# Patient Record
Sex: FEMALE | Race: WHITE | NOT HISPANIC OR LATINO | Employment: UNEMPLOYED | URBAN - METROPOLITAN AREA
[De-identification: names, ages, dates, MRNs, and addresses within clinical notes are randomized per-mention and may not be internally consistent; named-entity substitution may affect disease eponyms.]

---

## 2021-03-12 ENCOUNTER — IMMUNIZATION (OUTPATIENT)
Dept: PRIMARY CARE CLINIC | Facility: CLINIC | Age: 51
End: 2021-03-12
Payer: MEDICARE

## 2021-03-12 DIAGNOSIS — Z23 NEED FOR VACCINATION: Primary | ICD-10-CM

## 2021-03-12 PROCEDURE — 91301 COVID-19, MRNA, LNP-S, PF, 100 MCG/0.5 ML DOSE VACCINE: CPT | Mod: ,,, | Performed by: EMERGENCY MEDICINE

## 2021-03-12 PROCEDURE — 0011A COVID-19, MRNA, LNP-S, PF, 100 MCG/0.5 ML DOSE VACCINE: ICD-10-PCS | Mod: CV19,,, | Performed by: EMERGENCY MEDICINE

## 2021-03-12 PROCEDURE — 91301 COVID-19, MRNA, LNP-S, PF, 100 MCG/0.5 ML DOSE VACCINE: ICD-10-PCS | Mod: ,,, | Performed by: EMERGENCY MEDICINE

## 2021-03-12 PROCEDURE — 0011A COVID-19, MRNA, LNP-S, PF, 100 MCG/0.5 ML DOSE VACCINE: CPT | Mod: CV19,,, | Performed by: EMERGENCY MEDICINE

## 2021-04-09 ENCOUNTER — IMMUNIZATION (OUTPATIENT)
Dept: PRIMARY CARE CLINIC | Facility: CLINIC | Age: 51
End: 2021-04-09
Payer: MEDICARE

## 2021-04-09 DIAGNOSIS — Z23 NEED FOR VACCINATION: Primary | ICD-10-CM

## 2021-04-09 PROCEDURE — 0012A COVID-19, MRNA, LNP-S, PF, 100 MCG/0.5 ML DOSE VACCINE: CPT | Mod: PBBFAC,PN

## 2021-05-27 ENCOUNTER — TELEPHONE (OUTPATIENT)
Dept: HEMATOLOGY/ONCOLOGY | Facility: CLINIC | Age: 51
End: 2021-05-27

## 2021-06-08 PROBLEM — C50.912: Status: ACTIVE | Noted: 2021-06-08

## 2021-06-08 PROBLEM — C78.00 METASTASIS TO LUNG: Status: ACTIVE | Noted: 2021-06-08

## 2021-06-08 PROBLEM — C78.02 MALIGNANT NEOPLASM METASTATIC TO LEFT LUNG: Status: ACTIVE | Noted: 2021-06-08

## 2021-06-09 ENCOUNTER — LAB VISIT (OUTPATIENT)
Dept: LAB | Facility: HOSPITAL | Age: 51
End: 2021-06-09
Attending: INTERNAL MEDICINE
Payer: MEDICARE

## 2021-06-09 ENCOUNTER — OFFICE VISIT (OUTPATIENT)
Dept: HEMATOLOGY/ONCOLOGY | Facility: CLINIC | Age: 51
End: 2021-06-09
Payer: MEDICARE

## 2021-06-09 VITALS
HEART RATE: 111 BPM | DIASTOLIC BLOOD PRESSURE: 99 MMHG | WEIGHT: 245.56 LBS | HEIGHT: 62 IN | RESPIRATION RATE: 22 BRPM | SYSTOLIC BLOOD PRESSURE: 149 MMHG | BODY MASS INDEX: 45.19 KG/M2

## 2021-06-09 DIAGNOSIS — C50.912 BREAST CANCER, STAGE 4, LEFT: ICD-10-CM

## 2021-06-09 DIAGNOSIS — C78.02 MALIGNANT NEOPLASM METASTATIC TO LEFT LUNG: ICD-10-CM

## 2021-06-09 LAB
ALBUMIN SERPL BCP-MCNC: 3.3 G/DL (ref 3.5–5.2)
ALP SERPL-CCNC: 135 U/L (ref 55–135)
ALT SERPL W/O P-5'-P-CCNC: 19 U/L (ref 10–44)
ANION GAP SERPL CALC-SCNC: 10 MMOL/L (ref 8–16)
AST SERPL-CCNC: 24 U/L (ref 10–40)
BASOPHILS # BLD AUTO: 0.04 K/UL (ref 0–0.2)
BASOPHILS NFR BLD: 0.6 % (ref 0–1.9)
BILIRUB SERPL-MCNC: 0.8 MG/DL (ref 0.1–1)
BUN SERPL-MCNC: 8 MG/DL (ref 6–20)
CALCIUM SERPL-MCNC: 9.7 MG/DL (ref 8.7–10.5)
CHLORIDE SERPL-SCNC: 102 MMOL/L (ref 95–110)
CO2 SERPL-SCNC: 27 MMOL/L (ref 23–29)
CREAT SERPL-MCNC: 0.7 MG/DL (ref 0.5–1.4)
DIFFERENTIAL METHOD: ABNORMAL
EOSINOPHIL # BLD AUTO: 0.3 K/UL (ref 0–0.5)
EOSINOPHIL NFR BLD: 4.2 % (ref 0–8)
ERYTHROCYTE [DISTWIDTH] IN BLOOD BY AUTOMATED COUNT: 16.2 % (ref 11.5–14.5)
EST. GFR  (AFRICAN AMERICAN): >60 ML/MIN/1.73 M^2
EST. GFR  (NON AFRICAN AMERICAN): >60 ML/MIN/1.73 M^2
GLUCOSE SERPL-MCNC: 104 MG/DL (ref 70–110)
HCT VFR BLD AUTO: 41.3 % (ref 37–48.5)
HGB BLD-MCNC: 13.7 G/DL (ref 12–16)
IMM GRANULOCYTES # BLD AUTO: 0.02 K/UL (ref 0–0.04)
IMM GRANULOCYTES NFR BLD AUTO: 0.3 % (ref 0–0.5)
LYMPHOCYTES # BLD AUTO: 1.3 K/UL (ref 1–4.8)
LYMPHOCYTES NFR BLD: 19.2 % (ref 18–48)
MCH RBC QN AUTO: 29.9 PG (ref 27–31)
MCHC RBC AUTO-ENTMCNC: 33.2 G/DL (ref 32–36)
MCV RBC AUTO: 90 FL (ref 82–98)
MONOCYTES # BLD AUTO: 0.2 K/UL (ref 0.3–1)
MONOCYTES NFR BLD: 3 % (ref 4–15)
NEUTROPHILS # BLD AUTO: 5.1 K/UL (ref 1.8–7.7)
NEUTROPHILS NFR BLD: 72.7 % (ref 38–73)
NRBC BLD-RTO: 0 /100 WBC
PLATELET # BLD AUTO: 114 K/UL (ref 150–450)
PMV BLD AUTO: 11.8 FL (ref 9.2–12.9)
POTASSIUM SERPL-SCNC: 4 MMOL/L (ref 3.5–5.1)
PROT SERPL-MCNC: 7.4 G/DL (ref 6–8.4)
RBC # BLD AUTO: 4.58 M/UL (ref 4–5.4)
SODIUM SERPL-SCNC: 139 MMOL/L (ref 136–145)
WBC # BLD AUTO: 6.94 K/UL (ref 3.9–12.7)

## 2021-06-09 PROCEDURE — 99214 OFFICE O/P EST MOD 30 MIN: CPT | Mod: PBBFAC | Performed by: INTERNAL MEDICINE

## 2021-06-09 PROCEDURE — 99205 PR OFFICE/OUTPT VISIT, NEW, LEVL V, 60-74 MIN: ICD-10-PCS | Mod: S$PBB,,, | Performed by: INTERNAL MEDICINE

## 2021-06-09 PROCEDURE — 36415 COLL VENOUS BLD VENIPUNCTURE: CPT | Performed by: INTERNAL MEDICINE

## 2021-06-09 PROCEDURE — 85025 COMPLETE CBC W/AUTO DIFF WBC: CPT | Performed by: INTERNAL MEDICINE

## 2021-06-09 PROCEDURE — 99999 PR PBB SHADOW E&M-EST. PATIENT-LVL IV: CPT | Mod: PBBFAC,,, | Performed by: INTERNAL MEDICINE

## 2021-06-09 PROCEDURE — 99999 PR PBB SHADOW E&M-EST. PATIENT-LVL IV: ICD-10-PCS | Mod: PBBFAC,,, | Performed by: INTERNAL MEDICINE

## 2021-06-09 PROCEDURE — 99205 OFFICE O/P NEW HI 60 MIN: CPT | Mod: S$PBB,,, | Performed by: INTERNAL MEDICINE

## 2021-06-09 PROCEDURE — 80053 COMPREHEN METABOLIC PANEL: CPT | Performed by: INTERNAL MEDICINE

## 2021-06-09 RX ORDER — ACETAMINOPHEN 500 MG
1000 TABLET ORAL
COMMUNITY

## 2021-06-09 RX ORDER — ONDANSETRON HYDROCHLORIDE 8 MG/1
8 TABLET, FILM COATED ORAL 3 TIMES DAILY PRN
COMMUNITY
Start: 2021-04-06

## 2021-06-09 RX ORDER — HEPARIN 100 UNIT/ML
500 SYRINGE INTRAVENOUS
Status: CANCELLED | OUTPATIENT
Start: 2021-06-28

## 2021-06-09 RX ORDER — HEPARIN SOD,PORCINE/0.9 % NACL 100/ML
KIT INTRAVENOUS
COMMUNITY
End: 2021-11-29

## 2021-06-09 RX ORDER — SODIUM CHLORIDE 0.9 % (FLUSH) 0.9 %
10 SYRINGE (ML) INJECTION
Status: CANCELLED | OUTPATIENT
Start: 2021-06-28

## 2021-06-09 RX ORDER — LIDOCAINE AND PRILOCAINE 25; 25 MG/G; MG/G
CREAM TOPICAL
COMMUNITY
Start: 2021-05-04

## 2021-06-18 ENCOUNTER — TELEPHONE (OUTPATIENT)
Dept: INTERVENTIONAL RADIOLOGY/VASCULAR | Facility: HOSPITAL | Age: 51
End: 2021-06-18

## 2021-06-22 ENCOUNTER — HOSPITAL ENCOUNTER (OUTPATIENT)
Dept: RADIOLOGY | Facility: HOSPITAL | Age: 51
Discharge: HOME | End: 2021-06-22
Attending: INTERNAL MEDICINE
Payer: MEDICARE

## 2021-06-22 ENCOUNTER — HOSPITAL ENCOUNTER (OUTPATIENT)
Dept: INTERVENTIONAL RADIOLOGY/VASCULAR | Facility: HOSPITAL | Age: 51
Discharge: HOME | End: 2021-06-22
Attending: INTERNAL MEDICINE
Payer: MEDICARE

## 2021-06-22 ENCOUNTER — HOSPITAL ENCOUNTER (OUTPATIENT)
Dept: CARDIOLOGY | Facility: HOSPITAL | Age: 51
Discharge: HOME | End: 2021-06-22
Attending: INTERNAL MEDICINE
Payer: MEDICARE

## 2021-06-22 VITALS
SYSTOLIC BLOOD PRESSURE: 140 MMHG | BODY MASS INDEX: 45.08 KG/M2 | WEIGHT: 245 LBS | DIASTOLIC BLOOD PRESSURE: 82 MMHG | HEART RATE: 92 BPM | HEIGHT: 62 IN

## 2021-06-22 DIAGNOSIS — C50.912 BREAST CANCER, STAGE 4, LEFT: ICD-10-CM

## 2021-06-22 LAB
ASCENDING AORTA: 3.44 CM
AV INDEX (PROSTH): 0.49
AV MEAN GRADIENT: 8 MMHG
AV PEAK GRADIENT: 16 MMHG
AV VALVE AREA: 1.74 CM2
AV VELOCITY RATIO: 0.44
BSA FOR ECHO PROCEDURE: 2.2 M2
CV ECHO LV RWT: 0.36 CM
DOP CALC AO PEAK VEL: 2.02 M/S
DOP CALC AO VTI: 39.18 CM
DOP CALC LVOT AREA: 3.5 CM2
DOP CALC LVOT DIAMETER: 2.12 CM
DOP CALC LVOT PEAK VEL: 0.88 M/S
DOP CALC LVOT STROKE VOLUME: 68.09 CM3
DOP CALCLVOT PEAK VEL VTI: 19.3 CM
E WAVE DECELERATION TIME: 197.93 MSEC
E/A RATIO: 0.74
E/E' RATIO: 9.25 M/S
ECHO LV POSTERIOR WALL: 0.87 CM (ref 0.6–1.1)
EJECTION FRACTION: 55 %
FRACTIONAL SHORTENING: 34 % (ref 28–44)
INTERVENTRICULAR SEPTUM: 0.74 CM (ref 0.6–1.1)
LA MAJOR: 6.04 CM
LA MINOR: 5.76 CM
LA WIDTH: 4.39 CM
LEFT ATRIUM SIZE: 3.78 CM
LEFT ATRIUM VOLUME INDEX MOD: 29.9 ML/M2
LEFT ATRIUM VOLUME INDEX: 40 ML/M2
LEFT ATRIUM VOLUME MOD: 62.18 CM3
LEFT ATRIUM VOLUME: 83.17 CM3
LEFT INTERNAL DIMENSION IN SYSTOLE: 3.15 CM (ref 2.1–4)
LEFT VENTRICLE DIASTOLIC VOLUME INDEX: 51.41 ML/M2
LEFT VENTRICLE DIASTOLIC VOLUME: 106.93 ML
LEFT VENTRICLE MASS INDEX: 61 G/M2
LEFT VENTRICLE SYSTOLIC VOLUME INDEX: 18.9 ML/M2
LEFT VENTRICLE SYSTOLIC VOLUME: 39.36 ML
LEFT VENTRICULAR INTERNAL DIMENSION IN DIASTOLE: 4.79 CM (ref 3.5–6)
LEFT VENTRICULAR MASS: 127.27 G
LV LATERAL E/E' RATIO: 9.25 M/S
LV SEPTAL E/E' RATIO: 9.25 M/S
MV A" WAVE DURATION": 7.23 MSEC
MV PEAK A VEL: 1 M/S
MV PEAK E VEL: 0.74 M/S
MV STENOSIS PRESSURE HALF TIME: 57.4 MS
MV VALVE AREA P 1/2 METHOD: 3.83 CM2
PISA TR MAX VEL: 2.45 M/S
PULM VEIN S/D RATIO: 1.48
PV PEAK D VEL: 0.4 M/S
PV PEAK S VEL: 0.59 M/S
QEF: 54 %
RA MAJOR: 4.95 CM
RA PRESSURE: 3 MMHG
RA WIDTH: 2.94 CM
RIGHT VENTRICULAR END-DIASTOLIC DIMENSION: 3.5 CM
SINUS: 3.2 CM
STJ: 2.53 CM
TDI LATERAL: 0.08 M/S
TDI SEPTAL: 0.08 M/S
TDI: 0.08 M/S
TR MAX PG: 24 MMHG
TRICUSPID ANNULAR PLANE SYSTOLIC EXCURSION: 1.91 CM
TV REST PULMONARY ARTERY PRESSURE: 27 MMHG

## 2021-06-22 PROCEDURE — 25500020 PHARM REV CODE 255: Performed by: STUDENT IN AN ORGANIZED HEALTH CARE EDUCATION/TRAINING PROGRAM

## 2021-06-22 PROCEDURE — 93356 MYOCRD STRAIN IMG SPCKL TRCK: CPT | Mod: ,,, | Performed by: INTERNAL MEDICINE

## 2021-06-22 PROCEDURE — 93306 TTE W/DOPPLER COMPLETE: CPT | Mod: 26,,, | Performed by: INTERNAL MEDICINE

## 2021-06-22 PROCEDURE — 74177 CT ABD & PELVIS W/CONTRAST: CPT | Mod: 26,,, | Performed by: RADIOLOGY

## 2021-06-22 PROCEDURE — 70553 MRI BRAIN W WO CONTRAST: ICD-10-PCS | Mod: 26,,, | Performed by: RADIOLOGY

## 2021-06-22 PROCEDURE — 63600175 PHARM REV CODE 636 W HCPCS: Performed by: STUDENT IN AN ORGANIZED HEALTH CARE EDUCATION/TRAINING PROGRAM

## 2021-06-22 PROCEDURE — 36598 IR PORT CHECK, EXISTING CENTRAL VENOUS LINE W/CONTRAST INJ: ICD-10-PCS | Mod: ,,, | Performed by: STUDENT IN AN ORGANIZED HEALTH CARE EDUCATION/TRAINING PROGRAM

## 2021-06-22 PROCEDURE — 93306 ECHO (CUPID ONLY): ICD-10-PCS | Mod: 26,,, | Performed by: INTERNAL MEDICINE

## 2021-06-22 PROCEDURE — 74177 CT ABD & PELVIS W/CONTRAST: CPT | Mod: TC

## 2021-06-22 PROCEDURE — 74177 CT CHEST ABDOMEN PELVIS WITH CONTRAST (XPD): ICD-10-PCS | Mod: 26,,, | Performed by: RADIOLOGY

## 2021-06-22 PROCEDURE — 93356 ECHO (CUPID ONLY): ICD-10-PCS | Mod: ,,, | Performed by: INTERNAL MEDICINE

## 2021-06-22 PROCEDURE — 70553 MRI BRAIN STEM W/O & W/DYE: CPT | Mod: TC

## 2021-06-22 PROCEDURE — 93306 TTE W/DOPPLER COMPLETE: CPT

## 2021-06-22 PROCEDURE — 25500020 PHARM REV CODE 255: Performed by: INTERNAL MEDICINE

## 2021-06-22 PROCEDURE — A9585 GADOBUTROL INJECTION: HCPCS | Performed by: INTERNAL MEDICINE

## 2021-06-22 PROCEDURE — 70553 MRI BRAIN STEM W/O & W/DYE: CPT | Mod: 26,,, | Performed by: RADIOLOGY

## 2021-06-22 PROCEDURE — 71260 CT CHEST ABDOMEN PELVIS WITH CONTRAST (XPD): ICD-10-PCS | Mod: 26,,, | Performed by: RADIOLOGY

## 2021-06-22 PROCEDURE — 36598 INJ W/FLUOR EVAL CV DEVICE: CPT | Performed by: STUDENT IN AN ORGANIZED HEALTH CARE EDUCATION/TRAINING PROGRAM

## 2021-06-22 PROCEDURE — 71260 CT THORAX DX C+: CPT | Mod: 26,,, | Performed by: RADIOLOGY

## 2021-06-22 RX ORDER — GADOBUTROL 604.72 MG/ML
10 INJECTION INTRAVENOUS
Status: COMPLETED | OUTPATIENT
Start: 2021-06-22 | End: 2021-06-22

## 2021-06-22 RX ORDER — HEPARIN 100 UNIT/ML
SYRINGE INTRAVENOUS CODE/TRAUMA/SEDATION MEDICATION
Status: DISCONTINUED | OUTPATIENT
Start: 2021-06-22 | End: 2021-06-23 | Stop reason: HOSPADM

## 2021-06-22 RX ADMIN — IOHEXOL 100 ML: 350 INJECTION, SOLUTION INTRAVENOUS at 04:06

## 2021-06-22 RX ADMIN — HEPARIN SODIUM (PORCINE) LOCK FLUSH IV SOLN 100 UNIT/ML 5 ML: 100 SOLUTION at 09:06

## 2021-06-22 RX ADMIN — GADOBUTROL 10 ML: 604.72 INJECTION INTRAVENOUS at 02:06

## 2021-06-22 RX ADMIN — IOHEXOL 5 ML: 300 INJECTION, SOLUTION INTRAVENOUS at 09:06

## 2021-06-23 DIAGNOSIS — C50.912 BREAST CANCER, STAGE 4, LEFT: Primary | ICD-10-CM

## 2021-06-25 ENCOUNTER — TELEPHONE (OUTPATIENT)
Dept: HEMATOLOGY/ONCOLOGY | Facility: CLINIC | Age: 51
End: 2021-06-25

## 2021-06-25 ENCOUNTER — TELEPHONE (OUTPATIENT)
Dept: INTERVENTIONAL RADIOLOGY/VASCULAR | Facility: HOSPITAL | Age: 51
End: 2021-06-25

## 2021-06-28 ENCOUNTER — OFFICE VISIT (OUTPATIENT)
Dept: HEMATOLOGY/ONCOLOGY | Facility: CLINIC | Age: 51
End: 2021-06-28
Payer: MEDICARE

## 2021-06-28 ENCOUNTER — INFUSION (OUTPATIENT)
Dept: INFUSION THERAPY | Facility: HOSPITAL | Age: 51
End: 2021-06-28
Payer: MEDICARE

## 2021-06-28 VITALS
HEART RATE: 105 BPM | DIASTOLIC BLOOD PRESSURE: 107 MMHG | RESPIRATION RATE: 18 BRPM | TEMPERATURE: 97 F | HEIGHT: 62 IN | SYSTOLIC BLOOD PRESSURE: 179 MMHG | OXYGEN SATURATION: 95 % | BODY MASS INDEX: 45.72 KG/M2 | WEIGHT: 248.44 LBS

## 2021-06-28 VITALS
RESPIRATION RATE: 18 BRPM | HEART RATE: 80 BPM | SYSTOLIC BLOOD PRESSURE: 160 MMHG | DIASTOLIC BLOOD PRESSURE: 73 MMHG | TEMPERATURE: 98 F | OXYGEN SATURATION: 96 %

## 2021-06-28 DIAGNOSIS — C50.912 BREAST CANCER, STAGE 4, LEFT: Primary | ICD-10-CM

## 2021-06-28 PROCEDURE — 96367 TX/PROPH/DG ADDL SEQ IV INF: CPT

## 2021-06-28 PROCEDURE — 99213 OFFICE O/P EST LOW 20 MIN: CPT | Mod: S$PBB,,, | Performed by: INTERNAL MEDICINE

## 2021-06-28 PROCEDURE — 99213 PR OFFICE/OUTPT VISIT, EST, LEVL III, 20-29 MIN: ICD-10-PCS | Mod: S$PBB,,, | Performed by: INTERNAL MEDICINE

## 2021-06-28 PROCEDURE — 96413 CHEMO IV INFUSION 1 HR: CPT

## 2021-06-28 PROCEDURE — 63600175 PHARM REV CODE 636 W HCPCS: Performed by: INTERNAL MEDICINE

## 2021-06-28 PROCEDURE — 99999 PR PBB SHADOW E&M-EST. PATIENT-LVL III: CPT | Mod: PBBFAC,,, | Performed by: INTERNAL MEDICINE

## 2021-06-28 PROCEDURE — 99999 PR PBB SHADOW E&M-EST. PATIENT-LVL III: ICD-10-PCS | Mod: PBBFAC,,, | Performed by: INTERNAL MEDICINE

## 2021-06-28 PROCEDURE — A4216 STERILE WATER/SALINE, 10 ML: HCPCS | Performed by: INTERNAL MEDICINE

## 2021-06-28 PROCEDURE — 25000003 PHARM REV CODE 250: Performed by: INTERNAL MEDICINE

## 2021-06-28 PROCEDURE — 99213 OFFICE O/P EST LOW 20 MIN: CPT | Mod: PBBFAC,25 | Performed by: INTERNAL MEDICINE

## 2021-06-28 RX ORDER — SODIUM CHLORIDE 0.9 % (FLUSH) 0.9 %
10 SYRINGE (ML) INJECTION
Status: DISCONTINUED | OUTPATIENT
Start: 2021-06-28 | End: 2021-06-28 | Stop reason: HOSPADM

## 2021-06-28 RX ORDER — HEPARIN 100 UNIT/ML
500 SYRINGE INTRAVENOUS
Status: DISCONTINUED | OUTPATIENT
Start: 2021-06-28 | End: 2021-06-28 | Stop reason: HOSPADM

## 2021-06-28 RX ADMIN — HEPARIN 500 UNITS: 100 SYRINGE at 10:06

## 2021-06-28 RX ADMIN — Medication 10 ML: at 10:06

## 2021-06-28 RX ADMIN — SODIUM CHLORIDE 150 MG: 9 INJECTION, SOLUTION INTRAVENOUS at 09:06

## 2021-06-28 RX ADMIN — FAM-TRASTUZUMAB DERUXTECAN-NXKI 600 MG: 100 INJECTION, POWDER, LYOPHILIZED, FOR SOLUTION INTRAVENOUS at 09:06

## 2021-06-28 RX ADMIN — PALONOSETRON HYDROCHLORIDE: 0.25 INJECTION INTRAVENOUS at 08:06

## 2021-06-28 RX ADMIN — SODIUM CHLORIDE: 0.9 INJECTION, SOLUTION INTRAVENOUS at 08:06

## 2021-06-29 ENCOUNTER — TELEPHONE (OUTPATIENT)
Dept: HEMATOLOGY/ONCOLOGY | Facility: CLINIC | Age: 51
End: 2021-06-29

## 2021-07-06 DIAGNOSIS — C50.912 BREAST CANCER, STAGE 4, LEFT: Primary | ICD-10-CM

## 2021-07-06 DIAGNOSIS — R79.1 ABNORMAL COAGULATION PROFILE: ICD-10-CM

## 2021-07-07 ENCOUNTER — TELEPHONE (OUTPATIENT)
Dept: INTERVENTIONAL RADIOLOGY/VASCULAR | Facility: HOSPITAL | Age: 51
End: 2021-07-07

## 2021-07-08 ENCOUNTER — OFFICE VISIT (OUTPATIENT)
Dept: INTERVENTIONAL RADIOLOGY/VASCULAR | Facility: CLINIC | Age: 51
End: 2021-07-08
Payer: MEDICARE

## 2021-07-08 DIAGNOSIS — Z45.2 ENCOUNTER FOR CARE RELATED TO VASCULAR ACCESS PORT: Primary | ICD-10-CM

## 2021-07-08 PROCEDURE — 99214 PR OFFICE/OUTPT VISIT, EST, LEVL IV, 30-39 MIN: ICD-10-PCS | Mod: 95,,, | Performed by: FAMILY MEDICINE

## 2021-07-08 PROCEDURE — 99214 OFFICE O/P EST MOD 30 MIN: CPT | Mod: 95,,, | Performed by: FAMILY MEDICINE

## 2021-07-19 ENCOUNTER — INFUSION (OUTPATIENT)
Dept: INFUSION THERAPY | Facility: HOSPITAL | Age: 51
End: 2021-07-19
Attending: INTERNAL MEDICINE
Payer: MEDICARE

## 2021-07-19 ENCOUNTER — LAB VISIT (OUTPATIENT)
Dept: LAB | Facility: HOSPITAL | Age: 51
End: 2021-07-19
Attending: INTERNAL MEDICINE
Payer: MEDICARE

## 2021-07-19 ENCOUNTER — OFFICE VISIT (OUTPATIENT)
Dept: HEMATOLOGY/ONCOLOGY | Facility: CLINIC | Age: 51
End: 2021-07-19
Payer: MEDICARE

## 2021-07-19 VITALS
OXYGEN SATURATION: 95 % | HEART RATE: 107 BPM | DIASTOLIC BLOOD PRESSURE: 93 MMHG | HEIGHT: 62 IN | SYSTOLIC BLOOD PRESSURE: 147 MMHG | BODY MASS INDEX: 46.17 KG/M2 | WEIGHT: 250.88 LBS | RESPIRATION RATE: 16 BRPM | TEMPERATURE: 98 F

## 2021-07-19 VITALS — HEART RATE: 77 BPM | SYSTOLIC BLOOD PRESSURE: 137 MMHG | DIASTOLIC BLOOD PRESSURE: 84 MMHG

## 2021-07-19 DIAGNOSIS — C50.912 BREAST CANCER, STAGE 4, LEFT: ICD-10-CM

## 2021-07-19 DIAGNOSIS — D51.8 OTHER VITAMIN B12 DEFICIENCY ANEMIAS: ICD-10-CM

## 2021-07-19 DIAGNOSIS — C50.912 BREAST CANCER, STAGE 4, LEFT: Primary | ICD-10-CM

## 2021-07-19 DIAGNOSIS — R53.83 FATIGUE, UNSPECIFIED TYPE: ICD-10-CM

## 2021-07-19 DIAGNOSIS — C78.02 MALIGNANT NEOPLASM METASTATIC TO LEFT LUNG: ICD-10-CM

## 2021-07-19 LAB
ALBUMIN SERPL BCP-MCNC: 3.4 G/DL (ref 3.5–5.2)
ALP SERPL-CCNC: 149 U/L (ref 55–135)
ALT SERPL W/O P-5'-P-CCNC: 21 U/L (ref 10–44)
ANION GAP SERPL CALC-SCNC: 9 MMOL/L (ref 8–16)
AST SERPL-CCNC: 30 U/L (ref 10–40)
BASOPHILS # BLD AUTO: 0.03 K/UL (ref 0–0.2)
BASOPHILS NFR BLD: 0.7 % (ref 0–1.9)
BILIRUB SERPL-MCNC: 0.8 MG/DL (ref 0.1–1)
BUN SERPL-MCNC: 6 MG/DL (ref 6–20)
CALCIUM SERPL-MCNC: 9.7 MG/DL (ref 8.7–10.5)
CHLORIDE SERPL-SCNC: 106 MMOL/L (ref 95–110)
CO2 SERPL-SCNC: 28 MMOL/L (ref 23–29)
CREAT SERPL-MCNC: 0.7 MG/DL (ref 0.5–1.4)
DIFFERENTIAL METHOD: ABNORMAL
EOSINOPHIL # BLD AUTO: 0.3 K/UL (ref 0–0.5)
EOSINOPHIL NFR BLD: 7.5 % (ref 0–8)
ERYTHROCYTE [DISTWIDTH] IN BLOOD BY AUTOMATED COUNT: 17.1 % (ref 11.5–14.5)
EST. GFR  (AFRICAN AMERICAN): >60 ML/MIN/1.73 M^2
EST. GFR  (NON AFRICAN AMERICAN): >60 ML/MIN/1.73 M^2
GLUCOSE SERPL-MCNC: 110 MG/DL (ref 70–110)
HCT VFR BLD AUTO: 42.5 % (ref 37–48.5)
HGB BLD-MCNC: 13.7 G/DL (ref 12–16)
IMM GRANULOCYTES # BLD AUTO: 0 K/UL (ref 0–0.04)
IMM GRANULOCYTES NFR BLD AUTO: 0 % (ref 0–0.5)
LYMPHOCYTES # BLD AUTO: 1 K/UL (ref 1–4.8)
LYMPHOCYTES NFR BLD: 22.9 % (ref 18–48)
MCH RBC QN AUTO: 31 PG (ref 27–31)
MCHC RBC AUTO-ENTMCNC: 32.2 G/DL (ref 32–36)
MCV RBC AUTO: 96 FL (ref 82–98)
MONOCYTES # BLD AUTO: 0.3 K/UL (ref 0.3–1)
MONOCYTES NFR BLD: 7.5 % (ref 4–15)
NEUTROPHILS # BLD AUTO: 2.6 K/UL (ref 1.8–7.7)
NEUTROPHILS NFR BLD: 61.4 % (ref 38–73)
NRBC BLD-RTO: 0 /100 WBC
PLATELET # BLD AUTO: 203 K/UL (ref 150–450)
PMV BLD AUTO: 11.2 FL (ref 9.2–12.9)
POTASSIUM SERPL-SCNC: 3.8 MMOL/L (ref 3.5–5.1)
PROT SERPL-MCNC: 7.3 G/DL (ref 6–8.4)
RBC # BLD AUTO: 4.42 M/UL (ref 4–5.4)
SODIUM SERPL-SCNC: 143 MMOL/L (ref 136–145)
WBC # BLD AUTO: 4.28 K/UL (ref 3.9–12.7)

## 2021-07-19 PROCEDURE — 36415 COLL VENOUS BLD VENIPUNCTURE: CPT | Performed by: INTERNAL MEDICINE

## 2021-07-19 PROCEDURE — 99999 PR PBB SHADOW E&M-EST. PATIENT-LVL III: ICD-10-PCS | Mod: PBBFAC,,, | Performed by: NURSE PRACTITIONER

## 2021-07-19 PROCEDURE — 96367 TX/PROPH/DG ADDL SEQ IV INF: CPT

## 2021-07-19 PROCEDURE — 80053 COMPREHEN METABOLIC PANEL: CPT | Performed by: INTERNAL MEDICINE

## 2021-07-19 PROCEDURE — 99999 PR PBB SHADOW E&M-EST. PATIENT-LVL III: CPT | Mod: PBBFAC,,, | Performed by: NURSE PRACTITIONER

## 2021-07-19 PROCEDURE — 99213 OFFICE O/P EST LOW 20 MIN: CPT | Mod: PBBFAC,25 | Performed by: NURSE PRACTITIONER

## 2021-07-19 PROCEDURE — 85025 COMPLETE CBC W/AUTO DIFF WBC: CPT | Performed by: INTERNAL MEDICINE

## 2021-07-19 PROCEDURE — 99215 PR OFFICE/OUTPT VISIT, EST, LEVL V, 40-54 MIN: ICD-10-PCS | Mod: S$PBB,,, | Performed by: NURSE PRACTITIONER

## 2021-07-19 PROCEDURE — 25000003 PHARM REV CODE 250: Performed by: INTERNAL MEDICINE

## 2021-07-19 PROCEDURE — 96413 CHEMO IV INFUSION 1 HR: CPT

## 2021-07-19 PROCEDURE — 63600175 PHARM REV CODE 636 W HCPCS: Performed by: INTERNAL MEDICINE

## 2021-07-19 PROCEDURE — 99215 OFFICE O/P EST HI 40 MIN: CPT | Mod: S$PBB,,, | Performed by: NURSE PRACTITIONER

## 2021-07-19 RX ORDER — SODIUM CHLORIDE 0.9 % (FLUSH) 0.9 %
10 SYRINGE (ML) INJECTION
Status: DISCONTINUED | OUTPATIENT
Start: 2021-07-19 | End: 2021-07-19 | Stop reason: HOSPADM

## 2021-07-19 RX ORDER — SODIUM CHLORIDE 0.9 % (FLUSH) 0.9 %
10 SYRINGE (ML) INJECTION
Status: CANCELLED | OUTPATIENT
Start: 2021-07-19

## 2021-07-19 RX ORDER — HEPARIN 100 UNIT/ML
500 SYRINGE INTRAVENOUS
Status: CANCELLED | OUTPATIENT
Start: 2021-07-19

## 2021-07-19 RX ORDER — HEPARIN 100 UNIT/ML
500 SYRINGE INTRAVENOUS
Status: DISCONTINUED | OUTPATIENT
Start: 2021-07-19 | End: 2021-07-19 | Stop reason: HOSPADM

## 2021-07-19 RX ADMIN — SODIUM CHLORIDE: 9 INJECTION, SOLUTION INTRAVENOUS at 09:07

## 2021-07-19 RX ADMIN — SODIUM CHLORIDE 150 MG: 0.9 INJECTION, SOLUTION INTRAVENOUS at 09:07

## 2021-07-19 RX ADMIN — HEPARIN 500 UNITS: 100 SYRINGE at 10:07

## 2021-07-19 RX ADMIN — PALONOSETRON: 0.25 INJECTION, SOLUTION INTRAVENOUS at 09:07

## 2021-07-19 RX ADMIN — FAM-TRASTUZUMAB DERUXTECAN-NXKI 600 MG: 100 INJECTION, POWDER, LYOPHILIZED, FOR SOLUTION INTRAVENOUS at 10:07

## 2021-07-20 ENCOUNTER — TELEPHONE (OUTPATIENT)
Dept: HEMATOLOGY/ONCOLOGY | Facility: CLINIC | Age: 51
End: 2021-07-20

## 2021-07-20 ENCOUNTER — PATIENT MESSAGE (OUTPATIENT)
Dept: HEMATOLOGY/ONCOLOGY | Facility: CLINIC | Age: 51
End: 2021-07-20

## 2021-07-26 ENCOUNTER — CLINICAL SUPPORT (OUTPATIENT)
Dept: REHABILITATION | Facility: HOSPITAL | Age: 51
End: 2021-07-26
Attending: NURSE PRACTITIONER
Payer: MEDICARE

## 2021-07-26 DIAGNOSIS — C78.02 MALIGNANT NEOPLASM METASTATIC TO LEFT LUNG: ICD-10-CM

## 2021-07-26 DIAGNOSIS — M25.612 DECREASED ROM OF LEFT SHOULDER: Primary | ICD-10-CM

## 2021-07-26 DIAGNOSIS — Z74.09 IMPAIRED FUNCTIONAL MOBILITY AND ACTIVITY TOLERANCE: ICD-10-CM

## 2021-07-26 DIAGNOSIS — C50.912 BREAST CANCER, STAGE 4, LEFT: ICD-10-CM

## 2021-07-26 DIAGNOSIS — M62.81 MUSCLE WEAKNESS: ICD-10-CM

## 2021-07-26 DIAGNOSIS — I89.0 LYMPHEDEMA: ICD-10-CM

## 2021-07-26 PROCEDURE — 97162 PT EVAL MOD COMPLEX 30 MIN: CPT

## 2021-07-26 PROCEDURE — 97110 THERAPEUTIC EXERCISES: CPT

## 2021-07-27 PROBLEM — Z74.09 IMPAIRED FUNCTIONAL MOBILITY AND ACTIVITY TOLERANCE: Status: ACTIVE | Noted: 2021-07-27

## 2021-07-27 PROBLEM — M62.81 MUSCLE WEAKNESS: Status: ACTIVE | Noted: 2021-07-27

## 2021-07-27 PROBLEM — I89.0 LYMPHEDEMA: Status: ACTIVE | Noted: 2021-07-27

## 2021-07-27 PROBLEM — M25.612 DECREASED ROM OF LEFT SHOULDER: Status: ACTIVE | Noted: 2021-07-27

## 2021-08-02 ENCOUNTER — CLINICAL SUPPORT (OUTPATIENT)
Dept: REHABILITATION | Facility: HOSPITAL | Age: 51
End: 2021-08-02
Payer: MEDICARE

## 2021-08-02 ENCOUNTER — PATIENT MESSAGE (OUTPATIENT)
Dept: HEMATOLOGY/ONCOLOGY | Facility: CLINIC | Age: 51
End: 2021-08-02

## 2021-08-02 DIAGNOSIS — Z74.09 IMPAIRED FUNCTIONAL MOBILITY AND ACTIVITY TOLERANCE: ICD-10-CM

## 2021-08-02 DIAGNOSIS — M25.612 DECREASED ROM OF LEFT SHOULDER: Primary | ICD-10-CM

## 2021-08-02 DIAGNOSIS — M62.81 MUSCLE WEAKNESS: ICD-10-CM

## 2021-08-02 DIAGNOSIS — I89.0 LYMPHEDEMA: ICD-10-CM

## 2021-08-02 PROCEDURE — 97140 MANUAL THERAPY 1/> REGIONS: CPT | Performed by: PHYSICAL MEDICINE & REHABILITATION

## 2021-08-02 PROCEDURE — 97530 THERAPEUTIC ACTIVITIES: CPT | Performed by: PHYSICAL MEDICINE & REHABILITATION

## 2021-08-02 RX ORDER — SODIUM CHLORIDE 0.9 % (FLUSH) 0.9 %
10 SYRINGE (ML) INJECTION
Status: CANCELLED | OUTPATIENT
Start: 2021-08-09

## 2021-08-02 RX ORDER — HEPARIN 100 UNIT/ML
500 SYRINGE INTRAVENOUS
Status: CANCELLED | OUTPATIENT
Start: 2021-08-09

## 2021-08-03 DIAGNOSIS — C78.02 MALIGNANT NEOPLASM METASTATIC TO LEFT LUNG: Primary | ICD-10-CM

## 2021-08-06 ENCOUNTER — CLINICAL SUPPORT (OUTPATIENT)
Dept: REHABILITATION | Facility: HOSPITAL | Age: 51
End: 2021-08-06
Payer: MEDICARE

## 2021-08-06 DIAGNOSIS — Z74.09 IMPAIRED FUNCTIONAL MOBILITY AND ACTIVITY TOLERANCE: ICD-10-CM

## 2021-08-06 DIAGNOSIS — M25.612 DECREASED ROM OF LEFT SHOULDER: Primary | ICD-10-CM

## 2021-08-06 DIAGNOSIS — M62.81 MUSCLE WEAKNESS: ICD-10-CM

## 2021-08-06 DIAGNOSIS — I89.0 LYMPHEDEMA: ICD-10-CM

## 2021-08-06 PROCEDURE — 97110 THERAPEUTIC EXERCISES: CPT | Performed by: PHYSICAL MEDICINE & REHABILITATION

## 2021-08-06 PROCEDURE — 97140 MANUAL THERAPY 1/> REGIONS: CPT | Performed by: PHYSICAL MEDICINE & REHABILITATION

## 2021-08-09 ENCOUNTER — INFUSION (OUTPATIENT)
Dept: INFUSION THERAPY | Facility: HOSPITAL | Age: 51
End: 2021-08-09
Payer: MEDICARE

## 2021-08-09 ENCOUNTER — LAB VISIT (OUTPATIENT)
Dept: LAB | Facility: HOSPITAL | Age: 51
End: 2021-08-09
Payer: MEDICARE

## 2021-08-09 ENCOUNTER — OFFICE VISIT (OUTPATIENT)
Dept: HEMATOLOGY/ONCOLOGY | Facility: CLINIC | Age: 51
End: 2021-08-09
Payer: MEDICARE

## 2021-08-09 VITALS
TEMPERATURE: 99 F | HEART RATE: 75 BPM | BODY MASS INDEX: 45.76 KG/M2 | SYSTOLIC BLOOD PRESSURE: 130 MMHG | WEIGHT: 248.69 LBS | RESPIRATION RATE: 18 BRPM | DIASTOLIC BLOOD PRESSURE: 70 MMHG | OXYGEN SATURATION: 99 % | HEIGHT: 62 IN

## 2021-08-09 VITALS
HEART RATE: 87 BPM | RESPIRATION RATE: 18 BRPM | WEIGHT: 248.69 LBS | SYSTOLIC BLOOD PRESSURE: 137 MMHG | DIASTOLIC BLOOD PRESSURE: 86 MMHG | HEIGHT: 62 IN | TEMPERATURE: 98 F | OXYGEN SATURATION: 95 % | BODY MASS INDEX: 45.76 KG/M2

## 2021-08-09 DIAGNOSIS — C78.02 MALIGNANT NEOPLASM METASTATIC TO LEFT LUNG: ICD-10-CM

## 2021-08-09 DIAGNOSIS — C50.912 BREAST CANCER, STAGE 4, LEFT: Primary | ICD-10-CM

## 2021-08-09 DIAGNOSIS — C50.912 BREAST CANCER, STAGE 4, LEFT: ICD-10-CM

## 2021-08-09 LAB
ALBUMIN SERPL BCP-MCNC: 3.2 G/DL (ref 3.5–5.2)
ALP SERPL-CCNC: 138 U/L (ref 55–135)
ALT SERPL W/O P-5'-P-CCNC: 17 U/L (ref 10–44)
ANION GAP SERPL CALC-SCNC: 9 MMOL/L (ref 8–16)
AST SERPL-CCNC: 25 U/L (ref 10–40)
BILIRUB SERPL-MCNC: 0.6 MG/DL (ref 0.1–1)
BUN SERPL-MCNC: 8 MG/DL (ref 6–20)
CALCIUM SERPL-MCNC: 9 MG/DL (ref 8.7–10.5)
CHLORIDE SERPL-SCNC: 105 MMOL/L (ref 95–110)
CO2 SERPL-SCNC: 27 MMOL/L (ref 23–29)
CREAT SERPL-MCNC: 0.7 MG/DL (ref 0.5–1.4)
ERYTHROCYTE [DISTWIDTH] IN BLOOD BY AUTOMATED COUNT: 17.2 % (ref 11.5–14.5)
EST. GFR  (AFRICAN AMERICAN): >60 ML/MIN/1.73 M^2
EST. GFR  (NON AFRICAN AMERICAN): >60 ML/MIN/1.73 M^2
GLUCOSE SERPL-MCNC: 90 MG/DL (ref 70–110)
HCT VFR BLD AUTO: 39.9 % (ref 37–48.5)
HGB BLD-MCNC: 13.1 G/DL (ref 12–16)
IMM GRANULOCYTES # BLD AUTO: 0.01 K/UL (ref 0–0.04)
MCH RBC QN AUTO: 31.4 PG (ref 27–31)
MCHC RBC AUTO-ENTMCNC: 32.8 G/DL (ref 32–36)
MCV RBC AUTO: 96 FL (ref 82–98)
NEUTROPHILS # BLD AUTO: 2.7 K/UL (ref 1.8–7.7)
PLATELET # BLD AUTO: 184 K/UL (ref 150–450)
PMV BLD AUTO: 10.7 FL (ref 9.2–12.9)
POTASSIUM SERPL-SCNC: 3.9 MMOL/L (ref 3.5–5.1)
PROT SERPL-MCNC: 6.9 G/DL (ref 6–8.4)
RBC # BLD AUTO: 4.17 M/UL (ref 4–5.4)
SODIUM SERPL-SCNC: 141 MMOL/L (ref 136–145)
WBC # BLD AUTO: 4.53 K/UL (ref 3.9–12.7)

## 2021-08-09 PROCEDURE — 99999 PR PBB SHADOW E&M-EST. PATIENT-LVL III: CPT | Mod: PBBFAC,,, | Performed by: NURSE PRACTITIONER

## 2021-08-09 PROCEDURE — 96413 CHEMO IV INFUSION 1 HR: CPT

## 2021-08-09 PROCEDURE — 99215 OFFICE O/P EST HI 40 MIN: CPT | Mod: S$PBB,,, | Performed by: NURSE PRACTITIONER

## 2021-08-09 PROCEDURE — 85027 COMPLETE CBC AUTOMATED: CPT | Performed by: NURSE PRACTITIONER

## 2021-08-09 PROCEDURE — 99215 PR OFFICE/OUTPT VISIT, EST, LEVL V, 40-54 MIN: ICD-10-PCS | Mod: S$PBB,,, | Performed by: NURSE PRACTITIONER

## 2021-08-09 PROCEDURE — 25000003 PHARM REV CODE 250: Performed by: INTERNAL MEDICINE

## 2021-08-09 PROCEDURE — 80053 COMPREHEN METABOLIC PANEL: CPT | Performed by: NURSE PRACTITIONER

## 2021-08-09 PROCEDURE — 63600175 PHARM REV CODE 636 W HCPCS: Performed by: INTERNAL MEDICINE

## 2021-08-09 PROCEDURE — 36415 COLL VENOUS BLD VENIPUNCTURE: CPT | Performed by: NURSE PRACTITIONER

## 2021-08-09 PROCEDURE — 99999 PR PBB SHADOW E&M-EST. PATIENT-LVL III: ICD-10-PCS | Mod: PBBFAC,,, | Performed by: NURSE PRACTITIONER

## 2021-08-09 PROCEDURE — 99213 OFFICE O/P EST LOW 20 MIN: CPT | Mod: PBBFAC,25 | Performed by: NURSE PRACTITIONER

## 2021-08-09 PROCEDURE — 96367 TX/PROPH/DG ADDL SEQ IV INF: CPT

## 2021-08-09 RX ORDER — HEPARIN 100 UNIT/ML
500 SYRINGE INTRAVENOUS
Status: DISCONTINUED | OUTPATIENT
Start: 2021-08-09 | End: 2021-08-09 | Stop reason: HOSPADM

## 2021-08-09 RX ORDER — SODIUM CHLORIDE 0.9 % (FLUSH) 0.9 %
10 SYRINGE (ML) INJECTION
Status: DISCONTINUED | OUTPATIENT
Start: 2021-08-09 | End: 2021-08-09 | Stop reason: HOSPADM

## 2021-08-09 RX ADMIN — HEPARIN 500 UNITS: 100 SYRINGE at 11:08

## 2021-08-09 RX ADMIN — PALONOSETRON HYDROCHLORIDE 0.25 MG: 0.25 INJECTION, SOLUTION INTRAVENOUS at 10:08

## 2021-08-09 RX ADMIN — SODIUM CHLORIDE 150 MG: 9 INJECTION, SOLUTION INTRAVENOUS at 09:08

## 2021-08-09 RX ADMIN — FAM-TRASTUZUMAB DERUXTECAN-NXKI 600 MG: 100 INJECTION, POWDER, LYOPHILIZED, FOR SOLUTION INTRAVENOUS at 10:08

## 2021-08-10 DIAGNOSIS — C50.912 BREAST CANCER, STAGE 4, LEFT: Primary | ICD-10-CM

## 2021-08-10 DIAGNOSIS — R79.1 ABNORMAL COAGULATION PROFILE: ICD-10-CM

## 2021-08-12 ENCOUNTER — TELEPHONE (OUTPATIENT)
Dept: INTERVENTIONAL RADIOLOGY/VASCULAR | Facility: CLINIC | Age: 51
End: 2021-08-12

## 2021-08-12 ENCOUNTER — PATIENT MESSAGE (OUTPATIENT)
Dept: HEMATOLOGY/ONCOLOGY | Facility: CLINIC | Age: 51
End: 2021-08-12

## 2021-08-27 ENCOUNTER — PATIENT MESSAGE (OUTPATIENT)
Dept: HEMATOLOGY/ONCOLOGY | Facility: CLINIC | Age: 51
End: 2021-08-27

## 2021-09-13 ENCOUNTER — OFFICE VISIT (OUTPATIENT)
Dept: HEMATOLOGY/ONCOLOGY | Facility: CLINIC | Age: 51
End: 2021-09-13
Payer: MEDICARE

## 2021-09-13 ENCOUNTER — TELEPHONE (OUTPATIENT)
Dept: HEMATOLOGY/ONCOLOGY | Facility: CLINIC | Age: 51
End: 2021-09-13

## 2021-09-13 ENCOUNTER — LAB VISIT (OUTPATIENT)
Dept: LAB | Facility: HOSPITAL | Age: 51
End: 2021-09-13
Attending: NURSE PRACTITIONER
Payer: MEDICARE

## 2021-09-13 VITALS
OXYGEN SATURATION: 98 % | WEIGHT: 244.69 LBS | TEMPERATURE: 97 F | RESPIRATION RATE: 18 BRPM | HEIGHT: 62 IN | BODY MASS INDEX: 45.03 KG/M2 | SYSTOLIC BLOOD PRESSURE: 139 MMHG | HEART RATE: 93 BPM | DIASTOLIC BLOOD PRESSURE: 87 MMHG

## 2021-09-13 DIAGNOSIS — Z12.89 ENCOUNTER FOR SCREENING FOR MALIGNANT NEOPLASM OF OTHER SITES: ICD-10-CM

## 2021-09-13 DIAGNOSIS — C50.912 BREAST CANCER, STAGE 4, LEFT: ICD-10-CM

## 2021-09-13 DIAGNOSIS — C78.02 MALIGNANT NEOPLASM METASTATIC TO LEFT LUNG: ICD-10-CM

## 2021-09-13 DIAGNOSIS — C50.912 BREAST CANCER, STAGE 4, LEFT: Primary | ICD-10-CM

## 2021-09-13 LAB
ALBUMIN SERPL BCP-MCNC: 3.5 G/DL (ref 3.5–5.2)
ALP SERPL-CCNC: 156 U/L (ref 55–135)
ALT SERPL W/O P-5'-P-CCNC: 19 U/L (ref 10–44)
ANION GAP SERPL CALC-SCNC: 10 MMOL/L (ref 8–16)
AST SERPL-CCNC: 32 U/L (ref 10–40)
BILIRUB SERPL-MCNC: 0.8 MG/DL (ref 0.1–1)
BUN SERPL-MCNC: 8 MG/DL (ref 6–20)
CALCIUM SERPL-MCNC: 9.2 MG/DL (ref 8.7–10.5)
CHLORIDE SERPL-SCNC: 106 MMOL/L (ref 95–110)
CO2 SERPL-SCNC: 25 MMOL/L (ref 23–29)
CREAT SERPL-MCNC: 0.7 MG/DL (ref 0.5–1.4)
ERYTHROCYTE [DISTWIDTH] IN BLOOD BY AUTOMATED COUNT: 16.1 % (ref 11.5–14.5)
EST. GFR  (AFRICAN AMERICAN): >60 ML/MIN/1.73 M^2
EST. GFR  (NON AFRICAN AMERICAN): >60 ML/MIN/1.73 M^2
GLUCOSE SERPL-MCNC: 86 MG/DL (ref 70–110)
HCT VFR BLD AUTO: 42 % (ref 37–48.5)
HGB BLD-MCNC: 14 G/DL (ref 12–16)
IMM GRANULOCYTES # BLD AUTO: 0.01 K/UL (ref 0–0.04)
MCH RBC QN AUTO: 32 PG (ref 27–31)
MCHC RBC AUTO-ENTMCNC: 33.3 G/DL (ref 32–36)
MCV RBC AUTO: 96 FL (ref 82–98)
NEUTROPHILS # BLD AUTO: 2.9 K/UL (ref 1.8–7.7)
PLATELET # BLD AUTO: 123 K/UL (ref 150–450)
PMV BLD AUTO: 11.6 FL (ref 9.2–12.9)
POTASSIUM SERPL-SCNC: 3.9 MMOL/L (ref 3.5–5.1)
PROT SERPL-MCNC: 7.2 G/DL (ref 6–8.4)
RBC # BLD AUTO: 4.38 M/UL (ref 4–5.4)
SODIUM SERPL-SCNC: 141 MMOL/L (ref 136–145)
WBC # BLD AUTO: 4.61 K/UL (ref 3.9–12.7)

## 2021-09-13 PROCEDURE — 99215 OFFICE O/P EST HI 40 MIN: CPT | Mod: S$PBB,,, | Performed by: NURSE PRACTITIONER

## 2021-09-13 PROCEDURE — 85027 COMPLETE CBC AUTOMATED: CPT | Performed by: NURSE PRACTITIONER

## 2021-09-13 PROCEDURE — 99215 PR OFFICE/OUTPT VISIT, EST, LEVL V, 40-54 MIN: ICD-10-PCS | Mod: S$PBB,,, | Performed by: NURSE PRACTITIONER

## 2021-09-13 PROCEDURE — 80053 COMPREHEN METABOLIC PANEL: CPT | Performed by: NURSE PRACTITIONER

## 2021-09-13 PROCEDURE — 99999 PR PBB SHADOW E&M-EST. PATIENT-LVL IV: ICD-10-PCS | Mod: PBBFAC,,, | Performed by: NURSE PRACTITIONER

## 2021-09-13 PROCEDURE — 36415 COLL VENOUS BLD VENIPUNCTURE: CPT | Performed by: NURSE PRACTITIONER

## 2021-09-13 PROCEDURE — 99999 PR PBB SHADOW E&M-EST. PATIENT-LVL IV: CPT | Mod: PBBFAC,,, | Performed by: NURSE PRACTITIONER

## 2021-09-13 PROCEDURE — 99214 OFFICE O/P EST MOD 30 MIN: CPT | Mod: PBBFAC | Performed by: NURSE PRACTITIONER

## 2021-09-13 RX ORDER — DOXYCYCLINE 100 MG/1
100 CAPSULE ORAL 2 TIMES DAILY
COMMUNITY
Start: 2021-09-12 | End: 2021-11-29

## 2021-09-20 ENCOUNTER — INFUSION (OUTPATIENT)
Dept: INFUSION THERAPY | Facility: HOSPITAL | Age: 51
End: 2021-09-20
Payer: MEDICARE

## 2021-09-20 VITALS
WEIGHT: 245.13 LBS | HEIGHT: 62 IN | HEART RATE: 77 BPM | TEMPERATURE: 98 F | RESPIRATION RATE: 18 BRPM | SYSTOLIC BLOOD PRESSURE: 134 MMHG | BODY MASS INDEX: 45.11 KG/M2 | DIASTOLIC BLOOD PRESSURE: 61 MMHG

## 2021-09-20 DIAGNOSIS — C50.912 BREAST CANCER, STAGE 4, LEFT: Primary | ICD-10-CM

## 2021-09-20 PROCEDURE — 63600175 PHARM REV CODE 636 W HCPCS: Performed by: INTERNAL MEDICINE

## 2021-09-20 PROCEDURE — 96413 CHEMO IV INFUSION 1 HR: CPT

## 2021-09-20 PROCEDURE — 25000003 PHARM REV CODE 250: Performed by: NURSE PRACTITIONER

## 2021-09-20 PROCEDURE — 25000003 PHARM REV CODE 250: Performed by: INTERNAL MEDICINE

## 2021-09-20 PROCEDURE — 96367 TX/PROPH/DG ADDL SEQ IV INF: CPT

## 2021-09-20 PROCEDURE — A4216 STERILE WATER/SALINE, 10 ML: HCPCS | Performed by: INTERNAL MEDICINE

## 2021-09-20 RX ORDER — SODIUM CHLORIDE 0.9 % (FLUSH) 0.9 %
10 SYRINGE (ML) INJECTION
Status: DISCONTINUED | OUTPATIENT
Start: 2021-09-20 | End: 2021-09-20 | Stop reason: HOSPADM

## 2021-09-20 RX ORDER — SODIUM CHLORIDE 0.9 % (FLUSH) 0.9 %
10 SYRINGE (ML) INJECTION
Status: CANCELLED | OUTPATIENT
Start: 2021-09-20

## 2021-09-20 RX ORDER — HEPARIN 100 UNIT/ML
500 SYRINGE INTRAVENOUS
Status: CANCELLED | OUTPATIENT
Start: 2021-09-20

## 2021-09-20 RX ORDER — HEPARIN 100 UNIT/ML
500 SYRINGE INTRAVENOUS
Status: DISCONTINUED | OUTPATIENT
Start: 2021-09-20 | End: 2021-09-20 | Stop reason: HOSPADM

## 2021-09-20 RX ADMIN — PALONOSETRON HYDROCHLORIDE 0.25 MG: 0.25 INJECTION, SOLUTION INTRAVENOUS at 02:09

## 2021-09-20 RX ADMIN — FAM-TRASTUZUMAB DERUXTECAN-NXKI 600 MG: 100 INJECTION, POWDER, LYOPHILIZED, FOR SOLUTION INTRAVENOUS at 03:09

## 2021-09-20 RX ADMIN — SODIUM CHLORIDE: 9 INJECTION, SOLUTION INTRAVENOUS at 02:09

## 2021-09-20 RX ADMIN — DEXTROSE: 50 INJECTION, SOLUTION INTRAVENOUS at 03:09

## 2021-09-20 RX ADMIN — SODIUM CHLORIDE 150 MG: 9 INJECTION, SOLUTION INTRAVENOUS at 02:09

## 2021-09-20 RX ADMIN — HEPARIN 500 UNITS: 100 SYRINGE at 04:09

## 2021-09-20 RX ADMIN — Medication 10 ML: at 04:09

## 2021-09-27 ENCOUNTER — DOCUMENTATION ONLY (OUTPATIENT)
Dept: REHABILITATION | Facility: HOSPITAL | Age: 51
End: 2021-09-27

## 2021-09-27 DIAGNOSIS — M62.81 MUSCLE WEAKNESS: ICD-10-CM

## 2021-09-27 DIAGNOSIS — M25.612 DECREASED ROM OF LEFT SHOULDER: Primary | ICD-10-CM

## 2021-09-27 DIAGNOSIS — I89.0 LYMPHEDEMA: ICD-10-CM

## 2021-09-27 DIAGNOSIS — Z74.09 IMPAIRED FUNCTIONAL MOBILITY AND ACTIVITY TOLERANCE: ICD-10-CM

## 2021-10-08 ENCOUNTER — HOSPITAL ENCOUNTER (OUTPATIENT)
Dept: RADIOLOGY | Facility: HOSPITAL | Age: 51
Discharge: HOME | End: 2021-10-08
Attending: NURSE PRACTITIONER
Payer: MEDICARE

## 2021-10-08 ENCOUNTER — HOSPITAL ENCOUNTER (OUTPATIENT)
Dept: CARDIOLOGY | Facility: HOSPITAL | Age: 51
Discharge: HOME | End: 2021-10-08
Attending: NURSE PRACTITIONER
Payer: MEDICARE

## 2021-10-08 VITALS
BODY MASS INDEX: 45.08 KG/M2 | SYSTOLIC BLOOD PRESSURE: 130 MMHG | WEIGHT: 245 LBS | DIASTOLIC BLOOD PRESSURE: 70 MMHG | HEIGHT: 62 IN | HEART RATE: 80 BPM

## 2021-10-08 DIAGNOSIS — C50.912 BREAST CANCER, STAGE 4, LEFT: ICD-10-CM

## 2021-10-08 DIAGNOSIS — Z12.89 ENCOUNTER FOR SCREENING FOR MALIGNANT NEOPLASM OF OTHER SITES: ICD-10-CM

## 2021-10-08 LAB
ASCENDING AORTA: 3.24 CM
AV INDEX (PROSTH): 0.61
AV MEAN GRADIENT: 8 MMHG
AV PEAK GRADIENT: 14 MMHG
AV VALVE AREA: 1.86 CM2
AV VELOCITY RATIO: 0.52
BSA FOR ECHO PROCEDURE: 2.2 M2
CV ECHO LV RWT: 0.32 CM
DOP CALC AO PEAK VEL: 1.86 M/S
DOP CALC AO VTI: 35.65 CM
DOP CALC LVOT AREA: 3 CM2
DOP CALC LVOT DIAMETER: 1.97 CM
DOP CALC LVOT PEAK VEL: 0.96 M/S
DOP CALC LVOT STROKE VOLUME: 66.14 CM3
DOP CALCLVOT PEAK VEL VTI: 21.71 CM
E WAVE DECELERATION TIME: 143.03 MSEC
E/A RATIO: 0.95
E/E' RATIO: 9.56 M/S
ECHO LV POSTERIOR WALL: 0.83 CM (ref 0.6–1.1)
EJECTION FRACTION: 55 %
FRACTIONAL SHORTENING: 33 % (ref 28–44)
INTERVENTRICULAR SEPTUM: 0.84 CM (ref 0.6–1.1)
IVRT: 74.22 MSEC
LA MAJOR: 5.22 CM
LA MINOR: 5.05 CM
LA WIDTH: 4.33 CM
LEFT ATRIUM SIZE: 4.64 CM
LEFT ATRIUM VOLUME INDEX MOD: 31.5 ML/M2
LEFT ATRIUM VOLUME INDEX: 42.1 ML/M2
LEFT ATRIUM VOLUME MOD: 65.5 CM3
LEFT ATRIUM VOLUME: 87.67 CM3
LEFT INTERNAL DIMENSION IN SYSTOLE: 3.48 CM (ref 2.1–4)
LEFT VENTRICLE DIASTOLIC VOLUME INDEX: 61.46 ML/M2
LEFT VENTRICLE DIASTOLIC VOLUME: 127.83 ML
LEFT VENTRICLE MASS INDEX: 73 G/M2
LEFT VENTRICLE SYSTOLIC VOLUME INDEX: 24.1 ML/M2
LEFT VENTRICLE SYSTOLIC VOLUME: 50.14 ML
LEFT VENTRICULAR INTERNAL DIMENSION IN DIASTOLE: 5.17 CM (ref 3.5–6)
LEFT VENTRICULAR MASS: 151.88 G
LV LATERAL E/E' RATIO: 9.56 M/S
LV SEPTAL E/E' RATIO: 9.56 M/S
MV A" WAVE DURATION": 7.71 MSEC
MV PEAK A VEL: 0.91 M/S
MV PEAK E VEL: 0.86 M/S
MV STENOSIS PRESSURE HALF TIME: 41.48 MS
MV VALVE AREA P 1/2 METHOD: 5.3 CM2
PISA TR MAX VEL: 2.3 M/S
PULM VEIN S/D RATIO: 1.32
PV PEAK D VEL: 0.57 M/S
PV PEAK S VEL: 0.75 M/S
QEF: 55 %
RA MAJOR: 4.05 CM
RA PRESSURE: 3 MMHG
RA WIDTH: 3.88 CM
RIGHT VENTRICULAR END-DIASTOLIC DIMENSION: 3.78 CM
RV TISSUE DOPPLER FREE WALL SYSTOLIC VELOCITY 1 (APICAL 4 CHAMBER VIEW): 11.43 CM/S
SINUS: 2.99 CM
STJ: 2.63 CM
TDI LATERAL: 0.09 M/S
TDI SEPTAL: 0.09 M/S
TDI: 0.09 M/S
TR MAX PG: 21 MMHG
TRICUSPID ANNULAR PLANE SYSTOLIC EXCURSION: 2.21 CM
TV REST PULMONARY ARTERY PRESSURE: 24 MMHG

## 2021-10-08 PROCEDURE — 25500020 PHARM REV CODE 255: Performed by: NURSE PRACTITIONER

## 2021-10-08 PROCEDURE — 93356 MYOCRD STRAIN IMG SPCKL TRCK: CPT | Mod: ,,, | Performed by: INTERNAL MEDICINE

## 2021-10-08 PROCEDURE — 93306 ECHO (CUPID ONLY): ICD-10-PCS | Mod: 26,,, | Performed by: INTERNAL MEDICINE

## 2021-10-08 PROCEDURE — 74177 CT ABD & PELVIS W/CONTRAST: CPT | Mod: TC

## 2021-10-08 PROCEDURE — 74177 CT CHEST ABDOMEN PELVIS WITH CONTRAST (XPD): ICD-10-PCS | Mod: 26,,, | Performed by: RADIOLOGY

## 2021-10-08 PROCEDURE — 74177 CT ABD & PELVIS W/CONTRAST: CPT | Mod: 26,,, | Performed by: RADIOLOGY

## 2021-10-08 PROCEDURE — 93306 TTE W/DOPPLER COMPLETE: CPT | Mod: 26,,, | Performed by: INTERNAL MEDICINE

## 2021-10-08 PROCEDURE — 71260 CT CHEST ABDOMEN PELVIS WITH CONTRAST (XPD): ICD-10-PCS | Mod: 26,,, | Performed by: RADIOLOGY

## 2021-10-08 PROCEDURE — 93306 TTE W/DOPPLER COMPLETE: CPT

## 2021-10-08 PROCEDURE — 93356 ECHO (CUPID ONLY): ICD-10-PCS | Mod: ,,, | Performed by: INTERNAL MEDICINE

## 2021-10-08 PROCEDURE — 71260 CT THORAX DX C+: CPT | Mod: TC

## 2021-10-08 PROCEDURE — 71260 CT THORAX DX C+: CPT | Mod: 26,,, | Performed by: RADIOLOGY

## 2021-10-08 RX ADMIN — IOHEXOL 100 ML: 350 INJECTION, SOLUTION INTRAVENOUS at 10:10

## 2021-10-08 RX ADMIN — IOHEXOL 15 ML: 350 INJECTION, SOLUTION INTRAVENOUS at 10:10

## 2021-10-11 ENCOUNTER — OFFICE VISIT (OUTPATIENT)
Dept: HEMATOLOGY/ONCOLOGY | Facility: CLINIC | Age: 51
End: 2021-10-11
Payer: MEDICARE

## 2021-10-11 ENCOUNTER — INFUSION (OUTPATIENT)
Dept: INFUSION THERAPY | Facility: HOSPITAL | Age: 51
End: 2021-10-11
Payer: MEDICARE

## 2021-10-11 VITALS
SYSTOLIC BLOOD PRESSURE: 139 MMHG | HEART RATE: 97 BPM | TEMPERATURE: 98 F | RESPIRATION RATE: 16 BRPM | OXYGEN SATURATION: 95 % | BODY MASS INDEX: 45.72 KG/M2 | WEIGHT: 248.44 LBS | DIASTOLIC BLOOD PRESSURE: 90 MMHG | HEIGHT: 62 IN

## 2021-10-11 VITALS
RESPIRATION RATE: 18 BRPM | HEART RATE: 81 BPM | SYSTOLIC BLOOD PRESSURE: 152 MMHG | TEMPERATURE: 98 F | DIASTOLIC BLOOD PRESSURE: 70 MMHG

## 2021-10-11 DIAGNOSIS — C50.912 BREAST CANCER, STAGE 4, LEFT: Primary | ICD-10-CM

## 2021-10-11 DIAGNOSIS — C78.02 MALIGNANT NEOPLASM METASTATIC TO LEFT LUNG: ICD-10-CM

## 2021-10-11 PROCEDURE — 99999 PR PBB SHADOW E&M-EST. PATIENT-LVL III: ICD-10-PCS | Mod: PBBFAC,,, | Performed by: INTERNAL MEDICINE

## 2021-10-11 PROCEDURE — 96413 CHEMO IV INFUSION 1 HR: CPT

## 2021-10-11 PROCEDURE — 25000003 PHARM REV CODE 250: Performed by: INTERNAL MEDICINE

## 2021-10-11 PROCEDURE — 99999 PR PBB SHADOW E&M-EST. PATIENT-LVL III: CPT | Mod: PBBFAC,,, | Performed by: INTERNAL MEDICINE

## 2021-10-11 PROCEDURE — 99214 OFFICE O/P EST MOD 30 MIN: CPT | Mod: S$PBB,,, | Performed by: INTERNAL MEDICINE

## 2021-10-11 PROCEDURE — 99213 OFFICE O/P EST LOW 20 MIN: CPT | Mod: PBBFAC,25 | Performed by: INTERNAL MEDICINE

## 2021-10-11 PROCEDURE — 99214 PR OFFICE/OUTPT VISIT, EST, LEVL IV, 30-39 MIN: ICD-10-PCS | Mod: S$PBB,,, | Performed by: INTERNAL MEDICINE

## 2021-10-11 PROCEDURE — 96367 TX/PROPH/DG ADDL SEQ IV INF: CPT

## 2021-10-11 PROCEDURE — 63600175 PHARM REV CODE 636 W HCPCS: Performed by: INTERNAL MEDICINE

## 2021-10-11 RX ORDER — HEPARIN 100 UNIT/ML
500 SYRINGE INTRAVENOUS
Status: CANCELLED | OUTPATIENT
Start: 2021-10-11

## 2021-10-11 RX ORDER — SODIUM CHLORIDE 0.9 % (FLUSH) 0.9 %
10 SYRINGE (ML) INJECTION
Status: CANCELLED | OUTPATIENT
Start: 2021-10-11

## 2021-10-11 RX ORDER — SODIUM CHLORIDE 0.9 % (FLUSH) 0.9 %
10 SYRINGE (ML) INJECTION
Status: DISCONTINUED | OUTPATIENT
Start: 2021-10-11 | End: 2021-10-11 | Stop reason: HOSPADM

## 2021-10-11 RX ORDER — HEPARIN 100 UNIT/ML
500 SYRINGE INTRAVENOUS
Status: DISCONTINUED | OUTPATIENT
Start: 2021-10-11 | End: 2021-10-11 | Stop reason: HOSPADM

## 2021-10-11 RX ADMIN — PALONOSETRON HYDROCHLORIDE 0.25 MG: 0.25 INJECTION, SOLUTION INTRAVENOUS at 02:10

## 2021-10-11 RX ADMIN — HEPARIN 500 UNITS: 100 SYRINGE at 04:10

## 2021-10-11 RX ADMIN — SODIUM CHLORIDE: 9 INJECTION, SOLUTION INTRAVENOUS at 12:10

## 2021-10-11 RX ADMIN — SODIUM CHLORIDE 150 MG: 9 INJECTION, SOLUTION INTRAVENOUS at 02:10

## 2021-10-11 RX ADMIN — FAM-TRASTUZUMAB DERUXTECAN-NXKI 600 MG: 100 INJECTION, POWDER, LYOPHILIZED, FOR SOLUTION INTRAVENOUS at 03:10

## 2021-10-13 ENCOUNTER — TELEPHONE (OUTPATIENT)
Dept: HEMATOLOGY/ONCOLOGY | Facility: CLINIC | Age: 51
End: 2021-10-13

## 2021-11-08 ENCOUNTER — OFFICE VISIT (OUTPATIENT)
Dept: HEMATOLOGY/ONCOLOGY | Facility: CLINIC | Age: 51
End: 2021-11-08
Payer: MEDICARE

## 2021-11-08 ENCOUNTER — INFUSION (OUTPATIENT)
Dept: INFUSION THERAPY | Facility: HOSPITAL | Age: 51
End: 2021-11-08
Payer: MEDICARE

## 2021-11-08 VITALS
HEART RATE: 93 BPM | TEMPERATURE: 98 F | DIASTOLIC BLOOD PRESSURE: 88 MMHG | HEIGHT: 62 IN | OXYGEN SATURATION: 97 % | BODY MASS INDEX: 44.79 KG/M2 | RESPIRATION RATE: 18 BRPM | WEIGHT: 243.38 LBS | SYSTOLIC BLOOD PRESSURE: 159 MMHG

## 2021-11-08 VITALS — SYSTOLIC BLOOD PRESSURE: 122 MMHG | DIASTOLIC BLOOD PRESSURE: 58 MMHG | HEART RATE: 75 BPM

## 2021-11-08 DIAGNOSIS — C50.912 BREAST CANCER, STAGE 4, LEFT: Primary | ICD-10-CM

## 2021-11-08 DIAGNOSIS — C78.02 MALIGNANT NEOPLASM METASTATIC TO LEFT LUNG: ICD-10-CM

## 2021-11-08 PROCEDURE — 99213 OFFICE O/P EST LOW 20 MIN: CPT | Mod: PBBFAC,25 | Performed by: INTERNAL MEDICINE

## 2021-11-08 PROCEDURE — 63600175 PHARM REV CODE 636 W HCPCS: Mod: TB | Performed by: INTERNAL MEDICINE

## 2021-11-08 PROCEDURE — 25000003 PHARM REV CODE 250: Performed by: INTERNAL MEDICINE

## 2021-11-08 PROCEDURE — 96413 CHEMO IV INFUSION 1 HR: CPT

## 2021-11-08 PROCEDURE — 99214 OFFICE O/P EST MOD 30 MIN: CPT | Mod: S$PBB,,, | Performed by: INTERNAL MEDICINE

## 2021-11-08 PROCEDURE — 99999 PR PBB SHADOW E&M-EST. PATIENT-LVL III: CPT | Mod: PBBFAC,,, | Performed by: INTERNAL MEDICINE

## 2021-11-08 PROCEDURE — 99214 PR OFFICE/OUTPT VISIT, EST, LEVL IV, 30-39 MIN: ICD-10-PCS | Mod: S$PBB,,, | Performed by: INTERNAL MEDICINE

## 2021-11-08 PROCEDURE — 96367 TX/PROPH/DG ADDL SEQ IV INF: CPT

## 2021-11-08 PROCEDURE — 99999 PR PBB SHADOW E&M-EST. PATIENT-LVL III: ICD-10-PCS | Mod: PBBFAC,,, | Performed by: INTERNAL MEDICINE

## 2021-11-08 PROCEDURE — A4216 STERILE WATER/SALINE, 10 ML: HCPCS | Performed by: INTERNAL MEDICINE

## 2021-11-08 RX ORDER — HEPARIN 100 UNIT/ML
500 SYRINGE INTRAVENOUS
Status: DISCONTINUED | OUTPATIENT
Start: 2021-11-08 | End: 2021-11-08 | Stop reason: HOSPADM

## 2021-11-08 RX ORDER — SODIUM CHLORIDE 0.9 % (FLUSH) 0.9 %
10 SYRINGE (ML) INJECTION
Status: DISCONTINUED | OUTPATIENT
Start: 2021-11-08 | End: 2021-11-08 | Stop reason: HOSPADM

## 2021-11-08 RX ORDER — SODIUM CHLORIDE 0.9 % (FLUSH) 0.9 %
10 SYRINGE (ML) INJECTION
Status: CANCELLED | OUTPATIENT
Start: 2021-11-08

## 2021-11-08 RX ORDER — HEPARIN 100 UNIT/ML
500 SYRINGE INTRAVENOUS
Status: CANCELLED | OUTPATIENT
Start: 2021-11-08

## 2021-11-08 RX ADMIN — FOSAPREPITANT DIMEGLUMINE 150 MG: 150 INJECTION, POWDER, LYOPHILIZED, FOR SOLUTION INTRAVENOUS at 02:11

## 2021-11-08 RX ADMIN — HEPARIN 500 UNITS: 100 SYRINGE at 03:11

## 2021-11-08 RX ADMIN — SODIUM CHLORIDE: 0.9 INJECTION, SOLUTION INTRAVENOUS at 01:11

## 2021-11-08 RX ADMIN — Medication 10 ML: at 03:11

## 2021-11-08 RX ADMIN — PALONOSETRON HYDROCHLORIDE 0.25 MG: 0.25 INJECTION, SOLUTION INTRAVENOUS at 02:11

## 2021-11-08 RX ADMIN — FAM-TRASTUZUMAB DERUXTECAN-NXKI 600 MG: 100 INJECTION, POWDER, LYOPHILIZED, FOR SOLUTION INTRAVENOUS at 02:11

## 2021-11-12 ENCOUNTER — TELEPHONE (OUTPATIENT)
Dept: HEMATOLOGY/ONCOLOGY | Facility: CLINIC | Age: 51
End: 2021-11-12
Payer: MEDICARE

## 2021-11-29 ENCOUNTER — LAB VISIT (OUTPATIENT)
Dept: LAB | Facility: HOSPITAL | Age: 51
End: 2021-11-29
Attending: NURSE PRACTITIONER
Payer: MEDICARE

## 2021-11-29 ENCOUNTER — INFUSION (OUTPATIENT)
Dept: INFUSION THERAPY | Facility: HOSPITAL | Age: 51
End: 2021-11-29
Attending: NURSE PRACTITIONER
Payer: MEDICARE

## 2021-11-29 ENCOUNTER — OFFICE VISIT (OUTPATIENT)
Dept: HEMATOLOGY/ONCOLOGY | Facility: CLINIC | Age: 51
End: 2021-11-29
Payer: MEDICARE

## 2021-11-29 VITALS
BODY MASS INDEX: 45.48 KG/M2 | DIASTOLIC BLOOD PRESSURE: 96 MMHG | SYSTOLIC BLOOD PRESSURE: 143 MMHG | HEART RATE: 91 BPM | RESPIRATION RATE: 20 BRPM | OXYGEN SATURATION: 99 % | TEMPERATURE: 98 F | HEIGHT: 62 IN | WEIGHT: 247.13 LBS

## 2021-11-29 VITALS
HEART RATE: 72 BPM | DIASTOLIC BLOOD PRESSURE: 72 MMHG | OXYGEN SATURATION: 96 % | SYSTOLIC BLOOD PRESSURE: 142 MMHG | RESPIRATION RATE: 16 BRPM | TEMPERATURE: 98 F

## 2021-11-29 DIAGNOSIS — C78.02 MALIGNANT NEOPLASM METASTATIC TO LEFT LUNG: ICD-10-CM

## 2021-11-29 DIAGNOSIS — G89.29 CHRONIC PAIN OF LEFT KNEE: ICD-10-CM

## 2021-11-29 DIAGNOSIS — C50.912 BREAST CANCER, STAGE 4, LEFT: ICD-10-CM

## 2021-11-29 DIAGNOSIS — C50.912 BREAST CANCER, STAGE 4, LEFT: Primary | ICD-10-CM

## 2021-11-29 DIAGNOSIS — M25.562 CHRONIC PAIN OF LEFT KNEE: ICD-10-CM

## 2021-11-29 LAB
ALBUMIN SERPL BCP-MCNC: 3.4 G/DL (ref 3.5–5.2)
ALP SERPL-CCNC: 158 U/L (ref 55–135)
ALT SERPL W/O P-5'-P-CCNC: 20 U/L (ref 10–44)
ANION GAP SERPL CALC-SCNC: 7 MMOL/L (ref 8–16)
AST SERPL-CCNC: 31 U/L (ref 10–40)
BILIRUB SERPL-MCNC: 0.9 MG/DL (ref 0.1–1)
BUN SERPL-MCNC: 6 MG/DL (ref 6–20)
CALCIUM SERPL-MCNC: 9.2 MG/DL (ref 8.7–10.5)
CHLORIDE SERPL-SCNC: 105 MMOL/L (ref 95–110)
CO2 SERPL-SCNC: 29 MMOL/L (ref 23–29)
CREAT SERPL-MCNC: 0.6 MG/DL (ref 0.5–1.4)
ERYTHROCYTE [DISTWIDTH] IN BLOOD BY AUTOMATED COUNT: 16.7 % (ref 11.5–14.5)
EST. GFR  (AFRICAN AMERICAN): >60 ML/MIN/1.73 M^2
EST. GFR  (NON AFRICAN AMERICAN): >60 ML/MIN/1.73 M^2
GLUCOSE SERPL-MCNC: 107 MG/DL (ref 70–110)
HCT VFR BLD AUTO: 40.7 % (ref 37–48.5)
HGB BLD-MCNC: 13.3 G/DL (ref 12–16)
IMM GRANULOCYTES # BLD AUTO: 0 K/UL (ref 0–0.04)
MCH RBC QN AUTO: 32 PG (ref 27–31)
MCHC RBC AUTO-ENTMCNC: 32.7 G/DL (ref 32–36)
MCV RBC AUTO: 98 FL (ref 82–98)
NEUTROPHILS # BLD AUTO: 2 K/UL (ref 1.8–7.7)
PLATELET # BLD AUTO: 168 K/UL (ref 150–450)
PMV BLD AUTO: 11.3 FL (ref 9.2–12.9)
POTASSIUM SERPL-SCNC: 3.6 MMOL/L (ref 3.5–5.1)
PROT SERPL-MCNC: 6.9 G/DL (ref 6–8.4)
RBC # BLD AUTO: 4.15 M/UL (ref 4–5.4)
SODIUM SERPL-SCNC: 141 MMOL/L (ref 136–145)
WBC # BLD AUTO: 3.4 K/UL (ref 3.9–12.7)

## 2021-11-29 PROCEDURE — 99999 PR PBB SHADOW E&M-EST. PATIENT-LVL IV: ICD-10-PCS | Mod: PBBFAC,,, | Performed by: NURSE PRACTITIONER

## 2021-11-29 PROCEDURE — 85027 COMPLETE CBC AUTOMATED: CPT | Performed by: NURSE PRACTITIONER

## 2021-11-29 PROCEDURE — 99214 OFFICE O/P EST MOD 30 MIN: CPT | Mod: PBBFAC | Performed by: NURSE PRACTITIONER

## 2021-11-29 PROCEDURE — A4216 STERILE WATER/SALINE, 10 ML: HCPCS | Performed by: INTERNAL MEDICINE

## 2021-11-29 PROCEDURE — 80053 COMPREHEN METABOLIC PANEL: CPT | Performed by: NURSE PRACTITIONER

## 2021-11-29 PROCEDURE — 25000003 PHARM REV CODE 250: Performed by: INTERNAL MEDICINE

## 2021-11-29 PROCEDURE — 96367 TX/PROPH/DG ADDL SEQ IV INF: CPT

## 2021-11-29 PROCEDURE — 99215 OFFICE O/P EST HI 40 MIN: CPT | Mod: S$PBB,,, | Performed by: NURSE PRACTITIONER

## 2021-11-29 PROCEDURE — 36415 COLL VENOUS BLD VENIPUNCTURE: CPT | Performed by: NURSE PRACTITIONER

## 2021-11-29 PROCEDURE — 99215 PR OFFICE/OUTPT VISIT, EST, LEVL V, 40-54 MIN: ICD-10-PCS | Mod: S$PBB,,, | Performed by: NURSE PRACTITIONER

## 2021-11-29 PROCEDURE — 63600175 PHARM REV CODE 636 W HCPCS: Performed by: INTERNAL MEDICINE

## 2021-11-29 PROCEDURE — 96413 CHEMO IV INFUSION 1 HR: CPT

## 2021-11-29 PROCEDURE — 99999 PR PBB SHADOW E&M-EST. PATIENT-LVL IV: CPT | Mod: PBBFAC,,, | Performed by: NURSE PRACTITIONER

## 2021-11-29 RX ORDER — HEPARIN 100 UNIT/ML
500 SYRINGE INTRAVENOUS
Status: DISCONTINUED | OUTPATIENT
Start: 2021-11-29 | End: 2021-11-29 | Stop reason: HOSPADM

## 2021-11-29 RX ORDER — SODIUM CHLORIDE 0.9 % (FLUSH) 0.9 %
10 SYRINGE (ML) INJECTION
Status: DISCONTINUED | OUTPATIENT
Start: 2021-11-29 | End: 2021-11-29 | Stop reason: HOSPADM

## 2021-11-29 RX ORDER — HEPARIN 100 UNIT/ML
500 SYRINGE INTRAVENOUS
Status: CANCELLED | OUTPATIENT
Start: 2021-11-29

## 2021-11-29 RX ORDER — SODIUM CHLORIDE 0.9 % (FLUSH) 0.9 %
10 SYRINGE (ML) INJECTION
Status: CANCELLED | OUTPATIENT
Start: 2021-11-29

## 2021-11-29 RX ADMIN — Medication 10 ML: at 05:11

## 2021-11-29 RX ADMIN — FOSAPREPITANT DIMEGLUMINE 150 MG: 150 INJECTION, POWDER, LYOPHILIZED, FOR SOLUTION INTRAVENOUS at 02:11

## 2021-11-29 RX ADMIN — FAM-TRASTUZUMAB DERUXTECAN-NXKI 600 MG: 100 INJECTION, POWDER, LYOPHILIZED, FOR SOLUTION INTRAVENOUS at 03:11

## 2021-11-29 RX ADMIN — PALONOSETRON HYDROCHLORIDE 0.25 MG: 0.25 INJECTION, SOLUTION INTRAVENOUS at 02:11

## 2021-11-29 RX ADMIN — HEPARIN 500 UNITS: 100 SYRINGE at 05:11

## 2021-11-29 RX ADMIN — SODIUM CHLORIDE: 9 INJECTION, SOLUTION INTRAVENOUS at 01:11

## 2021-12-27 ENCOUNTER — OFFICE VISIT (OUTPATIENT)
Dept: HEMATOLOGY/ONCOLOGY | Facility: CLINIC | Age: 51
End: 2021-12-27
Payer: MEDICARE

## 2021-12-27 ENCOUNTER — PATIENT MESSAGE (OUTPATIENT)
Dept: ADMINISTRATIVE | Facility: OTHER | Age: 51
End: 2021-12-27
Payer: MEDICARE

## 2021-12-27 ENCOUNTER — INFUSION (OUTPATIENT)
Dept: INFUSION THERAPY | Facility: HOSPITAL | Age: 51
End: 2021-12-27
Payer: MEDICARE

## 2021-12-27 VITALS
DIASTOLIC BLOOD PRESSURE: 65 MMHG | SYSTOLIC BLOOD PRESSURE: 131 MMHG | RESPIRATION RATE: 18 BRPM | HEART RATE: 83 BPM | TEMPERATURE: 98 F

## 2021-12-27 VITALS
TEMPERATURE: 98 F | SYSTOLIC BLOOD PRESSURE: 144 MMHG | DIASTOLIC BLOOD PRESSURE: 81 MMHG | HEIGHT: 62 IN | WEIGHT: 246.25 LBS | HEART RATE: 97 BPM | BODY MASS INDEX: 45.32 KG/M2 | OXYGEN SATURATION: 97 % | RESPIRATION RATE: 18 BRPM

## 2021-12-27 DIAGNOSIS — C50.912 BREAST CANCER, STAGE 4, LEFT: ICD-10-CM

## 2021-12-27 DIAGNOSIS — C78.02 MALIGNANT NEOPLASM METASTATIC TO LEFT LUNG: Primary | ICD-10-CM

## 2021-12-27 DIAGNOSIS — C50.912 BREAST CANCER, STAGE 4, LEFT: Primary | ICD-10-CM

## 2021-12-27 PROCEDURE — 96367 TX/PROPH/DG ADDL SEQ IV INF: CPT

## 2021-12-27 PROCEDURE — 99215 OFFICE O/P EST HI 40 MIN: CPT | Mod: S$PBB,,, | Performed by: NURSE PRACTITIONER

## 2021-12-27 PROCEDURE — A4216 STERILE WATER/SALINE, 10 ML: HCPCS | Performed by: STUDENT IN AN ORGANIZED HEALTH CARE EDUCATION/TRAINING PROGRAM

## 2021-12-27 PROCEDURE — 99213 OFFICE O/P EST LOW 20 MIN: CPT | Mod: PBBFAC,25 | Performed by: NURSE PRACTITIONER

## 2021-12-27 PROCEDURE — 63600175 PHARM REV CODE 636 W HCPCS: Mod: TB | Performed by: STUDENT IN AN ORGANIZED HEALTH CARE EDUCATION/TRAINING PROGRAM

## 2021-12-27 PROCEDURE — 99215 PR OFFICE/OUTPT VISIT, EST, LEVL V, 40-54 MIN: ICD-10-PCS | Mod: S$PBB,,, | Performed by: NURSE PRACTITIONER

## 2021-12-27 PROCEDURE — 99999 PR PBB SHADOW E&M-EST. PATIENT-LVL III: ICD-10-PCS | Mod: PBBFAC,,, | Performed by: NURSE PRACTITIONER

## 2021-12-27 PROCEDURE — 96413 CHEMO IV INFUSION 1 HR: CPT

## 2021-12-27 PROCEDURE — 99999 PR PBB SHADOW E&M-EST. PATIENT-LVL III: CPT | Mod: PBBFAC,,, | Performed by: NURSE PRACTITIONER

## 2021-12-27 PROCEDURE — 25000003 PHARM REV CODE 250: Performed by: STUDENT IN AN ORGANIZED HEALTH CARE EDUCATION/TRAINING PROGRAM

## 2021-12-27 RX ORDER — HEPARIN 100 UNIT/ML
500 SYRINGE INTRAVENOUS
Status: DISCONTINUED | OUTPATIENT
Start: 2021-12-27 | End: 2021-12-27 | Stop reason: HOSPADM

## 2021-12-27 RX ORDER — SODIUM CHLORIDE 0.9 % (FLUSH) 0.9 %
10 SYRINGE (ML) INJECTION
Status: DISCONTINUED | OUTPATIENT
Start: 2021-12-27 | End: 2021-12-27 | Stop reason: HOSPADM

## 2021-12-27 RX ADMIN — PALONOSETRON HYDROCHLORIDE 0.25 MG: 0.25 INJECTION, SOLUTION INTRAVENOUS at 01:12

## 2021-12-27 RX ADMIN — HEPARIN 500 UNITS: 100 SYRINGE at 03:12

## 2021-12-27 RX ADMIN — Medication 10 ML: at 03:12

## 2021-12-27 RX ADMIN — SODIUM CHLORIDE: 0.9 INJECTION, SOLUTION INTRAVENOUS at 01:12

## 2021-12-27 RX ADMIN — FOSAPREPITANT DIMEGLUMINE 150 MG: 150 INJECTION, POWDER, LYOPHILIZED, FOR SOLUTION INTRAVENOUS at 02:12

## 2021-12-27 RX ADMIN — FAM-TRASTUZUMAB DERUXTECAN-NXKI 600 MG: 100 INJECTION, POWDER, LYOPHILIZED, FOR SOLUTION INTRAVENOUS at 02:12

## 2021-12-29 ENCOUNTER — PATIENT MESSAGE (OUTPATIENT)
Dept: HEMATOLOGY/ONCOLOGY | Facility: CLINIC | Age: 51
End: 2021-12-29
Payer: MEDICARE

## 2021-12-29 ENCOUNTER — PATIENT MESSAGE (OUTPATIENT)
Dept: ADMINISTRATIVE | Facility: OTHER | Age: 51
End: 2021-12-29
Payer: MEDICARE

## 2022-01-14 ENCOUNTER — HOSPITAL ENCOUNTER (OUTPATIENT)
Dept: RADIOLOGY | Facility: HOSPITAL | Age: 52
Discharge: HOME | End: 2022-01-14
Attending: NURSE PRACTITIONER
Payer: MEDICARE

## 2022-01-14 DIAGNOSIS — C50.912 BREAST CANCER, STAGE 4, LEFT: ICD-10-CM

## 2022-01-14 DIAGNOSIS — C78.02 MALIGNANT NEOPLASM METASTATIC TO LEFT LUNG: ICD-10-CM

## 2022-01-14 PROCEDURE — 74177 CT CHEST ABDOMEN PELVIS WITH CONTRAST (XPD): ICD-10-PCS | Mod: 26,,, | Performed by: STUDENT IN AN ORGANIZED HEALTH CARE EDUCATION/TRAINING PROGRAM

## 2022-01-14 PROCEDURE — 25500020 PHARM REV CODE 255: Performed by: NURSE PRACTITIONER

## 2022-01-14 PROCEDURE — 71260 CT CHEST ABDOMEN PELVIS WITH CONTRAST (XPD): ICD-10-PCS | Mod: 26,,, | Performed by: STUDENT IN AN ORGANIZED HEALTH CARE EDUCATION/TRAINING PROGRAM

## 2022-01-14 PROCEDURE — 74177 CT ABD & PELVIS W/CONTRAST: CPT | Mod: TC

## 2022-01-14 PROCEDURE — 71260 CT THORAX DX C+: CPT | Mod: 26,,, | Performed by: STUDENT IN AN ORGANIZED HEALTH CARE EDUCATION/TRAINING PROGRAM

## 2022-01-14 PROCEDURE — 74177 CT ABD & PELVIS W/CONTRAST: CPT | Mod: 26,,, | Performed by: STUDENT IN AN ORGANIZED HEALTH CARE EDUCATION/TRAINING PROGRAM

## 2022-01-14 PROCEDURE — 71260 CT THORAX DX C+: CPT | Mod: TC

## 2022-01-14 RX ADMIN — IOHEXOL 100 ML: 350 INJECTION, SOLUTION INTRAVENOUS at 01:01

## 2022-01-14 RX ADMIN — IOHEXOL 15 ML: 350 INJECTION, SOLUTION INTRAVENOUS at 01:01

## 2022-01-17 NOTE — PROGRESS NOTES
Subjective:       Patient ID: Shikha López is a 51 y.o. female.    Chief Complaint: No chief complaint on file.    HPI 51-year-old female who returns for F/U  for metastatic breast cancer.  She is on Trastuzumab deruxtecan  - here for cycle 12 .      Today she reports she has had no shortness of breath.  She has no unusual pain.  Appetite and bowel function has been stable.    CT 1/14/21 -    Stable mildly enlarged right axillary lymph node.     Redemonstration of multiple pulmonary lesions with slight increase in size of the left infrahilar mass which may be due to interobserver variability/technique as above.  Additional pulmonary nodules appear stable from the prior exam.  No definite new nodules.      Breast history:  She presented in the emergency room at [facility] on September 29, 2006 with a 4 months history of inflammation of her left breast.  Physical examination at that time showed the left breast was largely replaced by large mass with multiple skin ulcerations.    A biopsy in September 2006 showed poorly differentiated carcinoma which was ER and [place]. negative and HER2 positive.    She was then referred to [facility] for additional care.   CT scan of the chest and abdomen November 2006 revealed multiple nodules in the lungs consistent with metastatic disease.  There also enlarged left axillary node.    She was treated with chemotherapy with weekly Herceptin and Abraxane and had marked improvement in her breast and lung metastasis on that therapy.    On May 29, 2007 she underwent left modified radical mastectomy(Dr. Colvin) which showed no residual tumor in the breast and 1/5 nodes was positive for metastasis measuring 6 mm.  (ypT0N1).    She then received postoperative radiation therapy(Dr Singh)  to the left chest wall supraclav and internal mammary lymph nodes from August 20, 2007 to September 28, 2007.    Postoperatively she continued on Herceptin for approximately 3 and 1 half  years before her lung metastasis begin to grow.    She subsequently received a number of different chemotherapy treatments including Adriamycin, Halaven, Xeloda plus lapatinib then Xeloda plus Herceptin.  The  Those treatments were provided under the care of  in [facility].    After she was hospitalized with neutropenic fever in 2013, she transferred her care to  at [facility].  She received Kadcyla therapy.  She was initially treated with Taxotere Herceptin Perjeta.  She was then changed to Kadcyla which she received for 12 in 18 months.  Line we  In July 2020 PET scan showed an increase in the size of an infrahilar mass consistent with progression.   She then took approximately 9 months of Herceptin and Navelbine.  Apparently she has some initial response to that therapy.  Last scans were December 2020.    She started trastuzumab- deruxtecan  4/12/21.     MRI brain 6/22/21  - negative    Echo 10/8/21  -EF 55%.    CT - 10/8/21 decreased size of left infrahilar mass within the left lower lobe measuring approximately 3.2 x 2.4 x 2.9 cm, previously measured 4.2 x 3.2 x 3.7 cm.  Multiple additional small pulmonary nodules scattered throughout both lungs appears similar in overall size and distribution, the largest located within the apical segment of the right upper lobe tethering of pleural margin, measuring approximately 2.0 cm.      Review of Systems   Constitutional: Positive for fatigue. Negative for activity change, appetite change and fever.   Respiratory: Negative.    Cardiovascular: Negative.    Gastrointestinal: Positive for nausea. Negative for constipation.   Genitourinary: Negative for frequency.   Musculoskeletal: Positive for arthralgias (left knee). Negative for back pain.   Integumentary:  Negative for rash.   Neurological: Negative for headaches.   Hematological: Negative for adenopathy.   Psychiatric/Behavioral: Negative for dysphoric mood. The patient is not  nervous/anxious.          Objective:      Physical Exam  Vitals reviewed.   Constitutional:       General: She is not in acute distress.     Appearance: She is obese.   Eyes:      General: No scleral icterus.  Cardiovascular:      Rate and Rhythm: Normal rate and regular rhythm.   Pulmonary:      Effort: Pulmonary effort is normal. No respiratory distress.      Breath sounds: Normal breath sounds. No wheezing or rales.   Chest:   Breasts:      Right: Normal. No axillary adenopathy or supraclavicular adenopathy.      Left: No supraclavicular adenopathy.         Abdominal:      Palpations: Abdomen is soft. There is no mass.   Lymphadenopathy:      Cervical: No cervical adenopathy.      Upper Body:      Right upper body: No supraclavicular or axillary adenopathy.      Left upper body: No supraclavicular adenopathy.   Neurological:      Mental Status: She is alert and oriented to person, place, and time.   Psychiatric:         Mood and Affect: Mood normal.         Behavior: Behavior normal.         Thought Content: Thought content normal.         Judgment: Judgment normal.         Assessment:     Labs unremarkable  1. Breast cancer, stage 4, left    2. Malignant neoplasm metastatic to left lung        Plan:          Cycle 12 Trastuzumab deruxtecan.  She is due for her echocardiogram this month.  RTC 3 weeks with labs.  Rescan in 9 weeks.

## 2022-01-18 ENCOUNTER — LAB VISIT (OUTPATIENT)
Dept: LAB | Facility: HOSPITAL | Age: 52
End: 2022-01-18
Attending: NURSE PRACTITIONER
Payer: MEDICARE

## 2022-01-18 ENCOUNTER — OFFICE VISIT (OUTPATIENT)
Dept: HEMATOLOGY/ONCOLOGY | Facility: CLINIC | Age: 52
End: 2022-01-18
Payer: MEDICARE

## 2022-01-18 ENCOUNTER — INFUSION (OUTPATIENT)
Dept: INFUSION THERAPY | Facility: HOSPITAL | Age: 52
End: 2022-01-18
Attending: NURSE PRACTITIONER
Payer: MEDICARE

## 2022-01-18 VITALS
DIASTOLIC BLOOD PRESSURE: 79 MMHG | HEART RATE: 84 BPM | TEMPERATURE: 98 F | HEIGHT: 62 IN | BODY MASS INDEX: 44.22 KG/M2 | OXYGEN SATURATION: 98 % | WEIGHT: 240.31 LBS | RESPIRATION RATE: 18 BRPM | SYSTOLIC BLOOD PRESSURE: 140 MMHG

## 2022-01-18 VITALS
TEMPERATURE: 98 F | HEIGHT: 62 IN | DIASTOLIC BLOOD PRESSURE: 69 MMHG | BODY MASS INDEX: 44.22 KG/M2 | RESPIRATION RATE: 20 BRPM | WEIGHT: 240.31 LBS | HEART RATE: 75 BPM | SYSTOLIC BLOOD PRESSURE: 128 MMHG

## 2022-01-18 DIAGNOSIS — C50.912 BREAST CANCER, STAGE 4, LEFT: ICD-10-CM

## 2022-01-18 DIAGNOSIS — C50.912 BREAST CANCER, STAGE 4, LEFT: Primary | ICD-10-CM

## 2022-01-18 DIAGNOSIS — C78.02 MALIGNANT NEOPLASM METASTATIC TO LEFT LUNG: ICD-10-CM

## 2022-01-18 LAB
ALBUMIN SERPL BCP-MCNC: 3.3 G/DL (ref 3.5–5.2)
ALP SERPL-CCNC: 141 U/L (ref 55–135)
ALT SERPL W/O P-5'-P-CCNC: 13 U/L (ref 10–44)
ANION GAP SERPL CALC-SCNC: 8 MMOL/L (ref 8–16)
AST SERPL-CCNC: 24 U/L (ref 10–40)
BILIRUB SERPL-MCNC: 0.8 MG/DL (ref 0.1–1)
BUN SERPL-MCNC: 6 MG/DL (ref 6–20)
CALCIUM SERPL-MCNC: 8.9 MG/DL (ref 8.7–10.5)
CHLORIDE SERPL-SCNC: 106 MMOL/L (ref 95–110)
CO2 SERPL-SCNC: 27 MMOL/L (ref 23–29)
CREAT SERPL-MCNC: 0.6 MG/DL (ref 0.5–1.4)
ERYTHROCYTE [DISTWIDTH] IN BLOOD BY AUTOMATED COUNT: 16.8 % (ref 11.5–14.5)
EST. GFR  (AFRICAN AMERICAN): >60 ML/MIN/1.73 M^2
EST. GFR  (NON AFRICAN AMERICAN): >60 ML/MIN/1.73 M^2
GLUCOSE SERPL-MCNC: 89 MG/DL (ref 70–110)
HCT VFR BLD AUTO: 40.2 % (ref 37–48.5)
HGB BLD-MCNC: 13.1 G/DL (ref 12–16)
IMM GRANULOCYTES # BLD AUTO: 0.01 K/UL (ref 0–0.04)
MCH RBC QN AUTO: 32.9 PG (ref 27–31)
MCHC RBC AUTO-ENTMCNC: 32.6 G/DL (ref 32–36)
MCV RBC AUTO: 101 FL (ref 82–98)
NEUTROPHILS # BLD AUTO: 2.4 K/UL (ref 1.8–7.7)
PLATELET # BLD AUTO: 186 K/UL (ref 150–450)
PMV BLD AUTO: 11 FL (ref 9.2–12.9)
POTASSIUM SERPL-SCNC: 3.6 MMOL/L (ref 3.5–5.1)
PROT SERPL-MCNC: 6.9 G/DL (ref 6–8.4)
RBC # BLD AUTO: 3.98 M/UL (ref 4–5.4)
SODIUM SERPL-SCNC: 141 MMOL/L (ref 136–145)
WBC # BLD AUTO: 3.92 K/UL (ref 3.9–12.7)

## 2022-01-18 PROCEDURE — 36415 COLL VENOUS BLD VENIPUNCTURE: CPT | Performed by: NURSE PRACTITIONER

## 2022-01-18 PROCEDURE — 96367 TX/PROPH/DG ADDL SEQ IV INF: CPT

## 2022-01-18 PROCEDURE — A4216 STERILE WATER/SALINE, 10 ML: HCPCS | Performed by: INTERNAL MEDICINE

## 2022-01-18 PROCEDURE — 99213 OFFICE O/P EST LOW 20 MIN: CPT | Mod: PBBFAC | Performed by: INTERNAL MEDICINE

## 2022-01-18 PROCEDURE — 99999 PR PBB SHADOW E&M-EST. PATIENT-LVL III: CPT | Mod: PBBFAC,,, | Performed by: INTERNAL MEDICINE

## 2022-01-18 PROCEDURE — 99214 PR OFFICE/OUTPT VISIT, EST, LEVL IV, 30-39 MIN: ICD-10-PCS | Mod: S$PBB,,, | Performed by: INTERNAL MEDICINE

## 2022-01-18 PROCEDURE — 25000003 PHARM REV CODE 250: Performed by: INTERNAL MEDICINE

## 2022-01-18 PROCEDURE — 80053 COMPREHEN METABOLIC PANEL: CPT | Performed by: NURSE PRACTITIONER

## 2022-01-18 PROCEDURE — 99214 OFFICE O/P EST MOD 30 MIN: CPT | Mod: S$PBB,,, | Performed by: INTERNAL MEDICINE

## 2022-01-18 PROCEDURE — 96413 CHEMO IV INFUSION 1 HR: CPT

## 2022-01-18 PROCEDURE — 85027 COMPLETE CBC AUTOMATED: CPT | Performed by: NURSE PRACTITIONER

## 2022-01-18 PROCEDURE — 99999 PR PBB SHADOW E&M-EST. PATIENT-LVL III: ICD-10-PCS | Mod: PBBFAC,,, | Performed by: INTERNAL MEDICINE

## 2022-01-18 PROCEDURE — 63600175 PHARM REV CODE 636 W HCPCS: Mod: TB | Performed by: INTERNAL MEDICINE

## 2022-01-18 RX ORDER — HEPARIN 100 UNIT/ML
500 SYRINGE INTRAVENOUS
Status: CANCELLED | OUTPATIENT
Start: 2022-01-18

## 2022-01-18 RX ORDER — SODIUM CHLORIDE 0.9 % (FLUSH) 0.9 %
10 SYRINGE (ML) INJECTION
Status: CANCELLED | OUTPATIENT
Start: 2022-01-18

## 2022-01-18 RX ORDER — HEPARIN 100 UNIT/ML
500 SYRINGE INTRAVENOUS
Status: DISCONTINUED | OUTPATIENT
Start: 2022-01-18 | End: 2022-01-18 | Stop reason: HOSPADM

## 2022-01-18 RX ORDER — SODIUM CHLORIDE 0.9 % (FLUSH) 0.9 %
10 SYRINGE (ML) INJECTION
Status: DISCONTINUED | OUTPATIENT
Start: 2022-01-18 | End: 2022-01-18 | Stop reason: HOSPADM

## 2022-01-18 RX ADMIN — PALONOSETRON HYDROCHLORIDE 0.25 MG: 0.25 INJECTION, SOLUTION INTRAVENOUS at 08:01

## 2022-01-18 RX ADMIN — HEPARIN 500 UNITS: 100 SYRINGE at 11:01

## 2022-01-18 RX ADMIN — FOSAPREPITANT DIMEGLUMINE 150 MG: 150 INJECTION, POWDER, LYOPHILIZED, FOR SOLUTION INTRAVENOUS at 09:01

## 2022-01-18 RX ADMIN — SODIUM CHLORIDE: 0.9 INJECTION, SOLUTION INTRAVENOUS at 08:01

## 2022-01-18 RX ADMIN — Medication 10 ML: at 11:01

## 2022-01-18 RX ADMIN — FAM-TRASTUZUMAB DERUXTECAN-NXKI 600 MG: 100 INJECTION, POWDER, LYOPHILIZED, FOR SOLUTION INTRAVENOUS at 09:01

## 2022-01-18 NOTE — PLAN OF CARE
Patient tolerated Enhertu well today over 1 hour and observed for 30 min post infusion. Patient states she prefers the 1 hour infusion bc it decreased her nausea. NAD noted upon discharge. Port deaccessed, no blood return present (port study completed). Discharged home, ambulated independently.

## 2022-01-18 NOTE — PLAN OF CARE
Problem: Adult Inpatient Plan of Care  Goal: Optimal Comfort and Wellbeing  Intervention: Provide Person-Centered Care  Flowsheets (Taken 1/18/2022 8672)  Trust Relationship/Rapport:   reassurance provided   care explained   choices provided   thoughts/feelings acknowledged   emotional support provided   empathic listening provided   questions answered   questions encouraged

## 2022-01-24 NOTE — PROGRESS NOTES
Subjective:       Patient ID: Shikha López is a 51 y.o. female.    Chief Complaint: Malignant neoplasm metastatic to left lung    HPI 51-year-old female who returns for F/U  for metastatic breast cancer.  She is on Trastuzumab deruxtecan  - here for cycle 13.      Today she reports she has more fatigue and decreased appetite.  She has no unusual pain.    Positive for constipation, she tried probiotics without relief.       Per Dr. Willis's previous note: Breast history:  She presented in the emergency room at [facility] on September 29, 2006 with a 4 months history of inflammation of her left breast.  Physical examination at that time showed the left breast was largely replaced by large mass with multiple skin ulcerations.    A biopsy in September 2006 showed poorly differentiated carcinoma which was ER and [place]. negative and HER2 positive.    She was then referred to [facility] for additional care.   CT scan of the chest and abdomen November 2006 revealed multiple nodules in the lungs consistent with metastatic disease.  There also enlarged left axillary node.    She was treated with chemotherapy with weekly Herceptin and Abraxane and had marked improvement in her breast and lung metastasis on that therapy.    On May 29, 2007 she underwent left modified radical mastectomy(Dr. Colvin) which showed no residual tumor in the breast and 1/5 nodes was positive for metastasis measuring 6 mm.  (ypT0N1).    She then received postoperative radiation therapy(Dr Singh)  to the left chest wall supraclav and internal mammary lymph nodes from August 20, 2007 to September 28, 2007.    Postoperatively she continued on Herceptin for approximately 3 and 1 half years before her lung metastasis begin to grow.    She subsequently received a number of different chemotherapy treatments including Adriamycin, Halaven, Xeloda plus lapatinib then Xeloda plus Herceptin.  The  Those treatments were provided under the care of   in [facility].    After she was hospitalized with neutropenic fever in 2013, she transferred her care to  at [facility].  She received Kadcyla therapy.  She was initially treated with Taxotere Herceptin Perjeta.  She was then changed to Kadcyla which she received for 12 in 18 months.  Line we  In July 2020 PET scan showed an increase in the size of an infrahilar mass consistent with progression.   She then took approximately 9 months of Herceptin and Navelbine.  Apparently she has some initial response to that therapy.  Last scans were December 2020.    She started trastuzumab- deruxtecan  4/12/21.     MRI brain 6/22/21  - negative    Echo 10/8/21  -EF 55%.    CT - 10/8/21 decreased size of left infrahilar mass within the left lower lobe measuring approximately 3.2 x 2.4 x 2.9 cm, previously measured 4.2 x 3.2 x 3.7 cm.  Multiple additional small pulmonary nodules scattered throughout both lungs appears similar in overall size and distribution, the largest located within the apical segment of the right upper lobe tethering of pleural margin, measuring approximately 2.0 cm.    CT 1/14/21 -    Stable mildly enlarged right axillary lymph node. Redemonstration of multiple pulmonary lesions with slight increase in size of the left infrahilar mass which may be due to interobserver variability/technique as above.  Additional pulmonary nodules appear stable from the prior exam.  No definite new nodules.      Review of Systems   Constitutional: Positive for fatigue. Negative for activity change, appetite change and fever.   Respiratory: Negative.    Cardiovascular: Negative.    Gastrointestinal: Positive for constipation and nausea.   Genitourinary: Negative for frequency.   Musculoskeletal: Positive for arthralgias (left knee). Negative for back pain.   Integumentary:  Negative for rash.   Neurological: Negative for headaches.   Hematological: Negative for adenopathy.    Psychiatric/Behavioral: Negative for dysphoric mood. The patient is not nervous/anxious.          Objective:      Physical Exam  Vitals reviewed.   Constitutional:       General: She is not in acute distress.     Appearance: She is obese.   Eyes:      General: No scleral icterus.  Cardiovascular:      Rate and Rhythm: Normal rate and regular rhythm.   Pulmonary:      Effort: Pulmonary effort is normal. No respiratory distress.      Breath sounds: Normal breath sounds. No wheezing or rales.   Chest:   Breasts:      Right: Normal. No axillary adenopathy or supraclavicular adenopathy.      Left: No supraclavicular adenopathy.         Abdominal:      Palpations: Abdomen is soft. There is no mass.   Lymphadenopathy:      Cervical: No cervical adenopathy.      Upper Body:      Right upper body: No supraclavicular or axillary adenopathy.      Left upper body: No supraclavicular adenopathy.   Neurological:      Mental Status: She is alert and oriented to person, place, and time.   Psychiatric:         Mood and Affect: Mood normal.         Behavior: Behavior normal.         Thought Content: Thought content normal.         Judgment: Judgment normal.         Assessment:     Labs unremarkable  1. Malignant neoplasm metastatic to left lung    2. Breast cancer, stage 4, left        Plan:          1-2. Cycle 13 Trastuzumab deruxtecan.  Due for Echo   RTC 3 weeks with labs.  Rescan in 6 weeks.    Return to clinic in 3 weeks with MD appointment and labs.     Patient is in agreement with the proposed treatment plan. All questions were answered to the patient's satisfaction. Patient knows to call clinic for any new or worsening symptoms and if anything is needed before the next clinic visit.          Mariam Lisa, MALIKP-C  Hematology & Medical Oncology   [facility] [place] [address]  ph. [phone]  Fax. [phone]    Patient discussed with collaborating physician, Dr. estrella.    Approximately 15 minutes were  spent face-to-face with the patient.  Approximately 25 minutes in total were spent on this encounter, which includes face-to-face time and non-face-to-face time preparing to see the patient (e.g., review of tests), obtaining and/or reviewing separately obtained history, documenting clinical information in the electronic or other health record, independently interpreting results (not separately reported) and communicating results to the patient/family/caregiver, or care coordination (not separately reported).       Route Chart for Scheduling    Med Onc Chart Routing  Follow up with physician . 6 weeks CBC and CMP to see Dr. Willis and Chemo    Follow up with JIMI 3 weeks. 3 weeks CBC and CMP to see me and chemo   Labs    Imaging ECHO and CT chest abdomen pelvis   CT due in 6 weeks prior to seeing Dr. Willis ; Echo due now    Pharmacy appointment    Other referrals          Treatment Plan Information   OP BREAST FAM-TRASTUZUMAB DERUXTECAN-NXKI Q3W   Home Willis MD   Upcoming Treatment Dates - OP BREAST FAM-TRASTUZUMAB DERUXTECAN-NXKI Q3W    2/7/2022       Chemotherapy       fam-trastuzumab deruxtecan-nxki 602 mg in dextrose 5 % 100 mL infusion  2/28/2022       Chemotherapy       fam-trastuzumab deruxtecan-nxki 602 mg in dextrose 5 % 100 mL infusion  3/21/2022       Chemotherapy       fam-trastuzumab deruxtecan-nxki 602 mg in dextrose 5 % 100 mL infusion  4/11/2022       Chemotherapy       fam-trastuzumab deruxtecan-nxki 602 mg in dextrose 5 % 100 mL infusion

## 2022-02-07 ENCOUNTER — OFFICE VISIT (OUTPATIENT)
Dept: HEMATOLOGY/ONCOLOGY | Facility: CLINIC | Age: 52
End: 2022-02-07
Payer: MEDICARE

## 2022-02-07 ENCOUNTER — INFUSION (OUTPATIENT)
Dept: INFUSION THERAPY | Facility: HOSPITAL | Age: 52
End: 2022-02-07
Payer: MEDICARE

## 2022-02-07 VITALS
HEART RATE: 80 BPM | DIASTOLIC BLOOD PRESSURE: 67 MMHG | OXYGEN SATURATION: 95 % | SYSTOLIC BLOOD PRESSURE: 140 MMHG | TEMPERATURE: 98 F | RESPIRATION RATE: 18 BRPM

## 2022-02-07 VITALS
HEIGHT: 62 IN | RESPIRATION RATE: 18 BRPM | BODY MASS INDEX: 44.42 KG/M2 | WEIGHT: 241.38 LBS | TEMPERATURE: 98 F | OXYGEN SATURATION: 96 % | DIASTOLIC BLOOD PRESSURE: 81 MMHG | SYSTOLIC BLOOD PRESSURE: 147 MMHG | HEART RATE: 93 BPM

## 2022-02-07 DIAGNOSIS — C50.912 BREAST CANCER, STAGE 4, LEFT: ICD-10-CM

## 2022-02-07 DIAGNOSIS — R11.0 NAUSEA: ICD-10-CM

## 2022-02-07 DIAGNOSIS — C50.912 BREAST CANCER, STAGE 4, LEFT: Primary | ICD-10-CM

## 2022-02-07 DIAGNOSIS — C78.02 MALIGNANT NEOPLASM METASTATIC TO LEFT LUNG: Primary | ICD-10-CM

## 2022-02-07 PROCEDURE — 63600175 PHARM REV CODE 636 W HCPCS: Performed by: INTERNAL MEDICINE

## 2022-02-07 PROCEDURE — 99213 OFFICE O/P EST LOW 20 MIN: CPT | Mod: PBBFAC | Performed by: NURSE PRACTITIONER

## 2022-02-07 PROCEDURE — 99999 PR PBB SHADOW E&M-EST. PATIENT-LVL III: ICD-10-PCS | Mod: PBBFAC,,, | Performed by: NURSE PRACTITIONER

## 2022-02-07 PROCEDURE — A4216 STERILE WATER/SALINE, 10 ML: HCPCS | Performed by: INTERNAL MEDICINE

## 2022-02-07 PROCEDURE — 96365 THER/PROPH/DIAG IV INF INIT: CPT

## 2022-02-07 PROCEDURE — 96413 CHEMO IV INFUSION 1 HR: CPT

## 2022-02-07 PROCEDURE — 99999 PR PBB SHADOW E&M-EST. PATIENT-LVL III: CPT | Mod: PBBFAC,,, | Performed by: NURSE PRACTITIONER

## 2022-02-07 PROCEDURE — 25000003 PHARM REV CODE 250: Performed by: INTERNAL MEDICINE

## 2022-02-07 PROCEDURE — 99215 OFFICE O/P EST HI 40 MIN: CPT | Mod: S$PBB,,, | Performed by: NURSE PRACTITIONER

## 2022-02-07 PROCEDURE — 99215 PR OFFICE/OUTPT VISIT, EST, LEVL V, 40-54 MIN: ICD-10-PCS | Mod: S$PBB,,, | Performed by: NURSE PRACTITIONER

## 2022-02-07 PROCEDURE — 96367 TX/PROPH/DG ADDL SEQ IV INF: CPT

## 2022-02-07 RX ORDER — SODIUM CHLORIDE 0.9 % (FLUSH) 0.9 %
10 SYRINGE (ML) INJECTION
Status: DISCONTINUED | OUTPATIENT
Start: 2022-02-07 | End: 2022-02-07 | Stop reason: HOSPADM

## 2022-02-07 RX ORDER — SODIUM CHLORIDE 0.9 % (FLUSH) 0.9 %
10 SYRINGE (ML) INJECTION
Status: CANCELLED | OUTPATIENT
Start: 2022-02-07

## 2022-02-07 RX ORDER — HEPARIN 100 UNIT/ML
500 SYRINGE INTRAVENOUS
Status: CANCELLED | OUTPATIENT
Start: 2022-02-07

## 2022-02-07 RX ORDER — HEPARIN 100 UNIT/ML
500 SYRINGE INTRAVENOUS
Status: DISCONTINUED | OUTPATIENT
Start: 2022-02-07 | End: 2022-02-07 | Stop reason: HOSPADM

## 2022-02-07 RX ORDER — OLANZAPINE 5 MG/1
TABLET ORAL
Qty: 30 TABLET | Refills: 2 | Status: SHIPPED | OUTPATIENT
Start: 2022-02-07

## 2022-02-07 RX ADMIN — PALONOSETRON HYDROCHLORIDE 0.25 MG: 0.25 INJECTION, SOLUTION INTRAVENOUS at 03:02

## 2022-02-07 RX ADMIN — Medication 10 ML: at 05:02

## 2022-02-07 RX ADMIN — FAM-TRASTUZUMAB DERUXTECAN-NXKI 600 MG: 100 INJECTION, POWDER, LYOPHILIZED, FOR SOLUTION INTRAVENOUS at 03:02

## 2022-02-07 RX ADMIN — FOSAPREPITANT DIMEGLUMINE 150 MG: 150 INJECTION, POWDER, LYOPHILIZED, FOR SOLUTION INTRAVENOUS at 02:02

## 2022-02-07 RX ADMIN — HEPARIN 500 UNITS: 100 SYRINGE at 05:02

## 2022-02-07 NOTE — NURSING
6023  Patient stated that she gets the  EnHertu over 1 hour as opposed to the 30 minute infusion as ordered to have less nausea post infusion.   Patient stated that she gets on a support group site for people on the exact same medication and most do 1 hour and IV fluids.

## 2022-02-07 NOTE — PLAN OF CARE
1720  Patient completed infusion and post 30 minute observation, tolerated well.  VSS, Port deaccessed without issue, Flushed, heparin locked. Patient has no RTC at this time but will monitor My Chart for future appts.  Patient ambulated off floor independently.

## 2022-02-07 NOTE — PLAN OF CARE
1400 Patient seated in chair, VSS, Assessment done, Port accessed without issue, flushed, patient reported port had study done but does not return blood, approved to use.  Started on NS @ 25 cc/hr KVO while waiting for pre-medications from pharmacy.  WCTM for safety

## 2022-02-14 NOTE — PROGRESS NOTES
Subjective:       Patient ID: Shikha López is a 51 y.o. female.    Chief Complaint: Malignant neoplasm metastatic to left lung    HPI 51-year-old female who returns for F/U  for metastatic breast cancer.  She is on Trastuzumab deruxtecan  - here for cycle 14.  She receives her chemo over an hour.    Today she reports that she has been fatigued - stable.  Over the lats three weeks she had more constipation, bloating and decreased appetite.   She is taking dulcolax - will increase this cycle.   She is taking gas X for the bloating.   Hydrating well. She has been drinking smoothies and carnation instant breakfast.   Notes she has trouble swallowing the first week of chemo, not choking on her food.         Per Dr. Willis's previous note: Breast history:  She presented in the emergency room at [facility] on September 29, 2006 with a 4 months history of inflammation of her left breast.  Physical examination at that time showed the left breast was largely replaced by large mass with multiple skin ulcerations.    A biopsy in September 2006 showed poorly differentiated carcinoma which was ER and OR. negative and HER2 positive.    She was then referred to [facility] for additional care.   CT scan of the chest and abdomen November 2006 revealed multiple nodules in the lungs consistent with metastatic disease.  There also enlarged left axillary node.    She was treated with chemotherapy with weekly Herceptin and Abraxane and had marked improvement in her breast and lung metastasis on that therapy.    On May 29, 2007 she underwent left modified radical mastectomy(Dr. Colvin) which showed no residual tumor in the breast and 1/5 nodes was positive for metastasis measuring 6 mm.  (ypT0N1).    She then received postoperative radiation therapy(Dr Singh)  to the left chest wall supraclav and internal mammary lymph nodes from August 20, 2007 to September 28, 2007.    Postoperatively she continued on Herceptin for  approximately 3 and 1 half years before her lung metastasis begin to grow.    She subsequently received a number of different chemotherapy treatments including Adriamycin, Halaven, Xeloda plus lapatinib then Xeloda plus Herceptin.  The  Those treatments were provided under the care of  in [facility].    After she was hospitalized with neutropenic fever in 2013, she transferred her care to  at [facility].  She received Kadcyla therapy.  She was initially treated with Taxotere Herceptin Perjeta.  She was then changed to Kadcyla which she received for 12 in 18 months.  Line we  In July 2020 PET scan showed an increase in the size of an infrahilar mass consistent with progression.   She then took approximately 9 months of Herceptin and Navelbine.  Apparently she has some initial response to that therapy.  Last scans were December 2020.    She started trastuzumab- deruxtecan  4/12/21.     MRI brain 6/22/21  - negative    Echo 10/8/21  -EF 55%.    CT - 10/8/21 decreased size of left infrahilar mass within the left lower lobe measuring approximately 3.2 x 2.4 x 2.9 cm, previously measured 4.2 x 3.2 x 3.7 cm.  Multiple additional small pulmonary nodules scattered throughout both lungs appears similar in overall size and distribution, the largest located within the apical segment of the right upper lobe tethering of pleural margin, measuring approximately 2.0 cm.    CT 1/14/21 -    Stable mildly enlarged right axillary lymph node. Redemonstration of multiple pulmonary lesions with slight increase in size of the left infrahilar mass which may be due to interobserver variability/technique as above.  Additional pulmonary nodules appear stable from the prior exam.  No definite new nodules.      Review of Systems   Constitutional: Positive for fatigue. Negative for activity change, appetite change and fever.   Respiratory: Negative.    Cardiovascular: Negative.    Gastrointestinal: Positive for  constipation and nausea.        Bloating    Genitourinary: Negative for frequency.   Musculoskeletal: Positive for arthralgias (left knee). Negative for back pain.   Integumentary:  Negative for rash.   Neurological: Negative for headaches.   Hematological: Negative for adenopathy.   Psychiatric/Behavioral: Negative for dysphoric mood. The patient is not nervous/anxious.          Objective:      Physical Exam  Vitals and nursing note reviewed.   Constitutional:       General: She is not in acute distress.     Appearance: Normal appearance. She is well-developed.   HENT:      Head: Normocephalic.   Eyes:      Pupils: Pupils are equal, round, and reactive to light.   Cardiovascular:      Rate and Rhythm: Normal rate and regular rhythm.      Heart sounds: Normal heart sounds.   Pulmonary:      Effort: Pulmonary effort is normal.      Breath sounds: Normal breath sounds.   Abdominal:      General: Bowel sounds are normal. There is no distension.      Palpations: Abdomen is soft.      Tenderness: There is no abdominal tenderness.   Musculoskeletal:      Cervical back: Normal range of motion.   Skin:     General: Skin is warm and dry.      Findings: No rash.   Neurological:      Mental Status: She is alert and oriented to person, place, and time.   Psychiatric:         Behavior: Behavior normal.         Assessment:     Labs unremarkable  1. Malignant neoplasm metastatic to left lung    2. Breast cancer, stage 4, left        Plan:          1-2. Cycle 14 Trastuzumab deruxtecan.  EF 55% on 2/25/22   - Scans prior to next treatment    Encouraged to increase potassium rich foods.     Return to clinic in 3 weeks with MD appointment and labs.     Patient is in agreement with the proposed treatment plan. All questions were answered to the patient's satisfaction. Patient knows to call clinic for any new or worsening symptoms and if anything is needed before the next clinic visit.          Mariam Lisa, MALIKP-C  Hematology &  Medical Oncology   [facility] [place] [address]  ph. [phone]  Fax. [phone]    Patient discussed with collaborating physician, Dr. willis.    Approximately 15 minutes were spent face-to-face with the patient.  Approximately 25 minutes in total were spent on this encounter, which includes face-to-face time and non-face-to-face time preparing to see the patient (e.g., review of tests), obtaining and/or reviewing separately obtained history, documenting clinical information in the electronic or other health record, independently interpreting results (not separately reported) and communicating results to the patient/family/caregiver, or care coordination (not separately reported).       Route Chart for Scheduling    Med Onc Chart Routing      Follow up with physician    Follow up with JIMI 6 weeks.   Labs CBC and CMP   Lab interval: every 3 weeks  Needs labs prior to seein Dr. Willis 3/21 - Call patient about this appointment please and leave a message if she does not answer   Imaging PET scan   Prior to seeing Dr. Willis 3/21   Pharmacy appointment    Other referrals          Treatment Plan Information   OP BREAST FAM-TRASTUZUMAB DERUXTECAN-NXKI Q3W   Home Willis MD   Upcoming Treatment Dates - OP BREAST FAM-TRASTUZUMAB DERUXTECAN-NXKI Q3W    2/28/2022       Chemotherapy       fam-trastuzumab deruxtecan-nxki 602 mg in dextrose 5 % 100 mL infusion  3/21/2022       Chemotherapy       fam-trastuzumab deruxtecan-nxki 602 mg in dextrose 5 % 100 mL infusion  4/11/2022       Chemotherapy       fam-trastuzumab deruxtecan-nxki 602 mg in dextrose 5 % 100 mL infusion

## 2022-02-21 ENCOUNTER — PATIENT MESSAGE (OUTPATIENT)
Dept: HEMATOLOGY/ONCOLOGY | Facility: CLINIC | Age: 52
End: 2022-02-21
Payer: MEDICARE

## 2022-02-24 ENCOUNTER — TELEPHONE (OUTPATIENT)
Dept: RESEARCH | Facility: HOSPITAL | Age: 52
End: 2022-02-24
Payer: MEDICARE

## 2022-02-24 NOTE — TELEPHONE ENCOUNTER
Study title: Detection of Reduced Left Ventricular Ejection Fraction and Atrial Arrhymias with Single Lead ECG using [employer] Intelligence  IRB #: [id number]  IRB approval date: 6/30/2021  Sponsor: [employer]  Site Number: Oef  Subject ID: N/A  Date of Encounter:  2/24/2022    Called to introduce the patient to the EKO clinical trial. Left voicemail with my return number.

## 2022-02-24 NOTE — TELEPHONE ENCOUNTER
Study title: Detection of Reduced Left Ventricular Ejection Fraction and Atrial Arrhymias with Single Lead ECG using [employer] Intelligence  IRB #: [id number]  IRB approval date: 6/30/2021  Sponsor: [employer]  Site Number: Oef  Subject ID: N/A  Date of Encounter:  2/24/2022     Called patient to introduce the EKO study. Explained overview of study. Patient expressed interest and is willing to see me on 2/25/2022 @0830. Voiced understanding. Will schedule accordingly.     Consent form was emailed to patient.

## 2022-02-25 ENCOUNTER — HOSPITAL ENCOUNTER (OUTPATIENT)
Dept: CARDIOLOGY | Facility: HOSPITAL | Age: 52
Discharge: HOME | End: 2022-02-25
Attending: NURSE PRACTITIONER
Payer: MEDICARE

## 2022-02-25 ENCOUNTER — RESEARCH ENCOUNTER (OUTPATIENT)
Dept: RESEARCH | Facility: HOSPITAL | Age: 52
End: 2022-02-25

## 2022-02-25 VITALS
SYSTOLIC BLOOD PRESSURE: 128 MMHG | DIASTOLIC BLOOD PRESSURE: 65 MMHG | HEART RATE: 75 BPM | WEIGHT: 245 LBS | BODY MASS INDEX: 45.08 KG/M2 | HEIGHT: 62 IN

## 2022-02-25 DIAGNOSIS — C78.02 MALIGNANT NEOPLASM METASTATIC TO LEFT LUNG: ICD-10-CM

## 2022-02-25 DIAGNOSIS — C50.912 BREAST CANCER, STAGE 4, LEFT: ICD-10-CM

## 2022-02-25 LAB
ASCENDING AORTA: 3.22 CM
AV INDEX (PROSTH): 0.49
AV MEAN GRADIENT: 10 MMHG
AV PEAK GRADIENT: 17 MMHG
AV VALVE AREA: 1.85 CM2
AV VELOCITY RATIO: 0.47
BSA FOR ECHO PROCEDURE: 2.2 M2
CV ECHO LV RWT: 0.29 CM
DOP CALC AO PEAK VEL: 2.07 M/S
DOP CALC AO VTI: 47.51 CM
DOP CALC LVOT AREA: 3.8 CM2
DOP CALC LVOT DIAMETER: 2.2 CM
DOP CALC LVOT PEAK VEL: 0.97 M/S
DOP CALC LVOT STROKE VOLUME: 88.07 CM3
DOP CALCLVOT PEAK VEL VTI: 23.18 CM
E WAVE DECELERATION TIME: 174.84 MSEC
E/A RATIO: 0.83
E/E' RATIO: 9.67 M/S
ECHO LV POSTERIOR WALL: 0.75 CM (ref 0.6–1.1)
EJECTION FRACTION: 55 %
FRACTIONAL SHORTENING: 42 % (ref 28–44)
INTERVENTRICULAR SEPTUM: 0.61 CM (ref 0.6–1.1)
LA MAJOR: 4.08 CM
LA MINOR: 4.41 CM
LA WIDTH: 3.63 CM
LEFT ATRIUM SIZE: 4.3 CM
LEFT ATRIUM VOLUME INDEX MOD: 20.1 ML/M2
LEFT ATRIUM VOLUME INDEX: 27 ML/M2
LEFT ATRIUM VOLUME MOD: 41.77 CM3
LEFT ATRIUM VOLUME: 56.24 CM3
LEFT INTERNAL DIMENSION IN SYSTOLE: 3.01 CM (ref 2.1–4)
LEFT VENTRICLE DIASTOLIC VOLUME INDEX: 61.49 ML/M2
LEFT VENTRICLE DIASTOLIC VOLUME: 127.9 ML
LEFT VENTRICLE MASS INDEX: 56 G/M2
LEFT VENTRICLE SYSTOLIC VOLUME INDEX: 17 ML/M2
LEFT VENTRICLE SYSTOLIC VOLUME: 35.39 ML
LEFT VENTRICULAR INTERNAL DIMENSION IN DIASTOLE: 5.17 CM (ref 3.5–6)
LEFT VENTRICULAR MASS: 117.29 G
LV LATERAL E/E' RATIO: 9.67 M/S
LV SEPTAL E/E' RATIO: 9.67 M/S
MV PEAK A VEL: 1.05 M/S
MV PEAK E VEL: 0.87 M/S
MV STENOSIS PRESSURE HALF TIME: 50.7 MS
MV VALVE AREA P 1/2 METHOD: 4.34 CM2
PISA TR MAX VEL: 2.07 M/S
RA MAJOR: 3.76 CM
RA PRESSURE: 3 MMHG
RA WIDTH: 3.02 CM
RIGHT VENTRICULAR END-DIASTOLIC DIMENSION: 3.66 CM
RV TISSUE DOPPLER FREE WALL SYSTOLIC VELOCITY 1 (APICAL 4 CHAMBER VIEW): 10.23 CM/S
SINUS: 3.16 CM
STJ: 2.97 CM
TDI LATERAL: 0.09 M/S
TDI SEPTAL: 0.09 M/S
TDI: 0.09 M/S
TR MAX PG: 17 MMHG
TRICUSPID ANNULAR PLANE SYSTOLIC EXCURSION: 1.44 CM
TV REST PULMONARY ARTERY PRESSURE: 20 MMHG

## 2022-02-25 PROCEDURE — 93306 ECHO (CUPID ONLY): ICD-10-PCS | Mod: 26,,, | Performed by: INTERNAL MEDICINE

## 2022-02-25 PROCEDURE — 93306 TTE W/DOPPLER COMPLETE: CPT

## 2022-02-25 PROCEDURE — 93306 TTE W/DOPPLER COMPLETE: CPT | Mod: 26,,, | Performed by: INTERNAL MEDICINE

## 2022-02-25 NOTE — PROGRESS NOTES
Date Consent signed: 2/25/2022    Company/  Name: [employer]  Study Title: Detection of Reduced Left Ventricular Ejection Fraction and Atrial Arrhythmias with Single Lead ECG using  Artificial Intelligence- EKO Study    IRB Number: 2021.079    Principle Investigator: Teodora Tucker MD    Present for Discussion: Patient, Myself    Is LAR Consenting for Subject: N/A      Scotsteffany Rock met with patient to review consent form and answer any and all outstanding questions that would present in regard to the EKO clinical trial. After thoroughly discussing the consent form patient did agree to participate in cardiology clinical trial 2021.079.      Prior to the Informed Consent (IC) being signed, or any study protocol required data collection, testing, procedure, or intervention being performed, the following was done and/or discussed:   Patient was given a copy of the IC for review   Purpose of the study and qualifications to participate   Study design, Follow up schedule, and tests or procedures done at each visit   Confidentiality and HIPAA Authorization for Release of Medical Records for the research trial/ subject's rights/research related injury   Risk, Benefits, Alternative Treatments, Compensation and Costs   Participation in the research trial is voluntary and patient may withdraw at anytime   Contact information for study related questions      Patient verbalizes understanding of the above: Yes  Contact information for CRC and PI given to patient: Yes  Patient able to adequately summarize: the purpose of the study, the risks associated with the study, and all procedures, testing, and follow-ups associated with the study: Yes      Shikha López signed the informed consent form for the EKO clinical research study with an IRB approval date of 6/30/2021. Each page of the consent form was reviewed with Shikha López (and pts family) and all questions answered satisfactorily. Shikha MORALES  Mariebl signed the consent form and received a copy of same. The original consent was scanned into electronic medical records (EPIC) and filed into the subject's research study binder.      Consent was obtained by: Scot Rock      As a participant in the EKO clinical trial, 5 recordings were done by a DUO monitor device. Recordings were done by Scot Rock      After all study related procedures were completed, patient was issued a $10.00 stipend for participation via [employer] provided by the sponsor.

## 2022-02-28 ENCOUNTER — INFUSION (OUTPATIENT)
Dept: INFUSION THERAPY | Facility: HOSPITAL | Age: 52
End: 2022-02-28
Attending: NURSE PRACTITIONER
Payer: MEDICARE

## 2022-02-28 ENCOUNTER — OFFICE VISIT (OUTPATIENT)
Dept: HEMATOLOGY/ONCOLOGY | Facility: CLINIC | Age: 52
End: 2022-02-28
Payer: MEDICARE

## 2022-02-28 VITALS — DIASTOLIC BLOOD PRESSURE: 61 MMHG | RESPIRATION RATE: 16 BRPM | HEART RATE: 75 BPM | SYSTOLIC BLOOD PRESSURE: 127 MMHG

## 2022-02-28 VITALS
DIASTOLIC BLOOD PRESSURE: 84 MMHG | WEIGHT: 240.06 LBS | BODY MASS INDEX: 44.18 KG/M2 | TEMPERATURE: 98 F | SYSTOLIC BLOOD PRESSURE: 172 MMHG | HEIGHT: 62 IN | RESPIRATION RATE: 18 BRPM | OXYGEN SATURATION: 96 % | HEART RATE: 94 BPM

## 2022-02-28 DIAGNOSIS — C50.912 BREAST CANCER, STAGE 4, LEFT: ICD-10-CM

## 2022-02-28 DIAGNOSIS — C50.912 BREAST CANCER, STAGE 4, LEFT: Primary | ICD-10-CM

## 2022-02-28 DIAGNOSIS — C78.02 MALIGNANT NEOPLASM METASTATIC TO LEFT LUNG: Primary | ICD-10-CM

## 2022-02-28 PROCEDURE — 99215 PR OFFICE/OUTPT VISIT, EST, LEVL V, 40-54 MIN: ICD-10-PCS | Mod: S$PBB,,, | Performed by: NURSE PRACTITIONER

## 2022-02-28 PROCEDURE — 25000003 PHARM REV CODE 250: Performed by: STUDENT IN AN ORGANIZED HEALTH CARE EDUCATION/TRAINING PROGRAM

## 2022-02-28 PROCEDURE — 96413 CHEMO IV INFUSION 1 HR: CPT

## 2022-02-28 PROCEDURE — 63600175 PHARM REV CODE 636 W HCPCS: Mod: TB | Performed by: STUDENT IN AN ORGANIZED HEALTH CARE EDUCATION/TRAINING PROGRAM

## 2022-02-28 PROCEDURE — 99215 OFFICE O/P EST HI 40 MIN: CPT | Mod: S$PBB,,, | Performed by: NURSE PRACTITIONER

## 2022-02-28 PROCEDURE — 99999 PR PBB SHADOW E&M-EST. PATIENT-LVL IV: CPT | Mod: PBBFAC,,, | Performed by: NURSE PRACTITIONER

## 2022-02-28 PROCEDURE — 96367 TX/PROPH/DG ADDL SEQ IV INF: CPT

## 2022-02-28 PROCEDURE — 99999 PR PBB SHADOW E&M-EST. PATIENT-LVL IV: ICD-10-PCS | Mod: PBBFAC,,, | Performed by: NURSE PRACTITIONER

## 2022-02-28 PROCEDURE — A4216 STERILE WATER/SALINE, 10 ML: HCPCS | Performed by: STUDENT IN AN ORGANIZED HEALTH CARE EDUCATION/TRAINING PROGRAM

## 2022-02-28 PROCEDURE — 99214 OFFICE O/P EST MOD 30 MIN: CPT | Mod: PBBFAC | Performed by: NURSE PRACTITIONER

## 2022-02-28 RX ORDER — HEPARIN 100 UNIT/ML
500 SYRINGE INTRAVENOUS
Status: DISCONTINUED | OUTPATIENT
Start: 2022-02-28 | End: 2022-02-28 | Stop reason: HOSPADM

## 2022-02-28 RX ORDER — SODIUM CHLORIDE 0.9 % (FLUSH) 0.9 %
10 SYRINGE (ML) INJECTION
Status: DISCONTINUED | OUTPATIENT
Start: 2022-02-28 | End: 2022-02-28 | Stop reason: HOSPADM

## 2022-02-28 RX ADMIN — SODIUM CHLORIDE: 9 INJECTION, SOLUTION INTRAVENOUS at 12:02

## 2022-02-28 RX ADMIN — FAM-TRASTUZUMAB DERUXTECAN-NXKI 600 MG: 100 INJECTION, POWDER, LYOPHILIZED, FOR SOLUTION INTRAVENOUS at 02:02

## 2022-02-28 RX ADMIN — PALONOSETRON HYDROCHLORIDE 0.25 MG: 0.25 INJECTION, SOLUTION INTRAVENOUS at 01:02

## 2022-02-28 RX ADMIN — FOSAPREPITANT DIMEGLUMINE 150 MG: 150 INJECTION, POWDER, LYOPHILIZED, FOR SOLUTION INTRAVENOUS at 01:02

## 2022-02-28 RX ADMIN — HEPARIN 500 UNITS: 100 SYRINGE at 02:02

## 2022-02-28 RX ADMIN — Medication 10 ML: at 02:02

## 2022-02-28 NOTE — PLAN OF CARE
1440-Pt tolerated Enhertu infusion well, no complications or side effects, POC and meds discussed with pt, pt aware of upcoming appts, pt knows to call MD with any questions or concerns. Pt ambulated off unit, no distress noted.

## 2022-03-05 ENCOUNTER — TELEPHONE (OUTPATIENT)
Dept: HEMATOLOGY/ONCOLOGY | Facility: CLINIC | Age: 52
End: 2022-03-05
Payer: MEDICARE

## 2022-03-11 ENCOUNTER — PATIENT MESSAGE (OUTPATIENT)
Dept: HEMATOLOGY/ONCOLOGY | Facility: CLINIC | Age: 52
End: 2022-03-11
Payer: MEDICARE

## 2022-03-23 ENCOUNTER — HOSPITAL ENCOUNTER (OUTPATIENT)
Dept: RADIOLOGY | Facility: HOSPITAL | Age: 52
Discharge: HOME | End: 2022-03-23
Attending: NURSE PRACTITIONER
Payer: MEDICARE

## 2022-03-23 DIAGNOSIS — C78.02 MALIGNANT NEOPLASM METASTATIC TO LEFT LUNG: ICD-10-CM

## 2022-03-23 DIAGNOSIS — C50.912 BREAST CANCER, STAGE 4, LEFT: ICD-10-CM

## 2022-03-23 PROCEDURE — 25500020 PHARM REV CODE 255: Performed by: NURSE PRACTITIONER

## 2022-03-23 PROCEDURE — 74177 CT ABD & PELVIS W/CONTRAST: CPT | Mod: 26,,, | Performed by: RADIOLOGY

## 2022-03-23 PROCEDURE — 71260 CT THORAX DX C+: CPT | Mod: 26,,, | Performed by: RADIOLOGY

## 2022-03-23 PROCEDURE — 71260 CT THORAX DX C+: CPT | Mod: TC

## 2022-03-23 PROCEDURE — 71260 CT CHEST ABDOMEN PELVIS WITH CONTRAST (XPD): ICD-10-PCS | Mod: 26,,, | Performed by: RADIOLOGY

## 2022-03-23 PROCEDURE — 74177 CT CHEST ABDOMEN PELVIS WITH CONTRAST (XPD): ICD-10-PCS | Mod: 26,,, | Performed by: RADIOLOGY

## 2022-03-23 PROCEDURE — 74177 CT ABD & PELVIS W/CONTRAST: CPT | Mod: TC

## 2022-03-23 RX ADMIN — IOHEXOL 100 ML: 350 INJECTION, SOLUTION INTRAVENOUS at 07:03

## 2022-03-23 RX ADMIN — IOHEXOL 15 ML: 350 INJECTION, SOLUTION INTRAVENOUS at 07:03

## 2022-03-24 RX ORDER — HEPARIN 100 UNIT/ML
500 SYRINGE INTRAVENOUS
Status: CANCELLED | OUTPATIENT
Start: 2022-03-31

## 2022-03-24 RX ORDER — SODIUM CHLORIDE 0.9 % (FLUSH) 0.9 %
10 SYRINGE (ML) INJECTION
Status: CANCELLED | OUTPATIENT
Start: 2022-03-31

## 2022-03-30 ENCOUNTER — TELEPHONE (OUTPATIENT)
Dept: HEMATOLOGY/ONCOLOGY | Facility: CLINIC | Age: 52
End: 2022-03-30
Payer: MEDICARE

## 2022-03-30 NOTE — PROGRESS NOTES
Subjective:       Patient ID: Shikha López is a 51 y.o. female.    Chief Complaint: No chief complaint on file.    HPI 51-year-old female who returns for F/U  for metastatic breast cancer.  She is on Trastuzumab deruxtecan  - here for cycle  .      She has some fatigue and constipation during her 1st week, her appetite is also somewhat increased.  She denies any pain.        Breast history:  She presented in the emergency room at [facility] on September 29, 2006 with a 4 months history of inflammation of her left breast.  Physical examination at that time showed the left breast was largely replaced by large mass with multiple skin ulcerations.    A biopsy in September 2006 showed poorly differentiated carcinoma which was ER and NH. negative and HER2 positive.    She was then referred to [facility] for additional care.   CT scan of the chest and abdomen November 2006 revealed multiple nodules in the lungs consistent with metastatic disease.  There also enlarged left axillary node.    She was treated with chemotherapy with weekly Herceptin and Abraxane and had marked improvement in her breast and lung metastasis on that therapy.    On May 29, 2007 she underwent left modified radical mastectomy(Dr. Colvin) which showed no residual tumor in the breast and 1/5 nodes was positive for metastasis measuring 6 mm.  (ypT0N1).    She then received postoperative radiation therapy(Dr Singh)  to the left chest wall supraclav and internal mammary lymph nodes from August 20, 2007 to September 28, 2007.    Postoperatively she continued on Herceptin for approximately 3 and 1 half years before her lung metastasis begin to grow.    She subsequently received a number of different chemotherapy treatments including Adriamycin, Halaven, Xeloda plus lapatinib then Xeloda plus Herceptin.  The  Those treatments were provided under the care of  in [facility].    After she was hospitalized with neutropenic fever  in 2013, she transferred her care to  at [facility].  She received Kadcyla therapy.  She was initially treated with Taxotere Herceptin Perjeta.  She was then changed to Kadcyla which she received for 12 in 18 months.  Line we  In July 2020 PET scan showed an increase in the size of an infrahilar mass consistent with progression.   She then took approximately 9 months of Herceptin and Navelbine.  Apparently she has some initial response to that therapy.  Last scans were December 2020.    She started trastuzumab- deruxtecan  4/12/21.     Echo 2/25 - EF 55%    CT 3/23/22 -   Lungs/airways/pleura: Central airways are patent.  Persistent left infrahilar mass obstructing the proximal segmental bronchi in the left lower lobe.  The left infrahilar mass is difficult to measure however is approximately 3.0 x 2.7 cm, unchanged.  Scattered irregular nodular opacities bilaterally similar in size and distribution compared to prior.  Largest lesion right upper lobe again measures 1.9 cm with pleural tethering, unchanged.  Largest lesion in the left upper lobe measures 1.5 cm  unchanged.  No definite new nodules.  Persistent mild left pleural thickening.  Review of Systems   Constitutional: Positive for fatigue. Negative for appetite change and unexpected weight change.   HENT: Negative for mouth sores.    Eyes: Negative for visual disturbance.   Respiratory: Positive for shortness of breath (At times). Negative for cough.    Cardiovascular: Negative for chest pain.   Gastrointestinal: Positive for constipation. Negative for abdominal pain and diarrhea.   Genitourinary: Negative for frequency.   Musculoskeletal: Negative for back pain.   Integumentary:  Negative for rash.   Neurological: Negative for headaches.   Hematological: Negative for adenopathy.   Psychiatric/Behavioral: Negative.  The patient is not nervous/anxious.          Objective:      Physical Exam  Vitals reviewed.   HENT:      Mouth/Throat:       Mouth: Mucous membranes are moist.      Pharynx: Oropharynx is clear. No oropharyngeal exudate or posterior oropharyngeal erythema.   Eyes:      Pupils: Pupils are equal, round, and reactive to light.   Cardiovascular:      Rate and Rhythm: Normal rate and regular rhythm.      Heart sounds: Murmur (systolic -aortic) heard.   Pulmonary:      Effort: Pulmonary effort is normal. No respiratory distress.      Breath sounds: Normal breath sounds. No wheezing or rales.   Chest:   Breasts:      Right: Normal. No axillary adenopathy or supraclavicular adenopathy.      Left: Absent. No axillary adenopathy or supraclavicular adenopathy.         Abdominal:      Palpations: Abdomen is soft. There is no mass.      Tenderness: There is no abdominal tenderness.   Lymphadenopathy:      Cervical: No cervical adenopathy.      Upper Body:      Right upper body: No supraclavicular or axillary adenopathy.      Left upper body: No supraclavicular or axillary adenopathy.   Skin:     Findings: No rash.   Neurological:      Mental Status: She is alert and oriented to person, place, and time.   Psychiatric:         Mood and Affect: Mood normal.         Behavior: Behavior normal.         Thought Content: Thought content normal.         Judgment: Judgment normal.         Assessment:     CMP - bili 1.5, CBC normal  Problem List Items Addressed This Visit     Breast cancer, stage 4, left - Primary    Malignant neoplasm metastatic to left lung          Plan:       Continue current RX    Route Chart for Scheduling    Med Onc Chart Routing      Follow up with physician 3 weeks. Me or Mariam on 4/25   Follow up with JIMI    Labs CBC and CMP   Lab interval:     Imaging    Pharmacy appointment    Other referrals          Treatment Plan Information   OP BREAST FAM-TRASTUZUMAB DERUXTECAN-NXKI Q3W   Home Willis MD   Upcoming Treatment Dates - OP BREAST FAM-TRASTUZUMAB DERUXTECAN-NXKI Q3W    3/31/2022       Chemotherapy       fam-trastuzumab  deruxtecan-nxki 602 mg in dextrose 5 % 100 mL infusion  4/21/2022       Chemotherapy       fam-trastuzumab deruxtecan-nxki 602 mg in dextrose 5 % 100 mL infusion

## 2022-03-31 ENCOUNTER — INFUSION (OUTPATIENT)
Dept: INFUSION THERAPY | Facility: HOSPITAL | Age: 52
End: 2022-03-31
Payer: MEDICARE

## 2022-03-31 ENCOUNTER — OFFICE VISIT (OUTPATIENT)
Dept: HEMATOLOGY/ONCOLOGY | Facility: CLINIC | Age: 52
End: 2022-03-31
Payer: MEDICARE

## 2022-03-31 VITALS
RESPIRATION RATE: 18 BRPM | WEIGHT: 239.44 LBS | TEMPERATURE: 98 F | SYSTOLIC BLOOD PRESSURE: 144 MMHG | BODY MASS INDEX: 44.06 KG/M2 | HEIGHT: 62 IN | DIASTOLIC BLOOD PRESSURE: 73 MMHG | OXYGEN SATURATION: 97 % | HEART RATE: 87 BPM

## 2022-03-31 VITALS
BODY MASS INDEX: 44.06 KG/M2 | DIASTOLIC BLOOD PRESSURE: 58 MMHG | TEMPERATURE: 97 F | HEIGHT: 62 IN | WEIGHT: 239.44 LBS | OXYGEN SATURATION: 95 % | HEART RATE: 83 BPM | RESPIRATION RATE: 16 BRPM | SYSTOLIC BLOOD PRESSURE: 114 MMHG

## 2022-03-31 DIAGNOSIS — C50.912 BREAST CANCER, STAGE 4, LEFT: Primary | ICD-10-CM

## 2022-03-31 DIAGNOSIS — C78.02 MALIGNANT NEOPLASM METASTATIC TO LEFT LUNG: ICD-10-CM

## 2022-03-31 PROCEDURE — 96365 THER/PROPH/DIAG IV INF INIT: CPT

## 2022-03-31 PROCEDURE — 63600175 PHARM REV CODE 636 W HCPCS: Performed by: INTERNAL MEDICINE

## 2022-03-31 PROCEDURE — 99214 PR OFFICE/OUTPT VISIT, EST, LEVL IV, 30-39 MIN: ICD-10-PCS | Mod: S$PBB,,, | Performed by: INTERNAL MEDICINE

## 2022-03-31 PROCEDURE — 25000003 PHARM REV CODE 250: Performed by: INTERNAL MEDICINE

## 2022-03-31 PROCEDURE — 99999 PR PBB SHADOW E&M-EST. PATIENT-LVL III: ICD-10-PCS | Mod: PBBFAC,,, | Performed by: INTERNAL MEDICINE

## 2022-03-31 PROCEDURE — 96413 CHEMO IV INFUSION 1 HR: CPT

## 2022-03-31 PROCEDURE — 99213 OFFICE O/P EST LOW 20 MIN: CPT | Mod: PBBFAC | Performed by: INTERNAL MEDICINE

## 2022-03-31 PROCEDURE — 96367 TX/PROPH/DG ADDL SEQ IV INF: CPT

## 2022-03-31 PROCEDURE — A4216 STERILE WATER/SALINE, 10 ML: HCPCS | Performed by: INTERNAL MEDICINE

## 2022-03-31 PROCEDURE — 99999 PR PBB SHADOW E&M-EST. PATIENT-LVL III: CPT | Mod: PBBFAC,,, | Performed by: INTERNAL MEDICINE

## 2022-03-31 PROCEDURE — 99214 OFFICE O/P EST MOD 30 MIN: CPT | Mod: S$PBB,,, | Performed by: INTERNAL MEDICINE

## 2022-03-31 RX ORDER — SODIUM CHLORIDE 0.9 % (FLUSH) 0.9 %
10 SYRINGE (ML) INJECTION
Status: DISCONTINUED | OUTPATIENT
Start: 2022-03-31 | End: 2022-03-31 | Stop reason: HOSPADM

## 2022-03-31 RX ORDER — HEPARIN 100 UNIT/ML
500 SYRINGE INTRAVENOUS
Status: DISCONTINUED | OUTPATIENT
Start: 2022-03-31 | End: 2022-03-31 | Stop reason: HOSPADM

## 2022-03-31 RX ADMIN — FAM-TRASTUZUMAB DERUXTECAN-NXKI 600 MG: 100 INJECTION, POWDER, LYOPHILIZED, FOR SOLUTION INTRAVENOUS at 12:03

## 2022-03-31 RX ADMIN — Medication 10 ML: at 01:03

## 2022-03-31 RX ADMIN — SODIUM CHLORIDE: 9 INJECTION, SOLUTION INTRAVENOUS at 11:03

## 2022-03-31 RX ADMIN — FOSAPREPITANT DIMEGLUMINE 150 MG: 150 INJECTION, POWDER, LYOPHILIZED, FOR SOLUTION INTRAVENOUS at 11:03

## 2022-03-31 RX ADMIN — PALONOSETRON HYDROCHLORIDE 0.25 MG: 0.25 INJECTION, SOLUTION INTRAVENOUS at 11:03

## 2022-03-31 RX ADMIN — HEPARIN 500 UNITS: 100 SYRINGE at 01:03

## 2022-03-31 NOTE — PLAN OF CARE
1100   Patient seated in chair, VSS, Assessmenrt done, Port accessed without issue, flushed and Blood return noted.  Patient stated that was the first time in a while that there was blood return.  They had done a port study to make sure it was able to be utilized.  Started NS @ 25 cc/hr KVO while waiting for pre-medication from pharmacy.  WCTM for safety

## 2022-03-31 NOTE — PLAN OF CARE
1310  Pateint completed Enhertu infusion, tolerated well.  Port deaccessed without issue, flushed, heparin locked.  No RTC scheduled at this time, Patient will monitor My Chart for future appts.  Patient ambulated independently off floor.

## 2022-04-04 ENCOUNTER — TELEPHONE (OUTPATIENT)
Dept: HEMATOLOGY/ONCOLOGY | Facility: CLINIC | Age: 52
End: 2022-04-04
Payer: MEDICARE

## 2022-04-21 ENCOUNTER — TELEPHONE (OUTPATIENT)
Dept: HEMATOLOGY/ONCOLOGY | Facility: CLINIC | Age: 52
End: 2022-04-21
Payer: MEDICARE

## 2022-04-21 NOTE — TELEPHONE ENCOUNTER
"Returned pt call, pt will see Mariam on Monday and will discuss next visit at that time.                 ----- Message from Quinten Leung sent at 4/21/2022  3:49 PM CDT -----  Consult/Advisory:          Name Of Caller: Self      Contact Preference?: [phone] (home)         Provider Name: Lazaro      Does patient feel the need to be seen today? No      What is the nature of the call?: Calling to speak w/ nurse. Inquiring if she still needed to see Dr. Willis some time soon (since her appt w/ him was r/s to see [facility]).          Additional Notes:  "Thank you for all that you do for our patients"        "

## 2022-04-25 ENCOUNTER — INFUSION (OUTPATIENT)
Dept: INFUSION THERAPY | Facility: HOSPITAL | Age: 52
End: 2022-04-25
Attending: INTERNAL MEDICINE
Payer: MEDICARE

## 2022-04-25 ENCOUNTER — OFFICE VISIT (OUTPATIENT)
Dept: HEMATOLOGY/ONCOLOGY | Facility: CLINIC | Age: 52
End: 2022-04-25
Attending: INTERNAL MEDICINE
Payer: MEDICARE

## 2022-04-25 VITALS
WEIGHT: 239.44 LBS | DIASTOLIC BLOOD PRESSURE: 81 MMHG | RESPIRATION RATE: 18 BRPM | HEART RATE: 85 BPM | HEIGHT: 62 IN | BODY MASS INDEX: 44.06 KG/M2 | OXYGEN SATURATION: 96 % | SYSTOLIC BLOOD PRESSURE: 173 MMHG | TEMPERATURE: 98 F

## 2022-04-25 VITALS — HEART RATE: 74 BPM | DIASTOLIC BLOOD PRESSURE: 77 MMHG | SYSTOLIC BLOOD PRESSURE: 144 MMHG

## 2022-04-25 DIAGNOSIS — C50.912 BREAST CANCER, STAGE 4, LEFT: Primary | ICD-10-CM

## 2022-04-25 DIAGNOSIS — T45.1X5A CHEMOTHERAPY-INDUCED PERIPHERAL NEUROPATHY: ICD-10-CM

## 2022-04-25 DIAGNOSIS — C78.02 MALIGNANT NEOPLASM METASTATIC TO LEFT LUNG: Primary | ICD-10-CM

## 2022-04-25 DIAGNOSIS — C50.912 BREAST CANCER, STAGE 4, LEFT: ICD-10-CM

## 2022-04-25 DIAGNOSIS — G62.0 CHEMOTHERAPY-INDUCED PERIPHERAL NEUROPATHY: ICD-10-CM

## 2022-04-25 PROCEDURE — 96367 TX/PROPH/DG ADDL SEQ IV INF: CPT

## 2022-04-25 PROCEDURE — 99999 PR PBB SHADOW E&M-EST. PATIENT-LVL IV: CPT | Mod: PBBFAC,,, | Performed by: NURSE PRACTITIONER

## 2022-04-25 PROCEDURE — 99215 PR OFFICE/OUTPT VISIT, EST, LEVL V, 40-54 MIN: ICD-10-PCS | Mod: S$PBB,,, | Performed by: NURSE PRACTITIONER

## 2022-04-25 PROCEDURE — 99214 OFFICE O/P EST MOD 30 MIN: CPT | Mod: PBBFAC,25 | Performed by: NURSE PRACTITIONER

## 2022-04-25 PROCEDURE — A4216 STERILE WATER/SALINE, 10 ML: HCPCS | Performed by: INTERNAL MEDICINE

## 2022-04-25 PROCEDURE — 63600175 PHARM REV CODE 636 W HCPCS: Mod: TB | Performed by: INTERNAL MEDICINE

## 2022-04-25 PROCEDURE — 25000003 PHARM REV CODE 250: Performed by: INTERNAL MEDICINE

## 2022-04-25 PROCEDURE — 99215 OFFICE O/P EST HI 40 MIN: CPT | Mod: S$PBB,,, | Performed by: NURSE PRACTITIONER

## 2022-04-25 PROCEDURE — 96413 CHEMO IV INFUSION 1 HR: CPT

## 2022-04-25 PROCEDURE — 99999 PR PBB SHADOW E&M-EST. PATIENT-LVL IV: ICD-10-PCS | Mod: PBBFAC,,, | Performed by: NURSE PRACTITIONER

## 2022-04-25 RX ORDER — HEPARIN 100 UNIT/ML
500 SYRINGE INTRAVENOUS
Status: DISCONTINUED | OUTPATIENT
Start: 2022-04-25 | End: 2022-04-25 | Stop reason: HOSPADM

## 2022-04-25 RX ORDER — HEPARIN 100 UNIT/ML
500 SYRINGE INTRAVENOUS
Status: CANCELLED | OUTPATIENT
Start: 2022-04-29

## 2022-04-25 RX ORDER — SODIUM CHLORIDE 0.9 % (FLUSH) 0.9 %
10 SYRINGE (ML) INJECTION
Status: CANCELLED | OUTPATIENT
Start: 2022-04-29

## 2022-04-25 RX ORDER — SODIUM CHLORIDE 0.9 % (FLUSH) 0.9 %
10 SYRINGE (ML) INJECTION
Status: DISCONTINUED | OUTPATIENT
Start: 2022-04-25 | End: 2022-04-25 | Stop reason: HOSPADM

## 2022-04-25 RX ADMIN — FAM-TRASTUZUMAB DERUXTECAN-NXKI 600 MG: 100 INJECTION, POWDER, LYOPHILIZED, FOR SOLUTION INTRAVENOUS at 04:04

## 2022-04-25 RX ADMIN — HEPARIN SODIUM (PORCINE) LOCK FLUSH IV SOLN 100 UNIT/ML 500 UNITS: 100 SOLUTION at 05:04

## 2022-04-25 RX ADMIN — PALONOSETRON 0.25 MG: 0.25 INJECTION, SOLUTION INTRAVENOUS at 03:04

## 2022-04-25 RX ADMIN — Medication 10 ML: at 05:04

## 2022-04-25 RX ADMIN — FOSAPREPITANT DIMEGLUMINE 150 MG: 150 INJECTION, POWDER, LYOPHILIZED, FOR SOLUTION INTRAVENOUS at 04:04

## 2022-04-25 RX ADMIN — SODIUM CHLORIDE: 9 INJECTION, SOLUTION INTRAVENOUS at 03:04

## 2022-04-25 NOTE — PLAN OF CARE
Patient tolerated Enhertu today. NAD. PAC accessed with positive blood return. Pt declined AVS, uses [facility]. Discharged home, ambulated independently.

## 2022-04-25 NOTE — PROGRESS NOTES
Subjective:       Patient ID: Shikha López is a 51 y.o. female.    Chief Complaint: Malignant neoplasm metastatic to left lung    HPI 51-year-old female who returns for F/U  for metastatic breast cancer.  She is on Trastuzumab deruxtecan  - here for cycle 16 .      Energy level was okay after last cycle.  Still with gas build up and nausea.  Appetite is stable.   Still with knee pain.   Bilateral feet swelling and ankle started last week. Improved when laying worse with standing.         Per Dr. Willis's previous note: Breast history:  She presented in the emergency room at [facility] on September 29, 2006 with a 4 months history of inflammation of her left breast.  Physical examination at that time showed the left breast was largely replaced by large mass with multiple skin ulcerations.    A biopsy in September 2006 showed poorly differentiated carcinoma which was ER and NM. negative and HER2 positive.    She was then referred to [facility] for additional care.   CT scan of the chest and abdomen November 2006 revealed multiple nodules in the lungs consistent with metastatic disease.  There also enlarged left axillary node.    She was treated with chemotherapy with weekly Herceptin and Abraxane and had marked improvement in her breast and lung metastasis on that therapy.    On May 29, 2007 she underwent left modified radical mastectomy(Dr. Colvin) which showed no residual tumor in the breast and 1/5 nodes was positive for metastasis measuring 6 mm.  (ypT0N1).    She then received postoperative radiation therapy(Dr Singh)  to the left chest wall supraclav and internal mammary lymph nodes from August 20, 2007 to September 28, 2007.    Postoperatively she continued on Herceptin for approximately 3 and 1 half years before her lung metastasis begin to grow.    She subsequently received a number of different chemotherapy treatments including Adriamycin, Halaven, Xeloda plus lapatinib then Xeloda plus  Herceptin.  The  Those treatments were provided under the care of  in [facility].    After she was hospitalized with neutropenic fever in 2013, she transferred her care to  at [facility].  She received Kadcyla therapy.  She was initially treated with Taxotere Herceptin Perjeta.  She was then changed to Kadcyla which she received for 12 in 18 months.  Line we  In July 2020 PET scan showed an increase in the size of an infrahilar mass consistent with progression.   She then took approximately 9 months of Herceptin and Navelbine.  Apparently she has some initial response to that therapy.  Last scans were December 2020.    She started trastuzumab- deruxtecan  4/12/21.     Echo 2/25 - EF 55%    CT 3/23/22 -   Lungs/airways/pleura: Central airways are patent.  Persistent left infrahilar mass obstructing the proximal segmental bronchi in the left lower lobe.  The left infrahilar mass is difficult to measure however is approximately 3.0 x 2.7 cm, unchanged.  Scattered irregular nodular opacities bilaterally similar in size and distribution compared to prior.  Largest lesion right upper lobe again measures 1.9 cm with pleural tethering, unchanged.  Largest lesion in the left upper lobe measures 1.5 cm  unchanged.  No definite new nodules.  Persistent mild left pleural thickening.      Review of Systems   Constitutional: Positive for fatigue. Negative for activity change, appetite change, chills, diaphoresis, fever and unexpected weight change.   HENT: Negative for mouth sores and nosebleeds.    Eyes: Negative for visual disturbance.   Respiratory: Positive for shortness of breath (stable). Negative for cough.    Cardiovascular: Positive for leg swelling (worsening throughout the day). Negative for chest pain and palpitations.   Gastrointestinal: Positive for constipation. Negative for abdominal distention, abdominal pain, blood in stool, diarrhea, nausea and vomiting.   Genitourinary:  Negative for frequency, hematuria and vaginal bleeding.   Musculoskeletal: Negative for arthralgias, back pain and myalgias.   Integumentary:  Negative for pallor and rash.   Allergic/Immunologic: Negative for immunocompromised state.   Neurological: Negative for dizziness, weakness, light-headedness, numbness and headaches.   Hematological: Negative for adenopathy. Does not bruise/bleed easily.   Psychiatric/Behavioral: Negative.  Negative for confusion. The patient is not nervous/anxious.          Objective:      Physical Exam  Vitals and nursing note reviewed.   Constitutional:       General: She is not in acute distress.     Appearance: Normal appearance. She is well-developed.   HENT:      Head: Normocephalic.      Mouth/Throat:      Pharynx: No oropharyngeal exudate.   Eyes:      Pupils: Pupils are equal, round, and reactive to light.   Cardiovascular:      Rate and Rhythm: Normal rate and regular rhythm.      Heart sounds: Murmur heard.   Pulmonary:      Effort: Pulmonary effort is normal.      Breath sounds: Normal breath sounds.   Chest:   Breasts:      Right: Normal. No axillary adenopathy or supraclavicular adenopathy.      Left: No axillary adenopathy or supraclavicular adenopathy.        Comments: Left breast mastectomy with skin graft in place  Abdominal:      General: Bowel sounds are normal. There is no distension.      Palpations: Abdomen is soft.      Tenderness: There is no abdominal tenderness.   Musculoskeletal:      Cervical back: Normal range of motion.   Lymphadenopathy:      Cervical: No cervical adenopathy.      Upper Body:      Right upper body: No supraclavicular or axillary adenopathy.      Left upper body: No supraclavicular or axillary adenopathy.   Skin:     General: Skin is warm and dry.      Findings: No rash.   Neurological:      Mental Status: She is alert and oriented to person, place, and time.   Psychiatric:         Behavior: Behavior normal.         Assessment:     CMP - bili  1.2, CBC unremarkable   1. Malignant neoplasm metastatic to left lung     2. Breast cancer, stage 4, left        Plan:       1-2. Continue current RX  - discussed dose reduction with patient and Dr. Willis, plan for dose reduction due to side effects next cycle   - Echo due in May   - Referred to integrative oncology for acupuncture     Return to clinic in 3 weeks with MD appointment and labs.     Patient is in agreement with the proposed treatment plan. All questions were answered to the patient's satisfaction. Patient knows to call clinic for any new or worsening symptoms and if anything is needed before the next clinic visit.          Mariam Lisa, FNP-C  Hematology & Medical Oncology   [address] [place] [address]  ph. [phone]  Fax. [phone]    Collaborating physician, Dr. Willis.    Approximately 15 minutes were spent face-to-face with the patient.  Approximately 30 minutes in total were spent on this encounter, which includes face-to-face time and non-face-to-face time preparing to see the patient (e.g., review of tests), obtaining and/or reviewing separately obtained history, documenting clinical information in the electronic or other health record, independently interpreting results (not separately reported) and communicating results to the patient/family/caregiver, or care coordination (not separately reported).     Route Chart for Scheduling    Med Onc Chart Routing      Follow up with physician 3 weeks.   Follow up with JIMI 6 weeks.   Labs CBC and CMP   Lab interval: every 3 weeks     Imaging ECHO   Echo in late May   Pharmacy appointment    Other referrals          Treatment Plan Information   OP BREAST FAM-TRASTUZUMAB DERUXTECAN-NXKI Q3W   Home Willis MD   Upcoming Treatment Dates - OP BREAST FAM-TRASTUZUMAB DERUXTECAN-NXKI Q3W    5/20/2022       Chemotherapy       fam-trastuzumab deruxtecan-nxki 602 mg in dextrose 5 % 100 mL infusion  6/10/2022       Chemotherapy        fam-trastuzumab deruxtecan-nxki 602 mg in dextrose 5 % 100 mL infusion

## 2022-04-26 ENCOUNTER — TELEPHONE (OUTPATIENT)
Dept: HEMATOLOGY/ONCOLOGY | Facility: CLINIC | Age: 52
End: 2022-04-26
Payer: MEDICARE

## 2022-04-26 ENCOUNTER — TELEPHONE (OUTPATIENT)
Dept: OBSTETRICS AND GYNECOLOGY | Facility: CLINIC | Age: 52
End: 2022-04-26
Payer: MEDICARE

## 2022-04-26 ENCOUNTER — PATIENT MESSAGE (OUTPATIENT)
Dept: HEMATOLOGY/ONCOLOGY | Facility: CLINIC | Age: 52
End: 2022-04-26
Payer: MEDICARE

## 2022-04-26 DIAGNOSIS — G62.0 PERIPHERAL NEUROPATHY DUE TO CHEMOTHERAPY: Primary | ICD-10-CM

## 2022-04-26 DIAGNOSIS — T45.1X5A PERIPHERAL NEUROPATHY DUE TO CHEMOTHERAPY: Primary | ICD-10-CM

## 2022-04-26 NOTE — TELEPHONE ENCOUNTER
----- Message from EMILIA Espnioza MA sent at 4/26/2022  9:01 AM CDT -----  Hey,  Pt confirmed integrative appt 5/2. She reports neuropathy, nausea, gas and bloating. Active chemo. Poor appetite the first week of chemo, doesn't sleep well with the steroids. No supplements. She wants to start taking collagen.    Can we submit acupuncture?    Thanks!

## 2022-04-26 NOTE — TELEPHONE ENCOUNTER
Pt confirmed integrative appt 5/2. She reports neuropathy, nausea, gas and bloating. Active chemo. Poor appetite the first week of chemo, doesn't sleep well with the steroids. No supplements. She wants to start taking collagen.

## 2022-05-02 ENCOUNTER — OFFICE VISIT (OUTPATIENT)
Dept: HEMATOLOGY/ONCOLOGY | Facility: CLINIC | Age: 52
End: 2022-05-02
Payer: MEDICARE

## 2022-05-02 VITALS
SYSTOLIC BLOOD PRESSURE: 135 MMHG | HEART RATE: 106 BPM | DIASTOLIC BLOOD PRESSURE: 94 MMHG | WEIGHT: 231.94 LBS | HEIGHT: 62 IN | BODY MASS INDEX: 42.68 KG/M2

## 2022-05-02 DIAGNOSIS — R11.0 NAUSEA: ICD-10-CM

## 2022-05-02 DIAGNOSIS — C50.912 BREAST CANCER, STAGE 4, LEFT: ICD-10-CM

## 2022-05-02 DIAGNOSIS — M54.50 CHRONIC BILATERAL LOW BACK PAIN WITHOUT SCIATICA: Primary | ICD-10-CM

## 2022-05-02 DIAGNOSIS — G62.0 CHEMOTHERAPY-INDUCED PERIPHERAL NEUROPATHY: ICD-10-CM

## 2022-05-02 DIAGNOSIS — R53.83 OTHER FATIGUE: ICD-10-CM

## 2022-05-02 DIAGNOSIS — G89.29 CHRONIC BILATERAL LOW BACK PAIN WITHOUT SCIATICA: Primary | ICD-10-CM

## 2022-05-02 DIAGNOSIS — K59.09 OTHER CONSTIPATION: ICD-10-CM

## 2022-05-02 DIAGNOSIS — T45.1X5A CHEMOTHERAPY-INDUCED PERIPHERAL NEUROPATHY: ICD-10-CM

## 2022-05-02 PROCEDURE — 99204 OFFICE O/P NEW MOD 45 MIN: CPT | Mod: S$PBB,,, | Performed by: PHYSICIAN ASSISTANT

## 2022-05-02 PROCEDURE — 99214 OFFICE O/P EST MOD 30 MIN: CPT | Mod: PBBFAC | Performed by: PHYSICIAN ASSISTANT

## 2022-05-02 PROCEDURE — 99999 PR PBB SHADOW E&M-EST. PATIENT-LVL IV: CPT | Mod: PBBFAC,,, | Performed by: PHYSICIAN ASSISTANT

## 2022-05-02 PROCEDURE — 99999 PR PBB SHADOW E&M-EST. PATIENT-LVL IV: ICD-10-PCS | Mod: PBBFAC,,, | Performed by: PHYSICIAN ASSISTANT

## 2022-05-02 PROCEDURE — 99204 PR OFFICE/OUTPT VISIT, NEW, LEVL IV, 45-59 MIN: ICD-10-PCS | Mod: S$PBB,,, | Performed by: PHYSICIAN ASSISTANT

## 2022-05-02 NOTE — PROGRESS NOTES
"Subjective:      Shikha López is a 51 y.o. female who presents for integrative oncology consult. History of metastatic breast cancer currently on chemotherapy. Reports abdominal bloating, gas and constipation for the week after chemotherapy. Some nausea but avoids Zofran due to worsening the constipation. Eating small meals of what she can tolerate. Lower abdominal discomfort while occurring. Takes Miralax that helps, but if continues will then have diarrhea.  Reports chronic low back pain for years. Worse in the AM when she wakes up in the morning. No trauma to the area. Thinks is related to her weight. Also having left knee pain and has been referred to ortho. Limits her ability to exercise. Does have 3 dogs but has not been able to walk them. Has never done yoga but is interested. Concerns that she would not be able to get up. Tried YouTube yoga at home but needs assistance so stopped.   Neuropathy in bilateral fingers and toes for years. Can not tolerate gabapentin. Trying to avoid another medication and interested in acupuncture.  She is "tired." Has been battling breast cancer for 16 years and mostly on her own. Father  shortly after her diagnosis. Does have daughter who lives on [address] but is busy with her own life. Energy levels are up and down pending when she has had chemotherapy. Sleeping "ok" pending if she needs to urinate or her dogs. Does not pray but did try meditation and liked. Had to cancel her [employer] subscription due to cost.    Supplements: None      Past Medical History:   Diagnosis Date    Breast cancer      Past Surgical History:   Procedure Laterality Date    MASTECTOMY       Social History     Tobacco Use    Smoking status: Never Smoker    Smokeless tobacco: Never Used   Substance Use Topics    Alcohol use: Never    Drug use: Never     Family History   Problem Relation Age of Onset    Lung cancer Father      OB History   No obstetric history on file.       Current " "Outpatient Medications:     acetaminophen (TYLENOL) 500 MG tablet, Take 1,000 mg by mouth., Disp: , Rfl:     LIDOcaine-prilocaine (EMLA) cream, APPLY TO THE AFFECTED AREA 1 HOUR BEFORE PORT ACCESS, Disp: , Rfl:     OLANZapine (ZYPREXA) 5 MG tablet, Take days 1-4 of chemotherapy, Disp: 30 tablet, Rfl: 2    ondansetron (ZOFRAN) 8 MG tablet, Take 8 mg by mouth 3 (three) times daily as needed., Disp: , Rfl:     The ASCVD Risk score (Caballo ROCHELLE Jr., et al., 2013) failed to calculate for the following reasons:    Cannot find a previous HDL lab    Cannot find a previous total cholesterol lab    Review of Systems:  General: No fever, chills, or weight loss.  Chest: No chest pain, shortness of breath, or palpitations.  Breast: No pain, masses, or nipple discharge.  Vulva: No pain, lesions, or itching.  Vagina: No relaxation, itching, discharge, or lesions.  Abdomen: No pain, nausea, vomiting, diarrhea, or constipation.  Urinary: No incontinence, nocturia, frequency, or dysuria.  Extremities:  No leg cramps, edema, or calf pain.  Neurologic: No headaches, dizziness, or visual changes.    Objective:     Vitals:    05/02/22 0829   BP: (!) 135/94   Pulse: 106   Weight: 105.2 kg (231 lb 14.8 oz)   Height: 5' 2" (1.575 m)   PainSc: 0-No pain     Body mass index is 42.42 kg/m².    PHYSICAL EXAM:  APPEARANCE: Well nourished, well developed, in no acute distress.  AFFECT: WNL, alert and oriented x 3      Assessment:      Chronic bilateral low back pain without sciatica  -     Acupuncture; Future  -     Ambulatory referral/consult to Physical/Occupational Therapy; Future; Expected date: 05/09/2022    Chemotherapy-induced peripheral neuropathy  -     Ambulatory referral/consult to Integrative Oncology    Breast cancer, stage 4, left  -     Ambulatory referral/consult to Physical/Occupational Therapy; Future; Expected date: 05/09/2022    Nausea    Other constipation    Other fatigue  -     Ambulatory referral/consult to " Physical/Occupational Therapy; Future; Expected date: 05/09/2022        Plan:   Acupuncture for constipation, fatigue and pain/neuropathy.  Therapeutic yoga with Tamarin.  Reviewed benefits of meditation.  Informed of meditation class if interested.  List of free meditation apps to try at home  Counseled on sleep hygiene.  Sleep Stories on Calm hailee.  Renea hailee.  Info on You Night.  Follow up in 3 months with Dr. Mcqueen.     Instructed patient to call if she experiences any side effects or has any questions.    I spent a total of 35 minutes on the day of the visit.This includes face to face time and non-face to face time preparing to see the patient (eg, review of tests), obtaining and/or reviewing separately obtained history, documenting clinical information in the electronic or other health record, independently interpreting results and communicating results to the patient/family/caregiver, or care coordinator.

## 2022-05-04 ENCOUNTER — PATIENT MESSAGE (OUTPATIENT)
Dept: HEMATOLOGY/ONCOLOGY | Facility: CLINIC | Age: 52
End: 2022-05-04
Payer: MEDICARE

## 2022-05-05 ENCOUNTER — CLINICAL SUPPORT (OUTPATIENT)
Dept: HEMATOLOGY/ONCOLOGY | Facility: CLINIC | Age: 52
End: 2022-05-05
Payer: MEDICARE

## 2022-05-05 DIAGNOSIS — T45.1X5A CHEMOTHERAPY-INDUCED PERIPHERAL NEUROPATHY: Primary | ICD-10-CM

## 2022-05-05 DIAGNOSIS — R11.0 NAUSEA: ICD-10-CM

## 2022-05-05 DIAGNOSIS — G62.0 CHEMOTHERAPY-INDUCED PERIPHERAL NEUROPATHY: Primary | ICD-10-CM

## 2022-05-05 DIAGNOSIS — M54.50 CHRONIC BILATERAL LOW BACK PAIN WITHOUT SCIATICA: ICD-10-CM

## 2022-05-05 DIAGNOSIS — G89.29 CHRONIC BILATERAL LOW BACK PAIN WITHOUT SCIATICA: ICD-10-CM

## 2022-05-05 PROCEDURE — 97811 ACUP 1/> W/O ESTIM EA ADD 15: CPT | Mod: ,,, | Performed by: ACUPUNCTURIST

## 2022-05-05 PROCEDURE — 97810 ACUP 1/> WO ESTIM 1ST 15 MIN: CPT | Mod: ,,, | Performed by: ACUPUNCTURIST

## 2022-05-05 PROCEDURE — 97810 PR ACUPUNCT W/O ELEC STIMUL 15 MIN: ICD-10-PCS | Mod: ,,, | Performed by: ACUPUNCTURIST

## 2022-05-05 PROCEDURE — 97811 PR ACUPUNCT W/O ELEC STIMUL ADDL 15M: ICD-10-PCS | Mod: ,,, | Performed by: ACUPUNCTURIST

## 2022-05-05 NOTE — PROGRESS NOTES
Subjective:       Patient ID: Shikha López is a 51 y.o. female.    Chief Complaint: Pain (cLBP), Nausea, and Diarrhea (With alternating constipation )    New patient establishing care for chronic low back pain and nausea, diarrhea and constipation. Patient states that this is increased after chemo (every 3 weeks). She has joint pain and gas / gastro issues. Complete 4 treatments then evaluate progress.     Review of Systems   Gastrointestinal: Positive for constipation, diarrhea and nausea.   Musculoskeletal: Positive for arthralgias and back pain.         Objective:      Physical Exam    Assessment:       Problem List Items Addressed This Visit    None     Visit Diagnoses     Chemotherapy-induced peripheral neuropathy    -  Primary    Chronic bilateral low back pain without sciatica        Nausea              Plan:       1x a week*         Pre-Symptom Score: 7  Post-Symptom Score: 7     Pain (cLBP), Nausea, and Diarrhea (With alternating constipation )       Encounter Diagnoses   Name Primary?    Chemotherapy-induced peripheral neuropathy Yes    Chronic bilateral low back pain without sciatica     Nausea        Acupuncture points used:  4 PEREZ, BA GRADY, Du20, Gb34, Li11, REN12, Sp10, Sp6, Sp9, St36, St40, St44 and YIN JENKINS    NO STIM USED      NEEDLES IN: 28  NEEDLES OUT: 28    NEEDLES W/O STIM  AT: 8:15 AM  NEEDLES W/O STIM REMOVED AT: 8:45 AM

## 2022-05-09 ENCOUNTER — CLINICAL SUPPORT (OUTPATIENT)
Dept: HEMATOLOGY/ONCOLOGY | Facility: CLINIC | Age: 52
End: 2022-05-09
Payer: MEDICARE

## 2022-05-09 DIAGNOSIS — G89.29 CHRONIC BILATERAL LOW BACK PAIN WITHOUT SCIATICA: Primary | ICD-10-CM

## 2022-05-09 DIAGNOSIS — T45.1X5A CHEMOTHERAPY-INDUCED PERIPHERAL NEUROPATHY: ICD-10-CM

## 2022-05-09 DIAGNOSIS — R11.0 NAUSEA: ICD-10-CM

## 2022-05-09 DIAGNOSIS — M54.50 CHRONIC BILATERAL LOW BACK PAIN WITHOUT SCIATICA: Primary | ICD-10-CM

## 2022-05-09 DIAGNOSIS — G62.0 CHEMOTHERAPY-INDUCED PERIPHERAL NEUROPATHY: ICD-10-CM

## 2022-05-09 PROCEDURE — 97810 ACUP 1/> WO ESTIM 1ST 15 MIN: CPT | Mod: ,,, | Performed by: ACUPUNCTURIST

## 2022-05-09 PROCEDURE — 97810 PR ACUPUNCT W/O ELEC STIMUL 15 MIN: ICD-10-PCS | Mod: ,,, | Performed by: ACUPUNCTURIST

## 2022-05-09 PROCEDURE — 97811 ACUP 1/> W/O ESTIM EA ADD 15: CPT | Mod: ,,, | Performed by: ACUPUNCTURIST

## 2022-05-09 PROCEDURE — 97811 PR ACUPUNCT W/O ELEC STIMUL ADDL 15M: ICD-10-PCS | Mod: ,,, | Performed by: ACUPUNCTURIST

## 2022-05-09 NOTE — PROGRESS NOTES
Subjective:       Patient ID: Shikha López is a 51 y.o. female.    Chief Complaint: Pain (cLBP)    Patient states that she had reduction in symptoms after initial treatment. Continue with care.    Review of Systems   Gastrointestinal: Positive for abdominal distention, constipation and diarrhea.   Musculoskeletal: Positive for arthralgias and back pain.         Objective:      Physical Exam    Assessment:       Problem List Items Addressed This Visit    None     Visit Diagnoses     Chronic bilateral low back pain without sciatica    -  Primary    Chemotherapy-induced peripheral neuropathy        Nausea              Plan:       1x a week*         Pre-Symptom Score: NA  Post-Symptom Score: NA     Pain (cLBP)       Encounter Diagnoses   Name Primary?    Chronic bilateral low back pain without sciatica Yes    Chemotherapy-induced peripheral neuropathy     Nausea        Acupuncture points used:  4 PEREZ, BA GRADY, Gb34, Li11, REN12, MAZARIEGOS MEN, Sp10, Sp6, Sp9, St25, St36, St40, St44 and YIN JENKINS    NO STIM USED      NEEDLES IN: 22  NEEDLES OUT: 22    NEEDLES W/O STIM  AT: 8:00 AM  NEEDLES W/O STIM REMOVED AT: 8 :30 AM

## 2022-05-13 ENCOUNTER — HOSPITAL ENCOUNTER (OUTPATIENT)
Dept: CARDIOLOGY | Facility: HOSPITAL | Age: 52
Discharge: HOME | End: 2022-05-13
Attending: NURSE PRACTITIONER
Payer: MEDICARE

## 2022-05-13 VITALS
BODY MASS INDEX: 42.51 KG/M2 | HEART RATE: 85 BPM | DIASTOLIC BLOOD PRESSURE: 70 MMHG | WEIGHT: 231 LBS | SYSTOLIC BLOOD PRESSURE: 140 MMHG | HEIGHT: 62 IN

## 2022-05-13 DIAGNOSIS — C78.02 MALIGNANT NEOPLASM METASTATIC TO LEFT LUNG: ICD-10-CM

## 2022-05-13 DIAGNOSIS — C50.912 BREAST CANCER, STAGE 4, LEFT: ICD-10-CM

## 2022-05-13 LAB
ASCENDING AORTA: 2.83 CM
AV INDEX (PROSTH): 0.46
AV MEAN GRADIENT: 14 MMHG
AV PEAK GRADIENT: 27 MMHG
AV VALVE AREA: 1.73 CM2
AV VELOCITY RATIO: 0.42
BSA FOR ECHO PROCEDURE: 2.14 M2
CV ECHO LV RWT: 0.31 CM
DOP CALC AO PEAK VEL: 2.58 M/S
DOP CALC AO VTI: 52.61 CM
DOP CALC LVOT AREA: 3.8 CM2
DOP CALC LVOT DIAMETER: 2.2 CM
DOP CALC LVOT PEAK VEL: 1.09 M/S
DOP CALC LVOT STROKE VOLUME: 91.19 CM3
DOP CALCLVOT PEAK VEL VTI: 24 CM
E WAVE DECELERATION TIME: 151.65 MSEC
E/A RATIO: 0.98
E/E' RATIO: 15.06 M/S
ECHO LV POSTERIOR WALL: 0.78 CM (ref 0.6–1.1)
EJECTION FRACTION: 60 %
FRACTIONAL SHORTENING: 34 % (ref 28–44)
INTERVENTRICULAR SEPTUM: 0.84 CM (ref 0.6–1.1)
IVRT: 62.8 MSEC
LA MAJOR: 5.53 CM
LA MINOR: 5.41 CM
LA WIDTH: 3.84 CM
LEFT ATRIUM SIZE: 5.09 CM
LEFT ATRIUM VOLUME INDEX MOD: 29.8 ML/M2
LEFT ATRIUM VOLUME INDEX: 44.8 ML/M2
LEFT ATRIUM VOLUME MOD: 60.41 CM3
LEFT ATRIUM VOLUME: 90.87 CM3
LEFT INTERNAL DIMENSION IN SYSTOLE: 3.32 CM (ref 2.1–4)
LEFT VENTRICLE DIASTOLIC VOLUME INDEX: 58.63 ML/M2
LEFT VENTRICLE DIASTOLIC VOLUME: 119.02 ML
LEFT VENTRICLE MASS INDEX: 68 G/M2
LEFT VENTRICLE SYSTOLIC VOLUME INDEX: 22.1 ML/M2
LEFT VENTRICLE SYSTOLIC VOLUME: 44.83 ML
LEFT VENTRICULAR INTERNAL DIMENSION IN DIASTOLE: 5.01 CM (ref 3.5–6)
LEFT VENTRICULAR MASS: 138.45 G
LV LATERAL E/E' RATIO: 18.29 M/S
LV SEPTAL E/E' RATIO: 12.8 M/S
MV A" WAVE DURATION": 7.42 MSEC
MV PEAK A VEL: 1.31 M/S
MV PEAK E VEL: 1.28 M/S
MV STENOSIS PRESSURE HALF TIME: 43.98 MS
MV VALVE AREA P 1/2 METHOD: 5 CM2
PISA TR MAX VEL: 2.7 M/S
PULM VEIN S/D RATIO: 0.83
PV PEAK D VEL: 0.96 M/S
PV PEAK S VEL: 0.8 M/S
QEF: 62 %
RA MAJOR: 4.83 CM
RA PRESSURE: 3 MMHG
RA WIDTH: 4.4 CM
RIGHT VENTRICULAR END-DIASTOLIC DIMENSION: 4.4 CM
RV TISSUE DOPPLER FREE WALL SYSTOLIC VELOCITY 1 (APICAL 4 CHAMBER VIEW): 11.79 CM/S
SINUS: 3.05 CM
STJ: 2.64 CM
TDI LATERAL: 0.07 M/S
TDI SEPTAL: 0.1 M/S
TDI: 0.09 M/S
TR MAX PG: 29 MMHG
TRICUSPID ANNULAR PLANE SYSTOLIC EXCURSION: 2.16 CM
TV REST PULMONARY ARTERY PRESSURE: 32 MMHG

## 2022-05-13 PROCEDURE — 93356 MYOCRD STRAIN IMG SPCKL TRCK: CPT

## 2022-05-13 PROCEDURE — 93306 ECHO (CUPID ONLY): ICD-10-PCS | Mod: 26,,, | Performed by: INTERNAL MEDICINE

## 2022-05-13 PROCEDURE — 93356 MYOCRD STRAIN IMG SPCKL TRCK: CPT | Mod: ,,, | Performed by: INTERNAL MEDICINE

## 2022-05-13 PROCEDURE — 93306 TTE W/DOPPLER COMPLETE: CPT | Mod: 26,,, | Performed by: INTERNAL MEDICINE

## 2022-05-13 PROCEDURE — 93356 ECHO (CUPID ONLY): ICD-10-PCS | Mod: ,,, | Performed by: INTERNAL MEDICINE

## 2022-05-13 NOTE — PROGRESS NOTES
Subjective:       Patient ID: Shikha López is a 51 y.o. female.    Chief Complaint: No chief complaint on file.    HPI 51-year-old female who returns for F/U  for metastatic breast cancer.  She is on Trastuzumab deruxtecan  - here for cycle  17.        Today she reports that      Breast history:  She presented in the emergency room at [facility] on September 29, 2006 with a 4 months history of inflammation of her left breast.  Physical examination at that time showed the left breast was largely replaced by large mass with multiple skin ulcerations.    A biopsy in September 2006 showed poorly differentiated carcinoma which was ER and [place]. negative and HER2 positive.    She was then referred to [facility] for additional care.   CT scan of the chest and abdomen November 2006 revealed multiple nodules in the lungs consistent with metastatic disease.  There also enlarged left axillary node.    She was treated with chemotherapy with weekly Herceptin and Abraxane and had marked improvement in her breast and lung metastasis on that therapy.    On May 29, 2007 she underwent left modified radical mastectomy(Dr. Colvin) which showed no residual tumor in the breast and 1/5 nodes was positive for metastasis measuring 6 mm.  (ypT0N1).    She then received postoperative radiation therapy(Dr Singh)  to the left chest wall supraclav and internal mammary lymph nodes from August 20, 2007 to September 28, 2007.    Postoperatively she continued on Herceptin for approximately 3 and 1 half years before her lung metastasis begin to grow.    She subsequently received a number of different chemotherapy treatments including Adriamycin, Halaven, Xeloda plus lapatinib then Xeloda plus Herceptin.  The  Those treatments were provided under the care of  in [facility].    After she was hospitalized with neutropenic fever in 2013, she transferred her care to  at [facility].  She received Kadcyla  therapy.  She was initially treated with Taxotere Herceptin Perjeta.  She was then changed to Kadcyla which she received for 12 in 18 months.  Line we  In July 2020 PET scan showed an increase in the size of an infrahilar mass consistent with progression.   She then took approximately 9 months of Herceptin and Navelbine.  Apparently she has some initial response to that therapy.  Last scans were December 2020.    She started trastuzumab- deruxtecan  4/12/21.       CT 3/23/22 -   Lungs/airways/pleura: Central airways are patent.  Persistent left infrahilar mass obstructing the proximal segmental bronchi in the left lower lobe.  The left infrahilar mass is difficult to measure however is approximately 3.0 x 2.7 cm, unchanged.  Scattered irregular nodular opacities bilaterally similar in size and distribution compared to prior.  Largest lesion right upper lobe again measures 1.9 cm with pleural tethering, unchanged.  Largest lesion in the left upper lobe measures 1.5 cm  unchanged.  No definite new nodules.  Persistent mild left pleural thickening.    Echo 4/25/22  -EF 60%  Review of Systems   Constitutional: Positive for fatigue (variable). Negative for appetite change and unexpected weight change.   HENT: Negative for mouth sores.    Eyes: Negative for visual disturbance.   Respiratory: Positive for shortness of breath (minimal). Negative for cough.    Cardiovascular: Negative for chest pain.   Gastrointestinal: Positive for abdominal pain, constipation and nausea. Negative for diarrhea.   Genitourinary: Negative for frequency.   Musculoskeletal: Negative for back pain.   Integumentary:  Negative for rash.   Neurological: Negative for headaches.   Hematological: Negative for adenopathy.   Psychiatric/Behavioral: Negative.  The patient is not nervous/anxious.          Objective:      Physical Exam  Vitals reviewed.   Constitutional:       General: She is not in acute distress.     Appearance: She is obese.   HENT:       Mouth/Throat:      Mouth: Mucous membranes are moist.      Pharynx: Oropharynx is clear. No oropharyngeal exudate or posterior oropharyngeal erythema.   Eyes:      Pupils: Pupils are equal, round, and reactive to light.   Cardiovascular:      Rate and Rhythm: Normal rate and regular rhythm.      Heart sounds: Murmur (systolic -aortic) heard.   Pulmonary:      Effort: Pulmonary effort is normal. No respiratory distress.      Breath sounds: Normal breath sounds. No wheezing or rales.   Chest:   Breasts:      Right: Normal. No axillary adenopathy or supraclavicular adenopathy.      Left: Absent. No axillary adenopathy or supraclavicular adenopathy.         Abdominal:      Palpations: Abdomen is soft. There is no mass.      Tenderness: There is no abdominal tenderness.   Lymphadenopathy:      Cervical: No cervical adenopathy.      Upper Body:      Right upper body: No supraclavicular or axillary adenopathy.      Left upper body: No supraclavicular or axillary adenopathy.   Skin:     Findings: No rash.   Neurological:      Mental Status: She is alert and oriented to person, place, and time.   Psychiatric:         Mood and Affect: Mood normal.         Behavior: Behavior normal.         Thought Content: Thought content normal.         Judgment: Judgment normal.         Assessment:     Labs pending  Problem List Items Addressed This Visit     Breast cancer, stage 4, left - Primary    Malignant neoplasm metastatic to left lung          Plan:       Continue current RX  RTC 3 weeks.  Scans in June.    Route Chart for Scheduling    Med Onc Chart Routing      Follow up with physician 3 weeks.   Follow up with JIMI    Labs CBC and CMP   Lab interval:     Imaging    Pharmacy appointment    Other referrals          Treatment Plan Information   OP BREAST FAM-TRASTUZUMAB DERUXTECAN-NXKI Q3W   Home Willis MD   Upcoming Treatment Dates - OP BREAST FAM-TRASTUZUMAB DERUXTECAN-NXKI Q3W    5/20/2022       Chemotherapy        fam-trastuzumab deruxtecan-nxki 602 mg in dextrose 5 % 100 mL infusion  6/10/2022       Chemotherapy       fam-trastuzumab deruxtecan-nxki 602 mg in dextrose 5 % 100 mL infusion

## 2022-05-16 ENCOUNTER — INFUSION (OUTPATIENT)
Dept: INFUSION THERAPY | Facility: HOSPITAL | Age: 52
End: 2022-05-16
Payer: MEDICARE

## 2022-05-16 ENCOUNTER — OFFICE VISIT (OUTPATIENT)
Dept: HEMATOLOGY/ONCOLOGY | Facility: CLINIC | Age: 52
End: 2022-05-16
Payer: MEDICARE

## 2022-05-16 ENCOUNTER — LAB VISIT (OUTPATIENT)
Dept: LAB | Facility: HOSPITAL | Age: 52
End: 2022-05-16
Attending: INTERNAL MEDICINE
Payer: MEDICARE

## 2022-05-16 VITALS
HEIGHT: 62 IN | HEART RATE: 88 BPM | SYSTOLIC BLOOD PRESSURE: 122 MMHG | RESPIRATION RATE: 18 BRPM | OXYGEN SATURATION: 98 % | WEIGHT: 234.81 LBS | BODY MASS INDEX: 43.21 KG/M2 | DIASTOLIC BLOOD PRESSURE: 65 MMHG | TEMPERATURE: 97 F

## 2022-05-16 VITALS
TEMPERATURE: 98 F | DIASTOLIC BLOOD PRESSURE: 55 MMHG | RESPIRATION RATE: 18 BRPM | SYSTOLIC BLOOD PRESSURE: 121 MMHG | HEART RATE: 75 BPM

## 2022-05-16 DIAGNOSIS — C78.02 MALIGNANT NEOPLASM METASTATIC TO LEFT LUNG: ICD-10-CM

## 2022-05-16 DIAGNOSIS — C50.912 BREAST CANCER, STAGE 4, LEFT: Primary | ICD-10-CM

## 2022-05-16 DIAGNOSIS — C50.912 BREAST CANCER, STAGE 4, LEFT: ICD-10-CM

## 2022-05-16 LAB
ALBUMIN SERPL BCP-MCNC: 3.3 G/DL (ref 3.5–5.2)
ALP SERPL-CCNC: 119 U/L (ref 55–135)
ALT SERPL W/O P-5'-P-CCNC: 15 U/L (ref 10–44)
ANION GAP SERPL CALC-SCNC: 8 MMOL/L (ref 8–16)
AST SERPL-CCNC: 31 U/L (ref 10–40)
BILIRUB SERPL-MCNC: 1.5 MG/DL (ref 0.1–1)
BUN SERPL-MCNC: 6 MG/DL (ref 6–20)
CALCIUM SERPL-MCNC: 8.9 MG/DL (ref 8.7–10.5)
CHLORIDE SERPL-SCNC: 108 MMOL/L (ref 95–110)
CO2 SERPL-SCNC: 28 MMOL/L (ref 23–29)
CREAT SERPL-MCNC: 0.6 MG/DL (ref 0.5–1.4)
ERYTHROCYTE [DISTWIDTH] IN BLOOD BY AUTOMATED COUNT: 16.4 % (ref 11.5–14.5)
EST. GFR  (AFRICAN AMERICAN): >60 ML/MIN/1.73 M^2
EST. GFR  (NON AFRICAN AMERICAN): >60 ML/MIN/1.73 M^2
GLUCOSE SERPL-MCNC: 92 MG/DL (ref 70–110)
HCT VFR BLD AUTO: 38 % (ref 37–48.5)
HGB BLD-MCNC: 12.6 G/DL (ref 12–16)
IMM GRANULOCYTES # BLD AUTO: 0 K/UL (ref 0–0.04)
MCH RBC QN AUTO: 33.2 PG (ref 27–31)
MCHC RBC AUTO-ENTMCNC: 33.2 G/DL (ref 32–36)
MCV RBC AUTO: 100 FL (ref 82–98)
NEUTROPHILS # BLD AUTO: 2.1 K/UL (ref 1.8–7.7)
PLATELET # BLD AUTO: 150 K/UL (ref 150–450)
PMV BLD AUTO: 10.7 FL (ref 9.2–12.9)
POTASSIUM SERPL-SCNC: 3.3 MMOL/L (ref 3.5–5.1)
PROT SERPL-MCNC: 6.5 G/DL (ref 6–8.4)
RBC # BLD AUTO: 3.8 M/UL (ref 4–5.4)
SODIUM SERPL-SCNC: 144 MMOL/L (ref 136–145)
WBC # BLD AUTO: 3.85 K/UL (ref 3.9–12.7)

## 2022-05-16 PROCEDURE — 80053 COMPREHEN METABOLIC PANEL: CPT | Performed by: NURSE PRACTITIONER

## 2022-05-16 PROCEDURE — 96413 CHEMO IV INFUSION 1 HR: CPT

## 2022-05-16 PROCEDURE — 99214 PR OFFICE/OUTPT VISIT, EST, LEVL IV, 30-39 MIN: ICD-10-PCS | Mod: S$PBB,,, | Performed by: INTERNAL MEDICINE

## 2022-05-16 PROCEDURE — 96367 TX/PROPH/DG ADDL SEQ IV INF: CPT

## 2022-05-16 PROCEDURE — 36415 COLL VENOUS BLD VENIPUNCTURE: CPT | Performed by: NURSE PRACTITIONER

## 2022-05-16 PROCEDURE — 25000003 PHARM REV CODE 250: Performed by: INTERNAL MEDICINE

## 2022-05-16 PROCEDURE — 99999 PR PBB SHADOW E&M-EST. PATIENT-LVL III: CPT | Mod: PBBFAC,,, | Performed by: INTERNAL MEDICINE

## 2022-05-16 PROCEDURE — 85027 COMPLETE CBC AUTOMATED: CPT | Performed by: NURSE PRACTITIONER

## 2022-05-16 PROCEDURE — 99999 PR PBB SHADOW E&M-EST. PATIENT-LVL III: ICD-10-PCS | Mod: PBBFAC,,, | Performed by: INTERNAL MEDICINE

## 2022-05-16 PROCEDURE — 99214 OFFICE O/P EST MOD 30 MIN: CPT | Mod: S$PBB,,, | Performed by: INTERNAL MEDICINE

## 2022-05-16 PROCEDURE — 63600175 PHARM REV CODE 636 W HCPCS: Performed by: INTERNAL MEDICINE

## 2022-05-16 PROCEDURE — 99213 OFFICE O/P EST LOW 20 MIN: CPT | Mod: PBBFAC,25 | Performed by: INTERNAL MEDICINE

## 2022-05-16 PROCEDURE — A4216 STERILE WATER/SALINE, 10 ML: HCPCS | Performed by: INTERNAL MEDICINE

## 2022-05-16 RX ORDER — SODIUM CHLORIDE 0.9 % (FLUSH) 0.9 %
10 SYRINGE (ML) INJECTION
Status: DISCONTINUED | OUTPATIENT
Start: 2022-05-16 | End: 2022-05-16 | Stop reason: HOSPADM

## 2022-05-16 RX ORDER — HEPARIN 100 UNIT/ML
500 SYRINGE INTRAVENOUS
Status: DISCONTINUED | OUTPATIENT
Start: 2022-05-16 | End: 2022-05-16 | Stop reason: HOSPADM

## 2022-05-16 RX ORDER — SODIUM CHLORIDE 0.9 % (FLUSH) 0.9 %
10 SYRINGE (ML) INJECTION
Status: CANCELLED | OUTPATIENT
Start: 2022-05-20

## 2022-05-16 RX ORDER — HEPARIN 100 UNIT/ML
500 SYRINGE INTRAVENOUS
Status: CANCELLED | OUTPATIENT
Start: 2022-05-20

## 2022-05-16 RX ADMIN — FOSAPREPITANT DIMEGLUMINE 150 MG: 150 INJECTION, POWDER, LYOPHILIZED, FOR SOLUTION INTRAVENOUS at 01:05

## 2022-05-16 RX ADMIN — SODIUM CHLORIDE: 9 INJECTION, SOLUTION INTRAVENOUS at 12:05

## 2022-05-16 RX ADMIN — HEPARIN 500 UNITS: 100 SYRINGE at 03:05

## 2022-05-16 RX ADMIN — PALONOSETRON HYDROCHLORIDE 0.25 MG: 0.25 INJECTION, SOLUTION INTRAVENOUS at 01:05

## 2022-05-16 RX ADMIN — FAM-TRASTUZUMAB DERUXTECAN-NXKI 600 MG: 100 INJECTION, POWDER, LYOPHILIZED, FOR SOLUTION INTRAVENOUS at 02:05

## 2022-05-16 RX ADMIN — Medication 10 ML: at 12:05

## 2022-05-16 NOTE — PLAN OF CARE
Pt arrived for [place] [id number] following Dr. Willis appt.  Treatment plan reviewed with pt, states agreement.  Port accessed with good blood return.  Pt tolerated all infusions without issue.  Port flushed and de accessed.  Pt discharged to home.

## 2022-05-23 ENCOUNTER — CLINICAL SUPPORT (OUTPATIENT)
Dept: HEMATOLOGY/ONCOLOGY | Facility: CLINIC | Age: 52
End: 2022-05-23
Payer: MEDICARE

## 2022-05-23 DIAGNOSIS — T45.1X5A CHEMOTHERAPY-INDUCED PERIPHERAL NEUROPATHY: ICD-10-CM

## 2022-05-23 DIAGNOSIS — M54.50 CHRONIC BILATERAL LOW BACK PAIN WITHOUT SCIATICA: Primary | ICD-10-CM

## 2022-05-23 DIAGNOSIS — G62.0 CHEMOTHERAPY-INDUCED PERIPHERAL NEUROPATHY: ICD-10-CM

## 2022-05-23 DIAGNOSIS — K59.09 OTHER CONSTIPATION: ICD-10-CM

## 2022-05-23 DIAGNOSIS — G89.29 CHRONIC BILATERAL LOW BACK PAIN WITHOUT SCIATICA: Primary | ICD-10-CM

## 2022-05-23 PROCEDURE — 97811 PR ACUPUNCT W/O ELEC STIMUL ADDL 15M: ICD-10-PCS | Mod: ,,, | Performed by: ACUPUNCTURIST

## 2022-05-23 PROCEDURE — 97811 ACUP 1/> W/O ESTIM EA ADD 15: CPT | Mod: ,,, | Performed by: ACUPUNCTURIST

## 2022-05-23 PROCEDURE — 97810 PR ACUPUNCT W/O ELEC STIMUL 15 MIN: ICD-10-PCS | Mod: ,,, | Performed by: ACUPUNCTURIST

## 2022-05-23 PROCEDURE — 97810 ACUP 1/> WO ESTIM 1ST 15 MIN: CPT | Mod: ,,, | Performed by: ACUPUNCTURIST

## 2022-05-23 NOTE — PROGRESS NOTES
Subjective:       Patient ID: Shikha López is a 51 y.o. female.    Chief Complaint: Pain (cLBP, CIPN) and Nausea    Patient states that she had a rough weekend starting on Saturday and had some severe constipation and low back pain. Patient states her toes are also still having CIPN symptoms. Continue with care and evaluate.     Review of Systems   Gastrointestinal: Positive for constipation.   Musculoskeletal: Positive for arthralgias and back pain.   Neurological: Positive for numbness.         Objective:      Physical Exam    Assessment:       Problem List Items Addressed This Visit    None     Visit Diagnoses     Chronic bilateral low back pain without sciatica    -  Primary    Chemotherapy-induced peripheral neuropathy        Other constipation              Plan:       1x a week*         Pre-Symptom Score: NA  Post-Symptom Score: NA     Pain (cLBP, CIPN) and Nausea       Encounter Diagnoses   Name Primary?    Chronic bilateral low back pain without sciatica Yes    Chemotherapy-induced peripheral neuropathy     Other constipation        Acupuncture points used:  4 PEREZ, BA GRADY, Du20, Gb34, Li11, REN12, MAZARIEGOS MEN, Sp10, Sp6, Sp9, SSC, St25, St36, St40 and YIN JENKINS    NO STIM USED      NEEDLES IN: 24  NEEDLES OUT: 24    NEEDLES W/O STIM  AT: 8:10 AM  NEEDLES W/O STIM REMOVED AT: 8:40 AM

## 2022-05-23 NOTE — PROGRESS NOTES
Subjective:       Patient ID: Shikha López is a 51 y.o. female.    Chief Complaint: Malignant neoplasm metastatic to left lung    HPI 51-year-old female who returns for F/U  for metastatic breast cancer.  She is on Trastuzumab deruxtecan  - here for cycle  18.      Overall she is doing well.  She still has nausea the week of treatment.   Appetite and bowel movements are good.       Per Dr. Willis's previous note: Breast history:  She presented in the emergency room at [facility] on September 29, 2006 with a 4 months history of inflammation of her left breast.  Physical examination at that time showed the left breast was largely replaced by large mass with multiple skin ulcerations.    A biopsy in September 2006 showed poorly differentiated carcinoma which was ER and [place]. negative and HER2 positive.    She was then referred to [facility] for additional care.   CT scan of the chest and abdomen November 2006 revealed multiple nodules in the lungs consistent with metastatic disease.  There also enlarged left axillary node.    She was treated with chemotherapy with weekly Herceptin and Abraxane and had marked improvement in her breast and lung metastasis on that therapy.    On May 29, 2007 she underwent left modified radical mastectomy(Dr. Colvin) which showed no residual tumor in the breast and 1/5 nodes was positive for metastasis measuring 6 mm.  (ypT0N1).    She then received postoperative radiation therapy(Dr Singh)  to the left chest wall supraclav and internal mammary lymph nodes from August 20, 2007 to September 28, 2007.    Postoperatively she continued on Herceptin for approximately 3 and 1 half years before her lung metastasis begin to grow.    She subsequently received a number of different chemotherapy treatments including Adriamycin, Halaven, Xeloda plus lapatinib then Xeloda plus Herceptin.  The  Those treatments were provided under the care of  in [facility].    After she was  hospitalized with neutropenic fever in 2013, she transferred her care to  at [facility].  She received Kadcyla therapy.  She was initially treated with Taxotere Herceptin Perjeta.  She was then changed to Kadcyla which she received for 12 in 18 months.  Line we  In July 2020 PET scan showed an increase in the size of an infrahilar mass consistent with progression.   She then took approximately 9 months of Herceptin and Navelbine.  Apparently she has some initial response to that therapy.  Last scans were December 2020.    She started trastuzumab- deruxtecan  4/12/21.       CT 3/23/22 -   Lungs/airways/pleura: Central airways are patent.  Persistent left infrahilar mass obstructing the proximal segmental bronchi in the left lower lobe.  The left infrahilar mass is difficult to measure however is approximately 3.0 x 2.7 cm, unchanged.  Scattered irregular nodular opacities bilaterally similar in size and distribution compared to prior.  Largest lesion right upper lobe again measures 1.9 cm with pleural tethering, unchanged.  Largest lesion in the left upper lobe measures 1.5 cm  unchanged.  No definite new nodules.  Persistent mild left pleural thickening.    Echo 4/25/22  -EF 60%  Review of Systems   Constitutional: Positive for fatigue (variable). Negative for activity change, appetite change, chills and unexpected weight change.   HENT: Negative for mouth sores.    Eyes: Negative for visual disturbance.   Respiratory: Positive for shortness of breath (minimal). Negative for cough.    Cardiovascular: Negative for chest pain.   Gastrointestinal: Positive for nausea. Negative for abdominal pain, constipation and diarrhea.   Genitourinary: Negative for frequency.   Musculoskeletal: Negative for back pain.   Integumentary:  Negative for rash.   Neurological: Negative for headaches.   Hematological: Negative for adenopathy.   Psychiatric/Behavioral: Negative.  The patient is not nervous/anxious.           Objective:      Physical Exam  Vitals reviewed.   Constitutional:       General: She is not in acute distress.     Appearance: She is obese.   HENT:      Mouth/Throat:      Mouth: Mucous membranes are moist.      Pharynx: Oropharynx is clear. No oropharyngeal exudate or posterior oropharyngeal erythema.   Eyes:      Pupils: Pupils are equal, round, and reactive to light.   Cardiovascular:      Rate and Rhythm: Normal rate and regular rhythm.      Heart sounds: Murmur (systolic -aortic) heard.   Pulmonary:      Effort: Pulmonary effort is normal. No respiratory distress.      Breath sounds: Normal breath sounds. No wheezing or rales.   Chest:   Breasts:      Right: Normal. No axillary adenopathy or supraclavicular adenopathy.      Left: Absent. No axillary adenopathy or supraclavicular adenopathy.         Abdominal:      Palpations: Abdomen is soft. There is no mass.      Tenderness: There is no abdominal tenderness.   Lymphadenopathy:      Cervical: No cervical adenopathy.      Upper Body:      Right upper body: No supraclavicular or axillary adenopathy.      Left upper body: No supraclavicular or axillary adenopathy.   Skin:     Findings: No rash.   Neurological:      Mental Status: She is alert and oriented to person, place, and time.   Psychiatric:         Mood and Affect: Mood normal.         Behavior: Behavior normal.         Thought Content: Thought content normal.         Judgment: Judgment normal.         Assessment:     [facility] T. Bli 1.9  1. Malignant neoplasm metastatic to left lung     2. Breast cancer, stage 4, left        Plan:       1-2. Continue current RX  RTC 3 weeks.  Due for scans   EF 60% - due in August    - T. Bili 1.9 - will proceed with treatment today; discussed with Dr. Willis     Return to clinic in 3 weeks with MD appointment and labs and imaging.     Patient is in agreement with the proposed treatment plan. All questions were answered to the patient's satisfaction. Patient knows to  call clinic for any new or worsening symptoms and if anything is needed before the next clinic visit.          Mariam Lisa, FNP-C  Hematology & Medical Oncology   [facility] [place] [address]  ph. [phone]  Fax. [phone]    Collaborating physician, Dr. Willis.    Approximately 10 minutes were spent face-to-face with the patient.  Approximately 20 minutes in total were spent on this encounter, which includes face-to-face time and non-face-to-face time preparing to see the patient (e.g., review of tests), obtaining and/or reviewing separately obtained history, documenting clinical information in the electronic or other health record, independently interpreting results (not separately reported) and communicating results to the patient/family/caregiver, or care coordination (not separately reported).       Route Chart for Scheduling    Med Onc Chart Routing      Follow up with physician 3 weeks.   Follow up with JIMI 6 weeks.   Labs CBC and CMP   Lab interval: every 3 weeks     Imaging CT chest abdomen pelvis   Prior to seeing Lazaro in 3 weeks    Pharmacy appointment    Other referrals          Treatment Plan Information   OP BREAST FAM-TRASTUZUMAB DERUXTECAN-NXKI Q3W   Home Willis MD   Upcoming Treatment Dates - OP BREAST FAM-TRASTUZUMAB DERUXTECAN-NXKI Q3W    6/6/2022       Chemotherapy       fam-trastuzumab deruxtecan-nxki 602 mg in dextrose 5 % 100 mL infusion  6/23/2022       Chemotherapy       fam-trastuzumab deruxtecan-nxki 602 mg in dextrose 5 % 100 mL infusion  7/10/2022       Chemotherapy       fam-trastuzumab deruxtecan-nxki 602 mg in dextrose 5 % 100 mL infusion  7/27/2022       Chemotherapy       fam-trastuzumab deruxtecan-nxki 602 mg in dextrose 5 % 100 mL infusion

## 2022-05-30 ENCOUNTER — CLINICAL SUPPORT (OUTPATIENT)
Dept: HEMATOLOGY/ONCOLOGY | Facility: CLINIC | Age: 52
End: 2022-05-30
Payer: MEDICARE

## 2022-05-30 DIAGNOSIS — G62.0 PERIPHERAL NEUROPATHY DUE TO CHEMOTHERAPY: ICD-10-CM

## 2022-05-30 DIAGNOSIS — T45.1X5A PERIPHERAL NEUROPATHY DUE TO CHEMOTHERAPY: ICD-10-CM

## 2022-05-30 DIAGNOSIS — G89.29 CHRONIC BILATERAL LOW BACK PAIN WITHOUT SCIATICA: Primary | ICD-10-CM

## 2022-05-30 DIAGNOSIS — M54.50 CHRONIC BILATERAL LOW BACK PAIN WITHOUT SCIATICA: Primary | ICD-10-CM

## 2022-05-30 PROCEDURE — 97811 PR ACUPUNCT W/O ELEC STIMUL ADDL 15M: ICD-10-PCS | Mod: ,,, | Performed by: ACUPUNCTURIST

## 2022-05-30 PROCEDURE — 97810 PR ACUPUNCT W/O ELEC STIMUL 15 MIN: ICD-10-PCS | Mod: ,,, | Performed by: ACUPUNCTURIST

## 2022-05-30 PROCEDURE — 97810 ACUP 1/> WO ESTIM 1ST 15 MIN: CPT | Mod: ,,, | Performed by: ACUPUNCTURIST

## 2022-05-30 PROCEDURE — 97811 ACUP 1/> W/O ESTIM EA ADD 15: CPT | Mod: ,,, | Performed by: ACUPUNCTURIST

## 2022-05-30 NOTE — PROGRESS NOTES
Subjective:       Patient ID: Shikha López is a 51 y.o. female.    Chief Complaint: Results (Constipation with alternating diarrhea) and Pain (cLBP)    Patient doing well with treatments - today is dealing with some constipation issues still followed by loose stools. Continue with care.    Review of Systems   Constitutional: Positive for fatigue.   Gastrointestinal: Positive for constipation and diarrhea.   Musculoskeletal: Positive for arthralgias and back pain.         Objective:      Physical Exam    Assessment:       Problem List Items Addressed This Visit    None     Visit Diagnoses     Chronic bilateral low back pain without sciatica    -  Primary    Peripheral neuropathy due to chemotherapy              Plan:       1x a week*         Pre-Symptom Score: NA  Post-Symptom Score: NA     Results (Constipation with alternating diarrhea) and Pain (cLBP)       Encounter Diagnoses   Name Primary?    Chronic bilateral low back pain without sciatica Yes    Peripheral neuropathy due to chemotherapy        Acupuncture points used:  4 PEREZ, Du20, Gb34, Li11, REN12, MAZARIEGOS MEN, Sp10, Sp6, Sp9, St36, St40, St44 and YIN JENKINS    NO STIM USED      NEEDLES IN: 20  NEEDLES OUT: 20    NEEDLES W/O STIM  AT: 8:10 AM  NEEDLES W/O STIM REMOVED AT: 8:40 AM

## 2022-06-06 ENCOUNTER — LAB VISIT (OUTPATIENT)
Dept: LAB | Facility: HOSPITAL | Age: 52
End: 2022-06-06
Attending: INTERNAL MEDICINE
Payer: MEDICARE

## 2022-06-06 ENCOUNTER — OFFICE VISIT (OUTPATIENT)
Dept: HEMATOLOGY/ONCOLOGY | Facility: CLINIC | Age: 52
End: 2022-06-06
Payer: MEDICARE

## 2022-06-06 ENCOUNTER — CLINICAL SUPPORT (OUTPATIENT)
Dept: HEMATOLOGY/ONCOLOGY | Facility: CLINIC | Age: 52
End: 2022-06-06
Payer: MEDICARE

## 2022-06-06 ENCOUNTER — INFUSION (OUTPATIENT)
Dept: INFUSION THERAPY | Facility: HOSPITAL | Age: 52
End: 2022-06-06
Payer: MEDICARE

## 2022-06-06 VITALS
TEMPERATURE: 98 F | BODY MASS INDEX: 42.96 KG/M2 | OXYGEN SATURATION: 96 % | HEART RATE: 79 BPM | SYSTOLIC BLOOD PRESSURE: 129 MMHG | HEIGHT: 62 IN | DIASTOLIC BLOOD PRESSURE: 69 MMHG | WEIGHT: 233.44 LBS | RESPIRATION RATE: 18 BRPM

## 2022-06-06 VITALS
SYSTOLIC BLOOD PRESSURE: 183 MMHG | TEMPERATURE: 98 F | BODY MASS INDEX: 42.96 KG/M2 | HEIGHT: 62 IN | WEIGHT: 233.44 LBS | DIASTOLIC BLOOD PRESSURE: 79 MMHG | HEART RATE: 73 BPM | RESPIRATION RATE: 18 BRPM

## 2022-06-06 DIAGNOSIS — C50.912 BREAST CANCER, STAGE 4, LEFT: ICD-10-CM

## 2022-06-06 DIAGNOSIS — G89.29 CHRONIC BILATERAL LOW BACK PAIN WITHOUT SCIATICA: Primary | ICD-10-CM

## 2022-06-06 DIAGNOSIS — K59.09 OTHER CONSTIPATION: ICD-10-CM

## 2022-06-06 DIAGNOSIS — C78.02 MALIGNANT NEOPLASM METASTATIC TO LEFT LUNG: Primary | ICD-10-CM

## 2022-06-06 DIAGNOSIS — G62.0 CHEMOTHERAPY-INDUCED PERIPHERAL NEUROPATHY: ICD-10-CM

## 2022-06-06 DIAGNOSIS — M54.50 CHRONIC BILATERAL LOW BACK PAIN WITHOUT SCIATICA: Primary | ICD-10-CM

## 2022-06-06 DIAGNOSIS — C50.912 BREAST CANCER, STAGE 4, LEFT: Primary | ICD-10-CM

## 2022-06-06 DIAGNOSIS — C78.02 MALIGNANT NEOPLASM METASTATIC TO LEFT LUNG: ICD-10-CM

## 2022-06-06 DIAGNOSIS — T45.1X5A CHEMOTHERAPY-INDUCED PERIPHERAL NEUROPATHY: ICD-10-CM

## 2022-06-06 DIAGNOSIS — R11.0 NAUSEA: ICD-10-CM

## 2022-06-06 LAB
ALBUMIN SERPL BCP-MCNC: 3.5 G/DL (ref 3.5–5.2)
ALP SERPL-CCNC: 146 U/L (ref 55–135)
ALT SERPL W/O P-5'-P-CCNC: 16 U/L (ref 10–44)
ANION GAP SERPL CALC-SCNC: 9 MMOL/L (ref 8–16)
AST SERPL-CCNC: 32 U/L (ref 10–40)
BILIRUB SERPL-MCNC: 1.9 MG/DL (ref 0.1–1)
BUN SERPL-MCNC: 5 MG/DL (ref 6–20)
CALCIUM SERPL-MCNC: 9.2 MG/DL (ref 8.7–10.5)
CHLORIDE SERPL-SCNC: 106 MMOL/L (ref 95–110)
CO2 SERPL-SCNC: 28 MMOL/L (ref 23–29)
CREAT SERPL-MCNC: 0.7 MG/DL (ref 0.5–1.4)
ERYTHROCYTE [DISTWIDTH] IN BLOOD BY AUTOMATED COUNT: 17.3 % (ref 11.5–14.5)
EST. GFR  (AFRICAN AMERICAN): >60 ML/MIN/1.73 M^2
EST. GFR  (NON AFRICAN AMERICAN): >60 ML/MIN/1.73 M^2
GLUCOSE SERPL-MCNC: 88 MG/DL (ref 70–110)
HCT VFR BLD AUTO: 38.1 % (ref 37–48.5)
HGB BLD-MCNC: 13 G/DL (ref 12–16)
IMM GRANULOCYTES # BLD AUTO: 0.01 K/UL (ref 0–0.04)
MCH RBC QN AUTO: 33.7 PG (ref 27–31)
MCHC RBC AUTO-ENTMCNC: 34.1 G/DL (ref 32–36)
MCV RBC AUTO: 99 FL (ref 82–98)
NEUTROPHILS # BLD AUTO: 2.8 K/UL (ref 1.8–7.7)
PLATELET # BLD AUTO: 153 K/UL (ref 150–450)
PMV BLD AUTO: 11.1 FL (ref 9.2–12.9)
POTASSIUM SERPL-SCNC: 3.4 MMOL/L (ref 3.5–5.1)
PROT SERPL-MCNC: 6.8 G/DL (ref 6–8.4)
RBC # BLD AUTO: 3.86 M/UL (ref 4–5.4)
SODIUM SERPL-SCNC: 143 MMOL/L (ref 136–145)
WBC # BLD AUTO: 4.45 K/UL (ref 3.9–12.7)

## 2022-06-06 PROCEDURE — 99215 OFFICE O/P EST HI 40 MIN: CPT | Mod: S$PBB,,, | Performed by: NURSE PRACTITIONER

## 2022-06-06 PROCEDURE — 36415 COLL VENOUS BLD VENIPUNCTURE: CPT | Performed by: INTERNAL MEDICINE

## 2022-06-06 PROCEDURE — 99999 PR PBB SHADOW E&M-EST. PATIENT-LVL I: ICD-10-PCS | Mod: PBBFAC,,, | Performed by: ACUPUNCTURIST

## 2022-06-06 PROCEDURE — 99211 OFF/OP EST MAY X REQ PHY/QHP: CPT | Mod: PBBFAC,27,25 | Performed by: ACUPUNCTURIST

## 2022-06-06 PROCEDURE — 99999 PR PBB SHADOW E&M-EST. PATIENT-LVL IV: CPT | Mod: PBBFAC,,, | Performed by: NURSE PRACTITIONER

## 2022-06-06 PROCEDURE — 96367 TX/PROPH/DG ADDL SEQ IV INF: CPT

## 2022-06-06 PROCEDURE — 99215 PR OFFICE/OUTPT VISIT, EST, LEVL V, 40-54 MIN: ICD-10-PCS | Mod: S$PBB,,, | Performed by: NURSE PRACTITIONER

## 2022-06-06 PROCEDURE — 97810 PR ACUPUNCT W/O ELEC STIMUL 15 MIN: ICD-10-PCS | Mod: ,,, | Performed by: ACUPUNCTURIST

## 2022-06-06 PROCEDURE — 99999 PR PBB SHADOW E&M-EST. PATIENT-LVL IV: ICD-10-PCS | Mod: PBBFAC,,, | Performed by: NURSE PRACTITIONER

## 2022-06-06 PROCEDURE — 97811 PR ACUPUNCT W/O ELEC STIMUL ADDL 15M: ICD-10-PCS | Mod: ,,, | Performed by: ACUPUNCTURIST

## 2022-06-06 PROCEDURE — 97810 ACUP 1/> WO ESTIM 1ST 15 MIN: CPT | Mod: ,,, | Performed by: ACUPUNCTURIST

## 2022-06-06 PROCEDURE — 97811 ACUP 1/> W/O ESTIM EA ADD 15: CPT | Mod: ,,, | Performed by: ACUPUNCTURIST

## 2022-06-06 PROCEDURE — 96413 CHEMO IV INFUSION 1 HR: CPT

## 2022-06-06 PROCEDURE — 80053 COMPREHEN METABOLIC PANEL: CPT | Performed by: INTERNAL MEDICINE

## 2022-06-06 PROCEDURE — 99214 OFFICE O/P EST MOD 30 MIN: CPT | Mod: PBBFAC | Performed by: NURSE PRACTITIONER

## 2022-06-06 PROCEDURE — 85027 COMPLETE CBC AUTOMATED: CPT | Performed by: INTERNAL MEDICINE

## 2022-06-06 PROCEDURE — 99999 PR PBB SHADOW E&M-EST. PATIENT-LVL I: CPT | Mod: PBBFAC,,, | Performed by: ACUPUNCTURIST

## 2022-06-06 PROCEDURE — 96375 TX/PRO/DX INJ NEW DRUG ADDON: CPT

## 2022-06-06 PROCEDURE — 25000003 PHARM REV CODE 250: Performed by: INTERNAL MEDICINE

## 2022-06-06 PROCEDURE — 63600175 PHARM REV CODE 636 W HCPCS: Performed by: INTERNAL MEDICINE

## 2022-06-06 RX ORDER — HEPARIN 100 UNIT/ML
500 SYRINGE INTRAVENOUS
Status: DISCONTINUED | OUTPATIENT
Start: 2022-06-06 | End: 2022-06-06 | Stop reason: HOSPADM

## 2022-06-06 RX ORDER — SODIUM CHLORIDE 0.9 % (FLUSH) 0.9 %
10 SYRINGE (ML) INJECTION
Status: CANCELLED | OUTPATIENT
Start: 2022-06-06

## 2022-06-06 RX ORDER — HEPARIN 100 UNIT/ML
500 SYRINGE INTRAVENOUS
Status: CANCELLED | OUTPATIENT
Start: 2022-06-06

## 2022-06-06 RX ORDER — SODIUM CHLORIDE 0.9 % (FLUSH) 0.9 %
10 SYRINGE (ML) INJECTION
Status: DISCONTINUED | OUTPATIENT
Start: 2022-06-06 | End: 2022-06-06 | Stop reason: HOSPADM

## 2022-06-06 RX ADMIN — DEXAMETHASONE SODIUM PHOSPHATE 0.25 MG: 4 INJECTION, SOLUTION INTRA-ARTICULAR; INTRALESIONAL; INTRAMUSCULAR; INTRAVENOUS; SOFT TISSUE at 03:06

## 2022-06-06 RX ADMIN — FAM-TRASTUZUMAB DERUXTECAN-NXKI 600 MG: 100 INJECTION, POWDER, LYOPHILIZED, FOR SOLUTION INTRAVENOUS at 04:06

## 2022-06-06 RX ADMIN — FOSAPREPITANT DIMEGLUMINE 150 MG: 150 INJECTION, POWDER, LYOPHILIZED, FOR SOLUTION INTRAVENOUS at 04:06

## 2022-06-06 NOTE — PROGRESS NOTES
Subjective:       Patient ID: Shikha López is a 51 y.o. female.    Chief Complaint: Pain (cLBP)    Patient doing well with treatments. States she is getting chemo today and will have fatigue for a week with some nausea. Continue with treatments    Review of Systems   Constitutional: Positive for fatigue.   Gastrointestinal: Positive for constipation, diarrhea and nausea.   Musculoskeletal: Positive for arthralgias and back pain.         Objective:      Physical Exam    Assessment:       Problem List Items Addressed This Visit    None     Visit Diagnoses     Chronic bilateral low back pain without sciatica    -  Primary    Chemotherapy-induced peripheral neuropathy        Nausea        Other constipation              Plan:       1x a week*         Pre-Symptom Score: NA  Post-Symptom Score: NA     Pain (cLBP)       Encounter Diagnoses   Name Primary?    Chronic bilateral low back pain without sciatica Yes    Chemotherapy-induced peripheral neuropathy     Nausea     Other constipation        Acupuncture points used:  4 PEREZ, Du20, ER JUAN, Gb34, Li11, REN12, MAZARIEGOS MEN, Sp10, Sp6, Sp9, SSC, St25, St36, St40 and YIN JENKINS    NO STIM USED      NEEDLES IN: 26  NEEDLES OUT: 26    NEEDLES W/O STIM  AT: 8:10 AM  NEEDLES W/O STIM REMOVED AT: 8:40 AM

## 2022-06-13 ENCOUNTER — CLINICAL SUPPORT (OUTPATIENT)
Dept: HEMATOLOGY/ONCOLOGY | Facility: CLINIC | Age: 52
End: 2022-06-13
Payer: MEDICARE

## 2022-06-13 DIAGNOSIS — G62.0 CHEMOTHERAPY-INDUCED PERIPHERAL NEUROPATHY: ICD-10-CM

## 2022-06-13 DIAGNOSIS — M54.50 CHRONIC BILATERAL LOW BACK PAIN WITHOUT SCIATICA: Primary | ICD-10-CM

## 2022-06-13 DIAGNOSIS — R11.0 NAUSEA: ICD-10-CM

## 2022-06-13 DIAGNOSIS — G89.29 CHRONIC BILATERAL LOW BACK PAIN WITHOUT SCIATICA: Primary | ICD-10-CM

## 2022-06-13 DIAGNOSIS — T45.1X5A CHEMOTHERAPY-INDUCED PERIPHERAL NEUROPATHY: ICD-10-CM

## 2022-06-13 PROCEDURE — 97811 PR ACUPUNCT W/O ELEC STIMUL ADDL 15M: ICD-10-PCS | Mod: ,,, | Performed by: ACUPUNCTURIST

## 2022-06-13 PROCEDURE — 97810 PR ACUPUNCT W/O ELEC STIMUL 15 MIN: ICD-10-PCS | Mod: ,,, | Performed by: ACUPUNCTURIST

## 2022-06-13 PROCEDURE — 97811 ACUP 1/> W/O ESTIM EA ADD 15: CPT | Mod: ,,, | Performed by: ACUPUNCTURIST

## 2022-06-13 PROCEDURE — 97810 ACUP 1/> WO ESTIM 1ST 15 MIN: CPT | Mod: ,,, | Performed by: ACUPUNCTURIST

## 2022-06-13 NOTE — PROGRESS NOTES
Subjective:       Patient ID: Shikha López is a 52 y.o. female.    Chief Complaint: Pain (cLBP) and Nausea (CINV)    Patient doing great with treatments, continue.     Review of Systems   Musculoskeletal: Positive for arthralgias and back pain.         Objective:      Physical Exam    Assessment:       Problem List Items Addressed This Visit    None     Visit Diagnoses     Chronic bilateral low back pain without sciatica    -  Primary    Chemotherapy-induced peripheral neuropathy        Nausea              Plan:       1x a week*         Pre-Symptom Score: NA  Post-Symptom Score: NA     Pain (cLBP) and Nausea (CINV)       Encounter Diagnoses   Name Primary?    Chronic bilateral low back pain without sciatica Yes    Chemotherapy-induced peripheral neuropathy     Nausea        Acupuncture points used:  4 PEREZ, BA GRADY, BA MALINA , Du20, Gb34, Li11, REN12, Sp10, Sp6, Sp9, St25, St36, St40, St44 and YIN JENKINS    NO STIM USED      NEEDLES IN: 24  NEEDLES OUT: 24    NEEDLES W/O STIM  AT: 8:10 AM  NEEDLES W/O STIM REMOVED AT: 8:40 AM

## 2022-06-14 ENCOUNTER — CLINICAL SUPPORT (OUTPATIENT)
Dept: REHABILITATION | Facility: HOSPITAL | Age: 52
End: 2022-06-14
Payer: MEDICARE

## 2022-06-14 DIAGNOSIS — R53.83 OTHER FATIGUE: ICD-10-CM

## 2022-06-14 DIAGNOSIS — C50.912 BREAST CANCER, STAGE 4, LEFT: ICD-10-CM

## 2022-06-14 DIAGNOSIS — R26.89 BALANCE PROBLEM: ICD-10-CM

## 2022-06-14 DIAGNOSIS — M54.50 CHRONIC BILATERAL LOW BACK PAIN WITHOUT SCIATICA: ICD-10-CM

## 2022-06-14 DIAGNOSIS — G89.29 CHRONIC BILATERAL LOW BACK PAIN WITHOUT SCIATICA: ICD-10-CM

## 2022-06-14 DIAGNOSIS — R53.81 PHYSICAL DECONDITIONING: Primary | ICD-10-CM

## 2022-06-14 PROCEDURE — 97165 OT EVAL LOW COMPLEX 30 MIN: CPT

## 2022-06-14 PROCEDURE — 97110 THERAPEUTIC EXERCISES: CPT

## 2022-06-14 PROCEDURE — 97535 SELF CARE MNGMENT TRAINING: CPT

## 2022-06-14 NOTE — PLAN OF CARE
[facility]   Occupational Therapy Initial Evaluation - Therapeutic Yoga    Date: 6/14/2022   Name: Shikha López  Clinic Number: [id number]    Therapy Diagnosis:   Encounter Diagnoses   Name Primary?    Chronic bilateral low back pain without sciatica     Breast cancer, stage 4, left     Other fatigue      Physician: Jessie Vivar PA-C    Physician Orders: Eval and Treat   Medical Diagnosis from Referral: Chronic bilateral low back pain without sciatica [M54.50, G89.29], Breast cancer, stage 4, left [C50.912], Other fatigue [R53.83]    Evaluation Date: 6/14/2022  Authorization Period Expiration: 5/2/23  Plan of Care Expiration: 9/14/22  Progress Note Due: 9/14/22  Visit # / Visits authorized: 1/1/        Precautions: Standard and cancer     Time In: 4pm  Time Out: 5pm  Total Appointment Time (timed & untimed codes): 60 minutes      SUBJECTIVE     Date of onset: 2006 diagnosed with breast cancer.    History of current condition: Shikha reports: left breast mastectomy with skin graft.   She has been on chemotherapy for 16 years and is currently receiving this every 3 weeks.     Falls: none    Prior Therapy: yes, lymph drainage with PT, 1 year s/p  Social History:  lives alone  Occupation: contract work on occasion in     Prior Level of Function:   Current Level of Function: fatigued with more  difficulty  Completing ADL's this year.    Pain:  Current 4/10, worst 10/10, best 1/10   Location: bilateral back    Description: Aching and Burning  Aggravating Factors: Standing, falling asleep on my sofa  Easing Factors: pain medication and hot bath     Patients goals: improve my balance, get stronger, learn relaxation techniques, have less pain in my back.     Medical History:   Past Medical History:   Diagnosis Date    Breast cancer        Surgical History:   Shikha López  has a past surgical history that includes Mastectomy.    Medications:   Shikha has a  current medication list which includes the following prescription(s): acetaminophen, lidocaine-prilocaine, olanzapine, and ondansetron.    Allergies:   Review of patient's allergies indicates:  No Known Allergies       OBJECTIVE        Emotional Stress Response: anxious   Physical Stress Response: I don't have a physical response   Behavioral Stress Response: figure out what to do and stay calm    Self-Care: unable to perform full meal prep or baking due to decreased standing tolerance fo Pedro Pablo's    Patient Specific Functional Scale:         Activity 6/14/22       1.  Using stairs 4       2.  Carrying groceries 4       3. LB dressing in standing 4       4.        5.        6.        SCORE    4         Total Score = Sum of activity scores / number of activities  Minimum Detectable Change (90% CII) for average score = 2 points  Minimum Detectable Change (90% CI) for single activity score = 3 points    Lifestyle Questionnaire:      Lifestyle Response   Emotional Response to Health Status good   Patient's Communication Skills good   Reported Eating Habits fair   Reported Sleeping Patterns  Waking up 3-4x /night and unable to fall back to sleep fair   Reported Energy Level poor   Knowledge of Exercises & Fitness poor   Frequency of Exercise Sessions/Week <3       TREATMENT     Total Treatment time (time-based codes) separate from Evaluation: 60 minutes      Shikha received the treatments listed below:      therapeutic exercises to develop strength and flexability for 15 minutes including:chair yoga:  Cat-Cow,forward bend, back bend, twist, supine bridge with block between knees x 5 and twist x 5    Self care to develop relaxation skills for immune health for 15 minutes including: body scan, diaphragmatic breathing, DB.      PATIENT EDUCATION AND HOME EXERCISES     Education provided: - physiology of yoga/meditation and immune health      Written Home Exercises Provided: yes. Exercises were reviewed and Shikha was able  to demonstrate them prior to the end of the session.  Shikha demonstrated good  understanding of the education provided. See EMR under Patient Instructions for exercises provided during therapy sessions.    ASSESSMENT     Shikha is a 52 y.o. female referred to outpatient Occupational Therapy with a medical diagnosis ofChronic bilateral low back pain without sciatica [M54.50, G89.29], Breast cancer, stage 4, left [C50.912], Other fatigue [R53.83]  Chronic bilateral low back pain without sciatica [M54.50, G89.29], Breast cancer, stage 4, left [C50.912], Other fatigue [R53.83]   . Patient presents with the following impairments:       Self-Care Limitations, Deconditioning, Activity Pacing, Sleep Disturbance and Upper Extremity - Decreased Strength    Following medical record review, it is determined that Shikha will benefit from skilled outpatient Occupational Therapy to address the impairments stated above and in the chart below in order to maximize functional activities. The following goals were discussed with the patient and patient is in agreement with them as addressed in the treatment plan. The patient's rehab potential is Good.       Plan of care discussed with patient: Yes  Patient's spiritual, cultural and educational needs considered and patient is agreeable to the plan of care and goals as stated below:     Anticipated Barriers for therapy: none    Medical Necessity is demonstrated by the following    Profile and History Assessment of Occupational Performance Level of Clinical Decision Making Complexity Score   Occupational Profile:   Shikha López is a 52 y.o. female who lives alone and is currently employed. Shikha has difficulty with  ADLs and IADLs as listed previously, which  Affecting herdaily functional abilities.      Comorbidities:    has a past medical history of Breast cancer.    Medical and Therapy History Review:   Brief               Performance Deficits    Physical:  Joint Mobility  Joint  Stability  Muscle Power/Strength  Muscle Endurance  Pain    Cognitive:  No Deficits    Psychosocial:    No Deficits     Clinical Decision Making:  low    Assessment Process:  Problem-Focused Assessments    Modification/Need for Assistance:  Not Necessary    Intervention Selection:  Multiple Treatment Options       low  Based on PMHX, co morbidities , data from assessments and functional level of assistance required with task and clinical presentation directly impacting function.         Goals:  Short Term Goals: 6 weeks      Goal # Goal Status   1 Patient will demonstrate independence with diaphragmatic breathing in sit and supine. Progressing   2 Patient will demonstrate independence with diaphragmatic breathing in sit and supine. Progressing   3 Patient will identify 2 new stress coping skills for stress management/immune health. Progressing   4 Patient will identify activity pacing problems.  Patient will then implement new plan for daily activity to increase endurance for ADL's. Progressing      Long Term Goals: 12 weeks      Goal # Goal Status   1 Patient will demonstrate independence with yoga Home Exercise Program to increase strength and endurance for ADL's. Progressing   2 Patient will demonstrate independence with relaxation techniques to manage stress for immune health. Progressing   3 Patient will verbalize good understanding of stress/immune health relationship.  Progressing   4              PLAN   Plan of care Certification: 6/14/2022 to 9/14/22    Outpatient Occupational Therapy 1 times weekly for 12 weeks to include the following interventions: Patient Education, Self Care and Therapeutic Exercise.     Heidi Van, OT

## 2022-06-15 ENCOUNTER — TELEPHONE (OUTPATIENT)
Dept: HEMATOLOGY/ONCOLOGY | Facility: CLINIC | Age: 52
End: 2022-06-15
Payer: MEDICARE

## 2022-06-20 ENCOUNTER — CLINICAL SUPPORT (OUTPATIENT)
Dept: HEMATOLOGY/ONCOLOGY | Facility: CLINIC | Age: 52
End: 2022-06-20
Payer: MEDICARE

## 2022-06-20 DIAGNOSIS — T45.1X5A CHEMOTHERAPY-INDUCED PERIPHERAL NEUROPATHY: ICD-10-CM

## 2022-06-20 DIAGNOSIS — R11.0 NAUSEA: ICD-10-CM

## 2022-06-20 DIAGNOSIS — M54.50 CHRONIC BILATERAL LOW BACK PAIN WITHOUT SCIATICA: Primary | ICD-10-CM

## 2022-06-20 DIAGNOSIS — G89.29 CHRONIC BILATERAL LOW BACK PAIN WITHOUT SCIATICA: Primary | ICD-10-CM

## 2022-06-20 DIAGNOSIS — G62.0 CHEMOTHERAPY-INDUCED PERIPHERAL NEUROPATHY: ICD-10-CM

## 2022-06-20 PROCEDURE — 97811 ACUP 1/> W/O ESTIM EA ADD 15: CPT | Mod: ,,, | Performed by: ACUPUNCTURIST

## 2022-06-20 PROCEDURE — 97810 ACUP 1/> WO ESTIM 1ST 15 MIN: CPT | Mod: ,,, | Performed by: ACUPUNCTURIST

## 2022-06-20 PROCEDURE — 97810 PR ACUPUNCT W/O ELEC STIMUL 15 MIN: ICD-10-PCS | Mod: ,,, | Performed by: ACUPUNCTURIST

## 2022-06-20 PROCEDURE — 97811 PR ACUPUNCT W/O ELEC STIMUL ADDL 15M: ICD-10-PCS | Mod: ,,, | Performed by: ACUPUNCTURIST

## 2022-06-20 NOTE — PROGRESS NOTES
Subjective:       Patient ID: Shikha López is a 52 y.o. female.    Chief Complaint: Pain (cLBP), Results (fatigue), and Nausea (CINV)    Patient doing well with treatments. Continue.     Review of Systems   Gastrointestinal: Positive for nausea.   Genitourinary: Positive for hot flashes.   Musculoskeletal: Positive for arthralgias and back pain.         Objective:      Physical Exam    Assessment:       Problem List Items Addressed This Visit    None     Visit Diagnoses     Chronic bilateral low back pain without sciatica    -  Primary    Chemotherapy-induced peripheral neuropathy        Nausea              Plan:       1x a week*         Pre-Symptom Score: NA  Post-Symptom Score: NA     Pain (cLBP), Results (fatigue), and Nausea (CINV)       Encounter Diagnoses   Name Primary?    Chronic bilateral low back pain without sciatica Yes    Chemotherapy-induced peripheral neuropathy     Nausea        Acupuncture points used:  4 PEREZ, Du20, ER JUAN, Gb34, Li11, Lu7, REN12, Sp10, Sp6, Sp9, SSC, St25, St36, St40 and YIN JENKINS    NO STIM USED      NEEDLES IN: 24  NEEDLES OUT: 24    NEEDLES W/O STIM  AT: 8:00 AM  NEEDLES W/O STIM REMOVED AT: 8:30 AM

## 2022-06-23 ENCOUNTER — HOSPITAL ENCOUNTER (OUTPATIENT)
Dept: RADIOLOGY | Facility: HOSPITAL | Age: 52
Discharge: HOME | End: 2022-06-23
Attending: NURSE PRACTITIONER
Payer: MEDICARE

## 2022-06-23 DIAGNOSIS — C50.912 BREAST CANCER, STAGE 4, LEFT: ICD-10-CM

## 2022-06-23 DIAGNOSIS — C78.02 MALIGNANT NEOPLASM METASTATIC TO LEFT LUNG: ICD-10-CM

## 2022-06-23 PROCEDURE — 71250 CT CHEST ABDOMEN PELVIS WITHOUT CONTRAST(XPD): ICD-10-PCS | Mod: 26,,, | Performed by: RADIOLOGY

## 2022-06-23 PROCEDURE — 74176 CT ABD & PELVIS W/O CONTRAST: CPT | Mod: 26,,, | Performed by: RADIOLOGY

## 2022-06-23 PROCEDURE — 71250 CT THORAX DX C-: CPT | Mod: 26,,, | Performed by: RADIOLOGY

## 2022-06-23 PROCEDURE — 74176 CT CHEST ABDOMEN PELVIS WITHOUT CONTRAST(XPD): ICD-10-PCS | Mod: 26,,, | Performed by: RADIOLOGY

## 2022-06-23 PROCEDURE — 74176 CT ABD & PELVIS W/O CONTRAST: CPT | Mod: TC

## 2022-06-26 NOTE — PROGRESS NOTES
Subjective:       Patient ID: Shikha López is a 52 y.o. female.    Chief Complaint: No chief complaint on file.    HPI 52-year-old female who returns for F/U  for metastatic breast cancer.  She is on Trastuzumab deruxtecan  - here for cycle  19.      She has mild nausea on day 3 which last for 1 day then resolves.  Appetite and bowel function has been good.  She denies any shortness of breath or pain.        Breast history:  She presented in the emergency room at [facility] on September 29, 2006 with a 4 months history of inflammation of her left breast.  Physical examination at that time showed the left breast was largely replaced by large mass with multiple skin ulcerations.    A biopsy in September 2006 showed poorly differentiated carcinoma which was ER and OK. negative and HER2 positive.    She was then referred to [facility] for additional care.   CT scan of the chest and abdomen November 2006 revealed multiple nodules in the lungs consistent with metastatic disease.  There also enlarged left axillary node.    She was treated with chemotherapy with weekly Herceptin and Abraxane and had marked improvement in her breast and lung metastasis on that therapy.    On May 29, 2007 she underwent left modified radical mastectomy(Dr. Colvin) which showed no residual tumor in the breast and 1/5 nodes was positive for metastasis measuring 6 mm.  (ypT0N1).    She then received postoperative radiation therapy(Dr Singh)  to the left chest wall supraclav and internal mammary lymph nodes from August 20, 2007 to September 28, 2007.    Postoperatively she continued on Herceptin for approximately 3 and 1 half years before her lung metastasis begin to grow.    She subsequently received a number of different chemotherapy treatments including Adriamycin, Halaven, Xeloda plus lapatinib then Xeloda plus Herceptin.  The  Those treatments were provided under the care of  in [facility].    After she was  hospitalized with neutropenic fever in 2013, she transferred her care to  at [facility].  She received Kadcyla therapy.  She was initially treated with Taxotere Herceptin Perjeta.  She was then changed to Kadcyla which she received for 12 in 18 months.  Line we  In July 2020 PET scan showed an increase in the size of an infrahilar mass consistent with progression.   She then took approximately 9 months of Herceptin and Navelbine.  Apparently she has some initial response to that therapy.  Last scans were December 2020.    She started trastuzumab- deruxtecan  4/12/21.       CT 3/23/22 -   Lungs/airways/pleura: Central airways are patent.  Persistent left infrahilar mass obstructing the proximal segmental bronchi in the left lower lobe.  The left infrahilar mass is difficult to measure however is approximately 3.0 x 2.7 cm, unchanged.  Scattered irregular nodular opacities bilaterally similar in size and distribution compared to prior.  Largest lesion right upper lobe again measures 1.9 cm with pleural tethering, unchanged.  Largest lesion in the left upper lobe measures 1.5 cm  unchanged.  No definite new nodules.  Persistent mild left pleural thickening.    Echo 5/13/22  -EF 60%    CT on 6/25 -   Relatively unchanged infrahilar mass (allowing for differences in interobserver measurement technique and lack of IV contrast) obstructing the proximal segmental lower lobe bronchi (axial series 6, image 208), measuring approximately 2.7 by 2.7 cm (previously 3.0 x 2.7 cm).  Relatively stable scattered bilateral irregular pulmonary opacities.  Largest on the right measures 1.9 cm (axial series 6, image 98), previously 1.9 cm.  Largest on the left measures 1.4 cm (axial series 6, image 128), previously 1.5 cm.  Stable axillary and retroperitoneal nodes  Review of Systems   Constitutional: Positive for fatigue. Negative for appetite change and unexpected weight change.   HENT: Negative for mouth sores.     Eyes: Negative for visual disturbance.   Respiratory: Negative for cough and shortness of breath.    Cardiovascular: Negative for chest pain.   Gastrointestinal: Positive for nausea. Negative for abdominal pain, constipation and diarrhea.   Genitourinary: Negative for frequency.   Musculoskeletal: Negative for back pain.   Integumentary:  Negative for rash.   Neurological: Negative for headaches.   Hematological: Negative for adenopathy.   Psychiatric/Behavioral: Negative.  The patient is not nervous/anxious.          Objective:      Physical Exam  Vitals reviewed.   Constitutional:       General: She is not in acute distress.     Appearance: She is obese.   HENT:      Mouth/Throat:      Mouth: Mucous membranes are moist.      Pharynx: Oropharynx is clear. No oropharyngeal exudate or posterior oropharyngeal erythema.   Eyes:      Pupils: Pupils are equal, round, and reactive to light.   Cardiovascular:      Rate and Rhythm: Normal rate and regular rhythm.      Heart sounds: Murmur (systolic -aortic) heard.   Pulmonary:      Effort: Pulmonary effort is normal. No respiratory distress.      Breath sounds: Normal breath sounds. No wheezing or rales.   Chest:   Breasts:      Right: Normal. No axillary adenopathy or supraclavicular adenopathy.      Left: Absent. No axillary adenopathy or supraclavicular adenopathy.         Abdominal:      Palpations: Abdomen is soft. There is no mass.      Tenderness: There is no abdominal tenderness.   Lymphadenopathy:      Cervical: No cervical adenopathy.      Upper Body:      Right upper body: No supraclavicular or axillary adenopathy.      Left upper body: No supraclavicular or axillary adenopathy.   Skin:     Findings: No rash.   Neurological:      Mental Status: She is alert and oriented to person, place, and time.   Psychiatric:         Mood and Affect: Mood normal.         Behavior: Behavior normal.         Thought Content: Thought content normal.         Judgment: Judgment  normal.         Assessment:      Problem List Items Addressed This Visit     Breast cancer, stage 4, left - Primary    Malignant neoplasm metastatic to left lung          Plan:       Continue current RX  RTC 3 weeks.      Route Chart for Scheduling    Med Onc Chart Routing      Follow up with physician 3 months. Me or Mariam   Follow up with JIMI    Infusion scheduling note    Injection scheduling note    Labs CMP and CBC   Lab interval:     Imaging    Pharmacy appointment    Other referrals          Treatment Plan Information   OP BREAST FAM-TRASTUZUMAB DERUXTECAN-NXKI Q3W   Home Willis MD   Upcoming Treatment Dates - OP BREAST FAM-TRASTUZUMAB DERUXTECAN-NXKI Q3W    6/23/2022       Chemotherapy       fam-trastuzumab deruxtecan-nxki 602 mg in dextrose 5 % 100 mL infusion       Antiemetics       fosaprepitant 150 mg in sodium chloride 0.9% 150 mL IVPB       palonosetron (ALOXI) 0.25 mg, dexamethasone (DECADRON) 10 mg in sodium chloride 0.9% 50 mL IVPB  7/10/2022       Chemotherapy       fam-trastuzumab deruxtecan-nxki 602 mg in dextrose 5 % 100 mL infusion       Antiemetics       fosaprepitant 150 mg in sodium chloride 0.9% 150 mL IVPB       palonosetron (ALOXI) 0.25 mg, dexamethasone (DECADRON) 10 mg in sodium chloride 0.9% 50 mL IVPB  7/27/2022       Chemotherapy       fam-trastuzumab deruxtecan-nxki 602 mg in dextrose 5 % 100 mL infusion       Antiemetics       fosaprepitant 150 mg in sodium chloride 0.9% 150 mL IVPB       palonosetron (ALOXI) 0.25 mg, dexamethasone (DECADRON) 10 mg in sodium chloride 0.9% 50 mL IVPB

## 2022-06-27 ENCOUNTER — INFUSION (OUTPATIENT)
Dept: INFUSION THERAPY | Facility: HOSPITAL | Age: 52
End: 2022-06-27
Attending: INTERNAL MEDICINE
Payer: MEDICARE

## 2022-06-27 ENCOUNTER — OFFICE VISIT (OUTPATIENT)
Dept: HEMATOLOGY/ONCOLOGY | Facility: CLINIC | Age: 52
End: 2022-06-27
Payer: MEDICARE

## 2022-06-27 ENCOUNTER — CLINICAL SUPPORT (OUTPATIENT)
Dept: HEMATOLOGY/ONCOLOGY | Facility: CLINIC | Age: 52
End: 2022-06-27
Payer: MEDICARE

## 2022-06-27 VITALS
HEIGHT: 62 IN | WEIGHT: 235 LBS | HEART RATE: 77 BPM | RESPIRATION RATE: 18 BRPM | DIASTOLIC BLOOD PRESSURE: 65 MMHG | TEMPERATURE: 98 F | BODY MASS INDEX: 43.24 KG/M2 | SYSTOLIC BLOOD PRESSURE: 142 MMHG

## 2022-06-27 VITALS
OXYGEN SATURATION: 96 % | SYSTOLIC BLOOD PRESSURE: 179 MMHG | DIASTOLIC BLOOD PRESSURE: 79 MMHG | HEART RATE: 90 BPM | HEIGHT: 62 IN | BODY MASS INDEX: 43.24 KG/M2 | WEIGHT: 235 LBS | TEMPERATURE: 98 F | RESPIRATION RATE: 18 BRPM

## 2022-06-27 DIAGNOSIS — C50.912 BREAST CANCER, STAGE 4, LEFT: Primary | ICD-10-CM

## 2022-06-27 DIAGNOSIS — G89.29 CHRONIC PAIN OF LEFT KNEE: ICD-10-CM

## 2022-06-27 DIAGNOSIS — G62.0 CHEMOTHERAPY-INDUCED PERIPHERAL NEUROPATHY: ICD-10-CM

## 2022-06-27 DIAGNOSIS — M25.562 CHRONIC PAIN OF LEFT KNEE: ICD-10-CM

## 2022-06-27 DIAGNOSIS — C78.02 MALIGNANT NEOPLASM METASTATIC TO LEFT LUNG: ICD-10-CM

## 2022-06-27 DIAGNOSIS — R11.0 NAUSEA: ICD-10-CM

## 2022-06-27 DIAGNOSIS — M54.50 CHRONIC BILATERAL LOW BACK PAIN WITHOUT SCIATICA: Primary | ICD-10-CM

## 2022-06-27 DIAGNOSIS — G89.29 CHRONIC BILATERAL LOW BACK PAIN WITHOUT SCIATICA: Primary | ICD-10-CM

## 2022-06-27 DIAGNOSIS — T45.1X5A CHEMOTHERAPY-INDUCED PERIPHERAL NEUROPATHY: ICD-10-CM

## 2022-06-27 PROCEDURE — 97811 ACUP 1/> W/O ESTIM EA ADD 15: CPT | Mod: ,,, | Performed by: ACUPUNCTURIST

## 2022-06-27 PROCEDURE — 99214 PR OFFICE/OUTPT VISIT, EST, LEVL IV, 30-39 MIN: ICD-10-PCS | Mod: S$PBB,,, | Performed by: INTERNAL MEDICINE

## 2022-06-27 PROCEDURE — 63600175 PHARM REV CODE 636 W HCPCS: Mod: TB | Performed by: INTERNAL MEDICINE

## 2022-06-27 PROCEDURE — 99999 PR PBB SHADOW E&M-EST. PATIENT-LVL III: ICD-10-PCS | Mod: PBBFAC,,, | Performed by: INTERNAL MEDICINE

## 2022-06-27 PROCEDURE — 97811 PR ACUPUNCT W/O ELEC STIMUL ADDL 15M: ICD-10-PCS | Mod: ,,, | Performed by: ACUPUNCTURIST

## 2022-06-27 PROCEDURE — 99211 OFF/OP EST MAY X REQ PHY/QHP: CPT | Mod: PBBFAC,25,27 | Performed by: ACUPUNCTURIST

## 2022-06-27 PROCEDURE — 96367 TX/PROPH/DG ADDL SEQ IV INF: CPT

## 2022-06-27 PROCEDURE — 97810 PR ACUPUNCT W/O ELEC STIMUL 15 MIN: ICD-10-PCS | Mod: ,,, | Performed by: ACUPUNCTURIST

## 2022-06-27 PROCEDURE — 25000003 PHARM REV CODE 250: Performed by: INTERNAL MEDICINE

## 2022-06-27 PROCEDURE — 96413 CHEMO IV INFUSION 1 HR: CPT

## 2022-06-27 PROCEDURE — A4216 STERILE WATER/SALINE, 10 ML: HCPCS | Performed by: INTERNAL MEDICINE

## 2022-06-27 PROCEDURE — 99214 OFFICE O/P EST MOD 30 MIN: CPT | Mod: S$PBB,,, | Performed by: INTERNAL MEDICINE

## 2022-06-27 PROCEDURE — 97810 ACUP 1/> WO ESTIM 1ST 15 MIN: CPT | Mod: ,,, | Performed by: ACUPUNCTURIST

## 2022-06-27 PROCEDURE — 99213 OFFICE O/P EST LOW 20 MIN: CPT | Mod: PBBFAC,25 | Performed by: INTERNAL MEDICINE

## 2022-06-27 PROCEDURE — 99999 PR PBB SHADOW E&M-EST. PATIENT-LVL III: CPT | Mod: PBBFAC,,, | Performed by: INTERNAL MEDICINE

## 2022-06-27 PROCEDURE — 99999 PR PBB SHADOW E&M-EST. PATIENT-LVL I: ICD-10-PCS | Mod: PBBFAC,,, | Performed by: ACUPUNCTURIST

## 2022-06-27 PROCEDURE — 99999 PR PBB SHADOW E&M-EST. PATIENT-LVL I: CPT | Mod: PBBFAC,,, | Performed by: ACUPUNCTURIST

## 2022-06-27 RX ORDER — SODIUM CHLORIDE 0.9 % (FLUSH) 0.9 %
10 SYRINGE (ML) INJECTION
Status: CANCELLED | OUTPATIENT
Start: 2022-06-27

## 2022-06-27 RX ORDER — SODIUM CHLORIDE 0.9 % (FLUSH) 0.9 %
10 SYRINGE (ML) INJECTION
Status: DISCONTINUED | OUTPATIENT
Start: 2022-06-27 | End: 2022-06-27 | Stop reason: HOSPADM

## 2022-06-27 RX ORDER — HEPARIN 100 UNIT/ML
500 SYRINGE INTRAVENOUS
Status: DISCONTINUED | OUTPATIENT
Start: 2022-06-27 | End: 2022-06-27 | Stop reason: HOSPADM

## 2022-06-27 RX ORDER — HEPARIN 100 UNIT/ML
500 SYRINGE INTRAVENOUS
Status: CANCELLED | OUTPATIENT
Start: 2022-06-27

## 2022-06-27 RX ADMIN — PALONOSETRON 0.25 MG: 0.25 INJECTION, SOLUTION INTRAVENOUS at 10:06

## 2022-06-27 RX ADMIN — FAM-TRASTUZUMAB DERUXTECAN-NXKI 600 MG: 100 INJECTION, POWDER, LYOPHILIZED, FOR SOLUTION INTRAVENOUS at 12:06

## 2022-06-27 RX ADMIN — SODIUM CHLORIDE: 9 INJECTION, SOLUTION INTRAVENOUS at 10:06

## 2022-06-27 RX ADMIN — HEPARIN 500 UNITS: 100 SYRINGE at 01:06

## 2022-06-27 RX ADMIN — Medication 10 ML: at 01:06

## 2022-06-27 RX ADMIN — FOSAPREPITANT 150 MG: 150 INJECTION, POWDER, LYOPHILIZED, FOR SOLUTION INTRAVENOUS at 10:06

## 2022-06-27 NOTE — PLAN OF CARE
Patient tolerated Enhertu with no complications. VSS. Pt instructed to call MD with any problems. NAD. Pt discharged home independently.

## 2022-06-30 NOTE — PROGRESS NOTES
Subjective:       Patient ID: Shikha López is a 52 y.o. female.    Chief Complaint: Pain (cLBP) and Nausea (CINV)    Patient is still dealing with severe back pain which is worse when laying down or standing for long periods of time. Has CINV after treatment and fatigue. Continue with care during treatments.     Review of Systems   Constitutional: Positive for fatigue.   Musculoskeletal: Positive for arthralgias and back pain.   Neurological: Positive for numbness.         Objective:      Physical Exam    Assessment:       Problem List Items Addressed This Visit    None     Visit Diagnoses     Chronic bilateral low back pain without sciatica    -  Primary    Chemotherapy-induced peripheral neuropathy        Nausea        Chronic pain of left knee              Plan:       1x a week*         Pre-Symptom Score: NA  Post-Symptom Score: NA     Pain (cLBP) and Nausea (CINV)       Encounter Diagnoses   Name Primary?    Chronic bilateral low back pain without sciatica Yes    Chemotherapy-induced peripheral neuropathy     Nausea     Chronic pain of left knee        Acupuncture points used:  4 PEREZ, Du20, Gb34, Li11, REN12, Sp10, Sp6, Sp9, SSC, St25, St36, St40 and YIN JENKINS    NO STIM USED      NEEDLES IN: 24  NEEDLES OUT: 24    NEEDLES W/O STIM  AT: 8:00 AM  NEEDLES W/O STIM REMOVED AT: 8:30 AM

## 2022-07-01 ENCOUNTER — TELEPHONE (OUTPATIENT)
Dept: HEMATOLOGY/ONCOLOGY | Facility: CLINIC | Age: 52
End: 2022-07-01
Payer: MEDICARE

## 2022-07-06 NOTE — PROGRESS NOTES
Subjective:       Patient ID: Shikha López is a 52 y.o. female.    Chief Complaint: Breast cancer, stage 4, left    HPI 52-year-old female who returns for F/U  for metastatic breast cancer.  She is on Trastuzumab deruxtecan  - here for cycle  20.      Her main issues is the let down from the steroids. Notes that her blood sugar is not stable on days 2-3 after chemo and her energy is decreased.  Nausea has been well controlled with PRN medications  Still with indigestion (she is on Pepcid daily) and gas.   Appetite and bowel function has been good.  She denies any shortness of breath or pain.        Per Dr. Willis's previous note: Breast history:  She presented in the emergency room at [facility] on September 29, 2006 with a 4 months history of inflammation of her left breast.  Physical examination at that time showed the left breast was largely replaced by large mass with multiple skin ulcerations.    A biopsy in September 2006 showed poorly differentiated carcinoma which was ER and RI. negative and HER2 positive.    She was then referred to [facility] for additional care.   CT scan of the chest and abdomen November 2006 revealed multiple nodules in the lungs consistent with metastatic disease.  There also enlarged left axillary node.    She was treated with chemotherapy with weekly Herceptin and Abraxane and had marked improvement in her breast and lung metastasis on that therapy.    On May 29, 2007 she underwent left modified radical mastectomy(Dr. Colvin) which showed no residual tumor in the breast and 1/5 nodes was positive for metastasis measuring 6 mm.  (ypT0N1).    She then received postoperative radiation therapy(Dr Singh)  to the left chest wall supraclav and internal mammary lymph nodes from August 20, 2007 to September 28, 2007.    Postoperatively she continued on Herceptin for approximately 3 and 1 half years before her lung metastasis begin to grow.    She subsequently received a  number of different chemotherapy treatments including Adriamycin, Halaven, Xeloda plus lapatinib then Xeloda plus Herceptin.  The  Those treatments were provided under the care of  in [facility].    After she was hospitalized with neutropenic fever in 2013, she transferred her care to  at [facility].  She received Kadcyla therapy.  She was initially treated with Taxotere Herceptin Perjeta.  She was then changed to Kadcyla which she received for 12 in 18 months.  Line we  In July 2020 PET scan showed an increase in the size of an infrahilar mass consistent with progression.   She then took approximately 9 months of Herceptin and Navelbine.  Apparently she has some initial response to that therapy.  Last scans were December 2020.    She started trastuzumab- deruxtecan  4/12/21.       CT 3/23/22 -   Lungs/airways/pleura: Central airways are patent.  Persistent left infrahilar mass obstructing the proximal segmental bronchi in the left lower lobe.  The left infrahilar mass is difficult to measure however is approximately 3.0 x 2.7 cm, unchanged.  Scattered irregular nodular opacities bilaterally similar in size and distribution compared to prior.  Largest lesion right upper lobe again measures 1.9 cm with pleural tethering, unchanged.  Largest lesion in the left upper lobe measures 1.5 cm  unchanged.  No definite new nodules.  Persistent mild left pleural thickening.    Echo 5/13/22  -EF 60%    CT on 6/25 -   Relatively unchanged infrahilar mass (allowing for differences in interobserver measurement technique and lack of IV contrast) obstructing the proximal segmental lower lobe bronchi (axial series 6, image 208), measuring approximately 2.7 by 2.7 cm (previously 3.0 x 2.7 cm).  Relatively stable scattered bilateral irregular pulmonary opacities.  Largest on the right measures 1.9 cm (axial series 6, image 98), previously 1.9 cm.  Largest on the left measures 1.4 cm (axial series 6,  image 128), previously 1.5 cm.  Stable axillary and retroperitoneal nodes      Review of Systems   Constitutional: Positive for fatigue. Negative for activity change, appetite change, chills and unexpected weight change.   HENT: Negative for mouth sores and nosebleeds.    Eyes: Negative for visual disturbance.   Respiratory: Negative for cough and shortness of breath.    Cardiovascular: Negative for chest pain.   Gastrointestinal: Positive for constipation (1st week after chemo), nausea (controlled with PRN medicaitons) and reflux (takes pepcid). Negative for abdominal pain, diarrhea and vomiting.   Genitourinary: Negative for frequency, hematuria and hot flashes.   Musculoskeletal: Negative for back pain.   Integumentary:  Negative for rash.   Neurological: Negative for dizziness, light-headedness, numbness and headaches.   Hematological: Negative for adenopathy.   Psychiatric/Behavioral: Negative.  The patient is not nervous/anxious.          Objective:      Physical Exam  Vitals and nursing note reviewed.   Constitutional:       General: She is not in acute distress.     Appearance: Normal appearance. She is well-developed. She is obese.   HENT:      Head: Normocephalic.   Eyes:      Pupils: Pupils are equal, round, and reactive to light.   Cardiovascular:      Rate and Rhythm: Normal rate and regular rhythm.      Heart sounds: Murmur (systolic) heard.   Pulmonary:      Effort: Pulmonary effort is normal.      Breath sounds: Normal breath sounds.   Chest:   Breasts:      Right: Normal. No axillary adenopathy or supraclavicular adenopathy.      Left: Absent. No axillary adenopathy or supraclavicular adenopathy.        Comments: Left breast mastectomy with skin graft, denies pain but positive for tenderness  Abdominal:      General: Bowel sounds are normal. There is no distension.      Palpations: Abdomen is soft.      Tenderness: There is no abdominal tenderness.   Musculoskeletal:      Cervical back: Normal range  of motion.   Lymphadenopathy:      Cervical: No cervical adenopathy.      Upper Body:      Right upper body: No supraclavicular or axillary adenopathy.      Left upper body: No supraclavicular or axillary adenopathy.   Skin:     General: Skin is warm and dry.      Findings: No rash.   Neurological:      Mental Status: She is alert and oriented to person, place, and time.   Psychiatric:         Behavior: Behavior normal.         Assessment:       1. Breast cancer, stage 4, left     2. Malignant neoplasm metastatic to left lung            Plan:       1-2. Continue current RX  - Enhertu  - Due every 3 weeks  - Echo due in August - ordered  - Scans in June; due again in December       Return to clinic in 3 weeks with MD appointment and labs.     Patient is in agreement with the proposed treatment plan. All questions were answered to the patient's satisfaction. Patient knows to call clinic for any new or worsening symptoms and if anything is needed before the next clinic visit.          Mariam Lisa, MALIKP-C  Hematology & Medical Oncology   [address] [place] [id number]  ph. [phone]  Fax. [phone]    Collaborating physician, Dr. Willis.    Approximately 20 minutes were spent face-to-face with the patient.  Approximately 25 minutes in total were spent on this encounter, which includes face-to-face time and non-face-to-face time preparing to see the patient (e.g., review of tests), obtaining and/or reviewing separately obtained history, documenting clinical information in the electronic or other health record, independently interpreting results (not separately reported) and communicating results to the patient/family/caregiver, or care coordination (not separately reported).         Route Chart for Scheduling    Med Onc Chart Routing      Follow up with physician 3 weeks. Me or Mariam; 9 weeks   Follow up with JIMI 6 weeks.   Infusion scheduling note    Injection scheduling note    Labs CMP and CBC    Lab interval: every 3 weeks     Imaging ECHO   Due in august   Pharmacy appointment    Other referrals          Treatment Plan Information   OP BREAST FAM-TRASTUZUMAB DERUXTECAN-NXKI Q3W   Home Willis MD   Upcoming Treatment Dates - OP BREAST FAM-TRASTUZUMAB DERUXTECAN-NXKI Q3W    7/18/2022       Chemotherapy       fam-trastuzumab deruxtecan-nxki 602 mg in dextrose 5 % 100 mL infusion       Antiemetics       fosaprepitant 150 mg in sodium chloride 0.9% 150 mL IVPB       palonosetron (ALOXI) 0.25 mg in sodium chloride 0.9% 50 mL IVPB       dexAMETHasone tablet 8 mg  8/4/2022       Chemotherapy       fam-trastuzumab deruxtecan-nxki 602 mg in dextrose 5 % 100 mL infusion       Antiemetics       fosaprepitant 150 mg in sodium chloride 0.9% 150 mL IVPB       palonosetron (ALOXI) 0.25 mg in sodium chloride 0.9% 50 mL IVPB       dexAMETHasone tablet 8 mg  8/21/2022       Chemotherapy       fam-trastuzumab deruxtecan-nxki 602 mg in dextrose 5 % 100 mL infusion       Antiemetics       fosaprepitant 150 mg in sodium chloride 0.9% 150 mL IVPB       palonosetron (ALOXI) 0.25 mg in sodium chloride 0.9% 50 mL IVPB       dexAMETHasone tablet 8 mg  9/7/2022       Chemotherapy       fam-trastuzumab deruxtecan-nxki 602 mg in dextrose 5 % 100 mL infusion       Antiemetics       fosaprepitant 150 mg in sodium chloride 0.9% 150 mL IVPB       palonosetron (ALOXI) 0.25 mg in sodium chloride 0.9% 50 mL IVPB       dexAMETHasone tablet 8 mg

## 2022-07-11 ENCOUNTER — CLINICAL SUPPORT (OUTPATIENT)
Dept: HEMATOLOGY/ONCOLOGY | Facility: CLINIC | Age: 52
End: 2022-07-11
Payer: MEDICARE

## 2022-07-11 DIAGNOSIS — M54.50 CHRONIC BILATERAL LOW BACK PAIN WITHOUT SCIATICA: Primary | ICD-10-CM

## 2022-07-11 DIAGNOSIS — G89.29 CHRONIC BILATERAL LOW BACK PAIN WITHOUT SCIATICA: Primary | ICD-10-CM

## 2022-07-11 DIAGNOSIS — G62.0 CHEMOTHERAPY-INDUCED PERIPHERAL NEUROPATHY: ICD-10-CM

## 2022-07-11 DIAGNOSIS — T45.1X5A CHEMOTHERAPY-INDUCED PERIPHERAL NEUROPATHY: ICD-10-CM

## 2022-07-11 PROCEDURE — 97811 PR ACUPUNCT W/O ELEC STIMUL ADDL 15M: ICD-10-PCS | Mod: ,,, | Performed by: ACUPUNCTURIST

## 2022-07-11 PROCEDURE — 97811 ACUP 1/> W/O ESTIM EA ADD 15: CPT | Mod: ,,, | Performed by: ACUPUNCTURIST

## 2022-07-11 PROCEDURE — 97810 ACUP 1/> WO ESTIM 1ST 15 MIN: CPT | Mod: ,,, | Performed by: ACUPUNCTURIST

## 2022-07-11 PROCEDURE — 97810 PR ACUPUNCT W/O ELEC STIMUL 15 MIN: ICD-10-PCS | Mod: ,,, | Performed by: ACUPUNCTURIST

## 2022-07-11 NOTE — PROGRESS NOTES
Subjective:       Patient ID: Shikha López is a 52 y.o. female.    Chief Complaint: Pain (cLBP), Nausea, and Results (fatigue)    Patient is doing well with treatments and symptoms are subdued. She is still having some higher bouts of fatigue post treatment but overall pain and nausea is reduced. Continue with care.     Review of Systems   Constitutional: Positive for fatigue.   Musculoskeletal: Positive for arthralgias and back pain.         Objective:      Physical Exam    Assessment:       Problem List Items Addressed This Visit    None     Visit Diagnoses     Chronic bilateral low back pain without sciatica    -  Primary    Chemotherapy-induced peripheral neuropathy              Plan:       1x a week*         Pre-Symptom Score: NA  Post-Symptom Score: NA     Pain (cLBP), Nausea, and Results (fatigue)       Encounter Diagnoses   Name Primary?    Chronic bilateral low back pain without sciatica Yes    Chemotherapy-induced peripheral neuropathy        Acupuncture points used:  4 PEREZ, Du20, Gb34, Li11, REN12, Sp10, Sp6, Sp9, SSC, St25, St36, St40 and YIN JENKINS    NO STIM USED      NEEDLES IN: 24  NEEDLES OUT: 24    NEEDLES W/ STIM  AT: 8:10 AM  NEEDLES W/ STIM REMOVED AT: 8:40 AM

## 2022-07-18 ENCOUNTER — INFUSION (OUTPATIENT)
Dept: INFUSION THERAPY | Facility: HOSPITAL | Age: 52
End: 2022-07-18
Attending: INTERNAL MEDICINE
Payer: MEDICARE

## 2022-07-18 ENCOUNTER — OFFICE VISIT (OUTPATIENT)
Dept: HEMATOLOGY/ONCOLOGY | Facility: CLINIC | Age: 52
End: 2022-07-18
Payer: MEDICARE

## 2022-07-18 ENCOUNTER — CLINICAL SUPPORT (OUTPATIENT)
Dept: HEMATOLOGY/ONCOLOGY | Facility: CLINIC | Age: 52
End: 2022-07-18
Payer: MEDICARE

## 2022-07-18 VITALS
TEMPERATURE: 98 F | DIASTOLIC BLOOD PRESSURE: 67 MMHG | HEART RATE: 75 BPM | BODY MASS INDEX: 43.08 KG/M2 | HEIGHT: 62 IN | OXYGEN SATURATION: 99 % | WEIGHT: 234.13 LBS | SYSTOLIC BLOOD PRESSURE: 143 MMHG | RESPIRATION RATE: 18 BRPM

## 2022-07-18 VITALS
TEMPERATURE: 98 F | HEART RATE: 76 BPM | BODY MASS INDEX: 43.08 KG/M2 | DIASTOLIC BLOOD PRESSURE: 71 MMHG | RESPIRATION RATE: 18 BRPM | SYSTOLIC BLOOD PRESSURE: 163 MMHG | OXYGEN SATURATION: 95 % | WEIGHT: 234.13 LBS | HEIGHT: 62 IN

## 2022-07-18 DIAGNOSIS — C50.912 BREAST CANCER, STAGE 4, LEFT: Primary | ICD-10-CM

## 2022-07-18 DIAGNOSIS — M54.50 CHRONIC BILATERAL LOW BACK PAIN WITHOUT SCIATICA: Primary | ICD-10-CM

## 2022-07-18 DIAGNOSIS — C78.02 MALIGNANT NEOPLASM METASTATIC TO LEFT LUNG: ICD-10-CM

## 2022-07-18 DIAGNOSIS — G89.29 CHRONIC BILATERAL LOW BACK PAIN WITHOUT SCIATICA: Primary | ICD-10-CM

## 2022-07-18 PROCEDURE — 97810 ACUP 1/> WO ESTIM 1ST 15 MIN: CPT | Mod: ,,, | Performed by: ACUPUNCTURIST

## 2022-07-18 PROCEDURE — 99213 OFFICE O/P EST LOW 20 MIN: CPT | Mod: PBBFAC,25 | Performed by: NURSE PRACTITIONER

## 2022-07-18 PROCEDURE — 99211 OFF/OP EST MAY X REQ PHY/QHP: CPT | Mod: PBBFAC,27 | Performed by: ACUPUNCTURIST

## 2022-07-18 PROCEDURE — 99999 PR PBB SHADOW E&M-EST. PATIENT-LVL I: ICD-10-PCS | Mod: PBBFAC,,, | Performed by: ACUPUNCTURIST

## 2022-07-18 PROCEDURE — 97810 PR ACUPUNCT W/O ELEC STIMUL 15 MIN: ICD-10-PCS | Mod: ,,, | Performed by: ACUPUNCTURIST

## 2022-07-18 PROCEDURE — 99215 PR OFFICE/OUTPT VISIT, EST, LEVL V, 40-54 MIN: ICD-10-PCS | Mod: S$PBB,,, | Performed by: NURSE PRACTITIONER

## 2022-07-18 PROCEDURE — 99999 PR PBB SHADOW E&M-EST. PATIENT-LVL III: CPT | Mod: PBBFAC,,, | Performed by: NURSE PRACTITIONER

## 2022-07-18 PROCEDURE — 97811 ACUP 1/> W/O ESTIM EA ADD 15: CPT | Mod: ,,, | Performed by: ACUPUNCTURIST

## 2022-07-18 PROCEDURE — 96367 TX/PROPH/DG ADDL SEQ IV INF: CPT

## 2022-07-18 PROCEDURE — 99999 PR PBB SHADOW E&M-EST. PATIENT-LVL I: CPT | Mod: PBBFAC,,, | Performed by: ACUPUNCTURIST

## 2022-07-18 PROCEDURE — 99215 OFFICE O/P EST HI 40 MIN: CPT | Mod: S$PBB,,, | Performed by: NURSE PRACTITIONER

## 2022-07-18 PROCEDURE — 25000003 PHARM REV CODE 250: Performed by: NURSE PRACTITIONER

## 2022-07-18 PROCEDURE — 96413 CHEMO IV INFUSION 1 HR: CPT

## 2022-07-18 PROCEDURE — 63600175 PHARM REV CODE 636 W HCPCS: Performed by: NURSE PRACTITIONER

## 2022-07-18 PROCEDURE — A4216 STERILE WATER/SALINE, 10 ML: HCPCS | Performed by: NURSE PRACTITIONER

## 2022-07-18 PROCEDURE — 99999 PR PBB SHADOW E&M-EST. PATIENT-LVL III: ICD-10-PCS | Mod: PBBFAC,,, | Performed by: NURSE PRACTITIONER

## 2022-07-18 PROCEDURE — 97811 PR ACUPUNCT W/O ELEC STIMUL ADDL 15M: ICD-10-PCS | Mod: ,,, | Performed by: ACUPUNCTURIST

## 2022-07-18 RX ORDER — DEXAMETHASONE 4 MG/1
8 TABLET ORAL
Status: CANCELLED
Start: 2022-07-18

## 2022-07-18 RX ORDER — HEPARIN 100 UNIT/ML
500 SYRINGE INTRAVENOUS
Status: DISCONTINUED | OUTPATIENT
Start: 2022-07-18 | End: 2022-07-18 | Stop reason: HOSPADM

## 2022-07-18 RX ORDER — SODIUM CHLORIDE 0.9 % (FLUSH) 0.9 %
10 SYRINGE (ML) INJECTION
Status: CANCELLED | OUTPATIENT
Start: 2022-07-18

## 2022-07-18 RX ORDER — DEXAMETHASONE 4 MG/1
8 TABLET ORAL
Status: COMPLETED | OUTPATIENT
Start: 2022-07-18 | End: 2022-07-18

## 2022-07-18 RX ORDER — HEPARIN 100 UNIT/ML
500 SYRINGE INTRAVENOUS
Status: CANCELLED | OUTPATIENT
Start: 2022-07-18

## 2022-07-18 RX ORDER — SODIUM CHLORIDE 0.9 % (FLUSH) 0.9 %
10 SYRINGE (ML) INJECTION
Status: DISCONTINUED | OUTPATIENT
Start: 2022-07-18 | End: 2022-07-18 | Stop reason: HOSPADM

## 2022-07-18 RX ADMIN — HEPARIN SODIUM (PORCINE) LOCK FLUSH IV SOLN 100 UNIT/ML 500 UNITS: 100 SOLUTION at 03:07

## 2022-07-18 RX ADMIN — FAM-TRASTUZUMAB DERUXTECAN-NXKI 600 MG: 100 INJECTION, POWDER, LYOPHILIZED, FOR SOLUTION INTRAVENOUS at 01:07

## 2022-07-18 RX ADMIN — SODIUM CHLORIDE: 0.9 INJECTION, SOLUTION INTRAVENOUS at 12:07

## 2022-07-18 RX ADMIN — PALONOSETRON 0.25 MG: 0.25 INJECTION, SOLUTION INTRAVENOUS at 12:07

## 2022-07-18 RX ADMIN — DEXAMETHASONE 8 MG: 4 TABLET ORAL at 12:07

## 2022-07-18 RX ADMIN — Medication 10 ML: at 03:07

## 2022-07-18 RX ADMIN — FOSAPREPITANT 150 MG: 150 INJECTION, POWDER, LYOPHILIZED, FOR SOLUTION INTRAVENOUS at 01:07

## 2022-07-18 NOTE — PLAN OF CARE
Pt received Enhertu today and tolerated well, without complications. Educated patient about Enhertu (indications, side effects, possible reactions, chemotherapy precautions) and verbalized understanding.  VSS. CW port positive for blood return, saline flushed, Heparin flush instilled to dwell and de accessed prior to DC. Pt DC with no distress noted, ambulated off unit w/o event, via self, pleased.

## 2022-07-18 NOTE — PROGRESS NOTES
Subjective:       Patient ID: Shikha López is a 52 y.o. female.    Chief Complaint: Pain (cLBP)    Patient doing well overall - nausea is redcued, low back pain is present but manageable. Continue with care.     Review of Systems   Musculoskeletal: Positive for arthralgias and back pain.         Objective:      Physical Exam    Assessment:       Problem List Items Addressed This Visit    None     Visit Diagnoses     Chronic bilateral low back pain without sciatica    -  Primary          Plan:       1x a week*         Pre-Symptom Score: NA  Post-Symptom Score: NA     Pain (cLBP)       Encounter Diagnoses   Name Primary?    Chronic bilateral low back pain without sciatica Yes       Acupuncture points used:  4 PEREZ, Du20, ER JUAN, Gb34, Li11, REN12, Sp10, Sp6, Sp9, St25, St36, St40 and YIN JENKINS    NO STIM USED      NEEDLES IN: 22  NEEDLES OUT: 22    NEEDLES W/O STIM  AT: 8:00 Am  NEEDLES W/O STIM REMOVED AT: 8:30 am

## 2022-07-25 ENCOUNTER — TELEPHONE (OUTPATIENT)
Dept: HEMATOLOGY/ONCOLOGY | Facility: CLINIC | Age: 52
End: 2022-07-25

## 2022-07-25 ENCOUNTER — CLINICAL SUPPORT (OUTPATIENT)
Dept: HEMATOLOGY/ONCOLOGY | Facility: CLINIC | Age: 52
End: 2022-07-25
Payer: MEDICARE

## 2022-07-25 ENCOUNTER — TELEPHONE (OUTPATIENT)
Dept: HEMATOLOGY/ONCOLOGY | Facility: CLINIC | Age: 52
End: 2022-07-25
Payer: MEDICARE

## 2022-07-25 DIAGNOSIS — R11.0 NAUSEA: ICD-10-CM

## 2022-07-25 DIAGNOSIS — G89.29 CHRONIC BILATERAL LOW BACK PAIN WITHOUT SCIATICA: Primary | ICD-10-CM

## 2022-07-25 DIAGNOSIS — M54.50 CHRONIC BILATERAL LOW BACK PAIN WITHOUT SCIATICA: Primary | ICD-10-CM

## 2022-07-25 PROCEDURE — 97811 ACUP 1/> W/O ESTIM EA ADD 15: CPT | Mod: ,,, | Performed by: ACUPUNCTURIST

## 2022-07-25 PROCEDURE — 97811 PR ACUPUNCT W/O ELEC STIMUL ADDL 15M: ICD-10-PCS | Mod: ,,, | Performed by: ACUPUNCTURIST

## 2022-07-25 PROCEDURE — 97810 PR ACUPUNCT W/O ELEC STIMUL 15 MIN: ICD-10-PCS | Mod: ,,, | Performed by: ACUPUNCTURIST

## 2022-07-25 PROCEDURE — 97810 ACUP 1/> WO ESTIM 1ST 15 MIN: CPT | Mod: ,,, | Performed by: ACUPUNCTURIST

## 2022-07-25 NOTE — PROGRESS NOTES
Subjective:       Patient ID: Shikha López is a 52 y.o. female.    Chief Complaint: Pain (cLBP) and Nausea    Patient states she had one of her better works since last treatment and infusion. Low gas, nausea. Back pain present but was able to move around and work in the house. Continue with care.     Review of Systems   Musculoskeletal: Positive for arthralgias and back pain.         Objective:      Physical Exam    Assessment:       Problem List Items Addressed This Visit    None     Visit Diagnoses     Chronic bilateral low back pain without sciatica    -  Primary    Nausea              Plan:       1x a week*         Pre-Symptom Score: NA  Post-Symptom Score: NA     Pain (cLBP) and Nausea       Encounter Diagnoses   Name Primary?    Chronic bilateral low back pain without sciatica Yes    Nausea        Acupuncture points used:  4 PEREZ, Du20, Gb34, Li11, REN12, MAZARIEGOS MEN, Sp10, Sp6, Sp9, St25, St36, St40, St44 and YIN JENKINS    NO STIM USED      NEEDLES IN: 23  NEEDLES OUT: 23    NEEDLES W/O STIM  AT: 8:15 AM  NEEDLES W/O STIM REMOVED AT: 8:45 AM

## 2022-08-01 ENCOUNTER — CLINICAL SUPPORT (OUTPATIENT)
Dept: HEMATOLOGY/ONCOLOGY | Facility: CLINIC | Age: 52
End: 2022-08-01
Payer: MEDICARE

## 2022-08-01 DIAGNOSIS — G89.29 CHRONIC BILATERAL LOW BACK PAIN WITHOUT SCIATICA: Primary | ICD-10-CM

## 2022-08-01 DIAGNOSIS — R11.0 NAUSEA: ICD-10-CM

## 2022-08-01 DIAGNOSIS — M54.50 CHRONIC BILATERAL LOW BACK PAIN WITHOUT SCIATICA: Primary | ICD-10-CM

## 2022-08-01 PROCEDURE — 97811 ACUP 1/> W/O ESTIM EA ADD 15: CPT | Mod: ,,, | Performed by: ACUPUNCTURIST

## 2022-08-01 PROCEDURE — 97810 ACUP 1/> WO ESTIM 1ST 15 MIN: CPT | Mod: ,,, | Performed by: ACUPUNCTURIST

## 2022-08-01 PROCEDURE — 97811 PR ACUPUNCT W/O ELEC STIMUL ADDL 15M: ICD-10-PCS | Mod: ,,, | Performed by: ACUPUNCTURIST

## 2022-08-01 PROCEDURE — 97810 PR ACUPUNCT W/O ELEC STIMUL 15 MIN: ICD-10-PCS | Mod: ,,, | Performed by: ACUPUNCTURIST

## 2022-08-02 NOTE — PROGRESS NOTES
Subjective:       Patient ID: Shikha López is a 52 y.o. female.    Chief Complaint: Pain (cLBP)    Patient on weekly maintenance for symptoms. Continue with care.     Review of Systems   Musculoskeletal: Positive for arthralgias and back pain.         Objective:      Physical Exam    Assessment:       Problem List Items Addressed This Visit    None     Visit Diagnoses     Chronic bilateral low back pain without sciatica    -  Primary    Nausea              Plan:       1x a week*         Pre-Symptom Score: NA  Post-Symptom Score: NA     Pain (cLBP)       Encounter Diagnoses   Name Primary?    Chronic bilateral low back pain without sciatica Yes    Nausea        Acupuncture points used:  4 PEREZ, Du20, Gb34, Li11, Pc6, REN12, MAZARIEGOS MEN, Sp10, Sp6, Sp9, SSC, St25, St36, St40 and YIN JENKINS    NO STIM USED      NEEDLES IN: 18  NEEDLES OUT: 18    NEEDLES W/O STIM  AT: 8:10 AM  NEEDLES W/O STIM REMOVED AT: 8:40 AM

## 2022-08-05 ENCOUNTER — HOSPITAL ENCOUNTER (OUTPATIENT)
Dept: CARDIOLOGY | Facility: HOSPITAL | Age: 52
Discharge: HOME | End: 2022-08-05
Attending: NURSE PRACTITIONER
Payer: MEDICARE

## 2022-08-05 VITALS
HEIGHT: 62 IN | HEART RATE: 84 BPM | DIASTOLIC BLOOD PRESSURE: 74 MMHG | BODY MASS INDEX: 43.06 KG/M2 | SYSTOLIC BLOOD PRESSURE: 132 MMHG | WEIGHT: 234 LBS

## 2022-08-05 DIAGNOSIS — C78.02 MALIGNANT NEOPLASM METASTATIC TO LEFT LUNG: ICD-10-CM

## 2022-08-05 DIAGNOSIS — C50.912 BREAST CANCER, STAGE 4, LEFT: ICD-10-CM

## 2022-08-05 LAB
ASCENDING AORTA: 2.71 CM
AV INDEX (PROSTH): 0.48
AV MEAN GRADIENT: 13 MMHG
AV PEAK GRADIENT: 24 MMHG
AV VALVE AREA: 1.73 CM2
AV VELOCITY RATIO: 0.42
BSA FOR ECHO PROCEDURE: 2.15 M2
CV ECHO LV RWT: 0.34 CM
DOP CALC AO PEAK VEL: 2.47 M/S
DOP CALC AO VTI: 49.12 CM
DOP CALC LVOT AREA: 3.6 CM2
DOP CALC LVOT DIAMETER: 2.15 CM
DOP CALC LVOT PEAK VEL: 1.03 M/S
DOP CALC LVOT STROKE VOLUME: 84.8 CM3
DOP CALCLVOT PEAK VEL VTI: 23.37 CM
E WAVE DECELERATION TIME: 148.44 MSEC
E/A RATIO: 1.03
E/E' RATIO: 13.67 M/S
ECHO LV POSTERIOR WALL: 0.89 CM (ref 0.6–1.1)
EJECTION FRACTION: 65 %
FRACTIONAL SHORTENING: 37 % (ref 28–44)
INTERVENTRICULAR SEPTUM: 0.86 CM (ref 0.6–1.1)
IVRT: 62.8 MSEC
LA MAJOR: 5.6 CM
LA MINOR: 5.22 CM
LA WIDTH: 4.01 CM
LEFT ATRIUM SIZE: 4.76 CM
LEFT ATRIUM VOLUME INDEX MOD: 27.6 ML/M2
LEFT ATRIUM VOLUME INDEX: 43 ML/M2
LEFT ATRIUM VOLUME MOD: 56.35 CM3
LEFT ATRIUM VOLUME: 87.67 CM3
LEFT INTERNAL DIMENSION IN SYSTOLE: 3.27 CM (ref 2.1–4)
LEFT VENTRICLE DIASTOLIC VOLUME INDEX: 63.83 ML/M2
LEFT VENTRICLE DIASTOLIC VOLUME: 130.21 ML
LEFT VENTRICLE MASS INDEX: 80 G/M2
LEFT VENTRICLE SYSTOLIC VOLUME INDEX: 21.2 ML/M2
LEFT VENTRICLE SYSTOLIC VOLUME: 43.29 ML
LEFT VENTRICULAR INTERNAL DIMENSION IN DIASTOLE: 5.21 CM (ref 3.5–6)
LEFT VENTRICULAR MASS: 163.45 G
LV LATERAL E/E' RATIO: 15.38 M/S
LV SEPTAL E/E' RATIO: 12.3 M/S
MV PEAK A VEL: 1.19 M/S
MV PEAK E VEL: 1.23 M/S
MV STENOSIS PRESSURE HALF TIME: 43.05 MS
MV VALVE AREA P 1/2 METHOD: 5.11 CM2
PISA TR MAX VEL: 2.55 M/S
PULM VEIN S/D RATIO: 0.93
PV PEAK D VEL: 0.8 M/S
PV PEAK S VEL: 0.74 M/S
RA MAJOR: 4.97 CM
RA PRESSURE: 3 MMHG
RA WIDTH: 4.59 CM
RIGHT ATRIAL AREA: 19 CM2
RIGHT VENTRICULAR END-DIASTOLIC DIMENSION: 4.1 CM
RV TISSUE DOPPLER FREE WALL SYSTOLIC VELOCITY 1 (APICAL 4 CHAMBER VIEW): 12.76 CM/S
SINUS: 3.19 CM
STJ: 2.6 CM
TDI LATERAL: 0.08 M/S
TDI SEPTAL: 0.1 M/S
TDI: 0.09 M/S
TR MAX PG: 26 MMHG
TRICUSPID ANNULAR PLANE SYSTOLIC EXCURSION: 1.87 CM
TV REST PULMONARY ARTERY PRESSURE: 29 MMHG

## 2022-08-05 PROCEDURE — 93306 TTE W/DOPPLER COMPLETE: CPT | Mod: 26,,, | Performed by: INTERNAL MEDICINE

## 2022-08-05 PROCEDURE — 93356 MYOCRD STRAIN IMG SPCKL TRCK: CPT

## 2022-08-05 PROCEDURE — 93356 MYOCRD STRAIN IMG SPCKL TRCK: CPT | Mod: ,,, | Performed by: INTERNAL MEDICINE

## 2022-08-05 PROCEDURE — 93356 ECHO (CUPID ONLY): ICD-10-PCS | Mod: ,,, | Performed by: INTERNAL MEDICINE

## 2022-08-05 PROCEDURE — 93306 ECHO (CUPID ONLY): ICD-10-PCS | Mod: 26,,, | Performed by: INTERNAL MEDICINE

## 2022-08-10 RX ORDER — DEXAMETHASONE 4 MG/1
8 TABLET ORAL
Status: CANCELLED
Start: 2022-08-17

## 2022-08-10 RX ORDER — HEPARIN 100 UNIT/ML
500 SYRINGE INTRAVENOUS
Status: CANCELLED | OUTPATIENT
Start: 2022-08-17

## 2022-08-10 RX ORDER — SODIUM CHLORIDE 0.9 % (FLUSH) 0.9 %
10 SYRINGE (ML) INJECTION
Status: CANCELLED | OUTPATIENT
Start: 2022-08-17

## 2022-08-15 ENCOUNTER — CLINICAL SUPPORT (OUTPATIENT)
Dept: HEMATOLOGY/ONCOLOGY | Facility: CLINIC | Age: 52
End: 2022-08-15
Payer: MEDICARE

## 2022-08-15 DIAGNOSIS — G89.29 CHRONIC BILATERAL LOW BACK PAIN WITHOUT SCIATICA: Primary | ICD-10-CM

## 2022-08-15 DIAGNOSIS — M54.50 CHRONIC BILATERAL LOW BACK PAIN WITHOUT SCIATICA: Primary | ICD-10-CM

## 2022-08-15 PROCEDURE — 97810 ACUP 1/> WO ESTIM 1ST 15 MIN: CPT | Mod: ,,, | Performed by: ACUPUNCTURIST

## 2022-08-15 PROCEDURE — 97811 ACUP 1/> W/O ESTIM EA ADD 15: CPT | Mod: ,,, | Performed by: ACUPUNCTURIST

## 2022-08-15 PROCEDURE — 97810 PR ACUPUNCT W/O ELEC STIMUL 15 MIN: ICD-10-PCS | Mod: ,,, | Performed by: ACUPUNCTURIST

## 2022-08-15 PROCEDURE — 97811 PR ACUPUNCT W/O ELEC STIMUL ADDL 15M: ICD-10-PCS | Mod: ,,, | Performed by: ACUPUNCTURIST

## 2022-08-15 NOTE — PROGRESS NOTES
Subjective:       Patient ID: Shikha López is a 52 y.o. female.    Chief Complaint: Pain (cLBP, headache)    Patient on maintenance for cLBP and headaches. Patient states she is dealing with a headache today, low back pain is present but not as severe getting up off table as usual. Continue with care as needed.     Review of Systems   Musculoskeletal: Positive for arthralgias and back pain.   Neurological: Positive for headaches.         Objective:      Physical Exam    Assessment:       Problem List Items Addressed This Visit    None     Visit Diagnoses     Chronic bilateral low back pain without sciatica    -  Primary          Plan:       1x a week*         Pre-Symptom Score: NA  Post-Symptom Score: NA     Pain (cLBP, headache)       Encounter Diagnoses   Name Primary?    Chronic bilateral low back pain without sciatica Yes       Acupuncture points used:  4 PEREZ, Du20, Gb34, Li11, Sp10, Sp6, Sp9, SSC, St36, St40 and YIN JENKINS    NO STIM USED      NEEDLES IN: 18  NEEDLES OUT: 18    NEEDLES W/O STIM  AT: 8:10 AM  NEEDLES W/O STIM REMOVED AT: 8:40 AM
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
## 2022-08-17 ENCOUNTER — OFFICE VISIT (OUTPATIENT)
Dept: HEMATOLOGY/ONCOLOGY | Facility: CLINIC | Age: 52
End: 2022-08-17
Payer: MEDICARE

## 2022-08-17 ENCOUNTER — INFUSION (OUTPATIENT)
Dept: INFUSION THERAPY | Facility: HOSPITAL | Age: 52
End: 2022-08-17
Attending: INTERNAL MEDICINE
Payer: MEDICARE

## 2022-08-17 ENCOUNTER — LAB VISIT (OUTPATIENT)
Dept: LAB | Facility: HOSPITAL | Age: 52
End: 2022-08-17
Attending: INTERNAL MEDICINE
Payer: MEDICARE

## 2022-08-17 VITALS
TEMPERATURE: 98 F | HEART RATE: 80 BPM | SYSTOLIC BLOOD PRESSURE: 132 MMHG | RESPIRATION RATE: 18 BRPM | DIASTOLIC BLOOD PRESSURE: 67 MMHG

## 2022-08-17 VITALS
OXYGEN SATURATION: 96 % | SYSTOLIC BLOOD PRESSURE: 125 MMHG | RESPIRATION RATE: 18 BRPM | BODY MASS INDEX: 42.44 KG/M2 | HEART RATE: 91 BPM | WEIGHT: 230.63 LBS | DIASTOLIC BLOOD PRESSURE: 62 MMHG | TEMPERATURE: 98 F | HEIGHT: 62 IN

## 2022-08-17 DIAGNOSIS — R53.83 FATIGUE, UNSPECIFIED TYPE: ICD-10-CM

## 2022-08-17 DIAGNOSIS — C50.912 BREAST CANCER, STAGE 4, LEFT: Primary | ICD-10-CM

## 2022-08-17 DIAGNOSIS — C50.912 BREAST CANCER, STAGE 4, LEFT: ICD-10-CM

## 2022-08-17 DIAGNOSIS — D51.8 OTHER VITAMIN B12 DEFICIENCY ANEMIAS: ICD-10-CM

## 2022-08-17 DIAGNOSIS — C78.02 MALIGNANT NEOPLASM METASTATIC TO LEFT LUNG: ICD-10-CM

## 2022-08-17 LAB
ALBUMIN SERPL BCP-MCNC: 3.3 G/DL (ref 3.5–5.2)
ALP SERPL-CCNC: 131 U/L (ref 55–135)
ALT SERPL W/O P-5'-P-CCNC: 14 U/L (ref 10–44)
ANION GAP SERPL CALC-SCNC: 9 MMOL/L (ref 8–16)
AST SERPL-CCNC: 34 U/L (ref 10–40)
BASOPHILS # BLD AUTO: 0.04 K/UL (ref 0–0.2)
BASOPHILS NFR BLD: 1 % (ref 0–1.9)
BILIRUB SERPL-MCNC: 1.8 MG/DL (ref 0.1–1)
BUN SERPL-MCNC: 6 MG/DL (ref 6–20)
CALCIUM SERPL-MCNC: 9.2 MG/DL (ref 8.7–10.5)
CHLORIDE SERPL-SCNC: 109 MMOL/L (ref 95–110)
CO2 SERPL-SCNC: 26 MMOL/L (ref 23–29)
CREAT SERPL-MCNC: 0.6 MG/DL (ref 0.5–1.4)
DIFFERENTIAL METHOD: ABNORMAL
EOSINOPHIL # BLD AUTO: 0.3 K/UL (ref 0–0.5)
EOSINOPHIL NFR BLD: 7 % (ref 0–8)
ERYTHROCYTE [DISTWIDTH] IN BLOOD BY AUTOMATED COUNT: 16.5 % (ref 11.5–14.5)
EST. GFR  (NO RACE VARIABLE): >60 ML/MIN/1.73 M^2
GLUCOSE SERPL-MCNC: 95 MG/DL (ref 70–110)
HCT VFR BLD AUTO: 38.7 % (ref 37–48.5)
HGB BLD-MCNC: 12.9 G/DL (ref 12–16)
IMM GRANULOCYTES # BLD AUTO: 0.01 K/UL (ref 0–0.04)
IMM GRANULOCYTES NFR BLD AUTO: 0.2 % (ref 0–0.5)
LYMPHOCYTES # BLD AUTO: 0.9 K/UL (ref 1–4.8)
LYMPHOCYTES NFR BLD: 22.1 % (ref 18–48)
MCH RBC QN AUTO: 35 PG (ref 27–31)
MCHC RBC AUTO-ENTMCNC: 33.3 G/DL (ref 32–36)
MCV RBC AUTO: 105 FL (ref 82–98)
MONOCYTES # BLD AUTO: 0.3 K/UL (ref 0.3–1)
MONOCYTES NFR BLD: 7.5 % (ref 4–15)
NEUTROPHILS # BLD AUTO: 2.6 K/UL (ref 1.8–7.7)
NEUTROPHILS NFR BLD: 62.2 % (ref 38–73)
NRBC BLD-RTO: 0 /100 WBC
PLATELET # BLD AUTO: 129 K/UL (ref 150–450)
PMV BLD AUTO: 10.8 FL (ref 9.2–12.9)
POTASSIUM SERPL-SCNC: 3.5 MMOL/L (ref 3.5–5.1)
PROT SERPL-MCNC: 6.4 G/DL (ref 6–8.4)
RBC # BLD AUTO: 3.69 M/UL (ref 4–5.4)
SODIUM SERPL-SCNC: 144 MMOL/L (ref 136–145)
VIT B12 SERPL-MCNC: 399 PG/ML (ref 210–950)
WBC # BLD AUTO: 4.12 K/UL (ref 3.9–12.7)

## 2022-08-17 PROCEDURE — 99213 OFFICE O/P EST LOW 20 MIN: CPT | Mod: PBBFAC | Performed by: INTERNAL MEDICINE

## 2022-08-17 PROCEDURE — 96413 CHEMO IV INFUSION 1 HR: CPT

## 2022-08-17 PROCEDURE — 36415 COLL VENOUS BLD VENIPUNCTURE: CPT | Performed by: NURSE PRACTITIONER

## 2022-08-17 PROCEDURE — 96367 TX/PROPH/DG ADDL SEQ IV INF: CPT

## 2022-08-17 PROCEDURE — A4216 STERILE WATER/SALINE, 10 ML: HCPCS | Performed by: INTERNAL MEDICINE

## 2022-08-17 PROCEDURE — 99214 OFFICE O/P EST MOD 30 MIN: CPT | Mod: S$PBB,,, | Performed by: INTERNAL MEDICINE

## 2022-08-17 PROCEDURE — 25000003 PHARM REV CODE 250: Performed by: INTERNAL MEDICINE

## 2022-08-17 PROCEDURE — 99214 PR OFFICE/OUTPT VISIT, EST, LEVL IV, 30-39 MIN: ICD-10-PCS | Mod: S$PBB,,, | Performed by: INTERNAL MEDICINE

## 2022-08-17 PROCEDURE — 80053 COMPREHEN METABOLIC PANEL: CPT | Performed by: INTERNAL MEDICINE

## 2022-08-17 PROCEDURE — 63600175 PHARM REV CODE 636 W HCPCS: Mod: TB | Performed by: INTERNAL MEDICINE

## 2022-08-17 PROCEDURE — 85025 COMPLETE CBC W/AUTO DIFF WBC: CPT | Performed by: INTERNAL MEDICINE

## 2022-08-17 PROCEDURE — 99999 PR PBB SHADOW E&M-EST. PATIENT-LVL III: ICD-10-PCS | Mod: PBBFAC,,, | Performed by: INTERNAL MEDICINE

## 2022-08-17 PROCEDURE — 82607 VITAMIN B-12: CPT | Performed by: NURSE PRACTITIONER

## 2022-08-17 PROCEDURE — 99999 PR PBB SHADOW E&M-EST. PATIENT-LVL III: CPT | Mod: PBBFAC,,, | Performed by: INTERNAL MEDICINE

## 2022-08-17 RX ORDER — DEXAMETHASONE 4 MG/1
8 TABLET ORAL
Status: COMPLETED | OUTPATIENT
Start: 2022-08-17 | End: 2022-08-17

## 2022-08-17 RX ORDER — HEPARIN 100 UNIT/ML
500 SYRINGE INTRAVENOUS
Status: DISCONTINUED | OUTPATIENT
Start: 2022-08-17 | End: 2022-08-17 | Stop reason: HOSPADM

## 2022-08-17 RX ORDER — SODIUM CHLORIDE 0.9 % (FLUSH) 0.9 %
10 SYRINGE (ML) INJECTION
Status: DISCONTINUED | OUTPATIENT
Start: 2022-08-17 | End: 2022-08-17 | Stop reason: HOSPADM

## 2022-08-17 RX ADMIN — DEXAMETHASONE 8 MG: 4 TABLET ORAL at 09:08

## 2022-08-17 RX ADMIN — SODIUM CHLORIDE: 9 INJECTION, SOLUTION INTRAVENOUS at 09:08

## 2022-08-17 RX ADMIN — FAM-TRASTUZUMAB DERUXTECAN-NXKI 600 MG: 100 INJECTION, POWDER, LYOPHILIZED, FOR SOLUTION INTRAVENOUS at 10:08

## 2022-08-17 RX ADMIN — PALONOSETRON 0.25 MG: 0.05 INJECTION, SOLUTION INTRAVENOUS at 09:08

## 2022-08-17 RX ADMIN — HEPARIN 500 UNITS: 100 SYRINGE at 12:08

## 2022-08-17 RX ADMIN — Medication 10 ML: at 12:08

## 2022-08-17 RX ADMIN — FOSAPREPITANT 150 MG: 150 INJECTION, POWDER, LYOPHILIZED, FOR SOLUTION INTRAVENOUS at 10:08

## 2022-08-17 NOTE — PLAN OF CARE
1222 pt tolerated enhertu, pt was observed for 30 mins post infusion. NAD noted, pt voiced no new complaints or concerns at this time. Pt d/c home.

## 2022-08-18 ENCOUNTER — PATIENT MESSAGE (OUTPATIENT)
Dept: HEMATOLOGY/ONCOLOGY | Facility: CLINIC | Age: 52
End: 2022-08-18
Payer: MEDICARE

## 2022-08-23 ENCOUNTER — CLINICAL SUPPORT (OUTPATIENT)
Dept: HEMATOLOGY/ONCOLOGY | Facility: CLINIC | Age: 52
End: 2022-08-23
Payer: MEDICARE

## 2022-08-23 DIAGNOSIS — M54.50 CHRONIC BILATERAL LOW BACK PAIN WITHOUT SCIATICA: Primary | ICD-10-CM

## 2022-08-23 DIAGNOSIS — G89.29 CHRONIC BILATERAL LOW BACK PAIN WITHOUT SCIATICA: Primary | ICD-10-CM

## 2022-08-23 PROCEDURE — 97810 PR ACUPUNCT W/O ELEC STIMUL 15 MIN: ICD-10-PCS | Mod: ,,, | Performed by: ACUPUNCTURIST

## 2022-08-23 PROCEDURE — 97810 ACUP 1/> WO ESTIM 1ST 15 MIN: CPT | Mod: ,,, | Performed by: ACUPUNCTURIST

## 2022-08-23 PROCEDURE — 97811 ACUP 1/> W/O ESTIM EA ADD 15: CPT | Mod: ,,, | Performed by: ACUPUNCTURIST

## 2022-08-23 PROCEDURE — 97811 PR ACUPUNCT W/O ELEC STIMUL ADDL 15M: ICD-10-PCS | Mod: ,,, | Performed by: ACUPUNCTURIST

## 2022-08-23 NOTE — PROGRESS NOTES
Subjective:       Patient ID: Shikha López is a 52 y.o. female.    Chief Complaint: Pain (cLBP)    Patient on maintenance. States she is doing well, slept a lot this weekend and feels better and caught up today. Nausea low. Pain lower but still present in back.     Review of Systems   Musculoskeletal: Positive for arthralgias and back pain.         Objective:      Physical Exam    Assessment:       Problem List Items Addressed This Visit    None     Visit Diagnoses     Chronic bilateral low back pain without sciatica    -  Primary          Plan:       *1x a week*         Pre-Symptom Score: NA  Post-Symptom Score: NA     Pain (cLBP)       Encounter Diagnoses   Name Primary?    Chronic bilateral low back pain without sciatica Yes       Acupuncture points used:  4 PEREZ, Du20, Gb34, Li11, REN12, MAZARIEGOS MEN, Sp10, Sp6, Sp9, St25, St36, St40 and YIN JENKINS    NO STIM USED      NEEDLES IN: 22  NEEDLES OUT: 22    NEEDLES W/O STIM  AT: 7:40 AM  NEEDLES W/O STIM REMOVED AT: 8:10 AM

## 2022-08-24 NOTE — PROGRESS NOTES
Subjective:       Patient ID: Shikha López is a 52 y.o. female.    Chief Complaint: Breast cancer, stage 4, left    HPI 52-year-old female who returns for F/U  for metastatic breast cancer.  She is on Trastuzumab deruxtecan  - here for cycle  22.        Overall she is doing well. Nausea a few days after chemotherapy. She does have sluggish bowel movements the first few days, but then resolves.  Energy remains low.       Per Dr. Willis's previous note: Breast history:  She presented in the emergency room at [facility] on September 29, 2006 with a 4 months history of inflammation of her left breast.  Physical examination at that time showed the left breast was largely replaced by large mass with multiple skin ulcerations.    A biopsy in September 2006 showed poorly differentiated carcinoma which was ER and MI. negative and HER2 positive.    She was then referred to [facility] for additional care.   CT scan of the chest and abdomen November 2006 revealed multiple nodules in the lungs consistent with metastatic disease.  There also enlarged left axillary node.    She was treated with chemotherapy with weekly Herceptin and Abraxane and had marked improvement in her breast and lung metastasis on that therapy.    On May 29, 2007 she underwent left modified radical mastectomy(Dr. Colvin) which showed no residual tumor in the breast and 1/5 nodes was positive for metastasis measuring 6 mm.  (ypT0N1).    She then received postoperative radiation therapy(Dr Singh)  to the left chest wall supraclav and internal mammary lymph nodes from August 20, 2007 to September 28, 2007.    Postoperatively she continued on Herceptin for approximately 3 and 1 half years before her lung metastasis begin to grow.    She subsequently received a number of different chemotherapy treatments including Adriamycin, Halaven, Xeloda plus lapatinib then Xeloda plus Herceptin.  The  Those treatments were provided under the care of   in [facility].    After she was hospitalized with neutropenic fever in 2013, she transferred her care to  at [facility].  She received Kadcyla therapy.  She was initially treated with Taxotere Herceptin Perjeta.  She was then changed to Kadcyla which she received for 12 in 18 months.  Line we  In July 2020 PET scan showed an increase in the size of an infrahilar mass consistent with progression.   She then took approximately 9 months of Herceptin and Navelbine.  Apparently she has some initial response to that therapy.  Last scans were December 2020.    She started trastuzumab- deruxtecan  4/12/21.     Echo 5/13/22  -EF 60%    CT on 6/25/22 -   Relatively unchanged infrahilar mass  obstructing the proximal segmental lower lobe bronchi , measuring approximately 2.7 by 2.7 cm (previously 3.0 x 2.7 cm).  Relatively stable scattered bilateral irregular pulmonary opacities.  Largest on the right measures 1.9 cm , previously 1.9 cm.  Largest on the left measures 1.4 cm , previously 1.5 cm.  Stable axillary and retroperitoneal nodes      Review of Systems   Constitutional:  Positive for fatigue. Negative for appetite change and unexpected weight change.   HENT:  Negative for mouth sores.    Eyes:  Negative for visual disturbance.   Respiratory:  Negative for cough and shortness of breath.    Cardiovascular:  Negative for chest pain.   Gastrointestinal:  Positive for nausea (controlled). Negative for abdominal pain, constipation and diarrhea.   Genitourinary:  Negative for frequency.   Musculoskeletal:  Positive for arthralgias (knees). Negative for back pain.   Integumentary:  Negative for rash.   Neurological:  Negative for headaches.   Hematological:  Negative for adenopathy.   Psychiatric/Behavioral: Negative.  The patient is not nervous/anxious.        Objective:      Physical Exam  Vitals reviewed.   Constitutional:       General: She is not in acute distress.     Appearance: She is  obese.   HENT:      Mouth/Throat:      Mouth: Mucous membranes are moist.      Pharynx: Oropharynx is clear. No oropharyngeal exudate or posterior oropharyngeal erythema.   Eyes:      Pupils: Pupils are equal, round, and reactive to light.   Cardiovascular:      Rate and Rhythm: Normal rate and regular rhythm.      Heart sounds: Murmur (systolic -aortic) heard.   Pulmonary:      Effort: Pulmonary effort is normal. No respiratory distress.      Breath sounds: Normal breath sounds. No wheezing or rales.   Chest:   Breasts:     Right: Normal.      Left: Absent.       Abdominal:      Palpations: Abdomen is soft. There is no mass.      Tenderness: There is no abdominal tenderness.   Lymphadenopathy:      Cervical: No cervical adenopathy.      Upper Body:      Right upper body: No supraclavicular or axillary adenopathy.      Left upper body: No supraclavicular or axillary adenopathy.   Skin:     Findings: No rash.   Neurological:      Mental Status: She is alert and oriented to person, place, and time.   Psychiatric:         Mood and Affect: Mood normal.         Behavior: Behavior normal.         Thought Content: Thought content normal.         Judgment: Judgment normal.       Assessment:       1. Malignant neoplasm metastatic to left lung     2. Breast cancer, stage 4, left            Plan:       1-2. Continue current RX. Cycle 22 of Enhertu   - Echo due in Nov  - CT due in September   - Bra and prothesis sent       Return to clinic in 3 weeks with MD appointment and labs.     Patient is in agreement with the proposed treatment plan. All questions were answered to the patient's satisfaction. Patient knows to call clinic for any new or worsening symptoms and if anything is needed before the next clinic visit.          Mariam Lisa, MALIKP-C  Hematology & Medical Oncology   [facility] [place] [address]  ph. [phone]  Fax. [phone]    Collaborating physician, Dr. Willis.    Approximately 15 minutes  were spent face-to-face with the patient.  Approximately 25 minutes in total were spent on this encounter, which includes face-to-face time and non-face-to-face time preparing to see the patient (e.g., review of tests), obtaining and/or reviewing separately obtained history, documenting clinical information in the electronic or other health record, independently interpreting results (not separately reported) and communicating results to the patient/family/caregiver, or care coordination (not separately reported).       Route Chart for Scheduling    Med Onc Chart Routing      Follow up with physician 3 weeks. Please move 9/19 to 9/26   Follow up with JIMI 6 weeks. 6 weeks - please schedule patient me, chemo and labs same day here   Infusion scheduling note    Injection scheduling note    Labs CBC and CMP   Lab interval:     Imaging CT chest abdomen pelvis   please schedule before 9/26   Pharmacy appointment    Other referrals        Treatment Plan Information   OP BREAST FAM-TRASTUZUMAB DERUXTECAN-NXKI Q3W   Home Willis MD   Upcoming Treatment Dates - OP BREAST FAM-TRASTUZUMAB DERUXTECAN-NXKI Q3W    9/7/2022       Chemotherapy       fam-trastuzumab deruxtecan-nxki 602 mg in dextrose 5 % 100 mL infusion       Antiemetics       fosaprepitant 150 mg in sodium chloride 0.9% 150 mL IVPB       palonosetron (ALOXI) 0.25 mg in sodium chloride 0.9% 50 mL IVPB       dexAMETHasone tablet 8 mg  9/24/2022       Chemotherapy       fam-trastuzumab deruxtecan-nxki 602 mg in dextrose 5 % 100 mL infusion       Antiemetics       fosaprepitant 150 mg in sodium chloride 0.9% 150 mL IVPB       palonosetron (ALOXI) 0.25 mg in sodium chloride 0.9% 50 mL IVPB       dexAMETHasone tablet 8 mg  10/11/2022       Chemotherapy       fam-trastuzumab deruxtecan-nxki 602 mg in dextrose 5 % 100 mL infusion       Antiemetics       fosaprepitant 150 mg in sodium chloride 0.9% 150 mL IVPB       palonosetron (ALOXI) 0.25 mg in sodium chloride 0.9% 50 mL  IVPB       dexAMETHasone tablet 8 mg

## 2022-08-26 ENCOUNTER — TELEPHONE (OUTPATIENT)
Dept: INFUSION THERAPY | Facility: HOSPITAL | Age: 52
End: 2022-08-26
Payer: MEDICARE

## 2022-08-26 ENCOUNTER — OFFICE VISIT (OUTPATIENT)
Dept: HEMATOLOGY/ONCOLOGY | Facility: CLINIC | Age: 52
End: 2022-08-26
Payer: MEDICARE

## 2022-08-26 VITALS
HEART RATE: 95 BPM | SYSTOLIC BLOOD PRESSURE: 145 MMHG | BODY MASS INDEX: 42.6 KG/M2 | WEIGHT: 231.5 LBS | DIASTOLIC BLOOD PRESSURE: 62 MMHG | HEIGHT: 62 IN

## 2022-08-26 DIAGNOSIS — Z85.3 HISTORY OF BREAST CANCER: Primary | ICD-10-CM

## 2022-08-26 DIAGNOSIS — Z12.31 ENCOUNTER FOR SCREENING MAMMOGRAM FOR MALIGNANT NEOPLASM OF BREAST: ICD-10-CM

## 2022-08-26 DIAGNOSIS — R53.81 PHYSICAL DECONDITIONING: ICD-10-CM

## 2022-08-26 PROCEDURE — 99214 PR OFFICE/OUTPT VISIT, EST, LEVL IV, 30-39 MIN: ICD-10-PCS | Mod: S$PBB,,, | Performed by: OBSTETRICS & GYNECOLOGY

## 2022-08-26 PROCEDURE — 99214 OFFICE O/P EST MOD 30 MIN: CPT | Mod: S$PBB,,, | Performed by: OBSTETRICS & GYNECOLOGY

## 2022-08-26 PROCEDURE — 99999 PR PBB SHADOW E&M-EST. PATIENT-LVL IV: CPT | Mod: PBBFAC,,, | Performed by: OBSTETRICS & GYNECOLOGY

## 2022-08-26 PROCEDURE — 99214 OFFICE O/P EST MOD 30 MIN: CPT | Mod: PBBFAC | Performed by: OBSTETRICS & GYNECOLOGY

## 2022-08-26 PROCEDURE — 99999 PR PBB SHADOW E&M-EST. PATIENT-LVL IV: ICD-10-PCS | Mod: PBBFAC,,, | Performed by: OBSTETRICS & GYNECOLOGY

## 2022-08-26 NOTE — PROGRESS NOTES
"Integrative Health and Medicine Initial Visit    This 52 y.o.  female seeks an integrative approach to discuss what she can do to improve her long-term survival from breast cancer and to address side effects related to her breast cancer treatment..    HPI  She has a history of Stage IV breast cancer  With metastatic disease to her lungs. She is currently on Herceptin Deruxtecan. She reports this is "my 10th line of treatment and this is by far the hardest". She reports GI side effects- gas, bloating.    She reports that she tries to prepare meals on the weekends for the week. She has tried simple meals, cutting carbohydrates and adding more fruits and vegetables      Sleep  How many hours of sleep per night? 8 hours  Do you have trouble falling asleep, staying asleep or waking up earlier than you need to? no  Do you have daytime fatigue? yes  Do you need medication for sleep? no  Do you use any supplements or other interventions for sleep? no    Resilience  Rate your current level of stress- high  How do you manage stress? She does not have a lot of support. She is looking for a support group with Stage IV survivors    Nutrition   Food allergies or sensitivities: no  Do you adhere to a particular type of diet? no  What type of diet do you follow? none  Do you have any concerns with your eating habits? yes  Are you concerned with your level of alcohol intake? no    Exercise  How would you describe your physical activity level? minimal  Do you work at a sedentary job? no  What do you do for physical activity? Walks her dogs    Past Medical History  Past Medical History:   Diagnosis Date    Breast cancer         Past Surgical History   Past Surgical History:   Procedure Laterality Date    MASTECTOMY          Family History   Family History   Problem Relation Age of Onset    Lung cancer Father         Social History  Social History     Socioeconomic History    Marital status: Single   Tobacco Use    Smoking status: " "Never Smoker    Smokeless tobacco: Never Used   Substance and Sexual Activity    Alcohol use: Never    Drug use: Never    Sexual activity: Not Currently        Allergies  Review of patient's allergies indicates:  No Known Allergies     Current Medications:    Current Outpatient Medications:     acetaminophen (TYLENOL) 500 MG tablet, Take 1,000 mg by mouth., Disp: , Rfl:     LIDOcaine-prilocaine (EMLA) cream, APPLY TO THE AFFECTED AREA 1 HOUR BEFORE PORT ACCESS, Disp: , Rfl:     OLANZapine (ZYPREXA) 5 MG tablet, Take days 1-4 of chemotherapy, Disp: 30 tablet, Rfl: 2    ondansetron (ZOFRAN) 8 MG tablet, Take 8 mg by mouth 3 (three) times daily as needed., Disp: , Rfl:      Review of Systems  Constitutional: no weight loss, weight gain, fever, fatigue  Eyes:  No vision changes, glasses/contacts  ENT/Mouth: No ulcers, sinus problems, ears ringing, headache  Cardiovascular: No inability to lie flat, chest pain, exercise intolerance, swelling, heart palpitations  Respiratory: No wheezing, coughing blood, shortness of breath, or cough  Gastrointestinal: No diarrhea, bloody stool, nausea/vomiting, +constipation,+ gas, hemorrhoids, +bloating  Genitourinary: No blood in urine, painful urination, urgency of urination, frequency of urination, incomplete emptying, incontinence, abnormal bleeding, painful periods, heavy periods, vaginal discharge, vaginal odor, painful intercourse, sexual problems, bleeding after intercourse.  Musculoskeletal: No muscle weakness  Skin/Breast: No painful breasts, nipple discharge, masses, rash, ulcers, +breast cancer  Neurological: No passing out, seizures, numbness, headache  Endocrine: No diabetes, hypothyroid, hyperthyroid, hot flashes, hair loss, abnormal hair growth, acne  Psychiatric: No depression, crying  Hematologic: No bruises, bleeding, swollen lymph nodes, anemia.    Physical Exam   BP: 145/62 Ht:5'2"  Wt: 230 pounds  BMI Body mass index is 42.34 kg/m².    Physical " Exam  deferred  ASSESSMENT & PLAN:    1. History of breast cancer  Mammo Digital Screening Right with Abdi    Ambulatory referral/consult to Hematology/Oncology/Nutrition    Ambulatory Referral/Consult to Hematology/Oncology/Behavioral Health   2. Encounter for screening mammogram for malignant neoplasm of breast   Mammo Digital Screening Right with Abdi   3. Physical deconditioning         PLAN total time 30 minutes- face to face, review of medical record and arranging follow up  Counseled her on benefit of meeting with RD- will refer to nutrition  Counseled her on benefit of meeting with psychologist- referral placed  Mammogram  Continue acupuncture  Continue OT and therapeutic yoga

## 2022-08-30 ENCOUNTER — CLINICAL SUPPORT (OUTPATIENT)
Dept: HEMATOLOGY/ONCOLOGY | Facility: CLINIC | Age: 52
End: 2022-08-30
Payer: MEDICARE

## 2022-08-30 DIAGNOSIS — Z85.3 HISTORY OF BREAST CANCER: ICD-10-CM

## 2022-08-30 DIAGNOSIS — M54.50 CHRONIC BILATERAL LOW BACK PAIN WITHOUT SCIATICA: Primary | ICD-10-CM

## 2022-08-30 DIAGNOSIS — G89.29 CHRONIC BILATERAL LOW BACK PAIN WITHOUT SCIATICA: Primary | ICD-10-CM

## 2022-08-30 PROCEDURE — 97811 PR ACUPUNCT W/O ELEC STIMUL ADDL 15M: ICD-10-PCS | Mod: ,,, | Performed by: ACUPUNCTURIST

## 2022-08-30 PROCEDURE — 97811 ACUP 1/> W/O ESTIM EA ADD 15: CPT | Mod: ,,, | Performed by: ACUPUNCTURIST

## 2022-08-30 PROCEDURE — 97810 PR ACUPUNCT W/O ELEC STIMUL 15 MIN: ICD-10-PCS | Mod: ,,, | Performed by: ACUPUNCTURIST

## 2022-08-30 PROCEDURE — 97810 ACUP 1/> WO ESTIM 1ST 15 MIN: CPT | Mod: ,,, | Performed by: ACUPUNCTURIST

## 2022-08-31 RX ORDER — HEPARIN 100 UNIT/ML
500 SYRINGE INTRAVENOUS
Status: CANCELLED | OUTPATIENT
Start: 2022-09-07

## 2022-08-31 RX ORDER — SODIUM CHLORIDE 0.9 % (FLUSH) 0.9 %
10 SYRINGE (ML) INJECTION
Status: CANCELLED | OUTPATIENT
Start: 2022-09-07

## 2022-08-31 RX ORDER — DEXAMETHASONE 4 MG/1
8 TABLET ORAL
Status: CANCELLED
Start: 2022-09-07

## 2022-08-31 NOTE — PROGRESS NOTES
Subjective:       Patient ID: Shikha López is a 52 y.o. female.    Chief Complaint: Pain (cLBP)    Patient doing well, on maintenance. Low back not hurting as bad this morning and mobility is better.    Review of Systems   Gastrointestinal:  Positive for nausea.   Musculoskeletal:  Positive for arthralgias and back pain.       Objective:      Physical Exam    Assessment:       Problem List Items Addressed This Visit    None  Visit Diagnoses       Chronic bilateral low back pain without sciatica    -  Primary    History of breast cancer                  Plan:       1x a week           Pre-Symptom Score: NA  Post-Symptom Score: NA     Pain (cLBP)       Encounter Diagnoses   Name Primary?    Chronic bilateral low back pain without sciatica Yes    History of breast cancer        Acupuncture points used:  4 PEREZ, Du20, Gb34, Li11, Pc6, REN12, Sp10, Sp6, Sp9, St36, St40, and YIN JENKINS    NO STIM USED      NEEDLES IN: 18  NEEDLES OUT: 18    NEEDLES W/O STIM  AT: 8:10 AM  NEEDLES W/O STIM REMOVED AT: 8:40 AM

## 2022-09-01 ENCOUNTER — HOSPITAL ENCOUNTER (OUTPATIENT)
Dept: RADIOLOGY | Facility: HOSPITAL | Age: 52
Discharge: HOME | End: 2022-09-01
Attending: OBSTETRICS & GYNECOLOGY
Payer: MEDICARE

## 2022-09-01 DIAGNOSIS — Z85.3 HISTORY OF BREAST CANCER: ICD-10-CM

## 2022-09-01 DIAGNOSIS — Z12.31 ENCOUNTER FOR SCREENING MAMMOGRAM FOR MALIGNANT NEOPLASM OF BREAST: ICD-10-CM

## 2022-09-01 PROCEDURE — 77067 MAMMO DIGITAL SCREENING RIGHT WITH TOMO: ICD-10-PCS | Mod: 26,,, | Performed by: RADIOLOGY

## 2022-09-01 PROCEDURE — 77067 SCR MAMMO BI INCL CAD: CPT | Mod: TC

## 2022-09-01 PROCEDURE — 77063 BREAST TOMOSYNTHESIS BI: CPT | Mod: 26,,, | Performed by: RADIOLOGY

## 2022-09-01 PROCEDURE — 77063 BREAST TOMOSYNTHESIS BI: CPT | Mod: TC

## 2022-09-01 PROCEDURE — 77063 MAMMO DIGITAL SCREENING RIGHT WITH TOMO: ICD-10-PCS | Mod: 26,,, | Performed by: RADIOLOGY

## 2022-09-01 PROCEDURE — 77067 SCR MAMMO BI INCL CAD: CPT | Mod: 26,,, | Performed by: RADIOLOGY

## 2022-09-06 ENCOUNTER — CLINICAL SUPPORT (OUTPATIENT)
Dept: HEMATOLOGY/ONCOLOGY | Facility: CLINIC | Age: 52
End: 2022-09-06
Payer: MEDICARE

## 2022-09-06 ENCOUNTER — TELEPHONE (OUTPATIENT)
Dept: HEMATOLOGY/ONCOLOGY | Facility: CLINIC | Age: 52
End: 2022-09-06
Payer: MEDICARE

## 2022-09-06 DIAGNOSIS — Z85.3 HISTORY OF BREAST CANCER: Primary | ICD-10-CM

## 2022-09-06 DIAGNOSIS — G89.29 CHRONIC BILATERAL LOW BACK PAIN WITHOUT SCIATICA: Primary | ICD-10-CM

## 2022-09-06 DIAGNOSIS — M54.50 CHRONIC BILATERAL LOW BACK PAIN WITHOUT SCIATICA: Primary | ICD-10-CM

## 2022-09-06 PROCEDURE — 97810 PR ACUPUNCT W/O ELEC STIMUL 15 MIN: ICD-10-PCS | Mod: ,,, | Performed by: ACUPUNCTURIST

## 2022-09-06 PROCEDURE — 99999 PR PBB SHADOW E&M-EST. PATIENT-LVL I: ICD-10-PCS | Mod: PBBFAC,,, | Performed by: ACUPUNCTURIST

## 2022-09-06 PROCEDURE — 97811 ACUP 1/> W/O ESTIM EA ADD 15: CPT | Mod: ,,, | Performed by: ACUPUNCTURIST

## 2022-09-06 PROCEDURE — 97811 PR ACUPUNCT W/O ELEC STIMUL ADDL 15M: ICD-10-PCS | Mod: ,,, | Performed by: ACUPUNCTURIST

## 2022-09-06 PROCEDURE — 97810 ACUP 1/> WO ESTIM 1ST 15 MIN: CPT | Mod: ,,, | Performed by: ACUPUNCTURIST

## 2022-09-06 PROCEDURE — 99999 PR PBB SHADOW E&M-EST. PATIENT-LVL I: CPT | Mod: PBBFAC,,, | Performed by: ACUPUNCTURIST

## 2022-09-06 PROCEDURE — 99211 OFF/OP EST MAY X REQ PHY/QHP: CPT | Mod: PBBFAC | Performed by: ACUPUNCTURIST

## 2022-09-07 ENCOUNTER — TELEPHONE (OUTPATIENT)
Dept: RADIOLOGY | Facility: HOSPITAL | Age: 52
End: 2022-09-07
Payer: MEDICARE

## 2022-09-07 ENCOUNTER — OFFICE VISIT (OUTPATIENT)
Dept: HEMATOLOGY/ONCOLOGY | Facility: CLINIC | Age: 52
End: 2022-09-07
Payer: MEDICARE

## 2022-09-07 ENCOUNTER — LAB VISIT (OUTPATIENT)
Dept: LAB | Facility: HOSPITAL | Age: 52
End: 2022-09-07
Attending: NURSE PRACTITIONER
Payer: MEDICARE

## 2022-09-07 ENCOUNTER — INFUSION (OUTPATIENT)
Dept: INFUSION THERAPY | Facility: HOSPITAL | Age: 52
End: 2022-09-07
Attending: NURSE PRACTITIONER
Payer: MEDICARE

## 2022-09-07 VITALS
DIASTOLIC BLOOD PRESSURE: 74 MMHG | HEART RATE: 88 BPM | SYSTOLIC BLOOD PRESSURE: 137 MMHG | BODY MASS INDEX: 42.15 KG/M2 | WEIGHT: 229.06 LBS | RESPIRATION RATE: 18 BRPM | HEIGHT: 62 IN | TEMPERATURE: 98 F | OXYGEN SATURATION: 95 %

## 2022-09-07 VITALS
WEIGHT: 229.06 LBS | RESPIRATION RATE: 18 BRPM | TEMPERATURE: 98 F | DIASTOLIC BLOOD PRESSURE: 60 MMHG | BODY MASS INDEX: 42.15 KG/M2 | SYSTOLIC BLOOD PRESSURE: 112 MMHG | HEART RATE: 87 BPM | HEIGHT: 62 IN

## 2022-09-07 DIAGNOSIS — C50.912 BREAST CANCER, STAGE 4, LEFT: ICD-10-CM

## 2022-09-07 DIAGNOSIS — C78.02 MALIGNANT NEOPLASM METASTATIC TO LEFT LUNG: Primary | ICD-10-CM

## 2022-09-07 DIAGNOSIS — C50.912 BREAST CANCER, STAGE 4, LEFT: Primary | ICD-10-CM

## 2022-09-07 LAB
ALBUMIN SERPL BCP-MCNC: 3.3 G/DL (ref 3.5–5.2)
ALP SERPL-CCNC: 148 U/L (ref 55–135)
ALT SERPL W/O P-5'-P-CCNC: 21 U/L (ref 10–44)
ANION GAP SERPL CALC-SCNC: 6 MMOL/L (ref 8–16)
AST SERPL-CCNC: 32 U/L (ref 10–40)
BASOPHILS # BLD AUTO: 0.04 K/UL (ref 0–0.2)
BASOPHILS NFR BLD: 1.2 % (ref 0–1.9)
BILIRUB SERPL-MCNC: 1.9 MG/DL (ref 0.1–1)
BUN SERPL-MCNC: 5 MG/DL (ref 6–20)
CALCIUM SERPL-MCNC: 9 MG/DL (ref 8.7–10.5)
CHLORIDE SERPL-SCNC: 105 MMOL/L (ref 95–110)
CO2 SERPL-SCNC: 28 MMOL/L (ref 23–29)
CREAT SERPL-MCNC: 0.6 MG/DL (ref 0.5–1.4)
DIFFERENTIAL METHOD: ABNORMAL
EOSINOPHIL # BLD AUTO: 0.4 K/UL (ref 0–0.5)
EOSINOPHIL NFR BLD: 11.1 % (ref 0–8)
ERYTHROCYTE [DISTWIDTH] IN BLOOD BY AUTOMATED COUNT: 16.2 % (ref 11.5–14.5)
EST. GFR  (NO RACE VARIABLE): >60 ML/MIN/1.73 M^2
GLUCOSE SERPL-MCNC: 90 MG/DL (ref 70–110)
HCT VFR BLD AUTO: 37.1 % (ref 37–48.5)
HGB BLD-MCNC: 12.7 G/DL (ref 12–16)
IMM GRANULOCYTES # BLD AUTO: 0 K/UL (ref 0–0.04)
IMM GRANULOCYTES NFR BLD AUTO: 0 % (ref 0–0.5)
LYMPHOCYTES # BLD AUTO: 0.7 K/UL (ref 1–4.8)
LYMPHOCYTES NFR BLD: 21.9 % (ref 18–48)
MCH RBC QN AUTO: 35.5 PG (ref 27–31)
MCHC RBC AUTO-ENTMCNC: 34.2 G/DL (ref 32–36)
MCV RBC AUTO: 104 FL (ref 82–98)
MONOCYTES # BLD AUTO: 0.3 K/UL (ref 0.3–1)
MONOCYTES NFR BLD: 7.8 % (ref 4–15)
NEUTROPHILS # BLD AUTO: 1.9 K/UL (ref 1.8–7.7)
NEUTROPHILS NFR BLD: 58 % (ref 38–73)
NRBC BLD-RTO: 0 /100 WBC
PLATELET # BLD AUTO: 167 K/UL (ref 150–450)
PMV BLD AUTO: 10.8 FL (ref 9.2–12.9)
POTASSIUM SERPL-SCNC: 3.5 MMOL/L (ref 3.5–5.1)
PROT SERPL-MCNC: 6.2 G/DL (ref 6–8.4)
RBC # BLD AUTO: 3.58 M/UL (ref 4–5.4)
SODIUM SERPL-SCNC: 139 MMOL/L (ref 136–145)
WBC # BLD AUTO: 3.33 K/UL (ref 3.9–12.7)

## 2022-09-07 PROCEDURE — 99215 OFFICE O/P EST HI 40 MIN: CPT | Mod: S$PBB,,, | Performed by: NURSE PRACTITIONER

## 2022-09-07 PROCEDURE — 85025 COMPLETE CBC W/AUTO DIFF WBC: CPT | Performed by: INTERNAL MEDICINE

## 2022-09-07 PROCEDURE — 25000003 PHARM REV CODE 250: Performed by: INTERNAL MEDICINE

## 2022-09-07 PROCEDURE — 36415 COLL VENOUS BLD VENIPUNCTURE: CPT | Performed by: INTERNAL MEDICINE

## 2022-09-07 PROCEDURE — A4216 STERILE WATER/SALINE, 10 ML: HCPCS | Performed by: INTERNAL MEDICINE

## 2022-09-07 PROCEDURE — 96413 CHEMO IV INFUSION 1 HR: CPT

## 2022-09-07 PROCEDURE — 99999 PR PBB SHADOW E&M-EST. PATIENT-LVL IV: ICD-10-PCS | Mod: PBBFAC,,, | Performed by: NURSE PRACTITIONER

## 2022-09-07 PROCEDURE — 63600175 PHARM REV CODE 636 W HCPCS: Performed by: INTERNAL MEDICINE

## 2022-09-07 PROCEDURE — 96367 TX/PROPH/DG ADDL SEQ IV INF: CPT

## 2022-09-07 PROCEDURE — 80053 COMPREHEN METABOLIC PANEL: CPT | Performed by: INTERNAL MEDICINE

## 2022-09-07 PROCEDURE — 99999 PR PBB SHADOW E&M-EST. PATIENT-LVL IV: CPT | Mod: PBBFAC,,, | Performed by: NURSE PRACTITIONER

## 2022-09-07 PROCEDURE — 99214 OFFICE O/P EST MOD 30 MIN: CPT | Mod: PBBFAC | Performed by: NURSE PRACTITIONER

## 2022-09-07 PROCEDURE — 99215 PR OFFICE/OUTPT VISIT, EST, LEVL V, 40-54 MIN: ICD-10-PCS | Mod: S$PBB,,, | Performed by: NURSE PRACTITIONER

## 2022-09-07 RX ORDER — DEXAMETHASONE 4 MG/1
8 TABLET ORAL
Status: COMPLETED | OUTPATIENT
Start: 2022-09-07 | End: 2022-09-07

## 2022-09-07 RX ORDER — SODIUM CHLORIDE 0.9 % (FLUSH) 0.9 %
10 SYRINGE (ML) INJECTION
Status: DISCONTINUED | OUTPATIENT
Start: 2022-09-07 | End: 2022-09-07 | Stop reason: HOSPADM

## 2022-09-07 RX ORDER — HEPARIN 100 UNIT/ML
500 SYRINGE INTRAVENOUS
Status: DISCONTINUED | OUTPATIENT
Start: 2022-09-07 | End: 2022-09-07 | Stop reason: HOSPADM

## 2022-09-07 RX ADMIN — DEXAMETHASONE 8 MG: 4 TABLET ORAL at 09:09

## 2022-09-07 RX ADMIN — FAM-TRASTUZUMAB DERUXTECAN-NXKI 600 MG: 100 INJECTION, POWDER, LYOPHILIZED, FOR SOLUTION INTRAVENOUS at 10:09

## 2022-09-07 RX ADMIN — FOSAPREPITANT 150 MG: 150 INJECTION, POWDER, LYOPHILIZED, FOR SOLUTION INTRAVENOUS at 09:09

## 2022-09-07 RX ADMIN — PALONOSETRON 0.25 MG: 0.25 INJECTION, SOLUTION INTRAVENOUS at 09:09

## 2022-09-07 RX ADMIN — SODIUM CHLORIDE: 9 INJECTION, SOLUTION INTRAVENOUS at 09:09

## 2022-09-07 RX ADMIN — HEPARIN SODIUM (PORCINE) LOCK FLUSH IV SOLN 100 UNIT/ML 500 UNITS: 100 SOLUTION at 12:09

## 2022-09-07 RX ADMIN — Medication 10 ML: at 12:09

## 2022-09-07 NOTE — PLAN OF CARE
1217  Infusion completed, pt tolerated well; pt observed 30 mins post infusion end, no complaints or concerns; pt instructed to increase water hydration daily; discussed when to contact MD, when to report to ER; AVS declined, next appt incorrect, RN will contact clinic and pt instructed to monitor [facility] for changes; pt verbalized understanding of all discussed

## 2022-09-07 NOTE — TELEPHONE ENCOUNTER
Spoke with patient and explained mammogram findings.Patient expressed understanding of results. Patient did not schedule follow up. Per the patient she is waiting to hear from Mariam Lisa NP and Dr Willis before scheduling to make sure she need to schedule follow up.

## 2022-09-07 NOTE — NURSING
0856  Pt here for Enhertu infusion, no new complaints or concerns at present; discussed treatment plan for today, all questions answered and pt agrees to proceed

## 2022-09-09 ENCOUNTER — TELEPHONE (OUTPATIENT)
Dept: HEMATOLOGY/ONCOLOGY | Facility: CLINIC | Age: 52
End: 2022-09-09
Payer: MEDICARE

## 2022-09-09 ENCOUNTER — PATIENT MESSAGE (OUTPATIENT)
Dept: HEMATOLOGY/ONCOLOGY | Facility: CLINIC | Age: 52
End: 2022-09-09
Payer: MEDICARE

## 2022-09-09 NOTE — TELEPHONE ENCOUNTER
Spoke with patient. She states all her concerns were addressed with the  who just called her back.  She thanked nurse for following up.

## 2022-09-09 NOTE — TELEPHONE ENCOUNTER
"----- Message from Quinten Leung sent at 9/9/2022  2:03 PM CDT -----  Consult/Advisory:          Name Of Caller: Self      Contact Preference?: [phone] (home)       Provider Name: [facility]      Does patient feel the need to be seen today? No      What is the nature of the call?: Returning call to office.          Additional Notes:  "Thank you for all that you do for our patients"       "

## 2022-09-12 ENCOUNTER — CLINICAL SUPPORT (OUTPATIENT)
Dept: HEMATOLOGY/ONCOLOGY | Facility: CLINIC | Age: 52
End: 2022-09-12
Payer: MEDICARE

## 2022-09-12 DIAGNOSIS — M54.50 CHRONIC BILATERAL LOW BACK PAIN WITHOUT SCIATICA: Primary | ICD-10-CM

## 2022-09-12 DIAGNOSIS — G89.29 CHRONIC BILATERAL LOW BACK PAIN WITHOUT SCIATICA: Primary | ICD-10-CM

## 2022-09-12 PROCEDURE — 97811 ACUP 1/> W/O ESTIM EA ADD 15: CPT | Mod: ,,, | Performed by: ACUPUNCTURIST

## 2022-09-12 PROCEDURE — 97810 ACUP 1/> WO ESTIM 1ST 15 MIN: CPT | Mod: ,,, | Performed by: ACUPUNCTURIST

## 2022-09-12 PROCEDURE — 97811 PR ACUPUNCT W/O ELEC STIMUL ADDL 15M: ICD-10-PCS | Mod: ,,, | Performed by: ACUPUNCTURIST

## 2022-09-12 PROCEDURE — 97810 PR ACUPUNCT W/O ELEC STIMUL 15 MIN: ICD-10-PCS | Mod: ,,, | Performed by: ACUPUNCTURIST

## 2022-09-12 NOTE — PROGRESS NOTES
"Oncology Nutrition Assessment for Medical Nutrition Therapy  Initial Visit    Shikha López   1970    Referring Provider:  Samra Mcqueen, *      Reason for Visit: Pt in for education and nutrition counseling     PMHx:   Past Medical History:   Diagnosis Date    Breast cancer        Nutrition Assessment    This is a 52 y.o.female with metastatic breast cancer. She has been on multiple lines of treatment and is struggling more with her current regimen (Enhertu). Referred to nutrition from integrative department.   Her current chemotherapy regimen is causing "slow" digestion as well as gas with a lot of bloating. She is taking pepcid BID which helps some. Has constipation 3-4 days following an infusion, but then mostly regular bowel movements. She drinks 4-6 bottles of water per day. Does not like ginger or anything carbonated.    Her appetite is poor days 2-5 after chemo. Normal after this. She tries to eat 3 meals per day; will do smaller meals if needed.   Breakfast- egg sandwich, overnight oats, fruit/granola, etc   Lunch- yesterday soup and grilled cheese (might be homemade, [employer], etc)  Dinner- cereal if not up to cooking, last night small pork chop and sweet potato  Tries to incorporate fruits and vegetables but not always able to     Weight:103.9 kg (229 lb)  Height:5' 2" (1.575 m)  BMI:Body mass index is 41.88 kg/m².   IBW: Ideal body weight: 50.1 kg (110 lb 7.2 oz)  Adjusted ideal body weight: 71.6 kg (157 lb 13.9 oz)    Allergies: Patient has no known allergies.    Current Medications:    Current Outpatient Medications:     acetaminophen (TYLENOL) 500 MG tablet, Take 1,000 mg by mouth., Disp: , Rfl:     LIDOcaine-prilocaine (EMLA) cream, APPLY TO THE AFFECTED AREA 1 HOUR BEFORE PORT ACCESS, Disp: , Rfl:     OLANZapine (ZYPREXA) 5 MG tablet, Take days 1-4 of chemotherapy, Disp: 30 tablet, Rfl: 2    ondansetron (ZOFRAN) 8 MG tablet, Take 8 mg by mouth 3 (three) times daily as needed., Disp: , " Rfl:     Vitamins/Supplements: none     Labs: Reviewed from 9/7- also noted B12 is now WNL    Nutrition Diagnosis    Problem: altered GI function  Etiology (related to):  chemotherapy   Signs/Symptoms (as evidenced by):  bloating, gas, and changes in digestion and bowel habits    Nutrition Intervention    Nutrition Prescription   2229-6476 Kcals (15-20kcal/kg)  83 g protein (0.8g/kg)   2600 mL fluid (25mL/kg)    Recommendations:  Culturelle digestive daily probiotic (or similar)   4-6 bottles of water per day   Enteric coated peppermint 1-2 times daily   Prebiotic foods like bananas, oats, onion, garlic, asparagus, etc.   Enterade if other interventions are not working (information and samples provided)   -2 bottles per day 4-7 days than maintenance 1/day     Nutrition Monitoring and Evaluation    Monitor: diet tolerance , diet education needs , and GI symptoms     Goals: improved GI symptoms, diet tolerance     Follow up In about 4 weeks     Communication to referring provider/care team: note available in chart     Counseling time: 45 Minutes    Radha Patel, MPH, RD, , LDN, FAND   [phone]

## 2022-09-12 NOTE — PROGRESS NOTES
Subjective:       Patient ID: Shikha López is a 52 y.o. female.    Chief Complaint: Pain (cLBP), Nausea, and Results (Constipation post chemo)    Patient states she had a harder weekend post chemo with constipation and low appetite. Nausea was present but minimal. Low back pain present today.     Review of Systems   Gastrointestinal:  Positive for constipation and nausea.   Musculoskeletal:  Positive for arthralgias and back pain.       Objective:      Physical Exam    Assessment:       Problem List Items Addressed This Visit    None  Visit Diagnoses       Chronic bilateral low back pain without sciatica    -  Primary            Plan:       1x a week         Pre-Symptom Score: NA  Post-Symptom Score: NA     Pain (cLBP), Nausea, and Results (Constipation post chemo)       Encounter Diagnoses   Name Primary?    Chronic bilateral low back pain without sciatica Yes     Acupuncture points used:  4 PEREZ, Du20, Gb34, Li11, Pc6, REN12, Sp10, Sp6, Sp9, St36, St40, and YIN JENKINS    NO STIM USED      NEEDLES IN: 22  NEEDLES OUT: 22    NEEDLES W/O STIM  AT: 8:15 AM  NEEDLES W/O STIM REMOVED AT: 8:45 AM

## 2022-09-13 ENCOUNTER — CLINICAL SUPPORT (OUTPATIENT)
Dept: HEMATOLOGY/ONCOLOGY | Facility: CLINIC | Age: 52
End: 2022-09-13
Payer: MEDICARE

## 2022-09-13 VITALS — WEIGHT: 229 LBS | BODY MASS INDEX: 42.14 KG/M2 | HEIGHT: 62 IN

## 2022-09-13 DIAGNOSIS — R14.0 POSTPRANDIAL ABDOMINAL BLOATING: ICD-10-CM

## 2022-09-13 DIAGNOSIS — Z71.3 NUTRITIONAL COUNSELING: Primary | ICD-10-CM

## 2022-09-13 DIAGNOSIS — Z85.3 HISTORY OF BREAST CANCER: ICD-10-CM

## 2022-09-13 DIAGNOSIS — C50.912 BREAST CANCER, STAGE 4, LEFT: ICD-10-CM

## 2022-09-13 PROCEDURE — 99212 OFFICE O/P EST SF 10 MIN: CPT | Mod: PBBFAC,25 | Performed by: DIETITIAN, REGISTERED

## 2022-09-13 PROCEDURE — 99999 PR PBB SHADOW E&M-EST. PATIENT-LVL II: CPT | Mod: PBBFAC,,, | Performed by: DIETITIAN, REGISTERED

## 2022-09-13 PROCEDURE — 99999 PR PBB SHADOW E&M-EST. PATIENT-LVL II: ICD-10-PCS | Mod: PBBFAC,,, | Performed by: DIETITIAN, REGISTERED

## 2022-09-13 PROCEDURE — 97802 MEDICAL NUTRITION INDIV IN: CPT | Mod: PBBFAC | Performed by: DIETITIAN, REGISTERED

## 2022-09-19 ENCOUNTER — CLINICAL SUPPORT (OUTPATIENT)
Dept: HEMATOLOGY/ONCOLOGY | Facility: CLINIC | Age: 52
End: 2022-09-19
Payer: MEDICARE

## 2022-09-19 DIAGNOSIS — M54.50 CHRONIC BILATERAL LOW BACK PAIN WITHOUT SCIATICA: Primary | ICD-10-CM

## 2022-09-19 DIAGNOSIS — G89.29 CHRONIC BILATERAL LOW BACK PAIN WITHOUT SCIATICA: Primary | ICD-10-CM

## 2022-09-19 DIAGNOSIS — R11.0 NAUSEA: ICD-10-CM

## 2022-09-19 PROCEDURE — 97810 PR ACUPUNCT W/O ELEC STIMUL 15 MIN: ICD-10-PCS | Mod: ,,, | Performed by: ACUPUNCTURIST

## 2022-09-19 PROCEDURE — 97811 ACUP 1/> W/O ESTIM EA ADD 15: CPT | Mod: ,,, | Performed by: ACUPUNCTURIST

## 2022-09-19 PROCEDURE — 97811 PR ACUPUNCT W/O ELEC STIMUL ADDL 15M: ICD-10-PCS | Mod: ,,, | Performed by: ACUPUNCTURIST

## 2022-09-19 PROCEDURE — 97810 ACUP 1/> WO ESTIM 1ST 15 MIN: CPT | Mod: ,,, | Performed by: ACUPUNCTURIST

## 2022-09-19 NOTE — PROGRESS NOTES
Subjective:       Patient ID: Shikha López is a 52 y.o. female.    Chief Complaint: Pain (cLBP) and Nausea (CINV)    Patient on maintenance. States she did well this weekend, especially with digestive issues. Continue with care as needed.    Review of Systems   Gastrointestinal:  Positive for constipation, diarrhea and nausea.   Musculoskeletal:  Positive for arthralgias and back pain.       Objective:      Physical Exam    Assessment:       Problem List Items Addressed This Visit    None  Visit Diagnoses       Chronic bilateral low back pain without sciatica    -  Primary    Nausea                Plan:       1x a week or as needed         Pre-Symptom Score: NA  Post-Symptom Score: NA     Pain (cLBP) and Nausea (CINV)       Encounter Diagnoses   Name Primary?    Chronic bilateral low back pain without sciatica Yes    Nausea        Acupuncture points used:  4 PEREZ, Du20, Gb34, Li11, REN12, MAZARIEGOS MEN, Sp10, Sp6, Sp9, SSC, St25, St36, St40, and YIN JENKINS    NO STIM USED      NEEDLES IN: 26  NEEDLES OUT: 26    NEEDLES W/O STIM  AT: 8:00 AM   NEEDLES W/O STIM REMOVED AT: 8:30 AM

## 2022-09-21 RX ORDER — SODIUM CHLORIDE 0.9 % (FLUSH) 0.9 %
10 SYRINGE (ML) INJECTION
Status: CANCELLED | OUTPATIENT
Start: 2022-09-24

## 2022-09-21 RX ORDER — DEXAMETHASONE 4 MG/1
8 TABLET ORAL
Status: CANCELLED
Start: 2022-09-24

## 2022-09-21 RX ORDER — HEPARIN 100 UNIT/ML
500 SYRINGE INTRAVENOUS
Status: CANCELLED | OUTPATIENT
Start: 2022-09-24

## 2022-09-23 ENCOUNTER — PATIENT MESSAGE (OUTPATIENT)
Dept: PSYCHIATRY | Facility: CLINIC | Age: 52
End: 2022-09-23
Payer: MEDICARE

## 2022-09-23 ENCOUNTER — TELEPHONE (OUTPATIENT)
Dept: HEMATOLOGY/ONCOLOGY | Facility: CLINIC | Age: 52
End: 2022-09-23
Payer: MEDICARE

## 2022-09-23 ENCOUNTER — OFFICE VISIT (OUTPATIENT)
Dept: PSYCHIATRY | Facility: CLINIC | Age: 52
End: 2022-09-23
Payer: MEDICARE

## 2022-09-23 DIAGNOSIS — F41.9 ANXIETY: Primary | ICD-10-CM

## 2022-09-23 DIAGNOSIS — C50.912 BREAST CANCER, STAGE 4, LEFT: ICD-10-CM

## 2022-09-23 DIAGNOSIS — F32.A DEPRESSION, UNSPECIFIED DEPRESSION TYPE: ICD-10-CM

## 2022-09-23 DIAGNOSIS — Z85.3 HISTORY OF BREAST CANCER: ICD-10-CM

## 2022-09-23 PROCEDURE — 99212 OFFICE O/P EST SF 10 MIN: CPT | Mod: PBBFAC | Performed by: PSYCHOLOGIST

## 2022-09-23 PROCEDURE — 99999 PR PBB SHADOW E&M-EST. PATIENT-LVL II: ICD-10-PCS | Mod: PBBFAC,,, | Performed by: PSYCHOLOGIST

## 2022-09-23 PROCEDURE — 99999 PR PBB SHADOW E&M-EST. PATIENT-LVL II: CPT | Mod: PBBFAC,,, | Performed by: PSYCHOLOGIST

## 2022-09-23 PROCEDURE — 90791 PSYCH DIAGNOSTIC EVALUATION: CPT | Mod: ,,, | Performed by: PSYCHOLOGIST

## 2022-09-23 PROCEDURE — 90791 PR PSYCHIATRIC DIAGNOSTIC EVALUATION: ICD-10-PCS | Mod: ,,, | Performed by: PSYCHOLOGIST

## 2022-09-23 NOTE — PROGRESS NOTES
INFORMED CONSENT/ LIMITS of CONFIDENTIALITY: Prior to beginning the interview, the patient's identification was confirmed via name and date of birth. Shikha López  was informed of the possible risks and benefits of psychological interventions (e.g., counseling, psychotherapy, testing) and provided information regarding the handling of protected health records and   the limits of confidentiality, including the importance of reporting any suicidal or homicidal ideation to ensure safety of all parties. This provider explained the purpose of today's appointment and the patient was provided with time to ask questions regarding this information.  Acceptance and understanding of these conditions was expressed, and Shikha López freely consented to this evaluation.       PSYCHO-ONCOLOGY INTAKE    Diagnostic Interview - CPT 29609    Date: 9/23/2022  Site: [facility]     Evaluation Length (direct face-to-face time):  1 hour     Referral Source: Samra Mcqueen, *   Oncologist:   PCP: Primary Doctor No    Clinical status of patient: Outpatient    Shikha López, a 52 y.o. female, seen for initial evaluation visit.  Met with patient.    Chief complaint/reason for encounter: adjustment to illness, Psychological Evaluation and treatment recommendations    Medical/Surgical History:    Patient Active Problem List   Diagnosis    Breast cancer, stage 4, left    Malignant neoplasm metastatic to left lung    Physical deconditioning    Balance problem       Health Behaviors:       ETOH Use: No (rare)       Tobacco Use: No   Illicit Drug Use:  No     Prescription Misuse:No   Caffeine: Occassional   Exercise:The patient engages in intermittent activity (walking), garden work   Firearms:  No   Advanced directives:Yes     Family History:   Psychiatric illness: No     Alcohol/Drug Abuse: No     Suicide: No      Past Psychiatric History:   Inpatient treatment: No     Outpatient treatment: No     Prior substance abuse  treatment: No     Suicide Attempts: No     Psychotropic Medications:  Current: none       Past: none    Current medications as per below, allergies reviewed in chart.    Current Outpatient Medications   Medication    acetaminophen (TYLENOL) 500 MG tablet    LIDOcaine-prilocaine (EMLA) cream    OLANZapine (ZYPREXA) 5 MG tablet    ondansetron (ZOFRAN) 8 MG tablet     No current facility-administered medications for this visit.          Social situation/Stressors: Shikha López lives with alone (pets) in [facility] in [facility] .  She is PRN in shipping and received   She has been in her job for several  years.    Shikha López has never been  and has 1 adult children.    The patient reports poor perceived social support.  Has one close friend and her daughter. Strained relationship with her sister. Mother with memory issues. Shikha López is not Spiritism.  Shikha López's hobbies include gardening, reading, art/painting, crafting.    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  The patient was fully cooperative throughout the interview and was an adequate historian   Appearance: age appropriate, appropriately  dressed, adequately  groomed  Behavior/Cooperation: friendly and cooperative  Speech: normal in rate, volume, and tone and appropriate quality, quantity and organization of sentences  Mood: euthymic  Affect: mood congruent  Thought Process: goal-directed, logical  Thought Content: normal, no suicidality, no homicidality, delusions, or paranoia;did not appear to be responding to internal stimuli during the interview.   Orientation: grossly intact  Memory: Grossly intact  Attention Span/Concentration: Attends to interview without  distraction; reports no difficulty  Fund of Knowledge: average  Estimate of Intelligence: average from verbal skills and history  Cognition: grossly intact  Insight: patient has awareness of illness; good insight into own behavior and behavior of others  Judgment: the patient's behavior is adequate to circumstances    Distress Score    Distress Score: 2        Practical Problems Physical Problems                                                   Family Problems                                         Emotional Problems                                                         Spiritual/Religions Concerns     Spiritual / Evangelical Concerns: No         Other Problems              PHQ ANSWERS    Q1. Little interest or pleasure in doing things: (P) More than half the days (09/21/22 1530)  Q2. Feeling down, depressed, or hopeless: (P) Several days (09/21/22 1530)  Q3. Trouble falling or staying asleep, or sleeping too much: (P) Not at all (09/21/22 1530)  Q4. Feeling tired or having little energy: (P) More than half the days (09/21/22 1530)  Q5. Poor appetite or overeating: (P) Not at all (09/21/22 1530)  Q6. Feeling bad about yourself - or that you are a failure or have let yourself or your family down: (P) More than half the days (09/21/22 1530)  Q7. Trouble concentrating on things, such as reading the newspaper or watching television: (P) Several days (09/21/22 1530)  Q8. Moving or speaking so slowly that other people could have noticed. Or the opposite - being so fidgety or restless that you have been moving around a lot more than usual: (P) Not at all (09/21/22 1530)  Q9.  No SI    PHQ8 Score : (P) 8 (09/21/22 1530)  PHQ-9 Total Score: (P) 8 (09/21/22 1530)       LILY-7     GAD7 9/21/2022   1. Feeling nervous, anxious, or on edge? 1   2. Not being able to stop or control worrying? 1   3. Worrying too much about different things? 0   4. Trouble relaxing? 0   5. Being so restless that it is hard to sit still? 0   6.  Becoming easily annoyed or irritable? 0   7. Feeling afraid as if something awful might happen? 0   LILY-7 Score 2          History of present illness:    Oncology History   Breast cancer, stage 4, left   4/12/2021 -  Chemotherapy    Treatment Summary   Plan Name: OP BREAST FAM-TRASTUZUMAB DERUXTECAN-NXKI Q3W  Treatment Goal: Palliative  Status: Active  Start Date: 4/12/2021  End Date: 10/11/2022 (Planned)  Provider: Home Willis MD  Chemotherapy: fam-trastuzumab deruxtecan-nxki 602 mg in dextrose 5 % 100 mL infusion, 5.4 mg/kg, Intravenous, Clinic/HOD 1 time, 22 of 24 cycles  Administration: 600 mg (6/28/2021), 600 mg (7/19/2021), 600 mg (8/9/2021), 600 mg (9/20/2021), 600 mg (10/11/2021), 600 mg (11/8/2021), 600 mg (11/29/2021), 600 mg (12/27/2021), 600 mg (1/18/2022), 600 mg (2/7/2022), 600 mg (3/31/2022), 600 mg (4/25/2022), 600 mg (2/28/2022), 600 mg (5/16/2022), 600 mg (6/6/2022), 600 mg (6/27/2022), 600 mg (7/18/2022), 600 mg (8/17/2022), 600 mg (9/7/2022)       6/8/2021 Initial Diagnosis    Breast cancer, stage 4, left     6/9/2021 Cancer Staged    Staging form: Breast, AJCC 8th Edition  - Clinical stage from 6/9/2021: Stage IV (cT4b, cN2a, cM1, G3, ER-, UT-, HER2+)         Patient with HER2+ Stage IV BC. Has tried several lines of treatment with progression of disease. Continues to be on diease directed therapy.  Has been on disease directed treatment for 16 years. Referred for stress management.  Poor perceived social support over time. Finds that acupuncture and meditation helps. Patient stated that aat times is hurts to not have support at appointments and continually.     Struggles with large crowds chronically. Also does not like to speak in front of people- fear of judgement (social anxiety)    Shikha López has adjusted to illness fairly well primarily through active coping strategies. She has engaged in appropriate information gathering.  The patient has fair family/friend support.  Her support  system is coping well with the diagnosis/treatment/prognosis. Illness-related psychosocial stressors include absence from work and changes in ability to engage in leisure activities.  The patient has a good partnership with her [facility] oncology treatment team. The patient reports the following barriers to cancer care:lack of family support.       Patient Reported Cancer Treatment Symptoms:  alopecia, constipation, dehydration, fatigue, and nausea    Behavioral Health Symptoms:   Mood: Depression: depressed mood, diminished interest, fatigue, worthlessness/guilt, and social isolation;  prior depression:with COVID- due to isolation ; no SI/HI  Hui: Denies  Psychosis: Denies   Anxiety: Feeling nervous, anxious, or on edge and Uncontrollable worry (about health, support); lifelong anxiety  Generalized anxiety: Denies    Panic Disorder: Denies  Social/specific phobia: Denies   OCD: Denies  Trauma: Denies  Sexual Dysfunction:  Denies  Substance abuse: denied  Cognitive functioning: denied  Health behaviors: noncontributory  Sleep: No concerns restful sleep  , no sleep onset difficulty  and no sleep maintenance difficulty, occasional naps, mild caffeine/stimulants  and no sleep hygiene considerations , no use of OTC/melatonin/hypnotics/benzodiazepines    Pain: Ms. López reports no pain. Symptoms interfere with daily activity, sleeping and work.   CAM Therapies: None         Assessment - Diagnosis - Goals:       ICD-10-CM ICD-9-CM   1. Anxiety  F41.9 300.00   2. History of breast cancer  Z85.3 V10.3   3. Depression, unspecified depression type  F32.A 311   4. Breast cancer, stage 4, left  C50.912 174.9        Plan:individual psychotherapy      Summary and Recommendations  Shikha López is a 52 y.o. female referred by Samra Mcqueen, * for psychological evaluation and treatment.  Ms. López appears to be coping fairly well with her diagnosis and proposed treatment course.  Patient has good relationship with  children. Mood protective strategies during cancer treatment were discussed.  She is interested in individual therapy for cancer coping skills and will follow up with me for that purpose..     GOALS:   Pursed Lip Breathing    Next Session:  Positive Self- Talk    Linda Figueroa, PhD  Clinical Psychologist  [place] License #[id number]  [place] License #[id number]

## 2022-09-26 ENCOUNTER — HOSPITAL ENCOUNTER (OUTPATIENT)
Dept: RADIOLOGY | Facility: HOSPITAL | Age: 52
Discharge: HOME | End: 2022-09-26
Attending: NURSE PRACTITIONER
Payer: MEDICARE

## 2022-09-26 ENCOUNTER — CLINICAL SUPPORT (OUTPATIENT)
Dept: HEMATOLOGY/ONCOLOGY | Facility: CLINIC | Age: 52
End: 2022-09-26
Payer: MEDICARE

## 2022-09-26 DIAGNOSIS — G89.29 CHRONIC BILATERAL LOW BACK PAIN WITHOUT SCIATICA: Primary | ICD-10-CM

## 2022-09-26 DIAGNOSIS — C78.02 MALIGNANT NEOPLASM METASTATIC TO LEFT LUNG: ICD-10-CM

## 2022-09-26 DIAGNOSIS — R11.0 NAUSEA: ICD-10-CM

## 2022-09-26 DIAGNOSIS — M25.562 CHRONIC PAIN OF LEFT KNEE: ICD-10-CM

## 2022-09-26 DIAGNOSIS — C50.912 BREAST CANCER, STAGE 4, LEFT: ICD-10-CM

## 2022-09-26 DIAGNOSIS — K59.09 OTHER CONSTIPATION: ICD-10-CM

## 2022-09-26 DIAGNOSIS — M54.50 CHRONIC BILATERAL LOW BACK PAIN WITHOUT SCIATICA: Primary | ICD-10-CM

## 2022-09-26 DIAGNOSIS — G89.29 CHRONIC PAIN OF LEFT KNEE: ICD-10-CM

## 2022-09-26 PROCEDURE — 71260 CT THORAX DX C+: CPT | Mod: 26,,, | Performed by: RADIOLOGY

## 2022-09-26 PROCEDURE — 74177 CT ABD & PELVIS W/CONTRAST: CPT | Mod: TC

## 2022-09-26 PROCEDURE — 71260 CT CHEST ABDOMEN PELVIS WITH CONTRAST (XPD): ICD-10-PCS | Mod: 26,,, | Performed by: RADIOLOGY

## 2022-09-26 PROCEDURE — 97811 PR ACUPUNCT W/O ELEC STIMUL ADDL 15M: ICD-10-PCS | Mod: ,,, | Performed by: ACUPUNCTURIST

## 2022-09-26 PROCEDURE — 25500020 PHARM REV CODE 255: Performed by: NURSE PRACTITIONER

## 2022-09-26 PROCEDURE — 74177 CT CHEST ABDOMEN PELVIS WITH CONTRAST (XPD): ICD-10-PCS | Mod: 26,,, | Performed by: RADIOLOGY

## 2022-09-26 PROCEDURE — 74177 CT ABD & PELVIS W/CONTRAST: CPT | Mod: 26,,, | Performed by: RADIOLOGY

## 2022-09-26 PROCEDURE — 97810 PR ACUPUNCT W/O ELEC STIMUL 15 MIN: ICD-10-PCS | Mod: ,,, | Performed by: ACUPUNCTURIST

## 2022-09-26 PROCEDURE — 99999 PR PBB SHADOW E&M-EST. PATIENT-LVL I: CPT | Mod: PBBFAC,,, | Performed by: ACUPUNCTURIST

## 2022-09-26 PROCEDURE — 97811 ACUP 1/> W/O ESTIM EA ADD 15: CPT | Mod: ,,, | Performed by: ACUPUNCTURIST

## 2022-09-26 PROCEDURE — 71260 CT THORAX DX C+: CPT | Mod: TC

## 2022-09-26 PROCEDURE — 97810 ACUP 1/> WO ESTIM 1ST 15 MIN: CPT | Mod: ,,, | Performed by: ACUPUNCTURIST

## 2022-09-26 PROCEDURE — 99999 PR PBB SHADOW E&M-EST. PATIENT-LVL I: ICD-10-PCS | Mod: PBBFAC,,, | Performed by: ACUPUNCTURIST

## 2022-09-26 PROCEDURE — 99211 OFF/OP EST MAY X REQ PHY/QHP: CPT | Mod: PBBFAC,25 | Performed by: ACUPUNCTURIST

## 2022-09-26 RX ADMIN — IOHEXOL 100 ML: 350 INJECTION, SOLUTION INTRAVENOUS at 07:09

## 2022-09-26 NOTE — PROGRESS NOTES
Subjective:       Patient ID: Shikha López is a 52 y.o. female.    Chief Complaint: Pain (cLBP, bilateral knee)    Patient on maintenance. Continue weekly for symptom management.     Review of Systems   Gastrointestinal:  Positive for constipation, diarrhea and nausea.   Musculoskeletal:  Positive for arthralgias and back pain.       Objective:      Physical Exam    Assessment:       Problem List Items Addressed This Visit    None  Visit Diagnoses       Chronic bilateral low back pain without sciatica    -  Primary    Nausea        Other constipation        Chronic pain of left knee                  Plan:       1x a week         Pre-Symptom Score: NA  Post-Symptom Score: NA     Pain (cLBP, bilateral knee)       Encounter Diagnoses   Name Primary?    Chronic bilateral low back pain without sciatica Yes    Nausea     Other constipation     Chronic pain of left knee        Acupuncture points used:  4 PEREZ, Du20, Gb34, Li11, Pc6, REN12, Sp10, Sp6, Sp9, St25, St36, St40, St44, and YIN JENKINS    NO STIM USED      NEEDLES IN: 18  NEEDLES OUT: 18    NEEDLES W/O STIM  AT: 8:00 AM  NEEDLES W/O STIM REMOVED AT: 8:30 AM

## 2022-09-27 NOTE — PROGRESS NOTES
Subjective:       Patient ID: Shikha López is a 52 y.o. female.    Chief Complaint: No chief complaint on file.    HPI 52-year-old female who returns for F/U  for metastatic breast cancer.  She is on Trastuzumab deruxtecan  - here for cycle  23.      Today she reports no new issues.There is no change in her breathing and she has no unusual pains. Mild nausea the day after chemotherapy.      CT 9/27/22  - no evidence of new metastasis in comparison to 06/23/2022.   Left infrahilar spiculated lung mass with mild increased size in comparison prior as above.   Stable size and number of few patchy pulmonary nodules largest measuring 1.8 cm.      Breast history:  She presented in the emergency room at [facility] on September 29, 2006 with a 4 months history of inflammation of her left breast.  Physical examination at that time showed the left breast was largely replaced by large mass with multiple skin ulcerations.    A biopsy in September 2006 showed poorly differentiated carcinoma which was ER and ME. negative and HER2 positive.    She was then referred to [facility] for additional care.   CT scan of the chest and abdomen November 2006 revealed multiple nodules in the lungs consistent with metastatic disease.  There also enlarged left axillary node.    She was treated with chemotherapy with weekly Herceptin and Abraxane and had marked improvement in her breast and lung metastasis on that therapy.    On May 29, 2007 she underwent left modified radical mastectomy(Dr. Colvin) which showed no residual tumor in the breast and 1/5 nodes was positive for metastasis measuring 6 mm.  (ypT0N1).    She then received postoperative radiation therapy(Dr Singh)  to the left chest wall supraclav and internal mammary lymph nodes from August 20, 2007 to September 28, 2007.    Postoperatively she continued on Herceptin for approximately 3 and 1/2 years before her lung metastasis begin to grow.    She subsequently  received a number of different chemotherapy treatments including Adriamycin, Halaven, Xeloda plus lapatinib then Xeloda plus Herceptin.  The  Those treatments were provided under the care of  in [facility].  Subsequently, she transferred her care to  at [facility].  She was initially treated with Taxotere Herceptin Perjeta.      She was then changed to Kadcyla.    In July 2020 PET scan showed an increase in the size of an infrahilar mass consistent with progression.   She then took approximately 9 months of Herceptin and Navelbine.  Apparently she has some initial response to that therapy.    She started trastuzumab- deruxtecan  4/12/21.     Echo 8/5/22  -EF 65%    CT on 9/26/22 -   Sandra/Mediastinum: No significant mediastinal, hilar, or axillary lymphadenopathy     Lungs: Trachea and bronchi are patent.  Similar lung parenchymal mosaic pattern similar likely from small airway disease.   Left spiculated infrahilar mass obstructing the proximal segment of the left lower lobe bronchi with increase in size measuring approximately 2.8 x 3.4 cm, previously 2.7 x 2.7 cm however, suboptimal comparison due to lack of IV contrast on the prior study.   Scattered bilateral pulmonary nodules are stable in size and number in comparison to prior, largest on the right measuring 1.8 cm  and largest on the left measuring 1.4 cm .  No new consolidation or pulmonary nodule.  No pleural effusion.  Stable left-sided pleural thickening.     Liver: Normal in size and contour.  No focal hepatic lesion.    Review of Systems   Constitutional:  Negative for appetite change and unexpected weight change.   HENT:  Negative for mouth sores.    Eyes:  Negative for visual disturbance.   Respiratory:  Negative for cough and shortness of breath.    Cardiovascular:  Negative for chest pain.   Gastrointestinal:  Negative for abdominal pain, constipation and diarrhea.   Genitourinary:  Negative for frequency.   Musculoskeletal:   Negative for back pain.   Integumentary:  Negative for rash.   Neurological:  Negative for headaches.   Hematological:  Negative for adenopathy.   Psychiatric/Behavioral: Negative.  The patient is not nervous/anxious.        Objective:      Physical Exam  Vitals reviewed.   Constitutional:       General: She is not in acute distress.     Appearance: She is obese.   HENT:      Mouth/Throat:      Mouth: Mucous membranes are moist.      Pharynx: Oropharynx is clear. No oropharyngeal exudate or posterior oropharyngeal erythema.   Eyes:      Pupils: Pupils are equal, round, and reactive to light.   Cardiovascular:      Rate and Rhythm: Normal rate and regular rhythm.      Heart sounds: Murmur (systolic -aortic) heard.   Pulmonary:      Effort: Pulmonary effort is normal. No respiratory distress.      Breath sounds: Normal breath sounds. No wheezing or rales.   Chest:   Breasts:     Right: Normal.      Left: Absent.       Abdominal:      Palpations: Abdomen is soft. There is no mass.      Tenderness: There is no abdominal tenderness.   Lymphadenopathy:      Cervical: No cervical adenopathy.      Upper Body:      Right upper body: No supraclavicular or axillary adenopathy.      Left upper body: No supraclavicular or axillary adenopathy.   Skin:     Findings: No rash.   Neurological:      Mental Status: She is alert and oriented to person, place, and time.   Psychiatric:         Mood and Affect: Mood normal.         Behavior: Behavior normal.         Thought Content: Thought content normal.         Judgment: Judgment normal.       Assessment:    CBC shows white count 3000 with an ANC of 70 100, hemoglobin 12 memory count 192,000, metabolic profile shows creatinine 0.6, bilirubin 1.7 with normal transaminases.  Problem List Items Addressed This Visit       Breast cancer, stage 4, left - Primary    Malignant neoplasm metastatic to left lung       Plan:       Continue current RX  RTC 3 weeks.  Repeat scans in 3 M.    Route  Chart for Scheduling    Med Onc Chart Routing      Follow up with physician 3 weeks. me or Mariam   Follow up with JIMI    Infusion scheduling note    Injection scheduling note    Labs CBC and CMP   Lab interval:     Imaging    Pharmacy appointment    Other referrals        Treatment Plan Information   OP BREAST FAM-TRASTUZUMAB DERUXTECAN-NXKI Q3W   Home Willis MD   Upcoming Treatment Dates - OP BREAST FAM-TRASTUZUMAB DERUXTECAN-NXKI Q3W    9/24/2022       Chemotherapy       fam-trastuzumab deruxtecan-nxki 602 mg in dextrose 5 % 100 mL infusion       Antiemetics       fosaprepitant 150 mg in sodium chloride 0.9% 150 mL IVPB       palonosetron (ALOXI) 0.25 mg in sodium chloride 0.9% 50 mL IVPB       dexAMETHasone tablet 8 mg  10/11/2022       Chemotherapy       fam-trastuzumab deruxtecan-nxki 602 mg in dextrose 5 % 100 mL infusion       Antiemetics       fosaprepitant 150 mg in sodium chloride 0.9% 150 mL IVPB       palonosetron (ALOXI) 0.25 mg in sodium chloride 0.9% 50 mL IVPB       dexAMETHasone tablet 8 mg

## 2022-09-28 ENCOUNTER — OFFICE VISIT (OUTPATIENT)
Dept: HEMATOLOGY/ONCOLOGY | Facility: CLINIC | Age: 52
End: 2022-09-28
Attending: INTERNAL MEDICINE
Payer: MEDICARE

## 2022-09-28 ENCOUNTER — INFUSION (OUTPATIENT)
Dept: INFUSION THERAPY | Facility: HOSPITAL | Age: 52
End: 2022-09-28
Attending: INTERNAL MEDICINE
Payer: MEDICARE

## 2022-09-28 VITALS
OXYGEN SATURATION: 96 % | HEIGHT: 62 IN | DIASTOLIC BLOOD PRESSURE: 77 MMHG | HEART RATE: 86 BPM | BODY MASS INDEX: 43.16 KG/M2 | WEIGHT: 234.56 LBS | TEMPERATURE: 98 F | SYSTOLIC BLOOD PRESSURE: 164 MMHG | RESPIRATION RATE: 18 BRPM

## 2022-09-28 VITALS
HEART RATE: 70 BPM | RESPIRATION RATE: 18 BRPM | DIASTOLIC BLOOD PRESSURE: 61 MMHG | SYSTOLIC BLOOD PRESSURE: 132 MMHG | TEMPERATURE: 98 F

## 2022-09-28 DIAGNOSIS — C50.912 BREAST CANCER, STAGE 4, LEFT: Primary | ICD-10-CM

## 2022-09-28 DIAGNOSIS — C78.02 MALIGNANT NEOPLASM METASTATIC TO LEFT LUNG: ICD-10-CM

## 2022-09-28 PROCEDURE — 99999 PR PBB SHADOW E&M-EST. PATIENT-LVL IV: CPT | Mod: PBBFAC,,, | Performed by: INTERNAL MEDICINE

## 2022-09-28 PROCEDURE — 99214 OFFICE O/P EST MOD 30 MIN: CPT | Mod: S$PBB,,, | Performed by: INTERNAL MEDICINE

## 2022-09-28 PROCEDURE — 25000003 PHARM REV CODE 250: Performed by: INTERNAL MEDICINE

## 2022-09-28 PROCEDURE — 96367 TX/PROPH/DG ADDL SEQ IV INF: CPT

## 2022-09-28 PROCEDURE — 99214 PR OFFICE/OUTPT VISIT, EST, LEVL IV, 30-39 MIN: ICD-10-PCS | Mod: S$PBB,,, | Performed by: INTERNAL MEDICINE

## 2022-09-28 PROCEDURE — 63600175 PHARM REV CODE 636 W HCPCS: Performed by: INTERNAL MEDICINE

## 2022-09-28 PROCEDURE — A4216 STERILE WATER/SALINE, 10 ML: HCPCS | Performed by: INTERNAL MEDICINE

## 2022-09-28 PROCEDURE — 99214 OFFICE O/P EST MOD 30 MIN: CPT | Mod: PBBFAC,25 | Performed by: INTERNAL MEDICINE

## 2022-09-28 PROCEDURE — 99999 PR PBB SHADOW E&M-EST. PATIENT-LVL IV: ICD-10-PCS | Mod: PBBFAC,,, | Performed by: INTERNAL MEDICINE

## 2022-09-28 PROCEDURE — 96413 CHEMO IV INFUSION 1 HR: CPT

## 2022-09-28 RX ORDER — DEXAMETHASONE 4 MG/1
8 TABLET ORAL
Status: COMPLETED | OUTPATIENT
Start: 2022-09-28 | End: 2022-09-28

## 2022-09-28 RX ORDER — HEPARIN 100 UNIT/ML
500 SYRINGE INTRAVENOUS
Status: DISCONTINUED | OUTPATIENT
Start: 2022-09-28 | End: 2022-09-28 | Stop reason: HOSPADM

## 2022-09-28 RX ORDER — SODIUM CHLORIDE 0.9 % (FLUSH) 0.9 %
10 SYRINGE (ML) INJECTION
Status: DISCONTINUED | OUTPATIENT
Start: 2022-09-28 | End: 2022-09-28 | Stop reason: HOSPADM

## 2022-09-28 RX ADMIN — PALONOSETRON 0.25 MG: 0.25 INJECTION, SOLUTION INTRAVENOUS at 10:09

## 2022-09-28 RX ADMIN — FOSAPREPITANT 150 MG: 150 INJECTION, POWDER, LYOPHILIZED, FOR SOLUTION INTRAVENOUS at 10:09

## 2022-09-28 RX ADMIN — FAM-TRASTUZUMAB DERUXTECAN-NXKI 600 MG: 100 INJECTION, POWDER, LYOPHILIZED, FOR SOLUTION INTRAVENOUS at 11:09

## 2022-09-28 RX ADMIN — DEXAMETHASONE 8 MG: 4 TABLET ORAL at 10:09

## 2022-09-28 RX ADMIN — SODIUM CHLORIDE: 0.9 INJECTION, SOLUTION INTRAVENOUS at 10:09

## 2022-09-28 RX ADMIN — HEPARIN SODIUM (PORCINE) LOCK FLUSH IV SOLN 100 UNIT/ML 500 UNITS: 100 SOLUTION at 12:09

## 2022-09-28 RX ADMIN — Medication 10 ML: at 12:09

## 2022-09-28 NOTE — PLAN OF CARE
Pt tolerated Enhertu today. NAD. Port flushed no blood return present(pt had port study, ok to use) flushed. Hep lock and deaccesed. declined AVS. Uses my [facility]. Discharged home. Ambulated independently.   Problem: Adult Inpatient Plan of Care  Goal: Optimal Comfort and Wellbeing  Intervention: Provide Person-Centered Care  Flowsheets (Taken 9/28/2022 1043)  Trust Relationship/Rapport:   thoughts/feelings acknowledged   care explained   choices provided   emotional support provided   empathic listening provided   questions answered   questions encouraged   reassurance provided

## 2022-09-30 ENCOUNTER — TELEPHONE (OUTPATIENT)
Dept: INFUSION THERAPY | Facility: HOSPITAL | Age: 52
End: 2022-09-30
Payer: MEDICARE

## 2022-10-02 NOTE — PROGRESS NOTES
Subjective:       Patient ID: Shikha López is a 52 y.o. female.    Chief Complaint: Pain (cLBP) and Nausea    Patient on maintenance for symptoms. Continue as needed.    Review of Systems   Musculoskeletal:  Positive for arthralgias and back pain.       Objective:      Physical Exam    Assessment:       Problem List Items Addressed This Visit    None  Visit Diagnoses       Chronic bilateral low back pain without sciatica    -  Primary            Plan:       1x a week or as needed         Pre-Symptom Score: NA  Post-Symptom Score: NA     Pain (cLBP) and Nausea       Encounter Diagnoses   Name Primary?    Chronic bilateral low back pain without sciatica Yes       Acupuncture points used:  4 PEREZ, Du20, Gb34, Li11, REN12, MAZARIEGOS MEN, Sp10, Sp6, Sp9, SSC, St25, St40, and YIN JENKINS    NO STIM USED      NEEDLES IN: 18  NEEDLES OUT: 18    NEEDLES W/O STIM  AT: 8:00 AM  NEEDLES W/O STIM REMOVED AT: 8:30 AM

## 2022-10-03 ENCOUNTER — CLINICAL SUPPORT (OUTPATIENT)
Dept: HEMATOLOGY/ONCOLOGY | Facility: CLINIC | Age: 52
End: 2022-10-03
Payer: MEDICARE

## 2022-10-03 DIAGNOSIS — G89.29 CHRONIC BILATERAL LOW BACK PAIN WITHOUT SCIATICA: Primary | ICD-10-CM

## 2022-10-03 DIAGNOSIS — M54.50 CHRONIC BILATERAL LOW BACK PAIN WITHOUT SCIATICA: Primary | ICD-10-CM

## 2022-10-03 DIAGNOSIS — R11.0 NAUSEA: ICD-10-CM

## 2022-10-03 PROCEDURE — 97810 PR ACUPUNCT W/O ELEC STIMUL 15 MIN: ICD-10-PCS | Mod: ,,, | Performed by: ACUPUNCTURIST

## 2022-10-03 PROCEDURE — 97810 ACUP 1/> WO ESTIM 1ST 15 MIN: CPT | Mod: ,,, | Performed by: ACUPUNCTURIST

## 2022-10-03 PROCEDURE — 97811 PR ACUPUNCT W/O ELEC STIMUL ADDL 15M: ICD-10-PCS | Mod: ,,, | Performed by: ACUPUNCTURIST

## 2022-10-03 PROCEDURE — 97811 ACUP 1/> W/O ESTIM EA ADD 15: CPT | Mod: ,,, | Performed by: ACUPUNCTURIST

## 2022-10-03 NOTE — PROGRESS NOTES
Subjective:       Patient ID: Shikha López is a 52 y.o. female.    Chief Complaint: Pain (cLBP) and Nausea (CINV)    Patient states while she had some discomfort this weekend after treatment she was moving around a bit and walking dogs. Movement helped but still felt nausea and fatigue. Continue with care weekly.    Review of Systems   Constitutional:  Positive for fatigue.   Gastrointestinal:  Positive for nausea.   Musculoskeletal:  Positive for arthralgias and back pain.       Objective:      Physical Exam    Assessment:       Problem List Items Addressed This Visit    None  Visit Diagnoses       Chronic bilateral low back pain without sciatica    -  Primary    Nausea                  Plan:       1x a week         Pre-Symptom Score: NA  Post-Symptom Score: NA     Pain (cLBP) and Nausea (CINV)       Encounter Diagnoses   Name Primary?    Chronic bilateral low back pain without sciatica Yes    Nausea        Acupuncture points used:  4 PEREZ, Du20, ER JUAN, Gb34, Li11, REN12, Sp10, Sp6, Sp9, St25, St36, St40, and YIN JENKINS    NO STIM USED      NEEDLES IN: 18  NEEDLES OUT: 18    NEEDLES W/O STIM  AT: 8:14 AM  NEEDLES W/O STIM REMOVED AT: 8:44 AM

## 2022-10-06 ENCOUNTER — TELEPHONE (OUTPATIENT)
Dept: PSYCHIATRY | Facility: CLINIC | Age: 52
End: 2022-10-06
Payer: MEDICARE

## 2022-10-10 NOTE — PROGRESS NOTES
Subjective:       Patient ID: Shikha López is a 52 y.o. female.    Chief Complaint: Breast cancer, stage 4, left    HPI 52-year-old female who returns for F/U  for metastatic breast cancer.  She is on Trastuzumab deruxtecan  - here for cycle  24.      Overall she is feeling well. She is taking probiotics at the recommendation of the nutritionist.  Denied nausea after the last cycle. Appetite and bowel movements good. Fatigues easily.  Stable peripheral neuropathy. Work with acupuncturist. Denies shortness of breath.       Per Dr. Willis's previous note: Breast history:  She presented in the emergency room at [facility] on September 29, 2006 with a 4 months history of inflammation of her left breast.  Physical examination at that time showed the left breast was largely replaced by large mass with multiple skin ulcerations.    A biopsy in September 2006 showed poorly differentiated carcinoma which was ER and TN. negative and HER2 positive.    She was then referred to [facility] for additional care.   CT scan of the chest and abdomen November 2006 revealed multiple nodules in the lungs consistent with metastatic disease.  There also enlarged left axillary node.    She was treated with chemotherapy with weekly Herceptin and Abraxane and had marked improvement in her breast and lung metastasis on that therapy.    On May 29, 2007 she underwent left modified radical mastectomy(Dr. Colvin) which showed no residual tumor in the breast and 1/5 nodes was positive for metastasis measuring 6 mm.  (ypT0N1).    She then received postoperative radiation therapy(Dr Singh)  to the left chest wall supraclav and internal mammary lymph nodes from August 20, 2007 to September 28, 2007.    Postoperatively she continued on Herceptin for approximately 3 and 1/2 years before her lung metastasis begin to grow.    She subsequently received a number of different chemotherapy treatments including Adriamycin, Halaven, Xeloda  plus lapatinib then Xeloda plus Herceptin.  The  Those treatments were provided under the care of  in [facility].  Subsequently, she transferred her care to  at [facility].  She was initially treated with Taxotere Herceptin Perjeta.      She was then changed to Kadcyla.    In July 2020 PET scan showed an increase in the size of an infrahilar mass consistent with progression.   She then took approximately 9 months of Herceptin and Navelbine.  Apparently she has some initial response to that therapy.    She started trastuzumab- deruxtecan  4/12/21.     Echo 8/5/22  -EF 65%    CT on 9/26/22 -   Sandra/Mediastinum: No significant mediastinal, hilar, or axillary lymphadenopathy     Lungs: Trachea and bronchi are patent.  Similar lung parenchymal mosaic pattern similar likely from small airway disease.   Left spiculated infrahilar mass obstructing the proximal segment of the left lower lobe bronchi with increase in size measuring approximately 2.8 x 3.4 cm, previously 2.7 x 2.7 cm however, suboptimal comparison due to lack of IV contrast on the prior study.   Scattered bilateral pulmonary nodules are stable in size and number in comparison to prior, largest on the right measuring 1.8 cm  and largest on the left measuring 1.4 cm .  No new consolidation or pulmonary nodule.  No pleural effusion.  Stable left-sided pleural thickening.     Liver: Normal in size and contour.  No focal hepatic lesion.        Review of Systems   Constitutional:  Positive for fatigue. Negative for activity change, appetite change, chills, diaphoresis, fever and unexpected weight change.   HENT:  Negative for mouth sores and nosebleeds.    Eyes:  Negative for visual disturbance.   Respiratory:  Negative for cough and shortness of breath.    Cardiovascular:  Negative for chest pain, palpitations and leg swelling.   Gastrointestinal:  Negative for abdominal distention, abdominal pain, blood in stool, constipation, diarrhea,  nausea and vomiting.   Genitourinary:  Negative for frequency, hematuria and vaginal bleeding.   Musculoskeletal:  Negative for arthralgias, back pain and myalgias.   Integumentary:  Negative for pallor and rash.   Allergic/Immunologic: Negative for immunocompromised state.   Neurological:  Positive for numbness. Negative for dizziness, weakness, light-headedness and headaches.   Hematological:  Negative for adenopathy. Does not bruise/bleed easily.   Psychiatric/Behavioral: Negative.  Negative for confusion. The patient is not nervous/anxious.        Objective:      Physical Exam  Vitals reviewed.   Constitutional:       General: She is not in acute distress.     Appearance: She is obese.   HENT:      Mouth/Throat:      Mouth: Mucous membranes are moist.      Pharynx: Oropharynx is clear. No oropharyngeal exudate or posterior oropharyngeal erythema.   Eyes:      Pupils: Pupils are equal, round, and reactive to light.   Cardiovascular:      Rate and Rhythm: Normal rate and regular rhythm.      Heart sounds: Murmur (systolic -aortic) heard.   Pulmonary:      Effort: Pulmonary effort is normal. No respiratory distress.      Breath sounds: Normal breath sounds. No wheezing or rales.   Chest:   Breasts:     Right: Normal.      Left: Absent.       Abdominal:      Palpations: Abdomen is soft. There is no mass.      Tenderness: There is no abdominal tenderness.   Lymphadenopathy:      Cervical: No cervical adenopathy.      Upper Body:      Right upper body: No supraclavicular or axillary adenopathy.      Left upper body: No supraclavicular or axillary adenopathy.   Skin:     Findings: No rash.   Neurological:      Mental Status: She is alert and oriented to person, place, and time.   Psychiatric:         Mood and Affect: Mood normal.         Behavior: Behavior normal.         Thought Content: Thought content normal.         Judgment: Judgment normal.       Assessment:      1. Breast cancer, stage 4, left        2.  Malignant neoplasm metastatic to left lung               Plan:       1-2. Proceed with cycle 24 of Trastuzumab deruxtecan  - Echo due in early Nov  - Repeat scans end of December    Return to clinic in 3 weeks with JIMI appointment and labs and echo.     Patient is in agreement with the proposed treatment plan. All questions were answered to the patient's satisfaction. Patient knows to call clinic for any new or worsening symptoms and if anything is needed before the next clinic visit.          Mariam Lisa, FNP-C  Hematology & Medical Oncology   [facility] [place] [address]  ph. [phone]  Fax. [phone]    Collaborating physician, Dr. Willis.    Approximately 15 minutes were spent face-to-face with the patient.  Approximately 25 minutes in total were spent on this encounter, which includes face-to-face time and non-face-to-face time preparing to see the patient (e.g., review of tests), obtaining and/or reviewing separately obtained history, documenting clinical information in the electronic or other health record, independently interpreting results (not separately reported) and communicating results to the patient/family/caregiver, or care coordination (not separately reported).       Route Chart for Scheduling    Med Onc Chart Routing      Follow up with physician 3 weeks. 10 weeks with Dr. Willis (will be off 1 week per patient request) so 12/28 with scans prior   Follow up with JIMI 6 weeks.   Infusion scheduling note every 3 weeks trastuzumab-dextran   Injection scheduling note    Labs CBC and CMP   Lab interval: every 3 weeks     Imaging CT chest abdomen pelvis and ECHO   CT prior to appointment on 12/28; echo due in early Nov   Pharmacy appointment    Other referrals        Treatment Plan Information   OP BREAST FAM-TRASTUZUMAB DERUXTECAN-NXKI Q3W   Home Willis MD   Upcoming Treatment Dates - OP BREAST FAM-TRASTUZUMAB DERUXTECAN-NXKI Q3W    10/19/2022       Chemotherapy        fam-trastuzumab deruxtecan-nxki 602 mg in dextrose 5 % 100 mL infusion       Antiemetics       fosaprepitant 150 mg in sodium chloride 0.9% 150 mL IVPB       palonosetron (ALOXI) 0.25 mg in sodium chloride 0.9% 50 mL IVPB       dexAMETHasone tablet 8 mg  11/9/2022       Chemotherapy       fam-trastuzumab deruxtecan-nxki 602 mg in dextrose 5 % 100 mL infusion       Antiemetics       fosaprepitant 150 mg in sodium chloride 0.9% 150 mL IVPB       palonosetron (ALOXI) 0.25 mg in sodium chloride 0.9% 50 mL IVPB       dexAMETHasone tablet 8 mg  11/30/2022       Chemotherapy       fam-trastuzumab deruxtecan-nxki 602 mg in dextrose 5 % 100 mL infusion       Antiemetics       fosaprepitant 150 mg in sodium chloride 0.9% 150 mL IVPB       palonosetron (ALOXI) 0.25 mg in sodium chloride 0.9% 50 mL IVPB       dexAMETHasone tablet 8 mg  12/21/2022       Chemotherapy       fam-trastuzumab deruxtecan-nxki 602 mg in dextrose 5 % 100 mL infusion       Antiemetics       fosaprepitant 150 mg in sodium chloride 0.9% 150 mL IVPB       palonosetron (ALOXI) 0.25 mg in sodium chloride 0.9% 50 mL IVPB       dexAMETHasone tablet 8 mg

## 2022-10-11 ENCOUNTER — CLINICAL SUPPORT (OUTPATIENT)
Dept: HEMATOLOGY/ONCOLOGY | Facility: CLINIC | Age: 52
End: 2022-10-11
Payer: MEDICARE

## 2022-10-11 ENCOUNTER — PATIENT MESSAGE (OUTPATIENT)
Dept: HEMATOLOGY/ONCOLOGY | Facility: CLINIC | Age: 52
End: 2022-10-11

## 2022-10-11 VITALS — BODY MASS INDEX: 42.9 KG/M2 | HEIGHT: 62 IN

## 2022-10-11 DIAGNOSIS — G89.29 CHRONIC BILATERAL LOW BACK PAIN WITHOUT SCIATICA: Primary | ICD-10-CM

## 2022-10-11 DIAGNOSIS — R11.0 NAUSEA: ICD-10-CM

## 2022-10-11 DIAGNOSIS — Z71.3 NUTRITIONAL COUNSELING: Primary | ICD-10-CM

## 2022-10-11 DIAGNOSIS — C50.912 BREAST CANCER, STAGE 4, LEFT: ICD-10-CM

## 2022-10-11 DIAGNOSIS — E87.6 HYPOKALEMIA: ICD-10-CM

## 2022-10-11 DIAGNOSIS — M54.50 CHRONIC BILATERAL LOW BACK PAIN WITHOUT SCIATICA: Primary | ICD-10-CM

## 2022-10-11 PROCEDURE — 97811 ACUP 1/> W/O ESTIM EA ADD 15: CPT | Mod: ,,, | Performed by: ACUPUNCTURIST

## 2022-10-11 PROCEDURE — 97810 ACUP 1/> WO ESTIM 1ST 15 MIN: CPT | Mod: ,,, | Performed by: ACUPUNCTURIST

## 2022-10-11 PROCEDURE — 97811 PR ACUPUNCT W/O ELEC STIMUL ADDL 15M: ICD-10-PCS | Mod: ,,, | Performed by: ACUPUNCTURIST

## 2022-10-11 PROCEDURE — 97803 MED NUTRITION INDIV SUBSEQ: CPT | Mod: PBBFAC | Performed by: DIETITIAN, REGISTERED

## 2022-10-11 PROCEDURE — 99211 OFF/OP EST MAY X REQ PHY/QHP: CPT | Mod: PBBFAC | Performed by: ACUPUNCTURIST

## 2022-10-11 PROCEDURE — 99999 PR PBB SHADOW E&M-EST. PATIENT-LVL I: ICD-10-PCS | Mod: PBBFAC,,, | Performed by: ACUPUNCTURIST

## 2022-10-11 PROCEDURE — 99999 PR PBB SHADOW E&M-EST. PATIENT-LVL I: CPT | Mod: PBBFAC,,, | Performed by: ACUPUNCTURIST

## 2022-10-11 PROCEDURE — 97810 PR ACUPUNCT W/O ELEC STIMUL 15 MIN: ICD-10-PCS | Mod: ,,, | Performed by: ACUPUNCTURIST

## 2022-10-11 NOTE — PROGRESS NOTES
"Oncology Nutrition Assessment for Medical Nutrition Therapy  Follow up Visit    Shikha López   1970    Referring Provider:  No ref. provider found      Reason for Visit: Pt in for education and nutrition counseling     PMHx:   Past Medical History:   Diagnosis Date    Breast cancer        Nutrition Assessment    This is a 52 y.o.female with metastatic breast cancer. She has been on multiple lines of treatment and is struggling more with her current regimen (Enhertu). Referred to nutrition from integrative department.   She is here today for follow up.   She reports she started culturelle after our previous visit an almost all of her digestion issues have resolved. She only occasionally has a "bad day" now. Reports feeling wonderful/better than she has in a long time.   She is concerned about low potassium. Still struggling to find foods she wants to eat. Her appetite is fine but sometimes nothing appeals to her. Since she lives alone she doesn't cook often.     Weight:   Height:5' 2" (1.575 m)  BMI:Body mass index is 42.9 kg/m².   IBW: Patient weight not recorded    Allergies: Patient has no known allergies.    Current Medications:    Current Outpatient Medications:     acetaminophen (TYLENOL) 500 MG tablet, Take 1,000 mg by mouth., Disp: , Rfl:     LIDOcaine-prilocaine (EMLA) cream, APPLY TO THE AFFECTED AREA 1 HOUR BEFORE PORT ACCESS, Disp: , Rfl:     OLANZapine (ZYPREXA) 5 MG tablet, Take days 1-4 of chemotherapy, Disp: 30 tablet, Rfl: 2    ondansetron (ZOFRAN) 8 MG tablet, Take 8 mg by mouth 3 (three) times daily as needed., Disp: , Rfl:     Vitamins/Supplements: none     Labs: Reviewed from 9/28- potassium 3.3, Albumin 3.2    Nutrition Diagnosis    Problem: altered nutrition related labs: potassium   Etiology (related to):  chemotherapy  vs inadequate intake   Signs/Symptoms (as evidenced by): potassium 3.3     Nutrition Intervention    Nutrition Prescription   5525-1266 Kcals (15-20kcal/kg)  83 g " protein (0.8g/kg)   2600 mL fluid (25mL/kg)    Recommendations:  Continue Culturelle digestive daily probiotic   Increase intake of high potassium foods (list provided)   Add Liquid IV to water 1-2 times daily   Discussed options for buying smaller portions of food that are easy to prepare  -skillet meals, frozen salmon patties, small portions of vegetables     Nutrition Monitoring and Evaluation    Monitor: diet tolerance , diet education needs , and GI symptoms     Goals: continued improvement in GI symptoms, diet tolerance     Follow up PRN    Communication to referring provider/care team: note available in chart     Counseling time: 15 Minutes    Radha Patel, MPH, RD, , LDN, FAND   [phone]

## 2022-10-12 RX ORDER — HEPARIN 100 UNIT/ML
500 SYRINGE INTRAVENOUS
Status: CANCELLED | OUTPATIENT
Start: 2022-10-19

## 2022-10-12 RX ORDER — DEXAMETHASONE 4 MG/1
8 TABLET ORAL
Status: CANCELLED
Start: 2022-10-19

## 2022-10-12 RX ORDER — SODIUM CHLORIDE 0.9 % (FLUSH) 0.9 %
10 SYRINGE (ML) INJECTION
Status: CANCELLED | OUTPATIENT
Start: 2022-10-19

## 2022-10-17 ENCOUNTER — CLINICAL SUPPORT (OUTPATIENT)
Dept: HEMATOLOGY/ONCOLOGY | Facility: CLINIC | Age: 52
End: 2022-10-17
Payer: MEDICARE

## 2022-10-17 DIAGNOSIS — G89.29 CHRONIC BILATERAL LOW BACK PAIN WITHOUT SCIATICA: Primary | ICD-10-CM

## 2022-10-17 DIAGNOSIS — M54.50 CHRONIC BILATERAL LOW BACK PAIN WITHOUT SCIATICA: Primary | ICD-10-CM

## 2022-10-17 DIAGNOSIS — R11.0 NAUSEA: ICD-10-CM

## 2022-10-17 PROCEDURE — 97810 ACUP 1/> WO ESTIM 1ST 15 MIN: CPT | Mod: ,,, | Performed by: ACUPUNCTURIST

## 2022-10-17 PROCEDURE — 97810 PR ACUPUNCT W/O ELEC STIMUL 15 MIN: ICD-10-PCS | Mod: ,,, | Performed by: ACUPUNCTURIST

## 2022-10-17 PROCEDURE — 97811 PR ACUPUNCT W/O ELEC STIMUL ADDL 15M: ICD-10-PCS | Mod: ,,, | Performed by: ACUPUNCTURIST

## 2022-10-17 PROCEDURE — 97811 ACUP 1/> W/O ESTIM EA ADD 15: CPT | Mod: ,,, | Performed by: ACUPUNCTURIST

## 2022-10-17 NOTE — PROGRESS NOTES
Subjective:       Patient ID: Shikha López is a 52 y.o. female.    Chief Complaint: Pain (cLBP, general joint) and Nausea (CINV)    Patient states she had a harder weekend with lots of movement. Feeling fatigued today. cLBP doing okay, knees better. Continue with care as needed.    Review of Systems   Gastrointestinal:  Positive for nausea.   Musculoskeletal:  Positive for arthralgias and back pain.       Objective:      Physical Exam    Assessment:       Problem List Items Addressed This Visit    None  Visit Diagnoses       Chronic bilateral low back pain without sciatica    -  Primary    Nausea                  Plan:       1x a week or as needed         Pre-Symptom Score: NA  Post-Symptom Score: NA     Pain (cLBP, general joint) and Nausea (CINV)       Encounter Diagnoses   Name Primary?    Chronic bilateral low back pain without sciatica Yes    Nausea      Acupuncture points used:  4 PEREZ, Du20, Gb34, Li11, REN12, MAZARIEGOS MEN, Sp10, Sp6, Sp9, SSC, St25, St36, St40, and YIN JENKINS    NO STIM USED      NEEDLES IN: 18  NEEDLES OUT: 18    NEEDLES W/O STIM  AT: 8:15 AM  NEEDLES W/O STIM REMOVED AT: 8:45 AM

## 2022-10-19 ENCOUNTER — OFFICE VISIT (OUTPATIENT)
Dept: HEMATOLOGY/ONCOLOGY | Facility: CLINIC | Age: 52
End: 2022-10-19
Payer: MEDICARE

## 2022-10-19 ENCOUNTER — INFUSION (OUTPATIENT)
Dept: INFUSION THERAPY | Facility: HOSPITAL | Age: 52
End: 2022-10-19
Attending: INTERNAL MEDICINE
Payer: MEDICARE

## 2022-10-19 VITALS
WEIGHT: 232.06 LBS | BODY MASS INDEX: 42.7 KG/M2 | RESPIRATION RATE: 18 BRPM | TEMPERATURE: 98 F | SYSTOLIC BLOOD PRESSURE: 143 MMHG | DIASTOLIC BLOOD PRESSURE: 67 MMHG | HEART RATE: 81 BPM | HEIGHT: 62 IN | OXYGEN SATURATION: 98 %

## 2022-10-19 VITALS
SYSTOLIC BLOOD PRESSURE: 153 MMHG | TEMPERATURE: 98 F | BODY MASS INDEX: 42.7 KG/M2 | RESPIRATION RATE: 18 BRPM | WEIGHT: 232.06 LBS | HEART RATE: 85 BPM | DIASTOLIC BLOOD PRESSURE: 69 MMHG | HEIGHT: 62 IN | OXYGEN SATURATION: 98 %

## 2022-10-19 DIAGNOSIS — C78.02 MALIGNANT NEOPLASM METASTATIC TO LEFT LUNG: ICD-10-CM

## 2022-10-19 DIAGNOSIS — C50.912 BREAST CANCER, STAGE 4, LEFT: Primary | ICD-10-CM

## 2022-10-19 PROCEDURE — 99999 PR PBB SHADOW E&M-EST. PATIENT-LVL IV: ICD-10-PCS | Mod: PBBFAC,,, | Performed by: NURSE PRACTITIONER

## 2022-10-19 PROCEDURE — 99214 OFFICE O/P EST MOD 30 MIN: CPT | Mod: PBBFAC,25 | Performed by: NURSE PRACTITIONER

## 2022-10-19 PROCEDURE — 96413 CHEMO IV INFUSION 1 HR: CPT

## 2022-10-19 PROCEDURE — 99215 OFFICE O/P EST HI 40 MIN: CPT | Mod: S$PBB,,, | Performed by: NURSE PRACTITIONER

## 2022-10-19 PROCEDURE — 96367 TX/PROPH/DG ADDL SEQ IV INF: CPT

## 2022-10-19 PROCEDURE — A4216 STERILE WATER/SALINE, 10 ML: HCPCS | Performed by: INTERNAL MEDICINE

## 2022-10-19 PROCEDURE — 99999 PR PBB SHADOW E&M-EST. PATIENT-LVL IV: CPT | Mod: PBBFAC,,, | Performed by: NURSE PRACTITIONER

## 2022-10-19 PROCEDURE — 63600175 PHARM REV CODE 636 W HCPCS: Performed by: INTERNAL MEDICINE

## 2022-10-19 PROCEDURE — 25000003 PHARM REV CODE 250: Performed by: INTERNAL MEDICINE

## 2022-10-19 PROCEDURE — 99215 PR OFFICE/OUTPT VISIT, EST, LEVL V, 40-54 MIN: ICD-10-PCS | Mod: S$PBB,,, | Performed by: NURSE PRACTITIONER

## 2022-10-19 RX ORDER — HEPARIN 100 UNIT/ML
500 SYRINGE INTRAVENOUS
Status: DISCONTINUED | OUTPATIENT
Start: 2022-10-19 | End: 2022-10-19 | Stop reason: HOSPADM

## 2022-10-19 RX ORDER — DEXAMETHASONE 4 MG/1
8 TABLET ORAL
Status: COMPLETED | OUTPATIENT
Start: 2022-10-19 | End: 2022-10-19

## 2022-10-19 RX ORDER — SODIUM CHLORIDE 0.9 % (FLUSH) 0.9 %
10 SYRINGE (ML) INJECTION
Status: DISCONTINUED | OUTPATIENT
Start: 2022-10-19 | End: 2022-10-19 | Stop reason: HOSPADM

## 2022-10-19 RX ADMIN — HEPARIN 500 UNITS: 100 SYRINGE at 11:10

## 2022-10-19 RX ADMIN — FOSAPREPITANT 150 MG: 150 INJECTION, POWDER, LYOPHILIZED, FOR SOLUTION INTRAVENOUS at 09:10

## 2022-10-19 RX ADMIN — DEXAMETHASONE 8 MG: 4 TABLET ORAL at 09:10

## 2022-10-19 RX ADMIN — FAM-TRASTUZUMAB DERUXTECAN-NXKI 600 MG: 100 INJECTION, POWDER, LYOPHILIZED, FOR SOLUTION INTRAVENOUS at 10:10

## 2022-10-19 RX ADMIN — Medication 10 ML: at 11:10

## 2022-10-19 RX ADMIN — PALONOSETRON 0.25 MG: 0.05 INJECTION, SOLUTION INTRAVENOUS at 09:10

## 2022-10-19 NOTE — PLAN OF CARE
Enhertu infusion complete tolerated well. Pt monitored 30 mins post infusion no s/s of reaction. PAC flushed with NS positive blood rtn hep locked de accessed. D/c home, avs declined. Ambulated away from unit independently, NAD.

## 2022-10-24 ENCOUNTER — CLINICAL SUPPORT (OUTPATIENT)
Dept: HEMATOLOGY/ONCOLOGY | Facility: CLINIC | Age: 52
End: 2022-10-24
Payer: MEDICARE

## 2022-10-24 ENCOUNTER — PATIENT MESSAGE (OUTPATIENT)
Dept: HEMATOLOGY/ONCOLOGY | Facility: CLINIC | Age: 52
End: 2022-10-24

## 2022-10-24 DIAGNOSIS — R11.0 NAUSEA: ICD-10-CM

## 2022-10-24 DIAGNOSIS — M54.50 CHRONIC BILATERAL LOW BACK PAIN WITHOUT SCIATICA: Primary | ICD-10-CM

## 2022-10-24 DIAGNOSIS — G89.29 CHRONIC BILATERAL LOW BACK PAIN WITHOUT SCIATICA: Primary | ICD-10-CM

## 2022-10-24 PROCEDURE — 97811 ACUP 1/> W/O ESTIM EA ADD 15: CPT | Mod: ,,, | Performed by: ACUPUNCTURIST

## 2022-10-24 PROCEDURE — 97810 PR ACUPUNCT W/O ELEC STIMUL 15 MIN: ICD-10-PCS | Mod: ,,, | Performed by: ACUPUNCTURIST

## 2022-10-24 PROCEDURE — 97811 PR ACUPUNCT W/O ELEC STIMUL ADDL 15M: ICD-10-PCS | Mod: ,,, | Performed by: ACUPUNCTURIST

## 2022-10-24 PROCEDURE — 97810 ACUP 1/> WO ESTIM 1ST 15 MIN: CPT | Mod: ,,, | Performed by: ACUPUNCTURIST

## 2022-10-24 NOTE — PROGRESS NOTES
Subjective:       Patient ID: Shikha López is a 52 y.o. female.    Chief Complaint: Pain (cLBP) and Results (fatigue)    Patient states she is extremely fatigued after this chemo and weekend due to family situation and animals. Continue with care weekly.     Review of Systems   Constitutional:  Positive for fatigue.   Gastrointestinal:  Positive for nausea.   Musculoskeletal:  Positive for arthralgias and back pain.       Objective:      Physical Exam    Assessment:       Problem List Items Addressed This Visit    None  Visit Diagnoses       Chronic bilateral low back pain without sciatica    -  Primary    Nausea                  Plan:       1x a week         Pre-Symptom Score: NA  Post-Symptom Score: NA     Pain (cLBP) and Results (fatigue)       Encounter Diagnoses   Name Primary?    Chronic bilateral low back pain without sciatica Yes    Nausea        Acupuncture points used:  4 PEREZ, Du20, Gb34, Li11, Pc6, REN12, MAZARIEGOS MEN, Sp10, Sp6, Sp9, St25, St36, St40, St44, and YIN JENKINS    NO STIM USED      NEEDLES IN: 21  NEEDLES OUT: 21    NEEDLES W/O STIM  AT: 8:10 AM  NEEDLES W/O STIM REMOVED AT: 8:40 AM

## 2022-10-30 ENCOUNTER — PATIENT MESSAGE (OUTPATIENT)
Dept: HEMATOLOGY/ONCOLOGY | Facility: CLINIC | Age: 52
End: 2022-10-30
Payer: MEDICARE

## 2022-11-03 RX ORDER — SODIUM CHLORIDE 0.9 % (FLUSH) 0.9 %
10 SYRINGE (ML) INJECTION
Status: CANCELLED | OUTPATIENT
Start: 2022-11-13

## 2022-11-03 RX ORDER — HEPARIN 100 UNIT/ML
500 SYRINGE INTRAVENOUS
Status: CANCELLED | OUTPATIENT
Start: 2022-11-13

## 2022-11-03 RX ORDER — DEXAMETHASONE 4 MG/1
8 TABLET ORAL
Status: CANCELLED
Start: 2022-11-13

## 2022-11-04 ENCOUNTER — PATIENT MESSAGE (OUTPATIENT)
Dept: HEMATOLOGY/ONCOLOGY | Facility: CLINIC | Age: 52
End: 2022-11-04
Payer: MEDICARE

## 2022-11-07 ENCOUNTER — HOSPITAL ENCOUNTER (OUTPATIENT)
Dept: CARDIOLOGY | Facility: HOSPITAL | Age: 52
Discharge: HOME | End: 2022-11-07
Attending: NURSE PRACTITIONER
Payer: MEDICARE

## 2022-11-07 ENCOUNTER — CLINICAL SUPPORT (OUTPATIENT)
Dept: HEMATOLOGY/ONCOLOGY | Facility: CLINIC | Age: 52
End: 2022-11-07
Payer: MEDICARE

## 2022-11-07 ENCOUNTER — LAB VISIT (OUTPATIENT)
Dept: LAB | Facility: HOSPITAL | Age: 52
End: 2022-11-07
Attending: INTERNAL MEDICINE
Payer: MEDICARE

## 2022-11-07 VITALS
WEIGHT: 232 LBS | BODY MASS INDEX: 42.69 KG/M2 | HEIGHT: 62 IN | SYSTOLIC BLOOD PRESSURE: 140 MMHG | HEART RATE: 75 BPM | DIASTOLIC BLOOD PRESSURE: 67 MMHG

## 2022-11-07 DIAGNOSIS — G89.29 CHRONIC BILATERAL LOW BACK PAIN WITHOUT SCIATICA: Primary | ICD-10-CM

## 2022-11-07 DIAGNOSIS — C78.02 MALIGNANT NEOPLASM METASTATIC TO LEFT LUNG: ICD-10-CM

## 2022-11-07 DIAGNOSIS — M54.50 CHRONIC BILATERAL LOW BACK PAIN WITHOUT SCIATICA: Primary | ICD-10-CM

## 2022-11-07 DIAGNOSIS — C50.912 BREAST CANCER, STAGE 4, LEFT: ICD-10-CM

## 2022-11-07 DIAGNOSIS — R11.0 NAUSEA: ICD-10-CM

## 2022-11-07 LAB
ALBUMIN SERPL BCP-MCNC: 3.2 G/DL (ref 3.5–5.2)
ALP SERPL-CCNC: 134 U/L (ref 55–135)
ALT SERPL W/O P-5'-P-CCNC: 20 U/L (ref 10–44)
ANION GAP SERPL CALC-SCNC: 9 MMOL/L (ref 8–16)
ASCENDING AORTA: 3.1 CM
AST SERPL-CCNC: 35 U/L (ref 10–40)
AV INDEX (PROSTH): 0.45
AV MEAN GRADIENT: 11 MMHG
AV PEAK GRADIENT: 20 MMHG
AV VALVE AREA: 1.73 CM2
AV VELOCITY RATIO: 0.42
BASOPHILS # BLD AUTO: 0.03 K/UL (ref 0–0.2)
BASOPHILS NFR BLD: 1.1 % (ref 0–1.9)
BILIRUB SERPL-MCNC: 2 MG/DL (ref 0.1–1)
BSA FOR ECHO PROCEDURE: 2.15 M2
BUN SERPL-MCNC: 7 MG/DL (ref 6–20)
CALCIUM SERPL-MCNC: 8.7 MG/DL (ref 8.7–10.5)
CHLORIDE SERPL-SCNC: 105 MMOL/L (ref 95–110)
CO2 SERPL-SCNC: 25 MMOL/L (ref 23–29)
CREAT SERPL-MCNC: 0.6 MG/DL (ref 0.5–1.4)
CV ECHO LV RWT: 0.23 CM
DIFFERENTIAL METHOD: ABNORMAL
DOP CALC AO PEAK VEL: 2.22 M/S
DOP CALC AO VTI: 51.2 CM
DOP CALC LVOT AREA: 3.8 CM2
DOP CALC LVOT DIAMETER: 2.2 CM
DOP CALC LVOT PEAK VEL: 0.93 M/S
DOP CALC LVOT STROKE VOLUME: 88.45 CM3
DOP CALC MV VTI: 19.19 CM
DOP CALCLVOT PEAK VEL VTI: 23.28 CM
E WAVE DECELERATION TIME: 222.86 MSEC
E/A RATIO: 1.09
E/E' RATIO: 10.38 M/S
ECHO LV POSTERIOR WALL: 0.58 CM (ref 0.6–1.1)
EJECTION FRACTION: 60 %
EOSINOPHIL # BLD AUTO: 0.3 K/UL (ref 0–0.5)
EOSINOPHIL NFR BLD: 10 % (ref 0–8)
ERYTHROCYTE [DISTWIDTH] IN BLOOD BY AUTOMATED COUNT: 17.6 % (ref 11.5–14.5)
EST. GFR  (NO RACE VARIABLE): >60 ML/MIN/1.73 M^2
FRACTIONAL SHORTENING: 36 % (ref 28–44)
GLUCOSE SERPL-MCNC: 93 MG/DL (ref 70–110)
HCT VFR BLD AUTO: 37.3 % (ref 37–48.5)
HGB BLD-MCNC: 12.2 G/DL (ref 12–16)
IMM GRANULOCYTES # BLD AUTO: 0 K/UL (ref 0–0.04)
IMM GRANULOCYTES NFR BLD AUTO: 0 % (ref 0–0.5)
INTERVENTRICULAR SEPTUM: 0.62 CM (ref 0.6–1.1)
IVRT: 65.65 MSEC
LA MAJOR: 5.88 CM
LA MINOR: 5.84 CM
LA WIDTH: 5.05 CM
LEFT ATRIUM SIZE: 4.32 CM
LEFT ATRIUM VOLUME INDEX MOD: 41.7 ML/M2
LEFT ATRIUM VOLUME INDEX: 53.3 ML/M2
LEFT ATRIUM VOLUME MOD: 85.11 CM3
LEFT ATRIUM VOLUME: 108.66 CM3
LEFT INTERNAL DIMENSION IN SYSTOLE: 3.27 CM (ref 2.1–4)
LEFT VENTRICLE DIASTOLIC VOLUME INDEX: 60.75 ML/M2
LEFT VENTRICLE DIASTOLIC VOLUME: 123.93 ML
LEFT VENTRICLE MASS INDEX: 48 G/M2
LEFT VENTRICLE SYSTOLIC VOLUME INDEX: 21.1 ML/M2
LEFT VENTRICLE SYSTOLIC VOLUME: 43.08 ML
LEFT VENTRICULAR INTERNAL DIMENSION IN DIASTOLE: 5.1 CM (ref 3.5–6)
LEFT VENTRICULAR MASS: 98.27 G
LV LATERAL E/E' RATIO: 10.9 M/S
LV SEPTAL E/E' RATIO: 9.91 M/S
LYMPHOCYTES # BLD AUTO: 0.7 K/UL (ref 1–4.8)
LYMPHOCYTES NFR BLD: 25.8 % (ref 18–48)
MCH RBC QN AUTO: 35.4 PG (ref 27–31)
MCHC RBC AUTO-ENTMCNC: 32.7 G/DL (ref 32–36)
MCV RBC AUTO: 108 FL (ref 82–98)
MONOCYTES # BLD AUTO: 0.2 K/UL (ref 0.3–1)
MONOCYTES NFR BLD: 8.6 % (ref 4–15)
MV A" WAVE DURATION": 8.56 MSEC
MV MEAN GRADIENT: 1 MMHG
MV PEAK A VEL: 1 M/S
MV PEAK E VEL: 1.09 M/S
MV PEAK GRADIENT: 2 MMHG
MV STENOSIS PRESSURE HALF TIME: 74.23 MS
MV VALVE AREA BY CONTINUITY EQUATION: 4.61 CM2
MV VALVE AREA P 1/2 METHOD: 2.96 CM2
NEUTROPHILS # BLD AUTO: 1.5 K/UL (ref 1.8–7.7)
NEUTROPHILS NFR BLD: 54.5 % (ref 38–73)
NRBC BLD-RTO: 0 /100 WBC
PISA MRMAX VEL: 0.05 M/S
PISA TR MAX VEL: 2.92 M/S
PLATELET # BLD AUTO: 132 K/UL (ref 150–450)
PMV BLD AUTO: 10.9 FL (ref 9.2–12.9)
POTASSIUM SERPL-SCNC: 3.5 MMOL/L (ref 3.5–5.1)
PROT SERPL-MCNC: 6 G/DL (ref 6–8.4)
PULM VEIN S/D RATIO: 0.82
PV PEAK D VEL: 0.9 M/S
PV PEAK S VEL: 0.74 M/S
QEF: 62 %
RA MAJOR: 5.06 CM
RA PRESSURE: 8 MMHG
RA WIDTH: 3.92 CM
RBC # BLD AUTO: 3.45 M/UL (ref 4–5.4)
RIGHT VENTRICULAR END-DIASTOLIC DIMENSION: 4.2 CM
RV TISSUE DOPPLER FREE WALL SYSTOLIC VELOCITY 1 (APICAL 4 CHAMBER VIEW): 11.76 CM/S
SINUS: 2.95 CM
SODIUM SERPL-SCNC: 139 MMOL/L (ref 136–145)
STJ: 2.62 CM
TDI LATERAL: 0.1 M/S
TDI SEPTAL: 0.11 M/S
TDI: 0.11 M/S
TR MAX PG: 34 MMHG
TRICUSPID ANNULAR PLANE SYSTOLIC EXCURSION: 1.88 CM
TV REST PULMONARY ARTERY PRESSURE: 42 MMHG
WBC # BLD AUTO: 2.79 K/UL (ref 3.9–12.7)

## 2022-11-07 PROCEDURE — 97811 PR ACUPUNCT W/O ELEC STIMUL ADDL 15M: ICD-10-PCS | Mod: ,,, | Performed by: ACUPUNCTURIST

## 2022-11-07 PROCEDURE — 93356 MYOCRD STRAIN IMG SPCKL TRCK: CPT | Mod: ,,, | Performed by: INTERNAL MEDICINE

## 2022-11-07 PROCEDURE — 85025 COMPLETE CBC W/AUTO DIFF WBC: CPT | Performed by: INTERNAL MEDICINE

## 2022-11-07 PROCEDURE — 93356 ECHO (CUPID ONLY): ICD-10-PCS | Mod: ,,, | Performed by: INTERNAL MEDICINE

## 2022-11-07 PROCEDURE — 93306 TTE W/DOPPLER COMPLETE: CPT | Mod: 26,,, | Performed by: INTERNAL MEDICINE

## 2022-11-07 PROCEDURE — 80053 COMPREHEN METABOLIC PANEL: CPT | Performed by: INTERNAL MEDICINE

## 2022-11-07 PROCEDURE — 97810 ACUP 1/> WO ESTIM 1ST 15 MIN: CPT | Mod: ,,, | Performed by: ACUPUNCTURIST

## 2022-11-07 PROCEDURE — 93306 ECHO (CUPID ONLY): ICD-10-PCS | Mod: 26,,, | Performed by: INTERNAL MEDICINE

## 2022-11-07 PROCEDURE — 36415 COLL VENOUS BLD VENIPUNCTURE: CPT | Performed by: INTERNAL MEDICINE

## 2022-11-07 PROCEDURE — 97810 PR ACUPUNCT W/O ELEC STIMUL 15 MIN: ICD-10-PCS | Mod: ,,, | Performed by: ACUPUNCTURIST

## 2022-11-07 PROCEDURE — 93356 MYOCRD STRAIN IMG SPCKL TRCK: CPT

## 2022-11-07 PROCEDURE — 97811 ACUP 1/> W/O ESTIM EA ADD 15: CPT | Mod: ,,, | Performed by: ACUPUNCTURIST

## 2022-11-08 NOTE — PROGRESS NOTES
Subjective:       Patient ID: Shikha López is a 52 y.o. female.    Chief Complaint: Pain (cLBP)    Patient states she had a long weekend and is fatigued. Pain is minimal and nausea is reduced. Continue with care.     Review of Systems   Gastrointestinal:  Positive for nausea.   Musculoskeletal:  Positive for arthralgias and back pain.       Objective:      Physical Exam    Assessment:       Problem List Items Addressed This Visit    None  Visit Diagnoses       Chronic bilateral low back pain without sciatica    -  Primary    Nausea                  Plan:       1x a week         Pre-Symptom Score: NA  Post-Symptom Score: NA     Pain (cLBP)       Encounter Diagnoses   Name Primary?    Chronic bilateral low back pain without sciatica Yes    Nausea        Acupuncture points used:  4 PEREZ, Du20, Gb34, Li11, REN12, MAZARIEGOS MEN, Sp10, Sp6, Sp9, SSC, St25, St36, St40, and YIN JENKINS    NO STIM USED      NEEDLES IN: 24  NEEDLES OUT: 24    NEEDLES W/O STIM  AT: 8:30 AM  NEEDLES W/O STIM REMOVED AT: 9:00 AM

## 2022-11-08 NOTE — PROGRESS NOTES
Subjective:       Patient ID: Shikha López is a 52 y.o. female.    Chief Complaint: No chief complaint on file.    HPI 52-year-old female who returns for F/U  for metastatic breast cancer.  She is on Trastuzumab deruxtecan  - here for cycle  25.      She continues to do well with only some mild nausea for 1-2 days after treatment. She has no shortness of breath.      Breast history:  She presented in the emergency room at [facility] on September 29, 2006 with a 4 months history of inflammation of her left breast.  Physical examination at that time showed the left breast was largely replaced by large mass with multiple skin ulcerations.    A biopsy in September 2006 showed poorly differentiated carcinoma which was ER and LA. negative and HER2 positive.    She was then referred to [facility] for additional care.   CT scan of the chest and abdomen November 2006 revealed multiple nodules in the lungs consistent with metastatic disease.  There also enlarged left axillary node.    She was treated with chemotherapy with weekly Herceptin and Abraxane and had marked improvement in her breast and lung metastasis on that therapy.    On May 29, 2007 she underwent left modified radical mastectomy(Dr. Colvin) which showed no residual tumor in the breast and 1/5 nodes was positive for metastasis measuring 6 mm.  (ypT0N1).    She then received postoperative radiation therapy(Dr Singh)  to the left chest wall supraclav and internal mammary lymph nodes from August 20, 2007 to September 28, 2007.    Postoperatively she continued on Herceptin for approximately 3 and 1/2 years before her lung metastasis begin to grow.    She subsequently received a number of different chemotherapy treatments including Adriamycin, Halaven, Xeloda plus lapatinib then Xeloda plus Herceptin.  The  Those treatments were provided under the care of  in [facility].  Subsequently, she transferred her care to  at [facility]  [facility].  She was initially treated with Taxotere Herceptin Perjeta.      She was then changed to Kadcyla.    In July 2020 PET scan showed an increase in the size of an infrahilar mass consistent with progression.   She then took approximately 9 months of Herceptin and Navelbine.  Apparently she has some initial response to that therapy.    She started trastuzumab- deruxtecan  4/12/21.     Echo 8/5/22  -EF 65%    CT on 9/26/22 -   Sandra/Mediastinum: No significant mediastinal, hilar, or axillary lymphadenopathy     Lungs: Trachea and bronchi are patent.  Similar lung parenchymal mosaic pattern similar likely from small airway disease.   Left spiculated infrahilar mass obstructing the proximal segment of the left lower lobe bronchi with increase in size measuring approximately 2.8 x 3.4 cm, previously 2.7 x 2.7 cm however, suboptimal comparison due to lack of IV contrast on the prior study.   Scattered bilateral pulmonary nodules are stable in size and number in comparison to prior, largest on the right measuring 1.8 cm  and largest on the left measuring 1.4 cm .  No new consolidation or pulmonary nodule.  No pleural effusion.  Stable left-sided pleural thickening.     Liver: Normal in size and contour.  No focal hepatic lesion.    Review of Systems   Constitutional:  Negative for appetite change and unexpected weight change.   HENT:  Negative for mouth sores.    Eyes:  Negative for visual disturbance.   Respiratory:  Negative for cough and shortness of breath.    Cardiovascular:  Negative for chest pain.   Gastrointestinal:  Positive for nausea. Negative for abdominal pain, constipation and diarrhea.   Genitourinary:  Negative for frequency.   Musculoskeletal:  Negative for back pain.   Integumentary:  Negative for rash.   Neurological:  Negative for headaches.   Hematological:  Negative for adenopathy.   Psychiatric/Behavioral: Negative.  The patient is not nervous/anxious.        Objective:      Physical  Exam  Vitals reviewed.   Constitutional:       General: She is not in acute distress.     Appearance: She is obese.   HENT:      Mouth/Throat:      Mouth: Mucous membranes are moist.      Pharynx: Oropharynx is clear. No oropharyngeal exudate or posterior oropharyngeal erythema.   Eyes:      Pupils: Pupils are equal, round, and reactive to light.   Cardiovascular:      Rate and Rhythm: Normal rate and regular rhythm.      Heart sounds: Murmur (systolic -aortic) heard.   Pulmonary:      Effort: Pulmonary effort is normal. No respiratory distress.      Breath sounds: Normal breath sounds. No wheezing or rales.   Chest:   Breasts:     Right: Normal.      Left: Absent.       Abdominal:      Palpations: Abdomen is soft. There is no mass.      Tenderness: There is no abdominal tenderness.   Lymphadenopathy:      Cervical: No cervical adenopathy.      Upper Body:      Right upper body: No supraclavicular or axillary adenopathy.      Left upper body: No supraclavicular or axillary adenopathy.   Skin:     Findings: No rash.   Neurological:      Mental Status: She is alert and oriented to person, place, and time.   Psychiatric:         Mood and Affect: Mood normal.         Behavior: Behavior normal.         Thought Content: Thought content normal.         Judgment: Judgment normal.       Assessment:    CBC shows  WBC 2790, ANC  1500, HGB 12.2  Plts,[id number]  CMP - bili 2  Echo -60%  Problem List Items Addressed This Visit       Breast cancer, stage 4, left - Primary       Plan:       Continue current RX  RTC 3 weeks.  Repeat scans in 3 M - end of December.    Route Chart for Scheduling    Med Onc Chart Routing      Follow up with physician 3 weeks. me or Mariam   Follow up with JIMI    Infusion scheduling note    Injection scheduling note    Labs CBC and CMP   Lab interval:     Imaging    Pharmacy appointment    Other referrals          Treatment Plan Information   OP BREAST FAM-TRASTUZUMAB DERUXTECAN-NXKI Q3W   Home Willis MD    Upcoming Treatment Dates - OP BREAST FAM-TRASTUZUMAB DERUXTECAN-NXKI Q3W    11/13/2022       Chemotherapy       fam-trastuzumab deruxtecan-nxki (ENHERTU) 602 mg in dextrose 5 % 100 mL infusion       Antiemetics       fosaprepitant 150 mg in sodium chloride 0.9% 150 mL IVPB       palonosetron (ALOXI) 0.25 mg in sodium chloride 0.9% 50 mL IVPB       dexAMETHasone tablet 8 mg  12/4/2022       Chemotherapy       fam-trastuzumab deruxtecan-nxki 602 mg in dextrose 5 % 100 mL infusion       Antiemetics       fosaprepitant 150 mg in sodium chloride 0.9% 150 mL IVPB       palonosetron (ALOXI) 0.25 mg in sodium chloride 0.9% 50 mL IVPB       dexAMETHasone tablet 8 mg  12/25/2022       Chemotherapy       fam-trastuzumab deruxtecan-nxki 602 mg in dextrose 5 % 100 mL infusion       Antiemetics       fosaprepitant 150 mg in sodium chloride 0.9% 150 mL IVPB       palonosetron (ALOXI) 0.25 mg in sodium chloride 0.9% 50 mL IVPB       dexAMETHasone tablet 8 mg  1/15/2023       Chemotherapy       fam-trastuzumab deruxtecan-nxki 602 mg in dextrose 5 % 100 mL infusion       Antiemetics       fosaprepitant 150 mg in sodium chloride 0.9% 150 mL IVPB       palonosetron (ALOXI) 0.25 mg in sodium chloride 0.9% 50 mL IVPB       dexAMETHasone tablet 8 mg

## 2022-11-09 ENCOUNTER — INFUSION (OUTPATIENT)
Dept: INFUSION THERAPY | Facility: HOSPITAL | Age: 52
End: 2022-11-09
Attending: INTERNAL MEDICINE
Payer: MEDICARE

## 2022-11-09 ENCOUNTER — OFFICE VISIT (OUTPATIENT)
Dept: HEMATOLOGY/ONCOLOGY | Facility: CLINIC | Age: 52
End: 2022-11-09
Payer: MEDICARE

## 2022-11-09 VITALS
RESPIRATION RATE: 18 BRPM | OXYGEN SATURATION: 95 % | DIASTOLIC BLOOD PRESSURE: 72 MMHG | HEIGHT: 62 IN | WEIGHT: 231.94 LBS | HEART RATE: 98 BPM | BODY MASS INDEX: 42.68 KG/M2 | SYSTOLIC BLOOD PRESSURE: 137 MMHG | TEMPERATURE: 98 F

## 2022-11-09 VITALS — HEART RATE: 80 BPM | SYSTOLIC BLOOD PRESSURE: 111 MMHG | DIASTOLIC BLOOD PRESSURE: 55 MMHG

## 2022-11-09 DIAGNOSIS — C50.912 BREAST CANCER, STAGE 4, LEFT: Primary | ICD-10-CM

## 2022-11-09 PROCEDURE — 96367 TX/PROPH/DG ADDL SEQ IV INF: CPT

## 2022-11-09 PROCEDURE — 99999 PR PBB SHADOW E&M-EST. PATIENT-LVL III: ICD-10-PCS | Mod: PBBFAC,,, | Performed by: INTERNAL MEDICINE

## 2022-11-09 PROCEDURE — 96413 CHEMO IV INFUSION 1 HR: CPT

## 2022-11-09 PROCEDURE — 99214 PR OFFICE/OUTPT VISIT, EST, LEVL IV, 30-39 MIN: ICD-10-PCS | Mod: S$PBB,,, | Performed by: INTERNAL MEDICINE

## 2022-11-09 PROCEDURE — A4216 STERILE WATER/SALINE, 10 ML: HCPCS | Performed by: INTERNAL MEDICINE

## 2022-11-09 PROCEDURE — 99214 OFFICE O/P EST MOD 30 MIN: CPT | Mod: S$PBB,,, | Performed by: INTERNAL MEDICINE

## 2022-11-09 PROCEDURE — 99999 PR PBB SHADOW E&M-EST. PATIENT-LVL III: CPT | Mod: PBBFAC,,, | Performed by: INTERNAL MEDICINE

## 2022-11-09 PROCEDURE — 99213 OFFICE O/P EST LOW 20 MIN: CPT | Mod: PBBFAC,25 | Performed by: INTERNAL MEDICINE

## 2022-11-09 PROCEDURE — 63600175 PHARM REV CODE 636 W HCPCS: Mod: JG | Performed by: INTERNAL MEDICINE

## 2022-11-09 PROCEDURE — 25000003 PHARM REV CODE 250: Performed by: INTERNAL MEDICINE

## 2022-11-09 RX ORDER — SODIUM CHLORIDE 0.9 % (FLUSH) 0.9 %
10 SYRINGE (ML) INJECTION
Status: DISCONTINUED | OUTPATIENT
Start: 2022-11-09 | End: 2022-11-09 | Stop reason: HOSPADM

## 2022-11-09 RX ORDER — DEXAMETHASONE 4 MG/1
8 TABLET ORAL
Status: COMPLETED | OUTPATIENT
Start: 2022-11-09 | End: 2022-11-09

## 2022-11-09 RX ORDER — HEPARIN 100 UNIT/ML
500 SYRINGE INTRAVENOUS
Status: DISCONTINUED | OUTPATIENT
Start: 2022-11-09 | End: 2022-11-09 | Stop reason: HOSPADM

## 2022-11-09 RX ADMIN — HEPARIN 500 UNITS: 100 SYRINGE at 12:11

## 2022-11-09 RX ADMIN — PALONOSETRON 0.25 MG: 0.05 INJECTION, SOLUTION INTRAVENOUS at 09:11

## 2022-11-09 RX ADMIN — DEXAMETHASONE 8 MG: 4 TABLET ORAL at 09:11

## 2022-11-09 RX ADMIN — FAM-TRASTUZUMAB DERUXTECAN-NXKI 600 MG: 100 INJECTION, POWDER, LYOPHILIZED, FOR SOLUTION INTRAVENOUS at 10:11

## 2022-11-09 RX ADMIN — Medication 10 ML: at 12:11

## 2022-11-09 RX ADMIN — SODIUM CHLORIDE: 9 INJECTION, SOLUTION INTRAVENOUS at 09:11

## 2022-11-09 RX ADMIN — FOSAPREPITANT 150 MG: 150 INJECTION, POWDER, LYOPHILIZED, FOR SOLUTION INTRAVENOUS at 09:11

## 2022-11-09 NOTE — PLAN OF CARE
0900: Pt arrived. Pt A&Ox4. VSS. Labs reviewed. Chemo consent found in chart. All medications review and verbal understanding noted. Port accessed with sterile technique. Positive blood return noted prior to infusion. All premeds given. Will continue to monitor.

## 2022-11-09 NOTE — PLAN OF CARE
1211: Pt tolerated treatment well. Positive blood return noted s/p chemo infusion. Port deaccessed s/p heparin lock. Pt reeducated on sign and symptoms of reaction and instructed to seek medical care if reaction noted. Pt denied AVS, will use MyChart. Pt ambulated out of clinic.

## 2022-11-13 NOTE — PROGRESS NOTES
Subjective:       Patient ID: Shikha López is a 52 y.o. female.    Chief Complaint: Pain (cLBP)    Weekly maintenance for cLBP, joint and general discomfort. Continue through end of year.     Review of Systems   Constitutional:  Positive for fatigue.   Gastrointestinal:  Positive for nausea.   Musculoskeletal:  Positive for arthralgias, back pain and leg pain.       Objective:      Physical Exam    Assessment:       Problem List Items Addressed This Visit    None  Visit Diagnoses       Chronic bilateral low back pain without sciatica    -  Primary    Nausea                  Plan:       1x a week         Pre-Symptom Score: NA  Post-Symptom Score: NA     Pain (cLBP)       Encounter Diagnoses   Name Primary?    Chronic bilateral low back pain without sciatica Yes    Nausea      Acupuncture points used:  4 PEREZ, Du20, Gb34, Li11, Lu7, Pc6, REN12, MAZARIEGOS MEN, Sp10, Sp6, Sp9, St25, St36, St44, and YIN JENKINS    NO STIM USED      NEEDLES IN: 24  NEEDLES OUT: 24    NEEDLES W/O STIM  AT: 8:30 AM   NEEDLES W/O STIM REMOVED AT: 9:00 AM

## 2022-11-14 ENCOUNTER — PATIENT MESSAGE (OUTPATIENT)
Dept: HEMATOLOGY/ONCOLOGY | Facility: CLINIC | Age: 52
End: 2022-11-14

## 2022-11-14 ENCOUNTER — CLINICAL SUPPORT (OUTPATIENT)
Dept: HEMATOLOGY/ONCOLOGY | Facility: CLINIC | Age: 52
End: 2022-11-14
Payer: MEDICARE

## 2022-11-14 DIAGNOSIS — G89.29 CHRONIC BILATERAL LOW BACK PAIN WITHOUT SCIATICA: Primary | ICD-10-CM

## 2022-11-14 DIAGNOSIS — M54.50 CHRONIC BILATERAL LOW BACK PAIN WITHOUT SCIATICA: Primary | ICD-10-CM

## 2022-11-14 PROCEDURE — 97810 ACUP 1/> WO ESTIM 1ST 15 MIN: CPT | Mod: ,,, | Performed by: ACUPUNCTURIST

## 2022-11-14 PROCEDURE — 97811 PR ACUPUNCT W/O ELEC STIMUL ADDL 15M: ICD-10-PCS | Mod: ,,, | Performed by: ACUPUNCTURIST

## 2022-11-14 PROCEDURE — 97810 PR ACUPUNCT W/O ELEC STIMUL 15 MIN: ICD-10-PCS | Mod: ,,, | Performed by: ACUPUNCTURIST

## 2022-11-14 PROCEDURE — 97811 ACUP 1/> W/O ESTIM EA ADD 15: CPT | Mod: ,,, | Performed by: ACUPUNCTURIST

## 2022-11-14 NOTE — PROGRESS NOTES
Subjective:       Patient ID: Shikha López is a 52 y.o. female.    Chief Complaint: Pain (cLBP)    Patient finishing protocol. Still having symptoms after treatments - may follow up in the new year.     Review of Systems   Musculoskeletal:  Positive for arthralgias and back pain.       Objective:      Physical Exam    Assessment:       Problem List Items Addressed This Visit    None  Visit Diagnoses       Chronic bilateral low back pain without sciatica    -  Primary            Plan:       Follow up in new year         Pre-Symptom Score: NA  Post-Symptom Score: NA     Pain (cLBP)       Encounter Diagnoses   Name Primary?    Chronic bilateral low back pain without sciatica Yes       Acupuncture points used:  4 PEREZ, Du20, Gb34, Li11, REN12, Sp10, Sp6, Sp9, St25, St36, St40, St44, and YIN JENKINS    NO STIM USED      NEEDLES IN: 24  NEEDLES OUT: 24    NEEDLES W/O STIM  AT: 8:45 AM  NEEDLES W/O STIM REMOVED AT: 9:15 AM

## 2022-11-17 NOTE — PROGRESS NOTES
Subjective:       Patient ID: Shikha López is a 52 y.o. female.    Chief Complaint: Breast cancer, stage 4, left    HPI 52-year-old female who returns for F/U  for metastatic breast cancer.  She is on Trastuzumab deruxtecan  - here for cycle  26.      Overall she is stable. She does note she is extremely fatigued.   Appetite is stable. She continues to do well with only some mild nausea for 1-2 days after treatment. She has no shortness of breath.  Bowel movements stable - worse the first few days after treatment then improves.       Per Dr. Willis's previous note: Breast history:  She presented in the emergency room at [facility] on September 29, 2006 with a 4 months history of inflammation of her left breast.  Physical examination at that time showed the left breast was largely replaced by large mass with multiple skin ulcerations.    A biopsy in September 2006 showed poorly differentiated carcinoma which was ER and [place]. negative and HER2 positive.    She was then referred to [facility] for additional care.   CT scan of the chest and abdomen November 2006 revealed multiple nodules in the lungs consistent with metastatic disease.  There also enlarged left axillary node.    She was treated with chemotherapy with weekly Herceptin and Abraxane and had marked improvement in her breast and lung metastasis on that therapy.    On May 29, 2007 she underwent left modified radical mastectomy(Dr. Colvin) which showed no residual tumor in the breast and 1/5 nodes was positive for metastasis measuring 6 mm.  (ypT0N1).    She then received postoperative radiation therapy(Dr Singh)  to the left chest wall supraclav and internal mammary lymph nodes from August 20, 2007 to September 28, 2007.    Postoperatively she continued on Herceptin for approximately 3 and 1/2 years before her lung metastasis begin to grow.    She subsequently received a number of different chemotherapy treatments including Adriamycin,  Halaven, Xeloda plus lapatinib then Xeloda plus Herceptin.  The  Those treatments were provided under the care of  in [facility].  Subsequently, she transferred her care to  at [facility].  She was initially treated with Taxotere Herceptin Perjeta.      She was then changed to Kadcyla.    In July 2020 PET scan showed an increase in the size of an infrahilar mass consistent with progression.   She then took approximately 9 months of Herceptin and Navelbine.  Apparently she has some initial response to that therapy.    She started trastuzumab- deruxtecan  4/12/21.     Echo 8/5/22  -EF 65%    CT on 9/26/22 -   Sandra/Mediastinum: No significant mediastinal, hilar, or axillary lymphadenopathy     Lungs: Trachea and bronchi are patent.  Similar lung parenchymal mosaic pattern similar likely from small airway disease.   Left spiculated infrahilar mass obstructing the proximal segment of the left lower lobe bronchi with increase in size measuring approximately 2.8 x 3.4 cm, previously 2.7 x 2.7 cm however, suboptimal comparison due to lack of IV contrast on the prior study.   Scattered bilateral pulmonary nodules are stable in size and number in comparison to prior, largest on the right measuring 1.8 cm  and largest on the left measuring 1.4 cm .  No new consolidation or pulmonary nodule.  No pleural effusion.  Stable left-sided pleural thickening.     Liver: Normal in size and contour.  No focal hepatic lesion.    Review of Systems   Constitutional:  Positive for fatigue. Negative for appetite change, chills, fever and unexpected weight change.   HENT:  Negative for mouth sores.    Eyes:  Negative for visual disturbance.   Respiratory:  Negative for cough and shortness of breath.    Cardiovascular:  Negative for chest pain.   Gastrointestinal:  Positive for constipation and nausea. Negative for abdominal pain and diarrhea.   Genitourinary:  Negative for frequency.   Musculoskeletal:  Negative for  back pain.   Integumentary:  Negative for rash.   Neurological:  Positive for numbness (stable no changes) and memory loss. Negative for dizziness, light-headedness and headaches.   Hematological:  Negative for adenopathy.   Psychiatric/Behavioral: Negative.  The patient is not nervous/anxious.        Objective:      Physical Exam  Vitals reviewed.   Constitutional:       General: She is not in acute distress.     Appearance: She is obese.   HENT:      Mouth/Throat:      Mouth: Mucous membranes are moist.      Pharynx: Oropharynx is clear. No oropharyngeal exudate or posterior oropharyngeal erythema.   Eyes:      Pupils: Pupils are equal, round, and reactive to light.   Cardiovascular:      Rate and Rhythm: Normal rate and regular rhythm.      Heart sounds: Murmur (systolic -aortic) heard.   Pulmonary:      Effort: Pulmonary effort is normal. No respiratory distress.      Breath sounds: Normal breath sounds. No wheezing or rales.   Chest:   Breasts:     Right: Normal.      Left: Absent.       Abdominal:      Palpations: Abdomen is soft. There is no mass.      Tenderness: There is no abdominal tenderness.   Lymphadenopathy:      Cervical: No cervical adenopathy.      Upper Body:      Right upper body: No supraclavicular or axillary adenopathy.      Left upper body: No supraclavicular or axillary adenopathy.   Skin:     Findings: No rash.   Neurological:      Mental Status: She is alert and oriented to person, place, and time.   Psychiatric:         Mood and Affect: Mood normal.         Behavior: Behavior normal.         Thought Content: Thought content normal.         Judgment: Judgment normal.       Assessment:      1. Malignant neoplasm metastatic to left lung        2. Breast cancer, stage 4, left        3. Lack of concentration  methylphenidate HCl (RITALIN) 5 MG tablet             Plan:       1-2. Continue current RX  -Scans due the end of December will delay until Jan as Dr. Willis is out  - Echo due in Feb.  -  T. Bili 2.2 will continue to monitor, LFTs WNL    Return to clinic in 3 weeks with MD appointment and labs.     Patient is in agreement with the proposed treatment plan. All questions were answered to the patient's satisfaction. Patient knows to call clinic for any new or worsening symptoms and if anything is needed before the next clinic visit.          Mariam Lisa, FNP-C  Hematology & Medical Oncology   [address] [place] [address]  ph. [phone]  Fax. [phone]    Collaborating physician, Dr. Willis.    Approximately 15 minutes were spent face-to-face with the patient.  Approximately 25 minutes in total were spent on this encounter, which includes face-to-face time and non-face-to-face time preparing to see the patient (e.g., review of tests), obtaining and/or reviewing separately obtained history, documenting clinical information in the electronic or other health record, independently interpreting results (not separately reported) and communicating results to the patient/family/caregiver, or care coordination (not separately reported).       Route Chart for Scheduling    Med Onc Chart Routing      Follow up with physician 6 weeks. 1/18   Follow up with JIMI . 9 weeks labs and see me with chemo after   Infusion scheduling note enhertu every 3 weeks   Injection scheduling note    Labs CBC and CMP   Lab interval: every 3 weeks     Imaging ECHO   Please move scan from 12/27 to 1/17 prior to seeing Dr. Willis; echo in Feb   Pharmacy appointment    Other referrals        Treatment Plan Information   OP BREAST FAM-TRASTUZUMAB DERUXTECAN-NXKI Q3W   Home Willis MD   Upcoming Treatment Dates - OP BREAST FAM-TRASTUZUMAB DERUXTECAN-NXKI Q3W    12/4/2022       Chemotherapy       fam-trastuzumab deruxtecan-nxki 602 mg in dextrose 5 % 100 mL infusion       Antiemetics       fosaprepitant 150 mg in sodium chloride 0.9% 150 mL IVPB       palonosetron (ALOXI) 0.25 mg in sodium chloride 0.9% 50 mL IVPB        dexAMETHasone tablet 8 mg  12/25/2022       Chemotherapy       fam-trastuzumab deruxtecan-nxki 602 mg in dextrose 5 % 100 mL infusion       Antiemetics       fosaprepitant 150 mg in sodium chloride 0.9% 150 mL IVPB       palonosetron (ALOXI) 0.25 mg in sodium chloride 0.9% 50 mL IVPB       dexAMETHasone tablet 8 mg  1/15/2023       Chemotherapy       fam-trastuzumab deruxtecan-nxki 602 mg in dextrose 5 % 100 mL infusion       Antiemetics       fosaprepitant 150 mg in sodium chloride 0.9% 150 mL IVPB       palonosetron (ALOXI) 0.25 mg in sodium chloride 0.9% 50 mL IVPB       dexAMETHasone tablet 8 mg

## 2022-11-30 RX ORDER — HEPARIN 100 UNIT/ML
500 SYRINGE INTRAVENOUS
Status: CANCELLED | OUTPATIENT
Start: 2022-12-04

## 2022-11-30 RX ORDER — DEXAMETHASONE 4 MG/1
8 TABLET ORAL
Status: CANCELLED
Start: 2022-12-04

## 2022-11-30 RX ORDER — SODIUM CHLORIDE 0.9 % (FLUSH) 0.9 %
10 SYRINGE (ML) INJECTION
Status: CANCELLED | OUTPATIENT
Start: 2022-12-04

## 2022-12-01 ENCOUNTER — INFUSION (OUTPATIENT)
Dept: INFUSION THERAPY | Facility: HOSPITAL | Age: 52
End: 2022-12-01
Attending: INTERNAL MEDICINE
Payer: MEDICARE

## 2022-12-01 ENCOUNTER — OFFICE VISIT (OUTPATIENT)
Dept: HEMATOLOGY/ONCOLOGY | Facility: CLINIC | Age: 52
End: 2022-12-01
Payer: MEDICARE

## 2022-12-01 ENCOUNTER — PATIENT MESSAGE (OUTPATIENT)
Dept: HEMATOLOGY/ONCOLOGY | Facility: CLINIC | Age: 52
End: 2022-12-01

## 2022-12-01 VITALS
OXYGEN SATURATION: 98 % | SYSTOLIC BLOOD PRESSURE: 134 MMHG | RESPIRATION RATE: 18 BRPM | TEMPERATURE: 98 F | DIASTOLIC BLOOD PRESSURE: 66 MMHG | BODY MASS INDEX: 41.97 KG/M2 | WEIGHT: 228.06 LBS | HEART RATE: 85 BPM | HEIGHT: 62 IN

## 2022-12-01 VITALS
HEART RATE: 81 BPM | TEMPERATURE: 98 F | RESPIRATION RATE: 18 BRPM | SYSTOLIC BLOOD PRESSURE: 111 MMHG | DIASTOLIC BLOOD PRESSURE: 55 MMHG | OXYGEN SATURATION: 98 %

## 2022-12-01 DIAGNOSIS — C50.912 BREAST CANCER, STAGE 4, LEFT: Primary | ICD-10-CM

## 2022-12-01 DIAGNOSIS — C78.02 MALIGNANT NEOPLASM METASTATIC TO LEFT LUNG: Primary | ICD-10-CM

## 2022-12-01 DIAGNOSIS — R41.840 LACK OF CONCENTRATION: ICD-10-CM

## 2022-12-01 DIAGNOSIS — C50.912 BREAST CANCER, STAGE 4, LEFT: ICD-10-CM

## 2022-12-01 PROCEDURE — 63600175 PHARM REV CODE 636 W HCPCS: Performed by: INTERNAL MEDICINE

## 2022-12-01 PROCEDURE — 96367 TX/PROPH/DG ADDL SEQ IV INF: CPT

## 2022-12-01 PROCEDURE — 96413 CHEMO IV INFUSION 1 HR: CPT

## 2022-12-01 PROCEDURE — 99999 PR PBB SHADOW E&M-EST. PATIENT-LVL III: ICD-10-PCS | Mod: PBBFAC,,, | Performed by: NURSE PRACTITIONER

## 2022-12-01 PROCEDURE — 99215 PR OFFICE/OUTPT VISIT, EST, LEVL V, 40-54 MIN: ICD-10-PCS | Mod: S$PBB,,, | Performed by: NURSE PRACTITIONER

## 2022-12-01 PROCEDURE — 99213 OFFICE O/P EST LOW 20 MIN: CPT | Mod: PBBFAC,25 | Performed by: NURSE PRACTITIONER

## 2022-12-01 PROCEDURE — 25000003 PHARM REV CODE 250: Performed by: INTERNAL MEDICINE

## 2022-12-01 PROCEDURE — 99999 PR PBB SHADOW E&M-EST. PATIENT-LVL III: CPT | Mod: PBBFAC,,, | Performed by: NURSE PRACTITIONER

## 2022-12-01 PROCEDURE — A4216 STERILE WATER/SALINE, 10 ML: HCPCS | Performed by: INTERNAL MEDICINE

## 2022-12-01 PROCEDURE — 99215 OFFICE O/P EST HI 40 MIN: CPT | Mod: S$PBB,,, | Performed by: NURSE PRACTITIONER

## 2022-12-01 RX ORDER — SODIUM CHLORIDE 0.9 % (FLUSH) 0.9 %
10 SYRINGE (ML) INJECTION
Status: DISCONTINUED | OUTPATIENT
Start: 2022-12-01 | End: 2022-12-01 | Stop reason: HOSPADM

## 2022-12-01 RX ORDER — HEPARIN 100 UNIT/ML
500 SYRINGE INTRAVENOUS
Status: DISCONTINUED | OUTPATIENT
Start: 2022-12-01 | End: 2022-12-01 | Stop reason: HOSPADM

## 2022-12-01 RX ORDER — DEXAMETHASONE 4 MG/1
8 TABLET ORAL
Status: COMPLETED | OUTPATIENT
Start: 2022-12-01 | End: 2022-12-01

## 2022-12-01 RX ORDER — METHYLPHENIDATE HYDROCHLORIDE 5 MG/1
5 TABLET ORAL 2 TIMES DAILY
Qty: 60 TABLET | Refills: 0 | Status: SHIPPED | OUTPATIENT
Start: 2022-12-01 | End: 2022-12-05

## 2022-12-01 RX ADMIN — Medication 10 ML: at 12:12

## 2022-12-01 RX ADMIN — HEPARIN 500 UNITS: 100 SYRINGE at 12:12

## 2022-12-01 RX ADMIN — FAM-TRASTUZUMAB DERUXTECAN-NXKI 600 MG: 100 INJECTION, POWDER, LYOPHILIZED, FOR SOLUTION INTRAVENOUS at 10:12

## 2022-12-01 RX ADMIN — DEXAMETHASONE 8 MG: 4 TABLET ORAL at 09:12

## 2022-12-01 RX ADMIN — PALONOSETRON 0.25 MG: 0.05 INJECTION, SOLUTION INTRAVENOUS at 09:12

## 2022-12-01 RX ADMIN — SODIUM CHLORIDE 150 MG: 0.9 INJECTION, SOLUTION INTRAVENOUS at 09:12

## 2022-12-01 NOTE — PLAN OF CARE
1210-okay to run Enhertu over 1 hr per provider, Pt tolerated Enhertu infusion well , no complications or side effects, POC and meds discussed with pt, pt aware of upcoming appts, pt knows to call MD with any questions or concerns. Pt ambulated off unit, no distress noted.

## 2022-12-05 RX ORDER — METHYLPHENIDATE HYDROCHLORIDE 5 MG/1
5 TABLET ORAL 2 TIMES DAILY
Qty: 60 TABLET | Refills: 0 | Status: SHIPPED | OUTPATIENT
Start: 2022-12-05 | End: 2023-01-03 | Stop reason: SDUPTHER

## 2022-12-06 ENCOUNTER — TELEPHONE (OUTPATIENT)
Dept: HEMATOLOGY/ONCOLOGY | Facility: CLINIC | Age: 52
End: 2022-12-06
Payer: MEDICARE

## 2022-12-06 NOTE — TELEPHONE ENCOUNTER
"Returned call, spoke to representative. Dx and letter confirmed,.          ----- Message from Constantin Alston sent at 12/6/2022  9:56 AM CST -----  Regarding: Consult/Advisory    Name Of Caller:Sally Durant    Contact Preference?:[phone]           What is the nature of the call?: Calling in regards of a letter. Would like to confirm that letter came from Dr. Lisa           Additional Notes:  "Thank you for all that you do for our patients'"     "

## 2022-12-28 ENCOUNTER — OFFICE VISIT (OUTPATIENT)
Dept: HEMATOLOGY/ONCOLOGY | Facility: CLINIC | Age: 52
End: 2022-12-28
Payer: MEDICARE

## 2022-12-28 ENCOUNTER — INFUSION (OUTPATIENT)
Dept: INFUSION THERAPY | Facility: HOSPITAL | Age: 52
End: 2022-12-28
Attending: INTERNAL MEDICINE
Payer: MEDICARE

## 2022-12-28 VITALS
SYSTOLIC BLOOD PRESSURE: 134 MMHG | BODY MASS INDEX: 39.88 KG/M2 | OXYGEN SATURATION: 96 % | HEIGHT: 62 IN | DIASTOLIC BLOOD PRESSURE: 67 MMHG | WEIGHT: 216.69 LBS | TEMPERATURE: 98 F | RESPIRATION RATE: 18 BRPM | HEART RATE: 94 BPM

## 2022-12-28 VITALS
TEMPERATURE: 98 F | BODY MASS INDEX: 39.88 KG/M2 | HEIGHT: 62 IN | HEART RATE: 84 BPM | SYSTOLIC BLOOD PRESSURE: 142 MMHG | DIASTOLIC BLOOD PRESSURE: 72 MMHG | WEIGHT: 216.69 LBS | RESPIRATION RATE: 18 BRPM

## 2022-12-28 DIAGNOSIS — C78.02 MALIGNANT NEOPLASM METASTATIC TO LEFT LUNG: ICD-10-CM

## 2022-12-28 DIAGNOSIS — C50.912 BREAST CANCER, STAGE 4, LEFT: Primary | ICD-10-CM

## 2022-12-28 DIAGNOSIS — C78.02 MALIGNANT NEOPLASM METASTATIC TO LEFT LUNG: Primary | ICD-10-CM

## 2022-12-28 PROCEDURE — 96367 TX/PROPH/DG ADDL SEQ IV INF: CPT

## 2022-12-28 PROCEDURE — 99215 PR OFFICE/OUTPT VISIT, EST, LEVL V, 40-54 MIN: ICD-10-PCS | Mod: S$PBB,,, | Performed by: INTERNAL MEDICINE

## 2022-12-28 PROCEDURE — 99999 PR PBB SHADOW E&M-EST. PATIENT-LVL III: CPT | Mod: PBBFAC,,, | Performed by: INTERNAL MEDICINE

## 2022-12-28 PROCEDURE — 99213 OFFICE O/P EST LOW 20 MIN: CPT | Mod: PBBFAC,25 | Performed by: INTERNAL MEDICINE

## 2022-12-28 PROCEDURE — A4216 STERILE WATER/SALINE, 10 ML: HCPCS | Performed by: INTERNAL MEDICINE

## 2022-12-28 PROCEDURE — 96413 CHEMO IV INFUSION 1 HR: CPT

## 2022-12-28 PROCEDURE — 25000003 PHARM REV CODE 250: Performed by: INTERNAL MEDICINE

## 2022-12-28 PROCEDURE — 63600175 PHARM REV CODE 636 W HCPCS: Performed by: INTERNAL MEDICINE

## 2022-12-28 PROCEDURE — 99999 PR PBB SHADOW E&M-EST. PATIENT-LVL III: ICD-10-PCS | Mod: PBBFAC,,, | Performed by: INTERNAL MEDICINE

## 2022-12-28 PROCEDURE — 99215 OFFICE O/P EST HI 40 MIN: CPT | Mod: S$PBB,,, | Performed by: INTERNAL MEDICINE

## 2022-12-28 RX ORDER — DEXAMETHASONE 4 MG/1
8 TABLET ORAL
Status: CANCELLED
Start: 2022-12-28

## 2022-12-28 RX ORDER — SODIUM CHLORIDE 0.9 % (FLUSH) 0.9 %
10 SYRINGE (ML) INJECTION
Status: DISCONTINUED | OUTPATIENT
Start: 2022-12-28 | End: 2022-12-28 | Stop reason: HOSPADM

## 2022-12-28 RX ORDER — POTASSIUM CHLORIDE 7.45 MG/ML
20 INJECTION INTRAVENOUS
Status: CANCELLED
Start: 2022-12-28

## 2022-12-28 RX ORDER — SODIUM CHLORIDE 0.9 % (FLUSH) 0.9 %
10 SYRINGE (ML) INJECTION
Status: CANCELLED | OUTPATIENT
Start: 2022-12-28

## 2022-12-28 RX ORDER — DEXAMETHASONE 4 MG/1
8 TABLET ORAL
Status: COMPLETED | OUTPATIENT
Start: 2022-12-28 | End: 2022-12-28

## 2022-12-28 RX ORDER — POTASSIUM CHLORIDE 7.45 MG/ML
20 INJECTION INTRAVENOUS
Status: DISCONTINUED | OUTPATIENT
Start: 2022-12-28 | End: 2022-12-28

## 2022-12-28 RX ORDER — HEPARIN 100 UNIT/ML
500 SYRINGE INTRAVENOUS
Status: CANCELLED | OUTPATIENT
Start: 2022-12-28

## 2022-12-28 RX ORDER — POTASSIUM CHLORIDE 7.45 MG/ML
10 INJECTION INTRAVENOUS
Status: COMPLETED | OUTPATIENT
Start: 2022-12-28 | End: 2022-12-28

## 2022-12-28 RX ORDER — HEPARIN 100 UNIT/ML
500 SYRINGE INTRAVENOUS
Status: DISCONTINUED | OUTPATIENT
Start: 2022-12-28 | End: 2022-12-28 | Stop reason: HOSPADM

## 2022-12-28 RX ADMIN — FAM-TRASTUZUMAB DERUXTECAN-NXKI 500 MG: 100 INJECTION, POWDER, LYOPHILIZED, FOR SOLUTION INTRAVENOUS at 12:12

## 2022-12-28 RX ADMIN — Medication 10 ML: at 01:12

## 2022-12-28 RX ADMIN — HEPARIN 500 UNITS: 100 SYRINGE at 01:12

## 2022-12-28 RX ADMIN — FOSAPREPITANT 150 MG: 150 INJECTION, POWDER, LYOPHILIZED, FOR SOLUTION INTRAVENOUS at 11:12

## 2022-12-28 RX ADMIN — PALONOSETRON 0.25 MG: 0.05 INJECTION, SOLUTION INTRAVENOUS at 11:12

## 2022-12-28 RX ADMIN — DEXAMETHASONE 8 MG: 4 TABLET ORAL at 11:12

## 2022-12-28 RX ADMIN — SODIUM CHLORIDE 1000 ML: 9 INJECTION, SOLUTION INTRAVENOUS at 09:12

## 2022-12-28 RX ADMIN — POTASSIUM CHLORIDE 10 MEQ: 10 INJECTION, SOLUTION INTRAVENOUS at 09:12

## 2022-12-28 RX ADMIN — POTASSIUM CHLORIDE 10 MEQ: 10 INJECTION, SOLUTION INTRAVENOUS at 10:12

## 2022-12-28 NOTE — PLAN OF CARE
Patient tolerated Enhertu, IVFs, and potassium with no complications. VSS. Pt instructed to call MD with any problems. Pt discharged home independently.

## 2022-12-28 NOTE — PROGRESS NOTES
Subjective:       Patient ID: Shikha López is a 52 y.o. female.    Chief Complaint: Malignant neoplasm metastatic to left lung      HPI 52-year-old female who returns for F/U  for metastatic breast cancer.  She is on Trastuzumab deruxtecan  - here for cycle  27.      On ritalin for the extreme fatigue, it is helping but she has noticed decreased appetite.   Is walking her dogs and staying active that way.   She wants to try IV fluids for fatigue and dehydration.   Has lost 12 lbs since her last visit       Per Dr. Willis's previous note: Breast history:  She presented in the emergency room at [facility] on September 29, 2006 with a 4 months history of inflammation of her left breast.  Physical examination at that time showed the left breast was largely replaced by large mass with multiple skin ulcerations.    A biopsy in September 2006 showed poorly differentiated carcinoma which was ER and [place]. negative and HER2 positive.    She was then referred to [facility] for additional care.   CT scan of the chest and abdomen November 2006 revealed multiple nodules in the lungs consistent with metastatic disease.  There also enlarged left axillary node.    She was treated with chemotherapy with weekly Herceptin and Abraxane and had marked improvement in her breast and lung metastasis on that therapy.    On May 29, 2007 she underwent left modified radical mastectomy(Dr. Colvin) which showed no residual tumor in the breast and 1/5 nodes was positive for metastasis measuring 6 mm.  (ypT0N1).    She then received postoperative radiation therapy(Dr Singh)  to the left chest wall supraclav and internal mammary lymph nodes from August 20, 2007 to September 28, 2007.    Postoperatively she continued on Herceptin for approximately 3 and 1/2 years before her lung metastasis begin to grow.    She subsequently received a number of different chemotherapy treatments including Adriamycin, Halaven, Xeloda plus lapatinib then  Xeloda plus Herceptin.  The  Those treatments were provided under the care of  in [facility].  Subsequently, she transferred her care to  at [facility].  She was initially treated with Taxotere Herceptin Perjeta.      She was then changed to Kadcyla.    In July 2020 PET scan showed an increase in the size of an infrahilar mass consistent with progression.   She then took approximately 9 months of Herceptin and Navelbine.  Apparently she has some initial response to that therapy.    She started trastuzumab- deruxtecan  4/12/21.     Echo 8/5/22  -EF 65%    CT on 9/26/22 -   Sandra/Mediastinum: No significant mediastinal, hilar, or axillary lymphadenopathy     Lungs: Trachea and bronchi are patent.  Similar lung parenchymal mosaic pattern similar likely from small airway disease.   Left spiculated infrahilar mass obstructing the proximal segment of the left lower lobe bronchi with increase in size measuring approximately 2.8 x 3.4 cm, previously 2.7 x 2.7 cm however, suboptimal comparison due to lack of IV contrast on the prior study.   Scattered bilateral pulmonary nodules are stable in size and number in comparison to prior, largest on the right measuring 1.8 cm  and largest on the left measuring 1.4 cm .  No new consolidation or pulmonary nodule.  No pleural effusion.  Stable left-sided pleural thickening.     Liver: Normal in size and contour.  No focal hepatic lesion.    Review of Systems   Constitutional:  Positive for fatigue. Negative for activity change, appetite change, chills, fever and unexpected weight change.   HENT:  Negative for nasal congestion, mouth sores, nosebleeds, rhinorrhea, sneezing and sore throat.    Eyes:  Negative for photophobia, discharge, itching and visual disturbance.   Respiratory:  Negative for cough, chest tightness, shortness of breath and wheezing.    Cardiovascular:  Negative for chest pain, palpitations and leg swelling.   Gastrointestinal:  Positive for  constipation and nausea. Negative for abdominal distention, abdominal pain, blood in stool, diarrhea and vomiting.   Endocrine: Negative for cold intolerance, heat intolerance, polydipsia and polyuria.   Genitourinary:  Negative for difficulty urinating, dysuria, flank pain, frequency and hematuria.   Musculoskeletal:  Negative for arthralgias, back pain, joint swelling and neck stiffness.   Integumentary:  Negative for color change, pallor and rash.   Allergic/Immunologic: Negative for food allergies.   Neurological:  Positive for numbness (stable no changes) and memory loss. Negative for dizziness, tremors, seizures, syncope, weakness, light-headedness and headaches.   Hematological:  Negative for adenopathy. Does not bruise/bleed easily.   Psychiatric/Behavioral: Negative.  Negative for agitation, behavioral problems, confusion and sleep disturbance. The patient is not nervous/anxious.        Objective:      Physical Exam  Vitals reviewed.   Constitutional:       General: She is not in acute distress.     Appearance: She is obese.   HENT:      Mouth/Throat:      Mouth: Mucous membranes are moist.      Pharynx: Oropharynx is clear. No oropharyngeal exudate or posterior oropharyngeal erythema.   Eyes:      Pupils: Pupils are equal, round, and reactive to light.   Cardiovascular:      Rate and Rhythm: Normal rate and regular rhythm.      Heart sounds: Murmur (systolic -aortic) heard.   Pulmonary:      Effort: Pulmonary effort is normal. No respiratory distress.      Breath sounds: Normal breath sounds. No wheezing or rales.   Chest:   Breasts:     Right: Normal.      Left: Absent.       Abdominal:      Palpations: Abdomen is soft. There is no mass.      Tenderness: There is no abdominal tenderness.   Lymphadenopathy:      Cervical: No cervical adenopathy.      Upper Body:      Right upper body: No supraclavicular or axillary adenopathy.      Left upper body: No supraclavicular or axillary adenopathy.   Skin:      Findings: No rash.   Neurological:      Mental Status: She is alert and oriented to person, place, and time.   Psychiatric:         Mood and Affect: Mood normal.         Behavior: Behavior normal.         Thought Content: Thought content normal.         Judgment: Judgment normal.       Assessment:      1. Breast cancer, stage 4, left     2. Malignant neoplasm metastatic to left lung        3. Hypokalemia     4. Weight loss                                                     Plan:       1-2. Continue current RX  -Scans scheduled prior to next visit with Dr. Willis   - Noy pool in Feb.  - T. Bili 2.24will continue to monitor, LFTs WNL    3. Mild and intermittent. Will give KCL 20 meq IV with IVF today.   4. Dose adjusted based on weight. Monitor and offer appetite stimulants as needed.     Return to clinic in 3 weeks with MD appointment and labs.     Patient is in agreement with the proposed treatment plan. All questions were answered to the patient's satisfaction. Patient knows to call clinic for any new or worsening symptoms and if anything is needed before the next clinic visit.            Route Chart for Scheduling    Med Onc Chart Routing      Follow up with physician . Appts scheduled   Follow up with JIMI    Infusion scheduling note    Injection scheduling note    Labs    Imaging    Pharmacy appointment    Other referrals        Treatment Plan Information   OP BREAST FAM-TRASTUZUMAB DERUXTECAN-NXKI Q3W   Home Willis MD   Upcoming Treatment Dates - OP BREAST FAM-TRASTUZUMAB DERUXTECAN-NXKI Q3W    1/15/2023       Chemotherapy       fam-trastuzumab deruxtecan-nxki (ENHERTU) 530 mg in dextrose 5 % 100 mL infusion       Antiemetics       fosaprepitant 150 mg in sodium chloride 0.9% 150 mL IVPB       palonosetron (ALOXI) 0.25 mg in sodium chloride 0.9% 50 mL IVPB       dexAMETHasone tablet 8 mg

## 2023-01-03 DIAGNOSIS — R41.840 LACK OF CONCENTRATION: ICD-10-CM

## 2023-01-03 RX ORDER — METHYLPHENIDATE HYDROCHLORIDE 5 MG/1
5 TABLET ORAL 2 TIMES DAILY
Qty: 60 TABLET | Refills: 0 | Status: SHIPPED | OUTPATIENT
Start: 2023-01-03 | End: 2023-02-08 | Stop reason: SDUPTHER

## 2023-01-11 RX ORDER — HEPARIN 100 UNIT/ML
500 SYRINGE INTRAVENOUS
Status: CANCELLED | OUTPATIENT
Start: 2023-01-15

## 2023-01-11 RX ORDER — DEXAMETHASONE 4 MG/1
8 TABLET ORAL
Status: CANCELLED
Start: 2023-01-15

## 2023-01-11 RX ORDER — SODIUM CHLORIDE 0.9 % (FLUSH) 0.9 %
10 SYRINGE (ML) INJECTION
Status: CANCELLED | OUTPATIENT
Start: 2023-01-15

## 2023-01-17 ENCOUNTER — HOSPITAL ENCOUNTER (OUTPATIENT)
Dept: RADIOLOGY | Facility: HOSPITAL | Age: 53
Discharge: HOME | End: 2023-01-17
Attending: NURSE PRACTITIONER
Payer: MEDICARE

## 2023-01-17 DIAGNOSIS — C50.912 BREAST CANCER, STAGE 4, LEFT: ICD-10-CM

## 2023-01-17 DIAGNOSIS — C78.02 MALIGNANT NEOPLASM METASTATIC TO LEFT LUNG: ICD-10-CM

## 2023-01-17 PROCEDURE — 74177 CT ABD & PELVIS W/CONTRAST: CPT | Mod: 26,,, | Performed by: RADIOLOGY

## 2023-01-17 PROCEDURE — 25500020 PHARM REV CODE 255: Performed by: NURSE PRACTITIONER

## 2023-01-17 PROCEDURE — 74177 CT ABD & PELVIS W/CONTRAST: CPT | Mod: TC

## 2023-01-17 PROCEDURE — 74177 CT CHEST ABDOMEN PELVIS WITH CONTRAST (XPD): ICD-10-PCS | Mod: 26,,, | Performed by: RADIOLOGY

## 2023-01-17 PROCEDURE — 71260 CT THORAX DX C+: CPT | Mod: TC

## 2023-01-17 PROCEDURE — A9698 NON-RAD CONTRAST MATERIALNOC: HCPCS | Performed by: NURSE PRACTITIONER

## 2023-01-17 PROCEDURE — 71260 CT CHEST ABDOMEN PELVIS WITH CONTRAST (XPD): ICD-10-PCS | Mod: 26,,, | Performed by: RADIOLOGY

## 2023-01-17 PROCEDURE — 71260 CT THORAX DX C+: CPT | Mod: 26,,, | Performed by: RADIOLOGY

## 2023-01-17 RX ADMIN — Medication 450 ML: at 12:01

## 2023-01-17 RX ADMIN — IOHEXOL 100 ML: 350 INJECTION, SOLUTION INTRAVENOUS at 12:01

## 2023-01-17 NOTE — PROGRESS NOTES
Subjective:       Patient ID: Shikha López is a 52 y.o. female.    Chief Complaint: No chief complaint on file.      HPI 52-year-old female who returns for F/U  for metastatic breast cancer.  She is on Trastuzumab deruxtecan  - here for cycle  28.      Today she reports that she is been doing well.  She is no new symptoms.  She has a chronic dry cough which she feels like starts in her throat with a tickle.    She is no shortness of breath or change in pulmonary symptoms.  Appetite and bowel function are good.  She is no unusual pain.  She got IV fluids before her last treatment which seemed to help her quite a bit.    Breast history:  She presented in the emergency room at [facility] on September 29, 2006 with a 4 months history of inflammation of her left breast.  Physical examination at that time showed the left breast was largely replaced by large mass with multiple skin ulcerations.    A biopsy in September 2006 showed poorly differentiated carcinoma which was ER and CA. negative and HER2 positive.    She was then referred to [facility] for additional care.   CT scan of the chest and abdomen November 2006 revealed multiple nodules in the lungs consistent with metastatic disease.  There also enlarged left axillary node.    She was treated with chemotherapy with weekly Herceptin and Abraxane and had marked improvement in her breast and lung metastasis on that therapy.    On May 29, 2007 she underwent left modified radical mastectomy(Dr. Colvin) which showed no residual tumor in the breast and 1/5 nodes was positive for metastasis measuring 6 mm.  (ypT0N1).    She then received postoperative radiation therapy(Dr Singh)  to the left chest wall supraclav and internal mammary lymph nodes from August 20, 2007 to September 28, 2007.    Postoperatively she continued on Herceptin for approximately 3 and 1/2 years before her lung metastasis begin to grow.    She subsequently received a number of  different chemotherapy treatments including Adriamycin, Halaven, Xeloda plus lapatinib then Xeloda plus Herceptin.  The  Those treatments were provided under the care of  in [facility].  Subsequently, she transferred her care to  at [facility].  She was initially treated with Taxotere Herceptin Perjeta.      She was then changed to Kadcyla.    In July 2020 PET scan showed an increase in the size of an infrahilar mass consistent with progression.   She then took approximately 9 months of Herceptin and Navelbine.  Apparently she has some initial response to that therapy.    She started trastuzumab- deruxtecan  4/12/21.     Echo 8/5/22  -EF 65%    CT on 9/26/22 -   Sandra/Mediastinum: No significant mediastinal, hilar, or axillary lymphadenopathy     Lungs: Trachea and bronchi are patent.  Similar lung parenchymal mosaic pattern similar likely from small airway disease.   Left spiculated infrahilar mass obstructing the proximal segment of the left lower lobe bronchi with increase in size measuring approximately 2.8 x 3.4 cm, previously 2.7 x 2.7 cm however, suboptimal comparison due to lack of IV contrast on the prior study.   Scattered bilateral pulmonary nodules are stable in size and number in comparison to prior, largest on the right measuring 1.8 cm  and largest on the left measuring 1.4 cm .  No new consolidation or pulmonary nodule.  No pleural effusion.  Stable left-sided pleural thickening.     Liver: Normal in size and contour.  No focal hepatic lesion.    Review of Systems   Constitutional:  Negative for appetite change and unexpected weight change.   HENT:  Negative for mouth sores.    Eyes:  Negative for visual disturbance.   Respiratory:  Positive for cough. Negative for shortness of breath.    Cardiovascular:  Negative for chest pain.   Gastrointestinal:  Negative for abdominal pain, constipation, diarrhea and nausea.   Genitourinary:  Negative for frequency.   Musculoskeletal:   Negative for back pain.   Integumentary:  Negative for rash.   Neurological:  Negative for headaches.   Hematological:  Negative for adenopathy.   Psychiatric/Behavioral: Negative.  The patient is not nervous/anxious.        Objective:      Physical Exam  Vitals reviewed.   Constitutional:       General: She is not in acute distress.     Appearance: She is obese.   HENT:      Mouth/Throat:      Mouth: Mucous membranes are moist.      Pharynx: Oropharynx is clear. No oropharyngeal exudate or posterior oropharyngeal erythema.   Eyes:      Pupils: Pupils are equal, round, and reactive to light.   Cardiovascular:      Rate and Rhythm: Normal rate and regular rhythm.      Heart sounds: Murmur (systolic -aortic) heard.   Pulmonary:      Effort: Pulmonary effort is normal. No respiratory distress.      Breath sounds: Normal breath sounds. No wheezing or rales.   Chest:   Breasts:     Right: Normal.      Left: Absent.       Abdominal:      Palpations: Abdomen is soft. There is no mass.      Tenderness: There is no abdominal tenderness.   Lymphadenopathy:      Cervical: No cervical adenopathy.      Upper Body:      Right upper body: No supraclavicular or axillary adenopathy.      Left upper body: No supraclavicular or axillary adenopathy.   Skin:     Findings: No rash.   Neurological:      Mental Status: She is alert and oriented to person, place, and time.   Psychiatric:         Mood and Affect: Mood normal.         Behavior: Behavior normal.         Thought Content: Thought content normal.         Judgment: Judgment normal.       Assessment:    CT 1/17/22  - 1. In this patient with metastatic breast cancer there is a stable infrahilar spiculated lung mass, stable bilateral pulmonary nodules, and stable enlarged retroperitoneal lymph node.  No definite new foci of metastatic disease.  There is a 12 mm right axillary node series 2, image 44 previously 10 mm.  2. Persistent mosaic attenuation in the bilateral lung parenchyma,  suggestive of small airways disease or increased pulmonary vascular resistance.  3. Stable left-sided pleural thickening.    WBC 3220 with ANC 1800  Problem List Items Addressed This Visit       Breast cancer, stage 4, left - Primary    Malignant neoplasm metastatic to left lung       Plan:     Echo next month  Continue current RX  RTC 3 weeks.      Route Chart for Scheduling    Med Onc Chart Routing      Follow up with physician 3 weeks. me or mesha   Follow up with JIMI    Infusion scheduling note    Injection scheduling note    Labs CBC and CMP   Lab interval:     Imaging    Pharmacy appointment    Other referrals        Treatment Plan Information   OP BREAST FAM-TRASTUZUMAB DERUXTECAN-NXKI Q3W   Home Willis MD   Upcoming Treatment Dates - OP BREAST FAM-TRASTUZUMAB DERUXTECAN-NXKI Q3W    1/15/2023       Chemotherapy       fam-trastuzumab deruxtecan-nxki (ENHERTU) 530 mg in dextrose 5 % 100 mL infusion       Antiemetics       fosaprepitant 150 mg in sodium chloride 0.9% 150 mL IVPB       palonosetron (ALOXI) 0.25 mg in sodium chloride 0.9% 50 mL IVPB       dexAMETHasone tablet 8 mg

## 2023-01-18 ENCOUNTER — OFFICE VISIT (OUTPATIENT)
Dept: HEMATOLOGY/ONCOLOGY | Facility: CLINIC | Age: 53
End: 2023-01-18
Payer: MEDICARE

## 2023-01-18 ENCOUNTER — INFUSION (OUTPATIENT)
Dept: INFUSION THERAPY | Facility: HOSPITAL | Age: 53
End: 2023-01-18
Attending: INTERNAL MEDICINE
Payer: MEDICARE

## 2023-01-18 VITALS
BODY MASS INDEX: 39.66 KG/M2 | HEART RATE: 83 BPM | HEIGHT: 62 IN | DIASTOLIC BLOOD PRESSURE: 57 MMHG | TEMPERATURE: 98 F | RESPIRATION RATE: 18 BRPM | BODY MASS INDEX: 39.66 KG/M2 | DIASTOLIC BLOOD PRESSURE: 66 MMHG | HEIGHT: 62 IN | TEMPERATURE: 97 F | RESPIRATION RATE: 18 BRPM | OXYGEN SATURATION: 95 % | WEIGHT: 215.5 LBS | SYSTOLIC BLOOD PRESSURE: 134 MMHG | HEART RATE: 90 BPM | SYSTOLIC BLOOD PRESSURE: 125 MMHG | WEIGHT: 215.5 LBS

## 2023-01-18 DIAGNOSIS — C50.912 BREAST CANCER, STAGE 4, LEFT: Primary | ICD-10-CM

## 2023-01-18 DIAGNOSIS — C78.02 MALIGNANT NEOPLASM METASTATIC TO LEFT LUNG: ICD-10-CM

## 2023-01-18 PROCEDURE — A4216 STERILE WATER/SALINE, 10 ML: HCPCS | Performed by: INTERNAL MEDICINE

## 2023-01-18 PROCEDURE — 96413 CHEMO IV INFUSION 1 HR: CPT

## 2023-01-18 PROCEDURE — 99214 PR OFFICE/OUTPT VISIT, EST, LEVL IV, 30-39 MIN: ICD-10-PCS | Mod: S$PBB,,, | Performed by: INTERNAL MEDICINE

## 2023-01-18 PROCEDURE — 25000003 PHARM REV CODE 250: Performed by: INTERNAL MEDICINE

## 2023-01-18 PROCEDURE — 96367 TX/PROPH/DG ADDL SEQ IV INF: CPT

## 2023-01-18 PROCEDURE — 63600175 PHARM REV CODE 636 W HCPCS: Performed by: INTERNAL MEDICINE

## 2023-01-18 PROCEDURE — 99214 OFFICE O/P EST MOD 30 MIN: CPT | Mod: S$PBB,,, | Performed by: INTERNAL MEDICINE

## 2023-01-18 PROCEDURE — 99213 OFFICE O/P EST LOW 20 MIN: CPT | Mod: PBBFAC | Performed by: INTERNAL MEDICINE

## 2023-01-18 PROCEDURE — 99999 PR PBB SHADOW E&M-EST. PATIENT-LVL III: CPT | Mod: PBBFAC,,, | Performed by: INTERNAL MEDICINE

## 2023-01-18 PROCEDURE — 99999 PR PBB SHADOW E&M-EST. PATIENT-LVL III: ICD-10-PCS | Mod: PBBFAC,,, | Performed by: INTERNAL MEDICINE

## 2023-01-18 RX ORDER — SODIUM CHLORIDE 9 MG/ML
INJECTION, SOLUTION INTRAVENOUS ONCE
Status: CANCELLED
Start: 2023-01-18 | End: 2023-01-18

## 2023-01-18 RX ORDER — SODIUM CHLORIDE 0.9 % (FLUSH) 0.9 %
10 SYRINGE (ML) INJECTION
Status: DISCONTINUED | OUTPATIENT
Start: 2023-01-18 | End: 2023-01-18 | Stop reason: HOSPADM

## 2023-01-18 RX ORDER — SODIUM CHLORIDE 9 MG/ML
INJECTION, SOLUTION INTRAVENOUS ONCE
Status: COMPLETED | OUTPATIENT
Start: 2023-01-18 | End: 2023-01-18

## 2023-01-18 RX ORDER — DEXAMETHASONE 4 MG/1
8 TABLET ORAL
Status: COMPLETED | OUTPATIENT
Start: 2023-01-18 | End: 2023-01-18

## 2023-01-18 RX ORDER — HEPARIN 100 UNIT/ML
500 SYRINGE INTRAVENOUS
Status: DISCONTINUED | OUTPATIENT
Start: 2023-01-18 | End: 2023-01-18 | Stop reason: HOSPADM

## 2023-01-18 RX ADMIN — DEXAMETHASONE 8 MG: 4 TABLET ORAL at 09:01

## 2023-01-18 RX ADMIN — FAM-TRASTUZUMAB DERUXTECAN-NXKI 500 MG: 100 INJECTION, POWDER, LYOPHILIZED, FOR SOLUTION INTRAVENOUS at 10:01

## 2023-01-18 RX ADMIN — SODIUM CHLORIDE: 9 INJECTION, SOLUTION INTRAVENOUS at 08:01

## 2023-01-18 RX ADMIN — PALONOSETRON 0.25 MG: 0.05 INJECTION, SOLUTION INTRAVENOUS at 09:01

## 2023-01-18 RX ADMIN — FOSAPREPITANT 150 MG: 150 INJECTION, POWDER, LYOPHILIZED, FOR SOLUTION INTRAVENOUS at 09:01

## 2023-01-18 RX ADMIN — SODIUM CHLORIDE: 9 INJECTION, SOLUTION INTRAVENOUS at 09:01

## 2023-01-18 RX ADMIN — Medication 10 ML: at 11:01

## 2023-01-18 RX ADMIN — HEPARIN 500 UNITS: 100 SYRINGE at 11:01

## 2023-01-18 NOTE — PLAN OF CARE
Patient tolerated Enhertu and IVFs with no complications. VSS. Pt instructed to call MD with any problems. Pt discharged home independently.

## 2023-01-26 ENCOUNTER — TELEPHONE (OUTPATIENT)
Dept: HEMATOLOGY/ONCOLOGY | Facility: CLINIC | Age: 53
End: 2023-01-26
Payer: MEDICARE

## 2023-01-26 DIAGNOSIS — C50.912 BREAST CANCER, STAGE 4, LEFT: Primary | ICD-10-CM

## 2023-01-26 NOTE — PROGRESS NOTES
Subjective:       Patient ID: Shikha López is a 52 y.o. female.    Chief Complaint: Malignant neoplasm metastatic to left lung      HPI 52-year-old female who returns for F/U  for metastatic breast cancer.  She is on Trastuzumab deruxtecan  - here for cycle  29.     Overall she is doing well.  Energy level has been better, she is on ritalin. Appetite and hydration are good.    She is also getting extra hydration prior to enhertu which has helped.   Denies nausea, she has not been taking her steroids. Denies constipation.   Denies pain.     Per Dr. Willis's previous note: Breast history:  She presented in the emergency room at [facility] on September 29, 2006 with a 4 months history of inflammation of her left breast.  Physical examination at that time showed the left breast was largely replaced by large mass with multiple skin ulcerations.    A biopsy in September 2006 showed poorly differentiated carcinoma which was ER and ND. negative and HER2 positive.    She was then referred to [facility] for additional care.   CT scan of the chest and abdomen November 2006 revealed multiple nodules in the lungs consistent with metastatic disease.  There also enlarged left axillary node.    She was treated with chemotherapy with weekly Herceptin and Abraxane and had marked improvement in her breast and lung metastasis on that therapy.    On May 29, 2007 she underwent left modified radical mastectomy(Dr. Colvin) which showed no residual tumor in the breast and 1/5 nodes was positive for metastasis measuring 6 mm.  (ypT0N1).    She then received postoperative radiation therapy(Dr Singh)  to the left chest wall supraclav and internal mammary lymph nodes from August 20, 2007 to September 28, 2007.    Postoperatively she continued on Herceptin for approximately 3 and 1/2 years before her lung metastasis begin to grow.    She subsequently received a number of different chemotherapy treatments including Adriamycin,  Halaven, Xeloda plus lapatinib then Xeloda plus Herceptin.  The  Those treatments were provided under the care of  in [facility].  Subsequently, she transferred her care to  at [facility].  She was initially treated with Taxotere Herceptin Perjeta.      She was then changed to Kadcyla.    In July 2020 PET scan showed an increase in the size of an infrahilar mass consistent with progression.   She then took approximately 9 months of Herceptin and Navelbine.  Apparently she has some initial response to that therapy.    She started trastuzumab- deruxtecan  4/12/21.     Echo 8/5/22  -EF 65%    CT on 9/26/22 -   Sandra/Mediastinum: No significant mediastinal, hilar, or axillary lymphadenopathy     Lungs: Trachea and bronchi are patent.  Similar lung parenchymal mosaic pattern similar likely from small airway disease.   Left spiculated infrahilar mass obstructing the proximal segment of the left lower lobe bronchi with increase in size measuring approximately 2.8 x 3.4 cm, previously 2.7 x 2.7 cm however, suboptimal comparison due to lack of IV contrast on the prior study.   Scattered bilateral pulmonary nodules are stable in size and number in comparison to prior, largest on the right measuring 1.8 cm  and largest on the left measuring 1.4 cm .  No new consolidation or pulmonary nodule.  No pleural effusion.  Stable left-sided pleural thickening.     Liver: Normal in size and contour.  No focal hepatic lesion.    CT 1/17/22  - 1. In this patient with metastatic breast cancer there is a stable infrahilar spiculated lung mass, stable bilateral pulmonary nodules, and stable enlarged retroperitoneal lymph node.  No definite new foci of metastatic disease.  There is a 12 mm right axillary node series 2, image 44 previously 10 mm.  2. Persistent mosaic attenuation in the bilateral lung parenchyma, suggestive of small airways disease or increased pulmonary vascular resistance.  3. Stable left-sided pleural  thickening.    Review of Systems   Constitutional:  Positive for fatigue (improved). Negative for activity change, appetite change and unexpected weight change.   HENT:  Negative for mouth sores.    Eyes:  Negative for visual disturbance.   Respiratory:  Negative for cough and shortness of breath.    Cardiovascular:  Negative for chest pain.   Gastrointestinal:  Negative for abdominal pain, constipation, diarrhea, nausea and vomiting.   Genitourinary:  Negative for frequency.   Musculoskeletal:  Negative for back pain.   Integumentary:  Negative for rash.   Neurological:  Negative for headaches.   Hematological:  Negative for adenopathy.   Psychiatric/Behavioral: Negative.  The patient is not nervous/anxious.        Objective:      Physical Exam  Vitals reviewed.   Constitutional:       General: She is not in acute distress.     Appearance: She is obese.   HENT:      Mouth/Throat:      Mouth: Mucous membranes are moist.      Pharynx: Oropharynx is clear. No oropharyngeal exudate or posterior oropharyngeal erythema.   Eyes:      Pupils: Pupils are equal, round, and reactive to light.   Cardiovascular:      Rate and Rhythm: Normal rate and regular rhythm.      Heart sounds: Murmur (systolic -aortic) heard.   Pulmonary:      Effort: Pulmonary effort is normal. No respiratory distress.      Breath sounds: Normal breath sounds. No wheezing or rales.   Chest:   Breasts:     Right: Normal.      Left: Absent.       Abdominal:      Palpations: Abdomen is soft. There is no mass.      Tenderness: There is no abdominal tenderness.   Lymphadenopathy:      Cervical: No cervical adenopathy.      Upper Body:      Right upper body: No supraclavicular or axillary adenopathy.      Left upper body: No supraclavicular or axillary adenopathy.   Skin:     Findings: No rash.   Neurological:      Mental Status: She is alert and oriented to person, place, and time.   Psychiatric:         Mood and Affect: Mood normal.         Behavior:  Behavior normal.         Thought Content: Thought content normal.         Judgment: Judgment normal.       Assessment:      1. Malignant neoplasm metastatic to left lung        2. Breast cancer, stage 4, left        3. Hypokalemia               Plan:     1-2. Proceed with Cycle 29 of Enerhertu  - Dexamethasone removed from treatment plan.  - 500 cc of fluids added prior to enhertu   - Echo scheduled 2/14     3. 20 meQ of potassium added today     Return to clinic in 3 weeks with MD appointment and labs.     Patient is in agreement with the proposed treatment plan. All questions were answered to the patient's satisfaction. Patient knows to call clinic for any new or worsening symptoms and if anything is needed before the next clinic visit.          Mariam Lisa, FNP-C  Hematology & Medical Oncology   [facility] [place] [address]  ph. [phone]  Fax. [phone]    Collaborating physician, Dr. Willis.    Approximately 18 minutes were spent face-to-face with the patient.  Approximately 30 minutes in total were spent on this encounter, which includes face-to-face time and non-face-to-face time preparing to see the patient (e.g., review of tests), obtaining and/or reviewing separately obtained history, documenting clinical information in the electronic or other health record, independently interpreting results (not separately reported) and communicating results to the patient/family/caregiver, or care coordination (not separately reported).         Route Chart for Scheduling    Med Onc Chart Routing      Follow up with physician    Follow up with JIMI 6 weeks. with labs prior and enerhtu following   Infusion scheduling note    Injection scheduling note    Labs CBC and CMP   Lab interval: every 3 weeks     Imaging    Pharmacy appointment    Other referrals        Treatment Plan Information   OP BREAST FAM-TRASTUZUMAB DERUXTECAN-NXKI Q3W   Home Willis MD   Upcoming Treatment Dates - OP BREAST  FAM-TRASTUZUMAB DERUXTECAN-NXKI Q3W    2/8/2023       Chemotherapy       fam-trastuzumab deruxtecan-nxki (ENHERTU) 530 mg in dextrose 5 % (D5W) 100 mL infusion       Antiemetics       fosaprepitant 150 mg in sodium chloride 0.9% 150 mL IVPB       palonosetron (ALOXI) 0.25 mg in sodium chloride 0.9% 50 mL IVPB  3/1/2023       Chemotherapy       fam-trastuzumab deruxtecan-nxki (ENHERTU) 530 mg in dextrose 5 % 100 mL infusion       Antiemetics       fosaprepitant 150 mg in sodium chloride 0.9% 150 mL IVPB       palonosetron (ALOXI) 0.25 mg in sodium chloride 0.9% 50 mL IVPB  3/22/2023       Chemotherapy       fam-trastuzumab deruxtecan-nxki (ENHERTU) 530 mg in dextrose 5 % 100 mL infusion       Antiemetics       fosaprepitant 150 mg in sodium chloride 0.9% 150 mL IVPB       palonosetron (ALOXI) 0.25 mg in sodium chloride 0.9% 50 mL IVPB  4/12/2023       Chemotherapy       fam-trastuzumab deruxtecan-nxki (ENHERTU) 530 mg in dextrose 5 % 100 mL infusion       Antiemetics       fosaprepitant 150 mg in sodium chloride 0.9% 150 mL IVPB       palonosetron (ALOXI) 0.25 mg in sodium chloride 0.9% 50 mL IVPB

## 2023-02-01 RX ORDER — SODIUM CHLORIDE 9 MG/ML
INJECTION, SOLUTION INTRAVENOUS ONCE
Status: CANCELLED
Start: 2023-02-08 | End: 2023-02-05

## 2023-02-01 RX ORDER — SODIUM CHLORIDE 0.9 % (FLUSH) 0.9 %
10 SYRINGE (ML) INJECTION
Status: CANCELLED | OUTPATIENT
Start: 2023-02-08

## 2023-02-01 RX ORDER — HEPARIN 100 UNIT/ML
500 SYRINGE INTRAVENOUS
Status: CANCELLED | OUTPATIENT
Start: 2023-02-08

## 2023-02-01 RX ORDER — DEXAMETHASONE 4 MG/1
8 TABLET ORAL
Status: CANCELLED
Start: 2023-02-05

## 2023-02-08 ENCOUNTER — INFUSION (OUTPATIENT)
Dept: INFUSION THERAPY | Facility: HOSPITAL | Age: 53
End: 2023-02-08
Attending: INTERNAL MEDICINE
Payer: MEDICARE

## 2023-02-08 ENCOUNTER — OFFICE VISIT (OUTPATIENT)
Dept: HEMATOLOGY/ONCOLOGY | Facility: CLINIC | Age: 53
End: 2023-02-08
Payer: MEDICARE

## 2023-02-08 VITALS
DIASTOLIC BLOOD PRESSURE: 63 MMHG | HEART RATE: 87 BPM | HEIGHT: 62 IN | BODY MASS INDEX: 39.49 KG/M2 | SYSTOLIC BLOOD PRESSURE: 133 MMHG | RESPIRATION RATE: 18 BRPM | WEIGHT: 214.63 LBS | OXYGEN SATURATION: 99 % | TEMPERATURE: 98 F

## 2023-02-08 VITALS
HEART RATE: 83 BPM | TEMPERATURE: 98 F | RESPIRATION RATE: 18 BRPM | DIASTOLIC BLOOD PRESSURE: 55 MMHG | SYSTOLIC BLOOD PRESSURE: 118 MMHG

## 2023-02-08 DIAGNOSIS — E87.6 HYPOKALEMIA: ICD-10-CM

## 2023-02-08 DIAGNOSIS — C78.02 MALIGNANT NEOPLASM METASTATIC TO LEFT LUNG: Primary | ICD-10-CM

## 2023-02-08 DIAGNOSIS — C50.912 BREAST CANCER, STAGE 4, LEFT: ICD-10-CM

## 2023-02-08 DIAGNOSIS — E87.6 HYPOKALEMIA: Primary | ICD-10-CM

## 2023-02-08 DIAGNOSIS — R41.840 LACK OF CONCENTRATION: ICD-10-CM

## 2023-02-08 PROCEDURE — 99999 PR PBB SHADOW E&M-EST. PATIENT-LVL III: CPT | Mod: PBBFAC,,, | Performed by: NURSE PRACTITIONER

## 2023-02-08 PROCEDURE — 63600175 PHARM REV CODE 636 W HCPCS: Performed by: NURSE PRACTITIONER

## 2023-02-08 PROCEDURE — A4216 STERILE WATER/SALINE, 10 ML: HCPCS | Performed by: INTERNAL MEDICINE

## 2023-02-08 PROCEDURE — 25000003 PHARM REV CODE 250: Performed by: INTERNAL MEDICINE

## 2023-02-08 PROCEDURE — 96413 CHEMO IV INFUSION 1 HR: CPT

## 2023-02-08 PROCEDURE — 25000003 PHARM REV CODE 250: Performed by: NURSE PRACTITIONER

## 2023-02-08 PROCEDURE — 99215 OFFICE O/P EST HI 40 MIN: CPT | Mod: S$PBB,,, | Performed by: NURSE PRACTITIONER

## 2023-02-08 PROCEDURE — 99213 OFFICE O/P EST LOW 20 MIN: CPT | Mod: PBBFAC,25 | Performed by: NURSE PRACTITIONER

## 2023-02-08 PROCEDURE — 99999 PR PBB SHADOW E&M-EST. PATIENT-LVL III: ICD-10-PCS | Mod: PBBFAC,,, | Performed by: NURSE PRACTITIONER

## 2023-02-08 PROCEDURE — 96367 TX/PROPH/DG ADDL SEQ IV INF: CPT

## 2023-02-08 PROCEDURE — 99215 PR OFFICE/OUTPT VISIT, EST, LEVL V, 40-54 MIN: ICD-10-PCS | Mod: S$PBB,,, | Performed by: NURSE PRACTITIONER

## 2023-02-08 PROCEDURE — 63600175 PHARM REV CODE 636 W HCPCS: Mod: TB,JG | Performed by: INTERNAL MEDICINE

## 2023-02-08 RX ORDER — POTASSIUM CHLORIDE 7.45 MG/ML
10 INJECTION INTRAVENOUS
Status: CANCELLED
Start: 2023-02-08

## 2023-02-08 RX ORDER — METHYLPHENIDATE HYDROCHLORIDE 5 MG/1
5 TABLET ORAL 2 TIMES DAILY
Qty: 60 TABLET | Refills: 0 | Status: SHIPPED | OUTPATIENT
Start: 2023-02-08 | End: 2023-03-08 | Stop reason: SDUPTHER

## 2023-02-08 RX ORDER — POTASSIUM CHLORIDE 7.45 MG/ML
10 INJECTION INTRAVENOUS
Status: COMPLETED | OUTPATIENT
Start: 2023-02-08 | End: 2023-02-08

## 2023-02-08 RX ORDER — SODIUM CHLORIDE 0.9 % (FLUSH) 0.9 %
10 SYRINGE (ML) INJECTION
Status: DISCONTINUED | OUTPATIENT
Start: 2023-02-08 | End: 2023-02-08 | Stop reason: HOSPADM

## 2023-02-08 RX ORDER — HEPARIN 100 UNIT/ML
500 SYRINGE INTRAVENOUS
Status: DISCONTINUED | OUTPATIENT
Start: 2023-02-08 | End: 2023-02-08 | Stop reason: HOSPADM

## 2023-02-08 RX ADMIN — Medication 10 ML: at 04:02

## 2023-02-08 RX ADMIN — FAM-TRASTUZUMAB DERUXTECAN-NXKI 500 MG: 100 INJECTION, POWDER, LYOPHILIZED, FOR SOLUTION INTRAVENOUS at 02:02

## 2023-02-08 RX ADMIN — PALONOSETRON 0.25 MG: 0.05 INJECTION, SOLUTION INTRAVENOUS at 02:02

## 2023-02-08 RX ADMIN — HEPARIN 500 UNITS: 100 SYRINGE at 04:02

## 2023-02-08 RX ADMIN — SODIUM CHLORIDE: 9 INJECTION, SOLUTION INTRAVENOUS at 11:02

## 2023-02-08 RX ADMIN — FOSAPREPITANT 150 MG: 150 INJECTION, POWDER, LYOPHILIZED, FOR SOLUTION INTRAVENOUS at 02:02

## 2023-02-08 RX ADMIN — POTASSIUM CHLORIDE 10 MEQ: 10 INJECTION, SOLUTION INTRAVENOUS at 11:02

## 2023-02-08 RX ADMIN — POTASSIUM CHLORIDE 500 ML/HR: 149 INJECTION, SOLUTION, CONCENTRATE INTRAVENOUS at 12:02

## 2023-02-08 NOTE — PLAN OF CARE
1605 pt tolerated K+, IVFs, and enhertu (over 1h). VSS, pt was observed for 30 mins post infusion. Pt voiced no new complaints or concerns at this time, NAD noted. Pt d/c home, left unit ambulatory.

## 2023-02-14 ENCOUNTER — HOSPITAL ENCOUNTER (OUTPATIENT)
Dept: CARDIOLOGY | Facility: HOSPITAL | Age: 53
Discharge: HOME | End: 2023-02-14
Attending: NURSE PRACTITIONER
Payer: MEDICARE

## 2023-02-14 VITALS
WEIGHT: 215 LBS | HEART RATE: 66 BPM | BODY MASS INDEX: 39.56 KG/M2 | HEIGHT: 62 IN | SYSTOLIC BLOOD PRESSURE: 124 MMHG | DIASTOLIC BLOOD PRESSURE: 68 MMHG

## 2023-02-14 DIAGNOSIS — C78.02 MALIGNANT NEOPLASM METASTATIC TO LEFT LUNG: ICD-10-CM

## 2023-02-14 LAB
ASCENDING AORTA: 2.84 CM
AV INDEX (PROSTH): 0.43
AV MEAN GRADIENT: 17 MMHG
AV PEAK GRADIENT: 28 MMHG
AV VALVE AREA: 1.62 CM2
AV VELOCITY RATIO: 0.39
BSA FOR ECHO PROCEDURE: 2.07 M2
CV ECHO LV RWT: 0.3 CM
DOP CALC AO PEAK VEL: 2.64 M/S
DOP CALC AO VTI: 55.82 CM
DOP CALC LVOT AREA: 3.8 CM2
DOP CALC LVOT DIAMETER: 2.2 CM
DOP CALC LVOT PEAK VEL: 1.03 M/S
DOP CALC LVOT STROKE VOLUME: 90.69 CM3
DOP CALCLVOT PEAK VEL VTI: 23.87 CM
E WAVE DECELERATION TIME: 210.59 MSEC
E/A RATIO: 1.09
E/E' RATIO: 14.67 M/S
ECHO LV POSTERIOR WALL: 0.73 CM (ref 0.6–1.1)
EJECTION FRACTION: 60 %
FRACTIONAL SHORTENING: 27 % (ref 28–44)
INTERVENTRICULAR SEPTUM: 0.76 CM (ref 0.6–1.1)
LA MAJOR: 4.71 CM
LA MINOR: 4.73 CM
LA WIDTH: 4.27 CM
LEFT ATRIUM SIZE: 4.04 CM
LEFT ATRIUM VOLUME INDEX MOD: 31.5 ML/M2
LEFT ATRIUM VOLUME INDEX: 35.1 ML/M2
LEFT ATRIUM VOLUME MOD: 62 CM3
LEFT ATRIUM VOLUME: 69.21 CM3
LEFT INTERNAL DIMENSION IN SYSTOLE: 3.59 CM (ref 2.1–4)
LEFT VENTRICLE DIASTOLIC VOLUME INDEX: 57.13 ML/M2
LEFT VENTRICLE DIASTOLIC VOLUME: 112.55 ML
LEFT VENTRICLE MASS INDEX: 61 G/M2
LEFT VENTRICLE SYSTOLIC VOLUME INDEX: 27.4 ML/M2
LEFT VENTRICLE SYSTOLIC VOLUME: 53.92 ML
LEFT VENTRICULAR INTERNAL DIMENSION IN DIASTOLE: 4.9 CM (ref 3.5–6)
LEFT VENTRICULAR MASS: 119.8 G
LV LATERAL E/E' RATIO: 15.71 M/S
LV SEPTAL E/E' RATIO: 13.75 M/S
MV A" WAVE DURATION": 6.85 MSEC
MV PEAK A VEL: 1.01 M/S
MV PEAK E VEL: 1.1 M/S
MV STENOSIS PRESSURE HALF TIME: 61.07 MS
MV VALVE AREA P 1/2 METHOD: 3.6 CM2
PISA TR MAX VEL: 2.66 M/S
PULM VEIN S/D RATIO: 1.13
PV PEAK D VEL: 0.7 M/S
PV PEAK S VEL: 0.79 M/S
RA MAJOR: 4.06 CM
RA PRESSURE: 3 MMHG
RA WIDTH: 3.81 CM
RIGHT VENTRICULAR END-DIASTOLIC DIMENSION: 3.38 CM
RV TISSUE DOPPLER FREE WALL SYSTOLIC VELOCITY 1 (APICAL 4 CHAMBER VIEW): 13.73 CM/S
SINUS: 3.15 CM
STJ: 2.58 CM
TDI LATERAL: 0.07 M/S
TDI SEPTAL: 0.08 M/S
TDI: 0.08 M/S
TR MAX PG: 28 MMHG
TRICUSPID ANNULAR PLANE SYSTOLIC EXCURSION: 1.64 CM
TV REST PULMONARY ARTERY PRESSURE: 31 MMHG

## 2023-02-14 PROCEDURE — 93356 MYOCRD STRAIN IMG SPCKL TRCK: CPT

## 2023-02-14 PROCEDURE — 93356 MYOCRD STRAIN IMG SPCKL TRCK: CPT | Mod: ,,, | Performed by: INTERNAL MEDICINE

## 2023-02-14 PROCEDURE — 93306 ECHO (CUPID ONLY): ICD-10-PCS | Mod: 26,,, | Performed by: INTERNAL MEDICINE

## 2023-02-14 PROCEDURE — 93306 TTE W/DOPPLER COMPLETE: CPT | Mod: 26,,, | Performed by: INTERNAL MEDICINE

## 2023-02-14 PROCEDURE — 93356 ECHO (CUPID ONLY): ICD-10-PCS | Mod: ,,, | Performed by: INTERNAL MEDICINE

## 2023-02-20 ENCOUNTER — OFFICE VISIT (OUTPATIENT)
Dept: PSYCHIATRY | Facility: CLINIC | Age: 53
End: 2023-02-20
Payer: MEDICARE

## 2023-02-20 DIAGNOSIS — F41.9 ANXIETY: Primary | ICD-10-CM

## 2023-02-20 DIAGNOSIS — C50.912 BREAST CANCER, STAGE 4, LEFT: ICD-10-CM

## 2023-02-20 PROCEDURE — 90834 PR PSYCHOTHERAPY W/PATIENT, 45 MIN: ICD-10-PCS | Mod: ,,, | Performed by: PSYCHOLOGIST

## 2023-02-20 PROCEDURE — 99211 OFF/OP EST MAY X REQ PHY/QHP: CPT | Mod: PBBFAC | Performed by: PSYCHOLOGIST

## 2023-02-20 PROCEDURE — 99999 PR PBB SHADOW E&M-EST. PATIENT-LVL I: CPT | Mod: PBBFAC,,, | Performed by: PSYCHOLOGIST

## 2023-02-20 PROCEDURE — 99999 PR PBB SHADOW E&M-EST. PATIENT-LVL I: ICD-10-PCS | Mod: PBBFAC,,, | Performed by: PSYCHOLOGIST

## 2023-02-20 PROCEDURE — 90834 PSYTX W PT 45 MINUTES: CPT | Mod: ,,, | Performed by: PSYCHOLOGIST

## 2023-02-20 NOTE — PROGRESS NOTES
INFORMED CONSENT: Patient was identified using two patient-identifiers. The patient has been informed of the risks and benefits associated with engaging in psychotherapy, the handling of protected health information, the rights of privacy and the limits of confidentiality. The patient has also been informed of the importance of reporting any suicidal or homicidal ideation to this or any provider to ensure safety of all parties, and the Shikha López expressed understanding. The patient was agreeable to these terms and freely participates in individual psychotherapy.    PSYCHO-ONCOLOGY NOTE/ Individual Psychotherapy     Date: 2/20/2023   Site:  [facility]        Therapeutic Intervention: Met with patient.  Outpatient - Insight oriented psychotherapy 45 min - CPT code 67811      Patient was last seen by me on 9/23/2022    Problem list  Patient Active Problem List   Diagnosis    Breast cancer, stage 4, left    Malignant neoplasm metastatic to left lung    Physical deconditioning    Balance problem    Hypokalemia       Chief complaint/reason for encounter: adjustment   Met with patient to evaluate psychosocial adaptation to diagnosis/treatment/survivorship of Stage IV BC    Current Medications  Current Outpatient Medications   Medication    acetaminophen (TYLENOL) 500 MG tablet    Lactobac no.41/Bifidobact no.7 (PROBIOTIC-10 ORAL)    LIDOcaine-prilocaine (EMLA) cream    methylphenidate HCl (RITALIN) 5 MG tablet    OLANZapine (ZYPREXA) 5 MG tablet    ondansetron (ZOFRAN) 8 MG tablet     No current facility-administered medications for this visit.       Objective:  Shikhakal López arrived promptly for the session.  The patient was fully cooperative throughout the session.  Appearance: age appropriate, appropriately  dressed, adequately  groomed  Behavior/Cooperation: friendly and cooperative  Speech: normal in rate, volume, and tone and appropriate quality, quantity and organization of sentences  Mood:  euthymic  Affect: mood congruent  Thought Process: goal-directed, logical  Thought Content: normal,  No delusions or paranoia; did not appear to be responding to internal stimuli during the session  Orientation: grossly intact  Memory: Grossly intact  Attention Span/Concentration: Attends to session without distraction; reports no difficulty  Fund of Knowledge: average  Estimate of Intelligence: average from verbal skills and history  Cognition: grossly intact  Insight: patient has awareness of illness; good insight into own behavior and behavior of others  Judgment: the patient's behavior is adequate to circumstances    Interval history and content of current session: Patient returns for a check-in. Reports mood has improved, sleep is stable, and feels hopeful.  Cognitive fatigue improved with Ritalin. Discussed diagnosis, treatment, prognosis, current adaptation to disease and treatment status, and family's adaptation to disease and treatment status. Reports to be coping in an adequate manner. Evaluated cognitive response, paying particular attention to negative intrusive thoughts of a persistent and detrimental nature. Thoughts of this type are in evidence with no distress. Provided cognitive behavioral therapy to address negative cognitions. Identified and evaluated psychosocial and environmental stressors secondary to diagnosis and treatment.  Examined proactive behaviors that may be implemented to minimize or ameliorate psychosocial stressors secondary to diagnosis and treatment.     Risk parameters:   Patient reports no suicidal ideation  Patient reports no homicidal ideation  Patient reports no self-injurious behavior  Patient reports no violent behavior   Safety needs:  None at this time      Verbal deficits: None     Patient's response to intervention:The patient's response to intervention is accepting.     Progress toward goals and other mental status changes:  The patient's progress toward goals is  good.      Progress to date:Progress as Expected      Goals from last visit: Met     Patient reported outcomes:    Distress Thermometer:   Distress Score    Distress Score: 0 - No Distress        Practical Problems Physical Problems                                                   Family Problems                                         Emotional Problems                                                         Spiritual/Religions Concerns     Spiritual / Mandaeism Concerns: No         Other Problems                 PHQ ANSWERS    Q1. Little interest or pleasure in doing things: (P) Several days (02/20/23 1119)  Q2. Feeling down, depressed, or hopeless: (P) Not at all (02/20/23 1119)  Q3. Trouble falling or staying asleep, or sleeping too much: (P) Not at all (02/20/23 1119)  Q4. Feeling tired or having little energy: (P) Several days (02/20/23 1119)  Q5. Poor appetite or overeating: (P) Not at all (02/20/23 1119)  Q6. Feeling bad about yourself - or that you are a failure or have let yourself or your family down: (P) Not at all (02/20/23 1119)  Q7. Trouble concentrating on things, such as reading the newspaper or watching television: (P) Several days (02/20/23 1119)  Q8. Moving or speaking so slowly that other people could have noticed. Or the opposite - being so fidgety or restless that you have been moving around a lot more than usual: (P) Not at all (02/20/23 1119)  Q9.  0    PHQ8 Score : (P) 3 (02/20/23 1119)  PHQ-9 Total Score: (P) 3 (02/20/23 1119)     LILY-7 Answers    GAD7 2/20/2023   1. Feeling nervous, anxious, or on edge? 0   2. Not being able to stop or control worrying? 0   3. Worrying too much about different things? 0   4. Trouble relaxing? 0   5. Being so restless that it is hard to sit still? 0   6. Becoming easily annoyed or irritable? 0   7. Feeling afraid as if something awful might happen? 0   LILY-7 Score 0     LILY-7 Score: (P) 0  Interpretation: (P) Normal     Client Strengths: verbal,  intelligent, successful, good social support, good insight, commitment to wellness, strong rob, strong cultural traditions     Diagnosis:     ICD-10-CM ICD-9-CM   1. Anxiety  F41.9 300.00   2. Breast cancer, stage 4, left  C50.912 174.9        Treatment Plan:individual psychotherapy and medication management by physician  Target symptoms: anxiety , adjustment  Why chosen therapy is appropriate versus another modality: relevant to diagnosis, patient responds to this modality, evidence based practice  Outcome monitoring methods: self-report, observation, checklist/rating scale  Therapeutic intervention type: insight oriented psychotherapy, behavior modifying psychotherapy  Prognosis: Good      Behavioral goals:    Exercise:   Stress management:   Social engagement:   Nutrition:   Smoking Cessation:   Therapy:  Increase daily self-care and attention to health management  Pleasant events scheduling and increased social interaction  Monitor stressors in writing and bring to next visit    Return to clinic: as needed    Next Session:      Length of Service (minutes direct face-to-face contact): 45    Linda Figueroa, PhD  Clinical Psychologist  [place] License #[id number]  [place] License #[id number]

## 2023-02-22 DIAGNOSIS — C50.912 BREAST CANCER, STAGE 4, LEFT: Primary | ICD-10-CM

## 2023-02-22 RX ORDER — SODIUM CHLORIDE 9 MG/ML
INJECTION, SOLUTION INTRAVENOUS ONCE
Status: CANCELLED
Start: 2023-03-09 | End: 2023-03-01

## 2023-02-22 RX ORDER — HEPARIN 100 UNIT/ML
500 SYRINGE INTRAVENOUS
Status: CANCELLED | OUTPATIENT
Start: 2023-03-09

## 2023-02-22 RX ORDER — SODIUM CHLORIDE 0.9 % (FLUSH) 0.9 %
10 SYRINGE (ML) INJECTION
Status: CANCELLED | OUTPATIENT
Start: 2023-03-09

## 2023-02-28 NOTE — PROGRESS NOTES
Subjective:       Patient ID: Shikha López is a 52 y.o. female.    Chief Complaint: No chief complaint on file.      HPI 52-year-old female who returns for F/U  for metastatic breast cancer.  She is on Trastuzumab deruxtecan  - here for cycle  30.      Today she reports that she is been feeling very well.  Nausea after chemotherapy is better now that she is receiving IV fluids and no steroids.    Appetite is good bowel function is stable.  She is having no unusual pain.    She denies shortness of breath.  She has a mild chronic cough.      Breast history:  She presented in the emergency room at [facility] on September 29, 2006 with a 4 months history of inflammation of her left breast.  Physical examination at that time showed the left breast was largely replaced by large mass with multiple skin ulcerations.    A biopsy in September 2006 showed poorly differentiated carcinoma which was ER and NM. negative and HER2 positive.    She was then referred to [facility] for additional care.   CT scan of the chest and abdomen November 2006 revealed multiple nodules in the lungs consistent with metastatic disease.  There also enlarged left axillary node.    She was treated with chemotherapy with weekly Herceptin and Abraxane and had marked improvement in her breast and lung metastasis on that therapy.    On May 29, 2007 she underwent left modified radical mastectomy(Dr. Colvin) which showed no residual tumor in the breast and 1/5 nodes was positive for metastasis measuring 6 mm.  (ypT0N1).    She then received postoperative radiation therapy(Dr Singh)  to the left chest wall supraclav and internal mammary lymph nodes from August 20, 2007 to September 28, 2007.    Postoperatively she continued on Herceptin for approximately 3 and 1/2 years before her lung metastasis begin to grow.    She subsequently received a number of different chemotherapy treatments including Adriamycin, Halaven, Xeloda plus lapatinib  then Xeloda plus Herceptin.  The  Those treatments were provided under the care of  in [facility].  Subsequently, she transferred her care to  at [facility].  She was initially treated with Taxotere Herceptin Perjeta.      She was then changed to Kadcyla.    In July 2020 PET scan showed an increase in the size of an infrahilar mass consistent with progression.   She then took approximately 9 months of Herceptin and Navelbine.  Apparently she has some initial response to that therapy.    She started trastuzumab- deruxtecan  4/12/21.       CT 1/17/22  - 1. In this patient with metastatic breast cancer there is a stable infrahilar spiculated lung mass, stable bilateral pulmonary nodules, and stable enlarged retroperitoneal lymph node.  No definite new foci of metastatic disease.  There is a 12 mm right axillary node series 2, image 44 previously 10 mm.  2. Persistent mosaic attenuation in the bilateral lung parenchyma, suggestive of small airways disease or increased pulmonary vascular resistance.  3. Stable left-sided pleural thickening.      Echo 2/14/23 -EF 60%  Review of Systems   Constitutional:  Negative for appetite change and unexpected weight change.   HENT:  Negative for mouth sores.    Eyes:  Negative for visual disturbance.   Respiratory:  Positive for cough. Negative for shortness of breath.    Cardiovascular:  Negative for chest pain.   Gastrointestinal:  Negative for abdominal pain, constipation, diarrhea and nausea.   Genitourinary:  Negative for frequency.   Musculoskeletal:  Negative for back pain.   Integumentary:  Negative for rash.   Neurological:  Negative for headaches.   Hematological:  Negative for adenopathy.   Psychiatric/Behavioral: Negative.  The patient is not nervous/anxious.        Objective:      Physical Exam  Vitals reviewed.   Constitutional:       General: She is not in acute distress.     Appearance: She is obese.   HENT:      Mouth/Throat:      Mouth:  Mucous membranes are moist.      Pharynx: Oropharynx is clear. No oropharyngeal exudate or posterior oropharyngeal erythema.   Eyes:      Pupils: Pupils are equal, round, and reactive to light.   Cardiovascular:      Rate and Rhythm: Normal rate and regular rhythm.      Heart sounds: Murmur (systolic -aortic) heard.   Pulmonary:      Effort: Pulmonary effort is normal. No respiratory distress.      Breath sounds: Normal breath sounds. No wheezing or rales.   Chest:   Breasts:     Right: Normal.      Left: Absent.       Abdominal:      Palpations: Abdomen is soft. There is no mass.      Tenderness: There is no abdominal tenderness.   Lymphadenopathy:      Cervical: No cervical adenopathy.      Upper Body:      Right upper body: No supraclavicular or axillary adenopathy.      Left upper body: No supraclavicular or axillary adenopathy.   Skin:     Findings: No rash.   Neurological:      Mental Status: She is alert and oriented to person, place, and time.   Psychiatric:         Mood and Affect: Mood normal.         Behavior: Behavior normal.         Thought Content: Thought content normal.         Judgment: Judgment normal.       Assessment:    WBC 2880, ANC  1800, HGB 11.7, Plts 127,000, CMP potassium 3.4, Cr - 0.6, Bili 1.9  Problem List Items Addressed This Visit       Breast cancer, stage 4, left - Primary    Malignant neoplasm metastatic to left lung       Plan:      Continue current RX  RTC 3 weeks.  Repeat scans in 6 weeks.      Route Chart for Scheduling    Med Onc Chart Routing      Follow up with physician 3 weeks. me or Mariam   Follow up with JIMI    Infusion scheduling note    Injection scheduling note    Labs CBC and CMP   Lab interval:     Imaging    Pharmacy appointment    Other referrals        Treatment Plan Information   OP BREAST FAM-TRASTUZUMAB DERUXTECAN-NXKI Q3W   Home Willis MD   Upcoming Treatment Dates - OP BREAST FAM-TRASTUZUMAB DERUXTECAN-NXKI Q3W    3/1/2023       Chemotherapy        fam-trastuzumab deruxtecan-nxki (ENHERTU) 530 mg in dextrose 5 % (D5W) 100 mL infusion       Antiemetics       fosaprepitant 150 mg in sodium chloride 0.9% 150 mL IVPB       palonosetron (ALOXI) 0.25 mg in sodium chloride 0.9% 50 mL IVPB  3/22/2023       Chemotherapy       fam-trastuzumab deruxtecan-nxki (ENHERTU) 530 mg in dextrose 5 % 100 mL infusion       Antiemetics       fosaprepitant 150 mg in sodium chloride 0.9% 150 mL IVPB       palonosetron (ALOXI) 0.25 mg in sodium chloride 0.9% 50 mL IVPB  4/12/2023       Chemotherapy       fam-trastuzumab deruxtecan-nxki (ENHERTU) 530 mg in dextrose 5 % 100 mL infusion       Antiemetics       fosaprepitant 150 mg in sodium chloride 0.9% 150 mL IVPB       palonosetron (ALOXI) 0.25 mg in sodium chloride 0.9% 50 mL IVPB

## 2023-03-01 ENCOUNTER — INFUSION (OUTPATIENT)
Dept: INFUSION THERAPY | Facility: HOSPITAL | Age: 53
End: 2023-03-01
Attending: INTERNAL MEDICINE
Payer: MEDICARE

## 2023-03-01 ENCOUNTER — OFFICE VISIT (OUTPATIENT)
Dept: HEMATOLOGY/ONCOLOGY | Facility: CLINIC | Age: 53
End: 2023-03-01
Payer: MEDICARE

## 2023-03-01 VITALS
BODY MASS INDEX: 39.82 KG/M2 | OXYGEN SATURATION: 100 % | SYSTOLIC BLOOD PRESSURE: 128 MMHG | DIASTOLIC BLOOD PRESSURE: 61 MMHG | HEART RATE: 81 BPM | WEIGHT: 216.38 LBS | HEIGHT: 62 IN | RESPIRATION RATE: 16 BRPM

## 2023-03-01 VITALS
WEIGHT: 216.25 LBS | SYSTOLIC BLOOD PRESSURE: 160 MMHG | TEMPERATURE: 98 F | DIASTOLIC BLOOD PRESSURE: 79 MMHG | HEIGHT: 62 IN | BODY MASS INDEX: 39.79 KG/M2 | RESPIRATION RATE: 18 BRPM | HEART RATE: 85 BPM

## 2023-03-01 DIAGNOSIS — C50.912 BREAST CANCER, STAGE 4, LEFT: Primary | ICD-10-CM

## 2023-03-01 DIAGNOSIS — E87.6 HYPOKALEMIA: Primary | ICD-10-CM

## 2023-03-01 DIAGNOSIS — C50.912 BREAST CANCER, STAGE 4, LEFT: ICD-10-CM

## 2023-03-01 DIAGNOSIS — C78.02 MALIGNANT NEOPLASM METASTATIC TO LEFT LUNG: ICD-10-CM

## 2023-03-01 PROCEDURE — 96361 HYDRATE IV INFUSION ADD-ON: CPT

## 2023-03-01 PROCEDURE — 99999 PR PBB SHADOW E&M-EST. PATIENT-LVL II: CPT | Mod: PBBFAC,,, | Performed by: INTERNAL MEDICINE

## 2023-03-01 PROCEDURE — 63600175 PHARM REV CODE 636 W HCPCS: Mod: JZ,TB | Performed by: INTERNAL MEDICINE

## 2023-03-01 PROCEDURE — 99214 OFFICE O/P EST MOD 30 MIN: CPT | Mod: S$PBB,,, | Performed by: INTERNAL MEDICINE

## 2023-03-01 PROCEDURE — 99212 OFFICE O/P EST SF 10 MIN: CPT | Mod: PBBFAC,25 | Performed by: INTERNAL MEDICINE

## 2023-03-01 PROCEDURE — 96413 CHEMO IV INFUSION 1 HR: CPT

## 2023-03-01 PROCEDURE — 96367 TX/PROPH/DG ADDL SEQ IV INF: CPT

## 2023-03-01 PROCEDURE — A4216 STERILE WATER/SALINE, 10 ML: HCPCS | Performed by: INTERNAL MEDICINE

## 2023-03-01 PROCEDURE — 25000003 PHARM REV CODE 250: Performed by: INTERNAL MEDICINE

## 2023-03-01 PROCEDURE — 99999 PR PBB SHADOW E&M-EST. PATIENT-LVL II: ICD-10-PCS | Mod: PBBFAC,,, | Performed by: INTERNAL MEDICINE

## 2023-03-01 PROCEDURE — 99214 PR OFFICE/OUTPT VISIT, EST, LEVL IV, 30-39 MIN: ICD-10-PCS | Mod: S$PBB,,, | Performed by: INTERNAL MEDICINE

## 2023-03-01 RX ORDER — HEPARIN 100 UNIT/ML
500 SYRINGE INTRAVENOUS
Status: DISCONTINUED | OUTPATIENT
Start: 2023-03-01 | End: 2023-03-01 | Stop reason: HOSPADM

## 2023-03-01 RX ORDER — SODIUM CHLORIDE 9 MG/ML
INJECTION, SOLUTION INTRAVENOUS ONCE
Status: COMPLETED | OUTPATIENT
Start: 2023-03-01 | End: 2023-03-01

## 2023-03-01 RX ORDER — SODIUM CHLORIDE 0.9 % (FLUSH) 0.9 %
10 SYRINGE (ML) INJECTION
Status: DISCONTINUED | OUTPATIENT
Start: 2023-03-01 | End: 2023-03-01 | Stop reason: HOSPADM

## 2023-03-01 RX ADMIN — FAM-TRASTUZUMAB DERUXTECAN-NXKI 500 MG: 100 INJECTION, POWDER, LYOPHILIZED, FOR SOLUTION INTRAVENOUS at 11:03

## 2023-03-01 RX ADMIN — HEPARIN 500 UNITS: 100 SYRINGE at 12:03

## 2023-03-01 RX ADMIN — PALONOSETRON 0.25 MG: 0.05 INJECTION, SOLUTION INTRAVENOUS at 10:03

## 2023-03-01 RX ADMIN — Medication 10 ML: at 12:03

## 2023-03-01 RX ADMIN — FOSAPREPITANT 150 MG: 150 INJECTION, POWDER, LYOPHILIZED, FOR SOLUTION INTRAVENOUS at 10:03

## 2023-03-01 RX ADMIN — SODIUM CHLORIDE: 9 INJECTION, SOLUTION INTRAVENOUS at 09:03

## 2023-03-08 DIAGNOSIS — R41.840 LACK OF CONCENTRATION: ICD-10-CM

## 2023-03-08 NOTE — PROGRESS NOTES
Subjective:       Patient ID: Shikha López is a 52 y.o. female.    Chief Complaint: Malignant neoplasm metastatic to left lung      HPI 52-year-old female who returns for F/U  for metastatic breast cancer.  She is on Trastuzumab deruxtecan  - here for cycle  31.      Overall she is doing well.  Since stopping the dexamethasone around the chemotherapy, she has been feeling much better and not having the let down.  She does get additional fluids with her chemotherapy which has helped.   She is on Ritalin for her fatigue. Minimal nausea, much better with fluids and without steroids.   Appetite is good, bowel function is stable.  She is having no unusual pain.    She denies shortness of breath.        Per Dr. Willis previous note: Breast history:  She presented in the emergency room at [facility] on September 29, 2006 with a 4 months history of inflammation of her left breast.  Physical examination at that time showed the left breast was largely replaced by large mass with multiple skin ulcerations.    A biopsy in September 2006 showed poorly differentiated carcinoma which was ER and MO. negative and HER2 positive.    She was then referred to [facility] for additional care.   CT scan of the chest and abdomen November 2006 revealed multiple nodules in the lungs consistent with metastatic disease.  There also enlarged left axillary node.    She was treated with chemotherapy with weekly Herceptin and Abraxane and had marked improvement in her breast and lung metastasis on that therapy.    On May 29, 2007 she underwent left modified radical mastectomy(Dr. Colvin) which showed no residual tumor in the breast and 1/5 nodes was positive for metastasis measuring 6 mm.  (ypT0N1).    She then received postoperative radiation therapy(Dr Singh)  to the left chest wall supraclav and internal mammary lymph nodes from August 20, 2007 to September 28, 2007.    Postoperatively she continued on Herceptin for  approximately 3 and 1/2 years before her lung metastasis begin to grow.    She subsequently received a number of different chemotherapy treatments including Adriamycin, Halaven, Xeloda plus lapatinib then Xeloda plus Herceptin.  The  Those treatments were provided under the care of  in [facility].  Subsequently, she transferred her care to  at [facility].  She was initially treated with Taxotere Herceptin Perjeta.      She was then changed to Kadcyla.    In July 2020 PET scan showed an increase in the size of an infrahilar mass consistent with progression.   She then took approximately 9 months of Herceptin and Navelbine.  Apparently she has some initial response to that therapy.    She started trastuzumab- deruxtecan  4/12/21.       CT 1/17/22  - 1. In this patient with metastatic breast cancer there is a stable infrahilar spiculated lung mass, stable bilateral pulmonary nodules, and stable enlarged retroperitoneal lymph node.  No definite new foci of metastatic disease.  There is a 12 mm right axillary node series 2, image 44 previously 10 mm.  2. Persistent mosaic attenuation in the bilateral lung parenchyma, suggestive of small airways disease or increased pulmonary vascular resistance.  3. Stable left-sided pleural thickening.      Echo 2/14/23 -EF 60%    Review of Systems   Constitutional:  Negative for appetite change, fatigue and unexpected weight change.   HENT:  Negative for mouth sores.    Eyes:  Negative for visual disturbance.   Respiratory:  Negative for cough and shortness of breath.    Cardiovascular:  Negative for chest pain.   Gastrointestinal:  Negative for abdominal pain, constipation, diarrhea and nausea.   Genitourinary:  Negative for frequency.   Musculoskeletal:  Negative for back pain.   Integumentary:  Negative for rash.   Neurological:  Negative for headaches.   Hematological:  Negative for adenopathy.   Psychiatric/Behavioral: Negative.  The patient is not  nervous/anxious.        Objective:      Physical Exam  Vitals reviewed.   Constitutional:       General: She is not in acute distress.     Appearance: She is obese.   HENT:      Mouth/Throat:      Mouth: Mucous membranes are moist.      Pharynx: Oropharynx is clear. No oropharyngeal exudate or posterior oropharyngeal erythema.   Eyes:      Pupils: Pupils are equal, round, and reactive to light.   Cardiovascular:      Rate and Rhythm: Normal rate and regular rhythm.      Heart sounds: Murmur (systolic -aortic) heard.   Pulmonary:      Effort: Pulmonary effort is normal. No respiratory distress.      Breath sounds: Normal breath sounds. No wheezing or rales.   Chest:   Breasts:     Right: Normal.      Left: Absent.       Abdominal:      Palpations: Abdomen is soft. There is no mass.      Tenderness: There is no abdominal tenderness.   Lymphadenopathy:      Cervical: No cervical adenopathy.      Upper Body:      Right upper body: No supraclavicular or axillary adenopathy.      Left upper body: No supraclavicular or axillary adenopathy.   Skin:     Findings: No rash.   Neurological:      Mental Status: She is alert and oriented to person, place, and time.   Psychiatric:         Mood and Affect: Mood normal.         Behavior: Behavior normal.         Thought Content: Thought content normal.         Judgment: Judgment normal.       Assessment:      1. Breast cancer, stage 4, left        2. Malignant neoplasm metastatic to left lung               Plan:      1-2. Continue current enhertu   - Scans due prior to next cycle   - Echo due in May     Return to clinic in 3 weeks with MD appointment and labs and imaging.     Patient is in agreement with the proposed treatment plan. All questions were answered to the patient's satisfaction. Patient knows to call clinic for any new or worsening symptoms and if anything is needed before the next clinic visit.          Mariam Lisa, FNP-C  Hematology & Medical Oncology   [id number]  [place] [address]  ph. [phone]  Fax. [phone]    Collaborating physician, Dr. Willis.    Approximately 20 minutes were spent face-to-face with the patient.  Approximately 30 minutes in total were spent on this encounter, which includes face-to-face time and non-face-to-face time preparing to see the patient (e.g., review of tests), obtaining and/or reviewing separately obtained history, documenting clinical information in the electronic or other health record, independently interpreting results (not separately reported) and communicating results to the patient/family/caregiver, or care coordination (not separately reported).       Route Chart for Scheduling    Med Onc Chart Routing      Follow up with physician 3 weeks. with CT scan prior labs and enertu   Follow up with JIMI 6 weeks. with labs and enerhtu   Infusion scheduling note enhertu every 3 weeks   Injection scheduling note    Labs CBC and CMP   Scheduling:  Preferred lab:  Lab interval: every 3 weeks     Imaging CT chest abdomen pelvis and ECHO   in 3 weeks; echo in mid May   Pharmacy appointment    Other referrals           Treatment Plan Information   OP BREAST FAM-TRASTUZUMAB DERUXTECAN-NXKI Q3W   Home Willis MD   Upcoming Treatment Dates - OP BREAST FAM-TRASTUZUMAB DERUXTECAN-NXKI Q3W    3/30/2023       Chemotherapy       fam-trastuzumab deruxtecan-nxki (ENHERTU) 530 mg in dextrose 5 % (D5W) 100 mL infusion       Antiemetics       fosaprepitant 150 mg in sodium chloride 0.9% 150 mL IVPB       palonosetron (ALOXI) 0.25 mg in sodium chloride 0.9% 50 mL IVPB  4/20/2023       Chemotherapy       fam-trastuzumab deruxtecan-nxki (ENHERTU) 530 mg in dextrose 5 % (D5W) 100 mL infusion       Antiemetics       fosaprepitant 150 mg in sodium chloride 0.9% 150 mL IVPB       palonosetron (ALOXI) 0.25 mg in sodium chloride 0.9% 50 mL IVPB  5/11/2023       Chemotherapy       fam-trastuzumab deruxtecan-nxki (ENHERTU) 530 mg in dextrose 5 %  (D5W) 100 mL infusion       Antiemetics       fosaprepitant 150 mg in sodium chloride 0.9% 150 mL IVPB       palonosetron (ALOXI) 0.25 mg in sodium chloride 0.9% 50 mL IVPB  6/1/2023       Chemotherapy       fam-trastuzumab deruxtecan-nxki (ENHERTU) 530 mg in dextrose 5 % (D5W) 100 mL infusion       Antiemetics       fosaprepitant 150 mg in sodium chloride 0.9% 150 mL IVPB       palonosetron (ALOXI) 0.25 mg in sodium chloride 0.9% 50 mL IVPB

## 2023-03-09 RX ORDER — METHYLPHENIDATE HYDROCHLORIDE 5 MG/1
5 TABLET ORAL 2 TIMES DAILY
Qty: 60 TABLET | Refills: 0 | Status: SHIPPED | OUTPATIENT
Start: 2023-03-09 | End: 2023-04-10 | Stop reason: SDUPTHER

## 2023-03-22 ENCOUNTER — INFUSION (OUTPATIENT)
Dept: INFUSION THERAPY | Facility: HOSPITAL | Age: 53
End: 2023-03-22
Attending: INTERNAL MEDICINE
Payer: MEDICARE

## 2023-03-22 ENCOUNTER — OFFICE VISIT (OUTPATIENT)
Dept: HEMATOLOGY/ONCOLOGY | Facility: CLINIC | Age: 53
End: 2023-03-22
Payer: MEDICARE

## 2023-03-22 VITALS
HEART RATE: 75 BPM | DIASTOLIC BLOOD PRESSURE: 74 MMHG | HEIGHT: 62 IN | RESPIRATION RATE: 18 BRPM | BODY MASS INDEX: 38.03 KG/M2 | SYSTOLIC BLOOD PRESSURE: 157 MMHG | WEIGHT: 206.69 LBS | OXYGEN SATURATION: 100 % | TEMPERATURE: 98 F

## 2023-03-22 VITALS
TEMPERATURE: 98 F | HEART RATE: 68 BPM | SYSTOLIC BLOOD PRESSURE: 119 MMHG | OXYGEN SATURATION: 100 % | RESPIRATION RATE: 16 BRPM | DIASTOLIC BLOOD PRESSURE: 60 MMHG

## 2023-03-22 DIAGNOSIS — C50.912 BREAST CANCER, STAGE 4, LEFT: Primary | ICD-10-CM

## 2023-03-22 DIAGNOSIS — C78.02 MALIGNANT NEOPLASM METASTATIC TO LEFT LUNG: ICD-10-CM

## 2023-03-22 DIAGNOSIS — E87.6 HYPOKALEMIA: Primary | ICD-10-CM

## 2023-03-22 DIAGNOSIS — C50.912 BREAST CANCER, STAGE 4, LEFT: ICD-10-CM

## 2023-03-22 PROCEDURE — 96361 HYDRATE IV INFUSION ADD-ON: CPT

## 2023-03-22 PROCEDURE — 99215 PR OFFICE/OUTPT VISIT, EST, LEVL V, 40-54 MIN: ICD-10-PCS | Mod: S$PBB,,, | Performed by: NURSE PRACTITIONER

## 2023-03-22 PROCEDURE — 99213 OFFICE O/P EST LOW 20 MIN: CPT | Mod: PBBFAC,25 | Performed by: NURSE PRACTITIONER

## 2023-03-22 PROCEDURE — 96367 TX/PROPH/DG ADDL SEQ IV INF: CPT

## 2023-03-22 PROCEDURE — 25000003 PHARM REV CODE 250: Performed by: NURSE PRACTITIONER

## 2023-03-22 PROCEDURE — 99215 OFFICE O/P EST HI 40 MIN: CPT | Mod: S$PBB,,, | Performed by: NURSE PRACTITIONER

## 2023-03-22 PROCEDURE — 99999 PR PBB SHADOW E&M-EST. PATIENT-LVL III: ICD-10-PCS | Mod: PBBFAC,,, | Performed by: NURSE PRACTITIONER

## 2023-03-22 PROCEDURE — 99999 PR PBB SHADOW E&M-EST. PATIENT-LVL III: CPT | Mod: PBBFAC,,, | Performed by: NURSE PRACTITIONER

## 2023-03-22 PROCEDURE — 96413 CHEMO IV INFUSION 1 HR: CPT

## 2023-03-22 PROCEDURE — 63600175 PHARM REV CODE 636 W HCPCS: Performed by: NURSE PRACTITIONER

## 2023-03-22 RX ORDER — HEPARIN 100 UNIT/ML
500 SYRINGE INTRAVENOUS
Status: CANCELLED | OUTPATIENT
Start: 2023-03-30

## 2023-03-22 RX ORDER — SODIUM CHLORIDE 9 MG/ML
INJECTION, SOLUTION INTRAVENOUS ONCE
Status: COMPLETED | OUTPATIENT
Start: 2023-03-22 | End: 2023-03-22

## 2023-03-22 RX ORDER — HEPARIN 100 UNIT/ML
500 SYRINGE INTRAVENOUS
Status: DISCONTINUED | OUTPATIENT
Start: 2023-03-22 | End: 2023-03-22 | Stop reason: HOSPADM

## 2023-03-22 RX ORDER — SODIUM CHLORIDE 0.9 % (FLUSH) 0.9 %
10 SYRINGE (ML) INJECTION
Status: DISCONTINUED | OUTPATIENT
Start: 2023-03-22 | End: 2023-03-22 | Stop reason: HOSPADM

## 2023-03-22 RX ORDER — SODIUM CHLORIDE 0.9 % (FLUSH) 0.9 %
10 SYRINGE (ML) INJECTION
Status: CANCELLED | OUTPATIENT
Start: 2023-03-30

## 2023-03-22 RX ORDER — SODIUM CHLORIDE 9 MG/ML
INJECTION, SOLUTION INTRAVENOUS ONCE
Status: CANCELLED
Start: 2023-03-30 | End: 2023-03-30

## 2023-03-22 RX ADMIN — SODIUM CHLORIDE: 9 INJECTION, SOLUTION INTRAVENOUS at 10:03

## 2023-03-22 RX ADMIN — FAM-TRASTUZUMAB DERUXTECAN-NXKI 500 MG: 100 INJECTION, POWDER, LYOPHILIZED, FOR SOLUTION INTRAVENOUS at 12:03

## 2023-03-22 RX ADMIN — FOSAPREPITANT 150 MG: 150 INJECTION, POWDER, LYOPHILIZED, FOR SOLUTION INTRAVENOUS at 11:03

## 2023-03-22 RX ADMIN — SODIUM CHLORIDE: 9 INJECTION, SOLUTION INTRAVENOUS at 12:03

## 2023-03-22 RX ADMIN — PALONOSETRON 0.25 MG: 0.05 INJECTION, SOLUTION INTRAVENOUS at 11:03

## 2023-03-22 RX ADMIN — HEPARIN 500 UNITS: 100 SYRINGE at 01:03

## 2023-03-22 NOTE — PLAN OF CARE
Pt here for Enhertu and IVF. Assessment complete and labs reviewed. VSS. PAC accessed using sterile technique. Pt tolerated treatment well; no reaction suspected after 30 min observation period. Flushed PAC with NS and heparin prior to de-accessing. No questions or concerns. Pt ambulated out of unit unassisted.

## 2023-03-23 ENCOUNTER — TELEPHONE (OUTPATIENT)
Dept: HEMATOLOGY/ONCOLOGY | Facility: CLINIC | Age: 53
End: 2023-03-23
Payer: MEDICARE

## 2023-03-23 DIAGNOSIS — C78.02 MALIGNANT NEOPLASM METASTATIC TO LEFT LUNG: Primary | ICD-10-CM

## 2023-03-24 ENCOUNTER — TELEPHONE (OUTPATIENT)
Dept: HEMATOLOGY/ONCOLOGY | Facility: CLINIC | Age: 53
End: 2023-03-24
Payer: MEDICARE

## 2023-03-24 NOTE — TELEPHONE ENCOUNTER
----- Message from Mariam Lisa NP sent at 3/23/2023  9:49 AM CDT -----  Order is in. Thanks  ----- Message -----  From: Tunde Cosme  Sent: 3/23/2023   9:44 AM CDT  To: Mariam Lisa NP    Good morning,    Can I get an echo order for this pt please?              Thank you!!  Tunde            CT chest abdomen pelvis and ECHO   in 3 weeks; echo in mid May

## 2023-03-29 ENCOUNTER — PATIENT MESSAGE (OUTPATIENT)
Dept: HEMATOLOGY/ONCOLOGY | Facility: CLINIC | Age: 53
End: 2023-03-29
Payer: MEDICARE

## 2023-04-03 ENCOUNTER — PATIENT MESSAGE (OUTPATIENT)
Dept: HEMATOLOGY/ONCOLOGY | Facility: CLINIC | Age: 53
End: 2023-04-03
Payer: MEDICARE

## 2023-04-10 ENCOUNTER — HOSPITAL ENCOUNTER (OUTPATIENT)
Dept: RADIOLOGY | Facility: HOSPITAL | Age: 53
Discharge: HOME | End: 2023-04-10
Attending: INTERNAL MEDICINE
Payer: MEDICARE

## 2023-04-10 ENCOUNTER — PATIENT MESSAGE (OUTPATIENT)
Dept: HEMATOLOGY/ONCOLOGY | Facility: CLINIC | Age: 53
End: 2023-04-10
Payer: MEDICARE

## 2023-04-10 DIAGNOSIS — R41.840 LACK OF CONCENTRATION: ICD-10-CM

## 2023-04-10 DIAGNOSIS — C50.912 BREAST CANCER, STAGE 4, LEFT: ICD-10-CM

## 2023-04-10 PROCEDURE — 74177 CT ABD & PELVIS W/CONTRAST: CPT | Mod: TC

## 2023-04-10 PROCEDURE — 25500020 PHARM REV CODE 255: Performed by: INTERNAL MEDICINE

## 2023-04-10 PROCEDURE — 71260 CT CHEST WITH CONTRAST: ICD-10-PCS | Mod: 26,,, | Performed by: RADIOLOGY

## 2023-04-10 PROCEDURE — 71260 CT THORAX DX C+: CPT | Mod: TC

## 2023-04-10 PROCEDURE — 74177 CT ABDOMEN PELVIS WITH CONTRAST: ICD-10-PCS | Mod: 26,,, | Performed by: RADIOLOGY

## 2023-04-10 PROCEDURE — 71260 CT THORAX DX C+: CPT | Mod: 26,,, | Performed by: RADIOLOGY

## 2023-04-10 PROCEDURE — 74177 CT ABD & PELVIS W/CONTRAST: CPT | Mod: 26,,, | Performed by: RADIOLOGY

## 2023-04-10 RX ORDER — METHYLPHENIDATE HYDROCHLORIDE 5 MG/1
5 TABLET ORAL 2 TIMES DAILY
Qty: 60 TABLET | Refills: 0 | Status: SHIPPED | OUTPATIENT
Start: 2023-04-10 | End: 2023-05-12 | Stop reason: SDUPTHER

## 2023-04-10 RX ADMIN — IOHEXOL 100 ML: 350 INJECTION, SOLUTION INTRAVENOUS at 11:04

## 2023-04-12 NOTE — PROGRESS NOTES
Subjective:       Patient ID: Shikha López is a 52 y.o. female.    Chief Complaint: No chief complaint on file.      HPI 52-year-old female who returns for F/U  for metastatic breast cancer.  She is on Trastuzumab deruxtecan  - here for cycle  32.      She continues to do well her current therapy.  She denies any shortness of breath or pain.  Her appetite has been variable.    Breast history:  She presented in the emergency room at [facility] on September 29, 2006 with a 4 months history of inflammation of her left breast.  Physical examination at that time showed the left breast was largely replaced by large mass with multiple skin ulcerations.    A biopsy in September 2006 showed poorly differentiated carcinoma which was ER and OK. negative and HER2 positive.    She was then referred to [facility] for additional care.   CT scan of the chest and abdomen November 2006 revealed multiple nodules in the lungs consistent with metastatic disease.  There also enlarged left axillary node.    She was treated with chemotherapy with weekly Herceptin and Abraxane and had marked improvement in her breast and lung metastasis on that therapy.    On May 29, 2007 she underwent left modified radical mastectomy(Dr. Colvin) which showed no residual tumor in the breast and 1/5 nodes was positive for metastasis measuring 6 mm.  (ypT0N1).    She then received postoperative radiation therapy(Dr Singh)  to the left chest wall supraclav and internal mammary lymph nodes from August 20, 2007 to September 28, 2007.    Postoperatively she continued on Herceptin for approximately 3 and 1/2 years before her lung metastasis begin to grow.    She subsequently received a number of different chemotherapy treatments including Adriamycin, Halaven, Xeloda plus lapatinib then Xeloda plus Herceptin.  The  Those treatments were provided under the care of  in [facility].  Subsequently, she transferred her care to  at  [facility].  She was initially treated with Taxotere Herceptin Perjeta.      She was then changed to Kadcyla.    In July 2020 PET scan showed an increase in the size of an infrahilar mass consistent with progression.   She then took approximately 9 months of Herceptin and Navelbine.  Apparently she has some initial response to that therapy.    She started trastuzumab- deruxtecan  4/12/21.       CT 4/10/23 - stable lesions possibly representing metastatic deposits throughout the lungs including a prominent left infrahilar 3.4 cm spiculated mass.  Stable prominent abdominal lymph node as well.  No convincing CT evidence of new or worsening metastatic disease above or below the diaphragm.      Echo 2/14/23 -EF 60%  Review of Systems   Constitutional:  Negative for appetite change and unexpected weight change.   HENT:  Negative for mouth sores.    Eyes:  Negative for visual disturbance.   Respiratory:  Positive for cough. Negative for shortness of breath.    Cardiovascular:  Negative for chest pain.   Gastrointestinal:  Negative for abdominal pain, constipation, diarrhea and nausea.   Genitourinary:  Negative for frequency.   Musculoskeletal:  Negative for back pain.   Integumentary:  Negative for rash.   Neurological:  Negative for headaches.   Hematological:  Negative for adenopathy.   Psychiatric/Behavioral: Negative.  The patient is not nervous/anxious.        Objective:      Physical Exam  Vitals reviewed.   Constitutional:       General: She is not in acute distress.     Appearance: She is obese.   HENT:      Mouth/Throat:      Mouth: Mucous membranes are moist.      Pharynx: Oropharynx is clear. No oropharyngeal exudate or posterior oropharyngeal erythema.   Eyes:      Pupils: Pupils are equal, round, and reactive to light.   Cardiovascular:      Rate and Rhythm: Normal rate and regular rhythm.      Heart sounds: Murmur (systolic -aortic) heard.   Pulmonary:      Effort: Pulmonary effort is normal. No  respiratory distress.      Breath sounds: Normal breath sounds. No wheezing or rales.   Chest:   Breasts:     Right: Normal.      Left: Absent.       Abdominal:      Palpations: Abdomen is soft. There is no mass.      Tenderness: There is no abdominal tenderness.   Lymphadenopathy:      Cervical: No cervical adenopathy.      Upper Body:      Right upper body: No supraclavicular or axillary adenopathy.      Left upper body: No supraclavicular or axillary adenopathy.   Skin:     Findings: No rash.   Neurological:      Mental Status: She is alert and oriented to person, place, and time.   Psychiatric:         Mood and Affect: Mood normal.         Behavior: Behavior normal.         Thought Content: Thought content normal.         Judgment: Judgment normal.       Assessment:    CBC unremarkable, CMP - K+2.9 -   Problem List Items Addressed This Visit       Breast cancer, stage 4, left    Malignant neoplasm metastatic to left lung - Primary       Plan:      Continue current RX  RTC 3 weeks.  Echo in May        Route Chart for Scheduling    Med Onc Chart Routing      Follow up with physician 6 weeks.   Follow up with JIMI 3 weeks.   Infusion scheduling note    Injection scheduling note    Labs CBC and CMP   Scheduling: Labs same day as infusion  Preferred lab:  Lab interval:     Imaging ECHO   3 weeks   Pharmacy appointment    Other referrals         Treatment Plan Information   OP BREAST FAM-TRASTUZUMAB DERUXTECAN-NXKI Q3W   Home Willis MD   Upcoming Treatment Dates - OP BREAST FAM-TRASTUZUMAB DERUXTECAN-NXKI Q3W    4/20/2023       Chemotherapy       fam-trastuzumab deruxtecan-nxki (ENHERTU) 530 mg in dextrose 5 % (D5W) 100 mL infusion       Antiemetics       fosaprepitant 150 mg in sodium chloride 0.9% 150 mL IVPB       palonosetron (ALOXI) 0.25 mg in sodium chloride 0.9% 50 mL IVPB  5/11/2023       Chemotherapy       fam-trastuzumab deruxtecan-nxki (ENHERTU) 530 mg in dextrose 5 % (D5W) 100 mL infusion        Antiemetics       fosaprepitant 150 mg in sodium chloride 0.9% 150 mL IVPB       palonosetron (ALOXI) 0.25 mg in sodium chloride 0.9% 50 mL IVPB  6/1/2023       Chemotherapy       fam-trastuzumab deruxtecan-nxki (ENHERTU) 530 mg in dextrose 5 % (D5W) 100 mL infusion       Antiemetics       fosaprepitant 150 mg in sodium chloride 0.9% 150 mL IVPB       palonosetron (ALOXI) 0.25 mg in sodium chloride 0.9% 50 mL IVPB  6/22/2023       Chemotherapy       fam-trastuzumab deruxtecan-nxki (ENHERTU) 530 mg in dextrose 5 % (D5W) 100 mL infusion       Antiemetics       fosaprepitant 150 mg in sodium chloride 0.9% 150 mL IVPB       palonosetron (ALOXI) 0.25 mg in sodium chloride 0.9% 50 mL IVPB

## 2023-04-13 ENCOUNTER — OFFICE VISIT (OUTPATIENT)
Dept: HEMATOLOGY/ONCOLOGY | Facility: CLINIC | Age: 53
End: 2023-04-13
Payer: MEDICARE

## 2023-04-13 ENCOUNTER — INFUSION (OUTPATIENT)
Dept: INFUSION THERAPY | Facility: HOSPITAL | Age: 53
End: 2023-04-13
Attending: INTERNAL MEDICINE
Payer: MEDICARE

## 2023-04-13 VITALS
TEMPERATURE: 98 F | BODY MASS INDEX: 37.37 KG/M2 | SYSTOLIC BLOOD PRESSURE: 115 MMHG | OXYGEN SATURATION: 97 % | HEIGHT: 62 IN | WEIGHT: 203.06 LBS | HEART RATE: 84 BPM | DIASTOLIC BLOOD PRESSURE: 60 MMHG | TEMPERATURE: 98 F | HEART RATE: 85 BPM | HEIGHT: 62 IN | BODY MASS INDEX: 37.37 KG/M2 | DIASTOLIC BLOOD PRESSURE: 57 MMHG | WEIGHT: 203.06 LBS | RESPIRATION RATE: 18 BRPM | SYSTOLIC BLOOD PRESSURE: 131 MMHG | RESPIRATION RATE: 18 BRPM

## 2023-04-13 DIAGNOSIS — C50.912 BREAST CANCER, STAGE 4, LEFT: ICD-10-CM

## 2023-04-13 DIAGNOSIS — E87.6 HYPOKALEMIA: ICD-10-CM

## 2023-04-13 DIAGNOSIS — C78.02 MALIGNANT NEOPLASM METASTATIC TO LEFT LUNG: Primary | ICD-10-CM

## 2023-04-13 DIAGNOSIS — E87.6 HYPOKALEMIA: Primary | ICD-10-CM

## 2023-04-13 PROCEDURE — 96361 HYDRATE IV INFUSION ADD-ON: CPT

## 2023-04-13 PROCEDURE — 63600175 PHARM REV CODE 636 W HCPCS: Performed by: INTERNAL MEDICINE

## 2023-04-13 PROCEDURE — 99213 OFFICE O/P EST LOW 20 MIN: CPT | Mod: PBBFAC | Performed by: INTERNAL MEDICINE

## 2023-04-13 PROCEDURE — 99999 PR PBB SHADOW E&M-EST. PATIENT-LVL III: CPT | Mod: PBBFAC,,, | Performed by: INTERNAL MEDICINE

## 2023-04-13 PROCEDURE — 96367 TX/PROPH/DG ADDL SEQ IV INF: CPT

## 2023-04-13 PROCEDURE — 25000003 PHARM REV CODE 250: Performed by: INTERNAL MEDICINE

## 2023-04-13 PROCEDURE — A4216 STERILE WATER/SALINE, 10 ML: HCPCS | Performed by: INTERNAL MEDICINE

## 2023-04-13 PROCEDURE — 99214 PR OFFICE/OUTPT VISIT, EST, LEVL IV, 30-39 MIN: ICD-10-PCS | Mod: S$PBB,,, | Performed by: INTERNAL MEDICINE

## 2023-04-13 PROCEDURE — 99214 OFFICE O/P EST MOD 30 MIN: CPT | Mod: S$PBB,,, | Performed by: INTERNAL MEDICINE

## 2023-04-13 PROCEDURE — 99999 PR PBB SHADOW E&M-EST. PATIENT-LVL III: ICD-10-PCS | Mod: PBBFAC,,, | Performed by: INTERNAL MEDICINE

## 2023-04-13 PROCEDURE — 96413 CHEMO IV INFUSION 1 HR: CPT

## 2023-04-13 RX ORDER — SODIUM CHLORIDE 9 MG/ML
INJECTION, SOLUTION INTRAVENOUS ONCE
Status: CANCELLED
Start: 2023-04-20 | End: 2023-04-20

## 2023-04-13 RX ORDER — POTASSIUM CHLORIDE 20 MEQ/1
20 TABLET, EXTENDED RELEASE ORAL DAILY
Qty: 30 TABLET | Refills: 11 | Status: ON HOLD | OUTPATIENT
Start: 2023-04-13 | End: 2023-06-28 | Stop reason: HOSPADM

## 2023-04-13 RX ORDER — SODIUM CHLORIDE 0.9 % (FLUSH) 0.9 %
10 SYRINGE (ML) INJECTION
Status: DISCONTINUED | OUTPATIENT
Start: 2023-04-13 | End: 2023-04-13 | Stop reason: HOSPADM

## 2023-04-13 RX ORDER — SODIUM CHLORIDE 0.9 % (FLUSH) 0.9 %
10 SYRINGE (ML) INJECTION
Status: CANCELLED | OUTPATIENT
Start: 2023-04-20

## 2023-04-13 RX ORDER — HEPARIN 100 UNIT/ML
500 SYRINGE INTRAVENOUS
Status: DISCONTINUED | OUTPATIENT
Start: 2023-04-13 | End: 2023-04-13 | Stop reason: HOSPADM

## 2023-04-13 RX ORDER — HEPARIN 100 UNIT/ML
500 SYRINGE INTRAVENOUS
Status: CANCELLED | OUTPATIENT
Start: 2023-04-20

## 2023-04-13 RX ORDER — SODIUM CHLORIDE 9 MG/ML
INJECTION, SOLUTION INTRAVENOUS ONCE
Status: COMPLETED | OUTPATIENT
Start: 2023-04-13 | End: 2023-04-13

## 2023-04-13 RX ADMIN — FAM-TRASTUZUMAB DERUXTECAN-NXKI 500 MG: 100 INJECTION, POWDER, LYOPHILIZED, FOR SOLUTION INTRAVENOUS at 10:04

## 2023-04-13 RX ADMIN — HEPARIN SODIUM (PORCINE) LOCK FLUSH IV SOLN 100 UNIT/ML 500 UNITS: 100 SOLUTION at 11:04

## 2023-04-13 RX ADMIN — SODIUM CHLORIDE: 0.9 INJECTION, SOLUTION INTRAVENOUS at 08:04

## 2023-04-13 RX ADMIN — Medication 10 ML: at 11:04

## 2023-04-13 RX ADMIN — FOSAPREPITANT 150 MG: 150 INJECTION, POWDER, LYOPHILIZED, FOR SOLUTION INTRAVENOUS at 10:04

## 2023-04-13 RX ADMIN — PALONOSETRON 0.25 MG: 0.05 INJECTION, SOLUTION INTRAVENOUS at 09:04

## 2023-04-19 NOTE — PROGRESS NOTES
Subjective:       Patient ID: Shikha López is a 52 y.o. female.    Chief Complaint: Breast cancer, stage 4, left      HPI 52-year-old female who returns for F/U  for metastatic breast cancer.  She is on Trastuzumab deruxtecan  - here for cycle  33.      Overall she is doing well.  She does have to put her dog down and this has her saddened.    She has been doing well with extra fluids and decreasing the dexamethasone.   Appetite is okay, tends to eat less at night.   She has been losing weight, but states she feels better.  Two weeks after chemo she is constipated or 3-4 days, this then resolves.       Per Dr. Willis's previous note:Breast history:  She presented in the emergency room at [facility] on September 29, 2006 with a 4 months history of inflammation of her left breast.  Physical examination at that time showed the left breast was largely replaced by large mass with multiple skin ulcerations.    A biopsy in September 2006 showed poorly differentiated carcinoma which was ER and DC. negative and HER2 positive.    She was then referred to [facility] for additional care.   CT scan of the chest and abdomen November 2006 revealed multiple nodules in the lungs consistent with metastatic disease.  There also enlarged left axillary node.    She was treated with chemotherapy with weekly Herceptin and Abraxane and had marked improvement in her breast and lung metastasis on that therapy.    On May 29, 2007 she underwent left modified radical mastectomy(Dr. Colvin) which showed no residual tumor in the breast and 1/5 nodes was positive for metastasis measuring 6 mm.  (ypT0N1).    She then received postoperative radiation therapy(Dr Singh)  to the left chest wall supraclav and internal mammary lymph nodes from August 20, 2007 to September 28, 2007.    Postoperatively she continued on Herceptin for approximately 3 and 1/2 years before her lung metastasis begin to grow.    She subsequently received a  number of different chemotherapy treatments including Adriamycin, Halaven, Xeloda plus lapatinib then Xeloda plus Herceptin.  The  Those treatments were provided under the care of  in [facility].  Subsequently, she transferred her care to  at [facility].  She was initially treated with Taxotere Herceptin Perjeta.      She was then changed to Kadcyla.    In July 2020 PET scan showed an increase in the size of an infrahilar mass consistent with progression.   She then took approximately 9 months of Herceptin and Navelbine.  Apparently she has some initial response to that therapy.    She started trastuzumab- deruxtecan  4/12/21.       CT 4/10/23 - stable lesions possibly representing metastatic deposits throughout the lungs including a prominent left infrahilar 3.4 cm spiculated mass.  Stable prominent abdominal lymph node as well.  No convincing CT evidence of new or worsening metastatic disease above or below the diaphragm.      Echo 2/14/23 -EF 60%    Review of Systems   Constitutional:  Negative for appetite change and unexpected weight change.   HENT:  Negative for mouth sores.    Eyes:  Negative for visual disturbance.   Respiratory:  Negative for cough and shortness of breath.    Cardiovascular:  Negative for chest pain.   Gastrointestinal:  Positive for constipation (2 weeks post chemo). Negative for abdominal pain, diarrhea and nausea.   Genitourinary:  Negative for frequency.   Musculoskeletal:  Negative for back pain.   Integumentary:  Negative for rash.   Neurological:  Negative for headaches.   Hematological:  Negative for adenopathy.   Psychiatric/Behavioral: Negative.  The patient is not nervous/anxious.        Objective:      Physical Exam  Vitals reviewed.   Constitutional:       General: She is not in acute distress.     Appearance: She is obese.   HENT:      Mouth/Throat:      Mouth: Mucous membranes are moist.      Pharynx: Oropharynx is clear. No oropharyngeal exudate or  posterior oropharyngeal erythema.   Eyes:      Pupils: Pupils are equal, round, and reactive to light.   Cardiovascular:      Rate and Rhythm: Normal rate and regular rhythm.      Heart sounds: Murmur (systolic -aortic) heard.   Pulmonary:      Effort: Pulmonary effort is normal. No respiratory distress.      Breath sounds: Normal breath sounds. No wheezing or rales.   Chest:   Breasts:     Right: Normal.      Left: Absent.       Abdominal:      Palpations: Abdomen is soft. There is no mass.      Tenderness: There is no abdominal tenderness.   Lymphadenopathy:      Cervical: No cervical adenopathy.      Upper Body:      Right upper body: No supraclavicular or axillary adenopathy.      Left upper body: No supraclavicular or axillary adenopathy.   Skin:     Findings: No rash.   Neurological:      Mental Status: She is alert and oriented to person, place, and time.   Psychiatric:         Mood and Affect: Mood normal.         Behavior: Behavior normal.         Thought Content: Thought content normal.         Judgment: Judgment normal.       Assessment:      1. Malignant neoplasm metastatic to left lung        2. Breast cancer, stage 4, left               Plan:      1-2. Continue enhertu  RTC 3 weeks.  Echo in May - scheduled     Return to clinic in 3 weeks with MD appointment and labs.     Patient is in agreement with the proposed treatment plan. All questions were answered to the patient's satisfaction. Patient knows to call clinic for any new or worsening symptoms and if anything is needed before the next clinic visit.          Mariam Lisa, MALIKP-C  Hematology & Medical Oncology   [facility] [place] [address]  ph. [phone]  Fax. [phone]    Collaborating physician, Dr. Willis.    Approximately 10 minutes were spent face-to-face with the patient.  Approximately 20 minutes in total were spent on this encounter, which includes face-to-face time and non-face-to-face time preparing to see the  patient (e.g., review of tests), obtaining and/or reviewing separately obtained history, documenting clinical information in the electronic or other health record, independently interpreting results (not separately reported) and communicating results to the patient/family/caregiver, or care coordination (not separately reported).         Route Chart for Scheduling    Med Onc Chart Routing      Follow up with physician 3 weeks. already made   Follow up with JIMI 6 weeks.   Infusion scheduling note enhertu every 3 weeks   Injection scheduling note    Labs CBC and CMP   Scheduling:  Preferred lab:  Lab interval: every 3 weeks     Imaging    Pharmacy appointment    Other referrals           Treatment Plan Information   OP BREAST FAM-TRASTUZUMAB DERUXTECAN-NXKI Q3W   Home Willis MD   Upcoming Treatment Dates - OP BREAST FAM-TRASTUZUMAB DERUXTECAN-NXKI Q3W    5/11/2023       Chemotherapy       fam-trastuzumab deruxtecan-nxki (ENHERTU) 530 mg in dextrose 5 % (D5W) 100 mL infusion       Antiemetics       fosaprepitant 150 mg in sodium chloride 0.9% 150 mL IVPB       palonosetron (ALOXI) 0.25 mg in sodium chloride 0.9% 50 mL IVPB  6/1/2023       Chemotherapy       fam-trastuzumab deruxtecan-nxki (ENHERTU) 530 mg in dextrose 5 % (D5W) 100 mL infusion       Antiemetics       fosaprepitant 150 mg in sodium chloride 0.9% 150 mL IVPB       palonosetron (ALOXI) 0.25 mg in sodium chloride 0.9% 50 mL IVPB  6/22/2023       Chemotherapy       fam-trastuzumab deruxtecan-nxki (ENHERTU) 530 mg in dextrose 5 % (D5W) 100 mL infusion       Antiemetics       fosaprepitant 150 mg in sodium chloride 0.9% 150 mL IVPB       palonosetron (ALOXI) 0.25 mg in sodium chloride 0.9% 50 mL IVPB  7/13/2023       Chemotherapy       fam-trastuzumab deruxtecan-nxki (ENHERTU) 530 mg in dextrose 5 % (D5W) 100 mL infusion       Antiemetics       fosaprepitant 150 mg in sodium chloride 0.9% 150 mL IVPB       palonosetron (ALOXI) 0.25 mg in sodium chloride 0.9%  50 mL IVPB

## 2023-04-20 ENCOUNTER — TELEPHONE (OUTPATIENT)
Dept: HEMATOLOGY/ONCOLOGY | Facility: CLINIC | Age: 53
End: 2023-04-20
Payer: MEDICARE

## 2023-05-03 ENCOUNTER — OFFICE VISIT (OUTPATIENT)
Dept: HEMATOLOGY/ONCOLOGY | Facility: CLINIC | Age: 53
End: 2023-05-03
Payer: MEDICARE

## 2023-05-03 ENCOUNTER — INFUSION (OUTPATIENT)
Dept: INFUSION THERAPY | Facility: HOSPITAL | Age: 53
End: 2023-05-03
Attending: INTERNAL MEDICINE
Payer: MEDICARE

## 2023-05-03 VITALS
TEMPERATURE: 98 F | HEART RATE: 88 BPM | WEIGHT: 197.31 LBS | DIASTOLIC BLOOD PRESSURE: 65 MMHG | BODY MASS INDEX: 36.09 KG/M2 | RESPIRATION RATE: 18 BRPM | SYSTOLIC BLOOD PRESSURE: 145 MMHG

## 2023-05-03 VITALS
HEART RATE: 92 BPM | BODY MASS INDEX: 36.31 KG/M2 | WEIGHT: 197.31 LBS | TEMPERATURE: 98 F | RESPIRATION RATE: 18 BRPM | HEIGHT: 62 IN | DIASTOLIC BLOOD PRESSURE: 64 MMHG | SYSTOLIC BLOOD PRESSURE: 149 MMHG | OXYGEN SATURATION: 95 %

## 2023-05-03 DIAGNOSIS — E87.6 HYPOKALEMIA: Primary | ICD-10-CM

## 2023-05-03 DIAGNOSIS — C78.02 MALIGNANT NEOPLASM METASTATIC TO LEFT LUNG: Primary | ICD-10-CM

## 2023-05-03 DIAGNOSIS — C50.912 BREAST CANCER, STAGE 4, LEFT: ICD-10-CM

## 2023-05-03 PROCEDURE — 25000003 PHARM REV CODE 250: Performed by: NURSE PRACTITIONER

## 2023-05-03 PROCEDURE — 96367 TX/PROPH/DG ADDL SEQ IV INF: CPT

## 2023-05-03 PROCEDURE — 63600175 PHARM REV CODE 636 W HCPCS: Performed by: NURSE PRACTITIONER

## 2023-05-03 PROCEDURE — 99999 PR PBB SHADOW E&M-EST. PATIENT-LVL IV: CPT | Mod: PBBFAC,,, | Performed by: NURSE PRACTITIONER

## 2023-05-03 PROCEDURE — 99999 PR PBB SHADOW E&M-EST. PATIENT-LVL IV: ICD-10-PCS | Mod: PBBFAC,,, | Performed by: NURSE PRACTITIONER

## 2023-05-03 PROCEDURE — 99215 PR OFFICE/OUTPT VISIT, EST, LEVL V, 40-54 MIN: ICD-10-PCS | Mod: S$PBB,,, | Performed by: NURSE PRACTITIONER

## 2023-05-03 PROCEDURE — 96413 CHEMO IV INFUSION 1 HR: CPT

## 2023-05-03 PROCEDURE — 99214 OFFICE O/P EST MOD 30 MIN: CPT | Mod: PBBFAC,25 | Performed by: NURSE PRACTITIONER

## 2023-05-03 PROCEDURE — A4216 STERILE WATER/SALINE, 10 ML: HCPCS | Performed by: NURSE PRACTITIONER

## 2023-05-03 PROCEDURE — 96361 HYDRATE IV INFUSION ADD-ON: CPT

## 2023-05-03 PROCEDURE — 99215 OFFICE O/P EST HI 40 MIN: CPT | Mod: S$PBB,,, | Performed by: NURSE PRACTITIONER

## 2023-05-03 RX ORDER — SODIUM CHLORIDE 0.9 % (FLUSH) 0.9 %
10 SYRINGE (ML) INJECTION
Status: CANCELLED | OUTPATIENT
Start: 2023-05-11

## 2023-05-03 RX ORDER — SODIUM CHLORIDE 9 MG/ML
INJECTION, SOLUTION INTRAVENOUS ONCE
Status: COMPLETED | OUTPATIENT
Start: 2023-05-03 | End: 2023-05-03

## 2023-05-03 RX ORDER — HEPARIN 100 UNIT/ML
500 SYRINGE INTRAVENOUS
Status: CANCELLED | OUTPATIENT
Start: 2023-05-11

## 2023-05-03 RX ORDER — SODIUM CHLORIDE 0.9 % (FLUSH) 0.9 %
10 SYRINGE (ML) INJECTION
Status: DISCONTINUED | OUTPATIENT
Start: 2023-05-03 | End: 2023-05-03 | Stop reason: HOSPADM

## 2023-05-03 RX ORDER — SODIUM CHLORIDE 9 MG/ML
INJECTION, SOLUTION INTRAVENOUS ONCE
Status: CANCELLED
Start: 2023-05-11 | End: 2023-05-11

## 2023-05-03 RX ORDER — HEPARIN 100 UNIT/ML
500 SYRINGE INTRAVENOUS
Status: DISCONTINUED | OUTPATIENT
Start: 2023-05-03 | End: 2023-05-03 | Stop reason: HOSPADM

## 2023-05-03 RX ADMIN — PALONOSETRON 0.25 MG: 0.05 INJECTION, SOLUTION INTRAVENOUS at 02:05

## 2023-05-03 RX ADMIN — SODIUM CHLORIDE: 0.9 INJECTION, SOLUTION INTRAVENOUS at 02:05

## 2023-05-03 RX ADMIN — HEPARIN 500 UNITS: 100 SYRINGE at 03:05

## 2023-05-03 RX ADMIN — FAM-TRASTUZUMAB DERUXTECAN-NXKI 500 MG: 100 INJECTION, POWDER, LYOPHILIZED, FOR SOLUTION INTRAVENOUS at 03:05

## 2023-05-03 RX ADMIN — Medication 10 ML: at 03:05

## 2023-05-03 RX ADMIN — FOSAPREPITANT 150 MG: 150 INJECTION, POWDER, LYOPHILIZED, FOR SOLUTION INTRAVENOUS at 02:05

## 2023-05-03 NOTE — PLAN OF CARE
1600 pt tolerated enhertu infusion without issue, pt to rtc 6/25/23, no distress noted upon d/c to home

## 2023-05-03 NOTE — PLAN OF CARE
1410 pt here for Enhertu D1C33 with IVF, labs, hx, meds, allergies reviewed, pt with no new complaints at this time, reclined in chair, continue to monitor

## 2023-05-12 DIAGNOSIS — R41.840 LACK OF CONCENTRATION: ICD-10-CM

## 2023-05-15 RX ORDER — METHYLPHENIDATE HYDROCHLORIDE 5 MG/1
5 TABLET ORAL 2 TIMES DAILY
Qty: 60 TABLET | Refills: 0 | Status: SHIPPED | OUTPATIENT
Start: 2023-05-15 | End: 2023-06-12 | Stop reason: SDUPTHER

## 2023-05-18 ENCOUNTER — HOSPITAL ENCOUNTER (OUTPATIENT)
Dept: CARDIOLOGY | Facility: HOSPITAL | Age: 53
Discharge: HOME | End: 2023-05-18
Attending: INTERNAL MEDICINE
Payer: MEDICARE

## 2023-05-18 VITALS
DIASTOLIC BLOOD PRESSURE: 75 MMHG | WEIGHT: 197 LBS | HEART RATE: 74 BPM | BODY MASS INDEX: 36.25 KG/M2 | SYSTOLIC BLOOD PRESSURE: 110 MMHG | HEIGHT: 62 IN

## 2023-05-18 DIAGNOSIS — C78.02 MALIGNANT NEOPLASM METASTATIC TO LEFT LUNG: ICD-10-CM

## 2023-05-18 LAB
ASCENDING AORTA: 3.05 CM
AV INDEX (PROSTH): 0.6
AV MEAN GRADIENT: 17 MMHG
AV PEAK GRADIENT: 27 MMHG
AV VALVE AREA: 2.17 CM2
AV VELOCITY RATIO: 0.57
BSA FOR ECHO PROCEDURE: 1.98 M2
CV ECHO LV RWT: 0.26 CM
DOP CALC AO PEAK VEL: 2.61 M/S
DOP CALC AO VTI: 59.24 CM
DOP CALC LVOT AREA: 3.6 CM2
DOP CALC LVOT DIAMETER: 2.15 CM
DOP CALC LVOT PEAK VEL: 1.49 M/S
DOP CALC LVOT STROKE VOLUME: 128.49 CM3
DOP CALC MV VTI: 31.61 CM
DOP CALCLVOT PEAK VEL VTI: 35.41 CM
E WAVE DECELERATION TIME: 51.75 MSEC
E/A RATIO: 0.79
E/E' RATIO: 10.53 M/S
ECHO LV POSTERIOR WALL: 0.63 CM (ref 0.6–1.1)
EJECTION FRACTION: 65 %
FRACTIONAL SHORTENING: 42 % (ref 28–44)
INTERVENTRICULAR SEPTUM: 0.59 CM (ref 0.6–1.1)
LA MAJOR: 5.11 CM
LA MINOR: 4.67 CM
LA WIDTH: 3.84 CM
LEFT ATRIUM SIZE: 4.83 CM
LEFT ATRIUM VOLUME INDEX MOD: 23.3 ML/M2
LEFT ATRIUM VOLUME INDEX: 40.5 ML/M2
LEFT ATRIUM VOLUME MOD: 44.31 CM3
LEFT ATRIUM VOLUME: 76.94 CM3
LEFT INTERNAL DIMENSION IN SYSTOLE: 2.78 CM (ref 2.1–4)
LEFT VENTRICLE DIASTOLIC VOLUME INDEX: 56.77 ML/M2
LEFT VENTRICLE DIASTOLIC VOLUME: 107.87 ML
LEFT VENTRICLE MASS INDEX: 48 G/M2
LEFT VENTRICLE SYSTOLIC VOLUME INDEX: 15.3 ML/M2
LEFT VENTRICLE SYSTOLIC VOLUME: 29 ML
LEFT VENTRICULAR INTERNAL DIMENSION IN DIASTOLE: 4.81 CM (ref 3.5–6)
LEFT VENTRICULAR MASS: 90.43 G
LV LATERAL E/E' RATIO: 11.11 M/S
LV SEPTAL E/E' RATIO: 10 M/S
MV A" WAVE DURATION": 11.13 MSEC
MV MEAN GRADIENT: 4 MMHG
MV PEAK A VEL: 1.27 M/S
MV PEAK E VEL: 1 M/S
MV PEAK GRADIENT: 6 MMHG
MV STENOSIS PRESSURE HALF TIME: 15.01 MS
MV VALVE AREA BY CONTINUITY EQUATION: 4.06 CM2
MV VALVE AREA P 1/2 METHOD: 14.66 CM2
PISA MRMAX VEL: 0.05 M/S
PISA TR MAX VEL: 2.82 M/S
PULM VEIN S/D RATIO: 0.74
PV PEAK D VEL: 0.61 M/S
PV PEAK S VEL: 0.45 M/S
QEF: 62 %
RA MAJOR: 3.96 CM
RA PRESSURE: 3 MMHG
RA WIDTH: 3.87 CM
RIGHT VENTRICULAR END-DIASTOLIC DIMENSION: 3.81 CM
SINUS: 2.54 CM
STJ: 2.55 CM
TDI LATERAL: 0.09 M/S
TDI SEPTAL: 0.1 M/S
TDI: 0.1 M/S
TR MAX PG: 32 MMHG
TRICUSPID ANNULAR PLANE SYSTOLIC EXCURSION: 2.02 CM
TV REST PULMONARY ARTERY PRESSURE: 35 MMHG

## 2023-05-18 PROCEDURE — 93306 TTE W/DOPPLER COMPLETE: CPT | Mod: 26,,, | Performed by: INTERNAL MEDICINE

## 2023-05-18 PROCEDURE — 93356 MYOCRD STRAIN IMG SPCKL TRCK: CPT

## 2023-05-18 PROCEDURE — 93356 ECHO (CUPID ONLY): ICD-10-PCS | Mod: ,,, | Performed by: INTERNAL MEDICINE

## 2023-05-18 PROCEDURE — 93356 MYOCRD STRAIN IMG SPCKL TRCK: CPT | Mod: ,,, | Performed by: INTERNAL MEDICINE

## 2023-05-18 PROCEDURE — 93306 ECHO (CUPID ONLY): ICD-10-PCS | Mod: 26,,, | Performed by: INTERNAL MEDICINE

## 2023-05-22 NOTE — PROGRESS NOTES
Subjective:       Patient ID: Shikha López is a 52 y.o. female.    Chief Complaint: No chief complaint on file.      HPI 52-year-old female who returns for F/U  for metastatic breast cancer.  She is on Trastuzumab deruxtecan  - here for cycle  35.      Today she reports that she is been feeling well.  She denies any shortness of breath or pain.  She is occasional cough.  She did have significant constipation after her last treatment.        Breast history:  She presented in the emergency room at [facility] on September 29, 2006 with a 4 months history of inflammation of her left breast.  Physical examination at that time showed the left breast was largely replaced by large mass with multiple skin ulcerations.    A biopsy in September 2006 showed poorly differentiated carcinoma which was ER and CO. negative and HER2 positive.    She was then referred to [facility] for additional care.   CT scan of the chest and abdomen November 2006 revealed multiple nodules in the lungs consistent with metastatic disease.  There also enlarged left axillary node.    She was treated with chemotherapy with weekly Herceptin and Abraxane and had marked improvement in her breast and lung metastasis on that therapy.    On May 29, 2007 she underwent left modified radical mastectomy(Dr. Colvin) which showed no residual tumor in the breast and 1/5 nodes was positive for metastasis measuring 6 mm.  (ypT0N1).    She then received postoperative radiation therapy(Dr Singh)  to the left chest wall supraclav and internal mammary lymph nodes from August 20, 2007 to September 28, 2007.    Postoperatively she continued on Herceptin for approximately 3 and 1/2 years before her lung metastasis begin to grow.    She subsequently received a number of different chemotherapy treatments including Adriamycin, Halaven, Xeloda plus lapatinib then Xeloda plus Herceptin.  The  Those treatments were provided under the care of  in  [place].  Subsequently, she transferred her care to  at [facility].  She was initially treated with Taxotere Herceptin Perjeta.      She was then changed to Kadcyla.    In July 2020 PET scan showed an increase in the size of an infrahilar mass consistent with progression.   She then took approximately 9 months of Herceptin and Navelbine.  Apparently she has some initial response to that therapy.    She started trastuzumab- deruxtecan  4/12/21.       CT 4/10/23 - stable lesions possibly representing metastatic deposits throughout the lungs including a prominent left infrahilar 3.4 cm spiculated mass.  Stable prominent abdominal lymph node as well.  No convincing CT evidence of new or worsening metastatic disease above or below the diaphragm.      Echo 5/18/23 - EF 62%   Review of Systems   Constitutional:  Negative for appetite change and unexpected weight change.   HENT:  Negative for mouth sores.    Eyes:  Negative for visual disturbance.   Respiratory:  Positive for cough. Negative for shortness of breath.    Cardiovascular:  Negative for chest pain.   Gastrointestinal:  Negative for abdominal pain, constipation, diarrhea and nausea.   Genitourinary:  Negative for frequency.   Musculoskeletal:  Negative for back pain.   Integumentary:  Negative for rash.   Neurological:  Negative for headaches.   Hematological:  Negative for adenopathy.   Psychiatric/Behavioral: Negative.  The patient is not nervous/anxious.        Objective:      Physical Exam  Vitals reviewed.   Constitutional:       General: She is not in acute distress.     Appearance: She is obese.   HENT:      Mouth/Throat:      Mouth: Mucous membranes are moist.      Pharynx: Oropharynx is clear. No oropharyngeal exudate or posterior oropharyngeal erythema.   Eyes:      Pupils: Pupils are equal, round, and reactive to light.   Cardiovascular:      Rate and Rhythm: Normal rate and regular rhythm.      Heart sounds: Murmur (systolic -aortic)  heard.   Pulmonary:      Effort: Pulmonary effort is normal. No respiratory distress.      Breath sounds: Normal breath sounds. No wheezing or rales.   Chest:   Breasts:     Right: Normal.      Left: Absent.       Abdominal:      Palpations: Abdomen is soft. There is no mass.      Tenderness: There is no abdominal tenderness.   Lymphadenopathy:      Cervical: No cervical adenopathy.      Upper Body:      Right upper body: No supraclavicular or axillary adenopathy.      Left upper body: No supraclavicular or axillary adenopathy.   Skin:     Findings: No rash.   Neurological:      Mental Status: She is alert and oriented to person, place, and time.   Psychiatric:         Mood and Affect: Mood normal.         Behavior: Behavior normal.         Thought Content: Thought content normal.         Judgment: Judgment normal.       Assessment:    CBC white blood cell count 2950 with ANC of 1900, hemoglobin 11.7 and platelet count 378361, CMP bili 2.6  Problem List Items Addressed This Visit       Breast cancer, stage 4, left - Primary    Malignant neoplasm metastatic to left lung       Plan:      Continue current RX  RTC 3 weeks.  Scans in July,      Route Chart for Scheduling    Med Onc Chart Routing      Follow up with physician 3 weeks. me or Mariam   Follow up with JIMI    Infusion scheduling note    Injection scheduling note    Labs CBC and CMP   Scheduling: Labs same day as infusion  Preferred lab:  Lab interval:     Imaging    Pharmacy appointment    Other referrals         Treatment Plan Information   OP BREAST FAM-TRASTUZUMAB DERUXTECAN-NXKI Q3W   Home Willis MD   Upcoming Treatment Dates - OP BREAST FAM-TRASTUZUMAB DERUXTECAN-NXKI Q3W    6/1/2023       Chemotherapy       fam-trastuzumab deruxtecan-nxki (ENHERTU) 530 mg in dextrose 5 % (D5W) 100 mL infusion       Antiemetics       fosaprepitant 150 mg in sodium chloride 0.9% 150 mL IVPB       palonosetron (ALOXI) 0.25 mg in sodium chloride 0.9% 50 mL IVPB  6/22/2023        Chemotherapy       fam-trastuzumab deruxtecan-nxki (ENHERTU) 530 mg in dextrose 5 % (D5W) 100 mL infusion       Antiemetics       fosaprepitant 150 mg in sodium chloride 0.9% 150 mL IVPB       palonosetron (ALOXI) 0.25 mg in sodium chloride 0.9% 50 mL IVPB  7/13/2023       Chemotherapy       fam-trastuzumab deruxtecan-nxki (ENHERTU) 530 mg in dextrose 5 % (D5W) 100 mL infusion       Antiemetics       fosaprepitant 150 mg in sodium chloride 0.9% 150 mL IVPB       palonosetron (ALOXI) 0.25 mg in sodium chloride 0.9% 50 mL IVPB

## 2023-05-24 ENCOUNTER — INFUSION (OUTPATIENT)
Dept: INFUSION THERAPY | Facility: HOSPITAL | Age: 53
End: 2023-05-24
Attending: INTERNAL MEDICINE
Payer: MEDICARE

## 2023-05-24 ENCOUNTER — OFFICE VISIT (OUTPATIENT)
Dept: HEMATOLOGY/ONCOLOGY | Facility: CLINIC | Age: 53
End: 2023-05-24
Payer: MEDICARE

## 2023-05-24 VITALS
SYSTOLIC BLOOD PRESSURE: 127 MMHG | OXYGEN SATURATION: 92 % | TEMPERATURE: 98 F | BODY MASS INDEX: 36.53 KG/M2 | WEIGHT: 198.5 LBS | RESPIRATION RATE: 16 BRPM | HEIGHT: 62 IN | DIASTOLIC BLOOD PRESSURE: 62 MMHG | HEART RATE: 82 BPM

## 2023-05-24 DIAGNOSIS — E87.6 HYPOKALEMIA: Primary | ICD-10-CM

## 2023-05-24 DIAGNOSIS — C50.912 BREAST CANCER, STAGE 4, LEFT: Primary | ICD-10-CM

## 2023-05-24 DIAGNOSIS — C50.912 BREAST CANCER, STAGE 4, LEFT: ICD-10-CM

## 2023-05-24 DIAGNOSIS — C78.02 MALIGNANT NEOPLASM METASTATIC TO LEFT LUNG: ICD-10-CM

## 2023-05-24 PROCEDURE — 96413 CHEMO IV INFUSION 1 HR: CPT

## 2023-05-24 PROCEDURE — 96367 TX/PROPH/DG ADDL SEQ IV INF: CPT

## 2023-05-24 PROCEDURE — 99214 OFFICE O/P EST MOD 30 MIN: CPT | Mod: S$PBB,,, | Performed by: INTERNAL MEDICINE

## 2023-05-24 PROCEDURE — 99999 PR PBB SHADOW E&M-EST. PATIENT-LVL III: ICD-10-PCS | Mod: PBBFAC,,, | Performed by: INTERNAL MEDICINE

## 2023-05-24 PROCEDURE — A4216 STERILE WATER/SALINE, 10 ML: HCPCS | Performed by: INTERNAL MEDICINE

## 2023-05-24 PROCEDURE — 99214 PR OFFICE/OUTPT VISIT, EST, LEVL IV, 30-39 MIN: ICD-10-PCS | Mod: S$PBB,,, | Performed by: INTERNAL MEDICINE

## 2023-05-24 PROCEDURE — 96361 HYDRATE IV INFUSION ADD-ON: CPT

## 2023-05-24 PROCEDURE — 63600175 PHARM REV CODE 636 W HCPCS: Performed by: INTERNAL MEDICINE

## 2023-05-24 PROCEDURE — 25000003 PHARM REV CODE 250: Performed by: INTERNAL MEDICINE

## 2023-05-24 PROCEDURE — 99213 OFFICE O/P EST LOW 20 MIN: CPT | Mod: PBBFAC,25 | Performed by: INTERNAL MEDICINE

## 2023-05-24 PROCEDURE — 99999 PR PBB SHADOW E&M-EST. PATIENT-LVL III: CPT | Mod: PBBFAC,,, | Performed by: INTERNAL MEDICINE

## 2023-05-24 RX ORDER — HEPARIN 100 UNIT/ML
500 SYRINGE INTRAVENOUS
Status: CANCELLED | OUTPATIENT
Start: 2023-06-01

## 2023-05-24 RX ORDER — SODIUM CHLORIDE 9 MG/ML
INJECTION, SOLUTION INTRAVENOUS ONCE
Status: CANCELLED
Start: 2023-06-01 | End: 2023-06-01

## 2023-05-24 RX ORDER — HEPARIN 100 UNIT/ML
500 SYRINGE INTRAVENOUS
Status: DISCONTINUED | OUTPATIENT
Start: 2023-05-24 | End: 2023-05-24 | Stop reason: HOSPADM

## 2023-05-24 RX ORDER — SODIUM CHLORIDE 9 MG/ML
INJECTION, SOLUTION INTRAVENOUS ONCE
Status: COMPLETED | OUTPATIENT
Start: 2023-05-24 | End: 2023-05-24

## 2023-05-24 RX ORDER — SODIUM CHLORIDE 0.9 % (FLUSH) 0.9 %
10 SYRINGE (ML) INJECTION
Status: CANCELLED | OUTPATIENT
Start: 2023-06-01

## 2023-05-24 RX ORDER — SODIUM CHLORIDE 0.9 % (FLUSH) 0.9 %
10 SYRINGE (ML) INJECTION
Status: DISCONTINUED | OUTPATIENT
Start: 2023-05-24 | End: 2023-05-24 | Stop reason: HOSPADM

## 2023-05-24 RX ADMIN — FAM-TRASTUZUMAB DERUXTECAN-NXKI 500 MG: 100 INJECTION, POWDER, LYOPHILIZED, FOR SOLUTION INTRAVENOUS at 10:05

## 2023-05-24 RX ADMIN — PALONOSETRON 0.25 MG: 0.05 INJECTION, SOLUTION INTRAVENOUS at 09:05

## 2023-05-24 RX ADMIN — SODIUM CHLORIDE: 9 INJECTION, SOLUTION INTRAVENOUS at 09:05

## 2023-05-24 RX ADMIN — HEPARIN 500 UNITS: 100 SYRINGE at 11:05

## 2023-05-24 RX ADMIN — Medication 10 ML: at 11:05

## 2023-05-24 RX ADMIN — FOSAPREPITANT 150 MG: 150 INJECTION, POWDER, LYOPHILIZED, FOR SOLUTION INTRAVENOUS at 09:05

## 2023-05-24 NOTE — PLAN OF CARE
0900: Pt arrived for ENHERTU/IVFs. Pt A&Ox4. VSS. Labs reviewed. Chemo consent found in chart. All medications review and verbal understanding noted. Port accessed with sterile technique. Positive blood return noted prior to infusion. All premeds given. Will continue to monitor.

## 2023-05-24 NOTE — PLAN OF CARE
1130: Pt tolerated treatment well. Positive blood return noted s/p chemo infusion. Port deaccessed s/p heparin lock. Pt reeducated on sign and symptoms of reaction and instructed to seek medical care if reaction noted. Pt denied AVS, will use MyChart. Pt ambulated out of clinic.

## 2023-05-26 ENCOUNTER — PATIENT MESSAGE (OUTPATIENT)
Dept: HEMATOLOGY/ONCOLOGY | Facility: CLINIC | Age: 53
End: 2023-05-26
Payer: MEDICARE

## 2023-05-31 NOTE — PROGRESS NOTES
Subjective:       Patient ID: Shikha López is a 52 y.o. female.    Chief Complaint: Follow-up      HPI 52-year-old female who returns for F/U  for metastatic breast cancer.  She is on Trastuzumab deruxtecan  - here for cycle  35.      Overall she is feeling well. 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  Appetite and bowel movements are good. She denies any shortness of breath or pain.    She is occasional cough.        Per Dr. Willis's previous note: Breast history:  She presented in the emergency room at [facility] on September 29, 2006 with a 4 months history of inflammation of her left breast.  Physical examination at that time showed the left breast was largely replaced by large mass with multiple skin ulcerations.    A biopsy in September 2006 showed poorly differentiated carcinoma which was ER and MT. negative and HER2 positive.    She was then referred to [facility] for additional care.   CT scan of the chest and abdomen November 2006 revealed multiple nodules in the lungs consistent with metastatic disease.  There also enlarged left axillary node.    She was treated with chemotherapy with weekly Herceptin and Abraxane and had marked improvement in her breast and lung metastasis on that therapy.    On May 29, 2007 she underwent left modified radical mastectomy(Dr. Colvin) which showed no residual tumor in the breast and 1/5 nodes was positive for metastasis measuring 6 mm.  (ypT0N1).    She then received postoperative radiation therapy(Dr Singh)  to the left chest wall supraclav and internal mammary lymph nodes from August 20, 2007 to September 28, 2007.    Postoperatively she continued on Herceptin for approximately 3 and 1/2 years before her lung metastasis begin to grow.    She subsequently received a number of different chemotherapy treatments including Adriamycin, Halaven, Xeloda plus lapatinib then Xeloda plus Herceptin.  The  Those treatments were provided under the care of   in [facility].  Subsequently, she transferred her care to  at [facility].  She was initially treated with Taxotere Herceptin Perjeta.      She was then changed to Kadcyla.    In July 2020 PET scan showed an increase in the size of an infrahilar mass consistent with progression.   She then took approximately 9 months of Herceptin and Navelbine.  Apparently she has some initial response to that therapy.    She started trastuzumab- deruxtecan  4/12/21.       CT 4/10/23 - stable lesions possibly representing metastatic deposits throughout the lungs including a prominent left infrahilar 3.4 cm spiculated mass.  Stable prominent abdominal lymph node as well.  No convincing CT evidence of new or worsening metastatic disease above or below the diaphragm.      Echo 5/18/23 - EF 62% .    Review of Systems   Constitutional:  Negative for activity change, appetite change, chills, diaphoresis, fatigue, fever and unexpected weight change.   HENT:  Negative for mouth sores, nosebleeds and trouble swallowing.    Eyes:  Negative for visual disturbance.   Respiratory:  Positive for cough (in the morning). Negative for shortness of breath.    Cardiovascular:  Negative for chest pain, palpitations and leg swelling.   Gastrointestinal:  Negative for abdominal distention, abdominal pain, blood in stool, constipation, diarrhea, nausea and vomiting.   Genitourinary:  Negative for frequency, hematuria and vaginal bleeding.   Musculoskeletal:  Negative for arthralgias, back pain and myalgias.   Integumentary:  Negative for pallor and rash.   Allergic/Immunologic: Negative for immunocompromised state.   Neurological:  Negative for dizziness, weakness, light-headedness, numbness and headaches.   Hematological:  Negative for adenopathy. Does not bruise/bleed easily.   Psychiatric/Behavioral: Negative.  Negative for confusion. The patient is not nervous/anxious.        Objective:      Physical Exam  Vitals reviewed.    Constitutional:       General: She is not in acute distress.     Appearance: She is obese.   HENT:      Mouth/Throat:      Mouth: Mucous membranes are moist.      Pharynx: Oropharynx is clear. No oropharyngeal exudate or posterior oropharyngeal erythema.   Eyes:      Pupils: Pupils are equal, round, and reactive to light.   Cardiovascular:      Rate and Rhythm: Normal rate and regular rhythm.      Heart sounds: Murmur (systolic -aortic) heard.   Pulmonary:      Effort: Pulmonary effort is normal. No respiratory distress.      Breath sounds: Normal breath sounds. No wheezing or rales.   Chest:   Breasts:     Right: Normal.      Left: Absent.       Abdominal:      Palpations: Abdomen is soft. There is no mass.      Tenderness: There is no abdominal tenderness.   Lymphadenopathy:      Cervical: No cervical adenopathy.      Upper Body:      Right upper body: No supraclavicular or axillary adenopathy.      Left upper body: No supraclavicular or axillary adenopathy.   Skin:     Findings: No rash.   Neurological:      Mental Status: She is alert and oriented to person, place, and time.   Psychiatric:         Mood and Affect: Mood normal.         Behavior: Behavior normal.         Thought Content: Thought content normal.         Judgment: Judgment normal.       Assessment:      1. Malignant neoplasm metastatic to left lung        2. Breast cancer, stage 4, left               Plan:      1-2. Continue current RX, cycle 36 of enhertu due in 3 weeks  - Scans due prior next   - Echo due in Aug  - Potassium 3.2 - patient not taking oral K at home, will restart 1 mEq of K today via IV      Return to clinic in 3 weeks with MD appointment and labs and imaging.     Patient is in agreement with the proposed treatment plan. All questions were answered to the patient's satisfaction. Patient knows to call clinic for any new or worsening symptoms and if anything is needed before the next clinic visit.          Mariam Lisa,  FNP-C  Hematology & Medical Oncology   [address] [place] [address]  ph. [phone]  Fax. [phone]    Collaborating physician, Dr. Willis.    Approximately 15 minutes were spent face-to-face with the patient.  Approximately 25 minutes in total were spent on this encounter, which includes face-to-face time and non-face-to-face time preparing to see the patient (e.g., review of tests), obtaining and/or reviewing separately obtained history, documenting clinical information in the electronic or other health record, independently interpreting results (not separately reported) and communicating results to the patient/family/caregiver, or care coordination (not separately reported).       Route Chart for Scheduling    Med Onc Chart Routing      Follow up with physician 3 weeks.   Follow up with JIMI 6 weeks.   Infusion scheduling note   enhertu every 3 weeks   Injection scheduling note    Labs CBC and CMP   Scheduling:  Preferred lab:  Lab interval: every 3 weeks     Imaging CT chest abdomen pelvis   please schedule on Saturday July 1 if possible   Pharmacy appointment    Other referrals            Treatment Plan Information   OP BREAST FAM-TRASTUZUMAB DERUXTECAN-NXKI Q3W   Home Willis MD   Upcoming Treatment Dates - OP BREAST FAM-TRASTUZUMAB DERUXTECAN-NXKI Q3W    7/13/2023       Chemotherapy       fam-trastuzumab deruxtecan-nxki (ENHERTU) 530 mg in dextrose 5 % (D5W) 100 mL infusion       Antiemetics       fosaprepitant 150 mg in sodium chloride 0.9% 150 mL IVPB       palonosetron (ALOXI) 0.25 mg in sodium chloride 0.9% 50 mL IVPB  8/3/2023       Chemotherapy       fam-trastuzumab deruxtecan-nxki (ENHERTU) 530 mg in dextrose 5 % (D5W) 100 mL infusion       Antiemetics       fosaprepitant 150 mg in sodium chloride 0.9% 150 mL IVPB       palonosetron (ALOXI) 0.25 mg in sodium chloride 0.9% 50 mL IVPB  8/24/2023       Chemotherapy       fam-trastuzumab deruxtecan-nxki (ENHERTU) 530 mg in dextrose 5 %  (D5W) 100 mL infusion       Antiemetics       fosaprepitant 150 mg in sodium chloride 0.9% 150 mL IVPB       palonosetron (ALOXI) 0.25 mg in sodium chloride 0.9% 50 mL IVPB  9/14/2023       Chemotherapy       fam-trastuzumab deruxtecan-nxki (ENHERTU) 530 mg in dextrose 5 % (D5W) 100 mL infusion       Antiemetics       fosaprepitant 150 mg in sodium chloride 0.9% 150 mL IVPB       palonosetron (ALOXI) 0.25 mg in sodium chloride 0.9% 50 mL IVPB

## 2023-06-12 DIAGNOSIS — R41.840 LACK OF CONCENTRATION: ICD-10-CM

## 2023-06-13 RX ORDER — METHYLPHENIDATE HYDROCHLORIDE 5 MG/1
5 TABLET ORAL 2 TIMES DAILY
Qty: 60 TABLET | Refills: 0 | Status: SHIPPED | OUTPATIENT
Start: 2023-06-13 | End: 2023-07-13 | Stop reason: SDUPTHER

## 2023-06-14 ENCOUNTER — OFFICE VISIT (OUTPATIENT)
Dept: HEMATOLOGY/ONCOLOGY | Facility: CLINIC | Age: 53
End: 2023-06-14
Payer: MEDICARE

## 2023-06-14 ENCOUNTER — INFUSION (OUTPATIENT)
Dept: INFUSION THERAPY | Facility: HOSPITAL | Age: 53
End: 2023-06-14
Attending: INTERNAL MEDICINE
Payer: MEDICARE

## 2023-06-14 VITALS — DIASTOLIC BLOOD PRESSURE: 53 MMHG | SYSTOLIC BLOOD PRESSURE: 106 MMHG | HEART RATE: 84 BPM | TEMPERATURE: 98 F

## 2023-06-14 VITALS
SYSTOLIC BLOOD PRESSURE: 126 MMHG | OXYGEN SATURATION: 98 % | RESPIRATION RATE: 18 BRPM | BODY MASS INDEX: 36.98 KG/M2 | HEIGHT: 62 IN | WEIGHT: 200.94 LBS | DIASTOLIC BLOOD PRESSURE: 58 MMHG | HEART RATE: 80 BPM

## 2023-06-14 DIAGNOSIS — E87.6 HYPOKALEMIA: Primary | ICD-10-CM

## 2023-06-14 DIAGNOSIS — C78.02 MALIGNANT NEOPLASM METASTATIC TO LEFT LUNG: Primary | ICD-10-CM

## 2023-06-14 DIAGNOSIS — C50.912 BREAST CANCER, STAGE 4, LEFT: ICD-10-CM

## 2023-06-14 PROCEDURE — 99214 OFFICE O/P EST MOD 30 MIN: CPT | Mod: PBBFAC,25 | Performed by: NURSE PRACTITIONER

## 2023-06-14 PROCEDURE — 25000003 PHARM REV CODE 250: Performed by: NURSE PRACTITIONER

## 2023-06-14 PROCEDURE — 96413 CHEMO IV INFUSION 1 HR: CPT

## 2023-06-14 PROCEDURE — 63600175 PHARM REV CODE 636 W HCPCS: Performed by: NURSE PRACTITIONER

## 2023-06-14 PROCEDURE — 99999 PR PBB SHADOW E&M-EST. PATIENT-LVL IV: ICD-10-PCS | Mod: PBBFAC,,, | Performed by: NURSE PRACTITIONER

## 2023-06-14 PROCEDURE — 96367 TX/PROPH/DG ADDL SEQ IV INF: CPT

## 2023-06-14 PROCEDURE — 99215 PR OFFICE/OUTPT VISIT, EST, LEVL V, 40-54 MIN: ICD-10-PCS | Mod: S$PBB,,, | Performed by: NURSE PRACTITIONER

## 2023-06-14 PROCEDURE — 99215 OFFICE O/P EST HI 40 MIN: CPT | Mod: S$PBB,,, | Performed by: NURSE PRACTITIONER

## 2023-06-14 PROCEDURE — 96361 HYDRATE IV INFUSION ADD-ON: CPT

## 2023-06-14 PROCEDURE — A4216 STERILE WATER/SALINE, 10 ML: HCPCS | Performed by: NURSE PRACTITIONER

## 2023-06-14 PROCEDURE — 99999 PR PBB SHADOW E&M-EST. PATIENT-LVL IV: CPT | Mod: PBBFAC,,, | Performed by: NURSE PRACTITIONER

## 2023-06-14 RX ORDER — SODIUM CHLORIDE 9 MG/ML
INJECTION, SOLUTION INTRAVENOUS ONCE
Status: COMPLETED | OUTPATIENT
Start: 2023-06-14 | End: 2023-06-14

## 2023-06-14 RX ORDER — HEPARIN 100 UNIT/ML
500 SYRINGE INTRAVENOUS
Status: DISCONTINUED | OUTPATIENT
Start: 2023-06-14 | End: 2023-06-14 | Stop reason: HOSPADM

## 2023-06-14 RX ORDER — SODIUM CHLORIDE 0.9 % (FLUSH) 0.9 %
10 SYRINGE (ML) INJECTION
Status: CANCELLED | OUTPATIENT
Start: 2023-06-22

## 2023-06-14 RX ORDER — SODIUM CHLORIDE 0.9 % (FLUSH) 0.9 %
10 SYRINGE (ML) INJECTION
Status: DISCONTINUED | OUTPATIENT
Start: 2023-06-14 | End: 2023-06-14 | Stop reason: HOSPADM

## 2023-06-14 RX ORDER — SODIUM CHLORIDE 9 MG/ML
INJECTION, SOLUTION INTRAVENOUS ONCE
Status: CANCELLED
Start: 2023-06-22 | End: 2023-06-22

## 2023-06-14 RX ORDER — HEPARIN 100 UNIT/ML
500 SYRINGE INTRAVENOUS
Status: CANCELLED | OUTPATIENT
Start: 2023-06-22

## 2023-06-14 RX ORDER — POTASSIUM CHLORIDE 7.45 MG/ML
10 INJECTION INTRAVENOUS
Status: COMPLETED | OUTPATIENT
Start: 2023-06-14 | End: 2023-06-14

## 2023-06-14 RX ADMIN — Medication 10 ML: at 12:06

## 2023-06-14 RX ADMIN — PALONOSETRON 0.25 MG: 0.05 INJECTION, SOLUTION INTRAVENOUS at 10:06

## 2023-06-14 RX ADMIN — SODIUM CHLORIDE: 9 INJECTION, SOLUTION INTRAVENOUS at 08:06

## 2023-06-14 RX ADMIN — FOSAPREPITANT 150 MG: 150 INJECTION, POWDER, LYOPHILIZED, FOR SOLUTION INTRAVENOUS at 09:06

## 2023-06-14 RX ADMIN — HEPARIN 500 UNITS: 100 SYRINGE at 12:06

## 2023-06-14 RX ADMIN — POTASSIUM CHLORIDE 10 MEQ: 10 INJECTION, SOLUTION INTRAVENOUS at 11:06

## 2023-06-14 RX ADMIN — SODIUM CHLORIDE: 9 INJECTION, SOLUTION INTRAVENOUS at 11:06

## 2023-06-14 RX ADMIN — FAM-TRASTUZUMAB DERUXTECAN-NXKI 500 MG: 100 INJECTION, POWDER, LYOPHILIZED, FOR SOLUTION INTRAVENOUS at 10:06

## 2023-06-14 NOTE — PLAN OF CARE
1244 Patient tolerated C35 Enhertu/ IVFs/ potassium repletion without incident. Seen by Mariam Lisa NP prior to treatment. Labs reviewed and within parameters for treatment. Potassium at 3.2, 10 mEq IV potassium administered as ordered per Mariam Lisa NP. Vitals stable before, during and after infusion. Port with brisk blood return and flushed without resistance before, during and after infusion, heparin locked and needle removed at discharge. Patient aware of her next appointment date/time, uses [facility]. Patient verbalized understanding that she must restart taking her home prescription for potassium. To contact provider with questions or concerns. D/C ambulatory and stable.

## 2023-06-22 ENCOUNTER — HOSPITAL ENCOUNTER (INPATIENT)
Facility: HOSPITAL | Age: 53
LOS: 6 days | Discharge: HOME | DRG: 299 | End: 2023-06-28
Attending: EMERGENCY MEDICINE | Admitting: INTERNAL MEDICINE
Payer: MEDICARE

## 2023-06-22 DIAGNOSIS — T45.1X5A ADVERSE EFFECT OF TRASTUZUMAB INFUSION: ICD-10-CM

## 2023-06-22 DIAGNOSIS — K92.2 GASTROINTESTINAL HEMORRHAGE, UNSPECIFIED GASTROINTESTINAL HEMORRHAGE TYPE: Primary | ICD-10-CM

## 2023-06-22 DIAGNOSIS — C78.02 MALIGNANT NEOPLASM METASTATIC TO LEFT LUNG: ICD-10-CM

## 2023-06-22 DIAGNOSIS — C50.912 BREAST CANCER, STAGE 4, LEFT: ICD-10-CM

## 2023-06-22 DIAGNOSIS — K92.2 GIB (GASTROINTESTINAL BLEEDING): ICD-10-CM

## 2023-06-22 DIAGNOSIS — R00.0 TACHYCARDIA: ICD-10-CM

## 2023-06-22 DIAGNOSIS — E87.6 HYPOKALEMIA: ICD-10-CM

## 2023-06-22 LAB
ABO + RH BLD: NORMAL
ALBUMIN SERPL BCP-MCNC: 2.7 G/DL (ref 3.5–5.2)
ALP SERPL-CCNC: 114 U/L (ref 55–135)
ALT SERPL W/O P-5'-P-CCNC: 12 U/L (ref 10–44)
ANION GAP SERPL CALC-SCNC: 10 MMOL/L (ref 8–16)
APTT PPP: 29.2 SEC (ref 21–32)
AST SERPL-CCNC: 33 U/L (ref 10–40)
BASOPHILS # BLD AUTO: 0.03 K/UL (ref 0–0.2)
BASOPHILS NFR BLD: 0.4 % (ref 0–1.9)
BILIRUB SERPL-MCNC: 3.2 MG/DL (ref 0.1–1)
BLD GP AB SCN CELLS X3 SERPL QL: NORMAL
BUN SERPL-MCNC: 23 MG/DL (ref 6–20)
CALCIUM SERPL-MCNC: 8.2 MG/DL (ref 8.7–10.5)
CHLORIDE SERPL-SCNC: 111 MMOL/L (ref 95–110)
CO2 SERPL-SCNC: 25 MMOL/L (ref 23–29)
CREAT SERPL-MCNC: 0.5 MG/DL (ref 0.5–1.4)
DIFFERENTIAL METHOD: ABNORMAL
EOSINOPHIL # BLD AUTO: 0.1 K/UL (ref 0–0.5)
EOSINOPHIL NFR BLD: 1 % (ref 0–8)
ERYTHROCYTE [DISTWIDTH] IN BLOOD BY AUTOMATED COUNT: 16.1 % (ref 11.5–14.5)
EST. GFR  (NO RACE VARIABLE): >60 ML/MIN/1.73 M^2
GLUCOSE SERPL-MCNC: 134 MG/DL (ref 70–110)
HCT VFR BLD AUTO: 21.1 % (ref 37–48.5)
HCV AB SERPL QL IA: NORMAL
HGB BLD-MCNC: 7 G/DL (ref 12–16)
HIV 1+2 AB+HIV1 P24 AG SERPL QL IA: NORMAL
IMM GRANULOCYTES # BLD AUTO: 0.06 K/UL (ref 0–0.04)
IMM GRANULOCYTES NFR BLD AUTO: 0.7 % (ref 0–0.5)
INR PPP: 1.4 (ref 0.8–1.2)
LYMPHOCYTES # BLD AUTO: 0.7 K/UL (ref 1–4.8)
LYMPHOCYTES NFR BLD: 8.5 % (ref 18–48)
MCH RBC QN AUTO: 36.8 PG (ref 27–31)
MCHC RBC AUTO-ENTMCNC: 33.2 G/DL (ref 32–36)
MCV RBC AUTO: 111 FL (ref 82–98)
MONOCYTES # BLD AUTO: 0.3 K/UL (ref 0.3–1)
MONOCYTES NFR BLD: 3.5 % (ref 4–15)
NEUTROPHILS # BLD AUTO: 6.9 K/UL (ref 1.8–7.7)
NEUTROPHILS NFR BLD: 85.9 % (ref 38–73)
NRBC BLD-RTO: 0 /100 WBC
PLATELET # BLD AUTO: 145 K/UL (ref 150–450)
PMV BLD AUTO: 11.4 FL (ref 9.2–12.9)
POTASSIUM SERPL-SCNC: 3.6 MMOL/L (ref 3.5–5.1)
PROT SERPL-MCNC: 5 G/DL (ref 6–8.4)
PROTHROMBIN TIME: 15.2 SEC (ref 9–12.5)
RBC # BLD AUTO: 1.9 M/UL (ref 4–5.4)
SARS-COV-2 RDRP RESP QL NAA+PROBE: NEGATIVE
SODIUM SERPL-SCNC: 146 MMOL/L (ref 136–145)
SPECIMEN OUTDATE: NORMAL
WBC # BLD AUTO: 8.03 K/UL (ref 3.9–12.7)

## 2023-06-22 PROCEDURE — 36430 TRANSFUSION BLD/BLD COMPNT: CPT

## 2023-06-22 PROCEDURE — 85730 THROMBOPLASTIN TIME PARTIAL: CPT | Performed by: PHYSICIAN ASSISTANT

## 2023-06-22 PROCEDURE — 63600175 PHARM REV CODE 636 W HCPCS: Mod: JA | Performed by: PHYSICIAN ASSISTANT

## 2023-06-22 PROCEDURE — 96375 TX/PRO/DX INJ NEW DRUG ADDON: CPT

## 2023-06-22 PROCEDURE — U0002 COVID-19 LAB TEST NON-CDC: HCPCS | Performed by: PHYSICIAN ASSISTANT

## 2023-06-22 PROCEDURE — 86803 HEPATITIS C AB TEST: CPT | Performed by: PHYSICIAN ASSISTANT

## 2023-06-22 PROCEDURE — 63600175 PHARM REV CODE 636 W HCPCS

## 2023-06-22 PROCEDURE — 25000003 PHARM REV CODE 250: Performed by: PHYSICIAN ASSISTANT

## 2023-06-22 PROCEDURE — 99233 PR SUBSEQUENT HOSPITAL CARE,LEVL III: ICD-10-PCS | Mod: ,,, | Performed by: NURSE PRACTITIONER

## 2023-06-22 PROCEDURE — 99285 EMERGENCY DEPT VISIT HI MDM: CPT | Mod: 25

## 2023-06-22 PROCEDURE — 93010 EKG 12-LEAD: ICD-10-PCS | Mod: ,,, | Performed by: INTERNAL MEDICINE

## 2023-06-22 PROCEDURE — 93005 ELECTROCARDIOGRAM TRACING: CPT

## 2023-06-22 PROCEDURE — 86900 BLOOD TYPING SEROLOGIC ABO: CPT | Performed by: PHYSICIAN ASSISTANT

## 2023-06-22 PROCEDURE — 85025 COMPLETE CBC W/AUTO DIFF WBC: CPT | Performed by: PHYSICIAN ASSISTANT

## 2023-06-22 PROCEDURE — 87389 HIV-1 AG W/HIV-1&-2 AB AG IA: CPT | Performed by: PHYSICIAN ASSISTANT

## 2023-06-22 PROCEDURE — C9113 INJ PANTOPRAZOLE SODIUM, VIA: HCPCS

## 2023-06-22 PROCEDURE — 99233 SBSQ HOSP IP/OBS HIGH 50: CPT | Mod: ,,, | Performed by: NURSE PRACTITIONER

## 2023-06-22 PROCEDURE — 80053 COMPREHEN METABOLIC PANEL: CPT | Performed by: PHYSICIAN ASSISTANT

## 2023-06-22 PROCEDURE — 96365 THER/PROPH/DIAG IV INF INIT: CPT

## 2023-06-22 PROCEDURE — 12000002 HC ACUTE/MED SURGE SEMI-PRIVATE ROOM

## 2023-06-22 PROCEDURE — C9113 INJ PANTOPRAZOLE SODIUM, VIA: HCPCS | Performed by: PHYSICIAN ASSISTANT

## 2023-06-22 PROCEDURE — 93010 ELECTROCARDIOGRAM REPORT: CPT | Mod: ,,, | Performed by: INTERNAL MEDICINE

## 2023-06-22 PROCEDURE — 86920 COMPATIBILITY TEST SPIN: CPT | Performed by: PHYSICIAN ASSISTANT

## 2023-06-22 PROCEDURE — 85610 PROTHROMBIN TIME: CPT | Performed by: PHYSICIAN ASSISTANT

## 2023-06-22 RX ORDER — HYDROCODONE BITARTRATE AND ACETAMINOPHEN 500; 5 MG/1; MG/1
TABLET ORAL
Status: DISCONTINUED | OUTPATIENT
Start: 2023-06-22 | End: 2023-06-28 | Stop reason: HOSPADM

## 2023-06-22 RX ORDER — PANTOPRAZOLE SODIUM 40 MG/10ML
80 INJECTION, POWDER, LYOPHILIZED, FOR SOLUTION INTRAVENOUS
Status: COMPLETED | OUTPATIENT
Start: 2023-06-22 | End: 2023-06-22

## 2023-06-22 RX ORDER — POTASSIUM CHLORIDE 20 MEQ/1
20 TABLET, EXTENDED RELEASE ORAL DAILY
Status: DISCONTINUED | OUTPATIENT
Start: 2023-06-23 | End: 2023-06-24

## 2023-06-22 RX ORDER — OCTREOTIDE ACETATE 100 UG/ML
50 INJECTION, SOLUTION INTRAVENOUS; SUBCUTANEOUS
Status: COMPLETED | OUTPATIENT
Start: 2023-06-22 | End: 2023-06-22

## 2023-06-22 RX ORDER — PANTOPRAZOLE SODIUM 40 MG/10ML
40 INJECTION, POWDER, LYOPHILIZED, FOR SOLUTION INTRAVENOUS 2 TIMES DAILY
Status: DISCONTINUED | OUTPATIENT
Start: 2023-06-22 | End: 2023-06-28

## 2023-06-22 RX ORDER — ONDANSETRON 2 MG/ML
4 INJECTION INTRAMUSCULAR; INTRAVENOUS
Status: COMPLETED | OUTPATIENT
Start: 2023-06-22 | End: 2023-06-22

## 2023-06-22 RX ORDER — PANTOPRAZOLE SODIUM 40 MG/10ML
40 INJECTION, POWDER, LYOPHILIZED, FOR SOLUTION INTRAVENOUS
Status: DISCONTINUED | OUTPATIENT
Start: 2023-06-22 | End: 2023-06-22

## 2023-06-22 RX ADMIN — SODIUM CHLORIDE 1000 ML: 9 INJECTION, SOLUTION INTRAVENOUS at 09:06

## 2023-06-22 RX ADMIN — OCTREOTIDE ACETATE 50 MCG: 100 INJECTION, SOLUTION INTRAVENOUS; SUBCUTANEOUS at 09:06

## 2023-06-22 RX ADMIN — PANTOPRAZOLE SODIUM 80 MG: 40 INJECTION, POWDER, FOR SOLUTION INTRAVENOUS at 09:06

## 2023-06-22 RX ADMIN — PROMETHAZINE HYDROCHLORIDE 25 MG: 25 INJECTION, SOLUTION INTRAMUSCULAR; INTRAVENOUS at 11:06

## 2023-06-22 RX ADMIN — ONDANSETRON 4 MG: 2 INJECTION INTRAMUSCULAR; INTRAVENOUS at 09:06

## 2023-06-22 RX ADMIN — OCTREOTIDE ACETATE 50 MCG/HR: 500 INJECTION, SOLUTION INTRAVENOUS; SUBCUTANEOUS at 09:06

## 2023-06-22 RX ADMIN — PANTOPRAZOLE SODIUM 40 MG: 40 INJECTION, POWDER, FOR SOLUTION INTRAVENOUS at 11:06

## 2023-06-23 ENCOUNTER — ANESTHESIA (OUTPATIENT)
Dept: ENDOSCOPY | Facility: HOSPITAL | Age: 53
DRG: 299 | End: 2023-06-23
Payer: MEDICARE

## 2023-06-23 ENCOUNTER — ANESTHESIA EVENT (OUTPATIENT)
Dept: ENDOSCOPY | Facility: HOSPITAL | Age: 53
DRG: 299 | End: 2023-06-23
Payer: MEDICARE

## 2023-06-23 PROBLEM — I86.4 ESOPHAGEAL AND GASTRIC VARICES: Status: ACTIVE | Noted: 2023-06-23

## 2023-06-23 PROBLEM — I85.00 ESOPHAGEAL AND GASTRIC VARICES: Status: ACTIVE | Noted: 2023-06-23

## 2023-06-23 LAB
BASOPHILS # BLD AUTO: 0.01 K/UL (ref 0–0.2)
BASOPHILS # BLD AUTO: 0.02 K/UL (ref 0–0.2)
BASOPHILS # BLD AUTO: 0.03 K/UL (ref 0–0.2)
BASOPHILS # BLD AUTO: 0.05 K/UL (ref 0–0.2)
BASOPHILS NFR BLD: 0.2 % (ref 0–1.9)
BASOPHILS NFR BLD: 0.3 % (ref 0–1.9)
BASOPHILS NFR BLD: 0.5 % (ref 0–1.9)
BASOPHILS NFR BLD: 0.6 % (ref 0–1.9)
BLD PROD TYP BPU: NORMAL
BLD PROD TYP BPU: NORMAL
BLOOD UNIT EXPIRATION DATE: NORMAL
BLOOD UNIT EXPIRATION DATE: NORMAL
BLOOD UNIT TYPE CODE: 5100
BLOOD UNIT TYPE CODE: 5100
BLOOD UNIT TYPE: NORMAL
BLOOD UNIT TYPE: NORMAL
CODING SYSTEM: NORMAL
CODING SYSTEM: NORMAL
CROSSMATCH INTERPRETATION: NORMAL
CROSSMATCH INTERPRETATION: NORMAL
DIFFERENTIAL METHOD: ABNORMAL
DISPENSE STATUS: NORMAL
DISPENSE STATUS: NORMAL
EOSINOPHIL # BLD AUTO: 0.1 K/UL (ref 0–0.5)
EOSINOPHIL # BLD AUTO: 0.1 K/UL (ref 0–0.5)
EOSINOPHIL # BLD AUTO: 0.2 K/UL (ref 0–0.5)
EOSINOPHIL # BLD AUTO: 0.4 K/UL (ref 0–0.5)
EOSINOPHIL NFR BLD: 1.5 % (ref 0–8)
EOSINOPHIL NFR BLD: 2.1 % (ref 0–8)
EOSINOPHIL NFR BLD: 2.9 % (ref 0–8)
EOSINOPHIL NFR BLD: 4.2 % (ref 0–8)
ERYTHROCYTE [DISTWIDTH] IN BLOOD BY AUTOMATED COUNT: 17.4 % (ref 11.5–14.5)
ERYTHROCYTE [DISTWIDTH] IN BLOOD BY AUTOMATED COUNT: 19.6 % (ref 11.5–14.5)
ERYTHROCYTE [DISTWIDTH] IN BLOOD BY AUTOMATED COUNT: 20.8 % (ref 11.5–14.5)
ERYTHROCYTE [DISTWIDTH] IN BLOOD BY AUTOMATED COUNT: 21.2 % (ref 11.5–14.5)
HCT VFR BLD AUTO: 20.9 % (ref 37–48.5)
HCT VFR BLD AUTO: 23.3 % (ref 37–48.5)
HCT VFR BLD AUTO: 24 % (ref 37–48.5)
HCT VFR BLD AUTO: 25 % (ref 37–48.5)
HGB BLD-MCNC: 7.1 G/DL (ref 12–16)
HGB BLD-MCNC: 8 G/DL (ref 12–16)
HGB BLD-MCNC: 8 G/DL (ref 12–16)
HGB BLD-MCNC: 8.9 G/DL (ref 12–16)
IMM GRANULOCYTES # BLD AUTO: 0.03 K/UL (ref 0–0.04)
IMM GRANULOCYTES # BLD AUTO: 0.05 K/UL (ref 0–0.04)
IMM GRANULOCYTES # BLD AUTO: 0.05 K/UL (ref 0–0.04)
IMM GRANULOCYTES # BLD AUTO: 0.1 K/UL (ref 0–0.04)
IMM GRANULOCYTES NFR BLD AUTO: 0.5 % (ref 0–0.5)
IMM GRANULOCYTES NFR BLD AUTO: 0.8 % (ref 0–0.5)
IMM GRANULOCYTES NFR BLD AUTO: 0.9 % (ref 0–0.5)
IMM GRANULOCYTES NFR BLD AUTO: 1.2 % (ref 0–0.5)
LYMPHOCYTES # BLD AUTO: 0.8 K/UL (ref 1–4.8)
LYMPHOCYTES # BLD AUTO: 1.3 K/UL (ref 1–4.8)
LYMPHOCYTES NFR BLD: 12.4 % (ref 18–48)
LYMPHOCYTES NFR BLD: 13.5 % (ref 18–48)
LYMPHOCYTES NFR BLD: 14.5 % (ref 18–48)
LYMPHOCYTES NFR BLD: 14.5 % (ref 18–48)
MCH RBC QN AUTO: 34.9 PG (ref 27–31)
MCH RBC QN AUTO: 35.7 PG (ref 27–31)
MCH RBC QN AUTO: 36.2 PG (ref 27–31)
MCH RBC QN AUTO: 36.6 PG (ref 27–31)
MCHC RBC AUTO-ENTMCNC: 33.3 G/DL (ref 32–36)
MCHC RBC AUTO-ENTMCNC: 34 G/DL (ref 32–36)
MCHC RBC AUTO-ENTMCNC: 34.3 G/DL (ref 32–36)
MCHC RBC AUTO-ENTMCNC: 35.6 G/DL (ref 32–36)
MCV RBC AUTO: 102 FL (ref 82–98)
MCV RBC AUTO: 104 FL (ref 82–98)
MCV RBC AUTO: 105 FL (ref 82–98)
MCV RBC AUTO: 108 FL (ref 82–98)
MONOCYTES # BLD AUTO: 0.3 K/UL (ref 0.3–1)
MONOCYTES # BLD AUTO: 0.3 K/UL (ref 0.3–1)
MONOCYTES # BLD AUTO: 0.4 K/UL (ref 0.3–1)
MONOCYTES # BLD AUTO: 0.5 K/UL (ref 0.3–1)
MONOCYTES NFR BLD: 5.2 % (ref 4–15)
MONOCYTES NFR BLD: 5.4 % (ref 4–15)
MONOCYTES NFR BLD: 5.8 % (ref 4–15)
MONOCYTES NFR BLD: 6.3 % (ref 4–15)
NEUTROPHILS # BLD AUTO: 4.5 K/UL (ref 1.8–7.7)
NEUTROPHILS # BLD AUTO: 4.5 K/UL (ref 1.8–7.7)
NEUTROPHILS # BLD AUTO: 4.9 K/UL (ref 1.8–7.7)
NEUTROPHILS # BLD AUTO: 6.4 K/UL (ref 1.8–7.7)
NEUTROPHILS NFR BLD: 73.7 % (ref 38–73)
NEUTROPHILS NFR BLD: 76.9 % (ref 38–73)
NEUTROPHILS NFR BLD: 77.9 % (ref 38–73)
NEUTROPHILS NFR BLD: 78.2 % (ref 38–73)
NRBC BLD-RTO: 0 /100 WBC
NRBC BLD-RTO: 0 /100 WBC
NRBC BLD-RTO: 1 /100 WBC
NRBC BLD-RTO: 1 /100 WBC
NUM UNITS TRANS PACKED RBC: NORMAL
NUM UNITS TRANS PACKED RBC: NORMAL
PLATELET # BLD AUTO: 107 K/UL (ref 150–450)
PLATELET # BLD AUTO: 108 K/UL (ref 150–450)
PLATELET # BLD AUTO: 120 K/UL (ref 150–450)
PLATELET # BLD AUTO: 140 K/UL (ref 150–450)
PMV BLD AUTO: 10.9 FL (ref 9.2–12.9)
PMV BLD AUTO: 11.3 FL (ref 9.2–12.9)
PMV BLD AUTO: 11.6 FL (ref 9.2–12.9)
PMV BLD AUTO: 11.6 FL (ref 9.2–12.9)
RBC # BLD AUTO: 1.94 M/UL (ref 4–5.4)
RBC # BLD AUTO: 2.24 M/UL (ref 4–5.4)
RBC # BLD AUTO: 2.29 M/UL (ref 4–5.4)
RBC # BLD AUTO: 2.46 M/UL (ref 4–5.4)
WBC # BLD AUTO: 5.79 K/UL (ref 3.9–12.7)
WBC # BLD AUTO: 5.84 K/UL (ref 3.9–12.7)
WBC # BLD AUTO: 6.2 K/UL (ref 3.9–12.7)
WBC # BLD AUTO: 8.61 K/UL (ref 3.9–12.7)

## 2023-06-23 PROCEDURE — 43235 PR EGD, FLEX, DIAGNOSTIC: ICD-10-PCS | Mod: ,,, | Performed by: STUDENT IN AN ORGANIZED HEALTH CARE EDUCATION/TRAINING PROGRAM

## 2023-06-23 PROCEDURE — 63600175 PHARM REV CODE 636 W HCPCS: Mod: JA | Performed by: PHYSICIAN ASSISTANT

## 2023-06-23 PROCEDURE — D9220A PRA ANESTHESIA: ICD-10-PCS | Mod: ANES,,, | Performed by: ANESTHESIOLOGY

## 2023-06-23 PROCEDURE — C9113 INJ PANTOPRAZOLE SODIUM, VIA: HCPCS

## 2023-06-23 PROCEDURE — 37000008 HC ANESTHESIA 1ST 15 MINUTES: Performed by: STUDENT IN AN ORGANIZED HEALTH CARE EDUCATION/TRAINING PROGRAM

## 2023-06-23 PROCEDURE — 25000003 PHARM REV CODE 250: Performed by: HOSPITALIST

## 2023-06-23 PROCEDURE — 20600001 HC STEP DOWN PRIVATE ROOM

## 2023-06-23 PROCEDURE — P9016 RBC LEUKOCYTES REDUCED: HCPCS | Performed by: PHYSICIAN ASSISTANT

## 2023-06-23 PROCEDURE — D9220A PRA ANESTHESIA: ICD-10-PCS | Mod: CRNA,,, | Performed by: NURSE ANESTHETIST, CERTIFIED REGISTERED

## 2023-06-23 PROCEDURE — 94761 N-INVAS EAR/PLS OXIMETRY MLT: CPT

## 2023-06-23 PROCEDURE — 25000003 PHARM REV CODE 250: Performed by: NURSE ANESTHETIST, CERTIFIED REGISTERED

## 2023-06-23 PROCEDURE — 99223 PR INITIAL HOSPITAL CARE,LEVL III: ICD-10-PCS | Mod: 25,GC,, | Performed by: STUDENT IN AN ORGANIZED HEALTH CARE EDUCATION/TRAINING PROGRAM

## 2023-06-23 PROCEDURE — 99223 PR INITIAL HOSPITAL CARE,LEVL III: ICD-10-PCS | Mod: GC,,, | Performed by: STUDENT IN AN ORGANIZED HEALTH CARE EDUCATION/TRAINING PROGRAM

## 2023-06-23 PROCEDURE — 99223 1ST HOSP IP/OBS HIGH 75: CPT | Mod: 25,GC,, | Performed by: STUDENT IN AN ORGANIZED HEALTH CARE EDUCATION/TRAINING PROGRAM

## 2023-06-23 PROCEDURE — 99223 1ST HOSP IP/OBS HIGH 75: CPT | Mod: GC,,, | Performed by: STUDENT IN AN ORGANIZED HEALTH CARE EDUCATION/TRAINING PROGRAM

## 2023-06-23 PROCEDURE — D9220A PRA ANESTHESIA: Mod: ANES,,, | Performed by: ANESTHESIOLOGY

## 2023-06-23 PROCEDURE — 99223 PR INITIAL HOSPITAL CARE,LEVL III: ICD-10-PCS | Mod: GC,,, | Performed by: HOSPITALIST

## 2023-06-23 PROCEDURE — D9220A PRA ANESTHESIA: Mod: CRNA,,, | Performed by: NURSE ANESTHETIST, CERTIFIED REGISTERED

## 2023-06-23 PROCEDURE — 63600175 PHARM REV CODE 636 W HCPCS

## 2023-06-23 PROCEDURE — 99223 1ST HOSP IP/OBS HIGH 75: CPT | Mod: GC,,, | Performed by: HOSPITALIST

## 2023-06-23 PROCEDURE — 37000009 HC ANESTHESIA EA ADD 15 MINS: Performed by: STUDENT IN AN ORGANIZED HEALTH CARE EDUCATION/TRAINING PROGRAM

## 2023-06-23 PROCEDURE — 25000003 PHARM REV CODE 250: Performed by: PHYSICIAN ASSISTANT

## 2023-06-23 PROCEDURE — 85025 COMPLETE CBC W/AUTO DIFF WBC: CPT | Mod: 91

## 2023-06-23 PROCEDURE — 36415 COLL VENOUS BLD VENIPUNCTURE: CPT

## 2023-06-23 PROCEDURE — 43235 EGD DIAGNOSTIC BRUSH WASH: CPT | Mod: ,,, | Performed by: STUDENT IN AN ORGANIZED HEALTH CARE EDUCATION/TRAINING PROGRAM

## 2023-06-23 PROCEDURE — 43235 EGD DIAGNOSTIC BRUSH WASH: CPT | Performed by: STUDENT IN AN ORGANIZED HEALTH CARE EDUCATION/TRAINING PROGRAM

## 2023-06-23 PROCEDURE — 63600175 PHARM REV CODE 636 W HCPCS: Performed by: NURSE ANESTHETIST, CERTIFIED REGISTERED

## 2023-06-23 RX ORDER — HYDROMORPHONE HYDROCHLORIDE 1 MG/ML
0.2 INJECTION, SOLUTION INTRAMUSCULAR; INTRAVENOUS; SUBCUTANEOUS EVERY 5 MIN PRN
Status: DISCONTINUED | OUTPATIENT
Start: 2023-06-23 | End: 2023-06-23 | Stop reason: HOSPADM

## 2023-06-23 RX ORDER — MUPIROCIN 20 MG/G
OINTMENT TOPICAL 2 TIMES DAILY
Status: COMPLETED | OUTPATIENT
Start: 2023-06-23 | End: 2023-06-27

## 2023-06-23 RX ORDER — LIDOCAINE HYDROCHLORIDE 20 MG/ML
INJECTION INTRAVENOUS
Status: DISCONTINUED | OUTPATIENT
Start: 2023-06-23 | End: 2023-06-23

## 2023-06-23 RX ORDER — PROPOFOL 10 MG/ML
VIAL (ML) INTRAVENOUS
Status: DISCONTINUED | OUTPATIENT
Start: 2023-06-23 | End: 2023-06-23

## 2023-06-23 RX ORDER — SODIUM CHLORIDE 0.9 % (FLUSH) 0.9 %
10 SYRINGE (ML) INJECTION
Status: DISCONTINUED | OUTPATIENT
Start: 2023-06-23 | End: 2023-06-23 | Stop reason: HOSPADM

## 2023-06-23 RX ADMIN — OCTREOTIDE ACETATE 50 MCG/HR: 500 INJECTION, SOLUTION INTRAVENOUS; SUBCUTANEOUS at 12:06

## 2023-06-23 RX ADMIN — MUPIROCIN: 20 OINTMENT TOPICAL at 12:06

## 2023-06-23 RX ADMIN — OCTREOTIDE ACETATE 50 MCG/HR: 500 INJECTION, SOLUTION INTRAVENOUS; SUBCUTANEOUS at 09:06

## 2023-06-23 RX ADMIN — PANTOPRAZOLE SODIUM 40 MG: 40 INJECTION, POWDER, FOR SOLUTION INTRAVENOUS at 12:06

## 2023-06-23 RX ADMIN — PROPOFOL 50 MG: 10 INJECTION, EMULSION INTRAVENOUS at 10:06

## 2023-06-23 RX ADMIN — PANTOPRAZOLE SODIUM 40 MG: 40 INJECTION, POWDER, FOR SOLUTION INTRAVENOUS at 08:06

## 2023-06-23 RX ADMIN — LIDOCAINE HYDROCHLORIDE 100 MG: 20 INJECTION INTRAVENOUS at 10:06

## 2023-06-23 RX ADMIN — SODIUM CHLORIDE: 9 INJECTION, SOLUTION INTRAVENOUS at 10:06

## 2023-06-23 RX ADMIN — MUPIROCIN: 20 OINTMENT TOPICAL at 08:06

## 2023-06-23 RX ADMIN — GLYCOPYRROLATE 0.2 MG: 0.2 INJECTION, SOLUTION INTRAMUSCULAR; INTRAVENOUS at 10:06

## 2023-06-23 NOTE — PROVATION PATIENT INSTRUCTIONS
Discharge Summary/Instructions after an Endoscopic Procedure  Patient Name: Shikha López  Patient MRN: [id number]  Patient YOB: 1970 Friday, June 23, 2023  Quintin Mooney MD  Dear patient,  As a result of recent federal legislation (The Federal Cures Act), you may   receive lab or pathology results from your procedure in your [facility]   account before your physician is able to contact you. Your physician or   their representative will relay the results to you with their   recommendations at their soonest availability.  Thank you,  RESTRICTIONS:  During your procedure today, you received medications for sedation.  These   medications may affect your judgment, balance and coordination.  Therefore,   for 24 hours, you have the following restrictions:   - DO NOT drive a car, operate machinery, make legal/financial decisions,   sign important papers or drink alcohol.    ACTIVITY:  Today: no heavy lifting, straining or running due to procedural   sedation/anesthesia.  The following day: return to full activity including work.  DIET:  Eat and drink normally unless instructed otherwise.     TREATMENT FOR COMMON SIDE EFFECTS:  - Mild abdominal pain, nausea, belching, bloating or excessive gas:  rest,   eat lightly and use a heating pad.  - Sore Throat: treat with throat lozenges and/or gargle with warm salt   water.  - Because air was used during the procedure, expelling large amounts of air   from your rectum or belching is normal.  - If a bowel prep was taken, you may not have a bowel movement for 1-3 days.    This is normal.  SYMPTOMS TO WATCH FOR AND REPORT TO YOUR PHYSICIAN:  1. Abdominal pain or bloating, other than gas cramps.  2. Chest pain.  3. Back pain.  4. Signs of infection such as: chills or fever occurring within 24 hours   after the procedure.  5. Rectal bleeding, which would show as bright red, maroon, or black stools.   (A tablespoon of blood from the rectum is not serious, especially  if   hemorrhoids are present.)  6. Vomiting.  7. Weakness or dizziness.  GO DIRECTLY TO THE NEAREST EMERGENCY ROOM IF YOU HAVE ANY OF THE FOLLOWING:      Difficulty breathing              Chills and/or fever over 101 F   Persistent vomiting and/or vomiting blood   Severe abdominal pain   Severe chest pain   Black, tarry stools   Bleeding- more than one tablespoon   Any other symptom or condition that you feel may need urgent attention  Your doctor recommends these additional instructions:  If any biopsies were taken, your doctors clinic will contact you in 1 to 2   weeks with any results.  - The findings and recommendations were discussed with the patient.   - Return patient to hospital kee for ongoing care.   - Consult hepatology for further evaluation of potential liver disease.    Will consider IR eval vs AES guided management.  For questions, problems or results please call your physician - Quintin Mooney MD at Work:  [phone].  [facility], EMERGENCY ROOM PHONE NUMBER: [phone]  IF A COMPLICATION OR EMERGENCY SITUATION ARISES AND YOU ARE UNABLE TO REACH   YOUR PHYSICIAN - GO DIRECTLY TO THE EMERGENCY ROOM.  Quintin Mooney MD  6/23/2023 10:38:21 AM  This report has been verified and signed electronically.  Dear patient,  As a result of recent federal legislation (The Federal Cures Act), you may   receive lab or pathology results from your procedure in your [facility]   account before your physician is able to contact you. Your physician or   their representative will relay the results to you with their   recommendations at their soonest availability.  Thank you,  PROVATION

## 2023-06-23 NOTE — ED PROVIDER NOTES
Encounter Date: 6/22/2023       History     Chief Complaint   Patient presents with    Rectal Bleeding     Diarrhea and rectal bleeding since last night +dark emesis      The history is provided by the patient and medical records. No  was used.     Shikha López is a 53 y.o. female with medical history of Metastatic breast cancer to L lung on chemotherapy presenting to the ED with the chief complaint of rectal bleeding.     Reports developing dark colored stools and vomiting beginning last night. Reports about 4-5 episodes of each. Reports feeling like she was going to pass out earlier and had to lay down on the ground. Denies blood thinner use or history of GI bleed. Occasionally takes NSAIDs, but nothing in the past week. Currently on chemotherapy. Denies prior EGD or colonoscopy.     Review of patient's allergies indicates:  No Known Allergies  Past Medical History:   Diagnosis Date    Breast cancer      Past Surgical History:   Procedure Laterality Date    MASTECTOMY       Family History   Problem Relation Age of Onset    Lung cancer Father      Social History     Tobacco Use    Smoking status: Never    Smokeless tobacco: Never   Substance Use Topics    Alcohol use: Never    Drug use: Never     Review of Systems   Gastrointestinal:  Positive for blood in stool.     Physical Exam     Initial Vitals [06/22/23 1902]   BP Pulse Resp Temp SpO2   125/62 (!) 117 20 98.4 °F (36.9 °C) 98 %      MAP       --         Physical Exam    Constitutional: She appears well-developed and well-nourished. She is not diaphoretic. No distress.   HENT:   Head: Normocephalic and atraumatic.   Mouth/Throat: Oropharynx is clear and moist. No oropharyngeal exudate.   Eyes: EOM are normal. Pupils are equal, round, and reactive to light.   Neck: Neck supple.   Normal range of motion.  Cardiovascular:  Regular rhythm.   Tachycardia present.         Pulmonary/Chest: Breath sounds normal. No respiratory distress. She has  no wheezes. She has no rales.   Abdominal: Abdomen is soft. She exhibits no distension. There is no abdominal tenderness. There is no rebound.   Genitourinary:    Genitourinary Comments: Rectal - Black colored stools on gloved finger. FOBT positive. Chaperone present.     Musculoskeletal:         General: No tenderness. Normal range of motion.      Cervical back: Normal range of motion and neck supple.     Neurological: She is alert and oriented to person, place, and time. She has normal strength. No cranial nerve deficit or sensory deficit.   Skin: Skin is warm and dry. No rash noted. No erythema.       ED Course   Procedures  Labs Reviewed   CBC W/ AUTO DIFFERENTIAL - Abnormal; Notable for the following components:       Result Value    RBC 1.90 (*)     Hemoglobin 7.0 (*)     Hematocrit 21.1 (*)      (*)     MCH 36.8 (*)     RDW 16.1 (*)     Platelets 145 (*)     Immature Granulocytes 0.7 (*)     Immature Grans (Abs) 0.06 (*)     Lymph # 0.7 (*)     Gran % 85.9 (*)     Lymph % 8.5 (*)     Mono % 3.5 (*)     All other components within normal limits   COMPREHENSIVE METABOLIC PANEL - Abnormal; Notable for the following components:    Sodium 146 (*)     Chloride 111 (*)     Glucose 134 (*)     BUN 23 (*)     Calcium 8.2 (*)     Total Protein 5.0 (*)     Albumin 2.7 (*)     Total Bilirubin 3.2 (*)     All other components within normal limits   HIV 1 / 2 ANTIBODY   HEPATITIS C ANTIBODY   APTT   PROTIME-INR   SARS-COV-2 RNA AMPLIFICATION, QUAL   TYPE & SCREEN   PREPARE RBC SOFT          Imaging Results    None          Medications   sodium chloride 0.9% bolus 1,000 mL 1,000 mL (1,000 mLs Intravenous New Bag 6/22/23 2133)   octreotide (SANDOSTATIN) 500 mcg in sodium chloride 0.9% 100 mL infusion (has no administration in time range)   0.9%  NaCl infusion (for blood administration) (has no administration in time range)   ondansetron injection 4 mg (4 mg Intravenous Given 6/22/23 2129)   pantoprazole injection 80  mg (80 mg Intravenous Given 6/22/23 2130)   octreotide injection 50 mcg (50 mcg Intravenous Given 6/22/23 2143)     Medical Decision Making:   History:   Old Medical Records: I decided to obtain old medical records.  Old Records Summarized: records from clinic visits and records from previous admission(s).  Initial Assessment:   53 y.o. female with medical history of Metastatic breast cancer to L lung on chemotherapy presenting to the ED c/o dark colored emesis and stools beginning about 24 hours ago.   Differential Diagnosis:   Variceal bleed, upper GI bleed, symptomatic anemia, malignancy, cardiac arrhythmia  Clinical Tests:   Lab Tests: Ordered and Reviewed  Medical Tests: Ordered and Reviewed  ED Management:  Emergency room management documented as below or in the ED course.  Other:   I have discussed this case with another health care provider.       <> Summary of the Discussion: Medical ICU, Gastroenterology     APC / Resident Notes:   Work-up shows HBG 7.0, baseline 11.5 from 8 days ago. CMP shows Na 146. Crt 0.5. She has history of upper abdominal esophagogastric varices on CT scan from 2 months ago. Discussed with GI concerns of unstable upper GI bleed. Protonix, Octreotide, IVF, T&S, 2 units RBC ordered to be transfused. Additionally discussed with Medical ICU who will come see the patient. Patient signed out to my attending at the end of my shift pending final disposition. Patient expresses understanding and agreeable to the plan. I have discussed the care of this patient with my supervising physician.                    Clinical Impression:   Final diagnoses:  [R00.0] Tachycardia               Dawson Webb PA-C  06/22/23 2139       Dawson Webb PA-C  06/22/23 2146

## 2023-06-23 NOTE — H&P
Short Stay Endoscopy History and Physical    PCP - Primary Doctor No  Referring Physician - Gali Amaya MD  [id number] [place] [address]    Procedure - EGD  ASA - per anesthesia  Mallampati - per anesthesia  History of Anesthesia problems - no  Family history Anesthesia problems -  no   Plan of anesthesia - General    HPI  53 y.o. female  Reason for procedure:  Gastrointestinal hemorrhage, unspecified gastrointestinal hemorrhage type [K92.2]      ROS:  Constitutional: No fevers, chills, No weight loss  CV: No chest pain  Pulm: No cough, No shortness of breath  GI: see HPI    Medical History:  has a past medical history of Breast cancer.    Surgical History:  has a past surgical history that includes Mastectomy.    Family History: family history includes Lung cancer in her father..    Social History:  reports that she has never smoked. She has never used smokeless tobacco. She reports that she does not drink alcohol and does not use drugs.    Review of patient's allergies indicates:  No Known Allergies    Medications:   Medications Prior to Admission   Medication Sig Dispense Refill Last Dose    acetaminophen (TYLENOL) 500 MG tablet Take 1,000 mg by mouth.       Lactobac no.41/Bifidobact no.7 (PROBIOTIC-10 ORAL) Take by mouth.       LIDOcaine-prilocaine (EMLA) cream APPLY TO THE AFFECTED AREA 1 HOUR BEFORE PORT ACCESS       methylphenidate HCl (RITALIN) 5 MG tablet Take 1 tablet (5 mg total) by mouth 2 (two) times daily. 60 tablet 0     OLANZapine (ZYPREXA) 5 MG tablet Take days 1-4 of chemotherapy 30 tablet 2     ondansetron (ZOFRAN) 8 MG tablet Take 8 mg by mouth 3 (three) times daily as needed.       potassium chloride SA (K-DUR,KLOR-CON) 20 MEQ tablet Take 1 tablet (20 mEq total) by mouth once daily. 30 tablet 11        Physical Exam:    Vital Signs:   Vitals:    06/23/23 0900   BP: 112/62   Pulse: 95   Resp: 16   Temp: 98.5 °F (36.9 °C)       General Appearance: Well appearing in no acute  distress  Abdomen: Soft, non tender, non distended with normal bowel sounds, no masses      Labs:  Lab Results   Component Value Date    WBC 5.79 06/23/2023    HGB 8.0 (L) 06/23/2023    HCT 23.3 (L) 06/23/2023     (L) 06/23/2023    ALT 12 06/22/2023    AST 33 06/22/2023     (H) 06/22/2023    K 3.6 06/22/2023     (H) 06/22/2023    CREATININE 0.5 06/22/2023    BUN 23 (H) 06/22/2023    CO2 25 06/22/2023    INR 1.4 (H) 06/22/2023       I have explained the risks and benefits of this endoscopic procedure to the patient including but not limited to bleeding, inflammation, infection, perforation, and death.      Quintin Mooney MD

## 2023-06-23 NOTE — ASSESSMENT & PLAN NOTE
Patient with history of metastatic breast cancer on chemotherapy who presented to ED with hematemesis and melena.    NSAID (--)  Aspirin(antiplatelet) (--)  Alcohol Use (--)  Severe retching (--)  History of GI bleed (--)  Past endoscopy (--)  Trauma (--)  Anticoagulation (--)  Malignancy (+)    Labs:  Recent Labs   Lab 06/22/23 2105   HGB 7.0*       Lab Results   Component Value Date    WBC 8.03 06/22/2023    HGB 7.0 (L) 06/22/2023    HCT 21.1 (L) 06/22/2023     (H) 06/22/2023     (L) 06/22/2023       Lab Results   Component Value Date     (H) 06/22/2023    K 3.6 06/22/2023     (H) 06/22/2023    CO2 25 06/22/2023    BUN 23 (H) 06/22/2023    CREATININE 0.5 06/22/2023    CALCIUM 8.2 (L) 06/22/2023    ANIONGAP 10 06/22/2023    ESTGFRAFRICA >60.0 07/18/2022    EGFRNONAA >60.0 07/18/2022       Lab Results   Component Value Date    ALT 12 06/22/2023    AST 33 06/22/2023    ALKPHOS 114 06/22/2023    BILITOT 3.2 (H) 06/22/2023       Lab Results   Component Value Date    INR 1.4 (H) 06/22/2023    INR 1.4 (H) 03/23/2011       GI Hemorrhage  Acute Blood Loss Anemia  - Hgb 7 on admit, baseline around 11-12  - S/p Protonix 80mg IV x 1 in the ED.  Start Protonix 40mg IV BID  - GI consulted, appreciate assistance  - NPO at midnight for possible EGD  - Type and screen, blood consent obtained  - Transfuse for Hemoglobin <7  - Continue Octreotide gtt, started in ED, with recent imaging on CT revealing of possible varices

## 2023-06-23 NOTE — CONSULTS
[facility] ([facility])  Hepatology  Consult Note    Patient Name: Shikha López  MRN: [id number]  Admission Date: 6/22/2023  Hospital Length of Stay: 1 days  Attending Provider: Gali Amaya MD   Primary Care Physician: Primary Doctor No  Principal Problem:GI bleed    Inpatient consult to Hepatology  Consult performed by: Francesco Muñoz MD  Consult ordered by: Conrad Lang DO        Subjective:     HPI:  Ms. Shikha López is a 53 year old female for whom hepatology is consulted for TIPS evaluation. She has a PMH significant for breast cancer (initially diagnosed in 2006 and status post chemotherapy, radiation, and mastectomy in 2007 with recurrence and metastases to lung by 2010; currently on chemotherapy; last session on 06/14) and obesity.     Hospital Course:   Patient was admitted to [facility] on 06/23 for evaluation of GI bleeding (hematemesis and melena) since 06/21 associated with pre-sycnope. On arrival, vital signs notable for tachycardia ('s). Labs notable for anemia (Hgb 7 from 11.5 on 06/14), thrombocytopenia (145), elevated INR (1.4), and hyperbilirubinemia (3.2). She was admitted to medical oncology and GI consulted. EGD performed on 06/23 showed Type 1 isolated gastric varices. Hepatology consulted for consideration of TIPS.     On initial bedside interview, patient denies abdominal pain, prior known or family history of liver disease, prior history of GI bleeding, history of skin or eye yellowing, ETOH usage, or prior episodes of hepatitis.       Review of Systems   Constitutional:  Positive for fatigue. Negative for appetite change, chills and fever.   HENT:  Negative for congestion and trouble swallowing.    Eyes:  Negative for pain and redness.   Respiratory:  Negative for cough and shortness of breath.    Cardiovascular:  Negative for chest pain and palpitations.   Gastrointestinal:  Negative for abdominal pain, constipation, diarrhea, nausea and vomiting.    Genitourinary:  Negative for difficulty urinating and dysuria.   Musculoskeletal:  Negative for back pain and neck pain.   Skin:  Negative for rash.   Neurological:  Positive for weakness. Negative for dizziness, light-headedness and headaches.     Past Medical History:   Diagnosis Date    Breast cancer        Past Surgical History:   Procedure Laterality Date    MASTECTOMY       Review of patient's allergies indicates:  No Known Allergies      Tobacco Use    Smoking status: Never    Smokeless tobacco: Never   Substance and Sexual Activity    Alcohol use: Never    Drug use: Never    Sexual activity: Not Currently       Medications Prior to Admission   Medication Sig Dispense Refill Last Dose    acetaminophen (TYLENOL) 500 MG tablet Take 1,000 mg by mouth.       Lactobac no.41/Bifidobact no.7 (PROBIOTIC-10 ORAL) Take by mouth.       LIDOcaine-prilocaine (EMLA) cream APPLY TO THE AFFECTED AREA 1 HOUR BEFORE PORT ACCESS       methylphenidate HCl (RITALIN) 5 MG tablet Take 1 tablet (5 mg total) by mouth 2 (two) times daily. 60 tablet 0     OLANZapine (ZYPREXA) 5 MG tablet Take days 1-4 of chemotherapy 30 tablet 2     ondansetron (ZOFRAN) 8 MG tablet Take 8 mg by mouth 3 (three) times daily as needed.       potassium chloride SA (K-DUR,KLOR-CON) 20 MEQ tablet Take 1 tablet (20 mEq total) by mouth once daily. 30 tablet 11        Objective:     Vital Signs (Most Recent):  Temp: 97.7 °F (36.5 °C) (06/23/23 1042)  Pulse: 98 (06/23/23 1400)  Resp: 19 (06/23/23 1400)  BP: 129/61 (06/23/23 1400)  SpO2: 97 % (06/23/23 1400) Vital Signs (24h Range):  Temp:  [97.7 °F (36.5 °C)-98.7 °F (37.1 °C)] 97.7 °F (36.5 °C)  Pulse:  [] 98  Resp:  [5-24] 19  SpO2:  [96 %-100 %] 97 %  BP: (105-131)/(52-62) 129/61     Weight: 90.7 kg (200 lb) (06/22/23 1902)  Body mass index is 36.58 kg/m².       Physical Exam  Constitutional:       Appearance: Normal appearance. She is obese.   Eyes:      General: Scleral icterus present.    Cardiovascular:      Rate and Rhythm: Normal rate and regular rhythm.      Pulses: Normal pulses.      Heart sounds: Normal heart sounds.   Pulmonary:      Effort: Pulmonary effort is normal. No respiratory distress.      Breath sounds: Normal breath sounds.   Abdominal:      General: Bowel sounds are normal. There is no distension.      Palpations: Abdomen is soft.      Tenderness: There is no abdominal tenderness.   Skin:     General: Skin is warm and dry.      Coloration: Skin is not jaundiced.   Neurological:      Mental Status: She is alert and oriented to person, place, and time.          MELD-Na: 15 at 6/22/2023  9:29 PM  MELD: 15 at 6/22/2023  9:29 PM  Calculated from:  Serum Creatinine: 0.5 mg/dL (Using min of 1 mg/dL) at 6/22/2023  9:05 PM  Serum Sodium: 146 mmol/L (Using max of 137 mmol/L) at 6/22/2023  9:05 PM  Total Bilirubin: 3.2 mg/dL at 6/22/2023  9:05 PM  INR(ratio): 1.4 at 6/22/2023  9:29 PM      Significant Labs:  Labs within the past month have been reviewed.    Significant Imaging:  CT: Reviewed    Assessment/Plan:     GI  Esophageal and gastric varices  This is a 53 year old female with PMH significant for breast cancer (initially diagnosed in 2006 and status post chemotherapy, radiation, and mastectomy in 2007 with recurrence and metastases to lung by 2010; currently on chemotherapy; last session on 06/14) and obesity who presented to [facility] on 06/22 for management of upper GI bleeding (hematemesis and melena). She is status post EGD on 06/23 showing non-bleeding small esophageal varices and gastric varices (IGV1). Hepatology consulted for consideration of TIPS for further treatment. Patient is without obvious stigmata of liver disease on exam or imaging, however would be at risk for HAWTHORNE in the setting of obesity. Other differentials include development of portal vein thrombus in the setting of malignancy.     Recommendations:     -Prior to consideration for TIPS, recommend obtaining CT  triple phase and ECHO.   -Continue Octreotide infusion.   -Follow-up GI recommendations.   -CMP and INR daily.         Thank you for your consult. I will follow-up with patient. Please contact us if you have any additional questions.    Francesco Muñoz MD  Hepatology  [facility] - [facility] (2nd Fl)

## 2023-06-23 NOTE — HPI
Shikha López is a 53 year old female with a past medical history of breast cancer with metastasis to the left lung on chemotherapy presenting to the ED with the chief complaint of rectal bleeding. She is currently on Trastuzumab. Last chemo  06/14.      Per the patient, she developed dark colored stools and vomiting beginning last night. Reports about 4-5 episodes of each. Reports feeling like she was going to pass out earlier and had to lay down on the ground. Denies blood thinner use or history of GI bleed. Denies NSAIDs. Denies prior EGD or colonoscopy.     In the ED, The patient was tachycardic , normotensive, on room air. Labs revealed Na 146, Cl 111, glucose 132, T.Bili 3.2, H/H 7/21.1 (baseline hemoglobin 11-12), INR 1.4

## 2023-06-23 NOTE — ASSESSMENT & PLAN NOTE
Pt with history of breast cancer diagnosed in 2007. Currently on Trastuzumab . Last Chemo 06/14/2023.

## 2023-06-23 NOTE — ED NOTES
Pt transported to [facility] via stretcher. Report given to [facility] RN. All belongings with pt.

## 2023-06-23 NOTE — NURSING TRANSFER
Nursing Transfer Note      6/23/2023     Reason patient is being transferred: post op    Transfer To: [id number]    Transfer via stretcher    Transfer with cardiac monitoring via centralize telemetry     Transported by transport     Medicines sent: mupirocin    Chart send with patient: Yes    Notified: pt notified family     Patient reassessed at: 6/23/2023 1485

## 2023-06-23 NOTE — ASSESSMENT & PLAN NOTE
Pt with multiple episodes of vomiting dark blood and dark stools. GI bleed likely secondary to Trastuzumab.  Last chemo 06/14.  Pt hemodynamically stable. Denies previous GI bleed, NSAID use, alcohol consumption. Denies prior colonoscopy and EGD.    -GI consult  -Recommend transfusion of 1 unit PRBC.   -Transfuse for Hgb <7  -Recommend continuing PPI and octreotide until GI consult

## 2023-06-23 NOTE — H&P
[facility] - Emergency Dept  Hematology/Oncology  H&P    Patient Name: Shikha López  MRN: [id number]  Admission Date: 6/22/2023  Code Status: Full Code   Attending Provider: Manfred Kolb MD  Primary Care Physician: Primary Doctor No  Principal Problem:GI bleed    Subjective:     HPI: Ms. López is a 53yoF with PMHx of metastatic breast cancer to left lung currently on chemotherapy, last session on 6/14 who presented to [facility] ED with noted hematemesis and melena starting 1 night prior to admission.  4-5 episodes.  Reports pre-syncope however denies syncopal event or falls.  Denies history of GIB or being on AC.  Denies NSAIDs or prior endoscopies.  In ED, initially tachycardic with stable BP on room air.  HR improved in ED prior to admission.  Hgb 7 on admission, baseline around 11-12.  Patient follows with Dr. Willis for breast cancer.    Oncology History:  She presented in the emergency room at [facility] on September 29, 2006 with a 4 months history of inflammation of her left breast.  Physical examination at that time showed the left breast was largely replaced by large mass with multiple skin ulcerations.     A biopsy in September 2006 showed poorly differentiated carcinoma which was ER and DC. negative and HER2 positive.     She was then referred to [facility] for additional care.   CT scan of the chest and abdomen November 2006 revealed multiple nodules in the lungs consistent with metastatic disease.  There also enlarged left axillary node.     She was treated with chemotherapy with weekly Herceptin and Abraxane and had marked improvement in her breast and lung metastasis on that therapy.     On May 29, 2007 she underwent left modified radical mastectomy(Dr. Colvin) which showed no residual tumor in the breast and 1/5 nodes was positive for metastasis measuring 6 mm.  (ypT0N1).     She then received postoperative radiation therapy(Dr Singh)  to the left chest wall supraclav and internal mammary  lymph nodes from August 20, 2007 to September 28, 2007.     Postoperatively she continued on Herceptin for approximately 3 and 1/2 years before her lung metastasis begin to grow.     She subsequently received a number of different chemotherapy treatments including Adriamycin, Halaven, Xeloda plus lapatinib then Xeloda plus Herceptin.  The  Those treatments were provided under the care of  in [facility].  Subsequently, she transferred her care to  at [facility].  She was initially treated with Taxotere Herceptin Perjeta.       She was then changed to Kadcyla.    In July 2020 PET scan showed an increase in the size of an infrahilar mass consistent with progression.   She then took approximately 9 months of Herceptin and Navelbine.  Apparently she has some initial response to that therapy.     She started trastuzumab- deruxtecan  4/12/21.       Oncology Treatment Plan:   OP BREAST FAM-TRASTUZUMAB DERUXTECAN-NXKI Q3W    Medications:  Continuous Infusions:   octreotide (SANDOSTATIN) infusion 50 mcg/hr (06/22/23 1939)     Scheduled Meds:   pantoprazole  40 mg Intravenous BID    potassium chloride SA  20 mEq Oral Daily     PRN Meds:sodium chloride, sodium chloride     Review of patient's allergies indicates:  No Known Allergies     Past Medical History:   Diagnosis Date    Breast cancer      Past Surgical History:   Procedure Laterality Date    MASTECTOMY       Family History       Problem Relation (Age of Onset)    Lung cancer Father          Tobacco Use    Smoking status: Never    Smokeless tobacco: Never   Substance and Sexual Activity    Alcohol use: Never    Drug use: Never    Sexual activity: Not Currently       Review of Systems   Constitutional:  Negative for chills and fever.   HENT:  Negative for sore throat and trouble swallowing.    Respiratory:  Negative for cough and shortness of breath.    Cardiovascular:  Negative for chest pain, palpitations and leg swelling.    Gastrointestinal:  Positive for blood in stool and nausea. Negative for abdominal distention, abdominal pain, constipation and vomiting.   Genitourinary: Negative.  Negative for difficulty urinating and dysuria.   Musculoskeletal: Negative.    Neurological:  Negative for dizziness and light-headedness.   Objective:     Vital Signs (Most Recent):  Temp: 98.4 °F (36.9 °C) (06/22/23 1902)  Pulse: 106 (06/22/23 2300)  Resp: 17 (06/22/23 2300)  BP: (!) 120/56 (06/22/23 2300)  SpO2: 97 % (06/22/23 2300) Vital Signs (24h Range):  Temp:  [98.4 °F (36.9 °C)] 98.4 °F (36.9 °C)  Pulse:  [] 106  Resp:  [17-20] 17  SpO2:  [97 %-98 %] 97 %  BP: (114-128)/(52-62) 120/56     Weight: 90.7 kg (200 lb)  Body mass index is 36.58 kg/m².  Body surface area is 1.99 meters squared.      Intake/Output Summary (Last 24 hours) at 6/23/2023 0012  Last data filed at 6/22/2023 2310  Gross per 24 hour   Intake 1000 ml   Output --   Net 1000 ml        Physical Exam  Vitals and nursing note reviewed.   Constitutional:       General: She is not in acute distress.     Appearance: She is not toxic-appearing.   HENT:      Head: Normocephalic and atraumatic.      Mouth/Throat:      Mouth: Mucous membranes are moist.      Pharynx: Oropharynx is clear. No oropharyngeal exudate.   Eyes:      General: No scleral icterus.     Extraocular Movements: Extraocular movements intact.      Conjunctiva/sclera: Conjunctivae normal.   Cardiovascular:      Rate and Rhythm: Regular rhythm. Tachycardia present.      Heart sounds: Murmur heard.   Pulmonary:      Effort: Pulmonary effort is normal. No respiratory distress.      Breath sounds: Normal breath sounds. No wheezing or rales.   Abdominal:      General: Abdomen is flat. Bowel sounds are normal. There is no distension.      Palpations: Abdomen is soft.      Tenderness: There is no abdominal tenderness. There is no guarding.   Musculoskeletal:         General: No tenderness. Normal range of motion.       Cervical back: Normal range of motion and neck supple.      Right lower leg: No edema.      Left lower leg: No edema.   Skin:     General: Skin is warm and dry.      Coloration: Skin is pale.   Neurological:      General: No focal deficit present.      Mental Status: She is alert and oriented to person, place, and time.        Significant Labs:   CBC:   Recent Labs   Lab 06/22/23 2105   WBC 8.03   HGB 7.0*   HCT 21.1*   *   , CMP:   Recent Labs   Lab 06/22/23 2105   *   K 3.6   *   CO2 25   *   BUN 23*   CREATININE 0.5   CALCIUM 8.2*   PROT 5.0*   ALBUMIN 2.7*   BILITOT 3.2*   ALKPHOS 114   AST 33   ALT 12   ANIONGAP 10   , and All pertinent labs from the last 24 hours have been reviewed.    Diagnostic Results:  I have reviewed all pertinent imaging results/findings within the past 24 hours.    Assessment/Plan:     * GI bleed  Patient with history of metastatic breast cancer on chemotherapy who presented to ED with hematemesis and melena.    NSAID (--)  Aspirin(antiplatelet) (--)  Alcohol Use (--)  Severe retching (--)  History of GI bleed (--)  Past endoscopy (--)  Trauma (--)  Anticoagulation (--)  Malignancy (+)    Labs:  Recent Labs   Lab 06/22/23 2105   HGB 7.0*       Lab Results   Component Value Date    WBC 8.03 06/22/2023    HGB 7.0 (L) 06/22/2023    HCT 21.1 (L) 06/22/2023     (H) 06/22/2023     (L) 06/22/2023       Lab Results   Component Value Date     (H) 06/22/2023    K 3.6 06/22/2023     (H) 06/22/2023    CO2 25 06/22/2023    BUN 23 (H) 06/22/2023    CREATININE 0.5 06/22/2023    CALCIUM 8.2 (L) 06/22/2023    ANIONGAP 10 06/22/2023    ESTGFRAFRICA >60.0 07/18/2022    EGFRNONAA >60.0 07/18/2022       Lab Results   Component Value Date    ALT 12 06/22/2023    AST 33 06/22/2023    ALKPHOS 114 06/22/2023    BILITOT 3.2 (H) 06/22/2023       Lab Results   Component Value Date    INR 1.4 (H) 06/22/2023    INR 1.4 (H) 03/23/2011       GI Hemorrhage  Acute  Blood Loss Anemia  - Hgb 7 on admit, baseline around 11-12  - S/p Protonix 80mg IV x 1 in the ED.  Start Protonix 40mg IV BID  - GI consulted, appreciate assistance  - NPO at midnight for possible EGD  - Type and screen, blood consent obtained  - Transfuse for Hemoglobin <7  - Continue Octreotide gtt, started in ED, with recent imaging on CT revealing of possible varices      Breast cancer, stage 4, left  Patient follows with Dr. Willis outpatient for metastatic breast cancer on Trastuzumab.  Last dose on 6/14.  HDS        Kevin Vick MD  Hematology/Oncology  [facility] - Emergency Dept

## 2023-06-23 NOTE — HPI
Ms. López is a 53yoF with PMHx of metastatic breast cancer to left lung currently on chemotherapy, last session on 6/14 who presented to [facility] ED with noted hematemesis and melena starting 1 night prior to admission.  4-5 episodes.  Reports pre-syncope however denies syncopal event or falls.  Denies history of GIB or being on AC.  Denies NSAIDs or prior endoscopies.  In ED, initially tachycardic with stable BP on room air.  HR improved in ED prior to admission.  Hgb 7 on admission, baseline around 11-12.  Patient follows with Dr. Willis for breast cancer.    Oncology History:  She presented in the emergency room at [facility] on September 29, 2006 with a 4 months history of inflammation of her left breast.  Physical examination at that time showed the left breast was largely replaced by large mass with multiple skin ulcerations.     A biopsy in September 2006 showed poorly differentiated carcinoma which was ER and IA. negative and HER2 positive.     She was then referred to [facility] for additional care.   CT scan of the chest and abdomen November 2006 revealed multiple nodules in the lungs consistent with metastatic disease.  There also enlarged left axillary node.     She was treated with chemotherapy with weekly Herceptin and Abraxane and had marked improvement in her breast and lung metastasis on that therapy.     On May 29, 2007 she underwent left modified radical mastectomy(Dr. Colvin) which showed no residual tumor in the breast and 1/5 nodes was positive for metastasis measuring 6 mm.  (ypT0N1).     She then received postoperative radiation therapy(Dr Singh)  to the left chest wall supraclav and internal mammary lymph nodes from August 20, 2007 to September 28, 2007.     Postoperatively she continued on Herceptin for approximately 3 and 1/2 years before her lung metastasis begin to grow.     She subsequently received a number of different chemotherapy treatments including Adriamycin, Halaven,  Xeloda plus lapatinib then Xeloda plus Herceptin.  The  Those treatments were provided under the care of  in [facility].  Subsequently, she transferred her care to  at [facility].  She was initially treated with Taxotere Herceptin Perjeta.       She was then changed to Kadcyla.    In July 2020 PET scan showed an increase in the size of an infrahilar mass consistent with progression.   She then took approximately 9 months of Herceptin and Navelbine.  Apparently she has some initial response to that therapy.     She started trastuzumab- deruxtecan  4/12/21.

## 2023-06-23 NOTE — HOSPITAL COURSE
Pt with GI bleed. Reports multiple episodes of dark bloody emesis and melena. Pt hemodynamically stable while in the ED. Awaiting 1 unit PRBC.     Critical care medicine consulted. Ok for patient to be admitted to hospital medicine service.

## 2023-06-23 NOTE — TREATMENT PLAN
GI Post-Procedure Treatment Plan    EGD complete    Impression:            - Small (< 5 mm) esophageal varices.                          - Type 1 isolated gastric varices (IGV1, varices                          located in the fundus),                          without bleeding.                          - Erythematous mucosa in the antrum.                          - Normal examined duodenum.                          - No specimens collected.     Recommendation:        - Consult hepatology for further evaluation of potential liver disease  - Will discuss IR eval vs AES-guided management  - Continue octreotide gtt and IV PPI BID at this time    Piyush Spencer  Gastroenterology Fellow, PGY-IV

## 2023-06-23 NOTE — ANESTHESIA PREPROCEDURE EVALUATION
06/23/2023  Shikha López is a 53 y.o., female with breast cancer and GI bleed here for EGD.  Past Medical History:   Diagnosis Date    Breast cancer      Lab Results   Component Value Date    WBC 5.79 06/23/2023    HGB 8.0 (L) 06/23/2023    HCT 23.3 (L) 06/23/2023     (H) 06/23/2023     (L) 06/23/2023         Chemistry        Component Value Date/Time     (H) 06/22/2023 2105    K 3.6 06/22/2023 2105     (H) 06/22/2023 2105    CO2 25 06/22/2023 2105    BUN 23 (H) 06/22/2023 2105    CREATININE 0.5 06/22/2023 2105     (H) 06/22/2023 2105        Component Value Date/Time    CALCIUM 8.2 (L) 06/22/2023 2105    ALKPHOS 114 06/22/2023 2105    AST 33 06/22/2023 2105    ALT 12 06/22/2023 2105    BILITOT 3.2 (H) 06/22/2023 2105    ESTGFRAFRICA >60.0 07/18/2022 1054    EGFRNONAA >60.0 07/18/2022 1054        Results for orders placed during the hospital encounter of 05/18/23    Echo Saline Bubble? No    Interpretation Summary  · The left ventricle is normal in size with normal systolic function.  · The estimated ejection fraction is 65%.  · The quantitatively derived ejection fraction is 62%.  · The left ventricular global longitudinal strain is -19.9%.  · Grade I left ventricular diastolic dysfunction.  · Mild left atrial enlargement.  · The estimated PA systolic pressure is 35 mmHg.  · Normal right ventricular size with normal right ventricular systolic function.  · Normal central venous pressure (3 mmHg).  · There is no pulmonary hypertension.  · There is mild aortic valve stenosis. No significant change from previous  · Aortic valve area is 2.17 cm2; peak velocity is 2.61 m/s; mean gradient is 17 mmHg.  · Previously: Aortic valve area is 1.62 cm2; peak velocity is 2.64 m/s; mean gradient is 17 mmHg        Pre-op Assessment    I have reviewed the Patient Summary Reports.     I    [facility]  Exam    ASSESSMENT & PLAN  Karlos Aguilera is a 6 days who has normal growth and normal development.  Diagnoses and all orders for this visit:    Health supervision for  under 8 days old        Vitamin D discussed, Lactation Referral and Return to clinic at 2 months or sooner as needed.    ANTICIPATORY GUIDANCE  I have reviewed age appropriate anticipatory guidance.  Parenting:  Sleep Habits and Respond to Cry/Colic  Nutrition:  Breastfeeding  Play and Communication:  Mobiles and Voices  Health:  Dressing and Diaper Care  Safety:  Car Seat  and Safe Crib    HEALTH HISTORY   Do you have any concerns that you'd like to discuss today?: 1. Throws a tantrum when parents undress  2.Mother states that he pants a lot. When will this go away?    3. Vitamin D drop questions      Roomed by: [facility]    Accompanied by Parents    Refills needed? No    Do you have any forms that need to be filled out? No        Do you have any significant health concerns in your family history?: No  Family History   Problem Relation Age of Onset     Asthma Father      Allergies Sister      Asthma Sister      Other Sister      fine motor delays     ADD / ADHD Brother      No Medical Problems Maternal Grandmother      No Medical Problems Sister      No Medical Problems Mother      No Medical Problems Maternal Grandfather      Migraines Paternal Grandmother      Cancer Paternal Grandfather      lymphatic       Who lives in your home?:  Mother,father, 2 sisters, brother and dog  Social History     Social History Narrative     No narrative on file       Does your child eat:  Breast: every  2 hours for 15 min/side  Is your child spitting up? NO    Sleep:  How many times does your child wake in the night?: 7-9 times   In what position does your baby sleep:  back  Where does your baby sleep?:  bassinet   He sleeps 2.5 hour stretches.     Elimination:  Do you have any concerns with your child's bowels or bladder (peeing, pooping,  "constipation?):  No  How many dirty diapers does your child have a day?:  4  How many wet diapers does your child have a day?:  5    TB Risk Assessment:  The patient and/or parent/guardian answer positive to:  patient and/or parent/guardian answer 'no' to all screening TB questions    DEVELOPMENT  Do parents have any concerns regarding development?  No  Do parents have any concerns regarding hearing?  No  Do parents have any concerns regarding vision?  No     SCREENING RESULTS  [place] hearing screening: Pass  Blood spot/metabolic results:  In process  Pulse oximetry:  Pass    Patient Active Problem List   Diagnosis     Term , current hospitalization     Passage of meconium during delivery affecting      [place] delivered by vacuum extraction      circumcision       Maternal depression screening: Doing well    Screening Results      metabolic       Hearing         MEASUREMENTS    Length:  19.25\" (48.9 cm) (15 %, Z= -1.03, Source: WHO (Boys, 0-2 years))  Weight: 7 lb 12.5 oz (3.53 kg) (47 %, Z= -0.08, Source: WHO (Boys, 0-2 years))  Birth Weight Change:  -1%  OFC: 35.6 cm (14\") (67 %, Z= 0.43, Source: WHO (Boys, 0-2 years))    Birth History     Birth     Length: 20\" (50.8 cm)     Weight: 7 lb 14 oz (3.572 kg)     HC 35.6 cm (14\")     Apgar     One: 7     Five: 9     Delivery Method: Vaginal, Vacuum (Extractor)     Gestation Age: 38 4/7 wks     Duration of Labor: 2nd: 5h 32m       PHYSICAL EXAM  General: He is alert, quiet, in no acute distress   Head: Sutures normal, Anterior [place] soft and flat   Eyes: PERRL, Red reflex present bilaterally   Ears: Ears normally formed and placed, canals patent   Nose: Patent nares; noncongested   Mouth: Moist mucosa, palate intact   Neck: No anomalies   Lungs: Clear to auscultation bilaterally   CV: Normal S1 & S2 with regular rate and rhythm, no murmur present; femoral pulses 2+ bilaterally, well perfused   Abdomen: Soft, nontender, " have reviewed the Nursing Notes. I have reviewed the NPO Status.      Review of Systems         Anesthesia Plan  Type of Anesthesia, risks & benefits discussed:    Anesthesia Type: Gen Natural Airway  Intra-op Monitoring Plan: Standard ASA Monitors  Post Op Pain Control Plan: multimodal analgesia  Induction:  IV  Informed Consent: Informed consent signed with the Patient and all parties understand the risks and agree with anesthesia plan.  All questions answered.   ASA Score: 3    Ready For Surgery From Anesthesia Perspective.     .       nondistended, no masses or hepatosplenomegaly   Back: Well formed, no dimples or hair harry   : Normal jeffrey 1 male genitalia   Musculoskeletal: Hips with symmetric abduction, normal Ortolani & Gutierrez, symmetric skin folds   Skin: No rashes or lesions; no jaundice.   Neuro: Normal tone, symmetric reflexes    ADDITIONAL HISTORY SUMMARIZED (2): None.  DECISION TO OBTAIN EXTRA INFORMATION (1): None.   RADIOLOGY TESTS (1): None.  LABS (1): None.  MEDICINE TESTS (1): None.  INDEPENDENT REVIEW (2 each): None.     The visit lasted a total of 24 minutes face to face with the patient. Over 50% of the time was spent counseling and educating the patient about general wellness.    I, Sindi Rodriguez, am scribing for and in the presence of, Dr. Castellanos.    I, Rebel Castellanos, personally performed the services described in this documentation, as scribed by Sindi Rodriguez in my presence, and it is both accurate and complete.

## 2023-06-23 NOTE — PLAN OF CARE
Pt transferred to room 808 from PACU at around 1820. AAOx4 with no distress observed. Pt oriented to room, educated on safety measures to prevent injury to self, pt with nonskid footwear on with bed in lowest position and locked. Up with stand-by assist, instructed to call for assistance prior to getting OOB, verbalized understanding, and call light is within reach.

## 2023-06-23 NOTE — SUBJECTIVE & OBJECTIVE
Review of Systems   Constitutional:  Positive for fatigue. Negative for appetite change, chills and fever.   HENT:  Negative for congestion and trouble swallowing.    Eyes:  Negative for pain and redness.   Respiratory:  Negative for cough and shortness of breath.    Cardiovascular:  Negative for chest pain and palpitations.   Gastrointestinal:  Negative for abdominal pain, constipation, diarrhea, nausea and vomiting.   Genitourinary:  Negative for difficulty urinating and dysuria.   Musculoskeletal:  Negative for back pain and neck pain.   Skin:  Negative for rash.   Neurological:  Positive for weakness. Negative for dizziness, light-headedness and headaches.     Past Medical History:   Diagnosis Date    Breast cancer        Past Surgical History:   Procedure Laterality Date    MASTECTOMY       Review of patient's allergies indicates:  No Known Allergies      Tobacco Use    Smoking status: Never    Smokeless tobacco: Never   Substance and Sexual Activity    Alcohol use: Never    Drug use: Never    Sexual activity: Not Currently       Medications Prior to Admission   Medication Sig Dispense Refill Last Dose    acetaminophen (TYLENOL) 500 MG tablet Take 1,000 mg by mouth.       Lactobac no.41/Bifidobact no.7 (PROBIOTIC-10 ORAL) Take by mouth.       LIDOcaine-prilocaine (EMLA) cream APPLY TO THE AFFECTED AREA 1 HOUR BEFORE PORT ACCESS       methylphenidate HCl (RITALIN) 5 MG tablet Take 1 tablet (5 mg total) by mouth 2 (two) times daily. 60 tablet 0     OLANZapine (ZYPREXA) 5 MG tablet Take days 1-4 of chemotherapy 30 tablet 2     ondansetron (ZOFRAN) 8 MG tablet Take 8 mg by mouth 3 (three) times daily as needed.       potassium chloride SA (K-DUR,KLOR-CON) 20 MEQ tablet Take 1 tablet (20 mEq total) by mouth once daily. 30 tablet 11        Objective:     Vital Signs (Most Recent):  Temp: 97.7 °F (36.5 °C) (06/23/23 1042)  Pulse: 98 (06/23/23 1400)  Resp: 19 (06/23/23 1400)  BP: 129/61 (06/23/23 1400)  SpO2: 97 %  (06/23/23 1400) Vital Signs (24h Range):  Temp:  [97.7 °F (36.5 °C)-98.7 °F (37.1 °C)] 97.7 °F (36.5 °C)  Pulse:  [] 98  Resp:  [5-24] 19  SpO2:  [96 %-100 %] 97 %  BP: (105-131)/(52-62) 129/61     Weight: 90.7 kg (200 lb) (06/22/23 1902)  Body mass index is 36.58 kg/m².       Physical Exam  Constitutional:       Appearance: Normal appearance. She is obese.   Eyes:      General: Scleral icterus present.   Cardiovascular:      Rate and Rhythm: Normal rate and regular rhythm.      Pulses: Normal pulses.      Heart sounds: Normal heart sounds.   Pulmonary:      Effort: Pulmonary effort is normal. No respiratory distress.      Breath sounds: Normal breath sounds.   Abdominal:      General: Bowel sounds are normal. There is no distension.      Palpations: Abdomen is soft.      Tenderness: There is no abdominal tenderness.   Skin:     General: Skin is warm and dry.      Coloration: Skin is not jaundiced.   Neurological:      Mental Status: She is alert and oriented to person, place, and time.          MELD-Na: 15 at 6/22/2023  9:29 PM  MELD: 15 at 6/22/2023  9:29 PM  Calculated from:  Serum Creatinine: 0.5 mg/dL (Using min of 1 mg/dL) at 6/22/2023  9:05 PM  Serum Sodium: 146 mmol/L (Using max of 137 mmol/L) at 6/22/2023  9:05 PM  Total Bilirubin: 3.2 mg/dL at 6/22/2023  9:05 PM  INR(ratio): 1.4 at 6/22/2023  9:29 PM      Significant Labs:  Labs within the past month have been reviewed.    Significant Imaging:  CT: Reviewed

## 2023-06-23 NOTE — ED NOTES
Assumed care of patient. Patient on continuous cardiac monitor, automatic blood pressure cuff and continuous pulse oximeter. VSS. Pt resting comfortably in stretcher. Respirations even and unlabored. Side rails up and bed in lowest position. Call light in reach. Pt aware of POC. RBCs transfusing at 150ml/hr, pt tolerating well.

## 2023-06-23 NOTE — SUBJECTIVE & OBJECTIVE
Past Medical History:   Diagnosis Date    Breast cancer        Past Surgical History:   Procedure Laterality Date    MASTECTOMY         Review of patient's allergies indicates:  No Known Allergies    Family History       Problem Relation (Age of Onset)    Lung cancer Father          Tobacco Use    Smoking status: Never    Smokeless tobacco: Never   Substance and Sexual Activity    Alcohol use: Never    Drug use: Never    Sexual activity: Not Currently      Review of Systems   Constitutional:  Positive for fatigue.   HENT: Negative.     Eyes: Negative.    Respiratory: Negative.     Cardiovascular: Negative.    Gastrointestinal:  Positive for blood in stool and vomiting.   Endocrine: Negative.    Genitourinary: Negative.    Musculoskeletal: Negative.    Neurological:  Positive for weakness.   Psychiatric/Behavioral: Negative.     Objective:     Vital Signs (Most Recent):  Temp: 98.4 °F (36.9 °C) (06/22/23 1902)  Pulse: (!) 111 (06/22/23 2200)  Resp: 18 (06/22/23 2200)  BP: (!) 128/59 (06/22/23 2200)  SpO2: 98 % (06/22/23 2200) Vital Signs (24h Range):  Temp:  [98.4 °F (36.9 °C)] 98.4 °F (36.9 °C)  Pulse:  [111-117] 111  Resp:  [18-20] 18  SpO2:  [98 %] 98 %  BP: (125-128)/(59-62) 128/59   Weight: 90.7 kg (200 lb)  Body mass index is 36.58 kg/m².    No intake or output data in the 24 hours ending 06/22/23 2214       Physical Exam  Vitals and nursing note reviewed.   Constitutional:       Appearance: She is ill-appearing.   Eyes:      Pupils: Pupils are equal, round, and reactive to light.   Cardiovascular:      Rate and Rhythm: Regular rhythm. Tachycardia present.      Pulses: Normal pulses.      Heart sounds: Normal heart sounds.   Pulmonary:      Effort: Pulmonary effort is normal.      Breath sounds: Normal breath sounds.   Abdominal:      General: Abdomen is flat. Bowel sounds are normal.      Palpations: Abdomen is soft.   Musculoskeletal:      Right lower leg: Edema present.      Left lower leg: Edema present.    Skin:     General: Skin is warm and dry.      Coloration: Skin is pale.   Neurological:      Mental Status: She is alert and oriented to person, place, and time.          Vents:     Lines/Drains/Airways       Central Venous Catheter Line  Duration             Port A Cath Single Lumen 06/22/21 right internal jugular 730 days              Peripheral Intravenous Line  Duration                  Peripheral IV - Single Lumen 06/22/23 2105 18 G Right Antecubital <1 day         Peripheral IV - Single Lumen 06/22/23 2146 20 G Anterior;Right Forearm <1 day                  Significant Labs:    CBC/Anemia Profile:  Recent Labs   Lab 06/22/23 2105   WBC 8.03   HGB 7.0*   HCT 21.1*   *   *   RDW 16.1*        Chemistries:  Recent Labs   Lab 06/22/23 2105   *   K 3.6   *   CO2 25   BUN 23*   CREATININE 0.5   CALCIUM 8.2*   ALBUMIN 2.7*   PROT 5.0*   BILITOT 3.2*   ALKPHOS 114   ALT 12   AST 33       All pertinent labs within the past 24 hours have been reviewed.    Significant Imaging: I have reviewed all pertinent imaging results/findings within the past 24 hours.

## 2023-06-23 NOTE — PLAN OF CARE
To procedure via stretcher. All vital signs, medications, IV fluids and sedation per CRNA. See anesthesia notes. Pt's belongings under stretcher

## 2023-06-23 NOTE — SUBJECTIVE & OBJECTIVE
Oncology Treatment Plan:   OP BREAST FAM-TRASTUZUMAB DERUXTECAN-NXKI Q3W    Medications:  Continuous Infusions:   octreotide (SANDOSTATIN) infusion 50 mcg/hr (06/22/23 2155)     Scheduled Meds:   pantoprazole  40 mg Intravenous BID    potassium chloride SA  20 mEq Oral Daily     PRN Meds:sodium chloride, sodium chloride     Review of patient's allergies indicates:  No Known Allergies     Past Medical History:   Diagnosis Date    Breast cancer      Past Surgical History:   Procedure Laterality Date    MASTECTOMY       Family History       Problem Relation (Age of Onset)    Lung cancer Father          Tobacco Use    Smoking status: Never    Smokeless tobacco: Never   Substance and Sexual Activity    Alcohol use: Never    Drug use: Never    Sexual activity: Not Currently       Review of Systems   Constitutional:  Negative for chills and fever.   HENT:  Negative for sore throat and trouble swallowing.    Respiratory:  Negative for cough and shortness of breath.    Cardiovascular:  Negative for chest pain, palpitations and leg swelling.   Gastrointestinal:  Positive for blood in stool and nausea. Negative for abdominal distention, abdominal pain, constipation and vomiting.   Genitourinary: Negative.  Negative for difficulty urinating and dysuria.   Musculoskeletal: Negative.    Neurological:  Negative for dizziness and light-headedness.   Objective:     Vital Signs (Most Recent):  Temp: 98.4 °F (36.9 °C) (06/22/23 1902)  Pulse: 106 (06/22/23 2300)  Resp: 17 (06/22/23 2300)  BP: (!) 120/56 (06/22/23 2300)  SpO2: 97 % (06/22/23 2300) Vital Signs (24h Range):  Temp:  [98.4 °F (36.9 °C)] 98.4 °F (36.9 °C)  Pulse:  [] 106  Resp:  [17-20] 17  SpO2:  [97 %-98 %] 97 %  BP: (114-128)/(52-62) 120/56     Weight: 90.7 kg (200 lb)  Body mass index is 36.58 kg/m².  Body surface area is 1.99 meters squared.      Intake/Output Summary (Last 24 hours) at 6/23/2023 0012  Last data filed at 6/22/2023 2310  Gross per 24 hour   Intake  1000 ml   Output --   Net 1000 ml        Physical Exam  Vitals and nursing note reviewed.   Constitutional:       General: She is not in acute distress.     Appearance: She is not toxic-appearing.   HENT:      Head: Normocephalic and atraumatic.      Mouth/Throat:      Mouth: Mucous membranes are moist.      Pharynx: Oropharynx is clear. No oropharyngeal exudate.   Eyes:      General: No scleral icterus.     Extraocular Movements: Extraocular movements intact.      Conjunctiva/sclera: Conjunctivae normal.   Cardiovascular:      Rate and Rhythm: Regular rhythm. Tachycardia present.      Heart sounds: Murmur heard.   Pulmonary:      Effort: Pulmonary effort is normal. No respiratory distress.      Breath sounds: Normal breath sounds. No wheezing or rales.   Abdominal:      General: Abdomen is flat. Bowel sounds are normal. There is no distension.      Palpations: Abdomen is soft.      Tenderness: There is no abdominal tenderness. There is no guarding.   Musculoskeletal:         General: No tenderness. Normal range of motion.      Cervical back: Normal range of motion and neck supple.      Right lower leg: No edema.      Left lower leg: No edema.   Skin:     General: Skin is warm and dry.      Coloration: Skin is pale.   Neurological:      General: No focal deficit present.      Mental Status: She is alert and oriented to person, place, and time.        Significant Labs:   CBC:   Recent Labs   Lab 06/22/23  2105   WBC 8.03   HGB 7.0*   HCT 21.1*   *   , CMP:   Recent Labs   Lab 06/22/23  2105   *   K 3.6   *   CO2 25   *   BUN 23*   CREATININE 0.5   CALCIUM 8.2*   PROT 5.0*   ALBUMIN 2.7*   BILITOT 3.2*   ALKPHOS 114   AST 33   ALT 12   ANIONGAP 10   , and All pertinent labs from the last 24 hours have been reviewed.    Diagnostic Results:  I have reviewed all pertinent imaging results/findings within the past 24 hours.

## 2023-06-23 NOTE — TRANSFER OF CARE
"Anesthesia Transfer of Care Note    Patient: Shikha López    Procedure(s) Performed: Procedure(s) (LRB):  EGD (ESOPHAGOGASTRODUODENOSCOPY) (N/A)    Patient location: PACU    Anesthesia Type: MAC    Post pain: adequate analgesia    Post assessment: no apparent anesthetic complications    Post vital signs: stable    Level of consciousness: awake, alert and oriented    Nausea/Vomiting: no nausea/vomiting    Complications: none    Transfer of care protocol was followed      Last vitals:   Visit Vitals  /60 (BP Location: Right leg, Patient Position: Lying)   Pulse 107   Temp 36.7 °C (98.1 °F) (Tympanic)   Resp 17   Ht 5' 2" (1.575 m)   Wt 90.7 kg (200 lb)   LMP  (LMP Unknown)   SpO2 100%   Breastfeeding No   BMI 36.58 kg/m²     "

## 2023-06-23 NOTE — ASSESSMENT & PLAN NOTE
This is a 53 year old female with PMH significant for breast cancer (initially diagnosed in 2006 and status post chemotherapy, radiation, and mastectomy in 2007 with recurrence and metastases to lung by 2010; currently on chemotherapy; last session on 06/14) and obesity who presented to [facility] on 06/22 for management of upper GI bleeding (hematemesis and melena). She is status post EGD on 06/23 showing non-bleeding small esophageal varices and gastric varices (IGV1). Hepatology consulted for consideration of TIPS for further treatment. Patient is without obvious stigmata of liver disease on exam or imaging, however would be at risk for HAWTHORNE in the setting of obesity. Other differentials include development of portal vein thrombus in the setting of malignancy.     Recommendations:     -Prior to consideration for TIPS, recommend obtaining CT triple phase and ECHO.   -Continue Octreotide infusion.   -Follow-up GI recommendations.   -CMP and INR daily.

## 2023-06-23 NOTE — ED TRIAGE NOTES
Pt reports to ED via POV accompanied by her mother with c/o rectal bleeding and hematemesis that began last night. Pt has a hx of metastatic breast cancer, currently on chemo. Pt on blood thinners and is weak, nauseated, and having ABD pain. +dark stool. AAOx4.

## 2023-06-23 NOTE — ASSESSMENT & PLAN NOTE
Patient follows with Dr. Willis outpatient for metastatic breast cancer on Trastuzumab.  Last dose on 6/14.  HDS

## 2023-06-23 NOTE — CONSULTS
[facility] - Emergency Dept  Critical Care Medicine  Consult Note    Patient Name: Shikha López  MRN: [id number]  Admission Date: 6/22/2023  Hospital Length of Stay: 0 days  Code Status: No Order  Attending Physician: Julia Nava MD   Primary Care Provider: Primary Doctor No   Principal Problem: GI bleed    Consults  Subjective:     HPI:  Shikha López is a 53 year old female with a past medical history of breast cancer with metastasis to the left lung on chemotherapy presenting to the ED with the chief complaint of rectal bleeding. She is currently on Trastuzumab. Last chemo  06/14.      Per the patient, she developed dark colored stools and vomiting beginning last night. Reports about 4-5 episodes of each. Reports feeling like she was going to pass out earlier and had to lay down on the ground. Denies blood thinner use or history of GI bleed. Denies NSAIDs. Denies prior EGD or colonoscopy.     In the ED, The patient was tachycardic , normotensive, on room air. Labs revealed Na 146, Cl 111, glucose 132, T.Bili 3.2, H/H 7/21.1 (baseline hemoglobin 11-12), INR 1.4          Hospital/ICU Course:  Pt with GI bleed. Reports multiple episodes of dark bloody emesis and melena. Pt hemodynamically stable while in the ED. Awaiting 1 unit PRBC.     Critical care medicine consulted. Ok for patient to be admitted to hospital medicine service.       Past Medical History:   Diagnosis Date    Breast cancer        Past Surgical History:   Procedure Laterality Date    MASTECTOMY         Review of patient's allergies indicates:  No Known Allergies    Family History       Problem Relation (Age of Onset)    Lung cancer Father          Tobacco Use    Smoking status: Never    Smokeless tobacco: Never   Substance and Sexual Activity    Alcohol use: Never    Drug use: Never    Sexual activity: Not Currently      Review of Systems   Constitutional:  Positive for fatigue.   HENT: Negative.     Eyes: Negative.     Respiratory: Negative.     Cardiovascular: Negative.    Gastrointestinal:  Positive for blood in stool and vomiting.   Endocrine: Negative.    Genitourinary: Negative.    Musculoskeletal: Negative.    Neurological:  Positive for weakness.   Psychiatric/Behavioral: Negative.     Objective:     Vital Signs (Most Recent):  Temp: 98.4 °F (36.9 °C) (06/22/23 1902)  Pulse: (!) 111 (06/22/23 2200)  Resp: 18 (06/22/23 2200)  BP: (!) 128/59 (06/22/23 2200)  SpO2: 98 % (06/22/23 2200) Vital Signs (24h Range):  Temp:  [98.4 °F (36.9 °C)] 98.4 °F (36.9 °C)  Pulse:  [111-117] 111  Resp:  [18-20] 18  SpO2:  [98 %] 98 %  BP: (125-128)/(59-62) 128/59   Weight: 90.7 kg (200 lb)  Body mass index is 36.58 kg/m².    No intake or output data in the 24 hours ending 06/22/23 2214       Physical Exam  Vitals and nursing note reviewed.   Constitutional:       Appearance: She is ill-appearing.   Eyes:      Pupils: Pupils are equal, round, and reactive to light.   Cardiovascular:      Rate and Rhythm: Regular rhythm. Tachycardia present.      Pulses: Normal pulses.      Heart sounds: Normal heart sounds.   Pulmonary:      Effort: Pulmonary effort is normal.      Breath sounds: Normal breath sounds.   Abdominal:      General: Abdomen is flat. Bowel sounds are normal.      Palpations: Abdomen is soft.   Musculoskeletal:      Right lower leg: Edema present.      Left lower leg: Edema present.   Skin:     General: Skin is warm and dry.      Coloration: Skin is pale.   Neurological:      Mental Status: She is alert and oriented to person, place, and time.          Vents:     Lines/Drains/Airways       Central Venous Catheter Line  Duration             Port A Cath Single Lumen 06/22/21 right internal jugular 730 days              Peripheral Intravenous Line  Duration                  Peripheral IV - Single Lumen 06/22/23 2105 18 G Right Antecubital <1 day         Peripheral IV - Single Lumen 06/22/23 2146 20 G Anterior;Right Forearm <1 day                   Significant Labs:    CBC/Anemia Profile:  Recent Labs   Lab 06/22/23  2105   WBC 8.03   HGB 7.0*   HCT 21.1*   *   *   RDW 16.1*        Chemistries:  Recent Labs   Lab 06/22/23  2105   *   K 3.6   *   CO2 25   BUN 23*   CREATININE 0.5   CALCIUM 8.2*   ALBUMIN 2.7*   PROT 5.0*   BILITOT 3.2*   ALKPHOS 114   ALT 12   AST 33       All pertinent labs within the past 24 hours have been reviewed.    Significant Imaging: I have reviewed all pertinent imaging results/findings within the past 24 hours.      ABG  No results for input(s): PH, PO2, PCO2, HCO3, BE in the last 168 hours.  Assessment/Plan:     Oncology  Breast cancer, stage 4, left  Pt with history of breast cancer diagnosed in 2007. Currently on Trastuzumab . Last Chemo 06/14/2023.     GI  * GI bleed  Pt with multiple episodes of vomiting dark blood and dark stools. GI bleed likely secondary to Trastuzumab.  Last chemo 06/14.  Pt hemodynamically stable. Denies previous GI bleed, NSAID use, alcohol consumption. Denies prior colonoscopy and EGD.    -GI consult  -Recommend transfusion of 1 unit PRBC.   -Transfuse for Hgb <7  -Recommend continuing PPI and octreotide until GI consult               Thank you for your consult. I will sign off. Please contact us if you have any additional questions.     Laura Robin, ZORAIDA  Critical Care Medicine  [facility] - Emergency Dept

## 2023-06-23 NOTE — CONSULTS
[facility]  Gastroenterology  Consult Note    Patient Name: Shikha López  MRN: [id number]  Admission Date: 6/22/2023  Hospital Length of Stay: 1 days  Code Status: Full Code   Attending Provider: Gali Amaya MD   Consulting Provider: Rosas Wall MD  Primary Care Physician: Primary Doctor No  Principal Problem:GI bleed    Inpatient consult to Gastroenterology  Consult performed by: Rosas Wall MD  Consult ordered by: Kevin Vick MD      Subjective:     HPI: Shikha López is a 53 y.o. female with history of metastatic breast cancer to left lung currently on chemotherapy with Enhertu (last session on 06/14) who presented with hematemesis and melena for the past day. She reports increased dizziness and fatigue since her most recent chemotherapy session. She denies any prior history of GI bleeding or hematemesis. She denies current use of NSAIDs or anticoagulation. Denies any previous endoscopies.    Per chart review, baseline hemoglobin is between 11.5 - 12. On admission hemoglobin noted to be 7. No active bleeding identified at that time. On prior CT abdomen imaging (04/2023) she was documented to have esophageal varices. She has a lengthy oncologic history including radical mastectomy and a number of prior attempted chemotherapy agents.     Gastroenterology was consulted for GI bleed    Past Medical History:   Diagnosis Date    Breast cancer        Past Surgical History:   Procedure Laterality Date    MASTECTOMY         Family History   Problem Relation Age of Onset    Lung cancer Father        Social History     Socioeconomic History    Marital status: Single    Number of children: 1   Tobacco Use    Smoking status: Never    Smokeless tobacco: Never   Substance and Sexual Activity    Alcohol use: Never    Drug use: Never    Sexual activity: Not Currently       No current facility-administered medications on file prior to encounter.     Current Outpatient Medications on File Prior  to Encounter   Medication Sig Dispense Refill    acetaminophen (TYLENOL) 500 MG tablet Take 1,000 mg by mouth.      Lactobac no.41/Bifidobact no.7 (PROBIOTIC-10 ORAL) Take by mouth.      LIDOcaine-prilocaine (EMLA) cream APPLY TO THE AFFECTED AREA 1 HOUR BEFORE PORT ACCESS      methylphenidate HCl (RITALIN) 5 MG tablet Take 1 tablet (5 mg total) by mouth 2 (two) times daily. 60 tablet 0    OLANZapine (ZYPREXA) 5 MG tablet Take days 1-4 of chemotherapy 30 tablet 2    ondansetron (ZOFRAN) 8 MG tablet Take 8 mg by mouth 3 (three) times daily as needed.      potassium chloride SA (K-DUR,KLOR-CON) 20 MEQ tablet Take 1 tablet (20 mEq total) by mouth once daily. 30 tablet 11       Review of patient's allergies indicates:  No Known Allergies    Review of Systems   Constitutional:  Negative for fever.   HENT:  Negative for sore throat.    Eyes:  Negative for pain.   Respiratory:  Negative for shortness of breath.    Cardiovascular:  Negative for chest pain.   Gastrointestinal:  Positive for vomiting.   Genitourinary:  Negative for dysuria.   Musculoskeletal:  Negative for myalgias.   Skin:  Negative for rash.   Neurological:  Negative for headaches.   Psychiatric/Behavioral:  The patient is not nervous/anxious.       Objective:     Vitals:    06/23/23 0717   BP: (!) 123/59   Pulse: 100   Resp: 18   Temp: 98.4 °F (36.9 °C)       Constitutional: NAD, tired-appearing female patient  HENT: Normocephalic, atraumatic, no obvious deformities   Eyes: sclera non-icteric  Cardiovascular: S1/S2 noted   Respiratory: normal respiratory effort,   Abdominal: Bowel sounds appreciated in all quadrants  Skin: warm, dry  Neurological: Alert and oriented x 3  Rectal: deferred    Significant Labs:  Recent Labs   Lab 06/22/23 2105 06/23/23  0434   HGB 7.0* 7.1*       Lab Results   Component Value Date    WBC 5.84 06/23/2023    HGB 7.1 (L) 06/23/2023    HCT 20.9 (L) 06/23/2023     (H) 06/23/2023     (L) 06/23/2023       Lab Results    Component Value Date     (H) 06/22/2023    K 3.6 06/22/2023     (H) 06/22/2023    CO2 25 06/22/2023    BUN 23 (H) 06/22/2023    CREATININE 0.5 06/22/2023    CALCIUM 8.2 (L) 06/22/2023    ANIONGAP 10 06/22/2023    ESTGFRAFRICA >60.0 07/18/2022    EGFRNONAA >60.0 07/18/2022       Lab Results   Component Value Date    ALT 12 06/22/2023    AST 33 06/22/2023    ALKPHOS 114 06/22/2023    BILITOT 3.2 (H) 06/22/2023       Lab Results   Component Value Date    INR 1.4 (H) 06/22/2023    INR 1.4 (H) 03/23/2011       Significant Imaging:  Reviewed pertinent radiology findings.       Assessment/Plan:     Shikha López is a 53 y.o. female with history of metastatic breast cancer and previously identified esophageal varices on imaging presenting with hematemesis, melena and symptomatic anemia    Problem List:    Hematemesis/Melena  Symptomatic anemia    Patient on active chemotherapy with decline in hemoglobin, melena and hematemesis concerning for acute blood loss upper GI bleed. Of note she has previously identified esophageal varices on imaging. Recommend further evaluation with endoscopy.    Recommendations:    - Plan for EGD today  - Trend Hgb q8 hrs. Transfuse for Hgb <7, unless otherwise indicated  - Maintain IV access with 2 large bore IVs  - Intravascular resuscitation/support with IVFs   - NPO  - Hold all NSAIDs and anticoagulants, unless contraindicated  - Bolus IV pantoprazole 80mg followed by 40mg BID  - Please correct any coagulopathy with platelets and FFP for goal of platelets >50K and INR <2.0  - Please notify GI team if there is significant change in patient's clinical status     Thank you for involving us in the care of Shikha Lópze. Please call with any additional questions, concerns or changes in the patient's clinical status. We will continue to follow.     Rosas Wall MD  Internal Medicine Resident, PGY-1  [facility]

## 2023-06-23 NOTE — HPI
Ms. Shikha López is a 53 year old female for whom hepatology is consulted for TIPS evaluation. She has a PMH significant for breast cancer (initially diagnosed in 2006 and status post chemotherapy, radiation, and mastectomy in 2007 with recurrence and metastases to lung by 2010; currently on chemotherapy; last session on 06/14) and obesity.     Hospital Course:   Patient was admitted to [facility] on 06/23 for evaluation of GI bleeding (hematemesis and melena) since 06/21 associated with pre-sycnope. On arrival, vital signs notable for tachycardia ('s). Labs notable for anemia (Hgb 7 from 11.5 on 06/14), thrombocytopenia (145), elevated INR (1.4), and hyperbilirubinemia (3.2). She was admitted to medical oncology and GI consulted. EGD performed on 06/23 showed Type 1 isolated gastric varices. Hepatology consulted for consideration of TIPS.     On initial bedside interview, patient denies abdominal pain, prior known or family history of liver disease, prior history of GI bleeding, history of skin or eye yellowing, ETOH usage, or prior episodes of hepatitis.

## 2023-06-24 LAB
ALBUMIN SERPL BCP-MCNC: 2.5 G/DL (ref 3.5–5.2)
ALP SERPL-CCNC: 98 U/L (ref 55–135)
ALT SERPL W/O P-5'-P-CCNC: 10 U/L (ref 10–44)
ANION GAP SERPL CALC-SCNC: 7 MMOL/L (ref 8–16)
AST SERPL-CCNC: 28 U/L (ref 10–40)
BASOPHILS # BLD AUTO: 0.07 K/UL (ref 0–0.2)
BASOPHILS NFR BLD: 1 % (ref 0–1.9)
BILIRUB SERPL-MCNC: 2.4 MG/DL (ref 0.1–1)
BUN SERPL-MCNC: 19 MG/DL (ref 6–20)
CALCIUM SERPL-MCNC: 7.7 MG/DL (ref 8.7–10.5)
CHLORIDE SERPL-SCNC: 113 MMOL/L (ref 95–110)
CO2 SERPL-SCNC: 24 MMOL/L (ref 23–29)
CREAT SERPL-MCNC: 0.6 MG/DL (ref 0.5–1.4)
DIFFERENTIAL METHOD: ABNORMAL
EOSINOPHIL # BLD AUTO: 0.6 K/UL (ref 0–0.5)
EOSINOPHIL NFR BLD: 8.9 % (ref 0–8)
ERYTHROCYTE [DISTWIDTH] IN BLOOD BY AUTOMATED COUNT: 21.4 % (ref 11.5–14.5)
EST. GFR  (NO RACE VARIABLE): >60 ML/MIN/1.73 M^2
GLUCOSE SERPL-MCNC: 90 MG/DL (ref 70–110)
HCT VFR BLD AUTO: 23.5 % (ref 37–48.5)
HGB BLD-MCNC: 8.1 G/DL (ref 12–16)
IMM GRANULOCYTES # BLD AUTO: 0.06 K/UL (ref 0–0.04)
IMM GRANULOCYTES NFR BLD AUTO: 0.9 % (ref 0–0.5)
INR PPP: 1.5 (ref 0.8–1.2)
LYMPHOCYTES # BLD AUTO: 1.3 K/UL (ref 1–4.8)
LYMPHOCYTES NFR BLD: 19 % (ref 18–48)
MAGNESIUM SERPL-MCNC: 1.9 MG/DL (ref 1.6–2.6)
MCH RBC QN AUTO: 36.7 PG (ref 27–31)
MCHC RBC AUTO-ENTMCNC: 34.5 G/DL (ref 32–36)
MCV RBC AUTO: 106 FL (ref 82–98)
MONOCYTES # BLD AUTO: 0.4 K/UL (ref 0.3–1)
MONOCYTES NFR BLD: 6.2 % (ref 4–15)
NEUTROPHILS # BLD AUTO: 4.5 K/UL (ref 1.8–7.7)
NEUTROPHILS NFR BLD: 64 % (ref 38–73)
NRBC BLD-RTO: 0 /100 WBC
PHOSPHATE SERPL-MCNC: 3.2 MG/DL (ref 2.7–4.5)
PLATELET # BLD AUTO: 118 K/UL (ref 150–450)
PMV BLD AUTO: 11.8 FL (ref 9.2–12.9)
POTASSIUM SERPL-SCNC: 3.3 MMOL/L (ref 3.5–5.1)
PROT SERPL-MCNC: 4.5 G/DL (ref 6–8.4)
PROTHROMBIN TIME: 15.6 SEC (ref 9–12.5)
RBC # BLD AUTO: 2.21 M/UL (ref 4–5.4)
SODIUM SERPL-SCNC: 144 MMOL/L (ref 136–145)
WBC # BLD AUTO: 6.99 K/UL (ref 3.9–12.7)

## 2023-06-24 PROCEDURE — 85025 COMPLETE CBC W/AUTO DIFF WBC: CPT

## 2023-06-24 PROCEDURE — 99233 PR SUBSEQUENT HOSPITAL CARE,LEVL III: ICD-10-PCS | Mod: GC,,, | Performed by: HOSPITALIST

## 2023-06-24 PROCEDURE — 84100 ASSAY OF PHOSPHORUS: CPT

## 2023-06-24 PROCEDURE — 85610 PROTHROMBIN TIME: CPT

## 2023-06-24 PROCEDURE — 83735 ASSAY OF MAGNESIUM: CPT

## 2023-06-24 PROCEDURE — 63600175 PHARM REV CODE 636 W HCPCS: Mod: JA | Performed by: PHYSICIAN ASSISTANT

## 2023-06-24 PROCEDURE — 94761 N-INVAS EAR/PLS OXIMETRY MLT: CPT

## 2023-06-24 PROCEDURE — 80053 COMPREHEN METABOLIC PANEL: CPT

## 2023-06-24 PROCEDURE — 25500020 PHARM REV CODE 255: Performed by: HOSPITALIST

## 2023-06-24 PROCEDURE — 20600001 HC STEP DOWN PRIVATE ROOM

## 2023-06-24 PROCEDURE — 25000003 PHARM REV CODE 250

## 2023-06-24 PROCEDURE — 36415 COLL VENOUS BLD VENIPUNCTURE: CPT

## 2023-06-24 PROCEDURE — 25000003 PHARM REV CODE 250: Performed by: PHYSICIAN ASSISTANT

## 2023-06-24 PROCEDURE — C9113 INJ PANTOPRAZOLE SODIUM, VIA: HCPCS

## 2023-06-24 PROCEDURE — 99233 SBSQ HOSP IP/OBS HIGH 50: CPT | Mod: GC,,, | Performed by: HOSPITALIST

## 2023-06-24 PROCEDURE — 63600175 PHARM REV CODE 636 W HCPCS

## 2023-06-24 RX ORDER — POTASSIUM CHLORIDE 750 MG/1
30 CAPSULE, EXTENDED RELEASE ORAL
Status: DISCONTINUED | OUTPATIENT
Start: 2023-06-24 | End: 2023-06-24

## 2023-06-24 RX ADMIN — MUPIROCIN: 20 OINTMENT TOPICAL at 08:06

## 2023-06-24 RX ADMIN — PANTOPRAZOLE SODIUM 40 MG: 40 INJECTION, POWDER, FOR SOLUTION INTRAVENOUS at 08:06

## 2023-06-24 RX ADMIN — POTASSIUM BICARBONATE 25 MEQ: 978 TABLET, EFFERVESCENT ORAL at 03:06

## 2023-06-24 RX ADMIN — PANTOPRAZOLE SODIUM 40 MG: 40 INJECTION, POWDER, FOR SOLUTION INTRAVENOUS at 09:06

## 2023-06-24 RX ADMIN — POTASSIUM BICARBONATE 25 MEQ: 978 TABLET, EFFERVESCENT ORAL at 09:06

## 2023-06-24 RX ADMIN — OCTREOTIDE ACETATE 50 MCG/HR: 500 INJECTION, SOLUTION INTRAVENOUS; SUBCUTANEOUS at 03:06

## 2023-06-24 RX ADMIN — MUPIROCIN: 20 OINTMENT TOPICAL at 09:06

## 2023-06-24 RX ADMIN — IOHEXOL 100 ML: 350 INJECTION, SOLUTION INTRAVENOUS at 03:06

## 2023-06-24 RX ADMIN — OCTREOTIDE ACETATE 50 MCG/HR: 500 INJECTION, SOLUTION INTRAVENOUS; SUBCUTANEOUS at 01:06

## 2023-06-24 NOTE — PLAN OF CARE
Problem: Adjustment to Illness (Gastrointestinal Bleeding)  Goal: Optimal Coping with Acute Illness  Outcome: Ongoing, Progressing     Problem: Bleeding (Gastrointestinal Bleeding)  Goal: Hemostasis  Outcome: Ongoing, Progressing     AAOX4; VSS; Afebrile. No acute complaints or concerns. Octreotide infusing at 10 ml/h. Fall precautions in place with call light nearby. Frequent monitoring Q2H. No new orders at this time.

## 2023-06-24 NOTE — SUBJECTIVE & OBJECTIVE
Interval History: NAEON. Patient tolerating clear liquid diet well. Hepatology consulted, recommend echo and triple phase CT. Repleted potassium. Discussed with patient regarding beginning to think about GOC and advanced directives which she was very receptive to. Can continue this discussion/outpatient palliative consult.     Oncology Treatment Plan:   OP BREAST FAM-TRASTUZUMAB DERUXTECAN-NXKI Q3W    Medications:  Continuous Infusions:   octreotide (SANDOSTATIN) infusion 50 mcg/hr (06/24/23 0141)     Scheduled Meds:   mupirocin   Nasal BID    pantoprazole  40 mg Intravenous BID    potassium bicarbonate  25 mEq Oral Q2H     PRN Meds:sodium chloride, sodium chloride     Review of Systems  Objective:     Vital Signs (Most Recent):  Temp: 98.3 °F (36.8 °C) (06/24/23 0845)  Pulse: 87 (06/24/23 0845)  Resp: 15 (06/24/23 0845)  BP: 130/62 (06/24/23 0845)  SpO2: (!) 93 % (06/24/23 0845) Vital Signs (24h Range):  Temp:  [97.7 °F (36.5 °C)-98.9 °F (37.2 °C)] 98.3 °F (36.8 °C)  Pulse:  [] 87  Resp:  [5-24] 15  SpO2:  [93 %-100 %] 93 %  BP: (112-136)/(53-63) 130/62     Weight: 90.7 kg (200 lb)  Body mass index is 36.58 kg/m².  Body surface area is 1.99 meters squared.      Intake/Output Summary (Last 24 hours) at 6/24/2023 0931  Last data filed at 6/24/2023 0400  Gross per 24 hour   Intake 536.51 ml   Output 780 ml   Net -243.49 ml        Physical Exam  Constitutional:       Appearance: Normal appearance. She is obese.   HENT:      Head: Normocephalic and atraumatic.   Cardiovascular:      Rate and Rhythm: Normal rate and regular rhythm.      Pulses: Normal pulses.      Heart sounds: Normal heart sounds.   Pulmonary:      Effort: Pulmonary effort is normal. No respiratory distress.      Breath sounds: Normal breath sounds.   Abdominal:      General: Bowel sounds are normal. There is no distension.      Palpations: Abdomen is soft.      Tenderness: There is no abdominal tenderness.   Skin:     General: Skin is warm and  dry.      Coloration: Skin is not jaundiced.   Neurological:      Mental Status: She is alert and oriented to person, place, and time.   Psychiatric:         Mood and Affect: Mood normal.         Behavior: Behavior normal.        Significant Labs:   All pertinent labs from the last 24 hours have been reviewed.    Diagnostic Results:  I have reviewed all pertinent imaging results/findings within the past 24 hours.

## 2023-06-24 NOTE — ASSESSMENT & PLAN NOTE
Patient with history of metastatic breast cancer on chemotherapy who presented to ED with hematemesis and melena.    NSAID (--)  Aspirin(antiplatelet) (--)  Alcohol Use (--)  Severe retching (--)  History of GI bleed (--)  Past endoscopy (--)  Trauma (--)  Anticoagulation (--)  Malignancy (+)    Labs:  Recent Labs   Lab 06/23/23  0901 06/23/23  1352 06/23/23  1848   HGB 8.0* 8.0* 8.9*       Lab Results   Component Value Date    WBC 8.61 06/23/2023    HGB 8.9 (L) 06/23/2023    HCT 25.0 (L) 06/23/2023     (H) 06/23/2023     (L) 06/23/2023       Lab Results   Component Value Date     06/24/2023    K 3.3 (L) 06/24/2023     (H) 06/24/2023    CO2 24 06/24/2023    BUN 19 06/24/2023    CREATININE 0.6 06/24/2023    CALCIUM 7.7 (L) 06/24/2023    ANIONGAP 7 (L) 06/24/2023    ESTGFRAFRICA >60.0 07/18/2022    EGFRNONAA >60.0 07/18/2022       Lab Results   Component Value Date    ALT 10 06/24/2023    AST 28 06/24/2023    ALKPHOS 98 06/24/2023    BILITOT 2.4 (H) 06/24/2023       Lab Results   Component Value Date    INR 1.5 (H) 06/24/2023    INR 1.4 (H) 06/22/2023    INR 1.4 (H) 03/23/2011       GI Hemorrhage  Acute Blood Loss Anemia  - Hgb 7 on admit, baseline around 11-12  - S/p Protonix 80mg IV x 1 in the ED.  Start Protonix 40mg IV BID  - GI consulted, appreciate assistance  - Type and screen, blood consent obtained s/p 2x pRBC transfusion   - Continue Octreotide gtt, started in ED, with recent imaging on CT revealing of possible varices  - hepatology consulted, recommend triple phase Ct for liver, and echo   - Denies further symptoms that she had upon presentation   - tolerating clear diet well   - Lyte repletion

## 2023-06-24 NOTE — PLAN OF CARE
[id number]    N: A/Ox4. Calm, cooperative. Glasses on.   C: Denies CP/palpitations. Cardiac monitoring discontinued. Generalized edema.   R: RA. Denies SOB. Aspiration precautions maintained.   : Stress incontinence. Voiding sufficient.   GI: Obese. Abdomen soft, non tender. Continue ocreotide continuous, protonix IVP BID. Continue clear liquid diet - not advanced due to GI rec.   S: Intact, scattered bruising. Scarring from left breast mastectomy.   M: Standby supervision. Patient reports tenderness and discomfort to left knee when beginning to stand/ambulate. No assistive device.     No s/s of active bleeding observed.   CT abd/pelvis completed, results pending.     Safety measures maintained. Hourly rounding complete. Patient able to use call light and make needs known appropriately.       Problem: Adjustment to Illness (Gastrointestinal Bleeding)  Goal: Optimal Coping with Acute Illness  Outcome: Ongoing, Progressing     Problem: Bleeding (Gastrointestinal Bleeding)  Goal: Hemostasis  Outcome: Ongoing, Progressing     Problem: Adult Inpatient Plan of Care  Goal: Plan of Care Review  Outcome: Ongoing, Progressing  Goal: Patient-Specific Goal (Individualized)  Outcome: Ongoing, Progressing  Goal: Absence of Hospital-Acquired Illness or Injury  Outcome: Ongoing, Progressing  Goal: Optimal Comfort and Wellbeing  Outcome: Ongoing, Progressing  Goal: Readiness for Transition of Care  Outcome: Ongoing, Progressing     Problem: Infection  Goal: Absence of Infection Signs and Symptoms  Outcome: Ongoing, Progressing

## 2023-06-24 NOTE — PROGRESS NOTES
Dereck Forrester - Oncology ([facility])  Hematology/Oncology  Progress Note    Patient Name: Shikha López  Admission Date: 6/22/2023  Hospital Length of Stay: 2 days  Code Status: Full Code     Subjective:     HPI:  Ms. López is a 53yoF with PMHx of metastatic breast cancer to left lung currently on chemotherapy, last session on 6/14 who presented to [facility] ED with noted hematemesis and melena starting 1 night prior to admission.  4-5 episodes.  Reports pre-syncope however denies syncopal event or falls.  Denies history of GIB or being on AC.  Denies NSAIDs or prior endoscopies.  In ED, initially tachycardic with stable BP on room air.  HR improved in ED prior to admission.  Hgb 7 on admission, baseline around 11-12.  Patient follows with Dr. Willis for breast cancer.    Oncology History:  She presented in the emergency room at [facility] on September 29, 2006 with a 4 months history of inflammation of her left breast.  Physical examination at that time showed the left breast was largely replaced by large mass with multiple skin ulcerations.     A biopsy in September 2006 showed poorly differentiated carcinoma which was ER and NH. negative and HER2 positive.     She was then referred to [facility] for additional care.   CT scan of the chest and abdomen November 2006 revealed multiple nodules in the lungs consistent with metastatic disease.  There also enlarged left axillary node.     She was treated with chemotherapy with weekly Herceptin and Abraxane and had marked improvement in her breast and lung metastasis on that therapy.     On May 29, 2007 she underwent left modified radical mastectomy(Dr. Colvin) which showed no residual tumor in the breast and 1/5 nodes was positive for metastasis measuring 6 mm.  (ypT0N1).     She then received postoperative radiation therapy(Dr Singh)  to the left chest wall supraclav and internal mammary lymph nodes from August 20, 2007 to September 28, 2007.      Postoperatively she continued on Herceptin for approximately 3 and 1/2 years before her lung metastasis begin to grow.     She subsequently received a number of different chemotherapy treatments including Adriamycin, Halaven, Xeloda plus lapatinib then Xeloda plus Herceptin.  The  Those treatments were provided under the care of  in [facility].  Subsequently, she transferred her care to  at [facility].  She was initially treated with Taxotere Herceptin Perjeta.       She was then changed to Kadcyla.    In July 2020 PET scan showed an increase in the size of an infrahilar mass consistent with progression.   She then took approximately 9 months of Herceptin and Navelbine.  Apparently she has some initial response to that therapy.     She started trastuzumab- deruxtecan  4/12/21.      Interval History: NAEON. Patient tolerating clear liquid diet well. Hepatology consulted, recommend echo and triple phase CT. Repleted potassium. Discussed with patient regarding beginning to think about GOC and advanced directives which she was very receptive to. Can continue this discussion/outpatient palliative consult.     Oncology Treatment Plan:   OP BREAST FAM-TRASTUZUMAB DERUXTECAN-NXKI Q3W    Medications:  Continuous Infusions:   octreotide (SANDOSTATIN) infusion 50 mcg/hr (06/24/23 0141)     Scheduled Meds:   mupirocin   Nasal BID    pantoprazole  40 mg Intravenous BID    potassium bicarbonate  25 mEq Oral Q2H     PRN Meds:sodium chloride, sodium chloride     Review of Systems  Objective:     Vital Signs (Most Recent):  Temp: 98.3 °F (36.8 °C) (06/24/23 0845)  Pulse: 87 (06/24/23 0845)  Resp: 15 (06/24/23 0845)  BP: 130/62 (06/24/23 0845)  SpO2: (!) 93 % (06/24/23 0845) Vital Signs (24h Range):  Temp:  [97.7 °F (36.5 °C)-98.9 °F (37.2 °C)] 98.3 °F (36.8 °C)  Pulse:  [] 87  Resp:  [5-24] 15  SpO2:  [93 %-100 %] 93 %  BP: (112-136)/(53-63) 130/62     Weight: 90.7 kg (200 lb)  Body mass index is  36.58 kg/m².  Body surface area is 1.99 meters squared.      Intake/Output Summary (Last 24 hours) at 6/24/2023 0931  Last data filed at 6/24/2023 0400  Gross per 24 hour   Intake 536.51 ml   Output 780 ml   Net -243.49 ml        Physical Exam  Constitutional:       Appearance: Normal appearance. She is obese.   HENT:      Head: Normocephalic and atraumatic.   Cardiovascular:      Rate and Rhythm: Normal rate and regular rhythm.      Pulses: Normal pulses.      Heart sounds: Normal heart sounds.   Pulmonary:      Effort: Pulmonary effort is normal. No respiratory distress.      Breath sounds: Normal breath sounds.   Abdominal:      General: Bowel sounds are normal. There is no distension.      Palpations: Abdomen is soft.      Tenderness: There is no abdominal tenderness.   Skin:     General: Skin is warm and dry.      Coloration: Skin is not jaundiced.   Neurological:      Mental Status: She is alert and oriented to person, place, and time.   Psychiatric:         Mood and Affect: Mood normal.         Behavior: Behavior normal.        Significant Labs:   All pertinent labs from the last 24 hours have been reviewed.    Diagnostic Results:  I have reviewed all pertinent imaging results/findings within the past 24 hours.  Assessment/Plan:     * GI bleed  Patient with history of metastatic breast cancer on chemotherapy who presented to ED with hematemesis and melena.    NSAID (--)  Aspirin(antiplatelet) (--)  Alcohol Use (--)  Severe retching (--)  History of GI bleed (--)  Past endoscopy (--)  Trauma (--)  Anticoagulation (--)  Malignancy (+)    Labs:  Recent Labs   Lab 06/23/23  0901 06/23/23  1352 06/23/23  1848   HGB 8.0* 8.0* 8.9*       Lab Results   Component Value Date    WBC 8.61 06/23/2023    HGB 8.9 (L) 06/23/2023    HCT 25.0 (L) 06/23/2023     (H) 06/23/2023     (L) 06/23/2023       Lab Results   Component Value Date     06/24/2023    K 3.3 (L) 06/24/2023     (H) 06/24/2023    CO2 24  06/24/2023    BUN 19 06/24/2023    CREATININE 0.6 06/24/2023    CALCIUM 7.7 (L) 06/24/2023    ANIONGAP 7 (L) 06/24/2023    ESTGFRAFRICA >60.0 07/18/2022    EGFRNONAA >60.0 07/18/2022       Lab Results   Component Value Date    ALT 10 06/24/2023    AST 28 06/24/2023    ALKPHOS 98 06/24/2023    BILITOT 2.4 (H) 06/24/2023       Lab Results   Component Value Date    INR 1.5 (H) 06/24/2023    INR 1.4 (H) 06/22/2023    INR 1.4 (H) 03/23/2011       GI Hemorrhage  Acute Blood Loss Anemia  - Hgb 7 on admit, baseline around 11-12  - S/p Protonix 80mg IV x 1 in the ED.  Start Protonix 40mg IV BID  - GI consulted, appreciate assistance  - Type and screen, blood consent obtained s/p 2x pRBC transfusion   - Continue Octreotide gtt, started in ED, with recent imaging on CT revealing of possible varices  - hepatology consulted, recommend triple phase Ct for liver, and echo   - Denies further symptoms that she had upon presentation   - tolerating clear diet well   - Lyte repletion      Breast cancer, stage 4, left  Patient follows with Dr. Willis outpatient for metastatic breast cancer on Trastuzumab.  Last dose on 6/14.  HDS             [facility]  Hematology/Oncology  Dereck Forrester - Oncology ([facility])

## 2023-06-25 LAB
ALBUMIN SERPL BCP-MCNC: 2.7 G/DL (ref 3.5–5.2)
ALP SERPL-CCNC: 102 U/L (ref 55–135)
ALT SERPL W/O P-5'-P-CCNC: 11 U/L (ref 10–44)
ANION GAP SERPL CALC-SCNC: 7 MMOL/L (ref 8–16)
AST SERPL-CCNC: 34 U/L (ref 10–40)
BASOPHILS # BLD AUTO: 0.04 K/UL (ref 0–0.2)
BASOPHILS NFR BLD: 0.9 % (ref 0–1.9)
BILIRUB SERPL-MCNC: 2.4 MG/DL (ref 0.1–1)
BUN SERPL-MCNC: 16 MG/DL (ref 6–20)
CALCIUM SERPL-MCNC: 8 MG/DL (ref 8.7–10.5)
CHLORIDE SERPL-SCNC: 109 MMOL/L (ref 95–110)
CO2 SERPL-SCNC: 25 MMOL/L (ref 23–29)
CREAT SERPL-MCNC: 0.6 MG/DL (ref 0.5–1.4)
DIFFERENTIAL METHOD: ABNORMAL
EOSINOPHIL # BLD AUTO: 0.5 K/UL (ref 0–0.5)
EOSINOPHIL NFR BLD: 11.2 % (ref 0–8)
ERYTHROCYTE [DISTWIDTH] IN BLOOD BY AUTOMATED COUNT: 21.4 % (ref 11.5–14.5)
EST. GFR  (NO RACE VARIABLE): >60 ML/MIN/1.73 M^2
GLUCOSE SERPL-MCNC: 106 MG/DL (ref 70–110)
HCT VFR BLD AUTO: 23.1 % (ref 37–48.5)
HGB BLD-MCNC: 7.6 G/DL (ref 12–16)
IMM GRANULOCYTES # BLD AUTO: 0.02 K/UL (ref 0–0.04)
IMM GRANULOCYTES NFR BLD AUTO: 0.5 % (ref 0–0.5)
INR PPP: 1.4 (ref 0.8–1.2)
LYMPHOCYTES # BLD AUTO: 1 K/UL (ref 1–4.8)
LYMPHOCYTES NFR BLD: 23.1 % (ref 18–48)
MAGNESIUM SERPL-MCNC: 1.8 MG/DL (ref 1.6–2.6)
MCH RBC QN AUTO: 35.8 PG (ref 27–31)
MCHC RBC AUTO-ENTMCNC: 32.9 G/DL (ref 32–36)
MCV RBC AUTO: 109 FL (ref 82–98)
MONOCYTES # BLD AUTO: 0.2 K/UL (ref 0.3–1)
MONOCYTES NFR BLD: 4.3 % (ref 4–15)
NEUTROPHILS # BLD AUTO: 2.6 K/UL (ref 1.8–7.7)
NEUTROPHILS NFR BLD: 60 % (ref 38–73)
NRBC BLD-RTO: 1 /100 WBC
PHOSPHATE SERPL-MCNC: 2.5 MG/DL (ref 2.7–4.5)
PLATELET # BLD AUTO: 103 K/UL (ref 150–450)
PMV BLD AUTO: 11.6 FL (ref 9.2–12.9)
POTASSIUM SERPL-SCNC: 3.4 MMOL/L (ref 3.5–5.1)
PROT SERPL-MCNC: 4.7 G/DL (ref 6–8.4)
PROTHROMBIN TIME: 14.8 SEC (ref 9–12.5)
RBC # BLD AUTO: 2.12 M/UL (ref 4–5.4)
SODIUM SERPL-SCNC: 141 MMOL/L (ref 136–145)
WBC # BLD AUTO: 4.38 K/UL (ref 3.9–12.7)

## 2023-06-25 PROCEDURE — 25000003 PHARM REV CODE 250: Performed by: PHYSICIAN ASSISTANT

## 2023-06-25 PROCEDURE — 25000003 PHARM REV CODE 250: Performed by: HOSPITALIST

## 2023-06-25 PROCEDURE — 36415 COLL VENOUS BLD VENIPUNCTURE: CPT

## 2023-06-25 PROCEDURE — 94761 N-INVAS EAR/PLS OXIMETRY MLT: CPT

## 2023-06-25 PROCEDURE — 84100 ASSAY OF PHOSPHORUS: CPT

## 2023-06-25 PROCEDURE — 85610 PROTHROMBIN TIME: CPT

## 2023-06-25 PROCEDURE — 99233 PR SUBSEQUENT HOSPITAL CARE,LEVL III: ICD-10-PCS | Mod: GC,,, | Performed by: HOSPITALIST

## 2023-06-25 PROCEDURE — 63600175 PHARM REV CODE 636 W HCPCS: Mod: JA | Performed by: PHYSICIAN ASSISTANT

## 2023-06-25 PROCEDURE — 25000003 PHARM REV CODE 250

## 2023-06-25 PROCEDURE — 80053 COMPREHEN METABOLIC PANEL: CPT

## 2023-06-25 PROCEDURE — 83735 ASSAY OF MAGNESIUM: CPT

## 2023-06-25 PROCEDURE — 85025 COMPLETE CBC W/AUTO DIFF WBC: CPT

## 2023-06-25 PROCEDURE — 63600175 PHARM REV CODE 636 W HCPCS

## 2023-06-25 PROCEDURE — 20600001 HC STEP DOWN PRIVATE ROOM

## 2023-06-25 PROCEDURE — C9113 INJ PANTOPRAZOLE SODIUM, VIA: HCPCS

## 2023-06-25 PROCEDURE — 99233 SBSQ HOSP IP/OBS HIGH 50: CPT | Mod: GC,,, | Performed by: HOSPITALIST

## 2023-06-25 RX ORDER — SODIUM,POTASSIUM PHOSPHATES 280-250MG
2 POWDER IN PACKET (EA) ORAL ONCE
Status: COMPLETED | OUTPATIENT
Start: 2023-06-25 | End: 2023-06-25

## 2023-06-25 RX ADMIN — OCTREOTIDE ACETATE 50 MCG/HR: 500 INJECTION, SOLUTION INTRAVENOUS; SUBCUTANEOUS at 12:06

## 2023-06-25 RX ADMIN — POTASSIUM & SODIUM PHOSPHATES POWDER PACK 280-160-250 MG 2 PACKET: 280-160-250 PACK at 08:06

## 2023-06-25 RX ADMIN — PANTOPRAZOLE SODIUM 40 MG: 40 INJECTION, POWDER, FOR SOLUTION INTRAVENOUS at 08:06

## 2023-06-25 RX ADMIN — OCTREOTIDE ACETATE 50 MCG/HR: 500 INJECTION, SOLUTION INTRAVENOUS; SUBCUTANEOUS at 02:06

## 2023-06-25 RX ADMIN — MUPIROCIN: 20 OINTMENT TOPICAL at 08:06

## 2023-06-25 RX ADMIN — POTASSIUM BICARBONATE 25 MEQ: 978 TABLET, EFFERVESCENT ORAL at 12:06

## 2023-06-25 NOTE — ASSESSMENT & PLAN NOTE
Patient with history of metastatic breast cancer on chemotherapy who presented to ED with hematemesis and melena.    NSAID (--)  Aspirin(antiplatelet) (--)  Alcohol Use (--)  Severe retching (--)  History of GI bleed (--)  Past endoscopy (--)  Trauma (--)  Anticoagulation (--)  Malignancy (+)    Labs:  Recent Labs   Lab 06/23/23  1848 06/24/23  1626 06/25/23  0352   HGB 8.9* 8.1* 7.6*       Lab Results   Component Value Date    WBC 4.38 06/25/2023    HGB 7.6 (L) 06/25/2023    HCT 23.1 (L) 06/25/2023     (H) 06/25/2023     (L) 06/25/2023       Lab Results   Component Value Date     06/25/2023    K 3.4 (L) 06/25/2023     06/25/2023    CO2 25 06/25/2023    BUN 16 06/25/2023    CREATININE 0.6 06/25/2023    CALCIUM 8.0 (L) 06/25/2023    ANIONGAP 7 (L) 06/25/2023    ESTGFRAFRICA >60.0 07/18/2022    EGFRNONAA >60.0 07/18/2022       Lab Results   Component Value Date    ALT 11 06/25/2023    AST 34 06/25/2023    ALKPHOS 102 06/25/2023    BILITOT 2.4 (H) 06/25/2023       Lab Results   Component Value Date    INR 1.4 (H) 06/25/2023    INR 1.5 (H) 06/24/2023    INR 1.4 (H) 06/22/2023       GI Hemorrhage  Acute Blood Loss Anemia  - Hgb 7 on admit, baseline around 11-12  - S/p Protonix 80mg IV x 1 in the ED.  Start Protonix 40mg IV BID  - GI consulted, appreciate assistance  - Type and screen, blood consent obtained s/p 2x pRBC transfusion   - Continue Octreotide gtt, started in ED, with recent imaging on CT revealing of possible varices  - hepatology consulted, recommend triple phase Ct for liver, and echo - plan for coiling of varices   - Denies further symptoms that she had upon presentation   - tolerating clear diet well - advanced to regular with NPO at midnight   - Lyte repletion

## 2023-06-25 NOTE — HOSPITAL COURSE
Patient was admitted to Medical Oncology service for hematemesis and melena. She received a total of 2 units pRBC throughout hospital course. She underwent with endoscopy with GI who recommended Hepatology consult for concerns of varices potentially liver dysfunction. She was started on IV protonix and IV octreotide. Hep recommended triple phase CT scan as well as new echo. She underwent coiling with GI of gastric varices on 06/7. GI recommended repeat upper endoscopy in 4 weeks. She overall felt well, tolerated diet and di not have recurrent episodes of symptoms since admission. She was discharged on 06/28 in stable condition with protonix. She has follow up with primary Oncologist on 07/05.

## 2023-06-25 NOTE — TREATMENT PLAN
Hepatology Treatment Plan    Shikha López is a 53 y.o. female admitted to hospital 6/22/2023 (Hospital Day: 4) due to GI bleed.     Interval History  Vital signs stable on room air. Labs notable for Hgb 7.6 from 8.1 (PRBC last given on 06/23), stable INR (1.4), normal Cr, and stable hyperbilirubinemia (2.4 from 2.4). CT triple phase shows a normal appearing liver but with signs of portal hypertension including splenomegaly and gastroesophageal varices; portal vein patent.     Objective  Temp:  [97.9 °F (36.6 °C)-99.9 °F (37.7 °C)] 98.4 °F (36.9 °C) (06/25 1140)  Pulse:  [79-86] 79 (06/25 1140)  BP: (110-146)/(54-69) 128/61 (06/25 1140)  Resp:  [17-20] 19 (06/25 1140)  SpO2:  [91 %-97 %] 93 % (06/25 1140)    Laboratory    Lab Results   Component Value Date    WBC 4.38 06/25/2023    HGB 7.6 (L) 06/25/2023    HCT 23.1 (L) 06/25/2023     (H) 06/25/2023     (L) 06/25/2023       Lab Results   Component Value Date     06/25/2023    K 3.4 (L) 06/25/2023     06/25/2023    CO2 25 06/25/2023    BUN 16 06/25/2023    CREATININE 0.6 06/25/2023    CALCIUM 8.0 (L) 06/25/2023       Lab Results   Component Value Date    ALBUMIN 2.7 (L) 06/25/2023    ALT 11 06/25/2023    AST 34 06/25/2023    ALKPHOS 102 06/25/2023    BILITOT 2.4 (H) 06/25/2023       Lab Results   Component Value Date    INR 1.4 (H) 06/25/2023    INR 1.5 (H) 06/24/2023    INR 1.4 (H) 06/22/2023       MELD-Na: 14 at 6/25/2023  3:52 AM  MELD: 14 at 6/25/2023  3:52 AM  Calculated from:  Serum Creatinine: 0.6 mg/dL (Using min of 1 mg/dL) at 6/25/2023  3:52 AM  Serum Sodium: 141 mmol/L (Using max of 137 mmol/L) at 6/25/2023  3:52 AM  Total Bilirubin: 2.4 mg/dL at 6/25/2023  3:52 AM  INR(ratio): 1.4 at 6/25/2023  3:52 AM      Assessment  This is a 53 year old female with PMH significant for breast cancer (initially diagnosed in 2006 and status post chemotherapy, radiation, and mastectomy in 2007 with recurrence and metastases to lung by 2010;  currently on chemotherapy; last session on 06/14) and obesity who presented to [facility] on 06/22 for management of upper GI bleeding (hematemesis and melena). She is status post EGD on 06/23 showing non-bleeding small esophageal varices and gastric varices (IGV1). Hepatology consulted for consideration of TIPS for further treatment. Patient is without obvious stigmata of liver disease on exam, however she does have features of portal hypertension on CT triple phase from 06/24 with splenomegaly and gastric varices with patent vasculature.      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   -Given MELD score 14-15, would recommend attempting endoscopic treatment of varices rather than TIPS.   -Continue Octreotide infusion.   -Follow-up GI recommendations; notified of recommendation to pursue endoscopic treatment if possible.   -CMP and INR daily.     Thank you for involving us in the care of Shikha López. Please call with any additional concerns or questions.        Francesco Muñoz MD, PGY-V  Gastroenterology Fellow  [facility]

## 2023-06-25 NOTE — PROGRESS NOTES
Dereck Forrester - Oncology ([facility])  Hematology/Oncology  Progress Note    Patient Name: Shikha López  Admission Date: 6/22/2023  Hospital Length of Stay: 3 days  Code Status: Full Code     Subjective:     HPI:  Ms. López is a 53yoF with PMHx of metastatic breast cancer to left lung currently on chemotherapy, last session on 6/14 who presented to [facility] ED with noted hematemesis and melena starting 1 night prior to admission.  4-5 episodes.  Reports pre-syncope however denies syncopal event or falls.  Denies history of GIB or being on AC.  Denies NSAIDs or prior endoscopies.  In ED, initially tachycardic with stable BP on room air.  HR improved in ED prior to admission.  Hgb 7 on admission, baseline around 11-12.  Patient follows with Dr. Willis for breast cancer.    Oncology History:  She presented in the emergency room at [facility] on September 29, 2006 with a 4 months history of inflammation of her left breast.  Physical examination at that time showed the left breast was largely replaced by large mass with multiple skin ulcerations.     A biopsy in September 2006 showed poorly differentiated carcinoma which was ER and IL. negative and HER2 positive.     She was then referred to [facility] for additional care.   CT scan of the chest and abdomen November 2006 revealed multiple nodules in the lungs consistent with metastatic disease.  There also enlarged left axillary node.     She was treated with chemotherapy with weekly Herceptin and Abraxane and had marked improvement in her breast and lung metastasis on that therapy.     On May 29, 2007 she underwent left modified radical mastectomy(Dr. Colvin) which showed no residual tumor in the breast and 1/5 nodes was positive for metastasis measuring 6 mm.  (ypT0N1).     She then received postoperative radiation therapy(Dr Singh)  to the left chest wall supraclav and internal mammary lymph nodes from August 20, 2007 to September 28, 2007.      Postoperatively she continued on Herceptin for approximately 3 and 1/2 years before her lung metastasis begin to grow.     She subsequently received a number of different chemotherapy treatments including Adriamycin, Halaven, Xeloda plus lapatinib then Xeloda plus Herceptin.  The  Those treatments were provided under the care of  in [facility].  Subsequently, she transferred her care to  at [facility].  She was initially treated with Taxotere Herceptin Perjeta.       She was then changed to Kadcyla.    In July 2020 PET scan showed an increase in the size of an infrahilar mass consistent with progression.   She then took approximately 9 months of Herceptin and Navelbine.  Apparently she has some initial response to that therapy.     She started trastuzumab- deruxtecan  4/12/21.      Interval History: NAEON. Ct scan completed. Hepatology states GI/AES considering coiling. Will advance diet, NPO at midnight in anticipation.     Oncology Treatment Plan:   OP BREAST FAM-TRASTUZUMAB DERUXTECAN-NXKI Q3W    Medications:  Continuous Infusions:   octreotide (SANDOSTATIN) infusion 50 mcg/hr (06/25/23 0317)     Scheduled Meds:   mupirocin   Nasal BID    pantoprazole  40 mg Intravenous BID    potassium bicarbonate  25 mEq Oral Once     PRN Meds:sodium chloride, sodium chloride     Review of Systems  Objective:     Vital Signs (Most Recent):  Temp: 98.4 °F (36.9 °C) (06/25/23 1140)  Pulse: 79 (06/25/23 1140)  Resp: 19 (06/25/23 1140)  BP: 128/61 (06/25/23 1140)  SpO2: (!) 93 % (06/25/23 1140) Vital Signs (24h Range):  Temp:  [97.9 °F (36.6 °C)-99.9 °F (37.7 °C)] 98.4 °F (36.9 °C)  Pulse:  [79-86] 79  Resp:  [17-20] 19  SpO2:  [91 %-97 %] 93 %  BP: (110-146)/(54-69) 128/61     Weight: 90.7 kg (200 lb)  Body mass index is 36.58 kg/m².  Body surface area is 1.99 meters squared.      Intake/Output Summary (Last 24 hours) at 6/25/2023 1151  Last data filed at 6/25/2023 0412  Gross per 24 hour   Intake  584.97 ml   Output --   Net 584.97 ml        Physical Exam  Constitutional:       Appearance: Normal appearance. She is obese.   HENT:      Head: Normocephalic and atraumatic.   Cardiovascular:      Rate and Rhythm: Normal rate and regular rhythm.      Pulses: Normal pulses.      Heart sounds: Normal heart sounds.   Pulmonary:      Effort: Pulmonary effort is normal. No respiratory distress.      Breath sounds: Normal breath sounds.   Abdominal:      General: Bowel sounds are normal. There is no distension.      Palpations: Abdomen is soft.      Tenderness: There is no abdominal tenderness.   Skin:     General: Skin is warm and dry.      Coloration: Skin is not jaundiced.   Neurological:      Mental Status: She is alert and oriented to person, place, and time.   Psychiatric:         Mood and Affect: Mood normal.         Behavior: Behavior normal.         Thought Content: Thought content normal.         Judgment: Judgment normal.        Significant Labs:   All pertinent labs from the last 24 hours have been reviewed.    Diagnostic Results:  I have reviewed all pertinent imaging results/findings within the past 24 hours.    Assessment/Plan:     * GI bleed  Patient with history of metastatic breast cancer on chemotherapy who presented to ED with hematemesis and melena.    NSAID (--)  Aspirin(antiplatelet) (--)  Alcohol Use (--)  Severe retching (--)  History of GI bleed (--)  Past endoscopy (--)  Trauma (--)  Anticoagulation (--)  Malignancy (+)    Labs:  Recent Labs   Lab 06/23/23  1848 06/24/23  1626 06/25/23  0352   HGB 8.9* 8.1* 7.6*       Lab Results   Component Value Date    WBC 4.38 06/25/2023    HGB 7.6 (L) 06/25/2023    HCT 23.1 (L) 06/25/2023     (H) 06/25/2023     (L) 06/25/2023       Lab Results   Component Value Date     06/25/2023    K 3.4 (L) 06/25/2023     06/25/2023    CO2 25 06/25/2023    BUN 16 06/25/2023    CREATININE 0.6 06/25/2023    CALCIUM 8.0 (L) 06/25/2023    ANIONGAP  7 (L) 06/25/2023    ESTGFRAFRICA >60.0 07/18/2022    EGFRNONAA >60.0 07/18/2022       Lab Results   Component Value Date    ALT 11 06/25/2023    AST 34 06/25/2023    ALKPHOS 102 06/25/2023    BILITOT 2.4 (H) 06/25/2023       Lab Results   Component Value Date    INR 1.4 (H) 06/25/2023    INR 1.5 (H) 06/24/2023    INR 1.4 (H) 06/22/2023       GI Hemorrhage  Acute Blood Loss Anemia  - Hgb 7 on admit, baseline around 11-12  - S/p Protonix 80mg IV x 1 in the ED.  Start Protonix 40mg IV BID  - GI consulted, appreciate assistance  - Type and screen, blood consent obtained s/p 2x pRBC transfusion   - Continue Octreotide gtt, started in ED, with recent imaging on CT revealing of possible varices  - hepatology consulted, recommend triple phase Ct for liver, and echo - plan for coiling of varices   - Denies further symptoms that she had upon presentation   - tolerating clear diet well - advanced to regular with NPO at midnight   - Lyte repletion      Breast cancer, stage 4, left  Patient follows with Dr. Willis outpatient for metastatic breast cancer on Trastuzumab.  Last dose on 6/14.  HDS             Conrad Lang DO  Hematology/Oncology  Dereck Forrester - Oncology ([facility])

## 2023-06-25 NOTE — PLAN OF CARE
No acute events this shift. Afebrile & VSS on room air. Octreotide infusing at 10ml/hr. No PRNs needed. Ambulates independently to bathroom. No BM this shift. To be NPO at midnight for possible scope/coiling tomorrow, pt aware. CHG wipes provided and encouraged use. POC reviewed with patient. All needs addressed. Will continue to monitor with frequent rounds and clustered care to promote rest.

## 2023-06-25 NOTE — PLAN OF CARE
0613    AAOx4; afebrile; R PIV FA Dc'd due to swelling and edema- MD aware. Ultrasound consulted. Octreotide at 10 ml/h. NO BM overnight.    Problem: Adjustment to Illness (Gastrointestinal Bleeding)  Goal: Optimal Coping with Acute Illness  Outcome: Ongoing, Progressing     Problem: Bleeding (Gastrointestinal Bleeding)  Goal: Hemostasis  Outcome: Ongoing, Progressing     Problem: Adult Inpatient Plan of Care  Goal: Optimal Comfort and Wellbeing  Outcome: Ongoing, Progressing     Problem: Infection  Goal: Absence of Infection Signs and Symptoms  Outcome: Ongoing, Progressing     Problem: Skin Injury Risk Increased  Goal: Skin Health and Integrity  Outcome: Ongoing, Progressing

## 2023-06-25 NOTE — SUBJECTIVE & OBJECTIVE
Interval History: NAEON. Ct scan completed. Hepatology states GI/AES considering coiling. Will advance diet, NPO at midnight in anticipation.     Oncology Treatment Plan:   OP BREAST FAM-TRASTUZUMAB DERUXTECAN-NXKI Q3W    Medications:  Continuous Infusions:   octreotide (SANDOSTATIN) infusion 50 mcg/hr (06/25/23 0317)     Scheduled Meds:   mupirocin   Nasal BID    pantoprazole  40 mg Intravenous BID    potassium bicarbonate  25 mEq Oral Once     PRN Meds:sodium chloride, sodium chloride     Review of Systems  Objective:     Vital Signs (Most Recent):  Temp: 98.4 °F (36.9 °C) (06/25/23 1140)  Pulse: 79 (06/25/23 1140)  Resp: 19 (06/25/23 1140)  BP: 128/61 (06/25/23 1140)  SpO2: (!) 93 % (06/25/23 1140) Vital Signs (24h Range):  Temp:  [97.9 °F (36.6 °C)-99.9 °F (37.7 °C)] 98.4 °F (36.9 °C)  Pulse:  [79-86] 79  Resp:  [17-20] 19  SpO2:  [91 %-97 %] 93 %  BP: (110-146)/(54-69) 128/61     Weight: 90.7 kg (200 lb)  Body mass index is 36.58 kg/m².  Body surface area is 1.99 meters squared.      Intake/Output Summary (Last 24 hours) at 6/25/2023 1151  Last data filed at 6/25/2023 0412  Gross per 24 hour   Intake 584.97 ml   Output --   Net 584.97 ml        Physical Exam  Constitutional:       Appearance: Normal appearance. She is obese.   HENT:      Head: Normocephalic and atraumatic.   Cardiovascular:      Rate and Rhythm: Normal rate and regular rhythm.      Pulses: Normal pulses.      Heart sounds: Normal heart sounds.   Pulmonary:      Effort: Pulmonary effort is normal. No respiratory distress.      Breath sounds: Normal breath sounds.   Abdominal:      General: Bowel sounds are normal. There is no distension.      Palpations: Abdomen is soft.      Tenderness: There is no abdominal tenderness.   Skin:     General: Skin is warm and dry.      Coloration: Skin is not jaundiced.   Neurological:      Mental Status: She is alert and oriented to person, place, and time.   Psychiatric:         Mood and Affect: Mood normal.          Behavior: Behavior normal.         Thought Content: Thought content normal.         Judgment: Judgment normal.        Significant Labs:   All pertinent labs from the last 24 hours have been reviewed.    Diagnostic Results:  I have reviewed all pertinent imaging results/findings within the past 24 hours.

## 2023-06-26 ENCOUNTER — ANESTHESIA EVENT (OUTPATIENT)
Dept: ENDOSCOPY | Facility: HOSPITAL | Age: 53
DRG: 299 | End: 2023-06-26
Payer: MEDICARE

## 2023-06-26 LAB
ASCENDING AORTA: 3.13 CM
AV INDEX (PROSTH): 0.41
AV MEAN GRADIENT: 21 MMHG
AV PEAK GRADIENT: 34 MMHG
AV VALVE AREA: 1.62 CM2
AV VELOCITY RATIO: 0.39
BSA FOR ECHO PROCEDURE: 1.99 M2
CV ECHO LV RWT: 0.3 CM
DOP CALC AO PEAK VEL: 2.9 M/S
DOP CALC AO VTI: 70.03 CM
DOP CALC LVOT AREA: 4 CM2
DOP CALC LVOT DIAMETER: 2.25 CM
DOP CALC LVOT PEAK VEL: 1.12 M/S
DOP CALC LVOT STROKE VOLUME: 113.1 CM3
DOP CALCLVOT PEAK VEL VTI: 28.46 CM
E WAVE DECELERATION TIME: 161.16 MSEC
E/A RATIO: 1.11
E/E' RATIO: 14 M/S
ECHO LV POSTERIOR WALL: 0.8 CM (ref 0.6–1.1)
EJECTION FRACTION: 65 %
FRACTIONAL SHORTENING: 36 % (ref 28–44)
INTERVENTRICULAR SEPTUM: 0.81 CM (ref 0.6–1.1)
LA MAJOR: 6.2 CM
LA MINOR: 6.21 CM
LA WIDTH: 4.57 CM
LEFT ATRIUM SIZE: 4.79 CM
LEFT ATRIUM VOLUME INDEX MOD: 51.9 ML/M2
LEFT ATRIUM VOLUME INDEX: 60.4 ML/M2
LEFT ATRIUM VOLUME MOD: 99.15 CM3
LEFT ATRIUM VOLUME: 115.45 CM3
LEFT INTERNAL DIMENSION IN SYSTOLE: 3.38 CM (ref 2.1–4)
LEFT VENTRICLE DIASTOLIC VOLUME INDEX: 70.86 ML/M2
LEFT VENTRICLE DIASTOLIC VOLUME: 135.35 ML
LEFT VENTRICLE MASS INDEX: 79 G/M2
LEFT VENTRICLE SYSTOLIC VOLUME INDEX: 24.5 ML/M2
LEFT VENTRICLE SYSTOLIC VOLUME: 46.87 ML
LEFT VENTRICULAR INTERNAL DIMENSION IN DIASTOLE: 5.3 CM (ref 3.5–6)
LEFT VENTRICULAR MASS: 151.24 G
LV LATERAL E/E' RATIO: 16.63 M/S
LV SEPTAL E/E' RATIO: 12.09 M/S
MV A" WAVE DURATION": 12.18 MSEC
MV PEAK A VEL: 1.2 M/S
MV PEAK E VEL: 1.33 M/S
MV STENOSIS PRESSURE HALF TIME: 46.74 MS
MV VALVE AREA P 1/2 METHOD: 4.71 CM2
PISA TR MAX VEL: 2.51 M/S
PULM VEIN S/D RATIO: 1.03
PV PEAK D VEL: 0.67 M/S
PV PEAK S VEL: 0.69 M/S
RA MAJOR: 4.51 CM
RA PRESSURE: 8 MMHG
RA WIDTH: 3.03 CM
RIGHT VENTRICULAR END-DIASTOLIC DIMENSION: 4.7 CM
SINUS: 2.97 CM
STJ: 2.49 CM
TDI LATERAL: 0.08 M/S
TDI SEPTAL: 0.11 M/S
TDI: 0.1 M/S
TR MAX PG: 25 MMHG
TRICUSPID ANNULAR PLANE SYSTOLIC EXCURSION: 2.21 CM
TV REST PULMONARY ARTERY PRESSURE: 33 MMHG

## 2023-06-26 PROCEDURE — 99233 PR SUBSEQUENT HOSPITAL CARE,LEVL III: ICD-10-PCS | Mod: GC,,, | Performed by: HOSPITALIST

## 2023-06-26 PROCEDURE — 99233 SBSQ HOSP IP/OBS HIGH 50: CPT | Mod: GC,,, | Performed by: HOSPITALIST

## 2023-06-26 PROCEDURE — C9113 INJ PANTOPRAZOLE SODIUM, VIA: HCPCS

## 2023-06-26 PROCEDURE — 25000003 PHARM REV CODE 250: Performed by: PHYSICIAN ASSISTANT

## 2023-06-26 PROCEDURE — 63600175 PHARM REV CODE 636 W HCPCS

## 2023-06-26 PROCEDURE — 63600175 PHARM REV CODE 636 W HCPCS: Mod: JA | Performed by: PHYSICIAN ASSISTANT

## 2023-06-26 PROCEDURE — 36415 COLL VENOUS BLD VENIPUNCTURE: CPT

## 2023-06-26 PROCEDURE — 20600001 HC STEP DOWN PRIVATE ROOM

## 2023-06-26 PROCEDURE — 94761 N-INVAS EAR/PLS OXIMETRY MLT: CPT

## 2023-06-26 RX ADMIN — OCTREOTIDE ACETATE 50 MCG/HR: 500 INJECTION, SOLUTION INTRAVENOUS; SUBCUTANEOUS at 12:06

## 2023-06-26 RX ADMIN — PANTOPRAZOLE SODIUM 40 MG: 40 INJECTION, POWDER, FOR SOLUTION INTRAVENOUS at 08:06

## 2023-06-26 RX ADMIN — PANTOPRAZOLE SODIUM 40 MG: 40 INJECTION, POWDER, FOR SOLUTION INTRAVENOUS at 09:06

## 2023-06-26 RX ADMIN — MUPIROCIN: 20 OINTMENT TOPICAL at 08:06

## 2023-06-26 NOTE — PROGRESS NOTES
Dereck Forrester - Oncology ([facility])  Hematology/Oncology  Progress Note    Patient Name: Shikha López  Admission Date: 6/22/2023  Hospital Length of Stay: 4 days  Code Status: Full Code     Subjective:     HPI:  Ms. López is a 53yoF with PMHx of metastatic breast cancer to left lung currently on chemotherapy, last session on 6/14 who presented to [facility] ED with noted hematemesis and melena starting 1 night prior to admission.  4-5 episodes.  Reports pre-syncope however denies syncopal event or falls.  Denies history of GIB or being on AC.  Denies NSAIDs or prior endoscopies.  In ED, initially tachycardic with stable BP on room air.  HR improved in ED prior to admission.  Hgb 7 on admission, baseline around 11-12.  Patient follows with Dr. Willis for breast cancer.    Oncology History:  She presented in the emergency room at [facility] on September 29, 2006 with a 4 months history of inflammation of her left breast.  Physical examination at that time showed the left breast was largely replaced by large mass with multiple skin ulcerations.     A biopsy in September 2006 showed poorly differentiated carcinoma which was ER and NV. negative and HER2 positive.     She was then referred to [facility] for additional care.   CT scan of the chest and abdomen November 2006 revealed multiple nodules in the lungs consistent with metastatic disease.  There also enlarged left axillary node.     She was treated with chemotherapy with weekly Herceptin and Abraxane and had marked improvement in her breast and lung metastasis on that therapy.     On May 29, 2007 she underwent left modified radical mastectomy(Dr. Colvin) which showed no residual tumor in the breast and 1/5 nodes was positive for metastasis measuring 6 mm.  (ypT0N1).     She then received postoperative radiation therapy(Dr Singh)  to the left chest wall supraclav and internal mammary lymph nodes from August 20, 2007 to September 28, 2007.      Postoperatively she continued on Herceptin for approximately 3 and 1/2 years before her lung metastasis begin to grow.     She subsequently received a number of different chemotherapy treatments including Adriamycin, Halaven, Xeloda plus lapatinib then Xeloda plus Herceptin.  The  Those treatments were provided under the care of  in [facility].  Subsequently, she transferred her care to  at [facility].  She was initially treated with Taxotere Herceptin Perjeta.       She was then changed to Kadcyla.    In July 2020 PET scan showed an increase in the size of an infrahilar mass consistent with progression.   She then took approximately 9 months of Herceptin and Navelbine.  Apparently she has some initial response to that therapy.     She started trastuzumab- deruxtecan  4/12/21.      Interval History: NAEON. Plan for coiling tomorrow with GI. Clears today, NPO after midnight.     Oncology Treatment Plan:   OP BREAST FAM-TRASTUZUMAB DERUXTECAN-NXKI Q3W    Medications:  Continuous Infusions:   octreotide (SANDOSTATIN) infusion 50 mcg/hr (06/26/23 0051)     Scheduled Meds:   mupirocin   Nasal BID    pantoprazole  40 mg Intravenous BID     PRN Meds:sodium chloride, sodium chloride     Review of Systems  Objective:     Vital Signs (Most Recent):  Temp: 98 °F (36.7 °C) (06/26/23 1123)  Pulse: 81 (06/26/23 1123)  Resp: 15 (06/26/23 1123)  BP: 137/76 (06/26/23 1123)  SpO2: (!) 94 % (06/26/23 1123) Vital Signs (24h Range):  Temp:  [98 °F (36.7 °C)-98.4 °F (36.9 °C)] 98 °F (36.7 °C)  Pulse:  [74-85] 81  Resp:  [15-22] 15  SpO2:  [91 %-94 %] 94 %  BP: (105-137)/(50-76) 137/76     Weight: 90.7 kg (200 lb)  Body mass index is 36.58 kg/m².  Body surface area is 1.99 meters squared.      Intake/Output Summary (Last 24 hours) at 6/26/2023 1130  Last data filed at 6/25/2023 2011  Gross per 24 hour   Intake 1122 ml   Output 318 ml   Net 804 ml        Physical Exam  Constitutional:       Appearance: Normal  appearance. She is obese.   HENT:      Head: Normocephalic and atraumatic.   Cardiovascular:      Rate and Rhythm: Normal rate and regular rhythm.   Pulmonary:      Effort: Pulmonary effort is normal. No respiratory distress.   Abdominal:      General: Bowel sounds are normal. There is no distension.      Palpations: Abdomen is soft.      Tenderness: There is no abdominal tenderness.   Skin:     General: Skin is warm and dry.      Coloration: Skin is not jaundiced.   Neurological:      General: No focal deficit present.      Mental Status: She is alert and oriented to person, place, and time. Mental status is at baseline.   Psychiatric:         Mood and Affect: Mood normal.         Behavior: Behavior normal.         Thought Content: Thought content normal.         Judgment: Judgment normal.        Significant Labs:   All pertinent labs from the last 24 hours have been reviewed.    Diagnostic Results:  I have reviewed all pertinent imaging results/findings within the past 24 hours.    Assessment/Plan:     * GI bleed  Patient with history of metastatic breast cancer on chemotherapy who presented to ED with hematemesis and melena.    NSAID (--)  Aspirin(antiplatelet) (--)  Alcohol Use (--)  Severe retching (--)  History of GI bleed (--)  Past endoscopy (--)  Trauma (--)  Anticoagulation (--)  Malignancy (+)    Labs:  Recent Labs   Lab 06/23/23  1848 06/24/23  1626 06/25/23  0352   HGB 8.9* 8.1* 7.6*       Lab Results   Component Value Date    WBC 4.38 06/25/2023    HGB 7.6 (L) 06/25/2023    HCT 23.1 (L) 06/25/2023     (H) 06/25/2023     (L) 06/25/2023       Lab Results   Component Value Date     06/25/2023    K 3.4 (L) 06/25/2023     06/25/2023    CO2 25 06/25/2023    BUN 16 06/25/2023    CREATININE 0.6 06/25/2023    CALCIUM 8.0 (L) 06/25/2023    ANIONGAP 7 (L) 06/25/2023    ESTGFRAFRICA >60.0 07/18/2022    EGFRNONAA >60.0 07/18/2022       Lab Results   Component Value Date    ALT 11  06/25/2023    AST 34 06/25/2023    ALKPHOS 102 06/25/2023    BILITOT 2.4 (H) 06/25/2023       Lab Results   Component Value Date    INR 1.4 (H) 06/25/2023    INR 1.5 (H) 06/24/2023    INR 1.4 (H) 06/22/2023       GI Hemorrhage  Acute Blood Loss Anemia  - Hgb 7 on admit, baseline around 11-12  - S/p Protonix 80mg IV x 1 in the ED.  Start Protonix 40mg IV BID  - GI consulted, appreciate assistance  - Type and screen, blood consent obtained s/p 2x pRBC transfusion   - Continue Octreotide gtt, started in ED, with recent imaging on CT revealing of possible varices  - hepatology consulted, recommend triple phase Ct for liver, and echo - plan for coiling of varices   - Denies further symptoms that she had upon presentation   - tolerating clear diet well - will continue NPO at midnight for coiling tomorrow   - Lyte repletion      Breast cancer, stage 4, left  Patient follows with Dr. Willis outpatient for metastatic breast cancer on Trastuzumab.  Last dose on 6/14.  HDS             Conrad Lang DO  Hematology/Oncology  Dereck Forrester - Oncology ([facility])

## 2023-06-26 NOTE — ANESTHESIA PREPROCEDURE EVALUATION
[facility]  Anesthesia Pre-Operative Evaluation        Patient Name: Shikha López  YOB: 1970  MRN: [id number]    SUBJECTIVE:     Pre-operative Evaluation for Procedure(s) (LRB):  ULTRASOUND, UPPER GI TRACT, ENDOSCOPIC (N/A)  EGD (ESOPHAGOGASTRODUODENOSCOPY) (N/A)     06/26/2023    Shikha López is a 53 y.o. female with a PMHx significant for metastatic breast cancer here with hematemesis and melena. EGD showed large gastric varices (IGV1) and small nonbleeding esophageal varices. 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 Patient currently on octreotide gtt.      She now presents for the above procedure(s).    Previous Airway: None documented.    TTE   Results for orders placed during the hospital encounter of 05/18/23  Interpretation Summary  · The left ventricle is normal in size with normal systolic function.  · The estimated ejection fraction is 65%.  · The quantitatively derived ejection fraction is 62%.  · The left ventricular global longitudinal strain is -19.9%.  · Grade I left ventricular diastolic dysfunction.  · Mild left atrial enlargement.  · The estimated PA systolic pressure is 35 mmHg.  · Normal right ventricular size with normal right ventricular systolic function.  · Normal central venous pressure (3 mmHg).  · There is no pulmonary hypertension.  · There is mild aortic valve stenosis. No significant change from previous  · Aortic valve area is 2.17 cm2; peak velocity is 2.61 m/s; mean gradient is 17 mmHg.  · Previously: Aortic valve area is 1.62 cm2; peak velocity is 2.64 m/s; mean gradient is 17 mmHg      LDA:   Port A Cath Single Lumen 06/22/21 right internal jugular (Active)   Line Necessity Review Longterm central access required 06/14/23 5267   Site Assessment No drainage;No redness;No swelling 06/14/23 6670    Dressing Type Transparent (Tegaderm) 06/14/23 0856   Dressing Status Clean;Dry;Intact 06/14/23 0856   Dressing Intervention Other (Comment) 06/14/23 1240   Patency/Care flushed w/o difficulty;heparin locked;blood return present 06/14/23 1240   Status Deaccessed 06/14/23 1240   Accessed by: Nova GARNICA 06/14/23 0856   Needle Insertion Date 06/14/23 06/14/23 0856   Needle Insertion Time 0846 06/14/23 0856   Type of Needle Marcos 06/14/23 0856   Gauge 20 06/14/23 0856   Needle Length 3/4 in 06/14/23 0856   Needle Status Removed 06/14/23 1240   Flush Performed Yes 06/14/23 1240   Needle Removal Date 06/14/23 06/14/23 1240   Needle Removal Time 1240 06/14/23 1240   Deaccessed By Genaro 06/14/23 1240   Number of days: 734            Peripheral IV - Single Lumen 06/22/23 2105 18 G Right Antecubital (Active)   Site Assessment Clean;Dry;Intact;No redness;No swelling 06/26/23 0800   Extremity Assessment Distal to IV No abnormal discoloration;No redness;No swelling;No warmth 06/26/23 0800   Line Status Infusing 06/26/23 0800   Dressing Status Clean;Dry;Intact 06/26/23 0800   Dressing Intervention Integrity maintained 06/26/23 0800   Dressing Change Due 06/26/23 06/26/23 0800   Site Change Due 06/26/23 06/26/23 0800   Reason Not Rotated Other (Comment) 06/26/23 0800   Number of days: 3       Drips:    octreotide (SANDOSTATIN) infusion 50 mcg/hr (06/26/23 0051)       Patient Active Problem List   Diagnosis    Breast cancer, stage 4, left    Malignant neoplasm metastatic to left lung    Physical deconditioning    Balance problem    Hypokalemia    GI bleed    Esophageal and gastric varices       Review of patient's allergies indicates:  No Known Allergies    Current Outpatient Medications   Medication Instructions    acetaminophen (TYLENOL) 1,000 mg, Oral    Lactobac no.41/Bifidobact no.7 (PROBIOTIC-10 ORAL) Oral    LIDOcaine-prilocaine (EMLA) cream APPLY TO THE AFFECTED AREA 1 HOUR BEFORE PORT ACCESS     methylphenidate HCl (RITALIN) 5 mg, Oral, 2 times daily    OLANZapine (ZYPREXA) 5 MG tablet Take days 1-4 of chemotherapy    ondansetron (ZOFRAN) 8 mg, Oral, 3 times daily PRN    potassium chloride SA (K-DUR,KLOR-CON) 20 MEQ tablet 20 mEq, Oral, Daily       Past Surgical History:   Procedure Laterality Date    ESOPHAGOGASTRODUODENOSCOPY N/A 6/23/2023    Procedure: EGD (ESOPHAGOGASTRODUODENOSCOPY);  Surgeon: Quintin Mooney MD;  Location: [address]);  Service: Endoscopy;  Laterality: N/A;    MASTECTOMY         Social History     Substance and Sexual Activity   Drug Use Never     Alcohol Use: Not on file     Tobacco Use: Low Risk     Smoking Tobacco Use: Never    Smokeless Tobacco Use: Never    Passive Exposure: Not on file       OBJECTIVE:     Vital Signs Range (Last 24H):  Temp:  [36.7 °C (98 °F)-36.9 °C (98.4 °F)]   Pulse:  [74-85]   Resp:  [15-22]   BP: (105-137)/(50-76)   SpO2:  [91 %-94 %]       Significant Labs    Heme Profile  Lab Results   Component Value Date    WBC 4.38 06/25/2023    HGB 7.6 (L) 06/25/2023    HCT 23.1 (L) 06/25/2023     (L) 06/25/2023       Coagulation Studies  Lab Results   Component Value Date    LABPROT 14.8 (H) 06/25/2023    INR 1.4 (H) 06/25/2023    APTT 29.2 06/22/2023       BMP  Lab Results   Component Value Date     06/25/2023    K 3.4 (L) 06/25/2023     06/25/2023    CO2 25 06/25/2023    BUN 16 06/25/2023    CREATININE 0.6 06/25/2023    MG 1.8 06/25/2023    PHOS 2.5 (L) 06/25/2023       Liver Function Tests  Lab Results   Component Value Date    AST 34 06/25/2023    ALT 11 06/25/2023    ALKPHOS 102 06/25/2023    BILITOT 2.4 (H) 06/25/2023    PROT 4.7 (L) 06/25/2023    ALBUMIN 2.7 (L) 06/25/2023       Lipid Profile  No results found for: CHOL, HDL, LDLDIRECT, TRIG    Endocrine Profile  No results found for: HGBA1C, TSH      Cardiac Studies    EKG:   Results for orders placed or performed during the hospital encounter of 06/22/23   EKG 12-lead     Collection Time: 06/22/23  8:56 PM    Narrative    Test Reason : R00.0,    Vent. Rate : 110 BPM     Atrial Rate : 110 BPM     P-R Int : 190 ms          QRS Dur : 090 ms      QT Int : 328 ms       P-R-T Axes : 055 007 182 degrees     QTc Int : 443 ms    Sinus tachycardia  Possible Left atrial enlargement  LVH  ST and T wave abnormality, consider inferior ischemia  Abnormal ECG  When compared with ECG of 23-MAR-2011 21:28,  T wave inversion more evident in Inferior leads  T wave inversion now evident in Lateral leads  Confirmed by Maldonado Mobley MD ([id number] on 6/23/2023 9:29:46 AM    Referred By: [email or web address]   SELF           Confirmed By:Maldonado Mobley MD       TTE   Results for orders placed during the hospital encounter of 05/18/23    Echo Saline Bubble? No    Interpretation Summary  · The left ventricle is normal in size with normal systolic function.  · The estimated ejection fraction is 65%.  · The quantitatively derived ejection fraction is 62%.  · The left ventricular global longitudinal strain is -19.9%.  · Grade I left ventricular diastolic dysfunction.  · Mild left atrial enlargement.  · The estimated PA systolic pressure is 35 mmHg.  · Normal right ventricular size with normal right ventricular systolic function.  · Normal central venous pressure (3 mmHg).  · There is no pulmonary hypertension.  · There is mild aortic valve stenosis. No significant change from previous  · Aortic valve area is 2.17 cm2; peak velocity is 2.61 m/s; mean gradient is 17 mmHg.  · Previously: Aortic valve area is 1.62 cm2; peak velocity is 2.64 m/s; mean gradient is 17 mmHg      ASSESSMENT/PLAN:       Pre-op Assessment    I have reviewed the Patient Summary Reports.     I have reviewed the Nursing Notes. I have reviewed the NPO Status.      Review of Systems  Anesthesia Hx:  No problems with previous Anesthesia   Denies Personal Hx of Anesthesia complications.   Hematology/Oncology:        Current/Recent Cancer. Breast  chemotherapy Oncology Comments: Metastatic breast cancer to left lung     EENT/Dental:EENT/Dental Normal   Cardiovascular:  Cardiovascular Normal     Pulmonary:  Pulmonary Normal    Renal/:  Renal/ Normal     Hepatic/GI:   Esophageal varices    Neurological:  Neurology Normal    Endocrine:  Endocrine Normal    Dermatological:  Skin Normal    Psych:  Psychiatric Normal           Physical Exam  General: Well nourished, Cooperative and Alert    Airway:  Mallampati: III   Mouth Opening: Small, but > 3cm  TM Distance: Normal  Tongue: Normal  Neck ROM: Normal ROM    Dental:  Periodontal disease        Anesthesia Plan  Type of Anesthesia, risks & benefits discussed:    Anesthesia Type: Gen ETT  Intra-op Monitoring Plan: Standard ASA Monitors and Art Line  Post Op Pain Control Plan: multimodal analgesia and IV/PO Opioids PRN  Induction:  IV  Airway Plan: Direct, Post-Induction  Informed Consent: Informed consent signed with the Patient and all parties understand the risks and agree with anesthesia plan.  All questions answered.   ASA Score: 3  Day of Surgery Review of History & Physical: H&P Update referred to the surgeon/provider.    Ready For Surgery From Anesthesia Perspective.     .

## 2023-06-26 NOTE — PLAN OF CARE
No acute events this shift. Afebrile & VSS on room air. Octreotide infusing at 10ml/hr. No PRNs needed. Ambulates independently to bathroom. Re-educated on I&O recording, hat provided. No BM this shift. To be NPO at midnight for EGD with coiling tomorrow 6/27, pt aware. Will notify night RN. CHG wipes provided and encouraged use. POC reviewed with patient. All needs addressed. Will continue to monitor with frequent rounds and clustered care to promote rest.

## 2023-06-26 NOTE — ASSESSMENT & PLAN NOTE
Patient with history of metastatic breast cancer on chemotherapy who presented to ED with hematemesis and melena.    NSAID (--)  Aspirin(antiplatelet) (--)  Alcohol Use (--)  Severe retching (--)  History of GI bleed (--)  Past endoscopy (--)  Trauma (--)  Anticoagulation (--)  Malignancy (+)    Labs:  Recent Labs   Lab 06/23/23  1848 06/24/23  1626 06/25/23  0352   HGB 8.9* 8.1* 7.6*       Lab Results   Component Value Date    WBC 4.38 06/25/2023    HGB 7.6 (L) 06/25/2023    HCT 23.1 (L) 06/25/2023     (H) 06/25/2023     (L) 06/25/2023       Lab Results   Component Value Date     06/25/2023    K 3.4 (L) 06/25/2023     06/25/2023    CO2 25 06/25/2023    BUN 16 06/25/2023    CREATININE 0.6 06/25/2023    CALCIUM 8.0 (L) 06/25/2023    ANIONGAP 7 (L) 06/25/2023    ESTGFRAFRICA >60.0 07/18/2022    EGFRNONAA >60.0 07/18/2022       Lab Results   Component Value Date    ALT 11 06/25/2023    AST 34 06/25/2023    ALKPHOS 102 06/25/2023    BILITOT 2.4 (H) 06/25/2023       Lab Results   Component Value Date    INR 1.4 (H) 06/25/2023    INR 1.5 (H) 06/24/2023    INR 1.4 (H) 06/22/2023       GI Hemorrhage  Acute Blood Loss Anemia  - Hgb 7 on admit, baseline around 11-12  - S/p Protonix 80mg IV x 1 in the ED.  Start Protonix 40mg IV BID  - GI consulted, appreciate assistance  - Type and screen, blood consent obtained s/p 2x pRBC transfusion   - Continue Octreotide gtt, started in ED, with recent imaging on CT revealing of possible varices  - hepatology consulted, recommend triple phase Ct for liver, and echo - plan for coiling of varices   - Denies further symptoms that she had upon presentation   - tolerating clear diet well - will continue NPO at midnight for coiling tomorrow   - Lyte repletion

## 2023-06-26 NOTE — ANESTHESIA POSTPROCEDURE EVALUATION
Anesthesia Post Evaluation    Patient: Shikha López    Procedure(s) Performed: Procedure(s) (LRB):  EGD (ESOPHAGOGASTRODUODENOSCOPY) (N/A)    Final Anesthesia Type: general      Patient location during evaluation: PACU  Patient participation: Yes- Able to Participate  Level of consciousness: awake and alert and oriented  Post-procedure vital signs: reviewed and stable  Pain management: adequate  Airway patency: patent    PONV status at discharge: No PONV  Anesthetic complications: no      Cardiovascular status: blood pressure returned to baseline and hemodynamically stable  Respiratory status: unassisted and spontaneous ventilation  Hydration status: euvolemic  Follow-up not needed.          Vitals Value Taken Time   /59 06/26/23 0715   Temp 36.7 °C (98.1 °F) 06/26/23 0715   Pulse 81 06/26/23 0715   Resp 18 06/26/23 0715   SpO2 93 % 06/26/23 0715         Event Time   Out of Recovery 18:04:00         Pain/Yisel Score: No data recorded

## 2023-06-26 NOTE — TREATMENT PLAN
GI Treatment Plan    Shikha López is a 53 y.o. female admitted to hospital 6/22/2023 (Hospital Day: 5) due to GI bleed.     Interval History  Hgb 8.1 > 7.6 yesterday, awaiting repeat today  HDS  No recurrent bleeding    Objective  Temp:  [98 °F (36.7 °C)-98.4 °F (36.9 °C)] 98.1 °F (36.7 °C) (06/26 0715)  Pulse:  [74-85] 81 (06/26 0715)  BP: (105-129)/(50-61) 129/59 (06/26 0715)  Resp:  [17-22] 18 (06/26 0715)  SpO2:  [91 %-94 %] 93 % (06/26 0715)        Laboratory    Recent Labs   Lab 06/23/23  1848 06/24/23  1626 06/25/23  0352   HGB 8.9* 8.1* 7.6*         Assessment and Plan    Shikha López is a 53 y.o. female with history of metastatic breast cancer here with hematemesis and melena. EGD showed large gastric varices (IGV1) and small nonbleeding esophageal varices. 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     Recommendations  - Continue octreotide gtt  - Continue IV PPI BID  - Plan for EGD with coiling with AES on 6/27  - Clear liquid diet, NPO at midnight    - We will continue to follow.    Thank you for involving us in the care of Shikha López. Please call with any additional questions, concerns or changes in the patient's clinical status.    Piyush Spencer MD  Gastroenterology Fellow, PGY IV

## 2023-06-26 NOTE — PLAN OF CARE
IVF octreotide 10 ml/h; no complaints or concerns; been NPO for coiling/scope procedure; no new orders at this time    Problem: Bleeding (Gastrointestinal Bleeding)  Goal: Hemostasis  Outcome: Ongoing, Progressing     Problem: Infection  Goal: Absence of Infection Signs and Symptoms  Outcome: Ongoing, Progressing     Problem: Skin Injury Risk Increased  Goal: Skin Health and Integrity  Outcome: Ongoing, Progressing

## 2023-06-26 NOTE — SUBJECTIVE & OBJECTIVE
Interval History: NAEON. Plan for coiling tomorrow with GI. Clears today, NPO after midnight.     Oncology Treatment Plan:   OP BREAST FAM-TRASTUZUMAB DERUXTECAN-NXKI Q3W    Medications:  Continuous Infusions:   octreotide (SANDOSTATIN) infusion 50 mcg/hr (06/26/23 0051)     Scheduled Meds:   mupirocin   Nasal BID    pantoprazole  40 mg Intravenous BID     PRN Meds:sodium chloride, sodium chloride     Review of Systems  Objective:     Vital Signs (Most Recent):  Temp: 98 °F (36.7 °C) (06/26/23 1123)  Pulse: 81 (06/26/23 1123)  Resp: 15 (06/26/23 1123)  BP: 137/76 (06/26/23 1123)  SpO2: (!) 94 % (06/26/23 1123) Vital Signs (24h Range):  Temp:  [98 °F (36.7 °C)-98.4 °F (36.9 °C)] 98 °F (36.7 °C)  Pulse:  [74-85] 81  Resp:  [15-22] 15  SpO2:  [91 %-94 %] 94 %  BP: (105-137)/(50-76) 137/76     Weight: 90.7 kg (200 lb)  Body mass index is 36.58 kg/m².  Body surface area is 1.99 meters squared.      Intake/Output Summary (Last 24 hours) at 6/26/2023 1130  Last data filed at 6/25/2023 2011  Gross per 24 hour   Intake 1122 ml   Output 318 ml   Net 804 ml        Physical Exam  Constitutional:       Appearance: Normal appearance. She is obese.   HENT:      Head: Normocephalic and atraumatic.   Cardiovascular:      Rate and Rhythm: Normal rate and regular rhythm.   Pulmonary:      Effort: Pulmonary effort is normal. No respiratory distress.   Abdominal:      General: Bowel sounds are normal. There is no distension.      Palpations: Abdomen is soft.      Tenderness: There is no abdominal tenderness.   Skin:     General: Skin is warm and dry.      Coloration: Skin is not jaundiced.   Neurological:      General: No focal deficit present.      Mental Status: She is alert and oriented to person, place, and time. Mental status is at baseline.   Psychiatric:         Mood and Affect: Mood normal.         Behavior: Behavior normal.         Thought Content: Thought content normal.         Judgment: Judgment normal.        Significant  Labs:   All pertinent labs from the last 24 hours have been reviewed.    Diagnostic Results:  I have reviewed all pertinent imaging results/findings within the past 24 hours.

## 2023-06-27 ENCOUNTER — ANESTHESIA (OUTPATIENT)
Dept: ENDOSCOPY | Facility: HOSPITAL | Age: 53
DRG: 299 | End: 2023-06-27
Payer: MEDICARE

## 2023-06-27 ENCOUNTER — TELEPHONE (OUTPATIENT)
Dept: ENDOSCOPY | Facility: HOSPITAL | Age: 53
End: 2023-06-27

## 2023-06-27 DIAGNOSIS — I86.4 GASTRIC VARICES: Primary | ICD-10-CM

## 2023-06-27 LAB
ALBUMIN SERPL BCP-MCNC: 2.5 G/DL (ref 3.5–5.2)
ALP SERPL-CCNC: 99 U/L (ref 55–135)
ALT SERPL W/O P-5'-P-CCNC: 10 U/L (ref 10–44)
ANION GAP SERPL CALC-SCNC: 8 MMOL/L (ref 8–16)
ANISOCYTOSIS BLD QL SMEAR: SLIGHT
AST SERPL-CCNC: 32 U/L (ref 10–40)
BASOPHILS # BLD AUTO: 0.03 K/UL (ref 0–0.2)
BASOPHILS NFR BLD: 1.1 % (ref 0–1.9)
BILIRUB SERPL-MCNC: 2.1 MG/DL (ref 0.1–1)
BUN SERPL-MCNC: 8 MG/DL (ref 6–20)
CALCIUM SERPL-MCNC: 7.6 MG/DL (ref 8.7–10.5)
CHLORIDE SERPL-SCNC: 108 MMOL/L (ref 95–110)
CO2 SERPL-SCNC: 27 MMOL/L (ref 23–29)
CREAT SERPL-MCNC: 0.5 MG/DL (ref 0.5–1.4)
DIFFERENTIAL METHOD: ABNORMAL
EOSINOPHIL # BLD AUTO: 0.4 K/UL (ref 0–0.5)
EOSINOPHIL NFR BLD: 13.5 % (ref 0–8)
ERYTHROCYTE [DISTWIDTH] IN BLOOD BY AUTOMATED COUNT: 22 % (ref 11.5–14.5)
EST. GFR  (NO RACE VARIABLE): >60 ML/MIN/1.73 M^2
GLUCOSE SERPL-MCNC: 101 MG/DL (ref 70–110)
HCT VFR BLD AUTO: 22.8 % (ref 37–48.5)
HGB BLD-MCNC: 7.5 G/DL (ref 12–16)
IMM GRANULOCYTES # BLD AUTO: 0.01 K/UL (ref 0–0.04)
IMM GRANULOCYTES NFR BLD AUTO: 0.4 % (ref 0–0.5)
INR PPP: 1.6 (ref 0.8–1.2)
LYMPHOCYTES # BLD AUTO: 0.7 K/UL (ref 1–4.8)
LYMPHOCYTES NFR BLD: 24.8 % (ref 18–48)
MAGNESIUM SERPL-MCNC: 1.8 MG/DL (ref 1.6–2.6)
MCH RBC QN AUTO: 36.6 PG (ref 27–31)
MCHC RBC AUTO-ENTMCNC: 32.9 G/DL (ref 32–36)
MCV RBC AUTO: 111 FL (ref 82–98)
MONOCYTES # BLD AUTO: 0.2 K/UL (ref 0.3–1)
MONOCYTES NFR BLD: 5.7 % (ref 4–15)
NEUTROPHILS # BLD AUTO: 1.5 K/UL (ref 1.8–7.7)
NEUTROPHILS NFR BLD: 54.5 % (ref 38–73)
NRBC BLD-RTO: 1 /100 WBC
OVALOCYTES BLD QL SMEAR: ABNORMAL
PHOSPHATE SERPL-MCNC: 3.4 MG/DL (ref 2.7–4.5)
PLATELET # BLD AUTO: 84 K/UL (ref 150–450)
PLATELET BLD QL SMEAR: ABNORMAL
PMV BLD AUTO: 12.2 FL (ref 9.2–12.9)
POIKILOCYTOSIS BLD QL SMEAR: SLIGHT
POTASSIUM SERPL-SCNC: 3.3 MMOL/L (ref 3.5–5.1)
PROT SERPL-MCNC: 4.5 G/DL (ref 6–8.4)
PROTHROMBIN TIME: 16.7 SEC (ref 9–12.5)
RBC # BLD AUTO: 2.05 M/UL (ref 4–5.4)
SODIUM SERPL-SCNC: 143 MMOL/L (ref 136–145)
SPHEROCYTES BLD QL SMEAR: ABNORMAL
WBC # BLD AUTO: 2.82 K/UL (ref 3.9–12.7)

## 2023-06-27 PROCEDURE — 43999 PR EGD W/ COIL EMBOLIZATION OF GASTRIC VARICES: ICD-10-PCS | Mod: ,,, | Performed by: INTERNAL MEDICINE

## 2023-06-27 PROCEDURE — 83735 ASSAY OF MAGNESIUM: CPT

## 2023-06-27 PROCEDURE — 25000003 PHARM REV CODE 250: Performed by: NURSE ANESTHETIST, CERTIFIED REGISTERED

## 2023-06-27 PROCEDURE — 63600175 PHARM REV CODE 636 W HCPCS: Performed by: NURSE ANESTHETIST, CERTIFIED REGISTERED

## 2023-06-27 PROCEDURE — C1889 IMPLANT/INSERT DEVICE, NOC: HCPCS | Performed by: INTERNAL MEDICINE

## 2023-06-27 PROCEDURE — 20600001 HC STEP DOWN PRIVATE ROOM

## 2023-06-27 PROCEDURE — 94761 N-INVAS EAR/PLS OXIMETRY MLT: CPT

## 2023-06-27 PROCEDURE — 63600175 PHARM REV CODE 636 W HCPCS: Mod: JA | Performed by: PHYSICIAN ASSISTANT

## 2023-06-27 PROCEDURE — D9220A PRA ANESTHESIA: ICD-10-PCS | Mod: ANES,,, | Performed by: ANESTHESIOLOGY

## 2023-06-27 PROCEDURE — 43253 EGD US TRANSMURAL INJXN/MARK: CPT | Mod: ,,, | Performed by: INTERNAL MEDICINE

## 2023-06-27 PROCEDURE — D9220A PRA ANESTHESIA: Mod: ANES,,, | Performed by: ANESTHESIOLOGY

## 2023-06-27 PROCEDURE — 84100 ASSAY OF PHOSPHORUS: CPT

## 2023-06-27 PROCEDURE — 85025 COMPLETE CBC W/AUTO DIFF WBC: CPT

## 2023-06-27 PROCEDURE — D9220A PRA ANESTHESIA: ICD-10-PCS | Mod: CRNA,,, | Performed by: NURSE ANESTHETIST, CERTIFIED REGISTERED

## 2023-06-27 PROCEDURE — 99233 SBSQ HOSP IP/OBS HIGH 50: CPT | Mod: GC,,, | Performed by: HOSPITALIST

## 2023-06-27 PROCEDURE — 25000003 PHARM REV CODE 250: Performed by: PHYSICIAN ASSISTANT

## 2023-06-27 PROCEDURE — 63600175 PHARM REV CODE 636 W HCPCS

## 2023-06-27 PROCEDURE — C9113 INJ PANTOPRAZOLE SODIUM, VIA: HCPCS

## 2023-06-27 PROCEDURE — 37000008 HC ANESTHESIA 1ST 15 MINUTES: Performed by: INTERNAL MEDICINE

## 2023-06-27 PROCEDURE — 99233 PR SUBSEQUENT HOSPITAL CARE,LEVL III: ICD-10-PCS | Mod: GC,,, | Performed by: HOSPITALIST

## 2023-06-27 PROCEDURE — 43999 UNLISTED PROCEDURE STOMACH: CPT | Mod: ,,, | Performed by: INTERNAL MEDICINE

## 2023-06-27 PROCEDURE — D9220A PRA ANESTHESIA: Mod: CRNA,,, | Performed by: NURSE ANESTHETIST, CERTIFIED REGISTERED

## 2023-06-27 PROCEDURE — 43253 PR EGD, FLEX, W/US GUIDED INJ, DX/THER SUBST/FIDUCIAL MARKER: ICD-10-PCS | Mod: ,,, | Performed by: INTERNAL MEDICINE

## 2023-06-27 PROCEDURE — 85610 PROTHROMBIN TIME: CPT

## 2023-06-27 PROCEDURE — 80053 COMPREHEN METABOLIC PANEL: CPT

## 2023-06-27 PROCEDURE — 25000003 PHARM REV CODE 250

## 2023-06-27 PROCEDURE — 37000009 HC ANESTHESIA EA ADD 15 MINS: Performed by: INTERNAL MEDICINE

## 2023-06-27 PROCEDURE — 43999 UNLISTED PROCEDURE STOMACH: CPT | Performed by: INTERNAL MEDICINE

## 2023-06-27 PROCEDURE — 43253 EGD US TRANSMURAL INJXN/MARK: CPT | Performed by: INTERNAL MEDICINE

## 2023-06-27 DEVICE — NESTER EMBOLIZATION COIL
Type: IMPLANTABLE DEVICE | Site: ABDOMEN | Status: FUNCTIONAL
Brand: NESTER

## 2023-06-27 RX ORDER — ROCURONIUM BROMIDE 10 MG/ML
INJECTION, SOLUTION INTRAVENOUS
Status: DISCONTINUED | OUTPATIENT
Start: 2023-06-27 | End: 2023-06-27

## 2023-06-27 RX ORDER — FENTANYL CITRATE 50 UG/ML
INJECTION, SOLUTION INTRAMUSCULAR; INTRAVENOUS
Status: DISCONTINUED | OUTPATIENT
Start: 2023-06-27 | End: 2023-06-27

## 2023-06-27 RX ORDER — PHENYLEPHRINE HCL IN 0.9% NACL 1 MG/10 ML
SYRINGE (ML) INTRAVENOUS
Status: DISCONTINUED | OUTPATIENT
Start: 2023-06-27 | End: 2023-06-27

## 2023-06-27 RX ORDER — DEXAMETHASONE SODIUM PHOSPHATE 4 MG/ML
INJECTION, SOLUTION INTRA-ARTICULAR; INTRALESIONAL; INTRAMUSCULAR; INTRAVENOUS; SOFT TISSUE
Status: DISCONTINUED | OUTPATIENT
Start: 2023-06-27 | End: 2023-06-27

## 2023-06-27 RX ORDER — LIDOCAINE HYDROCHLORIDE 20 MG/ML
INJECTION, SOLUTION EPIDURAL; INFILTRATION; INTRACAUDAL; PERINEURAL
Status: DISCONTINUED | OUTPATIENT
Start: 2023-06-27 | End: 2023-06-27

## 2023-06-27 RX ORDER — ONDANSETRON 2 MG/ML
INJECTION INTRAMUSCULAR; INTRAVENOUS
Status: DISCONTINUED | OUTPATIENT
Start: 2023-06-27 | End: 2023-06-27

## 2023-06-27 RX ORDER — PROPOFOL 10 MG/ML
VIAL (ML) INTRAVENOUS
Status: DISCONTINUED | OUTPATIENT
Start: 2023-06-27 | End: 2023-06-27

## 2023-06-27 RX ADMIN — ROCURONIUM BROMIDE 40 MG: 10 INJECTION, SOLUTION INTRAVENOUS at 10:06

## 2023-06-27 RX ADMIN — PROPOFOL 150 MG: 10 INJECTION, EMULSION INTRAVENOUS at 10:06

## 2023-06-27 RX ADMIN — Medication 100 MCG: at 10:06

## 2023-06-27 RX ADMIN — OCTREOTIDE ACETATE 50 MCG/HR: 500 INJECTION, SOLUTION INTRAVENOUS; SUBCUTANEOUS at 12:06

## 2023-06-27 RX ADMIN — ONDANSETRON 4 MG: 2 INJECTION INTRAMUSCULAR; INTRAVENOUS at 11:06

## 2023-06-27 RX ADMIN — MUPIROCIN: 20 OINTMENT TOPICAL at 08:06

## 2023-06-27 RX ADMIN — DEXAMETHASONE SODIUM PHOSPHATE 4 MG: 4 INJECTION INTRA-ARTICULAR; INTRALESIONAL; INTRAMUSCULAR; INTRAVENOUS; SOFT TISSUE at 10:06

## 2023-06-27 RX ADMIN — SODIUM CHLORIDE: 0.9 INJECTION, SOLUTION INTRAVENOUS at 10:06

## 2023-06-27 RX ADMIN — PANTOPRAZOLE SODIUM 40 MG: 40 INJECTION, POWDER, FOR SOLUTION INTRAVENOUS at 08:06

## 2023-06-27 RX ADMIN — FENTANYL CITRATE 50 MCG: 50 INJECTION, SOLUTION INTRAMUSCULAR; INTRAVENOUS at 10:06

## 2023-06-27 RX ADMIN — LIDOCAINE HYDROCHLORIDE 80 MG: 20 INJECTION, SOLUTION EPIDURAL; INFILTRATION; INTRACAUDAL; PERINEURAL at 10:06

## 2023-06-27 RX ADMIN — SUGAMMADEX 200 MG: 100 INJECTION, SOLUTION INTRAVENOUS at 11:06

## 2023-06-27 RX ADMIN — POTASSIUM BICARBONATE 25 MEQ: 978 TABLET, EFFERVESCENT ORAL at 01:06

## 2023-06-27 RX ADMIN — POTASSIUM BICARBONATE 25 MEQ: 978 TABLET, EFFERVESCENT ORAL at 08:06

## 2023-06-27 RX ADMIN — OCTREOTIDE ACETATE 50 MCG/HR: 500 INJECTION, SOLUTION INTRAVENOUS; SUBCUTANEOUS at 08:06

## 2023-06-27 NOTE — ASSESSMENT & PLAN NOTE
Patient with history of metastatic breast cancer on chemotherapy who presented to ED with hematemesis and melena.    NSAID (--)  Aspirin(antiplatelet) (--)  Alcohol Use (--)  Severe retching (--)  History of GI bleed (--)  Past endoscopy (--)  Trauma (--)  Anticoagulation (--)  Malignancy (+)    Labs:  Recent Labs   Lab 06/24/23  1626 06/25/23  0352 06/27/23  0436   HGB 8.1* 7.6* 7.5*       Lab Results   Component Value Date    WBC 2.82 (L) 06/27/2023    HGB 7.5 (L) 06/27/2023    HCT 22.8 (L) 06/27/2023     (H) 06/27/2023    PLT 84 (L) 06/27/2023       Lab Results   Component Value Date     06/27/2023    K 3.3 (L) 06/27/2023     06/27/2023    CO2 27 06/27/2023    BUN 8 06/27/2023    CREATININE 0.5 06/27/2023    CALCIUM 7.6 (L) 06/27/2023    ANIONGAP 8 06/27/2023    ESTGFRAFRICA >60.0 07/18/2022    EGFRNONAA >60.0 07/18/2022       Lab Results   Component Value Date    ALT 10 06/27/2023    AST 32 06/27/2023    ALKPHOS 99 06/27/2023    BILITOT 2.1 (H) 06/27/2023       Lab Results   Component Value Date    INR 1.6 (H) 06/27/2023    INR 1.4 (H) 06/25/2023    INR 1.5 (H) 06/24/2023       GI Hemorrhage  Acute Blood Loss Anemia  - Hgb 7 on admit, baseline around 11-12  - S/p Protonix 80mg IV x 1 in the ED.  Start Protonix 40mg IV BID  - GI consulted, appreciate assistance  - Type and screen, blood consent obtained s/p 2x pRBC transfusion   - Continue Octreotide gtt, started in ED, with recent imaging on CT revealing of possible varices  - hepatology consulted, recommend triple phase Ct for liver, and echo - plan for coiling of varices   - Denies further symptoms that she had upon presentation   - tolerating clear diet well - coiling today, will observe overnight and d/c tomorrow if no acute events   - Lyte repletion

## 2023-06-27 NOTE — PROGRESS NOTES
Dereck Forrester - Oncology ([facility])  Hematology/Oncology  Progress Note    Patient Name: Shikha óLpez  Admission Date: 6/22/2023  Hospital Length of Stay: 5 days  Code Status: Full Code     Subjective:     HPI:  Ms. López is a 53yoF with PMHx of metastatic breast cancer to left lung currently on chemotherapy, last session on 6/14 who presented to [facility] ED with noted hematemesis and melena starting 1 night prior to admission.  4-5 episodes.  Reports pre-syncope however denies syncopal event or falls.  Denies history of GIB or being on AC.  Denies NSAIDs or prior endoscopies.  In ED, initially tachycardic with stable BP on room air.  HR improved in ED prior to admission.  Hgb 7 on admission, baseline around 11-12.  Patient follows with Dr. Willis for breast cancer.    Oncology History:  She presented in the emergency room at [facility] on September 29, 2006 with a 4 months history of inflammation of her left breast.  Physical examination at that time showed the left breast was largely replaced by large mass with multiple skin ulcerations.     A biopsy in September 2006 showed poorly differentiated carcinoma which was ER and [place]. negative and HER2 positive.     She was then referred to [facility] for additional care.   CT scan of the chest and abdomen November 2006 revealed multiple nodules in the lungs consistent with metastatic disease.  There also enlarged left axillary node.     She was treated with chemotherapy with weekly Herceptin and Abraxane and had marked improvement in her breast and lung metastasis on that therapy.     On May 29, 2007 she underwent left modified radical mastectomy(Dr. Colvin) which showed no residual tumor in the breast and 1/5 nodes was positive for metastasis measuring 6 mm.  (ypT0N1).     She then received postoperative radiation therapy(Dr Singh)  to the left chest wall supraclav and internal mammary lymph nodes from August 20, 2007 to September 28, 2007.      Postoperatively she continued on Herceptin for approximately 3 and 1/2 years before her lung metastasis begin to grow.     She subsequently received a number of different chemotherapy treatments including Adriamycin, Halaven, Xeloda plus lapatinib then Xeloda plus Herceptin.  The  Those treatments were provided under the care of  in [facility].  Subsequently, she transferred her care to  at [facility].  She was initially treated with Taxotere Herceptin Perjeta.       She was then changed to Kadcyla.    In July 2020 PET scan showed an increase in the size of an infrahilar mass consistent with progression.   She then took approximately 9 months of Herceptin and Navelbine.  Apparently she has some initial response to that therapy.     She started trastuzumab- deruxtecan  4/12/21.      Interval History: NAEON. Stable H/H. Plan for coiling today.     Oncology Treatment Plan:   OP BREAST FAM-TRASTUZUMAB DERUXTECAN-NXKI Q3W    Medications:  Continuous Infusions:   octreotide (SANDOSTATIN) infusion 50 mcg/hr (06/27/23 0251)     Scheduled Meds:   mupirocin   Nasal BID    pantoprazole  40 mg Intravenous BID    potassium bicarbonate  25 mEq Oral Q2H     PRN Meds:sodium chloride, sodium chloride     Review of Systems  Objective:     Vital Signs (Most Recent):  Temp: 98.6 °F (37 °C) (06/27/23 0734)  Pulse: 81 (06/27/23 0734)  Resp: 16 (06/27/23 0734)  BP: 122/60 (06/27/23 0734)  SpO2: 96 % (06/27/23 0734) Vital Signs (24h Range):  Temp:  [97.9 °F (36.6 °C)-98.6 °F (37 °C)] 98.6 °F (37 °C)  Pulse:  [77-83] 81  Resp:  [15-20] 16  SpO2:  [91 %-96 %] 96 %  BP: (101-137)/(49-76) 122/60     Weight: 90.7 kg (200 lb)  Body mass index is 36.58 kg/m².  Body surface area is 1.99 meters squared.      Intake/Output Summary (Last 24 hours) at 6/27/2023 0815  Last data filed at 6/27/2023 0735  Gross per 24 hour   Intake 1078.63 ml   Output 700 ml   Net 378.63 ml        Physical Exam  Constitutional:        Appearance: Normal appearance. She is obese.   HENT:      Head: Normocephalic and atraumatic.   Cardiovascular:      Rate and Rhythm: Normal rate and regular rhythm.   Pulmonary:      Effort: Pulmonary effort is normal. No respiratory distress.   Abdominal:      General: Bowel sounds are normal. There is no distension.      Palpations: Abdomen is soft.      Tenderness: There is no abdominal tenderness.   Skin:     General: Skin is warm and dry.      Coloration: Skin is not jaundiced.   Neurological:      General: No focal deficit present.      Mental Status: She is alert and oriented to person, place, and time. Mental status is at baseline.   Psychiatric:         Mood and Affect: Mood normal.         Behavior: Behavior normal.         Thought Content: Thought content normal.         Judgment: Judgment normal.        Significant Labs:   All pertinent labs from the last 24 hours have been reviewed.    Diagnostic Results:  I have reviewed all pertinent imaging results/findings within the past 24 hours.    Assessment/Plan:     * GI bleed  Patient with history of metastatic breast cancer on chemotherapy who presented to ED with hematemesis and melena.    NSAID (--)  Aspirin(antiplatelet) (--)  Alcohol Use (--)  Severe retching (--)  History of GI bleed (--)  Past endoscopy (--)  Trauma (--)  Anticoagulation (--)  Malignancy (+)    Labs:  Recent Labs   Lab 06/24/23  1626 06/25/23  0352 06/27/23  0436   HGB 8.1* 7.6* 7.5*       Lab Results   Component Value Date    WBC 2.82 (L) 06/27/2023    HGB 7.5 (L) 06/27/2023    HCT 22.8 (L) 06/27/2023     (H) 06/27/2023    PLT 84 (L) 06/27/2023       Lab Results   Component Value Date     06/27/2023    K 3.3 (L) 06/27/2023     06/27/2023    CO2 27 06/27/2023    BUN 8 06/27/2023    CREATININE 0.5 06/27/2023    CALCIUM 7.6 (L) 06/27/2023    ANIONGAP 8 06/27/2023    ESTGFRAFRICA >60.0 07/18/2022    EGFRNONAA >60.0 07/18/2022       Lab Results   Component Value Date     ALT 10 06/27/2023    AST 32 06/27/2023    ALKPHOS 99 06/27/2023    BILITOT 2.1 (H) 06/27/2023       Lab Results   Component Value Date    INR 1.6 (H) 06/27/2023    INR 1.4 (H) 06/25/2023    INR 1.5 (H) 06/24/2023       GI Hemorrhage  Acute Blood Loss Anemia  - Hgb 7 on admit, baseline around 11-12  - S/p Protonix 80mg IV x 1 in the ED.  Start Protonix 40mg IV BID  - GI consulted, appreciate assistance  - Type and screen, blood consent obtained s/p 2x pRBC transfusion   - Continue Octreotide gtt, started in ED, with recent imaging on CT revealing of possible varices  - hepatology consulted, recommend triple phase Ct for liver, and echo - plan for coiling of varices   - Denies further symptoms that she had upon presentation   - tolerating clear diet well - coiling today, will observe overnight and d/c tomorrow if no acute events   - Lyte repletion      Breast cancer, stage 4, left  Patient follows with Dr. Willis outpatient for metastatic breast cancer on Trastuzumab.  Last dose on 6/14.  HDS             Conrad Lang DO  Hematology/Oncology  Dereck Forrester - Oncology ([facility])

## 2023-06-27 NOTE — SUBJECTIVE & OBJECTIVE
Interval History: NAEON. Stable H/H. Plan for coiling today.     Oncology Treatment Plan:   OP BREAST FAM-TRASTUZUMAB DERUXTECAN-NXKI Q3W    Medications:  Continuous Infusions:   octreotide (SANDOSTATIN) infusion 50 mcg/hr (06/27/23 0251)     Scheduled Meds:   mupirocin   Nasal BID    pantoprazole  40 mg Intravenous BID    potassium bicarbonate  25 mEq Oral Q2H     PRN Meds:sodium chloride, sodium chloride     Review of Systems  Objective:     Vital Signs (Most Recent):  Temp: 98.6 °F (37 °C) (06/27/23 0734)  Pulse: 81 (06/27/23 0734)  Resp: 16 (06/27/23 0734)  BP: 122/60 (06/27/23 0734)  SpO2: 96 % (06/27/23 0734) Vital Signs (24h Range):  Temp:  [97.9 °F (36.6 °C)-98.6 °F (37 °C)] 98.6 °F (37 °C)  Pulse:  [77-83] 81  Resp:  [15-20] 16  SpO2:  [91 %-96 %] 96 %  BP: (101-137)/(49-76) 122/60     Weight: 90.7 kg (200 lb)  Body mass index is 36.58 kg/m².  Body surface area is 1.99 meters squared.      Intake/Output Summary (Last 24 hours) at 6/27/2023 0858  Last data filed at 6/27/2023 0735  Gross per 24 hour   Intake 1078.63 ml   Output 700 ml   Net 378.63 ml        Physical Exam  Constitutional:       Appearance: Normal appearance. She is obese.   HENT:      Head: Normocephalic and atraumatic.   Cardiovascular:      Rate and Rhythm: Normal rate and regular rhythm.   Pulmonary:      Effort: Pulmonary effort is normal. No respiratory distress.   Abdominal:      General: Bowel sounds are normal. There is no distension.      Palpations: Abdomen is soft.      Tenderness: There is no abdominal tenderness.   Skin:     General: Skin is warm and dry.      Coloration: Skin is not jaundiced.   Neurological:      General: No focal deficit present.      Mental Status: She is alert and oriented to person, place, and time. Mental status is at baseline.   Psychiatric:         Mood and Affect: Mood normal.         Behavior: Behavior normal.         Thought Content: Thought content normal.         Judgment: Judgment normal.         Significant Labs:   All pertinent labs from the last 24 hours have been reviewed.    Diagnostic Results:  I have reviewed all pertinent imaging results/findings within the past 24 hours.

## 2023-06-27 NOTE — PLAN OF CARE
Pt had EGD today, tolerated well. No acute events this shift. Afebrile & VSS on room air. Octreotide infusing at 10ml/hr. No PRNs needed. Electrolytes closely monitored, potassium replaced per orders. Ambulates independently to bathroom. Re-educated on I&O recording, pt voiding in hat. No BM this shift. Resumed regular diet post EGD with good appetite. CHG wipes provided and encouraged use. POC reviewed with patient. All needs addressed. Will continue to monitor with frequent rounds and clustered care to promote rest.

## 2023-06-27 NOTE — ANESTHESIA POSTPROCEDURE EVALUATION
Anesthesia Post Evaluation    Patient: Shikha López    Procedure(s) Performed: Procedure(s) (LRB):  ULTRASOUND, UPPER GI TRACT, ENDOSCOPIC (N/A)  EGD (ESOPHAGOGASTRODUODENOSCOPY) (N/A)    Final Anesthesia Type: general      Patient location during evaluation: PACU  Patient participation: Yes- Able to Participate  Level of consciousness: awake and alert  Post-procedure vital signs: reviewed and stable  Pain management: adequate  Airway patency: patent    PONV status at discharge: No PONV  Anesthetic complications: no      Cardiovascular status: blood pressure returned to baseline  Respiratory status: unassisted  Hydration status: euvolemic  Follow-up not needed.          Vitals Value Taken Time   /56 06/27/23 1231   Temp 36.7 °C (98 °F) 06/27/23 1230   Pulse 83 06/27/23 1235   Resp 22 06/27/23 1235   SpO2 96 % 06/27/23 1235   Vitals shown include unvalidated device data.      Event Time   Out of Recovery 12:45:00         Pain/Yisel Score: Yisel Score: 9 (6/27/2023 11:45 AM)

## 2023-06-27 NOTE — TREATMENT PLAN
EUS    Impression:            - Small (< 5 mm) esophageal varices.                          - Gastric varices, without bleeding. IGV coiled                         - Normal examined duodenum.                          - No specimens collected.   Recommendation:        - Return patient to hospital kee for ongoing care.                          - Resume previous diet.                          - Repeat the upper endoscopic ultrasound in 4                          weeks for retreatment.     Please call back with questions or if patient has change in clinical status.    Jayden Lozada MD  Gastroenterology/Hepatology, PGY IV  [facility]

## 2023-06-27 NOTE — ANESTHESIA PROCEDURE NOTES
Intubation    Date/Time: 6/27/2023 10:26 AM  Performed by: Sultana Celis CRNA  Authorized by: Amador Gregg MD     Intubation:     Induction:  Intravenous    Intubated:  Postinduction    Mask Ventilation:  Easy with oral airway    Attempts:  1    Attempted By:  CRNA    Method of Intubation:  Video laryngoscopy    Blade:  Yee 3    Laryngeal View Grade: Grade I - full view of cords      Difficult Airway Encountered?: No      Complications:  None    Airway Device:  Oral endotracheal tube    Airway Device Size:  7.0    Style/Cuff Inflation:  Cuffed (inflated to minimal occlusive pressure)    Inflation Amount (mL):  6    Tube secured:  21    Secured at:  The lips    Placement Verified By:  Capnometry    Complicating Factors:  None    Findings Post-Intubation:  BS equal bilateral and atraumatic/condition of teeth unchanged

## 2023-06-27 NOTE — NURSING TRANSFER
Nursing Transfer Note      6/27/2023     Transfer To: [facility] [facility]    Transfer via stretcher    Transfer with None    Transported by transport    Telemetry: N/A    Medicines sent: None    Chart send with patient: Yes    Notified: No one at the request of patient    Patient reassessed at: 1130 on 6/27/23

## 2023-06-27 NOTE — TRANSFER OF CARE
"Anesthesia Transfer of Care Note    Patient: Shikha López    Procedure(s) Performed: Procedure(s) (LRB):  ULTRASOUND, UPPER GI TRACT, ENDOSCOPIC (N/A)  EGD (ESOPHAGOGASTRODUODENOSCOPY) (N/A)    Patient location: PACU    Anesthesia Type: general    Transport from OR: Transported from OR on 6-10 L/min O2 by face mask with adequate spontaneous ventilation    Post pain: adequate analgesia    Post assessment: no apparent anesthetic complications and tolerated procedure well    Post vital signs: stable    Level of consciousness: awake    Nausea/Vomiting: no nausea/vomiting    Complications: none    Transfer of care protocol was followed      Last vitals:   Visit Vitals  /60 (Patient Position: Lying)   Pulse 85   Temp 36.7 °C (98 °F) (Temporal)   Resp 16   Ht 5' 2" (1.575 m)   Wt 90.7 kg (200 lb)   LMP  (LMP Unknown)   SpO2 98%   Breastfeeding No   BMI 36.58 kg/m²     "

## 2023-06-27 NOTE — PLAN OF CARE
NPO since 0000 due to EGD or scope/coiling procedure this morning. Octreotide infusing at 10 ml/h; new bag in fridge.    Problem: Adjustment to Illness (Gastrointestinal Bleeding)  Goal: Optimal Coping with Acute Illness  Outcome: Ongoing, Progressing     Problem: Bleeding (Gastrointestinal Bleeding)  Goal: Hemostasis  Outcome: Ongoing, Progressing     Problem: Adult Inpatient Plan of Care  Goal: Plan of Care Review  Outcome: Ongoing, Progressing  Goal: Patient-Specific Goal (Individualized)  Outcome: Ongoing, Progressing  Goal: Absence of Hospital-Acquired Illness or Injury  Outcome: Ongoing, Progressing  Goal: Optimal Comfort and Wellbeing  Outcome: Ongoing, Progressing  Goal: Readiness for Transition of Care  Outcome: Ongoing, Progressing     Problem: Infection  Goal: Absence of Infection Signs and Symptoms  Outcome: Ongoing, Progressing     Problem: Skin Injury Risk Increased  Goal: Skin Health and Integrity  Outcome: Ongoing, Progressing

## 2023-06-27 NOTE — H&P
Short Stay Endoscopy History and Physical    PCP - Primary Doctor No  Referring Physician - Gali Amaya MD  [address] [place] [address]    Procedure - eus coil of gastric varices  ASA - per anesthesia  Mallampati - per anesthesia  History of Anesthesia problems - no  Family history Anesthesia problems -  no   Plan of anesthesia - General    HPI:  This is a 53 y.o. female here for evaluation of: gastric varices    Reflux - no  Dysphagia - no  Abdominal pain - no  Diarrhea - no    ROS:  Constitutional: No fevers, chills, No weight loss  CV: No chest pain  Pulm: No cough, No shortness of breath  Ophtho: No vision changes  GI: see HPI  Derm: No rash    Medical History:  has a past medical history of Breast cancer.    Surgical History:  has a past surgical history that includes Mastectomy and Esophagogastroduodenoscopy (N/A, 6/23/2023).    Family History: family history includes Lung cancer in her father..    Social History:  reports that she has never smoked. She has never used smokeless tobacco. She reports that she does not drink alcohol and does not use drugs.    Review of patient's allergies indicates:  No Known Allergies    Medications:   Medications Prior to Admission   Medication Sig Dispense Refill Last Dose    acetaminophen (TYLENOL) 500 MG tablet Take 1,000 mg by mouth.       Lactobac no.41/Bifidobact no.7 (PROBIOTIC-10 ORAL) Take by mouth.       LIDOcaine-prilocaine (EMLA) cream APPLY TO THE AFFECTED AREA 1 HOUR BEFORE PORT ACCESS       methylphenidate HCl (RITALIN) 5 MG tablet Take 1 tablet (5 mg total) by mouth 2 (two) times daily. 60 tablet 0     OLANZapine (ZYPREXA) 5 MG tablet Take days 1-4 of chemotherapy 30 tablet 2     ondansetron (ZOFRAN) 8 MG tablet Take 8 mg by mouth 3 (three) times daily as needed.       potassium chloride SA (K-DUR,KLOR-CON) 20 MEQ tablet Take 1 tablet (20 mEq total) by mouth once daily. 30 tablet 11        Physical Exam:    Vital Signs:   Vitals:     06/27/23 0922   BP: 128/60   Pulse: 85   Resp: 16   Temp: 98 °F (36.7 °C)       General Appearance: Well appearing in no acute distress    Labs:  Lab Results   Component Value Date    WBC 2.82 (L) 06/27/2023    HGB 7.5 (L) 06/27/2023    HCT 22.8 (L) 06/27/2023    PLT 84 (L) 06/27/2023    ALT 10 06/27/2023    AST 32 06/27/2023     06/27/2023    K 3.3 (L) 06/27/2023     06/27/2023    CREATININE 0.5 06/27/2023    BUN 8 06/27/2023    CO2 27 06/27/2023    INR 1.6 (H) 06/27/2023       I have explained the risks and benefits of this endoscopic procedure to the patient including but not limited to bleeding, inflammation, infection, perforation, and death.      Home Lopez MD

## 2023-06-27 NOTE — PROVATION PATIENT INSTRUCTIONS
Discharge Summary/Instructions after an Endoscopic Procedure  Patient Name: Shikha López  Patient MRN: [id number]  Patient YOB: 1970 Tuesday, June 27, 2023  Home Lopez MD  Dear patient,  As a result of recent federal legislation (The Federal Cures Act), you may   receive lab or pathology results from your procedure in your [facility]   account before your physician is able to contact you. Your physician or   their representative will relay the results to you with their   recommendations at their soonest availability.  Thank you,  RESTRICTIONS:  During your procedure today, you received medications for sedation.  These   medications may affect your judgment, balance and coordination.  Therefore,   for 24 hours, you have the following restrictions:   - DO NOT drive a car, operate machinery, make legal/financial decisions,   sign important papers or drink alcohol.    ACTIVITY:  Today: no heavy lifting, straining or running due to procedural   sedation/anesthesia.  The following day: return to full activity including work.  DIET:  Eat and drink normally unless instructed otherwise.     TREATMENT FOR COMMON SIDE EFFECTS:  - Mild abdominal pain, nausea, belching, bloating or excessive gas:  rest,   eat lightly and use a heating pad.  - Sore Throat: treat with throat lozenges and/or gargle with warm salt   water.  - Because air was used during the procedure, expelling large amounts of air   from your rectum or belching is normal.  - If a bowel prep was taken, you may not have a bowel movement for 1-3 days.    This is normal.  SYMPTOMS TO WATCH FOR AND REPORT TO YOUR PHYSICIAN:  1. Abdominal pain or bloating, other than gas cramps.  2. Chest pain.  3. Back pain.  4. Signs of infection such as: chills or fever occurring within 24 hours   after the procedure.  5. Rectal bleeding, which would show as bright red, maroon, or black stools.   (A tablespoon of blood from the rectum is not serious, especially if    hemorrhoids are present.)  6. Vomiting.  7. Weakness or dizziness.  GO DIRECTLY TO THE NEAREST EMERGENCY ROOM IF YOU HAVE ANY OF THE FOLLOWING:      Difficulty breathing              Chills and/or fever over 101 F   Persistent vomiting and/or vomiting blood   Severe abdominal pain   Severe chest pain   Black, tarry stools   Bleeding- more than one tablespoon   Any other symptom or condition that you feel may need urgent attention  Your doctor recommends these additional instructions:  If any biopsies were taken, your doctors clinic will contact you in 1 to 2   weeks with any results.  - Return patient to hospital kee for ongoing care.   - Resume previous diet.   - Repeat the upper endoscopic ultrasound in 4 weeks for retreatment.  For questions, problems or results please call your physician - Home Lopez MD at Work:  [phone].  [facility], EMERGENCY ROOM PHONE NUMBER: [phone]  IF A COMPLICATION OR EMERGENCY SITUATION ARISES AND YOU ARE UNABLE TO REACH   YOUR PHYSICIAN - GO DIRECTLY TO THE EMERGENCY ROOM.  Home Lopez MD  6/27/2023 11:27:27 AM  This report has been verified and signed electronically.  Dear patient,  As a result of recent federal legislation (The Federal Cures Act), you may   receive lab or pathology results from your procedure in your [facility]   account before your physician is able to contact you. Your physician or   their representative will relay the results to you with their   recommendations at their soonest availability.  Thank you,  PROVATION

## 2023-06-28 ENCOUNTER — TELEPHONE (OUTPATIENT)
Dept: ENDOSCOPY | Facility: HOSPITAL | Age: 53
End: 2023-06-28
Payer: MEDICARE

## 2023-06-28 ENCOUNTER — PATIENT MESSAGE (OUTPATIENT)
Dept: HEMATOLOGY/ONCOLOGY | Facility: CLINIC | Age: 53
End: 2023-06-28
Payer: MEDICARE

## 2023-06-28 VITALS
HEART RATE: 91 BPM | SYSTOLIC BLOOD PRESSURE: 129 MMHG | OXYGEN SATURATION: 95 % | WEIGHT: 200 LBS | HEIGHT: 62 IN | DIASTOLIC BLOOD PRESSURE: 64 MMHG | TEMPERATURE: 98 F | RESPIRATION RATE: 17 BRPM | BODY MASS INDEX: 36.8 KG/M2

## 2023-06-28 DIAGNOSIS — C78.02 MALIGNANT NEOPLASM METASTATIC TO LEFT LUNG: Primary | ICD-10-CM

## 2023-06-28 LAB
ALBUMIN SERPL BCP-MCNC: 2.6 G/DL (ref 3.5–5.2)
ALP SERPL-CCNC: 116 U/L (ref 55–135)
ALT SERPL W/O P-5'-P-CCNC: 11 U/L (ref 10–44)
ANION GAP SERPL CALC-SCNC: 5 MMOL/L (ref 8–16)
ANISOCYTOSIS BLD QL SMEAR: SLIGHT
AST SERPL-CCNC: 31 U/L (ref 10–40)
BASOPHILS # BLD AUTO: 0.02 K/UL (ref 0–0.2)
BASOPHILS NFR BLD: 0.5 % (ref 0–1.9)
BILIRUB SERPL-MCNC: 2.1 MG/DL (ref 0.1–1)
BUN SERPL-MCNC: 7 MG/DL (ref 6–20)
CALCIUM SERPL-MCNC: 7.9 MG/DL (ref 8.7–10.5)
CHLORIDE SERPL-SCNC: 107 MMOL/L (ref 95–110)
CO2 SERPL-SCNC: 28 MMOL/L (ref 23–29)
CREAT SERPL-MCNC: 0.6 MG/DL (ref 0.5–1.4)
DACRYOCYTES BLD QL SMEAR: ABNORMAL
DIFFERENTIAL METHOD: ABNORMAL
EOSINOPHIL # BLD AUTO: 0.2 K/UL (ref 0–0.5)
EOSINOPHIL NFR BLD: 3.6 % (ref 0–8)
ERYTHROCYTE [DISTWIDTH] IN BLOOD BY AUTOMATED COUNT: 22.5 % (ref 11.5–14.5)
EST. GFR  (NO RACE VARIABLE): >60 ML/MIN/1.73 M^2
GLUCOSE SERPL-MCNC: 108 MG/DL (ref 70–110)
HCT VFR BLD AUTO: 23.9 % (ref 37–48.5)
HGB BLD-MCNC: 7.6 G/DL (ref 12–16)
HYPOCHROMIA BLD QL SMEAR: ABNORMAL
IMM GRANULOCYTES # BLD AUTO: 0.01 K/UL (ref 0–0.04)
IMM GRANULOCYTES NFR BLD AUTO: 0.2 % (ref 0–0.5)
INR PPP: 1.5 (ref 0.8–1.2)
LYMPHOCYTES # BLD AUTO: 0.6 K/UL (ref 1–4.8)
LYMPHOCYTES NFR BLD: 15.3 % (ref 18–48)
MAGNESIUM SERPL-MCNC: 1.7 MG/DL (ref 1.6–2.6)
MCH RBC QN AUTO: 36 PG (ref 27–31)
MCHC RBC AUTO-ENTMCNC: 31.8 G/DL (ref 32–36)
MCV RBC AUTO: 113 FL (ref 82–98)
MONOCYTES # BLD AUTO: 0.3 K/UL (ref 0.3–1)
MONOCYTES NFR BLD: 6 % (ref 4–15)
NEUTROPHILS # BLD AUTO: 3.1 K/UL (ref 1.8–7.7)
NEUTROPHILS NFR BLD: 74.4 % (ref 38–73)
NRBC BLD-RTO: 0 /100 WBC
OVALOCYTES BLD QL SMEAR: ABNORMAL
PHOSPHATE SERPL-MCNC: 3.2 MG/DL (ref 2.7–4.5)
PLATELET # BLD AUTO: 100 K/UL (ref 150–450)
PLATELET BLD QL SMEAR: ABNORMAL
PMV BLD AUTO: 12.4 FL (ref 9.2–12.9)
POIKILOCYTOSIS BLD QL SMEAR: SLIGHT
POLYCHROMASIA BLD QL SMEAR: ABNORMAL
POTASSIUM SERPL-SCNC: 3.6 MMOL/L (ref 3.5–5.1)
PROT SERPL-MCNC: 4.6 G/DL (ref 6–8.4)
PROTHROMBIN TIME: 15.5 SEC (ref 9–12.5)
RBC # BLD AUTO: 2.11 M/UL (ref 4–5.4)
SODIUM SERPL-SCNC: 140 MMOL/L (ref 136–145)
WBC # BLD AUTO: 4.19 K/UL (ref 3.9–12.7)

## 2023-06-28 PROCEDURE — 94761 N-INVAS EAR/PLS OXIMETRY MLT: CPT

## 2023-06-28 PROCEDURE — 99239 PR HOSPITAL DISCHARGE DAY,>30 MIN: ICD-10-PCS | Mod: GC,,, | Performed by: HOSPITALIST

## 2023-06-28 PROCEDURE — 85610 PROTHROMBIN TIME: CPT

## 2023-06-28 PROCEDURE — 84100 ASSAY OF PHOSPHORUS: CPT

## 2023-06-28 PROCEDURE — 83735 ASSAY OF MAGNESIUM: CPT

## 2023-06-28 PROCEDURE — 80053 COMPREHEN METABOLIC PANEL: CPT

## 2023-06-28 PROCEDURE — 85025 COMPLETE CBC W/AUTO DIFF WBC: CPT

## 2023-06-28 PROCEDURE — C9113 INJ PANTOPRAZOLE SODIUM, VIA: HCPCS

## 2023-06-28 PROCEDURE — 63600175 PHARM REV CODE 636 W HCPCS: Performed by: STUDENT IN AN ORGANIZED HEALTH CARE EDUCATION/TRAINING PROGRAM

## 2023-06-28 PROCEDURE — 99239 HOSP IP/OBS DSCHRG MGMT >30: CPT | Mod: GC,,, | Performed by: HOSPITALIST

## 2023-06-28 PROCEDURE — 63600175 PHARM REV CODE 636 W HCPCS

## 2023-06-28 RX ORDER — HEPARIN 100 UNIT/ML
100 SYRINGE INTRAVENOUS ONCE
Status: COMPLETED | OUTPATIENT
Start: 2023-06-28 | End: 2023-06-28

## 2023-06-28 RX ORDER — POTASSIUM CHLORIDE 20 MEQ/15ML
20 SOLUTION ORAL DAILY
Qty: 600 ML | Refills: 0 | Status: SHIPPED | OUTPATIENT
Start: 2023-06-28

## 2023-06-28 RX ORDER — PANTOPRAZOLE SODIUM 40 MG/1
40 TABLET, DELAYED RELEASE ORAL
Status: DISCONTINUED | OUTPATIENT
Start: 2023-06-28 | End: 2023-06-28 | Stop reason: HOSPADM

## 2023-06-28 RX ORDER — PANTOPRAZOLE SODIUM 40 MG/1
40 TABLET, DELAYED RELEASE ORAL 2 TIMES DAILY
Qty: 60 TABLET | Refills: 2 | Status: SHIPPED | OUTPATIENT
Start: 2023-06-28 | End: 2023-10-23 | Stop reason: SDUPTHER

## 2023-06-28 RX ADMIN — HEPARIN 100 UNITS: 100 SYRINGE at 02:06

## 2023-06-28 RX ADMIN — PANTOPRAZOLE SODIUM 40 MG: 40 INJECTION, POWDER, FOR SOLUTION INTRAVENOUS at 08:06

## 2023-06-28 NOTE — TREATMENT PLAN
[facility] Follow-up Note     Shikha López was seen and examined.   1 Day Post-Op s/p EUS w/ coiling of IGV   No abdominal discomfort. Tolerating diet.    Vitals stable. Afebrile.     Lab Results   Component Value Date    ALT 11 06/28/2023    AST 31 06/28/2023    ALKPHOS 116 06/28/2023    BILITOT 2.1 (H) 06/28/2023    BILITOT 2.1 (H) 06/27/2023    BILITOT 2.4 (H) 06/25/2023       No sx/sx of post-ERCP pancreatitis or other procedural complications.   AES will sign off. Please call if any questions/concerns.  Patient will be contacted with follow-up information.     Jayden Lozada MD  Gastroenterology & Hepatology Fellow

## 2023-06-28 NOTE — PLAN OF CARE
Plan of care reviewed. Patient is oriented X4. Ambulates  without difficulty. Port infusing Octreotide at 10 ml/hr. No complaints of pain or discomfort at this time. Remained afebrile. All questions and concerns addressed. All safety precautions in place. Remains free of injury. All needs met at this time.

## 2023-06-28 NOTE — PROGRESS NOTES
Admit Assessment    Patient Identification: Shikha López   :  1970  Admit Date:  2023  Attending Provider:  Gali Amaya MD              Referral:   Patient was admitted to [facility] with a diagnosis of GI bleed, and was admitted this hospital stay due to Tachycardia [R00.0]  GIB (gastrointestinal bleeding) [K92.2]  Gastrointestinal hemorrhage, unspecified gastrointestinal hemorrhage type [K92.2].       is involved was referred to the Social Work Department via routine referral.   Patient presents as a 53 y.o. year old single female.    Persons interviewed: Patient  Patient reports she resides alone in a single story home with 5 steps to enter the home. Patient reports she has no DME and is in the process of obtaining a PCP.     Living Situation:    Resides at   [address] [place] [address]   Phone: [phone]       (RETIRED) Functional Status Prior  Ambulation Prior: 0-->independent  Transferrin-->independent  Toiletin-->independent  Bathin-->independent  Dressin-->independent  Eatin-->independent  Communication: Understands/communicates without difficulty  Swallowing: Swallows foods/liquids without difficulty    Current or Past Agencies and Description of Services/Supplies    DME  Agency Name: N/A     Home Health  Agency Name: N/A    IV Infusion  Agency Name: N/A    Nutrition: Regular Diet    Outpatient Pharmacy:     [id number] [address] AT [facility]  [address] [place] [place] [address]  Phone: [phone] Fax: [phone]    [facility] [address] [address]  Phone: [phone] Fax: [phone]    Patient Preference of agencies include: Patient has no agency preference.     Patient/Caregiver informed of right to choose providers or agencies. Patient provides permission to release any necessary information to [facility] and to  Non-[facility] agencies as needed to facilitate patient care, treatment planning, and patient discharge planning.  Written and verbal resources provided.    Coping  Patient reports she has been handling everything pretty well.     Adjustment to Diagnosis and Treatment  Patient reports she has adjusting well.     Emotional/Behavioral/Cognitive Issues  Patient reports no emotional behavioral or emotional issues at this time.     Employment: N/A    Finances: SSDI    Housing: Safe and permanent    Support: Good support system with her daughter, sister and extended family.     Hobbies/Leisure/Recreation: Patient enjoys working in her garden, doing Yieldex, drawing, painting and caring for her dogs and cats.      Brinda: Syn[employer] (Non-Practicing)     Will: Currently working on obtaining a Living Will.     POA: Currently working on appointing a Medical POA    Transportation: Patient's sister will provide transportation    Advance Directives: TBD    History/Current Symptoms of Anxiety/Depression: No    History/Current Substance Use:   Social History     Tobacco Use    Smoking status: Never    Smokeless tobacco: Never   Substance and Sexual Activity    Alcohol use: Never    Drug use: Never    Sexual activity: Not Currently     Indications of Abuse/Neglect: No  Abuse Screen (yes response referral indicated)  Feels Unsafe at Home or Work/School: no  Feels Threatened by Someone: no  Does anyone try to keep you from having contact with others or doing things outside your home?: no  Physical Signs of Abuse Present: no    Financial:  Payer/Plan Subscr  Sex Relation Sub. Ins. ID Effective Group Num   1. MEDICARE - [place]* BRADLY GUZMÁN 1970 Female Self [id number] 3/1/09                                    [address]   2. [place]ID [place] BRADLY GUZMÁN 1970 Female Self [id number]* 18 [id number]                                   [address] [address]     Other identified concerns/needs: None at this time.    Plan: Home with  family    Interventions/Referrals: None at this time.     Patient/caregiver engaged in treatment planning process.     providing psychosocial and supportive counseling, resources, education, assistance and discharge planning as appropriate. Patient/caregiver state understanding of  available resources,  following, remains available.    ELSIE Wilkes, [facility] Social Worker   Hardik y - Oncology  [phone]

## 2023-06-28 NOTE — PLAN OF CARE
Patient is AAOX4. IV out done. Port de assessed. Discharge teaching given. Prescribed medication will be picked up by the patient. Patient discharged with her belongings and family member with transport.

## 2023-06-28 NOTE — PLAN OF CARE
CHW met with patient/family at bedside. Patient experience rounding completed and reviewed the following.     Do you know your discharge plan? Yes,    If yes, what is the plan? Home    If you are discharging home, do you have help at home? Yes    Do you think you will need help at home at discharge? Yes    Have you discussed your needs and preferences with your SW/CM? Yes    Assigned SW/CM notified of any patient/family needs or concerns.

## 2023-06-28 NOTE — DISCHARGE SUMMARY
Dereck Forrester - Oncology ([facility])  Hematology/Oncology  Discharge Summary      Patient Name: Shikha López  MRN: [id number]  Admission Date: 6/22/2023  Hospital Length of Stay: 6 days  Discharge Date and Time:  06/28/2023 6:15 PM  Attending Physician: Lenore att. providers found   Discharging Provider: Maxine Solomon MD  Primary Care Provider: Primary Doctor Lenore    HPI: Ms. López is a 53yoF with PMHx of metastatic breast cancer to left lung currently on chemotherapy, last session on 6/14 who presented to [facility] [place] with noted hematemesis and melena starting 1 night prior to admission.  4-5 episodes.  Reports pre-syncope however denies syncopal event or falls.  Denies history of GIB or being on AC.  Denies NSAIDs or prior endoscopies.  In ED, initially tachycardic with stable BP on room air.  HR improved in ED prior to admission.  Hgb 7 on admission, baseline around 11-12.  Patient follows with Dr. Willis for breast cancer.    Oncology History:  She presented in the emergency room at [facility] on September 29, 2006 with a 4 months history of inflammation of her left breast.  Physical examination at that time showed the left breast was largely replaced by large mass with multiple skin ulcerations.     A biopsy in September 2006 showed poorly differentiated carcinoma which was ER and CO. negative and HER2 positive.     She was then referred to [facility] for additional care.   CT scan of the chest and abdomen November 2006 revealed multiple nodules in the lungs consistent with metastatic disease.  There also enlarged left axillary node.     She was treated with chemotherapy with weekly Herceptin and Abraxane and had marked improvement in her breast and lung metastasis on that therapy.     On May 29, 2007 she underwent left modified radical mastectomy(Dr. Colvin) which showed no residual tumor in the breast and 1/5 nodes was positive for metastasis measuring 6 mm.  (ypT0N1).     She then received postoperative radiation  therapy(Dr Singh)  to the left chest wall supraclav and internal mammary lymph nodes from August 20, 2007 to September 28, 2007.     Postoperatively she continued on Herceptin for approximately 3 and 1/2 years before her lung metastasis begin to grow.     She subsequently received a number of different chemotherapy treatments including Adriamycin, Halaven, Xeloda plus lapatinib then Xeloda plus Herceptin.  The  Those treatments were provided under the care of  in [facility].  Subsequently, she transferred her care to  at [facility].  She was initially treated with Taxotere Herceptin Perjeta.       She was then changed to Kadcyla.    In July 2020 PET scan showed an increase in the size of an infrahilar mass consistent with progression.   She then took approximately 9 months of Herceptin and Navelbine.  Apparently she has some initial response to that therapy.     She started trastuzumab- deruxtecan  4/12/21.      Procedure(s) (LRB):  ULTRASOUND, UPPER GI TRACT, ENDOSCOPIC (N/A)  EGD (ESOPHAGOGASTRODUODENOSCOPY) (N/A)     Hospital Course: Patient was admitted to Medical Oncology service for hematemesis and melena. She received a total of 2 units pRBC throughout hospital course. She underwent with endoscopy with GI who recommended Hepatology consult for concerns of varices potentially liver dysfunction. She was started on IV protonix and IV octreotide. Hep recommended triple phase CT scan as well as new echo. She underwent coiling with GI of gastric varices on 06/7. GI recommended repeat upper endoscopy in 4 weeks. She overall felt well, tolerated diet and di not have recurrent episodes of symptoms since admission. She was discharged on 06/28 in stable condition with protonix. She has follow up with primary Oncologist on 07/05.      Goals of Care Treatment Preferences:  Code Status: Full Code      Consults:   Consults (From admission, onward)        Status Ordering Provider     Inpatient  consult to Hepatology  Once        Provider:  (Not yet assigned)    Completed HAZEL LORI     Inpatient consult to Gastroenterology  Once        Provider:  (Not yet assigned)    Completed EVELIA BISHOP     Inpatient consult to Hematology/Oncology  Once        Provider:  (Not yet assigned)    Completed ADRIANO PARR        Vitals:    06/28/23 0003 06/28/23 0329 06/28/23 0745 06/28/23 1129   BP: (!) 120/59 (!) 115/57 (!) 122/59 129/64   BP Location: Right arm Right arm Right arm Right arm   Patient Position: Lying Lying Sitting Sitting   Pulse: 84 89 87 91   Resp: 18 17 16 17   Temp: 98.3 °F (36.8 °C) 98.2 °F (36.8 °C) 98.1 °F (36.7 °C) 97.9 °F (36.6 °C)   TempSrc: Oral Oral Oral Oral   SpO2: 95% (!) 92% (!) 93% 95%   Weight:       Height:         Physical Exam  Constitutional:       Appearance: Normal appearance. She is obese.   HENT:      Head: Normocephalic and atraumatic.   Cardiovascular:      Rate and Rhythm: Normal rate and regular rhythm.   Pulmonary:      Effort: Pulmonary effort is normal. No respiratory distress.   Abdominal:      General: Bowel sounds are normal. There is no distension.      Palpations: Abdomen is soft.      Tenderness: There is no abdominal tenderness.   Skin:     General: Skin is warm and dry.      Coloration: Skin is not jaundiced.   Neurological:      General: No focal deficit present.      Mental Status: She is alert and oriented to person, place, and time. Mental status is at baseline.   Psychiatric:         Mood and Affect: Mood normal.         Behavior: Behavior normal.         Thought Content: Thought content normal.         Judgment: Judgment normal.     Significant Diagnostic Studies: Labs:   CMP   Recent Labs   Lab 06/27/23  0436 06/28/23  0355    140   K 3.3* 3.6    107   CO2 27 28    108   BUN 8 7   CREATININE 0.5 0.6   CALCIUM 7.6* 7.9*   PROT 4.5* 4.6*   ALBUMIN 2.5* 2.6*   BILITOT 2.1* 2.1*   ALKPHOS 99 116   AST 32 31   ALT 10 11   ANIONGAP  8 5*    and CBC   Recent Labs   Lab 06/27/23  0436 06/28/23  0355   WBC 2.82* 4.19   HGB 7.5* 7.6*   HCT 22.8* 23.9*   PLT 84* 100*       Pending Diagnostic Studies:     Procedure Component Value Units Date/Time    CBC auto differential [[id number]] Collected: 06/26/23 0541    Order Status: Sent Lab Status: In process Updated: 06/26/23 0541    Specimen: Blood     Comprehensive metabolic panel [[id number]] Collected: 06/26/23 0541    Order Status: Sent Lab Status: In process Updated: 06/26/23 0541    Specimen: Blood     Magnesium [[id number]] Collected: 06/26/23 0541    Order Status: Sent Lab Status: In process Updated: 06/26/23 0541    Specimen: Blood     Phosphorus [[id number]] Collected: 06/26/23 0541    Order Status: Sent Lab Status: In process Updated: 06/26/23 0541    Specimen: Blood     Protime-INR [[id number]] Collected: 06/26/23 0541    Order Status: Sent Lab Status: In process Updated: 06/26/23 0541    Specimen: Blood         Final Active Diagnoses:    Diagnosis Date Noted POA    PRINCIPAL PROBLEM:  GI bleed [K92.2] 06/22/2023 Yes    Esophageal and gastric varices [I85.00, I86.4] 06/23/2023 Yes    Malignant neoplasm metastatic to left lung [C78.02] 06/08/2021 Yes    Breast cancer, stage 4, left [C50.912] 06/08/2021 Yes      Problems Resolved During this Admission:      Discharged Condition: fair    Disposition: Home or Self Care    Follow Up:    Patient Instructions:      Ambulatory referral/consult to CLINIC Palliative Care   Standing Status: Future   Referral Priority: Routine Referral Type: Consultation   Requested Specialty: Palliative Medicine   Number of Visits Requested: 1     Ambulatory referral/consult to Gastroenterology   Standing Status: Future   Referral Priority: Routine Referral Type: Consultation   Referral Reason: Specialty Services Required   Requested Specialty: Gastroenterology   Number of Visits Requested: 1     Medications:  Reconciled Home Medications:      Medication List      START  taking these medications    pantoprazole 40 MG tablet  Commonly known as: PROTONIX  Take 1 tablet (40 mg total) by mouth 2 (two) times daily.     potassium chloride 10% 20 mEq/15 mL oral solution  Commonly known as: KAYCIEL  Take 15 mLs (20 mEq total) by mouth once daily. (To prevent stomach upset, mix dose with a glass of cold water or juice)  Replaces: potassium chloride SA 20 MEQ tablet        CONTINUE taking these medications    acetaminophen 500 MG tablet  Commonly known as: TYLENOL  Take 1,000 mg by mouth.     LIDOcaine-prilocaine cream  Commonly known as: EMLA  APPLY TO THE AFFECTED AREA 1 HOUR BEFORE PORT ACCESS     methylphenidate HCl 5 MG tablet  Commonly known as: RITALIN  Take 1 tablet (5 mg total) by mouth 2 (two) times daily.     OLANZapine 5 MG tablet  Commonly known as: ZyPREXA  Take days 1-4 of chemotherapy     ondansetron 8 MG tablet  Commonly known as: ZOFRAN  Take 8 mg by mouth 3 (three) times daily as needed.     PROBIOTIC-10 ORAL  Take by mouth.        STOP taking these medications    potassium chloride SA 20 MEQ tablet  Commonly known as: K-DUR,KLOR-CON  Replaced by: potassium chloride 10% 20 mEq/15 mL oral solution            Maxine Solomon MD  Hematology/Oncology  [facility] - Oncology ([facility])

## 2023-06-30 ENCOUNTER — TELEPHONE (OUTPATIENT)
Dept: ENDOSCOPY | Facility: HOSPITAL | Age: 53
End: 2023-06-30
Payer: MEDICARE

## 2023-06-30 ENCOUNTER — HOSPITAL ENCOUNTER (OUTPATIENT)
Dept: RADIOLOGY | Facility: HOSPITAL | Age: 53
Discharge: HOME | End: 2023-06-30
Attending: NURSE PRACTITIONER
Payer: MEDICARE

## 2023-06-30 DIAGNOSIS — C78.02 MALIGNANT NEOPLASM METASTATIC TO LEFT LUNG: ICD-10-CM

## 2023-06-30 PROCEDURE — 71260 CT CHEST WITH CONTRAST: ICD-10-PCS | Mod: 26,,, | Performed by: STUDENT IN AN ORGANIZED HEALTH CARE EDUCATION/TRAINING PROGRAM

## 2023-06-30 PROCEDURE — 25500020 PHARM REV CODE 255: Performed by: NURSE PRACTITIONER

## 2023-06-30 PROCEDURE — 71260 CT THORAX DX C+: CPT | Mod: TC

## 2023-06-30 PROCEDURE — 71260 CT THORAX DX C+: CPT | Mod: 26,,, | Performed by: STUDENT IN AN ORGANIZED HEALTH CARE EDUCATION/TRAINING PROGRAM

## 2023-06-30 RX ADMIN — IOHEXOL 75 ML: 350 INJECTION, SOLUTION INTRAVENOUS at 07:06

## 2023-07-03 NOTE — PROGRESS NOTES
Subjective:       Patient ID: Shikha López is a 53 y.o. female.    Chief Complaint: No chief complaint on file.      HPI 52-year-old female who returns for F/U  for metastatic breast cancer.  She is on Trastuzumab deruxtecan  - here for cycle  37.      She was hospitalized on June 22nd with an upper GI bleed.  Hemoglobin decreased to 7 g dL.  She required packed red blood cell transfusions.  Endoscopy showed some esophageal gastric varices.  She is scheduled for follow-up endoscopy August 3rd.      Since her discharge she is eating much better with no more nausea or vomiting.  Stools are.  She does not feel ready to restart chemotherapy.    CT 6/30/23 - stable - no new findings      Breast history:  She presented in the emergency room at [facility] on September 29, 2006 with a 4 months history of inflammation of her left breast.  Physical examination at that time showed the left breast was largely replaced by large mass with multiple skin ulcerations.    A biopsy in September 2006 showed poorly differentiated carcinoma which was ER and FL. negative and HER2 positive.    She was then referred to [facility] for additional care.   CT scan of the chest and abdomen November 2006 revealed multiple nodules in the lungs consistent with metastatic disease.  There also enlarged left axillary node.    She was treated with chemotherapy with weekly Herceptin and Abraxane and had marked improvement in her breast and lung metastasis on that therapy.    On May 29, 2007 she underwent left modified radical mastectomy(Dr. Colvin) which showed no residual tumor in the breast and 1/5 nodes was positive for metastasis measuring 6 mm.  (ypT0N1).    She then received postoperative radiation therapy(Dr Singh)  to the left chest wall supraclav and internal mammary lymph nodes from August 20, 2007 to September 28, 2007.    Postoperatively she continued on Herceptin for approximately 3 and 1/2 years before her lung  metastasis begin to grow.    She subsequently received a number of different chemotherapy treatments including Adriamycin, Halaven, Xeloda plus lapatinib then Xeloda plus Herceptin.  The  Those treatments were provided under the care of  in [facility].  Subsequently, she transferred her care to  at [facility].  She was initially treated with Taxotere Herceptin Perjeta.      She was then changed to Kadcyla.    In July 2020 PET scan showed an increase in the size of an infrahilar mass consistent with progression.   She then took approximately 9 months of Herceptin and Navelbine.  Apparently she has some initial response to that therapy.    She started trastuzumab- deruxtecan  4/12/21.     Echo 6/26/23 - EF 65% , left atrial enlargement, mild AS  Review of Systems   Constitutional:  Positive for fatigue. Negative for appetite change and unexpected weight change.   HENT:  Negative for mouth sores.    Eyes:  Negative for visual disturbance.   Respiratory:  Negative for cough and shortness of breath.    Cardiovascular:  Negative for chest pain.   Gastrointestinal:  Negative for abdominal pain, blood in stool, constipation, diarrhea and nausea.   Genitourinary:  Negative for frequency.   Musculoskeletal:  Negative for back pain.   Integumentary:  Negative for rash.   Neurological:  Positive for light-headedness. Negative for headaches.   Hematological:  Negative for adenopathy.   Psychiatric/Behavioral: Negative.  The patient is not nervous/anxious.        Objective:      Physical Exam  Vitals reviewed.   Constitutional:       General: She is not in acute distress.     Appearance: She is obese.   HENT:      Mouth/Throat:      Mouth: Mucous membranes are moist.      Pharynx: Oropharynx is clear. No oropharyngeal exudate or posterior oropharyngeal erythema.   Eyes:      Pupils: Pupils are equal, round, and reactive to light.   Cardiovascular:      Rate and Rhythm: Normal rate and regular rhythm.       Heart sounds: Murmur (systolic -aortic) heard.   Pulmonary:      Effort: Pulmonary effort is normal. No respiratory distress.      Breath sounds: Normal breath sounds. No wheezing or rales.   Chest:   Breasts:     Right: Normal.      Left: Absent.       Abdominal:      Palpations: Abdomen is soft. There is no mass.      Tenderness: There is no abdominal tenderness.   Lymphadenopathy:      Cervical: No cervical adenopathy.      Upper Body:      Right upper body: No supraclavicular or axillary adenopathy.      Left upper body: No supraclavicular or axillary adenopathy.   Skin:     Findings: No rash.   Neurological:      Mental Status: She is alert and oriented to person, place, and time.   Psychiatric:         Mood and Affect: Mood normal.         Behavior: Behavior normal.         Thought Content: Thought content normal.         Judgment: Judgment normal.       Assessment:    CBC - HGB 8.6, Cr 0.6, Bile 2.1  Problem List Items Addressed This Visit       Breast cancer, stage 4, left - Primary    Malignant neoplasm metastatic to left lung       Plan:    Start iron therapy.  Delay RX  RTC 2 weeks.  Cardiology        Route Chart for Scheduling    Med Onc Chart Routing      Follow up with physician 2 weeks. me or Mariam   Follow up with JIMI    Infusion scheduling note    Injection scheduling note the day after visit   Labs CBC   Scheduling:  Preferred lab:  Lab interval:     Imaging None      Pharmacy appointment    Other referrals     No additional referrals needed  Cariology       Treatment Plan Information   OP BREAST FAM-TRASTUZUMAB DERUXTECAN-NXKI Q3W   Home Willis MD   Upcoming Treatment Dates - OP BREAST FAM-TRASTUZUMAB DERUXTECAN-NXKI Q3W    7/13/2023       Chemotherapy       fam-trastuzumab deruxtecan-nxki (ENHERTU) 530 mg in dextrose 5 % (D5W) 100 mL infusion       Antiemetics       fosaprepitant 150 mg in sodium chloride 0.9% 150 mL IVPB       palonosetron (ALOXI) 0.25 mg in sodium chloride 0.9% 50 mL  IVPB  8/3/2023       Chemotherapy       fam-trastuzumab deruxtecan-nxki (ENHERTU) 530 mg in dextrose 5 % (D5W) 100 mL infusion       Antiemetics       fosaprepitant 150 mg in sodium chloride 0.9% 150 mL IVPB       palonosetron (ALOXI) 0.25 mg in sodium chloride 0.9% 50 mL IVPB  8/24/2023       Chemotherapy       fam-trastuzumab deruxtecan-nxki (ENHERTU) 530 mg in dextrose 5 % (D5W) 100 mL infusion       Antiemetics       fosaprepitant 150 mg in sodium chloride 0.9% 150 mL IVPB       palonosetron (ALOXI) 0.25 mg in sodium chloride 0.9% 50 mL IVPB  9/14/2023       Chemotherapy       fam-trastuzumab deruxtecan-nxki (ENHERTU) 530 mg in dextrose 5 % (D5W) 100 mL infusion       Antiemetics       fosaprepitant 150 mg in sodium chloride 0.9% 150 mL IVPB       palonosetron (ALOXI) 0.25 mg in sodium chloride 0.9% 50 mL IVPB

## 2023-07-05 ENCOUNTER — OFFICE VISIT (OUTPATIENT)
Dept: HEMATOLOGY/ONCOLOGY | Facility: CLINIC | Age: 53
End: 2023-07-05
Payer: MEDICARE

## 2023-07-05 ENCOUNTER — LAB VISIT (OUTPATIENT)
Dept: LAB | Facility: HOSPITAL | Age: 53
End: 2023-07-05
Attending: INTERNAL MEDICINE
Payer: MEDICARE

## 2023-07-05 VITALS
HEIGHT: 62 IN | HEART RATE: 92 BPM | OXYGEN SATURATION: 98 % | RESPIRATION RATE: 20 BRPM | SYSTOLIC BLOOD PRESSURE: 119 MMHG | TEMPERATURE: 98 F | DIASTOLIC BLOOD PRESSURE: 64 MMHG | BODY MASS INDEX: 39.05 KG/M2 | WEIGHT: 212.19 LBS

## 2023-07-05 DIAGNOSIS — C50.912 BREAST CANCER, STAGE 4, LEFT: ICD-10-CM

## 2023-07-05 DIAGNOSIS — I35.0 NONRHEUMATIC AORTIC VALVE STENOSIS: ICD-10-CM

## 2023-07-05 DIAGNOSIS — C78.02 MALIGNANT NEOPLASM METASTATIC TO LEFT LUNG: ICD-10-CM

## 2023-07-05 DIAGNOSIS — I85.00 ESOPHAGEAL AND GASTRIC VARICES: ICD-10-CM

## 2023-07-05 DIAGNOSIS — D62 ACUTE BLOOD LOSS ANEMIA: ICD-10-CM

## 2023-07-05 DIAGNOSIS — I86.4 ESOPHAGEAL AND GASTRIC VARICES: ICD-10-CM

## 2023-07-05 DIAGNOSIS — C50.912 BREAST CANCER, STAGE 4, LEFT: Primary | ICD-10-CM

## 2023-07-05 LAB
ALBUMIN SERPL BCP-MCNC: 2.9 G/DL (ref 3.5–5.2)
ALP SERPL-CCNC: 115 U/L (ref 55–135)
ALT SERPL W/O P-5'-P-CCNC: 13 U/L (ref 10–44)
ANION GAP SERPL CALC-SCNC: 7 MMOL/L (ref 8–16)
AST SERPL-CCNC: 27 U/L (ref 10–40)
BASOPHILS # BLD AUTO: 0.04 K/UL (ref 0–0.2)
BASOPHILS NFR BLD: 1.4 % (ref 0–1.9)
BILIRUB SERPL-MCNC: 2.1 MG/DL (ref 0.1–1)
BUN SERPL-MCNC: 4 MG/DL (ref 6–20)
CALCIUM SERPL-MCNC: 8.2 MG/DL (ref 8.7–10.5)
CHLORIDE SERPL-SCNC: 106 MMOL/L (ref 95–110)
CO2 SERPL-SCNC: 28 MMOL/L (ref 23–29)
CREAT SERPL-MCNC: 0.6 MG/DL (ref 0.5–1.4)
DIFFERENTIAL METHOD: ABNORMAL
EOSINOPHIL # BLD AUTO: 0.2 K/UL (ref 0–0.5)
EOSINOPHIL NFR BLD: 7.6 % (ref 0–8)
ERYTHROCYTE [DISTWIDTH] IN BLOOD BY AUTOMATED COUNT: 21.6 % (ref 11.5–14.5)
EST. GFR  (NO RACE VARIABLE): >60 ML/MIN/1.73 M^2
GLUCOSE SERPL-MCNC: 117 MG/DL (ref 70–110)
HCT VFR BLD AUTO: 27.6 % (ref 37–48.5)
HGB BLD-MCNC: 8.6 G/DL (ref 12–16)
IMM GRANULOCYTES # BLD AUTO: 0 K/UL (ref 0–0.04)
IMM GRANULOCYTES NFR BLD AUTO: 0 % (ref 0–0.5)
LYMPHOCYTES # BLD AUTO: 0.5 K/UL (ref 1–4.8)
LYMPHOCYTES NFR BLD: 19.1 % (ref 18–48)
MCH RBC QN AUTO: 35.5 PG (ref 27–31)
MCHC RBC AUTO-ENTMCNC: 31.2 G/DL (ref 32–36)
MCV RBC AUTO: 114 FL (ref 82–98)
MONOCYTES # BLD AUTO: 0.3 K/UL (ref 0.3–1)
MONOCYTES NFR BLD: 9 % (ref 4–15)
NEUTROPHILS # BLD AUTO: 1.7 K/UL (ref 1.8–7.7)
NEUTROPHILS NFR BLD: 62.9 % (ref 38–73)
NRBC BLD-RTO: 0 /100 WBC
PLATELET # BLD AUTO: 148 K/UL (ref 150–450)
PMV BLD AUTO: 11.7 FL (ref 9.2–12.9)
POTASSIUM SERPL-SCNC: 3.6 MMOL/L (ref 3.5–5.1)
PROT SERPL-MCNC: 5.2 G/DL (ref 6–8.4)
RBC # BLD AUTO: 2.42 M/UL (ref 4–5.4)
SODIUM SERPL-SCNC: 141 MMOL/L (ref 136–145)
WBC # BLD AUTO: 2.77 K/UL (ref 3.9–12.7)

## 2023-07-05 PROCEDURE — 99214 OFFICE O/P EST MOD 30 MIN: CPT | Mod: PBBFAC | Performed by: INTERNAL MEDICINE

## 2023-07-05 PROCEDURE — 85025 COMPLETE CBC W/AUTO DIFF WBC: CPT | Performed by: INTERNAL MEDICINE

## 2023-07-05 PROCEDURE — 80053 COMPREHEN METABOLIC PANEL: CPT | Performed by: INTERNAL MEDICINE

## 2023-07-05 PROCEDURE — 99214 PR OFFICE/OUTPT VISIT, EST, LEVL IV, 30-39 MIN: ICD-10-PCS | Mod: S$PBB,,, | Performed by: INTERNAL MEDICINE

## 2023-07-05 PROCEDURE — 99214 OFFICE O/P EST MOD 30 MIN: CPT | Mod: S$PBB,,, | Performed by: INTERNAL MEDICINE

## 2023-07-05 PROCEDURE — 99999 PR PBB SHADOW E&M-EST. PATIENT-LVL IV: ICD-10-PCS | Mod: PBBFAC,,, | Performed by: INTERNAL MEDICINE

## 2023-07-05 PROCEDURE — 99999 PR PBB SHADOW E&M-EST. PATIENT-LVL IV: CPT | Mod: PBBFAC,,, | Performed by: INTERNAL MEDICINE

## 2023-07-05 PROCEDURE — 36415 COLL VENOUS BLD VENIPUNCTURE: CPT | Performed by: INTERNAL MEDICINE

## 2023-07-05 RX ORDER — FERROUS SULFATE 325(65) MG
TABLET ORAL
Qty: 60 TABLET | Refills: 3 | Status: SHIPPED | OUTPATIENT
Start: 2023-07-05

## 2023-07-06 ENCOUNTER — TELEPHONE (OUTPATIENT)
Dept: PRIMARY CARE CLINIC | Facility: CLINIC | Age: 53
End: 2023-07-06
Payer: MEDICARE

## 2023-07-06 PROBLEM — E66.01 SEVERE OBESITY (BMI 35.0-39.9) WITH COMORBIDITY: Status: ACTIVE | Noted: 2023-07-06

## 2023-07-06 PROBLEM — I70.0 AORTIC ATHEROSCLEROSIS: Status: ACTIVE | Noted: 2023-07-06

## 2023-07-06 NOTE — PROGRESS NOTES
Subjective:           Chief Complaint: Shortness of Breath (On exertion)      [facility]    Cardio-oncology consultation    53 year old female with breast cancer receiving trastuzumab deruxtecan (previously also received paclitaxel, doxorubicin, eribulin, capecitabine, lapatinib, trastuzumab, trastuzumab emtansine, docetaxel and vinorelbine), mild aortic stenosis, obesity, upper GI bleed with esophageal varices noted on EGD here for cardio-oncology evaluation.    Since her hospital discharge she has gained 14 lbs and has noticed increasing shortness of breath with dependent peripheral edema. She denies chest pain/pressure/tightness/discomfort, orthopnea, PND, palpitations, syncope or claudication.    She walks her dogs about half a block, could go further if her dog did not have health issues. She can spend up to 2 hours walking around [place] without needing breaks.    She quit smoking in 1991 and does not drink alcohol.    There is a family history of premature CAD (father had first MI at age 40).    Review of Systems  Pertinent findings as per HPI.        Objective:      Vitals:    07/13/23 1020   BP: 134/65   Pulse: 100       Physical Exam  Constitutional:       Appearance: She is well-developed. She is not diaphoretic.   HENT:      Head: Normocephalic and atraumatic.   Eyes:      Pupils: Pupils are equal, round, and reactive to light.   Neck:      Vascular: JVD present.   Cardiovascular:      Rate and Rhythm: Normal rate and regular rhythm.      Heart sounds: Heart sounds not distant. Murmur (Grade III/VI sysolic murmur loudest at RUSB, radiates to carotids) heard.     No friction rub. No gallop. No S3 or S4 sounds.   Pulmonary:      Effort: Pulmonary effort is normal. No respiratory distress.      Breath sounds: Rales (Bibasilar) present. No wheezing.   Abdominal:      General: Bowel sounds are normal. There is no distension.      Palpations: Abdomen is soft.      Tenderness: There is no abdominal tenderness.    Musculoskeletal:         General: Swelling (3+ BLLE edema) present.      Cervical back: Normal range of motion.   Skin:     General: Skin is warm.      Findings: No erythema.   Neurological:      Mental Status: She is alert and oriented to person, place, and time.   Psychiatric:         Behavior: Behavior normal.         Assessment:       1. Breast cancer, stage 4, left    2. Malignant neoplasm metastatic to left lung    3. Gastrointestinal hemorrhage, unspecified gastrointestinal hemorrhage type    4. Esophageal and gastric varices    5. Obesity (BMI 30-39.9)    6. Nonrheumatic aortic valve stenosis    7. Severe obesity (BMI 35.0-39.9) with comorbidity    8. Aortic atherosclerosis    9. Chronic heart failure with preserved ejection fraction          Plan     Breast cancer, stage 4, left  Malignant neoplasm metastatic to left lung  Very high risk for CTRCD according to HFA-ICOS risk stratification  TTE every 3 months and consideration of BNP/troponin every 3 months recommended  Troponin, BNP and repeat TTE ordered today (repeat TTE due to the clinical change since hospital discharge)  Clearly volume overloaded, will start lasix 40 mg daily, BMP in one week    Stress test when euvolemic    Mild aortic stenosis  TTE surveillance as above    Gastrointestinal hemorrhage, unspecified gastrointestinal hemorrhage type  Esophageal and gastric varices  Plans for repeat EGD    Obesity (BMI 30-39.9)  We discussed diet and lifestyle modification.    RTC 6 weeks

## 2023-07-06 NOTE — TELEPHONE ENCOUNTER
----- Message from Emani Bernal MA sent at 7/6/2023 12:04 PM CDT -----  Type:  Sooner Apoointment Request    Caller is requesting a sooner appointment. Yes  Name of Caller: pt  When is the first available appointment? N/a  Would the patient rather a call back or a response via [facility]? Call back  Best Call Back Number: [phone]  Additional Information:  please contact pt to be scheduled by provider

## 2023-07-12 ENCOUNTER — TELEPHONE (OUTPATIENT)
Dept: ADMINISTRATIVE | Facility: CLINIC | Age: 53
End: 2023-07-12
Payer: MEDICARE

## 2023-07-12 ENCOUNTER — PATIENT OUTREACH (OUTPATIENT)
Dept: ADMINISTRATIVE | Facility: OTHER | Age: 53
End: 2023-07-12
Payer: MEDICARE

## 2023-07-12 NOTE — PROGRESS NOTES
Pt did not answer on 1st outreach call. I will continue to outreach tomorrow on [facility] platform.

## 2023-07-13 ENCOUNTER — OFFICE VISIT (OUTPATIENT)
Dept: CARDIOLOGY | Facility: CLINIC | Age: 53
End: 2023-07-13
Payer: MEDICARE

## 2023-07-13 ENCOUNTER — TELEPHONE (OUTPATIENT)
Dept: HEMATOLOGY/ONCOLOGY | Facility: CLINIC | Age: 53
End: 2023-07-13
Payer: MEDICARE

## 2023-07-13 VITALS
HEIGHT: 62 IN | BODY MASS INDEX: 39.39 KG/M2 | DIASTOLIC BLOOD PRESSURE: 65 MMHG | OXYGEN SATURATION: 100 % | SYSTOLIC BLOOD PRESSURE: 134 MMHG | HEART RATE: 100 BPM | WEIGHT: 214.06 LBS

## 2023-07-13 DIAGNOSIS — M54.50 CHRONIC BILATERAL LOW BACK PAIN WITHOUT SCIATICA: Primary | ICD-10-CM

## 2023-07-13 DIAGNOSIS — E66.9 OBESITY (BMI 30-39.9): ICD-10-CM

## 2023-07-13 DIAGNOSIS — C50.912 BREAST CANCER, STAGE 4, LEFT: Primary | ICD-10-CM

## 2023-07-13 DIAGNOSIS — E66.01 SEVERE OBESITY (BMI 35.0-39.9) WITH COMORBIDITY: ICD-10-CM

## 2023-07-13 DIAGNOSIS — C78.02 MALIGNANT NEOPLASM METASTATIC TO LEFT LUNG: ICD-10-CM

## 2023-07-13 DIAGNOSIS — I85.00 ESOPHAGEAL AND GASTRIC VARICES: ICD-10-CM

## 2023-07-13 DIAGNOSIS — G89.29 CHRONIC BILATERAL LOW BACK PAIN WITHOUT SCIATICA: Primary | ICD-10-CM

## 2023-07-13 DIAGNOSIS — I50.32 CHRONIC HEART FAILURE WITH PRESERVED EJECTION FRACTION: ICD-10-CM

## 2023-07-13 DIAGNOSIS — I35.0 NONRHEUMATIC AORTIC VALVE STENOSIS: ICD-10-CM

## 2023-07-13 DIAGNOSIS — K92.2 GASTROINTESTINAL HEMORRHAGE, UNSPECIFIED GASTROINTESTINAL HEMORRHAGE TYPE: ICD-10-CM

## 2023-07-13 DIAGNOSIS — I86.4 ESOPHAGEAL AND GASTRIC VARICES: ICD-10-CM

## 2023-07-13 DIAGNOSIS — R41.840 LACK OF CONCENTRATION: ICD-10-CM

## 2023-07-13 DIAGNOSIS — I70.0 AORTIC ATHEROSCLEROSIS: ICD-10-CM

## 2023-07-13 PROCEDURE — 99204 PR OFFICE/OUTPT VISIT, NEW, LEVL IV, 45-59 MIN: ICD-10-PCS | Mod: S$PBB,,, | Performed by: INTERNAL MEDICINE

## 2023-07-13 PROCEDURE — 99999 PR PBB SHADOW E&M-EST. PATIENT-LVL IV: ICD-10-PCS | Mod: PBBFAC,,, | Performed by: INTERNAL MEDICINE

## 2023-07-13 PROCEDURE — 99204 OFFICE O/P NEW MOD 45 MIN: CPT | Mod: S$PBB,,, | Performed by: INTERNAL MEDICINE

## 2023-07-13 PROCEDURE — 99214 OFFICE O/P EST MOD 30 MIN: CPT | Mod: PBBFAC | Performed by: INTERNAL MEDICINE

## 2023-07-13 PROCEDURE — 99999 PR PBB SHADOW E&M-EST. PATIENT-LVL IV: CPT | Mod: PBBFAC,,, | Performed by: INTERNAL MEDICINE

## 2023-07-13 RX ORDER — FUROSEMIDE 40 MG/1
40 TABLET ORAL DAILY
Qty: 30 TABLET | Refills: 11 | Status: SHIPPED | OUTPATIENT
Start: 2023-07-13 | End: 2023-07-20

## 2023-07-13 RX ORDER — METHYLPHENIDATE HYDROCHLORIDE 5 MG/1
5 TABLET ORAL 2 TIMES DAILY
Qty: 60 TABLET | Refills: 0 | Status: SHIPPED | OUTPATIENT
Start: 2023-07-13 | End: 2023-09-12 | Stop reason: SDUPTHER

## 2023-07-13 NOTE — TELEPHONE ENCOUNTER
----- Message from Trish Mark sent at 7/13/2023 11:59 AM CDT -----  Regarding: Acupuncture  Good Morning,     Ms. Trivedi has seen Ms. Randi Healy in the past for acupuncture and would like to resume.  Can you please put in an order using PRO1 or simply by typing acupuncture.        Medicare covers up to 12 acupuncture visits in 90 days for chronic low back pain. Chronic low back pain is defined as: Lasting 12 weeks or longer    Can you please update her diagnosis on the referral to also reflect M54.5 Chronic Low Back Pain?    Thank you,     Trish Mark  [phone]

## 2023-07-19 ENCOUNTER — CLINICAL SUPPORT (OUTPATIENT)
Dept: REHABILITATION | Facility: HOSPITAL | Age: 53
End: 2023-07-19
Attending: INTERNAL MEDICINE
Payer: MEDICARE

## 2023-07-19 DIAGNOSIS — G89.29 CHRONIC BILATERAL LOW BACK PAIN WITHOUT SCIATICA: ICD-10-CM

## 2023-07-19 DIAGNOSIS — M54.50 CHRONIC BILATERAL LOW BACK PAIN WITHOUT SCIATICA: ICD-10-CM

## 2023-07-19 PROCEDURE — 97813 ACUP 1/> W/ESTIM 1ST 15 MIN: CPT | Performed by: ACUPUNCTURIST

## 2023-07-19 PROCEDURE — 97814 ACUP 1/> W/ESTIM EA ADDL 15: CPT | Performed by: ACUPUNCTURIST

## 2023-07-19 NOTE — PROGRESS NOTES
"  Acupuncture Evaluation Note     Name: Shikha MORALES [facility] Number: [id number]    Traditional Chinese Medicine (TCM) Diagnosis: Qi Deficiency, Blood Deficiency, Damp, and Garcia Deficiency  Medical Diagnosis:   Encounter Diagnosis   Name Primary?    Chronic bilateral low back pain without sciatica         Evaluation Date: 7/19/2023    Visit #/Visits authorized: 1/12     Precautions: Standard and cancer    Subjective     Chief Concern: Midline cLBP and CIPN with numbness in tips of fingers and toes, worse in hands.     Medical necessity is demonstrated by the following IMPAIRMENTS: Medical Necessity: Cancer related pain              Aggravating Factors:  movement and standing   Relieving Factors:  rest    Symptom Description:     Quality:  Aching and numbness  Severity:  3  Frequency:  continuously    Previous Treatments Tried:  Acupuncture    HEENT:  no complaints    Chest:  no complaints     Digestion:     Diet: in general, a "healthy" diet     Fluids: denies use, is drinking plenty of fluids, none  Taste/Appetite: appetite good, taste normal    Symptoms: constipation    Sleep: History of trouble staying sleep, wakes up in the night with racing mind    Energy Levels:  better in past month, fatigue kris with chemo.     Psychological Symptoms:  anxiety     Other Symptoms: Hospitalized within the past month for liver problems, which created varises in throat and stomach. Patient was vomiting blood and had bloody diarrhea. She had two blood transfusions in ER. No confirmed dx yet, cirrhosis and cancer ruled out. Could be either fatty liver disease or effects of chemo. Started Lasix last Friday to treat water weight, has lost 17 lbs since. Skipped a dose of chemo recently due to being in ER.     GYN Symptoms: chemo induced menopause     Objective     Observation:     Tongue:      Body:  swollen edges   Color:  pink   Coating:  thin,     Pulse:        deep and slippery       New Findings:      Treatment     Treatment " Principles:  Nourish qi and blood, strengthen Yang qi, strengthen middle burner to transform damp, stop pain    Acupuncture points used:  BA GRADY, BA MALINA , Ki3, Ki6, Li11, Lu7, REN12, REN6, Sp6, and YIN JENKINS    Bilateral points: KD7, ling-gu/da-derrick  Unilateral points: HT7, GB40  Auricular Treatment:      Needles In: 35  Needles Out: 35  [place] W/ STIM placed: 11:30  Needles W/ STIM removed: 11:50      Other Traditional Chinese Medicine Modalities - [facility]    Assessment     After treatment, patient felt relaxed     Patient prognosis is Good.     Patient will continue to benefit from acupuncture treatment to address the deficits listed in the problem list box on initial evaluation, provide patient family education and to maximize pt's level of independence in the home and community environment.     Patient's spiritual, cultural and educational needs considered and pt agreeable to plan of care and goals.     Anticipated barriers to treatment: none    Plan     Recommend 1 /week for 6 sessions then re-assess.      Education:  Patient is aware of cumulative benefit of acupuncture

## 2023-07-20 ENCOUNTER — LAB VISIT (OUTPATIENT)
Dept: LAB | Facility: HOSPITAL | Age: 53
End: 2023-07-20
Attending: INTERNAL MEDICINE
Payer: MEDICARE

## 2023-07-20 DIAGNOSIS — C50.912 BREAST CANCER, STAGE 4, LEFT: ICD-10-CM

## 2023-07-20 LAB
ANION GAP SERPL CALC-SCNC: 7 MMOL/L (ref 8–16)
BNP SERPL-MCNC: 124 PG/ML (ref 0–99)
BUN SERPL-MCNC: 7 MG/DL (ref 6–20)
CALCIUM SERPL-MCNC: 8.4 MG/DL (ref 8.7–10.5)
CHLORIDE SERPL-SCNC: 106 MMOL/L (ref 95–110)
CO2 SERPL-SCNC: 29 MMOL/L (ref 23–29)
CREAT SERPL-MCNC: 0.6 MG/DL (ref 0.5–1.4)
EST. GFR  (NO RACE VARIABLE): >60 ML/MIN/1.73 M^2
GLUCOSE SERPL-MCNC: 95 MG/DL (ref 70–110)
POTASSIUM SERPL-SCNC: 3.2 MMOL/L (ref 3.5–5.1)
SODIUM SERPL-SCNC: 142 MMOL/L (ref 136–145)
TROPONIN I SERPL DL<=0.01 NG/ML-MCNC: 0.01 NG/ML (ref 0–0.03)

## 2023-07-20 PROCEDURE — 80048 BASIC METABOLIC PNL TOTAL CA: CPT | Performed by: INTERNAL MEDICINE

## 2023-07-20 PROCEDURE — 83880 ASSAY OF NATRIURETIC PEPTIDE: CPT | Performed by: INTERNAL MEDICINE

## 2023-07-20 PROCEDURE — 84484 ASSAY OF TROPONIN QUANT: CPT | Performed by: INTERNAL MEDICINE

## 2023-07-20 PROCEDURE — 36415 COLL VENOUS BLD VENIPUNCTURE: CPT | Performed by: INTERNAL MEDICINE

## 2023-07-20 RX ORDER — FUROSEMIDE 20 MG/1
20 TABLET ORAL DAILY
Qty: 90 TABLET | Refills: 3 | Status: SHIPPED | OUTPATIENT
Start: 2023-07-20 | End: 2024-07-19

## 2023-07-20 NOTE — PROGRESS NOTES
Spoke with patient regarding hypokalemia on today's labs. She feels markedly improved since starting lasix with significantly less dyspnea and edema. She is now starting to feel somewhat dehydrated (dry mouth) and is drinking more water.    Decrease lasix dose from 40 mg daily to 20 mg daily with instructions given on how to self-titrate lasix dose and when to notify me. She missed her potassium supplement yesterday. She will continue her potassium supplement and reduce her lasix dose and has upcoming labs next week for which she can message me in order to review results.    Kevin Epps MD

## 2023-07-24 NOTE — PROGRESS NOTES
Subjective:       Patient ID: Shikha López is a 53 y.o. female.    Chief Complaint: No chief complaint on file.      HPI 53-year-old female who returns for F/U  for metastatic breast cancer.  She is on Trastuzumab deruxtecan  - here for cycle  37.      She was hospitalized on June 22nd with an upper GI bleed due to gastric varices.  Hemoglobin decreased to 7 g dL.  She required packed red blood cell transfusions.    She is scheduled for follow-up endoscopy August 3rd.      Chemo was held to give her additional recovery time.  She saw Cardiology on July 13th and was felt to be volume overloaded, Lasix was started with marked improvement - now on 20 mg/day.    Today she reports that she is been feeling much better.  Her breathing is almost back to normal and her fatigue is also improved.          Breast history:  She presented in the emergency room at [facility] on September 29, 2006 with a 4 months history of inflammation of her left breast.  Physical examination at that time showed the left breast was largely replaced by large mass with multiple skin ulcerations.    A biopsy in September 2006 showed poorly differentiated carcinoma which was ER and OR. negative and HER2 positive.    She was then referred to [facility] for additional care.   CT scan of the chest and abdomen November 2006 revealed multiple nodules in the lungs consistent with metastatic disease.  There also enlarged left axillary node.    She was treated with chemotherapy with weekly Herceptin and Abraxane and had marked improvement in her breast and lung metastasis on that therapy.    On May 29, 2007 she underwent left modified radical mastectomy(Dr. Colvin) which showed no residual tumor in the breast and 1/5 nodes was positive for metastasis measuring 6 mm.  (ypT0N1).    She then received postoperative radiation therapy(Dr Singh)  to the left chest wall supraclav and internal mammary lymph nodes from August 20, 2007 to September  28, 2007.    Postoperatively she continued on Herceptin for approximately 3 and 1/2 years before her lung metastasis begin to grow.    She subsequently received a number of different chemotherapy treatments including Adriamycin, Halaven, Xeloda plus lapatinib then Xeloda plus Herceptin.  The  Those treatments were provided under the care of  in [facility].  Subsequently, she transferred her care to  at [facility].  She was initially treated with Taxotere Herceptin Perjeta.      She was then changed to Kadcyla.    In July 2020 PET scan showed an increase in the size of an infrahilar mass consistent with progression.   She then took approximately 9 months of Herceptin and Navelbine.  Apparently she has some initial response to that therapy.    She started trastuzumab- deruxtecan  4/12/21.       CT 6/30/23 - stable - no new findings    Echo 6/26/23 - EF 65% , left atrial enlargement, mild AS  Review of Systems   Constitutional:  Positive for fatigue (improved). Negative for appetite change and unexpected weight change.   HENT:  Negative for mouth sores.    Eyes:  Negative for visual disturbance.   Respiratory:  Positive for shortness of breath (improved). Negative for cough.    Cardiovascular:  Negative for chest pain.   Gastrointestinal:  Negative for abdominal pain, blood in stool, constipation, diarrhea and nausea.   Genitourinary:  Negative for frequency.   Musculoskeletal:  Negative for back pain.   Integumentary:  Negative for rash.   Neurological:  Negative for headaches.   Hematological:  Negative for adenopathy.   Psychiatric/Behavioral: Negative.  The patient is not nervous/anxious.        Objective:      Physical Exam  Vitals reviewed.   Constitutional:       General: She is not in acute distress.     Appearance: She is obese.   HENT:      Mouth/Throat:      Mouth: Mucous membranes are moist.      Pharynx: Oropharynx is clear. No oropharyngeal exudate or posterior oropharyngeal  erythema.   Eyes:      Pupils: Pupils are equal, round, and reactive to light.   Cardiovascular:      Rate and Rhythm: Normal rate and regular rhythm.      Heart sounds: Murmur (systolic -aortic) heard.   Pulmonary:      Effort: Pulmonary effort is normal. No respiratory distress.      Breath sounds: Normal breath sounds. No wheezing or rales.   Chest:   Breasts:     Right: Normal.      Left: Absent.       Abdominal:      Palpations: Abdomen is soft. There is no mass.      Tenderness: There is no abdominal tenderness.   Lymphadenopathy:      Cervical: No cervical adenopathy.      Upper Body:      Right upper body: No supraclavicular or axillary adenopathy.      Left upper body: No supraclavicular or axillary adenopathy.   Skin:     Findings: No rash.   Neurological:      Mental Status: She is alert and oriented to person, place, and time.   Psychiatric:         Mood and Affect: Mood normal.         Behavior: Behavior normal.         Thought Content: Thought content normal.         Judgment: Judgment normal.       Assessment:    CBC - HGB 10.1, WBC 2560 with ANC 1400, CMP normal renal and hepatic function (bu=clement 1.8)  Problem List Items Addressed This Visit       Breast cancer, stage 4, left    Malignant neoplasm metastatic to left lung - Primary       Plan:    Restart Therapy  RTC 3 weeks.    Route Chart for Scheduling    Med Onc Chart Routing      Follow up with physician 3 weeks. me or Mariam   Follow up with JIMI    Infusion scheduling note    Injection scheduling note    Labs CBC and CMP   Scheduling:  Preferred lab:  Lab interval:     Imaging None      Pharmacy appointment    Other referrals     No additional referrals needed           Treatment Plan Information   OP BREAST FAM-TRASTUZUMAB DERUXTECAN-NXKI Q3W   Home Willis MD   Upcoming Treatment Dates - OP BREAST FAM-TRASTUZUMAB DERUXTECAN-NXKI Q3W    8/3/2023       Chemotherapy       fam-trastuzumab deruxtecan-nxki (ENHERTU) 530 mg in dextrose 5 % (D5W) 100  mL infusion       Antiemetics       fosaprepitant 150 mg in sodium chloride 0.9% 150 mL IVPB       palonosetron (ALOXI) 0.25 mg in sodium chloride 0.9% 50 mL IVPB  8/24/2023       Chemotherapy       fam-trastuzumab deruxtecan-nxki (ENHERTU) 530 mg in dextrose 5 % (D5W) 100 mL infusion       Antiemetics       fosaprepitant 150 mg in sodium chloride 0.9% 150 mL IVPB       palonosetron (ALOXI) 0.25 mg in sodium chloride 0.9% 50 mL IVPB  9/14/2023       Chemotherapy       fam-trastuzumab deruxtecan-nxki (ENHERTU) 530 mg in dextrose 5 % (D5W) 100 mL infusion       Antiemetics       fosaprepitant 150 mg in sodium chloride 0.9% 150 mL IVPB       palonosetron (ALOXI) 0.25 mg in sodium chloride 0.9% 50 mL IVPB  10/5/2023       Chemotherapy       fam-trastuzumab deruxtecan-nxki (ENHERTU) 530 mg in dextrose 5 % (D5W) 100 mL infusion       Antiemetics       fosaprepitant 150 mg in sodium chloride 0.9% 150 mL IVPB       palonosetron (ALOXI) 0.25 mg in sodium chloride 0.9% 50 mL IVPB

## 2023-07-26 ENCOUNTER — OFFICE VISIT (OUTPATIENT)
Dept: HEMATOLOGY/ONCOLOGY | Facility: CLINIC | Age: 53
End: 2023-07-26
Payer: MEDICARE

## 2023-07-26 ENCOUNTER — PATIENT MESSAGE (OUTPATIENT)
Dept: ADMINISTRATIVE | Facility: OTHER | Age: 53
End: 2023-07-26
Payer: MEDICARE

## 2023-07-26 ENCOUNTER — INFUSION (OUTPATIENT)
Dept: INFUSION THERAPY | Facility: HOSPITAL | Age: 53
End: 2023-07-26
Attending: INTERNAL MEDICINE
Payer: MEDICARE

## 2023-07-26 VITALS
SYSTOLIC BLOOD PRESSURE: 116 MMHG | WEIGHT: 190.81 LBS | TEMPERATURE: 98 F | HEIGHT: 62 IN | BODY MASS INDEX: 35.11 KG/M2 | OXYGEN SATURATION: 100 % | RESPIRATION RATE: 20 BRPM | HEART RATE: 81 BPM | DIASTOLIC BLOOD PRESSURE: 56 MMHG

## 2023-07-26 VITALS — SYSTOLIC BLOOD PRESSURE: 118 MMHG | HEART RATE: 68 BPM | DIASTOLIC BLOOD PRESSURE: 58 MMHG

## 2023-07-26 DIAGNOSIS — E87.6 HYPOKALEMIA: Primary | ICD-10-CM

## 2023-07-26 DIAGNOSIS — D61.818 PANCYTOPENIA: ICD-10-CM

## 2023-07-26 DIAGNOSIS — C78.02 MALIGNANT NEOPLASM METASTATIC TO LEFT LUNG: Primary | ICD-10-CM

## 2023-07-26 DIAGNOSIS — C50.912 BREAST CANCER, STAGE 4, LEFT: ICD-10-CM

## 2023-07-26 PROCEDURE — 63600175 PHARM REV CODE 636 W HCPCS: Performed by: INTERNAL MEDICINE

## 2023-07-26 PROCEDURE — 96360 HYDRATION IV INFUSION INIT: CPT

## 2023-07-26 PROCEDURE — 99999 PR PBB SHADOW E&M-EST. PATIENT-LVL III: ICD-10-PCS | Mod: PBBFAC,,, | Performed by: INTERNAL MEDICINE

## 2023-07-26 PROCEDURE — 99213 OFFICE O/P EST LOW 20 MIN: CPT | Mod: PBBFAC,25 | Performed by: INTERNAL MEDICINE

## 2023-07-26 PROCEDURE — A4216 STERILE WATER/SALINE, 10 ML: HCPCS | Performed by: INTERNAL MEDICINE

## 2023-07-26 PROCEDURE — 99214 OFFICE O/P EST MOD 30 MIN: CPT | Mod: S$PBB,,, | Performed by: INTERNAL MEDICINE

## 2023-07-26 PROCEDURE — 99214 PR OFFICE/OUTPT VISIT, EST, LEVL IV, 30-39 MIN: ICD-10-PCS | Mod: S$PBB,,, | Performed by: INTERNAL MEDICINE

## 2023-07-26 PROCEDURE — 25000003 PHARM REV CODE 250: Performed by: INTERNAL MEDICINE

## 2023-07-26 PROCEDURE — 99999 PR PBB SHADOW E&M-EST. PATIENT-LVL III: CPT | Mod: PBBFAC,,, | Performed by: INTERNAL MEDICINE

## 2023-07-26 PROCEDURE — 96367 TX/PROPH/DG ADDL SEQ IV INF: CPT

## 2023-07-26 PROCEDURE — 96413 CHEMO IV INFUSION 1 HR: CPT

## 2023-07-26 RX ORDER — SODIUM CHLORIDE 9 MG/ML
INJECTION, SOLUTION INTRAVENOUS ONCE
Status: COMPLETED | OUTPATIENT
Start: 2023-07-26 | End: 2023-07-26

## 2023-07-26 RX ORDER — SODIUM CHLORIDE 9 MG/ML
INJECTION, SOLUTION INTRAVENOUS ONCE
Status: CANCELLED
Start: 2023-08-03 | End: 2023-07-26

## 2023-07-26 RX ORDER — SODIUM CHLORIDE 0.9 % (FLUSH) 0.9 %
10 SYRINGE (ML) INJECTION
Status: DISCONTINUED | OUTPATIENT
Start: 2023-07-26 | End: 2023-07-26 | Stop reason: HOSPADM

## 2023-07-26 RX ORDER — HEPARIN 100 UNIT/ML
500 SYRINGE INTRAVENOUS
Status: DISCONTINUED | OUTPATIENT
Start: 2023-07-26 | End: 2023-07-26 | Stop reason: HOSPADM

## 2023-07-26 RX ORDER — HEPARIN 100 UNIT/ML
500 SYRINGE INTRAVENOUS
Status: CANCELLED | OUTPATIENT
Start: 2023-08-03

## 2023-07-26 RX ORDER — SODIUM CHLORIDE 9 MG/ML
INJECTION, SOLUTION INTRAVENOUS ONCE
Status: CANCELLED
Start: 2023-08-03 | End: 2023-08-03

## 2023-07-26 RX ORDER — SODIUM CHLORIDE 0.9 % (FLUSH) 0.9 %
10 SYRINGE (ML) INJECTION
Status: CANCELLED | OUTPATIENT
Start: 2023-08-03

## 2023-07-26 RX ADMIN — Medication 10 ML: at 12:07

## 2023-07-26 RX ADMIN — SODIUM CHLORIDE: 9 INJECTION, SOLUTION INTRAVENOUS at 09:07

## 2023-07-26 RX ADMIN — HEPARIN 500 UNITS: 100 SYRINGE at 12:07

## 2023-07-26 RX ADMIN — FOSAPREPITANT 150 MG: 150 INJECTION, POWDER, LYOPHILIZED, FOR SOLUTION INTRAVENOUS at 10:07

## 2023-07-26 RX ADMIN — PALONOSETRON 0.25 MG: 0.05 INJECTION, SOLUTION INTRAVENOUS at 09:07

## 2023-07-26 RX ADMIN — FAM-TRASTUZUMAB DERUXTECAN-NXKI 500 MG: 100 INJECTION, POWDER, LYOPHILIZED, FOR SOLUTION INTRAVENOUS at 10:07

## 2023-07-27 ENCOUNTER — PATIENT MESSAGE (OUTPATIENT)
Dept: ADMINISTRATIVE | Facility: OTHER | Age: 53
End: 2023-07-27
Payer: MEDICARE

## 2023-07-28 ENCOUNTER — TELEPHONE (OUTPATIENT)
Dept: ENDOSCOPY | Facility: HOSPITAL | Age: 53
End: 2023-07-28
Payer: MEDICARE

## 2023-07-28 NOTE — TELEPHONE ENCOUNTER
----- Message from Sally Cosme sent at 7/28/2023 10:23 AM CDT -----  Type:  Needs Medical Advice    Who Called: pt  Symptoms (please be specific): Pt has Endoscopy on Aug 3 needs to know about what time so she can make arrangements for transportation   Would the patient rather a call back or a response via [facility]? Call  Best Call Back Number: [phone]  Additional Information:

## 2023-07-31 ENCOUNTER — PATIENT MESSAGE (OUTPATIENT)
Dept: ADMINISTRATIVE | Facility: OTHER | Age: 53
End: 2023-07-31
Payer: MEDICARE

## 2023-08-01 ENCOUNTER — PATIENT MESSAGE (OUTPATIENT)
Dept: ADMINISTRATIVE | Facility: OTHER | Age: 53
End: 2023-08-01
Payer: MEDICARE

## 2023-08-02 ENCOUNTER — PATIENT MESSAGE (OUTPATIENT)
Dept: ADMINISTRATIVE | Facility: OTHER | Age: 53
End: 2023-08-02
Payer: MEDICARE

## 2023-08-03 ENCOUNTER — ANESTHESIA EVENT (OUTPATIENT)
Dept: ENDOSCOPY | Facility: HOSPITAL | Age: 53
End: 2023-08-03
Payer: MEDICARE

## 2023-08-03 ENCOUNTER — ANESTHESIA (OUTPATIENT)
Dept: ENDOSCOPY | Facility: HOSPITAL | Age: 53
End: 2023-08-03
Payer: MEDICARE

## 2023-08-03 ENCOUNTER — HOSPITAL ENCOUNTER (OUTPATIENT)
Facility: HOSPITAL | Age: 53
Discharge: HOME | End: 2023-08-03
Attending: INTERNAL MEDICINE | Admitting: INTERNAL MEDICINE
Payer: MEDICARE

## 2023-08-03 VITALS
HEART RATE: 74 BPM | WEIGHT: 189 LBS | TEMPERATURE: 98 F | RESPIRATION RATE: 21 BRPM | OXYGEN SATURATION: 98 % | SYSTOLIC BLOOD PRESSURE: 109 MMHG | HEIGHT: 62 IN | BODY MASS INDEX: 34.78 KG/M2 | DIASTOLIC BLOOD PRESSURE: 60 MMHG

## 2023-08-03 DIAGNOSIS — I85.00 ESOPHAGEAL VARICES: ICD-10-CM

## 2023-08-03 DIAGNOSIS — C78.02 MALIGNANT NEOPLASM METASTATIC TO LEFT LUNG: Primary | ICD-10-CM

## 2023-08-03 DIAGNOSIS — I85.00 ESOPHAGEAL AND GASTRIC VARICES: ICD-10-CM

## 2023-08-03 DIAGNOSIS — I86.4 ESOPHAGEAL AND GASTRIC VARICES: ICD-10-CM

## 2023-08-03 PROCEDURE — 25000003 PHARM REV CODE 250: Performed by: INTERNAL MEDICINE

## 2023-08-03 PROCEDURE — 37000008 HC ANESTHESIA 1ST 15 MINUTES: Performed by: INTERNAL MEDICINE

## 2023-08-03 PROCEDURE — D9220A PRA ANESTHESIA: ICD-10-PCS | Mod: CRNA,,, | Performed by: NURSE ANESTHETIST, CERTIFIED REGISTERED

## 2023-08-03 PROCEDURE — 37000009 HC ANESTHESIA EA ADD 15 MINS: Performed by: INTERNAL MEDICINE

## 2023-08-03 PROCEDURE — 25000003 PHARM REV CODE 250: Performed by: NURSE ANESTHETIST, CERTIFIED REGISTERED

## 2023-08-03 PROCEDURE — 43235 EGD DIAGNOSTIC BRUSH WASH: CPT | Performed by: INTERNAL MEDICINE

## 2023-08-03 PROCEDURE — D9220A PRA ANESTHESIA: Mod: ANES,,, | Performed by: ANESTHESIOLOGY

## 2023-08-03 PROCEDURE — 43235 EGD DIAGNOSTIC BRUSH WASH: CPT | Mod: ,,, | Performed by: INTERNAL MEDICINE

## 2023-08-03 PROCEDURE — 63600175 PHARM REV CODE 636 W HCPCS: Performed by: NURSE ANESTHETIST, CERTIFIED REGISTERED

## 2023-08-03 PROCEDURE — D9220A PRA ANESTHESIA: ICD-10-PCS | Mod: ANES,,, | Performed by: ANESTHESIOLOGY

## 2023-08-03 PROCEDURE — 43235 PR EGD, FLEX, DIAGNOSTIC: ICD-10-PCS | Mod: ,,, | Performed by: INTERNAL MEDICINE

## 2023-08-03 PROCEDURE — D9220A PRA ANESTHESIA: Mod: CRNA,,, | Performed by: NURSE ANESTHETIST, CERTIFIED REGISTERED

## 2023-08-03 RX ORDER — ONDANSETRON 2 MG/ML
4 INJECTION INTRAMUSCULAR; INTRAVENOUS ONCE AS NEEDED
Status: DISCONTINUED | OUTPATIENT
Start: 2023-08-03 | End: 2023-08-03 | Stop reason: HOSPADM

## 2023-08-03 RX ORDER — PROPOFOL 10 MG/ML
VIAL (ML) INTRAVENOUS
Status: DISCONTINUED | OUTPATIENT
Start: 2023-08-03 | End: 2023-08-03

## 2023-08-03 RX ORDER — SODIUM CHLORIDE 9 MG/ML
INJECTION, SOLUTION INTRAVENOUS CONTINUOUS
Status: DISCONTINUED | OUTPATIENT
Start: 2023-08-03 | End: 2023-08-03 | Stop reason: HOSPADM

## 2023-08-03 RX ORDER — SODIUM CHLORIDE 0.9 % (FLUSH) 0.9 %
10 SYRINGE (ML) INJECTION
Status: DISCONTINUED | OUTPATIENT
Start: 2023-08-03 | End: 2023-08-03 | Stop reason: HOSPADM

## 2023-08-03 RX ORDER — PROPOFOL 10 MG/ML
INJECTION, EMULSION INTRAVENOUS CONTINUOUS PRN
Status: DISCONTINUED | OUTPATIENT
Start: 2023-08-03 | End: 2023-08-03

## 2023-08-03 RX ORDER — LIDOCAINE HYDROCHLORIDE 20 MG/ML
INJECTION INTRAVENOUS
Status: DISCONTINUED | OUTPATIENT
Start: 2023-08-03 | End: 2023-08-03

## 2023-08-03 RX ADMIN — PROPOFOL 100 MG: 10 INJECTION, EMULSION INTRAVENOUS at 09:08

## 2023-08-03 RX ADMIN — PROPOFOL 225 MCG/KG/MIN: 10 INJECTION, EMULSION INTRAVENOUS at 09:08

## 2023-08-03 RX ADMIN — PROPOFOL 30 MG: 10 INJECTION, EMULSION INTRAVENOUS at 09:08

## 2023-08-03 RX ADMIN — LIDOCAINE HYDROCHLORIDE 100 MG: 20 INJECTION INTRAVENOUS at 09:08

## 2023-08-03 RX ADMIN — SODIUM CHLORIDE: 0.9 INJECTION, SOLUTION INTRAVENOUS at 08:08

## 2023-08-03 NOTE — PLAN OF CARE
Patient discharged in stable condition with responsible adult.  IV removed.  AVS reviewed and given to patient/family.  Verbalized an understanding of all information discussed.

## 2023-08-03 NOTE — TRANSFER OF CARE
Anesthesia Transfer of Care Note    Patient: Shikha López    Procedure(s) Performed: Procedure(s) (LRB):  EGD (ESOPHAGOGASTRODUODENOSCOPY) (N/A)    Patient location: [facility]    Anesthesia Type: general    Transport from OR: Transported from OR on room air with adequate spontaneous ventilation    Post pain: adequate analgesia    Post assessment: no apparent anesthetic complications    Post vital signs: stable    Level of consciousness: awake    Nausea/Vomiting: no nausea/vomiting    Complications: none    Transfer of care protocol was followed      Last vitals:   Visit Vitals  /54   Pulse 92   Temp 36   Resp 20   Ht    Wt    LMP    SpO2 99%   Breastfeeding    BMI

## 2023-08-03 NOTE — PROVATION PATIENT INSTRUCTIONS
Discharge Summary/Instructions after an Endoscopic Procedure  Patient Name: Shikha López  Patient MRN: [id number]  Patient YOB: 1970  Thursday, August 3, 2023  Home Lopez MD  Dear patient,  As a result of recent federal legislation (The Federal Cures Act), you may   receive lab or pathology results from your procedure in your [facility]   account before your physician is able to contact you. Your physician or   their representative will relay the results to you with their   recommendations at their soonest availability.  Thank you,  RESTRICTIONS:  During your procedure today, you received medications for sedation.  These   medications may affect your judgment, balance and coordination.  Therefore,   for 24 hours, you have the following restrictions:   - DO NOT drive a car, operate machinery, make legal/financial decisions,   sign important papers or drink alcohol.    ACTIVITY:  Today: no heavy lifting, straining or running due to procedural   sedation/anesthesia.  The following day: return to full activity including work.  DIET:  Eat and drink normally unless instructed otherwise.     TREATMENT FOR COMMON SIDE EFFECTS:  - Mild abdominal pain, nausea, belching, bloating or excessive gas:  rest,   eat lightly and use a heating pad.  - Sore Throat: treat with throat lozenges and/or gargle with warm salt   water.  - Because air was used during the procedure, expelling large amounts of air   from your rectum or belching is normal.  - If a bowel prep was taken, you may not have a bowel movement for 1-3 days.    This is normal.  SYMPTOMS TO WATCH FOR AND REPORT TO YOUR PHYSICIAN:  1. Abdominal pain or bloating, other than gas cramps.  2. Chest pain.  3. Back pain.  4. Signs of infection such as: chills or fever occurring within 24 hours   after the procedure.  5. Rectal bleeding, which would show as bright red, maroon, or black stools.   (A tablespoon of blood from the rectum is not serious, especially if    hemorrhoids are present.)  6. Vomiting.  7. Weakness or dizziness.  GO DIRECTLY TO THE NEAREST EMERGENCY ROOM IF YOU HAVE ANY OF THE FOLLOWING:      Difficulty breathing              Chills and/or fever over 101 F   Persistent vomiting and/or vomiting blood   Severe abdominal pain   Severe chest pain   Black, tarry stools   Bleeding- more than one tablespoon   Any other symptom or condition that you feel may need urgent attention  Your doctor recommends these additional instructions:  If any biopsies were taken, your doctors clinic will contact you in 1 to 2   weeks with any results.  - Discharge patient to home.   - Resume previous diet.   - Continue present medications.   - Perform an upper endoscopic ultrasound (UEUS) in 3 months.   - Return to primary care physician.  For questions, problems or results please call your physician - Home Lopez MD at Work:  [phone].  [facility] [place], EMERGENCY ROOM PHONE NUMBER: [phone]  IF A COMPLICATION OR EMERGENCY SITUATION ARISES AND YOU ARE UNABLE TO REACH   YOUR PHYSICIAN - GO DIRECTLY TO THE EMERGENCY ROOM.  Home Lopez MD  8/3/2023 9:47:14 AM  This report has been verified and signed electronically.  Dear patient,  As a result of recent federal legislation (The Federal Cures Act), you may   receive lab or pathology results from your procedure in your [facility]   account before your physician is able to contact you. Your physician or   their representative will relay the results to you with their   recommendations at their soonest availability.  Thank you,  [employer]ATION

## 2023-08-03 NOTE — H&P
Short Stay Endoscopy History and Physical    PCP - No, Primary Doctor  Referring Physician - Home Lopez MD  [address]  [address]  [place] [id number]    Procedure - egd  ASA - per anesthesia  Mallampati - per anesthesia  History of Anesthesia problems - no  Family history Anesthesia problems -  no   Plan of anesthesia - General    HPI:  This is a 53 y.o. female here for evaluation of: variceal surveillance    Reflux - no  Dysphagia - no  Abdominal pain - no  Diarrhea - no    ROS:  Constitutional: No fevers, chills, No weight loss  CV: No chest pain  Pulm: No cough, No shortness of breath  Ophtho: No vision changes  GI: see HPI  Derm: No rash    Medical History:  has a past medical history of Breast cancer.    Surgical History:  has a past surgical history that includes Mastectomy; Esophagogastroduodenoscopy (N/A, 6/23/2023); Endoscopic ultrasound of upper gastrointestinal tract (N/A, 6/27/2023); and Esophagogastroduodenoscopy (N/A, 6/27/2023).    Family History: family history includes Lung cancer in her father..    Social History:  reports that she has never smoked. She has never used smokeless tobacco. She reports that she does not drink alcohol and does not use drugs.    Review of patient's allergies indicates:  No Known Allergies    Medications:   Medications Prior to Admission   Medication Sig Dispense Refill Last Dose    ferrous sulfate (IRON, FERROUS SULFATE,) 325 mg (65 mg iron) Tab tablet Take daily with Vitamin C on an empty stomach 60 tablet 3 8/2/2023    furosemide (LASIX) 20 MG tablet Take 1 tablet (20 mg total) by mouth once daily. 90 tablet 3 Past Week    methylphenidate HCl (RITALIN) 5 MG tablet Take 1 tablet (5 mg total) by mouth 2 (two) times daily. 60 tablet 0 8/2/2023    pantoprazole (PROTONIX) 40 MG tablet Take 1 tablet (40 mg total) by mouth 2 (two) times daily. 60 tablet 2 8/2/2023    potassium chloride 10% (KAYCIEL) 20 mEq/15 mL oral solution Take 15 mLs (20 mEq total) by  mouth once daily. (To prevent stomach upset, mix dose with a glass of cold water or juice) 600 mL 0 Past Week    acetaminophen (TYLENOL) 500 MG tablet Take 1,000 mg by mouth.   More than a month    Lactobac no.41/Bifidobact no.7 (PROBIOTIC-10 ORAL) Take by mouth.   More than a month    LIDOcaine-prilocaine (EMLA) cream APPLY TO THE AFFECTED AREA 1 HOUR BEFORE PORT ACCESS       OLANZapine (ZYPREXA) 5 MG tablet Take days 1-4 of chemotherapy 30 tablet 2 More than a month    ondansetron (ZOFRAN) 8 MG tablet Take 8 mg by mouth 3 (three) times daily as needed.   More than a month       Physical Exam:    Vital Signs:   Vitals:    08/03/23 0856   BP: (!) 164/72   Pulse: 93   Resp: 16   Temp: 98.2 °F (36.8 °C)       General Appearance: Well appearing in no acute distress    Labs:  Lab Results   Component Value Date    WBC 2.56 (L) 07/26/2023    HGB 10.1 (L) 07/26/2023    HCT 33.4 (L) 07/26/2023     (L) 07/26/2023    ALT 13 07/26/2023    AST 32 07/26/2023     07/26/2023    K 3.4 (L) 07/26/2023     07/26/2023    CREATININE 0.6 07/26/2023    BUN 5 (L) 07/26/2023    CO2 28 07/26/2023    INR 1.5 (H) 06/28/2023       I have explained the risks and benefits of this endoscopic procedure to the patient including but not limited to bleeding, inflammation, infection, perforation, and death.      Home Lopez MD  egd

## 2023-08-03 NOTE — ANESTHESIA PREPROCEDURE EVALUATION
08/03/2023  Shikha López is a 53 y.o., female.      Pre-op Assessment          Review of Systems  Anesthesia Hx:  No problems with previous Anesthesia    Hematology/Oncology:        Current/Recent Cancer.   Cardiovascular:   Denies Hypertension. Valvular problems/Murmurs, AS  Denies CAD.     Functional Capacity Can you climb two flights of stairs? ==> Yes    Pulmonary:   Denies Asthma.  Denies Sleep Apnea.    Renal/:   Denies Chronic Renal Disease.     Hepatic/GI:   Denies PUD. Denies GERD. Denies Liver Disease.    Neurological:   Denies CVA. Denies Seizures.    Endocrine:   Denies Diabetes. Denies Hypothyroidism.        Physical Exam  General: Alert    Airway:  Mallampati: I   Mouth Opening: Normal  TM Distance: Normal  Tongue: Normal  Neck ROM: Normal ROM    Dental:  Periodontal disease        Anesthesia Plan  Type of Anesthesia, risks & benefits discussed:    Anesthesia Type: Gen ETT  Intra-op Monitoring Plan: Standard ASA Monitors  Post Op Pain Control Plan: multimodal analgesia and IV/PO Opioids PRN  Induction:  IV  Airway Plan: Direct  Informed Consent: Informed consent signed with the Patient and all parties understand the risks and agree with anesthesia plan.  All questions answered.   ASA Score: 3    Ready For Surgery From Anesthesia Perspective.     .

## 2023-08-04 ENCOUNTER — PATIENT MESSAGE (OUTPATIENT)
Dept: ADMINISTRATIVE | Facility: OTHER | Age: 53
End: 2023-08-04
Payer: MEDICARE

## 2023-08-07 NOTE — ANESTHESIA POSTPROCEDURE EVALUATION
Anesthesia Post Evaluation    Patient: Shikha López    Procedure(s) Performed: Procedure(s) (LRB):  EGD (ESOPHAGOGASTRODUODENOSCOPY) (N/A)    Final Anesthesia Type: general      Patient location during evaluation: GI PACU  Patient participation: Yes- Able to Participate  Level of consciousness: awake  Post-procedure vital signs: reviewed and stable  Pain management: adequate  Airway patency: patent    PONV status at discharge: No PONV  Anesthetic complications: no      Cardiovascular status: blood pressure returned to baseline  Respiratory status: unassisted  Hydration status: euvolemic  Follow-up not needed.          Vitals Value Taken Time   /60 08/03/23 1030   Temp 36.7 °C (98.1 °F) 08/03/23 1030   Pulse 74 08/03/23 1030   Resp 21 08/03/23 1030   SpO2 98 % 08/03/23 1030         No case tracking events are documented in the log.      Pain/Yisel Score: No data recorded

## 2023-08-09 ENCOUNTER — PATIENT MESSAGE (OUTPATIENT)
Dept: ADMINISTRATIVE | Facility: OTHER | Age: 53
End: 2023-08-09
Payer: MEDICARE

## 2023-08-10 ENCOUNTER — PATIENT MESSAGE (OUTPATIENT)
Dept: ADMINISTRATIVE | Facility: OTHER | Age: 53
End: 2023-08-10
Payer: MEDICARE

## 2023-08-11 ENCOUNTER — PATIENT MESSAGE (OUTPATIENT)
Dept: ADMINISTRATIVE | Facility: OTHER | Age: 53
End: 2023-08-11
Payer: MEDICARE

## 2023-08-12 ENCOUNTER — PATIENT MESSAGE (OUTPATIENT)
Dept: ADMINISTRATIVE | Facility: OTHER | Age: 53
End: 2023-08-12
Payer: MEDICARE

## 2023-08-14 ENCOUNTER — PATIENT MESSAGE (OUTPATIENT)
Dept: ADMINISTRATIVE | Facility: OTHER | Age: 53
End: 2023-08-14
Payer: MEDICARE

## 2023-08-15 ENCOUNTER — PATIENT MESSAGE (OUTPATIENT)
Dept: ADMINISTRATIVE | Facility: OTHER | Age: 53
End: 2023-08-15
Payer: MEDICARE

## 2023-08-15 NOTE — PROGRESS NOTES
Subjective:       Patient ID: Shikha López is a 53 y.o. female.    Chief Complaint: No chief complaint on file.      HPI 53-year-old female who returns for F/U  for metastatic breast cancer.  She is on Trastuzumab deruxtecan  - here for cycle  38..        Today she reports that her energy level is improved.  She has occasional mild fatigue.    Old intolerance has resolved.  She has no cough or shortness of breath.    She is due to for follow-up endoscopic ultrasound in November.          Breast history:  She presented in the emergency room at [facility] on September 29, 2006 with a 4 months history of inflammation of her left breast.  Physical examination at that time showed the left breast was largely replaced by large mass with multiple skin ulcerations.    A biopsy in September 2006 showed poorly differentiated carcinoma which was ER and GA. negative and HER2 positive.    She was then referred to [facility] for additional care.   CT scan of the chest and abdomen November 2006 revealed multiple nodules in the lungs consistent with metastatic disease.  There also enlarged left axillary node.    She was treated with chemotherapy with weekly Herceptin and Abraxane with improvement in her breast and lung metastasis.    On May 29, 2007 she underwent left modified radical mastectomy(Dr. Colvin) which showed no residual tumor in the breast and 1/5 nodes was positive for metastasis measuring 6 mm.  (ypT0N1).  She then received postoperative radiation therapy(Dr Singh)  to the left chest wall supraclav and internal mammary lymph nodes from August 20, 2007 to September 28, 2007.    Postoperatively she continued on Herceptin for approximately 3 and 1/2 years before her lung metastasis begin to grow.    She subsequently received a number of different chemotherapy treatments including Adriamycin, Halaven, Xeloda plus lapatinib then Xeloda plus Herceptin. (  in [facility].)    Subsequently, she  transferred her care to  at [facility].She was initially treated with Taxotere Herceptin Perjeta.      She was then changed to Kadcyla.      In July 2020 PET scan showed progression.   She then took approximately 9 months of Herceptin and Navelbine with initial response then progression.    She started trastuzumab- deruxtecan  4/12/21.     CT 6/30/23 - stable - no new findings    Echo 6/26/23 - EF 65% , left atrial enlargement, mild AS    She was hospitalized on June 22nd with an upper GI bleed due to gastric varices.  Hemoglobin decreased to 7 g dL-  required packed red blood cell transfusions.    F/U endoscopy 8/3/23  -esophageal and gastric varices - no bleeding.  Review of Systems   Constitutional:  Negative for appetite change, fatigue (minimal) and unexpected weight change.   HENT:  Negative for mouth sores.    Eyes:  Negative for visual disturbance.   Respiratory:  Negative for cough and shortness of breath.    Cardiovascular:  Negative for chest pain.   Gastrointestinal:  Negative for abdominal pain, blood in stool, constipation, diarrhea and nausea.   Genitourinary:  Negative for frequency.   Musculoskeletal:  Negative for back pain.   Integumentary:  Negative for rash.   Neurological:  Negative for headaches.   Hematological:  Negative for adenopathy.   Psychiatric/Behavioral: Negative.  The patient is not nervous/anxious.          Objective:      Physical Exam  Vitals reviewed.   Constitutional:       General: She is not in acute distress.     Appearance: She is obese.   HENT:      Mouth/Throat:      Mouth: Mucous membranes are moist.      Pharynx: Oropharynx is clear. No oropharyngeal exudate or posterior oropharyngeal erythema.   Eyes:      Pupils: Pupils are equal, round, and reactive to light.   Cardiovascular:      Rate and Rhythm: Normal rate and regular rhythm.      Heart sounds: Murmur (systolic -aortic) heard.   Pulmonary:      Effort: Pulmonary effort is normal. No respiratory  distress.      Breath sounds: Normal breath sounds. No wheezing or rales.   Abdominal:      Palpations: Abdomen is soft. There is no mass.      Tenderness: There is no abdominal tenderness.   Lymphadenopathy:      Cervical: No cervical adenopathy.      Upper Body:      Right upper body: No supraclavicular or axillary adenopathy.      Left upper body: No supraclavicular or axillary adenopathy.   Skin:     Findings: No rash.   Neurological:      Mental Status: She is alert and oriented to person, place, and time.   Psychiatric:         Mood and Affect: Mood normal.         Behavior: Behavior normal.         Thought Content: Thought content normal.         Judgment: Judgment normal.         Assessment:    CBC - pending  Problem List Items Addressed This Visit       Breast cancer, stage 4, left    Malignant neoplasm metastatic to left lung - Primary       Plan:    Continue current therapy  RTC 3 weeks.  Echo in 6 weeks.  CT in December    Route Chart for Scheduling    Med Onc Chart Routing      Follow up with physician 3 weeks.   Follow up with JIMI    Infusion scheduling note    Injection scheduling note    Labs CBC and CMP   Scheduling:  Preferred lab:  Lab interval:     Imaging ECHO   6 weeks   Pharmacy appointment    Other referrals              Treatment Plan Information   OP BREAST FAM-TRASTUZUMAB DERUXTECAN-NXKI Q3W   Home Willis MD   Upcoming Treatment Dates - OP BREAST FAM-TRASTUZUMAB DERUXTECAN-NXKI Q3W    8/24/2023       Chemotherapy       fam-trastuzumab deruxtecan-nxki (ENHERTU) 530 mg in dextrose 5 % (D5W) 100 mL infusion       Antiemetics       fosaprepitant 150 mg in sodium chloride 0.9% 150 mL IVPB       palonosetron (ALOXI) 0.25 mg in sodium chloride 0.9% 50 mL IVPB  9/14/2023       Chemotherapy       fam-trastuzumab deruxtecan-nxki (ENHERTU) 530 mg in dextrose 5 % (D5W) 100 mL infusion       Antiemetics       fosaprepitant 150 mg in sodium chloride 0.9% 150 mL IVPB       palonosetron (ALOXI) 0.25 mg  in sodium chloride 0.9% 50 mL IVPB  10/5/2023       Chemotherapy       fam-trastuzumab deruxtecan-nxki (ENHERTU) 530 mg in dextrose 5 % (D5W) 100 mL infusion       Antiemetics       fosaprepitant 150 mg in sodium chloride 0.9% 150 mL IVPB       palonosetron (ALOXI) 0.25 mg in sodium chloride 0.9% 50 mL IVPB

## 2023-08-16 ENCOUNTER — OFFICE VISIT (OUTPATIENT)
Dept: HEMATOLOGY/ONCOLOGY | Facility: CLINIC | Age: 53
End: 2023-08-16
Payer: MEDICARE

## 2023-08-16 ENCOUNTER — PATIENT MESSAGE (OUTPATIENT)
Dept: ADMINISTRATIVE | Facility: OTHER | Age: 53
End: 2023-08-16
Payer: MEDICARE

## 2023-08-16 ENCOUNTER — INFUSION (OUTPATIENT)
Dept: INFUSION THERAPY | Facility: HOSPITAL | Age: 53
End: 2023-08-16
Attending: INTERNAL MEDICINE
Payer: MEDICARE

## 2023-08-16 VITALS
SYSTOLIC BLOOD PRESSURE: 134 MMHG | TEMPERATURE: 97 F | SYSTOLIC BLOOD PRESSURE: 152 MMHG | OXYGEN SATURATION: 99 % | DIASTOLIC BLOOD PRESSURE: 60 MMHG | HEART RATE: 84 BPM | BODY MASS INDEX: 35.38 KG/M2 | RESPIRATION RATE: 18 BRPM | DIASTOLIC BLOOD PRESSURE: 67 MMHG | TEMPERATURE: 98 F | BODY MASS INDEX: 35.4 KG/M2 | HEIGHT: 62 IN | HEART RATE: 75 BPM | WEIGHT: 192.38 LBS | HEIGHT: 62 IN | WEIGHT: 192.25 LBS | RESPIRATION RATE: 18 BRPM

## 2023-08-16 DIAGNOSIS — E87.6 HYPOKALEMIA: Primary | ICD-10-CM

## 2023-08-16 DIAGNOSIS — C50.912 BREAST CANCER, STAGE 4, LEFT: ICD-10-CM

## 2023-08-16 DIAGNOSIS — C78.02 MALIGNANT NEOPLASM METASTATIC TO LEFT LUNG: Primary | ICD-10-CM

## 2023-08-16 PROCEDURE — 99213 OFFICE O/P EST LOW 20 MIN: CPT | Mod: PBBFAC,25 | Performed by: INTERNAL MEDICINE

## 2023-08-16 PROCEDURE — 99214 OFFICE O/P EST MOD 30 MIN: CPT | Mod: S$PBB,,, | Performed by: INTERNAL MEDICINE

## 2023-08-16 PROCEDURE — 99999 PR PBB SHADOW E&M-EST. PATIENT-LVL III: ICD-10-PCS | Mod: PBBFAC,,, | Performed by: INTERNAL MEDICINE

## 2023-08-16 PROCEDURE — 99214 PR OFFICE/OUTPT VISIT, EST, LEVL IV, 30-39 MIN: ICD-10-PCS | Mod: S$PBB,,, | Performed by: INTERNAL MEDICINE

## 2023-08-16 PROCEDURE — 63600175 PHARM REV CODE 636 W HCPCS: Performed by: INTERNAL MEDICINE

## 2023-08-16 PROCEDURE — 96361 HYDRATE IV INFUSION ADD-ON: CPT

## 2023-08-16 PROCEDURE — 96413 CHEMO IV INFUSION 1 HR: CPT

## 2023-08-16 PROCEDURE — 25000003 PHARM REV CODE 250: Performed by: INTERNAL MEDICINE

## 2023-08-16 PROCEDURE — A4216 STERILE WATER/SALINE, 10 ML: HCPCS | Performed by: INTERNAL MEDICINE

## 2023-08-16 PROCEDURE — 96367 TX/PROPH/DG ADDL SEQ IV INF: CPT

## 2023-08-16 PROCEDURE — 96375 TX/PRO/DX INJ NEW DRUG ADDON: CPT

## 2023-08-16 PROCEDURE — 99999 PR PBB SHADOW E&M-EST. PATIENT-LVL III: CPT | Mod: PBBFAC,,, | Performed by: INTERNAL MEDICINE

## 2023-08-16 RX ORDER — SODIUM CHLORIDE 0.9 % (FLUSH) 0.9 %
10 SYRINGE (ML) INJECTION
Status: DISCONTINUED | OUTPATIENT
Start: 2023-08-16 | End: 2023-08-16 | Stop reason: HOSPADM

## 2023-08-16 RX ORDER — HEPARIN 100 UNIT/ML
500 SYRINGE INTRAVENOUS
Status: CANCELLED | OUTPATIENT
Start: 2023-08-24

## 2023-08-16 RX ORDER — SODIUM CHLORIDE 9 MG/ML
INJECTION, SOLUTION INTRAVENOUS ONCE
Status: CANCELLED
Start: 2023-08-24 | End: 2023-08-24

## 2023-08-16 RX ORDER — HEPARIN 100 UNIT/ML
500 SYRINGE INTRAVENOUS
Status: DISCONTINUED | OUTPATIENT
Start: 2023-08-16 | End: 2023-08-16 | Stop reason: HOSPADM

## 2023-08-16 RX ORDER — SODIUM CHLORIDE 0.9 % (FLUSH) 0.9 %
10 SYRINGE (ML) INJECTION
Status: CANCELLED | OUTPATIENT
Start: 2023-08-24

## 2023-08-16 RX ORDER — SODIUM CHLORIDE 9 MG/ML
INJECTION, SOLUTION INTRAVENOUS ONCE
Status: COMPLETED | OUTPATIENT
Start: 2023-08-16 | End: 2023-08-16

## 2023-08-16 RX ADMIN — HEPARIN 500 UNITS: 100 SYRINGE at 11:08

## 2023-08-16 RX ADMIN — PALONOSETRON 0.25 MG: 0.05 INJECTION, SOLUTION INTRAVENOUS at 09:08

## 2023-08-16 RX ADMIN — FAM-TRASTUZUMAB DERUXTECAN-NXKI 500 MG: 100 INJECTION, POWDER, LYOPHILIZED, FOR SOLUTION INTRAVENOUS at 10:08

## 2023-08-16 RX ADMIN — SODIUM CHLORIDE 150 MG: 9 INJECTION, SOLUTION INTRAVENOUS at 09:08

## 2023-08-16 RX ADMIN — SODIUM CHLORIDE: 9 INJECTION, SOLUTION INTRAVENOUS at 09:08

## 2023-08-16 RX ADMIN — Medication 10 ML: at 11:08

## 2023-08-17 ENCOUNTER — PATIENT MESSAGE (OUTPATIENT)
Dept: ADMINISTRATIVE | Facility: OTHER | Age: 53
End: 2023-08-17
Payer: MEDICARE

## 2023-08-18 ENCOUNTER — PATIENT MESSAGE (OUTPATIENT)
Dept: ADMINISTRATIVE | Facility: OTHER | Age: 53
End: 2023-08-18
Payer: MEDICARE

## 2023-08-18 ENCOUNTER — TELEPHONE (OUTPATIENT)
Dept: PALLIATIVE MEDICINE | Facility: CLINIC | Age: 53
End: 2023-08-18
Payer: MEDICARE

## 2023-08-22 ENCOUNTER — PATIENT MESSAGE (OUTPATIENT)
Dept: ADMINISTRATIVE | Facility: OTHER | Age: 53
End: 2023-08-22
Payer: MEDICARE

## 2023-08-23 ENCOUNTER — TELEPHONE (OUTPATIENT)
Dept: PALLIATIVE MEDICINE | Facility: CLINIC | Age: 53
End: 2023-08-23
Payer: MEDICARE

## 2023-08-23 ENCOUNTER — PATIENT MESSAGE (OUTPATIENT)
Dept: ADMINISTRATIVE | Facility: OTHER | Age: 53
End: 2023-08-23
Payer: MEDICARE

## 2023-08-24 ENCOUNTER — PATIENT MESSAGE (OUTPATIENT)
Dept: ADMINISTRATIVE | Facility: OTHER | Age: 53
End: 2023-08-24
Payer: MEDICARE

## 2023-08-24 ENCOUNTER — OFFICE VISIT (OUTPATIENT)
Dept: PALLIATIVE MEDICINE | Facility: CLINIC | Age: 53
End: 2023-08-24
Payer: MEDICARE

## 2023-08-24 VITALS
DIASTOLIC BLOOD PRESSURE: 61 MMHG | SYSTOLIC BLOOD PRESSURE: 127 MMHG | HEIGHT: 62 IN | BODY MASS INDEX: 36.47 KG/M2 | HEART RATE: 100 BPM | WEIGHT: 198.19 LBS

## 2023-08-24 DIAGNOSIS — C50.912 BREAST CANCER, STAGE 4, LEFT: ICD-10-CM

## 2023-08-24 DIAGNOSIS — R53.83 FATIGUE, UNSPECIFIED TYPE: ICD-10-CM

## 2023-08-24 DIAGNOSIS — Z71.89 ACP (ADVANCE CARE PLANNING): ICD-10-CM

## 2023-08-24 DIAGNOSIS — Z51.5 ENCOUNTER FOR PALLIATIVE CARE: Primary | ICD-10-CM

## 2023-08-24 DIAGNOSIS — R60.9 EDEMA, UNSPECIFIED TYPE: ICD-10-CM

## 2023-08-24 DIAGNOSIS — C78.02 MALIGNANT NEOPLASM METASTATIC TO LEFT LUNG: ICD-10-CM

## 2023-08-24 PROCEDURE — 99213 OFFICE O/P EST LOW 20 MIN: CPT | Mod: PBBFAC | Performed by: NURSE PRACTITIONER

## 2023-08-24 PROCEDURE — 99497 PR ADVNCD CARE PLAN 30 MIN: ICD-10-PCS | Mod: S$PBB,,, | Performed by: NURSE PRACTITIONER

## 2023-08-24 PROCEDURE — 99215 PR OFFICE/OUTPT VISIT, EST, LEVL V, 40-54 MIN: ICD-10-PCS | Mod: S$PBB,,, | Performed by: NURSE PRACTITIONER

## 2023-08-24 PROCEDURE — 99215 OFFICE O/P EST HI 40 MIN: CPT | Mod: S$PBB,,, | Performed by: NURSE PRACTITIONER

## 2023-08-24 PROCEDURE — 99999 PR PBB SHADOW E&M-EST. PATIENT-LVL III: CPT | Mod: PBBFAC,,, | Performed by: NURSE PRACTITIONER

## 2023-08-24 PROCEDURE — 99497 ADVNCD CARE PLAN 30 MIN: CPT | Mod: S$PBB,,, | Performed by: NURSE PRACTITIONER

## 2023-08-24 PROCEDURE — 99999 PR PBB SHADOW E&M-EST. PATIENT-LVL III: ICD-10-PCS | Mod: PBBFAC,,, | Performed by: NURSE PRACTITIONER

## 2023-08-24 NOTE — PROGRESS NOTES
Consult Note  Palliative Care      Consult Requested By: Dr. Conrad Lang  Reason for Consult: symptom mgmt/ACP      ASSESSMENT/PLAN:     Plan/Recommendations:    08/24/2023:  - initial visit    Metastatic breast cancer  - following with Dr. Willis & NP [place]   - ID 2006, chemo, s/p radical mastectomy (2007), s/p post-op RT, herceptin x 3.5 years until progression in lung mass, cycled through myriad of chemo tx with progression on July 2020 PET, 9 months of continued therapy w eventual progression, stable on 6/2023 CT  - currently ENHERTU on 2 years  - next scan December 2023    Encounter for palliative care  - Patient is decisional  - Patient accompanied today by self  - ACP documents are not uploaded into EMR   - Philosophy of Palliative Medicine reviewed with patient and family at first visit  - New patient folder given to and reviewed with patient and family at first visit  - Goals of care:  Patient lives alone with her 2 dogs and 2 cats. She is fully independent with no care needs of any kind. Her daughter Samra Jaeger ([id number]) is her support system. Nearby, her mother is living with cognitive/health issues and pt anticipates having to care for her mother FT within a year. Pt also has a sister who is an  and will help her with getting her affairs in order, including HCPOA. A booklet is provided and reviewed today. We will complete at future visit. Ms. Trivedi shares that she was initially diagnosed with cancer 17 years ago. It has been a long journey for her. She found strength in setting goals along the way. Seeing her daughter graduate, , start her career - each milestone has motivated her to continue. Her daughter is now pregnant and due in December 2023, her goal is to meet her grandson. Ms. 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 Enjoys attending [employer], going to the movies, working in the garden, reading and baking, going to  [employer], farmer's markets, etc. No spiritual/rob, declines .    Cancer associated fatigue  - treatment-associated  - exacerbated by heat (she does not have AC in car, runs only 1 window unit in her house 2/2 cost)  - currently on ritalin, which helps  - she is ECOG 0  - will continue to monitor     Edema   - BLE  - follows with cardiologist  - controls with daily lasix, will hold doses on a busy day where it will be difficult to use a bathroom frequently    Understanding of illness: Excellent     Goals of care: preserve qol, independence, activity tolerance    Follow up: 8 weeks     Patient's encounter and above plan of care discussed with patient's oncology team.     SUBJECTIVE:     History of Present Illness:  Patient is a 53 y.o. year old female presenting with metastatic breast cancer. Please see oncology notes for full oncologic history and treatment course.      08/24/2023:  JOSSUE ELIZALDE reviewed: no concerns    Patient presents to initial clinic visit alone, she is extremely pleasant with few complaints. She does have some lower extremity edema for which she uses lasix. She skips doses on days where she will be away from home or running errands and in less frequent contact with a bathroom. She uses ritalin for fatigue and that works well for her. She shares that her cancer journey started 17 years ago and each step of the way, she has used various milestones to keep her motivated. She has seen her daughter graduate,  and start her career and now looks forward to meeting her first grandchild in December. We review ACP booklet today. Her sister, who is an , was planning to complete ACP w her. She will talk to her sister and we will discuss at future visit.     Past Medical History:   Diagnosis Date    Breast cancer      Past Surgical History:   Procedure Laterality Date    ENDOSCOPIC ULTRASOUND OF UPPER GASTROINTESTINAL TRACT N/A 6/27/2023    Procedure: ULTRASOUND, UPPER GI TRACT,  ENDOSCOPIC;  Surgeon: Home Lopez MD;  Location: [facility] (2ND FLR);  Service: Endoscopy;  Laterality: N/A;    ESOPHAGOGASTRODUODENOSCOPY N/A 6/23/2023    Procedure: EGD (ESOPHAGOGASTRODUODENOSCOPY);  Surgeon: Quintin Mooney MD;  Location: [facility] (2ND FLR);  Service: Endoscopy;  Laterality: N/A;    ESOPHAGOGASTRODUODENOSCOPY N/A 6/27/2023    Procedure: EGD (ESOPHAGOGASTRODUODENOSCOPY);  Surgeon: Home Lopez MD;  Location: [facility] (2ND FLR);  Service: Endoscopy;  Laterality: N/A;    ESOPHAGOGASTRODUODENOSCOPY N/A 8/3/2023    Procedure: EGD (ESOPHAGOGASTRODUODENOSCOPY);  Surgeon: Home Lopez MD;  Location: [facility] (2ND FLR);  Service: Endoscopy;  Laterality: N/A;  instr portal-labs 6/28/23-tb    MASTECTOMY       Family History   Problem Relation Age of Onset    Lung cancer Father      Review of patient's allergies indicates:  No Known Allergies    Medications:    Current Outpatient Medications:     acetaminophen (TYLENOL) 500 MG tablet, Take 1,000 mg by mouth., Disp: , Rfl:     ferrous sulfate (IRON, FERROUS SULFATE,) 325 mg (65 mg iron) Tab tablet, Take daily with Vitamin C on an empty stomach, Disp: 60 tablet, Rfl: 3    furosemide (LASIX) 20 MG tablet, Take 1 tablet (20 mg total) by mouth once daily., Disp: 90 tablet, Rfl: 3    Lactobac no.41/Bifidobact no.7 (PROBIOTIC-10 ORAL), Take by mouth., Disp: , Rfl:     LIDOcaine-prilocaine (EMLA) cream, APPLY TO THE AFFECTED AREA 1 HOUR BEFORE PORT ACCESS, Disp: , Rfl:     methylphenidate HCl (RITALIN) 5 MG tablet, Take 1 tablet (5 mg total) by mouth 2 (two) times daily., Disp: 60 tablet, Rfl: 0    OLANZapine (ZYPREXA) 5 MG tablet, Take days 1-4 of chemotherapy, Disp: 30 tablet, Rfl: 2    ondansetron (ZOFRAN) 8 MG tablet, Take 8 mg by mouth 3 (three) times daily as needed., Disp: , Rfl:     pantoprazole (PROTONIX) 40 MG tablet, Take 1 tablet (40 mg total) by mouth 2 (two) times daily., Disp: 60 tablet, Rfl: 2    potassium chloride 10% (MICHELLE) 20  mEq/15 mL oral solution, Take 15 mLs (20 mEq total) by mouth once daily. (To prevent stomach upset, mix dose with a glass of cold water or juice), Disp: 600 mL, Rfl: 0    OBJECTIVE:       ROS:  Review of Systems   Constitutional:  Positive for fatigue. Negative for activity change and appetite change.   HENT:  Negative for congestion, dental problem and drooling.    Eyes:  Negative for pain, discharge and itching.   Respiratory:  Negative for cough, choking and wheezing.    Cardiovascular:  Positive for leg swelling.   Gastrointestinal:  Negative for constipation, diarrhea, nausea and vomiting.   Genitourinary:  Negative for difficulty urinating, dyspareunia and dysuria.   Musculoskeletal:  Negative for arthralgias, back pain and gait problem.   Skin:  Negative for pallor, rash and wound.   Neurological:  Negative for dizziness, facial asymmetry and headaches.       Review of Symptoms      Symptom Assessment (ESAS 0-10 Scale)  Pain:  0  Dyspnea:  0  Anxiety:  0  Nausea:  0  Depression:  0  Anorexia:  0  Fatigue:  7  Insomnia:  0  Restlessness:  0  Agitation:  0     CAM / Delirium:  Negative  Constipation:  Negative  Diarrhea:  Negative    Constipation:  No constipation    Bowel Management Plan (BMP):  No      Pain Assessment:  OME in 24 hours:  0  Location(s): none      ECOG Performance Status stGstrstastdstest:st st1st Psychosocial/Cultural:   See Palliative Psychosocial Note: No  **Primary  to Follow**  Palliative Care  Consult: No     Time-Based Charting:  No      Advance Care Planning   Advance Directives:   Living Will: No        Oral Declaration: No    LaPOST: No    Do Not Resuscitate Status: No        Oral Declaration: No      Decision Making:  Patient answered questions  Goals of Care: The patient endorses that what is most important right now is to focus on remaining as independent as possible, symptom/pain control, life-prolongation  and QOL     Accordingly, we have decided that the best plan  to meet the patient's goals includes continuing with treatment        Physical Exam:  Vitals:    Vitals:    08/24/23 1431   BP: 127/61   Pulse: 100       Physical Exam  Vitals reviewed.   Constitutional:       General: She is not in acute distress.     Appearance: Normal appearance. She is not ill-appearing, toxic-appearing or diaphoretic.   HENT:      Head: Atraumatic.      Right Ear: External ear normal.      Left Ear: External ear normal.      Nose: Nose normal.      Mouth/Throat:      Dentition: Abnormal dentition.   Eyes:      General:         Right eye: No discharge.         Left eye: No discharge.      Extraocular Movements: Extraocular movements intact.      Conjunctiva/sclera: Conjunctivae normal.   Pulmonary:      Effort: Pulmonary effort is normal. No respiratory distress.      Breath sounds: No stridor.   Musculoskeletal:         General: Normal range of motion.      Cervical back: Normal range of motion.   Skin:     General: Skin is warm and dry.      Coloration: Skin is not jaundiced.   Neurological:      Mental Status: She is alert and oriented to person, place, and time. Mental status is at baseline.      Motor: No weakness.   Psychiatric:         Behavior: Behavior normal.         Thought Content: Thought content normal.         Judgment: Judgment normal.         Labs:  CBC:   WBC   Date Value Ref Range Status   08/16/2023 2.28 (L) 3.90 - 12.70 K/uL Final     Hemoglobin   Date Value Ref Range Status   08/16/2023 10.1 (L) 12.0 - 16.0 g/dL Final     Hematocrit   Date Value Ref Range Status   08/16/2023 32.5 (L) 37.0 - 48.5 % Final     MCV   Date Value Ref Range Status   08/16/2023 102 (H) 82 - 98 fL Final     Platelets   Date Value Ref Range Status   08/16/2023 122 (L) 150 - 450 K/uL Final       LFT:   Lab Results   Component Value Date    AST 33 08/16/2023    ALKPHOS 136 (H) 08/16/2023    BILITOT 1.8 (H) 08/16/2023       Albumin:   Albumin   Date Value Ref Range Status   08/16/2023 3.1 (L) 3.5 - 5.2  g/dL Final     Protein:   Total Protein   Date Value Ref Range Status   08/16/2023 5.9 (L) 6.0 - 8.4 g/dL Final       Radiology:I have reviewed all pertinent imaging results/findings within the past 24 hours.    06/30/2023 CT chest: Impression:     Stable multiple bilateral lung spiculated nodule compared to April 2023 likely metastatic disease.     Trace bilateral pleural effusion.    06/24/2023 CT AP: Impression:     No focal hepatic lesion.     Sequela of portal hypertension including splenomegaly and gastroesophageal varices.     Mild wall thickening and fatty infiltration at the proximal colon with mild pericolonic stranding.  Findings potentially relating to additional sequela of portosystemic change with portal colopathy.  A nonspecific colitis cannot be entirely excluded.     Similar left infrahilar spiculated opacity with lower lung pleural thickening.  Continued dedicated surveillance as scheduled.     Prior left mastectomy.  Superficial skin thickening of the visualized right breast.  To correlate with mammographic history.     Trace intrapelvic free fluid and lower body wall edema.     Cholelithiasis, stable hypodense focus at the pancreatic head, and additional findings as above.    I spent a total of 50 minutes on the day of the visit.This includes face to face time in discussion of goals of care, symptom assessment, coordination of care and emotional support.  This also includes non-face to face time preparing to see the patient (eg, review of tests/imaging), obtaining and/or reviewing separately obtained history, documenting clinical information in the electronic or other health record, independently interpreting results and communicating results to the patient/family/caregiver, or care coordinator.     A total of 16 min was spent on advance care planning, goals of care discussion, emotional support, formulating and communicating prognosis and goals of care, exploring burden/benefit of various  approaches of treatment. This discussion occurred on a fully voluntary basis with the verbal consent of the patient and/or family    Signature: Lizbeth Mehta DNP

## 2023-08-25 ENCOUNTER — PATIENT MESSAGE (OUTPATIENT)
Dept: ADMINISTRATIVE | Facility: OTHER | Age: 53
End: 2023-08-25
Payer: MEDICARE

## 2023-08-27 ENCOUNTER — PATIENT MESSAGE (OUTPATIENT)
Dept: ADMINISTRATIVE | Facility: OTHER | Age: 53
End: 2023-08-27
Payer: MEDICARE

## 2023-08-28 ENCOUNTER — PATIENT MESSAGE (OUTPATIENT)
Dept: ADMINISTRATIVE | Facility: OTHER | Age: 53
End: 2023-08-28
Payer: MEDICARE

## 2023-08-29 ENCOUNTER — PATIENT MESSAGE (OUTPATIENT)
Dept: ADMINISTRATIVE | Facility: OTHER | Age: 53
End: 2023-08-29
Payer: MEDICARE

## 2023-08-30 ENCOUNTER — PATIENT MESSAGE (OUTPATIENT)
Dept: ADMINISTRATIVE | Facility: OTHER | Age: 53
End: 2023-08-30
Payer: MEDICARE

## 2023-08-31 ENCOUNTER — PATIENT MESSAGE (OUTPATIENT)
Dept: ADMINISTRATIVE | Facility: OTHER | Age: 53
End: 2023-08-31
Payer: MEDICARE

## 2023-09-01 ENCOUNTER — PATIENT MESSAGE (OUTPATIENT)
Dept: ADMINISTRATIVE | Facility: OTHER | Age: 53
End: 2023-09-01
Payer: MEDICARE

## 2023-09-02 ENCOUNTER — PATIENT MESSAGE (OUTPATIENT)
Dept: ADMINISTRATIVE | Facility: OTHER | Age: 53
End: 2023-09-02
Payer: MEDICARE

## 2023-09-03 ENCOUNTER — PATIENT MESSAGE (OUTPATIENT)
Dept: ADMINISTRATIVE | Facility: OTHER | Age: 53
End: 2023-09-03
Payer: MEDICARE

## 2023-09-05 ENCOUNTER — PATIENT MESSAGE (OUTPATIENT)
Dept: ADMINISTRATIVE | Facility: OTHER | Age: 53
End: 2023-09-05
Payer: MEDICARE

## 2023-09-05 DIAGNOSIS — R93.89 ABNORMAL FINDING ON IMAGING: Primary | ICD-10-CM

## 2023-09-05 NOTE — PROGRESS NOTES
Subjective:       Patient ID: Shikha López is a 53 y.o. female.    Chief Complaint: No chief complaint on file.      HPI 53-year-old female who returns for F/U  for metastatic breast cancer.  She is on Trastuzumab deruxtecan  - here for cycle  39.       She reports that she is been doing very well.  She has no pain or shortness of breath.  Her energy level is back to normal.  She has no GI complaints.  Her only issue has been recent upper respiratory infection with some nasal congestion and postnasal drip with some cough and clear mucus.  She had no fever.  To have follow-up endoscopic ultrasound in November.          Breast history:  She presented in the emergency room at [facility] on September 29, 2006 with a 4 months history of inflammation of her left breast.  Physical examination at that time showed the left breast was largely replaced by large mass with multiple skin ulcerations.    A biopsy in September 2006 showed poorly differentiated carcinoma which was ER and [place]. negative and HER2 positive.    She was then referred to [facility] for additional care.   CT scan of the chest and abdomen November 2006 revealed multiple nodules in the lungs consistent with metastatic disease.  There also enlarged left axillary node.    She was treated with chemotherapy with weekly Herceptin and Abraxane with improvement in her breast and lung metastasis.    On May 29, 2007 she underwent left modified radical mastectomy(Dr. Colvin) which showed no residual tumor in the breast and 1/5 nodes was positive for metastasis measuring 6 mm.  (ypT0N1).  She then received postoperative radiation therapy(Dr Singh)  to the left chest wall supraclav and internal mammary lymph nodes from August 20, 2007 to September 28, 2007.    Postoperatively she continued on Herceptin for approximately 3 and 1/2 years before her lung metastasis begin to grow.    She subsequently received a number of different chemotherapy treatments  including Adriamycin, Halaven, Xeloda plus lapatinib then Xeloda plus Herceptin. (  in [facility].)    Subsequently, she transferred her care to  at [facility].She was initially treated with Taxotere Herceptin Perjeta.      She was then changed to Kadcyla.      In July 2020 PET scan showed progression.   She then took approximately 9 months of Herceptin and Navelbine with initial response then progression.    She started trastuzumab- deruxtecan  4/12/21.     CT 6/30/23 - stable - no new findings    Echo 9/13/23 planned    She was hospitalized on June 22nd with an upper GI bleed due to gastric varices.  Hemoglobin decreased to 7 g dL-  required packed red blood cell transfusions.    F/U endoscopy 8/3/23  -esophageal and gastric varices - no bleeding.  Review of Systems   Constitutional:  Negative for appetite change, fatigue (minimal), fever and unexpected weight change.   HENT:  Positive for nasal congestion, postnasal drip and rhinorrhea. Negative for mouth sores.    Eyes:  Negative for visual disturbance.   Respiratory:  Positive for cough. Negative for shortness of breath.    Cardiovascular:  Negative for chest pain.   Gastrointestinal:  Negative for abdominal pain, blood in stool, constipation, diarrhea and nausea.   Genitourinary:  Negative for frequency.   Musculoskeletal:  Negative for back pain.   Integumentary:  Negative for rash.   Neurological:  Negative for headaches.   Hematological:  Negative for adenopathy.   Psychiatric/Behavioral: Negative.  The patient is not nervous/anxious.          Objective:      Physical Exam  Vitals reviewed.   Constitutional:       General: She is not in acute distress.     Appearance: She is obese.   HENT:      Mouth/Throat:      Mouth: Mucous membranes are moist.      Pharynx: Oropharynx is clear. No oropharyngeal exudate or posterior oropharyngeal erythema.   Eyes:      Pupils: Pupils are equal, round, and reactive to light.   Cardiovascular:       Rate and Rhythm: Normal rate and regular rhythm.      Heart sounds: Murmur (systolic -aortic) heard.   Pulmonary:      Effort: Pulmonary effort is normal. No respiratory distress.      Breath sounds: Normal breath sounds. No wheezing or rales.   Chest:   Breasts:     Right: Normal.      Left: Absent.   Abdominal:      Palpations: Abdomen is soft. There is no mass.      Tenderness: There is no abdominal tenderness.   Lymphadenopathy:      Cervical: No cervical adenopathy.      Upper Body:      Right upper body: No supraclavicular or axillary adenopathy.      Left upper body: No supraclavicular or axillary adenopathy.   Skin:     Findings: No rash.   Neurological:      Mental Status: She is alert and oriented to person, place, and time.   Psychiatric:         Mood and Affect: Mood normal.         Behavior: Behavior normal.         Thought Content: Thought content normal.         Judgment: Judgment normal.         Assessment:    Labs pending  Problem List Items Addressed This Visit       Breast cancer, stage 4, left    Malignant neoplasm metastatic to left lung - Primary       Plan:    Continue current therapy  RTC 3 weeks.  Echo 9/13/23.  CT in December    Route Chart for Scheduling    Med Onc Chart Routing      Follow up with physician 3 weeks.   Follow up with JIMI    Infusion scheduling note    Injection scheduling note    Labs CBC and CMP   Scheduling:  Preferred lab:  Lab interval:     Imaging None      Pharmacy appointment    Other referrals     No additional referrals needed             Treatment Plan Information   OP BREAST FAM-TRASTUZUMAB DERUXTECAN-NXKI Q3W   Home Willis MD   Upcoming Treatment Dates - OP BREAST FAM-TRASTUZUMAB DERUXTECAN-NXKI Q3W    9/14/2023       Chemotherapy       fam-trastuzumab deruxtecan-nxki (ENHERTU) 470 mg in dextrose 5 % (D5W) 100 mL infusion       Antiemetics       fosaprepitant 150 mg in sodium chloride 0.9% 150 mL IVPB       palonosetron (ALOXI) 0.25 mg in sodium chloride 0.9%  50 mL IVPB  10/5/2023       Chemotherapy       fam-trastuzumab deruxtecan-nxki (ENHERTU) 470 mg in dextrose 5 % (D5W) 100 mL infusion       Antiemetics       fosaprepitant 150 mg in sodium chloride 0.9% 150 mL IVPB       palonosetron (ALOXI) 0.25 mg in sodium chloride 0.9% 50 mL IVPB

## 2023-09-06 ENCOUNTER — OFFICE VISIT (OUTPATIENT)
Dept: HEMATOLOGY/ONCOLOGY | Facility: CLINIC | Age: 53
End: 2023-09-06
Payer: MEDICARE

## 2023-09-06 ENCOUNTER — INFUSION (OUTPATIENT)
Dept: INFUSION THERAPY | Facility: HOSPITAL | Age: 53
End: 2023-09-06
Attending: INTERNAL MEDICINE
Payer: MEDICARE

## 2023-09-06 VITALS
SYSTOLIC BLOOD PRESSURE: 129 MMHG | TEMPERATURE: 98 F | HEART RATE: 81 BPM | WEIGHT: 188.94 LBS | HEIGHT: 62 IN | DIASTOLIC BLOOD PRESSURE: 60 MMHG | OXYGEN SATURATION: 100 % | RESPIRATION RATE: 18 BRPM | BODY MASS INDEX: 34.77 KG/M2

## 2023-09-06 VITALS — HEART RATE: 79 BPM | DIASTOLIC BLOOD PRESSURE: 59 MMHG | SYSTOLIC BLOOD PRESSURE: 114 MMHG

## 2023-09-06 DIAGNOSIS — C78.02 MALIGNANT NEOPLASM METASTATIC TO LEFT LUNG: Primary | ICD-10-CM

## 2023-09-06 DIAGNOSIS — E87.6 HYPOKALEMIA: Primary | ICD-10-CM

## 2023-09-06 DIAGNOSIS — C50.912 BREAST CANCER, STAGE 4, LEFT: ICD-10-CM

## 2023-09-06 PROCEDURE — A4216 STERILE WATER/SALINE, 10 ML: HCPCS | Performed by: INTERNAL MEDICINE

## 2023-09-06 PROCEDURE — 99999 PR PBB SHADOW E&M-EST. PATIENT-LVL III: ICD-10-PCS | Mod: PBBFAC,,, | Performed by: INTERNAL MEDICINE

## 2023-09-06 PROCEDURE — 99213 OFFICE O/P EST LOW 20 MIN: CPT | Mod: PBBFAC | Performed by: INTERNAL MEDICINE

## 2023-09-06 PROCEDURE — 25000003 PHARM REV CODE 250: Performed by: INTERNAL MEDICINE

## 2023-09-06 PROCEDURE — 99214 OFFICE O/P EST MOD 30 MIN: CPT | Mod: S$PBB,,, | Performed by: INTERNAL MEDICINE

## 2023-09-06 PROCEDURE — 99214 PR OFFICE/OUTPT VISIT, EST, LEVL IV, 30-39 MIN: ICD-10-PCS | Mod: S$PBB,,, | Performed by: INTERNAL MEDICINE

## 2023-09-06 PROCEDURE — 96367 TX/PROPH/DG ADDL SEQ IV INF: CPT

## 2023-09-06 PROCEDURE — 96413 CHEMO IV INFUSION 1 HR: CPT

## 2023-09-06 PROCEDURE — 63600175 PHARM REV CODE 636 W HCPCS: Performed by: INTERNAL MEDICINE

## 2023-09-06 PROCEDURE — 99999 PR PBB SHADOW E&M-EST. PATIENT-LVL III: CPT | Mod: PBBFAC,,, | Performed by: INTERNAL MEDICINE

## 2023-09-06 RX ORDER — SODIUM CHLORIDE 0.9 % (FLUSH) 0.9 %
10 SYRINGE (ML) INJECTION
Status: CANCELLED | OUTPATIENT
Start: 2023-09-14

## 2023-09-06 RX ORDER — SODIUM CHLORIDE 9 MG/ML
INJECTION, SOLUTION INTRAVENOUS ONCE
Status: COMPLETED | OUTPATIENT
Start: 2023-09-06 | End: 2023-09-06

## 2023-09-06 RX ORDER — SODIUM CHLORIDE 9 MG/ML
INJECTION, SOLUTION INTRAVENOUS ONCE
Status: CANCELLED
Start: 2023-09-14 | End: 2023-09-14

## 2023-09-06 RX ORDER — HEPARIN 100 UNIT/ML
500 SYRINGE INTRAVENOUS
Status: DISCONTINUED | OUTPATIENT
Start: 2023-09-06 | End: 2023-09-06 | Stop reason: HOSPADM

## 2023-09-06 RX ORDER — SODIUM CHLORIDE 0.9 % (FLUSH) 0.9 %
10 SYRINGE (ML) INJECTION
Status: DISCONTINUED | OUTPATIENT
Start: 2023-09-06 | End: 2023-09-06 | Stop reason: HOSPADM

## 2023-09-06 RX ORDER — HEPARIN 100 UNIT/ML
500 SYRINGE INTRAVENOUS
Status: CANCELLED | OUTPATIENT
Start: 2023-09-14

## 2023-09-06 RX ADMIN — Medication 10 ML: at 11:09

## 2023-09-06 RX ADMIN — FAM-TRASTUZUMAB DERUXTECAN-NXKI 470 MG: 100 INJECTION, POWDER, LYOPHILIZED, FOR SOLUTION INTRAVENOUS at 10:09

## 2023-09-06 RX ADMIN — SODIUM CHLORIDE: 9 INJECTION, SOLUTION INTRAVENOUS at 09:09

## 2023-09-06 RX ADMIN — HEPARIN 500 UNITS: 100 SYRINGE at 11:09

## 2023-09-06 RX ADMIN — PALONOSETRON 0.25 MG: 0.05 INJECTION, SOLUTION INTRAVENOUS at 09:09

## 2023-09-06 RX ADMIN — FOSAPREPITANT 150 MG: 150 INJECTION, POWDER, LYOPHILIZED, FOR SOLUTION INTRAVENOUS at 10:09

## 2023-09-06 NOTE — PLAN OF CARE
0900 Labs, hx, and medications reviewed, pt meets parameters for treatment today. Assessment completed and plan of care reviewed. Pt verbalized understanding. PAC accessed with no complications. Pt voices no new complaints or concerns, will continue to monitor for safety.

## 2023-09-06 NOTE — PLAN OF CARE
1200 Pt tolerated Enhertu and IVF's infusion well today, no complaints or complications,. VSS through duration of treatment. Pt aware to call provider with any questions or concerns and is aware of upcoming appts. Pt ambulatory from clinic with steady gait, no distress noted.

## 2023-09-08 ENCOUNTER — PATIENT MESSAGE (OUTPATIENT)
Dept: ADMINISTRATIVE | Facility: OTHER | Age: 53
End: 2023-09-08
Payer: MEDICARE

## 2023-09-11 ENCOUNTER — PATIENT MESSAGE (OUTPATIENT)
Dept: ADMINISTRATIVE | Facility: OTHER | Age: 53
End: 2023-09-11
Payer: MEDICARE

## 2023-09-12 ENCOUNTER — PATIENT MESSAGE (OUTPATIENT)
Dept: ADMINISTRATIVE | Facility: OTHER | Age: 53
End: 2023-09-12
Payer: MEDICARE

## 2023-09-12 DIAGNOSIS — R41.840 LACK OF CONCENTRATION: ICD-10-CM

## 2023-09-12 PROBLEM — D69.6 THROMBOCYTOPENIA, UNSPECIFIED: Status: ACTIVE | Noted: 2023-09-12

## 2023-09-12 RX ORDER — METHYLPHENIDATE HYDROCHLORIDE 5 MG/1
5 TABLET ORAL 2 TIMES DAILY
Qty: 60 TABLET | Refills: 0 | Status: SHIPPED | OUTPATIENT
Start: 2023-09-12 | End: 2023-10-23 | Stop reason: SDUPTHER

## 2023-09-12 NOTE — PROGRESS NOTES
Subjective:       Patient ID: Shikha López is a 53 y.o. female.    Chief Complaint: Malignant neoplasm metastatic to left lung      HPI 53-year-old female who returns for F/U  for metastatic breast cancer.  She is on Trastuzumab deruxtecan  - here for cycle  39.      Presents to continue to [facility].   Her daughter is pregnant with little boy due in December.  She is feeling good. Appetite has been good. Occasional constipation, relieved by prunes.   Denies nausea and vomiting.       Per Dr. Willis's previous note: Breast history:  She presented in the emergency room at [facility] on September 29, 2006 with a 4 months history of inflammation of her left breast.  Physical examination at that time showed the left breast was largely replaced by large mass with multiple skin ulcerations.    A biopsy in September 2006 showed poorly differentiated carcinoma which was ER and [place]. negative and HER2 positive.    She was then referred to [facility] for additional care.   CT scan of the chest and abdomen November 2006 revealed multiple nodules in the lungs consistent with metastatic disease.  There also enlarged left axillary node.    She was treated with chemotherapy with weekly Herceptin and Abraxane with improvement in her breast and lung metastasis.    On May 29, 2007 she underwent left modified radical mastectomy(Dr. Colvin) which showed no residual tumor in the breast and 1/5 nodes was positive for metastasis measuring 6 mm.  (ypT0N1).  She then received postoperative radiation therapy(Dr Singh)  to the left chest wall supraclav and internal mammary lymph nodes from August 20, 2007 to September 28, 2007.    Postoperatively she continued on Herceptin for approximately 3 and 1/2 years before her lung metastasis begin to grow.    She subsequently received a number of different chemotherapy treatments including Adriamycin, Halaven, Xeloda plus lapatinib then Xeloda plus Herceptin. (  in  [place]    Subsequently, she transferred her care to  at [facility].She was initially treated with Taxotere Herceptin Perjeta.      She was then changed to Kadcyla.      In July 2020 PET scan showed progression.   She then took approximately 9 months of Herceptin and Navelbine with initial response then progression.    She started trastuzumab- deruxtecan  4/12/21.     CT 6/30/23 - stable - no new findings    Echo 9/13/23 planned    She was hospitalized on June 22nd with an upper GI bleed due to gastric varices.  Hemoglobin decreased to 7 g dL-  required packed red blood cell transfusions.    F/U endoscopy 8/3/23  -esophageal and gastric varices - no bleeding.    Review of Systems   Constitutional:  Negative for appetite change, fatigue (minimal), fever and unexpected weight change.   HENT:  Positive for dental problem. Negative for nasal congestion, mouth sores, postnasal drip and rhinorrhea.    Eyes:  Negative for visual disturbance.   Respiratory:  Positive for cough (occasional). Negative for shortness of breath.    Cardiovascular:  Negative for chest pain.   Gastrointestinal:  Positive for constipation (occasional). Negative for abdominal pain, blood in stool, diarrhea and nausea.   Genitourinary:  Negative for frequency.   Musculoskeletal:  Negative for back pain.   Integumentary:  Negative for rash.   Neurological:  Negative for headaches.   Hematological:  Negative for adenopathy.   Psychiatric/Behavioral: Negative.  The patient is not nervous/anxious.          Objective:      Physical Exam  Vitals reviewed.   Constitutional:       General: She is not in acute distress.     Appearance: She is obese.   HENT:      Mouth/Throat:      Mouth: Mucous membranes are moist.      Pharynx: Oropharynx is clear. No oropharyngeal exudate or posterior oropharyngeal erythema.      Comments: Poor penitentiary  Eyes:      Pupils: Pupils are equal, round, and reactive to light.   Cardiovascular:      Rate and  Rhythm: Normal rate and regular rhythm.      Heart sounds: Murmur (systolic -aortic) heard.   Pulmonary:      Effort: Pulmonary effort is normal. No respiratory distress.      Breath sounds: Normal breath sounds. No wheezing or rales.   Chest:   Breasts:     Right: Normal.      Left: Absent.   Abdominal:      Palpations: Abdomen is soft. There is no mass.      Tenderness: There is no abdominal tenderness.   Lymphadenopathy:      Cervical: No cervical adenopathy.      Upper Body:      Right upper body: No supraclavicular or axillary adenopathy.      Left upper body: No supraclavicular or axillary adenopathy.   Skin:     Findings: No rash.   Neurological:      Mental Status: She is alert and oriented to person, place, and time.   Psychiatric:         Mood and Affect: Mood normal.         Behavior: Behavior normal.         Thought Content: Thought content normal.         Judgment: Judgment normal.         Assessment:      1. Malignant neoplasm metastatic to left lung        2. Breast cancer, stage 4, left        3. Aortic atherosclerosis        4. Thrombocytopenia, unspecified               Plan:    1-2. Continue current therapy  RTC 3 weeks.  Echo September EF 55-60% - due again December  CT in December 4. Platelets 137,000 - will continue to monitor     Return to clinic in 3 weeks with MD appointment and labs.     Patient is in agreement with the proposed treatment plan. All questions were answered to the patient's satisfaction. Patient knows to call clinic for any new or worsening symptoms and if anything is needed before the next clinic visit.          Mariam Lisa, FNP-C  Hematology & Medical Oncology   [address] [place] [address]  ph. [phone]  Fax. [phone]    Collaborating physician, Dr. Willis.    Approximately 20 minutes were spent face-to-face with the patient.  Approximately 30 minutes in total were spent on this encounter, which includes face-to-face time and  non-face-to-face time preparing to see the patient (e.g., review of tests), obtaining and/or reviewing separately obtained history, documenting clinical information in the electronic or other health record, independently interpreting results (not separately reported) and communicating results to the patient/family/caregiver, or care coordination (not separately reported).     Route Chart for Scheduling    Med Onc Chart Routing      Follow up with physician 3 weeks. requests Wednesday appointments   Follow up with JIMI 6 weeks.   Infusion scheduling note   enerhtu every 3 weeks with provider visit   Injection scheduling note    Labs CBC and CMP   Scheduling:  Preferred lab:  Lab interval: every 3 weeks     Imaging CT chest abdomen pelvis and ECHO   in DEcember both echo and CT   Pharmacy appointment    Other referrals                  Treatment Plan Information   OP BREAST FAM-TRASTUZUMAB DERUXTECAN-NXKI Q3W   Home Willis MD   Upcoming Treatment Dates - OP BREAST FAM-TRASTUZUMAB DERUXTECAN-NXKI Q3W    10/5/2023       Chemotherapy       fam-trastuzumab deruxtecan-nxki (ENHERTU) 470 mg in dextrose 5 % (D5W) 100 mL infusion       Antiemetics       fosaprepitant 150 mg in sodium chloride 0.9% 150 mL IVPB       palonosetron (ALOXI) 0.25 mg in sodium chloride 0.9% 50 mL IVPB  10/26/2023       Chemotherapy       fam-trastuzumab deruxtecan-nxki (ENHERTU) 470 mg in dextrose 5 % (D5W) 100 mL infusion       Antiemetics       fosaprepitant 150 mg in sodium chloride 0.9% 150 mL IVPB       palonosetron (ALOXI) 0.25 mg in sodium chloride 0.9% 50 mL IVPB  11/16/2023       Chemotherapy       fam-trastuzumab deruxtecan-nxki (ENHERTU) 470 mg in dextrose 5 % (D5W) 100 mL infusion       Antiemetics       fosaprepitant 150 mg in sodium chloride 0.9% 150 mL IVPB       palonosetron (ALOXI) 0.25 mg in sodium chloride 0.9% 50 mL IVPB  12/7/2023       Chemotherapy       fam-trastuzumab deruxtecan-nxki (ENHERTU) 470 mg in dextrose 5 % (D5W)  100 mL infusion       Antiemetics       fosaprepitant 150 mg in sodium chloride 0.9% 150 mL IVPB       palonosetron (ALOXI) 0.25 mg in sodium chloride 0.9% 50 mL IVPB

## 2023-09-13 ENCOUNTER — HOSPITAL ENCOUNTER (OUTPATIENT)
Dept: CARDIOLOGY | Facility: HOSPITAL | Age: 53
Discharge: HOME | End: 2023-09-13
Attending: INTERNAL MEDICINE
Payer: MEDICARE

## 2023-09-13 ENCOUNTER — PATIENT MESSAGE (OUTPATIENT)
Dept: CARDIOLOGY | Facility: CLINIC | Age: 53
End: 2023-09-13
Payer: MEDICARE

## 2023-09-13 ENCOUNTER — PATIENT MESSAGE (OUTPATIENT)
Dept: ADMINISTRATIVE | Facility: OTHER | Age: 53
End: 2023-09-13
Payer: MEDICARE

## 2023-09-13 VITALS
BODY MASS INDEX: 34.6 KG/M2 | HEIGHT: 62 IN | SYSTOLIC BLOOD PRESSURE: 130 MMHG | DIASTOLIC BLOOD PRESSURE: 80 MMHG | HEART RATE: 70 BPM | WEIGHT: 188 LBS

## 2023-09-13 DIAGNOSIS — C50.912 BREAST CANCER, STAGE 4, LEFT: ICD-10-CM

## 2023-09-13 LAB
ASCENDING AORTA: 2.75 CM
AV INDEX (PROSTH): 0.44
AV MEAN GRADIENT: 12 MMHG
AV PEAK GRADIENT: 19 MMHG
AV VALVE AREA BY VELOCITY RATIO: 1.46 CM²
AV VALVE AREA: 1.52 CM²
AV VELOCITY RATIO: 0.42
BSA FOR ECHO PROCEDURE: 1.93 M2
CV ECHO LV RWT: 0.31 CM
DOP CALC AO PEAK VEL: 2.16 M/S
DOP CALC AO VTI: 44.82 CM
DOP CALC LVOT AREA: 3.5 CM2
DOP CALC LVOT DIAMETER: 2.1 CM
DOP CALC LVOT PEAK VEL: 0.91 M/S
DOP CALC LVOT STROKE VOLUME: 68.27 CM3
DOP CALCLVOT PEAK VEL VTI: 19.72 CM
E WAVE DECELERATION TIME: 161.51 MSEC
E/A RATIO: 1.27
E/E' RATIO: 12 M/S
ECHO LV POSTERIOR WALL: 0.81 CM (ref 0.6–1.1)
FRACTIONAL SHORTENING: 33 % (ref 28–44)
GLOBAL LONGITUIDAL STRAIN: 18 %
INTERVENTRICULAR SEPTUM: 0.58 CM (ref 0.6–1.1)
LA MAJOR: 4.5 CM
LA MINOR: 4.32 CM
LA WIDTH: 4.6 CM
LEFT ATRIUM SIZE: 5.1 CM
LEFT ATRIUM VOLUME INDEX MOD: 43 ML/M2
LEFT ATRIUM VOLUME INDEX: 47.3 ML/M2
LEFT ATRIUM VOLUME MOD: 80 CM3
LEFT ATRIUM VOLUME: 87.9 CM3
LEFT INTERNAL DIMENSION IN SYSTOLE: 3.52 CM (ref 2.1–4)
LEFT VENTRICLE DIASTOLIC VOLUME INDEX: 70.8 ML/M2
LEFT VENTRICLE DIASTOLIC VOLUME: 131.68 ML
LEFT VENTRICLE MASS INDEX: 66 G/M2
LEFT VENTRICLE SYSTOLIC VOLUME INDEX: 27.6 ML/M2
LEFT VENTRICLE SYSTOLIC VOLUME: 51.4 ML
LEFT VENTRICULAR INTERNAL DIMENSION IN DIASTOLE: 5.24 CM (ref 3.5–6)
LEFT VENTRICULAR MASS: 123.37 G
LV LATERAL E/E' RATIO: 12.67 M/S
LV SEPTAL E/E' RATIO: 11.4 M/S
MV PEAK A VEL: 0.9 M/S
MV PEAK E VEL: 1.14 M/S
MV STENOSIS PRESSURE HALF TIME: 46.84 MS
MV VALVE AREA P 1/2 METHOD: 4.7 CM2
OHS LV EJECTION FRACTION SIMPSONS BIPLANE MOD: 58 %
PISA TR MAX VEL: 2.57 M/S
RA MAJOR: 4.5 CM
RA PRESSURE ESTIMATED: 3 MMHG
RA WIDTH: 3.55 CM
RIGHT VENTRICULAR END-DIASTOLIC DIMENSION: 4.02 CM
RV TB RVSP: 6 MMHG
SINUS: 2.82 CM
STJ: 2.48 CM
TDI LATERAL: 0.09 M/S
TDI SEPTAL: 0.1 M/S
TDI: 0.1 M/S
TR MAX PG: 26 MMHG
TRICUSPID ANNULAR PLANE SYSTOLIC EXCURSION: 1.87 CM
TV REST PULMONARY ARTERY PRESSURE: 29 MMHG
Z-SCORE OF LEFT VENTRICULAR DIMENSION IN END DIASTOLE: 0.12
Z-SCORE OF LEFT VENTRICULAR DIMENSION IN END SYSTOLE: 0.77

## 2023-09-13 PROCEDURE — 93306 TTE W/DOPPLER COMPLETE: CPT

## 2023-09-13 PROCEDURE — 93306 TTE W/DOPPLER COMPLETE: CPT | Mod: 26,,, | Performed by: INTERNAL MEDICINE

## 2023-09-13 PROCEDURE — 93306 ECHO (CUPID ONLY): ICD-10-PCS | Mod: 26,,, | Performed by: INTERNAL MEDICINE

## 2023-09-14 ENCOUNTER — PATIENT MESSAGE (OUTPATIENT)
Dept: ADMINISTRATIVE | Facility: OTHER | Age: 53
End: 2023-09-14
Payer: MEDICARE

## 2023-09-15 ENCOUNTER — PATIENT MESSAGE (OUTPATIENT)
Dept: ADMINISTRATIVE | Facility: OTHER | Age: 53
End: 2023-09-15
Payer: MEDICARE

## 2023-09-16 ENCOUNTER — PATIENT MESSAGE (OUTPATIENT)
Dept: ADMINISTRATIVE | Facility: OTHER | Age: 53
End: 2023-09-16
Payer: MEDICARE

## 2023-09-17 ENCOUNTER — PATIENT MESSAGE (OUTPATIENT)
Dept: ADMINISTRATIVE | Facility: OTHER | Age: 53
End: 2023-09-17
Payer: MEDICARE

## 2023-09-18 ENCOUNTER — PATIENT MESSAGE (OUTPATIENT)
Dept: ADMINISTRATIVE | Facility: OTHER | Age: 53
End: 2023-09-18
Payer: MEDICARE

## 2023-09-19 ENCOUNTER — PATIENT MESSAGE (OUTPATIENT)
Dept: ADMINISTRATIVE | Facility: OTHER | Age: 53
End: 2023-09-19
Payer: MEDICARE

## 2023-09-20 ENCOUNTER — PATIENT MESSAGE (OUTPATIENT)
Dept: ADMINISTRATIVE | Facility: OTHER | Age: 53
End: 2023-09-20
Payer: MEDICARE

## 2023-09-21 ENCOUNTER — PATIENT MESSAGE (OUTPATIENT)
Dept: ADMINISTRATIVE | Facility: OTHER | Age: 53
End: 2023-09-21
Payer: MEDICARE

## 2023-09-22 ENCOUNTER — PATIENT MESSAGE (OUTPATIENT)
Dept: ADMINISTRATIVE | Facility: OTHER | Age: 53
End: 2023-09-22
Payer: MEDICARE

## 2023-09-24 ENCOUNTER — PATIENT MESSAGE (OUTPATIENT)
Dept: ADMINISTRATIVE | Facility: OTHER | Age: 53
End: 2023-09-24
Payer: MEDICARE

## 2023-09-24 NOTE — PROGRESS NOTES
Subjective:           Chief Complaint: No chief complaint on file.      HPI    Cardio-oncology follow-up    53 year old female with breast cancer receiving trastuzumab deruxtecan (previously also received paclitaxel, doxorubicin, eribulin, capecitabine, lapatinib, trastuzumab, trastuzumab emtansine, docetaxel and vinorelbine), mild aortic stenosis, obesity, upper GI bleed with esophageal varices noted on EGD here for cardio-oncology follow-up.    At our initial visit she had reported 14 lbs weight gain and increasing shortness of breath with dependent edema following her hospitalization for a GI bleed. TTE was unremarkable (normal systolic and diastolic function), with minimally elevated BNP and normal troponin. She had significant improvements with lasix.    She has lost about 30 lbs, she largely attributes this to lasix and major dietary changes. Weight has since been stable. She overall feels well but has occasional fatigue. She denies chest pain/pressure/tightness/discomfort, dyspnea on exertion, orthopnea, PND, palpitations, syncope or claudication. 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     She walks her dogs about 3 blocks. She can spend 4-5 hours gardening when the weather permits, without difficulty.     She quit smoking in 1991 and does not drink alcohol.    There is a family history of premature CAD (father had first MI at age 40).    Review of Systems  Pertinent findings as per HPI.        Objective:      Vitals:    09/28/23 0938   BP: 126/61   Pulse: 68     Physical Exam  Constitutional:       Appearance: She is well-developed. She is not diaphoretic.   HENT:      Head: Normocephalic and atraumatic.   Eyes:      Pupils: Pupils are equal, round, and reactive to light.   Neck:      Vascular: No JVD.   Cardiovascular:      Rate and Rhythm: Normal rate and regular rhythm.      Heart sounds: Heart sounds not distant. Murmur (Grade III/VI sysolic murmur loudest at RUSB,  radiates to carotids) heard.      No friction rub. No gallop. No S3 or S4 sounds.   Pulmonary:      Effort: Pulmonary effort is normal. No respiratory distress.      Breath sounds: Wheezing (bilateral) present. No rales.   Abdominal:      General: Bowel sounds are normal. There is no distension.      Palpations: Abdomen is soft.      Tenderness: There is no abdominal tenderness.   Musculoskeletal:         General: Swelling (RLE non-pitting edema) present.      Cervical back: Normal range of motion.   Skin:     General: Skin is warm.      Findings: No erythema.   Neurological:      Mental Status: She is alert and oriented to person, place, and time.   Psychiatric:         Behavior: Behavior normal.           Assessment:       1. Breast cancer, stage 4, left    2. Malignant neoplasm metastatic to left lung    3. Aortic atherosclerosis    4. Nonrheumatic aortic valve stenosis    5. Gastrointestinal hemorrhage, unspecified gastrointestinal hemorrhage type    6. Esophageal and gastric varices    7. Obesity (BMI 30-39.9)            Plan     Breast cancer, stage 4, left  Malignant neoplasm metastatic to left lung  TTE every 3 months and consideration of BNP/troponin every 3 months reasonable  TTE unremarkable (including normal diastolic function), troponin normal, BNP very mildly elevated at the time when she was symptomatic   She was quite dramatically volume overloaded at our initial visit with significant improvements with lasix, however she has normal systolic and diastolic cardiac function. Not on GDMT as she has a structurally normal heart and normal vitals. Continue to monitor as above.    Mild aortic stenosis  TTE surveillance as above    Gastrointestinal hemorrhage, unspecified gastrointestinal hemorrhage type  Esophageal and gastric varices  Plans for repeat EGD    Obesity (BMI 30-39.9)  We discussed diet and lifestyle modification.    Doing very well, follow-up in 6 months

## 2023-09-25 ENCOUNTER — PATIENT MESSAGE (OUTPATIENT)
Dept: ADMINISTRATIVE | Facility: OTHER | Age: 53
End: 2023-09-25
Payer: MEDICARE

## 2023-09-25 PROBLEM — K92.2 GI BLEED: Status: RESOLVED | Noted: 2023-06-22 | Resolved: 2023-09-25

## 2023-09-26 ENCOUNTER — OFFICE VISIT (OUTPATIENT)
Dept: HEMATOLOGY/ONCOLOGY | Facility: CLINIC | Age: 53
End: 2023-09-26
Payer: MEDICARE

## 2023-09-26 ENCOUNTER — INFUSION (OUTPATIENT)
Dept: INFUSION THERAPY | Facility: HOSPITAL | Age: 53
End: 2023-09-26
Attending: INTERNAL MEDICINE
Payer: MEDICARE

## 2023-09-26 VITALS
HEART RATE: 74 BPM | BODY MASS INDEX: 33.76 KG/M2 | WEIGHT: 183.44 LBS | TEMPERATURE: 98 F | OXYGEN SATURATION: 100 % | HEIGHT: 62 IN | RESPIRATION RATE: 18 BRPM | SYSTOLIC BLOOD PRESSURE: 132 MMHG | DIASTOLIC BLOOD PRESSURE: 61 MMHG

## 2023-09-26 VITALS
OXYGEN SATURATION: 100 % | HEART RATE: 79 BPM | RESPIRATION RATE: 17 BRPM | SYSTOLIC BLOOD PRESSURE: 122 MMHG | DIASTOLIC BLOOD PRESSURE: 61 MMHG

## 2023-09-26 DIAGNOSIS — I70.0 AORTIC ATHEROSCLEROSIS: ICD-10-CM

## 2023-09-26 DIAGNOSIS — C50.912 BREAST CANCER, STAGE 4, LEFT: ICD-10-CM

## 2023-09-26 DIAGNOSIS — D69.6 THROMBOCYTOPENIA, UNSPECIFIED: ICD-10-CM

## 2023-09-26 DIAGNOSIS — C78.02 MALIGNANT NEOPLASM METASTATIC TO LEFT LUNG: Primary | ICD-10-CM

## 2023-09-26 DIAGNOSIS — E87.6 HYPOKALEMIA: Primary | ICD-10-CM

## 2023-09-26 PROCEDURE — 96413 CHEMO IV INFUSION 1 HR: CPT

## 2023-09-26 PROCEDURE — 99999 PR PBB SHADOW E&M-EST. PATIENT-LVL IV: CPT | Mod: PBBFAC,,, | Performed by: NURSE PRACTITIONER

## 2023-09-26 PROCEDURE — 96367 TX/PROPH/DG ADDL SEQ IV INF: CPT

## 2023-09-26 PROCEDURE — 25000003 PHARM REV CODE 250: Performed by: NURSE PRACTITIONER

## 2023-09-26 PROCEDURE — 99215 OFFICE O/P EST HI 40 MIN: CPT | Mod: S$PBB,,, | Performed by: NURSE PRACTITIONER

## 2023-09-26 PROCEDURE — 96361 HYDRATE IV INFUSION ADD-ON: CPT

## 2023-09-26 PROCEDURE — 99214 OFFICE O/P EST MOD 30 MIN: CPT | Mod: PBBFAC,25 | Performed by: NURSE PRACTITIONER

## 2023-09-26 PROCEDURE — 63600175 PHARM REV CODE 636 W HCPCS: Performed by: NURSE PRACTITIONER

## 2023-09-26 PROCEDURE — 99999 PR PBB SHADOW E&M-EST. PATIENT-LVL IV: ICD-10-PCS | Mod: PBBFAC,,, | Performed by: NURSE PRACTITIONER

## 2023-09-26 PROCEDURE — 99215 PR OFFICE/OUTPT VISIT, EST, LEVL V, 40-54 MIN: ICD-10-PCS | Mod: S$PBB,,, | Performed by: NURSE PRACTITIONER

## 2023-09-26 RX ORDER — HEPARIN 100 UNIT/ML
500 SYRINGE INTRAVENOUS
Status: DISCONTINUED | OUTPATIENT
Start: 2023-09-26 | End: 2023-09-26 | Stop reason: HOSPADM

## 2023-09-26 RX ORDER — SODIUM CHLORIDE 9 MG/ML
INJECTION, SOLUTION INTRAVENOUS ONCE
Status: COMPLETED | OUTPATIENT
Start: 2023-09-26 | End: 2023-09-26

## 2023-09-26 RX ORDER — SODIUM CHLORIDE 0.9 % (FLUSH) 0.9 %
10 SYRINGE (ML) INJECTION
Status: CANCELLED | OUTPATIENT
Start: 2023-10-05

## 2023-09-26 RX ORDER — HEPARIN 100 UNIT/ML
500 SYRINGE INTRAVENOUS
Status: CANCELLED | OUTPATIENT
Start: 2023-10-05

## 2023-09-26 RX ORDER — SODIUM CHLORIDE 0.9 % (FLUSH) 0.9 %
10 SYRINGE (ML) INJECTION
Status: DISCONTINUED | OUTPATIENT
Start: 2023-09-26 | End: 2023-09-26 | Stop reason: HOSPADM

## 2023-09-26 RX ORDER — SODIUM CHLORIDE 9 MG/ML
INJECTION, SOLUTION INTRAVENOUS ONCE
Status: CANCELLED
Start: 2023-10-05 | End: 2023-10-05

## 2023-09-26 RX ADMIN — SODIUM CHLORIDE: 9 INJECTION, SOLUTION INTRAVENOUS at 09:09

## 2023-09-26 RX ADMIN — FOSAPREPITANT 150 MG: 150 INJECTION, POWDER, LYOPHILIZED, FOR SOLUTION INTRAVENOUS at 09:09

## 2023-09-26 RX ADMIN — FAM-TRASTUZUMAB DERUXTECAN-NXKI 470 MG: 100 INJECTION, POWDER, LYOPHILIZED, FOR SOLUTION INTRAVENOUS at 10:09

## 2023-09-26 RX ADMIN — HEPARIN 500 UNITS: 100 SYRINGE at 11:09

## 2023-09-26 RX ADMIN — PALONOSETRON 0.25 MG: 0.05 INJECTION, SOLUTION INTRAVENOUS at 10:09

## 2023-09-26 NOTE — PLAN OF CARE
Pt ambulatory to clinic alone for Enhertu infusion. Denies any sig complaints. Port accessed with little difficulty. Port is on an angle and have to flush with pressure to get blood return. Good blood return. Pt tolerated infusion well. Prefers Enhurtu over one hour. Port deaccessed after flushing. Ambulatory from clinic in [facility].

## 2023-09-27 ENCOUNTER — PATIENT MESSAGE (OUTPATIENT)
Dept: ADMINISTRATIVE | Facility: OTHER | Age: 53
End: 2023-09-27
Payer: MEDICARE

## 2023-09-27 ENCOUNTER — PATIENT MESSAGE (OUTPATIENT)
Dept: HEMATOLOGY/ONCOLOGY | Facility: CLINIC | Age: 53
End: 2023-09-27
Payer: MEDICARE

## 2023-09-28 ENCOUNTER — OFFICE VISIT (OUTPATIENT)
Dept: CARDIOLOGY | Facility: CLINIC | Age: 53
End: 2023-09-28
Payer: MEDICARE

## 2023-09-28 ENCOUNTER — PATIENT MESSAGE (OUTPATIENT)
Dept: ADMINISTRATIVE | Facility: OTHER | Age: 53
End: 2023-09-28
Payer: MEDICARE

## 2023-09-28 VITALS
SYSTOLIC BLOOD PRESSURE: 126 MMHG | BODY MASS INDEX: 34.12 KG/M2 | HEIGHT: 62 IN | WEIGHT: 185.44 LBS | HEART RATE: 68 BPM | DIASTOLIC BLOOD PRESSURE: 61 MMHG | OXYGEN SATURATION: 100 %

## 2023-09-28 DIAGNOSIS — C78.02 MALIGNANT NEOPLASM METASTATIC TO LEFT LUNG: ICD-10-CM

## 2023-09-28 DIAGNOSIS — C50.912 BREAST CANCER, STAGE 4, LEFT: Primary | ICD-10-CM

## 2023-09-28 DIAGNOSIS — I85.00 ESOPHAGEAL AND GASTRIC VARICES: ICD-10-CM

## 2023-09-28 DIAGNOSIS — K92.2 GASTROINTESTINAL HEMORRHAGE, UNSPECIFIED GASTROINTESTINAL HEMORRHAGE TYPE: ICD-10-CM

## 2023-09-28 DIAGNOSIS — E66.9 OBESITY (BMI 30-39.9): ICD-10-CM

## 2023-09-28 DIAGNOSIS — I70.0 AORTIC ATHEROSCLEROSIS: ICD-10-CM

## 2023-09-28 DIAGNOSIS — I86.4 ESOPHAGEAL AND GASTRIC VARICES: ICD-10-CM

## 2023-09-28 DIAGNOSIS — I35.0 NONRHEUMATIC AORTIC VALVE STENOSIS: ICD-10-CM

## 2023-09-28 PROCEDURE — 99214 OFFICE O/P EST MOD 30 MIN: CPT | Mod: S$PBB,,, | Performed by: INTERNAL MEDICINE

## 2023-09-28 PROCEDURE — 99999 PR PBB SHADOW E&M-EST. PATIENT-LVL IV: CPT | Mod: PBBFAC,,, | Performed by: INTERNAL MEDICINE

## 2023-09-28 PROCEDURE — 99214 OFFICE O/P EST MOD 30 MIN: CPT | Mod: PBBFAC | Performed by: INTERNAL MEDICINE

## 2023-09-28 PROCEDURE — 99214 PR OFFICE/OUTPT VISIT, EST, LEVL IV, 30-39 MIN: ICD-10-PCS | Mod: S$PBB,,, | Performed by: INTERNAL MEDICINE

## 2023-09-28 PROCEDURE — 99999 PR PBB SHADOW E&M-EST. PATIENT-LVL IV: ICD-10-PCS | Mod: PBBFAC,,, | Performed by: INTERNAL MEDICINE

## 2023-09-29 ENCOUNTER — PATIENT MESSAGE (OUTPATIENT)
Dept: ADMINISTRATIVE | Facility: OTHER | Age: 53
End: 2023-09-29
Payer: MEDICARE

## 2023-09-30 ENCOUNTER — PATIENT MESSAGE (OUTPATIENT)
Dept: ADMINISTRATIVE | Facility: OTHER | Age: 53
End: 2023-09-30
Payer: MEDICARE

## 2023-10-02 ENCOUNTER — PATIENT MESSAGE (OUTPATIENT)
Dept: ADMINISTRATIVE | Facility: OTHER | Age: 53
End: 2023-10-02
Payer: MEDICARE

## 2023-10-04 ENCOUNTER — PATIENT MESSAGE (OUTPATIENT)
Dept: ADMINISTRATIVE | Facility: OTHER | Age: 53
End: 2023-10-04
Payer: MEDICARE

## 2023-10-05 ENCOUNTER — PATIENT MESSAGE (OUTPATIENT)
Dept: ADMINISTRATIVE | Facility: OTHER | Age: 53
End: 2023-10-05
Payer: MEDICARE

## 2023-10-06 ENCOUNTER — PATIENT MESSAGE (OUTPATIENT)
Dept: ADMINISTRATIVE | Facility: OTHER | Age: 53
End: 2023-10-06
Payer: MEDICARE

## 2023-10-10 ENCOUNTER — PATIENT MESSAGE (OUTPATIENT)
Dept: ADMINISTRATIVE | Facility: OTHER | Age: 53
End: 2023-10-10
Payer: MEDICARE

## 2023-10-11 ENCOUNTER — PATIENT MESSAGE (OUTPATIENT)
Dept: ADMINISTRATIVE | Facility: OTHER | Age: 53
End: 2023-10-11
Payer: MEDICARE

## 2023-10-12 ENCOUNTER — TELEPHONE (OUTPATIENT)
Dept: HEMATOLOGY/ONCOLOGY | Facility: CLINIC | Age: 53
End: 2023-10-12
Payer: MEDICARE

## 2023-10-12 NOTE — PROGRESS NOTES
Subjective:       Patient ID: Shikha López is a 53 y.o. female.    Chief Complaint: Malignant neoplasm metastatic to left lung      HPI 53-year-old female who returns for F/U  for metastatic breast cancer.  She is on Trastuzumab deruxtecan  - here for cycle  40.      Presents to continue to [facility].  Her daughter is pregnant with little boy due in December 8, baby shower is Nov 4.  She is feeling good. Appetite and bowel movements have been good.   Denies nausea and vomiting.       Per Dr. Willis's previous note: Breast history:  She presented in the emergency room at [facility] on September 29, 2006 with a 4 months history of inflammation of her left breast.  Physical examination at that time showed the left breast was largely replaced by large mass with multiple skin ulcerations.    A biopsy in September 2006 showed poorly differentiated carcinoma which was ER and OR. negative and HER2 positive.    She was then referred to [facility] for additional care.   CT scan of the chest and abdomen November 2006 revealed multiple nodules in the lungs consistent with metastatic disease.  There also enlarged left axillary node.    She was treated with chemotherapy with weekly Herceptin and Abraxane with improvement in her breast and lung metastasis.    On May 29, 2007 she underwent left modified radical mastectomy(Dr. Colvin) which showed no residual tumor in the breast and 1/5 nodes was positive for metastasis measuring 6 mm.  (ypT0N1).  She then received postoperative radiation therapy(Dr Singh)  to the left chest wall supraclav and internal mammary lymph nodes from August 20, 2007 to September 28, 2007.    Postoperatively she continued on Herceptin for approximately 3 and 1/2 years before her lung metastasis begin to grow.    She subsequently received a number of different chemotherapy treatments including Adriamycin, Halaven, Xeloda plus lapatinib then Xeloda plus Herceptin. (  in  [place].)    Subsequently, she transferred her care to  at [facility].She was initially treated with Taxotere Herceptin Perjeta.      She was then changed to Kadcyla.      In July 2020 PET scan showed progression.   She then took approximately 9 months of Herceptin and Navelbine with initial response then progression.    She started trastuzumab- deruxtecan  4/12/21.     CT 6/30/23 - stable - no new findings    Echo 9/13/23 planned    She was hospitalized on June 22nd with an upper GI bleed due to gastric varices.  Hemoglobin decreased to 7 g dL-  required packed red blood cell transfusions.    F/U endoscopy 8/3/23  -esophageal and gastric varices - no bleeding.    Review of Systems   Constitutional:  Negative for activity change, appetite change, chills, diaphoresis, fatigue, fever and unexpected weight change.   HENT:  Positive for dental problem and nosebleeds (yesterday). Negative for nasal congestion, postnasal drip, rhinorrhea, sore throat and trouble swallowing.    Eyes:  Negative for visual disturbance.   Respiratory:  Negative for cough and shortness of breath.    Cardiovascular:  Negative for chest pain, palpitations and leg swelling.   Gastrointestinal:  Negative for abdominal distention, abdominal pain, blood in stool, constipation, diarrhea, nausea and vomiting.   Genitourinary:  Negative for frequency, hematuria and vaginal bleeding.   Musculoskeletal:  Negative for arthralgias, back pain and myalgias.   Integumentary:  Negative for pallor and rash.   Allergic/Immunologic: Negative for immunocompromised state.   Neurological:  Negative for dizziness, weakness, light-headedness, numbness and headaches.   Hematological:  Negative for adenopathy. Does not bruise/bleed easily.   Psychiatric/Behavioral: Negative.  Negative for confusion. The patient is not nervous/anxious.          Objective:      Physical Exam  Vitals reviewed.   Constitutional:       General: She is not in acute distress.      Appearance: She is obese.   HENT:      Mouth/Throat:      Mouth: Mucous membranes are moist.      Pharynx: Oropharynx is clear. No oropharyngeal exudate or posterior oropharyngeal erythema.      Comments: Poor senior living  Eyes:      Pupils: Pupils are equal, round, and reactive to light.   Cardiovascular:      Rate and Rhythm: Normal rate and regular rhythm.      Heart sounds: Murmur (systolic -aortic) heard.   Pulmonary:      Effort: Pulmonary effort is normal. No respiratory distress.      Breath sounds: Normal breath sounds. No wheezing or rales.   Chest:   Breasts:     Right: Normal.      Left: Absent.   Abdominal:      Palpations: Abdomen is soft. There is no mass.      Tenderness: There is no abdominal tenderness.   Lymphadenopathy:      Cervical: No cervical adenopathy.      Upper Body:      Right upper body: No supraclavicular or axillary adenopathy.      Left upper body: No supraclavicular or axillary adenopathy.   Skin:     Findings: No rash.   Neurological:      Mental Status: She is alert and oriented to person, place, and time.   Psychiatric:         Mood and Affect: Mood normal.         Behavior: Behavior normal.         Thought Content: Thought content normal.         Judgment: Judgment normal.       Assessment:      1. Breast cancer, stage 4, left        2. Malignant neoplasm metastatic to left lung        3. Hypokalemia        4. Thrombocytopenia, unspecified        5. Aortic atherosclerosis               Plan:    1-2. Continue current therapy  RTC 3 weeks.  Echo September EF 55-60% - due again December  CT in December 4. Platelets 150,000 - will continue to monitor     Return to clinic in 3 weeks with MD appointment and labs.     Patient is in agreement with the proposed treatment plan. All questions were answered to the patient's satisfaction. Patient knows to call clinic for any new or worsening symptoms and if anything is needed before the next clinic visit.          Mariam Lisa,  FN-C  Hematology & Medical Oncology   [address] [place] [address]  ph. [phone]  Fax. [phone]    Collaborating physician, Dr. Willis.    Approximately 20 minutes were spent face-to-face with the patient.  Approximately 30 minutes in total were spent on this encounter, which includes face-to-face time and non-face-to-face time preparing to see the patient (e.g., review of tests), obtaining and/or reviewing separately obtained history, documenting clinical information in the electronic or other health record, independently interpreting results (not separately reported) and communicating results to the patient/family/caregiver, or care coordination (not separately reported).     Route Chart for Scheduling    Med Onc Chart Routing      Follow up with physician 3 weeks. already scheduled   Follow up with JIMI    Infusion scheduling note    Injection scheduling note    Labs    Imaging CT chest abdomen pelvis and ECHO   echo and CT due in December - prior to seeing Dr. Willis in December   Pharmacy appointment    Other referrals                  Treatment Plan Information   OP BREAST FAM-TRASTUZUMAB DERUXTECAN-NXKI Q3W   Home Willis MD   Upcoming Treatment Dates - OP BREAST FAM-TRASTUZUMAB DERUXTECAN-NXKI Q3W    10/26/2023       Chemotherapy       fam-trastuzumab deruxtecan-nxki (ENHERTU) 470 mg in dextrose 5 % (D5W) 100 mL infusion       Antiemetics       fosaprepitant 150 mg in sodium chloride 0.9% 150 mL IVPB       palonosetron (ALOXI) 0.25 mg in sodium chloride 0.9% 50 mL IVPB  11/16/2023       Chemotherapy       fam-trastuzumab deruxtecan-nxki (ENHERTU) 470 mg in dextrose 5 % (D5W) 100 mL infusion       Antiemetics       fosaprepitant 150 mg in sodium chloride 0.9% 150 mL IVPB       palonosetron (ALOXI) 0.25 mg in sodium chloride 0.9% 50 mL IVPB  12/7/2023       Chemotherapy       fam-trastuzumab deruxtecan-nxki (ENHERTU) 470 mg in dextrose 5 % (D5W) 100 mL infusion       Antiemetics        fosaprepitant 150 mg in sodium chloride 0.9% 150 mL IVPB       palonosetron (ALOXI) 0.25 mg in sodium chloride 0.9% 50 mL IVPB  12/28/2023       Chemotherapy       fam-trastuzumab deruxtecan-nxki (ENHERTU) 470 mg in dextrose 5 % (D5W) 100 mL infusion       Antiemetics       fosaprepitant 150 mg in sodium chloride 0.9% 150 mL IVPB       palonosetron (ALOXI) 0.25 mg in sodium chloride 0.9% 50 mL IVPB

## 2023-10-12 NOTE — TELEPHONE ENCOUNTER
----- Message from Andra Lelo Isaiah sent at 10/12/2023  9:40 AM CDT -----  Regarding: returning missed call  Contact: pt @[phone]  Pt is returning a missed call from someone in the office and is asking for a return call back soon. Thanks.         Reason for call:returning missed call         Patient's DX:n/a         Patient requesting call back or [facility] [phone]    Additional Info: Pt was unaware of the upcoming appts

## 2023-10-12 NOTE — TELEPHONE ENCOUNTER
Spoke to the pt. Stated she was able to see her appt on the [facility] portal. Requesting to change infusion appt to [facility]. Joyce Bearden called pt explaining we are booked and place her on the wait list.

## 2023-10-13 ENCOUNTER — PATIENT MESSAGE (OUTPATIENT)
Dept: ADMINISTRATIVE | Facility: OTHER | Age: 53
End: 2023-10-13
Payer: MEDICARE

## 2023-10-16 ENCOUNTER — PATIENT MESSAGE (OUTPATIENT)
Dept: ADMINISTRATIVE | Facility: OTHER | Age: 53
End: 2023-10-16
Payer: MEDICARE

## 2023-10-17 ENCOUNTER — LAB VISIT (OUTPATIENT)
Dept: LAB | Facility: HOSPITAL | Age: 53
End: 2023-10-17
Attending: INTERNAL MEDICINE
Payer: MEDICARE

## 2023-10-17 ENCOUNTER — OFFICE VISIT (OUTPATIENT)
Dept: HEMATOLOGY/ONCOLOGY | Facility: CLINIC | Age: 53
End: 2023-10-17
Payer: MEDICARE

## 2023-10-17 VITALS
RESPIRATION RATE: 18 BRPM | HEART RATE: 81 BPM | TEMPERATURE: 98 F | OXYGEN SATURATION: 100 % | SYSTOLIC BLOOD PRESSURE: 130 MMHG | BODY MASS INDEX: 34.72 KG/M2 | HEIGHT: 62 IN | DIASTOLIC BLOOD PRESSURE: 61 MMHG | WEIGHT: 188.69 LBS

## 2023-10-17 DIAGNOSIS — C78.02 MALIGNANT NEOPLASM METASTATIC TO LEFT LUNG: ICD-10-CM

## 2023-10-17 DIAGNOSIS — I70.0 AORTIC ATHEROSCLEROSIS: ICD-10-CM

## 2023-10-17 DIAGNOSIS — D69.6 THROMBOCYTOPENIA, UNSPECIFIED: ICD-10-CM

## 2023-10-17 DIAGNOSIS — C50.912 BREAST CANCER, STAGE 4, LEFT: ICD-10-CM

## 2023-10-17 DIAGNOSIS — E87.6 HYPOKALEMIA: ICD-10-CM

## 2023-10-17 DIAGNOSIS — C50.912 BREAST CANCER, STAGE 4, LEFT: Primary | ICD-10-CM

## 2023-10-17 LAB
ALBUMIN SERPL BCP-MCNC: 3 G/DL (ref 3.5–5.2)
ALP SERPL-CCNC: 142 U/L (ref 55–135)
ALT SERPL W/O P-5'-P-CCNC: 17 U/L (ref 10–44)
ANION GAP SERPL CALC-SCNC: 6 MMOL/L (ref 8–16)
AST SERPL-CCNC: 40 U/L (ref 10–40)
BASOPHILS # BLD AUTO: 0.03 K/UL (ref 0–0.2)
BASOPHILS NFR BLD: 1 % (ref 0–1.9)
BILIRUB SERPL-MCNC: 1.9 MG/DL (ref 0.1–1)
BUN SERPL-MCNC: 9 MG/DL (ref 6–20)
CALCIUM SERPL-MCNC: 8.7 MG/DL (ref 8.7–10.5)
CHLORIDE SERPL-SCNC: 109 MMOL/L (ref 95–110)
CO2 SERPL-SCNC: 27 MMOL/L (ref 23–29)
CREAT SERPL-MCNC: 0.6 MG/DL (ref 0.5–1.4)
DIFFERENTIAL METHOD: ABNORMAL
EOSINOPHIL # BLD AUTO: 0.2 K/UL (ref 0–0.5)
EOSINOPHIL NFR BLD: 8 % (ref 0–8)
ERYTHROCYTE [DISTWIDTH] IN BLOOD BY AUTOMATED COUNT: 22.8 % (ref 11.5–14.5)
EST. GFR  (NO RACE VARIABLE): >60 ML/MIN/1.73 M^2
GLUCOSE SERPL-MCNC: 108 MG/DL (ref 70–110)
HCT VFR BLD AUTO: 26.3 % (ref 37–48.5)
HGB BLD-MCNC: 8.4 G/DL (ref 12–16)
IMM GRANULOCYTES # BLD AUTO: 0.01 K/UL (ref 0–0.04)
IMM GRANULOCYTES NFR BLD AUTO: 0.3 % (ref 0–0.5)
LYMPHOCYTES # BLD AUTO: 0.8 K/UL (ref 1–4.8)
LYMPHOCYTES NFR BLD: 26.4 % (ref 18–48)
MCH RBC QN AUTO: 33.5 PG (ref 27–31)
MCHC RBC AUTO-ENTMCNC: 31.9 G/DL (ref 32–36)
MCV RBC AUTO: 105 FL (ref 82–98)
MONOCYTES # BLD AUTO: 0.3 K/UL (ref 0.3–1)
MONOCYTES NFR BLD: 8.7 % (ref 4–15)
NEUTROPHILS # BLD AUTO: 1.7 K/UL (ref 1.8–7.7)
NEUTROPHILS NFR BLD: 55.6 % (ref 38–73)
NRBC BLD-RTO: 0 /100 WBC
PLATELET # BLD AUTO: 150 K/UL (ref 150–450)
PMV BLD AUTO: 12.9 FL (ref 9.2–12.9)
POTASSIUM SERPL-SCNC: 3.6 MMOL/L (ref 3.5–5.1)
PROT SERPL-MCNC: 5.8 G/DL (ref 6–8.4)
RBC # BLD AUTO: 2.51 M/UL (ref 4–5.4)
SODIUM SERPL-SCNC: 142 MMOL/L (ref 136–145)
WBC # BLD AUTO: 2.99 K/UL (ref 3.9–12.7)

## 2023-10-17 PROCEDURE — 99215 PR OFFICE/OUTPT VISIT, EST, LEVL V, 40-54 MIN: ICD-10-PCS | Mod: S$PBB,,, | Performed by: NURSE PRACTITIONER

## 2023-10-17 PROCEDURE — 99214 OFFICE O/P EST MOD 30 MIN: CPT | Mod: PBBFAC | Performed by: NURSE PRACTITIONER

## 2023-10-17 PROCEDURE — 85025 COMPLETE CBC W/AUTO DIFF WBC: CPT | Performed by: INTERNAL MEDICINE

## 2023-10-17 PROCEDURE — 99999 PR PBB SHADOW E&M-EST. PATIENT-LVL IV: ICD-10-PCS | Mod: PBBFAC,,, | Performed by: NURSE PRACTITIONER

## 2023-10-17 PROCEDURE — 99999 PR PBB SHADOW E&M-EST. PATIENT-LVL IV: CPT | Mod: PBBFAC,,, | Performed by: NURSE PRACTITIONER

## 2023-10-17 PROCEDURE — 99215 OFFICE O/P EST HI 40 MIN: CPT | Mod: S$PBB,,, | Performed by: NURSE PRACTITIONER

## 2023-10-17 PROCEDURE — 36415 COLL VENOUS BLD VENIPUNCTURE: CPT | Performed by: INTERNAL MEDICINE

## 2023-10-17 PROCEDURE — 80053 COMPREHEN METABOLIC PANEL: CPT | Performed by: INTERNAL MEDICINE

## 2023-10-17 RX ORDER — SODIUM CHLORIDE 9 MG/ML
INJECTION, SOLUTION INTRAVENOUS ONCE
Status: CANCELLED
Start: 2023-10-26 | End: 2023-10-26

## 2023-10-17 RX ORDER — SODIUM CHLORIDE 0.9 % (FLUSH) 0.9 %
10 SYRINGE (ML) INJECTION
Status: CANCELLED | OUTPATIENT
Start: 2023-10-26

## 2023-10-17 RX ORDER — HEPARIN 100 UNIT/ML
500 SYRINGE INTRAVENOUS
Status: CANCELLED | OUTPATIENT
Start: 2023-10-26

## 2023-10-19 ENCOUNTER — PATIENT MESSAGE (OUTPATIENT)
Dept: ADMINISTRATIVE | Facility: OTHER | Age: 53
End: 2023-10-19
Payer: MEDICARE

## 2023-10-20 ENCOUNTER — TELEPHONE (OUTPATIENT)
Dept: HEMATOLOGY/ONCOLOGY | Facility: CLINIC | Age: 53
End: 2023-10-20
Payer: MEDICARE

## 2023-10-20 ENCOUNTER — PATIENT MESSAGE (OUTPATIENT)
Dept: ADMINISTRATIVE | Facility: OTHER | Age: 53
End: 2023-10-20
Payer: MEDICARE

## 2023-10-20 NOTE — TELEPHONE ENCOUNTER
Care Companion Intervention    Reason for intervention: Weight decrease  Comment:  10 lb weight loss noted per CCC.     Intervention: Other intervention (comment)  Comment:  patient did not answer. Message left. PJ

## 2023-10-20 NOTE — TELEPHONE ENCOUNTER
----- Message from Johann Daniels sent at 10/20/2023  3:06 PM CDT -----  Contact: pt  Type:  Patient Returning Call    Who Called:Pt   Who Left Message for Patient:NP  Does the patient know what this is regarding?:n/a   Would the patient rather a call back or a response via [facility]? call  Best Call Back Number:[phone]  Additional Information:

## 2023-10-20 NOTE — TELEPHONE ENCOUNTER
Spoke to patient and she took her weight on a regular scale and she is 188, she will reweigh on her CCC scale if still low she will change the batteries and try again

## 2023-10-21 ENCOUNTER — INFUSION (OUTPATIENT)
Dept: INFUSION THERAPY | Facility: HOSPITAL | Age: 53
End: 2023-10-21
Attending: INTERNAL MEDICINE
Payer: MEDICARE

## 2023-10-21 ENCOUNTER — PATIENT MESSAGE (OUTPATIENT)
Dept: ADMINISTRATIVE | Facility: OTHER | Age: 53
End: 2023-10-21
Payer: MEDICARE

## 2023-10-21 VITALS
TEMPERATURE: 98 F | OXYGEN SATURATION: 100 % | DIASTOLIC BLOOD PRESSURE: 62 MMHG | SYSTOLIC BLOOD PRESSURE: 132 MMHG | RESPIRATION RATE: 18 BRPM | HEART RATE: 82 BPM

## 2023-10-21 DIAGNOSIS — E87.6 HYPOKALEMIA: Primary | ICD-10-CM

## 2023-10-21 DIAGNOSIS — C50.912 BREAST CANCER, STAGE 4, LEFT: ICD-10-CM

## 2023-10-21 PROCEDURE — 25000003 PHARM REV CODE 250: Performed by: NURSE PRACTITIONER

## 2023-10-21 PROCEDURE — 96413 CHEMO IV INFUSION 1 HR: CPT

## 2023-10-21 PROCEDURE — 63600175 PHARM REV CODE 636 W HCPCS: Performed by: NURSE PRACTITIONER

## 2023-10-21 PROCEDURE — 96375 TX/PRO/DX INJ NEW DRUG ADDON: CPT

## 2023-10-21 PROCEDURE — 96367 TX/PROPH/DG ADDL SEQ IV INF: CPT

## 2023-10-21 PROCEDURE — A4216 STERILE WATER/SALINE, 10 ML: HCPCS | Performed by: NURSE PRACTITIONER

## 2023-10-21 RX ORDER — SODIUM CHLORIDE 9 MG/ML
INJECTION, SOLUTION INTRAVENOUS ONCE
Status: COMPLETED | OUTPATIENT
Start: 2023-10-21 | End: 2023-10-21

## 2023-10-21 RX ORDER — SODIUM CHLORIDE 0.9 % (FLUSH) 0.9 %
10 SYRINGE (ML) INJECTION
Status: DISCONTINUED | OUTPATIENT
Start: 2023-10-21 | End: 2023-10-21 | Stop reason: HOSPADM

## 2023-10-21 RX ORDER — HEPARIN 100 UNIT/ML
500 SYRINGE INTRAVENOUS
Status: DISCONTINUED | OUTPATIENT
Start: 2023-10-21 | End: 2023-10-21 | Stop reason: HOSPADM

## 2023-10-21 RX ADMIN — SODIUM CHLORIDE: 9 INJECTION, SOLUTION INTRAVENOUS at 08:10

## 2023-10-21 RX ADMIN — HEPARIN 500 UNITS: 100 SYRINGE at 11:10

## 2023-10-21 RX ADMIN — PALONOSETRON 0.25 MG: 0.05 INJECTION, SOLUTION INTRAVENOUS at 09:10

## 2023-10-21 RX ADMIN — FAM-TRASTUZUMAB DERUXTECAN-NXKI 470 MG: 100 INJECTION, POWDER, LYOPHILIZED, FOR SOLUTION INTRAVENOUS at 10:10

## 2023-10-21 RX ADMIN — FOSAPREPITANT 150 MG: 150 INJECTION, POWDER, LYOPHILIZED, FOR SOLUTION INTRAVENOUS at 09:10

## 2023-10-21 RX ADMIN — Medication 10 ML: at 11:10

## 2023-10-21 NOTE — PLAN OF CARE
1125  Patient completed IVF, and Enhertu, tolerated well.  Port deaccessed, flushed, blood return noted, heparin locked.  Patient ambulated off floor independently, NAD.

## 2023-10-21 NOTE — PLAN OF CARE
"0840  Patient seated in chair, VSS, assessment done.  Port accessed, flushed, blood return noted, started NS bolus @ 999 cc/hr but Alaris kept alarming & messaging "occluded pt side", flushed again, did well, turned bolus to 800 cc/hr without alarm problems. Waiting for Enhertu from Pharmacy.  [facility] for safety  "

## 2023-10-23 ENCOUNTER — OFFICE VISIT (OUTPATIENT)
Dept: PALLIATIVE MEDICINE | Facility: CLINIC | Age: 53
End: 2023-10-23
Payer: MEDICARE

## 2023-10-23 VITALS
HEIGHT: 62 IN | HEART RATE: 78 BPM | WEIGHT: 191.13 LBS | SYSTOLIC BLOOD PRESSURE: 119 MMHG | BODY MASS INDEX: 35.17 KG/M2 | DIASTOLIC BLOOD PRESSURE: 58 MMHG

## 2023-10-23 DIAGNOSIS — R60.9 EDEMA, UNSPECIFIED TYPE: ICD-10-CM

## 2023-10-23 DIAGNOSIS — K92.2 GASTROINTESTINAL HEMORRHAGE, UNSPECIFIED GASTROINTESTINAL HEMORRHAGE TYPE: ICD-10-CM

## 2023-10-23 DIAGNOSIS — R41.840 LACK OF CONCENTRATION: ICD-10-CM

## 2023-10-23 DIAGNOSIS — R53.83 FATIGUE, UNSPECIFIED TYPE: ICD-10-CM

## 2023-10-23 DIAGNOSIS — Z51.5 ENCOUNTER FOR PALLIATIVE CARE: Primary | ICD-10-CM

## 2023-10-23 DIAGNOSIS — C50.912 BREAST CANCER, STAGE 4, LEFT: ICD-10-CM

## 2023-10-23 DIAGNOSIS — C78.02 MALIGNANT NEOPLASM METASTATIC TO LEFT LUNG: ICD-10-CM

## 2023-10-23 PROCEDURE — 99999 PR PBB SHADOW E&M-EST. PATIENT-LVL III: ICD-10-PCS | Mod: PBBFAC,,, | Performed by: NURSE PRACTITIONER

## 2023-10-23 PROCEDURE — 99215 PR OFFICE/OUTPT VISIT, EST, LEVL V, 40-54 MIN: ICD-10-PCS | Mod: S$PBB,,, | Performed by: NURSE PRACTITIONER

## 2023-10-23 PROCEDURE — 99215 OFFICE O/P EST HI 40 MIN: CPT | Mod: S$PBB,,, | Performed by: NURSE PRACTITIONER

## 2023-10-23 PROCEDURE — 99999 PR PBB SHADOW E&M-EST. PATIENT-LVL III: CPT | Mod: PBBFAC,,, | Performed by: NURSE PRACTITIONER

## 2023-10-23 PROCEDURE — 99213 OFFICE O/P EST LOW 20 MIN: CPT | Mod: PBBFAC | Performed by: NURSE PRACTITIONER

## 2023-10-23 RX ORDER — PANTOPRAZOLE SODIUM 40 MG/1
40 TABLET, DELAYED RELEASE ORAL 2 TIMES DAILY
Qty: 180 TABLET | Refills: 0 | Status: SHIPPED | OUTPATIENT
Start: 2023-10-23 | End: 2024-01-30 | Stop reason: SDUPTHER

## 2023-10-23 RX ORDER — METHYLPHENIDATE HYDROCHLORIDE 5 MG/1
5 TABLET ORAL 2 TIMES DAILY
Qty: 60 TABLET | Refills: 0 | Status: SHIPPED | OUTPATIENT
Start: 2023-10-23 | End: 2023-12-13 | Stop reason: SDUPTHER

## 2023-10-23 NOTE — PROGRESS NOTES
Consult Note  Palliative Care      Consult Requested By: No ref. provider found  Reason for Consult: symptom mgmt/ACP      ASSESSMENT/PLAN:     Plan/Recommendations:    10/23/2023:  - no changes made    Metastatic breast cancer  - following with Dr. Willis & NP [facility]   - ID 2006, chemo, s/p radical mastectomy (2007), s/p post-op RT, herceptin x 3.5 years until progression in lung mass, cycled through myriad of chemo tx with progression on July 2020 PET, 9 months of continued therapy w eventual progression, stable on 6/2023 CT  - currently ENHERTU on 2 years  - next scan December 2023    Encounter for palliative care  - Patient is decisional  - Patient accompanied today by self  - ACP documents are not uploaded into EMR   - Philosophy of Palliative Medicine reviewed with patient and family at first visit  - New patient folder given to and reviewed with patient and family at first visit  - Goals of care:  Patient lives alone with her 2 dogs and 2 cats. She is fully independent with no care needs of any kind. Her daughter Samra Jaeger (31) is her support system. Nearby, her mother is living with cognitive/health issues and pt anticipates having to care for her mother FT within a year. Pt also has a sister who is an  and will help her with getting her affairs in order, including HCPOA. A booklet is provided and reviewed today. We will complete at future visit. Ms. Trivedi shares that she was initially diagnosed with cancer 17 years ago. It has been a long journey for her. She found strength in setting goals along the way. Seeing her daughter graduate, , start her career - each milestone has motivated her to continue. Her daughter is now pregnant and due in December 2023, her goal is to meet her grandson. Ms. 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 Enjoys attending [employer] [employer], going to the movies, working in the garden, reading and baking, going  "to [place], farmer's markets, etc. No spiritual/rob, declines .    Cancer associated fatigue  - treatment-associated  - exacerbated by heat (she does not have AC in car, runs only 1 window unit in her house 2/2 cost)  - currently on ritalin, which helps  - she is ECOG 0  - will continue to monitor     Edema   - BLE  - follows with cardiologist  - controls with daily lasix, will hold doses on a busy day where it will be difficult to use a bathroom frequently    Understanding of illness: Excellent     Goals of care: preserve qol, independence, activity tolerance    Follow up: 10 weeks     Patient's encounter and above plan of care discussed with patient's oncology team.     SUBJECTIVE:     History of Present Illness:  Patient is a 53 y.o. year old female presenting with metastatic breast cancer. Please see oncology notes for full oncologic history and treatment course.      10/23/2023:  JOSSUE ELIZALDE reviewed: no concerns    Patient presents to f/u clinic visit alone. She reports some increased fatigue and says she "gets a lot of mucus at night" for a few days after treatment. She is otherwise doing very well. She is busy planning her daughter's elaborate baby shower which will be held in November. She has been coordinating the party with her sister. She acknowledges how meaningful this is for her since this will be her only opportunity to throw a baby shower for her daughter, and this will be the only grandchild she will meet. Next CT 12/15.    08/24/2023:  JOSSUE ELIZALDE reviewed: no concerns    Patient presents to initial clinic visit alone, she is extremely pleasant with few complaints. She does have some lower extremity edema for which she uses lasix. She skips doses on days where she will be away from home or running errands and in less frequent contact with a bathroom. She uses ritalin for fatigue and that works well for her. She shares that her cancer journey started 17 years ago and each step of the way, " she has used various milestones to keep her motivated. She has seen her daughter graduate,  and start her career and now looks forward to meeting her first grandchild in December. We review ACP booklet today. Her sister, who is an , was planning to complete ACP w her. She will talk to her sister and we will discuss at future visit.     Past Medical History:   Diagnosis Date    Breast cancer      Past Surgical History:   Procedure Laterality Date    ENDOSCOPIC ULTRASOUND OF UPPER GASTROINTESTINAL TRACT N/A 6/27/2023    Procedure: ULTRASOUND, UPPER GI TRACT, ENDOSCOPIC;  Surgeon: Home Lopez MD;  Location: [facility] ([address] [place] [address]);  Service: Endoscopy;  Laterality: N/A;    ESOPHAGOGASTRODUODENOSCOPY N/A 6/23/2023    Procedure: EGD (ESOPHAGOGASTRODUODENOSCOPY);  Surgeon: Quintin Mooney MD;  [facility]  Service: Endoscopy;  Laterality: N/A;    ESOPHAGOGASTRODUODENOSCOPY N/A 6/27/2023    Procedure: EGD (ESOPHAGOGASTRODUODENOSCOPY);  Surgeon: Home Lopez MD;  [facility]  Service: Endoscopy;  Laterality: N/A;    ESOPHAGOGASTRODUODENOSCOPY N/A 8/3/2023    Procedure: EGD (ESOPHAGOGASTRODUODENOSCOPY);  Surgeon: Home Lopez MD;  [facility]  Service: Endoscopy;  Laterality: N/A;  instr portal-labs 6/28/23-tb    MASTECTOMY       Family History   Problem Relation Age of Onset    Lung cancer Father      Review of patient's allergies indicates:  No Known Allergies    Medications:    Current Outpatient Medications:     acetaminophen (TYLENOL) 500 MG tablet, Take 1,000 mg by mouth., Disp: , Rfl:     ferrous sulfate (IRON, FERROUS SULFATE,) 325 mg (65 mg iron) Tab tablet, Take daily with Vitamin C on an empty stomach, Disp: 60 tablet, Rfl: 3    furosemide (LASIX) 20 MG tablet, Take 1 tablet (20 mg total) by mouth once daily., Disp: 90 tablet, Rfl: 3    Lactobac no.41/Bifidobact no.7 (PROBIOTIC-10 ORAL), Take by mouth., Disp: , Rfl:      LIDOcaine-prilocaine (EMLA) cream, APPLY TO THE AFFECTED AREA 1 HOUR BEFORE PORT ACCESS, Disp: , Rfl:     methylphenidate HCl (RITALIN) 5 MG tablet, Take 1 tablet (5 mg total) by mouth 2 (two) times daily., Disp: 60 tablet, Rfl: 0    OLANZapine (ZYPREXA) 5 MG tablet, Take days 1-4 of chemotherapy, Disp: 30 tablet, Rfl: 2    ondansetron (ZOFRAN) 8 MG tablet, Take 8 mg by mouth 3 (three) times daily as needed., Disp: , Rfl:     pantoprazole (PROTONIX) 40 MG tablet, Take 1 tablet (40 mg total) by mouth 2 (two) times daily. (Patient taking differently: Take 40 mg by mouth once daily.), Disp: 60 tablet, Rfl: 2    potassium chloride 10% (KAYCIEL) 20 mEq/15 mL oral solution, Take 15 mLs (20 mEq total) by mouth once daily. (To prevent stomach upset, mix dose with a glass of cold water or juice), Disp: 600 mL, Rfl: 0    OBJECTIVE:       ROS:  Review of Systems   Constitutional:  Positive for fatigue. Negative for activity change and appetite change.   HENT:  Negative for congestion, dental problem and drooling.    Eyes:  Negative for pain, discharge and itching.   Respiratory:  Negative for cough, choking and wheezing.    Cardiovascular:  Positive for leg swelling.   Gastrointestinal:  Negative for constipation, diarrhea, nausea and vomiting.   Genitourinary:  Negative for difficulty urinating, dyspareunia and dysuria.   Musculoskeletal:  Negative for arthralgias, back pain and gait problem.   Skin:  Negative for pallor, rash and wound.   Neurological:  Negative for dizziness, facial asymmetry and headaches.       Review of Symptoms      Symptom Assessment (ESAS 0-10 Scale)  Pain:  0  Dyspnea:  0  Anxiety:  0  Nausea:  0  Depression:  0  Anorexia:  0  Fatigue:  6  Insomnia:  0  Restlessness:  0  Agitation:  0     CAM / Delirium:  Negative  Constipation:  Negative  Diarrhea:  Negative    Constipation:  No constipation    Bowel Management Plan (BMP):  No      Pain Assessment:  OME in 24 hours:  0  Location(s): none      ECOG  Performance Status stGstrstastdstest:st st1st Psychosocial/Cultural:   See Palliative Psychosocial Note: No  **Primary  to Follow**  Palliative Care  Consult: No     Time-Based Charting:  No      Advance Care Planning   Advance Directives:   Living Will: No        Oral Declaration: No    LaPOST: No    Do Not Resuscitate Status: No        Oral Declaration: No      Decision Making:  Patient answered questions  Goals of Care: What is most important right now is to focus on remaining as independent as possible, symptom/pain control, curative/life-prolongation (regardless of treatment burdens), comfort and QOL . Accordingly, we have decided that the best plan to meet the patient's goals includes continuing with treatment.        Physical Exam:  Vitals:    Vitals:    10/23/23 0809   BP: (!) 119/58   Pulse: 78           Physical Exam  Vitals reviewed.   Constitutional:       General: She is not in acute distress.     Appearance: Normal appearance. She is not ill-appearing, toxic-appearing or diaphoretic.   HENT:      Head: Atraumatic.      Right Ear: External ear normal.      Left Ear: External ear normal.      Nose: Nose normal.      Mouth/Throat:      Dentition: Abnormal dentition.   Eyes:      General:         Right eye: No discharge.         Left eye: No discharge.      Extraocular Movements: Extraocular movements intact.      Conjunctiva/sclera: Conjunctivae normal.   Pulmonary:      Effort: Pulmonary effort is normal. No respiratory distress.      Breath sounds: No stridor.   Musculoskeletal:         General: Normal range of motion.      Cervical back: Normal range of motion.   Skin:     General: Skin is warm and dry.      Coloration: Skin is not jaundiced.   Neurological:      Mental Status: She is alert and oriented to person, place, and time. Mental status is at baseline.      Motor: No weakness.   Psychiatric:         Behavior: Behavior normal.         Thought Content: Thought content normal.          Judgment: Judgment normal.         Labs:  CBC:   WBC   Date Value Ref Range Status   10/17/2023 2.99 (L) 3.90 - 12.70 K/uL Final     Hemoglobin   Date Value Ref Range Status   10/17/2023 8.4 (L) 12.0 - 16.0 g/dL Final     Hematocrit   Date Value Ref Range Status   10/17/2023 26.3 (L) 37.0 - 48.5 % Final     MCV   Date Value Ref Range Status   10/17/2023 105 (H) 82 - 98 fL Final     Platelets   Date Value Ref Range Status   10/17/2023 150 150 - 450 K/uL Final       LFT:   Lab Results   Component Value Date    AST 40 10/17/2023    ALKPHOS 142 (H) 10/17/2023    BILITOT 1.9 (H) 10/17/2023       Albumin:   Albumin   Date Value Ref Range Status   10/17/2023 3.0 (L) 3.5 - 5.2 g/dL Final     Protein:   Total Protein   Date Value Ref Range Status   10/17/2023 5.8 (L) 6.0 - 8.4 g/dL Final       Radiology:I have reviewed all pertinent imaging results/findings within the past 24 hours.    06/30/2023 CT chest: Impression:     Stable multiple bilateral lung spiculated nodule compared to April 2023 likely metastatic disease.     Trace bilateral pleural effusion.    06/24/2023 CT AP: Impression:     No focal hepatic lesion.     Sequela of portal hypertension including splenomegaly and gastroesophageal varices.     Mild wall thickening and fatty infiltration at the proximal colon with mild pericolonic stranding.  Findings potentially relating to additional sequela of portosystemic change with portal colopathy.  A nonspecific colitis cannot be entirely excluded.     Similar left infrahilar spiculated opacity with lower lung pleural thickening.  Continued dedicated surveillance as scheduled.     Prior left mastectomy.  Superficial skin thickening of the visualized right breast.  To correlate with mammographic history.     Trace intrapelvic free fluid and lower body wall edema.     Cholelithiasis, stable hypodense focus at the pancreatic head, and additional findings as above.    I spent a total of 40 minutes on the day of the  visit.This includes face to face time in discussion of goals of care, symptom assessment, coordination of care and emotional support.  This also includes non-face to face time preparing to see the patient (eg, review of tests/imaging), obtaining and/or reviewing separately obtained history, documenting clinical information in the electronic or other health record, independently interpreting results and communicating results to the patient/family/caregiver, or care coordinator.     Signature: Lizbeth Mehta DNP

## 2023-10-24 ENCOUNTER — PATIENT MESSAGE (OUTPATIENT)
Dept: ADMINISTRATIVE | Facility: OTHER | Age: 53
End: 2023-10-24
Payer: MEDICARE

## 2023-10-26 ENCOUNTER — PATIENT MESSAGE (OUTPATIENT)
Dept: ADMINISTRATIVE | Facility: OTHER | Age: 53
End: 2023-10-26
Payer: MEDICARE

## 2023-10-27 ENCOUNTER — PATIENT MESSAGE (OUTPATIENT)
Dept: ADMINISTRATIVE | Facility: OTHER | Age: 53
End: 2023-10-27
Payer: MEDICARE

## 2023-10-30 ENCOUNTER — PATIENT MESSAGE (OUTPATIENT)
Dept: ADMINISTRATIVE | Facility: OTHER | Age: 53
End: 2023-10-30
Payer: MEDICARE

## 2023-11-06 NOTE — PROGRESS NOTES
Subjective:       Patient ID: Shikha López is a 53 y.o. female.    Chief Complaint: No chief complaint on file.      HPI 53-year-old female who returns for F/U  for metastatic breast cancer.  She is on Trastuzumab deruxtecan  - here for cycle  42.      Over last week she developed laryngitis and then a cough which is interfere with her sleep.  The cough has been productive of grayish sputum.  She denies any shortness of breath or fever.  She has felt fatigued.        Breast history:  She presented in the emergency room at [facility] on September 29, 2006 with a 4 months history of inflammation of her left breast.  Physical examination at that time showed the left breast was largely replaced by large mass with multiple skin ulcerations.    A biopsy in September 2006 showed poorly differentiated carcinoma which was ER and NV. negative and HER2 positive.    She was then referred to [facility] for additional care.   CT scan of the chest and abdomen November 2006 revealed multiple nodules in the lungs consistent with metastatic disease.  There also enlarged left axillary node.    She was treated with chemotherapy with weekly Herceptin and Abraxane with improvement in her breast and lung metastasis.    On May 29, 2007 she underwent left modified radical mastectomy(Dr. Colvin) which showed no residual tumor in the breast and 1/5 nodes was positive for metastasis measuring 6 mm.  (ypT0N1).  She then received postoperative radiation therapy(Dr Singh)  to the left chest wall supraclav and internal mammary lymph nodes from August 20, 2007 to September 28, 2007.    Postoperatively she continued on Herceptin for approximately 3 and 1/2 years before her lung metastasis begin to grow.    She subsequently received a number of different chemotherapy treatments including Adriamycin, Halaven, Xeloda plus lapatinib then Xeloda plus Herceptin. (  in [facility].)    Subsequently, she transferred her care  to  at [facility].She was initially treated with Taxotere Herceptin Perjeta.      She was then changed to Kadcyla.      In July 2020 PET scan showed progression.   She then took approximately 9 months of Herceptin and Navelbine with initial response then progression.    She started trastuzumab- deruxtecan  4/12/21.     CT 6/30/23 - stable - no new findings    Echo 9/13/23 planned    She was hospitalized on June 22nd with an upper GI bleed due to gastric varices.  Hemoglobin decreased to 7 g dL-  required packed red blood cell transfusions.    F/U endoscopy 8/3/23  -esophageal and gastric varices - no bleeding.  Review of Systems   Constitutional:  Positive for fatigue. Negative for appetite change, fever and unexpected weight change.   HENT:  Positive for voice change. Negative for nasal congestion, mouth sores, postnasal drip and rhinorrhea.    Eyes:  Negative for visual disturbance.   Respiratory:  Positive for cough. Negative for shortness of breath.    Cardiovascular:  Negative for chest pain.   Gastrointestinal:  Negative for abdominal pain, blood in stool, constipation, diarrhea and nausea.   Genitourinary:  Negative for frequency.   Musculoskeletal:  Negative for back pain.   Integumentary:  Negative for rash.   Neurological:  Negative for headaches.   Hematological:  Negative for adenopathy.   Psychiatric/Behavioral: Negative.  The patient is not nervous/anxious.          Objective:      Physical Exam  Vitals reviewed.   Constitutional:       General: She is not in acute distress.     Appearance: She is obese.   HENT:      Mouth/Throat:      Mouth: Mucous membranes are moist.      Pharynx: Oropharynx is clear. No oropharyngeal exudate or posterior oropharyngeal erythema.   Eyes:      Pupils: Pupils are equal, round, and reactive to light.   Cardiovascular:      Rate and Rhythm: Normal rate and regular rhythm.      Heart sounds: Murmur (systolic -aortic) heard.   Pulmonary:      Effort: Pulmonary  effort is normal. No respiratory distress.      Breath sounds: Normal breath sounds. No wheezing or rales.   Chest:   Breasts:     Right: Normal.      Left: Absent.       Abdominal:      Palpations: Abdomen is soft. There is no mass.      Tenderness: There is no abdominal tenderness.   Lymphadenopathy:      Cervical: No cervical adenopathy.      Upper Body:      Right upper body: No supraclavicular or axillary adenopathy.      Left upper body: No supraclavicular or axillary adenopathy.   Skin:     Findings: No rash.   Neurological:      Mental Status: She is alert and oriented to person, place, and time.   Psychiatric:         Mood and Affect: Mood normal.         Behavior: Behavior normal.         Thought Content: Thought content normal.         Judgment: Judgment normal.       Assessment:    Labs : WBC 3020 ANC 1600, HGB 8.6  Problem List Items Addressed This Visit       Breast cancer, stage 4, left - Primary    Malignant neoplasm metastatic to left lung       Plan:    Continue current therapy with delay 1 week due to respiratory infection.  RTC 4 weeks.  CT in December  Route Chart for Scheduling    Med Onc Chart Routing      Follow up with physician 4 weeks.   Follow up with JIMI    Infusion scheduling note    Injection scheduling note    Labs CMP and CBC   Scheduling:  Preferred lab:  Lab interval:     Imaging   Already scheduled   Pharmacy appointment No pharmacy appointment needed      Other referrals no referral to Oncology Primary Care needed -  no Massage appointment needed    No additional referrals needed           Treatment Plan Information   OP BREAST FAM-TRASTUZUMAB DERUXTECAN-NXKI Q3W   Home Willis MD   Upcoming Treatment Dates - OP BREAST FAM-TRASTUZUMAB DERUXTECAN-NXKI Q3W    11/23/2023       Chemotherapy       fam-trastuzumab deruxtecan-nxki (ENHERTU) 470 mg in dextrose 5 % (D5W) 100 mL infusion       Antiemetics       fosaprepitant 150 mg in sodium chloride 0.9% 150 mL IVPB       palonosetron  (ALOXI) 0.25 mg in sodium chloride 0.9% 50 mL IVPB  12/14/2023       Chemotherapy       fam-trastuzumab deruxtecan-nxki (ENHERTU) 470 mg in dextrose 5 % (D5W) 100 mL infusion       Antiemetics       fosaprepitant 150 mg in sodium chloride 0.9% 150 mL IVPB       palonosetron (ALOXI) 0.25 mg in sodium chloride 0.9% 50 mL IVPB  1/4/2024       Chemotherapy       fam-trastuzumab deruxtecan-nxki (ENHERTU) 470 mg in dextrose 5 % (D5W) 100 mL infusion       Antiemetics       fosaprepitant 150 mg in sodium chloride 0.9% 150 mL IVPB       palonosetron (ALOXI) 0.25 mg in sodium chloride 0.9% 50 mL IVPB

## 2023-11-08 ENCOUNTER — LAB VISIT (OUTPATIENT)
Dept: LAB | Facility: HOSPITAL | Age: 53
End: 2023-11-08
Attending: INTERNAL MEDICINE
Payer: MEDICARE

## 2023-11-08 ENCOUNTER — OFFICE VISIT (OUTPATIENT)
Dept: HEMATOLOGY/ONCOLOGY | Facility: CLINIC | Age: 53
End: 2023-11-08
Payer: MEDICARE

## 2023-11-08 VITALS
SYSTOLIC BLOOD PRESSURE: 123 MMHG | HEART RATE: 84 BPM | HEIGHT: 62 IN | BODY MASS INDEX: 34.46 KG/M2 | DIASTOLIC BLOOD PRESSURE: 57 MMHG | WEIGHT: 187.25 LBS | OXYGEN SATURATION: 100 % | TEMPERATURE: 98 F | RESPIRATION RATE: 20 BRPM

## 2023-11-08 DIAGNOSIS — C50.912 BREAST CANCER, STAGE 4, LEFT: Primary | ICD-10-CM

## 2023-11-08 DIAGNOSIS — C50.912 BREAST CANCER, STAGE 4, LEFT: ICD-10-CM

## 2023-11-08 DIAGNOSIS — C78.02 MALIGNANT NEOPLASM METASTATIC TO LEFT LUNG: ICD-10-CM

## 2023-11-08 LAB
ALBUMIN SERPL BCP-MCNC: 3 G/DL (ref 3.5–5.2)
ALP SERPL-CCNC: 125 U/L (ref 55–135)
ALT SERPL W/O P-5'-P-CCNC: 14 U/L (ref 10–44)
ANION GAP SERPL CALC-SCNC: 6 MMOL/L (ref 8–16)
AST SERPL-CCNC: 32 U/L (ref 10–40)
BASOPHILS # BLD AUTO: 0.02 K/UL (ref 0–0.2)
BASOPHILS NFR BLD: 0.7 % (ref 0–1.9)
BILIRUB SERPL-MCNC: 2.1 MG/DL (ref 0.1–1)
BUN SERPL-MCNC: 5 MG/DL (ref 6–20)
CALCIUM SERPL-MCNC: 8.4 MG/DL (ref 8.7–10.5)
CHLORIDE SERPL-SCNC: 108 MMOL/L (ref 95–110)
CO2 SERPL-SCNC: 29 MMOL/L (ref 23–29)
CREAT SERPL-MCNC: 0.6 MG/DL (ref 0.5–1.4)
DIFFERENTIAL METHOD: ABNORMAL
EOSINOPHIL # BLD AUTO: 0.3 K/UL (ref 0–0.5)
EOSINOPHIL NFR BLD: 9.6 % (ref 0–8)
ERYTHROCYTE [DISTWIDTH] IN BLOOD BY AUTOMATED COUNT: 20.8 % (ref 11.5–14.5)
EST. GFR  (NO RACE VARIABLE): >60 ML/MIN/1.73 M^2
GLUCOSE SERPL-MCNC: 94 MG/DL (ref 70–110)
HCT VFR BLD AUTO: 28.9 % (ref 37–48.5)
HGB BLD-MCNC: 8.6 G/DL (ref 12–16)
IMM GRANULOCYTES # BLD AUTO: 0.01 K/UL (ref 0–0.04)
IMM GRANULOCYTES NFR BLD AUTO: 0.3 % (ref 0–0.5)
LYMPHOCYTES # BLD AUTO: 0.5 K/UL (ref 1–4.8)
LYMPHOCYTES NFR BLD: 17.5 % (ref 18–48)
MCH RBC QN AUTO: 32.2 PG (ref 27–31)
MCHC RBC AUTO-ENTMCNC: 29.8 G/DL (ref 32–36)
MCV RBC AUTO: 108 FL (ref 82–98)
MONOCYTES # BLD AUTO: 0.2 K/UL (ref 0.3–1)
MONOCYTES NFR BLD: 7.9 % (ref 4–15)
NEUTROPHILS # BLD AUTO: 1.9 K/UL (ref 1.8–7.7)
NEUTROPHILS NFR BLD: 64 % (ref 38–73)
NRBC BLD-RTO: 0 /100 WBC
PLATELET # BLD AUTO: 146 K/UL (ref 150–450)
PMV BLD AUTO: 13.1 FL (ref 9.2–12.9)
POTASSIUM SERPL-SCNC: 3 MMOL/L (ref 3.5–5.1)
PROT SERPL-MCNC: 5.9 G/DL (ref 6–8.4)
RBC # BLD AUTO: 2.67 M/UL (ref 4–5.4)
SODIUM SERPL-SCNC: 143 MMOL/L (ref 136–145)
WBC # BLD AUTO: 3.02 K/UL (ref 3.9–12.7)

## 2023-11-08 PROCEDURE — 85025 COMPLETE CBC W/AUTO DIFF WBC: CPT | Performed by: INTERNAL MEDICINE

## 2023-11-08 PROCEDURE — 80053 COMPREHEN METABOLIC PANEL: CPT | Performed by: INTERNAL MEDICINE

## 2023-11-08 PROCEDURE — 99214 PR OFFICE/OUTPT VISIT, EST, LEVL IV, 30-39 MIN: ICD-10-PCS | Mod: S$PBB,,, | Performed by: INTERNAL MEDICINE

## 2023-11-08 PROCEDURE — 99999 PR PBB SHADOW E&M-EST. PATIENT-LVL IV: CPT | Mod: PBBFAC,,, | Performed by: INTERNAL MEDICINE

## 2023-11-08 PROCEDURE — 99214 OFFICE O/P EST MOD 30 MIN: CPT | Mod: PBBFAC | Performed by: INTERNAL MEDICINE

## 2023-11-08 PROCEDURE — 99999 PR PBB SHADOW E&M-EST. PATIENT-LVL IV: ICD-10-PCS | Mod: PBBFAC,,, | Performed by: INTERNAL MEDICINE

## 2023-11-08 PROCEDURE — 99214 OFFICE O/P EST MOD 30 MIN: CPT | Mod: S$PBB,,, | Performed by: INTERNAL MEDICINE

## 2023-11-08 PROCEDURE — 36415 COLL VENOUS BLD VENIPUNCTURE: CPT | Performed by: INTERNAL MEDICINE

## 2023-11-08 RX ORDER — HEPARIN 100 UNIT/ML
500 SYRINGE INTRAVENOUS
Status: CANCELLED | OUTPATIENT
Start: 2023-11-23

## 2023-11-08 RX ORDER — SODIUM CHLORIDE 0.9 % (FLUSH) 0.9 %
10 SYRINGE (ML) INJECTION
Status: CANCELLED | OUTPATIENT
Start: 2023-11-23

## 2023-11-08 RX ORDER — SODIUM CHLORIDE 9 MG/ML
INJECTION, SOLUTION INTRAVENOUS ONCE
Status: CANCELLED
Start: 2023-11-23 | End: 2023-11-23

## 2023-11-09 ENCOUNTER — PATIENT MESSAGE (OUTPATIENT)
Dept: HEMATOLOGY/ONCOLOGY | Facility: CLINIC | Age: 53
End: 2023-11-09
Payer: MEDICARE

## 2023-11-09 DIAGNOSIS — J01.10 SUBACUTE FRONTAL SINUSITIS: Primary | ICD-10-CM

## 2023-11-09 RX ORDER — AZITHROMYCIN 250 MG/1
TABLET, FILM COATED ORAL
Qty: 6 EACH | Refills: 0 | Status: SHIPPED | OUTPATIENT
Start: 2023-11-09 | End: 2023-11-14

## 2023-11-09 RX ORDER — HYDROCODONE BITARTRATE AND HOMATROPINE METHYLBROMIDE ORAL SOLUTION 5; 1.5 MG/5ML; MG/5ML
5 LIQUID ORAL 4 TIMES DAILY PRN
Qty: 120 ML | Refills: 0 | Status: SHIPPED | OUTPATIENT
Start: 2023-11-09 | End: 2024-03-25 | Stop reason: SDUPTHER

## 2023-11-14 ENCOUNTER — INFUSION (OUTPATIENT)
Dept: INFUSION THERAPY | Facility: HOSPITAL | Age: 53
End: 2023-11-14
Attending: INTERNAL MEDICINE
Payer: MEDICARE

## 2023-11-14 VITALS
HEIGHT: 62 IN | WEIGHT: 186.81 LBS | DIASTOLIC BLOOD PRESSURE: 52 MMHG | OXYGEN SATURATION: 98 % | SYSTOLIC BLOOD PRESSURE: 115 MMHG | BODY MASS INDEX: 34.38 KG/M2 | RESPIRATION RATE: 18 BRPM | TEMPERATURE: 98 F | HEART RATE: 94 BPM

## 2023-11-14 DIAGNOSIS — E87.6 HYPOKALEMIA: ICD-10-CM

## 2023-11-14 DIAGNOSIS — C50.912 BREAST CANCER, STAGE 4, LEFT: Primary | ICD-10-CM

## 2023-11-14 PROCEDURE — 25000003 PHARM REV CODE 250: Performed by: INTERNAL MEDICINE

## 2023-11-14 PROCEDURE — 96367 TX/PROPH/DG ADDL SEQ IV INF: CPT

## 2023-11-14 PROCEDURE — 96413 CHEMO IV INFUSION 1 HR: CPT

## 2023-11-14 PROCEDURE — A4216 STERILE WATER/SALINE, 10 ML: HCPCS | Performed by: INTERNAL MEDICINE

## 2023-11-14 PROCEDURE — 63600175 PHARM REV CODE 636 W HCPCS: Performed by: INTERNAL MEDICINE

## 2023-11-14 RX ORDER — HEPARIN 100 UNIT/ML
500 SYRINGE INTRAVENOUS
Status: DISCONTINUED | OUTPATIENT
Start: 2023-11-14 | End: 2023-11-14 | Stop reason: HOSPADM

## 2023-11-14 RX ORDER — SODIUM CHLORIDE 9 MG/ML
INJECTION, SOLUTION INTRAVENOUS ONCE
Status: COMPLETED | OUTPATIENT
Start: 2023-11-14 | End: 2023-11-14

## 2023-11-14 RX ORDER — SODIUM CHLORIDE 0.9 % (FLUSH) 0.9 %
10 SYRINGE (ML) INJECTION
Status: DISCONTINUED | OUTPATIENT
Start: 2023-11-14 | End: 2023-11-14 | Stop reason: HOSPADM

## 2023-11-14 RX ADMIN — FAM-TRASTUZUMAB DERUXTECAN-NXKI 470 MG: 100 INJECTION, POWDER, LYOPHILIZED, FOR SOLUTION INTRAVENOUS at 04:11

## 2023-11-14 RX ADMIN — SODIUM CHLORIDE: 9 INJECTION, SOLUTION INTRAVENOUS at 03:11

## 2023-11-14 RX ADMIN — FOSAPREPITANT 150 MG: 150 INJECTION, POWDER, LYOPHILIZED, FOR SOLUTION INTRAVENOUS at 03:11

## 2023-11-14 RX ADMIN — Medication 10 ML: at 05:11

## 2023-11-14 RX ADMIN — PALONOSETRON 0.25 MG: 0.05 INJECTION, SOLUTION INTRAVENOUS at 04:11

## 2023-11-14 RX ADMIN — HEPARIN 500 UNITS: 100 SYRINGE at 05:11

## 2023-11-14 NOTE — PLAN OF CARE
Pt received D1C41 of Enhertu +IVF hydration via R chest port without adverse effects. VS remained stable throughout tx. Pt prefers to use my [facility] for appt schedule. Discharged AAOx4 and ambulatory.

## 2023-11-28 ENCOUNTER — TELEPHONE (OUTPATIENT)
Dept: ENDOSCOPY | Facility: HOSPITAL | Age: 53
End: 2023-11-28
Payer: MEDICARE

## 2023-11-28 NOTE — TELEPHONE ENCOUNTER
Spoke to pt to schedule procedure(s) Upper Endoscopy Ultrasound (EUS)       Physician to perform procedure(s) Dr. EMILIA Lopez  Date of Procedure (s) 1/12/24  Arrival Time 8:45 AM  Time of Procedure(s) 9:45 AM   Location of Procedure(s) [address]  Type of Rx Prep sent to patient: N/A  Instructions provided to patient via [facility]    Patient was informed on the following information and verbalized understanding. Screening questionnaire reviewed with patient and complete. If procedure requires anesthesia, a responsible adult needs to be present to accompany the patient home, patient cannot drive after receiving anesthesia. Appointment details are tentative, especially check-in time. Patient will receive a prep-op call 7 days prior to confirm check-in time for procedure. If applicable the patient should contact their pharmacy to verify Rx for procedure prep is ready for pick-up. Patient was advised to call the scheduling department at [phone] if pharmacy states no Rx is available. Patient was advised to call the endoscopy scheduling department if any questions or concerns arise.      [facility] Endoscopy Scheduling Department

## 2023-11-29 NOTE — PROGRESS NOTES
Subjective:       Patient ID: Shikha López is a 53 y.o. female.    Chief Complaint: No chief complaint on file.      HPI 53-year-old female who returns for F/U  for metastatic breast cancer.  She is on Trastuzumab deruxtecan  - here for cycle  42.        Today she reports that she has been feeling well.  Her breathing has been doing very well she is been able to walk her dog without getting short of breath.  She denies any unusual pain.    She has had significant stress as her mother has progressive dementia.  On a Good note, her daughter is hospital to deliver a baby today.    Has F/U endoscopy 1/12/24.        Breast history:  She presented in the emergency room at [facility] on September 29, 2006 with a 4 months history of inflammation of her left breast.  Physical examination at that time showed the left breast was largely replaced by large mass with multiple skin ulcerations.    A biopsy in September 2006 showed poorly differentiated carcinoma which was ER and OH. negative and HER2 positive.    She was then referred to [facility] for additional care.   CT scan of the chest and abdomen November 2006 revealed multiple nodules in the lungs consistent with metastatic disease.  There also enlarged left axillary node.    She was treated with chemotherapy with weekly Herceptin and Abraxane with improvement in her breast and lung metastasis.    On May 29, 2007 she underwent left modified radical mastectomy(Dr. Colvin) which showed no residual tumor in the breast and 1/5 nodes was positive for metastasis measuring 6 mm.  (ypT0N1).  She then received postoperative radiation therapy(Dr Singh)  to the left chest wall supraclav and internal mammary lymph nodes from August 20, 2007 to September 28, 2007.    Postoperatively she continued on Herceptin for approximately 3 and 1/2 years before her lung metastasis begin to grow.    She subsequently received a number of different chemotherapy treatments  including Adriamycin, Halaven, Xeloda plus lapatinib then Xeloda plus Herceptin. (  in [facility].)    Subsequently, she transferred her care to  at [facility].She was initially treated with Taxotere Herceptin Perjeta.      She was then changed to Kadcyla.      In July 2020 PET scan showed progression.   She then took approximately 9 months of Herceptin and Navelbine with initial response then progression.    She started trastuzumab- deruxtecan  4/12/21.     CT 6/30/23 - stable - no new findings    Echo 9/13/23 planned    She was hospitalized on June 22nd with an upper GI bleed due to gastric varices.  Hemoglobin decreased to 7 g dL-  required packed red blood cell transfusions.    F/U endoscopy 8/3/23  -esophageal and gastric varices - no bleeding.  Review of Systems   Constitutional:  Negative for appetite change, fatigue, fever and unexpected weight change.   HENT:  Negative for nasal congestion, mouth sores, postnasal drip and rhinorrhea.    Eyes:  Negative for visual disturbance.   Respiratory:  Negative for cough and shortness of breath.    Cardiovascular:  Negative for chest pain.   Gastrointestinal:  Negative for abdominal pain, blood in stool, constipation, diarrhea and nausea.   Genitourinary:  Negative for frequency.   Musculoskeletal:  Negative for back pain.   Integumentary:  Negative for rash.   Neurological:  Negative for headaches.   Hematological:  Negative for adenopathy.   Psychiatric/Behavioral: Negative.  The patient is not nervous/anxious.          Objective:      Physical Exam  Vitals reviewed.   Constitutional:       General: She is not in acute distress.     Appearance: She is obese.   HENT:      Mouth/Throat:      Mouth: Mucous membranes are moist.      Pharynx: Oropharynx is clear. No oropharyngeal exudate or posterior oropharyngeal erythema.   Eyes:      Pupils: Pupils are equal, round, and reactive to light.   Cardiovascular:      Rate and Rhythm: Normal rate and  regular rhythm.      Heart sounds: Murmur (systolic -aortic) heard.   Pulmonary:      Effort: Pulmonary effort is normal. No respiratory distress.      Breath sounds: Normal breath sounds. No wheezing or rales.   Chest:   Breasts:     Right: Normal.      Left: Absent.       Abdominal:      Palpations: Abdomen is soft. There is no mass.      Tenderness: There is no abdominal tenderness.   Lymphadenopathy:      Cervical: No cervical adenopathy.      Upper Body:      Right upper body: No supraclavicular or axillary adenopathy.      Left upper body: No supraclavicular or axillary adenopathy.   Skin:     Findings: No rash.   Neurological:      Mental Status: She is alert and oriented to person, place, and time.   Psychiatric:         Mood and Affect: Mood normal.         Behavior: Behavior normal.         Thought Content: Thought content normal.         Judgment: Judgment normal.         Assessment:    Labs : WBC 3020 ANC 1400, HGB 2610   Problem List Items Addressed This Visit       Breast cancer, stage 4, left    Pancytopenia - Primary       Plan:    Echo scheduled-she will need a new cardiologist at some point.  RTC 3 weeks.  CTs due in December    OK to treat    Route Chart for Scheduling    Med Onc Chart Routing      Follow up with physician    Follow up with JIMI 3 weeks.   Infusion scheduling note    Injection scheduling note    Labs CMP and CBC   Scheduling:  Preferred lab:  Lab interval: every 3 weeks     Imaging    Pharmacy appointment No pharmacy appointment needed      Other referrals no referral to Oncology Primary Care needed -  no Massage appointment needed    No additional referrals needed               Treatment Plan Information   OP BREAST FAM-TRASTUZUMAB DERUXTECAN-NXKI Q3W   Home Willis MD   Upcoming Treatment Dates - OP BREAST FAM-TRASTUZUMAB DERUXTECAN-NXKI Q3W    12/14/2023       Chemotherapy       fam-trastuzumab deruxtecan-nxki (ENHERTU) 470 mg in dextrose 5 % (D5W) 100 mL infusion        Antiemetics       fosaprepitant 150 mg in sodium chloride 0.9% 150 mL IVPB       palonosetron (ALOXI) 0.25 mg in sodium chloride 0.9% 50 mL IVPB  1/4/2024       Chemotherapy       fam-trastuzumab deruxtecan-nxki (ENHERTU) 470 mg in dextrose 5 % (D5W) 100 mL infusion       Antiemetics       fosaprepitant 150 mg in sodium chloride 0.9% 150 mL IVPB       palonosetron (ALOXI) 0.25 mg in sodium chloride 0.9% 50 mL IVPB  1/25/2024       Chemotherapy       fam-trastuzumab deruxtecan-nxki (ENHERTU) 470 mg in dextrose 5 % (D5W) 100 mL infusion       Antiemetics       fosaprepitant 150 mg in sodium chloride 0.9% 150 mL IVPB       palonosetron (ALOXI) 0.25 mg in sodium chloride 0.9% 50 mL IVPB  2/15/2024       Chemotherapy       fam-trastuzumab deruxtecan-nxki (ENHERTU) 470 mg in dextrose 5 % (D5W) 100 mL infusion       Antiemetics       fosaprepitant 150 mg in sodium chloride 0.9% 150 mL IVPB       palonosetron (ALOXI) 0.25 mg in sodium chloride 0.9% 50 mL IVPB

## 2023-12-04 ENCOUNTER — OFFICE VISIT (OUTPATIENT)
Dept: HEMATOLOGY/ONCOLOGY | Facility: CLINIC | Age: 53
End: 2023-12-04
Payer: MEDICARE

## 2023-12-04 ENCOUNTER — INFUSION (OUTPATIENT)
Dept: INFUSION THERAPY | Facility: HOSPITAL | Age: 53
End: 2023-12-04
Attending: INTERNAL MEDICINE
Payer: MEDICARE

## 2023-12-04 VITALS
SYSTOLIC BLOOD PRESSURE: 146 MMHG | DIASTOLIC BLOOD PRESSURE: 69 MMHG | OXYGEN SATURATION: 99 % | BODY MASS INDEX: 33.87 KG/M2 | HEIGHT: 62 IN | RESPIRATION RATE: 18 BRPM | HEART RATE: 98 BPM | TEMPERATURE: 98 F | WEIGHT: 184.06 LBS

## 2023-12-04 VITALS
HEIGHT: 62 IN | BODY MASS INDEX: 33.87 KG/M2 | SYSTOLIC BLOOD PRESSURE: 127 MMHG | WEIGHT: 184.06 LBS | RESPIRATION RATE: 18 BRPM | HEART RATE: 81 BPM | DIASTOLIC BLOOD PRESSURE: 61 MMHG | OXYGEN SATURATION: 99 %

## 2023-12-04 DIAGNOSIS — C50.912 BREAST CANCER, STAGE 4, LEFT: ICD-10-CM

## 2023-12-04 DIAGNOSIS — C50.912 BREAST CANCER, STAGE 4, LEFT: Primary | ICD-10-CM

## 2023-12-04 DIAGNOSIS — E87.6 HYPOKALEMIA: ICD-10-CM

## 2023-12-04 DIAGNOSIS — D61.818 PANCYTOPENIA: Primary | ICD-10-CM

## 2023-12-04 PROCEDURE — 99999 PR PBB SHADOW E&M-EST. PATIENT-LVL III: CPT | Mod: PBBFAC,,, | Performed by: INTERNAL MEDICINE

## 2023-12-04 PROCEDURE — 99214 PR OFFICE/OUTPT VISIT, EST, LEVL IV, 30-39 MIN: ICD-10-PCS | Mod: S$PBB,,, | Performed by: INTERNAL MEDICINE

## 2023-12-04 PROCEDURE — 99999 PR PBB SHADOW E&M-EST. PATIENT-LVL III: ICD-10-PCS | Mod: PBBFAC,,, | Performed by: INTERNAL MEDICINE

## 2023-12-04 PROCEDURE — 96361 HYDRATE IV INFUSION ADD-ON: CPT

## 2023-12-04 PROCEDURE — 99213 OFFICE O/P EST LOW 20 MIN: CPT | Mod: PBBFAC,25 | Performed by: INTERNAL MEDICINE

## 2023-12-04 PROCEDURE — 96413 CHEMO IV INFUSION 1 HR: CPT

## 2023-12-04 PROCEDURE — 25000003 PHARM REV CODE 250: Performed by: INTERNAL MEDICINE

## 2023-12-04 PROCEDURE — 63600175 PHARM REV CODE 636 W HCPCS: Performed by: INTERNAL MEDICINE

## 2023-12-04 PROCEDURE — 99214 OFFICE O/P EST MOD 30 MIN: CPT | Mod: S$PBB,,, | Performed by: INTERNAL MEDICINE

## 2023-12-04 PROCEDURE — 96367 TX/PROPH/DG ADDL SEQ IV INF: CPT

## 2023-12-04 PROCEDURE — A4216 STERILE WATER/SALINE, 10 ML: HCPCS | Performed by: INTERNAL MEDICINE

## 2023-12-04 RX ORDER — HEPARIN 100 UNIT/ML
500 SYRINGE INTRAVENOUS
Status: CANCELLED | OUTPATIENT
Start: 2023-12-14

## 2023-12-04 RX ORDER — SODIUM CHLORIDE 0.9 % (FLUSH) 0.9 %
10 SYRINGE (ML) INJECTION
Status: CANCELLED | OUTPATIENT
Start: 2023-12-14

## 2023-12-04 RX ORDER — HEPARIN 100 UNIT/ML
500 SYRINGE INTRAVENOUS
Status: DISCONTINUED | OUTPATIENT
Start: 2023-12-04 | End: 2023-12-04 | Stop reason: HOSPADM

## 2023-12-04 RX ORDER — SODIUM CHLORIDE 9 MG/ML
INJECTION, SOLUTION INTRAVENOUS ONCE
Status: COMPLETED | OUTPATIENT
Start: 2023-12-04 | End: 2023-12-04

## 2023-12-04 RX ORDER — SODIUM CHLORIDE 9 MG/ML
INJECTION, SOLUTION INTRAVENOUS ONCE
Status: CANCELLED
Start: 2023-12-14 | End: 2023-12-14

## 2023-12-04 RX ORDER — SODIUM CHLORIDE 0.9 % (FLUSH) 0.9 %
10 SYRINGE (ML) INJECTION
Status: DISCONTINUED | OUTPATIENT
Start: 2023-12-04 | End: 2023-12-04 | Stop reason: HOSPADM

## 2023-12-04 RX ADMIN — FAM-TRASTUZUMAB DERUXTECAN-NXKI 470 MG: 100 INJECTION, POWDER, LYOPHILIZED, FOR SOLUTION INTRAVENOUS at 02:12

## 2023-12-04 RX ADMIN — HEPARIN 500 UNITS: 100 SYRINGE at 03:12

## 2023-12-04 RX ADMIN — SODIUM CHLORIDE: 0.9 INJECTION, SOLUTION INTRAVENOUS at 01:12

## 2023-12-04 RX ADMIN — FOSAPREPITANT 150 MG: 150 INJECTION, POWDER, LYOPHILIZED, FOR SOLUTION INTRAVENOUS at 01:12

## 2023-12-04 RX ADMIN — Medication 10 ML: at 03:12

## 2023-12-04 RX ADMIN — PALONOSETRON 0.25 MG: 0.05 INJECTION, SOLUTION INTRAVENOUS at 01:12

## 2023-12-04 NOTE — PLAN OF CARE
Pt received Enhertu & IVFs today and tolerated well, without complications. Educated patient about Enhertu & IVFs (indications, side effects, possible reactions, chemotherapy precautions) and verbalized understanding.  VSS. CW port positive for blood return, saline flushed, Heparin flush instilled to dwell and de accessed prior to DC. Pt DC with no distress noted, ambulated off unit,l w/o event, via self, pleased.

## 2023-12-08 DIAGNOSIS — R41.840 LACK OF CONCENTRATION: ICD-10-CM

## 2023-12-08 RX ORDER — METHYLPHENIDATE HYDROCHLORIDE 5 MG/1
5 TABLET ORAL 2 TIMES DAILY
Qty: 60 TABLET | Refills: 0 | OUTPATIENT
Start: 2023-12-08

## 2023-12-13 DIAGNOSIS — R41.840 LACK OF CONCENTRATION: ICD-10-CM

## 2023-12-14 RX ORDER — METHYLPHENIDATE HYDROCHLORIDE 5 MG/1
5 TABLET ORAL 2 TIMES DAILY
Qty: 60 TABLET | Refills: 0 | Status: SHIPPED | OUTPATIENT
Start: 2023-12-14 | End: 2024-01-30 | Stop reason: SDUPTHER

## 2023-12-15 ENCOUNTER — HOSPITAL ENCOUNTER (OUTPATIENT)
Dept: CARDIOLOGY | Facility: HOSPITAL | Age: 53
Discharge: HOME | End: 2023-12-15
Attending: NURSE PRACTITIONER
Payer: MEDICARE

## 2023-12-15 ENCOUNTER — HOSPITAL ENCOUNTER (OUTPATIENT)
Dept: RADIOLOGY | Facility: HOSPITAL | Age: 53
Discharge: HOME | End: 2023-12-15
Attending: NURSE PRACTITIONER
Payer: MEDICARE

## 2023-12-15 VITALS
BODY MASS INDEX: 33.86 KG/M2 | DIASTOLIC BLOOD PRESSURE: 80 MMHG | SYSTOLIC BLOOD PRESSURE: 122 MMHG | HEART RATE: 63 BPM | WEIGHT: 184 LBS | HEIGHT: 62 IN

## 2023-12-15 DIAGNOSIS — C50.912 BREAST CANCER, STAGE 4, LEFT: ICD-10-CM

## 2023-12-15 DIAGNOSIS — C78.02 MALIGNANT NEOPLASM METASTATIC TO LEFT LUNG: ICD-10-CM

## 2023-12-15 LAB
ASCENDING AORTA: 2.99 CM
AV INDEX (PROSTH): 0.49
AV MEAN GRADIENT: 13 MMHG
AV PEAK GRADIENT: 19 MMHG
AV VALVE AREA BY VELOCITY RATIO: 1.57 CM²
AV VALVE AREA: 1.71 CM²
AV VELOCITY RATIO: 0.45
BSA FOR ECHO PROCEDURE: 1.91 M2
CV ECHO LV RWT: 0.29 CM
DOP CALC AO PEAK VEL: 2.2 M/S
DOP CALC AO VTI: 44.59 CM
DOP CALC LVOT AREA: 3.5 CM2
DOP CALC LVOT DIAMETER: 2.1 CM
DOP CALC LVOT PEAK VEL: 1 M/S
DOP CALC LVOT STROKE VOLUME: 76.09 CM3
DOP CALCLVOT PEAK VEL VTI: 21.98 CM
E WAVE DECELERATION TIME: 118.6 MSEC
E/A RATIO: 1.24
E/E' RATIO: 12.95 M/S
ECHO LV POSTERIOR WALL: 0.83 CM (ref 0.6–1.1)
FRACTIONAL SHORTENING: 39 % (ref 28–44)
GLOBAL LONGITUIDAL STRAIN: 17.1 %
INTERVENTRICULAR SEPTUM: 0.6 CM (ref 0.6–1.1)
LA MAJOR: 5.45 CM
LA MINOR: 4.75 CM
LA WIDTH: 4.88 CM
LEFT ATRIUM SIZE: 5.18 CM
LEFT ATRIUM VOLUME INDEX MOD: 50.7 ML/M2
LEFT ATRIUM VOLUME INDEX: 59.3 ML/M2
LEFT ATRIUM VOLUME MOD: 93.23 CM3
LEFT ATRIUM VOLUME: 109.07 CM3
LEFT INTERNAL DIMENSION IN SYSTOLE: 3.47 CM (ref 2.1–4)
LEFT VENTRICLE DIASTOLIC VOLUME INDEX: 87.02 ML/M2
LEFT VENTRICLE DIASTOLIC VOLUME: 160.12 ML
LEFT VENTRICLE MASS INDEX: 80 G/M2
LEFT VENTRICLE SYSTOLIC VOLUME INDEX: 27 ML/M2
LEFT VENTRICLE SYSTOLIC VOLUME: 49.71 ML
LEFT VENTRICULAR INTERNAL DIMENSION IN DIASTOLE: 5.7 CM (ref 3.5–6)
LEFT VENTRICULAR MASS: 148.09 G
LV LATERAL E/E' RATIO: 15.38 M/S
LV SEPTAL E/E' RATIO: 11.18 M/S
MV PEAK A VEL: 0.99 M/S
MV PEAK E VEL: 1.23 M/S
MV STENOSIS PRESSURE HALF TIME: 34.39 MS
MV VALVE AREA P 1/2 METHOD: 6.4 CM2
OHS LV EJECTION FRACTION SIMPSONS BIPLANE MOD: 55 %
PISA TR MAX VEL: 2.72 M/S
RA MAJOR: 3.86 CM
RA PRESSURE ESTIMATED: 8 MMHG
RA WIDTH: 3.7 CM
RIGHT VENTRICULAR END-DIASTOLIC DIMENSION: 4.3 CM
RV TB RVSP: 11 MMHG
SINUS: 2.9 CM
STJ: 2.57 CM
TDI LATERAL: 0.08 M/S
TDI SEPTAL: 0.11 M/S
TDI: 0.1 M/S
TR MAX PG: 30 MMHG
TRICUSPID ANNULAR PLANE SYSTOLIC EXCURSION: 2.16 CM
TV REST PULMONARY ARTERY PRESSURE: 38 MMHG
Z-SCORE OF LEFT VENTRICULAR DIMENSION IN END DIASTOLE: 1.1
Z-SCORE OF LEFT VENTRICULAR DIMENSION IN END SYSTOLE: 0.76

## 2023-12-15 PROCEDURE — 93306 ECHO (CUPID ONLY): ICD-10-PCS | Mod: 26,,, | Performed by: INTERNAL MEDICINE

## 2023-12-15 PROCEDURE — 25500020 PHARM REV CODE 255: Performed by: NURSE PRACTITIONER

## 2023-12-15 PROCEDURE — 71260 CT THORAX DX C+: CPT | Mod: 26,,, | Performed by: INTERNAL MEDICINE

## 2023-12-15 PROCEDURE — 74177 CT ABD & PELVIS W/CONTRAST: CPT | Mod: TC

## 2023-12-15 PROCEDURE — 93356 ECHO (CUPID ONLY): ICD-10-PCS | Mod: ,,, | Performed by: INTERNAL MEDICINE

## 2023-12-15 PROCEDURE — 93356 MYOCRD STRAIN IMG SPCKL TRCK: CPT | Mod: ,,, | Performed by: INTERNAL MEDICINE

## 2023-12-15 PROCEDURE — 74177 CT ABD & PELVIS W/CONTRAST: CPT | Mod: 26,,, | Performed by: INTERNAL MEDICINE

## 2023-12-15 PROCEDURE — 71260 CT THORAX DX C+: CPT | Mod: TC

## 2023-12-15 PROCEDURE — 71260 CT CHEST ABDOMEN PELVIS WITH CONTRAST (XPD): ICD-10-PCS | Mod: 26,,, | Performed by: INTERNAL MEDICINE

## 2023-12-15 PROCEDURE — 93356 MYOCRD STRAIN IMG SPCKL TRCK: CPT

## 2023-12-15 PROCEDURE — 93306 TTE W/DOPPLER COMPLETE: CPT | Mod: 26,,, | Performed by: INTERNAL MEDICINE

## 2023-12-15 PROCEDURE — 74177 CT CHEST ABDOMEN PELVIS WITH CONTRAST (XPD): ICD-10-PCS | Mod: 26,,, | Performed by: INTERNAL MEDICINE

## 2023-12-15 RX ADMIN — IOHEXOL 100 ML: 350 INJECTION, SOLUTION INTRAVENOUS at 02:12

## 2023-12-26 ENCOUNTER — OFFICE VISIT (OUTPATIENT)
Dept: HEMATOLOGY/ONCOLOGY | Facility: CLINIC | Age: 53
End: 2023-12-26
Payer: MEDICARE

## 2023-12-26 ENCOUNTER — INFUSION (OUTPATIENT)
Dept: INFUSION THERAPY | Facility: HOSPITAL | Age: 53
End: 2023-12-26
Attending: INTERNAL MEDICINE
Payer: MEDICARE

## 2023-12-26 VITALS
HEIGHT: 62 IN | DIASTOLIC BLOOD PRESSURE: 58 MMHG | RESPIRATION RATE: 18 BRPM | BODY MASS INDEX: 34 KG/M2 | WEIGHT: 184.75 LBS | HEART RATE: 76 BPM | TEMPERATURE: 98 F | SYSTOLIC BLOOD PRESSURE: 132 MMHG

## 2023-12-26 VITALS
HEART RATE: 89 BPM | SYSTOLIC BLOOD PRESSURE: 152 MMHG | TEMPERATURE: 98 F | RESPIRATION RATE: 18 BRPM | DIASTOLIC BLOOD PRESSURE: 69 MMHG | OXYGEN SATURATION: 98 % | HEIGHT: 62 IN | WEIGHT: 184.75 LBS | BODY MASS INDEX: 34 KG/M2

## 2023-12-26 DIAGNOSIS — C50.912 BREAST CANCER, STAGE 4, LEFT: Primary | ICD-10-CM

## 2023-12-26 DIAGNOSIS — C50.912 BREAST CANCER, STAGE 4, LEFT: ICD-10-CM

## 2023-12-26 DIAGNOSIS — E87.6 HYPOKALEMIA: ICD-10-CM

## 2023-12-26 DIAGNOSIS — C78.02 MALIGNANT NEOPLASM METASTATIC TO LEFT LUNG: Primary | ICD-10-CM

## 2023-12-26 PROCEDURE — 99215 OFFICE O/P EST HI 40 MIN: CPT | Mod: S$PBB,,, | Performed by: INTERNAL MEDICINE

## 2023-12-26 PROCEDURE — 96413 CHEMO IV INFUSION 1 HR: CPT

## 2023-12-26 PROCEDURE — 63600175 PHARM REV CODE 636 W HCPCS: Mod: JZ,JG | Performed by: INTERNAL MEDICINE

## 2023-12-26 PROCEDURE — 99999 PR PBB SHADOW E&M-EST. PATIENT-LVL IV: ICD-10-PCS | Mod: PBBFAC,,, | Performed by: INTERNAL MEDICINE

## 2023-12-26 PROCEDURE — 25000003 PHARM REV CODE 250: Performed by: INTERNAL MEDICINE

## 2023-12-26 PROCEDURE — 96367 TX/PROPH/DG ADDL SEQ IV INF: CPT

## 2023-12-26 PROCEDURE — 99214 OFFICE O/P EST MOD 30 MIN: CPT | Mod: PBBFAC,25 | Performed by: INTERNAL MEDICINE

## 2023-12-26 PROCEDURE — 99215 PR OFFICE/OUTPT VISIT, EST, LEVL V, 40-54 MIN: ICD-10-PCS | Mod: S$PBB,,, | Performed by: INTERNAL MEDICINE

## 2023-12-26 PROCEDURE — 99999 PR PBB SHADOW E&M-EST. PATIENT-LVL IV: CPT | Mod: PBBFAC,,, | Performed by: INTERNAL MEDICINE

## 2023-12-26 PROCEDURE — 96361 HYDRATE IV INFUSION ADD-ON: CPT

## 2023-12-26 RX ORDER — SODIUM CHLORIDE 0.9 % (FLUSH) 0.9 %
10 SYRINGE (ML) INJECTION
Status: CANCELLED | OUTPATIENT
Start: 2023-12-26

## 2023-12-26 RX ORDER — SODIUM CHLORIDE 0.9 % (FLUSH) 0.9 %
10 SYRINGE (ML) INJECTION
Status: DISCONTINUED | OUTPATIENT
Start: 2023-12-26 | End: 2023-12-26 | Stop reason: HOSPADM

## 2023-12-26 RX ORDER — SODIUM CHLORIDE 0.9 % (FLUSH) 0.9 %
10 SYRINGE (ML) INJECTION
Status: CANCELLED | OUTPATIENT
Start: 2024-01-04

## 2023-12-26 RX ORDER — HEPARIN 100 UNIT/ML
500 SYRINGE INTRAVENOUS
Status: CANCELLED | OUTPATIENT
Start: 2023-12-26

## 2023-12-26 RX ORDER — SODIUM CHLORIDE 9 MG/ML
INJECTION, SOLUTION INTRAVENOUS ONCE
Status: COMPLETED | OUTPATIENT
Start: 2023-12-26 | End: 2023-12-26

## 2023-12-26 RX ORDER — HEPARIN 100 UNIT/ML
500 SYRINGE INTRAVENOUS
Status: CANCELLED | OUTPATIENT
Start: 2024-01-04

## 2023-12-26 RX ORDER — HEPARIN 100 UNIT/ML
500 SYRINGE INTRAVENOUS
Status: DISCONTINUED | OUTPATIENT
Start: 2023-12-26 | End: 2023-12-26 | Stop reason: HOSPADM

## 2023-12-26 RX ORDER — SODIUM CHLORIDE 9 MG/ML
INJECTION, SOLUTION INTRAVENOUS ONCE
Status: CANCELLED
Start: 2024-01-04 | End: 2024-01-04

## 2023-12-26 RX ADMIN — SODIUM CHLORIDE: 9 INJECTION, SOLUTION INTRAVENOUS at 11:12

## 2023-12-26 RX ADMIN — PALONOSETRON 0.25 MG: 0.05 INJECTION, SOLUTION INTRAVENOUS at 12:12

## 2023-12-26 RX ADMIN — FOSAPREPITANT DIMEGLUMINE 150 MG: 150 INJECTION, POWDER, LYOPHILIZED, FOR SOLUTION INTRAVENOUS at 11:12

## 2023-12-26 RX ADMIN — FAM-TRASTUZUMAB DERUXTECAN-NXKI 470 MG: 100 INJECTION, POWDER, LYOPHILIZED, FOR SOLUTION INTRAVENOUS at 01:12

## 2023-12-26 NOTE — PROGRESS NOTES
Subjective:       Patient ID: Shikha López is a 53 y.o. female.    Chief Complaint: No chief complaint on file.      HPI   Mrs. López presents today for follow-up.  Briefly, she is a 53-year-old female with metastatic breast cancer who is being followed by Dr. Willis.  She is due for cycle 43 of trastuzumab deruxtecan.    Her most recent staging scans were obtained 11 days ago and showed stable disease (reviewed).  There is right axillary and left para-aortic adenopathy and multiple pulmonary nodules. Most of the pulmonary nodules are unchanged, and only one of them is slightly more conspicuous, in the right lower lobe.  Her most recent echocardiogram from December 15, 2023 shows an EF of 55-60%.    Today's labs are as follows:  WBCs 2300 per cubic mm, hemoglobin 9.6 grams/deciliter, hematocrit 29.9%, and ANC 1200 per cubic mm and platelets 118 K   Total bilirubin is 2.3 mg/dL, transaminases are normal, and creatinine is 0.8 mg/dL.  Calcium is 8.5 mg/dL with an albumin 3 grams/dL    ROS:   Overall she feels well. She complains of fatigue but she denies any anxiety, depression, easy bruising, fevers, chills, night  sweats, weight loss, nausea, vomiting, diarrhea, constipation, diplopia,     blurred vision, headache, chest pain, palpitations, shortness of breath, breast pain, abdominal pain, extremity pain, or difficulty ambulating.  The remainder of the ten-point ROS, including general, skin, lymph, H/N, cardiorespiratory, GI, , Neuro, Endocrine, and psychiatric is negative.     PE:    She is alert, oriented to time, place, person, pleasant, well      nourished, in no acute physical distress.                                    VITAL SIGNS:  Reviewed                                      HEENT:  Normal.  There are no nasal, oral, lip, gingival, auricular, lid,    or conjunctival lesions.  Mucosae are moist and pink, and there is no        thrush.  Pupils are equal, reactive to light and accommodation.               Extraocular muscle movements are intact.  Dentition is very poor with multiple decayed teeth.  There is no frontal or maxillary tenderness.                                     NECK:  Supple without JVD, adenopathy, or thyromegaly.                       LUNGS:  Clear to auscultation without wheezing, rales, or rhonchi.           CARDIOVASCULAR:  Reveals an S1, S2, no murmurs, no rubs, no gallops.         ABDOMEN:  Soft, nontender, without organomegaly.  Bowel sounds are    present.                                                                     EXTREMITIES:  No cyanosis, clubbing, or edema.                               BREASTS:  She is status post left mastectomy and chest wall radiation.  There are radiation induced changes on the left chest wall    There are no masses in the right breast.  A right-sided Port-A-Cath is identified                                       LYMPHATIC:  There is no cervical, axillary, or supraclavicular adenopathy.   SKIN:  Warm and moist, without petechiae, rashes, induration, or ecchymoses.           NEUROLOGIC:  DTRs are 0-1+ bilaterally, symmetrical, motor function is 5/5,  and cranial nerves are  within normal limits.    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  We went over the scan findings.  She will proceed with the next cycle of herceptin deruxtecan today and she will return in 3 weeks with repeat labs to see Dr. Willis.  Her echocardiogram will be repeated in 3 months.  Finally, at her request, she will be hydrated with 1 L of normal saline which she has been getting every time she receives chemotherapy.  Her multiple questions were answered to her satisfaction.    I spent approximately 50 minutes reviewing the available records and evaluating the patient, out of which over 50% of the time was spent face to face with the  patient in counseling and coordinating this patient's care.        Route Chart for Scheduling    Med Onc Chart Routing      Follow up with physician 3 weeks. Return in 3 weeks with labs to see Dr. Willis and receive her next cycle of chemotherapy.   Follow up with JIMI    Infusion scheduling note    Injection scheduling note    Labs CBC and CMP   Scheduling:  Preferred lab:  Lab interval:     Imaging    Pharmacy appointment    Other referrals            Treatment Plan Information   OP BREAST FAM-TRASTUZUMAB DERUXTECAN-NXKI Q3W   Home Willis MD   Upcoming Treatment Dates - OP BREAST FAM-TRASTUZUMAB DERUXTECAN-NXKI Q3W    1/4/2024       Chemotherapy       fam-trastuzumab deruxtecan-nxki (ENHERTU) 470 mg in dextrose 5 % (D5W) 100 mL infusion       Antiemetics       fosaprepitant 150 mg in sodium chloride 0.9% 150 mL IVPB       palonosetron (ALOXI) 0.25 mg in sodium chloride 0.9% 50 mL IVPB  1/25/2024       Chemotherapy       fam-trastuzumab deruxtecan-nxki (ENHERTU) 470 mg in dextrose 5 % (D5W) 100 mL infusion       Antiemetics       fosaprepitant 150 mg in sodium chloride 0.9% 150 mL IVPB       palonosetron (ALOXI) 0.25 mg in sodium chloride 0.9% 50 mL IVPB  2/15/2024       Chemotherapy       fam-trastuzumab deruxtecan-nxki (ENHERTU) 470 mg in dextrose 5 % (D5W) 100 mL infusion       Antiemetics       fosaprepitant 150 mg in sodium chloride 0.9% 150 mL IVPB       palonosetron (ALOXI) 0.25 mg in sodium chloride 0.9% 50 mL IVPB  3/7/2024       Chemotherapy       fam-trastuzumab deruxtecan-nxki (ENHERTU) 470 mg in dextrose 5 % (D5W) 100 mL infusion       Antiemetics       fosaprepitant 150 mg in sodium chloride 0.9% 150 mL IVPB       palonosetron (ALOXI) 0.25 mg in sodium chloride 0.9% 50 mL IVPB

## 2023-12-26 NOTE — PLAN OF CARE
Problem: Adult Inpatient Plan of Care  Goal: Plan of Care Review  Outcome: Ongoing, Progressing   Patient tolerated 1L IVFs/Enhertu infusion today. PAC + blood return upon deaccess. NAD noted. VSS. Discharged home

## 2024-01-04 ENCOUNTER — OFFICE VISIT (OUTPATIENT)
Dept: PALLIATIVE MEDICINE | Facility: CLINIC | Age: 54
End: 2024-01-04
Payer: MEDICARE

## 2024-01-04 VITALS
OXYGEN SATURATION: 99 % | WEIGHT: 188.69 LBS | DIASTOLIC BLOOD PRESSURE: 61 MMHG | SYSTOLIC BLOOD PRESSURE: 122 MMHG | HEART RATE: 97 BPM | HEIGHT: 62 IN | BODY MASS INDEX: 34.72 KG/M2 | TEMPERATURE: 98 F

## 2024-01-04 DIAGNOSIS — R60.9 EDEMA, UNSPECIFIED TYPE: ICD-10-CM

## 2024-01-04 DIAGNOSIS — C78.02 MALIGNANT NEOPLASM METASTATIC TO LEFT LUNG: ICD-10-CM

## 2024-01-04 DIAGNOSIS — Z51.5 ENCOUNTER FOR PALLIATIVE CARE: Primary | ICD-10-CM

## 2024-01-04 DIAGNOSIS — R53.83 FATIGUE, UNSPECIFIED TYPE: ICD-10-CM

## 2024-01-04 PROCEDURE — 99213 OFFICE O/P EST LOW 20 MIN: CPT | Mod: PBBFAC | Performed by: NURSE PRACTITIONER

## 2024-01-04 PROCEDURE — 99999 PR PBB SHADOW E&M-EST. PATIENT-LVL III: CPT | Mod: PBBFAC,,, | Performed by: NURSE PRACTITIONER

## 2024-01-04 PROCEDURE — 99215 OFFICE O/P EST HI 40 MIN: CPT | Mod: S$PBB,,, | Performed by: NURSE PRACTITIONER

## 2024-01-04 NOTE — PROGRESS NOTES
Consult Note  Palliative Care      Consult Requested By: No ref. provider found  Reason for Consult: symptom mgmt/ACP      ASSESSMENT/PLAN:     Plan/Recommendations:    01/04/2024:  - no changes made    Metastatic breast cancer  - following with Dr. Willis & NP [place]   - ID 2006, chemo, s/p radical mastectomy (2007), s/p post-op RT, herceptin x 3.5 years until progression in lung mass, cycled through myriad of chemo tx with progression on July 2020 PET, 9 months of continued therapy w eventual progression, stable on 6/2023 CT  - currently ENHERTU on 2 years      Encounter for palliative care  - Patient is decisional  - Patient accompanied today by self  - ACP documents are not uploaded into EMR   - Philosophy of Palliative Medicine reviewed with patient and family at first visit  - New patient folder given to and reviewed with patient and family at first visit  - Goals of care:  Patient lives alone with her 2 dogs and 2 cats. She is fully independent with no care needs of any kind. Her daughter Samra Jaeger ([id number]) is her support system. Nearby, her mother is living with cognitive/health issues and pt anticipates having to care for her mother FT within a year. Pt also has a sister who is an  and will help her with getting her affairs in order, including HCPOA. A booklet is provided and reviewed today. We will complete at future visit. Ms. Trivedi shares that she was initially diagnosed with cancer 17 years ago. It has been a long journey for her. She found strength in setting goals along the way. Seeing her daughter graduate, , start her career - each milestone has motivated her to continue. Her daughter is now pregnant and due in December 2023, her goal is to meet her grandson. Ms. 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 Enjoys attending [email or web address], going to the movies, working in the garden, reading and baking, going to [employer],  "[employer], etc. No spiritual/rob, declines .    Cancer associated fatigue  - treatment-associated  - exacerbated by heat (she does not have AC in car, runs only 1 window unit in her house 2/2 cost)  - currently on ritalin, which helps  - she is ECOG 0  - will continue to monitor     Edema   - BLE  - follows with cardiologist  - controls with daily lasix, will hold doses on a busy day where it will be difficult to use a bathroom frequently    Understanding of illness: Excellent     Goals of care: preserve qol, independence, activity tolerance    Follow up: 10-12 weeks     Patient's encounter and above plan of care discussed with patient's oncology team.     SUBJECTIVE:     History of Present Illness:  Patient is a 53 y.o. year old female presenting with metastatic breast cancer. Please see oncology notes for full oncologic history and treatment course.      01/04/2024:  JOSSUE ELIZALDE reviewed: no concerns    Patient presents to clinic follow-up alone. She is doing well from a symptom standpoint, she does report severe anxiety which is secondary to several family-members being sick in the month of December. Her first grandson was born, she spends time with him as much as possible and this brings her harley. Denies new or worsening symptoms. RTC in 3 months, pt instructed to contact clinic if interim needs arise.     10/23/2023:  JOSSUE ELIZALDE reviewed: no concerns    Patient presents to f/u clinic visit alone. She reports some increased fatigue and says she "gets a lot of mucus at night" for a few days after treatment. She is otherwise doing very well. She is busy planning her daughter's elaborate baby shower which will be held in November. She has been coordinating the party with her sister. She acknowledges how meaningful this is for her since this will be her only opportunity to throw a baby shower for her daughter, and this will be the only grandchild she will meet. Next CT 12/15.    08/24/2023:  JOSSUE ELIZALDE reviewed: " no concerns    Patient presents to initial clinic visit alone, she is extremely pleasant with few complaints. She does have some lower extremity edema for which she uses lasix. She skips doses on days where she will be away from home or running errands and in less frequent contact with a bathroom. She uses ritalin for fatigue and that works well for her. She shares that her cancer journey started 17 years ago and each step of the way, she has used various milestones to keep her motivated. She has seen her daughter graduate,  and start her career and now looks forward to meeting her first grandchild in December. We review ACP booklet today. Her sister, who is an , was planning to complete ACP w her. She will talk to her sister and we will discuss at future visit.     Past Medical History:   Diagnosis Date    Breast cancer      Past Surgical History:   Procedure Laterality Date    ENDOSCOPIC ULTRASOUND OF UPPER GASTROINTESTINAL TRACT N/A 6/27/2023    Procedure: ULTRASOUND, UPPER GI TRACT, ENDOSCOPIC;  Surgeon: Home Lopez MD;  Location: [address]);  Service: Endoscopy;  Laterality: N/A;    ESOPHAGOGASTRODUODENOSCOPY N/A 6/23/2023    Procedure: EGD (ESOPHAGOGASTRODUODENOSCOPY);  Surgeon: Quintin Mooney MD;  Location: [address]);  Service: Endoscopy;  Laterality: N/A;    ESOPHAGOGASTRODUODENOSCOPY N/A 6/27/2023    Procedure: EGD (ESOPHAGOGASTRODUODENOSCOPY);  Surgeon: Home Lopez MD;  Location: [address]);  Service: Endoscopy;  Laterality: N/A;    ESOPHAGOGASTRODUODENOSCOPY N/A 8/3/2023    Procedure: EGD (ESOPHAGOGASTRODUODENOSCOPY);  Surgeon: Home Lopez MD;  Location: [facility] ([address] [place] [address]);  Service: Endoscopy;  Laterality: N/A;  instr portal-labs 6/28/23-tb    MASTECTOMY       Family History   Problem Relation Age of Onset    Lung cancer Father      Review of patient's allergies indicates:  No Known Allergies    Medications:    Current Outpatient Medications:      acetaminophen (TYLENOL) 500 MG tablet, Take 1,000 mg by mouth., Disp: , Rfl:     ferrous sulfate (IRON, FERROUS SULFATE,) 325 mg (65 mg iron) Tab tablet, Take daily with Vitamin C on an empty stomach, Disp: 60 tablet, Rfl: 3    furosemide (LASIX) 20 MG tablet, Take 1 tablet (20 mg total) by mouth once daily., Disp: 90 tablet, Rfl: 3    hydrocodone-homatropine 5-1.5 mg/5 ml (HYCODAN) 5-1.5 mg/5 mL Syrp, Take 5 mLs by mouth 4 (four) times daily as needed (cough)., Disp: 120 mL, Rfl: 0    Lactobac no.41/Bifidobact no.7 (PROBIOTIC-10 ORAL), Take by mouth., Disp: , Rfl:     LIDOcaine-prilocaine (EMLA) cream, APPLY TO THE AFFECTED AREA 1 HOUR BEFORE PORT ACCESS, Disp: , Rfl:     methylphenidate HCl (RITALIN) 5 MG tablet, Take 1 tablet (5 mg total) by mouth 2 (two) times daily., Disp: 60 tablet, Rfl: 0    OLANZapine (ZYPREXA) 5 MG tablet, Take days 1-4 of chemotherapy, Disp: 30 tablet, Rfl: 2    ondansetron (ZOFRAN) 8 MG tablet, Take 8 mg by mouth 3 (three) times daily as needed., Disp: , Rfl:     pantoprazole (PROTONIX) 40 MG tablet, Take 1 tablet (40 mg total) by mouth 2 (two) times daily., Disp: 180 tablet, Rfl: 0    potassium chloride 10% (KAYCIEL) 20 mEq/15 mL oral solution, Take 15 mLs (20 mEq total) by mouth once daily. (To prevent stomach upset, mix dose with a glass of cold water or juice), Disp: 600 mL, Rfl: 0    OBJECTIVE:       ROS:  Review of Systems   Constitutional:  Positive for fatigue. Negative for activity change and appetite change.   HENT:  Negative for congestion, dental problem and drooling.    Eyes:  Negative for pain, discharge and itching.   Respiratory:  Negative for cough, choking and wheezing.    Cardiovascular:  Positive for leg swelling.   Gastrointestinal:  Negative for constipation, diarrhea, nausea and vomiting.   Genitourinary:  Negative for difficulty urinating, dyspareunia and dysuria.   Musculoskeletal:  Negative for arthralgias, back pain and gait problem.   Skin:  Negative for  pallor, rash and wound.   Neurological:  Negative for dizziness, facial asymmetry and headaches.       Review of Symptoms      Symptom Assessment (ESAS 0-10 Scale)  Pain:  0  Dyspnea:  0  Anxiety:  8  Nausea:  0  Depression:  0  Anorexia:  0  Fatigue:  0  Insomnia:  0  Restlessness:  0  Agitation:  0     CAM / Delirium:  Negative  Constipation:  Negative  Diarrhea:  Negative    Constipation:  No constipation    Bowel Management Plan (BMP):  No      Pain Assessment:  OME in 24 hours:  0  Location(s): none      ECOG Performance Status stGstrstastdstest:st st1st Psychosocial/Cultural:   See Palliative Psychosocial Note: No  **Primary  to Follow**  Palliative Care  Consult: No     Time-Based Charting:  No      Advance Care Planning   Advance Directives:   Living Will: No        Oral Declaration: No    LaPOST: No    Do Not Resuscitate Status: No        Oral Declaration: No      Decision Making:  Patient answered questions  Goals of Care: What is most important right now is to focus on remaining as independent as possible, symptom/pain control, curative/life-prolongation (regardless of treatment burdens), comfort and QOL . Accordingly, we have decided that the best plan to meet the patient's goals includes continuing with treatment.        Physical Exam:  Vitals:    Vitals:    01/04/24 1101   BP: 122/61   Pulse: 97   Temp: 98.1 °F (36.7 °C)         Physical Exam  Vitals reviewed.   Constitutional:       General: She is not in acute distress.     Appearance: Normal appearance. She is not ill-appearing, toxic-appearing or diaphoretic.   HENT:      Head: Atraumatic.      Right Ear: External ear normal.      Left Ear: External ear normal.      Nose: Nose normal.      Mouth/Throat:      Dentition: Abnormal dentition.   Eyes:      General:         Right eye: No discharge.         Left eye: No discharge.      Extraocular Movements: Extraocular movements intact.      Conjunctiva/sclera: Conjunctivae normal.    Pulmonary:      Effort: Pulmonary effort is normal. No respiratory distress.      Breath sounds: No stridor.   Musculoskeletal:         General: Normal range of motion.      Cervical back: Normal range of motion.   Skin:     General: Skin is warm and dry.      Coloration: Skin is not jaundiced.   Neurological:      Mental Status: She is alert and oriented to person, place, and time. Mental status is at baseline.      Motor: No weakness.   Psychiatric:         Behavior: Behavior normal.         Thought Content: Thought content normal.         Judgment: Judgment normal.         Labs:  CBC:   WBC   Date Value Ref Range Status   12/26/2023 2.34 (L) 3.90 - 12.70 K/uL Final     Hemoglobin   Date Value Ref Range Status   12/26/2023 9.6 (L) 12.0 - 16.0 g/dL Final     Hematocrit   Date Value Ref Range Status   12/26/2023 29.9 (L) 37.0 - 48.5 % Final     MCV   Date Value Ref Range Status   12/26/2023 100 (H) 82 - 98 fL Final     Platelets   Date Value Ref Range Status   12/26/2023 118 (L) 150 - 450 K/uL Final       LFT:   Lab Results   Component Value Date    AST 33 12/26/2023    ALKPHOS 123 12/26/2023    BILITOT 2.3 (H) 12/26/2023       Albumin:   Albumin   Date Value Ref Range Status   12/26/2023 3.0 (L) 3.5 - 5.2 g/dL Final     Protein:   Total Protein   Date Value Ref Range Status   12/26/2023 5.9 (L) 6.0 - 8.4 g/dL Final       Radiology:I have reviewed all pertinent imaging results/findings within the past 24 hours.    12/15/2023 CT CAP: Impression:     Patient with breast cancer status post left mastectomy and axillary dissection.     Unchanged right axillary and left para-aortic lymphadenopathy.     Multiple pulmonary nodules.  Many are unchanged, however there is a slightly more conspicuous nodule in the right lower lobe.  Attention on follow-up.     No abdominopelvic metastases.     Hepatic steatosis with signs of portal hypertension including splenomegaly, portosystemic collaterals, and ascites.       06/30/2023  CT chest: Impression:     Stable multiple bilateral lung spiculated nodule compared to April 2023 likely metastatic disease.     Trace bilateral pleural effusion.    06/24/2023 CT AP: Impression:     No focal hepatic lesion.     Sequela of portal hypertension including splenomegaly and gastroesophageal varices.     Mild wall thickening and fatty infiltration at the proximal colon with mild pericolonic stranding.  Findings potentially relating to additional sequela of portosystemic change with portal colopathy.  A nonspecific colitis cannot be entirely excluded.     Similar left infrahilar spiculated opacity with lower lung pleural thickening.  Continued dedicated surveillance as scheduled.     Prior left mastectomy.  Superficial skin thickening of the visualized right breast.  To correlate with mammographic history.     Trace intrapelvic free fluid and lower body wall edema.     Cholelithiasis, stable hypodense focus at the pancreatic head, and additional findings as above.    I spent a total of 45 minutes on the day of the visit.This includes face to face time in discussion of goals of care, symptom assessment, coordination of care and emotional support.  This also includes non-face to face time preparing to see the patient (eg, review of tests/imaging), obtaining and/or reviewing separately obtained history, documenting clinical information in the electronic or other health record, independently interpreting results and communicating results to the patient/family/caregiver, or care coordinator.     Signature: Lizbeth Mehta DNP

## 2024-01-08 ENCOUNTER — TELEPHONE (OUTPATIENT)
Dept: ENDOSCOPY | Facility: HOSPITAL | Age: 54
End: 2024-01-08
Payer: MEDICARE

## 2024-01-11 NOTE — PROGRESS NOTES
Subjective:       Patient ID: Shikha López is a 53 y.o. female.    Chief Complaint: No chief complaint on file.      HPI 53-year-old female who returns for F/U  for metastatic breast cancer.  She is on Trastuzumab deruxtecan  - here for cycle  44.        Had F/U endoscopy 1/12/24.Grade 1 esophageal varices and gastric varices without bleeding.      Today she reports that she has been doing well physically.  Her breathing has been stable.  She has been very occupied helping care for her mother who fell last week and broke her arm.  Mother also has dementia and has required lots of help.        Breast history:  She presented in the emergency room at [facility] on September 29, 2006 with a 4 months history of inflammation of her left breast.  Physical examination at that time showed the left breast was largely replaced by large mass with multiple skin ulcerations.    A biopsy in September 2006 showed poorly differentiated carcinoma which was ER and WI. negative and HER2 positive.    She was then referred to [facility] for additional care.   CT scan of the chest and abdomen November 2006 revealed multiple nodules in the lungs consistent with metastatic disease.  There also enlarged left axillary node.    She was treated with chemotherapy with weekly Herceptin and Abraxane with improvement in her breast and lung metastasis.    On May 29, 2007 she underwent left modified radical mastectomy(Dr. Colvin) which showed no residual tumor in the breast and 1/5 nodes was positive for metastasis measuring 6 mm.  (ypT0N1).  She then received postoperative radiation therapy(Dr Singh)  to the left chest wall supraclav and internal mammary lymph nodes from August 20, 2007 to September 28, 2007.    Postoperatively she continued on Herceptin for approximately 3 and 1/2 years before her lung metastasis begin to grow.    She subsequently received a number of different chemotherapy treatments including Adriamycin,  Gretel, Xeloda plus lapatinib then Xeloda plus Herceptin. (  in [facility].)    Subsequently, she transferred her care to  at [facility].She was initially treated with Taxotere Herceptin Perjeta.      She was then changed to Kadcyla.      In July 2020 PET scan showed progression.   She then took approximately 9 months of Herceptin and Navelbine with initial response then progression.    She started trastuzumab- deruxtecan  4/12/21.     CT 12/15/23 - stable - no new findings    Echo 12/15/23  -EF 55-60%    Review of Systems   Constitutional:  Negative for appetite change, fatigue, fever and unexpected weight change.   HENT:  Negative for nasal congestion, mouth sores, postnasal drip and rhinorrhea.    Eyes:  Negative for visual disturbance.   Respiratory:  Negative for cough and shortness of breath (minimal).    Cardiovascular:  Negative for chest pain.   Gastrointestinal:  Negative for abdominal pain, blood in stool, constipation, diarrhea and nausea.   Genitourinary:  Negative for frequency.   Musculoskeletal:  Negative for back pain.   Integumentary:  Negative for rash.   Neurological:  Negative for headaches.   Hematological:  Negative for adenopathy.   Psychiatric/Behavioral: Negative.  The patient is not nervous/anxious.          Objective:      Physical Exam  Vitals reviewed.   Constitutional:       General: She is not in acute distress.     Appearance: She is obese.   HENT:      Mouth/Throat:      Mouth: Mucous membranes are moist.      Pharynx: Oropharynx is clear. No oropharyngeal exudate or posterior oropharyngeal erythema.   Eyes:      Pupils: Pupils are equal, round, and reactive to light.   Cardiovascular:      Rate and Rhythm: Normal rate and regular rhythm.      Heart sounds: Murmur (systolic -aortic) heard.   Pulmonary:      Effort: Pulmonary effort is normal. No respiratory distress.      Breath sounds: Normal breath sounds. No wheezing or rales.   Abdominal:       Palpations: Abdomen is soft. There is no mass.      Tenderness: There is no abdominal tenderness.   Lymphadenopathy:      Cervical: No cervical adenopathy.      Upper Body:      Right upper body: No supraclavicular or axillary adenopathy.      Left upper body: No supraclavicular or axillary adenopathy.   Skin:     Findings: No rash.   Neurological:      Mental Status: She is alert and oriented to person, place, and time.   Psychiatric:         Mood and Affect: Mood normal.         Behavior: Behavior normal.         Thought Content: Thought content normal.         Judgment: Judgment normal.       Assessment:    Labs :  White blood cell count 3040 with ANC 1600, hemoglobin 9.8, platelets normal  Problem List Items Addressed This Visit       Breast cancer, stage 4, left - Primary    Malignant neoplasm metastatic to left lung       Plan:    Continue current therapy.  RTC 3 weeks.        Route Chart for Scheduling    Med Onc Chart Routing      Follow up with physician 3 weeks. me or Mariam   Follow up with JIMI    Infusion scheduling note    Injection scheduling note    Labs CBC and CMP   Scheduling:  Preferred lab:  Lab interval:     Imaging None      Pharmacy appointment No pharmacy appointment needed      Other referrals no referral to Oncology Primary Care needed -  no Massage appointment needed    No additional referrals needed         Treatment Plan Information   OP BREAST FAM-TRASTUZUMAB DERUXTECAN-NXKI Q3W   Home Willis MD   Upcoming Treatment Dates - OP BREAST FAM-TRASTUZUMAB DERUXTECAN-NXKI Q3W    1/25/2024       Chemotherapy       fam-trastuzumab deruxtecan-nxki (ENHERTU) 470 mg in dextrose 5 % (D5W) 100 mL infusion       Antiemetics       fosaprepitant 150 mg in sodium chloride 0.9% 150 mL IVPB       palonosetron (ALOXI) 0.25 mg in sodium chloride 0.9% 50 mL IVPB  2/15/2024       Chemotherapy       fam-trastuzumab deruxtecan-nxki (ENHERTU) 470 mg in dextrose 5 % (D5W) 100 mL infusion       Antiemetics        fosaprepitant 150 mg in sodium chloride 0.9% 150 mL IVPB       palonosetron (ALOXI) 0.25 mg in sodium chloride 0.9% 50 mL IVPB  3/7/2024       Chemotherapy       fam-trastuzumab deruxtecan-nxki (ENHERTU) 470 mg in dextrose 5 % (D5W) 100 mL infusion       Antiemetics       fosaprepitant 150 mg in sodium chloride 0.9% 150 mL IVPB       palonosetron (ALOXI) 0.25 mg in sodium chloride 0.9% 50 mL IVPB  3/28/2024       Chemotherapy       fam-trastuzumab deruxtecan-nxki (ENHERTU) 470 mg in dextrose 5 % (D5W) 100 mL infusion       Antiemetics       fosaprepitant 150 mg in sodium chloride 0.9% 150 mL IVPB       palonosetron (ALOXI) 0.25 mg in sodium chloride 0.9% 50 mL IVPB    Supportive Plan Information  IV FLUIDS AND ELECTROLYTES   Miller Carrasquillo MD   Upcoming Treatment Dates - IV FLUIDS AND ELECTROLYTES    No upcoming days in selected categories.

## 2024-01-12 ENCOUNTER — HOSPITAL ENCOUNTER (OUTPATIENT)
Facility: HOSPITAL | Age: 54
Discharge: HOME | End: 2024-01-12
Attending: INTERNAL MEDICINE | Admitting: INTERNAL MEDICINE
Payer: MEDICARE

## 2024-01-12 ENCOUNTER — ANESTHESIA (OUTPATIENT)
Dept: ENDOSCOPY | Facility: HOSPITAL | Age: 54
End: 2024-01-12
Payer: MEDICARE

## 2024-01-12 ENCOUNTER — ANESTHESIA EVENT (OUTPATIENT)
Dept: ENDOSCOPY | Facility: HOSPITAL | Age: 54
End: 2024-01-12
Payer: MEDICARE

## 2024-01-12 VITALS
HEART RATE: 79 BPM | TEMPERATURE: 98 F | SYSTOLIC BLOOD PRESSURE: 126 MMHG | WEIGHT: 188 LBS | HEIGHT: 62 IN | BODY MASS INDEX: 34.6 KG/M2 | DIASTOLIC BLOOD PRESSURE: 67 MMHG | RESPIRATION RATE: 20 BRPM | OXYGEN SATURATION: 97 %

## 2024-01-12 DIAGNOSIS — I86.4 GASTRIC VARICES: ICD-10-CM

## 2024-01-12 PROCEDURE — 63600175 PHARM REV CODE 636 W HCPCS: Performed by: NURSE ANESTHETIST, CERTIFIED REGISTERED

## 2024-01-12 PROCEDURE — 37000009 HC ANESTHESIA EA ADD 15 MINS: Performed by: INTERNAL MEDICINE

## 2024-01-12 PROCEDURE — 43237 ENDOSCOPIC US EXAM ESOPH: CPT | Performed by: INTERNAL MEDICINE

## 2024-01-12 PROCEDURE — D9220A PRA ANESTHESIA: Mod: CRNA,,, | Performed by: NURSE ANESTHETIST, CERTIFIED REGISTERED

## 2024-01-12 PROCEDURE — D9220A PRA ANESTHESIA: Mod: ANES,,, | Performed by: ANESTHESIOLOGY

## 2024-01-12 PROCEDURE — 25000003 PHARM REV CODE 250: Performed by: INTERNAL MEDICINE

## 2024-01-12 PROCEDURE — 37000008 HC ANESTHESIA 1ST 15 MINUTES: Performed by: INTERNAL MEDICINE

## 2024-01-12 PROCEDURE — 43237 ENDOSCOPIC US EXAM ESOPH: CPT | Mod: ,,, | Performed by: INTERNAL MEDICINE

## 2024-01-12 RX ORDER — PROPOFOL 10 MG/ML
VIAL (ML) INTRAVENOUS
Status: DISCONTINUED | OUTPATIENT
Start: 2024-01-12 | End: 2024-01-12

## 2024-01-12 RX ORDER — SODIUM CHLORIDE 9 MG/ML
INJECTION, SOLUTION INTRAVENOUS CONTINUOUS
Status: DISCONTINUED | OUTPATIENT
Start: 2024-01-12 | End: 2024-01-12 | Stop reason: HOSPADM

## 2024-01-12 RX ORDER — FENTANYL CITRATE 50 UG/ML
INJECTION, SOLUTION INTRAMUSCULAR; INTRAVENOUS
Status: DISCONTINUED | OUTPATIENT
Start: 2024-01-12 | End: 2024-01-12

## 2024-01-12 RX ORDER — MIDAZOLAM HYDROCHLORIDE 1 MG/ML
INJECTION, SOLUTION INTRAMUSCULAR; INTRAVENOUS
Status: DISCONTINUED | OUTPATIENT
Start: 2024-01-12 | End: 2024-01-12

## 2024-01-12 RX ORDER — SODIUM CHLORIDE 0.9 % (FLUSH) 0.9 %
10 SYRINGE (ML) INJECTION
Status: DISCONTINUED | OUTPATIENT
Start: 2024-01-12 | End: 2024-01-12 | Stop reason: HOSPADM

## 2024-01-12 RX ADMIN — MIDAZOLAM 2 MG: 1 INJECTION INTRAMUSCULAR; INTRAVENOUS at 08:01

## 2024-01-12 RX ADMIN — PROPOFOL 70 MG: 10 INJECTION, EMULSION INTRAVENOUS at 09:01

## 2024-01-12 RX ADMIN — FENTANYL CITRATE 50 MCG: 50 INJECTION INTRAMUSCULAR; INTRAVENOUS at 09:01

## 2024-01-12 RX ADMIN — SODIUM CHLORIDE: 9 INJECTION, SOLUTION INTRAVENOUS at 08:01

## 2024-01-12 NOTE — H&P
Short Stay Endoscopy History and Physical    PCP - No, Primary Doctor  Referring Physician - Home Lopez MD  [address] [facility] [address]  [address]  [place] [id number]    Procedure - eus  ASA - per anesthesia  Mallampati - per anesthesia  History of Anesthesia problems - no  Family history Anesthesia problems -  no   Plan of anesthesia - General    HPI:  This is a 53 y.o. female here for evaluation of: gastric varices    Reflux - no  Dysphagia - no  Abdominal pain - no  Diarrhea - no    ROS:  Constitutional: No fevers, chills, No weight loss  CV: No chest pain  Pulm: No cough, No shortness of breath  Ophtho: No vision changes  GI: see HPI  Derm: No rash    Medical History:  has a past medical history of Breast cancer.    Surgical History:  has a past surgical history that includes Mastectomy; Esophagogastroduodenoscopy (N/A, 6/23/2023); Endoscopic ultrasound of upper gastrointestinal tract (N/A, 6/27/2023); Esophagogastroduodenoscopy (N/A, 6/27/2023); and Esophagogastroduodenoscopy (N/A, 8/3/2023).    Family History: family history includes Lung cancer in her father..    Social History:  reports that she has never smoked. She has never used smokeless tobacco. She reports that she does not drink alcohol and does not use drugs.    Review of patient's allergies indicates:  No Known Allergies    Medications:   Medications Prior to Admission   Medication Sig Dispense Refill Last Dose    ferrous sulfate (IRON, FERROUS SULFATE,) 325 mg (65 mg iron) Tab tablet Take daily with Vitamin C on an empty stomach 60 tablet 3 Past Week    furosemide (LASIX) 20 MG tablet Take 1 tablet (20 mg total) by mouth once daily. 90 tablet 3 Past Month    methylphenidate HCl (RITALIN) 5 MG tablet Take 1 tablet (5 mg total) by mouth 2 (two) times daily. 60 tablet 0 1/11/2024    pantoprazole (PROTONIX) 40 MG tablet Take 1 tablet (40 mg total) by mouth 2 (two) times daily. 180 tablet 0 1/11/2024    acetaminophen (TYLENOL) 500 MG tablet Take  1,000 mg by mouth.       hydrocodone-homatropine 5-1.5 mg/5 ml (HYCODAN) 5-1.5 mg/5 mL Syrp Take 5 mLs by mouth 4 (four) times daily as needed (cough). (Patient not taking: Reported on 1/4/2024) 120 mL 0     Lactobac no.41/Bifidobact no.7 (PROBIOTIC-10 ORAL) Take by mouth.       LIDOcaine-prilocaine (EMLA) cream APPLY TO THE AFFECTED AREA 1 HOUR BEFORE PORT ACCESS       OLANZapine (ZYPREXA) 5 MG tablet Take days 1-4 of chemotherapy 30 tablet 2     ondansetron (ZOFRAN) 8 MG tablet Take 8 mg by mouth 3 (three) times daily as needed.       potassium chloride 10% (KAYCIEL) 20 mEq/15 mL oral solution Take 15 mLs (20 mEq total) by mouth once daily. (To prevent stomach upset, mix dose with a glass of cold water or juice) 600 mL 0        Physical Exam:    Vital Signs:   Vitals:    01/12/24 0832   BP: 130/60   Pulse: 99   Resp: 16   Temp: 98.2 °F (36.8 °C)       General Appearance: Well appearing in no acute distress    Labs:  Lab Results   Component Value Date    WBC 2.34 (L) 12/26/2023    HGB 9.6 (L) 12/26/2023    HCT 29.9 (L) 12/26/2023     (L) 12/26/2023    ALT 17 12/26/2023    AST 33 12/26/2023     12/26/2023    K 3.4 (L) 12/26/2023     12/26/2023    CREATININE 0.5 12/26/2023    BUN 7 12/26/2023    CO2 27 12/26/2023    INR 1.5 (H) 06/28/2023       I have explained the risks and benefits of this endoscopic procedure to the patient including but not limited to bleeding, inflammation, infection, perforation, and death.      Home Lopez MD

## 2024-01-12 NOTE — CARE UPDATE
Patient discharged to home via wheelchair, escorted by her sister. Pt alert and talkative, vitals stable on room air, tolerating PO intake. Discharge instructions (written and verbal) and follow-up information given to patient who verbalized understanding, as well as a readiness for discharge. [facility] contact info provided for additional questions following discharge.

## 2024-01-12 NOTE — ANESTHESIA PREPROCEDURE EVALUATION
[facility]  Anesthesia Pre-Operative Evaluation         Patient Name/: Shikha López, 1970  MRN: [id number]    SUBJECTIVE:     Pre-operative evaluation for Procedure(s) (LRB):  ULTRASOUND, UPPER GI TRACT, ENDOSCOPIC (N/A)     2024    Shikha López is a 53 y.o. female     Patient now presents for the above procedure(s).    ________________________________________  Results for orders placed during the hospital encounter of 12/15/23    Echo    Interpretation Summary    Left Ventricle: The left ventricle is mildly dilated. Normal wall thickness. Normal wall motion. There is normal systolic function with a visually estimated ejection fraction of 55 - 60%. Biplane (2D) method of discs ejection fraction is 55%. Global longitudinal strain is -17.1%. There is normal diastolic function.    Right Ventricle: Normal right ventricular cavity size. Wall thickness is normal. Right ventricle wall motion  is normal. Systolic function is normal.    Left Atrium: Left atrium is severely dilated.    Aortic Valve: There is mild stenosis. Aortic valve area by VTI is 1.7 cm². Aortic valve peak velocity is 2.2 m/s. Mean gradient is 13 mmHg. The dimensionless index is 0.5.    Pulmonary Artery: The estimated pulmonary artery systolic pressure is 38 mmHg.    IVC/SVC: Intermediate venous pressure at 8 mmHg.    ________________________________________    LDA:   Port A Cath Single Lumen 21 right internal jugular (Active)   Line Necessity Review Longterm central access required 23 105   Site Assessment No drainage;No redness;No swelling;No warmth 23 1051   Dressing Type Transparent (Tegaderm) 23 1051   Dressing Status Dry;Clean;Intact 23 1051   Dressing Intervention First dressing 23 1051   Patency/Care flushed w/o difficulty;heparin locked;blood return present 23 1408   Status Deaccessed 23 1408   Accessed by:  23 140   Needle Insertion Date 23  12/26/23 1051   Needle Insertion Time 1051 12/26/23 1051   Type of Needle PowerHuber 12/26/23 1051   Gauge 20 12/26/23 1051   Needle Length 3/4 in 12/26/23 1051   Needle Status Removed 12/26/23 1408   Flush Performed Yes 12/26/23 1408   Needle Removal Date 12/26/23 12/26/23 1408   Needle Removal Time 1408 12/26/23 1408   Deaccessed By  12/26/23 1408   Number of days: 934       Drips:       Patient Active Problem List   Diagnosis    Breast cancer, stage 4, left    Malignant neoplasm metastatic to left lung    Physical deconditioning    Balance problem    Hypokalemia    Esophageal and gastric varices    Severe obesity (BMI 35.0-39.9) with comorbidity    Aortic atherosclerosis    Pancytopenia    Thrombocytopenia, unspecified       Review of patient's allergies indicates:  No Known Allergies    Current Inpatient Medications:       No current facility-administered medications on file prior to encounter.     Current Outpatient Medications on File Prior to Encounter   Medication Sig Dispense Refill    acetaminophen (TYLENOL) 500 MG tablet Take 1,000 mg by mouth.      ferrous sulfate (IRON, FERROUS SULFATE,) 325 mg (65 mg iron) Tab tablet Take daily with Vitamin C on an empty stomach 60 tablet 3    furosemide (LASIX) 20 MG tablet Take 1 tablet (20 mg total) by mouth once daily. 90 tablet 3    Lactobac no.41/Bifidobact no.7 (PROBIOTIC-10 ORAL) Take by mouth.      LIDOcaine-prilocaine (EMLA) cream APPLY TO THE AFFECTED AREA 1 HOUR BEFORE PORT ACCESS      OLANZapine (ZYPREXA) 5 MG tablet Take days 1-4 of chemotherapy 30 tablet 2    ondansetron (ZOFRAN) 8 MG tablet Take 8 mg by mouth 3 (three) times daily as needed.      potassium chloride 10% (KAYCIEL) 20 mEq/15 mL oral solution Take 15 mLs (20 mEq total) by mouth once daily. (To prevent stomach upset, mix dose with a glass of cold water or juice) 600 mL 0       Past Surgical History:   Procedure Laterality Date    ENDOSCOPIC ULTRASOUND OF UPPER GASTROINTESTINAL TRACT N/A  6/27/2023    Procedure: ULTRASOUND, UPPER GI TRACT, ENDOSCOPIC;  Surgeon: Home Lopez MD;  Location: [facility] ENDO (2ND FLR);  Service: Endoscopy;  Laterality: N/A;    ESOPHAGOGASTRODUODENOSCOPY N/A 6/23/2023    Procedure: EGD (ESOPHAGOGASTRODUODENOSCOPY);  Surgeon: Quintin Mooney MD;  Location: [facility] ENDO (2ND FLR);  Service: Endoscopy;  Laterality: N/A;    ESOPHAGOGASTRODUODENOSCOPY N/A 6/27/2023    Procedure: EGD (ESOPHAGOGASTRODUODENOSCOPY);  Surgeon: Home Lopez MD;  Location: [facility] ENDO (2ND FLR);  Service: Endoscopy;  Laterality: N/A;    ESOPHAGOGASTRODUODENOSCOPY N/A 8/3/2023    Procedure: EGD (ESOPHAGOGASTRODUODENOSCOPY);  Surgeon: Home Lopez MD;  Location: [facility] ENDO (2ND FLR);  Service: Endoscopy;  Laterality: N/A;  instr portal-labs 6/28/23-tb    MASTECTOMY         Social History:  Tobacco Use: Low Risk  (12/26/2023)    Patient History     Smoking Tobacco Use: Never     Smokeless Tobacco Use: Never     Passive Exposure: Not on file       Alcohol Use: Not At Risk (12/3/2023)    AUDIT-C     Frequency of Alcohol Consumption: Never     Average Number of Drinks: Patient does not drink     Frequency of Binge Drinking: Never       OBJECTIVE:     Vital Signs Range:  BMI Readings from Last 1 Encounters:   01/04/24 34.52 kg/m²               Significant Labs:        Component Value Date/Time    WBC 2.34 (L) 12/26/2023 0857    HGB 9.6 (L) 12/26/2023 0857    HCT 29.9 (L) 12/26/2023 0857     (L) 12/26/2023 0857     12/26/2023 0857    K 3.4 (L) 12/26/2023 0857     12/26/2023 0857    CO2 27 12/26/2023 0857    GLU 91 12/26/2023 0857    BUN 7 12/26/2023 0857    CREATININE 0.5 12/26/2023 0857    MG 1.7 06/28/2023 0355    PHOS 3.2 06/28/2023 0355    CALCIUM 8.5 (L) 12/26/2023 0857    ALBUMIN 3.0 (L) 12/26/2023 0857    PROT 5.9 (L) 12/26/2023 0857    ALKPHOS 123 12/26/2023 0857    BILITOT 2.3 (H) 12/26/2023 0857    AST 33 12/26/2023 0857    ALT 17 12/26/2023 0857    INR 1.5 (H) 06/28/2023 0355         Please see Results Review for additional labs.     Diagnostic Studies: No relevant studies.    EKG:   Results for orders placed or performed during the hospital encounter of 06/22/23   EKG 12-lead    Collection Time: 06/22/23  8:56 PM    Narrative    Test Reason : R00.0,    Vent. Rate : 110 BPM     Atrial Rate : 110 BPM     P-R Int : 190 ms          QRS Dur : 090 ms      QT Int : 328 ms       P-R-T Axes : 055 007 182 degrees     QTc Int : 443 ms    Sinus tachycardia  Possible Left atrial enlargement  LVH  ST and T wave abnormality, consider inferior ischemia  Abnormal ECG  When compared with ECG of 23-MAR-2011 21:28,  T wave inversion more evident in Inferior leads  T wave inversion now evident in Lateral leads  Confirmed by Maldonado Mobley MD ([id number]) on 6/23/2023 9:29:46 AM    Referred By: BABAR   SELF           Confirmed By:Maldonado Mobley MD       ECHO:  See subjective, if available.      ASSESSMENT/PLAN:                                                                                                                  01/12/2024  Shikha López is a 53 y.o., female.      Pre-op Assessment          Review of Systems      Physical Exam  General: Alert    Airway:  Mallampati: I   Mouth Opening: Normal  TM Distance: Normal  Tongue: Normal  Neck ROM: Normal ROM    Dental:  Periodontal disease        Anesthesia Plan  Type of Anesthesia, risks & benefits discussed:    Anesthesia Type: Gen Natural Airway, Gen ETT  Intra-op Monitoring Plan: Standard ASA Monitors  Induction:  IV  Informed Consent: Informed consent signed with the Patient and all parties understand the risks and agree with anesthesia plan.  All questions answered.   ASA Score: 3  Day of Surgery Review of History & Physical: H&P Update referred to the surgeon/provider.    Ready For Surgery From Anesthesia Perspective.     .

## 2024-01-12 NOTE — PROVATION PATIENT INSTRUCTIONS
Discharge Summary/Instructions after an Endoscopic Procedure  Patient Name: Shikha López  Patient MRN: [id number]  Patient YOB: 1970 Friday, January 12, 2024  Home Lopez MD  Dear patient,  As a result of recent federal legislation (The Federal Cures Act), you may   receive lab or pathology results from your procedure in your [facility]   account before your physician is able to contact you. Your physician or   their representative will relay the results to you with their   recommendations at their soonest availability.  Thank you,  RESTRICTIONS:  During your procedure today, you received medications for sedation.  These   medications may affect your judgment, balance and coordination.  Therefore,   for 24 hours, you have the following restrictions:   - DO NOT drive a car, operate machinery, make legal/financial decisions,   sign important papers or drink alcohol.    ACTIVITY:  Today: no heavy lifting, straining or running due to procedural   sedation/anesthesia.  The following day: return to full activity including work.  DIET:  Eat and drink normally unless instructed otherwise.     TREATMENT FOR COMMON SIDE EFFECTS:  - Mild abdominal pain, nausea, belching, bloating or excessive gas:  rest,   eat lightly and use a heating pad.  - Sore Throat: treat with throat lozenges and/or gargle with warm salt   water.  - Because air was used during the procedure, expelling large amounts of air   from your rectum or belching is normal.  - If a bowel prep was taken, you may not have a bowel movement for 1-3 days.    This is normal.  SYMPTOMS TO WATCH FOR AND REPORT TO YOUR PHYSICIAN:  1. Abdominal pain or bloating, other than gas cramps.  2. Chest pain.  3. Back pain.  4. Signs of infection such as: chills or fever occurring within 24 hours   after the procedure.  5. Rectal bleeding, which would show as bright red, maroon, or black stools.   (A tablespoon of blood from the rectum is not serious, especially if    hemorrhoids are present.)  6. Vomiting.  7. Weakness or dizziness.  GO DIRECTLY TO THE NEAREST EMERGENCY ROOM IF YOU HAVE ANY OF THE FOLLOWING:      Difficulty breathing              Chills and/or fever over 101 F   Persistent vomiting and/or vomiting blood   Severe abdominal pain   Severe chest pain   Black, tarry stools   Bleeding- more than one tablespoon   Any other symptom or condition that you feel may need urgent attention  Your doctor recommends these additional instructions:  If any biopsies were taken, your doctors clinic will contact you in 1 to 2   weeks with any results.  - Discharge patient to home.   - Resume previous diet.   - Continue present medications.   - Repeat the upper endoscopic ultrasound PRN for surveillance.  For questions, problems or results please call your physician - Home Lopez MD at Work:  [phone].  [facility], EMERGENCY ROOM PHONE NUMBER: [phone]  IF A COMPLICATION OR EMERGENCY SITUATION ARISES AND YOU ARE UNABLE TO REACH   YOUR PHYSICIAN - GO DIRECTLY TO THE EMERGENCY ROOM.  Home Lopez MD  1/12/2024 9:26:19 AM  This report has been verified and signed electronically.  Dear patient,  As a result of recent federal legislation (The Federal Cures Act), you may   receive lab or pathology results from your procedure in your [facility]   account before your physician is able to contact you. Your physician or   their representative will relay the results to you with their   recommendations at their soonest availability.  Thank you,  PROVATION

## 2024-01-16 ENCOUNTER — OFFICE VISIT (OUTPATIENT)
Dept: HEMATOLOGY/ONCOLOGY | Facility: CLINIC | Age: 54
End: 2024-01-16
Payer: MEDICARE

## 2024-01-16 ENCOUNTER — INFUSION (OUTPATIENT)
Dept: INFUSION THERAPY | Facility: HOSPITAL | Age: 54
End: 2024-01-16
Attending: INTERNAL MEDICINE
Payer: MEDICARE

## 2024-01-16 VITALS
RESPIRATION RATE: 16 BRPM | TEMPERATURE: 98 F | OXYGEN SATURATION: 99 % | HEIGHT: 62 IN | HEART RATE: 90 BPM | WEIGHT: 182.13 LBS | SYSTOLIC BLOOD PRESSURE: 126 MMHG | BODY MASS INDEX: 33.51 KG/M2 | DIASTOLIC BLOOD PRESSURE: 62 MMHG

## 2024-01-16 VITALS
HEIGHT: 62 IN | BODY MASS INDEX: 33.51 KG/M2 | TEMPERATURE: 98 F | OXYGEN SATURATION: 99 % | DIASTOLIC BLOOD PRESSURE: 70 MMHG | RESPIRATION RATE: 18 BRPM | SYSTOLIC BLOOD PRESSURE: 125 MMHG | WEIGHT: 182.13 LBS | HEART RATE: 80 BPM

## 2024-01-16 DIAGNOSIS — E87.6 HYPOKALEMIA: ICD-10-CM

## 2024-01-16 DIAGNOSIS — C50.912 BREAST CANCER, STAGE 4, LEFT: Primary | ICD-10-CM

## 2024-01-16 DIAGNOSIS — C78.02 MALIGNANT NEOPLASM METASTATIC TO LEFT LUNG: ICD-10-CM

## 2024-01-16 PROCEDURE — 25000003 PHARM REV CODE 250: Performed by: INTERNAL MEDICINE

## 2024-01-16 PROCEDURE — 96367 TX/PROPH/DG ADDL SEQ IV INF: CPT

## 2024-01-16 PROCEDURE — 99999 PR PBB SHADOW E&M-EST. PATIENT-LVL IV: CPT | Mod: PBBFAC,,, | Performed by: INTERNAL MEDICINE

## 2024-01-16 PROCEDURE — 99214 OFFICE O/P EST MOD 30 MIN: CPT | Mod: S$PBB,,, | Performed by: INTERNAL MEDICINE

## 2024-01-16 PROCEDURE — 96413 CHEMO IV INFUSION 1 HR: CPT

## 2024-01-16 PROCEDURE — 99214 OFFICE O/P EST MOD 30 MIN: CPT | Mod: PBBFAC | Performed by: INTERNAL MEDICINE

## 2024-01-16 PROCEDURE — 63600175 PHARM REV CODE 636 W HCPCS: Performed by: INTERNAL MEDICINE

## 2024-01-16 RX ORDER — SODIUM CHLORIDE 9 MG/ML
INJECTION, SOLUTION INTRAVENOUS ONCE
Status: CANCELLED
Start: 2024-01-25 | End: 2024-01-25

## 2024-01-16 RX ORDER — HEPARIN 100 UNIT/ML
500 SYRINGE INTRAVENOUS
Status: CANCELLED | OUTPATIENT
Start: 2024-01-25

## 2024-01-16 RX ORDER — SODIUM CHLORIDE 0.9 % (FLUSH) 0.9 %
10 SYRINGE (ML) INJECTION
Status: CANCELLED | OUTPATIENT
Start: 2024-01-25

## 2024-01-16 RX ORDER — HEPARIN 100 UNIT/ML
500 SYRINGE INTRAVENOUS
Status: DISCONTINUED | OUTPATIENT
Start: 2024-01-16 | End: 2024-01-16 | Stop reason: HOSPADM

## 2024-01-16 RX ORDER — SODIUM CHLORIDE 0.9 % (FLUSH) 0.9 %
10 SYRINGE (ML) INJECTION
Status: DISCONTINUED | OUTPATIENT
Start: 2024-01-16 | End: 2024-01-16 | Stop reason: HOSPADM

## 2024-01-16 RX ORDER — SODIUM CHLORIDE 9 MG/ML
INJECTION, SOLUTION INTRAVENOUS ONCE
Status: COMPLETED | OUTPATIENT
Start: 2024-01-16 | End: 2024-01-16

## 2024-01-16 RX ADMIN — FOSAPREPITANT DIMEGLUMINE 150 MG: 150 INJECTION, POWDER, LYOPHILIZED, FOR SOLUTION INTRAVENOUS at 10:01

## 2024-01-16 RX ADMIN — FAM-TRASTUZUMAB DERUXTECAN-NXKI 470 MG: 100 INJECTION, POWDER, LYOPHILIZED, FOR SOLUTION INTRAVENOUS at 12:01

## 2024-01-16 RX ADMIN — PALONOSETRON 0.25 MG: 0.05 INJECTION, SOLUTION INTRAVENOUS at 11:01

## 2024-01-16 RX ADMIN — HEPARIN 500 UNITS: 100 SYRINGE at 01:01

## 2024-01-16 RX ADMIN — SODIUM CHLORIDE: 9 INJECTION, SOLUTION INTRAVENOUS at 10:01

## 2024-01-16 RX ADMIN — DEXTROSE MONOHYDRATE: 50 INJECTION, SOLUTION INTRAVENOUS at 12:01

## 2024-01-16 NOTE — PLAN OF CARE
Pt admitted for C44 Enhertu. Labs reviewed and assessment performed. Pt tolerated  treatment well. Port de-acessed and pt discharged home

## 2024-01-16 NOTE — ANESTHESIA POSTPROCEDURE EVALUATION
Anesthesia Post Evaluation    Patient: Shikha López    Procedure(s) Performed: Procedure(s) (LRB):  ULTRASOUND, UPPER GI TRACT, ENDOSCOPIC (N/A)  EGD (ESOPHAGOGASTRODUODENOSCOPY) (N/A)    Final Anesthesia Type: general      Patient location during evaluation: PACU  Patient participation: Yes- Able to Participate  Level of consciousness: awake and alert  Post-procedure vital signs: reviewed and stable  Pain management: adequate  Airway patency: patent    PONV status at discharge: No PONV  Anesthetic complications: no      Cardiovascular status: blood pressure returned to baseline  Respiratory status: unassisted  Hydration status: euvolemic  Follow-up not needed.              Vitals Value Taken Time   /67 01/12/24 1017   Temp 36.8 °C (98.2 °F) 01/12/24 0930   Pulse 81 01/12/24 1019   Resp 28 01/12/24 1019   SpO2 98 % 01/12/24 1019   Vitals shown include unvalidated device data.      No case tracking events are documented in the log.      Pain/Yisel Score: No data recorded

## 2024-01-23 ENCOUNTER — DOCUMENTATION ONLY (OUTPATIENT)
Dept: HEMATOLOGY/ONCOLOGY | Facility: CLINIC | Age: 54
End: 2024-01-23
Payer: MEDICARE

## 2024-01-23 ENCOUNTER — OFFICE VISIT (OUTPATIENT)
Dept: PSYCHIATRY | Facility: CLINIC | Age: 54
End: 2024-01-23
Payer: MEDICARE

## 2024-01-23 DIAGNOSIS — C50.912 BREAST CANCER, STAGE 4, LEFT: ICD-10-CM

## 2024-01-23 DIAGNOSIS — F32.A DEPRESSION, UNSPECIFIED DEPRESSION TYPE: ICD-10-CM

## 2024-01-23 DIAGNOSIS — F41.9 ANXIETY: Primary | ICD-10-CM

## 2024-01-23 PROCEDURE — 90837 PSYTX W PT 60 MINUTES: CPT | Mod: ,,, | Performed by: PSYCHOLOGIST

## 2024-01-23 PROCEDURE — 99211 OFF/OP EST MAY X REQ PHY/QHP: CPT | Mod: PBBFAC | Performed by: PSYCHOLOGIST

## 2024-01-23 PROCEDURE — 99999 PR PBB SHADOW E&M-EST. PATIENT-LVL I: CPT | Mod: PBBFAC,,, | Performed by: PSYCHOLOGIST

## 2024-01-23 NOTE — PROGRESS NOTES
Received a message from Dr. BISI Figueroa that patient had a nose bleed, could I assist. I went to her office, patient's nose bleed had almost subsided. I gave her some gauze should it start again. I took her B/P 116/56, pulse 91, respirations 16, and SpO2 99%. Patient said she felt fine.

## 2024-01-23 NOTE — PROGRESS NOTES
INFORMED CONSENT: Patient was identified using two patient-identifiers. The patient has been informed of the risks and benefits associated with engaging in psychotherapy, the handling of protected health information, the rights of privacy and the limits of confidentiality. The patient has also been informed of the importance of reporting any suicidal or homicidal ideation to this or any provider to ensure safety of all parties, and the Shikha López expressed understanding. The patient was agreeable to these terms and freely participates in individual psychotherapy.    PSYCHO-ONCOLOGY NOTE/ Individual Psychotherapy     Date: 1/23/2024   Site:  [facility]        Therapeutic Intervention: Met with patient.  Outpatient - Supportive psychotherapy 60 min - CPT Code 56655      Patient was last seen by me on 2/20/2023    Problem list  Patient Active Problem List   Diagnosis    Breast cancer, stage 4, left    Malignant neoplasm metastatic to left lung    Physical deconditioning    Balance problem    Hypokalemia    Esophageal and gastric varices    Severe obesity (BMI 35.0-39.9) with comorbidity    Aortic atherosclerosis    Pancytopenia    Thrombocytopenia, unspecified       Chief complaint/reason for encounter: depression, anxiety, and interpersonal   Met with patient to evaluate psychosocial adaptation to diagnosis/treatment/survivorship of metastatic BC family health issues    Current Medications  Current Outpatient Medications   Medication    acetaminophen (TYLENOL) 500 MG tablet    ferrous sulfate (IRON, FERROUS SULFATE,) 325 mg (65 mg iron) Tab tablet    furosemide (LASIX) 20 MG tablet    hydrocodone-homatropine 5-1.5 mg/5 ml (HYCODAN) 5-1.5 mg/5 mL Syrp    Lactobac no.41/Bifidobact no.7 (PROBIOTIC-10 ORAL)    LIDOcaine-prilocaine (EMLA) cream    methylphenidate HCl (RITALIN) 5 MG tablet    OLANZapine (ZYPREXA) 5 MG tablet    ondansetron (ZOFRAN) 8 MG tablet    pantoprazole (PROTONIX) 40 MG tablet    potassium  chloride 10% (KAYCIEL) 20 mEq/15 mL oral solution     No current facility-administered medications for this visit.       Objective:  Shikha López arrived promptly for the session.     The patient was fully cooperative throughout the session.  Appearance: age appropriate, appropriately  dressed, adequately  groomed  Behavior/Cooperation: friendly and cooperative  Speech: normal in rate, volume, and tone and appropriate quality, quantity and organization of sentences  Mood: anxious, dysthymic  Affect: mood congruent  Thought Process: goal-directed, logical  Thought Content: normal,  No delusions or paranoia; did not appear to be responding to internal stimuli during the session  Orientation: grossly intact  Attention Span/Concentration: Attends to session without distraction; reports no difficulty  Insight: patient has awareness of illness; good insight into own behavior and behavior of others  Judgment: the patient's behavior is adequate to circumstances    Interval history and content of current session: Patient with family health issues- mother with dementia, had a fall and broke her arm. Placed mother in Nursing home. Patient with increased stress that is manifesting physically (sleep, pain).  Discussed diagnosis, treatment, prognosis, current adaptation to disease and treatment status, and family's adaptation to disease and treatment status. Reports to be coping with great difficulty. Evaluated cognitive response, paying particular attention to negative intrusive thoughts of a persistent and detrimental nature. Thoughts of this type are in evidence with severe distress. Provided cognitive behavioral therapy to address negative cognitions. Identified and evaluated psychosocial and environmental stressors secondary to diagnosis and treatment.  Examined proactive behaviors that may be implemented to minimize or ameliorate psychosocial stressors secondary to diagnosis and treatment.     Risk parameters:    Patient reports no suicidal ideation  Patient reports no homicidal ideation  Patient reports no self-injurious behavior  Patient reports no violent behavior   Safety needs:  None at this time      Verbal deficits: None     Patient's response to intervention:The patient's response to intervention is accepting.     Progress toward goals and other mental status changes:  The patient's progress toward goals is fair .      Progress to date:Revise Objectives (Goals) and Revise Interventions      Goals from last visit: Met     Patient reported outcomes:    Distress Thermometer:   Distress Score    Distress Score: 10 - Extreme Distress        Practical Problems Physical Problems                                                   Family Problems                                         Emotional Problems                                                         Spiritual/Religions Concerns               Other Problems               PHQ ANSWERS    Q1. Little interest or pleasure in doing things: (P) Not at all (01/22/24 1531)  Q2. Feeling down, depressed, or hopeless: (P) Nearly every day (01/22/24 1531)  Q3. Trouble falling or staying asleep, or sleeping too much: (P) Nearly every day (01/22/24 1531)  Q4. Feeling tired or having little energy: (P) Nearly every day (01/22/24 1531)  Q5. Poor appetite or overeating: (P) Nearly every day (01/22/24 1531)  Q6. Feeling bad about yourself - or that you are a failure or have let yourself or your family down: (P) Several days (01/22/24 1531)  Q7. Trouble concentrating on things, such as reading the newspaper or watching television: (P) Nearly every day (01/22/24 1531)  Q8. Moving or speaking so slowly that other people could have noticed. Or the opposite - being so fidgety or restless that you have been moving around a lot more than usual: (P) Not at all (01/22/24 1531)  Q9.  0    PHQ8 Score : (P) 16 (01/22/24 1531)  PHQ-9 Total Score: (P) 16 (01/22/24 1531)     LILY-7 Answers         1/22/2024     3:30 PM   GAD7   1. Feeling nervous, anxious, or on edge? 3   2. Not being able to stop or control worrying? 3   3. Worrying too much about different things? 3   4. Trouble relaxing? 2   5. Being so restless that it is hard to sit still? 0   6. Becoming easily annoyed or irritable? 0   7. Feeling afraid as if something awful might happen? 1   LILY-7 Score 12     LILY-7 Score: (P) 12  Interpretation: (P) Moderate Anxiety     Client Strengths: verbal, intelligent, successful, good social support, good insight, commitment to wellness, strong rob, strong cultural traditions     Diagnosis:     ICD-10-CM ICD-9-CM   1. Anxiety  F41.9 300.00   2. Breast cancer, stage 4, left  C50.912 174.9   3. Depression, unspecified depression type  F32.A 311        Treatment Plan:individual psychotherapy and medication management by physician  Target symptoms: depression, anxiety   Why chosen therapy is appropriate versus another modality: relevant to diagnosis, patient responds to this modality, evidence based practice  Outcome monitoring methods: self-report, observation, checklist/rating scale  Therapeutic intervention type: insight oriented psychotherapy, behavior modifying psychotherapy  Prognosis: Good      Behavioral goals:    Exercise:   Stress management:   Social engagement:   Nutrition:   Smoking Cessation:   Therapy:  Increase daily self-care and attention to health management  Pleasant events scheduling and increased social interaction  Monitor stressors in writing and bring to next visit    Return to clinic: as scheduled    Next Session:      Length of Service (minutes direct face-to-face contact): 60    Linda Figueroa, PhD  Clinical Psychologist  [place] License #[id number]  [place] License #[id number]

## 2024-01-30 DIAGNOSIS — R41.840 LACK OF CONCENTRATION: ICD-10-CM

## 2024-01-30 DIAGNOSIS — K92.2 GASTROINTESTINAL HEMORRHAGE, UNSPECIFIED GASTROINTESTINAL HEMORRHAGE TYPE: ICD-10-CM

## 2024-01-30 RX ORDER — METHYLPHENIDATE HYDROCHLORIDE 5 MG/1
5 TABLET ORAL 2 TIMES DAILY
Qty: 60 TABLET | Refills: 0 | Status: SHIPPED | OUTPATIENT
Start: 2024-01-30 | End: 2024-03-25 | Stop reason: SDUPTHER

## 2024-01-30 RX ORDER — PANTOPRAZOLE SODIUM 40 MG/1
40 TABLET, DELAYED RELEASE ORAL 2 TIMES DAILY
Qty: 180 TABLET | Refills: 0 | Status: SHIPPED | OUTPATIENT
Start: 2024-01-30 | End: 2024-05-08 | Stop reason: SDUPTHER

## 2024-02-05 ENCOUNTER — INFUSION (OUTPATIENT)
Dept: INFUSION THERAPY | Facility: HOSPITAL | Age: 54
End: 2024-02-05
Attending: INTERNAL MEDICINE
Payer: MEDICARE

## 2024-02-05 ENCOUNTER — OFFICE VISIT (OUTPATIENT)
Dept: HEMATOLOGY/ONCOLOGY | Facility: CLINIC | Age: 54
End: 2024-02-05
Payer: MEDICARE

## 2024-02-05 VITALS
OXYGEN SATURATION: 98 % | RESPIRATION RATE: 16 BRPM | DIASTOLIC BLOOD PRESSURE: 63 MMHG | BODY MASS INDEX: 34.48 KG/M2 | TEMPERATURE: 98 F | HEART RATE: 105 BPM | HEIGHT: 62 IN | WEIGHT: 187.38 LBS | SYSTOLIC BLOOD PRESSURE: 140 MMHG

## 2024-02-05 VITALS
OXYGEN SATURATION: 98 % | DIASTOLIC BLOOD PRESSURE: 63 MMHG | SYSTOLIC BLOOD PRESSURE: 139 MMHG | BODY MASS INDEX: 34.48 KG/M2 | WEIGHT: 187.38 LBS | HEART RATE: 124 BPM | HEIGHT: 62 IN | TEMPERATURE: 99 F | RESPIRATION RATE: 18 BRPM

## 2024-02-05 DIAGNOSIS — C50.912 BREAST CANCER, STAGE 4, LEFT: Primary | ICD-10-CM

## 2024-02-05 DIAGNOSIS — E87.6 HYPOKALEMIA: ICD-10-CM

## 2024-02-05 DIAGNOSIS — C78.02 MALIGNANT NEOPLASM METASTATIC TO LEFT LUNG: ICD-10-CM

## 2024-02-05 PROCEDURE — 99214 OFFICE O/P EST MOD 30 MIN: CPT | Mod: S$PBB,,, | Performed by: INTERNAL MEDICINE

## 2024-02-05 PROCEDURE — 96361 HYDRATE IV INFUSION ADD-ON: CPT

## 2024-02-05 PROCEDURE — 96413 CHEMO IV INFUSION 1 HR: CPT

## 2024-02-05 PROCEDURE — A4216 STERILE WATER/SALINE, 10 ML: HCPCS | Performed by: INTERNAL MEDICINE

## 2024-02-05 PROCEDURE — 96367 TX/PROPH/DG ADDL SEQ IV INF: CPT

## 2024-02-05 PROCEDURE — 25000003 PHARM REV CODE 250: Performed by: INTERNAL MEDICINE

## 2024-02-05 PROCEDURE — 63600175 PHARM REV CODE 636 W HCPCS: Performed by: INTERNAL MEDICINE

## 2024-02-05 PROCEDURE — 99214 OFFICE O/P EST MOD 30 MIN: CPT | Mod: PBBFAC,25 | Performed by: INTERNAL MEDICINE

## 2024-02-05 PROCEDURE — 99999 PR PBB SHADOW E&M-EST. PATIENT-LVL IV: CPT | Mod: PBBFAC,,, | Performed by: INTERNAL MEDICINE

## 2024-02-05 RX ORDER — SODIUM CHLORIDE 9 MG/ML
INJECTION, SOLUTION INTRAVENOUS ONCE
Status: CANCELLED
Start: 2024-02-15 | End: 2024-02-15

## 2024-02-05 RX ORDER — SODIUM CHLORIDE 0.9 % (FLUSH) 0.9 %
10 SYRINGE (ML) INJECTION
Status: CANCELLED | OUTPATIENT
Start: 2024-02-15

## 2024-02-05 RX ORDER — SODIUM CHLORIDE 9 MG/ML
INJECTION, SOLUTION INTRAVENOUS ONCE
Status: COMPLETED | OUTPATIENT
Start: 2024-02-05 | End: 2024-02-05

## 2024-02-05 RX ORDER — DEXTROSE MONOHYDRATE 50 MG/ML
INJECTION, SOLUTION INTRAVENOUS ONCE
Status: COMPLETED | OUTPATIENT
Start: 2024-02-05 | End: 2024-02-05

## 2024-02-05 RX ORDER — SODIUM CHLORIDE 0.9 % (FLUSH) 0.9 %
10 SYRINGE (ML) INJECTION
Status: DISCONTINUED | OUTPATIENT
Start: 2024-02-05 | End: 2024-02-05 | Stop reason: HOSPADM

## 2024-02-05 RX ORDER — HEPARIN 100 UNIT/ML
500 SYRINGE INTRAVENOUS
Status: CANCELLED | OUTPATIENT
Start: 2024-02-15

## 2024-02-05 RX ORDER — HEPARIN 100 UNIT/ML
500 SYRINGE INTRAVENOUS
Status: DISCONTINUED | OUTPATIENT
Start: 2024-02-05 | End: 2024-02-05 | Stop reason: HOSPADM

## 2024-02-05 RX ADMIN — HEPARIN 500 UNITS: 100 SYRINGE at 01:02

## 2024-02-05 RX ADMIN — FAM-TRASTUZUMAB DERUXTECAN-NXKI 470 MG: 100 INJECTION, POWDER, LYOPHILIZED, FOR SOLUTION INTRAVENOUS at 12:02

## 2024-02-05 RX ADMIN — SODIUM CHLORIDE: 9 INJECTION, SOLUTION INTRAVENOUS at 11:02

## 2024-02-05 RX ADMIN — DEXTROSE MONOHYDRATE: 5 INJECTION, SOLUTION INTRAVENOUS at 12:02

## 2024-02-05 RX ADMIN — Medication 10 ML: at 01:02

## 2024-02-05 RX ADMIN — FOSAPREPITANT 150 MG: 150 INJECTION, POWDER, LYOPHILIZED, FOR SOLUTION INTRAVENOUS at 11:02

## 2024-02-05 RX ADMIN — SODIUM CHLORIDE: 9 INJECTION, SOLUTION INTRAVENOUS at 10:02

## 2024-02-05 RX ADMIN — PALONOSETRON 0.25 MG: 0.25 INJECTION, SOLUTION INTRAVENOUS at 12:02

## 2024-02-05 NOTE — PLAN OF CARE
1343-Patient tolerated treatment well. Discharged without complaints or S/S of adverse event.  Instructed to call provider for any questions or concerns.

## 2024-02-05 NOTE — PROGRESS NOTES
Subjective:       Patient ID: Shikha López is a 53 y.o. female.    Chief Complaint: Breast cancer, stage 4, left      HPI 53-year-old female who returns for F/U  for metastatic HER 2 + breast cancer.  She is on Trastuzumab deruxtecan  - here for cycle  45.        Physically she is doing well with no new issues.  She has noted some axillary and perineal hyperpigmentation.  Still struggling with her mother who has dementia and is in the SNF.          Breast history:  She presented in the emergency room at [facility] on September 29, 2006 with a 4 months history of inflammation of her left breast.  Physical examination at that time showed the left breast was largely replaced by large mass with multiple skin ulcerations.    A biopsy in September 2006 showed poorly differentiated carcinoma which was ER and [place]. negative and HER2 positive.    She was then referred to [facility] for additional care.   CT scan of the chest and abdomen November 2006 revealed multiple nodules in the lungs consistent with metastatic disease.  There also enlarged left axillary node.    She was treated with chemotherapy with weekly Herceptin and Abraxane with improvement in her breast and lung metastasis.    On May 29, 2007 she underwent left modified radical mastectomy(Dr. Colvin) which showed no residual tumor in the breast and 1/5 nodes was positive for metastasis measuring 6 mm.  (ypT0N1).  She then received postoperative radiation therapy(Dr Singh)  to the left chest wall supraclav and internal mammary lymph nodes from August 20, 2007 to September 28, 2007.    Postoperatively she continued on Herceptin for approximately 3 and 1/2 years before her lung metastasis begin to grow.    She subsequently received a number of different chemotherapy treatments including Adriamycin, Halaven, Xeloda plus lapatinib then Xeloda plus Herceptin. (  in [facility].)    Subsequently, she transferred her care to  at [facility]  [facility].She was initially treated with Taxotere Herceptin Perjeta.      She was then changed to Kadcyla.      In July 2020 PET scan showed progression.   She then took approximately 9 months of Herceptin and Navelbine with initial response then progression.    She started trastuzumab- deruxtecan  4/12/21.     CT 12/15/23 - stable - no new findings    Echo 12/15/23  -EF 55-60%    Review of Systems   Constitutional:  Negative for appetite change, fatigue, fever and unexpected weight change.   HENT:  Negative for nasal congestion, mouth sores, postnasal drip and rhinorrhea.    Eyes:  Negative for visual disturbance.   Respiratory:  Negative for cough and shortness of breath (minimal).    Cardiovascular:  Negative for chest pain.   Gastrointestinal:  Negative for abdominal pain, blood in stool, constipation, diarrhea and nausea.   Genitourinary:  Negative for frequency.   Musculoskeletal:  Negative for back pain.   Integumentary:  Negative for rash.   Neurological:  Negative for headaches.   Hematological:  Negative for adenopathy.   Psychiatric/Behavioral: Negative.  The patient is not nervous/anxious.          Objective:      Physical Exam  Vitals reviewed.   Constitutional:       General: She is not in acute distress.     Appearance: She is obese.   HENT:      Mouth/Throat:      Mouth: Mucous membranes are moist.      Pharynx: Oropharynx is clear. No oropharyngeal exudate or posterior oropharyngeal erythema.   Eyes:      Pupils: Pupils are equal, round, and reactive to light.   Cardiovascular:      Rate and Rhythm: Normal rate and regular rhythm.      Heart sounds: Murmur (systolic -aortic) heard.   Pulmonary:      Effort: Pulmonary effort is normal. No respiratory distress.      Breath sounds: Wheezing (upper airway  -diffuse, scattered) present. No rales.   Chest:   Breasts:     Right: Normal.       Abdominal:      Palpations: Abdomen is soft. There is no mass.      Tenderness: There is no abdominal tenderness.    Lymphadenopathy:      Cervical: No cervical adenopathy.      Upper Body:      Right upper body: No supraclavicular or axillary adenopathy.      Left upper body: No supraclavicular or axillary adenopathy.   Skin:     Findings: No rash.   Neurological:      Mental Status: She is alert and oriented to person, place, and time.   Psychiatric:         Mood and Affect: Mood normal.         Behavior: Behavior normal.         Thought Content: Thought content normal.         Judgment: Judgment normal.         Assessment:    Labs :  White blood cell count 2290 with ANC 1400, hemoglobin 9.3, platelets normal  Problem List Items Addressed This Visit       Breast cancer, stage 4, left - Primary    Malignant neoplasm metastatic to left lung       Plan:    Continue current therapy.  RTC 3 weeks.  Repeat scans in March.  Echo in March      Route Chart for Scheduling    Med Onc Chart Routing      Follow up with physician 3 weeks. me or Mariam   Follow up with JIMI    Infusion scheduling note    Injection scheduling note    Labs CBC and CMP   Scheduling:  Preferred lab:  Lab interval:     Imaging ECHO and PET scan   March   Pharmacy appointment No pharmacy appointment needed      Other referrals no referral to Oncology Primary Care needed -  no Massage appointment needed    No additional referrals needed               Treatment Plan Information   OP BREAST FAM-TRASTUZUMAB DERUXTECAN-NXKI Q3W   Home Willis MD   Upcoming Treatment Dates - OP BREAST FAM-TRASTUZUMAB DERUXTECAN-NXKI Q3W    2/15/2024       Chemotherapy       fam-trastuzumab deruxtecan-nxki (ENHERTU) 470 mg in dextrose 5 % (D5W) 100 mL infusion       Antiemetics       fosaprepitant 150 mg in sodium chloride 0.9% 150 mL IVPB       palonosetron (ALOXI) 0.25 mg in sodium chloride 0.9% 50 mL IVPB  3/7/2024       Chemotherapy       fam-trastuzumab deruxtecan-nxki (ENHERTU) 470 mg in dextrose 5 % (D5W) 100 mL infusion       Antiemetics       fosaprepitant 150 mg in sodium  chloride 0.9% 150 mL IVPB       palonosetron (ALOXI) 0.25 mg in sodium chloride 0.9% 50 mL IVPB  3/28/2024       Chemotherapy       fam-trastuzumab deruxtecan-nxki (ENHERTU) 470 mg in dextrose 5 % (D5W) 100 mL infusion       Antiemetics       fosaprepitant 150 mg in sodium chloride 0.9% 150 mL IVPB       palonosetron (ALOXI) 0.25 mg in sodium chloride 0.9% 50 mL IVPB  4/18/2024       Chemotherapy       fam-trastuzumab deruxtecan-nxki (ENHERTU) 470 mg in dextrose 5 % (D5W) 100 mL infusion       Antiemetics       fosaprepitant 150 mg in sodium chloride 0.9% 150 mL IVPB       palonosetron (ALOXI) 0.25 mg in sodium chloride 0.9% 50 mL IVPB    Supportive Plan Information  IV FLUIDS AND ELECTROLYTES   Miller Carrasquillo MD   Upcoming Treatment Dates - IV FLUIDS AND ELECTROLYTES    No upcoming days in selected categories.

## 2024-02-05 NOTE — PLAN OF CARE
1007-Labs , hx, and medications reviewed, patient was seen by Md prior to arrival. Assessment completed. Discussed plan of care with patient. Patient in agreement. Chair reclined and warm blanket and snack offered.

## 2024-02-21 NOTE — PROGRESS NOTES
Subjective:       Patient ID: Shikha López is a 53 y.o. female.    Chief Complaint: No chief complaint on file.      HPI 53-year-old female who returns for F/U  for metastatic HER 2 + breast cancer.  She is on Trastuzumab deruxtecan  - here for cycle  46.      She developed a severe cough after her last treatment and then had right neck pain which has gradually improved.  She still has a scratchy throat.    She has no change in her breathing.  Appetite is good-she has been stress eating.    Her mother is going to be discharged from hospital tomorrow and she is going to be sitting with her at night.  Mother has significant dementia.      Breast history:  She presented in the emergency room at [facility] on September 29, 2006 with a 4 months history of inflammation of her left breast.  Physical examination at that time showed the left breast was largely replaced by large mass with multiple skin ulcerations.    A biopsy in September 2006 showed poorly differentiated carcinoma which was ER and [place]. negative and HER2 positive.    She was then referred to [facility] for additional care.   CT scan of the chest and abdomen November 2006 revealed multiple nodules in the lungs consistent with metastatic disease.  There also enlarged left axillary node.    She was treated with chemotherapy with weekly Herceptin and Abraxane with improvement in her breast and lung metastasis.    On May 29, 2007 she underwent left modified radical mastectomy(Dr. Colvin) which showed no residual tumor in the breast and 1/5 nodes was positive for metastasis measuring 6 mm.  (ypT0N1).  She then received postoperative radiation therapy(Dr Singh)  to the left chest wall supraclav and internal mammary lymph nodes from August 20, 2007 to September 28, 2007.    Postoperatively she continued on Herceptin for approximately 3 and 1/2 years before her lung metastasis begin to grow.    She subsequently received a number of different  chemotherapy treatments including Adriamycin, Halaven, Xeloda plus lapatinib then Xeloda plus Herceptin. (  in [facility]    Subsequently, she transferred her care to  at [facility].She was initially treated with Taxotere Herceptin Perjeta.      She was then changed to Kadcyla.      In July 2020 PET scan showed progression.   She then took approximately 9 months of Herceptin and Navelbine with initial response then progression.    She started trastuzumab- deruxtecan  4/12/21.     CT 12/15/23 - stable - no new findings    Echo 12/15/23  -EF 55-60%    Review of Systems   Constitutional:  Positive for fatigue. Negative for appetite change, fever and unexpected weight change.   HENT:  Negative for nasal congestion, mouth sores, postnasal drip and rhinorrhea.    Eyes:  Negative for visual disturbance.   Respiratory:  Negative for cough and shortness of breath (minimal).    Cardiovascular:  Positive for leg swelling. Negative for chest pain.   Gastrointestinal:  Negative for abdominal pain, blood in stool, constipation, diarrhea and nausea.   Genitourinary:  Negative for frequency.   Musculoskeletal:  Negative for back pain.   Integumentary:  Negative for rash.   Neurological:  Negative for headaches.   Hematological:  Negative for adenopathy.   Psychiatric/Behavioral:  Positive for dysphoric mood. The patient is not nervous/anxious.          Objective:      Physical Exam  Vitals reviewed.   Constitutional:       General: She is not in acute distress.     Appearance: She is obese.   HENT:      Mouth/Throat:      Mouth: Mucous membranes are moist.      Pharynx: Oropharynx is clear. No oropharyngeal exudate or posterior oropharyngeal erythema.   Eyes:      Pupils: Pupils are equal, round, and reactive to light.   Cardiovascular:      Rate and Rhythm: Normal rate and regular rhythm.      Heart sounds: Murmur (systolic -aortic) heard.   Pulmonary:      Effort: Pulmonary effort is normal. No  respiratory distress.      Breath sounds: No stridor. No wheezing, rhonchi or rales.   Chest:   Breasts:     Right: Normal.       Abdominal:      Palpations: Abdomen is soft. There is no mass.      Tenderness: There is no abdominal tenderness.   Lymphadenopathy:      Cervical: No cervical adenopathy.      Upper Body:      Right upper body: No supraclavicular or axillary adenopathy.      Left upper body: No supraclavicular or axillary adenopathy.   Skin:     Findings: No rash.   Neurological:      Mental Status: She is alert and oriented to person, place, and time.   Psychiatric:         Mood and Affect: Mood normal.         Behavior: Behavior normal.         Thought Content: Thought content normal.         Judgment: Judgment normal.       Assessment:    Labs :  White blood cell count 2080 with ANC 1200 , hemoglobin 8.7, platelets [id number]  CMP - Bili 2.2  Problem List Items Addressed This Visit       Breast cancer, stage 4, left    Malignant neoplasm metastatic to left lung - Primary       Plan:    Continue current therapy  RTC 3 weeks.  Repeat scans in March.  Echo in March      Route Chart for Scheduling    Med Onc Chart Routing      Follow up with physician 3 weeks.   Follow up with JIMI    Infusion scheduling note    Injection scheduling note    Labs CBC and CMP   Scheduling:  Preferred lab:  Lab interval:     Imaging CT chest abdomen pelvis and ECHO      Pharmacy appointment No pharmacy appointment needed      Other referrals no referral to Oncology Primary Care needed -  no Massage appointment needed    No additional referrals needed         Treatment Plan Information   OP BREAST FAM-TRASTUZUMAB DERUXTECAN-NXKI Q3W   Home Willis MD   Upcoming Treatment Dates - OP BREAST FAM-TRASTUZUMAB DERUXTECAN-NXKI Q3W    3/7/2024       Chemotherapy       fam-trastuzumab deruxtecan-nxki (ENHERTU) 470 mg in dextrose 5 % (D5W) 100 mL infusion       Antiemetics       fosaprepitant 150 mg in sodium chloride 0.9% 150 mL IVPB        palonosetron (ALOXI) 0.25 mg in sodium chloride 0.9% 50 mL IVPB  3/28/2024       Chemotherapy       fam-trastuzumab deruxtecan-nxki (ENHERTU) 470 mg in dextrose 5 % (D5W) 100 mL infusion       Antiemetics       fosaprepitant 150 mg in sodium chloride 0.9% 150 mL IVPB       palonosetron (ALOXI) 0.25 mg in sodium chloride 0.9% 50 mL IVPB  4/18/2024       Chemotherapy       fam-trastuzumab deruxtecan-nxki (ENHERTU) 470 mg in dextrose 5 % (D5W) 100 mL infusion       Antiemetics       fosaprepitant 150 mg in sodium chloride 0.9% 150 mL IVPB       palonosetron (ALOXI) 0.25 mg in sodium chloride 0.9% 50 mL IVPB    Supportive Plan Information  IV FLUIDS AND ELECTROLYTES   Miller Carrasquillo MD   Upcoming Treatment Dates - IV FLUIDS AND ELECTROLYTES    No upcoming days in selected categories.

## 2024-02-26 ENCOUNTER — OFFICE VISIT (OUTPATIENT)
Dept: HEMATOLOGY/ONCOLOGY | Facility: CLINIC | Age: 54
End: 2024-02-26
Payer: MEDICARE

## 2024-02-26 ENCOUNTER — INFUSION (OUTPATIENT)
Dept: INFUSION THERAPY | Facility: HOSPITAL | Age: 54
End: 2024-02-26
Attending: INTERNAL MEDICINE
Payer: MEDICARE

## 2024-02-26 VITALS
RESPIRATION RATE: 18 BRPM | BODY MASS INDEX: 34.8 KG/M2 | HEIGHT: 62 IN | HEART RATE: 100 BPM | TEMPERATURE: 98 F | DIASTOLIC BLOOD PRESSURE: 63 MMHG | SYSTOLIC BLOOD PRESSURE: 140 MMHG | WEIGHT: 189.13 LBS

## 2024-02-26 VITALS
RESPIRATION RATE: 18 BRPM | DIASTOLIC BLOOD PRESSURE: 60 MMHG | OXYGEN SATURATION: 98 % | BODY MASS INDEX: 34.8 KG/M2 | WEIGHT: 189.13 LBS | HEIGHT: 62 IN | HEART RATE: 104 BPM | TEMPERATURE: 98 F | SYSTOLIC BLOOD PRESSURE: 136 MMHG

## 2024-02-26 DIAGNOSIS — C50.912 BREAST CANCER, STAGE 4, LEFT: Primary | ICD-10-CM

## 2024-02-26 DIAGNOSIS — E87.6 HYPOKALEMIA: ICD-10-CM

## 2024-02-26 DIAGNOSIS — C78.02 MALIGNANT NEOPLASM METASTATIC TO LEFT LUNG: Primary | ICD-10-CM

## 2024-02-26 DIAGNOSIS — C50.912 BREAST CANCER, STAGE 4, LEFT: ICD-10-CM

## 2024-02-26 PROCEDURE — 63600175 PHARM REV CODE 636 W HCPCS: Performed by: INTERNAL MEDICINE

## 2024-02-26 PROCEDURE — 96361 HYDRATE IV INFUSION ADD-ON: CPT

## 2024-02-26 PROCEDURE — 99999 PR PBB SHADOW E&M-EST. PATIENT-LVL III: CPT | Mod: PBBFAC,,, | Performed by: INTERNAL MEDICINE

## 2024-02-26 PROCEDURE — A4216 STERILE WATER/SALINE, 10 ML: HCPCS | Performed by: INTERNAL MEDICINE

## 2024-02-26 PROCEDURE — 25000003 PHARM REV CODE 250: Performed by: INTERNAL MEDICINE

## 2024-02-26 PROCEDURE — 99213 OFFICE O/P EST LOW 20 MIN: CPT | Mod: PBBFAC,25 | Performed by: INTERNAL MEDICINE

## 2024-02-26 PROCEDURE — 99214 OFFICE O/P EST MOD 30 MIN: CPT | Mod: S$PBB,,, | Performed by: INTERNAL MEDICINE

## 2024-02-26 PROCEDURE — 96413 CHEMO IV INFUSION 1 HR: CPT

## 2024-02-26 PROCEDURE — 96367 TX/PROPH/DG ADDL SEQ IV INF: CPT

## 2024-02-26 RX ORDER — SODIUM CHLORIDE 9 MG/ML
INJECTION, SOLUTION INTRAVENOUS ONCE
Status: CANCELLED
Start: 2024-03-07 | End: 2024-03-07

## 2024-02-26 RX ORDER — HEPARIN 100 UNIT/ML
500 SYRINGE INTRAVENOUS
Status: DISCONTINUED | OUTPATIENT
Start: 2024-02-26 | End: 2024-02-26 | Stop reason: HOSPADM

## 2024-02-26 RX ORDER — DEXTROSE MONOHYDRATE 50 MG/ML
INJECTION, SOLUTION INTRAVENOUS ONCE
Status: CANCELLED | OUTPATIENT
Start: 2024-03-07 | End: 2024-03-07

## 2024-02-26 RX ORDER — SODIUM CHLORIDE 0.9 % (FLUSH) 0.9 %
10 SYRINGE (ML) INJECTION
Status: CANCELLED | OUTPATIENT
Start: 2024-03-07

## 2024-02-26 RX ORDER — SODIUM CHLORIDE 9 MG/ML
INJECTION, SOLUTION INTRAVENOUS ONCE
Status: COMPLETED | OUTPATIENT
Start: 2024-02-26 | End: 2024-02-26

## 2024-02-26 RX ORDER — SODIUM CHLORIDE 0.9 % (FLUSH) 0.9 %
10 SYRINGE (ML) INJECTION
Status: DISCONTINUED | OUTPATIENT
Start: 2024-02-26 | End: 2024-02-26 | Stop reason: HOSPADM

## 2024-02-26 RX ORDER — DEXTROSE MONOHYDRATE 50 MG/ML
INJECTION, SOLUTION INTRAVENOUS ONCE
Status: COMPLETED | OUTPATIENT
Start: 2024-02-26 | End: 2024-02-26

## 2024-02-26 RX ORDER — HEPARIN 100 UNIT/ML
500 SYRINGE INTRAVENOUS
Status: CANCELLED | OUTPATIENT
Start: 2024-03-07

## 2024-02-26 RX ADMIN — FOSAPREPITANT 150 MG: 150 INJECTION, POWDER, LYOPHILIZED, FOR SOLUTION INTRAVENOUS at 12:02

## 2024-02-26 RX ADMIN — FAM-TRASTUZUMAB DERUXTECAN-NXKI 470 MG: 100 INJECTION, POWDER, LYOPHILIZED, FOR SOLUTION INTRAVENOUS at 01:02

## 2024-02-26 RX ADMIN — DEXTROSE MONOHYDRATE: 50 INJECTION, SOLUTION INTRAVENOUS at 01:02

## 2024-02-26 RX ADMIN — HEPARIN 500 UNITS: 100 SYRINGE at 02:02

## 2024-02-26 RX ADMIN — SODIUM CHLORIDE: 9 INJECTION, SOLUTION INTRAVENOUS at 11:02

## 2024-02-26 RX ADMIN — Medication 10 ML: at 02:02

## 2024-02-26 RX ADMIN — PALONOSETRON 0.25 MG: 0.05 INJECTION, SOLUTION INTRAVENOUS at 12:02

## 2024-02-26 NOTE — PLAN OF CARE
1130-Labs , hx, and medications reviewed, currently waiting on ANC to result, patient was seen by MD prior to arrival. Assessment completed. Discussed plan of care with patient. Patient in agreement. Chair reclined and warm blanket and snack offered.

## 2024-02-26 NOTE — PLAN OF CARE
1433-Patient tolerated treatment well. Discharged without complaints or S/S of adverse event.  Instructed to call provider for any questions or concerns.

## 2024-03-08 ENCOUNTER — OFFICE VISIT (OUTPATIENT)
Dept: PSYCHIATRY | Facility: CLINIC | Age: 54
End: 2024-03-08
Payer: MEDICARE

## 2024-03-08 DIAGNOSIS — C50.912 BREAST CANCER, STAGE 4, LEFT: ICD-10-CM

## 2024-03-08 DIAGNOSIS — F41.9 ANXIETY: Primary | ICD-10-CM

## 2024-03-08 PROCEDURE — 99999 PR PBB SHADOW E&M-EST. PATIENT-LVL I: CPT | Mod: PBBFAC,,, | Performed by: PSYCHOLOGIST

## 2024-03-08 PROCEDURE — 99211 OFF/OP EST MAY X REQ PHY/QHP: CPT | Mod: PBBFAC | Performed by: PSYCHOLOGIST

## 2024-03-08 PROCEDURE — 90834 PSYTX W PT 45 MINUTES: CPT | Mod: ,,, | Performed by: PSYCHOLOGIST

## 2024-03-08 NOTE — PROGRESS NOTES
INFORMED CONSENT: Patient was identified using two patient-identifiers. The patient has been informed of the risks and benefits associated with engaging in psychotherapy, the handling of protected health information, the rights of privacy and the limits of confidentiality. The patient has also been informed of the importance of reporting any suicidal or homicidal ideation to this or any provider to ensure safety of all parties, and the Shikha López expressed understanding. The patient was agreeable to these terms and freely participates in individual psychotherapy.    PSYCHO-ONCOLOGY NOTE/ Individual Psychotherapy     Date: 3/8/2024   Site:  [facility]        Therapeutic Intervention: Met with patient.  Outpatient - Supportive psychotherapy 45 min - CPT Code 45905      Patient was last seen by me on 1/23/2024    Problem list  Patient Active Problem List   Diagnosis    Breast cancer, stage 4, left    Malignant neoplasm metastatic to left lung    Physical deconditioning    Balance problem    Hypokalemia    Esophageal and gastric varices    Severe obesity (BMI 35.0-39.9) with comorbidity    Aortic atherosclerosis    Pancytopenia    Thrombocytopenia, unspecified       Chief complaint/reason for encounter: anxiety and interpersonal   Met with patient to evaluate psychosocial adaptation to diagnosis/treatment/survivorship of BC    Current Medications  Current Outpatient Medications   Medication    acetaminophen (TYLENOL) 500 MG tablet    ferrous sulfate (IRON, FERROUS SULFATE,) 325 mg (65 mg iron) Tab tablet    furosemide (LASIX) 20 MG tablet    hydrocodone-homatropine 5-1.5 mg/5 ml (HYCODAN) 5-1.5 mg/5 mL Syrp    Lactobac no.41/Bifidobact no.7 (PROBIOTIC-10 ORAL)    LIDOcaine-prilocaine (EMLA) cream    methylphenidate HCl (RITALIN) 5 MG tablet    OLANZapine (ZYPREXA) 5 MG tablet    ondansetron (ZOFRAN) 8 MG tablet    pantoprazole (PROTONIX) 40 MG tablet    potassium chloride 10% (KAYCIEL) 20 mEq/15 mL oral  solution     No current facility-administered medications for this visit.       Objective:  Shikha López arrived  promptly for the session.   The patient was fully cooperative throughout the session.  Appearance: age appropriate, appropriately  dressed, adequately  groomed, poor dentation   Behavior/Cooperation: friendly and cooperative  Speech: normal in rate, volume, and tone and appropriate quality, quantity and organization of sentences  Mood: steady  Affect: mood congruent  Thought Process: goal-directed, logical  Thought Content: normal,  No delusions or paranoia; did not appear to be responding to internal stimuli during the session  Orientation: grossly intact  Attention Span/Concentration: Attends to session without distraction; reports no difficulty  Insight: patient has awareness of illness; good insight into own behavior and behavior of others  Judgment: the patient's behavior is adequate to circumstances    Interval history and content of current session: Mother now living with patient at the insistence of patient's sister. Patient very overwhelmed considering she is still in active Ca treatment..    Discussed diagnosis, treatment, prognosis, current adaptation to disease and treatment status, and family's adaptation to disease and treatment status. Reports to be coping with great difficulty. Evaluated cognitive response, paying particular attention to negative intrusive thoughts of a persistent and detrimental nature. Thoughts of this type are in evidence with severe distress. Provided cognitive behavioral therapy to address negative cognitions. Identified and evaluated psychosocial and environmental stressors secondary to diagnosis and treatment.  Examined proactive behaviors that may be implemented to minimize or ameliorate psychosocial stressors secondary to diagnosis and treatment.     Risk parameters:   Patient reports no suicidal ideation  Patient reports no homicidal ideation  Patient  reports no self-injurious behavior  Patient reports no violent behavior   Safety needs:  None at this time      Verbal deficits: None     Patient's response to intervention:The patient's response to intervention is accepting.     Progress toward goals and other mental status changes:  The patient's progress toward goals is fair .      Progress to date:Progress - Ongoing, but Slow      Goals from last visit: Met     Patient reported outcomes:    Distress Thermometer:   Distress Score    Distress Score: 9        Practical Problems Physical Problems                                                   Family Problems                                         Emotional Problems                                                         Spiritual/Religions Concerns               Other Problems               PHQ ANSWERS    Q1. Little interest or pleasure in doing things: (P) Not at all (03/08/24 0408)  Q2. Feeling down, depressed, or hopeless: (P) More than half the days (03/08/24 0408)  Q3. Trouble falling or staying asleep, or sleeping too much: (P) More than half the days (03/08/24 0408)  Q4. Feeling tired or having little energy: (P) Nearly every day (03/08/24 0408)  Q5. Poor appetite or overeating: (P) Nearly every day (03/08/24 0408)  Q6. Feeling bad about yourself - or that you are a failure or have let yourself or your family down: (P) Not at all (03/08/24 0408)  Q7. Trouble concentrating on things, such as reading the newspaper or watching television: (P) Not at all (03/08/24 0408)  Q8. Moving or speaking so slowly that other people could have noticed. Or the opposite - being so fidgety or restless that you have been moving around a lot more than usual: (P) Not at all (03/08/24 0408)  Q9.  0    PHQ8 Score : (P) 10 (03/08/24 0408)  PHQ-9 Total Score: (P) 10 (03/08/24 0408)     LILY-7 Answers        3/8/2024     4:07 AM   GAD7   1. Feeling nervous, anxious, or on edge? 3   2. Not being able to stop or control worrying? 3    3. Worrying too much about different things? 3   4. Trouble relaxing? 3   5. Being so restless that it is hard to sit still? 3   6. Becoming easily annoyed or irritable? 3   7. Feeling afraid as if something awful might happen? 0   LILY-7 Score 18     LILY-7 Score: (P) 18  Interpretation: (P) Severe Anxiety     Client Strengths: verbal, intelligent, successful, good social support, good insight, commitment to wellness, strong rob, strong cultural traditions     Diagnosis:     ICD-10-CM ICD-9-CM   1. Anxiety  F41.9 300.00   2. Breast cancer, stage 4, left  C50.912 174.9       Treatment Plan:individual psychotherapy and medication management by physician  Target symptoms: depression, anxiety   Why chosen therapy is appropriate versus another modality: relevant to diagnosis, patient responds to this modality, evidence based practice  Outcome monitoring methods: self-report, observation, checklist/rating scale  Therapeutic intervention type: insight oriented psychotherapy, behavior modifying psychotherapy  Prognosis: Fair      Behavioral goals:    Exercise:   Stress management:   Social engagement:   Nutrition:   Smoking Cessation:   Therapy:  Increase daily self-care and attention to health management  Pleasant events scheduling and increased social interaction  Monitor stressors in writing and bring to next visit  Identify, implement and maintain healthy personal boundaries    Return to clinic: 1 month    Next Session:      Length of Service (minutes direct face-to-face contact): 45    Linda Figueroa, PhD  Clinical Psychologist  [place] License #[id number]  [place] License #[id number]

## 2024-03-11 ENCOUNTER — HOSPITAL ENCOUNTER (OUTPATIENT)
Dept: CARDIOLOGY | Facility: HOSPITAL | Age: 54
Discharge: HOME | End: 2024-03-11
Attending: INTERNAL MEDICINE
Payer: MEDICARE

## 2024-03-11 ENCOUNTER — HOSPITAL ENCOUNTER (OUTPATIENT)
Dept: RADIOLOGY | Facility: HOSPITAL | Age: 54
Discharge: HOME | End: 2024-03-11
Attending: INTERNAL MEDICINE
Payer: MEDICARE

## 2024-03-11 VITALS
SYSTOLIC BLOOD PRESSURE: 132 MMHG | BODY MASS INDEX: 34.78 KG/M2 | DIASTOLIC BLOOD PRESSURE: 60 MMHG | HEART RATE: 92 BPM | WEIGHT: 189 LBS | HEIGHT: 62 IN

## 2024-03-11 DIAGNOSIS — C50.912 BREAST CANCER, STAGE 4, LEFT: ICD-10-CM

## 2024-03-11 LAB
ASCENDING AORTA: 3.17 CM
AV INDEX (PROSTH): 0.44
AV MEAN GRADIENT: 23 MMHG
AV PEAK GRADIENT: 37 MMHG
AV VALVE AREA BY VELOCITY RATIO: 1.37 CM²
AV VALVE AREA: 1.51 CM²
AV VELOCITY RATIO: 0.4
BSA FOR ECHO PROCEDURE: 1.94 M2
CV ECHO LV RWT: 0.36 CM
DOP CALC AO PEAK VEL: 3.05 M/S
DOP CALC AO VTI: 67.69 CM
DOP CALC LVOT AREA: 3.5 CM2
DOP CALC LVOT DIAMETER: 2.1 CM
DOP CALC LVOT PEAK VEL: 1.21 M/S
DOP CALC LVOT STROKE VOLUME: 102.37 CM3
DOP CALCLVOT PEAK VEL VTI: 29.57 CM
E WAVE DECELERATION TIME: 186.74 MSEC
E/A RATIO: 1.09
E/E' RATIO: 14.29 M/S
ECHO LV POSTERIOR WALL: 0.79 CM (ref 0.6–1.1)
EJECTION FRACTION: 60 %
FRACTIONAL SHORTENING: 36 % (ref 28–44)
GLOBAL LONGITUIDAL STRAIN: 21.7 %
INTERVENTRICULAR SEPTUM: 0.91 CM (ref 0.6–1.1)
LA MAJOR: 4.98 CM
LA MINOR: 4.97 CM
LA WIDTH: 4.7 CM
LEFT ATRIUM SIZE: 4.43 CM
LEFT ATRIUM VOLUME INDEX MOD: 43.3 ML/M2
LEFT ATRIUM VOLUME INDEX: 47.1 ML/M2
LEFT ATRIUM VOLUME MOD: 80.94 CM3
LEFT ATRIUM VOLUME: 88.05 CM3
LEFT INTERNAL DIMENSION IN SYSTOLE: 2.83 CM (ref 2.1–4)
LEFT VENTRICLE DIASTOLIC VOLUME INDEX: 47.93 ML/M2
LEFT VENTRICLE DIASTOLIC VOLUME: 89.63 ML
LEFT VENTRICLE MASS INDEX: 64 G/M2
LEFT VENTRICLE SYSTOLIC VOLUME INDEX: 16.2 ML/M2
LEFT VENTRICLE SYSTOLIC VOLUME: 30.37 ML
LEFT VENTRICULAR INTERNAL DIMENSION IN DIASTOLE: 4.44 CM (ref 3.5–6)
LEFT VENTRICULAR MASS: 120.36 G
LV LATERAL E/E' RATIO: 16.67 M/S
LV SEPTAL E/E' RATIO: 12.5 M/S
MV A" WAVE DURATION": 11.13 MSEC
MV PEAK A VEL: 1.38 M/S
MV PEAK E VEL: 1.5 M/S
MV STENOSIS PRESSURE HALF TIME: 54.15 MS
MV VALVE AREA P 1/2 METHOD: 4.06 CM2
OHS LV EJECTION FRACTION SIMPSONS BIPLANE MOD: 62 %
PISA TR MAX VEL: 2.95 M/S
PULM VEIN S/D RATIO: 1
PV PEAK D VEL: 0.66 M/S
PV PEAK S VEL: 0.66 M/S
RA MAJOR: 4.87 CM
RA PRESSURE ESTIMATED: 3 MMHG
RA WIDTH: 4.14 CM
RIGHT VENTRICULAR END-DIASTOLIC DIMENSION: 3.71 CM
RV TB RVSP: 6 MMHG
SINUS: 3.04 CM
STJ: 2.52 CM
TDI LATERAL: 0.09 M/S
TDI SEPTAL: 0.12 M/S
TDI: 0.11 M/S
TR MAX PG: 35 MMHG
TRICUSPID ANNULAR PLANE SYSTOLIC EXCURSION: 1.79 CM
TV REST PULMONARY ARTERY PRESSURE: 38 MMHG
Z-SCORE OF LEFT VENTRICULAR DIMENSION IN END DIASTOLE: -1.6
Z-SCORE OF LEFT VENTRICULAR DIMENSION IN END SYSTOLE: -1.01

## 2024-03-11 PROCEDURE — 71260 CT THORAX DX C+: CPT | Mod: 26,,, | Performed by: INTERNAL MEDICINE

## 2024-03-11 PROCEDURE — 25500020 PHARM REV CODE 255: Performed by: INTERNAL MEDICINE

## 2024-03-11 PROCEDURE — 93306 TTE W/DOPPLER COMPLETE: CPT

## 2024-03-11 PROCEDURE — 93306 TTE W/DOPPLER COMPLETE: CPT | Mod: 26,,, | Performed by: INTERNAL MEDICINE

## 2024-03-11 PROCEDURE — 74177 CT ABD & PELVIS W/CONTRAST: CPT | Mod: 26,,, | Performed by: INTERNAL MEDICINE

## 2024-03-11 PROCEDURE — 74177 CT ABD & PELVIS W/CONTRAST: CPT | Mod: TC

## 2024-03-11 RX ADMIN — IOHEXOL 100 ML: 350 INJECTION, SOLUTION INTRAVENOUS at 09:03

## 2024-03-13 NOTE — PROGRESS NOTES
Subjective:       Patient ID: Shikha López is a 53 y.o. female.    Chief Complaint: No chief complaint on file.      HPI 53-year-old female who returns for F/U  for metastatic HER 2 + breast cancer.  She is on Trastuzumab deruxtecan  - here for cycle  47.        Today she reports that she has been feeling well in general.    She is helping care for her mother at night and on weekends.  Breathing has been doing well.  She does have some dependent edema which resolves overnight.      Breast history:  She presented in the emergency room at [facility] on September 29, 2006 with a 4 months history of inflammation of her left breast.  Physical examination at that time showed the left breast was largely replaced by large mass with multiple skin ulcerations.    A biopsy in September 2006 showed poorly differentiated carcinoma which was ER and [place]. negative and HER2 positive.    She was then referred to [facility] for additional care.   CT scan of the chest and abdomen November 2006 revealed multiple nodules in the lungs consistent with metastatic disease.  There also enlarged left axillary node.    She was treated with chemotherapy with weekly Herceptin and Abraxane with improvement in her breast and lung metastasis.    On May 29, 2007 she underwent left modified radical mastectomy(Dr. Colvin) which showed no residual tumor in the breast and 1/5 nodes was positive for metastasis measuring 6 mm.  (ypT0N1).  She then received postoperative radiation therapy(Dr Singh)  to the left chest wall supraclav and internal mammary lymph nodes from August 20, 2007 to September 28, 2007.    Postoperatively she continued on Herceptin for approximately 3 and 1/2 years before her lung metastasis begin to grow.    She subsequently received a number of different chemotherapy treatments including Adriamycin, Halaven, Xeloda plus lapatinib then Xeloda plus Herceptin. (  in [facility]    Subsequently, she  transferred her care to  at [facility].  -She was initially treated with Taxotere Herceptin Perjeta.    -She was then changed to Kadcyla.    In July 2020 PET scan showed progression.   She then took approximately 9 months of Herceptin and Navelbine with initial response then progression.    She started trastuzumab- deruxtecan  4/12/21.     CT 3/11/24 - stable  Stable postsurgical changes of left mastectomy and axillary lymph node dissection is patient with metastatic left breast cancer.   Redemonstration of multiple irregular bilateral pulmonary nodules concerning for metastatic disease.  No new lesions identified.   Hepatic steatosis and portal hypertension.  Increased bowel wall thickening and diffuse mesenteric edema with mild ascites, likely related to hepatic dysfunction.    Echo 3/11/24 - EF 60%    EGD 1/12/24  -Grade I esophageal varices.                          - Gastric varices, without bleeding. Minimal to no                          impact of coiling to varices                          - Normal examined duodenum.     Review of Systems   Constitutional:  Positive for fatigue. Negative for appetite change, fever and unexpected weight change.   HENT:  Negative for nasal congestion, mouth sores, postnasal drip and rhinorrhea.    Eyes:  Negative for visual disturbance.   Respiratory:  Negative for cough and shortness of breath (minimal).    Cardiovascular:  Positive for leg swelling. Negative for chest pain.   Gastrointestinal:  Negative for abdominal pain, blood in stool, constipation, diarrhea and nausea.   Genitourinary:  Negative for frequency.   Musculoskeletal:  Negative for back pain.   Integumentary:  Negative for rash.   Neurological:  Negative for headaches.   Hematological:  Negative for adenopathy.   Psychiatric/Behavioral:  The patient is not nervous/anxious.          Objective:      Physical Exam  Vitals reviewed.   Constitutional:       General: She is not in acute distress.      Appearance: She is obese.   HENT:      Mouth/Throat:      Mouth: Mucous membranes are moist.      Pharynx: Oropharynx is clear. No oropharyngeal exudate or posterior oropharyngeal erythema.   Eyes:      Pupils: Pupils are equal, round, and reactive to light.   Cardiovascular:      Rate and Rhythm: Normal rate and regular rhythm.      Heart sounds: Murmur (systolic -aortic) heard.   Pulmonary:      Effort: Pulmonary effort is normal. No respiratory distress.      Breath sounds: No stridor. No wheezing, rhonchi or rales.   Chest:   Breasts:     Right: Skin change present. No mass.       Abdominal:      Palpations: Abdomen is soft. There is no mass.      Tenderness: There is no abdominal tenderness.   Lymphadenopathy:      Cervical: No cervical adenopathy.      Upper Body:      Right upper body: No supraclavicular or axillary adenopathy.      Left upper body: No supraclavicular or axillary adenopathy.   Skin:     Findings: No rash.   Neurological:      Mental Status: She is alert and oriented to person, place, and time.   Psychiatric:         Mood and Affect: Mood normal.         Behavior: Behavior normal.         Thought Content: Thought content normal.         Judgment: Judgment normal.         Assessment:    Labs :  pending  Problem List Items Addressed This Visit       Breast cancer, stage 4, left - Primary    Malignant neoplasm metastatic to left lung       Plan:    Continue current therapy  RTC 3 weeks.    Referral to Hepatology    Route Chart for Scheduling    Med Onc Chart Routing      Follow up with physician 3 weeks. me or Mariam   Follow up with JIMI    Infusion scheduling note    Injection scheduling note    Labs CBC and CMP   Scheduling:  Preferred lab:  Lab interval:     Imaging None      Pharmacy appointment No pharmacy appointment needed      Other referrals no referral to Oncology Primary Care needed -  no Massage appointment needed    Additional referrals needed  Hepatology             Treatment Plan  Information   OP BREAST FAM-TRASTUZUMAB DERUXTECAN-NXKI Q3W   Home Willis MD   Upcoming Treatment Dates - OP BREAST FAM-TRASTUZUMAB DERUXTECAN-NXKI Q3W    3/28/2024       Chemotherapy       fam-trastuzumab deruxtecan-nxki (ENHERTU) 470 mg in dextrose 5 % (D5W) 100 mL infusion       Antiemetics       fosaprepitant 150 mg in sodium chloride 0.9% 150 mL IVPB       palonosetron (ALOXI) 0.25 mg in sodium chloride 0.9% 50 mL IVPB  4/18/2024       Chemotherapy       fam-trastuzumab deruxtecan-nxki (ENHERTU) 470 mg in dextrose 5 % (D5W) 100 mL infusion       Antiemetics       fosaprepitant 150 mg in sodium chloride 0.9% 150 mL IVPB       palonosetron (ALOXI) 0.25 mg in sodium chloride 0.9% 50 mL IVPB    Supportive Plan Information  IV FLUIDS AND ELECTROLYTES   Miller Carrasquillo MD   Upcoming Treatment Dates - IV FLUIDS AND ELECTROLYTES    No upcoming days in selected categories.

## 2024-03-18 ENCOUNTER — INFUSION (OUTPATIENT)
Dept: INFUSION THERAPY | Facility: HOSPITAL | Age: 54
End: 2024-03-18
Attending: INTERNAL MEDICINE
Payer: MEDICARE

## 2024-03-18 ENCOUNTER — OFFICE VISIT (OUTPATIENT)
Dept: HEMATOLOGY/ONCOLOGY | Facility: CLINIC | Age: 54
End: 2024-03-18
Payer: MEDICARE

## 2024-03-18 VITALS
HEART RATE: 95 BPM | DIASTOLIC BLOOD PRESSURE: 64 MMHG | OXYGEN SATURATION: 99 % | SYSTOLIC BLOOD PRESSURE: 131 MMHG | BODY MASS INDEX: 34.08 KG/M2 | TEMPERATURE: 98 F | RESPIRATION RATE: 16 BRPM | HEIGHT: 62 IN | WEIGHT: 185.19 LBS

## 2024-03-18 VITALS
SYSTOLIC BLOOD PRESSURE: 138 MMHG | WEIGHT: 185.19 LBS | DIASTOLIC BLOOD PRESSURE: 63 MMHG | TEMPERATURE: 98 F | OXYGEN SATURATION: 99 % | BODY MASS INDEX: 34.08 KG/M2 | HEIGHT: 62 IN | HEART RATE: 99 BPM | RESPIRATION RATE: 16 BRPM

## 2024-03-18 DIAGNOSIS — C50.912 BREAST CANCER, STAGE 4, LEFT: Primary | ICD-10-CM

## 2024-03-18 DIAGNOSIS — C78.02 MALIGNANT NEOPLASM METASTATIC TO LEFT LUNG: ICD-10-CM

## 2024-03-18 DIAGNOSIS — I86.4 ESOPHAGEAL AND GASTRIC VARICES: ICD-10-CM

## 2024-03-18 DIAGNOSIS — I85.00 ESOPHAGEAL AND GASTRIC VARICES: ICD-10-CM

## 2024-03-18 DIAGNOSIS — E87.6 HYPOKALEMIA: ICD-10-CM

## 2024-03-18 PROCEDURE — 96413 CHEMO IV INFUSION 1 HR: CPT

## 2024-03-18 PROCEDURE — 99999 PR PBB SHADOW E&M-EST. PATIENT-LVL IV: CPT | Mod: PBBFAC,,, | Performed by: INTERNAL MEDICINE

## 2024-03-18 PROCEDURE — 96361 HYDRATE IV INFUSION ADD-ON: CPT

## 2024-03-18 PROCEDURE — 25000003 PHARM REV CODE 250: Performed by: INTERNAL MEDICINE

## 2024-03-18 PROCEDURE — A4216 STERILE WATER/SALINE, 10 ML: HCPCS | Performed by: INTERNAL MEDICINE

## 2024-03-18 PROCEDURE — 99214 OFFICE O/P EST MOD 30 MIN: CPT | Mod: PBBFAC,25 | Performed by: INTERNAL MEDICINE

## 2024-03-18 PROCEDURE — 96367 TX/PROPH/DG ADDL SEQ IV INF: CPT

## 2024-03-18 PROCEDURE — 96375 TX/PRO/DX INJ NEW DRUG ADDON: CPT

## 2024-03-18 PROCEDURE — 63600175 PHARM REV CODE 636 W HCPCS: Mod: JG | Performed by: INTERNAL MEDICINE

## 2024-03-18 PROCEDURE — 99214 OFFICE O/P EST MOD 30 MIN: CPT | Mod: S$PBB,,, | Performed by: INTERNAL MEDICINE

## 2024-03-18 RX ORDER — SODIUM CHLORIDE 9 MG/ML
INJECTION, SOLUTION INTRAVENOUS ONCE
Status: CANCELLED
Start: 2024-03-28 | End: 2024-03-28

## 2024-03-18 RX ORDER — SODIUM CHLORIDE 0.9 % (FLUSH) 0.9 %
10 SYRINGE (ML) INJECTION
Status: DISCONTINUED | OUTPATIENT
Start: 2024-03-18 | End: 2024-03-18 | Stop reason: HOSPADM

## 2024-03-18 RX ORDER — PALONOSETRON 0.05 MG/ML
0.25 INJECTION, SOLUTION INTRAVENOUS ONCE
Status: COMPLETED | OUTPATIENT
Start: 2024-03-18 | End: 2024-03-18

## 2024-03-18 RX ORDER — SODIUM CHLORIDE 9 MG/ML
INJECTION, SOLUTION INTRAVENOUS ONCE
Status: COMPLETED | OUTPATIENT
Start: 2024-03-18 | End: 2024-03-18

## 2024-03-18 RX ORDER — DEXTROSE MONOHYDRATE 50 MG/ML
INJECTION, SOLUTION INTRAVENOUS ONCE
Status: CANCELLED | OUTPATIENT
Start: 2024-03-28 | End: 2024-03-28

## 2024-03-18 RX ORDER — HEPARIN 100 UNIT/ML
500 SYRINGE INTRAVENOUS
Status: CANCELLED | OUTPATIENT
Start: 2024-03-28

## 2024-03-18 RX ORDER — HEPARIN 100 UNIT/ML
500 SYRINGE INTRAVENOUS
Status: DISCONTINUED | OUTPATIENT
Start: 2024-03-18 | End: 2024-03-18 | Stop reason: HOSPADM

## 2024-03-18 RX ORDER — DEXTROSE MONOHYDRATE 50 MG/ML
INJECTION, SOLUTION INTRAVENOUS ONCE
Status: COMPLETED | OUTPATIENT
Start: 2024-03-18 | End: 2024-03-18

## 2024-03-18 RX ORDER — SODIUM CHLORIDE 0.9 % (FLUSH) 0.9 %
10 SYRINGE (ML) INJECTION
Status: CANCELLED | OUTPATIENT
Start: 2024-03-28

## 2024-03-18 RX ADMIN — PALONOSETRON 0.25 MG: 0.05 INJECTION, SOLUTION INTRAVENOUS at 12:03

## 2024-03-18 RX ADMIN — DEXTROSE MONOHYDRATE: 50 INJECTION, SOLUTION INTRAVENOUS at 12:03

## 2024-03-18 RX ADMIN — FAM-TRASTUZUMAB DERUXTECAN-NXKI 470 MG: 100 INJECTION, POWDER, LYOPHILIZED, FOR SOLUTION INTRAVENOUS at 12:03

## 2024-03-18 RX ADMIN — Medication 10 ML: at 01:03

## 2024-03-18 RX ADMIN — SODIUM CHLORIDE: 9 INJECTION, SOLUTION INTRAVENOUS at 11:03

## 2024-03-18 RX ADMIN — SODIUM CHLORIDE 150 MG: 9 INJECTION, SOLUTION INTRAVENOUS at 12:03

## 2024-03-18 RX ADMIN — HEPARIN 500 UNITS: 100 SYRINGE at 01:03

## 2024-03-18 NOTE — PLAN OF CARE
1107-Labs , hx, and medications reviewed, patient was seen by Md prior to arrival. Assessment completed. Discussed plan of care with patient. Patient in agreement. Chair reclined and warm blanket and snack offered.

## 2024-03-18 NOTE — PLAN OF CARE
1344-Patient tolerated treatment well. Discharged without complaints or S/S of adverse event.  Instructed to call provider for any questions or concerns.

## 2024-03-25 ENCOUNTER — PATIENT MESSAGE (OUTPATIENT)
Dept: PSYCHIATRY | Facility: CLINIC | Age: 54
End: 2024-03-25
Payer: MEDICARE

## 2024-03-25 DIAGNOSIS — R41.840 LACK OF CONCENTRATION: ICD-10-CM

## 2024-03-25 DIAGNOSIS — J01.10 SUBACUTE FRONTAL SINUSITIS: ICD-10-CM

## 2024-03-25 RX ORDER — HYDROCODONE BITARTRATE AND HOMATROPINE METHYLBROMIDE ORAL SOLUTION 5; 1.5 MG/5ML; MG/5ML
5 LIQUID ORAL 4 TIMES DAILY PRN
Qty: 120 ML | Refills: 0 | Status: ON HOLD | OUTPATIENT
Start: 2024-03-25 | End: 2024-04-14 | Stop reason: HOSPADM

## 2024-03-25 RX ORDER — METHYLPHENIDATE HYDROCHLORIDE 5 MG/1
5 TABLET ORAL 2 TIMES DAILY
Qty: 60 TABLET | Refills: 0 | Status: SHIPPED | OUTPATIENT
Start: 2024-03-25 | End: 2024-05-08 | Stop reason: SDUPTHER

## 2024-03-25 NOTE — PROGRESS NOTES
Subjective:       Patient ID: Shikha López is a 53 y.o. female.    Chief Complaint: Breast cancer, stage 4, left      HPI 53-year-old female who returns for F/U  for metastatic HER 2 + breast cancer.  She is on Trastuzumab deruxtecan  - here for cycle  48.      Today she reports that she has been feeling well in general.    She has been caring for her mother on nights and weekends. Attempting to get her mother into a memory care facility.   Breathing has been doing well, she does still have a cough. Denies fevers, but always cold.  Bowel movements are variable, but she monitors them.       Per Dr Martel's previous note: Breast history:  She presented in the emergency room at [facility] on September 29, 2006 with a 4 months history of inflammation of her left breast.  Physical examination at that time showed the left breast was largely replaced by large mass with multiple skin ulcerations.    A biopsy in September 2006 showed poorly differentiated carcinoma which was ER and [place]. negative and HER2 positive.    She was then referred to [facility] for additional care.   CT scan of the chest and abdomen November 2006 revealed multiple nodules in the lungs consistent with metastatic disease.  There also enlarged left axillary node.    She was treated with chemotherapy with weekly Herceptin and Abraxane with improvement in her breast and lung metastasis.    On May 29, 2007 she underwent left modified radical mastectomy(Dr. Colvin) which showed no residual tumor in the breast and 1/5 nodes was positive for metastasis measuring 6 mm.  (ypT0N1).  She then received postoperative radiation therapy(Dr Singh)  to the left chest wall supraclav and internal mammary lymph nodes from August 20, 2007 to September 28, 2007.    Postoperatively she continued on Herceptin for approximately 3 and 1/2 years before her lung metastasis begin to grow.    She subsequently received a number of different chemotherapy treatments  including Adriamycin, Halaven, Xeloda plus lapatinib then Xeloda plus Herceptin. (  in [facility]    Subsequently, she transferred her care to  at [facility].  -She was initially treated with Taxotere Herceptin Perjeta.    -She was then changed to Kadcyla.    In July 2020 PET scan showed progression.   She then took approximately 9 months of Herceptin and Navelbine with initial response then progression.    She started trastuzumab- deruxtecan  4/12/21.     CT 3/11/24 - stable  Stable postsurgical changes of left mastectomy and axillary lymph node dissection is patient with metastatic left breast cancer.   Redemonstration of multiple irregular bilateral pulmonary nodules concerning for metastatic disease.  No new lesions identified.   Hepatic steatosis and portal hypertension.  Increased bowel wall thickening and diffuse mesenteric edema with mild ascites, likely related to hepatic dysfunction.    Echo 3/11/24 - EF 60%    EGD 1/12/24  -Grade I esophageal varices.                          - Gastric varices, without bleeding. Minimal to no                          impact of coiling to varices                          - Normal examined duodenum.     Review of Systems   Constitutional:  Positive for appetite change (decreased) and fatigue. Negative for fever and unexpected weight change.        Feels drained   HENT:  Negative for nasal congestion, mouth sores, postnasal drip and rhinorrhea.    Eyes:  Negative for visual disturbance.   Respiratory:  Negative for cough and shortness of breath (minimal).    Cardiovascular:  Positive for leg swelling. Negative for chest pain.   Gastrointestinal:  Negative for abdominal pain, blood in stool, constipation, diarrhea and nausea.   Genitourinary:  Negative for frequency.   Musculoskeletal:  Negative for back pain.   Integumentary:  Negative for rash.   Neurological:  Negative for headaches.   Hematological:  Negative for adenopathy.    Psychiatric/Behavioral:  The patient is not nervous/anxious.         Stressed         Objective:      Physical Exam  Vitals reviewed.   Constitutional:       General: She is not in acute distress.     Appearance: She is obese.   HENT:      Mouth/Throat:      Mouth: Mucous membranes are moist.      Pharynx: Oropharynx is clear. No oropharyngeal exudate or posterior oropharyngeal erythema.   Eyes:      Pupils: Pupils are equal, round, and reactive to light.   Cardiovascular:      Rate and Rhythm: Normal rate and regular rhythm.      Heart sounds: Murmur (systolic -aortic) heard.   Pulmonary:      Effort: Pulmonary effort is normal. No respiratory distress.      Breath sounds: No stridor. No wheezing, rhonchi or rales.   Chest:   Breasts:     Right: Skin change present. No mass.       Abdominal:      Palpations: Abdomen is soft. There is no mass.      Tenderness: There is no abdominal tenderness.   Lymphadenopathy:      Cervical: No cervical adenopathy.      Upper Body:      Right upper body: No supraclavicular or axillary adenopathy.      Left upper body: No supraclavicular or axillary adenopathy.   Skin:     Findings: No rash.   Neurological:      Mental Status: She is alert and oriented to person, place, and time.   Psychiatric:         Mood and Affect: Mood normal.         Behavior: Behavior normal.         Thought Content: Thought content normal.         Judgment: Judgment normal.         Assessment:      1. Breast cancer, stage 4, left        2. Malignant neoplasm metastatic to left lung        3. Pancytopenia        4. Severe obesity (BMI 35.0-39.9) with comorbidity        5. Thrombocytopenia, unspecified               Plan:    1-2. Continue current therapy. Enhertu  - ECHO due in June  - Scan s in 6 months - last done in march   3. Stable, hemoglobin 7.2 grams/dL - will ;recheck later this week  4. Diet and exercise  5. Stable at 144,000 K      Referral to Hepatology - scheduled in September     Return to  clinic in 3 weeks with MD appointment and labs.     Patient is in agreement with the proposed treatment plan. All questions were answered to the patient's satisfaction. Patient knows to call clinic for any new or worsening symptoms and if anything is needed before the next clinic visit.          Mariam Lisa, FNP-C  Hematology & Medical Oncology   [address] [place] [address]  ph. [phone]  Fax. [phone]    Collaborating physician, Dr. Willis.    Approximately 20 minutes were spent face-to-face with the patient.  Approximately 30 minutes in total were spent on this encounter, which includes face-to-face time and non-face-to-face time preparing to see the patient (e.g., review of tests), obtaining and/or reviewing separately obtained history, documenting clinical information in the electronic or other health record, independently interpreting results (not separately reported) and communicating results to the patient/family/caregiver, or care coordination (not separately reported).       Route Chart for Scheduling    Med Onc Chart Routing      Follow up with physician 3 weeks. already scheulded   Follow up with JIMI 6 weeks. with me labs prior chemo after   Infusion scheduling note    Injection scheduling note enhertu every 3 weeks   Labs CBC and CMP   Scheduling:  Preferred lab:  Lab interval:     Imaging    Pharmacy appointment    Other referrals                  Treatment Plan Information   OP BREAST FAM-TRASTUZUMAB DERUXTECAN-NXKI Q3W   Home Willis MD   Upcoming Treatment Dates - OP BREAST FAM-TRASTUZUMAB DERUXTECAN-NXKI Q3W    4/18/2024       Chemotherapy       fam-trastuzumab deruxtecan-nxki (ENHERTU) 470 mg in dextrose 5 % (D5W) 100 mL infusion       Antiemetics       fosaprepitant 150 mg in sodium chloride 0.9% 150 mL IVPB       palonosetron (ALOXI) 0.25 mg in sodium chloride 0.9% 50 mL IVPB  5/9/2024       Chemotherapy       fam-trastuzumab deruxtecan-nxki (ENHERTU) 470 mg in  dextrose 5 % (D5W) 100 mL infusion       Antiemetics       fosaprepitant 150 mg in sodium chloride 0.9% 150 mL IVPB       palonosetron (ALOXI) 0.25 mg in sodium chloride 0.9% 50 mL IVPB  5/30/2024       Chemotherapy       fam-trastuzumab deruxtecan-nxki (ENHERTU) 470 mg in dextrose 5 % (D5W) 100 mL infusion       Antiemetics       fosaprepitant 150 mg in sodium chloride 0.9% 150 mL IVPB       palonosetron (ALOXI) 0.25 mg in sodium chloride 0.9% 50 mL IVPB  6/20/2024       Chemotherapy       fam-trastuzumab deruxtecan-nxki (ENHERTU) 470 mg in dextrose 5 % (D5W) 100 mL infusion       Antiemetics       fosaprepitant 150 mg in sodium chloride 0.9% 150 mL IVPB       palonosetron (ALOXI) 0.25 mg in sodium chloride 0.9% 50 mL IVPB    Supportive Plan Information  IV FLUIDS AND ELECTROLYTES   Miller Carrasquillo MD   Upcoming Treatment Dates - IV FLUIDS AND ELECTROLYTES    No upcoming days in selected categories.

## 2024-04-04 ENCOUNTER — OFFICE VISIT (OUTPATIENT)
Dept: PALLIATIVE MEDICINE | Facility: CLINIC | Age: 54
End: 2024-04-04
Attending: INTERNAL MEDICINE
Payer: MEDICARE

## 2024-04-04 ENCOUNTER — PATIENT MESSAGE (OUTPATIENT)
Dept: PALLIATIVE MEDICINE | Facility: CLINIC | Age: 54
End: 2024-04-04

## 2024-04-04 DIAGNOSIS — C78.02 MALIGNANT NEOPLASM METASTATIC TO LEFT LUNG: ICD-10-CM

## 2024-04-04 DIAGNOSIS — C50.912 BREAST CANCER, STAGE 4, LEFT: ICD-10-CM

## 2024-04-04 DIAGNOSIS — Z51.5 ENCOUNTER FOR PALLIATIVE CARE: Primary | ICD-10-CM

## 2024-04-04 DIAGNOSIS — F41.9 ANXIETY: ICD-10-CM

## 2024-04-04 DIAGNOSIS — R60.9 EDEMA, UNSPECIFIED TYPE: ICD-10-CM

## 2024-04-04 DIAGNOSIS — R53.83 FATIGUE, UNSPECIFIED TYPE: ICD-10-CM

## 2024-04-04 PROCEDURE — 99215 OFFICE O/P EST HI 40 MIN: CPT | Mod: 95,,, | Performed by: NURSE PRACTITIONER

## 2024-04-04 NOTE — PROGRESS NOTES
The patient location is: [place]  The chief complaint leading to consultation is: symptom mgmt/ACP    Visit type: audiovisual    Face to Face time with patient: 50 minutes  60  minutes of total time spent on the encounter, which includes face to face time and non-face to face time preparing to see the patient (eg, review of tests), Obtaining and/or reviewing separately obtained history, Documenting clinical information in the electronic or other health record, Independently interpreting results (not separately reported) and communicating results to the patient/family/caregiver, or Care coordination (not separately reported).         Each patient to whom he or she provides medical services by telemedicine is:  (1) informed of the relationship between the physician and patient and the respective role of any other health care provider with respect to management of the patient; and (2) notified that he or she may decline to receive medical services by telemedicine and may withdraw from such care at any time.    Notes:     Consult Note  Palliative Care      Consult Requested By: Dr. Home Lopez  Reason for Consult: symptom mgmt/ACP      ASSESSMENT/PLAN:     Plan/Recommendations:    04/04/2024:  - no changes made today    Metastatic breast cancer  - following with Dr. Willis & NP [facility]   - ID 2006, chemo, s/p radical mastectomy (2007), s/p post-op RT, herceptin x 3.5 years until progression in lung mass, cycled through myriad of chemo tx with progression on July 2020 PET, 9 months of continued therapy w eventual progression, stable on 6/2023 CT  - currently ENHERTU on 2 years      Encounter for palliative care  - Patient is decisional  - Patient accompanied today by self  - ACP documents are not uploaded into EMR   - Philosophy of Palliative Medicine reviewed with patient and family at first visit  - New patient folder given to and reviewed with patient and family at first visit  - Goals of care:  Patient lives alone with  her 2 dogs and 2 cats. She is fully independent with no care needs of any kind. Her daughter Samra Jaeger ([id number]) is her support system. Nearby, her mother is living with cognitive/health issues and pt anticipates having to care for her mother FT within a year. Pt also has a sister who is an  and will help her with getting her affairs in order, including HCPOA. A booklet is provided and reviewed today. We will complete at future visit. Ms. Trivedi shares that she was initially diagnosed with cancer 17 years ago. It has been a long journey for her. She found strength in setting goals along the way. Seeing her daughter graduate, , start her career - each milestone has motivated her to continue. Her daughter is now pregnant and due in December 2023, her goal is to meet her grandson. Ms. 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 Enjoys attending [employer], going to the [employer], working in the garden, reading and baking, going to [employer], farmer's markets, etc. No spiritual/rob, declines .    Cancer associated fatigue  - treatment-associated  - exacerbated by heat (she does not have AC in car, runs only 1 window unit in her house 2/2 cost)  - currently on ritalin, which helps  - she is ECOG 0  - will continue to monitor     Edema   - BLE  - follows with cardiologist  - controls with daily lasix, will hold doses on a busy day where it will be difficult to use a bathroom frequently    Understanding of illness: Excellent     Goals of care: preserve qol, independence, activity tolerance    Follow up: 12 weeks     Patient's encounter and above plan of care discussed with patient's oncology team.     SUBJECTIVE:     History of Present Illness:  Patient is a 53 y.o. year old female presenting with metastatic breast cancer. Please see oncology notes for full oncologic history and treatment course.      04/04/2024:  JOSSUE ELIZALDE reviewed: no  "concerns    Patient presents to virtual follow-up alone. She is overall stable but does have a significant increase in personal stressors mostly r/t her mother's recent diagnosis of dementia. She is now staying in her mother's home and acting as primary caretaker though does have some support with hired sitter. Pt's repeat CT on 3/11/24 was stable though did reveal portal hypertension, pt is scheduled to see a hepatologist.      01/04/2024:  JOSSUE ELIZALDE reviewed: no concerns    Patient presents to clinic follow-up alone. She is doing well from a symptom standpoint, she does report severe anxiety which is secondary to several family-members being sick in the month of December. Her first grandson was born, she spends time with him as much as possible and this brings her harley. Denies new or worsening symptoms. RTC in 3 months, pt instructed to contact clinic if interim needs arise.     10/23/2023:  JOSSUE ELIZALDE reviewed: no concerns    Patient presents to f/u clinic visit alone. She reports some increased fatigue and says she "gets a lot of mucus at night" for a few days after treatment. She is otherwise doing very well. She is busy planning her daughter's elaborate baby shower which will be held in November. She has been coordinating the party with her sister. She acknowledges how meaningful this is for her since this will be her only opportunity to throw a baby shower for her daughter, and this will be the only grandchild she will meet. Next CT 12/15.    08/24/2023:  JOSSUE ELIZALDE reviewed: no concerns    Patient presents to initial clinic visit alone, she is extremely pleasant with few complaints. She does have some lower extremity edema for which she uses lasix. She skips doses on days where she will be away from home or running errands and in less frequent contact with a bathroom. She uses ritalin for fatigue and that works well for her. She shares that her cancer journey started 17 years ago and each step of the way, she has used " various milestones to keep her motivated. She has seen her daughter graduate,  and start her career and now looks forward to meeting her first grandchild in December. We review ACP booklet today. Her sister, who is an , was planning to complete ACP w her. She will talk to her sister and we will discuss at future visit.     Past Medical History:   Diagnosis Date    Breast cancer      Past Surgical History:   Procedure Laterality Date    ENDOSCOPIC ULTRASOUND OF UPPER GASTROINTESTINAL TRACT N/A 6/27/2023    Procedure: ULTRASOUND, UPPER GI TRACT, ENDOSCOPIC;  Surgeon: Home Lopez MD;  Location: [facility] (2ND FLR);  Service: Endoscopy;  Laterality: N/A;    ENDOSCOPIC ULTRASOUND OF UPPER GASTROINTESTINAL TRACT N/A 1/12/2024    Procedure: ULTRASOUND, UPPER GI TRACT, ENDOSCOPIC;  Surgeon: Home Lopez MD;  Location: [facility] (2ND FLR);  Service: Endoscopy;  Laterality: N/A;  11/28/23-Instructions via portal-DS  1/8-lvm for precall-MS  1/10-precall complete-Kpvt    ESOPHAGOGASTRODUODENOSCOPY N/A 6/23/2023    Procedure: EGD (ESOPHAGOGASTRODUODENOSCOPY);  Surgeon: Quintin Mooney MD;  Location: [facility] (2ND FLR);  Service: Endoscopy;  Laterality: N/A;    ESOPHAGOGASTRODUODENOSCOPY N/A 6/27/2023    Procedure: EGD (ESOPHAGOGASTRODUODENOSCOPY);  Surgeon: Home Lopez MD;  Location: [facility] (2ND FLR);  Service: Endoscopy;  Laterality: N/A;    ESOPHAGOGASTRODUODENOSCOPY N/A 8/3/2023    Procedure: EGD (ESOPHAGOGASTRODUODENOSCOPY);  Surgeon: Home Lopez MD;  Location: [facility] (2ND FLR);  Service: Endoscopy;  Laterality: N/A;  instr portal-labs 6/28/23-tb    ESOPHAGOGASTRODUODENOSCOPY N/A 1/12/2024    Procedure: EGD (ESOPHAGOGASTRODUODENOSCOPY);  Surgeon: Home Lopez MD;  Location: [facility] (2ND FLR);  Service: Endoscopy;  Laterality: N/A;    MASTECTOMY       Family History   Problem Relation Age of Onset    Lung cancer Father      Review of patient's allergies indicates:  No Known  Allergies    Medications:    Current Outpatient Medications:     acetaminophen (TYLENOL) 500 MG tablet, Take 1,000 mg by mouth., Disp: , Rfl:     ferrous sulfate (IRON, FERROUS SULFATE,) 325 mg (65 mg iron) Tab tablet, Take daily with Vitamin C on an empty stomach, Disp: 60 tablet, Rfl: 3    furosemide (LASIX) 20 MG tablet, Take 1 tablet (20 mg total) by mouth once daily., Disp: 90 tablet, Rfl: 3    hydrocodone-homatropine 5-1.5 mg/5 ml (HYCODAN) 5-1.5 mg/5 mL Syrp, Take 5 mLs by mouth 4 (four) times daily as needed (cough)., Disp: 120 mL, Rfl: 0    Lactobac no.41/Bifidobact no.7 (PROBIOTIC-10 ORAL), Take by mouth., Disp: , Rfl:     LIDOcaine-prilocaine (EMLA) cream, APPLY TO THE AFFECTED AREA 1 HOUR BEFORE PORT ACCESS, Disp: , Rfl:     methylphenidate HCl (RITALIN) 5 MG tablet, Take 1 tablet (5 mg total) by mouth 2 (two) times daily., Disp: 60 tablet, Rfl: 0    OLANZapine (ZYPREXA) 5 MG tablet, Take days 1-4 of chemotherapy, Disp: 30 tablet, Rfl: 2    ondansetron (ZOFRAN) 8 MG tablet, Take 8 mg by mouth 3 (three) times daily as needed., Disp: , Rfl:     pantoprazole (PROTONIX) 40 MG tablet, Take 1 tablet (40 mg total) by mouth 2 (two) times daily., Disp: 180 tablet, Rfl: 0    potassium chloride 10% (KAYCIEL) 20 mEq/15 mL oral solution, Take 15 mLs (20 mEq total) by mouth once daily. (To prevent stomach upset, mix dose with a glass of cold water or juice), Disp: 600 mL, Rfl: 0    OBJECTIVE:       ROS:  Review of Systems   Constitutional:  Positive for fatigue. Negative for activity change and appetite change.   HENT:  Negative for congestion, dental problem and drooling.    Eyes:  Negative for pain, discharge and itching.   Respiratory:  Negative for cough, choking and wheezing.    Cardiovascular:  Positive for leg swelling.   Gastrointestinal:  Negative for constipation, diarrhea, nausea and vomiting.   Genitourinary:  Negative for difficulty urinating, dyspareunia and dysuria.   Musculoskeletal:  Negative for  arthralgias, back pain and gait problem.   Skin:  Negative for pallor, rash and wound.   Neurological:  Negative for dizziness, facial asymmetry and headaches.       Review of Symptoms      Symptom Assessment (ESAS 0-10 Scale)  Pain:  0  Dyspnea:  0  Anxiety:  8  Nausea:  0  Depression:  0  Anorexia:  0  Fatigue:  6  Insomnia:  0  Restlessness:  0  Agitation:  0     CAM / Delirium:  Negative  Constipation:  Negative  Diarrhea:  Negative    Constipation:  No constipation    Bowel Management Plan (BMP):  No      Pain Assessment:  OME in 24 hours:  0  Location(s): none      Modified Hunter Scale:  0    ECOG Performance Status stGstrstastdstest:st st1st Psychosocial/Cultural:   See Palliative Psychosocial Note: No  **Primary  to Follow**  Palliative Care  Consult: No     Time-Based Charting:  No      Advance Care Planning   Advance Directives:   Living Will: No        Oral Declaration: No    [place]: No    Do Not Resuscitate Status: No        Oral Declaration: No      Decision Making:  Patient answered questions  Goals of Care: What is most important right now is to focus on remaining as independent as possible, symptom/pain control, curative/life-prolongation (regardless of treatment burdens), comfort and QOL . Accordingly, we have decided that the best plan to meet the patient's goals includes continuing with treatment.        Physical Exam:  Vitals:    There were no vitals filed for this visit.  Telehealth visit       Physical Exam  Constitutional:       General: She is not in acute distress.     Appearance: Normal appearance. She is not ill-appearing, toxic-appearing or diaphoretic.   HENT:      Head: Atraumatic.      Right Ear: External ear normal.      Left Ear: External ear normal.      Nose: Nose normal.      Mouth/Throat:      Dentition: Abnormal dentition.   Eyes:      General:         Right eye: No discharge.         Left eye: No discharge.      Extraocular Movements: Extraocular movements intact.       Conjunctiva/sclera: Conjunctivae normal.   Pulmonary:      Effort: Pulmonary effort is normal. No respiratory distress.      Breath sounds: No stridor.   Musculoskeletal:         General: Normal range of motion.      Cervical back: Normal range of motion.   Skin:     General: Skin is warm and dry.      Coloration: Skin is not jaundiced.   Neurological:      Mental Status: She is alert and oriented to person, place, and time. Mental status is at baseline.      Motor: No weakness.   Psychiatric:         Behavior: Behavior normal.         Thought Content: Thought content normal.         Judgment: Judgment normal.         Labs:  CBC:   WBC   Date Value Ref Range Status   03/18/2024 2.62 (L) 3.90 - 12.70 K/uL Final     Hemoglobin   Date Value Ref Range Status   03/18/2024 9.3 (L) 12.0 - 16.0 g/dL Final     Hematocrit   Date Value Ref Range Status   03/18/2024 29.5 (L) 37.0 - 48.5 % Final     MCV   Date Value Ref Range Status   03/18/2024 112 (H) 82 - 98 fL Final     Platelets   Date Value Ref Range Status   03/18/2024 121 (L) 150 - 450 K/uL Final       LFT:   Lab Results   Component Value Date    AST 49 (H) 03/18/2024    ALKPHOS 125 03/18/2024    BILITOT 2.8 (H) 03/18/2024       Albumin:   Albumin   Date Value Ref Range Status   03/18/2024 2.9 (L) 3.5 - 5.2 g/dL Final     Protein:   Total Protein   Date Value Ref Range Status   03/18/2024 5.7 (L) 6.0 - 8.4 g/dL Final       Radiology:I have reviewed all pertinent imaging results/findings within the past 24 hours.    03/11/2024 CT CAP: Impression:     Stable postsurgical changes of left mastectomy and axillary lymph node dissection is patient with metastatic left breast cancer.     Redemonstration of multiple irregular bilateral pulmonary nodules concerning for metastatic disease.  No new lesions identified.     Hepatic steatosis and portal hypertension.  Increased bowel wall thickening and diffuse mesenteric edema with mild ascites, likely related to hepatic  dysfunction.    12/15/2023 CT CAP: Impression:     Patient with breast cancer status post left mastectomy and axillary dissection.     Unchanged right axillary and left para-aortic lymphadenopathy.     Multiple pulmonary nodules.  Many are unchanged, however there is a slightly more conspicuous nodule in the right lower lobe.  Attention on follow-up.     No abdominopelvic metastases.     Hepatic steatosis with signs of portal hypertension including splenomegaly, portosystemic collaterals, and ascites.       06/30/2023 CT chest: Impression:     Stable multiple bilateral lung spiculated nodule compared to April 2023 likely metastatic disease.     Trace bilateral pleural effusion.    06/24/2023 CT AP: Impression:     No focal hepatic lesion.     Sequela of portal hypertension including splenomegaly and gastroesophageal varices.     Mild wall thickening and fatty infiltration at the proximal colon with mild pericolonic stranding.  Findings potentially relating to additional sequela of portosystemic change with portal colopathy.  A nonspecific colitis cannot be entirely excluded.     Similar left infrahilar spiculated opacity with lower lung pleural thickening.  Continued dedicated surveillance as scheduled.     Prior left mastectomy.  Superficial skin thickening of the visualized right breast.  To correlate with mammographic history.     Trace intrapelvic free fluid and lower body wall edema.     Cholelithiasis, stable hypodense focus at the pancreatic head, and additional findings as above.      Signature: Lizbeth Mehta, DNP

## 2024-04-08 ENCOUNTER — INFUSION (OUTPATIENT)
Dept: INFUSION THERAPY | Facility: HOSPITAL | Age: 54
End: 2024-04-08
Attending: INTERNAL MEDICINE
Payer: MEDICARE

## 2024-04-08 ENCOUNTER — PATIENT MESSAGE (OUTPATIENT)
Dept: HEMATOLOGY/ONCOLOGY | Facility: CLINIC | Age: 54
End: 2024-04-08

## 2024-04-08 ENCOUNTER — TELEPHONE (OUTPATIENT)
Dept: ENDOSCOPY | Facility: HOSPITAL | Age: 54
End: 2024-04-08
Payer: MEDICARE

## 2024-04-08 ENCOUNTER — OFFICE VISIT (OUTPATIENT)
Dept: HEMATOLOGY/ONCOLOGY | Facility: CLINIC | Age: 54
End: 2024-04-08
Payer: MEDICARE

## 2024-04-08 ENCOUNTER — LAB VISIT (OUTPATIENT)
Dept: LAB | Facility: HOSPITAL | Age: 54
End: 2024-04-08
Attending: INTERNAL MEDICINE
Payer: MEDICARE

## 2024-04-08 VITALS
HEART RATE: 113 BPM | RESPIRATION RATE: 18 BRPM | OXYGEN SATURATION: 95 % | WEIGHT: 185.88 LBS | TEMPERATURE: 98 F | HEIGHT: 62 IN | WEIGHT: 185.88 LBS | DIASTOLIC BLOOD PRESSURE: 61 MMHG | HEART RATE: 113 BPM | RESPIRATION RATE: 18 BRPM | DIASTOLIC BLOOD PRESSURE: 63 MMHG | SYSTOLIC BLOOD PRESSURE: 131 MMHG | SYSTOLIC BLOOD PRESSURE: 134 MMHG | BODY MASS INDEX: 34.2 KG/M2 | TEMPERATURE: 98 F | BODY MASS INDEX: 34.2 KG/M2 | HEIGHT: 62 IN

## 2024-04-08 DIAGNOSIS — E66.01 SEVERE OBESITY (BMI 35.0-39.9) WITH COMORBIDITY: ICD-10-CM

## 2024-04-08 DIAGNOSIS — C50.912 BREAST CANCER, STAGE 4, LEFT: Primary | ICD-10-CM

## 2024-04-08 DIAGNOSIS — E87.6 HYPOKALEMIA: ICD-10-CM

## 2024-04-08 DIAGNOSIS — C50.912 BREAST CANCER, STAGE 4, LEFT: ICD-10-CM

## 2024-04-08 DIAGNOSIS — D69.6 THROMBOCYTOPENIA, UNSPECIFIED: ICD-10-CM

## 2024-04-08 DIAGNOSIS — D61.818 PANCYTOPENIA: ICD-10-CM

## 2024-04-08 DIAGNOSIS — C78.02 MALIGNANT NEOPLASM METASTATIC TO LEFT LUNG: ICD-10-CM

## 2024-04-08 LAB
ALBUMIN SERPL BCP-MCNC: 2.5 G/DL (ref 3.5–5.2)
ALP SERPL-CCNC: 97 U/L (ref 55–135)
ALT SERPL W/O P-5'-P-CCNC: 14 U/L (ref 10–44)
ANION GAP SERPL CALC-SCNC: 4 MMOL/L (ref 8–16)
AST SERPL-CCNC: 34 U/L (ref 10–40)
BASOPHILS # BLD AUTO: 0.03 K/UL (ref 0–0.2)
BASOPHILS NFR BLD: 0.9 % (ref 0–1.9)
BILIRUB SERPL-MCNC: 2.7 MG/DL (ref 0.1–1)
BUN SERPL-MCNC: 13 MG/DL (ref 6–20)
CALCIUM SERPL-MCNC: 8 MG/DL (ref 8.7–10.5)
CHLORIDE SERPL-SCNC: 108 MMOL/L (ref 95–110)
CO2 SERPL-SCNC: 29 MMOL/L (ref 23–29)
CREAT SERPL-MCNC: 0.6 MG/DL (ref 0.5–1.4)
DIFFERENTIAL METHOD BLD: ABNORMAL
EOSINOPHIL # BLD AUTO: 0.1 K/UL (ref 0–0.5)
EOSINOPHIL NFR BLD: 2.7 % (ref 0–8)
ERYTHROCYTE [DISTWIDTH] IN BLOOD BY AUTOMATED COUNT: 22.6 % (ref 11.5–14.5)
EST. GFR  (NO RACE VARIABLE): >60 ML/MIN/1.73 M^2
GLUCOSE SERPL-MCNC: 108 MG/DL (ref 70–110)
HCT VFR BLD AUTO: 23.3 % (ref 37–48.5)
HGB BLD-MCNC: 7.2 G/DL (ref 12–16)
IMM GRANULOCYTES # BLD AUTO: 0.01 K/UL (ref 0–0.04)
IMM GRANULOCYTES NFR BLD AUTO: 0.3 % (ref 0–0.5)
LYMPHOCYTES # BLD AUTO: 0.6 K/UL (ref 1–4.8)
LYMPHOCYTES NFR BLD: 16.9 % (ref 18–48)
MCH RBC QN AUTO: 35.5 PG (ref 27–31)
MCHC RBC AUTO-ENTMCNC: 30.9 G/DL (ref 32–36)
MCV RBC AUTO: 115 FL (ref 82–98)
MONOCYTES # BLD AUTO: 0.3 K/UL (ref 0.3–1)
MONOCYTES NFR BLD: 9.1 % (ref 4–15)
NEUTROPHILS # BLD AUTO: 2.3 K/UL (ref 1.8–7.7)
NEUTROPHILS NFR BLD: 70.1 % (ref 38–73)
NRBC BLD-RTO: 0 /100 WBC
PLATELET # BLD AUTO: 144 K/UL (ref 150–450)
PMV BLD AUTO: 12.4 FL (ref 9.2–12.9)
POTASSIUM SERPL-SCNC: 3.6 MMOL/L (ref 3.5–5.1)
PROT SERPL-MCNC: 5.2 G/DL (ref 6–8.4)
RBC # BLD AUTO: 2.03 M/UL (ref 4–5.4)
SODIUM SERPL-SCNC: 141 MMOL/L (ref 136–145)
WBC # BLD AUTO: 3.31 K/UL (ref 3.9–12.7)

## 2024-04-08 PROCEDURE — 99999 PR PBB SHADOW E&M-EST. PATIENT-LVL V: CPT | Mod: PBBFAC,,, | Performed by: NURSE PRACTITIONER

## 2024-04-08 PROCEDURE — A4216 STERILE WATER/SALINE, 10 ML: HCPCS | Performed by: NURSE PRACTITIONER

## 2024-04-08 PROCEDURE — 99215 OFFICE O/P EST HI 40 MIN: CPT | Mod: PBBFAC,25 | Performed by: NURSE PRACTITIONER

## 2024-04-08 PROCEDURE — 63600175 PHARM REV CODE 636 W HCPCS: Performed by: NURSE PRACTITIONER

## 2024-04-08 PROCEDURE — 80053 COMPREHEN METABOLIC PANEL: CPT | Performed by: INTERNAL MEDICINE

## 2024-04-08 PROCEDURE — 96367 TX/PROPH/DG ADDL SEQ IV INF: CPT

## 2024-04-08 PROCEDURE — G2211 COMPLEX E/M VISIT ADD ON: HCPCS | Mod: S$PBB,,, | Performed by: NURSE PRACTITIONER

## 2024-04-08 PROCEDURE — 85025 COMPLETE CBC W/AUTO DIFF WBC: CPT | Performed by: INTERNAL MEDICINE

## 2024-04-08 PROCEDURE — 36415 COLL VENOUS BLD VENIPUNCTURE: CPT | Performed by: INTERNAL MEDICINE

## 2024-04-08 PROCEDURE — 96413 CHEMO IV INFUSION 1 HR: CPT

## 2024-04-08 PROCEDURE — 99215 OFFICE O/P EST HI 40 MIN: CPT | Mod: S$PBB,,, | Performed by: NURSE PRACTITIONER

## 2024-04-08 PROCEDURE — 25000003 PHARM REV CODE 250: Performed by: NURSE PRACTITIONER

## 2024-04-08 RX ORDER — SODIUM CHLORIDE 9 MG/ML
INJECTION, SOLUTION INTRAVENOUS ONCE
Status: CANCELLED
Start: 2024-04-18 | End: 2024-04-18

## 2024-04-08 RX ORDER — DEXTROSE MONOHYDRATE 50 MG/ML
INJECTION, SOLUTION INTRAVENOUS ONCE
Status: COMPLETED | OUTPATIENT
Start: 2024-04-08 | End: 2024-04-08

## 2024-04-08 RX ORDER — SODIUM CHLORIDE 0.9 % (FLUSH) 0.9 %
10 SYRINGE (ML) INJECTION
Status: DISCONTINUED | OUTPATIENT
Start: 2024-04-08 | End: 2024-04-08 | Stop reason: HOSPADM

## 2024-04-08 RX ORDER — HEPARIN 100 UNIT/ML
500 SYRINGE INTRAVENOUS
Status: DISCONTINUED | OUTPATIENT
Start: 2024-04-08 | End: 2024-04-08 | Stop reason: HOSPADM

## 2024-04-08 RX ORDER — HEPARIN 100 UNIT/ML
500 SYRINGE INTRAVENOUS
Status: CANCELLED | OUTPATIENT
Start: 2024-04-18

## 2024-04-08 RX ORDER — SODIUM CHLORIDE 0.9 % (FLUSH) 0.9 %
10 SYRINGE (ML) INJECTION
Status: CANCELLED | OUTPATIENT
Start: 2024-04-18

## 2024-04-08 RX ORDER — DEXTROSE MONOHYDRATE 50 MG/ML
INJECTION, SOLUTION INTRAVENOUS ONCE
Status: CANCELLED | OUTPATIENT
Start: 2024-04-18 | End: 2024-04-18

## 2024-04-08 RX ADMIN — FAM-TRASTUZUMAB DERUXTECAN-NXKI 470 MG: 100 INJECTION, POWDER, LYOPHILIZED, FOR SOLUTION INTRAVENOUS at 12:04

## 2024-04-08 RX ADMIN — PALONOSETRON 0.25 MG: 0.25 INJECTION, SOLUTION INTRAVENOUS at 11:04

## 2024-04-08 RX ADMIN — DEXTROSE MONOHYDRATE: 50 INJECTION, SOLUTION INTRAVENOUS at 10:04

## 2024-04-08 RX ADMIN — HEPARIN 500 UNITS: 100 SYRINGE at 12:04

## 2024-04-08 RX ADMIN — FOSAPREPITANT DIMEGLUMINE 150 MG: 150 INJECTION, POWDER, LYOPHILIZED, FOR SOLUTION INTRAVENOUS at 11:04

## 2024-04-08 RX ADMIN — Medication 10 ML: at 12:04

## 2024-04-08 NOTE — TELEPHONE ENCOUNTER
Dr. Lopez,    Patient had labs drawn today and her hgb was 7.2. She is having it checked again this Thursday. Patient had a hx of bleeding gastric varices. Patient is being followed by Hematology/Oncology.     Thanks,  Pinky Garnicaot  Endoscopy Scheduling

## 2024-04-08 NOTE — TELEPHONE ENCOUNTER
Dr. Lopez,    Patient calling stating she is having returning symptoms of black and sticky bowel movements. She states that she has not thrown up anything at this time. She had an EUS on 1/12/24 and your recommendation was to repeat the upper endoscopic ultrasound PRN for surveillance. Please advise if patient needs repeat EUS and place order.    Thanks,  Pinky Prevot  Endoscopy Scheduling

## 2024-04-09 ENCOUNTER — HOSPITAL ENCOUNTER (INPATIENT)
Facility: HOSPITAL | Age: 54
LOS: 7 days | Discharge: HOME | DRG: 271 | End: 2024-04-16
Attending: EMERGENCY MEDICINE | Admitting: INTERNAL MEDICINE
Payer: MEDICARE

## 2024-04-09 DIAGNOSIS — I86.4 ESOPHAGEAL AND GASTRIC VARICES: ICD-10-CM

## 2024-04-09 DIAGNOSIS — I85.00 ESOPHAGEAL AND GASTRIC VARICES: ICD-10-CM

## 2024-04-09 DIAGNOSIS — R07.9 CHEST PAIN: ICD-10-CM

## 2024-04-09 DIAGNOSIS — D61.818 PANCYTOPENIA: ICD-10-CM

## 2024-04-09 DIAGNOSIS — D64.9 SYMPTOMATIC ANEMIA: ICD-10-CM

## 2024-04-09 DIAGNOSIS — E66.01 SEVERE OBESITY (BMI 35.0-39.9) WITH COMORBIDITY: ICD-10-CM

## 2024-04-09 DIAGNOSIS — K92.2 ACUTE GASTROINTESTINAL BLEEDING: Primary | ICD-10-CM

## 2024-04-09 PROBLEM — K92.1 GASTROINTESTINAL HEMORRHAGE WITH MELENA: Status: ACTIVE | Noted: 2023-06-22

## 2024-04-09 LAB
ABO + RH BLD: NORMAL
ALBUMIN SERPL BCP-MCNC: 2.4 G/DL (ref 3.5–5.2)
ALP SERPL-CCNC: 87 U/L (ref 55–135)
ALT SERPL W/O P-5'-P-CCNC: 14 U/L (ref 10–44)
ANION GAP SERPL CALC-SCNC: 6 MMOL/L (ref 8–16)
ANISOCYTOSIS BLD QL SMEAR: ABNORMAL
AST SERPL-CCNC: 33 U/L (ref 10–40)
BASOPHILS # BLD AUTO: 0.01 K/UL (ref 0–0.2)
BASOPHILS # BLD AUTO: 0.01 K/UL (ref 0–0.2)
BASOPHILS NFR BLD: 0.3 % (ref 0–1.9)
BASOPHILS NFR BLD: 0.3 % (ref 0–1.9)
BILIRUB SERPL-MCNC: 4 MG/DL (ref 0.1–1)
BILIRUB UR QL STRIP: NEGATIVE
BLD GP AB SCN CELLS X3 SERPL QL: NORMAL
BLD PROD TYP BPU: NORMAL
BLD PROD TYP BPU: NORMAL
BLOOD UNIT EXPIRATION DATE: NORMAL
BLOOD UNIT EXPIRATION DATE: NORMAL
BLOOD UNIT TYPE CODE: 5100
BLOOD UNIT TYPE CODE: 5100
BLOOD UNIT TYPE: NORMAL
BLOOD UNIT TYPE: NORMAL
BUN SERPL-MCNC: 17 MG/DL (ref 6–20)
CALCIUM SERPL-MCNC: 7.8 MG/DL (ref 8.7–10.5)
CHLORIDE SERPL-SCNC: 105 MMOL/L (ref 95–110)
CLARITY UR REFRACT.AUTO: CLEAR
CO2 SERPL-SCNC: 27 MMOL/L (ref 23–29)
CODING SYSTEM: NORMAL
CODING SYSTEM: NORMAL
COLOR UR AUTO: YELLOW
CREAT SERPL-MCNC: 0.5 MG/DL (ref 0.5–1.4)
CROSSMATCH INTERPRETATION: NORMAL
CROSSMATCH INTERPRETATION: NORMAL
CTP QC/QA: YES
DACRYOCYTES BLD QL SMEAR: ABNORMAL
DIFFERENTIAL METHOD BLD: ABNORMAL
DIFFERENTIAL METHOD BLD: ABNORMAL
DISPENSE STATUS: NORMAL
DISPENSE STATUS: NORMAL
EOSINOPHIL # BLD AUTO: 0 K/UL (ref 0–0.5)
EOSINOPHIL # BLD AUTO: 0 K/UL (ref 0–0.5)
EOSINOPHIL NFR BLD: 0.3 % (ref 0–8)
EOSINOPHIL NFR BLD: 0.7 % (ref 0–8)
ERYTHROCYTE [DISTWIDTH] IN BLOOD BY AUTOMATED COUNT: 23.1 % (ref 11.5–14.5)
ERYTHROCYTE [DISTWIDTH] IN BLOOD BY AUTOMATED COUNT: 23.4 % (ref 11.5–14.5)
EST. GFR  (NO RACE VARIABLE): >60 ML/MIN/1.73 M^2
GLUCOSE SERPL-MCNC: 117 MG/DL (ref 70–110)
GLUCOSE UR QL STRIP: NEGATIVE
HCT VFR BLD AUTO: 17.9 % (ref 37–48.5)
HCT VFR BLD AUTO: 21.5 % (ref 37–48.5)
HGB BLD-MCNC: 5.6 G/DL (ref 12–16)
HGB BLD-MCNC: 6.9 G/DL (ref 12–16)
HGB UR QL STRIP: NEGATIVE
IMM GRANULOCYTES # BLD AUTO: 0.01 K/UL (ref 0–0.04)
IMM GRANULOCYTES # BLD AUTO: 0.01 K/UL (ref 0–0.04)
IMM GRANULOCYTES NFR BLD AUTO: 0.3 % (ref 0–0.5)
IMM GRANULOCYTES NFR BLD AUTO: 0.3 % (ref 0–0.5)
KETONES UR QL STRIP: NEGATIVE
LEUKOCYTE ESTERASE UR QL STRIP: NEGATIVE
LIPASE SERPL-CCNC: 12 U/L (ref 4–60)
LYMPHOCYTES # BLD AUTO: 0.3 K/UL (ref 1–4.8)
LYMPHOCYTES # BLD AUTO: 0.4 K/UL (ref 1–4.8)
LYMPHOCYTES NFR BLD: 14.4 % (ref 18–48)
LYMPHOCYTES NFR BLD: 9.9 % (ref 18–48)
MCH RBC QN AUTO: 35.2 PG (ref 27–31)
MCH RBC QN AUTO: 35.9 PG (ref 27–31)
MCHC RBC AUTO-ENTMCNC: 31.3 G/DL (ref 32–36)
MCHC RBC AUTO-ENTMCNC: 32.1 G/DL (ref 32–36)
MCV RBC AUTO: 110 FL (ref 82–98)
MCV RBC AUTO: 115 FL (ref 82–98)
MONOCYTES # BLD AUTO: 0.3 K/UL (ref 0.3–1)
MONOCYTES # BLD AUTO: 0.4 K/UL (ref 0.3–1)
MONOCYTES NFR BLD: 11.4 % (ref 4–15)
MONOCYTES NFR BLD: 9.9 % (ref 4–15)
NEUTROPHILS # BLD AUTO: 2.2 K/UL (ref 1.8–7.7)
NEUTROPHILS # BLD AUTO: 2.3 K/UL (ref 1.8–7.7)
NEUTROPHILS NFR BLD: 72.9 % (ref 38–73)
NEUTROPHILS NFR BLD: 79.3 % (ref 38–73)
NITRITE UR QL STRIP: NEGATIVE
NRBC BLD-RTO: 0 /100 WBC
NRBC BLD-RTO: 0 /100 WBC
NUM UNITS TRANS PACKED RBC: NORMAL
NUM UNITS TRANS PACKED RBC: NORMAL
OHS QRS DURATION: 94 MS
OHS QRS DURATION: 94 MS
OHS QTC CALCULATION: 431 MS
OHS QTC CALCULATION: 444 MS
OVALOCYTES BLD QL SMEAR: ABNORMAL
PH UR STRIP: 6 [PH] (ref 5–8)
PLATELET # BLD AUTO: 115 K/UL (ref 150–450)
PLATELET # BLD AUTO: 99 K/UL (ref 150–450)
PLATELET BLD QL SMEAR: ABNORMAL
PMV BLD AUTO: 12.7 FL (ref 9.2–12.9)
PMV BLD AUTO: 12.7 FL (ref 9.2–12.9)
POIKILOCYTOSIS BLD QL SMEAR: SLIGHT
POTASSIUM SERPL-SCNC: 3.8 MMOL/L (ref 3.5–5.1)
PROT SERPL-MCNC: 5 G/DL (ref 6–8.4)
PROT UR QL STRIP: NEGATIVE
RBC # BLD AUTO: 1.56 M/UL (ref 4–5.4)
RBC # BLD AUTO: 1.96 M/UL (ref 4–5.4)
SARS-COV-2 RDRP RESP QL NAA+PROBE: NEGATIVE
SCHISTOCYTES BLD QL SMEAR: ABNORMAL
SODIUM SERPL-SCNC: 138 MMOL/L (ref 136–145)
SP GR UR STRIP: 1.02 (ref 1–1.03)
SPECIMEN OUTDATE: NORMAL
URN SPEC COLLECT METH UR: NORMAL
WBC # BLD AUTO: 2.93 K/UL (ref 3.9–12.7)
WBC # BLD AUTO: 3.06 K/UL (ref 3.9–12.7)

## 2024-04-09 PROCEDURE — 99223 1ST HOSP IP/OBS HIGH 75: CPT | Mod: GC,,, | Performed by: INTERNAL MEDICINE

## 2024-04-09 PROCEDURE — 63600175 PHARM REV CODE 636 W HCPCS: Mod: JA | Performed by: STUDENT IN AN ORGANIZED HEALTH CARE EDUCATION/TRAINING PROGRAM

## 2024-04-09 PROCEDURE — 85025 COMPLETE CBC W/AUTO DIFF WBC: CPT | Performed by: EMERGENCY MEDICINE

## 2024-04-09 PROCEDURE — 83690 ASSAY OF LIPASE: CPT | Performed by: EMERGENCY MEDICINE

## 2024-04-09 PROCEDURE — C9113 INJ PANTOPRAZOLE SODIUM, VIA: HCPCS | Performed by: STUDENT IN AN ORGANIZED HEALTH CARE EDUCATION/TRAINING PROGRAM

## 2024-04-09 PROCEDURE — 86850 RBC ANTIBODY SCREEN: CPT | Performed by: EMERGENCY MEDICINE

## 2024-04-09 PROCEDURE — 25000003 PHARM REV CODE 250: Performed by: STUDENT IN AN ORGANIZED HEALTH CARE EDUCATION/TRAINING PROGRAM

## 2024-04-09 PROCEDURE — 86920 COMPATIBILITY TEST SPIN: CPT | Performed by: EMERGENCY MEDICINE

## 2024-04-09 PROCEDURE — 87635 SARS-COV-2 COVID-19 AMP PRB: CPT | Performed by: INTERNAL MEDICINE

## 2024-04-09 PROCEDURE — P9016 RBC LEUKOCYTES REDUCED: HCPCS | Performed by: EMERGENCY MEDICINE

## 2024-04-09 PROCEDURE — 36430 TRANSFUSION BLD/BLD COMPNT: CPT

## 2024-04-09 PROCEDURE — 86901 BLOOD TYPING SEROLOGIC RH(D): CPT | Performed by: EMERGENCY MEDICINE

## 2024-04-09 PROCEDURE — 85025 COMPLETE CBC W/AUTO DIFF WBC: CPT | Mod: 91 | Performed by: STUDENT IN AN ORGANIZED HEALTH CARE EDUCATION/TRAINING PROGRAM

## 2024-04-09 PROCEDURE — 81003 URINALYSIS AUTO W/O SCOPE: CPT | Performed by: EMERGENCY MEDICINE

## 2024-04-09 PROCEDURE — 99285 EMERGENCY DEPT VISIT HI MDM: CPT | Mod: 25

## 2024-04-09 PROCEDURE — 80053 COMPREHEN METABOLIC PANEL: CPT | Performed by: EMERGENCY MEDICINE

## 2024-04-09 PROCEDURE — 20600001 HC STEP DOWN PRIVATE ROOM

## 2024-04-09 PROCEDURE — 30233N0 TRANSFUSION OF AUTOLOGOUS RED BLOOD CELLS INTO PERIPHERAL VEIN, PERCUTANEOUS APPROACH: ICD-10-PCS | Performed by: INTERNAL MEDICINE

## 2024-04-09 RX ORDER — METHYLPHENIDATE HYDROCHLORIDE 5 MG/1
5 TABLET ORAL 2 TIMES DAILY
Status: CANCELLED | OUTPATIENT
Start: 2024-04-09

## 2024-04-09 RX ORDER — GLUCAGON 1 MG
1 KIT INJECTION
Status: DISCONTINUED | OUTPATIENT
Start: 2024-04-09 | End: 2024-04-16 | Stop reason: HOSPADM

## 2024-04-09 RX ORDER — HYDROCODONE BITARTRATE AND ACETAMINOPHEN 500; 5 MG/1; MG/1
TABLET ORAL
Status: DISCONTINUED | OUTPATIENT
Start: 2024-04-09 | End: 2024-04-16 | Stop reason: HOSPADM

## 2024-04-09 RX ORDER — IBUPROFEN 200 MG
16 TABLET ORAL
Status: DISCONTINUED | OUTPATIENT
Start: 2024-04-09 | End: 2024-04-16 | Stop reason: HOSPADM

## 2024-04-09 RX ORDER — PANTOPRAZOLE SODIUM 40 MG/10ML
40 INJECTION, POWDER, LYOPHILIZED, FOR SOLUTION INTRAVENOUS 2 TIMES DAILY
Status: DISCONTINUED | OUTPATIENT
Start: 2024-04-09 | End: 2024-04-14

## 2024-04-09 RX ORDER — IBUPROFEN 200 MG
24 TABLET ORAL
Status: DISCONTINUED | OUTPATIENT
Start: 2024-04-09 | End: 2024-04-16 | Stop reason: HOSPADM

## 2024-04-09 RX ORDER — PANTOPRAZOLE SODIUM 40 MG/10ML
80 INJECTION, POWDER, LYOPHILIZED, FOR SOLUTION INTRAVENOUS ONCE
Status: COMPLETED | OUTPATIENT
Start: 2024-04-09 | End: 2024-04-09

## 2024-04-09 RX ORDER — SODIUM CHLORIDE 0.9 % (FLUSH) 0.9 %
10 SYRINGE (ML) INJECTION EVERY 12 HOURS PRN
Status: DISCONTINUED | OUTPATIENT
Start: 2024-04-09 | End: 2024-04-16 | Stop reason: HOSPADM

## 2024-04-09 RX ORDER — OCTREOTIDE ACETATE 50 UG/ML
50 INJECTION, SOLUTION INTRAVENOUS; SUBCUTANEOUS ONCE
Status: COMPLETED | OUTPATIENT
Start: 2024-04-09 | End: 2024-04-09

## 2024-04-09 RX ORDER — PROCHLORPERAZINE EDISYLATE 5 MG/ML
5 INJECTION INTRAMUSCULAR; INTRAVENOUS EVERY 6 HOURS PRN
Status: DISCONTINUED | OUTPATIENT
Start: 2024-04-09 | End: 2024-04-16 | Stop reason: HOSPADM

## 2024-04-09 RX ORDER — NALOXONE HCL 0.4 MG/ML
0.02 VIAL (ML) INJECTION
Status: DISCONTINUED | OUTPATIENT
Start: 2024-04-09 | End: 2024-04-16 | Stop reason: HOSPADM

## 2024-04-09 RX ORDER — ONDANSETRON HYDROCHLORIDE 2 MG/ML
4 INJECTION, SOLUTION INTRAVENOUS EVERY 8 HOURS PRN
Status: DISCONTINUED | OUTPATIENT
Start: 2024-04-09 | End: 2024-04-16 | Stop reason: HOSPADM

## 2024-04-09 RX ADMIN — PANTOPRAZOLE SODIUM 40 MG: 40 INJECTION, POWDER, FOR SOLUTION INTRAVENOUS at 09:04

## 2024-04-09 RX ADMIN — CEFTRIAXONE 1 G: 1 INJECTION, POWDER, FOR SOLUTION INTRAMUSCULAR; INTRAVENOUS at 05:04

## 2024-04-09 RX ADMIN — OCTREOTIDE ACETATE 50 MCG: 50 INJECTION, SOLUTION INTRAVENOUS; SUBCUTANEOUS at 05:04

## 2024-04-09 RX ADMIN — OCTREOTIDE ACETATE 50 MCG/HR: 500 INJECTION, SOLUTION INTRAVENOUS; SUBCUTANEOUS at 03:04

## 2024-04-09 RX ADMIN — PANTOPRAZOLE SODIUM 80 MG: 40 INJECTION, POWDER, FOR SOLUTION INTRAVENOUS at 03:04

## 2024-04-09 NOTE — H&P
[facility] - Emergency Dept  Hematology/Oncology  H&P    Patient Name: Shikha López  MRN: [id number]  Admission Date: 4/9/2024  Code Status: Full Code   Attending Provider: Noah Terry MD  Primary Care Physician: No, Primary Doctor  Principal Problem:<principal problem not specified>    Subjective:     HPI: 53F with PMH stage 4 breast ca left (mets HER 2 +) Dr. 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 Yesterday, she began having hematochezia with BRBPR, lethargy and exertional SOB. Denies fevers, chills, nausea, vomiting, hematemesis, cp, dysuria, peripheral edema.     AF, tachycardic, BP stable, on RA, Hg 5.6, Plt 115, Tbil 4, U/A wnl, given IV PPI, pending 2 units of pRBCs, octreotide    Admitted to oncology service for GI bleeding on active chemotherapy     Oncology Treatment Plan:   OP BREAST FAM-TRASTUZUMAB DERUXTECAN-NXKI Q3W    Medications:  Continuous Infusions:   octreotide (SANDOSTATIN) 500 mcg in sodium chloride 0.9% 100 mL infusion 50 mcg/hr (04/09/24 1518)     Scheduled Meds:   cefTRIAXone (Rocephin) IV (PEDS and ADULTS)  1 g Intravenous Q24H    octreotide  50 mcg Intravenous Once    pantoprazole  40 mg Intravenous BID     PRN Meds:0.9%  NaCl infusion (for blood administration), dextrose 10%, dextrose 10%, glucagon (human recombinant), glucose, glucose, naloxone, ondansetron, prochlorperazine, sodium chloride 0.9%     Review of patient's allergies indicates:  No Known Allergies     Past Medical History:   Diagnosis Date    Aortic atherosclerosis 07/06/2023    Breast cancer     Esophageal and gastric varices 06/23/2023    Malignant neoplasm metastatic to left lung 06/08/2021     Past Surgical History:   Procedure Laterality Date    ENDOSCOPIC ULTRASOUND OF UPPER GASTROINTESTINAL TRACT N/A  6/27/2023    Procedure: ULTRASOUND, UPPER GI TRACT, ENDOSCOPIC;  Surgeon: Home Lopez MD;  Location: [facility] (2ND FLR);  Service: Endoscopy;  Laterality: N/A;    ENDOSCOPIC ULTRASOUND OF UPPER GASTROINTESTINAL TRACT N/A 1/12/2024    Procedure: ULTRASOUND, UPPER GI TRACT, ENDOSCOPIC;  Surgeon: Home Lopez MD;  [facility]: [facility] ([facility]);  Service: Endoscopy;  Laterality: N/A;  11/28/23-Instructions via portal-DS  1/8-lvm for precall-MS  1/10-precall complete-Kpvt    ESOPHAGOGASTRODUODENOSCOPY N/A 6/23/2023    Procedure: EGD (ESOPHAGOGASTRODUODENOSCOPY);  Surgeon: Quintin Mooney MD;  Location: [facility] (2ND FLR);  Service: Endoscopy;  Laterality: N/A;    ESOPHAGOGASTRODUODENOSCOPY N/A 6/27/2023    Procedure: EGD (ESOPHAGOGASTRODUODENOSCOPY);  Surgeon: Home Lopez MD;  Location: [facility] (2[facility] FLR);  Service: Endoscopy;  Laterality: N/A;    ESOPHAGOGASTRODUODENOSCOPY N/A 8/3/2023    Procedure: EGD (ESOPHAGOGASTRODUODENOSCOPY);  Surgeon: Home Lopez MD;  Location: [facility] (2ND FLR);  Service: Endoscopy;  Laterality: N/A;  instr portal-labs 6/28/23-tb    ESOPHAGOGASTRODUODENOSCOPY N/A 1/12/2024    Procedure: EGD (ESOPHAGOGASTRODUODENOSCOPY);  Surgeon: Home Lopez MD;  Location: [facility] (2ND FLR);  Service: Endoscopy;  Laterality: N/A;    MASTECTOMY       Family History       Problem Relation (Age of Onset)    Lung cancer Father          Tobacco Use    Smoking status: Never    Smokeless tobacco: Never   Substance and Sexual Activity    Alcohol use: Never    Drug use: Never    Sexual activity: Not Currently       Review of Systems  Objective:     Vital Signs (Most Recent):  Temp: 98.1 °F (36.7 °C) (04/09/24 0934)  Pulse: 97 (04/09/24 1300)  Resp: 16 (04/09/24 1300)  BP: (!) 113/57 (04/09/24 1300)  SpO2: 100 % (04/09/24 1300) Vital Signs (24h Range):  Temp:  [98.1 °F (36.7 °C)] 98.1 °F (36.7 °C)  Pulse:  [] 97  Resp:  [16-20] 16  SpO2:  [97 %-100 %] 100 %  BP: (113-131)/(57-59) 113/57      Weight: 83.5 kg (184 lb)  Body mass index is 33.65 kg/m².  Body surface area is 1.91 meters squared.    No intake or output data in the 24 hours ending 04/09/24 1519     Physical Exam  Constitutional:       Appearance: She is obese. She is not ill-appearing or diaphoretic.   HENT:      Mouth/Throat:      Mouth: Mucous membranes are moist.   Eyes:      Comments: Pallor eyelids   Cardiovascular:      Rate and Rhythm: Normal rate and regular rhythm.      Heart sounds: Murmur heard.   Pulmonary:      Effort: Pulmonary effort is normal. No respiratory distress.      Breath sounds: No wheezing or rales.   Abdominal:      General: Abdomen is flat. Bowel sounds are normal. There is no distension.      Tenderness: There is no abdominal tenderness.   Genitourinary:     Comments: ARYAN with dark colored blood  Musculoskeletal:         General: Swelling present.      Right lower leg: No edema.      Left lower leg: No edema.   Skin:     General: Skin is warm.      Coloration: Skin is not jaundiced.   Neurological:      Mental Status: She is alert and oriented to person, place, and time.          Significant Labs:   CBC:   Recent Labs   Lab 04/08/24  0915 04/09/24  1153   WBC 3.31* 2.93*   HGB 7.2* 5.6*   HCT 23.3* 17.9*   * 115*    and CMP:   Recent Labs   Lab 04/08/24  0915 04/09/24  1153    138   K 3.6 3.8    105   CO2 29 27    117*   BUN 13 17   CREATININE 0.6 0.5   CALCIUM 8.0* 7.8*   PROT 5.2* 5.0*   ALBUMIN 2.5* 2.4*   BILITOT 2.7* 4.0*   ALKPHOS 97 87   AST 34 33   ALT 14 14   ANIONGAP 4* 6*       Diagnostic Results:  None  Assessment/Plan:     GI bleed  53 hx of left breast ca, mets to lung, HAWTHORNE cirrhosis, obesity    Symptoms: melana, BRBPR, fatigue, SOB  Diagnostics: Hgb 5.6 (baseline 9) drop from 7.2 yesterday    - GI consult for EGD / C-Scope consult for source evaluation +/- intervention Plan for 4/10  - IV Protonix 80mg x 1, followed by IV 40mg BID thereafter  - clears until midnight  NPO, hold pharmacologic VTE PPx  - Trend CBCs ; Transfuse Hgb < 7  - Resuscitate IVF PRN  - Transfusing 2 unit pRBCs  - Cardiac telemetry  - Maintain 2 large bore IVs  - If hemodynamically unstable +/- new bleed, stat CTA / consult IR for embolization evaluation   - CTX for hx of varices (esophageal and gastric) and GI bleed    Severe obesity (BMI 35.0-39.9) with comorbidity  -documented  -Diet, exercise, and weight for mortality benefits    Esophageal and gastric varices  -2/2 HAWTHORNE cirrhosis  -hx of bleeding varices s/p banding in June 2023    Malignant neoplasm metastatic to left lung  -s/p chemo, radiation, post op RT  -herceptin x 3.5 till lung mass progression  -metastatic    Breast cancer, stage 4, left  -Follows with Dr. Willis  -On [facility] for 2 yrs        Shaun Ding MD  Hematology/Oncology  [facility] - Emergency Dept

## 2024-04-09 NOTE — PHARMACY MED REC
"  Admission Medication History     The home medication history was taken by Faviola Byers.    You may go to "Admission" then "Reconcile Home Medications" tabs to review and/or act upon these items.     The home medication list has been updated by the Pharmacy department.   Please read ALL comments highlighted in yellow.   Please address this information as you see fit.    Feel free to contact us if you have any questions or require assistance.      The medications listed below were removed from the home medication list. Please reorder if appropriate:  Patient reports no longer taking the following medication(s):  Acetaminophen (TYLENOL) 500 MG tablet  Lactobac no.41/Bifidobact no.7 (PROBIOTIC-10 ORAL)  LIDOcaine-prilocaine (EMLA) cream  Ondansetron (ZOFRAN) 8 MG tablet    Medications listed below were obtained from: Patient/family and Analytic software- [employer]  Current Outpatient Medications on File Prior to Encounter   Medication Sig    ferrous sulfate (IRON, FERROUS SULFATE,) 325 mg (65 mg iron) Tab tablet   Take 325 mg by mouth once daily.    furosemide (LASIX) 20 MG tablet   Take 1 tablet (20 mg total) by mouth once daily.    hydrocodone-homatropine 5-1.5 mg/5 ml (HYCODAN) 5-1.5 mg/5 mL Syrp   Take 5 mLs by mouth 4 (four) times daily as needed (cough).    methylphenidate HCl (RITALIN) 5 MG tablet Take 1 tablet (5 mg total) by mouth 2 (two) times daily.      OLANZapine (ZYPREXA) 5 MG tablet   Take days 1-4 of chemotherapy    pantoprazole (PROTONIX) 40 MG tablet Take 1 tablet (40 mg total) by mouth 2 (two) times daily.      potassium chloride 10% (KAYCIEL) 20 mEq/15 mL oral solution Take 15 mLs (20 mEq total) by mouth once daily. (To prevent stomach upset, mix dose with a glass of cold water or juice)       Faviola Byers  EXT [id number]               .        "

## 2024-04-09 NOTE — ASSESSMENT & PLAN NOTE
53 hx of left breast ca, mets to lung, HAWTHORNE cirrhosis, obesity    Symptoms: melana, BRBPR, fatigue, SOB  Diagnostics: Hgb 5.6 (baseline 9) drop from 7.2 yesterday    - GI consult for EGD / C-Scope consult for source evaluation +/- intervention Plan for 4/10  - IV Protonix 80mg x 1, followed by IV 40mg BID thereafter  - clears until midnight NPO, hold pharmacologic VTE PPx  - Trend CBCs ; Transfuse Hgb < 7  - Resuscitate IVF PRN  - Transfusing 2 unit pRBCs  - Cardiac telemetry  - Maintain 2 large bore IVs  - If hemodynamically unstable +/- new bleed, stat CTA / consult IR for embolization evaluation   - CTX for hx of varices (esophageal and gastric) and GI bleed

## 2024-04-09 NOTE — NURSING
Pt admitted to Onc/BMT room 858 .MD notified of pt arival. Pt ambulatory, A&Ox4, VS and assessment as charted. Pt and family oriented to room and unit. Bed in low position, side rails up x2, call light and personal items within reach. Pt instructed to call to assist with mobility.     Heather Razo  4/9/2024  4:59 PM

## 2024-04-09 NOTE — H&P (VIEW-ONLY)
[facility]  Gastroenterology  Consult Note    Patient Name: Shikha López  MRN: [id number]  Admission Date: 4/9/2024  Hospital Length of Stay: 0 days  Code Status: Prior   Attending Provider: Noah Terry MD   Consulting Provider: Piyush Spencer MD  Primary Care Physician: No, Primary Doctor  Principal Problem:<principal problem not specified>    Inpatient consult to Gastroenterology  Consult performed by: Piyush pSencer MD  Consult ordered by: Rober Ricci III, MD        Subjective:     HPI: Shikha López is a 53 y.o. female with history of HAWTHORNE cirrhosis, gastric varices, metastatic breast cancer (on trastuzumab and deruxtecan), obesity who presents for melena. Onset 4 days ago. Denies abd pain. Denies NSAID use. Melenic stools persisted since then. Had some darker red blood in stool yesterday and today.    In June 2023 had hematemesis and melena. EGD showed IGV1 gastric varices 20 mm in diameter with stigmata of recent bleeding. EUS-guided coiling done at that time. Repeat EGD in showed IGV1 varices now 14 mm in diameter. Grade 1 EV also present. Most recent EGD Jan 2024 showed IGV1 varix diameter 15 mm. Minimal to no effect of coiling.     No prior colonoscopies    Hgb 5.6,d own from 9.3 last month  BUN 17 Cr 0.5  Tachycardic to 112 in ED, normotensive      Objective:     Vitals:    04/09/24 1300   BP: (!) 113/57   Pulse: 97   Resp: 16   Temp:          Constitutional:  not in acute distress and well developed  HENT: Head: Normal, normocephalic.  Eyes: conjunctiva clear and sclera nonicteric  GI: soft, non-tender, without masses or organomegaly  Musculoskeletal: no muscular tenderness noted  Skin: normal color  Neurological: alert, oriented x3    Significant Labs:  Reviewed the following pertinent laboratory tests:   Hgb 5.6  BUN 17    Significant Imaging:  No pertinent imaging.      Assessment/Plan:     Shikha López is a 53 y.o. female with history of HAWTHORNE  cirrhosis, gastric varices s/p EUS-guided coiling in June 2023, metastatic breast cancer who presents for melena. Tachycardic at presentation but improving and HDS. Needs resuscitation with pRBC. EGD 4/10.    Problem List:  Melena  History of gastric varices s/p coiling    Recommendations:  - Plan for EGD 4/10  - Trend Hgb q12 hrs. Transfuse for Hgb > 7, unless otherwise indicated  - Maintain IV access with 2 large bore Ivs  - Intravascular resuscitation/support with IVFs   - Clear liquid diet, NPO at midnight  - Hold all NSAIDs and anticoagulants, unless contraindicated  - Bolus IV pantoprazole 80mg followed by 40mg BID  - Octreotide bolus 50mcg, then gtt at 50mcg/hr.  - Ceftriaxone 1g IV Q daily for primary prophylaxis. Complete 5 day course.  - Please correct any coagulopathy with platelets and FFP for goal of platelets >50K and INR <2.0  - Please notify GI team if there is significant change in patient's clinical status    Thank you for involving us in the care of Shikha López. Please call with any additional questions, concerns or changes in the patient's clinical status. We will continue to follow.     Piyush Spencer MD  Gastroenterology Fellow PGY V  [facility]

## 2024-04-09 NOTE — ED TRIAGE NOTES
Shikha López, a 53 y.o. female presents to the ED w/ complaint of blood in stool since Saturday. Hx of stomach varices    Triage note:  Chief Complaint   Patient presents with    Rectal Bleeding     Hx stomach varices per pt      Review of patient's allergies indicates:  No Known Allergies  Past Medical History:   Diagnosis Date    Breast cancer

## 2024-04-09 NOTE — NURSING
Nurses Note -- 4 Eyes      4/9/2024   5:42 PM      Skin assessed during: Admit      [x] No Altered Skin Integrity Present    []Prevention Measures Documented      [] Yes- Altered Skin Integrity Present or Discovered   [] LDA Added if Not in Epic (Describe Wound)   [] New Altered Skin Integrity was Present on Admit and Documented in LDA   [] Wound Image Taken    Wound Care Consulted? No    Attending Nurse:  NAHUN Romero    Second RN/Staff Member:   NAHUN Read

## 2024-04-09 NOTE — ED PROVIDER NOTES
Emergency Department Provider Note    Shikha López   53 y.o. female   [id number]      4/9/2024       History     This history was obtained from the patient without limitations.      She is a 53-year-old with the below past medical history who presents by personal transportation.  She developed black, tarry stools 3 days ago.  The following day she began to have dull left upper quadrant abdominal discomfort.  She started having hematochezia with bright red blood and minimal stool yesterday.  She has mild lethargy.  She has exertional shortness of breath.  She has episodic lightheadedness.  She denies fever, chills, chest pain, nausea, and vomiting.  Her history includes gastric and esophageal varices.  She is followed by Dr. Lopez with EGD monitoring.  She was last scoped in January of this year.         Past Medical History:   Diagnosis Date    Aortic atherosclerosis 07/06/2023    Breast cancer     Esophageal and gastric varices 06/23/2023    Malignant neoplasm metastatic to left lung 06/08/2021      Past Surgical History:   Procedure Laterality Date    ENDOSCOPIC ULTRASOUND OF UPPER GASTROINTESTINAL TRACT N/A 6/27/2023    Procedure: ULTRASOUND, UPPER GI TRACT, ENDOSCOPIC;  Surgeon: Home Lopez MD;  Location: [facility] ([address] [place] [address]);  Service: Endoscopy;  Laterality: N/A;    ENDOSCOPIC ULTRASOUND OF UPPER GASTROINTESTINAL TRACT N/A 1/12/2024    Procedure: ULTRASOUND, UPPER GI TRACT, ENDOSCOPIC;  Surgeon: Home Lopez MD;  [facility]  Service: Endoscopy;  Laterality: N/A;  11/28/23-Instructions via portal-DS  1/8-lvm for precall-MS  1/10-precall complete-Kpvt    ESOPHAGOGASTRODUODENOSCOPY N/A 6/23/2023    Procedure: EGD (ESOPHAGOGASTRODUODENOSCOPY);  Surgeon: Quintin Mooney MD;  Location: [facility] (2ND FLR);  Service: Endoscopy;  Laterality: N/A;    ESOPHAGOGASTRODUODENOSCOPY N/A 6/27/2023    Procedure: EGD (ESOPHAGOGASTRODUODENOSCOPY);  Surgeon: Home Lopez MD;  Location: [facility]  (2ND FLR);  Service: Endoscopy;  Laterality: N/A;    ESOPHAGOGASTRODUODENOSCOPY N/A 8/3/2023    Procedure: EGD (ESOPHAGOGASTRODUODENOSCOPY);  Surgeon: Home Lopez MD;  Location: [facility] (2ND FLR);  Service: Endoscopy;  Laterality: N/A;  instr portal-labs 6/28/23-tb    ESOPHAGOGASTRODUODENOSCOPY N/A 1/12/2024    Procedure: EGD (ESOPHAGOGASTRODUODENOSCOPY);  Surgeon: Home Lopez MD;  [facility]  Service: Endoscopy;  Laterality: N/A;    MASTECTOMY        Family History   Problem Relation Age of Onset    Lung cancer Father       Social History     Socioeconomic History    Marital status: Single    Number of children: 1   Tobacco Use    Smoking status: Never    Smokeless tobacco: Never   Substance and Sexual Activity    Alcohol use: Never    Drug use: Never    Sexual activity: Not Currently     Social Determinants of Health     Financial Resource Strain: High Risk (12/3/2023)    Overall Financial Resource Strain (CARDIA)     Difficulty of Paying Living Expenses: Very hard   Food Insecurity: Food Insecurity Present (12/3/2023)    Hunger Vital Sign     Worried About Running Out of Food in the Last Year: Often true     Ran Out of Food in the Last Year: Sometimes true   Transportation Needs: No Transportation Needs (12/3/2023)    PRAPARE - Transportation     Lack of Transportation (Medical): No     Lack of Transportation (Non-Medical): No   Physical Activity: Unknown (12/3/2023)    Exercise Vital Sign     Days of Exercise per Week: 0 days   Stress: No Stress Concern Present (12/3/2023)    Citizen of [place] of Occupational Health - Occupational Stress Questionnaire     Feeling of Stress : Only a little   Social Connections: Unknown (12/3/2023)    Social Connection and Isolation Panel [NHANES]     Frequency of Communication with Friends and Family: More than three times a week     Frequency of Social Gatherings with Friends and Family: Once a week     Active Member of Clubs or Organizations: No      Attends Club or Organization Meetings: Never     Marital Status: Never    Housing Stability: Low Risk  (12/3/2023)    Housing Stability Vital Sign     Unable to Pay for Housing in the Last Year: No     Number of Places Lived in the Last Year: 1     Unstable Housing in the Last Year: No      Review of patient's allergies indicates:  No Known Allergies        Physical Examination     Initial Vitals [04/09/24 0934]   BP Pulse Resp Temp SpO2   (!) 124/58 (!) 112 20 98.1 °F (36.7 °C) 97 %      MAP       --           Physical Exam    Nursing note and vitals reviewed.  Constitutional: She is not diaphoretic. No distress.   Eyes: Conjunctivae are normal. No scleral icterus.   Cardiovascular:  Normal rate and regular rhythm.     Exam reveals no gallop and no friction rub.       Murmur heard.  Systolic murmur is present with a grade of 3/6.  Pulmonary/Chest: Effort normal and breath sounds normal. No stridor. No respiratory distress. She has no decreased breath sounds. She has no wheezes. She has no rhonchi. She has no rales.   Abdominal: Abdomen is soft. She exhibits no distension and no mass. There is abdominal tenderness in the left upper quadrant. There is no rebound and no guarding.   Musculoskeletal:         General: Edema present.     Neurological: She is alert and oriented to person, place, and time. GCS score is 15. GCS eye subscore is 4. GCS verbal subscore is 5. GCS motor subscore is 6.   Skin: Skin is warm and dry. No pallor.   Psychiatric: She is not actively hallucinating. She is attentive.            Labs     Labs Reviewed   CBC W/ AUTO DIFFERENTIAL - Abnormal; Notable for the following components:       Result Value    WBC 2.93 (*)     RBC 1.56 (*)     Hemoglobin 5.6 (*)     Hematocrit 17.9 (*)      (*)     MCH 35.9 (*)     MCHC 31.3 (*)     RDW 23.1 (*)     Platelets 115 (*)     Lymph # 0.3 (*)     Gran % 79.3 (*)     Lymph % 9.9 (*)     Platelet Estimate Decreased (*)     All other components  within normal limits    Narrative:     H&H   critical result(s) called and verbal readback obtained from   Gely Marsh RN by [email or web address] 04/09/2024 12:34   COMPREHENSIVE METABOLIC PANEL - Abnormal; Notable for the following components:    Glucose 117 (*)     Calcium 7.8 (*)     Total Protein 5.0 (*)     Albumin 2.4 (*)     Total Bilirubin 4.0 (*)     Anion Gap 6 (*)     All other components within normal limits   URINALYSIS, REFLEX TO URINE CULTURE    Narrative:     Specimen Source->Urine   LIPASE   TYPE & SCREEN   INDIRECT ANTIGLOBULIN TEST   PREPARE RBC SOFT        Imaging     Imaging Results    None           ED Course     The patient received the following medications:  Medications   0.9%  NaCl infusion (for blood administration) (has no administration in time range)   pantoprazole injection 80 mg (has no administration in time range)   pantoprazole injection 40 mg (has no administration in time range)   octreotide (SANDOSTATIN) 500 mcg in sodium chloride 0.9% 100 mL infusion (has no administration in time range)         ED Course as of 04/09/24 1450   Tue Apr 09, 2024   1237 WBC(!): 2.93 [LP]   1237 Hemoglobin(!!): 5.6 [LP]   1237 Hematocrit(!!): 17.9 [LP]   1237 Platelet Count(!): 115 [LP]   1407 Glucose(!): 117 [LP]   1407 Calcium(!): 7.8 [LP]   1407 PROTEIN TOTAL(!): 5.0 [LP]   1407 Albumin(!): 2.4 [LP]   1407 BILIRUBIN TOTAL(!): 4.0 [LP]   1408 Lipase: 12 [LP]   1408 Urinalysis, Reflex to Urine Culture Urine, Clean Catch  Unremarkable. [LP]   1408 Called and discussed the patient's presentation, exam, lab results, and plan to initiate blood transfusion with the on-call hematology-oncology fellow, Dr. Conway.  He will admit the patient. [LP]      ED Course User Index  [LP] Rober Ricci III, MD        Medical Decision Making                 Medical Decision Making  Evaluation for acute gastrointestinal bleeding.  Likely upper GI bleeding given her history.  Vital signs stable.  Mild left upper quadrant  tenderness on exam; emergent imaging not felt to be indicated.  No white count elevation.  She has ongoing pancytopenia.  Hemoglobin today much worse than yesterday and consistent with acute blood loss.  Blood transfusion ordered.  Discussed with the on-call hematology-oncology fellow.  Admitted to the hematology-oncology service with plan for gastroenterology consultation.    Problems Addressed:  Acute gastrointestinal bleeding: acute illness or injury  Pancytopenia: chronic illness or injury  Symptomatic anemia: acute illness or injury    Amount and/or Complexity of Data Reviewed  Labs: ordered. Decision-making details documented in ED Course.    Risk  Prescription drug management.  Decision regarding hospitalization.             Diagnoses       ICD-10-CM ICD-9-CM   1. Acute gastrointestinal bleeding  K92.2 578.9   2. Pancytopenia  D61.818 284.19   3. Symptomatic anemia  D64.9 285.9         Dispostion      ED Disposition Condition    Admit Stable             Rober Ricci III, MD  04/09/24 2214

## 2024-04-09 NOTE — HPI
53F with PMH stage 4 breast ca left (mets HER 2 +) Dr. 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 Yesterday, she began having hematochezia with BRBPR, lethargy and exertional SOB. Denies fevers, chills, nausea, vomiting, hematemesis, cp, dysuria, peripheral edema.     AF, tachycardic, BP stable, on RA, Hg 5.6, Plt 115, Tbil 4, U/A wnl, given IV PPI, pending 2 units of pRBCs, octreotide    Admitted to oncology service for GI bleeding on active chemotherapy

## 2024-04-09 NOTE — CONSULTS
[facility]  Gastroenterology  Consult Note    Patient Name: Shikha López  MRN: [id number]  Admission Date: 4/9/2024  Hospital Length of Stay: 0 days  Code Status: Prior   Attending Provider: Noah Terry MD   Consulting Provider: Piyush Spencer MD  Primary Care Physician: No, Primary Doctor  Principal Problem:<principal problem not specified>    Inpatient consult to Gastroenterology  Consult performed by: Piyush Spencer MD  Consult ordered by: Rober Ricci III, MD        Subjective:     HPI: Shikha López is a 53 y.o. female with history of HAWTHORNE cirrhosis, gastric varices, metastatic breast cancer (on trastuzumab and deruxtecan), obesity who presents for melena. Onset 4 days ago. Denies abd pain. Denies NSAID use. Melenic stools persisted since then. Had some darker red blood in stool yesterday and today.    In June 2023 had hematemesis and melena. EGD showed IGV1 gastric varices 20 mm in diameter with stigmata of recent bleeding. EUS-guided coiling done at that time. Repeat EGD in showed IGV1 varices now 14 mm in diameter. Grade 1 EV also present. Most recent EGD Jan 2024 showed IGV1 varix diameter 15 mm. Minimal to no effect of coiling.     No prior colonoscopies    Hgb 5.6,d own from 9.3 last month  BUN 17 Cr 0.5  Tachycardic to 112 in ED, normotensive      Objective:     Vitals:    04/09/24 1300   BP: (!) 113/57   Pulse: 97   Resp: 16   Temp:          Constitutional:  not in acute distress and well developed  HENT: Head: Normal, normocephalic.  Eyes: conjunctiva clear and sclera nonicteric  GI: soft, non-tender, without masses or organomegaly  Musculoskeletal: no muscular tenderness noted  Skin: normal color  Neurological: alert, oriented x3    Significant Labs:  Reviewed the following pertinent laboratory tests:   Hgb 5.6  BUN 17    Significant Imaging:  No pertinent imaging.      Assessment/Plan:     Shikha López is a 53 y.o. female with history of HAWTHORNE  cirrhosis, gastric varices s/p EUS-guided coiling in June 2023, metastatic breast cancer who presents for melena. Tachycardic at presentation but improving and HDS. Needs resuscitation with pRBC. EGD 4/10.    Problem List:  Melena  History of gastric varices s/p coiling    Recommendations:  - Plan for EGD 4/10  - Trend Hgb q12 hrs. Transfuse for Hgb > 7, unless otherwise indicated  - Maintain IV access with 2 large bore Ivs  - Intravascular resuscitation/support with IVFs   - Clear liquid diet, NPO at midnight  - Hold all NSAIDs and anticoagulants, unless contraindicated  - Bolus IV pantoprazole 80mg followed by 40mg BID  - Octreotide bolus 50mcg, then gtt at 50mcg/hr.  - Ceftriaxone 1g IV Q daily for primary prophylaxis. Complete 5 day course.  - Please correct any coagulopathy with platelets and FFP for goal of platelets >50K and INR <2.0  - Please notify GI team if there is significant change in patient's clinical status    Thank you for involving us in the care of Shikha López. Please call with any additional questions, concerns or changes in the patient's clinical status. We will continue to follow.     Piyush Spencer MD  Gastroenterology Fellow PGY V  [facility]

## 2024-04-09 NOTE — SUBJECTIVE & OBJECTIVE
Oncology Treatment Plan:   OP BREAST FAM-TRASTUZUMAB DERUXTECAN-NXKI Q3W    Medications:  Continuous Infusions:   octreotide (SANDOSTATIN) 500 mcg in sodium chloride 0.9% 100 mL infusion 50 mcg/hr (04/09/24 1518)     Scheduled Meds:   cefTRIAXone (Rocephin) IV (PEDS and ADULTS)  1 g Intravenous Q24H    octreotide  50 mcg Intravenous Once    pantoprazole  40 mg Intravenous BID     PRN Meds:0.9%  NaCl infusion (for blood administration), dextrose 10%, dextrose 10%, glucagon (human recombinant), glucose, glucose, naloxone, ondansetron, prochlorperazine, sodium chloride 0.9%     Review of patient's allergies indicates:  No Known Allergies     Past Medical History:   Diagnosis Date    Aortic atherosclerosis 07/06/2023    Breast cancer     Esophageal and gastric varices 06/23/2023    Malignant neoplasm metastatic to left lung 06/08/2021     Past Surgical History:   Procedure Laterality Date    ENDOSCOPIC ULTRASOUND OF UPPER GASTROINTESTINAL TRACT N/A 6/27/2023    Procedure: ULTRASOUND, UPPER GI TRACT, ENDOSCOPIC;  Surgeon: Home Lopez MD;  Location: [facility] ([address] [place] [address]);  Service: Endoscopy;  Laterality: N/A;    ENDOSCOPIC ULTRASOUND OF UPPER GASTROINTESTINAL TRACT N/A 1/12/2024    Procedure: ULTRASOUND, UPPER GI TRACT, ENDOSCOPIC;  Surgeon: Home Lopez MD;  Location: [facility] ([address] [place] [address]);  Service: Endoscopy;  Laterality: N/A;  11/28/23-Instructions via portal-DS  1/8-lvm for precall-MS  1/10-precall complete-Kpvt    ESOPHAGOGASTRODUODENOSCOPY N/A 6/23/2023    Procedure: EGD (ESOPHAGOGASTRODUODENOSCOPY);  Surgeon: Quintin Mooney MD;  Location: [facility] (2ND FLR);  Service: Endoscopy;  Laterality: N/A;    ESOPHAGOGASTRODUODENOSCOPY N/A 6/27/2023    Procedure: EGD (ESOPHAGOGASTRODUODENOSCOPY);  Surgeon: Home Lopez MD;  [facility]  Service: Endoscopy;  Laterality: N/A;    ESOPHAGOGASTRODUODENOSCOPY N/A 8/3/2023    Procedure: EGD (ESOPHAGOGASTRODUODENOSCOPY);  Surgeon: Home Lopez MD;   Location: [facility] ([address] [place] [address]);  Service: Endoscopy;  Laterality: N/A;  instr portal-labs 6/28/23-tb    ESOPHAGOGASTRODUODENOSCOPY N/A 1/12/2024    Procedure: EGD (ESOPHAGOGASTRODUODENOSCOPY);  Surgeon: Home Lopez MD;  [facility]  Service: Endoscopy;  Laterality: N/A;    MASTECTOMY       Family History       Problem Relation (Age of Onset)    Lung cancer Father          Tobacco Use    Smoking status: Never    Smokeless tobacco: Never   Substance and Sexual Activity    Alcohol use: Never    Drug use: Never    Sexual activity: Not Currently       Review of Systems  Objective:     Vital Signs (Most Recent):  Temp: 98.1 °F (36.7 °C) (04/09/24 0934)  Pulse: 97 (04/09/24 1300)  Resp: 16 (04/09/24 1300)  BP: (!) 113/57 (04/09/24 1300)  SpO2: 100 % (04/09/24 1300) Vital Signs (24h Range):  Temp:  [98.1 °F (36.7 °C)] 98.1 °F (36.7 °C)  Pulse:  [] 97  Resp:  [16-20] 16  SpO2:  [97 %-100 %] 100 %  BP: (113-131)/(57-59) 113/57     Weight: 83.5 kg (184 lb)  Body mass index is 33.65 kg/m².  Body surface area is 1.91 meters squared.    No intake or output data in the 24 hours ending 04/09/24 1519     Physical Exam  Constitutional:       Appearance: She is obese. She is not ill-appearing or diaphoretic.   HENT:      Mouth/Throat:      Mouth: Mucous membranes are moist.   Eyes:      Comments: Pallor eyelids   Cardiovascular:      Rate and Rhythm: Normal rate and regular rhythm.      Heart sounds: Murmur heard.   Pulmonary:      Effort: Pulmonary effort is normal. No respiratory distress.      Breath sounds: No wheezing or rales.   Abdominal:      General: Abdomen is flat. Bowel sounds are normal. There is no distension.      Tenderness: There is no abdominal tenderness.   Genitourinary:     Comments: ARYAN with dark colored blood  Musculoskeletal:         General: Swelling present.      Right lower leg: No edema.      Left lower leg: No edema.   Skin:     General: Skin is warm.      Coloration: Skin is  not jaundiced.   Neurological:      Mental Status: She is alert and oriented to person, place, and time.          Significant Labs:   CBC:   Recent Labs   Lab 04/08/24  0915 04/09/24  1153   WBC 3.31* 2.93*   HGB 7.2* 5.6*   HCT 23.3* 17.9*   * 115*    and CMP:   Recent Labs   Lab 04/08/24  0915 04/09/24  1153    138   K 3.6 3.8    105   CO2 29 27    117*   BUN 13 17   CREATININE 0.6 0.5   CALCIUM 8.0* 7.8*   PROT 5.2* 5.0*   ALBUMIN 2.5* 2.4*   BILITOT 2.7* 4.0*   ALKPHOS 97 87   AST 34 33   ALT 14 14   ANIONGAP 4* 6*       Diagnostic Results:  None

## 2024-04-10 ENCOUNTER — ANESTHESIA (OUTPATIENT)
Dept: ENDOSCOPY | Facility: HOSPITAL | Age: 54
DRG: 271 | End: 2024-04-10
Payer: MEDICARE

## 2024-04-10 ENCOUNTER — ANESTHESIA EVENT (OUTPATIENT)
Dept: ENDOSCOPY | Facility: HOSPITAL | Age: 54
DRG: 271 | End: 2024-04-10
Payer: MEDICARE

## 2024-04-10 LAB
ALBUMIN SERPL BCP-MCNC: 2.2 G/DL (ref 3.5–5.2)
ALP SERPL-CCNC: 79 U/L (ref 55–135)
ALT SERPL W/O P-5'-P-CCNC: 13 U/L (ref 10–44)
ANION GAP SERPL CALC-SCNC: 5 MMOL/L (ref 8–16)
AST SERPL-CCNC: 32 U/L (ref 10–40)
BASOPHILS # BLD AUTO: 0.01 K/UL (ref 0–0.2)
BASOPHILS # BLD AUTO: 0.01 K/UL (ref 0–0.2)
BASOPHILS # BLD AUTO: 0.02 K/UL (ref 0–0.2)
BASOPHILS NFR BLD: 0.3 % (ref 0–1.9)
BASOPHILS NFR BLD: 0.3 % (ref 0–1.9)
BASOPHILS NFR BLD: 0.5 % (ref 0–1.9)
BILIRUB SERPL-MCNC: 4.6 MG/DL (ref 0.1–1)
BLD PROD TYP BPU: NORMAL
BLOOD UNIT EXPIRATION DATE: NORMAL
BLOOD UNIT TYPE CODE: 5100
BLOOD UNIT TYPE: NORMAL
BUN SERPL-MCNC: 20 MG/DL (ref 6–20)
CALCIUM SERPL-MCNC: 7.9 MG/DL (ref 8.7–10.5)
CHLORIDE SERPL-SCNC: 105 MMOL/L (ref 95–110)
CO2 SERPL-SCNC: 27 MMOL/L (ref 23–29)
CODING SYSTEM: NORMAL
CREAT SERPL-MCNC: 0.6 MG/DL (ref 0.5–1.4)
CROSSMATCH INTERPRETATION: NORMAL
DIFFERENTIAL METHOD BLD: ABNORMAL
DISPENSE STATUS: NORMAL
EOSINOPHIL # BLD AUTO: 0 K/UL (ref 0–0.5)
EOSINOPHIL # BLD AUTO: 0 K/UL (ref 0–0.5)
EOSINOPHIL # BLD AUTO: 0.1 K/UL (ref 0–0.5)
EOSINOPHIL NFR BLD: 0.3 % (ref 0–8)
EOSINOPHIL NFR BLD: 0.5 % (ref 0–8)
EOSINOPHIL NFR BLD: 2.1 % (ref 0–8)
ERYTHROCYTE [DISTWIDTH] IN BLOOD BY AUTOMATED COUNT: 23.1 % (ref 11.5–14.5)
ERYTHROCYTE [DISTWIDTH] IN BLOOD BY AUTOMATED COUNT: 23.2 % (ref 11.5–14.5)
ERYTHROCYTE [DISTWIDTH] IN BLOOD BY AUTOMATED COUNT: 23.5 % (ref 11.5–14.5)
EST. GFR  (NO RACE VARIABLE): >60 ML/MIN/1.73 M^2
GLUCOSE SERPL-MCNC: 135 MG/DL (ref 70–110)
HCT VFR BLD AUTO: 24.3 % (ref 37–48.5)
HCT VFR BLD AUTO: 24.4 % (ref 37–48.5)
HCT VFR BLD AUTO: 25.2 % (ref 37–48.5)
HGB BLD-MCNC: 8 G/DL (ref 12–16)
HGB BLD-MCNC: 8.3 G/DL (ref 12–16)
HGB BLD-MCNC: 8.4 G/DL (ref 12–16)
IMM GRANULOCYTES # BLD AUTO: 0.02 K/UL (ref 0–0.04)
IMM GRANULOCYTES NFR BLD AUTO: 0.5 % (ref 0–0.5)
IMM GRANULOCYTES NFR BLD AUTO: 0.5 % (ref 0–0.5)
IMM GRANULOCYTES NFR BLD AUTO: 0.6 % (ref 0–0.5)
INR PPP: 1.5 (ref 0.8–1.2)
LYMPHOCYTES # BLD AUTO: 0.3 K/UL (ref 1–4.8)
LYMPHOCYTES # BLD AUTO: 0.4 K/UL (ref 1–4.8)
LYMPHOCYTES # BLD AUTO: 0.5 K/UL (ref 1–4.8)
LYMPHOCYTES NFR BLD: 14.7 % (ref 18–48)
LYMPHOCYTES NFR BLD: 9.1 % (ref 18–48)
LYMPHOCYTES NFR BLD: 9.3 % (ref 18–48)
MAGNESIUM SERPL-MCNC: 1.9 MG/DL (ref 1.6–2.6)
MCH RBC QN AUTO: 33.9 PG (ref 27–31)
MCH RBC QN AUTO: 34.4 PG (ref 27–31)
MCH RBC QN AUTO: 34.9 PG (ref 27–31)
MCHC RBC AUTO-ENTMCNC: 32.9 G/DL (ref 32–36)
MCHC RBC AUTO-ENTMCNC: 33.3 G/DL (ref 32–36)
MCHC RBC AUTO-ENTMCNC: 34 G/DL (ref 32–36)
MCV RBC AUTO: 101 FL (ref 82–98)
MCV RBC AUTO: 103 FL (ref 82–98)
MCV RBC AUTO: 105 FL (ref 82–98)
MONOCYTES # BLD AUTO: 0.2 K/UL (ref 0.3–1)
MONOCYTES # BLD AUTO: 0.2 K/UL (ref 0.3–1)
MONOCYTES # BLD AUTO: 0.3 K/UL (ref 0.3–1)
MONOCYTES NFR BLD: 4.6 % (ref 4–15)
MONOCYTES NFR BLD: 5.3 % (ref 4–15)
MONOCYTES NFR BLD: 7.3 % (ref 4–15)
NEUTROPHILS # BLD AUTO: 2.6 K/UL (ref 1.8–7.7)
NEUTROPHILS # BLD AUTO: 3.2 K/UL (ref 1.8–7.7)
NEUTROPHILS # BLD AUTO: 3.2 K/UL (ref 1.8–7.7)
NEUTROPHILS NFR BLD: 75 % (ref 38–73)
NEUTROPHILS NFR BLD: 84.3 % (ref 38–73)
NEUTROPHILS NFR BLD: 84.8 % (ref 38–73)
NRBC BLD-RTO: 0 /100 WBC
NUM UNITS TRANS PACKED RBC: NORMAL
PHOSPHATE SERPL-MCNC: 4.1 MG/DL (ref 2.7–4.5)
PLATELET # BLD AUTO: 98 K/UL (ref 150–450)
PMV BLD AUTO: 12.9 FL (ref 9.2–12.9)
PMV BLD AUTO: 13 FL (ref 9.2–12.9)
PMV BLD AUTO: 13 FL (ref 9.2–12.9)
POTASSIUM SERPL-SCNC: 4.3 MMOL/L (ref 3.5–5.1)
PROT SERPL-MCNC: 4.7 G/DL (ref 6–8.4)
PROTHROMBIN TIME: 15.6 SEC (ref 9–12.5)
RBC # BLD AUTO: 2.36 M/UL (ref 4–5.4)
RBC # BLD AUTO: 2.41 M/UL (ref 4–5.4)
RBC # BLD AUTO: 2.41 M/UL (ref 4–5.4)
SODIUM SERPL-SCNC: 137 MMOL/L (ref 136–145)
WBC # BLD AUTO: 3.41 K/UL (ref 3.9–12.7)
WBC # BLD AUTO: 3.73 K/UL (ref 3.9–12.7)
WBC # BLD AUTO: 3.78 K/UL (ref 3.9–12.7)

## 2024-04-10 PROCEDURE — 80053 COMPREHEN METABOLIC PANEL: CPT | Performed by: STUDENT IN AN ORGANIZED HEALTH CARE EDUCATION/TRAINING PROGRAM

## 2024-04-10 PROCEDURE — 25000003 PHARM REV CODE 250: Performed by: STUDENT IN AN ORGANIZED HEALTH CARE EDUCATION/TRAINING PROGRAM

## 2024-04-10 PROCEDURE — 85610 PROTHROMBIN TIME: CPT | Performed by: STUDENT IN AN ORGANIZED HEALTH CARE EDUCATION/TRAINING PROGRAM

## 2024-04-10 PROCEDURE — 83735 ASSAY OF MAGNESIUM: CPT | Performed by: STUDENT IN AN ORGANIZED HEALTH CARE EDUCATION/TRAINING PROGRAM

## 2024-04-10 PROCEDURE — 43235 EGD DIAGNOSTIC BRUSH WASH: CPT | Performed by: INTERNAL MEDICINE

## 2024-04-10 PROCEDURE — 37000008 HC ANESTHESIA 1ST 15 MINUTES: Performed by: INTERNAL MEDICINE

## 2024-04-10 PROCEDURE — C9113 INJ PANTOPRAZOLE SODIUM, VIA: HCPCS | Performed by: STUDENT IN AN ORGANIZED HEALTH CARE EDUCATION/TRAINING PROGRAM

## 2024-04-10 PROCEDURE — 25000003 PHARM REV CODE 250: Performed by: NURSE ANESTHETIST, CERTIFIED REGISTERED

## 2024-04-10 PROCEDURE — 0DJ08ZZ INSPECTION OF UPPER INTESTINAL TRACT, VIA NATURAL OR ARTIFICIAL OPENING ENDOSCOPIC: ICD-10-PCS | Performed by: INTERNAL MEDICINE

## 2024-04-10 PROCEDURE — 43235 EGD DIAGNOSTIC BRUSH WASH: CPT | Mod: ,,, | Performed by: INTERNAL MEDICINE

## 2024-04-10 PROCEDURE — 86920 COMPATIBILITY TEST SPIN: CPT

## 2024-04-10 PROCEDURE — 85025 COMPLETE CBC W/AUTO DIFF WBC: CPT | Mod: 91 | Performed by: STUDENT IN AN ORGANIZED HEALTH CARE EDUCATION/TRAINING PROGRAM

## 2024-04-10 PROCEDURE — 36415 COLL VENOUS BLD VENIPUNCTURE: CPT | Performed by: STUDENT IN AN ORGANIZED HEALTH CARE EDUCATION/TRAINING PROGRAM

## 2024-04-10 PROCEDURE — 84100 ASSAY OF PHOSPHORUS: CPT | Performed by: STUDENT IN AN ORGANIZED HEALTH CARE EDUCATION/TRAINING PROGRAM

## 2024-04-10 PROCEDURE — 20600001 HC STEP DOWN PRIVATE ROOM

## 2024-04-10 PROCEDURE — D9220A PRA ANESTHESIA: Mod: CRNA,,, | Performed by: NURSE ANESTHETIST, CERTIFIED REGISTERED

## 2024-04-10 PROCEDURE — 94761 N-INVAS EAR/PLS OXIMETRY MLT: CPT

## 2024-04-10 PROCEDURE — 63600175 PHARM REV CODE 636 W HCPCS: Performed by: STUDENT IN AN ORGANIZED HEALTH CARE EDUCATION/TRAINING PROGRAM

## 2024-04-10 PROCEDURE — D9220A PRA ANESTHESIA: Mod: ANES,,, | Performed by: INTERNAL MEDICINE

## 2024-04-10 PROCEDURE — 37000009 HC ANESTHESIA EA ADD 15 MINS: Performed by: INTERNAL MEDICINE

## 2024-04-10 PROCEDURE — P9016 RBC LEUKOCYTES REDUCED: HCPCS

## 2024-04-10 PROCEDURE — 63600175 PHARM REV CODE 636 W HCPCS: Performed by: NURSE ANESTHETIST, CERTIFIED REGISTERED

## 2024-04-10 RX ORDER — SODIUM CHLORIDE 0.9 % (FLUSH) 0.9 %
10 SYRINGE (ML) INJECTION
Status: DISCONTINUED | OUTPATIENT
Start: 2024-04-10 | End: 2024-04-10 | Stop reason: HOSPADM

## 2024-04-10 RX ORDER — PROPOFOL 10 MG/ML
VIAL (ML) INTRAVENOUS CONTINUOUS PRN
Status: DISCONTINUED | OUTPATIENT
Start: 2024-04-10 | End: 2024-04-10

## 2024-04-10 RX ORDER — PROPOFOL 10 MG/ML
VIAL (ML) INTRAVENOUS
Status: DISCONTINUED | OUTPATIENT
Start: 2024-04-10 | End: 2024-04-10

## 2024-04-10 RX ORDER — HALOPERIDOL 5 MG/ML
0.5 INJECTION INTRAMUSCULAR EVERY 10 MIN PRN
Status: DISCONTINUED | OUTPATIENT
Start: 2024-04-10 | End: 2024-04-10 | Stop reason: HOSPADM

## 2024-04-10 RX ORDER — LIDOCAINE HYDROCHLORIDE 20 MG/ML
INJECTION INTRAVENOUS
Status: DISCONTINUED | OUTPATIENT
Start: 2024-04-10 | End: 2024-04-10

## 2024-04-10 RX ADMIN — CEFTRIAXONE 1 G: 1 INJECTION, POWDER, FOR SOLUTION INTRAMUSCULAR; INTRAVENOUS at 03:04

## 2024-04-10 RX ADMIN — OCTREOTIDE ACETATE 50 MCG/HR: 500 INJECTION, SOLUTION INTRAVENOUS; SUBCUTANEOUS at 09:04

## 2024-04-10 RX ADMIN — LIDOCAINE HYDROCHLORIDE 60 MG: 20 INJECTION INTRAVENOUS at 09:04

## 2024-04-10 RX ADMIN — PANTOPRAZOLE SODIUM 40 MG: 40 INJECTION, POWDER, FOR SOLUTION INTRAVENOUS at 09:04

## 2024-04-10 RX ADMIN — SODIUM CHLORIDE: 0.9 INJECTION, SOLUTION INTRAVENOUS at 09:04

## 2024-04-10 RX ADMIN — PROPOFOL 150 MCG/KG/MIN: 10 INJECTION, EMULSION INTRAVENOUS at 09:04

## 2024-04-10 RX ADMIN — OCTREOTIDE ACETATE 50 MCG/HR: 500 INJECTION, SOLUTION INTRAVENOUS; SUBCUTANEOUS at 11:04

## 2024-04-10 RX ADMIN — PROPOFOL 60 MG: 10 INJECTION, EMULSION INTRAVENOUS at 09:04

## 2024-04-10 RX ADMIN — OCTREOTIDE ACETATE 50 MCG/HR: 500 INJECTION, SOLUTION INTRAVENOUS; SUBCUTANEOUS at 12:04

## 2024-04-10 NOTE — HOSPITAL COURSE
Admitted for concern of GI bleeding in patient on active chemotherapy. Pt given 3 units of pRBCs and octreotide with hx of gastric and esophageal varices. EGD 4/10 with gastric varices showing stigmata. ID and hepatology consulted for input on EUS guided glue injection of varices.  Hepatology recommending TIPS and IR to perform. IR with successful TIPS 4/12 and variceal embolization. Pt clinically stable, stools without melena. No signs of HE after TIPS. Patient clinically stable and to be discharged with as needed lactulose if she develops Hg. She will have follow up with Oncology, hepatology, and GI. Concern for drop in Hg, will transfuse another unit prior to D/C, plan to D/C 4/16.

## 2024-04-10 NOTE — SUBJECTIVE & OBJECTIVE
Interval History: Continued dark blood per rectum on BM this AM. EGD with gastric varices showing stigmata, IR and hepatology consulted    Oncology Treatment Plan:   OP BREAST FAM-TRASTUZUMAB DERUXTECAN-NXKI Q3W    Medications:  Continuous Infusions:   octreotide (SANDOSTATIN) 500 mcg in sodium chloride 0.9% 100 mL infusion 50 mcg/hr (04/10/24 0042)     Scheduled Meds:   cefTRIAXone (Rocephin) IV (PEDS and ADULTS)  1 g Intravenous Q24H    pantoprazole  40 mg Intravenous BID     PRN Meds:0.9%  NaCl infusion (for blood administration), 0.9%  NaCl infusion (for blood administration), dextrose 10%, dextrose 10%, glucagon (human recombinant), glucose, glucose, naloxone, ondansetron, prochlorperazine, sodium chloride 0.9%     Review of Systems  Objective:     Vital Signs (Most Recent):  Temp: 98.2 °F (36.8 °C) (04/10/24 1004)  Pulse: 85 (04/10/24 1030)  Resp: 20 (04/10/24 1030)  BP: (!) 117/55 (04/10/24 1030)  SpO2: 98 % (04/10/24 1030) Vital Signs (24h Range):  Temp:  [97.6 °F (36.4 °C)-99.6 °F (37.6 °C)] 98.2 °F (36.8 °C)  Pulse:  [] 85  Resp:  [10-23] 20  SpO2:  [91 %-100 %] 98 %  BP: (102-132)/(50-60) 117/55     Weight: 83.5 kg (184 lb)  Body mass index is 33.65 kg/m².  Body surface area is 1.91 meters squared.      Intake/Output Summary (Last 24 hours) at 4/10/2024 1110  Last data filed at 4/10/2024 0957  Gross per 24 hour   Intake 1544.28 ml   Output 1000 ml   Net 544.28 ml        Physical Exam  Constitutional:       Appearance: She is obese. She is not ill-appearing or diaphoretic.   HENT:      Mouth/Throat:      Mouth: Mucous membranes are moist.   Eyes:      Comments: Pallor eyelids   Cardiovascular:      Rate and Rhythm: Normal rate and regular rhythm.      Heart sounds: Murmur heard.   Pulmonary:      Effort: Pulmonary effort is normal. No respiratory distress.      Breath sounds: No wheezing or rales.   Abdominal:      General: Abdomen is flat. Bowel sounds are normal. There is no distension.       Tenderness: There is no abdominal tenderness.   Genitourinary:     Comments: RAYAN with dark colored blood  Musculoskeletal:         General: Swelling present.      Right lower leg: No edema.      Left lower leg: No edema.   Skin:     General: Skin is warm.      Coloration: Skin is not jaundiced.   Neurological:      Mental Status: She is alert and oriented to person, place, and time.          Significant Labs:   CBC:   Recent Labs   Lab 04/09/24  1153 04/09/24  1823 04/10/24  0747   WBC 2.93* 3.06* 3.78*  3.73*   HGB 5.6* 6.9* 8.4*  8.0*   HCT 17.9* 21.5* 25.2*  24.3*   * 99* 98*  98*    and CMP:   Recent Labs   Lab 04/09/24  1153 04/10/24  0747    137   K 3.8 4.3    105   CO2 27 27   * 135*   BUN 17 20   CREATININE 0.5 0.6   CALCIUM 7.8* 7.9*   PROT 5.0* 4.7*   ALBUMIN 2.4* 2.2*   BILITOT 4.0* 4.6*   ALKPHOS 87 79   AST 33 32   ALT 14 13   ANIONGAP 6* 5*       Diagnostic Results:  None

## 2024-04-10 NOTE — HPI
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 Two days prior to presentation, she developed dull left upper quadrant abdominal discomfort, and then the following day, she began having hematochezia with BRBPR, lethargy and exertional SOB. She denies fevers, chills, nausea, vomiting, hematemesis, chest pain, dysuria, or syncope. In the ED, AF, tachycardic , hemodynamically stable, and stable on RA. Labs notable for Hg 5.6 (last Hgb 9.3), Plt 115, Tbil 4, U/A wnl. She was given IV PPI, 2 units of pRBCs, and octreotide. Admitted to Medical Oncology for anemia and dark tarry stools. GI was consulted and performed EGD on 4/10/24; it showed small (< 5 mm) esophageal varices with no bleeding and no stigmata of recent bleeding. Portal hypertensive gastropathy seen. Type 1 isolated gastric varices (IGV1, varices located in the fundus), without bleeding.    Prior GI history and endoscopies:  In June 2023, patient had hematemesis and melena. EGD showed IGV1 gastric varices 20 mm in diameter with stigmata of recent bleeding. EUS-guided coiling done at that time. Repeat EGD in showed IGV1 varices now 14 mm in diameter. Grade 1 EV also present.   - EGD in Jan 2024 showed IGV1 varix diameter 15 mm. Minimal to no effect of coiling.   - No prior colonoscopies

## 2024-04-10 NOTE — PLAN OF CARE
Pt involved in plan of care and communicating needs throughout shift. Up in room and to bathroom w/ assist; no c/o pain. NPO then clear liquid diet, voiding without difficulty. EGD this morning bleeding present in BM. IR and hepatology consult.  Sandostatin @ 10 ml/hr. All VSS; no acute events so far this shift.  Pt remaining free from falls or injury throughout shift; bed locked and in lowest position; call light within reach.  Pt instructed to call for assistance as needed.  Q1H rounding done on pt.

## 2024-04-10 NOTE — ASSESSMENT & PLAN NOTE
53 hx of left breast ca, mets to lung, HAWTHORNE cirrhosis, obesity    Symptoms: melana, BRBPR, fatigue, SOB  Diagnostics: Hgb 5.6 (baseline 9) drop from 7.2 yesterday    - GI consult for EGD / C-Scope consult for source evaluation +/- intervention Plan for 4/10  - IV Protonix 80mg x 1, followed by IV 40mg BID thereafter  - clears until midnight NPO, hold pharmacologic VTE PPx  - Trend CBCs ; Transfuse Hgb < 7  - Resuscitate IVF PRN  - Transfused 3 unit pRBCs  - Cardiac telemetry  - Maintain 2 large bore IVs  - If hemodynamically unstable +/- new bleed, stat CTA / consult IR for embolization evaluation   - CTX for hx of varices (esophageal and gastric) and GI bleed    -EGD 4/10 with gastric varices and stigmata of bleeding, consulted IR/hepatology for input on EUS guided glue injection

## 2024-04-10 NOTE — TRANSFER OF CARE
"Anesthesia Transfer of Care Note    Patient: Shikha López    Procedure(s) Performed: Procedure(s) (LRB):  EGD (ESOPHAGOGASTRODUODENOSCOPY) (N/A)    Patient location: PACU    Anesthesia Type: general    Transport from OR: Transported from OR on room air with adequate spontaneous ventilation    Post pain: adequate analgesia    Post assessment: no apparent anesthetic complications and tolerated procedure well    Post vital signs: stable    Level of consciousness: awake, alert and oriented    Nausea/Vomiting: no nausea/vomiting    Complications: none    Transfer of care protocol was followed      Last vitals: Visit Vitals  /60 (BP Location: Left arm, Patient Position: Lying)   Pulse 85   Temp 36.6 °C (97.8 °F) (Tympanic)   Resp 17   Ht 5' 2" (1.575 m)   Wt 83.5 kg (184 lb)   LMP  (LMP Unknown)   SpO2 94%   Breastfeeding No   BMI 33.65 kg/m²     "

## 2024-04-10 NOTE — PROGRESS NOTES
Admit Assessment    Patient Identification: Shikha López   :  1970  Admit Date:  2024  Attending Provider:  Noah Terry MD              Referral:   Patient was admitted to [facility] with a diagnosis of <principal problem not specified>, and was admitted this hospital stay due to Acute gastrointestinal bleeding [K92.2]  Pancytopenia [D61.818]  Chest pain [R07.9]  Symptomatic anemia [D64.9].       is involved was referred to the Social Work Department via routine referral. Patient presents as a 53 y.o. year old single female.    Persons interviewed: Patient    Patient reports she has been staying with her mom due to her mom having dementia. Patient reports she has been managing her breast cancer and everything that comes with it for the past 17 years, but it's the stress of having to care for a parent that has dementia. Patient reports she meets with Dr. Figueroa which has been very helpful. Patient reports she has an appointment tomorrow to meet with Dr. Figueroa and was trying to reschedule the appointment. GIOVANNI spoke with Leda, Oncology RN Navigator that was able to reschedule the appointment to  at 10:30 am. GIOVANNI notified the patient.     Patient reports she would like to find a PCP that is knowledgeable about oncology.     Living Situation:    Patient reports she resides in a single tory home alone with 5 steps to enter the home.     Patient reports she is currently residing with her mom that has dementia until she is able to get her placed in a memory care unit. Patient reports she has a sitter for her mom in the morning and she is the primary caregiver in the evenings and all day on the weekends. Patient reports her sister is currently sitting with her mom while she is IP.     Resides at:  [address] [place] [address]   Phone: [phone] (Cell)       (RETIRED) Functional Status Prior  Ambulation Prior: 0-->independent  Transferring:  0-->independent  Toiletin-->independent    Current or Past Agencies and Description of Services/Supplies    DME  Agency Name: N/A     [facility]  [facility]  Agency Phone Number: [phone]  Services: Nursing, PT/OT    IV Infusion  Agency Name: N/A    Nutrition: Regular Diet    Outpatient Pharmacy:     [facility] [place] [address] [address]  [facility] [address]  Phone: [phone] Fax: [phone]    Patient Preference of agencies include: Patient has no agency preference.     Patient/Caregiver informed of right to choose providers or agencies.  Patient provides permission to release any necessary information to [facility] and to Non-[facility] agencies as needed to facilitate patient care, treatment planning, and patient discharge planning. Written and verbal resources provided.    Coping  Patient reports she is coping just fine.      Adjustment to Diagnosis and Treatment  Patient reports she is adjusting well.     Emotional/Behavioral/Cognitive Issues  Patient reports no emotional, behavioral or cognitive issues at this time.     Employment: Patient retired from the fuel industry    Finances: SSDI    Housing: Safe and permanent    Support: Good support system with her daughter and grandson.    Hobbies/Leisure/Recreation: Patient enjoys gardening, baking, watching movies and playing with her dogs.      Brinda: Shinto    Will: Currently working on obtaining a Living Will.     Transportation: Patient's daughter will provide transportation upon d/c.    Advance Directives: Currently working on obtaining a Advance Directives..     History/Current Symptoms of Anxiety/Depression: No    History/Current Substance Use:   Social History     Tobacco Use    Smoking status: Never    Smokeless tobacco: Never   Substance and Sexual Activity    Alcohol use: Never    Drug use: Never    Sexual activity: Not Currently     Indications of Abuse/Neglect: No  Abuse Screen (yes response referral  indicated)  Feels Unsafe at Home or Work/School: no  Feels Threatened by Someone: no  Does anyone try to keep you from having contact with others or doing things outside your home?: no  Physical Signs of Abuse Present: no    Financial:  Payer/Plan Subscr  Sex Relation Sub. Ins. ID Effective Group Num   1. MEDICARE - [place]* BRADLY GUZMÁN 1970 Female Self [id number] 3/1/09                                    [address]   2. [place]ID [place] BRADLY GUZMÁN 1970 Female Self [id number]* 18 [id number]                                   [address] [address]      Other identified concerns/needs: TBD    Plan: TBD    Interventions/Referrals: TBD  Patient/caregiver engaged in treatment planning process.     providing psychosocial and supportive counseling, resources, education, assistance and discharge planning as appropriate.  Patient/caregiver state understanding of  available resources,  following, remains available.    ELSIE Wilkes, [place]  Oncology Social Worker   [facility] - Oncology  [phone]

## 2024-04-10 NOTE — PROGRESS NOTES
[facility] - Oncology ([facility])  Hematology/Oncology  Progress Note    Patient Name: Shikha López  Admission Date: 4/9/2024  Hospital Length of Stay: 1 days  Code Status: Full Code     Subjective:     HPI:  53F with PMH stage 4 breast ca left (mets HER 2 +) Dr. 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 Yesterday, she began having hematochezia with BRBPR, lethargy and exertional SOB. Denies fevers, chills, nausea, vomiting, hematemesis, cp, dysuria, peripheral edema.     AF, tachycardic, BP stable, on RA, Hg 5.6, Plt 115, Tbil 4, U/A wnl, given IV PPI, pending 2 units of pRBCs, octreotide    Admitted to oncology service for GI bleeding on active chemotherapy    Interval History: Continued dark blood per rectum on BM this AM. EGD with gastric varices showing stigmata, IR and hepatology consulted    Oncology Treatment Plan:   OP BREAST FAM-TRASTUZUMAB DERUXTECAN-NXKI Q3W    Medications:  Continuous Infusions:   octreotide (SANDOSTATIN) 500 mcg in sodium chloride 0.9% 100 mL infusion 50 mcg/hr (04/10/24 0042)     Scheduled Meds:   cefTRIAXone (Rocephin) IV (PEDS and ADULTS)  1 g Intravenous Q24H    pantoprazole  40 mg Intravenous BID     PRN Meds:0.9%  NaCl infusion (for blood administration), 0.9%  NaCl infusion (for blood administration), dextrose 10%, dextrose 10%, glucagon (human recombinant), glucose, glucose, naloxone, ondansetron, prochlorperazine, sodium chloride 0.9%     Review of Systems  Objective:     Vital Signs (Most Recent):  Temp: 98.2 °F (36.8 °C) (04/10/24 1004)  Pulse: 85 (04/10/24 1030)  Resp: 20 (04/10/24 1030)  BP: (!) 117/55 (04/10/24 1030)  SpO2: 98 % (04/10/24 1030) Vital Signs (24h Range):  Temp:  [97.6 °F (36.4 °C)-99.6 °F (37.6 °C)] 98.2 °F (36.8 °C)  Pulse:  [] 85  Resp:  [10-23]  20  SpO2:  [91 %-100 %] 98 %  BP: (102-132)/(50-60) 117/55     Weight: 83.5 kg (184 lb)  Body mass index is 33.65 kg/m².  Body surface area is 1.91 meters squared.      Intake/Output Summary (Last 24 hours) at 4/10/2024 1110  Last data filed at 4/10/2024 0957  Gross per 24 hour   Intake 1544.28 ml   Output 1000 ml   Net 544.28 ml        Physical Exam  Constitutional:       Appearance: She is obese. She is not ill-appearing or diaphoretic.   HENT:      Mouth/Throat:      Mouth: Mucous membranes are moist.   Eyes:      Comments: Pallor eyelids   Cardiovascular:      Rate and Rhythm: Normal rate and regular rhythm.      Heart sounds: Murmur heard.   Pulmonary:      Effort: Pulmonary effort is normal. No respiratory distress.      Breath sounds: No wheezing or rales.   Abdominal:      General: Abdomen is flat. Bowel sounds are normal. There is no distension.      Tenderness: There is no abdominal tenderness.   Genitourinary:     Comments: ARYAN with dark colored blood  Musculoskeletal:         General: Swelling present.      Right lower leg: No edema.      Left lower leg: No edema.   Skin:     General: Skin is warm.      Coloration: Skin is not jaundiced.   Neurological:      Mental Status: She is alert and oriented to person, place, and time.          Significant Labs:   CBC:   Recent Labs   Lab 04/09/24  1153 04/09/24  1823 04/10/24  0747   WBC 2.93* 3.06* 3.78*  3.73*   HGB 5.6* 6.9* 8.4*  8.0*   HCT 17.9* 21.5* 25.2*  24.3*   * 99* 98*  98*    and CMP:   Recent Labs   Lab 04/09/24  1153 04/10/24  0747    137   K 3.8 4.3    105   CO2 27 27   * 135*   BUN 17 20   CREATININE 0.5 0.6   CALCIUM 7.8* 7.9*   PROT 5.0* 4.7*   ALBUMIN 2.4* 2.2*   BILITOT 4.0* 4.6*   ALKPHOS 87 79   AST 33 32   ALT 14 13   ANIONGAP 6* 5*       Diagnostic Results:  None  Assessment/Plan:     Gastrointestinal hemorrhage with melena  53 hx of left breast ca, mets to lung, HAWTHORNE cirrhosis, obesity    Symptoms: melana,  BRBPR, fatigue, SOB  Diagnostics: Hgb 5.6 (baseline 9) drop from 7.2 yesterday    - GI consult for EGD / C-Scope consult for source evaluation +/- intervention Plan for 4/10  - IV Protonix 80mg x 1, followed by IV 40mg BID thereafter  - clears until midnight NPO, hold pharmacologic VTE PPx  - Trend CBCs ; Transfuse Hgb < 7  - Resuscitate IVF PRN  - Transfused 3 unit pRBCs  - Cardiac telemetry  - Maintain 2 large bore IVs  - If hemodynamically unstable +/- new bleed, stat CTA / consult IR for embolization evaluation   - CTX for hx of varices (esophageal and gastric) and GI bleed    -EGD 4/10 with gastric varices and stigmata of bleeding, consulted IR/hepatology for input on EUS guided glue injection    Severe obesity (BMI 35.0-39.9) with comorbidity  -documented  -Diet, exercise, and weight for mortality benefits    Esophageal and gastric varices  -2/2 HAWTHORNE cirrhosis  -hx of bleeding varices s/p banding in June 2023    Malignant neoplasm metastatic to left lung  -s/p chemo, radiation, post op RT  -herceptin x 3.5 till lung mass progression  -metastatic    Breast cancer, stage 4, left  -Follows with Dr. Willis  -On [facility] for 2 yrs          Shaun Ding MD  Hematology/Oncology  [facility] - Oncology ([facility])

## 2024-04-10 NOTE — NURSING TRANSFER
Nursing Transfer Note      4/10/2024   10:34 AM    Reason patient is being transferred: Recovery criteria met    PACU nurse giving handoff: NAHUN Calderon    Nurse receiving handoff: NAHUN Nair    Transfer to 858    Transfer via stretcher    Transfer with cardiac monitoring    Transported by Patient Escort/Transport    Telemetry: Box # [id number] HR 85 NSR  Verified on & working by PACU RN: Yes    Transfer Vital Signs @ 1030  Temperature: 98.2  Blood Pressure: 117/55  Heart Rate: 85  O2 Sat: 98% on RA  Respirations: 20    Medicines/Equipment sent with patient: Octreotide infusing per pump    Any special needs or follow-up needed: See GI note/recs    Patient belongings transferred with patient: No    Chart send with patient: Yes    Notified: None listed/Patient declined    Patient reassessed prior to transfer at: 4/10/24 @ 1030

## 2024-04-10 NOTE — INTERVAL H&P NOTE
The patient has been examined and the H&P has been reviewed:    Pre-Procedure H and P Addendum    Patient seen and examined.  History and exam unchanged from prior history and physical.      Procedure: EGD  Indication: Melena  ASA Class: per anesthesiology  Airway: normal  Neck Mobility: full range of motion  Mallampatti score: per anesthesia  History of anesthesia problems: no  Family history of anesthesia problems: no  Anesthesia Plan: MAC    Active Hospital Problems    Diagnosis  POA    Severe obesity (BMI 35.0-39.9) with comorbidity [E66.01]  Yes    Esophageal and gastric varices [I85.00, I86.4]  Yes    Gastrointestinal hemorrhage with melena [K92.1]  Yes    Breast cancer, stage 4, left [C50.912]  Yes    Malignant neoplasm metastatic to left lung [C78.02]  Yes      Resolved Hospital Problems   No resolved problems to display.

## 2024-04-10 NOTE — PROVATION PATIENT INSTRUCTIONS
Discharge Summary/Instructions after an Endoscopic Procedure  Patient Name: Shikha López  Patient MRN: [id number]  Patient YOB: 1970  Wednesday, April 10, 2024  Ze Anderson MD  Dear patient,  As a result of recent federal legislation (The Federal Cures Act), you may   receive lab or pathology results from your procedure in your [facility]   account before your physician is able to contact you. Your physician or   their representative will relay the results to you with their   recommendations at their soonest availability.  Thank you,  RESTRICTIONS:  During your procedure today, you received medications for sedation.  These   medications may affect your judgment, balance and coordination.  Therefore,   for 24 hours, you have the following restrictions:   - DO NOT drive a car, operate machinery, make legal/financial decisions,   sign important papers or drink alcohol.    ACTIVITY:  Today: no heavy lifting, straining or running due to procedural   sedation/anesthesia.  The following day: return to full activity including work.  DIET:  Eat and drink normally unless instructed otherwise.     TREATMENT FOR COMMON SIDE EFFECTS:  - Mild abdominal pain, nausea, belching, bloating or excessive gas:  rest,   eat lightly and use a heating pad.  - Sore Throat: treat with throat lozenges and/or gargle with warm salt   water.  - Because air was used during the procedure, expelling large amounts of air   from your rectum or belching is normal.  - If a bowel prep was taken, you may not have a bowel movement for 1-3 days.    This is normal.  SYMPTOMS TO WATCH FOR AND REPORT TO YOUR PHYSICIAN:  1. Abdominal pain or bloating, other than gas cramps.  2. Chest pain.  3. Back pain.  4. Signs of infection such as: chills or fever occurring within 24 hours   after the procedure.  5. Rectal bleeding, which would show as bright red, maroon, or black stools.   (A tablespoon of blood from the rectum is not serious, especially  if   hemorrhoids are present.)  6. Vomiting.  7. Weakness or dizziness.  GO DIRECTLY TO THE NEAREST EMERGENCY ROOM IF YOU HAVE ANY OF THE FOLLOWING:      Difficulty breathing              Chills and/or fever over 101 F   Persistent vomiting and/or vomiting blood   Severe abdominal pain   Severe chest pain   Black, tarry stools   Bleeding- more than one tablespoon   Any other symptom or condition that you feel may need urgent attention  Your doctor recommends these additional instructions:  If any biopsies were taken, your doctors clinic will contact you in 1 to 2   weeks with any results.  - Return patient to hospital kee for ongoing care.   - Clear liquid diet.   - Continue present medications.   - Case discussed with Dr. Home Lopez with our [facility] team.  We recommend   consultation with IR to evaluate possibility of endovascular treatment. If   not amenable to treatment by IR, can consider repeat endoscopic coiling.  For questions, problems or results please call your physician - Ze Anderson MD at Work:  [phone].  [facility] [place], EMERGENCY ROOM PHONE NUMBER: [phone]  IF A COMPLICATION OR EMERGENCY SITUATION ARISES AND YOU ARE UNABLE TO REACH   YOUR PHYSICIAN - GO DIRECTLY TO THE EMERGENCY ROOM.  Ze Anderson MD  4/10/2024 10:44:19 AM  This report has been verified and signed electronically.  Dear patient,  As a result of recent federal legislation (The Federal Cures Act), you may   receive lab or pathology results from your procedure in your [facility]   account before your physician is able to contact you. Your physician or   their representative will relay the results to you with their   recommendations at their soonest availability.  Thank you,  PROVATION

## 2024-04-10 NOTE — ANESTHESIA POSTPROCEDURE EVALUATION
Anesthesia Post Evaluation    Patient: Shikha López    Procedure(s) Performed: Procedure(s) (LRB):  EGD (ESOPHAGOGASTRODUODENOSCOPY) (N/A)    Final Anesthesia Type: general      Patient location during evaluation: PACU  Patient participation: Yes- Able to Participate  Level of consciousness: awake and alert  Post-procedure vital signs: reviewed and stable  Pain management: adequate  Airway patency: patent    PONV status at discharge: No PONV  Anesthetic complications: no      Cardiovascular status: blood pressure returned to baseline  Respiratory status: unassisted  Hydration status: euvolemic  Follow-up not needed.              Vitals Value Taken Time   /54 04/10/24 1131   Temp 36.6 °C (97.9 °F) 04/10/24 1131   Pulse 78 04/10/24 1149   Resp 18 04/10/24 1131   SpO2 99 % 04/10/24 1131         Event Time   Out of Recovery 10:34:43         Pain/Yisel Score: Yisel Score: 10 (4/10/2024 10:30 AM)

## 2024-04-10 NOTE — CONSULTS
Interventional Radiology has been consulted for TIPS with possible variceal embolization in this patient with a history of [place] cirrhosis, gastric/esophageal varices, and breast cancer who presented to the hospital with melena, abdominal pain, and lethargy. Would appreciate hepatology recs prior to proceeding with TIPS. Will complete full IR consult once hepatology has assessed the patient. Informed consulting provider of IR recommendations via secure chat.

## 2024-04-10 NOTE — ANESTHESIA PREPROCEDURE EVALUATION
[facility]  Anesthesia Pre-Operative Evaluation       Patient Name: Shikha López  YOB: 1970  MRN: [id number]  [facility]: [id number]      Code Status: Full Code   Date of Procedure: 4/10/2024  Anesthesia: General/MAC Procedure: Procedure(s) (LRB):  EGD (ESOPHAGOGASTRODUODENOSCOPY) (N/A)  Pre-Operative Diagnosis: Acute gastrointestinal bleeding [K92.2]  Proceduralist: Surgeon(s) and Role:     * Ze Anderson MD - Primary     * Ze Anderson MD - Assisting     * Vicente Mahmood MD - Assisting Nurse: aFtou Nuñez LPN  Registered Nurse: Martha Licea RN      SUBJECTIVE:   Shikha López is a 53 y.o. female who  has a past medical history of Aortic atherosclerosis (07/06/2023), Breast cancer, Esophageal and gastric varices (06/23/2023), and Malignant neoplasm metastatic to left lung (06/08/2021). 53F with PMH stage 4 breast ca left (mets HER 2 +) Dr. 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 Yesterday, she began having hematochezia with BRBPR, lethargy and exertional SOB. Denies fevers, chills, nausea, vomiting, hematemesis, cp, dysuria, peripheral edema.     AF, tachycardic, BP stable, on RA, Hg 5.6, Plt 115, Tbil 4, U/A wnl, given IV PPI, pending 2 units of pRBCs, octreotide    Admitted to oncology service for GI bleeding on active chemotherapy    Admitted for anemia and dark tarry stools. Pt denies vomiting blood this admission. Appropriately NPO. Given 2 units of PRBCS overnight. Vital signs stable on my evaluation     Anticoagulants   Medication Route Frequency       she has a current medication list which includes the following long-term medication(s): furosemide, methylphenidate hcl, pantoprazole, and olanzapine.   ALLERGIES:   Review of patient's allergies  indicates:  No Known Allergies  LDA:          Lines/Drains/Airways       Central Venous Catheter Line  Duration             Port A Cath Single Lumen 06/22/21 right internal jugular 1023 days              Peripheral Intravenous Line  Duration                  Peripheral IV - Single Lumen 04/09/24 1220 18 G Right Antecubital <1 day         Peripheral IV - Single Lumen 04/09/24 1358 20 G Right Antecubital <1 day                  MEDICATIONS:     Antibiotics (From admission, onward)      Start     Stop Route Frequency Ordered    04/09/24 1615  cefTRIAXone (Rocephin) 1 g in dextrose 5 % in water (D5W) 100 mL IVPB (MB+)         04/14/24 1614 IV Every 24 hours (non-standard times) 04/09/24 1509          VTE Risk Mitigation (From admission, onward)           Ordered     IP VTE HIGH RISK PATIENT  Once         04/09/24 1451     Place sequential compression device  Until discontinued         04/09/24 1451     Reason for No Pharmacological VTE Prophylaxis  Once        Question:  Reasons:  Answer:  Active Bleeding    04/09/24 1451                  Current Facility-Administered Medications   Medication Dose Route Frequency Provider Last Rate Last Admin    0.9%  NaCl infusion (for blood administration)   Intravenous Q24H PRN Rober Ricci III, MD        0.9%  NaCl infusion (for blood administration)   Intravenous Q24H PRN Shaun Ding MD        cefTRIAXone (Rocephin) 1 g in dextrose 5 % in water (D5W) 100 mL IVPB (MB+)  1 g Intravenous Q24H Prakash Conway MD   Stopped at 04/09/24 1754    dextrose 10% bolus 125 mL 125 mL  12.5 g Intravenous PRN Prakash Conway MD        dextrose 10% bolus 250 mL 250 mL  25 g Intravenous PRN Prakash Conway MD        glucagon (human recombinant) injection 1 mg  1 mg Intramuscular PRN Prakash Conway MD        glucose chewable tablet 16 g  16 g Oral PRN Prakash Conway MD        glucose chewable tablet 24 g  24 g Oral PRN  Prakash Conway MD        naloxone 0.4 mg/mL injection 0.02 mg  0.02 mg Intravenous PRN Prakash Conway MD        octreotide (SANDOSTATIN) 500 mcg in sodium chloride 0.9% 100 mL infusion  50 mcg/hr Intravenous Continuous Prakash Conway MD 10 mL/hr at 04/10/24 0042 50 mcg/hr at 04/10/24 0042    ondansetron injection 4 mg  4 mg Intravenous Q8H PRN Prakash Conway MD        pantoprazole injection 40 mg  40 mg Intravenous BID Prakash Conway MD   40 mg at 04/10/24 0900    prochlorperazine injection Soln 5 mg  5 mg Intravenous Q6H PRN Prakash Conway MD        sodium chloride 0.9% flush 10 mL  10 mL Intravenous Q12H PRN Prakash Conway MD              History:     Active Hospital Problems    Diagnosis  POA    Severe obesity (BMI 35.0-39.9) with comorbidity [E66.01]  Yes    Esophageal and gastric varices [I85.00, I86.4]  Yes    Gastrointestinal hemorrhage with melena [K92.1]  Yes    Breast cancer, stage 4, left [C50.912]  Yes    Malignant neoplasm metastatic to left lung [C78.02]  Yes      Resolved Hospital Problems   No resolved problems to display.     Surgical History:    has a past surgical history that includes Mastectomy; Esophagogastroduodenoscopy (N/A, 6/23/2023); Endoscopic ultrasound of upper gastrointestinal tract (N/A, 6/27/2023); Esophagogastroduodenoscopy (N/A, 6/27/2023); Esophagogastroduodenoscopy (N/A, 8/3/2023); Endoscopic ultrasound of upper gastrointestinal tract (N/A, 1/12/2024); and Esophagogastroduodenoscopy (N/A, 1/12/2024).   Social History:    reports that she is not currently sexually active.  reports that she has never smoked. She has never used smokeless tobacco. She reports that she does not drink alcohol and does not use drugs.     OBJECTIVE:     Vital Signs (Most Recent):  Temp: 36.6 °C (97.8 °F) (04/10/24 0918)  Pulse: 85 (04/10/24 0918)  Resp: 17 (04/10/24 0918)  BP: 132/60 (04/10/24 0918)  SpO2: 96 %  (04/10/24 0918) Vital Signs Range (Last 24H):  Temp:  [36.4 °C (97.6 °F)-37.6 °C (99.6 °F)]   Pulse:  []   Resp:  [10-20]   BP: (102-132)/(50-60)   SpO2:  [91 %-100 %]        Body mass index is 33.65 kg/m².   Wt Readings from Last 4 Encounters:   04/10/24 83.5 kg (184 lb)   04/08/24 84.3 kg (185 lb 13.6 oz)   04/08/24 84.3 kg (185 lb 13.6 oz)   03/18/24 84 kg (185 lb 3 oz)     Significant Labs:  Lab Results   Component Value Date    WBC 3.78 (L) 04/10/2024    WBC 3.73 (L) 04/10/2024    HGB 8.4 (L) 04/10/2024    HGB 8.0 (L) 04/10/2024    HCT 25.2 (L) 04/10/2024    HCT 24.3 (L) 04/10/2024    PLT 98 (L) 04/10/2024    PLT 98 (L) 04/10/2024     04/10/2024    K 4.3 04/10/2024     04/10/2024    CREATININE 0.6 04/10/2024    BUN 20 04/10/2024    CO2 27 04/10/2024     (H) 04/10/2024    CALCIUM 7.9 (L) 04/10/2024    MG 1.9 04/10/2024    PHOS 4.1 04/10/2024    ALKPHOS 79 04/10/2024    ALT 13 04/10/2024    AST 32 04/10/2024    ALBUMIN 2.2 (L) 04/10/2024    INR 1.5 (H) 04/10/2024    APTT 29.2 06/22/2023    TROPONINI 0.008 07/20/2023     (H) 07/20/2023    HCGQUANT <2.0 05/02/2007     No LMP recorded (lmp unknown). Patient is postmenopausal.  Recent Results (from the past 72 hour(s))   CBC Auto Differential    Collection Time: 04/08/24  9:15 AM   Result Value Ref Range    WBC 3.31 (L) 3.90 - 12.70 K/uL    RBC 2.03 (L) 4.00 - 5.40 M/uL    Hemoglobin 7.2 (L) 12.0 - 16.0 g/dL    Hematocrit 23.3 (L) 37.0 - 48.5 %     (H) 82 - 98 fL    MCH 35.5 (H) 27.0 - 31.0 pg    MCHC 30.9 (L) 32.0 - 36.0 g/dL    RDW 22.6 (H) 11.5 - 14.5 %    Platelets 144 (L) 150 - 450 K/uL    MPV 12.4 9.2 - 12.9 fL    Immature Granulocytes 0.3 0.0 - 0.5 %    Gran # (ANC) 2.3 1.8 - 7.7 K/uL    Immature Grans (Abs) 0.01 0.00 - 0.04 K/uL    Lymph # 0.6 (L) 1.0 - 4.8 K/uL    Mono # 0.3 0.3 - 1.0 K/uL    Eos # 0.1 0.0 - 0.5 K/uL    Baso # 0.03 0.00 - 0.20 K/uL    nRBC 0 0 /100 WBC    Gran % 70.1 38.0 - 73.0 %    Lymph % 16.9 (L)  18.0 - 48.0 %    Mono % 9.1 4.0 - 15.0 %    Eosinophil % 2.7 0.0 - 8.0 %    Basophil % 0.9 0.0 - 1.9 %    Differential Method Automated    Comprehensive Metabolic Panel    Collection Time: 04/08/24  9:15 AM   Result Value Ref Range    Sodium 141 136 - 145 mmol/L    Potassium 3.6 3.5 - 5.1 mmol/L    Chloride 108 95 - 110 mmol/L    CO2 29 23 - 29 mmol/L    Glucose 108 70 - 110 mg/dL    BUN 13 6 - 20 mg/dL    Creatinine 0.6 0.5 - 1.4 mg/dL    Calcium 8.0 (L) 8.7 - 10.5 mg/dL    Total Protein 5.2 (L) 6.0 - 8.4 g/dL    Albumin 2.5 (L) 3.5 - 5.2 g/dL    Total Bilirubin 2.7 (H) 0.1 - 1.0 mg/dL    Alkaline Phosphatase 97 55 - 135 U/L    AST 34 10 - 40 U/L    ALT 14 10 - 44 U/L    eGFR >60.0 >60 mL/min/1.73 m^2    Anion Gap 4 (L) 8 - 16 mmol/L   EKG 12-lead    Collection Time: 04/09/24 11:08 AM   Result Value Ref Range    QRS Duration 94 ms    OHS QTC Calculation 444 ms   CBC auto differential    Collection Time: 04/09/24 11:53 AM   Result Value Ref Range    WBC 2.93 (L) 3.90 - 12.70 K/uL    RBC 1.56 (L) 4.00 - 5.40 M/uL    Hemoglobin 5.6 (LL) 12.0 - 16.0 g/dL    Hematocrit 17.9 (LL) 37.0 - 48.5 %     (H) 82 - 98 fL    MCH 35.9 (H) 27.0 - 31.0 pg    MCHC 31.3 (L) 32.0 - 36.0 g/dL    RDW 23.1 (H) 11.5 - 14.5 %    Platelets 115 (L) 150 - 450 K/uL    MPV 12.7 9.2 - 12.9 fL    Immature Granulocytes 0.3 0.0 - 0.5 %    Gran # (ANC) 2.3 1.8 - 7.7 K/uL    Immature Grans (Abs) 0.01 0.00 - 0.04 K/uL    Lymph # 0.3 (L) 1.0 - 4.8 K/uL    Mono # 0.3 0.3 - 1.0 K/uL    Eos # 0.0 0.0 - 0.5 K/uL    Baso # 0.01 0.00 - 0.20 K/uL    nRBC 0 0 /100 WBC    Gran % 79.3 (H) 38.0 - 73.0 %    Lymph % 9.9 (L) 18.0 - 48.0 %    Mono % 9.9 4.0 - 15.0 %    Eosinophil % 0.3 0.0 - 8.0 %    Basophil % 0.3 0.0 - 1.9 %    Platelet Estimate Decreased (A)     Aniso Moderate     Poik Slight     Ovalocytes Occasional     Tear Drop Cells Occasional     Fragmented Cells Moderate     Differential Method Automated    Comprehensive metabolic panel    Collection  Time: 04/09/24 11:53 AM   Result Value Ref Range    Sodium 138 136 - 145 mmol/L    Potassium 3.8 3.5 - 5.1 mmol/L    Chloride 105 95 - 110 mmol/L    CO2 27 23 - 29 mmol/L    Glucose 117 (H) 70 - 110 mg/dL    BUN 17 6 - 20 mg/dL    Creatinine 0.5 0.5 - 1.4 mg/dL    Calcium 7.8 (L) 8.7 - 10.5 mg/dL    Total Protein 5.0 (L) 6.0 - 8.4 g/dL    Albumin 2.4 (L) 3.5 - 5.2 g/dL    Total Bilirubin 4.0 (H) 0.1 - 1.0 mg/dL    Alkaline Phosphatase 87 55 - 135 U/L    AST 33 10 - 40 U/L    ALT 14 10 - 44 U/L    eGFR >60.0 >60 mL/min/1.73 m^2    Anion Gap 6 (L) 8 - 16 mmol/L   Type & Screen    Collection Time: 04/09/24 11:53 AM   Result Value Ref Range    Group & Rh O POS     Specimen Outdate 04/12/2024 23:59    Lipase    Collection Time: 04/09/24 11:53 AM   Result Value Ref Range    Lipase 12 4 - 60 U/L   Indirect Antiglobulin Test    Collection Time: 04/09/24 11:53 AM   Result Value Ref Range    Antibody Screen NEG    Prepare RBC 2 Units; Emergency    Collection Time: 04/09/24 11:53 AM   Result Value Ref Range    UNIT NUMBER [id number]     Product Code H1343M00     DISPENSE STATUS TRANSFUSED     CODING SYSTEM GIKA029     Unit Blood Type Code 5100     Unit Blood Type O POS     Unit Expiration [id number]     CROSSMATCH INTERPRETATION Compatible     UNIT NUMBER [id number]     Product Code N6903T61     DISPENSE STATUS TRANSFUSED     CODING SYSTEM VNUI646     Unit Blood Type Code 5100     Unit Blood Type O POS     Unit Expiration [id number]     CROSSMATCH INTERPRETATION Compatible    Prepare RBC 1 Unit    Collection Time: 04/09/24 11:53 AM   Result Value Ref Range    UNIT NUMBER [id number]     Product Code Z2846U60     DISPENSE STATUS ISSUED     CODING SYSTEM XHET227     Unit Blood Type Code 5100     Unit Blood Type O POS     Unit Expiration [id number]     CROSSMATCH INTERPRETATION Compatible    EKG 12-lead    Collection Time: 04/09/24 11:54 AM   Result Value Ref Range    QRS Duration 94 ms    OHS QTC Calculation 431 ms    Urinalysis, Reflex to Urine Culture Urine, Clean Catch    Collection Time: 04/09/24 12:44 PM    Specimen: Urine   Result Value Ref Range    Specimen UA Urine, Clean Catch     Color, UA Yellow Yellow, Straw, Kristina    Appearance, UA Clear Clear    pH, UA 6.0 5.0 - 8.0    Specific Gravity, UA 1.020 1.005 - 1.030    Protein, UA Negative Negative    Glucose, UA Negative Negative    Ketones, UA Negative Negative    Bilirubin (UA) Negative Negative    Occult Blood UA Negative Negative    Nitrite, UA Negative Negative    Leukocytes, UA Negative Negative   POCT COVID-19 Rapid Screening    Collection Time: 04/09/24  4:35 PM   Result Value Ref Range    POC Rapid COVID Negative Negative     Acceptable Yes    CBC with Automated Differential    Collection Time: 04/09/24  6:23 PM   Result Value Ref Range    WBC 3.06 (L) 3.90 - 12.70 K/uL    RBC 1.96 (L) 4.00 - 5.40 M/uL    Hemoglobin 6.9 (L) 12.0 - 16.0 g/dL    Hematocrit 21.5 (L) 37.0 - 48.5 %     (H) 82 - 98 fL    MCH 35.2 (H) 27.0 - 31.0 pg    MCHC 32.1 32.0 - 36.0 g/dL    RDW 23.4 (H) 11.5 - 14.5 %    Platelets 99 (L) 150 - 450 K/uL    MPV 12.7 9.2 - 12.9 fL    Immature Granulocytes 0.3 0.0 - 0.5 %    Gran # (ANC) 2.2 1.8 - 7.7 K/uL    Immature Grans (Abs) 0.01 0.00 - 0.04 K/uL    Lymph # 0.4 (L) 1.0 - 4.8 K/uL    Mono # 0.4 0.3 - 1.0 K/uL    Eos # 0.0 0.0 - 0.5 K/uL    Baso # 0.01 0.00 - 0.20 K/uL    nRBC 0 0 /100 WBC    Gran % 72.9 38.0 - 73.0 %    Lymph % 14.4 (L) 18.0 - 48.0 %    Mono % 11.4 4.0 - 15.0 %    Eosinophil % 0.7 0.0 - 8.0 %    Basophil % 0.3 0.0 - 1.9 %    Differential Method Automated    CBC with Automated Differential    Collection Time: 04/10/24  7:47 AM   Result Value Ref Range    WBC 3.78 (L) 3.90 - 12.70 K/uL    RBC 2.41 (L) 4.00 - 5.40 M/uL    Hemoglobin 8.4 (L) 12.0 - 16.0 g/dL    Hematocrit 25.2 (L) 37.0 - 48.5 %     (H) 82 - 98 fL    MCH 34.9 (H) 27.0 - 31.0 pg    MCHC 33.3 32.0 - 36.0 g/dL    RDW 23.5 (H) 11.5 - 14.5 %     Platelets 98 (L) 150 - 450 K/uL    MPV 13.0 (H) 9.2 - 12.9 fL    Immature Granulocytes 0.5 0.0 - 0.5 %    Gran # (ANC) 3.2 1.8 - 7.7 K/uL    Immature Grans (Abs) 0.02 0.00 - 0.04 K/uL    Lymph # 0.4 (L) 1.0 - 4.8 K/uL    Mono # 0.2 (L) 0.3 - 1.0 K/uL    Eos # 0.0 0.0 - 0.5 K/uL    Baso # 0.01 0.00 - 0.20 K/uL    nRBC 0 0 /100 WBC    Gran % 84.3 (H) 38.0 - 73.0 %    Lymph % 9.3 (L) 18.0 - 48.0 %    Mono % 5.3 4.0 - 15.0 %    Eosinophil % 0.3 0.0 - 8.0 %    Basophil % 0.3 0.0 - 1.9 %    Differential Method Automated    Protime-INR    Collection Time: 04/10/24  7:47 AM   Result Value Ref Range    Prothrombin Time 15.6 (H) 9.0 - 12.5 sec    INR 1.5 (H) 0.8 - 1.2   Comprehensive Metabolic Panel (CMP)    Collection Time: 04/10/24  7:47 AM   Result Value Ref Range    Sodium 137 136 - 145 mmol/L    Potassium 4.3 3.5 - 5.1 mmol/L    Chloride 105 95 - 110 mmol/L    CO2 27 23 - 29 mmol/L    Glucose 135 (H) 70 - 110 mg/dL    BUN 20 6 - 20 mg/dL    Creatinine 0.6 0.5 - 1.4 mg/dL    Calcium 7.9 (L) 8.7 - 10.5 mg/dL    Total Protein 4.7 (L) 6.0 - 8.4 g/dL    Albumin 2.2 (L) 3.5 - 5.2 g/dL    Total Bilirubin 4.6 (H) 0.1 - 1.0 mg/dL    Alkaline Phosphatase 79 55 - 135 U/L    AST 32 10 - 40 U/L    ALT 13 10 - 44 U/L    eGFR >60.0 >60 mL/min/1.73 m^2    Anion Gap 5 (L) 8 - 16 mmol/L   Magnesium    Collection Time: 04/10/24  7:47 AM   Result Value Ref Range    Magnesium 1.9 1.6 - 2.6 mg/dL   Phosphorus    Collection Time: 04/10/24  7:47 AM   Result Value Ref Range    Phosphorus 4.1 2.7 - 4.5 mg/dL   CBC with Automated Differential    Collection Time: 04/10/24  7:47 AM   Result Value Ref Range    WBC 3.73 (L) 3.90 - 12.70 K/uL    RBC 2.36 (L) 4.00 - 5.40 M/uL    Hemoglobin 8.0 (L) 12.0 - 16.0 g/dL    Hematocrit 24.3 (L) 37.0 - 48.5 %     (H) 82 - 98 fL    MCH 33.9 (H) 27.0 - 31.0 pg    MCHC 32.9 32.0 - 36.0 g/dL    RDW 23.2 (H) 11.5 - 14.5 %    Platelets 98 (L) 150 - 450 K/uL    MPV 12.9 9.2 - 12.9 fL    Immature Granulocytes  "0.5 0.0 - 0.5 %    Gran # (ANC) 3.2 1.8 - 7.7 K/uL    Immature Grans (Abs) 0.02 0.00 - 0.04 K/uL    Lymph # 0.3 (L) 1.0 - 4.8 K/uL    Mono # 0.2 (L) 0.3 - 1.0 K/uL    Eos # 0.0 0.0 - 0.5 K/uL    Baso # 0.02 0.00 - 0.20 K/uL    nRBC 0 0 /100 WBC    Gran % 84.8 (H) 38.0 - 73.0 %    Lymph % 9.1 (L) 18.0 - 48.0 %    Mono % 4.6 4.0 - 15.0 %    Eosinophil % 0.5 0.0 - 8.0 %    Basophil % 0.5 0.0 - 1.9 %    Differential Method Automated        EKG:   Results for orders placed or performed in visit on 04/09/24   EKG 12-lead    Collection Time: 04/09/24 11:54 AM   Result Value Ref Range    QRS Duration 94 ms    OHS QTC Calculation 431 ms    Narrative    Test Reason :     Vent. Rate : 097 BPM     Atrial Rate : 097 BPM     P-R Int : 164 ms          QRS Dur : 094 ms      QT Int : 340 ms       P-R-T Axes : 041 -02 219 degrees     QTc Int : 431 ms    Normal sinus rhythm  Possible Left atrial enlargement  LVH  Nonspecific T wave abnormality  Abnormal ECG  When compared with ECG of 09-APR-2024 11:08,  No significant change was found  Confirmed by KAILEE ANDREWS MD ([id number]) on 4/9/2024 2:29:11 PM    Referred By: [email or web address]   SELF           Confirmed By:KAILEE ANDREWS MD       TTE:  Results for orders placed or performed during the hospital encounter of 03/11/24   Echo Saline Bubble? No   Result Value Ref Range    RA Width 4.14 cm    LA volume (mod) 80.94 cm3    Left Atrium Major Axis 4.98 cm    Left Atrium Minor Axis 4.97 cm    RA Major Axis 4.87 cm    LV Diastolic Volume 89.63 mL    LV Systolic Volume 30.37 mL    PV Peak D Sis 0.66 m/s    PV Peak S Sis 0.66 m/s    MV Peak A Sis 1.38 m/s    MV stenosis pressure 1/2 time 54.15 ms    TR Max Sis 2.95 m/s    MV Peak E Sis 1.50 m/s    Ao VTI 67.69 cm    Ao peak sis 3.05 m/s    LVOT peak VTI 29.57 cm    LVOT peak sis 1.21 m/s    LVOT diameter 2.10 cm    MV "A" wave duration 11.13 msec    E wave deceleration time 186.74 msec    AV mean gradient 23 mmHg    TAPSE 1.79 cm    RVDD 3.71 cm    " LA size 4.43 cm    Ascending aorta 3.17 cm    STJ 2.52 cm    Sinus 3.04 cm    LVIDs 2.83 2.1 - 4.0 cm    Posterior Wall 0.79 0.6 - 1.1 cm    IVS 0.91 0.6 - 1.1 cm    LVIDd 4.44 3.5 - 6.0 cm    TDI LATERAL 0.09 m/s    LA WIDTH 4.70 cm    TDI SEPTAL 0.12 m/s    LV LATERAL E/E' RATIO 16.67 m/s    LV SEPTAL E/E' RATIO 12.50 m/s    FS 36 28 - 44 %    LA volume 88.05 cm3    LV mass 120.36 g    Left Ventricle Relative Wall Thickness 0.36 cm    AV valve area 1.51 cm²    AV Velocity Ratio 0.40     AV index (prosthetic) 0.44     MV valve area p 1/2 method 4.06 cm2    E/A ratio 1.09     Mean e' 0.11 m/s    Pulm vein S/D ratio 1.00     LVOT area 3.5 cm2    LVOT stroke volume 102.37 cm3    AV peak gradient 37 mmHg    E/E' ratio 14.29 m/s    Triscuspid Valve Regurgitation Peak Gradient 35 mmHg    REJI by Velocity Ratio 1.37 cm²    BSA 1.94 m2    LV Systolic Volume Index 16.2 mL/m2    LV Diastolic Volume Index 47.93 mL/m2    LV Mass Index 64 g/m2    LA Volume Index 47.1 mL/m2    LA Volume Index (Mod) 43.3 mL/m2    ZLVIDS -1.01     ZLVIDD -1.60     EF 60 %    Osei's Biplane MOD Ejection Fraction 62 %    GLS 21.7 %    TV resting pulmonary artery pressure 38 mmHg    RV TB RVSP 6 mmHg    Est. RA pres 3 mmHg    Narrative      Left Ventricle: The left ventricle is normal in size. Normal wall   thickness. Normal wall motion. There is normal systolic function with a   visually estimated ejection fraction of 55 - 60%. Ejection fraction by   visual approximation is 60%. Biplane (2D) method of discs ejection   fraction is 62%. There is normal diastolic function.    Right Ventricle: Normal right ventricular cavity size. Wall thickness   is normal. Right ventricle wall motion  is normal. Systolic function is   normal.    Right Atrium: Right atrium is mildly dilated.    Aortic Valve: There is moderate aortic valve sclerosis. There is   moderate annular calcification present. Moderately restricted motion.   There is mild to moderate stenosis.  "Aortic valve area by VTI is 1.51 cm².   Aortic valve peak velocity is 3.05 m/s. Mean gradient is 23 mmHg. The   dimensionless index is 0.44.    Mitral Valve: There is trace regurgitation.    Tricuspid Valve: There is trace regurgitation.    IVC/SVC: Normal venous pressure at 3 mmHg.       EF   Date Value Ref Range Status   03/11/2024 60 % Final   06/26/2023 65 % Final      No results found for this or any previous visit.  GHULAM:  No results found for this or any previous visit.  Stress Test:  No results found for this or any previous visit.     LHC:  No results found for this or any previous visit.     PFT:  No results found for: "FEV1", "FVC", "FPG5CUR", "TLC", "DLCO"   ASSESSMENT/PLAN:          Pre-op Assessment    I have reviewed the Patient Summary Reports.     I have reviewed the Nursing Notes. I have reviewed the NPO Status.   I have reviewed the Medications.     Review of Systems  Anesthesia Hx:             Denies Family Hx of Anesthesia complications.     Social:  Non-Smoker, No Alcohol Use       Hematology/Oncology:       -- Anemia:                  Current/Recent Cancer.  Breast              Cardiovascular:  Exercise tolerance: poor               KAPADIA                            Pulmonary:         Primary breast cancer metastasis                Hepatic/GI:  Hepatic/GI Normal                     Physical Exam  General: Well nourished, Cooperative and Alert    Airway:  Mallampati: II   Mouth Opening: Normal  TM Distance: Normal    Dental:  Periodontal disease, Edentulous        Anesthesia Plan  Type of Anesthesia, risks & benefits discussed:    Anesthesia Type: Gen Natural Airway, Gen ETT  Intra-op Monitoring Plan: Standard ASA Monitors  Induction:  IV  Informed Consent: Informed consent signed with the Patient and all parties understand the risks and agree with anesthesia plan.  All questions answered.   ASA Score: 3    Ready For Surgery From Anesthesia Perspective.     .      "

## 2024-04-10 NOTE — PLAN OF CARE
No acute events this shift. Patient AAOx4. Afebrile and VSS. No complaints of pain or nausea. NPO at 0000. 2 Units of PRBC transfused. Well tolerated by patient. Voiding via Pure Wick.  Octreotide infused as ordered. Bed in lowest position and locked. Side rails up x2. All possessions and call light within reach. Non-skid socks worn. Instructed to call for assistance and voiced understanding. All needs of patient currently met. Will continue to monitor with frequent rounding.

## 2024-04-10 NOTE — TREATMENT PLAN
Brief GI Treatment Plan       Findings on EGD today:                         - Small (< 5 mm) esophageal varices with no                          bleeding and no stigmata of recent bleeding.                          - Esophagogastric landmarks identified.                          - Portal hypertensive gastropathy.                          - Type 1 isolated gastric varices (IGV1, varices                          located in the fundus), without bleeding.                          - Normal examined duodenum.                          - No specimens collected.       Discussed with Dr. Lopez (AES team). Refer to Provation report for further details.     PLAN:  - We can repeat EUS guided glue injection, but obtain hepatology and IR input first since coiling in January was ineffective.   - Continue supportive care per primary team.   - Trend Hgb q12 hrs. Transfuse for Hgb > 7, unless otherwise indicated.  - Clears today.   - Continue IV PPI, IV octreotide gtt, and 5 days total of IV Rocephin.     We will continue to follow.     Vicente Mahmood MD  PGY-V, Gastroenterology & Hepatology

## 2024-04-10 NOTE — PLAN OF CARE
ID band verified. Fall risk, allergy, and limb alert band placed. Plan of care reviewed with patient. Patient verbalizes understanding and no questions at this time.

## 2024-04-11 ENCOUNTER — ANESTHESIA EVENT (OUTPATIENT)
Dept: INTERVENTIONAL RADIOLOGY/VASCULAR | Facility: HOSPITAL | Age: 54
DRG: 271 | End: 2024-04-11
Payer: MEDICARE

## 2024-04-11 PROBLEM — K92.2 ACUTE GASTROINTESTINAL BLEEDING: Status: ACTIVE | Noted: 2024-04-11

## 2024-04-11 LAB
ALBUMIN SERPL BCP-MCNC: 2.3 G/DL (ref 3.5–5.2)
ALP SERPL-CCNC: 77 U/L (ref 55–135)
ALT SERPL W/O P-5'-P-CCNC: 12 U/L (ref 10–44)
ANION GAP SERPL CALC-SCNC: 6 MMOL/L (ref 8–16)
AST SERPL-CCNC: 33 U/L (ref 10–40)
BASOPHILS # BLD AUTO: 0.01 K/UL (ref 0–0.2)
BASOPHILS # BLD AUTO: 0.02 K/UL (ref 0–0.2)
BASOPHILS # BLD AUTO: 0.04 K/UL (ref 0–0.2)
BASOPHILS NFR BLD: 0.3 % (ref 0–1.9)
BASOPHILS NFR BLD: 0.6 % (ref 0–1.9)
BASOPHILS NFR BLD: 0.9 % (ref 0–1.9)
BILIRUB SERPL-MCNC: 2.5 MG/DL (ref 0.1–1)
BUN SERPL-MCNC: 16 MG/DL (ref 6–20)
CALCIUM SERPL-MCNC: 7.9 MG/DL (ref 8.7–10.5)
CHLORIDE SERPL-SCNC: 107 MMOL/L (ref 95–110)
CO2 SERPL-SCNC: 25 MMOL/L (ref 23–29)
CREAT SERPL-MCNC: 0.6 MG/DL (ref 0.5–1.4)
DIFFERENTIAL METHOD BLD: ABNORMAL
EOSINOPHIL # BLD AUTO: 0.1 K/UL (ref 0–0.5)
EOSINOPHIL NFR BLD: 3 % (ref 0–8)
EOSINOPHIL NFR BLD: 3.4 % (ref 0–8)
EOSINOPHIL NFR BLD: 3.8 % (ref 0–8)
ERYTHROCYTE [DISTWIDTH] IN BLOOD BY AUTOMATED COUNT: 21.9 % (ref 11.5–14.5)
ERYTHROCYTE [DISTWIDTH] IN BLOOD BY AUTOMATED COUNT: 22.7 % (ref 11.5–14.5)
ERYTHROCYTE [DISTWIDTH] IN BLOOD BY AUTOMATED COUNT: 22.8 % (ref 11.5–14.5)
EST. GFR  (NO RACE VARIABLE): >60 ML/MIN/1.73 M^2
GLUCOSE SERPL-MCNC: 100 MG/DL (ref 70–110)
HCT VFR BLD AUTO: 23.1 % (ref 37–48.5)
HCT VFR BLD AUTO: 25.3 % (ref 37–48.5)
HCT VFR BLD AUTO: 30.1 % (ref 37–48.5)
HGB BLD-MCNC: 7.8 G/DL (ref 12–16)
HGB BLD-MCNC: 8.6 G/DL (ref 12–16)
HGB BLD-MCNC: 9.7 G/DL (ref 12–16)
IMM GRANULOCYTES # BLD AUTO: 0.01 K/UL (ref 0–0.04)
IMM GRANULOCYTES # BLD AUTO: 0.02 K/UL (ref 0–0.04)
IMM GRANULOCYTES # BLD AUTO: 0.12 K/UL (ref 0–0.04)
IMM GRANULOCYTES NFR BLD AUTO: 0.3 % (ref 0–0.5)
IMM GRANULOCYTES NFR BLD AUTO: 0.6 % (ref 0–0.5)
IMM GRANULOCYTES NFR BLD AUTO: 2.7 % (ref 0–0.5)
INR PPP: 1.3 (ref 0.8–1.2)
LYMPHOCYTES # BLD AUTO: 0.4 K/UL (ref 1–4.8)
LYMPHOCYTES # BLD AUTO: 0.5 K/UL (ref 1–4.8)
LYMPHOCYTES # BLD AUTO: 0.8 K/UL (ref 1–4.8)
LYMPHOCYTES NFR BLD: 12.9 % (ref 18–48)
LYMPHOCYTES NFR BLD: 16.7 % (ref 18–48)
LYMPHOCYTES NFR BLD: 18.3 % (ref 18–48)
MAGNESIUM SERPL-MCNC: 1.9 MG/DL (ref 1.6–2.6)
MCH RBC QN AUTO: 34.2 PG (ref 27–31)
MCH RBC QN AUTO: 34.3 PG (ref 27–31)
MCH RBC QN AUTO: 34.8 PG (ref 27–31)
MCHC RBC AUTO-ENTMCNC: 32.2 G/DL (ref 32–36)
MCHC RBC AUTO-ENTMCNC: 33.8 G/DL (ref 32–36)
MCHC RBC AUTO-ENTMCNC: 34 G/DL (ref 32–36)
MCV RBC AUTO: 101 FL (ref 82–98)
MCV RBC AUTO: 102 FL (ref 82–98)
MCV RBC AUTO: 106 FL (ref 82–98)
MONOCYTES # BLD AUTO: 0.3 K/UL (ref 0.3–1)
MONOCYTES # BLD AUTO: 0.4 K/UL (ref 0.3–1)
MONOCYTES # BLD AUTO: 0.8 K/UL (ref 0.3–1)
MONOCYTES NFR BLD: 12.3 % (ref 4–15)
MONOCYTES NFR BLD: 17.2 % (ref 4–15)
MONOCYTES NFR BLD: 9.1 % (ref 4–15)
NEUTROPHILS # BLD AUTO: 2 K/UL (ref 1.8–7.7)
NEUTROPHILS # BLD AUTO: 2.3 K/UL (ref 1.8–7.7)
NEUTROPHILS # BLD AUTO: 2.5 K/UL (ref 1.8–7.7)
NEUTROPHILS NFR BLD: 57.9 % (ref 38–73)
NEUTROPHILS NFR BLD: 67 % (ref 38–73)
NEUTROPHILS NFR BLD: 73 % (ref 38–73)
NRBC BLD-RTO: 0 /100 WBC
PHOSPHATE SERPL-MCNC: 3.2 MG/DL (ref 2.7–4.5)
PLATELET # BLD AUTO: 72 K/UL (ref 150–450)
PLATELET # BLD AUTO: 79 K/UL (ref 150–450)
PLATELET # BLD AUTO: 80 K/UL (ref 150–450)
PMV BLD AUTO: 11.3 FL (ref 9.2–12.9)
PMV BLD AUTO: 13 FL (ref 9.2–12.9)
PMV BLD AUTO: 13.1 FL (ref 9.2–12.9)
POTASSIUM SERPL-SCNC: 4 MMOL/L (ref 3.5–5.1)
PROT SERPL-MCNC: 4.8 G/DL (ref 6–8.4)
PROTHROMBIN TIME: 14 SEC (ref 9–12.5)
RBC # BLD AUTO: 2.28 M/UL (ref 4–5.4)
RBC # BLD AUTO: 2.47 M/UL (ref 4–5.4)
RBC # BLD AUTO: 2.83 M/UL (ref 4–5.4)
SODIUM SERPL-SCNC: 138 MMOL/L (ref 136–145)
WBC # BLD AUTO: 2.93 K/UL (ref 3.9–12.7)
WBC # BLD AUTO: 3.19 K/UL (ref 3.9–12.7)
WBC # BLD AUTO: 4.37 K/UL (ref 3.9–12.7)

## 2024-04-11 PROCEDURE — 20600001 HC STEP DOWN PRIVATE ROOM

## 2024-04-11 PROCEDURE — 25000003 PHARM REV CODE 250: Performed by: STUDENT IN AN ORGANIZED HEALTH CARE EDUCATION/TRAINING PROGRAM

## 2024-04-11 PROCEDURE — 80053 COMPREHEN METABOLIC PANEL: CPT | Performed by: STUDENT IN AN ORGANIZED HEALTH CARE EDUCATION/TRAINING PROGRAM

## 2024-04-11 PROCEDURE — 84100 ASSAY OF PHOSPHORUS: CPT | Performed by: STUDENT IN AN ORGANIZED HEALTH CARE EDUCATION/TRAINING PROGRAM

## 2024-04-11 PROCEDURE — 85610 PROTHROMBIN TIME: CPT | Performed by: STUDENT IN AN ORGANIZED HEALTH CARE EDUCATION/TRAINING PROGRAM

## 2024-04-11 PROCEDURE — 63600175 PHARM REV CODE 636 W HCPCS: Performed by: STUDENT IN AN ORGANIZED HEALTH CARE EDUCATION/TRAINING PROGRAM

## 2024-04-11 PROCEDURE — 36415 COLL VENOUS BLD VENIPUNCTURE: CPT | Mod: XB | Performed by: STUDENT IN AN ORGANIZED HEALTH CARE EDUCATION/TRAINING PROGRAM

## 2024-04-11 PROCEDURE — 85025 COMPLETE CBC W/AUTO DIFF WBC: CPT | Mod: 91 | Performed by: STUDENT IN AN ORGANIZED HEALTH CARE EDUCATION/TRAINING PROGRAM

## 2024-04-11 PROCEDURE — 36415 COLL VENOUS BLD VENIPUNCTURE: CPT | Performed by: STUDENT IN AN ORGANIZED HEALTH CARE EDUCATION/TRAINING PROGRAM

## 2024-04-11 PROCEDURE — 99223 1ST HOSP IP/OBS HIGH 75: CPT | Mod: AI,,, | Performed by: INTERNAL MEDICINE

## 2024-04-11 PROCEDURE — 99223 1ST HOSP IP/OBS HIGH 75: CPT | Mod: ,,, | Performed by: PHYSICIAN ASSISTANT

## 2024-04-11 PROCEDURE — C9113 INJ PANTOPRAZOLE SODIUM, VIA: HCPCS | Performed by: STUDENT IN AN ORGANIZED HEALTH CARE EDUCATION/TRAINING PROGRAM

## 2024-04-11 PROCEDURE — 83735 ASSAY OF MAGNESIUM: CPT | Performed by: STUDENT IN AN ORGANIZED HEALTH CARE EDUCATION/TRAINING PROGRAM

## 2024-04-11 PROCEDURE — 99223 1ST HOSP IP/OBS HIGH 75: CPT | Mod: GC,,, | Performed by: INTERNAL MEDICINE

## 2024-04-11 RX ADMIN — CEFTRIAXONE 1 G: 1 INJECTION, POWDER, FOR SOLUTION INTRAMUSCULAR; INTRAVENOUS at 04:04

## 2024-04-11 RX ADMIN — PANTOPRAZOLE SODIUM 40 MG: 40 INJECTION, POWDER, FOR SOLUTION INTRAVENOUS at 08:04

## 2024-04-11 RX ADMIN — OCTREOTIDE ACETATE 50 MCG/HR: 500 INJECTION, SOLUTION INTRAVENOUS; SUBCUTANEOUS at 08:04

## 2024-04-11 NOTE — PLAN OF CARE
No acute events this shift, VSS, afebrile, medications tolerated, Ivs assessed, pt ambulates with assist, generalized edema, I/Os recorded, frequent rounds performed, pt in bed resting, call light in reach, pt instructed to call for assistance, NAD, safety maintained.

## 2024-04-11 NOTE — TREATMENT PLAN
Brief GI Treatment Plan       Findings on EGD 4/10/24:                         - Small (< 5 mm) esophageal varices with no                          bleeding and no stigmata of recent bleeding.                          - Esophagogastric landmarks identified.                          - Portal hypertensive gastropathy.                          - Type 1 isolated gastric varices (IGV1, varices                          located in the fundus), without bleeding.                          - Normal examined duodenum.                          - No specimens collected.     Hepatology team evaluated the patient and recommends TIPS.     PLAN:   - TIPS per IR. Appreciate assistance from hepatology & IR teams.   - Continue IV octreotide gtt for 72 hours total, and 5 days total of IV Rocephin.   - Trend Hgb q8 hrs. Transfuse for Hgb > 7, unless otherwise indicated.     GI will sign off. Please reach out if further assistance is needed.     Vicente Mahmood MD  PGY-V, Gastroenterology & Hepatology

## 2024-04-11 NOTE — CONSULTS
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 Two days prior to presentation, she developed dull left upper quadrant abdominal discomfort, and then the following day, she began having hematochezia with BRBPR, lethargy and exertional SOB. She denies fevers, chills, nausea, vomiting, hematemesis, chest pain, dysuria, or syncope. In the ED, AF, tachycardic , hemodynamically stable, and stable on RA. Labs notable for Hg 5.6 (last Hgb 9.3), Plt 115, Tbil 4, U/A wnl. She was given IV PPI, 2 units of pRBCs, and octreotide. Admitted to Medical Oncology for anemia and dark tarry stools. GI was consulted and performed EGD on 4/10/24; it showed small (< 5 mm) esophageal varices with no bleeding and no stigmata of recent bleeding. Portal hypertensive gastropathy seen. Type 1 isolated gastric varices (IGV1, varices located in the fundus), without bleeding.    Prior GI history and endoscopies:  In June 2023, patient had hematemesis and melena. EGD showed IGV1 gastric varices 20 mm in diameter with stigmata of recent bleeding. EUS-guided coiling done at that time. Repeat EGD in showed IGV1 varices now 14 mm in diameter. Grade 1 EV also present.   - EGD in Jan 2024 showed IGV1 varix diameter 15 mm. Minimal to no effect of coiling.    - No prior colonoscopies    Review of Systems   Constitutional:  Negative for chills and fever.   HENT:  Negative for congestion and sore throat.    Eyes:  Negative for photophobia and visual disturbance.   Respiratory:  Negative for cough and shortness of breath.    Cardiovascular:  Positive for leg swelling. Negative for chest pain and palpitations.   Gastrointestinal:  Negative for abdominal pain and diarrhea.   Genitourinary:  Negative for dysuria and hematuria.   Musculoskeletal:  Negative for arthralgias and gait problem.   Skin:  Negative for rash and wound.   Neurological:  Negative for syncope and headaches.   Psychiatric/Behavioral:  Negative for agitation and behavioral problems.        Past Medical History:   Diagnosis Date    Aortic atherosclerosis 07/06/2023    Breast cancer     Esophageal and gastric varices 06/23/2023    Malignant neoplasm metastatic to left lung 06/08/2021       Past Surgical History:   Procedure Laterality Date    ENDOSCOPIC ULTRASOUND OF UPPER GASTROINTESTINAL TRACT N/A 6/27/2023    Procedure: ULTRASOUND, UPPER GI TRACT, ENDOSCOPIC;  Surgeon: Home Lopez MD;  Location: [facility] ([address] [place] [address]);  Service: Endoscopy;  Laterality: N/A;    ENDOSCOPIC ULTRASOUND OF UPPER GASTROINTESTINAL TRACT N/A 1/12/2024    Procedure: ULTRASOUND, UPPER GI TRACT, ENDOSCOPIC;  Surgeon: Home Lopez MD;  Location: [facility] (2ND FLR);  Service: Endoscopy;  Laterality: N/A;  11/28/23-Instructions via portal-DS  1/8-lvm for precall-MS  1/10-precall complete-Kpvt    ESOPHAGOGASTRODUODENOSCOPY N/A 6/23/2023    Procedure: EGD (ESOPHAGOGASTRODUODENOSCOPY);  Surgeon: Quintin Mooney MD;  Location: [facility] (2ND FLR);  Service: Endoscopy;  Laterality: N/A;    ESOPHAGOGASTRODUODENOSCOPY N/A 6/27/2023    Procedure: EGD (ESOPHAGOGASTRODUODENOSCOPY);  Surgeon: Home Lopez MD;  [facility]  Service: Endoscopy;  Laterality: N/A;    ESOPHAGOGASTRODUODENOSCOPY N/A 8/3/2023    Procedure:  EGD (ESOPHAGOGASTRODUODENOSCOPY);  Surgeon: Home Lopez MD;  Location: [facility] ENDO (2ND FLR);  Service: Endoscopy;  Laterality: N/A;  instr portal-labs 6/28/23-tb    ESOPHAGOGASTRODUODENOSCOPY N/A 1/12/2024    Procedure: EGD (ESOPHAGOGASTRODUODENOSCOPY);  Surgeon: Home Lopez MD;  Location: [facility] ENDO (2ND FLR);  Service: Endoscopy;  Laterality: N/A;    ESOPHAGOGASTRODUODENOSCOPY N/A 4/10/2024    Procedure: EGD (ESOPHAGOGASTRODUODENOSCOPY);  Surgeon: Ze Anderson MD;  Location: [facility] ENDO (2ND FLR);  Service: Endoscopy;  Laterality: N/A;    MASTECTOMY         Family history of liver disease: No    Review of patient's allergies indicates:  No Known Allergies      Tobacco Use    Smoking status: Never    Smokeless tobacco: Never   Substance and Sexual Activity    Alcohol use: Never    Drug use: Never    Sexual activity: Not Currently       Medications Prior to Admission   Medication Sig Dispense Refill Last Dose    ferrous sulfate (IRON, FERROUS SULFATE,) 325 mg (65 mg iron) Tab tablet Take daily with Vitamin C on an empty stomach (Patient taking differently: Take 325 mg by mouth once daily.) 60 tablet 3     furosemide (LASIX) 20 MG tablet Take 1 tablet (20 mg total) by mouth once daily. 90 tablet 3 Past Week    hydrocodone-homatropine 5-1.5 mg/5 ml (HYCODAN) 5-1.5 mg/5 mL Syrp Take 5 mLs by mouth 4 (four) times daily as needed (cough). 120 mL 0 4/5/2024    methylphenidate HCl (RITALIN) 5 MG tablet Take 1 tablet (5 mg total) by mouth 2 (two) times daily. 60 tablet 0 4/7/2024    pantoprazole (PROTONIX) 40 MG tablet Take 1 tablet (40 mg total) by mouth 2 (two) times daily. 180 tablet 0 4/10/2024    potassium chloride 10% (KAYCIEL) 20 mEq/15 mL oral solution Take 15 mLs (20 mEq total) by mouth once daily. (To prevent stomach upset, mix dose with a glass of cold water or juice) 600 mL 0 4/5/2024    OLANZapine (ZYPREXA) 5 MG tablet Take days 1-4 of chemotherapy 30 tablet 2 More than a month       Objective:      Vital Signs (Most Recent):  Temp: 98.2 °F (36.8 °C) (04/11/24 0740)  Pulse: 83 (04/11/24 0740)  Resp: 18 (04/11/24 0740)  BP: (!) 121/58 (04/11/24 0740)  SpO2: 100 % (04/11/24 0837) Vital Signs (24h Range):  Temp:  [97.6 °F (36.4 °C)-98.2 °F (36.8 °C)] 98.2 °F (36.8 °C)  Pulse:  [77-84] 83  Resp:  [16-18] 18  SpO2:  [96 %-100 %] 100 %  BP: (109-137)/(52-63) 121/58     Weight: 83.5 kg (184 lb 0.4 oz) (04/11/24 0400)  Body mass index is 33.66 kg/m².       Physical Exam  Vitals reviewed.   Constitutional:       General: She is not in acute distress.     Appearance: Normal appearance.   HENT:      Head: Normocephalic and atraumatic.      Nose: No congestion or rhinorrhea.   Cardiovascular:      Rate and Rhythm: Normal rate and regular rhythm.   Pulmonary:      Effort: Pulmonary effort is normal. No respiratory distress.   Abdominal:      Palpations: Abdomen is soft.      Tenderness: There is no abdominal tenderness.   Musculoskeletal:         General: Swelling present. No tenderness.      Right lower leg: Edema present.      Left lower leg: Edema present.   Skin:     Findings: No bruising or rash.   Neurological:      General: No focal deficit present.      Mental Status: She is alert and oriented to person, place, and time.   Psychiatric:         Mood and Affect: Mood normal.         Behavior: Behavior normal.            MELD 3.0: 16 at 4/11/2024  8:55 AM  MELD-Na: 13 at 4/11/2024  8:55 AM  Calculated from:  Serum Creatinine: 0.6 mg/dL (Using min of 1 mg/dL) at 4/11/2024  5:34 AM  Serum Sodium: 138 mmol/L (Using max of 137 mmol/L) at 4/11/2024  5:34 AM  Total Bilirubin: 2.5 mg/dL at 4/11/2024  5:34 AM  Serum Albumin: 2.3 g/dL at 4/11/2024  5:34 AM  INR(ratio): 1.3 at 4/11/2024  8:55 AM  Age at listing (hypothetical): 53 years  Sex: Female at 4/11/2024  8:55 AM      Significant Labs:  Labs within the past month have been reviewed.    Significant Imaging:  X-Ray: Reviewed    Assessment/Plan:     GI  Esophageal and  gastric varices  53F with metastatic breast cancer, CKD, and [facility] cirrhosis c/b esophageal and gastric varices, who presents after 3 days of melena and abdominal pain. Patient with prior GI bleed last June with hematemesis and melena; EGD at that time showed IGV1 gastric varices 20 mm in diameter with stigmata of recent bleeding. EUS-guided coiling was performed. Repeat EGD in showed IGV1 varices now 14 mm in diameter. Grade 1 EV also present. Prior to presentation, most recent EGD in Jan 2024 showed IGV1 varix diameter 15 mm. Minimal to no effect of coiling. She is now s/p EGD on 4/10 which again demonstrated esophageal and gastric varices. Hepatology consulted regarding further approach to treatment of varices in setting of MASH cirrhosis and recurrent GI bleed.    Given failure of endoscopic intervention with coiling and presence of both gastric and esophageal varices, believe that next best step to treat variceal bleeding would be TIPS or BRTO. Patient has no documented history of hepatic encephalopathy and is not altered on evaluation. She has no history of heart failure, and EF on recent echo was normal. Her current MELD is around 16, which is less than 18 and so with the above factors, believe she would be an appropriate candidate to receive TIPS or BRTO.    Recommendations:  - Recommend proceeding with IR evaluation for TIPS or BRTO; believe either intervention would be appropriate for patient.        Thank you for your consult. I will follow-up with patient. Please contact us if you have any additional questions.    Morgan Anderson MD  Hepatology  [facility] - Oncology ([facility])

## 2024-04-11 NOTE — PROGRESS NOTES
[facility] - Oncology ([facility]  Hematology/Oncology  Progress Note    Patient Name: Shikha López  Admission Date: 4/9/2024  Hospital Length of Stay: 2 days  Code Status: Full Code     Subjective:     HPI:  53F with PMH stage 4 breast ca left (mets HER 2 +) Dr. 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 Yesterday, she began having hematochezia with BRBPR, lethargy and exertional SOB. Denies fevers, chills, nausea, vomiting, hematemesis, cp, dysuria, peripheral edema.     AF, tachycardic, BP stable, on RA, Hg 5.6, Plt 115, Tbil 4, U/A wnl, given IV PPI, pending 2 units of pRBCs, octreotide    Admitted to oncology service for GI bleeding on active chemotherapy    Interval History:  H&H stable, no episodes of melena or hematochezia, hepatology rec for TIPS, IR to perform    Oncology Treatment Plan:   OP BREAST FAM-TRASTUZUMAB DERUXTECAN-NXKI Q3W    Medications:  Continuous Infusions:   octreotide (SANDOSTATIN) 500 mcg in sodium chloride 0.9% 100 mL infusion 50 mcg/hr (04/11/24 0836)     Scheduled Meds:   cefTRIAXone (Rocephin) IV (PEDS and ADULTS)  1 g Intravenous Q24H    pantoprazole  40 mg Intravenous BID     PRN Meds:0.9%  NaCl infusion (for blood administration), 0.9%  NaCl infusion (for blood administration), dextrose 10%, dextrose 10%, glucagon (human recombinant), glucose, glucose, naloxone, ondansetron, prochlorperazine, sodium chloride 0.9%     Review of Systems  Objective:     Vital Signs (Most Recent):  Temp: 98.2 °F (36.8 °C) (04/11/24 0740)  Pulse: 83 (04/11/24 0740)  Resp: 18 (04/11/24 0740)  BP: (!) 121/58 (04/11/24 0740)  SpO2: 100 % (04/11/24 0837) Vital Signs (24h Range):  Temp:  [97.6 °F (36.4 °C)-98.2 °F (36.8 °C)] 98.2 °F (36.8 °C)  Pulse:  [77-84] 83  Resp:  [16-18] 18  SpO2:  [96 %-100 %] 100 %  BP:  (109-137)/(52-63) 121/58     Weight: 83.5 kg (184 lb 0.4 oz)  Body mass index is 33.66 kg/m².  Body surface area is 1.91 meters squared.      Intake/Output Summary (Last 24 hours) at 4/11/2024 1055  Last data filed at 4/11/2024 0757  Gross per 24 hour   Intake 1039.41 ml   Output 1250 ml   Net -210.59 ml        Physical Exam  Constitutional:       Appearance: She is obese. She is not ill-appearing or diaphoretic.   HENT:      Mouth/Throat:      Mouth: Mucous membranes are moist.   Eyes:      Comments: Pallor eyelids   Cardiovascular:      Rate and Rhythm: Normal rate and regular rhythm.      Heart sounds: Murmur heard.   Pulmonary:      Effort: Pulmonary effort is normal. No respiratory distress.      Breath sounds: No wheezing or rales.   Abdominal:      General: Abdomen is flat. Bowel sounds are normal. There is no distension.      Tenderness: There is no abdominal tenderness.   Genitourinary:     Comments: ARYAN with dark colored blood  Musculoskeletal:         General: Swelling present.      Right lower leg: No edema.      Left lower leg: No edema.   Skin:     General: Skin is warm.      Coloration: Skin is not jaundiced.   Neurological:      Mental Status: She is alert and oriented to person, place, and time.          Significant Labs:   CBC:   Recent Labs   Lab 04/10/24  1757 04/11/24  0059 04/11/24  0808   WBC 3.41* 4.37 3.19*   HGB 8.3* 7.8* 9.7*   HCT 24.4* 23.1* 30.1*   PLT 98* 79* 80*    and CMP:   Recent Labs   Lab 04/09/24  1153 04/10/24  0747 04/11/24  0534    137 138   K 3.8 4.3 4.0    105 107   CO2 27 27 25   * 135* 100   BUN 17 20 16   CREATININE 0.5 0.6 0.6   CALCIUM 7.8* 7.9* 7.9*   PROT 5.0* 4.7* 4.8*   ALBUMIN 2.4* 2.2* 2.3*   BILITOT 4.0* 4.6* 2.5*   ALKPHOS 87 79 77   AST 33 32 33   ALT 14 13 12   ANIONGAP 6* 5* 6*     Assessment/Plan:     Gastrointestinal hemorrhage with melena  53 hx of left breast ca, mets to lung, HAWTHORNE cirrhosis, obesity    Symptoms: melana, BRBPR,  fatigue, SOB  Diagnostics: Hgb 5.6 (baseline 9) drop from 7.2 yesterday    - GI consult for EGD / C-Scope consult for source evaluation +/- intervention Plan for 4/10  - IV Protonix 80mg x 1, followed by IV 40mg BID thereafter  - clears until midnight NPO, hold pharmacologic VTE PPx  - Trend CBCs ; Transfuse Hgb < 7  - Resuscitate IVF PRN  - Transfused 3 unit pRBCs  - Cardiac telemetry  - Maintain 2 large bore IVs  - If hemodynamically unstable +/- new bleed, stat CTA / consult IR for embolization evaluation   - CTX for hx of varices (esophageal and gastric) and GI bleed    -EGD 4/10 with gastric varices and stigmata of bleeding, consulted IR/hepatology for input on EUS guided glue injection  -hepatology and IR with plans for TIPS procedure to prevent gastric and esophageal variceal bleeding  -to complete 3 total days of octreotide and 5 days of ceftriaxone    Severe obesity (BMI 35.0-39.9) with comorbidity  -documented  -Diet, exercise, and weight for mortality benefits    Esophageal and gastric varices  -2/2 HAWTHORNE cirrhosis  -hx of bleeding varices s/p banding in June 2023  -plan for TIPS    Malignant neoplasm metastatic to left lung  -s/p chemo, radiation, post op RT  -herceptin x 3.5 till lung mass progression  -metastatic    Breast cancer, stage 4, left  -Follows with Dr. Willis  -On [facility] for 2 yrs             Shaun Ding MD  Hematology/Oncology  [facility] - Oncology ([facility])

## 2024-04-11 NOTE — ASSESSMENT & PLAN NOTE
53 hx of left breast ca, mets to lung, HAWTHORNE cirrhosis, obesity    Symptoms: melana, BRBPR, fatigue, SOB  Diagnostics: Hgb 5.6 (baseline 9) drop from 7.2 yesterday    - GI consult for EGD / C-Scope consult for source evaluation +/- intervention Plan for 4/10  - IV Protonix 80mg x 1, followed by IV 40mg BID thereafter  - clears until midnight NPO, hold pharmacologic VTE PPx  - Trend CBCs ; Transfuse Hgb < 7  - Resuscitate IVF PRN  - Transfused 3 unit pRBCs  - Cardiac telemetry  - Maintain 2 large bore IVs  - If hemodynamically unstable +/- new bleed, stat CTA / consult IR for embolization evaluation   - CTX for hx of varices (esophageal and gastric) and GI bleed    -EGD 4/10 with gastric varices and stigmata of bleeding, consulted IR/hepatology for input on EUS guided glue injection  -hepatology and IR with plans for TIPS procedure to prevent gastric and esophageal variceal bleeding  -to complete 3 total days of octreotide and 5 days of ceftriaxone

## 2024-04-11 NOTE — SUBJECTIVE & OBJECTIVE
Review of Systems   Constitutional:  Negative for chills and fever.   HENT:  Negative for congestion and sore throat.    Eyes:  Negative for photophobia and visual disturbance.   Respiratory:  Negative for cough and shortness of breath.    Cardiovascular:  Positive for leg swelling. Negative for chest pain and palpitations.   Gastrointestinal:  Negative for abdominal pain and diarrhea.   Genitourinary:  Negative for dysuria and hematuria.   Musculoskeletal:  Negative for arthralgias and gait problem.   Skin:  Negative for rash and wound.   Neurological:  Negative for syncope and headaches.   Psychiatric/Behavioral:  Negative for agitation and behavioral problems.        Past Medical History:   Diagnosis Date    Aortic atherosclerosis 07/06/2023    Breast cancer     Esophageal and gastric varices 06/23/2023    Malignant neoplasm metastatic to left lung 06/08/2021       Past Surgical History:   Procedure Laterality Date    ENDOSCOPIC ULTRASOUND OF UPPER GASTROINTESTINAL TRACT N/A 6/27/2023    Procedure: ULTRASOUND, UPPER GI TRACT, ENDOSCOPIC;  Surgeon: Home Lopez MD;  Location: [facility] ([address] [place] [address]);  Service: Endoscopy;  Laterality: N/A;    ENDOSCOPIC ULTRASOUND OF UPPER GASTROINTESTINAL TRACT N/A 1/12/2024    Procedure: ULTRASOUND, UPPER GI TRACT, ENDOSCOPIC;  Surgeon: Home Lopez MD;  Location: [facility] ([address] [place] [address]);  Service: Endoscopy;  Laterality: N/A;  11/28/23-Instructions via portal-DS  1/8-lvm for precall-MS  1/10-precall complete-Kpvt    ESOPHAGOGASTRODUODENOSCOPY N/A 6/23/2023    Procedure: EGD (ESOPHAGOGASTRODUODENOSCOPY);  Surgeon: Quintin Mooney MD;  [facility]  Service: Endoscopy;  Laterality: N/A;    ESOPHAGOGASTRODUODENOSCOPY N/A 6/27/2023    Procedure: EGD (ESOPHAGOGASTRODUODENOSCOPY);  Surgeon: Home Lopez MD;  Location: [facility] ([address] [place] [address]);  Service: Endoscopy;  Laterality: N/A;    ESOPHAGOGASTRODUODENOSCOPY N/A 8/3/2023    Procedure: EGD  (ESOPHAGOGASTRODUODENOSCOPY);  Surgeon: Home Lopez MD;  Location: [facility] ENDO (2ND FLR);  Service: Endoscopy;  Laterality: N/A;  instr portal-labs 6/28/23-tb    ESOPHAGOGASTRODUODENOSCOPY N/A 1/12/2024    Procedure: EGD (ESOPHAGOGASTRODUODENOSCOPY);  Surgeon: Home Lopez MD;  Location: [facility] ENDO (2ND FLR);  Service: Endoscopy;  Laterality: N/A;    ESOPHAGOGASTRODUODENOSCOPY N/A 4/10/2024    Procedure: EGD (ESOPHAGOGASTRODUODENOSCOPY);  Surgeon: Ze Anderson MD;  Location: [facility] ENDO (2ND FLR);  Service: Endoscopy;  Laterality: N/A;    MASTECTOMY         Family history of liver disease: No    Review of patient's allergies indicates:  No Known Allergies      Tobacco Use    Smoking status: Never    Smokeless tobacco: Never   Substance and Sexual Activity    Alcohol use: Never    Drug use: Never    Sexual activity: Not Currently       Medications Prior to Admission   Medication Sig Dispense Refill Last Dose    ferrous sulfate (IRON, FERROUS SULFATE,) 325 mg (65 mg iron) Tab tablet Take daily with Vitamin C on an empty stomach (Patient taking differently: Take 325 mg by mouth once daily.) 60 tablet 3     furosemide (LASIX) 20 MG tablet Take 1 tablet (20 mg total) by mouth once daily. 90 tablet 3 Past Week    hydrocodone-homatropine 5-1.5 mg/5 ml (HYCODAN) 5-1.5 mg/5 mL Syrp Take 5 mLs by mouth 4 (four) times daily as needed (cough). 120 mL 0 4/5/2024    methylphenidate HCl (RITALIN) 5 MG tablet Take 1 tablet (5 mg total) by mouth 2 (two) times daily. 60 tablet 0 4/7/2024    pantoprazole (PROTONIX) 40 MG tablet Take 1 tablet (40 mg total) by mouth 2 (two) times daily. 180 tablet 0 4/10/2024    potassium chloride 10% (KAYCIEL) 20 mEq/15 mL oral solution Take 15 mLs (20 mEq total) by mouth once daily. (To prevent stomach upset, mix dose with a glass of cold water or juice) 600 mL 0 4/5/2024    OLANZapine (ZYPREXA) 5 MG tablet Take days 1-4 of chemotherapy 30 tablet 2 More than a month       Objective:     Vital  Signs (Most Recent):  Temp: 98.2 °F (36.8 °C) (04/11/24 0740)  Pulse: 83 (04/11/24 0740)  Resp: 18 (04/11/24 0740)  BP: (!) 121/58 (04/11/24 0740)  SpO2: 100 % (04/11/24 0837) Vital Signs (24h Range):  Temp:  [97.6 °F (36.4 °C)-98.2 °F (36.8 °C)] 98.2 °F (36.8 °C)  Pulse:  [77-84] 83  Resp:  [16-18] 18  SpO2:  [96 %-100 %] 100 %  BP: (109-137)/(52-63) 121/58     Weight: 83.5 kg (184 lb 0.4 oz) (04/11/24 0400)  Body mass index is 33.66 kg/m².       Physical Exam  Vitals reviewed.   Constitutional:       General: She is not in acute distress.     Appearance: Normal appearance.   HENT:      Head: Normocephalic and atraumatic.      Nose: No congestion or rhinorrhea.   Cardiovascular:      Rate and Rhythm: Normal rate and regular rhythm.   Pulmonary:      Effort: Pulmonary effort is normal. No respiratory distress.   Abdominal:      Palpations: Abdomen is soft.      Tenderness: There is no abdominal tenderness.   Musculoskeletal:         General: Swelling present. No tenderness.      Right lower leg: Edema present.      Left lower leg: Edema present.   Skin:     Findings: No bruising or rash.   Neurological:      General: No focal deficit present.      Mental Status: She is alert and oriented to person, place, and time.   Psychiatric:         Mood and Affect: Mood normal.         Behavior: Behavior normal.            MELD 3.0: 16 at 4/11/2024  8:55 AM  MELD-Na: 13 at 4/11/2024  8:55 AM  Calculated from:  Serum Creatinine: 0.6 mg/dL (Using min of 1 mg/dL) at 4/11/2024  5:34 AM  Serum Sodium: 138 mmol/L (Using max of 137 mmol/L) at 4/11/2024  5:34 AM  Total Bilirubin: 2.5 mg/dL at 4/11/2024  5:34 AM  Serum Albumin: 2.3 g/dL at 4/11/2024  5:34 AM  INR(ratio): 1.3 at 4/11/2024  8:55 AM  Age at listing (hypothetical): 53 years  Sex: Female at 4/11/2024  8:55 AM      Significant Labs:  Labs within the past month have been reviewed.    Significant Imaging:  X-Ray: Reviewed

## 2024-04-11 NOTE — ASSESSMENT & PLAN NOTE
53F with metastatic breast cancer, CKD, and [facility] cirrhosis c/b esophageal and gastric varices, who presents after 3 days of melena and abdominal pain. Patient with prior GI bleed last June with hematemesis and melena; EGD at that time showed IGV1 gastric varices 20 mm in diameter with stigmata of recent bleeding. EUS-guided coiling was performed. Repeat EGD in showed IGV1 varices now 14 mm in diameter. Grade 1 EV also present. Prior to presentation, most recent EGD in Jan 2024 showed IGV1 varix diameter 15 mm. Minimal to no effect of coiling. She is now s/p EGD on 4/10 which again demonstrated esophageal and gastric varices. Hepatology consulted regarding further approach to treatment of varices in setting of MASH cirrhosis and recurrent GI bleed.    Given failure of endoscopic intervention with coiling and presence of both gastric and esophageal varices, believe that next best step to treat variceal bleeding would be TIPS or BRTO. Patient has no documented history of hepatic encephalopathy and is not altered on evaluation. She has no history of heart failure, and EF on recent echo was normal. Her current MELD is around 16, which is less than 18 and so with the above factors, believe she would be an appropriate candidate to receive TIPS or BRTO.    Recommendations:  - Recommend proceeding with IR evaluation for TIPS or BRTO; believe either intervention would be appropriate for patient.

## 2024-04-11 NOTE — PROGRESS NOTES
04/11/24 1119   Vital Signs   Temp 98.1 °F (36.7 °C)   Temp Source Oral   Pulse 79   Heart Rate Source Monitor   Resp 19   SpO2 (!) 94 %   Pulse Oximetry Type Intermittent   Device (Oxygen Therapy) room air   BP (!) 113/55   MAP (mmHg) 77   BP Location Right arm   BP Method Automatic   Patient Position Lying   Assessments (Pre/Post)   Level of Consciousness (AVPU) alert     Notified Dr. JAMES Ding.

## 2024-04-11 NOTE — ANESTHESIA PREPROCEDURE EVALUATION
[facility]  Anesthesia Pre-Operative Evaluation       Patient Name: Shikha López  YOB: 1970  MRN: [id number]  [facility]: [id number]      Code Status: Full Code   Date of Procedure: 4/10/2024  Anesthesia: General/MAC Procedure: TIPS  Pre-Operative Diagnosis: Acute gastrointestinal bleeding [K92.2]  Proceduralist: Surgeon(s) and Role:     * Ze Anderson MD - Primary     * Ze Anderson MD - Assisting     * Vicente Mahmood MD - Assisting Nurse: Fatou Nuñez LPN  Registered Nurse: Martha Licea RN      SUBJECTIVE:   Shikha López is a 53 y.o. female with a PMHx of stage IV breast cancer with lung mets, HAWTHORNE cirrhosis s/b esophageal/gastric varices, and obesity who was admitted on 4/9/24 for melena and LUQ abdominal pain. Hospital course notable  for transfusion of 3 u pRBC, EGD on 4/10 which revealed small esophageal varices with no evidence of recent bleeding and type 1 isolated gastric varices with evidence of recent bleeding which were not treated as same varices had already been treated endoscopically with coils during prior EGD on 6/27/23. IR consult received for possible TIPS for management of her GIB with associated esophageal/gastric varices.      Pt reports she has had 2 total episodes of GIB including this current episode. Per [facility] notes, she underwent coiling for treatment of her gastric varices on 6/27/23; could consider endoscopic glue injection into her gastric varices but unlikely this would be effective in preventing future GIB. She does not have a history of ascites requiring LVP. The pt is not listed for liver transplant or undergoing transplant evaluation due h/o stage IV breast cancer. Her current MELD score is 17. TB is 2.5 down from 4.6. She does not have a history of hepatic encephalopathy. She does not take maintenance lactulose/rifaximin. Pt does not have a history of heart disease or heart failure. Her last echo on 3/11/24 revealed an EF  of 55-60%. Pt has had recent imaging including a  CT c/a/p  on 3/11/24 which revealed evidence of steatosis and portal HTN with gastroesophageal varices and mild ascites. The pt's H/H has remained stable since she received 2 u pRBCs yesterday at 13:00. The pt is hemodynamically stable. She is currently receiving a CLD.    Anticoagulants   Medication Route Frequency       she has a current medication list which includes the following long-term medication(s): furosemide, methylphenidate hcl, pantoprazole, and olanzapine.   ALLERGIES:   Review of patient's allergies indicates:  No Known Allergies  LDA:          Lines/Drains/Airways       Central Venous Catheter Line  Duration             Port A Cath Single Lumen 06/22/21 right internal jugular 1024 days              Peripheral Intravenous Line  Duration                  Peripheral IV - Single Lumen 04/09/24 1220 18 G Right Antecubital 2 days         Peripheral IV - Single Lumen 04/09/24 1358 20 G Right Antecubital 2 days                  MEDICATIONS:     Antibiotics (From admission, onward)      Start     Stop Route Frequency Ordered    04/09/24 1615  cefTRIAXone (Rocephin) 1 g in dextrose 5 % in water (D5W) 100 mL IVPB (MB+)         04/14/24 1614 IV Every 24 hours (non-standard times) 04/09/24 1509          VTE Risk Mitigation (From admission, onward)           Ordered     IP VTE HIGH RISK PATIENT  Once         04/09/24 1451     Place sequential compression device  Until discontinued         04/09/24 1451     Reason for No Pharmacological VTE Prophylaxis  Once        Question:  Reasons:  Answer:  Active Bleeding    04/09/24 1451                  Current Facility-Administered Medications   Medication Dose Route Frequency Provider Last Rate Last Admin    0.9%  NaCl infusion (for blood administration)   Intravenous Q24H PRN Rober Ricci III, MD        0.9%  NaCl infusion (for blood administration)   Intravenous Q24H PRN Shaun Ding MD        cefTRIAXone (Rocephin)  1 g in dextrose 5 % in water (D5W) 100 mL IVPB (MB+)  1 g Intravenous Q24H Prakash Conway MD   Stopped at 04/10/24 1557    dextrose 10% bolus 125 mL 125 mL  12.5 g Intravenous PRN Prakash Conway MD        dextrose 10% bolus 250 mL 250 mL  25 g Intravenous PRN Prakash Conway MD        glucagon (human recombinant) injection 1 mg  1 mg Intramuscular PRN Prakash Conway MD        glucose chewable tablet 16 g  16 g Oral PRN Prakash Conway MD        glucose chewable tablet 24 g  24 g Oral PRN Prakash Conway MD        naloxone 0.4 mg/mL injection 0.02 mg  0.02 mg Intravenous PRN Prakash Conway MD        ondansetron injection 4 mg  4 mg Intravenous Q8H PRN Prakash Conway MD        pantoprazole injection 40 mg  40 mg Intravenous BID Prakash Conway MD   40 mg at 04/11/24 0836    prochlorperazine injection Soln 5 mg  5 mg Intravenous Q6H PRN Prakash Conway MD        sodium chloride 0.9% flush 10 mL  10 mL Intravenous Q12H PRN Prakash Conway MD              History:     Active Hospital Problems    Diagnosis  POA    Acute gastrointestinal bleeding [K92.2]  Yes    Severe obesity (BMI 35.0-39.9) with comorbidity [E66.01]  Yes    Esophageal and gastric varices [I85.00, I86.4]  Yes    Gastrointestinal hemorrhage with melena [K92.1]  Yes    Breast cancer, stage 4, left [C50.912]  Yes    Malignant neoplasm metastatic to left lung [C78.02]  Yes      Resolved Hospital Problems   No resolved problems to display.     Surgical History:    has a past surgical history that includes Mastectomy; Esophagogastroduodenoscopy (N/A, 6/23/2023); Endoscopic ultrasound of upper gastrointestinal tract (N/A, 6/27/2023); Esophagogastroduodenoscopy (N/A, 6/27/2023); Esophagogastroduodenoscopy (N/A, 8/3/2023); Endoscopic ultrasound of upper gastrointestinal tract (N/A, 1/12/2024); Esophagogastroduodenoscopy (N/A,  1/12/2024); and Esophagogastroduodenoscopy (N/A, 4/10/2024).   Social History:    reports that she is not currently sexually active.  reports that she has never smoked. She has never used smokeless tobacco. She reports that she does not drink alcohol and does not use drugs.     OBJECTIVE:     Vital Signs (Most Recent):  Temp: 36.7 °C (98.1 °F) (04/11/24 1119)  Pulse: 79 (04/11/24 1119)  Resp: 19 (04/11/24 1119)  BP: (!) 113/55 (04/11/24 1119)  SpO2: (!) 94 % (04/11/24 1222) Vital Signs Range (Last 24H):  Temp:  [36.4 °C (97.6 °F)-36.8 °C (98.2 °F)]   Pulse:  [77-99]   Resp:  [16-19]   BP: (110-137)/(52-63)   SpO2:  [94 %-100 %]        Body mass index is 33.66 kg/m².   Wt Readings from Last 4 Encounters:   04/11/24 83.5 kg (184 lb 0.4 oz)   04/08/24 84.3 kg (185 lb 13.6 oz)   04/08/24 84.3 kg (185 lb 13.6 oz)   03/18/24 84 kg (185 lb 3 oz)     Significant Labs:  Lab Results   Component Value Date    WBC 3.19 (L) 04/11/2024    HGB 9.7 (L) 04/11/2024    HCT 30.1 (L) 04/11/2024    PLT 80 (L) 04/11/2024     04/11/2024    K 4.0 04/11/2024     04/11/2024    CREATININE 0.6 04/11/2024    BUN 16 04/11/2024    CO2 25 04/11/2024     04/11/2024    CALCIUM 7.9 (L) 04/11/2024    MG 1.9 04/11/2024    PHOS 3.2 04/11/2024    ALKPHOS 77 04/11/2024    ALT 12 04/11/2024    AST 33 04/11/2024    ALBUMIN 2.3 (L) 04/11/2024    INR 1.3 (H) 04/11/2024    APTT 29.2 06/22/2023    TROPONINI 0.008 07/20/2023     (H) 07/20/2023    HCGQUANT <2.0 05/02/2007     No LMP recorded (lmp unknown). Patient is postmenopausal.  Recent Results (from the past 72 hour(s))   EKG 12-lead    Collection Time: 04/09/24 11:08 AM   Result Value Ref Range    QRS Duration 94 ms    OHS QTC Calculation 444 ms   CBC auto differential    Collection Time: 04/09/24 11:53 AM   Result Value Ref Range    WBC 2.93 (L) 3.90 - 12.70 K/uL    RBC 1.56 (L) 4.00 - 5.40 M/uL    Hemoglobin 5.6 (LL) 12.0 - 16.0 g/dL    Hematocrit 17.9 (LL) 37.0 - 48.5 %    MCV  115 (H) 82 - 98 fL    MCH 35.9 (H) 27.0 - 31.0 pg    MCHC 31.3 (L) 32.0 - 36.0 g/dL    RDW 23.1 (H) 11.5 - 14.5 %    Platelets 115 (L) 150 - 450 K/uL    MPV 12.7 9.2 - 12.9 fL    Immature Granulocytes 0.3 0.0 - 0.5 %    Gran # (ANC) 2.3 1.8 - 7.7 K/uL    Immature Grans (Abs) 0.01 0.00 - 0.04 K/uL    Lymph # 0.3 (L) 1.0 - 4.8 K/uL    Mono # 0.3 0.3 - 1.0 K/uL    Eos # 0.0 0.0 - 0.5 K/uL    Baso # 0.01 0.00 - 0.20 K/uL    nRBC 0 0 /100 WBC    Gran % 79.3 (H) 38.0 - 73.0 %    Lymph % 9.9 (L) 18.0 - 48.0 %    Mono % 9.9 4.0 - 15.0 %    Eosinophil % 0.3 0.0 - 8.0 %    Basophil % 0.3 0.0 - 1.9 %    Platelet Estimate Decreased (A)     Aniso Moderate     Poik Slight     Ovalocytes Occasional     Tear Drop Cells Occasional     Fragmented Cells Moderate     Differential Method Automated    Comprehensive metabolic panel    Collection Time: 04/09/24 11:53 AM   Result Value Ref Range    Sodium 138 136 - 145 mmol/L    Potassium 3.8 3.5 - 5.1 mmol/L    Chloride 105 95 - 110 mmol/L    CO2 27 23 - 29 mmol/L    Glucose 117 (H) 70 - 110 mg/dL    BUN 17 6 - 20 mg/dL    Creatinine 0.5 0.5 - 1.4 mg/dL    Calcium 7.8 (L) 8.7 - 10.5 mg/dL    Total Protein 5.0 (L) 6.0 - 8.4 g/dL    Albumin 2.4 (L) 3.5 - 5.2 g/dL    Total Bilirubin 4.0 (H) 0.1 - 1.0 mg/dL    Alkaline Phosphatase 87 55 - 135 U/L    AST 33 10 - 40 U/L    ALT 14 10 - 44 U/L    eGFR >60.0 >60 mL/min/1.73 m^2    Anion Gap 6 (L) 8 - 16 mmol/L   Type & Screen    Collection Time: 04/09/24 11:53 AM   Result Value Ref Range    Group & Rh O POS     Specimen Outdate 04/12/2024 23:59    Lipase    Collection Time: 04/09/24 11:53 AM   Result Value Ref Range    Lipase 12 4 - 60 U/L   Indirect Antiglobulin Test    Collection Time: 04/09/24 11:53 AM   Result Value Ref Range    Antibody Screen NEG    Prepare RBC 2 Units; Emergency    Collection Time: 04/09/24 11:53 AM   Result Value Ref Range    UNIT NUMBER [id number]     Product Code [id number]     DISPENSE STATUS TRANSFUSED     CODING  SYSTEM [id number]     Unit Blood Type Code 5100     Unit Blood Type O POS     Unit Expiration [id number]     CROSSMATCH INTERPRETATION Compatible     UNIT NUMBER [id number]     Product Code [id number]     DISPENSE STATUS TRANSFUSED     CODING SYSTEM [id number]     Unit Blood Type Code 5100     Unit Blood Type O POS     Unit Expiration [id number]     CROSSMATCH INTERPRETATION Compatible    Prepare RBC 1 Unit    Collection Time: 04/09/24 11:53 AM   Result Value Ref Range    UNIT NUMBER [id number]     Product Code P9309C48     DISPENSE STATUS TRANSFUSED     CODING SYSTEM [id number]     Unit Blood Type Code 5100     Unit Blood Type O POS     Unit Expiration [id number]     CROSSMATCH INTERPRETATION Compatible    EKG 12-lead    Collection Time: 04/09/24 11:54 AM   Result Value Ref Range    QRS Duration 94 ms    OHS QTC Calculation 431 ms   Urinalysis, Reflex to Urine Culture Urine, Clean Catch    Collection Time: 04/09/24 12:44 PM    Specimen: Urine   Result Value Ref Range    Specimen UA Urine, Clean Catch     Color, UA Yellow Yellow, Straw, Kristina    Appearance, UA Clear Clear    pH, UA 6.0 5.0 - 8.0    Specific Gravity, UA 1.020 1.005 - 1.030    Protein, UA Negative Negative    Glucose, UA Negative Negative    Ketones, UA Negative Negative    Bilirubin (UA) Negative Negative    Occult Blood UA Negative Negative    Nitrite, UA Negative Negative    Leukocytes, UA Negative Negative   POCT COVID-19 Rapid Screening    Collection Time: 04/09/24  4:35 PM   Result Value Ref Range    POC Rapid COVID Negative Negative     Acceptable Yes    CBC with Automated Differential    Collection Time: 04/09/24  6:23 PM   Result Value Ref Range    WBC 3.06 (L) 3.90 - 12.70 K/uL    RBC 1.96 (L) 4.00 - 5.40 M/uL    Hemoglobin 6.9 (L) 12.0 - 16.0 g/dL    Hematocrit 21.5 (L) 37.0 - 48.5 %     (H) 82 - 98 fL    MCH 35.2 (H) 27.0 - 31.0 pg    MCHC 32.1 32.0 - 36.0 g/dL    RDW 23.4 (H) 11.5 - 14.5 %    Platelets 99 (L) 150 -  450 K/uL    MPV 12.7 9.2 - 12.9 fL    Immature Granulocytes 0.3 0.0 - 0.5 %    Gran # (ANC) 2.2 1.8 - 7.7 K/uL    Immature Grans (Abs) 0.01 0.00 - 0.04 K/uL    Lymph # 0.4 (L) 1.0 - 4.8 K/uL    Mono # 0.4 0.3 - 1.0 K/uL    Eos # 0.0 0.0 - 0.5 K/uL    Baso # 0.01 0.00 - 0.20 K/uL    nRBC 0 0 /100 WBC    Gran % 72.9 38.0 - 73.0 %    Lymph % 14.4 (L) 18.0 - 48.0 %    Mono % 11.4 4.0 - 15.0 %    Eosinophil % 0.7 0.0 - 8.0 %    Basophil % 0.3 0.0 - 1.9 %    Differential Method Automated    CBC with Automated Differential    Collection Time: 04/10/24  7:47 AM   Result Value Ref Range    WBC 3.78 (L) 3.90 - 12.70 K/uL    RBC 2.41 (L) 4.00 - 5.40 M/uL    Hemoglobin 8.4 (L) 12.0 - 16.0 g/dL    Hematocrit 25.2 (L) 37.0 - 48.5 %     (H) 82 - 98 fL    MCH 34.9 (H) 27.0 - 31.0 pg    MCHC 33.3 32.0 - 36.0 g/dL    RDW 23.5 (H) 11.5 - 14.5 %    Platelets 98 (L) 150 - 450 K/uL    MPV 13.0 (H) 9.2 - 12.9 fL    Immature Granulocytes 0.5 0.0 - 0.5 %    Gran # (ANC) 3.2 1.8 - 7.7 K/uL    Immature Grans (Abs) 0.02 0.00 - 0.04 K/uL    Lymph # 0.4 (L) 1.0 - 4.8 K/uL    Mono # 0.2 (L) 0.3 - 1.0 K/uL    Eos # 0.0 0.0 - 0.5 K/uL    Baso # 0.01 0.00 - 0.20 K/uL    nRBC 0 0 /100 WBC    Gran % 84.3 (H) 38.0 - 73.0 %    Lymph % 9.3 (L) 18.0 - 48.0 %    Mono % 5.3 4.0 - 15.0 %    Eosinophil % 0.3 0.0 - 8.0 %    Basophil % 0.3 0.0 - 1.9 %    Differential Method Automated    Protime-INR    Collection Time: 04/10/24  7:47 AM   Result Value Ref Range    Prothrombin Time 15.6 (H) 9.0 - 12.5 sec    INR 1.5 (H) 0.8 - 1.2   Comprehensive Metabolic Panel (CMP)    Collection Time: 04/10/24  7:47 AM   Result Value Ref Range    Sodium 137 136 - 145 mmol/L    Potassium 4.3 3.5 - 5.1 mmol/L    Chloride 105 95 - 110 mmol/L    CO2 27 23 - 29 mmol/L    Glucose 135 (H) 70 - 110 mg/dL    BUN 20 6 - 20 mg/dL    Creatinine 0.6 0.5 - 1.4 mg/dL    Calcium 7.9 (L) 8.7 - 10.5 mg/dL    Total Protein 4.7 (L) 6.0 - 8.4 g/dL    Albumin 2.2 (L) 3.5 - 5.2 g/dL    Total  Bilirubin 4.6 (H) 0.1 - 1.0 mg/dL    Alkaline Phosphatase 79 55 - 135 U/L    AST 32 10 - 40 U/L    ALT 13 10 - 44 U/L    eGFR >60.0 >60 mL/min/1.73 m^2    Anion Gap 5 (L) 8 - 16 mmol/L   Magnesium    Collection Time: 04/10/24  7:47 AM   Result Value Ref Range    Magnesium 1.9 1.6 - 2.6 mg/dL   Phosphorus    Collection Time: 04/10/24  7:47 AM   Result Value Ref Range    Phosphorus 4.1 2.7 - 4.5 mg/dL   CBC with Automated Differential    Collection Time: 04/10/24  7:47 AM   Result Value Ref Range    WBC 3.73 (L) 3.90 - 12.70 K/uL    RBC 2.36 (L) 4.00 - 5.40 M/uL    Hemoglobin 8.0 (L) 12.0 - 16.0 g/dL    Hematocrit 24.3 (L) 37.0 - 48.5 %     (H) 82 - 98 fL    MCH 33.9 (H) 27.0 - 31.0 pg    MCHC 32.9 32.0 - 36.0 g/dL    RDW 23.2 (H) 11.5 - 14.5 %    Platelets 98 (L) 150 - 450 K/uL    MPV 12.9 9.2 - 12.9 fL    Immature Granulocytes 0.5 0.0 - 0.5 %    Gran # (ANC) 3.2 1.8 - 7.7 K/uL    Immature Grans (Abs) 0.02 0.00 - 0.04 K/uL    Lymph # 0.3 (L) 1.0 - 4.8 K/uL    Mono # 0.2 (L) 0.3 - 1.0 K/uL    Eos # 0.0 0.0 - 0.5 K/uL    Baso # 0.02 0.00 - 0.20 K/uL    nRBC 0 0 /100 WBC    Gran % 84.8 (H) 38.0 - 73.0 %    Lymph % 9.1 (L) 18.0 - 48.0 %    Mono % 4.6 4.0 - 15.0 %    Eosinophil % 0.5 0.0 - 8.0 %    Basophil % 0.5 0.0 - 1.9 %    Differential Method Automated    CBC with Automated Differential    Collection Time: 04/10/24  5:57 PM   Result Value Ref Range    WBC 3.41 (L) 3.90 - 12.70 K/uL    RBC 2.41 (L) 4.00 - 5.40 M/uL    Hemoglobin 8.3 (L) 12.0 - 16.0 g/dL    Hematocrit 24.4 (L) 37.0 - 48.5 %     (H) 82 - 98 fL    MCH 34.4 (H) 27.0 - 31.0 pg    MCHC 34.0 32.0 - 36.0 g/dL    RDW 23.1 (H) 11.5 - 14.5 %    Platelets 98 (L) 150 - 450 K/uL    MPV 13.0 (H) 9.2 - 12.9 fL    Immature Granulocytes 0.6 (H) 0.0 - 0.5 %    Gran # (ANC) 2.6 1.8 - 7.7 K/uL    Immature Grans (Abs) 0.02 0.00 - 0.04 K/uL    Lymph # 0.5 (L) 1.0 - 4.8 K/uL    Mono # 0.3 0.3 - 1.0 K/uL    Eos # 0.1 0.0 - 0.5 K/uL    Baso # 0.01 0.00 - 0.20  K/uL    nRBC 0 0 /100 WBC    Gran % 75.0 (H) 38.0 - 73.0 %    Lymph % 14.7 (L) 18.0 - 48.0 %    Mono % 7.3 4.0 - 15.0 %    Eosinophil % 2.1 0.0 - 8.0 %    Basophil % 0.3 0.0 - 1.9 %    Differential Method Automated    CBC with Automated Differential    Collection Time: 04/11/24 12:59 AM   Result Value Ref Range    WBC 4.37 3.90 - 12.70 K/uL    RBC 2.28 (L) 4.00 - 5.40 M/uL    Hemoglobin 7.8 (L) 12.0 - 16.0 g/dL    Hematocrit 23.1 (L) 37.0 - 48.5 %     (H) 82 - 98 fL    MCH 34.2 (H) 27.0 - 31.0 pg    MCHC 33.8 32.0 - 36.0 g/dL    RDW 22.7 (H) 11.5 - 14.5 %    Platelets 79 (L) 150 - 450 K/uL    MPV 13.1 (H) 9.2 - 12.9 fL    Immature Granulocytes 2.7 (H) 0.0 - 0.5 %    Gran # (ANC) 2.5 1.8 - 7.7 K/uL    Immature Grans (Abs) 0.12 (H) 0.00 - 0.04 K/uL    Lymph # 0.8 (L) 1.0 - 4.8 K/uL    Mono # 0.8 0.3 - 1.0 K/uL    Eos # 0.1 0.0 - 0.5 K/uL    Baso # 0.04 0.00 - 0.20 K/uL    nRBC 0 0 /100 WBC    Gran % 57.9 38.0 - 73.0 %    Lymph % 18.3 18.0 - 48.0 %    Mono % 17.2 (H) 4.0 - 15.0 %    Eosinophil % 3.0 0.0 - 8.0 %    Basophil % 0.9 0.0 - 1.9 %    Differential Method Automated    Comprehensive Metabolic Panel (CMP)    Collection Time: 04/11/24  5:34 AM   Result Value Ref Range    Sodium 138 136 - 145 mmol/L    Potassium 4.0 3.5 - 5.1 mmol/L    Chloride 107 95 - 110 mmol/L    CO2 25 23 - 29 mmol/L    Glucose 100 70 - 110 mg/dL    BUN 16 6 - 20 mg/dL    Creatinine 0.6 0.5 - 1.4 mg/dL    Calcium 7.9 (L) 8.7 - 10.5 mg/dL    Total Protein 4.8 (L) 6.0 - 8.4 g/dL    Albumin 2.3 (L) 3.5 - 5.2 g/dL    Total Bilirubin 2.5 (H) 0.1 - 1.0 mg/dL    Alkaline Phosphatase 77 55 - 135 U/L    AST 33 10 - 40 U/L    ALT 12 10 - 44 U/L    eGFR >60.0 >60 mL/min/1.73 m^2    Anion Gap 6 (L) 8 - 16 mmol/L   Magnesium    Collection Time: 04/11/24  5:34 AM   Result Value Ref Range    Magnesium 1.9 1.6 - 2.6 mg/dL   Phosphorus    Collection Time: 04/11/24  5:34 AM   Result Value Ref Range    Phosphorus 3.2 2.7 - 4.5 mg/dL   CBC with Automated  Differential    Collection Time: 04/11/24  8:08 AM   Result Value Ref Range    WBC 3.19 (L) 3.90 - 12.70 K/uL    RBC 2.83 (L) 4.00 - 5.40 M/uL    Hemoglobin 9.7 (L) 12.0 - 16.0 g/dL    Hematocrit 30.1 (L) 37.0 - 48.5 %     (H) 82 - 98 fL    MCH 34.3 (H) 27.0 - 31.0 pg    MCHC 32.2 32.0 - 36.0 g/dL    RDW 22.8 (H) 11.5 - 14.5 %    Platelets 80 (L) 150 - 450 K/uL    MPV 13.0 (H) 9.2 - 12.9 fL    Immature Granulocytes 0.6 (H) 0.0 - 0.5 %    Gran # (ANC) 2.3 1.8 - 7.7 K/uL    Immature Grans (Abs) 0.02 0.00 - 0.04 K/uL    Lymph # 0.4 (L) 1.0 - 4.8 K/uL    Mono # 0.3 0.3 - 1.0 K/uL    Eos # 0.1 0.0 - 0.5 K/uL    Baso # 0.02 0.00 - 0.20 K/uL    nRBC 0 0 /100 WBC    Gran % 73.0 38.0 - 73.0 %    Lymph % 12.9 (L) 18.0 - 48.0 %    Mono % 9.1 4.0 - 15.0 %    Eosinophil % 3.8 0.0 - 8.0 %    Basophil % 0.6 0.0 - 1.9 %    Differential Method Automated    Protime-INR    Collection Time: 04/11/24  8:55 AM   Result Value Ref Range    Prothrombin Time 14.0 (H) 9.0 - 12.5 sec    INR 1.3 (H) 0.8 - 1.2       EKG:   Results for orders placed or performed in visit on 04/09/24   EKG 12-lead    Collection Time: 04/09/24 11:54 AM   Result Value Ref Range    QRS Duration 94 ms    OHS QTC Calculation 431 ms    Narrative    Test Reason :     Vent. Rate : 097 BPM     Atrial Rate : 097 BPM     P-R Int : 164 ms          QRS Dur : 094 ms      QT Int : 340 ms       P-R-T Axes : 041 -02 219 degrees     QTc Int : 431 ms    Normal sinus rhythm  Possible Left atrial enlargement  LVH  Nonspecific T wave abnormality  Abnormal ECG  When compared with ECG of 09-APR-2024 11:08,  No significant change was found  Confirmed by KAILEE ANDREWS MD ([id number]) on 4/9/2024 2:29:11 PM    Referred By: [email or web address]   SELF           Confirmed By:KAILEE ANDREWS MD       TTE:  Results for orders placed or performed during the hospital encounter of 03/11/24   Echo Saline Bubble? No   Result Value Ref Range    RA Width 4.14 cm    LA volume (mod) 80.94 cm3    Left Atrium  "Major Axis 4.98 cm    Left Atrium Minor Axis 4.97 cm    RA Major Axis 4.87 cm    LV Diastolic Volume 89.63 mL    LV Systolic Volume 30.37 mL    PV Peak D Elmer 0.66 m/s    PV Peak S Elmer 0.66 m/s    MV Peak A Elmer 1.38 m/s    MV stenosis pressure 1/2 time 54.15 ms    TR Max Elmer 2.95 m/s    MV Peak E Elmer 1.50 m/s    Ao VTI 67.69 cm    Ao peak elmer 3.05 m/s    LVOT peak VTI 29.57 cm    LVOT peak elmer 1.21 m/s    LVOT diameter 2.10 cm    MV "A" wave duration 11.13 msec    E wave deceleration time 186.74 msec    AV mean gradient 23 mmHg    TAPSE 1.79 cm    RVDD 3.71 cm    LA size 4.43 cm    Ascending aorta 3.17 cm    STJ 2.52 cm    Sinus 3.04 cm    LVIDs 2.83 2.1 - 4.0 cm    Posterior Wall 0.79 0.6 - 1.1 cm    IVS 0.91 0.6 - 1.1 cm    LVIDd 4.44 3.5 - 6.0 cm    TDI LATERAL 0.09 m/s    LA WIDTH 4.70 cm    TDI SEPTAL 0.12 m/s    LV LATERAL E/E' RATIO 16.67 m/s    LV SEPTAL E/E' RATIO 12.50 m/s    FS 36 28 - 44 %    LA volume 88.05 cm3    LV mass 120.36 g    Left Ventricle Relative Wall Thickness 0.36 cm    AV valve area 1.51 cm²    AV Velocity Ratio 0.40     AV index (prosthetic) 0.44     MV valve area p 1/2 method 4.06 cm2    E/A ratio 1.09     Mean e' 0.11 m/s    Pulm vein S/D ratio 1.00     LVOT area 3.5 cm2    LVOT stroke volume 102.37 cm3    AV peak gradient 37 mmHg    E/E' ratio 14.29 m/s    Triscuspid Valve Regurgitation Peak Gradient 35 mmHg    REJI by Velocity Ratio 1.37 cm²    BSA 1.94 m2    LV Systolic Volume Index 16.2 mL/m2    LV Diastolic Volume Index 47.93 mL/m2    LV Mass Index 64 g/m2    LA Volume Index 47.1 mL/m2    LA Volume Index (Mod) 43.3 mL/m2    ZLVIDS -1.01     ZLVIDD -1.60     EF 60 %    Osei's Biplane MOD Ejection Fraction 62 %    GLS 21.7 %    TV resting pulmonary artery pressure 38 mmHg    RV TB RVSP 6 mmHg    Est. RA pres 3 mmHg    Narrative      Left Ventricle: The left ventricle is normal in size. Normal wall   thickness. Normal wall motion. There is normal systolic function with a   visually " "estimated ejection fraction of 55 - 60%. Ejection fraction by   visual approximation is 60%. Biplane (2D) method of discs ejection   fraction is 62%. There is normal diastolic function.    Right Ventricle: Normal right ventricular cavity size. Wall thickness   is normal. Right ventricle wall motion  is normal. Systolic function is   normal.    Right Atrium: Right atrium is mildly dilated.    Aortic Valve: There is moderate aortic valve sclerosis. There is   moderate annular calcification present. Moderately restricted motion.   There is mild to moderate stenosis. Aortic valve area by VTI is 1.51 cm².   Aortic valve peak velocity is 3.05 m/s. Mean gradient is 23 mmHg. The   dimensionless index is 0.44.    Mitral Valve: There is trace regurgitation.    Tricuspid Valve: There is trace regurgitation.    IVC/SVC: Normal venous pressure at 3 mmHg.       EF   Date Value Ref Range Status   03/11/2024 60 % Final   06/26/2023 65 % Final      No results found for this or any previous visit.  GHULAM:  No results found for this or any previous visit.  Stress Test:  No results found for this or any previous visit.     LHC:  No results found for this or any previous visit.     PFT:  No results found for: "FEV1", "FVC", "JDS3XVN", "TLC", "DLCO"   ASSESSMENT/PLAN:          Pre-op Assessment    I have reviewed the Patient Summary Reports.     I have reviewed the Nursing Notes. I have reviewed the NPO Status.   I have reviewed the Medications.     Review of Systems  Anesthesia Hx:             Denies Family Hx of Anesthesia complications.     Social:  Non-Smoker, No Alcohol Use       Hematology/Oncology:       -- Anemia:                  Current/Recent Cancer.  Breast              Cardiovascular:  Exercise tolerance: poor               KAPADIA                            Pulmonary:         Primary breast cancer metastasis                Hepatic/GI:  Hepatic/GI Normal                     Physical Exam  General: Well nourished, Cooperative and " Alert    Airway:  Mallampati: II   Mouth Opening: Normal  TM Distance: Normal    Dental:  Periodontal disease, Edentulous        Anesthesia Plan  Type of Anesthesia, risks & benefits discussed:    Anesthesia Type: Gen ETT  Intra-op Monitoring Plan: Standard ASA Monitors  Post Op Pain Control Plan: multimodal analgesia and IV/PO Opioids PRN  Induction:  IV  Airway Plan: Direct and Video  Informed Consent: Informed consent signed with the Patient and all parties understand the risks and agree with anesthesia plan.  All questions answered.   ASA Score: 4  Day of Surgery Review of History & Physical: H&P Update referred to the surgeon/provider.    Ready For Surgery From Anesthesia Perspective.     .

## 2024-04-11 NOTE — H&P
See IR consult dated 4/11/24. No changes from last note.    Pooja Urbina MD MSCR  PGY-5 Radiology Resident

## 2024-04-11 NOTE — NURSING
Patient is AAOX4. Up, independent, non skid shocks on, bed alarm refused. Call light and personal items within reach. Patient involved in care. Clear liquid diet with good intake. Telemetry monitored. Octreotide stopped as per order.  IV antibiotic provided. IR TIPS insertion to be done tomorrow. MN NPO tonight. Patient stable at this time.

## 2024-04-11 NOTE — CONSULTS
Transjugular Intrahepatic Portosystemic Shunt Placement Consult Note  Interventional Radiology    SUBJECTIVE:     Chief Complaint: GIB    History of Present Illness:  Shikha López is a 53 y.o. female with a PMHx of stage IV breast cancer with lung mets, HAWTHORNE cirrhosis s/b esophageal/gastric varices, and obesity who was admitted on 4/9/24 for melena and LUQ abdominal pain. Hospital course notable  for transfusion of 3 u pRBC, EGD on 4/10 which revealed small esophageal varices with no evidence of recent bleeding and type 1 isolated gastric varices with evidence of recent bleeding which were not treated as same varices had already been treated endoscopically with coils during prior EGD on 6/27/23. IR consult received for possible TIPS for management of her GIB with associated esophageal/gastric varices.     Pt reports she has had 2 total episodes of GIB including this current episode. Per [facility] notes, she underwent coiling for treatment of her gastric varices on 6/27/23; could consider endoscopic glue injection into her gastric varices but unlikely this would be effective in preventing future GIB. She does not have a history of ascites requiring LVP. The pt is not listed for liver transplant or undergoing transplant evaluation due h/o stage IV breast cancer. Her current MELD score is 17. TB is 2.5 down from 4.6. She does not have a history of hepatic encephalopathy. She does not take maintenance lactulose/rifaximin. Pt does not have a history of heart disease or heart failure. Her last echo on 3/11/24 revealed an EF of 55-60%. Pt has had recent imaging including a  CT c/a/p  on 3/11/24 which revealed evidence of steatosis and portal HTN with gastroesophageal varices and mild ascites. The pt's H/H has remained stable since she received 2 u pRBCs yesterday at 13:00. The pt is hemodynamically stable. She is currently receiving a CLD.    Scheduled Meds:   cefTRIAXone (Rocephin) IV (PEDS and ADULTS)  1 g Intravenous  Q24H    pantoprazole  40 mg Intravenous BID     Continuous Infusions:   octreotide (SANDOSTATIN) 500 mcg in sodium chloride 0.9% 100 mL infusion 50 mcg/hr (04/11/24 0836)     PRN Meds:0.9%  NaCl infusion (for blood administration), 0.9%  NaCl infusion (for blood administration), dextrose 10%, dextrose 10%, glucagon (human recombinant), glucose, glucose, naloxone, ondansetron, prochlorperazine, sodium chloride 0.9%    Review of patient's allergies indicates:  No Known Allergies    Past Medical History:   Diagnosis Date    Aortic atherosclerosis 07/06/2023    Breast cancer     Esophageal and gastric varices 06/23/2023    Malignant neoplasm metastatic to left lung 06/08/2021     Past Surgical History:   Procedure Laterality Date    ENDOSCOPIC ULTRASOUND OF UPPER GASTROINTESTINAL TRACT N/A 6/27/2023    Procedure: ULTRASOUND, UPPER GI TRACT, ENDOSCOPIC;  Surgeon: Home Loepz MD;  Location: [facility] (2ND FLR);  Service: Endoscopy;  Laterality: N/A;    ENDOSCOPIC ULTRASOUND OF UPPER GASTROINTESTINAL TRACT N/A 1/12/2024    Procedure: ULTRASOUND, UPPER GI TRACT, ENDOSCOPIC;  Surgeon: Home Lopez MD;  Location: [facility] (2ND FLR);  Service: Endoscopy;  Laterality: N/A;  11/28/23-Instructions via portal-DS  1/8-lvm for precall-MS  1/10-precall complete-Kpvt    ESOPHAGOGASTRODUODENOSCOPY N/A 6/23/2023    Procedure: EGD (ESOPHAGOGASTRODUODENOSCOPY);  Surgeon: Quintin Mooney MD;  Location: [facility] (2ND FLR);  Service: Endoscopy;  Laterality: N/A;    ESOPHAGOGASTRODUODENOSCOPY N/A 6/27/2023    Procedure: EGD (ESOPHAGOGASTRODUODENOSCOPY);  Surgeon: Home Lopez MD;  Location: [facility] (2ND FLR);  Service: Endoscopy;  Laterality: N/A;    ESOPHAGOGASTRODUODENOSCOPY N/A 8/3/2023    Procedure: EGD (ESOPHAGOGASTRODUODENOSCOPY);  Surgeon: Home Lopez MD;  Location: [facility] (2ND FLR);  Service: Endoscopy;  Laterality: N/A;  instr portal-labs 6/28/23-tb    ESOPHAGOGASTRODUODENOSCOPY N/A 1/12/2024    Procedure: EGD  (ESOPHAGOGASTRODUODENOSCOPY);  Surgeon: Home Lopez MD;  [facility]  Service: Endoscopy;  Laterality: N/A;    ESOPHAGOGASTRODUODENOSCOPY N/A 4/10/2024    Procedure: EGD (ESOPHAGOGASTRODUODENOSCOPY);  Surgeon: Ze Anderson MD;  [facility]  Service: Endoscopy;  Laterality: N/A;    MASTECTOMY       Family History   Problem Relation Age of Onset    Lung cancer Father      Social History     Tobacco Use    Smoking status: Never    Smokeless tobacco: Never   Substance Use Topics    Alcohol use: Never    Drug use: Never       OBJECTIVE:     Vital Signs (Most Recent)  Temp: 98.2 °F (36.8 °C) (04/11/24 0740)  Pulse: 83 (04/11/24 0740)  Resp: 18 (04/11/24 0740)  BP: (!) 121/58 (04/11/24 0740)  SpO2: 100 % (04/11/24 0837)    Physical Exam:  Physical Exam  Vitals and nursing note reviewed.   Constitutional:       General: She is not in acute distress.     Appearance: She is obese. She is ill-appearing.   HENT:      Head: Normocephalic and atraumatic.      Right Ear: External ear normal.      Left Ear: External ear normal.   Eyes:      Extraocular Movements: Extraocular movements intact.      Conjunctiva/sclera: Conjunctivae normal.      Pupils: Pupils are equal, round, and reactive to light.   Neck:      Comments: R upper chest port  Pulmonary:      Effort: Pulmonary effort is normal. No respiratory distress.   Abdominal:      General: Abdomen is flat. There is no distension.   Skin:     General: Skin is warm and dry.      Coloration: Skin is not jaundiced.   Neurological:      General: No focal deficit present.      Mental Status: She is alert and oriented to person, place, and time.   Psychiatric:         Mood and Affect: Mood normal.         Behavior: Behavior normal.         Thought Content: Thought content normal.         Judgment: Judgment normal.         Laboratory  I have reviewed all pertinent lab results within the past 24 hours.  CBC:   Recent Labs   Lab 04/11/24  0808    WBC 3.19*   RBC 2.83*   HGB 9.7*   HCT 30.1*   PLT 80*   *   MCH 34.3*   MCHC 32.2     BMP:   Recent Labs   Lab 04/11/24  0534         K 4.0      CO2 25   BUN 16   CREATININE 0.6   CALCIUM 7.9*   MG 1.9     CMP:   Recent Labs   Lab 04/11/24  0534      CALCIUM 7.9*   ALBUMIN 2.3*   PROT 4.8*      K 4.0   CO2 25      BUN 16   CREATININE 0.6   ALKPHOS 77   ALT 12   AST 33   BILITOT 2.5*     LFTs:   Recent Labs   Lab 04/11/24  0534   ALT 12   AST 33   ALKPHOS 77   BILITOT 2.5*   PROT 4.8*   ALBUMIN 2.3*     Coagulation:   Recent Labs   Lab 04/11/24  0855   LABPROT 14.0*   INR 1.3*     Microbiology Results (last 7 days)       ** No results found for the last 168 hours. **            ASA/Mallampati  ASA: 3  Mallampati: 2    Imaging:  Recent imaging studies including  CT c/a/p  on 3/11/24 which was independently reviewed by Josué Dyson MD.     EXAMINATION:  CT CHEST ABDOMEN PELVIS WITH IV CONTRAST (XPD)     CLINICAL HISTORY:  Breast cancer, invasive, stage IV, assess treatment response;  Malignant neoplasm of unspecified site of left female breast     TECHNIQUE:  Low dose axial images, sagittal and coronal reformations were obtained from the thoracic inlet to the pubic symphysis following the IV administration of 100 mL of Omnipaque 350 intravenous contrast. Oral contrast was not administered.     COMPARISON:  CT chest abdomen pelvis 12/15/2023.  01/17/2023..     FINDINGS:  CHEST:     Lower neck and thoracic soft tissues: Stable postsurgical changes of left mastectomy and left axillary node dissection.     Vasculature: Left sided aortic arch. Thoracic aorta is nonaneurysmal.  Main pulmonary artery is unremarkable.     Heart: Cardiomegaly.  Atherosclerosis of the coronary arteries. No pericardial effusion.     Airways: Central airways are clear.     Lungs/Pleura: Stable left pleural thickening and scattered calcifications.  Peripheral reticulations and bandlike opacities in  the left lingula, similar to prior exam likely represents scarring/fibrosis from prior radiation.  Redemonstration of irregular opacities throughout the bilateral lungs, similar in size and number compared to prior exams.  For example, 2.2 x 1.6 cm irregular nodule in the right upper lobe (axial series 6, image 110), previously measured 2.0 x 1.7 cm.  Right lower lobe opacity measures 1.9 x 0.7 cm (axial series 6, image 307), previously measured 1.8 x 0.6 cm.  No definite new lesions identified.     Hilum/Mediastinum: No hilar or mediastinal lymph node enlargement.     Esophagus: No significant abnormality.     ABDOMEN/PELVIS:     Liver: Normal size. Diffusely hypoattenuating liver parenchyma, suggestive of steatosis.  No focal suspicious hepatic lesions.     Gallbladder: Multiple calcified gallstones.  No pericholecystic inflammatory changes.     Bile ducts: No intrahepatic or extrahepatic biliary ductal dilatation.     Spleen: Splenomegaly.  Small accessory splenule.     Pancreas: 0.7 cm hypodensity in the pancreatic head (axial series 3, image 59), too small to characterize but similar to multiple prior exams.  No ductal dilatation.  No peripancreatic fat stranding.     Adrenals: No significant abnormality     Renal/Ureters: The kidneys enhance symmetrically.   No hydroureteronephrosis or stones.  The urinary bladder is unremarkable.     Reproductive: Multiple uterine fibroids.     Stomach/Bowel: Stable embolization coils for gastric varices.  Gastroesophageal varices present.  Stomach is otherwise unremarkable.  Increased diffuse wall thickening of the cecum and ascending with increased mesenteric edema likely related to portal hypertension.  Diverticulosis coli.  No evidence of bowel obstruction.  Appendix is unremarkable.     Peritoneum: Small volume free fluid, increased compared to prior.  Mesenteric edema, more pronounced.  No intraperitoneal free air.     Lymph Nodes: Stable 1.3 cm left periaortic lymph  node (axial series 3, image 66).     Vasculature: Abdominal aorta tapers normally.  Minimal atherosclerosis of the abdominal aorta and its branches.  Portal vasculature, SMV, and splenic vein are patent.  Numerous varices, similar to prior exam.     Bones: Degenerative changes of the spine.  No osseous destructive changes.     Abdominal wall: Diffuse body wall anasarca, increased from prior exam.  Collateral vessels along the abdominal wall.     Impression:     Stable postsurgical changes of left mastectomy and axillary lymph node dissection is patient with metastatic left breast cancer.     Redemonstration of multiple irregular bilateral pulmonary nodules concerning for metastatic disease.  No new lesions identified.     Hepatic steatosis and portal hypertension.  Increased bowel wall thickening and diffuse mesenteric edema with mild ascites, likely related to hepatic dysfunction.     Electronically signed by resident: Sofiya Palmer  Date:                                            03/11/2024  Time:                                           09:29     Electronically signed by: Livia Nolen MD  Date:                                            03/11/2024  Time:                                           10:46    ASSESSMENT/PLAN:     Assessment:  53 y.o. female with a PMHx of stage IV breast cancer with lung mets, HAWTHORNE cirrhosis s/b esophageal/gastric varices, and obesity who has been referred to IR for TIPS placement for management of secondary prophylaxis of variceal bleeding. Case discussed with staff. Pt is a candidate for TIPS insertion, would perform gastric variceal embolization as well. Of note, pt's current MELD score is 17. In the event that liver failure occurs following TIPS placement, she will not be a candidate for liver transplant due to current diagnosis of stage IV breast cancer. The procedure was discussed in great detail with the patient including thorough explanations of the potential risks and  benefits of TIPS. Risks include sepsis, severe infection, hemorrhage, acute liver failure, acute heart failure, hepatic encephalopathy, shunt failure, damage to surrounding structures and death. The patient is a candidate for TIPS insertion and embolization of gastric varices under general anesthesia. Plan discussed with ordering physician.The pt verbalized understanding of the plan and would like to proceed.    Plan:  Will proceed with TIPS insertion and embolization of gastric varices under general anesthesia on 4/11/24 pending anesthesia availability  Please keep pt NPO  Anticoagulation history reviewed.   Coagulation labs reviewed.  Thank you for the consult. Please contact with questions via [employer] secure chat or spectra.    Clara Mckinnon PA-C   Interventional Radiology  Spectra: [phone]

## 2024-04-11 NOTE — SUBJECTIVE & OBJECTIVE
Interval History:  H&H stable, no episodes of melena or hematochezia, hepatology rec for TIPS, IR to perform    Oncology Treatment Plan:   OP BREAST FAM-TRASTUZUMAB DERUXTECAN-NXKI Q3W    Medications:  Continuous Infusions:   octreotide (SANDOSTATIN) 500 mcg in sodium chloride 0.9% 100 mL infusion 50 mcg/hr (04/11/24 0836)     Scheduled Meds:   cefTRIAXone (Rocephin) IV (PEDS and ADULTS)  1 g Intravenous Q24H    pantoprazole  40 mg Intravenous BID     PRN Meds:0.9%  NaCl infusion (for blood administration), 0.9%  NaCl infusion (for blood administration), dextrose 10%, dextrose 10%, glucagon (human recombinant), glucose, glucose, naloxone, ondansetron, prochlorperazine, sodium chloride 0.9%     Review of Systems  Objective:     Vital Signs (Most Recent):  Temp: 98.2 °F (36.8 °C) (04/11/24 0740)  Pulse: 83 (04/11/24 0740)  Resp: 18 (04/11/24 0740)  BP: (!) 121/58 (04/11/24 0740)  SpO2: 100 % (04/11/24 0837) Vital Signs (24h Range):  Temp:  [97.6 °F (36.4 °C)-98.2 °F (36.8 °C)] 98.2 °F (36.8 °C)  Pulse:  [77-84] 83  Resp:  [16-18] 18  SpO2:  [96 %-100 %] 100 %  BP: (109-137)/(52-63) 121/58     Weight: 83.5 kg (184 lb 0.4 oz)  Body mass index is 33.66 kg/m².  Body surface area is 1.91 meters squared.      Intake/Output Summary (Last 24 hours) at 4/11/2024 1055  Last data filed at 4/11/2024 0757  Gross per 24 hour   Intake 1039.41 ml   Output 1250 ml   Net -210.59 ml        Physical Exam  Constitutional:       Appearance: She is obese. She is not ill-appearing or diaphoretic.   HENT:      Mouth/Throat:      Mouth: Mucous membranes are moist.   Eyes:      Comments: Pallor eyelids   Cardiovascular:      Rate and Rhythm: Normal rate and regular rhythm.      Heart sounds: Murmur heard.   Pulmonary:      Effort: Pulmonary effort is normal. No respiratory distress.      Breath sounds: No wheezing or rales.   Abdominal:      General: Abdomen is flat. Bowel sounds are normal. There is no distension.      Tenderness: There is no  abdominal tenderness.   Genitourinary:     Comments: ARYAN with dark colored blood  Musculoskeletal:         General: Swelling present.      Right lower leg: No edema.      Left lower leg: No edema.   Skin:     General: Skin is warm.      Coloration: Skin is not jaundiced.   Neurological:      Mental Status: She is alert and oriented to person, place, and time.          Significant Labs:   CBC:   Recent Labs   Lab 04/10/24  1757 04/11/24  0059 04/11/24  0808   WBC 3.41* 4.37 3.19*   HGB 8.3* 7.8* 9.7*   HCT 24.4* 23.1* 30.1*   PLT 98* 79* 80*    and CMP:   Recent Labs   Lab 04/09/24  1153 04/10/24  0747 04/11/24  0534    137 138   K 3.8 4.3 4.0    105 107   CO2 27 27 25   * 135* 100   BUN 17 20 16   CREATININE 0.5 0.6 0.6   CALCIUM 7.8* 7.9* 7.9*   PROT 5.0* 4.7* 4.8*   ALBUMIN 2.4* 2.2* 2.3*   BILITOT 4.0* 4.6* 2.5*   ALKPHOS 87 79 77   AST 33 32 33   ALT 14 13 12   ANIONGAP 6* 5* 6*

## 2024-04-12 LAB
ALBUMIN SERPL BCP-MCNC: 2.2 G/DL (ref 3.5–5.2)
ALP SERPL-CCNC: 75 U/L (ref 55–135)
ALT SERPL W/O P-5'-P-CCNC: 12 U/L (ref 10–44)
ANION GAP SERPL CALC-SCNC: 8 MMOL/L (ref 8–16)
AST SERPL-CCNC: 37 U/L (ref 10–40)
BASOPHILS # BLD AUTO: 0.01 K/UL (ref 0–0.2)
BASOPHILS # BLD AUTO: 0.02 K/UL (ref 0–0.2)
BASOPHILS # BLD AUTO: 0.02 K/UL (ref 0–0.2)
BASOPHILS NFR BLD: 0.4 % (ref 0–1.9)
BASOPHILS NFR BLD: 0.5 % (ref 0–1.9)
BASOPHILS NFR BLD: 0.7 % (ref 0–1.9)
BILIRUB SERPL-MCNC: 1.9 MG/DL (ref 0.1–1)
BUN SERPL-MCNC: 14 MG/DL (ref 6–20)
CALCIUM SERPL-MCNC: 7.8 MG/DL (ref 8.7–10.5)
CHLORIDE SERPL-SCNC: 107 MMOL/L (ref 95–110)
CO2 SERPL-SCNC: 20 MMOL/L (ref 23–29)
CREAT SERPL-MCNC: 0.6 MG/DL (ref 0.5–1.4)
DIFFERENTIAL METHOD BLD: ABNORMAL
EOSINOPHIL # BLD AUTO: 0 K/UL (ref 0–0.5)
EOSINOPHIL # BLD AUTO: 0.1 K/UL (ref 0–0.5)
EOSINOPHIL # BLD AUTO: 0.2 K/UL (ref 0–0.5)
EOSINOPHIL NFR BLD: 1.1 % (ref 0–8)
EOSINOPHIL NFR BLD: 5.5 % (ref 0–8)
EOSINOPHIL NFR BLD: 6.2 % (ref 0–8)
ERYTHROCYTE [DISTWIDTH] IN BLOOD BY AUTOMATED COUNT: 21.2 % (ref 11.5–14.5)
ERYTHROCYTE [DISTWIDTH] IN BLOOD BY AUTOMATED COUNT: 21.3 % (ref 11.5–14.5)
ERYTHROCYTE [DISTWIDTH] IN BLOOD BY AUTOMATED COUNT: 21.5 % (ref 11.5–14.5)
EST. GFR  (NO RACE VARIABLE): >60 ML/MIN/1.73 M^2
GLUCOSE SERPL-MCNC: 119 MG/DL (ref 70–110)
HCT VFR BLD AUTO: 23.5 % (ref 37–48.5)
HCT VFR BLD AUTO: 25.9 % (ref 37–48.5)
HCT VFR BLD AUTO: 26.8 % (ref 37–48.5)
HGB BLD-MCNC: 7.5 G/DL (ref 12–16)
HGB BLD-MCNC: 8.7 G/DL (ref 12–16)
HGB BLD-MCNC: 9.1 G/DL (ref 12–16)
IMM GRANULOCYTES # BLD AUTO: 0 K/UL (ref 0–0.04)
IMM GRANULOCYTES # BLD AUTO: 0.08 K/UL (ref 0–0.04)
IMM GRANULOCYTES # BLD AUTO: 0.09 K/UL (ref 0–0.04)
IMM GRANULOCYTES NFR BLD AUTO: 0 % (ref 0–0.5)
IMM GRANULOCYTES NFR BLD AUTO: 2.2 % (ref 0–0.5)
IMM GRANULOCYTES NFR BLD AUTO: 3 % (ref 0–0.5)
LYMPHOCYTES # BLD AUTO: 0.2 K/UL (ref 1–4.8)
LYMPHOCYTES # BLD AUTO: 0.4 K/UL (ref 1–4.8)
LYMPHOCYTES # BLD AUTO: 0.8 K/UL (ref 1–4.8)
LYMPHOCYTES NFR BLD: 18.5 % (ref 18–48)
LYMPHOCYTES NFR BLD: 18.9 % (ref 18–48)
LYMPHOCYTES NFR BLD: 7.1 % (ref 18–48)
MAGNESIUM SERPL-MCNC: 1.9 MG/DL (ref 1.6–2.6)
MCH RBC QN AUTO: 33.8 PG (ref 27–31)
MCH RBC QN AUTO: 34.3 PG (ref 27–31)
MCH RBC QN AUTO: 34.6 PG (ref 27–31)
MCHC RBC AUTO-ENTMCNC: 31.9 G/DL (ref 32–36)
MCHC RBC AUTO-ENTMCNC: 33.6 G/DL (ref 32–36)
MCHC RBC AUTO-ENTMCNC: 34 G/DL (ref 32–36)
MCV RBC AUTO: 102 FL (ref 82–98)
MCV RBC AUTO: 102 FL (ref 82–98)
MCV RBC AUTO: 106 FL (ref 82–98)
MONOCYTES # BLD AUTO: 0.1 K/UL (ref 0.3–1)
MONOCYTES # BLD AUTO: 0.2 K/UL (ref 0.3–1)
MONOCYTES # BLD AUTO: 0.5 K/UL (ref 0.3–1)
MONOCYTES NFR BLD: 11.3 % (ref 4–15)
MONOCYTES NFR BLD: 3.7 % (ref 4–15)
MONOCYTES NFR BLD: 6.6 % (ref 4–15)
NEUTROPHILS # BLD AUTO: 1.5 K/UL (ref 1.8–7.7)
NEUTROPHILS # BLD AUTO: 2.3 K/UL (ref 1.8–7.7)
NEUTROPHILS # BLD AUTO: 2.6 K/UL (ref 1.8–7.7)
NEUTROPHILS NFR BLD: 62 % (ref 38–73)
NEUTROPHILS NFR BLD: 67.9 % (ref 38–73)
NEUTROPHILS NFR BLD: 84.4 % (ref 38–73)
NRBC BLD-RTO: 0 /100 WBC
PHOSPHATE SERPL-MCNC: 3.2 MG/DL (ref 2.7–4.5)
PLATELET # BLD AUTO: 62 K/UL (ref 150–450)
PLATELET # BLD AUTO: 71 K/UL (ref 150–450)
PLATELET # BLD AUTO: 87 K/UL (ref 150–450)
PMV BLD AUTO: 12.6 FL (ref 9.2–12.9)
PMV BLD AUTO: 12.8 FL (ref 9.2–12.9)
PMV BLD AUTO: 12.9 FL (ref 9.2–12.9)
POTASSIUM SERPL-SCNC: 3.9 MMOL/L (ref 3.5–5.1)
PROT SERPL-MCNC: 4.9 G/DL (ref 6–8.4)
RBC # BLD AUTO: 2.22 M/UL (ref 4–5.4)
RBC # BLD AUTO: 2.54 M/UL (ref 4–5.4)
RBC # BLD AUTO: 2.63 M/UL (ref 4–5.4)
SODIUM SERPL-SCNC: 135 MMOL/L (ref 136–145)
WBC # BLD AUTO: 2.27 K/UL (ref 3.9–12.7)
WBC # BLD AUTO: 2.68 K/UL (ref 3.9–12.7)
WBC # BLD AUTO: 4.17 K/UL (ref 3.9–12.7)

## 2024-04-12 PROCEDURE — 25000003 PHARM REV CODE 250: Performed by: NURSE ANESTHETIST, CERTIFIED REGISTERED

## 2024-04-12 PROCEDURE — 36415 COLL VENOUS BLD VENIPUNCTURE: CPT | Performed by: STUDENT IN AN ORGANIZED HEALTH CARE EDUCATION/TRAINING PROGRAM

## 2024-04-12 PROCEDURE — 86920 COMPATIBILITY TEST SPIN: CPT | Performed by: ANESTHESIOLOGY

## 2024-04-12 PROCEDURE — 63600175 PHARM REV CODE 636 W HCPCS: Performed by: STUDENT IN AN ORGANIZED HEALTH CARE EDUCATION/TRAINING PROGRAM

## 2024-04-12 PROCEDURE — D9220A PRA ANESTHESIA: Mod: CRNA,,, | Performed by: NURSE ANESTHETIST, CERTIFIED REGISTERED

## 2024-04-12 PROCEDURE — 85025 COMPLETE CBC W/AUTO DIFF WBC: CPT | Mod: 91 | Performed by: STUDENT IN AN ORGANIZED HEALTH CARE EDUCATION/TRAINING PROGRAM

## 2024-04-12 PROCEDURE — 84100 ASSAY OF PHOSPHORUS: CPT | Performed by: STUDENT IN AN ORGANIZED HEALTH CARE EDUCATION/TRAINING PROGRAM

## 2024-04-12 PROCEDURE — 63600175 PHARM REV CODE 636 W HCPCS: Performed by: NURSE ANESTHETIST, CERTIFIED REGISTERED

## 2024-04-12 PROCEDURE — 99233 SBSQ HOSP IP/OBS HIGH 50: CPT | Mod: ,,, | Performed by: INTERNAL MEDICINE

## 2024-04-12 PROCEDURE — 20600001 HC STEP DOWN PRIVATE ROOM

## 2024-04-12 PROCEDURE — 83735 ASSAY OF MAGNESIUM: CPT | Performed by: STUDENT IN AN ORGANIZED HEALTH CARE EDUCATION/TRAINING PROGRAM

## 2024-04-12 PROCEDURE — 63600175 PHARM REV CODE 636 W HCPCS: Performed by: ANESTHESIOLOGY

## 2024-04-12 PROCEDURE — C9113 INJ PANTOPRAZOLE SODIUM, VIA: HCPCS | Performed by: STUDENT IN AN ORGANIZED HEALTH CARE EDUCATION/TRAINING PROGRAM

## 2024-04-12 PROCEDURE — D9220A PRA ANESTHESIA: Mod: ANES,,, | Performed by: ANESTHESIOLOGY

## 2024-04-12 PROCEDURE — 80053 COMPREHEN METABOLIC PANEL: CPT | Performed by: STUDENT IN AN ORGANIZED HEALTH CARE EDUCATION/TRAINING PROGRAM

## 2024-04-12 PROCEDURE — 06183J4 BYPASS PORTAL VEIN TO HEPATIC VEIN WITH SYNTHETIC SUBSTITUTE, PERCUTANEOUS APPROACH: ICD-10-PCS | Performed by: RADIOLOGY

## 2024-04-12 RX ORDER — MIDAZOLAM HYDROCHLORIDE 1 MG/ML
INJECTION INTRAMUSCULAR; INTRAVENOUS
Status: DISCONTINUED | OUTPATIENT
Start: 2024-04-12 | End: 2024-04-12

## 2024-04-12 RX ORDER — ONDANSETRON HYDROCHLORIDE 2 MG/ML
INJECTION, SOLUTION INTRAVENOUS
Status: DISCONTINUED | OUTPATIENT
Start: 2024-04-12 | End: 2024-04-12

## 2024-04-12 RX ORDER — DEXAMETHASONE SODIUM PHOSPHATE 4 MG/ML
INJECTION, SOLUTION INTRA-ARTICULAR; INTRALESIONAL; INTRAMUSCULAR; INTRAVENOUS; SOFT TISSUE
Status: DISCONTINUED | OUTPATIENT
Start: 2024-04-12 | End: 2024-04-12

## 2024-04-12 RX ORDER — PROPOFOL 10 MG/ML
VIAL (ML) INTRAVENOUS
Status: DISCONTINUED | OUTPATIENT
Start: 2024-04-12 | End: 2024-04-12

## 2024-04-12 RX ORDER — SODIUM CHLORIDE 0.9 % (FLUSH) 0.9 %
3 SYRINGE (ML) INJECTION
Status: DISCONTINUED | OUTPATIENT
Start: 2024-04-12 | End: 2024-04-16 | Stop reason: HOSPADM

## 2024-04-12 RX ORDER — FENTANYL CITRATE 50 UG/ML
25 INJECTION, SOLUTION INTRAMUSCULAR; INTRAVENOUS EVERY 5 MIN PRN
Status: DISCONTINUED | OUTPATIENT
Start: 2024-04-12 | End: 2024-04-15

## 2024-04-12 RX ORDER — HALOPERIDOL 5 MG/ML
0.5 INJECTION INTRAMUSCULAR EVERY 10 MIN PRN
Status: DISCONTINUED | OUTPATIENT
Start: 2024-04-12 | End: 2024-04-15

## 2024-04-12 RX ORDER — LIDOCAINE HYDROCHLORIDE 20 MG/ML
INJECTION INTRAVENOUS
Status: DISCONTINUED | OUTPATIENT
Start: 2024-04-12 | End: 2024-04-12

## 2024-04-12 RX ORDER — ROCURONIUM BROMIDE 10 MG/ML
INJECTION, SOLUTION INTRAVENOUS
Status: DISCONTINUED | OUTPATIENT
Start: 2024-04-12 | End: 2024-04-12

## 2024-04-12 RX ORDER — FENTANYL CITRATE 50 UG/ML
INJECTION, SOLUTION INTRAMUSCULAR; INTRAVENOUS
Status: DISCONTINUED | OUTPATIENT
Start: 2024-04-12 | End: 2024-04-12

## 2024-04-12 RX ADMIN — SUGAMMADEX 200 MG: 100 INJECTION, SOLUTION INTRAVENOUS at 04:04

## 2024-04-12 RX ADMIN — MIDAZOLAM HYDROCHLORIDE 2 MG: 2 INJECTION, SOLUTION INTRAMUSCULAR; INTRAVENOUS at 02:04

## 2024-04-12 RX ADMIN — CEFTRIAXONE 1 G: 1 INJECTION, POWDER, FOR SOLUTION INTRAMUSCULAR; INTRAVENOUS at 02:04

## 2024-04-12 RX ADMIN — SODIUM CHLORIDE: 0.9 INJECTION, SOLUTION INTRAVENOUS at 02:04

## 2024-04-12 RX ADMIN — FENTANYL CITRATE 25 MCG: 50 INJECTION INTRAMUSCULAR; INTRAVENOUS at 06:04

## 2024-04-12 RX ADMIN — FENTANYL CITRATE 25 MCG: 50 INJECTION INTRAMUSCULAR; INTRAVENOUS at 05:04

## 2024-04-12 RX ADMIN — PANTOPRAZOLE SODIUM 40 MG: 40 INJECTION, POWDER, FOR SOLUTION INTRAVENOUS at 08:04

## 2024-04-12 RX ADMIN — ROCURONIUM BROMIDE 50 MG: 10 INJECTION, SOLUTION INTRAVENOUS at 02:04

## 2024-04-12 RX ADMIN — HALOPERIDOL LACTATE 0.5 MG: 5 INJECTION, SOLUTION INTRAMUSCULAR at 05:04

## 2024-04-12 RX ADMIN — PROPOFOL 80 MG: 10 INJECTION, EMULSION INTRAVENOUS at 02:04

## 2024-04-12 RX ADMIN — PANTOPRAZOLE SODIUM 40 MG: 40 INJECTION, POWDER, FOR SOLUTION INTRAVENOUS at 09:04

## 2024-04-12 RX ADMIN — ONDANSETRON 4 MG: 2 INJECTION INTRAMUSCULAR; INTRAVENOUS at 04:04

## 2024-04-12 RX ADMIN — LIDOCAINE HYDROCHLORIDE 80 MG: 20 INJECTION INTRAVENOUS at 02:04

## 2024-04-12 RX ADMIN — ROCURONIUM BROMIDE 20 MG: 10 INJECTION, SOLUTION INTRAVENOUS at 02:04

## 2024-04-12 RX ADMIN — DEXAMETHASONE SODIUM PHOSPHATE 4 MG: 4 INJECTION, SOLUTION INTRAMUSCULAR; INTRAVENOUS at 04:04

## 2024-04-12 RX ADMIN — FENTANYL CITRATE 100 MCG: 50 INJECTION, SOLUTION INTRAMUSCULAR; INTRAVENOUS at 02:04

## 2024-04-12 NOTE — ANESTHESIA POSTPROCEDURE EVALUATION
Anesthesia Post Evaluation    Patient: Shikha López    Procedure(s) Performed: * No procedures listed *    Final Anesthesia Type: general      Patient location during evaluation: PACU  Patient participation: Yes- Able to Participate  Level of consciousness: awake and alert  Post-procedure vital signs: reviewed and stable  Pain management: adequate  Airway patency: patent    PONV status at discharge: No PONV  Anesthetic complications: no      Cardiovascular status: hemodynamically stable  Respiratory status: spontaneous ventilation  Follow-up not needed.              Vitals Value Taken Time   /60 04/12/24 1638   Temp 98.0 04/12/24 1645   Pulse 88 04/12/24 1644   Resp 10 04/12/24 1642   SpO2 100 % 04/12/24 1644   Vitals shown include unvalidated device data.      No case tracking events are documented in the log.      Pain/Yisel Score: Yisel Score: 10 (4/11/2024  8:47 PM)

## 2024-04-12 NOTE — PLAN OF CARE
Patient AAOx3, no distress noted, respirations even and unlabored, will continue to monitor. VSS. Acceptance of education, consents signed, H/P done. Labs reviewed. Patient in IR Room 188 for image guided transjugular intrahepatic portosystemic shunt and gastric varices with embolization procedure. Warm blankets applied to patient. Patient prepped and draped in sterile fashion. Anesthesia at bedside; Refer to anesthesia record regarding sedation and vital signs.

## 2024-04-12 NOTE — TRANSFER OF CARE
"Anesthesia Transfer of Care Note    Patient: Shikha López    Procedure(s) Performed: * No procedures listed *    Patient location: PACU    Anesthesia Type: general    Transport from OR: Transported from OR on 6-10 L/min O2 by face mask with adequate spontaneous ventilation    Post pain: adequate analgesia    Post assessment: no apparent anesthetic complications and tolerated procedure well    Post vital signs: stable    Level of consciousness: awake and alert    Nausea/Vomiting: no nausea/vomiting    Complications: none    Transfer of care protocol was followed    Last vitals: Visit Vitals  BP (!) 121/58 (BP Location: Right arm, Patient Position: Lying)   Pulse 84   Temp 36.8 °C (98.2 °F) (Oral)   Resp 18   Ht 5' 2" (1.575 m)   Wt 86 kg (189 lb 9.5 oz)   LMP  (LMP Unknown)   SpO2 98%   Breastfeeding No   BMI 34.68 kg/m²     "

## 2024-04-12 NOTE — ANESTHESIA PROCEDURE NOTES
Intubation    Date/Time: 4/12/2024 2:34 PM    Performed by: Ismael Menendez CRNA  Authorized by: Shahla Wilson MD    Intubation:     Induction:  Intravenous    Intubated:  Postinduction    Mask Ventilation:  Easy mask    Attempts:  1    Attempted By:  CRNA    Method of Intubation:  Video laryngoscopy    Blade:  Yee 3    Laryngeal View Grade: Grade IIA - cords partially seen      Difficult Airway Encountered?: No      Complications:  None    Airway Device:  Oral endotracheal tube    Airway Device Size:  7.5    Style/Cuff Inflation:  Cuffed    Inflation Amount (mL):  10    Tube secured:  20    Secured at:  The teeth    Placement Verified By:  Capnometry    Complicating Factors:  None    Findings Post-Intubation:  BS equal bilateral and atraumatic/condition of teeth unchanged

## 2024-04-12 NOTE — SUBJECTIVE & OBJECTIVE
Interval History: NAEON. Refers ongoign dark stools. Feeling ok. Patient booked for IR TIPS on 4/12 afternoon    Oncology Treatment Plan:   OP BREAST FAM-TRASTUZUMAB DERUXTECAN-NXKI Q3W    Medications:  Continuous Infusions:  Scheduled Meds:   cefTRIAXone (Rocephin) IV (PEDS and ADULTS)  1 g Intravenous Q24H    pantoprazole  40 mg Intravenous BID     PRN Meds:0.9%  NaCl infusion (for blood administration), 0.9%  NaCl infusion (for blood administration), dextrose 10%, dextrose 10%, glucagon (human recombinant), glucose, glucose, naloxone, ondansetron, prochlorperazine, sodium chloride 0.9%     Review of Systems   Constitutional:  Negative for chills and fever.   HENT:  Negative for congestion and sore throat.    Eyes:  Negative for photophobia and visual disturbance.   Respiratory:  Negative for cough and shortness of breath.    Cardiovascular:  Positive for leg swelling. Negative for chest pain and palpitations.   Gastrointestinal:  Negative for abdominal pain and diarrhea.   Genitourinary:  Negative for dysuria and hematuria.   Musculoskeletal:  Negative for arthralgias and gait problem.   Skin:  Negative for rash and wound.   Neurological:  Negative for syncope and headaches.   Psychiatric/Behavioral:  Negative for agitation and behavioral problems.      Objective:     Vital Signs (Most Recent):  Temp: 97.7 °F (36.5 °C) (04/12/24 0732)  Pulse: 86 (04/12/24 0732)  Resp: 18 (04/12/24 0732)  BP: (!) 114/59 (04/12/24 0732)  SpO2: (!) 93 % (04/12/24 0803) Vital Signs (24h Range):  Temp:  [97.7 °F (36.5 °C)-98.2 °F (36.8 °C)] 97.7 °F (36.5 °C)  Pulse:  [70-87] 86  Resp:  [17-20] 18  SpO2:  [93 %-99 %] 93 %  BP: (102-127)/(52-60) 114/59     Weight: 86 kg (189 lb 9.5 oz)  Body mass index is 34.68 kg/m².  Body surface area is 1.94 meters squared.      Intake/Output Summary (Last 24 hours) at 4/12/2024 1140  Last data filed at 4/12/2024 0605  Gross per 24 hour   Intake 1368.02 ml   Output 950 ml   Net 418.02 ml         Physical Exam  Constitutional:       Appearance: She is obese. She is not ill-appearing or diaphoretic.   HENT:      Mouth/Throat:      Mouth: Mucous membranes are moist.   Eyes:      Comments: Pallor eyelids   Cardiovascular:      Rate and Rhythm: Normal rate and regular rhythm.      Heart sounds: Murmur heard.   Pulmonary:      Effort: Pulmonary effort is normal. No respiratory distress.      Breath sounds: No wheezing or rales.   Abdominal:      General: Abdomen is flat. Bowel sounds are normal. There is no distension.      Tenderness: There is no abdominal tenderness.   Musculoskeletal:         General: Swelling present.      Right lower leg: No edema.      Left lower leg: No edema.   Skin:     General: Skin is warm.      Coloration: Skin is not jaundiced.   Neurological:      Mental Status: She is alert and oriented to person, place, and time.          Significant Labs:   All pertinent labs from the last 24 hours have been reviewed.    Diagnostic Results:  I have reviewed all pertinent imaging results/findings within the past 24 hours.

## 2024-04-12 NOTE — NURSING
Patient is AAOX4. Up, independent, non skid shocks on, bed alarm refused. Call light and personal items within reach. Patient involved in care. Telemetry monitored.  IV antibiotic provided.  IR TIPS insertion done. Patient stable at this time.

## 2024-04-12 NOTE — PLAN OF CARE
TIPS with embolization of gastric varices procedure completed. Patient tolerated well. Right procedure incision site clean, dry, and intact; no bleeding or hematoma noted. Patient to be transferred to PACU for post-procedural recovery per MD. Report to be given at bedside to RN. Anesthesia at bedside; Refer to anesthesia record for further information regarding sedation and vital signs.

## 2024-04-12 NOTE — PLAN OF CARE
PRE- TIPS PRESSURES    [facility]  9  Portal              22                            POST-TIPS PRESSURE  [facility]                       14  Portal                             18

## 2024-04-12 NOTE — NURSING TRANSFER
Nursing Transfer Note      4/12/2024   5:24 PM    Nurse giving handoff:NAHUN Martin  Nurse receiving handoff:ONC RN    Reason patient is being transferred: post anesthesia    Transfer To: [id number]    Transfer via stretcher    Transfer with  to O2, cardiac monitoring    Transported by PCT    Transfer Vital Signs:  Blood Pressure:138/63  Heart Rate:91  O2:100%-2LNC  Temperature:97.7  Respirations:14    Telemetry: Box Number [id number]  Order for Tele Monitor? Yes    Additional Lines: Oxygen    4eyes on Skin: yes    Medicines sent: n/a      Chart send with patient: Yes      Patient reassessed at: 4/12/24 @0537

## 2024-04-12 NOTE — ASSESSMENT & PLAN NOTE
53 hx of left breast ca, mets to lung, HAWTHORNE cirrhosis, obesity    Symptoms: melana, BRBPR, fatigue, SOB  Diagnostics: Hgb 5.6 (baseline 9) drop from 7.2 yesterday    - GI consult for EGD / C-Scope consult for source evaluation +/- intervention Plan for 4/10  - IV Protonix 80mg x 1, followed by IV 40mg BID thereafter  - clears until midnight NPO, hold pharmacologic VTE PPx  - Trend CBCs ; Transfuse Hgb < 7  - Resuscitate IVF PRN  - Transfused 3 unit pRBCs  - Cardiac telemetry  - Maintain 2 large bore IVs  - If hemodynamically unstable +/- new bleed, stat CTA / consult IR for embolization evaluation   - CTX for hx of varices (esophageal and gastric) and GI bleed    -EGD 4/10 with gastric varices and stigmata of bleeding, consulted IR/hepatology for input on EUS guided glue injection  -hepatology and IR with plans for TIPS procedure to prevent gastric and esophageal variceal bleeding. Booked for 4/12  -to complete 3 total days of octreotide and 5 days of ceftriaxone

## 2024-04-12 NOTE — PLAN OF CARE
No acute events this shift, pt aaox4, VSS, afebrile, medications tolerated, I/O recorded, no c/o pain, frequent rounds performed, pt ambulates independently, pt in bed resting, call light in reach, pt instructed to call for assistance, NAD, safety maintained.

## 2024-04-12 NOTE — PROCEDURES
VIR Post-Procedure Note      Pre Op Diagnosis:  Variceal bleed      Post Op Diagnosis: same     Procedure: Transjugular intrahepatic portosystemic shunt (TIPS) + Variceal embolization    Performed by:  Sarbjit Greco MD / Abdoulaye Gunter MD    Written Informed Consent Obtained:  Yes    Specimen Removed:  No    Estimated Blood Loss:  minimal     Findings:     General Anesthesia    Successful endovascular creation of a transhepatic portosystemic shunt via a right hepatic and right portal vein, post dilated to 8  mm     Postprocedural portogram shows brisk  flow through the TIPS.     Antegrade flow noted in the still enlarged coronary & posterior gastric vein, both of which were coil embolized,     Pre TIPS pressure gradient 13.    Post TIPS pressure gradient  4.    The catheter was removed and hemostasis achieved without hematoma.     There were no complications.  Please see Imaging report for further details.    Plan:    Close hemodynamic monitoring.     Monitor for liver dysfunction over 48 hours (bilirubin, liver enzymes, HE).     Monitor for hepatic encephalopathy (HE). If patient develops signs of HE, maximize medical treatment  before considering TIPS reduction.     Doppler US at 1, 3 & 6 months & then annual.               Sarbjit Greco MD  VIR Fellow

## 2024-04-12 NOTE — PROGRESS NOTES
[facility] - Oncology ([facility])  Hematology/Oncology  Progress Note    Patient Name: Shikha López  Admission Date: 4/9/2024  Hospital Length of Stay: 3 days  Code Status: Full Code     Subjective:     HPI:  53F with PMH stage 4 breast ca left (mets HER 2 +) Dr. 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 Yesterday, she began having hematochezia with BRBPR, lethargy and exertional SOB. Denies fevers, chills, nausea, vomiting, hematemesis, cp, dysuria, peripheral edema.     AF, tachycardic, BP stable, on RA, Hg 5.6, Plt 115, Tbil 4, U/A wnl, given IV PPI, pending 2 units of pRBCs, octreotide    Admitted to oncology service for GI bleeding on active chemotherapy    Interval History: NAEON. Refers ongoign dark stools. Feeling ok. Patient booked for IR TIPS on 4/12 afternoon    Oncology Treatment Plan:   OP BREAST FAM-TRASTUZUMAB DERUXTECAN-NXKI Q3W    Medications:  Continuous Infusions:  Scheduled Meds:   cefTRIAXone (Rocephin) IV (PEDS and ADULTS)  1 g Intravenous Q24H    pantoprazole  40 mg Intravenous BID     PRN Meds:0.9%  NaCl infusion (for blood administration), 0.9%  NaCl infusion (for blood administration), dextrose 10%, dextrose 10%, glucagon (human recombinant), glucose, glucose, naloxone, ondansetron, prochlorperazine, sodium chloride 0.9%     Review of Systems   Constitutional:  Negative for chills and fever.   HENT:  Negative for congestion and sore throat.    Eyes:  Negative for photophobia and visual disturbance.   Respiratory:  Negative for cough and shortness of breath.    Cardiovascular:  Positive for leg swelling. Negative for chest pain and palpitations.   Gastrointestinal:  Negative for abdominal pain and diarrhea.   Genitourinary:  Negative for dysuria and hematuria.   Musculoskeletal:  Negative for  arthralgias and gait problem.   Skin:  Negative for rash and wound.   Neurological:  Negative for syncope and headaches.   Psychiatric/Behavioral:  Negative for agitation and behavioral problems.      Objective:     Vital Signs (Most Recent):  Temp: 97.7 °F (36.5 °C) (04/12/24 0732)  Pulse: 86 (04/12/24 0732)  Resp: 18 (04/12/24 0732)  BP: (!) 114/59 (04/12/24 0732)  SpO2: (!) 93 % (04/12/24 0803) Vital Signs (24h Range):  Temp:  [97.7 °F (36.5 °C)-98.2 °F (36.8 °C)] 97.7 °F (36.5 °C)  Pulse:  [70-87] 86  Resp:  [17-20] 18  SpO2:  [93 %-99 %] 93 %  BP: (102-127)/(52-60) 114/59     Weight: 86 kg (189 lb 9.5 oz)  Body mass index is 34.68 kg/m².  Body surface area is 1.94 meters squared.      Intake/Output Summary (Last 24 hours) at 4/12/2024 1140  Last data filed at 4/12/2024 0605  Gross per 24 hour   Intake 1368.02 ml   Output 950 ml   Net 418.02 ml        Physical Exam  Constitutional:       Appearance: She is obese. She is not ill-appearing or diaphoretic.   HENT:      Mouth/Throat:      Mouth: Mucous membranes are moist.   Eyes:      Comments: Pallor eyelids   Cardiovascular:      Rate and Rhythm: Normal rate and regular rhythm.      Heart sounds: Murmur heard.   Pulmonary:      Effort: Pulmonary effort is normal. No respiratory distress.      Breath sounds: No wheezing or rales.   Abdominal:      General: Abdomen is flat. Bowel sounds are normal. There is no distension.      Tenderness: There is no abdominal tenderness.   Musculoskeletal:         General: Swelling present.      Right lower leg: No edema.      Left lower leg: No edema.   Skin:     General: Skin is warm.      Coloration: Skin is not jaundiced.   Neurological:      Mental Status: She is alert and oriented to person, place, and time.          Significant Labs:   All pertinent labs from the last 24 hours have been reviewed.    Diagnostic Results:  I have reviewed all pertinent imaging results/findings within the past 24 hours.  Assessment/Plan:      Severe obesity (BMI 35.0-39.9) with comorbidity  -documented  -Diet, exercise, and weight for mortality benefits    Esophageal and gastric varices  -2/2 HAWTHORNE cirrhosis  -hx of bleeding varices s/p banding in June 2023  -plan for TIPS 4/12    Gastrointestinal hemorrhage with melena  53 hx of left breast ca, mets to lung, HAWTHORNE cirrhosis, obesity    Symptoms: melana, BRBPR, fatigue, SOB  Diagnostics: Hgb 5.6 (baseline 9) drop from 7.2 yesterday    - GI consult for EGD / C-Scope consult for source evaluation +/- intervention Plan for 4/10  - IV Protonix 80mg x 1, followed by IV 40mg BID thereafter  - clears until midnight NPO, hold pharmacologic VTE PPx  - Trend CBCs ; Transfuse Hgb < 7  - Resuscitate IVF PRN  - Transfused 3 unit pRBCs  - Cardiac telemetry  - Maintain 2 large bore IVs  - If hemodynamically unstable +/- new bleed, stat CTA / consult IR for embolization evaluation   - CTX for hx of varices (esophageal and gastric) and GI bleed    -EGD 4/10 with gastric varices and stigmata of bleeding, consulted IR/hepatology for input on EUS guided glue injection  -hepatology and IR with plans for TIPS procedure to prevent gastric and esophageal variceal bleeding. Booked for 4/12  -to complete 3 total days of octreotide and 5 days of ceftriaxone    Malignant neoplasm metastatic to left lung  -s/p chemo, radiation, post op RT  -metastatic  -on maintenance herceptin    Breast cancer, stage 4, left  -Follows with Dr. Willis  -On [facility] for 2 yrs             Prakash Conway MD  Hematology/Oncology  [facility] - Oncology ([facility])

## 2024-04-13 LAB
ALBUMIN SERPL BCP-MCNC: 2.2 G/DL (ref 3.5–5.2)
ALP SERPL-CCNC: 121 U/L (ref 55–135)
ALT SERPL W/O P-5'-P-CCNC: 17 U/L (ref 10–44)
ANION GAP SERPL CALC-SCNC: 6 MMOL/L (ref 8–16)
AST SERPL-CCNC: 46 U/L (ref 10–40)
BASOPHILS # BLD AUTO: 0 K/UL (ref 0–0.2)
BASOPHILS # BLD AUTO: 0.01 K/UL (ref 0–0.2)
BASOPHILS NFR BLD: 0 % (ref 0–1.9)
BASOPHILS NFR BLD: 0.3 % (ref 0–1.9)
BILIRUB SERPL-MCNC: 2.4 MG/DL (ref 0.1–1)
BLD PROD TYP BPU: NORMAL
BLD PROD TYP BPU: NORMAL
BLOOD UNIT EXPIRATION DATE: NORMAL
BLOOD UNIT EXPIRATION DATE: NORMAL
BLOOD UNIT TYPE CODE: 5100
BLOOD UNIT TYPE CODE: 5100
BLOOD UNIT TYPE: NORMAL
BLOOD UNIT TYPE: NORMAL
BUN SERPL-MCNC: 10 MG/DL (ref 6–20)
CALCIUM SERPL-MCNC: 7.7 MG/DL (ref 8.7–10.5)
CHLORIDE SERPL-SCNC: 106 MMOL/L (ref 95–110)
CO2 SERPL-SCNC: 25 MMOL/L (ref 23–29)
CODING SYSTEM: NORMAL
CODING SYSTEM: NORMAL
CREAT SERPL-MCNC: 0.6 MG/DL (ref 0.5–1.4)
CROSSMATCH INTERPRETATION: NORMAL
CROSSMATCH INTERPRETATION: NORMAL
DIFFERENTIAL METHOD BLD: ABNORMAL
DIFFERENTIAL METHOD BLD: ABNORMAL
DISPENSE STATUS: NORMAL
DISPENSE STATUS: NORMAL
EOSINOPHIL # BLD AUTO: 0 K/UL (ref 0–0.5)
EOSINOPHIL # BLD AUTO: 0.1 K/UL (ref 0–0.5)
EOSINOPHIL NFR BLD: 0 % (ref 0–8)
EOSINOPHIL NFR BLD: 1.9 % (ref 0–8)
ERYTHROCYTE [DISTWIDTH] IN BLOOD BY AUTOMATED COUNT: 20.9 % (ref 11.5–14.5)
ERYTHROCYTE [DISTWIDTH] IN BLOOD BY AUTOMATED COUNT: 21 % (ref 11.5–14.5)
EST. GFR  (NO RACE VARIABLE): >60 ML/MIN/1.73 M^2
GLUCOSE SERPL-MCNC: 154 MG/DL (ref 70–110)
HCT VFR BLD AUTO: 24.8 % (ref 37–48.5)
HCT VFR BLD AUTO: 26.1 % (ref 37–48.5)
HGB BLD-MCNC: 8.2 G/DL (ref 12–16)
HGB BLD-MCNC: 8.9 G/DL (ref 12–16)
IMM GRANULOCYTES # BLD AUTO: 0.06 K/UL (ref 0–0.04)
IMM GRANULOCYTES # BLD AUTO: 0.07 K/UL (ref 0–0.04)
IMM GRANULOCYTES NFR BLD AUTO: 0.9 % (ref 0–0.5)
IMM GRANULOCYTES NFR BLD AUTO: 1.8 % (ref 0–0.5)
INR PPP: 1.4 (ref 0.8–1.2)
LYMPHOCYTES # BLD AUTO: 0.3 K/UL (ref 1–4.8)
LYMPHOCYTES # BLD AUTO: 0.6 K/UL (ref 1–4.8)
LYMPHOCYTES NFR BLD: 8.3 % (ref 18–48)
LYMPHOCYTES NFR BLD: 8.7 % (ref 18–48)
MAGNESIUM SERPL-MCNC: 1.8 MG/DL (ref 1.6–2.6)
MCH RBC QN AUTO: 34.6 PG (ref 27–31)
MCH RBC QN AUTO: 34.7 PG (ref 27–31)
MCHC RBC AUTO-ENTMCNC: 33.1 G/DL (ref 32–36)
MCHC RBC AUTO-ENTMCNC: 34.1 G/DL (ref 32–36)
MCV RBC AUTO: 102 FL (ref 82–98)
MCV RBC AUTO: 105 FL (ref 82–98)
MONOCYTES # BLD AUTO: 0.1 K/UL (ref 0.3–1)
MONOCYTES # BLD AUTO: 0.2 K/UL (ref 0.3–1)
MONOCYTES NFR BLD: 2.2 % (ref 4–15)
MONOCYTES NFR BLD: 3.3 % (ref 4–15)
NEUTROPHILS # BLD AUTO: 3.4 K/UL (ref 1.8–7.7)
NEUTROPHILS # BLD AUTO: 5.9 K/UL (ref 1.8–7.7)
NEUTROPHILS NFR BLD: 85.9 % (ref 38–73)
NEUTROPHILS NFR BLD: 86.7 % (ref 38–73)
NRBC BLD-RTO: 0 /100 WBC
NRBC BLD-RTO: 0 /100 WBC
NUM UNITS TRANS PACKED RBC: NORMAL
NUM UNITS TRANS PACKED RBC: NORMAL
PHOSPHATE SERPL-MCNC: 3.3 MG/DL (ref 2.7–4.5)
PLATELET # BLD AUTO: 72 K/UL (ref 150–450)
PLATELET # BLD AUTO: 87 K/UL (ref 150–450)
PMV BLD AUTO: 11.5 FL (ref 9.2–12.9)
PMV BLD AUTO: 13 FL (ref 9.2–12.9)
POTASSIUM SERPL-SCNC: 4.2 MMOL/L (ref 3.5–5.1)
PROT SERPL-MCNC: 5.1 G/DL (ref 6–8.4)
PROTHROMBIN TIME: 15.1 SEC (ref 9–12.5)
RBC # BLD AUTO: 2.36 M/UL (ref 4–5.4)
RBC # BLD AUTO: 2.57 M/UL (ref 4–5.4)
SODIUM SERPL-SCNC: 137 MMOL/L (ref 136–145)
WBC # BLD AUTO: 3.93 K/UL (ref 3.9–12.7)
WBC # BLD AUTO: 6.85 K/UL (ref 3.9–12.7)

## 2024-04-13 PROCEDURE — 99233 SBSQ HOSP IP/OBS HIGH 50: CPT | Mod: GC,,, | Performed by: INTERNAL MEDICINE

## 2024-04-13 PROCEDURE — 99233 SBSQ HOSP IP/OBS HIGH 50: CPT | Mod: GC,,, | Performed by: STUDENT IN AN ORGANIZED HEALTH CARE EDUCATION/TRAINING PROGRAM

## 2024-04-13 PROCEDURE — 80053 COMPREHEN METABOLIC PANEL: CPT | Performed by: STUDENT IN AN ORGANIZED HEALTH CARE EDUCATION/TRAINING PROGRAM

## 2024-04-13 PROCEDURE — 85610 PROTHROMBIN TIME: CPT | Performed by: STUDENT IN AN ORGANIZED HEALTH CARE EDUCATION/TRAINING PROGRAM

## 2024-04-13 PROCEDURE — 25000003 PHARM REV CODE 250: Performed by: STUDENT IN AN ORGANIZED HEALTH CARE EDUCATION/TRAINING PROGRAM

## 2024-04-13 PROCEDURE — 20600001 HC STEP DOWN PRIVATE ROOM

## 2024-04-13 PROCEDURE — 36415 COLL VENOUS BLD VENIPUNCTURE: CPT | Performed by: STUDENT IN AN ORGANIZED HEALTH CARE EDUCATION/TRAINING PROGRAM

## 2024-04-13 PROCEDURE — 63600175 PHARM REV CODE 636 W HCPCS: Mod: JZ,JG

## 2024-04-13 PROCEDURE — 83735 ASSAY OF MAGNESIUM: CPT | Performed by: STUDENT IN AN ORGANIZED HEALTH CARE EDUCATION/TRAINING PROGRAM

## 2024-04-13 PROCEDURE — 25000003 PHARM REV CODE 250

## 2024-04-13 PROCEDURE — 85025 COMPLETE CBC W/AUTO DIFF WBC: CPT | Performed by: STUDENT IN AN ORGANIZED HEALTH CARE EDUCATION/TRAINING PROGRAM

## 2024-04-13 PROCEDURE — 84100 ASSAY OF PHOSPHORUS: CPT | Performed by: STUDENT IN AN ORGANIZED HEALTH CARE EDUCATION/TRAINING PROGRAM

## 2024-04-13 PROCEDURE — C9113 INJ PANTOPRAZOLE SODIUM, VIA: HCPCS | Performed by: STUDENT IN AN ORGANIZED HEALTH CARE EDUCATION/TRAINING PROGRAM

## 2024-04-13 PROCEDURE — 63600175 PHARM REV CODE 636 W HCPCS: Performed by: STUDENT IN AN ORGANIZED HEALTH CARE EDUCATION/TRAINING PROGRAM

## 2024-04-13 RX ADMIN — PANTOPRAZOLE SODIUM 40 MG: 40 INJECTION, POWDER, FOR SOLUTION INTRAVENOUS at 09:04

## 2024-04-13 RX ADMIN — PANTOPRAZOLE SODIUM 40 MG: 40 INJECTION, POWDER, FOR SOLUTION INTRAVENOUS at 08:04

## 2024-04-13 RX ADMIN — ALTEPLASE 2 MG: 2.2 INJECTION, POWDER, LYOPHILIZED, FOR SOLUTION INTRAVENOUS at 05:04

## 2024-04-13 RX ADMIN — CEFTRIAXONE 1 G: 1 INJECTION, POWDER, FOR SOLUTION INTRAMUSCULAR; INTRAVENOUS at 04:04

## 2024-04-13 RX ADMIN — SODIUM CHLORIDE: 9 INJECTION, SOLUTION INTRAVENOUS at 04:04

## 2024-04-13 NOTE — SUBJECTIVE & OBJECTIVE
Subjective:     Interval History:   S/p TIPS placement on 4/12 with reduction in pressure gradient from 13 to 4. Slight bump in liver enzymes following TIPS, which is not unexpected. No episodes of encephalopathy following TIPS.    Review of Systems   Constitutional:  Negative for chills and fever.   Gastrointestinal:  Negative for blood in stool, nausea and vomiting.   Psychiatric/Behavioral:  Negative for confusion and decreased concentration.      Objective:     Vital Signs (Most Recent):  Temp: 97.5 °F (36.4 °C) (04/13/24 1057)  Pulse: 92 (04/13/24 1058)  Resp: 16 (04/13/24 1057)  BP: (!) 109/54 (04/13/24 1057)  SpO2: 96 % (04/13/24 1057) Vital Signs (24h Range):  Temp:  [97.4 °F (36.3 °C)-97.7 °F (36.5 °C)] 97.5 °F (36.4 °C)  Pulse:  [78-92] 92  Resp:  [0-18] 16  SpO2:  [92 %-100 %] 96 %  BP: (104-138)/(53-64) 109/54     Weight: 85.7 kg (188 lb 15 oz) (04/13/24 0400)  Body mass index is 34.56 kg/m².      Intake/Output Summary (Last 24 hours) at 4/13/2024 1226  Last data filed at 4/13/2024 0607  Gross per 24 hour   Intake 1020 ml   Output 650 ml   Net 370 ml       Lines/Drains/Airways       Central Venous Catheter Line  Duration             Port A Cath Single Lumen 06/22/21 right internal jugular 1026 days              Peripheral Intravenous Line  Duration                  Peripheral IV - Single Lumen 04/09/24 1220 18 G Right Antecubital 4 days         Peripheral IV - Single Lumen 04/09/24 1358 20 G Right Antecubital 3 days                     Physical Exam  Vitals reviewed.   Constitutional:       General: She is not in acute distress.     Appearance: Normal appearance.   HENT:      Head: Normocephalic and atraumatic.   Cardiovascular:      Rate and Rhythm: Normal rate and regular rhythm.   Pulmonary:      Effort: Pulmonary effort is normal. No respiratory distress.   Abdominal:      Palpations: Abdomen is soft.      Tenderness: There is no abdominal tenderness.   Musculoskeletal:         General: Swelling  present. No tenderness.      Right lower leg: Edema present.      Left lower leg: Edema present.   Skin:     Findings: No bruising or rash.   Neurological:      General: No focal deficit present.      Mental Status: She is alert and oriented to person, place, and time.   Psychiatric:         Mood and Affect: Mood normal.         Behavior: Behavior normal.          Significant Labs:  All pertinent lab results from the last 24 hours have been reviewed.      Significant Imaging:  Imaging results within the past 24 hours have been reviewed.

## 2024-04-13 NOTE — SUBJECTIVE & OBJECTIVE
Interval History: Successful TIPS 4/12, A/O x 4 without concerns for HE, resting comfortably and tolerating PO intake, no melena    Oncology Treatment Plan:   OP BREAST FAM-TRASTUZUMAB DERUXTECAN-NXKI Q3W    Medications:  Continuous Infusions:  Current Facility-Administered Medications   Medication Dose Route Frequency Provider Last Rate Last Admin    0.9%  NaCl infusion (for blood administration)   Intravenous Q24H PRN Rober Ricci III, MD        0.9%  NaCl infusion (for blood administration)   Intravenous Q24H PRN Shaun Ding MD        cefTRIAXone (Rocephin) 1 g in dextrose 5 % in water (D5W) 100 mL IVPB (MB+)  1 g Intravenous Q24H Prakash Conway MD   Stopped at 04/11/24 1656    dextrose 10% bolus 125 mL 125 mL  12.5 g Intravenous PRN Prakash Conway MD        dextrose 10% bolus 250 mL 250 mL  25 g Intravenous PRN Praaksh Conway MD        fentaNYL 50 mcg/mL injection 25 mcg  25 mcg Intravenous Q5 Min PRN Tl Madrigal MD   25 mcg at 04/12/24 1800    glucagon (human recombinant) injection 1 mg  1 mg Intramuscular PRN Prakash Conway MD        glucose chewable tablet 16 g  16 g Oral PRN Prakash Conway MD        glucose chewable tablet 24 g  24 g Oral PRN Prakash Conway MD        haloperidol lactate injection 0.5 mg  0.5 mg Intravenous Q10 Min PRN Tl Madrigal MD   0.5 mg at 04/12/24 1746    naloxone 0.4 mg/mL injection 0.02 mg  0.02 mg Intravenous PRN Prakash Conway MD        ondansetron injection 4 mg  4 mg Intravenous Q8H PRN Prakash Conway MD        pantoprazole injection 40 mg  40 mg Intravenous BID Prakash Conway MD   40 mg at 04/13/24 0842    prochlorperazine injection Soln 5 mg  5 mg Intravenous Q6H PRN Prakash Conway MD        sodium chloride 0.9% flush 10 mL  10 mL Intravenous Q12H PRN Prakash Conway MD        sodium chloride 0.9% flush 3 mL  3 mL  Intravenous PRN Tl Madrigal MD         Scheduled Meds:  Current Facility-Administered Medications   Medication Dose Route Frequency Provider Last Rate Last Admin    0.9%  NaCl infusion (for blood administration)   Intravenous Q24H PRN Rober Ricci III, MD        0.9%  NaCl infusion (for blood administration)   Intravenous Q24H PRN Shaun Ding MD        cefTRIAXone (Rocephin) 1 g in dextrose 5 % in water (D5W) 100 mL IVPB (MB+)  1 g Intravenous Q24H Prakash Conway MD   Stopped at 04/11/24 1656    dextrose 10% bolus 125 mL 125 mL  12.5 g Intravenous PRN Prakash Conway MD        dextrose 10% bolus 250 mL 250 mL  25 g Intravenous PRN Prakash Conway MD        fentaNYL 50 mcg/mL injection 25 mcg  25 mcg Intravenous Q5 Min PRN Tl Madrigal MD   25 mcg at 04/12/24 1800    glucagon (human recombinant) injection 1 mg  1 mg Intramuscular PRN Prakash Conway MD        glucose chewable tablet 16 g  16 g Oral PRN Prakash Conway MD        glucose chewable tablet 24 g  24 g Oral PRN Prakash Conway MD        haloperidol lactate injection 0.5 mg  0.5 mg Intravenous Q10 Min PRN Tl Madrigal MD   0.5 mg at 04/12/24 1746    naloxone 0.4 mg/mL injection 0.02 mg  0.02 mg Intravenous PRN Prakash Conway MD        ondansetron injection 4 mg  4 mg Intravenous Q8H PRN Prakash Conway MD        pantoprazole injection 40 mg  40 mg Intravenous BID Prakash Conway MD   40 mg at 04/13/24 0842    prochlorperazine injection Soln 5 mg  5 mg Intravenous Q6H PRN Prakash Conway MD        sodium chloride 0.9% flush 10 mL  10 mL Intravenous Q12H PRN Prakash Conway MD        sodium chloride 0.9% flush 3 mL  3 mL Intravenous PRN Tl Madrigal MD         PRN Meds:  Current Facility-Administered Medications   Medication Dose Route Frequency Provider Last Rate Last Admin    0.9%  NaCl  infusion (for blood administration)   Intravenous Q24H PRN Rober Ricci III, MD        0.9%  NaCl infusion (for blood administration)   Intravenous Q24H PRN Shaun Ding MD        cefTRIAXone (Rocephin) 1 g in dextrose 5 % in water (D5W) 100 mL IVPB (MB+)  1 g Intravenous Q24H Prakash Conway MD   Stopped at 04/11/24 1656    dextrose 10% bolus 125 mL 125 mL  12.5 g Intravenous PRN Prakash Conway MD        dextrose 10% bolus 250 mL 250 mL  25 g Intravenous PRN Prakash Conway MD        fentaNYL 50 mcg/mL injection 25 mcg  25 mcg Intravenous Q5 Min PRN Tl Madrigal MD   25 mcg at 04/12/24 1800    glucagon (human recombinant) injection 1 mg  1 mg Intramuscular PRN Prakash Conway MD        glucose chewable tablet 16 g  16 g Oral PRN Prakash Conway MD        glucose chewable tablet 24 g  24 g Oral PRN Prakash Conway MD        haloperidol lactate injection 0.5 mg  0.5 mg Intravenous Q10 Min PRN Tl Madrigal MD   0.5 mg at 04/12/24 1746    naloxone 0.4 mg/mL injection 0.02 mg  0.02 mg Intravenous PRN Prakash Conway MD        ondansetron injection 4 mg  4 mg Intravenous Q8H PRN Prakash Conway MD        pantoprazole injection 40 mg  40 mg Intravenous BID Prakash Conway MD   40 mg at 04/13/24 0842    prochlorperazine injection Soln 5 mg  5 mg Intravenous Q6H PRN Prakash Conway MD        sodium chloride 0.9% flush 10 mL  10 mL Intravenous Q12H PRN Prakash Conway MD        sodium chloride 0.9% flush 3 mL  3 mL Intravenous PRN Tl Madrigal MD            Review of Systems  Objective:     Vital Signs (Most Recent):  Temp: 97.7 °F (36.5 °C) (04/13/24 0735)  Pulse: 80 (04/13/24 0735)  Resp: 18 (04/13/24 0735)  BP: (!) 109/53 (04/13/24 0735)  SpO2: 97 % (04/13/24 0735) Vital Signs (24h Range):  Temp:  [97.4 °F (36.3 °C)-98.2 °F (36.8 °C)] 97.7 °F (36.5 °C)  Pulse:   [78-91] 80  Resp:  [0-18] 18  SpO2:  [92 %-100 %] 97 %  BP: (104-138)/(53-64) 109/53     Weight: 85.7 kg (188 lb 15 oz)  Body mass index is 34.56 kg/m².  Body surface area is 1.94 meters squared.      Intake/Output Summary (Last 24 hours) at 4/13/2024 0935  Last data filed at 4/13/2024 0607  Gross per 24 hour   Intake 1020 ml   Output 850 ml   Net 170 ml        Physical Exam  Constitutional:       Appearance: She is obese. She is not ill-appearing or diaphoretic.   HENT:      Mouth/Throat:      Mouth: Mucous membranes are moist.   Eyes:      Comments: Pallor eyelids   Cardiovascular:      Rate and Rhythm: Normal rate and regular rhythm.      Heart sounds: Murmur heard.   Pulmonary:      Effort: Pulmonary effort is normal. No respiratory distress.      Breath sounds: No wheezing or rales.   Abdominal:      General: Abdomen is flat. Bowel sounds are normal. There is no distension.      Tenderness: There is no abdominal tenderness.   Musculoskeletal:         General: Swelling present.      Right lower leg: No edema.      Left lower leg: No edema.   Skin:     General: Skin is warm.      Coloration: Skin is not jaundiced.   Neurological:      Mental Status: She is alert and oriented to person, place, and time.          Significant Labs:   CBC:   Recent Labs   Lab 04/12/24  0051 04/12/24  0847 04/12/24  1945   WBC 2.27* 4.17 2.68*   HGB 7.5* 9.1* 8.7*   HCT 23.5* 26.8* 25.9*   PLT 71* 87* 62*    and CMP:   Recent Labs   Lab 04/12/24  0441 04/13/24  0531   * 137   K 3.9 4.2    106   CO2 20* 25   * 154*   BUN 14 10   CREATININE 0.6 0.6   CALCIUM 7.8* 7.7*   PROT 4.9* 5.1*   ALBUMIN 2.2* 2.2*   BILITOT 1.9* 2.4*   ALKPHOS 75 121   AST 37 46*   ALT 12 17   ANIONGAP 8 6*       Diagnostic Results:  None

## 2024-04-13 NOTE — PROGRESS NOTES
Dereck Forrester - Oncology ([facility])  Hematology/Oncology  Progress Note    Patient Name: Shikha López  Admission Date: 4/9/2024  Hospital Length of Stay: 4 days  Code Status: Full Code     Subjective:     HPI:  53F with PMH stage 4 breast ca left (mets HER 2 +) Dr. 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 Yesterday, she began having hematochezia with BRBPR, lethargy and exertional SOB. Denies fevers, chills, nausea, vomiting, hematemesis, cp, dysuria, peripheral edema.     AF, tachycardic, BP stable, on RA, Hg 5.6, Plt 115, Tbil 4, U/A wnl, given IV PPI, pending 2 units of pRBCs, octreotide    Admitted to oncology service for GI bleeding on active chemotherapy    Interval History: Successful TIPS 4/12, A/O x 4 without concerns for HE, resting comfortably and tolerating PO intake, no melena    Oncology Treatment Plan:   OP BREAST FAM-TRASTUZUMAB DERUXTECAN-NXKI Q3W    Medications:  Continuous Infusions:  Current Facility-Administered Medications   Medication Dose Route Frequency Provider Last Rate Last Admin    0.9%  NaCl infusion (for blood administration)   Intravenous Q24H PRN Rober Ricci III, MD        0.9%  NaCl infusion (for blood administration)   Intravenous Q24H PRN Shaun Ding MD        cefTRIAXone (Rocephin) 1 g in dextrose 5 % in water (D5W) 100 mL IVPB (MB+)  1 g Intravenous Q24H Prakash Conway MD   Stopped at 04/11/24 1656    dextrose 10% bolus 125 mL 125 mL  12.5 g Intravenous PRN Prakash Conway MD        dextrose 10% bolus 250 mL 250 mL  25 g Intravenous PRN Prakash Conway MD        fentaNYL 50 mcg/mL injection 25 mcg  25 mcg Intravenous Q5 Min PRN Tl Madrigal MD   25 mcg at 04/12/24 1800    glucagon (human recombinant) injection 1 mg  1 mg  Intramuscular PRN Prakash Conway MD        glucose chewable tablet 16 g  16 g Oral PRN Prakash Conway MD        glucose chewable tablet 24 g  24 g Oral PRN Prakash Conway MD        haloperidol lactate injection 0.5 mg  0.5 mg Intravenous Q10 Min PRN Tl Madrigal MD   0.5 mg at 04/12/24 1746    naloxone 0.4 mg/mL injection 0.02 mg  0.02 mg Intravenous PRN Prakash Conway MD        ondansetron injection 4 mg  4 mg Intravenous Q8H PRN Prakash Conway MD        pantoprazole injection 40 mg  40 mg Intravenous BID Prakash Conway MD   40 mg at 04/13/24 0842    prochlorperazine injection Soln 5 mg  5 mg Intravenous Q6H PRN Prakash Conway MD        sodium chloride 0.9% flush 10 mL  10 mL Intravenous Q12H PRN Prakash Conway MD        sodium chloride 0.9% flush 3 mL  3 mL Intravenous PRN Tl Madrigal MD         Scheduled Meds:  Current Facility-Administered Medications   Medication Dose Route Frequency Provider Last Rate Last Admin    0.9%  NaCl infusion (for blood administration)   Intravenous Q24H PRN Rober Ricci III, MD        0.9%  NaCl infusion (for blood administration)   Intravenous Q24H PRN Shaun Ding MD        cefTRIAXone (Rocephin) 1 g in dextrose 5 % in water (D5W) 100 mL IVPB (MB+)  1 g Intravenous Q24H Prakash Conway MD   Stopped at 04/11/24 1656    dextrose 10% bolus 125 mL 125 mL  12.5 g Intravenous PRN Prakash Conway MD        dextrose 10% bolus 250 mL 250 mL  25 g Intravenous PRN Prakash Conway MD        fentaNYL 50 mcg/mL injection 25 mcg  25 mcg Intravenous Q5 Min PRN Tl Madrigal MD   25 mcg at 04/12/24 1800    glucagon (human recombinant) injection 1 mg  1 mg Intramuscular PRN Prakash Conway MD        glucose chewable tablet 16 g  16 g Oral PRN Prakash Conway MD        glucose chewable tablet 24 g  24 g Oral  PRN Prakash Conway MD        haloperidol lactate injection 0.5 mg  0.5 mg Intravenous Q10 Min PRN Tl Madrigal MD   0.5 mg at 04/12/24 1746    naloxone 0.4 mg/mL injection 0.02 mg  0.02 mg Intravenous PRN Prakash Conway MD        ondansetron injection 4 mg  4 mg Intravenous Q8H PRN Prakash Conway MD        pantoprazole injection 40 mg  40 mg Intravenous BID Prakash Conway MD   40 mg at 04/13/24 0842    prochlorperazine injection Soln 5 mg  5 mg Intravenous Q6H PRN Prakash Conway MD        sodium chloride 0.9% flush 10 mL  10 mL Intravenous Q12H PRN Prakash Conway MD        sodium chloride 0.9% flush 3 mL  3 mL Intravenous PRN Tl Madrigal MD         PRN Meds:  Current Facility-Administered Medications   Medication Dose Route Frequency Provider Last Rate Last Admin    0.9%  NaCl infusion (for blood administration)   Intravenous Q24H PRN Rober Ricci III, MD        0.9%  NaCl infusion (for blood administration)   Intravenous Q24H PRN Shaun Ding MD        cefTRIAXone (Rocephin) 1 g in dextrose 5 % in water (D5W) 100 mL IVPB (MB+)  1 g Intravenous Q24H Prakash Conway MD   Stopped at 04/11/24 1656    dextrose 10% bolus 125 mL 125 mL  12.5 g Intravenous PRN Prakash Conway MD        dextrose 10% bolus 250 mL 250 mL  25 g Intravenous PRN Prakash Conway MD        fentaNYL 50 mcg/mL injection 25 mcg  25 mcg Intravenous Q5 Min PRN Tl Madrigal MD   25 mcg at 04/12/24 1800    glucagon (human recombinant) injection 1 mg  1 mg Intramuscular PRN Prakash Conway MD        glucose chewable tablet 16 g  16 g Oral PRN Prakash Conway MD        glucose chewable tablet 24 g  24 g Oral PRN Prakash Conway MD        haloperidol lactate injection 0.5 mg  0.5 mg Intravenous Q10 Min PRN Tl Madrigal MD   0.5 mg at 04/12/24 1746    naloxone 0.4 mg/mL  injection 0.02 mg  0.02 mg Intravenous PRN Prakash Conway MD        ondansetron injection 4 mg  4 mg Intravenous Q8H PRN Prakash Conway MD        pantoprazole injection 40 mg  40 mg Intravenous BID Prakash Conway MD   40 mg at 04/13/24 0842    prochlorperazine injection Soln 5 mg  5 mg Intravenous Q6H PRN Prakash Conway MD        sodium chloride 0.9% flush 10 mL  10 mL Intravenous Q12H PRN Prakash Conway MD        sodium chloride 0.9% flush 3 mL  3 mL Intravenous PRN Tl Madrigal MD            Review of Systems  Objective:     Vital Signs (Most Recent):  Temp: 97.7 °F (36.5 °C) (04/13/24 0735)  Pulse: 80 (04/13/24 0735)  Resp: 18 (04/13/24 0735)  BP: (!) 109/53 (04/13/24 0735)  SpO2: 97 % (04/13/24 0735) Vital Signs (24h Range):  Temp:  [97.4 °F (36.3 °C)-98.2 °F (36.8 °C)] 97.7 °F (36.5 °C)  Pulse:  [78-91] 80  Resp:  [0-18] 18  SpO2:  [92 %-100 %] 97 %  BP: (104-138)/(53-64) 109/53     Weight: 85.7 kg (188 lb 15 oz)  Body mass index is 34.56 kg/m².  Body surface area is 1.94 meters squared.      Intake/Output Summary (Last 24 hours) at 4/13/2024 0935  Last data filed at 4/13/2024 0607  Gross per 24 hour   Intake 1020 ml   Output 850 ml   Net 170 ml        Physical Exam  Constitutional:       Appearance: She is obese. She is not ill-appearing or diaphoretic.   HENT:      Mouth/Throat:      Mouth: Mucous membranes are moist.   Eyes:      Comments: Pallor eyelids   Cardiovascular:      Rate and Rhythm: Normal rate and regular rhythm.      Heart sounds: Murmur heard.   Pulmonary:      Effort: Pulmonary effort is normal. No respiratory distress.      Breath sounds: No wheezing or rales.   Abdominal:      General: Abdomen is flat. Bowel sounds are normal. There is no distension.      Tenderness: There is no abdominal tenderness.   Musculoskeletal:         General: Swelling present.      Right lower leg: No edema.      Left lower leg: No edema.    Skin:     General: Skin is warm.      Coloration: Skin is not jaundiced.   Neurological:      Mental Status: She is alert and oriented to person, place, and time.          Significant Labs:   CBC:   Recent Labs   Lab 04/12/24  0051 04/12/24  0847 04/12/24  1945   WBC 2.27* 4.17 2.68*   HGB 7.5* 9.1* 8.7*   HCT 23.5* 26.8* 25.9*   PLT 71* 87* 62*    and CMP:   Recent Labs   Lab 04/12/24  0441 04/13/24  0531   * 137   K 3.9 4.2    106   CO2 20* 25   * 154*   BUN 14 10   CREATININE 0.6 0.6   CALCIUM 7.8* 7.7*   PROT 4.9* 5.1*   ALBUMIN 2.2* 2.2*   BILITOT 1.9* 2.4*   ALKPHOS 75 121   AST 37 46*   ALT 12 17   ANIONGAP 8 6*       Diagnostic Results:  None  Assessment/Plan:     * Gastrointestinal hemorrhage with melena  53 hx of left breast ca, mets to lung, HAWTHORNE cirrhosis, obesity    Symptoms: melana, BRBPR, fatigue, SOB  Diagnostics: Hgb 5.6 (baseline 9) drop from 7.2 yesterday    - GI consult for EGD / C-Scope consult for source evaluation +/- intervention Plan for 4/10  - IV Protonix 80mg x 1, followed by IV 40mg BID thereafter  - clears until midnight NPO, hold pharmacologic VTE PPx  - Trend CBCs ; Transfuse Hgb < 7  - Resuscitate IVF PRN  - Transfused 3 unit pRBCs  - Cardiac telemetry  - Maintain 2 large bore IVs  - If hemodynamically unstable +/- new bleed, stat CTA / consult IR for embolization evaluation   - CTX for hx of varices (esophageal and gastric) and GI bleed    -EGD 4/10 with gastric varices and stigmata of bleeding, consulted IR/hepatology for input on EUS guided glue injection  -hepatology and IR with plans for TIPS procedure to prevent gastric and esophageal variceal bleeding. Booked for 4/12  -to complete 3 total days of octreotide and 5 days of ceftriaxone    Severe obesity (BMI 35.0-39.9) with comorbidity  -documented  -Diet, exercise, and weight for mortality benefits    Esophageal and gastric varices  -2/2 HAWTHORNE cirrhosis  -hx of bleeding varices s/p banding in [place]  2023  -plan for TIPS 4/12    Malignant neoplasm metastatic to left lung  -s/p chemo, radiation, post op RT  -metastatic  -on maintenance herceptin    Breast cancer, stage 4, left  -Follows with Dr. Willis  -On [facility] for 2 yrs           Shaun Ding MD  Hematology/Oncology  Dereck heather - Oncology ([facility])

## 2024-04-13 NOTE — ASSESSMENT & PLAN NOTE
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 GI consulted; now s/p EGD on 4/10 which again demonstrated esophageal and gastric varices. Hepatology is consulted for TIPS evaluation. Labs on presentation notable for AST 33, ALT 12, ALP 77, TB 2.5, INR 1.5, MELD 3.0 16 (4/11/24), plt 80. On contrasted CT (3/11/24), the liver is normal in size with findings suggestive of fatty infiltration, splenomegaly with mild ascites. EGD (4/10/24) demonstrated IGV1 with stigmata of recent bleeding, small EV and PHG. Lastly, TTE (3/11/24) shows normal RV size and function with no significant tricuspid valvular dysfunction, estimated PASP 38mmHg. She has no absolute contraindications to TIPS placement. IR consulted for TIPS placement, completed on 4/12 with pre TIPS pressure gradient of 13 and post TIPS pressure gradient 4. No signs of encephalopathy. There is slight bump in her liver enzymes which is not unexpected.     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 Daily CMP, INR.   -She has no signs of encephalopathy currently. The risk of developing hepatic encephalopathy post-TIPS was discussed. She should discuss warning signs of encephalopathy  with household members and family in case she were to need Lactulose.   -Upon discharge, she will need a rx for Lactulose in case she were to develop encephalopathy, as well as a Hepatology follow up.

## 2024-04-13 NOTE — PLAN OF CARE
1930 rec'd resting in bed. Very drowsy but awakens easy. Oriented x4. VSS, on room air. Purewick applied per patient request. Bed alarm in use. No further needs voiced. Dressing to right side of neck C/D/I. Bed low and wheels locked. Call bell near, enc to call for any needs.     End of shift- rested quietly over the night. VSS, tolerating room air. No complaints voiced. Still drowsy but oriented x4. Dressing to neck C/D/I. Safety measures intact.

## 2024-04-13 NOTE — PROGRESS NOTES
Dereck Forrester - Oncology ([facility])  Gastroenterology  Progress Note    Patient Name: Shikha López  MRN: [id number]  Admission Date: 4/9/2024  Hospital Length of Stay: 4 days  Code Status: Full Code   Attending Provider: Noah Terry MD  Consulting Provider: Nadiya Atkinson MD  Primary Care Physician: No, Primary Doctor  Principal Problem: Gastrointestinal hemorrhage with melena        Subjective:     Interval History:   S/p TIPS placement on 4/12 with reduction in pressure gradient from 13 to 4. Slight bump in liver enzymes following TIPS, which is not unexpected. No episodes of encephalopathy following TIPS.    Review of Systems   Constitutional:  Negative for chills and fever.   Gastrointestinal:  Negative for blood in stool, nausea and vomiting.   Psychiatric/Behavioral:  Negative for confusion and decreased concentration.      Objective:     Vital Signs (Most Recent):  Temp: 97.5 °F (36.4 °C) (04/13/24 1057)  Pulse: 92 (04/13/24 1058)  Resp: 16 (04/13/24 1057)  BP: (!) 109/54 (04/13/24 1057)  SpO2: 96 % (04/13/24 1057) Vital Signs (24h Range):  Temp:  [97.4 °F (36.3 °C)-97.7 °F (36.5 °C)] 97.5 °F (36.4 °C)  Pulse:  [78-92] 92  Resp:  [0-18] 16  SpO2:  [92 %-100 %] 96 %  BP: (104-138)/(53-64) 109/54     Weight: 85.7 kg (188 lb 15 oz) (04/13/24 0400)  Body mass index is 34.56 kg/m².      Intake/Output Summary (Last 24 hours) at 4/13/2024 1226  Last data filed at 4/13/2024 0607  Gross per 24 hour   Intake 1020 ml   Output 650 ml   Net 370 ml       Lines/Drains/Airways       Central Venous Catheter Line  Duration             Port A Cath Single Lumen 06/22/21 right internal jugular 1026 days              Peripheral Intravenous Line  Duration                  Peripheral IV - Single Lumen 04/09/24 1220 18 G Right Antecubital 4 days         Peripheral IV - Single Lumen 04/09/24 1358 20 G Right Antecubital 3 days                     Physical Exam  Vitals reviewed.   Constitutional:       General: She is not in  acute distress.     Appearance: Normal appearance.   HENT:      Head: Normocephalic and atraumatic.   Cardiovascular:      Rate and Rhythm: Normal rate and regular rhythm.   Pulmonary:      Effort: Pulmonary effort is normal. No respiratory distress.   Abdominal:      Palpations: Abdomen is soft.      Tenderness: There is no abdominal tenderness.   Musculoskeletal:         General: Swelling present. No tenderness.      Right lower leg: Edema present.      Left lower leg: Edema present.   Skin:     Findings: No bruising or rash.   Neurological:      General: No focal deficit present.      Mental Status: She is alert and oriented to person, place, and time.   Psychiatric:         Mood and Affect: Mood normal.         Behavior: Behavior normal.          Significant Labs:  All pertinent lab results from the last 24 hours have been reviewed.      Significant Imaging:  Imaging results within the past 24 hours have been reviewed.  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 GI consulted; now s/p EGD on 4/10 which again demonstrated esophageal and gastric varices. Hepatology is consulted for TIPS evaluation. Labs on presentation notable for AST 33, ALT 12, ALP 77, TB 2.5, INR 1.5, MELD 3.0 16 (4/11/24), plt 80. On contrasted CT (3/11/24), the liver is normal in size with findings suggestive of fatty infiltration, splenomegaly with mild ascites. EGD (4/10/24) demonstrated IGV1 with stigmata of recent bleeding, small EV and PHG. Lastly, TTE (3/11/24) shows normal RV size and function with no significant tricuspid valvular dysfunction, estimated PASP 38mmHg. She has no absolute contraindications to TIPS placement. IR consulted for  TIPS placement, completed on 4/12 with pre TIPS pressure gradient of 13 and post TIPS pressure gradient 4. No signs of encephalopathy. There is slight bump in her liver enzymes which is not unexpected.     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 Daily CMP, INR.   -She has no signs of encephalopathy currently. The risk of developing hepatic encephalopathy post-TIPS was discussed. She should discuss warning signs of encephalopathy with household members and family in case she were to need Lactulose.   -Upon discharge, she will need a rx for Lactulose in case she were to develop encephalopathy, as well as a Hepatology follow up.         Thank you for your consult. I will follow-up with patient. Please contact us if you have any additional questions.    Nadiya Atkinson MD  Gastroenterology  [facility] - Oncology ([facility])

## 2024-04-13 NOTE — PLAN OF CARE
AAOX4. No acute events this shift. Bed alarm set.Instructed to call prior to getting out of bed,voiced understanding. Telemetry monitored.VSS. Nonskid footwear on with bed locked and low. Bed rails up x2.Call light and personal items within reach. Q1h rounding done.

## 2024-04-14 LAB
ALBUMIN SERPL BCP-MCNC: 2.1 G/DL (ref 3.5–5.2)
ALP SERPL-CCNC: 127 U/L (ref 55–135)
ALT SERPL W/O P-5'-P-CCNC: 33 U/L (ref 10–44)
ANION GAP SERPL CALC-SCNC: 5 MMOL/L (ref 8–16)
AST SERPL-CCNC: 68 U/L (ref 10–40)
BASOPHILS # BLD AUTO: 0.01 K/UL (ref 0–0.2)
BASOPHILS # BLD AUTO: 0.01 K/UL (ref 0–0.2)
BASOPHILS NFR BLD: 0.1 % (ref 0–1.9)
BASOPHILS NFR BLD: 0.2 % (ref 0–1.9)
BILIRUB DIRECT SERPL-MCNC: 1.2 MG/DL (ref 0.1–0.3)
BILIRUB SERPL-MCNC: 2.3 MG/DL (ref 0.1–1)
BUN SERPL-MCNC: 9 MG/DL (ref 6–20)
CALCIUM SERPL-MCNC: 7.3 MG/DL (ref 8.7–10.5)
CHLORIDE SERPL-SCNC: 105 MMOL/L (ref 95–110)
CO2 SERPL-SCNC: 28 MMOL/L (ref 23–29)
CREAT SERPL-MCNC: 0.6 MG/DL (ref 0.5–1.4)
DIFFERENTIAL METHOD BLD: ABNORMAL
DIFFERENTIAL METHOD BLD: ABNORMAL
EOSINOPHIL # BLD AUTO: 0.1 K/UL (ref 0–0.5)
EOSINOPHIL # BLD AUTO: 0.2 K/UL (ref 0–0.5)
EOSINOPHIL NFR BLD: 1.8 % (ref 0–8)
EOSINOPHIL NFR BLD: 2.2 % (ref 0–8)
ERYTHROCYTE [DISTWIDTH] IN BLOOD BY AUTOMATED COUNT: 21.1 % (ref 11.5–14.5)
ERYTHROCYTE [DISTWIDTH] IN BLOOD BY AUTOMATED COUNT: 21.3 % (ref 11.5–14.5)
EST. GFR  (NO RACE VARIABLE): >60 ML/MIN/1.73 M^2
GLUCOSE SERPL-MCNC: 94 MG/DL (ref 70–110)
HCT VFR BLD AUTO: 22.5 % (ref 37–48.5)
HCT VFR BLD AUTO: 23.1 % (ref 37–48.5)
HGB BLD-MCNC: 7.3 G/DL (ref 12–16)
HGB BLD-MCNC: 7.6 G/DL (ref 12–16)
IMM GRANULOCYTES # BLD AUTO: 0.09 K/UL (ref 0–0.04)
IMM GRANULOCYTES # BLD AUTO: 0.11 K/UL (ref 0–0.04)
IMM GRANULOCYTES NFR BLD AUTO: 1.3 % (ref 0–0.5)
IMM GRANULOCYTES NFR BLD AUTO: 2 % (ref 0–0.5)
INR PPP: 1.5 (ref 0.8–1.2)
LYMPHOCYTES # BLD AUTO: 0.5 K/UL (ref 1–4.8)
LYMPHOCYTES # BLD AUTO: 0.6 K/UL (ref 1–4.8)
LYMPHOCYTES NFR BLD: 10.5 % (ref 18–48)
LYMPHOCYTES NFR BLD: 6.6 % (ref 18–48)
MAGNESIUM SERPL-MCNC: 1.6 MG/DL (ref 1.6–2.6)
MCH RBC QN AUTO: 33.6 PG (ref 27–31)
MCH RBC QN AUTO: 35 PG (ref 27–31)
MCHC RBC AUTO-ENTMCNC: 32.4 G/DL (ref 32–36)
MCHC RBC AUTO-ENTMCNC: 32.9 G/DL (ref 32–36)
MCV RBC AUTO: 104 FL (ref 82–98)
MCV RBC AUTO: 107 FL (ref 82–98)
MONOCYTES # BLD AUTO: 0.2 K/UL (ref 0.3–1)
MONOCYTES # BLD AUTO: 0.2 K/UL (ref 0.3–1)
MONOCYTES NFR BLD: 2.9 % (ref 4–15)
MONOCYTES NFR BLD: 4.3 % (ref 4–15)
NEUTROPHILS # BLD AUTO: 4.4 K/UL (ref 1.8–7.7)
NEUTROPHILS # BLD AUTO: 6 K/UL (ref 1.8–7.7)
NEUTROPHILS NFR BLD: 81.2 % (ref 38–73)
NEUTROPHILS NFR BLD: 86.9 % (ref 38–73)
NRBC BLD-RTO: 0 /100 WBC
NRBC BLD-RTO: 0 /100 WBC
PHOSPHATE SERPL-MCNC: 1.7 MG/DL (ref 2.7–4.5)
PLATELET # BLD AUTO: 73 K/UL (ref 150–450)
PLATELET # BLD AUTO: 81 K/UL (ref 150–450)
PMV BLD AUTO: 11.5 FL (ref 9.2–12.9)
PMV BLD AUTO: 12.7 FL (ref 9.2–12.9)
POTASSIUM SERPL-SCNC: 3.6 MMOL/L (ref 3.5–5.1)
PROT SERPL-MCNC: 4.6 G/DL (ref 6–8.4)
PROTHROMBIN TIME: 16 SEC (ref 9–12.5)
RBC # BLD AUTO: 2.17 M/UL (ref 4–5.4)
RBC # BLD AUTO: 2.17 M/UL (ref 4–5.4)
SODIUM SERPL-SCNC: 138 MMOL/L (ref 136–145)
WBC # BLD AUTO: 5.41 K/UL (ref 3.9–12.7)
WBC # BLD AUTO: 6.93 K/UL (ref 3.9–12.7)

## 2024-04-14 PROCEDURE — 25000003 PHARM REV CODE 250

## 2024-04-14 PROCEDURE — 20600001 HC STEP DOWN PRIVATE ROOM

## 2024-04-14 PROCEDURE — 99239 HOSP IP/OBS DSCHRG MGMT >30: CPT | Mod: GC,,, | Performed by: STUDENT IN AN ORGANIZED HEALTH CARE EDUCATION/TRAINING PROGRAM

## 2024-04-14 PROCEDURE — 84100 ASSAY OF PHOSPHORUS: CPT | Performed by: STUDENT IN AN ORGANIZED HEALTH CARE EDUCATION/TRAINING PROGRAM

## 2024-04-14 PROCEDURE — 85025 COMPLETE CBC W/AUTO DIFF WBC: CPT | Performed by: STUDENT IN AN ORGANIZED HEALTH CARE EDUCATION/TRAINING PROGRAM

## 2024-04-14 PROCEDURE — 82248 BILIRUBIN DIRECT: CPT | Performed by: STUDENT IN AN ORGANIZED HEALTH CARE EDUCATION/TRAINING PROGRAM

## 2024-04-14 PROCEDURE — 85610 PROTHROMBIN TIME: CPT | Performed by: STUDENT IN AN ORGANIZED HEALTH CARE EDUCATION/TRAINING PROGRAM

## 2024-04-14 PROCEDURE — 80053 COMPREHEN METABOLIC PANEL: CPT | Performed by: STUDENT IN AN ORGANIZED HEALTH CARE EDUCATION/TRAINING PROGRAM

## 2024-04-14 PROCEDURE — 83735 ASSAY OF MAGNESIUM: CPT | Performed by: STUDENT IN AN ORGANIZED HEALTH CARE EDUCATION/TRAINING PROGRAM

## 2024-04-14 RX ORDER — HYDROXYZINE HYDROCHLORIDE 25 MG/1
25 TABLET, FILM COATED ORAL 3 TIMES DAILY PRN
Status: DISCONTINUED | OUTPATIENT
Start: 2024-04-14 | End: 2024-04-16 | Stop reason: HOSPADM

## 2024-04-14 RX ORDER — SODIUM,POTASSIUM PHOSPHATES 280-250MG
2 POWDER IN PACKET (EA) ORAL EVERY 4 HOURS
Status: COMPLETED | OUTPATIENT
Start: 2024-04-14 | End: 2024-04-14

## 2024-04-14 RX ORDER — PANTOPRAZOLE SODIUM 40 MG/1
40 TABLET, DELAYED RELEASE ORAL DAILY
Status: DISCONTINUED | OUTPATIENT
Start: 2024-04-15 | End: 2024-04-16 | Stop reason: HOSPADM

## 2024-04-14 RX ORDER — PANTOPRAZOLE SODIUM 40 MG/1
40 TABLET, DELAYED RELEASE ORAL DAILY
Status: COMPLETED | OUTPATIENT
Start: 2024-04-14 | End: 2024-04-14

## 2024-04-14 RX ORDER — LACTULOSE 10 G/15ML
10 SOLUTION ORAL; RECTAL 3 TIMES DAILY
Qty: 300 ML | Refills: 3 | Status: ON HOLD | OUTPATIENT
Start: 2024-04-14 | End: 2024-05-16 | Stop reason: SDUPTHER

## 2024-04-14 RX ADMIN — HYDROXYZINE HYDROCHLORIDE 25 MG: 25 TABLET, FILM COATED ORAL at 08:04

## 2024-04-14 RX ADMIN — POTASSIUM & SODIUM PHOSPHATES POWDER PACK 280-160-250 MG 2 PACKET: 280-160-250 PACK at 08:04

## 2024-04-14 RX ADMIN — PANTOPRAZOLE SODIUM 40 MG: 40 TABLET, DELAYED RELEASE ORAL at 09:04

## 2024-04-14 RX ADMIN — POTASSIUM & SODIUM PHOSPHATES POWDER PACK 280-160-250 MG 2 PACKET: 280-160-250 PACK at 12:04

## 2024-04-14 RX ADMIN — POTASSIUM BICARBONATE 40 MEQ: 391 TABLET, EFFERVESCENT ORAL at 08:04

## 2024-04-14 NOTE — SUBJECTIVE & OBJECTIVE
Interval History: CHAUNCEY atarax for anxiety, patient initially to [place] today but would feel more comfortable with family support to [place] emigdio AM.    Oncology Treatment Plan:   OP BREAST FAM-TRASTUZUMAB DERUXTECAN-NXKI Q3W    Medications:  Continuous Infusions:  Current Facility-Administered Medications   Medication Dose Route Frequency Provider Last Rate Last Admin    0.9%  NaCl infusion (for blood administration)   Intravenous Q24H PRN Rober Ricci III, MD        0.9%  NaCl infusion (for blood administration)   Intravenous Q24H PRN Shaun Ding MD   New Bag at 04/13/24 1650    alteplase injection 2 mg  2 mg Intra-Catheter Daily PRN Shaun Ding MD   2 mg at 04/13/24 1742    cefTRIAXone (Rocephin) 1 g in dextrose 5 % in water (D5W) 100 mL IVPB (MB+)  1 g Intravenous Q24H Prakash Conway MD   Stopped at 04/13/24 1728    dextrose 10% bolus 125 mL 125 mL  12.5 g Intravenous PRN Prakash Conway MD        dextrose 10% bolus 250 mL 250 mL  25 g Intravenous PRN Prakash Conway MD        fentaNYL 50 mcg/mL injection 25 mcg  25 mcg Intravenous Q5 Min PRN Tl Madrigal MD   25 mcg at 04/12/24 1800    glucagon (human recombinant) injection 1 mg  1 mg Intramuscular PRN Prakash Conway MD        glucose chewable tablet 16 g  16 g Oral PRN Prakash Conway MD        glucose chewable tablet 24 g  24 g Oral PRN Prakash Conway MD        haloperidol lactate injection 0.5 mg  0.5 mg Intravenous Q10 Min PRN lT Madrigal MD   0.5 mg at 04/12/24 1746    hydrOXYzine HCL tablet 25 mg  25 mg Oral TID PRN Shaun Ding MD   25 mg at 04/14/24 0822    naloxone 0.4 mg/mL injection 0.02 mg  0.02 mg Intravenous PRN Prakash Conway MD        ondansetron injection 4 mg  4 mg Intravenous Q8H PRN BatresPrakash Paul MD        [START ON 4/15/2024] pantoprazole EC tablet 40 mg  40 mg Oral Daily Shaun Ding MD prochlorperazine  injection Soln 5 mg  5 mg Intravenous Q6H PRN Prakash Conway MD        sodium chloride 0.9% flush 10 mL  10 mL Intravenous Q12H PRN Prakash Conway MD        sodium chloride 0.9% flush 3 mL  3 mL Intravenous PRN Tl Madrigal MD         Scheduled Meds:  Current Facility-Administered Medications   Medication Dose Route Frequency Provider Last Rate Last Admin    0.9%  NaCl infusion (for blood administration)   Intravenous Q24H PRN Rober Ricci III, MD        0.9%  NaCl infusion (for blood administration)   Intravenous Q24H PRN Shaun Ding MD   New Bag at 04/13/24 1650    alteplase injection 2 mg  2 mg Intra-Catheter Daily PRN Shaun Ding MD   2 mg at 04/13/24 1742    cefTRIAXone (Rocephin) 1 g in dextrose 5 % in water (D5W) 100 mL IVPB (MB+)  1 g Intravenous Q24H Prakash Conway MD   Stopped at 04/13/24 1728    dextrose 10% bolus 125 mL 125 mL  12.5 g Intravenous PRN Prakash Conway MD        dextrose 10% bolus 250 mL 250 mL  25 g Intravenous PRN Prakash Conway MD        fentaNYL 50 mcg/mL injection 25 mcg  25 mcg Intravenous Q5 Min PRN Tl Madrigal MD   25 mcg at 04/12/24 1800    glucagon (human recombinant) injection 1 mg  1 mg Intramuscular PRN Prakash Conway MD        glucose chewable tablet 16 g  16 g Oral PRN Prakash Conway MD        glucose chewable tablet 24 g  24 g Oral PRN Prakash Conway MD        haloperidol lactate injection 0.5 mg  0.5 mg Intravenous Q10 Min PRN Tl Madrigal MD   0.5 mg at 04/12/24 1746    hydrOXYzine HCL tablet 25 mg  25 mg Oral TID PRN Shaun Ding MD   25 mg at 04/14/24 0822    naloxone 0.4 mg/mL injection 0.02 mg  0.02 mg Intravenous PRN Prakash Conway MD        ondansetron injection 4 mg  4 mg Intravenous Q8H PRN Prakash Conway MD        [START ON 4/15/2024] pantoprazole EC tablet 40 mg  40 mg Oral Daily Shaun Ding MD         prochlorperazine injection Soln 5 mg  5 mg Intravenous Q6H PRN Prakash Conway MD        sodium chloride 0.9% flush 10 mL  10 mL Intravenous Q12H PRN Prakash Conway MD        sodium chloride 0.9% flush 3 mL  3 mL Intravenous PRN Tl Madrigal MD         PRN Meds:  Current Facility-Administered Medications   Medication Dose Route Frequency Provider Last Rate Last Admin    0.9%  NaCl infusion (for blood administration)   Intravenous Q24H PRN Rober Ricci III, MD        0.9%  NaCl infusion (for blood administration)   Intravenous Q24H PRN Shaun Ding MD   New Bag at 04/13/24 1650    alteplase injection 2 mg  2 mg Intra-Catheter Daily PRN Shaun Ding MD   2 mg at 04/13/24 1742    cefTRIAXone (Rocephin) 1 g in dextrose 5 % in water (D5W) 100 mL IVPB (MB+)  1 g Intravenous Q24H Prakash Conway MD   Stopped at 04/13/24 1728    dextrose 10% bolus 125 mL 125 mL  12.5 g Intravenous PRN Prakash Conway MD        dextrose 10% bolus 250 mL 250 mL  25 g Intravenous PRN Prakash Conway MD        fentaNYL 50 mcg/mL injection 25 mcg  25 mcg Intravenous Q5 Min PRN Tl Madrigal MD   25 mcg at 04/12/24 1800    glucagon (human recombinant) injection 1 mg  1 mg Intramuscular PRN Prakash Conway MD        glucose chewable tablet 16 g  16 g Oral PRN Prakash Conway MD        glucose chewable tablet 24 g  24 g Oral PRN Prakash Conway MD        haloperidol lactate injection 0.5 mg  0.5 mg Intravenous Q10 Min PRN Tl Madrigal MD   0.5 mg at 04/12/24 1746    hydrOXYzine HCL tablet 25 mg  25 mg Oral TID PRN Shaun Ding MD   25 mg at 04/14/24 0822    naloxone 0.4 mg/mL injection 0.02 mg  0.02 mg Intravenous PRN Prakash Conway MD        ondansetron injection 4 mg  4 mg Intravenous Q8H PRN Prakash Conway MD        [START ON 4/15/2024] pantoprazole EC tablet 40 mg  40 mg Oral Daily  Shaun Ding MD        prochlorperazine injection Soln 5 mg  5 mg Intravenous Q6H PRN Prakash Conway MD        sodium chloride 0.9% flush 10 mL  10 mL Intravenous Q12H PRN Prakash Conway MD        sodium chloride 0.9% flush 3 mL  3 mL Intravenous PRN Tl Madrigal MD            Review of Systems  Objective:     Vital Signs (Most Recent):  Temp: 98.3 °F (36.8 °C) (04/14/24 1116)  Pulse: (!) 121 (04/14/24 1446)  Resp: 18 (04/14/24 1116)  BP: (!) 115/53 (04/14/24 1116)  SpO2: (!) 93 % (04/14/24 1116) Vital Signs (24h Range):  Temp:  [97.4 °F (36.3 °C)-99 °F (37.2 °C)] 98.3 °F (36.8 °C)  Pulse:  [] 121  Resp:  [16-18] 18  SpO2:  [93 %-97 %] 93 %  BP: (105-140)/(53-74) 115/53     Weight: 87.5 kg (192 lb 14.4 oz)  Body mass index is 35.28 kg/m².  Body surface area is 1.96 meters squared.      Intake/Output Summary (Last 24 hours) at 4/14/2024 1453  Last data filed at 4/14/2024 0642  Gross per 24 hour   Intake 360 ml   Output 1050 ml   Net -690 ml        Physical Exam  Constitutional:       Appearance: She is obese. She is not ill-appearing or diaphoretic.   HENT:      Mouth/Throat:      Mouth: Mucous membranes are moist.   Eyes:      Comments: Pallor eyelids   Cardiovascular:      Rate and Rhythm: Normal rate and regular rhythm.      Heart sounds: Murmur heard.   Pulmonary:      Effort: Pulmonary effort is normal. No respiratory distress.      Breath sounds: No wheezing or rales.   Abdominal:      General: Abdomen is flat. Bowel sounds are normal. There is no distension.      Tenderness: There is no abdominal tenderness.   Musculoskeletal:         General: Swelling present.      Right lower leg: No edema.      Left lower leg: No edema.   Skin:     General: Skin is warm.      Coloration: Skin is not jaundiced.   Neurological:      Mental Status: She is alert and oriented to person, place, and time.          Significant Labs:   CBC:   Recent Labs   Lab 04/13/24  0847 04/13/24  2100  04/14/24  0827   WBC 3.93 6.85 6.93   HGB 8.9* 8.2* 7.6*   HCT 26.1* 24.8* 23.1*   PLT 72* 87* 73*    and CMP:   Recent Labs   Lab 04/13/24  0531 04/14/24  0348    138   K 4.2 3.6    105   CO2 25 28   * 94   BUN 10 9   CREATININE 0.6 0.6   CALCIUM 7.7* 7.3*   PROT 5.1* 4.6*   ALBUMIN 2.2* 2.1*   BILITOT 2.4* 2.3*   ALKPHOS 121 127   AST 46* 68*   ALT 17 33   ANIONGAP 6* 5*       Diagnostic Results:  None

## 2024-04-14 NOTE — PROGRESS NOTES
[facility] - Oncology ([facility])  Hematology/Oncology  Progress Note    Patient Name: Shikha López  Admission Date: 4/9/2024  Hospital Length of Stay: 5 days  Code Status: Full Code     Subjective:     HPI:  53F with PMH stage 4 breast ca left (mets HER 2 +) Dr. 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 Yesterday, she began having hematochezia with BRBPR, lethargy and exertional SOB. Denies fevers, chills, nausea, vomiting, hematemesis, cp, dysuria, peripheral edema.     AF, tachycardic, BP stable, on RA, Hg 5.6, Plt 115, Tbil 4, U/A wnl, given IV PPI, pending 2 units of pRBCs, octreotide    Admitted to oncology service for GI bleeding on active chemotherapy    Interval History: NAEON, atarax for anxiety, patient initially to [place] today but would feel more comfortable with family support to [place] emigdio AM.    Oncology Treatment Plan:   OP BREAST FAM-TRASTUZUMAB DERUXTECAN-NXKI Q3W    Medications:  Continuous Infusions:  Current Facility-Administered Medications   Medication Dose Route Frequency Provider Last Rate Last Admin    0.9%  NaCl infusion (for blood administration)   Intravenous Q24H PRN Rober Ricci III, MD        0.9%  NaCl infusion (for blood administration)   Intravenous Q24H PRN Shaun Ding MD   New Bag at 04/13/24 1650    alteplase injection 2 mg  2 mg Intra-Catheter Daily PRN Shaun Ding MD   2 mg at 04/13/24 1742    cefTRIAXone (Rocephin) 1 g in dextrose 5 % in water (D5W) 100 mL IVPB (MB+)  1 g Intravenous Q24H Prakash Conway MD   Stopped at 04/13/24 1728    dextrose 10% bolus 125 mL 125 mL  12.5 g Intravenous PRN Prakash Conway MD        dextrose 10% bolus 250 mL 250 mL  25 g Intravenous PRN Prakash Conway MD        fentaNYL 50 mcg/mL injection 25 mcg  25 mcg  Intravenous Q5 Min PRN Tl Madrigal MD   25 mcg at 04/12/24 1800    glucagon (human recombinant) injection 1 mg  1 mg Intramuscular PRN Prakash Conway MD        glucose chewable tablet 16 g  16 g Oral PRN Prakash Conway MD        glucose chewable tablet 24 g  24 g Oral PRN Prakash Conway MD        haloperidol lactate injection 0.5 mg  0.5 mg Intravenous Q10 Min PRN Tl Madrigal MD   0.5 mg at 04/12/24 1746    hydrOXYzine HCL tablet 25 mg  25 mg Oral TID PRN Shaun Ding MD   25 mg at 04/14/24 0822    naloxone 0.4 mg/mL injection 0.02 mg  0.02 mg Intravenous PRN Prakash Conway MD        ondansetron injection 4 mg  4 mg Intravenous Q8H PRN Prakash Conway MD        [START ON 4/15/2024] pantoprazole EC tablet 40 mg  40 mg Oral Daily Shaun Ding MD        prochlorperazine injection Soln 5 mg  5 mg Intravenous Q6H PRN Prakash Conway MD        sodium chloride 0.9% flush 10 mL  10 mL Intravenous Q12H PRN Prakash Conway MD        sodium chloride 0.9% flush 3 mL  3 mL Intravenous PRN Tl Madrigal MD         Scheduled Meds:  Current Facility-Administered Medications   Medication Dose Route Frequency Provider Last Rate Last Admin    0.9%  NaCl infusion (for blood administration)   Intravenous Q24H PRN Rober Ricci III, MD        0.9%  NaCl infusion (for blood administration)   Intravenous Q24H PRN Shaun Ding MD   New Bag at 04/13/24 1650    alteplase injection 2 mg  2 mg Intra-Catheter Daily PRN Shaun Ding MD   2 mg at 04/13/24 1742    cefTRIAXone (Rocephin) 1 g in dextrose 5 % in water (D5W) 100 mL IVPB (MB+)  1 g Intravenous Q24H Prakash Conway MD   Stopped at 04/13/24 1728    dextrose 10% bolus 125 mL 125 mL  12.5 g Intravenous PRN Prakash Conway MD        dextrose 10% bolus 250 mL 250 mL  25 g Intravenous PRN Prakash Conway MD        fentaNYL 50 mcg/mL  injection 25 mcg  25 mcg Intravenous Q5 Min PRN Tl Madrigal MD   25 mcg at 04/12/24 1800    glucagon (human recombinant) injection 1 mg  1 mg Intramuscular PRN Prakash Conway MD        glucose chewable tablet 16 g  16 g Oral PRN Prakash Conway MD        glucose chewable tablet 24 g  24 g Oral PRN Prakash Conway MD        haloperidol lactate injection 0.5 mg  0.5 mg Intravenous Q10 Min PRN Tl Madrigal MD   0.5 mg at 04/12/24 1746    hydrOXYzine HCL tablet 25 mg  25 mg Oral TID PRN Shaun Ding MD   25 mg at 04/14/24 0822    naloxone 0.4 mg/mL injection 0.02 mg  0.02 mg Intravenous PRN Prakash Conway MD        ondansetron injection 4 mg  4 mg Intravenous Q8H PRN Prakash Conway MD        [START ON 4/15/2024] pantoprazole EC tablet 40 mg  40 mg Oral Daily Shaun Ding MD        prochlorperazine injection Soln 5 mg  5 mg Intravenous Q6H PRN Prakash Conway MD        sodium chloride 0.9% flush 10 mL  10 mL Intravenous Q12H PRN Prakash Conway MD        sodium chloride 0.9% flush 3 mL  3 mL Intravenous PRN Tl Madrigal MD         PRN Meds:  Current Facility-Administered Medications   Medication Dose Route Frequency Provider Last Rate Last Admin    0.9%  NaCl infusion (for blood administration)   Intravenous Q24H PRN Rober Ricci III, MD        0.9%  NaCl infusion (for blood administration)   Intravenous Q24H PRN Shaun Ding MD   New Bag at 04/13/24 1650    alteplase injection 2 mg  2 mg Intra-Catheter Daily PRN Shaun Ding MD   2 mg at 04/13/24 1742    cefTRIAXone (Rocephin) 1 g in dextrose 5 % in water (D5W) 100 mL IVPB (MB+)  1 g Intravenous Q24H Prakash Conway MD   Stopped at 04/13/24 1728    dextrose 10% bolus 125 mL 125 mL  12.5 g Intravenous PRN Prakash Conway MD        dextrose 10% bolus 250 mL 250 mL  25 g Intravenous PRN Prakash Conway MD         fentaNYL 50 mcg/mL injection 25 mcg  25 mcg Intravenous Q5 Min PRN Tl Madrigal MD   25 mcg at 04/12/24 1800    glucagon (human recombinant) injection 1 mg  1 mg Intramuscular PRN Prakash Conway MD        glucose chewable tablet 16 g  16 g Oral PRN Prakash Conway MD        glucose chewable tablet 24 g  24 g Oral PRN Prakash Conway MD        haloperidol lactate injection 0.5 mg  0.5 mg Intravenous Q10 Min PRN Tl Madrigal MD   0.5 mg at 04/12/24 1746    hydrOXYzine HCL tablet 25 mg  25 mg Oral TID PRN Shaun Ding MD   25 mg at 04/14/24 0822    naloxone 0.4 mg/mL injection 0.02 mg  0.02 mg Intravenous PRN Prakash Conway MD        ondansetron injection 4 mg  4 mg Intravenous Q8H PRN Prakash Conway MD        [START ON 4/15/2024] pantoprazole EC tablet 40 mg  40 mg Oral Daily Shaun Ding MD        prochlorperazine injection Soln 5 mg  5 mg Intravenous Q6H PRN Prakash Conway MD        sodium chloride 0.9% flush 10 mL  10 mL Intravenous Q12H PRN Prakash Conway MD        sodium chloride 0.9% flush 3 mL  3 mL Intravenous PRN Tl Madrigal MD            Review of Systems  Objective:     Vital Signs (Most Recent):  Temp: 98.3 °F (36.8 °C) (04/14/24 1116)  Pulse: (!) 121 (04/14/24 1446)  Resp: 18 (04/14/24 1116)  BP: (!) 115/53 (04/14/24 1116)  SpO2: (!) 93 % (04/14/24 1116) Vital Signs (24h Range):  Temp:  [97.4 °F (36.3 °C)-99 °F (37.2 °C)] 98.3 °F (36.8 °C)  Pulse:  [] 121  Resp:  [16-18] 18  SpO2:  [93 %-97 %] 93 %  BP: (105-140)/(53-74) 115/53     Weight: 87.5 kg (192 lb 14.4 oz)  Body mass index is 35.28 kg/m².  Body surface area is 1.96 meters squared.      Intake/Output Summary (Last 24 hours) at 4/14/2024 1453  Last data filed at 4/14/2024 0642  Gross per 24 hour   Intake 360 ml   Output 1050 ml   Net -690 ml        Physical Exam  Constitutional:       Appearance: She is obese. She is not  ill-appearing or diaphoretic.   HENT:      Mouth/Throat:      Mouth: Mucous membranes are moist.   Eyes:      Comments: Pallor eyelids   Cardiovascular:      Rate and Rhythm: Normal rate and regular rhythm.      Heart sounds: Murmur heard.   Pulmonary:      Effort: Pulmonary effort is normal. No respiratory distress.      Breath sounds: No wheezing or rales.   Abdominal:      General: Abdomen is flat. Bowel sounds are normal. There is no distension.      Tenderness: There is no abdominal tenderness.   Musculoskeletal:         General: Swelling present.      Right lower leg: No edema.      Left lower leg: No edema.   Skin:     General: Skin is warm.      Coloration: Skin is not jaundiced.   Neurological:      Mental Status: She is alert and oriented to person, place, and time.          Significant Labs:   CBC:   Recent Labs   Lab 04/13/24  0847 04/13/24  2100 04/14/24  0827   WBC 3.93 6.85 6.93   HGB 8.9* 8.2* 7.6*   HCT 26.1* 24.8* 23.1*   PLT 72* 87* 73*    and CMP:   Recent Labs   Lab 04/13/24  0531 04/14/24  0348    138   K 4.2 3.6    105   CO2 25 28   * 94   BUN 10 9   CREATININE 0.6 0.6   CALCIUM 7.7* 7.3*   PROT 5.1* 4.6*   ALBUMIN 2.2* 2.1*   BILITOT 2.4* 2.3*   ALKPHOS 121 127   AST 46* 68*   ALT 17 33   ANIONGAP 6* 5*       Diagnostic Results:  None  Assessment/Plan:     * Gastrointestinal hemorrhage with melena  53 hx of left breast ca, mets to lung, HAWTHORNE cirrhosis, obesity    Symptoms: melana, BRBPR, fatigue, SOB  Diagnostics: Hgb 5.6 (baseline 9) drop from 7.2 yesterday    - GI consult for EGD / C-Scope consult for source evaluation +/- intervention Plan for 4/10  - IV Protonix 80mg x 1, followed by IV 40mg BID thereafter  - clears until midnight NPO, hold pharmacologic VTE PPx  - Trend CBCs ; Transfuse Hgb < 7  - Resuscitate IVF PRN  - Transfused 3 unit pRBCs  - Cardiac telemetry  - Maintain 2 large bore IVs  - If hemodynamically unstable +/- new bleed, stat CTA / consult IR for  embolization evaluation   - CTX for hx of varices (esophageal and gastric) and GI bleed    -EGD 4/10 with gastric varices and stigmata of bleeding, consulted IR/hepatology for input on EUS guided glue injection  -hepatology and IR with plans for TIPS procedure to prevent gastric and esophageal variceal bleeding. Booked for 4/12  -to complete 3 total days of octreotide and 5 days of ceftriaxone    Severe obesity (BMI 35.0-39.9) with comorbidity  -documented  -Diet, exercise, and weight for mortality benefits    Esophageal and gastric varices  -2/2 HAWTHORNE cirrhosis  -hx of bleeding varices s/p banding in June 2023  -plan for TIPS 4/12    Malignant neoplasm metastatic to left lung  -s/p chemo, radiation, post op RT  -metastatic  -on maintenance herceptin    Breast cancer, stage 4, left  -Follows with Dr. Willis  -On AGUILAR for 2 yrs             Shaun Ding MD  Hematology/Oncology  [facility] - Oncology ([facility])

## 2024-04-14 NOTE — PLAN OF CARE
AAOX4. Electrolyte replace. Anxious from family visit prior night prn atarax given. Up in room and to bathroom with assist. Non skid footwear on with bed locked and low. Bed rails up x2. Voiced no other concerns throughout shift, Call light and personal items within reach.

## 2024-04-14 NOTE — PLAN OF CARE
1936 rec'd resting in bed A/A/OX4. VSS, on room air. Family at bedside. No complaints voiced. Dressing to neck C/D/I. Encouraged to call for any needs or assistance. Bed low and wheels locked.       -120 over the night. Lokesh ORTIZ made aware. Ordered 500ml bolus then D/C'd-orders just to monitor. Patient in no distress and has no complaints, states she is just worried about her home life.     End of shift- rested quietly over the night. Had no complaints. VSS, on room air. Safety measure intact.

## 2024-04-14 NOTE — DISCHARGE SUMMARY
Dereck Forrester - Oncology ([facility])  Hematology/Oncology  Discharge Summary      Patient Name: Shikha López  MRN: [id number]  Admission Date: 4/9/2024  Hospital Length of Stay: 5 days  Discharge Date and Time:  04/14/2024 8:43 AM  Attending Physician: Noah Terry MD   Discharging Provider: Shaun Ding MD  Primary Care Provider: Lenore, Primary Doctor    HPI: 53F with PMH stage 4 breast ca left (mets HER 2 +) Dr. 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 Yesterday, she began having hematochezia with BRBPR, lethargy and exertional SOB. Denies fevers, chills, nausea, vomiting, hematemesis, cp, dysuria, peripheral edema.     AF, tachycardic, BP stable, on RA, Hg 5.6, Plt 115, Tbil 4, U/A wnl, given IV PPI, pending 2 units of pRBCs, octreotide    Admitted to oncology service for GI bleeding on active chemotherapy    Procedure(s) (LRB):  EGD (ESOPHAGOGASTRODUODENOSCOPY) (N/A)     Hospital Course: Admitted for concern of GI bleeding in patient on active chemotherapy. Pt given 3 units of pRBCs and octreotide with hx of gastric and esophageal varices. EGD 4/10 with gastric varices showing stigmata. ID and hepatology consulted for input on EUS guided glue injection of varices.  Hepatology recommending TIPS and IR to perform. IR with successful TIPS 4/12 and variceal embolization. Pt clinically stable, stools without melena. No signs of HE after TIPS. Patient clinically stable and to be discharged with as needed lactulose if she develops Hg. She will have follow up with Oncology, hepatology, and GI.     Vitals:    04/14/24 0717   BP: (!) 105/57   Pulse: (!) 112   Resp: 18   Temp: 97.6 °F (36.4 °C)      Physical Exam  Constitutional:       Appearance: She is obese. She is not ill-appearing or diaphoretic.   HENT:       Mouth/Throat:      Mouth: Mucous membranes are moist.   Eyes:      Comments: Pallor eyelids   Cardiovascular:      Rate and Rhythm: Normal rate and regular rhythm.      Heart sounds: Murmur heard.   Pulmonary:      Effort: Pulmonary effort is normal. No respiratory distress.      Breath sounds: No wheezing or rales.   Abdominal:      General: Abdomen is flat. Bowel sounds are normal. There is no distension.      Tenderness: There is no abdominal tenderness.   Musculoskeletal:         General: Swelling present.      Right lower leg: No edema.      Left lower leg: No edema.   Skin:     General: Skin is warm.      Coloration: Skin is not jaundiced.   Neurological:      Mental Status: She is alert and oriented to person, place, and time.      Goals of Care Treatment Preferences:  Code Status: Full Code          What is most important right now is to focus on remaining as independent as possible, symptom/pain control, curative/life-prolongation (regardless of treatment burdens), comfort and QOL .  Accordingly, we have decided that the best plan to meet the patient's goals includes continuing with treatment.      Consults:   Consults (From admission, onward)          Status Ordering Provider     Inpatient consult to Interventional Radiology  Once        Provider:  (Not yet assigned)    Completed LUPE LEWIS     Inpatient consult to Hepatology  Once        Provider:  (Not yet assigned)    Completed LUPE LEWIS     Inpatient consult to Gastroenterology  Once        Provider:  (Not yet assigned)    Completed DAVID TRIPATHI III            Significant Diagnostic Studies: N/A    Pending Diagnostic Studies:       Procedure Component Value Units Date/Time    IR Embolization Arterial or Venous for Hemorrhage [[id number]]     Order Status: Sent Lab Status: No result     IR TIPS Insertion [[id number]] Resulted: 04/12/24 1545    Order Status: Sent Lab Status: In process Updated: 04/12/24 1622          Final Active Diagnoses:     Diagnosis Date Noted POA    PRINCIPAL PROBLEM:  Gastrointestinal hemorrhage with melena [K92.1] 06/22/2023 Yes    Acute gastrointestinal bleeding [K92.2] 04/11/2024 Yes    Severe obesity (BMI 35.0-39.9) with comorbidity [E66.01] 07/06/2023 Yes    Esophageal and gastric varices [I85.00, I86.4] 06/23/2023 Yes    Breast cancer, stage 4, left [C50.912] 06/08/2021 Yes    Malignant neoplasm metastatic to left lung [C78.02] 06/08/2021 Yes      Problems Resolved During this Admission:      Discharged Condition: stable    Disposition: Home or Self Care    Follow Up:    Patient Instructions:   No discharge procedures on file.  Medications:  Reconciled Home Medications:      Medication List        START taking these medications      lactulose 20 gram/30 mL Soln  Commonly known as: CHRONULAC  Take 15 mLs (10 g total) by mouth 3 (three) times daily. PLEASE take as needed if you start feeling confused or altered, take as need to have around 2-3 bowel movements per day            CHANGE how you take these medications      FeroSuL 325 mg (65 mg iron) Tab tablet  Generic drug: ferrous sulfate  Take daily with Vitamin C on an empty stomach  What changed:   how much to take  how to take this  when to take this  additional instructions            CONTINUE taking these medications      furosemide 20 MG tablet  Commonly known as: LASIX  Take 1 tablet (20 mg total) by mouth once daily.     methylphenidate HCl 5 MG tablet  Commonly known as: RITALIN  Take 1 tablet (5 mg total) by mouth 2 (two) times daily.     OLANZapine 5 MG tablet  Commonly known as: ZyPREXA  Take days 1-4 of chemotherapy     pantoprazole 40 MG tablet  Commonly known as: PROTONIX  Take 1 tablet (40 mg total) by mouth 2 (two) times daily.     potassium chloride 10% 20 mEq/15 mL oral solution  Commonly known as: KAYCIEL  Take 15 mLs (20 mEq total) by mouth once daily. (To prevent stomach upset, mix dose with a glass of cold water or juice)            STOP taking these  medications      hydrocodone-homatropine 5-1.5 mg/5 ml 5-1.5 mg/5 mL Syrp  Commonly known as: DALI Ding MD  Hematology/Oncology  [facility] - Oncology ([facility])

## 2024-04-15 ENCOUNTER — TELEPHONE (OUTPATIENT)
Dept: HEMATOLOGY/ONCOLOGY | Facility: CLINIC | Age: 54
End: 2024-04-15
Payer: MEDICARE

## 2024-04-15 DIAGNOSIS — C78.02 MALIGNANT NEOPLASM METASTATIC TO LEFT LUNG: ICD-10-CM

## 2024-04-15 DIAGNOSIS — C50.912 BREAST CANCER, STAGE 4, LEFT: Primary | ICD-10-CM

## 2024-04-15 LAB
ABO + RH BLD: NORMAL
ALBUMIN SERPL BCP-MCNC: 2.1 G/DL (ref 3.5–5.2)
ALP SERPL-CCNC: 126 U/L (ref 55–135)
ALT SERPL W/O P-5'-P-CCNC: 127 U/L (ref 10–44)
ANION GAP SERPL CALC-SCNC: 5 MMOL/L (ref 8–16)
AST SERPL-CCNC: 254 U/L (ref 10–40)
BACTERIA #/AREA URNS AUTO: NORMAL /HPF
BASOPHILS # BLD AUTO: 0.02 K/UL (ref 0–0.2)
BASOPHILS NFR BLD: 0.5 % (ref 0–1.9)
BILIRUB SERPL-MCNC: 2.6 MG/DL (ref 0.1–1)
BILIRUB UR QL STRIP: NEGATIVE
BLD GP AB SCN CELLS X3 SERPL QL: NORMAL
BLD PROD TYP BPU: NORMAL
BLOOD UNIT EXPIRATION DATE: NORMAL
BLOOD UNIT TYPE CODE: 5100
BLOOD UNIT TYPE: NORMAL
BUN SERPL-MCNC: 11 MG/DL (ref 6–20)
CALCIUM SERPL-MCNC: 7.6 MG/DL (ref 8.7–10.5)
CHLORIDE SERPL-SCNC: 105 MMOL/L (ref 95–110)
CLARITY UR REFRACT.AUTO: CLEAR
CO2 SERPL-SCNC: 29 MMOL/L (ref 23–29)
CODING SYSTEM: NORMAL
COLOR UR AUTO: ABNORMAL
CREAT SERPL-MCNC: 0.6 MG/DL (ref 0.5–1.4)
CROSSMATCH INTERPRETATION: NORMAL
DIFFERENTIAL METHOD BLD: ABNORMAL
DISPENSE STATUS: NORMAL
EOSINOPHIL # BLD AUTO: 0.1 K/UL (ref 0–0.5)
EOSINOPHIL NFR BLD: 3.7 % (ref 0–8)
ERYTHROCYTE [DISTWIDTH] IN BLOOD BY AUTOMATED COUNT: 20.8 % (ref 11.5–14.5)
EST. GFR  (NO RACE VARIABLE): >60 ML/MIN/1.73 M^2
GLUCOSE SERPL-MCNC: 91 MG/DL (ref 70–110)
GLUCOSE UR QL STRIP: NEGATIVE
HCT VFR BLD AUTO: 21.5 % (ref 37–48.5)
HGB BLD-MCNC: 7 G/DL (ref 12–16)
HGB UR QL STRIP: ABNORMAL
IMM GRANULOCYTES # BLD AUTO: 0.09 K/UL (ref 0–0.04)
IMM GRANULOCYTES NFR BLD AUTO: 2.4 % (ref 0–0.5)
INR PPP: 1.5 (ref 0.8–1.2)
KETONES UR QL STRIP: NEGATIVE
LEUKOCYTE ESTERASE UR QL STRIP: NEGATIVE
LYMPHOCYTES # BLD AUTO: 0.5 K/UL (ref 1–4.8)
LYMPHOCYTES NFR BLD: 13.6 % (ref 18–48)
MAGNESIUM SERPL-MCNC: 1.7 MG/DL (ref 1.6–2.6)
MCH RBC QN AUTO: 33.7 PG (ref 27–31)
MCHC RBC AUTO-ENTMCNC: 32.6 G/DL (ref 32–36)
MCV RBC AUTO: 103 FL (ref 82–98)
MICROSCOPIC COMMENT: NORMAL
MONOCYTES # BLD AUTO: 0.2 K/UL (ref 0.3–1)
MONOCYTES NFR BLD: 4.5 % (ref 4–15)
NEUTROPHILS # BLD AUTO: 2.8 K/UL (ref 1.8–7.7)
NEUTROPHILS NFR BLD: 75.3 % (ref 38–73)
NITRITE UR QL STRIP: NEGATIVE
NRBC BLD-RTO: 0 /100 WBC
NUM UNITS TRANS PACKED RBC: NORMAL
PH UR STRIP: 6 [PH] (ref 5–8)
PHOSPHATE SERPL-MCNC: 2.2 MG/DL (ref 2.7–4.5)
PLATELET # BLD AUTO: 64 K/UL (ref 150–450)
PMV BLD AUTO: 12.3 FL (ref 9.2–12.9)
POTASSIUM SERPL-SCNC: 3.8 MMOL/L (ref 3.5–5.1)
PROT SERPL-MCNC: 4.6 G/DL (ref 6–8.4)
PROT UR QL STRIP: NEGATIVE
PROTHROMBIN TIME: 16.1 SEC (ref 9–12.5)
RBC # BLD AUTO: 2.08 M/UL (ref 4–5.4)
RBC #/AREA URNS AUTO: 1 /HPF (ref 0–4)
SODIUM SERPL-SCNC: 139 MMOL/L (ref 136–145)
SP GR UR STRIP: 1.01 (ref 1–1.03)
SPECIMEN OUTDATE: NORMAL
SQUAMOUS #/AREA URNS AUTO: 2 /HPF
URN SPEC COLLECT METH UR: ABNORMAL
WBC # BLD AUTO: 3.75 K/UL (ref 3.9–12.7)
WBC #/AREA URNS AUTO: 1 /HPF (ref 0–5)

## 2024-04-15 PROCEDURE — 86901 BLOOD TYPING SEROLOGIC RH(D): CPT

## 2024-04-15 PROCEDURE — 84100 ASSAY OF PHOSPHORUS: CPT | Performed by: STUDENT IN AN ORGANIZED HEALTH CARE EDUCATION/TRAINING PROGRAM

## 2024-04-15 PROCEDURE — 85610 PROTHROMBIN TIME: CPT | Performed by: STUDENT IN AN ORGANIZED HEALTH CARE EDUCATION/TRAINING PROGRAM

## 2024-04-15 PROCEDURE — 86920 COMPATIBILITY TEST SPIN: CPT

## 2024-04-15 PROCEDURE — 99233 SBSQ HOSP IP/OBS HIGH 50: CPT | Mod: GC,,, | Performed by: HOSPITALIST

## 2024-04-15 PROCEDURE — P9016 RBC LEUKOCYTES REDUCED: HCPCS

## 2024-04-15 PROCEDURE — 25000003 PHARM REV CODE 250

## 2024-04-15 PROCEDURE — 85025 COMPLETE CBC W/AUTO DIFF WBC: CPT | Performed by: STUDENT IN AN ORGANIZED HEALTH CARE EDUCATION/TRAINING PROGRAM

## 2024-04-15 PROCEDURE — 83735 ASSAY OF MAGNESIUM: CPT | Performed by: STUDENT IN AN ORGANIZED HEALTH CARE EDUCATION/TRAINING PROGRAM

## 2024-04-15 PROCEDURE — 20600001 HC STEP DOWN PRIVATE ROOM

## 2024-04-15 PROCEDURE — 80053 COMPREHEN METABOLIC PANEL: CPT | Performed by: STUDENT IN AN ORGANIZED HEALTH CARE EDUCATION/TRAINING PROGRAM

## 2024-04-15 PROCEDURE — 81001 URINALYSIS AUTO W/SCOPE: CPT

## 2024-04-15 RX ORDER — LACTULOSE 10 G/15ML
10 SOLUTION ORAL 3 TIMES DAILY
Status: DISCONTINUED | OUTPATIENT
Start: 2024-04-15 | End: 2024-04-16

## 2024-04-15 RX ORDER — SODIUM,POTASSIUM PHOSPHATES 280-250MG
1 POWDER IN PACKET (EA) ORAL ONCE
Status: COMPLETED | OUTPATIENT
Start: 2024-04-15 | End: 2024-04-15

## 2024-04-15 RX ORDER — HYDROCODONE BITARTRATE AND ACETAMINOPHEN 500; 5 MG/1; MG/1
TABLET ORAL
Status: DISCONTINUED | OUTPATIENT
Start: 2024-04-15 | End: 2024-04-16 | Stop reason: HOSPADM

## 2024-04-15 RX ORDER — FUROSEMIDE 20 MG/1
20 TABLET ORAL DAILY
Status: DISCONTINUED | OUTPATIENT
Start: 2024-04-15 | End: 2024-04-16 | Stop reason: HOSPADM

## 2024-04-15 RX ORDER — HEPARIN 100 UNIT/ML
300 SYRINGE INTRAVENOUS
Status: DISCONTINUED | OUTPATIENT
Start: 2024-04-15 | End: 2024-04-16 | Stop reason: HOSPADM

## 2024-04-15 RX ADMIN — LACTULOSE 10 G: 20 SOLUTION ORAL at 03:04

## 2024-04-15 RX ADMIN — POTASSIUM & SODIUM PHOSPHATES POWDER PACK 280-160-250 MG 1 PACKET: 280-160-250 PACK at 10:04

## 2024-04-15 RX ADMIN — PANTOPRAZOLE SODIUM 40 MG: 40 TABLET, DELAYED RELEASE ORAL at 08:04

## 2024-04-15 RX ADMIN — FUROSEMIDE 20 MG: 20 TABLET ORAL at 10:04

## 2024-04-15 RX ADMIN — LACTULOSE 10 G: 20 SOLUTION ORAL at 08:04

## 2024-04-15 RX ADMIN — LACTULOSE 10 G: 20 SOLUTION ORAL at 10:04

## 2024-04-15 NOTE — ASSESSMENT & PLAN NOTE
53 hx of left breast ca, mets to lung, HAWTHORNE cirrhosis, obesity    Symptoms: melana, BRBPR, fatigue, SOB  Diagnostics: Hgb 5.6 (baseline 9) drop from 7.2 yesterday    - GI consult for EGD / C-Scope consult for source evaluation +/- intervention Plan for 4/10  - IV Protonix 80mg x 1, followed by IV 40mg BID thereafter  - clears until midnight NPO, hold pharmacologic VTE PPx  - Trend CBCs ; Transfuse Hgb < 7  - Resuscitate IVF PRN  - Transfused 3 unit pRBCs  - Cardiac telemetry  - Maintain 2 large bore IVs  - If hemodynamically unstable +/- new bleed, stat CTA / consult IR for embolization evaluation   - CTX for hx of varices (esophageal and gastric) and GI bleed    -EGD 4/10 with gastric varices and stigmata of bleeding, consulted IR/hepatology for input on EUS guided glue injection  -hepatology and IR with plans for TIPS procedure to prevent gastric and esophageal variceal bleeding. Booked for 4/12  -to complete 3 total days of octreotide and 5 days of ceftriaxone  -Transfuse 1 unit RBCs 4/15 for Hg drop  -Lactulose, lasix, and U/A pending, no signs of bleeding or melena

## 2024-04-15 NOTE — PROGRESS NOTES
Dereck Forrester - Oncology ([facility])  Hematology/Oncology  Progress Note    Patient Name: Shikha López  Admission Date: 4/9/2024  Hospital Length of Stay: 6 days  Code Status: Full Code     Subjective:     HPI:  53F with PMH stage 4 breast ca left (mets HER 2 +) Dr. 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 Yesterday, she began having hematochezia with BRBPR, lethargy and exertional SOB. Denies fevers, chills, nausea, vomiting, hematemesis, cp, dysuria, peripheral edema.     AF, tachycardic, BP stable, on RA, Hg 5.6, Plt 115, Tbil 4, U/A wnl, given IV PPI, pending 2 units of pRBCs, octreotide    Admitted to oncology service for GI bleeding on active chemotherapy    Interval History: NAEON, repleting lytes and will transfuse another unit based on Hg drop, giving lactulose, lasix, U/A to rule out hematuria.     Oncology Treatment Plan:   OP BREAST FAM-TRASTUZUMAB DERUXTECAN-NXKI Q3W    Medications:  Continuous Infusions:  Current Facility-Administered Medications   Medication Dose Route Frequency Provider Last Rate Last Admin    0.9%  NaCl infusion (for blood administration)   Intravenous Q24H PRN Rober Ricci III, MD        0.9%  NaCl infusion (for blood administration)   Intravenous Q24H PRN Shaun Ding MD   New Bag at 04/13/24 1650    0.9%  NaCl infusion (for blood administration)   Intravenous Q24H PRN Shaun Ding MD        alteplase injection 2 mg  2 mg Intra-Catheter Daily PRN Shaun Ding MD   2 mg at 04/13/24 1742    dextrose 10% bolus 125 mL 125 mL  12.5 g Intravenous PRN Prakash Conway MD        dextrose 10% bolus 250 mL 250 mL  25 g Intravenous PRN Prakash Conway MD        fentaNYL 50 mcg/mL injection 25 mcg  25 mcg Intravenous Q5 Min PRN Tl Madrigal MD   25 mcg at  04/12/24 1800    glucagon (human recombinant) injection 1 mg  1 mg Intramuscular PRN Prakash Conway MD        glucose chewable tablet 16 g  16 g Oral PRN Prakash Conway MD        glucose chewable tablet 24 g  24 g Oral PRN Prakash Conway MD        haloperidol lactate injection 0.5 mg  0.5 mg Intravenous Q10 Min PRN Tl Madrigal MD   0.5 mg at 04/12/24 1746    heparin, porcine (PF) 100 unit/mL injection flush 300 Units  300 Units Intravenous PRN Shaun Ding MD        hydrOXYzine HCL tablet 25 mg  25 mg Oral TID PRN Shaun Ding MD   25 mg at 04/14/24 0822    lactulose 20 gram/30 mL solution Soln 10 g  10 g Oral TID Shaun Ding MD        naloxone 0.4 mg/mL injection 0.02 mg  0.02 mg Intravenous PRN Prakash Conway MD        ondansetron injection 4 mg  4 mg Intravenous Q8H PRN Prakash Conway MD        pantoprazole EC tablet 40 mg  40 mg Oral Daily Shaun Ding MD   40 mg at 04/15/24 0821    prochlorperazine injection Soln 5 mg  5 mg Intravenous Q6H PRN Prakash Conway MD        sodium chloride 0.9% flush 10 mL  10 mL Intravenous Q12H PRN Prakash Conway MD        sodium chloride 0.9% flush 3 mL  3 mL Intravenous PRN Tl Madrigal MD         Scheduled Meds:  Current Facility-Administered Medications   Medication Dose Route Frequency Provider Last Rate Last Admin    0.9%  NaCl infusion (for blood administration)   Intravenous Q24H PRN Rober Ricci III, MD        0.9%  NaCl infusion (for blood administration)   Intravenous Q24H PRN Shaun Ding MD   New Bag at 04/13/24 1650    0.9%  NaCl infusion (for blood administration)   Intravenous Q24H PRN Shaun Ding MD        alteplase injection 2 mg  2 mg Intra-Catheter Daily PRN Shaun Ding MD   2 mg at 04/13/24 1742    dextrose 10% bolus 125 mL 125 mL  12.5 g Intravenous PRN Prakash Conway MD        dextrose 10% bolus 250 mL 250 mL  25 g  Intravenous PRN Prakash Conway MD        fentaNYL 50 mcg/mL injection 25 mcg  25 mcg Intravenous Q5 Min PRN Tl Madrigal MD   25 mcg at 04/12/24 1800    glucagon (human recombinant) injection 1 mg  1 mg Intramuscular PRN Prakash Conway MD        glucose chewable tablet 16 g  16 g Oral PRN Prakash Conway MD        glucose chewable tablet 24 g  24 g Oral PRN Prakash Conway MD        haloperidol lactate injection 0.5 mg  0.5 mg Intravenous Q10 Min PRN Tl Madrigal MD   0.5 mg at 04/12/24 1746    heparin, porcine (PF) 100 unit/mL injection flush 300 Units  300 Units Intravenous PRN Shaun Ding MD        hydrOXYzine HCL tablet 25 mg  25 mg Oral TID PRN Shaun Ding MD   25 mg at 04/14/24 0822    lactulose 20 gram/30 mL solution Soln 10 g  10 g Oral TID Shaun Ding MD        naloxone 0.4 mg/mL injection 0.02 mg  0.02 mg Intravenous PRN Prakash Conway MD        ondansetron injection 4 mg  4 mg Intravenous Q8H PRN Prakash Conway MD        pantoprazole EC tablet 40 mg  40 mg Oral Daily Shaun Ding MD   40 mg at 04/15/24 0821    prochlorperazine injection Soln 5 mg  5 mg Intravenous Q6H PRN Prakash Conway MD        sodium chloride 0.9% flush 10 mL  10 mL Intravenous Q12H PRN Prakash Conway MD        sodium chloride 0.9% flush 3 mL  3 mL Intravenous PRN Tl Madrigal MD         PRN Meds:  Current Facility-Administered Medications   Medication Dose Route Frequency Provider Last Rate Last Admin    0.9%  NaCl infusion (for blood administration)   Intravenous Q24H PRN Rober Ricci III, MD        0.9%  NaCl infusion (for blood administration)   Intravenous Q24H PRN Shaun Ding MD   New Bag at 04/13/24 1650    0.9%  NaCl infusion (for blood administration)   Intravenous Q24H Shaun Langston MD        alteplase injection 2 mg  2 mg Intra-Catheter Daily PRShaun Bishop MD   2 mg at  04/13/24 1742    dextrose 10% bolus 125 mL 125 mL  12.5 g Intravenous PRN Prakash Conway MD        dextrose 10% bolus 250 mL 250 mL  25 g Intravenous PRN Prakash Conway MD        fentaNYL 50 mcg/mL injection 25 mcg  25 mcg Intravenous Q5 Min PRN Tl Madrigal MD   25 mcg at 04/12/24 1800    glucagon (human recombinant) injection 1 mg  1 mg Intramuscular PRN Prakash Conway MD        glucose chewable tablet 16 g  16 g Oral PRN Prakash Conway MD        glucose chewable tablet 24 g  24 g Oral PRN Prakash Conway MD        haloperidol lactate injection 0.5 mg  0.5 mg Intravenous Q10 Min PRN Tl Madrigal MD   0.5 mg at 04/12/24 1746    heparin, porcine (PF) 100 unit/mL injection flush 300 Units  300 Units Intravenous PRN Shaun Ding MD        hydrOXYzine HCL tablet 25 mg  25 mg Oral TID PRN Shaun Ding MD   25 mg at 04/14/24 0822    lactulose 20 gram/30 mL solution Soln 10 g  10 g Oral TID Shaun Ding MD        naloxone 0.4 mg/mL injection 0.02 mg  0.02 mg Intravenous PRN Prakash Conway MD        ondansetron injection 4 mg  4 mg Intravenous Q8H PRN Prakash Conway MD        pantoprazole EC tablet 40 mg  40 mg Oral Daily Shaun Ding MD   40 mg at 04/15/24 0821    prochlorperazine injection Soln 5 mg  5 mg Intravenous Q6H PRN Prakash Conway MD        sodium chloride 0.9% flush 10 mL  10 mL Intravenous Q12H PRN Prakash Conway MD        sodium chloride 0.9% flush 3 mL  3 mL Intravenous PRN Tl Madrigal MD            Review of Systems  Objective:     Vital Signs (Most Recent):  Temp: 98.7 °F (37.1 °C) (04/15/24 0742)  Pulse: 101 (04/15/24 0742)  Resp: 18 (04/15/24 0742)  BP: (!) 106/52 (04/15/24 0742)  SpO2: 96 % (04/15/24 0742) Vital Signs (24h Range):  Temp:  [98.3 °F (36.8 °C)-99.1 °F (37.3 °C)] 98.7 °F (37.1 °C)  Pulse:  [] 101  Resp:  [16-18] 18  SpO2:  [93 %-96 %] 96  %  BP: (103-132)/(52-59) 106/52     Weight: 88.6 kg (195 lb 5.2 oz)  Body mass index is 35.73 kg/m².  Body surface area is 1.97 meters squared.      Intake/Output Summary (Last 24 hours) at 4/15/2024 1020  Last data filed at 4/15/2024 0451  Gross per 24 hour   Intake 480 ml   Output 700 ml   Net -220 ml        Physical Exam  Constitutional:       Appearance: She is obese. She is not ill-appearing or diaphoretic.   HENT:      Mouth/Throat:      Mouth: Mucous membranes are moist.   Eyes:      Comments: Pallor eyelids   Cardiovascular:      Rate and Rhythm: Normal rate and regular rhythm.      Heart sounds: Murmur heard.   Pulmonary:      Effort: Pulmonary effort is normal. No respiratory distress.      Breath sounds: No wheezing or rales.   Abdominal:      General: Abdomen is flat. Bowel sounds are normal. There is no distension.      Tenderness: There is no abdominal tenderness.   Genitourinary:     Comments: Dark colored urine  Musculoskeletal:         General: Swelling present.      Right lower leg: No edema.      Left lower leg: No edema.   Skin:     General: Skin is warm.      Coloration: Skin is not jaundiced.   Neurological:      Mental Status: She is alert and oriented to person, place, and time.          Significant Labs:   CBC:   Recent Labs   Lab 04/14/24  0827 04/14/24  2042 04/15/24  0825   WBC 6.93 5.41 3.75*   HGB 7.6* 7.3* 7.0*   HCT 23.1* 22.5* 21.5*   PLT 73* 81* 64*    and CMP:   Recent Labs   Lab 04/14/24  0348 04/15/24  0426    139   K 3.6 3.8    105   CO2 28 29   GLU 94 91   BUN 9 11   CREATININE 0.6 0.6   CALCIUM 7.3* 7.6*   PROT 4.6* 4.6*   ALBUMIN 2.1* 2.1*   BILITOT 2.3* 2.6*   ALKPHOS 127 126   AST 68* 254*   ALT 33 127*   ANIONGAP 5* 5*       Diagnostic Results:  None  Assessment/Plan:     * Gastrointestinal hemorrhage with melena  53 hx of left breast ca, mets to lung, HAWTHORNE cirrhosis, obesity    Symptoms: melana, BRBPR, fatigue, SOB  Diagnostics: Hgb 5.6 (baseline 9) drop from  7.2 yesterday    - GI consult for EGD / C-Scope consult for source evaluation +/- intervention Plan for 4/10  - IV Protonix 80mg x 1, followed by IV 40mg BID thereafter  - clears until midnight NPO, hold pharmacologic VTE PPx  - Trend CBCs ; Transfuse Hgb < 7  - Resuscitate IVF PRN  - Transfused 3 unit pRBCs  - Cardiac telemetry  - Maintain 2 large bore IVs  - If hemodynamically unstable +/- new bleed, stat CTA / consult IR for embolization evaluation   - CTX for hx of varices (esophageal and gastric) and GI bleed    -EGD 4/10 with gastric varices and stigmata of bleeding, consulted IR/hepatology for input on EUS guided glue injection  -hepatology and IR with plans for TIPS procedure to prevent gastric and esophageal variceal bleeding. Booked for 4/12  -to complete 3 total days of octreotide and 5 days of ceftriaxone  -Transfuse 1 unit RBCs 4/15 for Hg drop  -Lactulose, lasix, and U/A pending, no signs of bleeding or melena    Severe obesity (BMI 35.0-39.9) with comorbidity  -documented  -Diet, exercise, and weight for mortality benefits    Esophageal and gastric varices  -2/2 HAWTHORNE cirrhosis  -hx of bleeding varices s/p banding in June 2023  -plan for TIPS 4/12    Malignant neoplasm metastatic to left lung  -s/p chemo, radiation, post op RT  -metastatic  -on maintenance herceptin    Breast cancer, stage 4, left  -Follows with Dr. Willis  [facility]             Shaun Ding MD  Hematology/Oncology  Dereck heather - Oncology ([facility])

## 2024-04-15 NOTE — NURSING
Patient is AAOX4. Up, independent, non skid shocks on. Call light and personal items within reach. Patient involved in care. Regular diet with fair intake. CBC, urinalysis and type & screen sent to the lab. PO electrolyte replaced. Telemetry monitored. 1 U PRBC transfused, tolerated well. Patient stable at this time.

## 2024-04-15 NOTE — SUBJECTIVE & OBJECTIVE
Interval History: NAEON, repleting lytes and will transfuse another unit based on Hg drop, giving lactulose, lasix, U/A to rule out hematuria.     Oncology Treatment Plan:   OP BREAST FAM-TRASTUZUMAB DERUXTECAN-NXKI Q3W    Medications:  Continuous Infusions:  Current Facility-Administered Medications   Medication Dose Route Frequency Provider Last Rate Last Admin    0.9%  NaCl infusion (for blood administration)   Intravenous Q24H PRN Rober Ricci III, MD        0.9%  NaCl infusion (for blood administration)   Intravenous Q24H PRN Shaun Ding MD   New Bag at 04/13/24 1650    0.9%  NaCl infusion (for blood administration)   Intravenous Q24H PRN Shaun Ding MD        alteplase injection 2 mg  2 mg Intra-Catheter Daily PRN Shaun Ding MD   2 mg at 04/13/24 1742    dextrose 10% bolus 125 mL 125 mL  12.5 g Intravenous PRN Prakash Conway MD        dextrose 10% bolus 250 mL 250 mL  25 g Intravenous PRN Prakash Conway MD        fentaNYL 50 mcg/mL injection 25 mcg  25 mcg Intravenous Q5 Min PRN Tl Madrigal MD   25 mcg at 04/12/24 1800    glucagon (human recombinant) injection 1 mg  1 mg Intramuscular PRN Prakash Conway MD        glucose chewable tablet 16 g  16 g Oral PRN Prakash Conway MD        glucose chewable tablet 24 g  24 g Oral PRN Prakash Conway MD        haloperidol lactate injection 0.5 mg  0.5 mg Intravenous Q10 Min PRN Tl Madrigal MD   0.5 mg at 04/12/24 1746    heparin, porcine (PF) 100 unit/mL injection flush 300 Units  300 Units Intravenous PRN Shaun Ding MD        hydrOXYzine HCL tablet 25 mg  25 mg Oral TID PRN Shaun Ding MD   25 mg at 04/14/24 0822    lactulose 20 gram/30 mL solution Soln 10 g  10 g Oral TID Shaun Ding MD        naloxone 0.4 mg/mL injection 0.02 mg  0.02 mg Intravenous PRN Prakash Conway MD        ondansetron injection 4 mg  4 mg Intravenous Q8H PRN Man,  Prakash RAMIREZ MD        pantoprazole EC tablet 40 mg  40 mg Oral Daily Shaun Ding MD   40 mg at 04/15/24 0821    prochlorperazine injection Soln 5 mg  5 mg Intravenous Q6H PRN Prakash Conway MD        sodium chloride 0.9% flush 10 mL  10 mL Intravenous Q12H PRN Prakash Conway MD        sodium chloride 0.9% flush 3 mL  3 mL Intravenous PRN Tl Madrigal MD         Scheduled Meds:  Current Facility-Administered Medications   Medication Dose Route Frequency Provider Last Rate Last Admin    0.9%  NaCl infusion (for blood administration)   Intravenous Q24H PRN Rober Ricci III, MD        0.9%  NaCl infusion (for blood administration)   Intravenous Q24H PRN Shaun Ding MD   New Bag at 04/13/24 1650    0.9%  NaCl infusion (for blood administration)   Intravenous Q24H PRN Shaun Ding MD        alteplase injection 2 mg  2 mg Intra-Catheter Daily PRN Shaun Ding MD   2 mg at 04/13/24 1742    dextrose 10% bolus 125 mL 125 mL  12.5 g Intravenous PRN Prakash Conway MD        dextrose 10% bolus 250 mL 250 mL  25 g Intravenous PRN Prakash Conway MD        fentaNYL 50 mcg/mL injection 25 mcg  25 mcg Intravenous Q5 Min PRN Tl Madrigal MD   25 mcg at 04/12/24 1800    glucagon (human recombinant) injection 1 mg  1 mg Intramuscular PRN Prakash Conway MD        glucose chewable tablet 16 g  16 g Oral PRN Prakash Conway MD        glucose chewable tablet 24 g  24 g Oral PRN Prakash Conway MD        haloperidol lactate injection 0.5 mg  0.5 mg Intravenous Q10 Min PRN Tl Madrigal MD   0.5 mg at 04/12/24 1746    heparin, porcine (PF) 100 unit/mL injection flush 300 Units  300 Units Intravenous PRN Shaun Ding MD        hydrOXYzine HCL tablet 25 mg  25 mg Oral TID PRN Shaun Ding MD   25 mg at 04/14/24 0822    lactulose 20 gram/30 mL solution Soln 10 g  10 g Oral TID Shaun Ding MD        naloxone 0.4 mg/mL  injection 0.02 mg  0.02 mg Intravenous PRN Prakash Conway MD        ondansetron injection 4 mg  4 mg Intravenous Q8H PRN Prakash Conway MD        pantoprazole EC tablet 40 mg  40 mg Oral Daily Shaun Ding MD   40 mg at 04/15/24 0821    prochlorperazine injection Soln 5 mg  5 mg Intravenous Q6H PRN Prakash Conway MD        sodium chloride 0.9% flush 10 mL  10 mL Intravenous Q12H PRN Prakash Conway MD        sodium chloride 0.9% flush 3 mL  3 mL Intravenous PRN Tl Madrigal MD         PRN Meds:  Current Facility-Administered Medications   Medication Dose Route Frequency Provider Last Rate Last Admin    0.9%  NaCl infusion (for blood administration)   Intravenous Q24H PRN Rober Ricci III, MD        0.9%  NaCl infusion (for blood administration)   Intravenous Q24H PRN Shaun Ding MD   New Bag at 04/13/24 1650    0.9%  NaCl infusion (for blood administration)   Intravenous Q24H PRN Shaun Ding MD        alteplase injection 2 mg  2 mg Intra-Catheter Daily PRN Shaun Ding MD   2 mg at 04/13/24 1742    dextrose 10% bolus 125 mL 125 mL  12.5 g Intravenous PRN Prakash Conway MD        dextrose 10% bolus 250 mL 250 mL  25 g Intravenous PRN Prakash Conway MD        fentaNYL 50 mcg/mL injection 25 mcg  25 mcg Intravenous Q5 Min PRN Tl Madrigal MD   25 mcg at 04/12/24 1800    glucagon (human recombinant) injection 1 mg  1 mg Intramuscular PRN Prakash Conway MD        glucose chewable tablet 16 g  16 g Oral PRN Prakash Conway MD        glucose chewable tablet 24 g  24 g Oral PRN Prakash Conway MD        haloperidol lactate injection 0.5 mg  0.5 mg Intravenous Q10 Min PRN Tl Madrigal MD   0.5 mg at 04/12/24 1746    heparin, porcine (PF) 100 unit/mL injection flush 300 Units  300 Units Intravenous PRN Shaun Ding MD        hydrOXYzine HCL tablet 25 mg  25 mg Oral TID PRN  Shaun Ding MD   25 mg at 04/14/24 0822    lactulose 20 gram/30 mL solution Soln 10 g  10 g Oral TID Shaun Ding MD        naloxone 0.4 mg/mL injection 0.02 mg  0.02 mg Intravenous PRN Prakash Conway MD        ondansetron injection 4 mg  4 mg Intravenous Q8H PRN Prakash Conway MD        pantoprazole EC tablet 40 mg  40 mg Oral Daily Shaun Ding MD   40 mg at 04/15/24 0821    prochlorperazine injection Soln 5 mg  5 mg Intravenous Q6H PRN Prakash Conway MD        sodium chloride 0.9% flush 10 mL  10 mL Intravenous Q12H PRN Prakash Conway MD        sodium chloride 0.9% flush 3 mL  3 mL Intravenous PRN Tl Madrigal MD            Review of Systems  Objective:     Vital Signs (Most Recent):  Temp: 98.7 °F (37.1 °C) (04/15/24 0742)  Pulse: 101 (04/15/24 0742)  Resp: 18 (04/15/24 0742)  BP: (!) 106/52 (04/15/24 0742)  SpO2: 96 % (04/15/24 0742) Vital Signs (24h Range):  Temp:  [98.3 °F (36.8 °C)-99.1 °F (37.3 °C)] 98.7 °F (37.1 °C)  Pulse:  [] 101  Resp:  [16-18] 18  SpO2:  [93 %-96 %] 96 %  BP: (103-132)/(52-59) 106/52     Weight: 88.6 kg (195 lb 5.2 oz)  Body mass index is 35.73 kg/m².  Body surface area is 1.97 meters squared.      Intake/Output Summary (Last 24 hours) at 4/15/2024 1020  Last data filed at 4/15/2024 0451  Gross per 24 hour   Intake 480 ml   Output 700 ml   Net -220 ml        Physical Exam  Constitutional:       Appearance: She is obese. She is not ill-appearing or diaphoretic.   HENT:      Mouth/Throat:      Mouth: Mucous membranes are moist.   Eyes:      Comments: Pallor eyelids   Cardiovascular:      Rate and Rhythm: Normal rate and regular rhythm.      Heart sounds: Murmur heard.   Pulmonary:      Effort: Pulmonary effort is normal. No respiratory distress.      Breath sounds: No wheezing or rales.   Abdominal:      General: Abdomen is flat. Bowel sounds are normal. There is no distension.      Tenderness: There is no abdominal  tenderness.   Genitourinary:     Comments: Dark colored urine  Musculoskeletal:         General: Swelling present.      Right lower leg: No edema.      Left lower leg: No edema.   Skin:     General: Skin is warm.      Coloration: Skin is not jaundiced.   Neurological:      Mental Status: She is alert and oriented to person, place, and time.          Significant Labs:   CBC:   Recent Labs   Lab 04/14/24  0827 04/14/24  2042 04/15/24  0825   WBC 6.93 5.41 3.75*   HGB 7.6* 7.3* 7.0*   HCT 23.1* 22.5* 21.5*   PLT 73* 81* 64*    and CMP:   Recent Labs   Lab 04/14/24  0348 04/15/24  0426    139   K 3.6 3.8    105   CO2 28 29   GLU 94 91   BUN 9 11   CREATININE 0.6 0.6   CALCIUM 7.3* 7.6*   PROT 4.6* 4.6*   ALBUMIN 2.1* 2.1*   BILITOT 2.3* 2.6*   ALKPHOS 127 126   AST 68* 254*   ALT 33 127*   ANIONGAP 5* 5*       Diagnostic Results:  None

## 2024-04-15 NOTE — PLAN OF CARE
1934 REC'D SITTING ON SIDE OF BED. A/A/OX4. IN NO DISTRESS, ON ROOM AIR. NO COMPLAINTS CURRENTLY. BED LOW AND WHEELS LOCKED. ENC TO CALL FOR ANY NEEDS.    END OF SHIFT- RESTED QUIETLY OVER THE NIGHT. NO COMPLAINTS VOICED. VSS, IN NO DISTRESS. BED LOW AND WHEELS LOCKED. CALL BELL NEAR.

## 2024-04-15 NOTE — TELEPHONE ENCOUNTER
----- Message from Mariam Lisa NP sent at 4/15/2024 12:14 PM CDT -----  Patient due for a CT scan in June

## 2024-04-16 VITALS
HEART RATE: 111 BPM | HEIGHT: 62 IN | WEIGHT: 195.31 LBS | SYSTOLIC BLOOD PRESSURE: 116 MMHG | DIASTOLIC BLOOD PRESSURE: 58 MMHG | OXYGEN SATURATION: 97 % | RESPIRATION RATE: 18 BRPM | BODY MASS INDEX: 35.94 KG/M2 | TEMPERATURE: 98 F

## 2024-04-16 LAB
ALBUMIN SERPL BCP-MCNC: 2.3 G/DL (ref 3.5–5.2)
ALP SERPL-CCNC: 149 U/L (ref 55–135)
ALT SERPL W/O P-5'-P-CCNC: 141 U/L (ref 10–44)
ANION GAP SERPL CALC-SCNC: 5 MMOL/L (ref 8–16)
AST SERPL-CCNC: 238 U/L (ref 10–40)
BASOPHILS # BLD AUTO: 0.04 K/UL (ref 0–0.2)
BASOPHILS NFR BLD: 0.8 % (ref 0–1.9)
BILIRUB SERPL-MCNC: 3.7 MG/DL (ref 0.1–1)
BUN SERPL-MCNC: 9 MG/DL (ref 6–20)
CALCIUM SERPL-MCNC: 7.7 MG/DL (ref 8.7–10.5)
CHLORIDE SERPL-SCNC: 101 MMOL/L (ref 95–110)
CO2 SERPL-SCNC: 28 MMOL/L (ref 23–29)
CREAT SERPL-MCNC: 0.5 MG/DL (ref 0.5–1.4)
DIFFERENTIAL METHOD BLD: ABNORMAL
EOSINOPHIL # BLD AUTO: 0.2 K/UL (ref 0–0.5)
EOSINOPHIL NFR BLD: 4 % (ref 0–8)
ERYTHROCYTE [DISTWIDTH] IN BLOOD BY AUTOMATED COUNT: 20.7 % (ref 11.5–14.5)
EST. GFR  (NO RACE VARIABLE): >60 ML/MIN/1.73 M^2
GLUCOSE SERPL-MCNC: 101 MG/DL (ref 70–110)
HCT VFR BLD AUTO: 24.4 % (ref 37–48.5)
HGB BLD-MCNC: 8.2 G/DL (ref 12–16)
IMM GRANULOCYTES # BLD AUTO: 0.11 K/UL (ref 0–0.04)
IMM GRANULOCYTES NFR BLD AUTO: 2.2 % (ref 0–0.5)
INR PPP: 1.4 (ref 0.8–1.2)
LYMPHOCYTES # BLD AUTO: 0.6 K/UL (ref 1–4.8)
LYMPHOCYTES NFR BLD: 11.9 % (ref 18–48)
MAGNESIUM SERPL-MCNC: 1.7 MG/DL (ref 1.6–2.6)
MCH RBC QN AUTO: 33.6 PG (ref 27–31)
MCHC RBC AUTO-ENTMCNC: 33.6 G/DL (ref 32–36)
MCV RBC AUTO: 100 FL (ref 82–98)
MONOCYTES # BLD AUTO: 0.2 K/UL (ref 0.3–1)
MONOCYTES NFR BLD: 4.7 % (ref 4–15)
NEUTROPHILS # BLD AUTO: 3.9 K/UL (ref 1.8–7.7)
NEUTROPHILS NFR BLD: 76.4 % (ref 38–73)
NRBC BLD-RTO: 0 /100 WBC
PHOSPHATE SERPL-MCNC: 2.5 MG/DL (ref 2.7–4.5)
PLATELET # BLD AUTO: 66 K/UL (ref 150–450)
PMV BLD AUTO: 11.1 FL (ref 9.2–12.9)
POTASSIUM SERPL-SCNC: 3.3 MMOL/L (ref 3.5–5.1)
PROT SERPL-MCNC: 4.9 G/DL (ref 6–8.4)
PROTHROMBIN TIME: 15.3 SEC (ref 9–12.5)
RBC # BLD AUTO: 2.44 M/UL (ref 4–5.4)
SODIUM SERPL-SCNC: 134 MMOL/L (ref 136–145)
WBC # BLD AUTO: 5.06 K/UL (ref 3.9–12.7)

## 2024-04-16 PROCEDURE — 25000003 PHARM REV CODE 250

## 2024-04-16 PROCEDURE — 80053 COMPREHEN METABOLIC PANEL: CPT | Performed by: STUDENT IN AN ORGANIZED HEALTH CARE EDUCATION/TRAINING PROGRAM

## 2024-04-16 PROCEDURE — 84100 ASSAY OF PHOSPHORUS: CPT | Performed by: STUDENT IN AN ORGANIZED HEALTH CARE EDUCATION/TRAINING PROGRAM

## 2024-04-16 PROCEDURE — 85025 COMPLETE CBC W/AUTO DIFF WBC: CPT | Performed by: STUDENT IN AN ORGANIZED HEALTH CARE EDUCATION/TRAINING PROGRAM

## 2024-04-16 PROCEDURE — 99239 HOSP IP/OBS DSCHRG MGMT >30: CPT | Mod: ,,, | Performed by: HOSPITALIST

## 2024-04-16 PROCEDURE — 83735 ASSAY OF MAGNESIUM: CPT | Performed by: STUDENT IN AN ORGANIZED HEALTH CARE EDUCATION/TRAINING PROGRAM

## 2024-04-16 PROCEDURE — 99232 SBSQ HOSP IP/OBS MODERATE 35: CPT | Mod: GC,,, | Performed by: INTERNAL MEDICINE

## 2024-04-16 PROCEDURE — 63600175 PHARM REV CODE 636 W HCPCS

## 2024-04-16 PROCEDURE — 85610 PROTHROMBIN TIME: CPT | Performed by: STUDENT IN AN ORGANIZED HEALTH CARE EDUCATION/TRAINING PROGRAM

## 2024-04-16 RX ORDER — FUROSEMIDE 10 MG/ML
40 INJECTION INTRAMUSCULAR; INTRAVENOUS ONCE
Status: CANCELLED | OUTPATIENT
Start: 2024-04-16

## 2024-04-16 RX ORDER — LACTULOSE 10 G/15ML
20 SOLUTION ORAL 3 TIMES DAILY
Status: DISCONTINUED | OUTPATIENT
Start: 2024-04-16 | End: 2024-04-16 | Stop reason: HOSPADM

## 2024-04-16 RX ORDER — BENZONATATE 100 MG/1
100 CAPSULE ORAL 3 TIMES DAILY
Status: DISCONTINUED | OUTPATIENT
Start: 2024-04-16 | End: 2024-04-16 | Stop reason: HOSPADM

## 2024-04-16 RX ORDER — LACTULOSE 10 G/15ML
20 SOLUTION ORAL 3 TIMES DAILY
Status: CANCELLED | OUTPATIENT
Start: 2024-04-16

## 2024-04-16 RX ORDER — FUROSEMIDE 10 MG/ML
40 INJECTION INTRAMUSCULAR; INTRAVENOUS ONCE
Status: COMPLETED | OUTPATIENT
Start: 2024-04-16 | End: 2024-04-16

## 2024-04-16 RX ORDER — LACTULOSE 10 G/15ML
10 SOLUTION ORAL ONCE
Status: COMPLETED | OUTPATIENT
Start: 2024-04-16 | End: 2024-04-16

## 2024-04-16 RX ADMIN — BENZONATATE 100 MG: 100 CAPSULE ORAL at 02:04

## 2024-04-16 RX ADMIN — LACTULOSE 10 G: 20 SOLUTION ORAL at 08:04

## 2024-04-16 RX ADMIN — FUROSEMIDE 40 MG: 10 INJECTION, SOLUTION INTRAVENOUS at 10:04

## 2024-04-16 RX ADMIN — BENZONATATE 100 MG: 100 CAPSULE ORAL at 08:04

## 2024-04-16 RX ADMIN — LACTULOSE 20 G: 20 SOLUTION ORAL at 02:04

## 2024-04-16 RX ADMIN — FUROSEMIDE 20 MG: 20 TABLET ORAL at 08:04

## 2024-04-16 RX ADMIN — PANTOPRAZOLE SODIUM 40 MG: 40 TABLET, DELAYED RELEASE ORAL at 08:04

## 2024-04-16 RX ADMIN — LACTULOSE 10 G: 20 SOLUTION ORAL at 10:04

## 2024-04-16 RX ADMIN — POTASSIUM BICARBONATE 50 MEQ: 978 TABLET, EFFERVESCENT ORAL at 08:04

## 2024-04-16 RX ADMIN — HEPARIN 300 UNITS: 100 SYRINGE at 03:04

## 2024-04-16 NOTE — DISCHARGE SUMMARY
[facility] - Oncology ([facility])  Hematology/Oncology  Discharge Summary      Patient Name: Shikha López  MRN: [id number]  Admission Date: 4/9/2024  Hospital Length of Stay: 7 days  Discharge Date and Time:  04/16/2024 8:43 AM  Attending Physician: Gali Amaya MD   Discharging Provider: Shaun Ding MD  Primary Care Provider: Lenore, Primary Doctor    HPI: 53F with PMH stage 4 breast ca left (mets HER 2 +) Dr. 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 Yesterday, she began having hematochezia with BRBPR, lethargy and exertional SOB. Denies fevers, chills, nausea, vomiting, hematemesis, cp, dysuria, peripheral edema.     AF, tachycardic, BP stable, on RA, Hg 5.6, Plt 115, Tbil 4, U/A wnl, given IV PPI, pending 2 units of pRBCs, octreotide    Admitted to oncology service for GI bleeding on active chemotherapy    Procedure(s) (LRB):  EGD (ESOPHAGOGASTRODUODENOSCOPY) (N/A)     Hospital Course: Admitted for concern of GI bleeding in patient on active chemotherapy. Pt given 3 units of pRBCs and octreotide with hx of gastric and esophageal varices. EGD 4/10 with gastric varices showing stigmata. ID and hepatology consulted for input on EUS guided glue injection of varices.  Hepatology recommending TIPS and IR to perform. IR with successful TIPS 4/12 and variceal embolization. Pt clinically stable, stools without melena. No signs of HE after TIPS. Patient clinically stable and to be discharged with as needed lactulose if she develops Hg. She will have follow up with Oncology, hepatology, and GI. Concern for drop in Hg, will transfuse another unit prior to D/C, plan to D/C 4/16. Close follow up with Hepatology with repeat labs to monitor elevated liver enzymes.      Vitals:    04/16/24 0420   BP: 119/60   Pulse: 104   Resp: 20    Temp: 98.8 °F (37.1 °C)      Physical Exam  Constitutional:       Appearance: She is obese. She is not ill-appearing or diaphoretic.   HENT:      Mouth/Throat:      Mouth: Mucous membranes are moist.   Eyes:      Comments: Pallor eyelids   Cardiovascular:      Rate and Rhythm: Normal rate and regular rhythm.      Heart sounds: Murmur heard.   Pulmonary:      Effort: Pulmonary effort is normal. No respiratory distress.      Breath sounds: No wheezing or rales.   Abdominal:      General: Abdomen is flat. Bowel sounds are normal. There is no distension.      Tenderness: There is no abdominal tenderness.   Musculoskeletal:         General: Swelling present.      Right lower leg: No edema.      Left lower leg: No edema.   Skin:     General: Skin is warm.      Coloration: Skin is not jaundiced.   Neurological:      Mental Status: She is alert and oriented to person, place, and time.      Goals of Care Treatment Preferences:  Code Status: Full Code          What is most important right now is to focus on remaining as independent as possible, symptom/pain control, curative/life-prolongation (regardless of treatment burdens), comfort and QOL .  Accordingly, we have decided that the best plan to meet the patient's goals includes continuing with treatment.      Consults:   Consults (From admission, onward)          Status Ordering Provider     Inpatient consult to Interventional Radiology  Once        Provider:  (Not yet assigned)    Completed LUPE LEWIS     Inpatient consult to Hepatology  Once        Provider:  (Not yet assigned)    Completed LUPE LEWIS     Inpatient consult to Gastroenterology  Once        Provider:  (Not yet assigned)    DAVID Conner III            Significant Diagnostic Studies: N/A    Pending Diagnostic Studies:       Procedure Component Value Units Date/Time    IR Embolization Arterial or Venous for Hemorrhage [[id number]]     Order Status: Sent Lab Status: No result           Final  Active Diagnoses:    Diagnosis Date Noted POA    PRINCIPAL PROBLEM:  Gastrointestinal hemorrhage with melena [K92.1] 06/22/2023 Yes    Acute gastrointestinal bleeding [K92.2] 04/11/2024 Yes    Severe obesity (BMI 35.0-39.9) with comorbidity [E66.01] 07/06/2023 Yes    Esophageal and gastric varices [I85.00, I86.4] 06/23/2023 Yes    Breast cancer, stage 4, left [C50.912] 06/08/2021 Yes    Malignant neoplasm metastatic to left lung [C78.02] 06/08/2021 Yes      Problems Resolved During this Admission:      Discharged Condition: stable    Disposition: Home or Self Care    Follow Up:    Patient Instructions:      Ambulatory referral/consult to Hepatology   Standing Status: Future   Referral Priority: Routine Referral Type: Consultation   Referral Reason: Specialty Services Required   Requested Specialty: Hepatology   Number of Visits Requested: 1     Medications:  Reconciled Home Medications:      Medication List        START taking these medications      CONSTULOSE 10 gram/15 mL solution  Generic drug: lactulose  Take 15 mLs (10 g total) by mouth 3 (three) times daily. PLEASE take as needed if you start feeling confused or altered, take as need to have around 2-3 bowel movements per day            CHANGE how you take these medications      FeroSuL 325 mg (65 mg iron) Tab tablet  Generic drug: ferrous sulfate  Take daily with Vitamin C on an empty stomach  What changed:   how much to take  how to take this  when to take this  additional instructions            CONTINUE taking these medications      furosemide 20 MG tablet  Commonly known as: LASIX  Take 1 tablet (20 mg total) by mouth once daily.     methylphenidate HCl 5 MG tablet  Commonly known as: RITALIN  Take 1 tablet (5 mg total) by mouth 2 (two) times daily.     OLANZapine 5 MG tablet  Commonly known as: ZyPREXA  Take days 1-4 of chemotherapy     pantoprazole 40 MG tablet  Commonly known as: PROTONIX  Take 1 tablet (40 mg total) by mouth 2 (two) times daily.      potassium chloride 10% 20 mEq/15 mL oral solution  Commonly known as: KAYCIEL  Take 15 mLs (20 mEq total) by mouth once daily. (To prevent stomach upset, mix dose with a glass of cold water or juice)            STOP taking these medications      hydrocodone-homatropine 5-1.5 mg/5 ml 5-1.5 mg/5 mL Syrp  Commonly known as: DALI Ding MD  Hematology/Oncology  [facility] - Oncology ([facility])

## 2024-04-16 NOTE — PROGRESS NOTES
[facility]  Hepatology  Progress note    Patient Name: Shikha López  MRN: [id number]  Admission Date: 4/9/2024  Hospital Length of Stay: 7 days  Code Status: Full Code   Attending Provider: Gali Amaya MD   Consulting Provider: Piyush Spencer MD  Primary Care Physician: No, Primary Doctor  Principal Problem:Gastrointestinal hemorrhage with melena    Subjective:     Interval history:  Discharging today  LFTs uptrended over past 2 days but AST starting to downtrend since yesterday  No signs of encephalopathy      Objective:     Vitals:    04/16/24 1054   BP:    Pulse: 108   Resp:    Temp:          Physical Exam:  Constitutional:  not in acute distress and well developed  HENT: Head: Normal, normocephalic.  Eyes: no scleral icterus  Respiratory: normal chest expansion & respiratory effort and no accessory muscle use  GI: soft, non-tender, without masses or organomegaly  Skin: not jaundiced  Neurological: alert, oriented x3.       Significant Labs:  Recent Labs   Lab 04/14/24  2042 04/15/24  0825 04/16/24  0440   HGB 7.3* 7.0* 8.2*       Lab Results   Component Value Date    WBC 5.06 04/16/2024    HGB 8.2 (L) 04/16/2024    HCT 24.4 (L) 04/16/2024     (H) 04/16/2024    PLT 66 (L) 04/16/2024       Lab Results   Component Value Date     (L) 04/16/2024    K 3.3 (L) 04/16/2024     04/16/2024    CO2 28 04/16/2024    BUN 9 04/16/2024    CREATININE 0.5 04/16/2024    CALCIUM 7.7 (L) 04/16/2024    ANIONGAP 5 (L) 04/16/2024    ESTGFRAFRICA >60.0 07/18/2022    EGFRNONAA >60.0 07/18/2022       Lab Results   Component Value Date     (H) 04/16/2024     (H) 04/16/2024    ALKPHOS 149 (H) 04/16/2024    BILITOT 3.7 (H) 04/16/2024       Lab Results   Component Value Date    INR 1.4 (H) 04/16/2024    INR 1.5 (H) 04/15/2024    INR 1.5 (H) 04/14/2024       MELD 3.0: 20 at 4/16/2024  4:40 AM  MELD-Na: 17 at 4/16/2024  4:40 AM  Calculated from:  Serum Creatinine: 0.5 mg/dL (Using  min of 1 mg/dL) at 4/16/2024  4:40 AM  Serum Sodium: 134 mmol/L at 4/16/2024  4:40 AM  Total Bilirubin: 3.7 mg/dL at 4/16/2024  4:40 AM  Serum Albumin: 2.3 g/dL at 4/16/2024  4:40 AM  INR(ratio): 1.4 at 4/16/2024  4:40 AM  Age at listing (hypothetical): 53 years  Sex: Female at 4/16/2024  4:40 AM      Significant Imaging:  Reviewed pertinent radiology findings.       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 She had no absolute contraindications to TIPS placement and underwent TIPS on 4/12. No signs of subsequent encephalopathy. There is slight bump in her liver enzymes which is not unexpected. Will check labs later this week as outpatient. Ok to discharge from hepatology perspective.    Problem List:  Gastric variceal bleeding s/p TIPS  Decompensated MASH cirrhosis  Metastatic breast cancer    Plan:  - Ok to discharge from hepatology perspective  - LFT elevation secondary to recent TIPS. Will follow labs as outpatient twice weekly  - Start lactulose at discharge. Titrate to 2-3 BMs per day.  - Will arrange for hepatology clinic follow up    Please obtain daily CBC, BMP, LFT, INR    Thank you for involving us in the care of Shikha López. Please call with any additional questions, concerns or changes in the patient's clinical status.    Piyush Spencer MD  Gastroenterology & Hepatology Fellow PGY V   [facility]

## 2024-04-16 NOTE — PROGRESS NOTES
Patient is being d/c today with no Social Service needs identified at this time.     ELSIE Wilkes, [facility]  Oncology Social Worker   [facility] - Oncology  [phone]

## 2024-04-16 NOTE — PLAN OF CARE
Patient is AAOX4. Port de accessed as per order. Telemetry removed as per order. Discharge teaching and follow up instructions provided. Patient walked down the hallway while  awaiting the transport, she told that she cannot wait anymore and her daughter is waiting her down. Patient discharged with her belongings.

## 2024-04-17 ENCOUNTER — TELEPHONE (OUTPATIENT)
Dept: HEPATOLOGY | Facility: CLINIC | Age: 54
End: 2024-04-17
Payer: MEDICARE

## 2024-04-17 ENCOUNTER — TELEPHONE (OUTPATIENT)
Dept: PSYCHIATRY | Facility: CLINIC | Age: 54
End: 2024-04-17
Payer: MEDICARE

## 2024-04-17 DIAGNOSIS — Z95.828 S/P TIPS (TRANSJUGULAR INTRAHEPATIC PORTOSYSTEMIC SHUNT): Primary | ICD-10-CM

## 2024-04-17 NOTE — TELEPHONE ENCOUNTER
"The patient was contacted to schedule an appointment from referral.  An appointment has been scheduled on Thursday, April 18, 2024 at 4PM.  Patient confirmed.  Patient stated "she have another appointment in the hospital and she would be glad to have this one too on the same day."  "

## 2024-04-18 ENCOUNTER — OFFICE VISIT (OUTPATIENT)
Dept: PSYCHIATRY | Facility: CLINIC | Age: 54
End: 2024-04-18
Payer: MEDICARE

## 2024-04-18 ENCOUNTER — OFFICE VISIT (OUTPATIENT)
Dept: HEPATOLOGY | Facility: CLINIC | Age: 54
End: 2024-04-18
Payer: MEDICARE

## 2024-04-18 ENCOUNTER — PATIENT OUTREACH (OUTPATIENT)
Dept: ADMINISTRATIVE | Facility: CLINIC | Age: 54
End: 2024-04-18
Payer: MEDICARE

## 2024-04-18 VITALS
SYSTOLIC BLOOD PRESSURE: 122 MMHG | HEART RATE: 105 BPM | OXYGEN SATURATION: 98 % | DIASTOLIC BLOOD PRESSURE: 59 MMHG | RESPIRATION RATE: 20 BRPM | WEIGHT: 195.31 LBS | HEIGHT: 62 IN | BODY MASS INDEX: 35.94 KG/M2

## 2024-04-18 DIAGNOSIS — F41.9 ANXIETY: Primary | ICD-10-CM

## 2024-04-18 DIAGNOSIS — Z87.19 HISTORY OF ESOPHAGEAL VARICES WITH BLEEDING: ICD-10-CM

## 2024-04-18 DIAGNOSIS — K75.81 LIVER CIRRHOSIS SECONDARY TO NASH: Primary | ICD-10-CM

## 2024-04-18 DIAGNOSIS — K74.60 LIVER CIRRHOSIS SECONDARY TO NASH: Primary | ICD-10-CM

## 2024-04-18 DIAGNOSIS — C50.912 BREAST CANCER, STAGE 4, LEFT: Primary | ICD-10-CM

## 2024-04-18 DIAGNOSIS — C50.912 BREAST CANCER, STAGE 4, LEFT: ICD-10-CM

## 2024-04-18 DIAGNOSIS — F32.A DEPRESSION, UNSPECIFIED DEPRESSION TYPE: ICD-10-CM

## 2024-04-18 DIAGNOSIS — Z95.828 S/P TIPS (TRANSJUGULAR INTRAHEPATIC PORTOSYSTEMIC SHUNT): ICD-10-CM

## 2024-04-18 DIAGNOSIS — I89.0 LYMPHEDEMA: ICD-10-CM

## 2024-04-18 DIAGNOSIS — C78.02 MALIGNANT NEOPLASM METASTATIC TO LEFT LUNG: ICD-10-CM

## 2024-04-18 DIAGNOSIS — R18.8 OTHER ASCITES: ICD-10-CM

## 2024-04-18 PROCEDURE — 99211 OFF/OP EST MAY X REQ PHY/QHP: CPT | Mod: PBBFAC,27 | Performed by: PSYCHOLOGIST

## 2024-04-18 PROCEDURE — 99214 OFFICE O/P EST MOD 30 MIN: CPT | Mod: PBBFAC | Performed by: STUDENT IN AN ORGANIZED HEALTH CARE EDUCATION/TRAINING PROGRAM

## 2024-04-18 PROCEDURE — 99999 PR PBB SHADOW E&M-EST. PATIENT-LVL IV: CPT | Mod: PBBFAC,,, | Performed by: STUDENT IN AN ORGANIZED HEALTH CARE EDUCATION/TRAINING PROGRAM

## 2024-04-18 PROCEDURE — 90834 PSYTX W PT 45 MINUTES: CPT | Mod: ,,, | Performed by: PSYCHOLOGIST

## 2024-04-18 PROCEDURE — 99215 OFFICE O/P EST HI 40 MIN: CPT | Mod: S$PBB,,, | Performed by: STUDENT IN AN ORGANIZED HEALTH CARE EDUCATION/TRAINING PROGRAM

## 2024-04-18 PROCEDURE — 99999 PR PBB SHADOW E&M-EST. PATIENT-LVL I: CPT | Mod: PBBFAC,,, | Performed by: PSYCHOLOGIST

## 2024-04-18 NOTE — PROGRESS NOTES
INFORMED CONSENT: Patient was identified using two patient-identifiers. The patient has been informed of the risks and benefits associated with engaging in psychotherapy, the handling of protected health information, the rights of privacy and the limits of confidentiality. The patient has also been informed of the importance of reporting any suicidal or homicidal ideation to this or any provider to ensure safety of all parties, and the Shikha López expressed understanding. The patient was agreeable to these terms and freely participates in individual psychotherapy.    PSYCHO-ONCOLOGY NOTE/ Individual Psychotherapy     Date: 4/18/2024   [facility]        Therapeutic Intervention: Met with patient.  Outpatient - Supportive psychotherapy 45 min - CPT Code 08587      Patient was last seen by me on 3/8/2024    Problem list  Patient Active Problem List   Diagnosis    Breast cancer, stage 4, left    Malignant neoplasm metastatic to left lung    Physical deconditioning    Balance problem    Hypokalemia    Gastrointestinal hemorrhage with melena    Esophageal and gastric varices    Severe obesity (BMI 35.0-39.9) with comorbidity    Aortic atherosclerosis    Pancytopenia    Thrombocytopenia, unspecified    Acute gastrointestinal bleeding       Chief complaint/reason for encounter: depression and anxiety   Met with patient to evaluate psychosocial adaptation to diagnosis/treatment/survivorship of BC    Current Medications  Current Outpatient Medications   Medication Sig Dispense Refill    ferrous sulfate (IRON, FERROUS SULFATE,) 325 mg (65 mg iron) Tab tablet Take daily with Vitamin C on an empty stomach (Patient taking differently: Take 325 mg by mouth once daily.) 60 tablet 3    furosemide (LASIX) 20 MG tablet Take 1 tablet (20 mg total) by mouth once daily. 90 tablet 3    lactulose (CHRONULAC) 10 gram/15 mL solution Take 15 mLs (10 g total) by mouth 3 (three) times daily. PLEASE take as needed if you start  feeling confused or altered, take as need to have around 2-3 bowel movements per day 300 mL 3    methylphenidate HCl (RITALIN) 5 MG tablet Take 1 tablet (5 mg total) by mouth 2 (two) times daily. 60 tablet 0    OLANZapine (ZYPREXA) 5 MG tablet Take days 1-4 of chemotherapy 30 tablet 2    pantoprazole (PROTONIX) 40 MG tablet Take 1 tablet (40 mg total) by mouth 2 (two) times daily. 180 tablet 0    potassium chloride 10% (KAYCIEL) 20 mEq/15 mL oral solution Take 15 mLs (20 mEq total) by mouth once daily. (To prevent stomach upset, mix dose with a glass of cold water or juice) 600 mL 0     No current facility-administered medications for this visit.       Objective:  Shikha López arrived promptly for the session.   The patient was fully cooperative throughout the session.  Appearance: age appropriate, appropriately  dressed, adequately  groomed  Behavior/Cooperation: friendly and cooperative  Speech: normal in rate, volume, and tone and appropriate quality, quantity and organization of sentences  Mood: steady  Affect: mood congruent  Thought Process: goal-directed, logical  Thought Content: normal,  No delusions or paranoia; did not appear to be responding to internal stimuli during the session  Orientation: grossly intact  Attention Span/Concentration: Attends to session without distraction; reports no difficulty  Insight: patient has awareness of illness; good insight into own behavior and behavior of others  Judgment: the patient's behavior is adequate to circumstances    Interval history and content of current session: patient recently admitted for GI bleed. Increased distress during the admission- has decreased  since discharge.  Discussed diagnosis, treatment, prognosis, current adaptation to disease and treatment status, and family's adaptation to disease and treatment status. Reports to be coping with great difficulty. Evaluated cognitive response, paying particular attention to negative intrusive thoughts  of a persistent and detrimental nature. Thoughts of this type are in evidence with severe distress. Provided cognitive behavioral therapy to address negative cognitions. Identified and evaluated psychosocial and environmental stressors secondary to diagnosis and treatment.  Examined proactive behaviors that may be implemented to minimize or ameliorate psychosocial stressors secondary to diagnosis and treatment.     Risk parameters:   Patient reports no suicidal ideation  Patient reports no homicidal ideation  Patient reports no self-injurious behavior  Patient reports no violent behavior   Safety needs:  None at this time      Verbal deficits: None     Patient's response to intervention:The patient's response to intervention is accepting.     Progress toward goals and other mental status changes:  The patient's progress toward goals is fair .      Progress to date:Progress - Ongoing, but Slow      Goals from last visit: Attempted, partially met     Patient reported outcomes:    Distress Thermometer:   Distress Score    Distress Score: 10 - Extreme Distress        Practical Problems Physical Problems                                                   Family Problems                                         Emotional Problems                                                         Spiritual/Religions Concerns               Other Problems               PHQ ANSWERS    Q1. Little interest or pleasure in doing things: (P) Several days (04/17/24 1500)  Q2. Feeling down, depressed, or hopeless: (P) Several days (04/17/24 1500)  Q3. Trouble falling or staying asleep, or sleeping too much: (P) Nearly every day (04/17/24 1500)  Q4. Feeling tired or having little energy: (P) Several days (04/17/24 1500)  Q5. Poor appetite or overeating: (P) Not at all (04/17/24 1500)  Q6. Feeling bad about yourself - or that you are a failure or have let yourself or your family down: (P) Not at all (04/17/24 1500)  Q7. Trouble concentrating on  things, such as reading the newspaper or watching television: (P) Several days (04/17/24 1500)  Q8. Moving or speaking so slowly that other people could have noticed. Or the opposite - being so fidgety or restless that you have been moving around a lot more than usual: (P) Not at all (04/17/24 1500)  Q9.  0    PHQ8 Score : (P) 7 (04/17/24 1500)  PHQ-9 Total Score: (P) 7 (04/17/24 1500)     LILY-7 Answers        4/17/2024     2:59 PM   GAD7   1. Feeling nervous, anxious, or on edge? 3   2. Not being able to stop or control worrying? 3   3. Worrying too much about different things? 3   4. Trouble relaxing? 3   5. Being so restless that it is hard to sit still? 1   6. Becoming easily annoyed or irritable? 1   7. Feeling afraid as if something awful might happen? 3   LILY-7 Score 17     LILY-7 Score: (P) 17  Interpretation: (P) Severe Anxiety     Client Strengths: verbal, intelligent, successful, good social support, good insight, commitment to wellness, strong rob, strong cultural traditions     Diagnosis:     ICD-10-CM ICD-9-CM   1. Anxiety  F41.9 300.00   2. Breast cancer, stage 4, left  C50.912 174.9   3. Depression, unspecified depression type  F32.A 311       Treatment Plan:individual psychotherapy and medication management by physician  Target symptoms: depression, anxiety   Why chosen therapy is appropriate versus another modality: relevant to diagnosis, patient responds to this modality, evidence based practice  Outcome monitoring methods: self-report, observation, checklist/rating scale  Therapeutic intervention type: insight oriented psychotherapy, behavior modifying psychotherapy  Prognosis: Good      Behavioral goals:    Exercise:   Stress management:   Social engagement:   Nutrition:   Smoking Cessation:   Therapy:  Increase daily self-care and attention to health management  Pleasant events scheduling and increased social interaction  Monitor stressors in writing and bring to next visit    Return to  clinic: as scheduled    Next Session:      Length of Service (minutes direct face-to-face contact): 45    Linda Figueroa, PhD  Clinical Psychologist  [place] License #[id number]  [place] License #[id number]

## 2024-04-18 NOTE — PROGRESS NOTES
Hepatology Consult Note    Referring provider: Dr. Shaun Ding  PCP: No, Primary Doctor    Chief complaint:  Cirrhosis    HPI:  Shikha López is a 53 y.o. female who was referred to Hepatology Clinic for cirrhosis.    She was diagnosed with cirrhosis in June 2023 when hospitalized with variceal bleeding.  EGD showed gastric varices that were subsequently treated with coil embolization.  She was hospitalized again earlier this month with recurrent variceal bleeding for which he underwent TIPS placement.  She denies recurrent GI bleeding since discharge.  She denies other signs of decompensated cirrhosis including no recent abdominal distention, encephalopathy, jaundice.    She does not drink alcohol and has never been a heavy drinker.  Testing negative for chronic viral hepatitis.  No known family history of liver disease.      Past Medical History:   Diagnosis Date    Aortic atherosclerosis 07/06/2023    Breast cancer     Esophageal and gastric varices 06/23/2023    Malignant neoplasm metastatic to left lung 06/08/2021       Past Surgical History:   Procedure Laterality Date    ENDOSCOPIC ULTRASOUND OF UPPER GASTROINTESTINAL TRACT N/A 6/27/2023    Procedure: ULTRASOUND, UPPER GI TRACT, ENDOSCOPIC;  Surgeon: Home Lopez MD;  [facility] ([address] [place] [address]);  Service: Endoscopy;  Laterality: N/A;    ENDOSCOPIC ULTRASOUND OF UPPER GASTROINTESTINAL TRACT N/A 1/12/2024    Procedure: ULTRASOUND, UPPER GI TRACT, ENDOSCOPIC;  Surgeon: Home Lopez MD;  [facility]  Service: Endoscopy;  Laterality: N/A;  11/28/23-Instructions via portal-DS  1/8-lvm for precall-MS  1/10-precall complete-Kpvt    ESOPHAGOGASTRODUODENOSCOPY N/A 6/23/2023    Procedure: EGD (ESOPHAGOGASTRODUODENOSCOPY);  Surgeon: Quintin Mooney MD;  [facility]  Service: Endoscopy;  Laterality: N/A;    ESOPHAGOGASTRODUODENOSCOPY N/A 6/27/2023    Procedure: EGD (ESOPHAGOGASTRODUODENOSCOPY);  Surgeon: Home RANGEL  MD John;  Location: [facility] ENDO (2ND FLR);  Service: Endoscopy;  Laterality: N/A;    ESOPHAGOGASTRODUODENOSCOPY N/A 8/3/2023    Procedure: EGD (ESOPHAGOGASTRODUODENOSCOPY);  Surgeon: Home Lopez MD;  Location: [facility] ENDO (2ND FLR);  Service: Endoscopy;  Laterality: N/A;  instr portal-labs 6/28/23-tb    ESOPHAGOGASTRODUODENOSCOPY N/A 1/12/2024    Procedure: EGD (ESOPHAGOGASTRODUODENOSCOPY);  Surgeon: Home Lopez MD;  Location: [facility] ENDO (2ND FLR);  Service: Endoscopy;  Laterality: N/A;    ESOPHAGOGASTRODUODENOSCOPY N/A 4/10/2024    Procedure: EGD (ESOPHAGOGASTRODUODENOSCOPY);  Surgeon: Ze Anderson MD;  [facility] (2ND F[facility]);  Service: Endoscopy;  Laterality: N/A;    MASTECTOMY         Family History   Problem Relation Name Age of Onset    Lung cancer Father         Social History     Tobacco Use    Smoking status: Never    Smokeless tobacco: Never   Substance Use Topics    Alcohol use: Never    Drug use: Never       Current Outpatient Medications   Medication Sig Dispense Refill    ferrous sulfate (IRON, FERROUS SULFATE,) 325 mg (65 mg iron) Tab tablet Take daily with Vitamin C on an empty stomach (Patient taking differently: Take 325 mg by mouth once daily.) 60 tablet 3    furosemide (LASIX) 20 MG tablet Take 1 tablet (20 mg total) by mouth once daily. 90 tablet 3    lactulose (CHRONULAC) 10 gram/15 mL solution Take 15 mLs (10 g total) by mouth 3 (three) times daily. PLEASE take as needed if you start feeling confused or altered, take as need to have around 2-3 bowel movements per day 300 mL 3    methylphenidate HCl (RITALIN) 5 MG tablet Take 1 tablet (5 mg total) by mouth 2 (two) times daily. 60 tablet 0    OLANZapine (ZYPREXA) 5 MG tablet Take days 1-4 of chemotherapy 30 tablet 2    pantoprazole (PROTONIX) 40 MG tablet Take 1 tablet (40 mg total) by mouth 2 (two) times daily. 180 tablet 0    potassium chloride 10% (KAYCIEL) 20 mEq/15 mL oral solution Take 15 mLs (20 mEq total) by mouth once  "daily. (To prevent stomach upset, mix dose with a glass of cold water or juice) 600 mL 0     No current facility-administered medications for this visit.       Review of patient's allergies indicates:  No Known Allergies    Review of Systems   Constitutional:  Negative for fever and weight loss.   Gastrointestinal:  Negative for abdominal pain, blood in stool, constipation, diarrhea, heartburn, melena, nausea and vomiting.       Vitals:    04/18/24 1525   BP: (!) 122/59   Pulse: 105   Resp: 20   SpO2: 98%   Weight: 88.6 kg (195 lb 5.2 oz)   Height: 5' 2" (1.575 m)       Physical Exam  Constitutional:       General: She is not in acute distress.  Eyes:      General: No scleral icterus.  Cardiovascular:      Rate and Rhythm: Regular rhythm. Tachycardia present.   Pulmonary:      Effort: Pulmonary effort is normal. No respiratory distress.   Abdominal:      General: Bowel sounds are normal. There is no distension.      Palpations: Abdomen is soft.      Tenderness: There is no abdominal tenderness. There is no guarding or rebound.   Skin:     Coloration: Skin is not jaundiced.         LABS: I personally reviewed pertinent laboratory findings.    Lab Results   Component Value Date    WBC 5.06 04/16/2024    HGB 8.2 (L) 04/16/2024    HCT 24.4 (L) 04/16/2024     (H) 04/16/2024    PLT 66 (L) 04/16/2024       Lab Results   Component Value Date     (L) 04/16/2024    K 3.3 (L) 04/16/2024     04/16/2024    CO2 28 04/16/2024    BUN 9 04/16/2024    CREATININE 0.5 04/16/2024    CALCIUM 7.7 (L) 04/16/2024    ANIONGAP 5 (L) 04/16/2024    ESTGFRAFRICA >60.0 07/18/2022    EGFRNONAA >60.0 07/18/2022       Lab Results   Component Value Date     (H) 04/16/2024     (H) 04/16/2024    ALKPHOS 149 (H) 04/16/2024    BILITOT 3.7 (H) 04/16/2024       Lab Results   Component Value Date    HEPCAB Non-reactive 06/22/2023       No results found for: "JAKOB", "MITOAB", "SMOOTHMUSCAB", "IGG", "CERULOPLSM"     MELD 3.0: " 20 at 4/16/2024  4:40 AM  MELD-Na: 17 at 4/16/2024  4:40 AM  Calculated from:  Serum Creatinine: 0.5 mg/dL (Using min of 1 mg/dL) at 4/16/2024  4:40 AM  Serum Sodium: 134 mmol/L at 4/16/2024  4:40 AM  Total Bilirubin: 3.7 mg/dL at 4/16/2024  4:40 AM  Serum Albumin: 2.3 g/dL at 4/16/2024  4:40 AM  INR(ratio): 1.4 at 4/16/2024  4:40 AM  Age at listing (hypothetical): 53 years  Sex: Female at 4/16/2024  4:40 AM       IMAGING: I personally reviewed imaging studies.      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 MELD-Na 17.    1. Liver cirrhosis secondary to HAWTHORNE    2. History of esophageal varices with bleeding    3. Other ascites    4. S/P TIPS (transjugular intrahepatic portosystemic shunt)        Recommendations:  Cirrhosis due to MASH: Counseled on diet, weight loss, and control of co-morbidities. Encouraged to lose 10% of her body weight over the next 12 months.  Complete serologic evaluation to help rule out other etiologies of liver disease.    History of variceal bleeding:  She is status post TIPS placement 04/2024.    Ascites/Edema:  She was status post TIPS placement 04/2024.  Continue Lasix 20 mg daily.    Encephalopathy: Continue lactulose. Titrate to maintain 3-4 bowel movements per day.    Transplant candidacy:  She is unfortunately not a transplant candidate given metastatic breast cancer.    Cirrhosis HM  - HCC screening: CT in 03/2024 without HCC.  Check AFP. Repeat abdominal US and AFP every 6 months.    - Immunizations: Recommend HAV and HBV vaccinations if not immune.    Return to clinic in 3 months or sooner if needed.    I have sent communication to the referring physician and/or primary care provider.    I spent a total of 40 minutes on the day of the visit. This includes face to face time and non-face to face time preparing to see the patient (eg, review of tests), obtaining and/or reviewing separately obtained history, documenting clinical information in the electronic or other  health record, independently interpreting results, and communicating results to the patient/family/caregiver, or care coordination.    This note includes dictation done using M*Modal speech recognition program. Word recognition mistakes are occasionally missed on review.    Farhad Figueroa MD  Staff Physician  Hepatology and Liver Transplant  [facility]  [facility]

## 2024-04-21 ENCOUNTER — NURSE TRIAGE (OUTPATIENT)
Dept: ADMINISTRATIVE | Facility: CLINIC | Age: 54
End: 2024-04-21
Payer: MEDICARE

## 2024-04-21 ENCOUNTER — HOSPITAL ENCOUNTER (INPATIENT)
Facility: HOSPITAL | Age: 54
LOS: 4 days | Discharge: HOME HEALTH | DRG: 602 | End: 2024-04-26
Attending: STUDENT IN AN ORGANIZED HEALTH CARE EDUCATION/TRAINING PROGRAM | Admitting: INTERNAL MEDICINE
Payer: MEDICARE

## 2024-04-21 DIAGNOSIS — Z95.828 S/P TIPS (TRANSJUGULAR INTRAHEPATIC PORTOSYSTEMIC SHUNT): ICD-10-CM

## 2024-04-21 DIAGNOSIS — K74.5 BILIARY CIRRHOSIS: ICD-10-CM

## 2024-04-21 DIAGNOSIS — R07.9 CHEST PAIN: ICD-10-CM

## 2024-04-21 DIAGNOSIS — K92.2 ACUTE GASTROINTESTINAL BLEEDING: ICD-10-CM

## 2024-04-21 DIAGNOSIS — E87.70 HYPERVOLEMIA, UNSPECIFIED HYPERVOLEMIA TYPE: ICD-10-CM

## 2024-04-21 DIAGNOSIS — M79.604 RIGHT LEG PAIN: ICD-10-CM

## 2024-04-21 DIAGNOSIS — R21 RASH: ICD-10-CM

## 2024-04-21 DIAGNOSIS — I86.4 ESOPHAGEAL AND GASTRIC VARICES: ICD-10-CM

## 2024-04-21 DIAGNOSIS — E87.6 HYPOKALEMIA: Primary | ICD-10-CM

## 2024-04-21 DIAGNOSIS — I85.00 ESOPHAGEAL AND GASTRIC VARICES: ICD-10-CM

## 2024-04-21 DIAGNOSIS — E87.70 VOLUME OVERLOAD: ICD-10-CM

## 2024-04-21 DIAGNOSIS — R17 JAUNDICE: ICD-10-CM

## 2024-04-21 DIAGNOSIS — L03.119 CELLULITIS OF FOOT: ICD-10-CM

## 2024-04-21 LAB
ALBUMIN SERPL BCP-MCNC: 2.5 G/DL (ref 3.5–5.2)
ALP SERPL-CCNC: 165 U/L (ref 55–135)
ALT SERPL W/O P-5'-P-CCNC: 52 U/L (ref 10–44)
ANION GAP SERPL CALC-SCNC: 10 MMOL/L (ref 8–16)
AST SERPL-CCNC: 80 U/L (ref 10–40)
BASOPHILS # BLD AUTO: 0.02 K/UL (ref 0–0.2)
BASOPHILS NFR BLD: 0.2 % (ref 0–1.9)
BILIRUB SERPL-MCNC: 5.4 MG/DL (ref 0.1–1)
BNP SERPL-MCNC: 767 PG/ML (ref 0–99)
BUN SERPL-MCNC: 8 MG/DL (ref 6–20)
CALCIUM SERPL-MCNC: 8 MG/DL (ref 8.7–10.5)
CHLORIDE SERPL-SCNC: 100 MMOL/L (ref 95–110)
CO2 SERPL-SCNC: 27 MMOL/L (ref 23–29)
CREAT SERPL-MCNC: 0.6 MG/DL (ref 0.5–1.4)
CRP SERPL-MCNC: 51.3 MG/L (ref 0–8.2)
DIFFERENTIAL METHOD BLD: ABNORMAL
EOSINOPHIL # BLD AUTO: 0 K/UL (ref 0–0.5)
EOSINOPHIL NFR BLD: 0 % (ref 0–8)
ERYTHROCYTE [DISTWIDTH] IN BLOOD BY AUTOMATED COUNT: 23.7 % (ref 11.5–14.5)
ERYTHROCYTE [SEDIMENTATION RATE] IN BLOOD BY PHOTOMETRIC METHOD: 12 MM/HR (ref 0–36)
EST. GFR  (NO RACE VARIABLE): >60 ML/MIN/1.73 M^2
GLUCOSE SERPL-MCNC: 122 MG/DL (ref 70–110)
HCT VFR BLD AUTO: 22.4 % (ref 37–48.5)
HGB BLD-MCNC: 7.8 G/DL (ref 12–16)
IMM GRANULOCYTES # BLD AUTO: 0.18 K/UL (ref 0–0.04)
IMM GRANULOCYTES NFR BLD AUTO: 1.6 % (ref 0–0.5)
LYMPHOCYTES # BLD AUTO: 0.5 K/UL (ref 1–4.8)
LYMPHOCYTES NFR BLD: 4 % (ref 18–48)
MAGNESIUM SERPL-MCNC: 1.6 MG/DL (ref 1.6–2.6)
MCH RBC QN AUTO: 35.9 PG (ref 27–31)
MCHC RBC AUTO-ENTMCNC: 34.8 G/DL (ref 32–36)
MCV RBC AUTO: 103 FL (ref 82–98)
MONOCYTES # BLD AUTO: 0.5 K/UL (ref 0.3–1)
MONOCYTES NFR BLD: 4.7 % (ref 4–15)
NEUTROPHILS # BLD AUTO: 10 K/UL (ref 1.8–7.7)
NEUTROPHILS NFR BLD: 89.5 % (ref 38–73)
NRBC BLD-RTO: 0 /100 WBC
PLATELET # BLD AUTO: 149 K/UL (ref 150–450)
PMV BLD AUTO: 11.8 FL (ref 9.2–12.9)
POTASSIUM SERPL-SCNC: 2.7 MMOL/L (ref 3.5–5.1)
PROT SERPL-MCNC: 5.3 G/DL (ref 6–8.4)
RBC # BLD AUTO: 2.17 M/UL (ref 4–5.4)
SARS-COV-2 RDRP RESP QL NAA+PROBE: NEGATIVE
SODIUM SERPL-SCNC: 137 MMOL/L (ref 136–145)
TROPONIN I SERPL DL<=0.01 NG/ML-MCNC: 0.03 NG/ML (ref 0–0.03)
WBC # BLD AUTO: 11.15 K/UL (ref 3.9–12.7)

## 2024-04-21 PROCEDURE — 80053 COMPREHEN METABOLIC PANEL: CPT

## 2024-04-21 PROCEDURE — 99223 1ST HOSP IP/OBS HIGH 75: CPT | Mod: AI,,, | Performed by: INTERNAL MEDICINE

## 2024-04-21 PROCEDURE — 25000003 PHARM REV CODE 250: Performed by: STUDENT IN AN ORGANIZED HEALTH CARE EDUCATION/TRAINING PROGRAM

## 2024-04-21 PROCEDURE — 87150 DNA/RNA AMPLIFIED PROBE: CPT | Performed by: STUDENT IN AN ORGANIZED HEALTH CARE EDUCATION/TRAINING PROGRAM

## 2024-04-21 PROCEDURE — 84484 ASSAY OF TROPONIN QUANT: CPT

## 2024-04-21 PROCEDURE — 99285 EMERGENCY DEPT VISIT HI MDM: CPT | Mod: 25

## 2024-04-21 PROCEDURE — 63600175 PHARM REV CODE 636 W HCPCS

## 2024-04-21 PROCEDURE — 83735 ASSAY OF MAGNESIUM: CPT

## 2024-04-21 PROCEDURE — G0378 HOSPITAL OBSERVATION PER HR: HCPCS

## 2024-04-21 PROCEDURE — 87186 SC STD MICRODIL/AGAR DIL: CPT | Performed by: STUDENT IN AN ORGANIZED HEALTH CARE EDUCATION/TRAINING PROGRAM

## 2024-04-21 PROCEDURE — U0002 COVID-19 LAB TEST NON-CDC: HCPCS | Performed by: INTERNAL MEDICINE

## 2024-04-21 PROCEDURE — 96360 HYDRATION IV INFUSION INIT: CPT

## 2024-04-21 PROCEDURE — 85025 COMPLETE CBC W/AUTO DIFF WBC: CPT

## 2024-04-21 PROCEDURE — 86140 C-REACTIVE PROTEIN: CPT

## 2024-04-21 PROCEDURE — 83880 ASSAY OF NATRIURETIC PEPTIDE: CPT

## 2024-04-21 PROCEDURE — 87040 BLOOD CULTURE FOR BACTERIA: CPT | Performed by: STUDENT IN AN ORGANIZED HEALTH CARE EDUCATION/TRAINING PROGRAM

## 2024-04-21 PROCEDURE — 63600175 PHARM REV CODE 636 W HCPCS: Performed by: STUDENT IN AN ORGANIZED HEALTH CARE EDUCATION/TRAINING PROGRAM

## 2024-04-21 PROCEDURE — 25000003 PHARM REV CODE 250

## 2024-04-21 PROCEDURE — 87077 CULTURE AEROBIC IDENTIFY: CPT | Performed by: STUDENT IN AN ORGANIZED HEALTH CARE EDUCATION/TRAINING PROGRAM

## 2024-04-21 PROCEDURE — 85652 RBC SED RATE AUTOMATED: CPT

## 2024-04-21 RX ORDER — PROCHLORPERAZINE EDISYLATE 5 MG/ML
5 INJECTION INTRAMUSCULAR; INTRAVENOUS EVERY 6 HOURS PRN
Status: DISCONTINUED | OUTPATIENT
Start: 2024-04-21 | End: 2024-04-26 | Stop reason: HOSPADM

## 2024-04-21 RX ORDER — FUROSEMIDE 10 MG/ML
40 INJECTION INTRAMUSCULAR; INTRAVENOUS EVERY 12 HOURS
Status: DISCONTINUED | OUTPATIENT
Start: 2024-04-21 | End: 2024-04-23

## 2024-04-21 RX ORDER — GLUCAGON 1 MG
1 KIT INJECTION
Status: DISCONTINUED | OUTPATIENT
Start: 2024-04-21 | End: 2024-04-26 | Stop reason: HOSPADM

## 2024-04-21 RX ORDER — HYDROCODONE BITARTRATE AND ACETAMINOPHEN 500; 5 MG/1; MG/1
TABLET ORAL
Status: DISCONTINUED | OUTPATIENT
Start: 2024-04-21 | End: 2024-04-25

## 2024-04-21 RX ORDER — POTASSIUM CHLORIDE 20 MEQ/1
40 TABLET, EXTENDED RELEASE ORAL ONCE
Status: COMPLETED | OUTPATIENT
Start: 2024-04-21 | End: 2024-04-21

## 2024-04-21 RX ORDER — ACETAMINOPHEN 500 MG
1000 TABLET ORAL EVERY 6 HOURS PRN
Status: DISCONTINUED | OUTPATIENT
Start: 2024-04-21 | End: 2024-04-23

## 2024-04-21 RX ORDER — ACETAMINOPHEN 500 MG
1000 TABLET ORAL
Status: COMPLETED | OUTPATIENT
Start: 2024-04-21 | End: 2024-04-21

## 2024-04-21 RX ORDER — SODIUM CHLORIDE 0.9 % (FLUSH) 0.9 %
10 SYRINGE (ML) INJECTION EVERY 12 HOURS PRN
Status: DISCONTINUED | OUTPATIENT
Start: 2024-04-21 | End: 2024-04-26 | Stop reason: HOSPADM

## 2024-04-21 RX ORDER — PANTOPRAZOLE SODIUM 40 MG/1
40 TABLET, DELAYED RELEASE ORAL DAILY
Status: DISCONTINUED | OUTPATIENT
Start: 2024-04-22 | End: 2024-04-22

## 2024-04-21 RX ORDER — IBUPROFEN 200 MG
24 TABLET ORAL
Status: DISCONTINUED | OUTPATIENT
Start: 2024-04-21 | End: 2024-04-26 | Stop reason: HOSPADM

## 2024-04-21 RX ORDER — ONDANSETRON HYDROCHLORIDE 2 MG/ML
4 INJECTION, SOLUTION INTRAVENOUS EVERY 8 HOURS PRN
Status: DISCONTINUED | OUTPATIENT
Start: 2024-04-21 | End: 2024-04-26 | Stop reason: HOSPADM

## 2024-04-21 RX ORDER — NALOXONE HCL 0.4 MG/ML
0.02 VIAL (ML) INJECTION
Status: DISCONTINUED | OUTPATIENT
Start: 2024-04-21 | End: 2024-04-26 | Stop reason: HOSPADM

## 2024-04-21 RX ORDER — HEPARIN 100 UNIT/ML
300 SYRINGE INTRAVENOUS
Status: DISCONTINUED | OUTPATIENT
Start: 2024-04-21 | End: 2024-04-26 | Stop reason: HOSPADM

## 2024-04-21 RX ORDER — SODIUM CHLORIDE 0.9 % (FLUSH) 0.9 %
3 SYRINGE (ML) INJECTION
Status: DISCONTINUED | OUTPATIENT
Start: 2024-04-21 | End: 2024-04-26 | Stop reason: HOSPADM

## 2024-04-21 RX ORDER — HYDROXYZINE HYDROCHLORIDE 25 MG/1
25 TABLET, FILM COATED ORAL 3 TIMES DAILY PRN
Status: DISCONTINUED | OUTPATIENT
Start: 2024-04-21 | End: 2024-04-26 | Stop reason: HOSPADM

## 2024-04-21 RX ORDER — BENZONATATE 100 MG/1
100 CAPSULE ORAL 3 TIMES DAILY
Status: DISCONTINUED | OUTPATIENT
Start: 2024-04-21 | End: 2024-04-26 | Stop reason: HOSPADM

## 2024-04-21 RX ORDER — IBUPROFEN 200 MG
16 TABLET ORAL
Status: DISCONTINUED | OUTPATIENT
Start: 2024-04-21 | End: 2024-04-26 | Stop reason: HOSPADM

## 2024-04-21 RX ADMIN — ACETAMINOPHEN 1000 MG: 500 TABLET ORAL at 04:04

## 2024-04-21 RX ADMIN — FUROSEMIDE 40 MG: 10 INJECTION, SOLUTION INTRAVENOUS at 06:04

## 2024-04-21 RX ADMIN — SODIUM CHLORIDE 1000 ML: 9 INJECTION, SOLUTION INTRAVENOUS at 04:04

## 2024-04-21 RX ADMIN — POTASSIUM CHLORIDE 40 MEQ: 1500 TABLET, EXTENDED RELEASE ORAL at 06:04

## 2024-04-21 RX ADMIN — POTASSIUM CHLORIDE 40 MEQ: 1500 TABLET, EXTENDED RELEASE ORAL at 05:04

## 2024-04-21 RX ADMIN — DEXTROSE MONOHYDRATE 1 G: 2.5 INJECTION INTRAVENOUS at 09:04

## 2024-04-21 RX ADMIN — VANCOMYCIN HYDROCHLORIDE 1500 MG: 1.5 INJECTION, POWDER, LYOPHILIZED, FOR SOLUTION INTRAVENOUS at 07:04

## 2024-04-21 NOTE — ED TRIAGE NOTES
"Shikha López, a 53 y.o. female presents to the ED via personal transportation w/ complaint of new onset bilateral wounds, redness and swelling on BLE that she noticed yesterday. Pt states "Last night I got the chills out of no where and had to go sit in the hot shower for an hour". Pt was admitted at the beginning of this month for GI bleed. Pt on active chemo for breast cancer.    Triage note:  Chief Complaint   Patient presents with    Wound Check     Reports redness, pain and hot to touch on R lower leg. Hx breast cancer, actively on chemo.      Review of patient's allergies indicates:  No Known Allergies  Past Medical History:   Diagnosis Date    Aortic atherosclerosis 07/06/2023    Breast cancer     Esophageal and gastric varices 06/23/2023    Malignant neoplasm metastatic to left lung 06/08/2021        "

## 2024-04-21 NOTE — ED PROVIDER NOTES
Encounter Date: 4/21/2024       History     Chief Complaint   Patient presents with    Wound Check     Reports redness, pain and hot to touch on R lower leg. Hx breast cancer, actively on chemo.      53 year old female with history of stage 4 breast cancer left (mets HER 2 +) Dr. 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  Patient states that she started to develop a red rash on her right lower leg on Friday.  Then the rash became hot and painful.  Her right leg rash has been present for 3 days and she states that she now has a red rash on her left lower leg that is nonpainful.  She endorses chills and SOB on exertion.  She denies fevers, chest pain, nausea, vomiting, abdominal pain, or changes in urination.  Patient was recently admitted to the hospital on 04/09/2024 for a GI bleed due to her gastric varices.  She was discharged on 04/16/2024.  She denies any blood in stool or tarry stools.    The history is provided by the patient. No  was used.     Review of patient's allergies indicates:  No Known Allergies  Past Medical History:   Diagnosis Date    Aortic atherosclerosis 07/06/2023    Breast cancer     Esophageal and gastric varices 06/23/2023    Malignant neoplasm metastatic to left lung 06/08/2021     Past Surgical History:   Procedure Laterality Date    ENDOSCOPIC ULTRASOUND OF UPPER GASTROINTESTINAL TRACT N/A 6/27/2023    Procedure: ULTRASOUND, UPPER GI TRACT, ENDOSCOPIC;  Surgeon: Home Lopez MD;  Location: [facility] ([address] [place] [address]);  Service: Endoscopy;  Laterality: N/A;    ENDOSCOPIC ULTRASOUND OF UPPER GASTROINTESTINAL TRACT N/A 1/12/2024    Procedure: ULTRASOUND, UPPER GI TRACT, ENDOSCOPIC;  Surgeon: Home Lopez MD;  Location: [facility] ([address] [place] [address]);  Service: Endoscopy;  Laterality: N/A;   11/28/23-Instructions via portal-DS  1/8-lvm for precall-MS  1/10-precall complete-Kpvt    ESOPHAGOGASTRODUODENOSCOPY N/A 6/23/2023    Procedure: EGD (ESOPHAGOGASTRODUODENOSCOPY);  Surgeon: Quintin Mooney MD;  Location: [facility] ENDO (2ND FLR);  Service: Endoscopy;  Laterality: N/A;    ESOPHAGOGASTRODUODENOSCOPY N/A 6/27/2023    Procedure: EGD (ESOPHAGOGASTRODUODENOSCOPY);  Surgeon: Home Lopez MD;  Location: [facility] ENDO (2ND FLR);  Service: Endoscopy;  Laterality: N/A;    ESOPHAGOGASTRODUODENOSCOPY N/A 8/3/2023    Procedure: EGD (ESOPHAGOGASTRODUODENOSCOPY);  Surgeon: Home Lopez MD;  Location: [facility] ENDO (2ND FLR);  Service: Endoscopy;  Laterality: N/A;  instr portal-labs 6/28/23-tb    ESOPHAGOGASTRODUODENOSCOPY N/A 1/12/2024    Procedure: EGD (ESOPHAGOGASTRODUODENOSCOPY);  Surgeon: Home Lopez MD;  Location: [facility] ENDO (2ND FLR);  Service: Endoscopy;  Laterality: N/A;    ESOPHAGOGASTRODUODENOSCOPY N/A 4/10/2024    Procedure: EGD (ESOPHAGOGASTRODUODENOSCOPY);  Surgeon: Ze Anderson MD;  Location: [facility] ENDO (2ND FLR);  Service: Endoscopy;  Laterality: N/A;    MASTECTOMY       Family History   Problem Relation Name Age of Onset    Lung cancer Father       Social History     Tobacco Use    Smoking status: Never    Smokeless tobacco: Never   Substance Use Topics    Alcohol use: Never    Drug use: Never     Review of Systems    Physical Exam     Initial Vitals [04/21/24 1323]   BP Pulse Resp Temp SpO2   (!) 125/56 (!) 114 18 98.8 °F (37.1 °C) 97 %      MAP       --         Physical Exam    Nursing note and vitals reviewed.  Constitutional: No distress.   Patient is laying in bed in no apparent distress   HENT:   Head: Normocephalic.   Mouth/Throat: Oropharynx is clear and moist.   Eyes: Conjunctivae and EOM are normal. No scleral icterus.   Neck: Neck supple.   Normal range of motion.  Cardiovascular:  Regular rhythm.           Murmur heard.  Tachycardic   Pulmonary/Chest: Breath sounds normal. No  respiratory distress. She has no wheezes. She has no rhonchi. She has no rales.   Abdominal: Abdomen is soft. She exhibits no distension. There is no abdominal tenderness. There is no rebound and no guarding.   Musculoskeletal:         General: Tenderness (RLE tenderness to palpation) and edema (BLE pitting edema, R mildly more edematous) present. Normal range of motion.      Cervical back: Normal range of motion and neck supple.     Neurological: She is alert.   Skin: Skin is warm. Capillary refill takes less than 2 seconds.   Psychiatric: She has a normal mood and affect.         ED Course   Procedures  Labs Reviewed   CBC W/ AUTO DIFFERENTIAL - Abnormal; Notable for the following components:       Result Value    RBC 2.17 (*)     Hemoglobin 7.8 (*)     Hematocrit 22.4 (*)      (*)     MCH 35.9 (*)     RDW 23.7 (*)     Platelets 149 (*)     Immature Granulocytes 1.6 (*)     Gran # (ANC) 10.0 (*)     Immature Grans (Abs) 0.18 (*)     Lymph # 0.5 (*)     Gran % 89.5 (*)     Lymph % 4.0 (*)     All other components within normal limits   COMPREHENSIVE METABOLIC PANEL - Abnormal; Notable for the following components:    Potassium 2.7 (*)     Glucose 122 (*)     Calcium 8.0 (*)     Total Protein 5.3 (*)     Albumin 2.5 (*)     Total Bilirubin 5.4 (*)     Alkaline Phosphatase 165 (*)     AST 80 (*)     ALT 52 (*)     All other components within normal limits   C-REACTIVE PROTEIN - Abnormal; Notable for the following components:    CRP 51.3 (*)     All other components within normal limits   B-TYPE NATRIURETIC PEPTIDE - Abnormal; Notable for the following components:     (*)     All other components within normal limits   TROPONIN I - Abnormal; Notable for the following components:    Troponin I 0.027 (*)     All other components within normal limits   CULTURE, BLOOD   CULTURE, BLOOD   SEDIMENTATION RATE    Narrative:     ADD ON MAG PER FELIPE KENDRICK MD ORDER# [id number] @  04/21/2024  17:21    MAGNESIUM    MAGNESIUM    Narrative:     ADD ON MAG PER FELIPE KENDRICK MD ORDER# [id number] @  04/21/2024  17:21           Imaging Results              X-Ray Tibia Fibula 2 View Right (Final result)  Result time 04/21/24 16:28:29      Final result by Antoni Chaudhry Jr., MD (04/21/24 16:28:29)                   Impression:      See above      Electronically signed by: Antoni Watts Jr  Date:    04/21/2024  Time:    16:28               Narrative:    EXAMINATION:  XR TIBIA FIBULA 2 VIEW RIGHT    CLINICAL HISTORY:  Pain in right leg    TECHNIQUE:  XR TIBIA FIBULA 2 VIEW RIGHT    COMPARISON:  None    FINDINGS:  There is generalized soft tissue edema no evidence of fracture.  Osteoarthritis of the knee.                                       X-Ray Chest AP Portable (Final result)  Result time 04/21/24 16:28:24      Final result by Ryan Nagel MD (04/21/24 16:28:24)                   Impression:      As above.      Electronically signed by: Ryan Nagel  Date:    04/21/2024  Time:    16:28               Narrative:    EXAMINATION:  XR CHEST AP PORTABLE    CLINICAL HISTORY:  SOB;    TECHNIQUE:  Single frontal view of the chest was performed.    COMPARISON:  Chest radiograph 03/23/2011; CT chest 03/11/2024    FINDINGS:  Lines and tubes: Right chest wall Port-A-Cath terminates in the left brachiocephalic vein.    Heart and mediastinum: Magnified by AP technique.    Pleura: Small left pleural effusion versus pleural thickening.  Elevated left hemidiaphragm.  No pneumothorax.    Lungs: Volume loss in the left lung.  There is pulmonary vascular indistinctness throughout both lungs indicating interstitial edema.  There are patchy opacities in the left lung base, likely atelectasis in the setting of an effusion.  Pulmonary nodules seen on recent CT are poorly visualized by radiographs.    Soft tissue/bone: Postoperative changes from left mastectomy and axillary lymph node dissection.  Tips in the right upper quadrant, embolization  coils in the left upper quadrant, and skin staples in the left chest wall.  Bones are unremarkable.                                       Medications   benzonatate capsule 100 mg (has no administration in time range)   pantoprazole EC tablet 40 mg (has no administration in time range)   0.9%  NaCl infusion (for blood administration) (has no administration in time range)   0.9%  NaCl infusion (for blood administration) (has no administration in time range)   0.9%  NaCl infusion (for blood administration) (has no administration in time range)   alteplase injection 2 mg (has no administration in time range)   dextrose 10% bolus 125 mL 125 mL (has no administration in time range)   dextrose 10% bolus 250 mL 250 mL (has no administration in time range)   glucagon (human recombinant) injection 1 mg (has no administration in time range)   glucose chewable tablet 16 g (has no administration in time range)   glucose chewable tablet 24 g (has no administration in time range)   heparin, porcine (PF) 100 unit/mL injection flush 300 Units (has no administration in time range)   hydrOXYzine HCL tablet 25 mg (has no administration in time range)   naloxone 0.4 mg/mL injection 0.02 mg (has no administration in time range)   ondansetron injection 4 mg (has no administration in time range)   prochlorperazine injection Soln 5 mg (has no administration in time range)   sodium chloride 0.9% flush 10 mL (has no administration in time range)   sodium chloride 0.9% flush 3 mL (has no administration in time range)   vancomycin 1,500 mg in dextrose 5 % (D5W) 250 mL IVPB (Vial-Mate) (has no administration in time range)   furosemide injection 40 mg (40 mg Intravenous Given 4/21/24 3454)   ceFAZolin (ANCEF) 1 g in dextrose 5 % in water (D5W) 50 mL IVPB (MB+) (has no administration in time range)   acetaminophen tablet 1,000 mg (has no administration in time range)   sodium chloride 0.9% bolus 1,000 mL 1,000 mL (0 mLs Intravenous Stopped  4/21/24 1737)   acetaminophen tablet 1,000 mg (1,000 mg Oral Given 4/21/24 1631)   potassium chloride SA CR tablet 40 mEq (40 mEq Oral Given 4/21/24 1728)   potassium chloride SA CR tablet 40 mEq (40 mEq Oral Given 4/21/24 1834)     Medical Decision Making  53-year-old female who presents for wound check of her right lower extremity painful rash.  She is tachycardic, other vitals WNL.  On exam, she has BLE pitting edema.  She has an erythematous rash on RLE that is tender to palpation.  Nontender erythematous rash on LLE.  Please see physical exam findings above for additional details.  Differential diagnoses include but are not limited to cellulitis, erysipelas, venous insufficiency, venous stasis dermatitis, CHF, ACS, PE, electrolyte abnormality, anemia, progression of malignancy.  I have higher suspicion of cellulitis since RLE is erythematous and tender versus venous stasis dermatitis since there is a rash on bilateral shins.  Lower suspicion of PE since patient is not hypoxic and states she states her breathing feels like it is at baseline.  She has had some shortness of breath on exertion ever since her lung neoplasm diagnosis.  Obtained labs and CXR.  Administered 1 L bolus of normal saline and Tylenol for pain.  Patient reassessed, pain has improved and HR has improved.  Please see ED course for additional details.    Patient will be admitted to the hospital by the heme onc team for observation in the setting of her fluid overload, cellulitis, and for troponin trending.    Amount and/or Complexity of Data Reviewed  Labs: ordered. Decision-making details documented in ED Course.  Radiology: ordered. Decision-making details documented in ED Course.  ECG/medicine tests: ordered and independent interpretation performed. Decision-making details documented in ED Course.    Risk  OTC drugs.  Prescription drug management.               ED Course as of 04/21/24 1844   Sun Apr 21, 2024   1602 EKG shows sinus  tachycardia, rate of 107 beats per minute, prolonged QT interval, and no STEMI. [MD]   1715 H/H stable [MD]   1715 Troponin I(!): 0.027  Elevated troponin [MD]   1715 BNP(!): 767  Elevated BNP [MD]   1716 CXR shows small left pleural effusion versus pleural thickening.  Patchy opacities visualized in lung base.  Pulmonary nodules present. [MD]   1717 X-ray of the right tibia fibula shows generalized soft tissue edema no evidence of fracture.  Osteoarthritis of the knee. [MD]   1719 RN reports hypokalemia from lab, value of 2.7.  Will replete. [MD]   1737 CRP(!): 51.3  Elevated CRP [MD]   1807 Consulted and discussed patient with heme onc team.  Patient will be admitted to the hospital by heme onc for observation. [MD]   1814 Ordered vancomycin for antibacterial coverage for cellulitis since patient is immunocompromised and has a hot, painful, and erythematous rash on RLE. [MD]      ED Course User Index  [MD] Sven Lamas MD                             Clinical Impression:  Final diagnoses:  [M79.604] Right leg pain (Primary)  [E87.6] Hypokalemia  [R21] Rash          ED Disposition Condition    Observation                   Sven Lamas MD  Resident  04/21/24 1005

## 2024-04-21 NOTE — H&P
[facility] - Emergency Dept  Hematology/Oncology  H&P    Patient Name: Shikha López  MRN: [id number]  Admission Date: 4/21/2024  Code Status: Full Code   Attending Provider: Mine Quinonez MD  Primary Care Physician: No, Primary Doctor  Principal Problem:<principal problem not specified>    Subjective:     HPI: 53F with PMH stage 4 breast ca left (mets HER 2 +) Dr. 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 Recent hospital admission for GI bleeding, TIPS procedure, and was admitted to oncology service due to active chemotherapy. Presenting to the ED for complaint of bilateral legs wounds, redness, and swelling BLE. Refers the symptoms slowly worsened since last admission to the point that she cannot walk due to R leg pain. Also refers erythema and warmth of RLE that is new from last admission. She otherwise refers feeling much better when last seen.     AF, VSS, on RA with labs H/H stable from prior around 7-9 baseline, K 2.7, Tbil 5.4, , trop 0.027, CRP 51.3, CXR and T/F Xray stable with soft tissue swelling, given 1L IVFs, Vanc, tylenol in ED    Admitted to Medical Oncology service         Oncology Treatment Plan:   OP BREAST FAM-TRASTUZUMAB DERUXTECAN-NXKI Q3W    Medications:  Continuous Infusions:  Current Facility-Administered Medications   Medication Dose Route Frequency Last Rate Last Admin     Scheduled Meds:  Current Facility-Administered Medications   Medication Dose Route Frequency    benzonatate  100 mg Oral TID    ceFAZolin (Ancef) IV (PEDS and ADULTS)  1 g Intravenous Q8H    furosemide (LASIX) injection  40 mg Intravenous Q12H    [START ON 4/22/2024] pantoprazole  40 mg Oral Daily    vancomycin (VANCOCIN) IV (PEDS and ADULTS)  1,500 mg Intravenous ED 1 Time     PRN Meds:  Current Facility-Administered Medications:     0.9%  NaCl infusion  (for blood administration), , Intravenous, Q24H PRN    0.9%  NaCl infusion (for blood administration), , Intravenous, Q24H PRN    0.9%  NaCl infusion (for blood administration), , Intravenous, Q24H PRN    acetaminophen, 1,000 mg, Oral, Q6H PRN    alteplase, 2 mg, Intra-Catheter, Daily PRN    dextrose 10%, 12.5 g, Intravenous, PRN    dextrose 10%, 25 g, Intravenous, PRN    glucagon (human recombinant), 1 mg, Intramuscular, PRN    glucose, 16 g, Oral, PRN    glucose, 24 g, Oral, PRN    heparin, porcine (PF), 300 Units, Intravenous, PRN    hydrOXYzine HCL, 25 mg, Oral, TID PRN    naloxone, 0.02 mg, Intravenous, PRN    ondansetron, 4 mg, Intravenous, Q8H PRN    prochlorperazine, 5 mg, Intravenous, Q6H PRN    sodium chloride 0.9%, 10 mL, Intravenous, Q12H PRN    sodium chloride 0.9%, 3 mL, Intravenous, PRN     Review of patient's allergies indicates:  No Known Allergies     Past Medical History:   Diagnosis Date    Aortic atherosclerosis 07/06/2023    Breast cancer     Esophageal and gastric varices 06/23/2023    Malignant neoplasm metastatic to left lung 06/08/2021     Past Surgical History:   Procedure Laterality Date    ENDOSCOPIC ULTRASOUND OF UPPER GASTROINTESTINAL TRACT N/A 6/27/2023    Procedure: ULTRASOUND, UPPER GI TRACT, ENDOSCOPIC;  Surgeon: Home Lopez MD;  Location: [address]);  Service: Endoscopy;  Laterality: N/A;    ENDOSCOPIC ULTRASOUND OF UPPER GASTROINTESTINAL TRACT N/A 1/12/2024    Procedure: ULTRASOUND, UPPER GI TRACT, ENDOSCOPIC;  Surgeon: Home Lopez MD;  Location: [facility] ([address] [place] [address]);  Service: Endoscopy;  Laterality: N/A;  11/28/23-Instructions via portal-DS  1/8-lvm for precall-MS  1/10-precall complete-Kpvt    ESOPHAGOGASTRODUODENOSCOPY N/A 6/23/2023    Procedure: EGD (ESOPHAGOGASTRODUODENOSCOPY);  Surgeon: Quintin Mooney MD;  Location: [facility] ([address] [place] [address]);  Service: Endoscopy;  Laterality: N/A;    ESOPHAGOGASTRODUODENOSCOPY N/A 6/27/2023    Procedure: EGD  (ESOPHAGOGASTRODUODENOSCOPY);  Surgeon: Home Lopez MD;  Location: [facility] ENDO (2ND FLR);  Service: Endoscopy;  Laterality: N/A;    ESOPHAGOGASTRODUODENOSCOPY N/A 8/3/2023    Procedure: EGD (ESOPHAGOGASTRODUODENOSCOPY);  Surgeon: Home Lopez MD;  Location: [facility] ENDO (2ND FLR);  Service: Endoscopy;  Laterality: N/A;  instr portal-labs 6/28/23-tb    ESOPHAGOGASTRODUODENOSCOPY N/A 1/12/2024    Procedure: EGD (ESOPHAGOGASTRODUODENOSCOPY);  Surgeon: Home Lopez MD;  Location: [facility] ENDO (2ND FLR);  Service: Endoscopy;  Laterality: N/A;    ESOPHAGOGASTRODUODENOSCOPY N/A 4/10/2024    Procedure: EGD (ESOPHAGOGASTRODUODENOSCOPY);  Surgeon: Ze Anderson MD;  Location: [facility] ENDO (2ND FLR);  Service: Endoscopy;  Laterality: N/A;    MASTECTOMY       Family History       Problem Relation (Age of Onset)    Lung cancer Father          Tobacco Use    Smoking status: Never    Smokeless tobacco: Never   Substance and Sexual Activity    Alcohol use: Never    Drug use: Never    Sexual activity: Not Currently       Review of Systems   Constitutional:  Negative for chills, fatigue and fever.   HENT:  Negative for congestion and sore throat.    Eyes:  Negative for visual disturbance.   Respiratory:  Negative for cough, shortness of breath and wheezing.    Cardiovascular:  Positive for leg swelling. Negative for chest pain and palpitations.   Gastrointestinal:  Negative for abdominal pain, diarrhea, nausea and vomiting.   Endocrine: Negative for polyuria.   Genitourinary:  Negative for dysuria, flank pain and frequency.   Musculoskeletal:  Positive for myalgias. Negative for arthralgias and back pain.   Skin:  Positive for color change and wound. Negative for pallor and rash.   Neurological:  Negative for light-headedness and headaches.     Objective:     Vital Signs (Most Recent):  Temp: 97.9 °F (36.6 °C) (04/21/24 1600)  Pulse: 103 (04/21/24 1730)  Resp: 17 (04/21/24 1730)  BP: (!) 124/56 (04/21/24 1730)  SpO2: 97 % (04/21/24  1730) Vital Signs (24h Range):  Temp:  [97.9 °F (36.6 °C)-98.8 °F (37.1 °C)] 97.9 °F (36.6 °C)  Pulse:  [100-114] 103  Resp:  [17-18] 17  SpO2:  [97 %-98 %] 97 %  BP: (124-136)/(56-62) 124/56     Weight: 90.3 kg (199 lb)  Body mass index is 36.4 kg/m².  Body surface area is 1.99 meters squared.    No intake or output data in the 24 hours ending 04/21/24 2619     Physical Exam  Vitals and nursing note reviewed.   Constitutional:       General: She is not in acute distress.     Appearance: She is obese. She is not toxic-appearing.   HENT:      Head: Normocephalic and atraumatic.      Mouth/Throat:      Mouth: Mucous membranes are moist.      Pharynx: No oropharyngeal exudate.      Comments: Poor dentition  Eyes:      Extraocular Movements: Extraocular movements intact.      Pupils: Pupils are equal, round, and reactive to light.   Cardiovascular:      Rate and Rhythm: Normal rate and regular rhythm.      Heart sounds: No murmur heard.  Pulmonary:      Effort: Pulmonary effort is normal.      Breath sounds: No wheezing or rales.   Abdominal:      General: Abdomen is flat. Bowel sounds are normal. There is no distension.      Tenderness: There is no abdominal tenderness. There is no guarding.   Musculoskeletal:         General: Swelling (both legs swollen with pitting edema. weeping R>L) and tenderness (ttp at right leg) present. Normal range of motion.      Cervical back: Normal range of motion.      Right lower leg: Edema present.      Left lower leg: Edema present.   Lymphadenopathy:      Cervical: No cervical adenopathy.   Skin:     General: Skin is warm.      Coloration: Skin is not jaundiced.      Findings: Erythema (bilateral leg erythema, worse at R>L) present. No bruising or rash.   Neurological:      General: No focal deficit present.      Mental Status: She is alert and oriented to person, place, and time.      Cranial Nerves: No cranial nerve deficit.          Significant Labs:   All pertinent labs from the  last 24 hours have been reviewed.    Diagnostic Results:  I have reviewed all pertinent imaging results/findings within the past 24 hours.  Assessment/Plan:     Volume overload  -BL lower extremity swelling  -rales on lung exam  -  -recent TTE with normal systolic and diastolic fnc  -IV lasix 40 BID    Cellulitis of foot  -Bilateral lower extremity swelling recurrent, but worsened from usual  -warm, tender, erythematous R > L  -afebrile, no leukocytosis, slightly tachycardiac  -Given dose of vanc in ED  -Bcx pending  -Started cefazolin  -LE US pending    S/P TIPS (transjugular intrahepatic portosystemic shunt)  -TIPS placed 4/12 for acute GI bleeding and hx of gastric and esophageal varices  -has gastric portal vein coiling and embolization   -monitor for HE, takes lactulose as needed    Severe obesity (BMI 35.0-39.9) with comorbidity  -discussed diet/exercise/weight loss for overall health benefits    Hypokalemia  -repleted in the ED  -repleting as needed with potassium PO  -2.7 on admission    Breast cancer, stage 4, left  -Follows with Dr. Willis  -On [facility] for 2 yrs        Prakash Conway MD  Hematology/Oncology  [facility] - Emergency Dept

## 2024-04-21 NOTE — SUBJECTIVE & OBJECTIVE
Oncology Treatment Plan:   OP BREAST FAM-TRASTUZUMAB DERUXTECAN-NXKI Q3W    Medications:  Continuous Infusions:  Current Facility-Administered Medications   Medication Dose Route Frequency Last Rate Last Admin     Scheduled Meds:  Current Facility-Administered Medications   Medication Dose Route Frequency    benzonatate  100 mg Oral TID    ceFAZolin (Ancef) IV (PEDS and ADULTS)  1 g Intravenous Q8H    furosemide (LASIX) injection  40 mg Intravenous Q12H    [START ON 4/22/2024] pantoprazole  40 mg Oral Daily    vancomycin (VANCOCIN) IV (PEDS and ADULTS)  1,500 mg Intravenous ED 1 Time     PRN Meds:  Current Facility-Administered Medications:     0.9%  NaCl infusion (for blood administration), , Intravenous, Q24H PRN    0.9%  NaCl infusion (for blood administration), , Intravenous, Q24H PRN    0.9%  NaCl infusion (for blood administration), , Intravenous, Q24H PRN    acetaminophen, 1,000 mg, Oral, Q6H PRN    alteplase, 2 mg, Intra-Catheter, Daily PRN    dextrose 10%, 12.5 g, Intravenous, PRN    dextrose 10%, 25 g, Intravenous, PRN    glucagon (human recombinant), 1 mg, Intramuscular, PRN    glucose, 16 g, Oral, PRN    glucose, 24 g, Oral, PRN    heparin, porcine (PF), 300 Units, Intravenous, PRN    hydrOXYzine HCL, 25 mg, Oral, TID PRN    naloxone, 0.02 mg, Intravenous, PRN    ondansetron, 4 mg, Intravenous, Q8H PRN    prochlorperazine, 5 mg, Intravenous, Q6H PRN    sodium chloride 0.9%, 10 mL, Intravenous, Q12H PRN    sodium chloride 0.9%, 3 mL, Intravenous, PRN     Review of patient's allergies indicates:  No Known Allergies     Past Medical History:   Diagnosis Date    Aortic atherosclerosis 07/06/2023    Breast cancer     Esophageal and gastric varices 06/23/2023    Malignant neoplasm metastatic to left lung 06/08/2021     Past Surgical History:   Procedure Laterality Date    ENDOSCOPIC ULTRASOUND OF UPPER GASTROINTESTINAL TRACT N/A 6/27/2023    Procedure: ULTRASOUND, UPPER GI TRACT, ENDOSCOPIC;  Surgeon: Home RANGEL  MD John;  Location: [facility] (2ND FLR);  Service: Endoscopy;  Laterality: N/A;    ENDOSCOPIC ULTRASOUND OF UPPER GASTROINTESTINAL TRACT N/A 1/12/2024    Procedure: ULTRASOUND, UPPER GI TRACT, ENDOSCOPIC;  Surgeon: Home Lopez MD;  Location: [facility] (2ND FLR);  Service: Endoscopy;  Laterality: N/A;  11/28/23-Instructions via portal-DS  1/8-lvm for precall-MS  1/10-precall complete-Kpvt    ESOPHAGOGASTRODUODENOSCOPY N/A 6/23/2023    Procedure: EGD (ESOPHAGOGASTRODUODENOSCOPY);  Surgeon: Quintin Mooney MD;  Location: [facility] (2ND FLR);  Service: Endoscopy;  Laterality: N/A;    ESOPHAGOGASTRODUODENOSCOPY N/A 6/27/2023    Procedure: EGD (ESOPHAGOGASTRODUODENOSCOPY);  Surgeon: Home Lopez MD;  Location: [facility] (2ND FLR);  Service: Endoscopy;  Laterality: N/A;    ESOPHAGOGASTRODUODENOSCOPY N/A 8/3/2023    Procedure: EGD (ESOPHAGOGASTRODUODENOSCOPY);  Surgeon: Home Lopez MD;  Location: [facility] (2ND FLR);  Service: Endoscopy;  Laterality: N/A;  instr portal-labs 6/28/23-tb    ESOPHAGOGASTRODUODENOSCOPY N/A 1/12/2024    Procedure: EGD (ESOPHAGOGASTRODUODENOSCOPY);  Surgeon: Home Lopez MD;  Location: [facility] (2ND FLR);  Service: Endoscopy;  Laterality: N/A;    ESOPHAGOGASTRODUODENOSCOPY N/A 4/10/2024    Procedure: EGD (ESOPHAGOGASTRODUODENOSCOPY);  Surgeon: Ze Anderson MD;  Location: [facility] (2ND FLR);  Service: Endoscopy;  Laterality: N/A;    MASTECTOMY       Family History       Problem Relation (Age of Onset)    Lung cancer Father          Tobacco Use    Smoking status: Never    Smokeless tobacco: Never   Substance and Sexual Activity    Alcohol use: Never    Drug use: Never    Sexual activity: Not Currently       Review of Systems   Constitutional:  Negative for chills, fatigue and fever.   HENT:  Negative for congestion and sore throat.    Eyes:  Negative for visual disturbance.   Respiratory:  Negative for cough, shortness of breath and wheezing.    Cardiovascular:  Positive  for leg swelling. Negative for chest pain and palpitations.   Gastrointestinal:  Negative for abdominal pain, diarrhea, nausea and vomiting.   Endocrine: Negative for polyuria.   Genitourinary:  Negative for dysuria, flank pain and frequency.   Musculoskeletal:  Positive for myalgias. Negative for arthralgias and back pain.   Skin:  Positive for color change and wound. Negative for pallor and rash.   Neurological:  Negative for light-headedness and headaches.     Objective:     Vital Signs (Most Recent):  Temp: 97.9 °F (36.6 °C) (04/21/24 1600)  Pulse: 103 (04/21/24 1730)  Resp: 17 (04/21/24 1730)  BP: (!) 124/56 (04/21/24 1730)  SpO2: 97 % (04/21/24 1730) Vital Signs (24h Range):  Temp:  [97.9 °F (36.6 °C)-98.8 °F (37.1 °C)] 97.9 °F (36.6 °C)  Pulse:  [100-114] 103  Resp:  [17-18] 17  SpO2:  [97 %-98 %] 97 %  BP: (124-136)/(56-62) 124/56     Weight: 90.3 kg (199 lb)  Body mass index is 36.4 kg/m².  Body surface area is 1.99 meters squared.    No intake or output data in the 24 hours ending 04/21/24 1839     Physical Exam  Vitals and nursing note reviewed.   Constitutional:       General: She is not in acute distress.     Appearance: She is obese. She is not toxic-appearing.   HENT:      Head: Normocephalic and atraumatic.      Mouth/Throat:      Mouth: Mucous membranes are moist.      Pharynx: No oropharyngeal exudate.      Comments: Poor dentition  Eyes:      Extraocular Movements: Extraocular movements intact.      Pupils: Pupils are equal, round, and reactive to light.   Cardiovascular:      Rate and Rhythm: Normal rate and regular rhythm.      Heart sounds: No murmur heard.  Pulmonary:      Effort: Pulmonary effort is normal.      Breath sounds: No wheezing or rales.   Abdominal:      General: Abdomen is flat. Bowel sounds are normal. There is no distension.      Tenderness: There is no abdominal tenderness. There is no guarding.   Musculoskeletal:         General: Swelling (both legs swollen with pitting  edema. weeping R>L) and tenderness (ttp at right leg) present. Normal range of motion.      Cervical back: Normal range of motion.      Right lower leg: Edema present.      Left lower leg: Edema present.   Lymphadenopathy:      Cervical: No cervical adenopathy.   Skin:     General: Skin is warm.      Coloration: Skin is not jaundiced.      Findings: Erythema (bilateral leg erythema, worse at R>L) present. No bruising or rash.   Neurological:      General: No focal deficit present.      Mental Status: She is alert and oriented to person, place, and time.      Cranial Nerves: No cranial nerve deficit.          Significant Labs:   All pertinent labs from the last 24 hours have been reviewed.    Diagnostic Results:  I have reviewed all pertinent imaging results/findings within the past 24 hours.

## 2024-04-21 NOTE — ED NOTES
Nurses Note -- 4 Eyes      4/21/2024   5:01 PM      Skin assessed during: Daily Assessment      [] No Altered Skin Integrity Present    []Prevention Measures Documented      [x] Yes- Altered Skin Integrity Present or Discovered   [x] LDA Added if Not in Epic (Describe Wound)   [x] New Altered Skin Integrity was Present on Admit and Documented in LDA   [x] Wound Image Taken    Wound Care Consulted? No    Attending Nurse:  NAHUN Persaud     Second RN/Staff Member: NAHUN Roamn

## 2024-04-21 NOTE — HPI
53F with PMH stage 4 breast ca left (mets HER 2 +) Dr. 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 Recent hospital admission for GI bleeding, TIPS procedure, and was admitted to oncology service due to active chemotherapy. Presenting to the ED for complaint of bilateral legs wounds, redness, and swelling BLE. Refers the symptoms slowly worsened since last admission to the point that she cannot walk due to R leg pain. Also refers erythema and warmth of RLE that is new from last admission. She otherwise refers feeling much better when last seen.     AF, VSS, on RA with labs H/H stable from prior around 7-9 baseline, K 2.7, Tbil 5.4, , trop 0.027, CRP 51.3, CXR and T/F Xray stable with soft tissue swelling, given 1L IVFs, Vanc, tylenol in ED    Admitted to Medical Oncology service

## 2024-04-22 LAB
ABO + RH BLD: NORMAL
ALBUMIN SERPL BCP-MCNC: 2.1 G/DL (ref 3.5–5.2)
ALP SERPL-CCNC: 138 U/L (ref 55–135)
ALT SERPL W/O P-5'-P-CCNC: 43 U/L (ref 10–44)
ANION GAP SERPL CALC-SCNC: 8 MMOL/L (ref 8–16)
ANISOCYTOSIS BLD QL SMEAR: ABNORMAL
AST SERPL-CCNC: 61 U/L (ref 10–40)
BASOPHILS # BLD AUTO: 0.02 K/UL (ref 0–0.2)
BASOPHILS # BLD AUTO: 0.02 K/UL (ref 0–0.2)
BASOPHILS NFR BLD: 0.2 % (ref 0–1.9)
BASOPHILS NFR BLD: 0.3 % (ref 0–1.9)
BILIRUB SERPL-MCNC: 4.1 MG/DL (ref 0.1–1)
BLD GP AB SCN CELLS X3 SERPL QL: NORMAL
BLD PROD TYP BPU: NORMAL
BLOOD UNIT EXPIRATION DATE: NORMAL
BLOOD UNIT TYPE CODE: 5100
BLOOD UNIT TYPE: NORMAL
BUN SERPL-MCNC: 10 MG/DL (ref 6–20)
CALCIUM SERPL-MCNC: 7.5 MG/DL (ref 8.7–10.5)
CHLORIDE SERPL-SCNC: 101 MMOL/L (ref 95–110)
CO2 SERPL-SCNC: 27 MMOL/L (ref 23–29)
CODING SYSTEM: NORMAL
CREAT SERPL-MCNC: 0.6 MG/DL (ref 0.5–1.4)
CROSSMATCH INTERPRETATION: NORMAL
DACRYOCYTES BLD QL SMEAR: ABNORMAL
DAT IGG-SP REAG RBC-IMP: NORMAL
DIFFERENTIAL METHOD BLD: ABNORMAL
DIFFERENTIAL METHOD BLD: ABNORMAL
DISPENSE STATUS: NORMAL
EOSINOPHIL # BLD AUTO: 0.1 K/UL (ref 0–0.5)
EOSINOPHIL # BLD AUTO: 0.1 K/UL (ref 0–0.5)
EOSINOPHIL NFR BLD: 0.7 % (ref 0–8)
EOSINOPHIL NFR BLD: 1.1 % (ref 0–8)
ERYTHROCYTE [DISTWIDTH] IN BLOOD BY AUTOMATED COUNT: 24.5 % (ref 11.5–14.5)
ERYTHROCYTE [DISTWIDTH] IN BLOOD BY AUTOMATED COUNT: 25.7 % (ref 11.5–14.5)
EST. GFR  (NO RACE VARIABLE): >60 ML/MIN/1.73 M^2
FOLATE SERPL-MCNC: 6.5 NG/ML (ref 4–24)
GLUCOSE SERPL-MCNC: 97 MG/DL (ref 70–110)
HAPTOGLOB SERPL-MCNC: <10 MG/DL (ref 30–250)
HCT VFR BLD AUTO: 20.2 % (ref 37–48.5)
HCT VFR BLD AUTO: 22.9 % (ref 37–48.5)
HGB BLD-MCNC: 6.8 G/DL (ref 12–16)
HGB BLD-MCNC: 7.4 G/DL (ref 12–16)
HYPOCHROMIA BLD QL SMEAR: ABNORMAL
IMM GRANULOCYTES # BLD AUTO: 0.06 K/UL (ref 0–0.04)
IMM GRANULOCYTES # BLD AUTO: 0.06 K/UL (ref 0–0.04)
IMM GRANULOCYTES NFR BLD AUTO: 0.7 % (ref 0–0.5)
IMM GRANULOCYTES NFR BLD AUTO: 0.9 % (ref 0–0.5)
INR PPP: 1.7 (ref 0.8–1.2)
LDH SERPL L TO P-CCNC: 591 U/L (ref 110–260)
LYMPHOCYTES # BLD AUTO: 0.4 K/UL (ref 1–4.8)
LYMPHOCYTES # BLD AUTO: 0.6 K/UL (ref 1–4.8)
LYMPHOCYTES NFR BLD: 5.4 % (ref 18–48)
LYMPHOCYTES NFR BLD: 8.7 % (ref 18–48)
MAGNESIUM SERPL-MCNC: 1.8 MG/DL (ref 1.6–2.6)
MCH RBC QN AUTO: 34.6 PG (ref 27–31)
MCH RBC QN AUTO: 35.8 PG (ref 27–31)
MCHC RBC AUTO-ENTMCNC: 32.3 G/DL (ref 32–36)
MCHC RBC AUTO-ENTMCNC: 33.7 G/DL (ref 32–36)
MCV RBC AUTO: 106 FL (ref 82–98)
MCV RBC AUTO: 107 FL (ref 82–98)
MONOCYTES # BLD AUTO: 0.4 K/UL (ref 0.3–1)
MONOCYTES # BLD AUTO: 0.5 K/UL (ref 0.3–1)
MONOCYTES NFR BLD: 5.2 % (ref 4–15)
MONOCYTES NFR BLD: 6.5 % (ref 4–15)
MRSA ID BY PCR: NEGATIVE
NEUTROPHILS # BLD AUTO: 5.8 K/UL (ref 1.8–7.7)
NEUTROPHILS # BLD AUTO: 7 K/UL (ref 1.8–7.7)
NEUTROPHILS NFR BLD: 82.5 % (ref 38–73)
NEUTROPHILS NFR BLD: 87.8 % (ref 38–73)
NRBC BLD-RTO: 0 /100 WBC
NRBC BLD-RTO: 0 /100 WBC
NUM UNITS TRANS PACKED RBC: NORMAL
OHS QRS DURATION: 104 MS
OHS QTC CALCULATION: 544 MS
OVALOCYTES BLD QL SMEAR: ABNORMAL
PATH REV BLD -IMP: NORMAL
PHOSPHATE SERPL-MCNC: 2.4 MG/DL (ref 2.7–4.5)
PLATELET # BLD AUTO: 128 K/UL (ref 150–450)
PLATELET # BLD AUTO: 156 K/UL (ref 150–450)
PLATELET BLD QL SMEAR: ABNORMAL
PMV BLD AUTO: 11.6 FL (ref 9.2–12.9)
PMV BLD AUTO: 11.8 FL (ref 9.2–12.9)
POIKILOCYTOSIS BLD QL SMEAR: SLIGHT
POLYCHROMASIA BLD QL SMEAR: ABNORMAL
POTASSIUM SERPL-SCNC: 3.1 MMOL/L (ref 3.5–5.1)
PROT SERPL-MCNC: 4.7 G/DL (ref 6–8.4)
PROTHROMBIN TIME: 18.5 SEC (ref 9–12.5)
RBC # BLD AUTO: 1.9 M/UL (ref 4–5.4)
RBC # BLD AUTO: 2.14 M/UL (ref 4–5.4)
RETICS/RBC NFR AUTO: 7.4 % (ref 0.5–2.5)
SODIUM SERPL-SCNC: 136 MMOL/L (ref 136–145)
SPECIMEN OUTDATE: NORMAL
STAPH AUREUS ID BY PCR: NEGATIVE
VIT B12 SERPL-MCNC: 664 PG/ML (ref 210–950)
WBC # BLD AUTO: 7.05 K/UL (ref 3.9–12.7)
WBC # BLD AUTO: 8.03 K/UL (ref 3.9–12.7)

## 2024-04-22 PROCEDURE — 30233N1 TRANSFUSION OF NONAUTOLOGOUS RED BLOOD CELLS INTO PERIPHERAL VEIN, PERCUTANEOUS APPROACH: ICD-10-PCS | Performed by: HOSPITALIST

## 2024-04-22 PROCEDURE — 84100 ASSAY OF PHOSPHORUS: CPT | Performed by: STUDENT IN AN ORGANIZED HEALTH CARE EDUCATION/TRAINING PROGRAM

## 2024-04-22 PROCEDURE — 25500020 PHARM REV CODE 255: Performed by: INTERNAL MEDICINE

## 2024-04-22 PROCEDURE — 83735 ASSAY OF MAGNESIUM: CPT | Performed by: STUDENT IN AN ORGANIZED HEALTH CARE EDUCATION/TRAINING PROGRAM

## 2024-04-22 PROCEDURE — 85025 COMPLETE CBC W/AUTO DIFF WBC: CPT | Performed by: STUDENT IN AN ORGANIZED HEALTH CARE EDUCATION/TRAINING PROGRAM

## 2024-04-22 PROCEDURE — 83010 ASSAY OF HAPTOGLOBIN QUANT: CPT

## 2024-04-22 PROCEDURE — 85610 PROTHROMBIN TIME: CPT | Performed by: STUDENT IN AN ORGANIZED HEALTH CARE EDUCATION/TRAINING PROGRAM

## 2024-04-22 PROCEDURE — 85025 COMPLETE CBC W/AUTO DIFF WBC: CPT | Mod: 91

## 2024-04-22 PROCEDURE — 25000003 PHARM REV CODE 250: Performed by: STUDENT IN AN ORGANIZED HEALTH CARE EDUCATION/TRAINING PROGRAM

## 2024-04-22 PROCEDURE — 25000003 PHARM REV CODE 250

## 2024-04-22 PROCEDURE — 82746 ASSAY OF FOLIC ACID SERUM: CPT

## 2024-04-22 PROCEDURE — 86920 COMPATIBILITY TEST SPIN: CPT

## 2024-04-22 PROCEDURE — P9016 RBC LEUKOCYTES REDUCED: HCPCS

## 2024-04-22 PROCEDURE — 20600001 HC STEP DOWN PRIVATE ROOM

## 2024-04-22 PROCEDURE — 85060 BLOOD SMEAR INTERPRETATION: CPT | Mod: ,,, | Performed by: PATHOLOGY

## 2024-04-22 PROCEDURE — 86850 RBC ANTIBODY SCREEN: CPT

## 2024-04-22 PROCEDURE — 83615 LACTATE (LD) (LDH) ENZYME: CPT

## 2024-04-22 PROCEDURE — 82607 VITAMIN B-12: CPT

## 2024-04-22 PROCEDURE — 86880 COOMBS TEST DIRECT: CPT

## 2024-04-22 PROCEDURE — 99233 SBSQ HOSP IP/OBS HIGH 50: CPT | Mod: ,,, | Performed by: INTERNAL MEDICINE

## 2024-04-22 PROCEDURE — 85045 AUTOMATED RETICULOCYTE COUNT: CPT

## 2024-04-22 PROCEDURE — 63600175 PHARM REV CODE 636 W HCPCS: Performed by: STUDENT IN AN ORGANIZED HEALTH CARE EDUCATION/TRAINING PROGRAM

## 2024-04-22 PROCEDURE — 36430 TRANSFUSION BLD/BLD COMPNT: CPT

## 2024-04-22 PROCEDURE — 80053 COMPREHEN METABOLIC PANEL: CPT | Performed by: STUDENT IN AN ORGANIZED HEALTH CARE EDUCATION/TRAINING PROGRAM

## 2024-04-22 RX ORDER — SODIUM,POTASSIUM PHOSPHATES 280-250MG
2 POWDER IN PACKET (EA) ORAL ONCE
Status: COMPLETED | OUTPATIENT
Start: 2024-04-22 | End: 2024-04-22

## 2024-04-22 RX ORDER — DIPHENHYDRAMINE HCL 25 MG
25 CAPSULE ORAL EVERY 6 HOURS PRN
Status: DISCONTINUED | OUTPATIENT
Start: 2024-04-22 | End: 2024-04-22

## 2024-04-22 RX ORDER — POTASSIUM CHLORIDE 20 MEQ/1
40 TABLET, EXTENDED RELEASE ORAL
Status: COMPLETED | OUTPATIENT
Start: 2024-04-22 | End: 2024-04-22

## 2024-04-22 RX ORDER — DIPHENHYDRAMINE HCL 25 MG
25 CAPSULE ORAL EVERY 6 HOURS PRN
Status: DISCONTINUED | OUTPATIENT
Start: 2024-04-22 | End: 2024-04-26 | Stop reason: HOSPADM

## 2024-04-22 RX ORDER — HYDROCODONE BITARTRATE AND ACETAMINOPHEN 500; 5 MG/1; MG/1
TABLET ORAL
Status: DISCONTINUED | OUTPATIENT
Start: 2024-04-22 | End: 2024-04-26 | Stop reason: HOSPADM

## 2024-04-22 RX ORDER — CEPHALEXIN 250 MG/1
500 CAPSULE ORAL EVERY 6 HOURS
Status: DISCONTINUED | OUTPATIENT
Start: 2024-04-22 | End: 2024-04-23

## 2024-04-22 RX ORDER — PANTOPRAZOLE SODIUM 40 MG/1
40 TABLET, DELAYED RELEASE ORAL 2 TIMES DAILY
Status: DISCONTINUED | OUTPATIENT
Start: 2024-04-22 | End: 2024-04-26 | Stop reason: HOSPADM

## 2024-04-22 RX ADMIN — POTASSIUM CHLORIDE 40 MEQ: 1500 TABLET, EXTENDED RELEASE ORAL at 07:04

## 2024-04-22 RX ADMIN — DEXTROSE MONOHYDRATE 1 G: 2.5 INJECTION INTRAVENOUS at 01:04

## 2024-04-22 RX ADMIN — PANTOPRAZOLE SODIUM 40 MG: 40 TABLET, DELAYED RELEASE ORAL at 08:04

## 2024-04-22 RX ADMIN — FUROSEMIDE 40 MG: 10 INJECTION, SOLUTION INTRAVENOUS at 08:04

## 2024-04-22 RX ADMIN — BENZONATATE 100 MG: 100 CAPSULE ORAL at 09:04

## 2024-04-22 RX ADMIN — POTASSIUM CHLORIDE 40 MEQ: 1500 TABLET, EXTENDED RELEASE ORAL at 09:04

## 2024-04-22 RX ADMIN — ACETAMINOPHEN 1000 MG: 500 TABLET ORAL at 01:04

## 2024-04-22 RX ADMIN — PANTOPRAZOLE SODIUM 40 MG: 40 TABLET, DELAYED RELEASE ORAL at 09:04

## 2024-04-22 RX ADMIN — BENZONATATE 100 MG: 100 CAPSULE ORAL at 08:04

## 2024-04-22 RX ADMIN — CEPHALEXIN 500 MG: 250 CAPSULE ORAL at 05:04

## 2024-04-22 RX ADMIN — CEPHALEXIN 500 MG: 250 CAPSULE ORAL at 11:04

## 2024-04-22 RX ADMIN — IOHEXOL 100 ML: 350 INJECTION, SOLUTION INTRAVENOUS at 06:04

## 2024-04-22 RX ADMIN — BENZONATATE 100 MG: 100 CAPSULE ORAL at 02:04

## 2024-04-22 RX ADMIN — POTASSIUM & SODIUM PHOSPHATES POWDER PACK 280-160-250 MG 2 PACKET: 280-160-250 PACK at 07:04

## 2024-04-22 RX ADMIN — FUROSEMIDE 40 MG: 10 INJECTION, SOLUTION INTRAVENOUS at 09:04

## 2024-04-22 RX ADMIN — DEXTROSE MONOHYDRATE 1 G: 2.5 INJECTION INTRAVENOUS at 04:04

## 2024-04-22 NOTE — HOSPITAL COURSE
Patient admitted to medical oncology for increased swelling, erythema, and pain of bilateral lower extremities. Patient started on increased lasix dose for increased swelling and antibiotics for possible non-purulent cellulitis. S/p recent TIPS procedure. Increased bilirubin from prior admission noted, CT abdomina pelvis liver protocol ordered to eval recent TIPS procedure. CT abd/pelvis noted patent TIPS though findings of volume overloaded. She was diuresed with IV furosemide with good UOP, hepatology consulted for assitance and recommended lasix 40mg daily and spironolactone 100mg daily along with close OP follow up. Found to be bacteremic and placed back on IV cefazolin. Cultures growing Staph pettenkoferi, susceptible to oral linezolid. Repeat cultures negative at 72 hours. Patient to d/c to finish 14 day course of oral linezolid for cellulitis with bacteremia. She is also d/c with lasix and spironolactone for continued volume management, and will have close follow up with hepatology. Patient agreeable to plan and is stable for discharge.

## 2024-04-22 NOTE — NURSING
Nurses Note -- 4 Eyes      4/22/2024   12:54 AM      Skin assessed during: Admit      [] No Altered Skin Integrity Present    []Prevention Measures Documented      [x] Yes- Altered Skin Integrity Present or Discovered   [x] LDA Added if Not in Epic (Describe Wound)   [x] New Altered Skin Integrity was Present on Admit and Documented in LDA   [x] Wound Image Taken    Wound Care Consulted? No    Attending Nurse:  Tsering Cardoso RN/Staff Member: Lindsay

## 2024-04-22 NOTE — CLINICAL REVIEW
Sepsis Screen (most recent)       Sepsis Screen (IP) - 04/22/24 8968       Is the patient's history or complaint suggestive of a possible infection? Yes  -    Are there at least two of the following signs and symptoms present? Yes  -    Sepsis signs/symptoms - Hyper or Hypothermia Hyperthermia >100.4 or Hypothermia < 96.8  -    Sepsis signs/symptoms - Tachycardia Tachycardia     >90  -    Are any of the following organ dysfunction criteria present and not considered to be due to a chronic condition? Yes   cirrhosis, on chemo -    Organ Dysfunction Criteria Total Bilirubin > 2.0 Platelet count < 100,000  -    Organ Dysfunction Criteria INR > 1.5 or aPTT > 60  -    Organ Dysfunction Criteria - O2 O2 Saturation < 95% on room air  -    Initiate Sepsis Protocol No  -    Reason sepsis not considered Pt. receiving appropriate management  -              User Key  (r) = Recorded By, (t) = Taken By, (c) = Cosigned By      Initials Name    Pallavi Herrera RN

## 2024-04-22 NOTE — PLAN OF CARE
POC reviewed with patient; understanding verbalized. Pt was admitted from the ED on 4/21, for heber lower ext cellulitis. Antibiotics were given. U/S completed, no signs of DVT. Pt has pure-wick in place to suction. Pt. with nonskid footwear on, bed in lowest position, and locked with bed rails up x2.  Pt. instructed to call prior to getting OOB.  Pt. has call light and personal items within reach. Patient ambulates in room with assist. VSS and afebrile this shift. All questions and concerns addressed at this time. Will continue to monitor.

## 2024-04-22 NOTE — NURSING
Patient is AAOX4. Up, stand by assistance. Call light and personal items within reach. Patient involved in care. Regular diet with good intake. Pure wick changed and continued. 1 U PRBC transfused, temp 100.6 after transfusion, notified Dr. Stephany Coyne, prn tylenol provided, refused prn benadryl by the patient. IV antibiotic provided. CT abdomen pelvis done. New PIV placed. Patient stable at this time.

## 2024-04-22 NOTE — SUBJECTIVE & OBJECTIVE
Interval History: Patient reports improvement in her lower extremity swelling with IV lasix yesterday. Hg <7, received 1u pRBCs today with elevated temperature afterwards, denied SOB, itching. Stated this has happened in the past as well and self resolved. Hemolysis labs collected before transfusion concerning for possible hemolysis, KM and smear pending.    Oncology Treatment Plan:   OP BREAST FAM-TRASTUZUMAB DERUXTECAN-NXKI Q3W    Medications:  Continuous Infusions:  Current Facility-Administered Medications   Medication Dose Route Frequency Last Rate Last Admin     Scheduled Meds:  Current Facility-Administered Medications   Medication Dose Route Frequency    benzonatate  100 mg Oral TID    cephALEXin  500 mg Oral Q6H    furosemide (LASIX) injection  40 mg Intravenous Q12H    pantoprazole  40 mg Oral BID     PRN Meds:  Current Facility-Administered Medications:     0.9%  NaCl infusion (for blood administration), , Intravenous, Q24H PRN    0.9%  NaCl infusion (for blood administration), , Intravenous, Q24H PRN    0.9%  NaCl infusion (for blood administration), , Intravenous, Q24H PRN    0.9%  NaCl infusion (for blood administration), , Intravenous, Q24H PRN    acetaminophen, 1,000 mg, Oral, Q6H PRN    alteplase, 2 mg, Intra-Catheter, Daily PRN    dextrose 10%, 12.5 g, Intravenous, PRN    dextrose 10%, 25 g, Intravenous, PRN    diphenhydrAMINE, 25 mg, Oral, Q6H PRN    glucagon (human recombinant), 1 mg, Intramuscular, PRN    glucose, 16 g, Oral, PRN    glucose, 24 g, Oral, PRN    heparin, porcine (PF), 300 Units, Intravenous, PRN    hydrOXYzine HCL, 25 mg, Oral, TID PRN    naloxone, 0.02 mg, Intravenous, PRN    ondansetron, 4 mg, Intravenous, Q8H PRN    prochlorperazine, 5 mg, Intravenous, Q6H PRN    sodium chloride 0.9%, 10 mL, Intravenous, Q12H PRN    sodium chloride 0.9%, 3 mL, Intravenous, PRN     Review of Systems   Constitutional:  Negative for chills, fatigue and fever.   HENT:  Negative for congestion and  sore throat.    Eyes:  Negative for visual disturbance.   Respiratory:  Negative for cough, shortness of breath and wheezing.    Cardiovascular:  Positive for leg swelling. Negative for chest pain and palpitations.   Gastrointestinal:  Negative for abdominal pain, diarrhea, nausea and vomiting.   Endocrine: Negative for polyuria.   Genitourinary:  Negative for dysuria, flank pain and frequency.   Musculoskeletal:  Positive for myalgias. Negative for arthralgias and back pain.   Skin:  Positive for color change and wound. Negative for pallor and rash.   Neurological:  Negative for light-headedness and headaches.     Objective:     Vital Signs (Most Recent):  Temp: 98.7 °F (37.1 °C) (04/22/24 1342)  Pulse: 107 (04/22/24 1301)  Resp: 18 (04/22/24 1301)  BP: (!) 108/52 (04/22/24 1301)  SpO2: (!) 93 % (04/22/24 1301) Vital Signs (24h Range):  Temp:  [97.5 °F (36.4 °C)-100.6 °F (38.1 °C)] 98.7 °F (37.1 °C)  Pulse:  [] 107  Resp:  [15-18] 18  SpO2:  [91 %-100 %] 93 %  BP: (108-136)/(52-62) 108/52     Weight: 89.4 kg (197 lb 1.5 oz)  Body mass index is 36.05 kg/m².  Body surface area is 1.98 meters squared.      Intake/Output Summary (Last 24 hours) at 4/22/2024 1451  Last data filed at 4/22/2024 1301  Gross per 24 hour   Intake 867 ml   Output 1250 ml   Net -383 ml        Physical Exam  Vitals and nursing note reviewed.   Constitutional:       General: She is not in acute distress.     Appearance: She is obese. She is not toxic-appearing.   HENT:      Head: Normocephalic and atraumatic.      Mouth/Throat:      Mouth: Mucous membranes are moist.      Pharynx: No oropharyngeal exudate.      Comments: Poor dentition  Eyes:      Extraocular Movements: Extraocular movements intact.      Pupils: Pupils are equal, round, and reactive to light.   Cardiovascular:      Rate and Rhythm: Normal rate and regular rhythm.      Heart sounds: No murmur heard.  Pulmonary:      Effort: Pulmonary effort is normal.      Breath sounds: No  wheezing or rales.   Abdominal:      General: Abdomen is flat. Bowel sounds are normal. There is no distension.      Tenderness: There is no abdominal tenderness. There is no guarding.   Musculoskeletal:         General: Swelling (both legs swollen with pitting edema. weeping R>L) and tenderness (ttp at right leg) present. Normal range of motion.      Cervical back: Normal range of motion.      Right lower leg: Edema present.      Left lower leg: Edema present.   Lymphadenopathy:      Cervical: No cervical adenopathy.   Skin:     General: Skin is warm.      Coloration: Skin is not jaundiced.      Findings: Erythema (bilateral leg erythema, worse at R>L) present. No bruising or rash.   Neurological:      General: No focal deficit present.      Mental Status: She is alert and oriented to person, place, and time.      Cranial Nerves: No cranial nerve deficit.          Significant Labs:   CBC:   Recent Labs   Lab 04/21/24  1624 04/22/24  0414 04/22/24  1013   WBC 11.15 7.05 8.03   HGB 7.8* 6.8* 7.4*   HCT 22.4* 20.2* 22.9*   * 128* 156   , CMP:   Recent Labs   Lab 04/21/24  1624 04/22/24  0414    136   K 2.7* 3.1*    101   CO2 27 27   * 97   BUN 8 10   CREATININE 0.6 0.6   CALCIUM 8.0* 7.5*   PROT 5.3* 4.7*   ALBUMIN 2.5* 2.1*   BILITOT 5.4* 4.1*   ALKPHOS 165* 138*   AST 80* 61*   ALT 52* 43   ANIONGAP 10 8   , Coagulation:   Recent Labs   Lab 04/22/24  0414   INR 1.7*   , Haptoglobin:   Recent Labs   Lab 04/22/24  1013   HAPTOGLOBIN <10*   , and Reticulocytes:   Recent Labs   Lab 04/22/24  1013   RETIC 7.4*       Diagnostic Results:  None

## 2024-04-22 NOTE — PROGRESS NOTES
Dereck Forrester - Oncology ([facility])  Hematology/Oncology  Progress Note    Patient Name: Shikha López  Admission Date: 4/21/2024  Hospital Length of Stay: 0 days  Code Status: Full Code     Subjective:     HPI:  53F with PMH stage 4 breast ca left (mets HER 2 +) Dr. 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 Recent hospital admission for GI bleeding, TIPS procedure, and was admitted to oncology service due to active chemotherapy. Presenting to the ED for complaint of bilateral legs wounds, redness, and swelling BLE. Refers the symptoms slowly worsened since last admission to the point that she cannot walk due to R leg pain. Also refers erythema and warmth of RLE that is new from last admission. She otherwise refers feeling much better when last seen.     AF, VSS, on RA with labs H/H stable from prior around 7-9 baseline, K 2.7, Tbil 5.4, , trop 0.027, CRP 51.3, CXR and T/F Xray stable with soft tissue swelling, given 1L IVFs, Vanc, tylenol in ED    Admitted to Medical Oncology service        Interval History: Patient reports improvement in her lower extremity swelling with IV lasix yesterday. Hg <7, received 1u pRBCs today with elevated temperature afterwards, denied SOB, itching. Stated this has happened in the past as well and self resolved. Hemolysis labs collected before transfusion concerning for possible hemolysis, KM and smear pending.    Oncology Treatment Plan:   OP BREAST FAM-TRASTUZUMAB DERUXTECAN-NXKI Q3W    Medications:  Continuous Infusions:  Current Facility-Administered Medications   Medication Dose Route Frequency Last Rate Last Admin     Scheduled Meds:  Current Facility-Administered Medications   Medication Dose Route Frequency    benzonatate  100 mg Oral TID    cephALEXin  500 mg Oral Q6H    furosemide (LASIX) injection  40 mg Intravenous  Q12H    pantoprazole  40 mg Oral BID     PRN Meds:  Current Facility-Administered Medications:     0.9%  NaCl infusion (for blood administration), , Intravenous, Q24H PRN    0.9%  NaCl infusion (for blood administration), , Intravenous, Q24H PRN    0.9%  NaCl infusion (for blood administration), , Intravenous, Q24H PRN    0.9%  NaCl infusion (for blood administration), , Intravenous, Q24H PRN    acetaminophen, 1,000 mg, Oral, Q6H PRN    alteplase, 2 mg, Intra-Catheter, Daily PRN    dextrose 10%, 12.5 g, Intravenous, PRN    dextrose 10%, 25 g, Intravenous, PRN    diphenhydrAMINE, 25 mg, Oral, Q6H PRN    glucagon (human recombinant), 1 mg, Intramuscular, PRN    glucose, 16 g, Oral, PRN    glucose, 24 g, Oral, PRN    heparin, porcine (PF), 300 Units, Intravenous, PRN    hydrOXYzine HCL, 25 mg, Oral, TID PRN    naloxone, 0.02 mg, Intravenous, PRN    ondansetron, 4 mg, Intravenous, Q8H PRN    prochlorperazine, 5 mg, Intravenous, Q6H PRN    sodium chloride 0.9%, 10 mL, Intravenous, Q12H PRN    sodium chloride 0.9%, 3 mL, Intravenous, PRN     Review of Systems   Constitutional:  Negative for chills, fatigue and fever.   HENT:  Negative for congestion and sore throat.    Eyes:  Negative for visual disturbance.   Respiratory:  Negative for cough, shortness of breath and wheezing.    Cardiovascular:  Positive for leg swelling. Negative for chest pain and palpitations.   Gastrointestinal:  Negative for abdominal pain, diarrhea, nausea and vomiting.   Endocrine: Negative for polyuria.   Genitourinary:  Negative for dysuria, flank pain and frequency.   Musculoskeletal:  Positive for myalgias. Negative for arthralgias and back pain.   Skin:  Positive for color change and wound. Negative for pallor and rash.   Neurological:  Negative for light-headedness and headaches.     Objective:     Vital Signs (Most Recent):  Temp: 98.7 °F (37.1 °C) (04/22/24 1342)  Pulse: 107 (04/22/24 1301)  Resp: 18 (04/22/24 1301)  BP: (!) 108/52  (04/22/24 1301)  SpO2: (!) 93 % (04/22/24 1301) Vital Signs (24h Range):  Temp:  [97.5 °F (36.4 °C)-100.6 °F (38.1 °C)] 98.7 °F (37.1 °C)  Pulse:  [] 107  Resp:  [15-18] 18  SpO2:  [91 %-100 %] 93 %  BP: (108-136)/(52-62) 108/52     Weight: 89.4 kg (197 lb 1.5 oz)  Body mass index is 36.05 kg/m².  Body surface area is 1.98 meters squared.      Intake/Output Summary (Last 24 hours) at 4/22/2024 1451  Last data filed at 4/22/2024 1301  Gross per 24 hour   Intake 867 ml   Output 1250 ml   Net -383 ml        Physical Exam  Vitals and nursing note reviewed.   Constitutional:       General: She is not in acute distress.     Appearance: She is obese. She is not toxic-appearing.   HENT:      Head: Normocephalic and atraumatic.      Mouth/Throat:      Mouth: Mucous membranes are moist.      Pharynx: No oropharyngeal exudate.      Comments: Poor dentition  Eyes:      Extraocular Movements: Extraocular movements intact.      Pupils: Pupils are equal, round, and reactive to light.   Cardiovascular:      Rate and Rhythm: Normal rate and regular rhythm.      Heart sounds: No murmur heard.  Pulmonary:      Effort: Pulmonary effort is normal.      Breath sounds: No wheezing or rales.   Abdominal:      General: Abdomen is flat. Bowel sounds are normal. There is no distension.      Tenderness: There is no abdominal tenderness. There is no guarding.   Musculoskeletal:         General: Swelling (both legs swollen with pitting edema. weeping R>L) and tenderness (ttp at right leg) present. Normal range of motion.      Cervical back: Normal range of motion.      Right lower leg: Edema present.      Left lower leg: Edema present.   Lymphadenopathy:      Cervical: No cervical adenopathy.   Skin:     General: Skin is warm.      Coloration: Skin is not jaundiced.      Findings: Erythema (bilateral leg erythema, worse at R>L) present. No bruising or rash.   Neurological:      General: No focal deficit present.      Mental Status: She is  alert and oriented to person, place, and time.      Cranial Nerves: No cranial nerve deficit.          Significant Labs:   CBC:   Recent Labs   Lab 04/21/24  1624 04/22/24  0414 04/22/24  1013   WBC 11.15 7.05 8.03   HGB 7.8* 6.8* 7.4*   HCT 22.4* 20.2* 22.9*   * 128* 156   , CMP:   Recent Labs   Lab 04/21/24  1624 04/22/24  0414    136   K 2.7* 3.1*    101   CO2 27 27   * 97   BUN 8 10   CREATININE 0.6 0.6   CALCIUM 8.0* 7.5*   PROT 5.3* 4.7*   ALBUMIN 2.5* 2.1*   BILITOT 5.4* 4.1*   ALKPHOS 165* 138*   AST 80* 61*   ALT 52* 43   ANIONGAP 10 8   , Coagulation:   Recent Labs   Lab 04/22/24  0414   INR 1.7*   , Haptoglobin:   Recent Labs   Lab 04/22/24  1013   HAPTOGLOBIN <10*   , and Reticulocytes:   Recent Labs   Lab 04/22/24  1013   RETIC 7.4*       Diagnostic Results:  None  Assessment/Plan:     * Cellulitis of foot  -Bilateral lower extremity swelling recurrent, but worsened from usual  -warm, tender, erythematous R > L  -afebrile, no leukocytosis, slightly tachycardiac  -Given dose of vanc in ED  -Bcx pending, GPC resembling Staph in 1/2 sets, likely contaminant, will f/u ID  -Started cephalexin for possible non-purulent cellulitis  -LE US pending    Volume overload  -BL lower extremity swelling  -rales on lung exam  -  -recent TTE with normal systolic and diastolic fnc  -IV lasix 40 BID with good UOP  -will need increased home lasix dose on discharge    S/P TIPS (transjugular intrahepatic portosystemic shunt)  -TIPS placed 4/12 for acute GI bleeding and hx of gastric and esophageal varices  -has gastric portal vein coiling and embolization   -monitor for HE, takes lactulose as needed, restarted  -f/u CT AP triple phase liver protocol to eval TIPS given rise in bilirubin    Severe obesity (BMI 35.0-39.9) with comorbidity  -discussed diet/exercise/weight loss for overall health benefits    Hypokalemia  -repleted in the ED  -repleting as needed with potassium PO  -2.7 on  admission    Breast cancer, stage 4, left  -Follows with Dr. Willis  -On AGUILAR for 2 yrs             Stephany Coyne MD  Hematology/Oncology  Dereck Forrester - Oncology ([facility])

## 2024-04-23 PROBLEM — K74.5 BILIARY CIRRHOSIS: Status: ACTIVE | Noted: 2024-04-23

## 2024-04-23 LAB
ALBUMIN SERPL BCP-MCNC: 2.1 G/DL (ref 3.5–5.2)
ALP SERPL-CCNC: 140 U/L (ref 55–135)
ALT SERPL W/O P-5'-P-CCNC: 33 U/L (ref 10–44)
ANION GAP SERPL CALC-SCNC: 6 MMOL/L (ref 8–16)
AST SERPL-CCNC: 52 U/L (ref 10–40)
BASOPHILS # BLD AUTO: 0.02 K/UL (ref 0–0.2)
BASOPHILS NFR BLD: 0.4 % (ref 0–1.9)
BILIRUB SERPL-MCNC: 3.8 MG/DL (ref 0.1–1)
BUN SERPL-MCNC: 9 MG/DL (ref 6–20)
CALCIUM SERPL-MCNC: 7.5 MG/DL (ref 8.7–10.5)
CHLORIDE SERPL-SCNC: 105 MMOL/L (ref 95–110)
CO2 SERPL-SCNC: 28 MMOL/L (ref 23–29)
CREAT SERPL-MCNC: 0.5 MG/DL (ref 0.5–1.4)
DIFFERENTIAL METHOD BLD: ABNORMAL
EOSINOPHIL # BLD AUTO: 0.2 K/UL (ref 0–0.5)
EOSINOPHIL NFR BLD: 3.6 % (ref 0–8)
ERYTHROCYTE [DISTWIDTH] IN BLOOD BY AUTOMATED COUNT: 26.6 % (ref 11.5–14.5)
EST. GFR  (NO RACE VARIABLE): >60 ML/MIN/1.73 M^2
FERRITIN SERPL-MCNC: 255 NG/ML (ref 20–300)
GLUCOSE SERPL-MCNC: 94 MG/DL (ref 70–110)
HCT VFR BLD AUTO: 24.5 % (ref 37–48.5)
HGB BLD-MCNC: 8.1 G/DL (ref 12–16)
IMM GRANULOCYTES # BLD AUTO: 0.08 K/UL (ref 0–0.04)
IMM GRANULOCYTES NFR BLD AUTO: 1.4 % (ref 0–0.5)
INR PPP: 1.5 (ref 0.8–1.2)
IRON SERPL-MCNC: 54 UG/DL (ref 30–160)
LYMPHOCYTES # BLD AUTO: 0.6 K/UL (ref 1–4.8)
LYMPHOCYTES NFR BLD: 11.2 % (ref 18–48)
MAGNESIUM SERPL-MCNC: 1.6 MG/DL (ref 1.6–2.6)
MCH RBC QN AUTO: 35.1 PG (ref 27–31)
MCHC RBC AUTO-ENTMCNC: 33.1 G/DL (ref 32–36)
MCV RBC AUTO: 106 FL (ref 82–98)
MONOCYTES # BLD AUTO: 0.3 K/UL (ref 0.3–1)
MONOCYTES NFR BLD: 5.8 % (ref 4–15)
NEUTROPHILS # BLD AUTO: 4.3 K/UL (ref 1.8–7.7)
NEUTROPHILS NFR BLD: 77.6 % (ref 38–73)
NRBC BLD-RTO: 0 /100 WBC
PATH REV BLD -IMP: NORMAL
PHOSPHATE SERPL-MCNC: 2.3 MG/DL (ref 2.7–4.5)
PLATELET # BLD AUTO: 154 K/UL (ref 150–450)
PMV BLD AUTO: 11.5 FL (ref 9.2–12.9)
POTASSIUM SERPL-SCNC: 3.3 MMOL/L (ref 3.5–5.1)
PROT SERPL-MCNC: 4.9 G/DL (ref 6–8.4)
PROTHROMBIN TIME: 16 SEC (ref 9–12.5)
RBC # BLD AUTO: 2.31 M/UL (ref 4–5.4)
SATURATED IRON: 25 % (ref 20–50)
SODIUM SERPL-SCNC: 139 MMOL/L (ref 136–145)
TOTAL IRON BINDING CAPACITY: 216 UG/DL (ref 250–450)
TRANSFERRIN SERPL-MCNC: 146 MG/DL (ref 200–375)
WBC # BLD AUTO: 5.56 K/UL (ref 3.9–12.7)

## 2024-04-23 PROCEDURE — 25000003 PHARM REV CODE 250: Performed by: STUDENT IN AN ORGANIZED HEALTH CARE EDUCATION/TRAINING PROGRAM

## 2024-04-23 PROCEDURE — 87040 BLOOD CULTURE FOR BACTERIA: CPT | Performed by: STUDENT IN AN ORGANIZED HEALTH CARE EDUCATION/TRAINING PROGRAM

## 2024-04-23 PROCEDURE — 80053 COMPREHEN METABOLIC PANEL: CPT | Performed by: STUDENT IN AN ORGANIZED HEALTH CARE EDUCATION/TRAINING PROGRAM

## 2024-04-23 PROCEDURE — 63600175 PHARM REV CODE 636 W HCPCS: Performed by: STUDENT IN AN ORGANIZED HEALTH CARE EDUCATION/TRAINING PROGRAM

## 2024-04-23 PROCEDURE — 82728 ASSAY OF FERRITIN: CPT | Performed by: STUDENT IN AN ORGANIZED HEALTH CARE EDUCATION/TRAINING PROGRAM

## 2024-04-23 PROCEDURE — 83735 ASSAY OF MAGNESIUM: CPT | Performed by: STUDENT IN AN ORGANIZED HEALTH CARE EDUCATION/TRAINING PROGRAM

## 2024-04-23 PROCEDURE — 99233 SBSQ HOSP IP/OBS HIGH 50: CPT | Mod: ,,, | Performed by: INTERNAL MEDICINE

## 2024-04-23 PROCEDURE — 85025 COMPLETE CBC W/AUTO DIFF WBC: CPT | Performed by: STUDENT IN AN ORGANIZED HEALTH CARE EDUCATION/TRAINING PROGRAM

## 2024-04-23 PROCEDURE — 20600001 HC STEP DOWN PRIVATE ROOM

## 2024-04-23 PROCEDURE — 83540 ASSAY OF IRON: CPT | Performed by: STUDENT IN AN ORGANIZED HEALTH CARE EDUCATION/TRAINING PROGRAM

## 2024-04-23 PROCEDURE — 36415 COLL VENOUS BLD VENIPUNCTURE: CPT | Performed by: STUDENT IN AN ORGANIZED HEALTH CARE EDUCATION/TRAINING PROGRAM

## 2024-04-23 PROCEDURE — 99223 1ST HOSP IP/OBS HIGH 75: CPT | Mod: GC,,, | Performed by: INTERNAL MEDICINE

## 2024-04-23 PROCEDURE — 25000003 PHARM REV CODE 250

## 2024-04-23 PROCEDURE — 85610 PROTHROMBIN TIME: CPT | Performed by: STUDENT IN AN ORGANIZED HEALTH CARE EDUCATION/TRAINING PROGRAM

## 2024-04-23 PROCEDURE — 63600175 PHARM REV CODE 636 W HCPCS: Performed by: INTERNAL MEDICINE

## 2024-04-23 PROCEDURE — 25000003 PHARM REV CODE 250: Performed by: INTERNAL MEDICINE

## 2024-04-23 PROCEDURE — 84100 ASSAY OF PHOSPHORUS: CPT | Performed by: STUDENT IN AN ORGANIZED HEALTH CARE EDUCATION/TRAINING PROGRAM

## 2024-04-23 RX ORDER — POTASSIUM CHLORIDE 20 MEQ/1
40 TABLET, EXTENDED RELEASE ORAL ONCE
Status: DISCONTINUED | OUTPATIENT
Start: 2024-04-23 | End: 2024-04-23

## 2024-04-23 RX ORDER — CEPHALEXIN 500 MG/1
500 CAPSULE ORAL EVERY 6 HOURS
Qty: 12 CAPSULE | Refills: 0 | Status: SHIPPED | OUTPATIENT
Start: 2024-04-23 | End: 2024-04-26 | Stop reason: HOSPADM

## 2024-04-23 RX ORDER — LACTULOSE 10 G/15ML
15 SOLUTION ORAL 3 TIMES DAILY
Status: DISCONTINUED | OUTPATIENT
Start: 2024-04-23 | End: 2024-04-26 | Stop reason: HOSPADM

## 2024-04-23 RX ORDER — FUROSEMIDE 40 MG/1
40 TABLET ORAL 2 TIMES DAILY
Qty: 60 TABLET | Refills: 11 | Status: SHIPPED | OUTPATIENT
Start: 2024-04-23 | End: 2024-04-24

## 2024-04-23 RX ORDER — SPIRONOLACTONE 100 MG/1
100 TABLET, FILM COATED ORAL DAILY
Qty: 30 TABLET | Refills: 11 | Status: SHIPPED | OUTPATIENT
Start: 2024-04-23 | End: 2025-04-23

## 2024-04-23 RX ORDER — FUROSEMIDE 10 MG/ML
40 INJECTION INTRAMUSCULAR; INTRAVENOUS ONCE
Status: COMPLETED | OUTPATIENT
Start: 2024-04-23 | End: 2024-04-23

## 2024-04-23 RX ORDER — FUROSEMIDE 10 MG/ML
40 INJECTION INTRAMUSCULAR; INTRAVENOUS EVERY 12 HOURS
Status: DISCONTINUED | OUTPATIENT
Start: 2024-04-24 | End: 2024-04-24

## 2024-04-23 RX ORDER — POTASSIUM CHLORIDE 20 MEQ/1
40 TABLET, EXTENDED RELEASE ORAL
Status: COMPLETED | OUTPATIENT
Start: 2024-04-23 | End: 2024-04-23

## 2024-04-23 RX ORDER — ACETAMINOPHEN 500 MG
1000 TABLET ORAL EVERY 6 HOURS PRN
Status: DISCONTINUED | OUTPATIENT
Start: 2024-04-23 | End: 2024-04-26 | Stop reason: HOSPADM

## 2024-04-23 RX ADMIN — PANTOPRAZOLE SODIUM 40 MG: 40 TABLET, DELAYED RELEASE ORAL at 08:04

## 2024-04-23 RX ADMIN — CEPHALEXIN 500 MG: 250 CAPSULE ORAL at 05:04

## 2024-04-23 RX ADMIN — LACTULOSE 15 G: 20 SOLUTION ORAL at 09:04

## 2024-04-23 RX ADMIN — BENZONATATE 100 MG: 100 CAPSULE ORAL at 08:04

## 2024-04-23 RX ADMIN — ACETAMINOPHEN 1000 MG: 500 TABLET ORAL at 06:04

## 2024-04-23 RX ADMIN — CEFAZOLIN 2 G: 2 INJECTION, POWDER, FOR SOLUTION INTRAMUSCULAR; INTRAVENOUS at 08:04

## 2024-04-23 RX ADMIN — POTASSIUM CHLORIDE 40 MEQ: 1500 TABLET, EXTENDED RELEASE ORAL at 10:04

## 2024-04-23 RX ADMIN — LACTULOSE 15 G: 20 SOLUTION ORAL at 08:04

## 2024-04-23 RX ADMIN — FUROSEMIDE 40 MG: 10 INJECTION, SOLUTION INTRAVENOUS at 08:04

## 2024-04-23 RX ADMIN — ACETAMINOPHEN 1000 MG: 500 TABLET ORAL at 05:04

## 2024-04-23 RX ADMIN — CEFAZOLIN 2 G: 2 INJECTION, POWDER, FOR SOLUTION INTRAMUSCULAR; INTRAVENOUS at 01:04

## 2024-04-23 RX ADMIN — FUROSEMIDE 40 MG: 10 INJECTION, SOLUTION INTRAVENOUS at 01:04

## 2024-04-23 RX ADMIN — BENZONATATE 100 MG: 100 CAPSULE ORAL at 02:04

## 2024-04-23 RX ADMIN — LACTULOSE 15 G: 20 SOLUTION ORAL at 02:04

## 2024-04-23 RX ADMIN — POTASSIUM CHLORIDE 40 MEQ: 1500 TABLET, EXTENDED RELEASE ORAL at 08:04

## 2024-04-23 RX ADMIN — CEPHALEXIN 500 MG: 250 CAPSULE ORAL at 11:04

## 2024-04-23 NOTE — PROGRESS NOTES
Admit Assessment    Patient Identification: Shikha López   :  1970  Admit Date:  2024  Attending Provider:  Mine Quinonez MD              Referral:   Patient was admitted to [facility] with a diagnosis of Cellulitis of foot, and was admitted this hospital stay due to Hypokalemia [E87.6]  Rash [R21]  Acute gastrointestinal bleeding [K92.2]  Right leg pain [M79.604]  Chest pain [R07.9].       is involved was referred to the Social Work Department via routine referral. Patient presents as a 53 y.o. year old single female.     Persons interviewed: Patient     Living Situation:    Patient reports she resides in a single tory home alone with 5 steps to enter the home.      Patient reports she is currently residing with her mom that has dementia until she is able to get her placed in a memory care unit. Patient reports she has a sitter for her mom, but her sister is currently sitting with her mom while she is back IP.      Resides at:  [address] [place] [id number]   Phone: [phone] (Cell)        (RETIRED) Functional Status Prior  Ambulation Prior: 0-->independent  Transferrin-->independent  Toiletin-->independent     Current or Past Agencies and Description of Services/Supplies     DME  Agency Name: N/A    Home Health  Agency Name: [facility]  Agency Phone Number: [phone]  Services: Nursing, PT/OT     IV Infusion  Agency Name: N/A     Nutrition: Regular Diet    Outpatient Pharmacy:     [facility] [address] [address]  Phone: [phone] Fax: [phone]    Patient Preference of agencies include: Patient has no agency preference.      Patient/Caregiver informed of right to choose providers or agencies.  Patient provides permission to release any necessary information to [facility] and to Non[facility] agencies as needed to facilitate patient care, treatment planning, and patient discharge planning. Written and verbal  resources provided.     Coping  Patient reports she is coping just fine.       Adjustment to Diagnosis and Treatment  Patient reports she is adjusting well.      Emotional/Behavioral/Cognitive Issues  Patient reports no emotional, behavioral or cognitive issues at this time.      Employment: Patient retired from the fuel industry     Finances: SSDI     Housing: Safe and permanent     Support: Good support system with her daughter and grandson.     Hobbies/Leisure/Recreation: Patient enjoys gardening, baking, watching movies and playing with her dogs.       Brinda: Alevism     Will: Currently working on obtaining a Living Will.      Transportation: Patient's daughter will provide transportation upon d/c.     Advance Directives: Currently working on obtaining a Advance Directives..      History/Current Symptoms of Anxiety/Depression: No     History/Current Substance Use:   Social History     Tobacco Use    Smoking status: Never    Smokeless tobacco: Never   Substance and Sexual Activity    Alcohol use: Never    Drug use: Never    Sexual activity: Not Currently     Indications of Abuse/Neglect: No  Abuse Screen (yes response referral indicated)  Feels Unsafe at Home or Work/School: no  Feels Threatened by Someone: no  Does anyone try to keep you from having contact with others or doing things outside your home?: no  Physical Signs of Abuse Present: no    Financial:  Payer/Plan Subscr  Sex Relation Sub. Ins. ID Effective Group Num   1. [place] BRADLY GUZMÁN 1970 Female Self [id number] 3/1/09                                    [address]   2. [place] BRAD[place] GUZMÁN 1970 Female Self [id number]* 18 [id number]                                   [address] [address]      Other identified concerns/needs: TBD     Plan: TBD     Interventions/Referrals: TBD    Patient/caregiver engaged in treatment planning process.      providing psychosocial and supportive counseling, resources,  education, assistance and discharge planning as appropriate.  Patient/caregiver state understanding of  available resources,  following, remains available.     ELSIE Wilkes, [facility]  Oncology Social Worker   [facility]ik y - Oncology  [phone]

## 2024-04-23 NOTE — PROGRESS NOTES
Patient educated on risks of ambulating without assistance. Patient refused bed alarm and calling for assistance before ambulating. Patient stated that she ambulates independently without issue. Patient stated that she will call for assistance if she feels that she needs assistance.

## 2024-04-23 NOTE — CONSULTS
Dereck Forrester - Oncology ([facility])  Hepatology  Consult Note    Patient Name: Shikha López  MRN: [id number]  Admission Date: 4/21/2024  Hospital Length of Stay: 1 days  Attending Provider: Mine Quinonez MD   Primary Care Physician: Lenore, Primary Doctor  Principal Problem:Cellulitis of foot    Inpatient consult to Hepatology  Consult performed by: Abimbola Nelson MD  Consult ordered by: Prakash Conway MD        Subjective:     Transplant status: Post-transplant    HPI:  53yoF w S4 breast CA w pulm mets (HER2+, on trastuzumab-deruxtecan), cirrhosis presumed 2/2 HAWTHORNE (diagnosed 6/23 w variceal bleeds s/p coil embolization) admitted to [facility] on 4/21/24 w BLE edema, erythema, and wounds. Of note, patient was hospitalized 4/9-16 for hematochezia, LLQ abd pain, and anemia requiring 3u pRBC. EGD 4/10 with gastric varices showing stigmata. Underwent TIPS 4/12 and variceal embolization. No signs of HE after TIPS. Was discharged with as needed lactulose and Onc, Hep, and GI f/u. Now returning with worsening edema and BLE pain, particularly at RLE preventing ambulation. Hepatology consulted for volume overload in context of recent TIPS. Pt denied repeat GIB. No ascites, AMS, jaundice noted on arrival. Denies alcohol use. Quit smoking 1991. Testing negative for chronic viral hepatitis.  No known family history of liver disease.    Saw outpatient Hepatology on 4/18/24, lasix 40mg qd was continued. No overt signs of decompensation at that time. Hepatitis serologic workup was ordered but pt was unable to complete prior to this presentation. Patient also follows with cardio-onc outpatient, has hx of lower extremity swelling managed with lasix. Last echo 2/24, noted mild-moderate aortic valve stenosis, estimated PASP 38mmHg.     She denies recurrent GI bleeding since discharge. Labs on admit notable for Hgb 6.8, K 3.1, Plt 128, alk phos 140, T bili 4.1 (peaked at 4 during last admit, dropped to 2 after TIPS), ,  Hapto <10, Retic 7.4%, KM -, ferritin nml. INR 1.6, folate/B12 wnl. Peripheral smear pending. CXR w interstitial edema, on RA. CT AP w contrast noted patent TIPS shunt, persistent esophageal varices and upper abdominal/abdominal wall collaterals, unchanged ascending colon wall thickening and fat stranding, diffuse anasarca, distended IVC, ascites. Patient received 1u pRBC w Hgb to 8.1, started on lasix 40IV BID w substantial UOP and improvement in edema. Blood Cx w GPC resembling staph, suspect RLE SSTI. Has been on Vanc -> cefazolin -> keflex since admit, mild fever to 100.6F, improved.        Current Facility-Administered Medications   Medication Dose Route Frequency Provider Last Rate Last Admin    0.9%  NaCl infusion (for blood administration)   Intravenous Q24H PRN Prakash Conway MD        0.9%  NaCl infusion (for blood administration)   Intravenous Q24H PRN Prakash Conway MD        0.9%  NaCl infusion (for blood administration)   Intravenous Q24H PRN Prakash Conway MD        0.9%  NaCl infusion (for blood administration)   Intravenous Q24H PRN Stephany Coyne MD        acetaminophen tablet 1,000 mg  1,000 mg Oral Q6H PRN Cm Morley MD   1,000 mg at 04/23/24 0611    alteplase injection 2 mg  2 mg Intra-Catheter Daily PRN Prakash Conway MD        benzonatate capsule 100 mg  100 mg Oral TID Prakash Conway MD   100 mg at 04/23/24 0850    cephALEXin capsule 500 mg  500 mg Oral Q6H Stephany Coyne MD   500 mg at 04/23/24 0558    dextrose 10% bolus 125 mL 125 mL  12.5 g Intravenous PRN Prakash Conway MD        dextrose 10% bolus 250 mL 250 mL  25 g Intravenous PRN Prakash Conway MD        diphenhydrAMINE capsule 25 mg  25 mg Oral Q6H PRN Stephany Coyne MD        furosemide injection 40 mg  40 mg Intravenous Q12H Prakash Conway MD   40 mg at 04/23/24 0850    glucagon (human recombinant) injection 1 mg   1 mg Intramuscular PRN Prakash Conway MD        glucose chewable tablet 16 g  16 g Oral PRN Prakash Conway MD        glucose chewable tablet 24 g  24 g Oral PRN Prakash Conway MD        heparin, porcine (PF) 100 unit/mL injection flush 300 Units  300 Units Intravenous PRN Prakash Conway MD        hydrOXYzine HCL tablet 25 mg  25 mg Oral TID PRN Prakash Conway MD        lactulose 20 gram/30 mL solution Soln 15 g  15 g Oral TID Prakash Conway MD   15 g at 04/23/24 0926    naloxone 0.4 mg/mL injection 0.02 mg  0.02 mg Intravenous PRN Prakash Conway MD        ondansetron injection 4 mg  4 mg Intravenous Q8H PRN Prakash Conway MD        pantoprazole EC tablet 40 mg  40 mg Oral BID Stephany Coyne MD   40 mg at 04/23/24 0850    potassium chloride SA CR tablet 40 mEq  40 mEq Oral Q2H Prakash Conway MD   40 mEq at 04/23/24 0850    prochlorperazine injection Soln 5 mg  5 mg Intravenous Q6H PRN Prakash Conway MD        sodium chloride 0.9% flush 10 mL  10 mL Intravenous Q12H PRN Prakash Conway MD        sodium chloride 0.9% flush 3 mL  3 mL Intravenous PRN Prakash Conway MD           Objective:     Vital Signs (Most Recent):  Temp: 98.2 °F (36.8 °C) (04/23/24 0739)  Pulse: 95 (04/23/24 0739)  Resp: 16 (04/23/24 0739)  BP: (!) 101/55 (04/23/24 0739)  SpO2: 96 % (04/23/24 0739) Vital Signs (24h Range):  Temp:  [97.5 °F (36.4 °C)-100.6 °F (38.1 °C)] 98.2 °F (36.8 °C)  Pulse:  [] 95  Resp:  [16-20] 16  SpO2:  [93 %-97 %] 96 %  BP: (101-122)/(51-58) 101/55     Weight: 84.8 kg (187 lb 1 oz) (04/23/24 0435)  Body mass index is 34.21 kg/m².       Physical Exam  Constitutional:       General: She is not in acute distress.     Appearance: She is obese. She is ill-appearing. She is not toxic-appearing.   HENT:      Head: Normocephalic and atraumatic.      Right Ear: External  ear normal.      Left Ear: External ear normal.      Nose: Nose normal.      Mouth/Throat:      Mouth: Mucous membranes are moist.   Eyes:      General: No scleral icterus.     Extraocular Movements: Extraocular movements intact.      Conjunctiva/sclera: Conjunctivae normal.   Cardiovascular:      Rate and Rhythm: Normal rate and regular rhythm.      Pulses: Normal pulses.      Heart sounds: Murmur (IV/VI AS) heard.   Pulmonary:      Effort: Pulmonary effort is normal.      Breath sounds: No rhonchi. Rales: minimal.  Abdominal:      General: There is distension.      Palpations: Abdomen is soft. There is no mass.      Tenderness: There is no abdominal tenderness.   Musculoskeletal:         General: Swelling present. Normal range of motion.      Cervical back: Normal range of motion.      Right lower leg: Edema present.      Left lower leg: Edema present.   Skin:     Capillary Refill: Capillary refill takes less than 2 seconds.      Findings: Erythema (RLE) present.   Neurological:      General: No focal deficit present.      Mental Status: She is alert and oriented to person, place, and time.   Psychiatric:         Mood and Affect: Mood normal.         Behavior: Behavior normal.         Thought Content: Thought content normal.         Judgment: Judgment normal.            MELD 3.0: 20 at 4/23/2024  4:45 AM  MELD-Na: 16 at 4/23/2024  4:45 AM  Calculated from:  Serum Creatinine: 0.5 mg/dL (Using min of 1 mg/dL) at 4/23/2024  4:45 AM  Serum Sodium: 139 mmol/L (Using max of 137 mmol/L) at 4/23/2024  4:45 AM  Total Bilirubin: 3.8 mg/dL at 4/23/2024  4:45 AM  Serum Albumin: 2.1 g/dL at 4/23/2024  4:45 AM  INR(ratio): 1.5 at 4/23/2024  4:45 AM  Age at listing (hypothetical): 53 years  Sex: Female at 4/23/2024  4:45 AM      Significant Labs:  CBC:   Recent Labs   Lab 04/23/24 0445   WBC 5.56   RBC 2.31*   HGB 8.1*   HCT 24.5*        CMP:   Recent Labs   Lab 04/23/24 0445   GLU 94   CALCIUM 7.5*   ALBUMIN 2.1*    PROT 4.9*      K 3.3*   CO2 28      BUN 9   CREATININE 0.5   ALKPHOS 140*   ALT 33   AST 52*   BILITOT 3.8*     Coagulation:   Recent Labs   Lab 04/23/24  0445   INR 1.5*       Significant Imaging:  reviewed  Assessment/Plan:     GI  Biliary cirrhosis  Pt reports adequate UOP with lasix 40mg PO qd at home, but grossly overloaded on arrival. Possible concurrent cardiac, BSI contributions with positive cultures and echo w AS (prev followed with cards-onc). Responding well to lasix 40IV BID. persistent hypokalemia since admit, low phos/mag, also possibly d/t lactulose.    - still undergoing IV diuresis today but would discharge with lasix 40mg PO qd and add spironolactone 100mg qd if BP tolerates   - CMP weekly for 4 weeks following discharge   - close Hepatology follow-up   - AST/ALT normalized.    - daily CMP, INR.     S/P TIPS (transjugular intrahepatic portosystemic shunt)  No s/s of HE, continue lactulose titrating to 3-4 BM daily.         Thank you for your consult. I will follow-up with patient. Please contact us if you have any additional questions.    Abimbola Nelson MD  Hepatology  [facility] - Oncology ([facility])

## 2024-04-23 NOTE — PROGRESS NOTES
SW faxed referral to [facility] and  [facility] for IV ABX upon d[facility] for [facility]. GIOVANNI will continue to follow.     ELSIE Wilkes, [facility]  Oncology Social Worker   Hardik [facility] - Oncology  [phone]

## 2024-04-23 NOTE — PLAN OF CARE
Plan of care reviewed with patient. Consults for hepatology and infectious disease. Echo pending. Cefazolin IV started. No complaints or acute events during this shift. Bed alarm refused. VSS, q1h patient rounds, bed in low position and wheels locked, rails up x2, call light and personal belongings within reach.    Heather Razo   4/23/2024   4:01 PM

## 2024-04-23 NOTE — SUBJECTIVE & OBJECTIVE
Current Facility-Administered Medications   Medication Dose Route Frequency Provider Last Rate Last Admin    0.9%  NaCl infusion (for blood administration)   Intravenous Q24H PRN Prakash Conway MD        0.9%  NaCl infusion (for blood administration)   Intravenous Q24H PRN Prakash Conway MD        0.9%  NaCl infusion (for blood administration)   Intravenous Q24H PRN Prakash Conway MD        0.9%  NaCl infusion (for blood administration)   Intravenous Q24H PRN Stephany Coyne MD        acetaminophen tablet 1,000 mg  1,000 mg Oral Q6H PRN Cm Morley MD   1,000 mg at 04/23/24 0611    alteplase injection 2 mg  2 mg Intra-Catheter Daily PRN Prakash Conway MD        benzonatate capsule 100 mg  100 mg Oral TID Prakash Conway MD   100 mg at 04/23/24 0850    cephALEXin capsule 500 mg  500 mg Oral Q6H Stephany Coyne MD   500 mg at 04/23/24 0558    dextrose 10% bolus 125 mL 125 mL  12.5 g Intravenous PRN Prakash Conway MD        dextrose 10% bolus 250 mL 250 mL  25 g Intravenous PRN Prakash Conway MD        diphenhydrAMINE capsule 25 mg  25 mg Oral Q6H PRN Stephany Coyne MD        furosemide injection 40 mg  40 mg Intravenous Q12H Prakash Conway MD   40 mg at 04/23/24 0850    glucagon (human recombinant) injection 1 mg  1 mg Intramuscular PRN Prakash Conway MD        glucose chewable tablet 16 g  16 g Oral PRN Prakash Conway MD        glucose chewable tablet 24 g  24 g Oral PRN Prakash Conway MD        heparin, porcine (PF) 100 unit/mL injection flush 300 Units  300 Units Intravenous PRN Prakash Conway MD        hydrOXYzine HCL tablet 25 mg  25 mg Oral TID PRN Prakash Conway MD        lactulose 20 gram/30 mL solution Soln 15 g  15 g Oral TID Prakash Conway MD   15 g at 04/23/24 0926    naloxone 0.4 mg/mL injection 0.02 mg  0.02  mg Intravenous PRN Prakash Conway MD        ondansetron injection 4 mg  4 mg Intravenous Q8H PRN Prakash Conway MD        pantoprazole EC tablet 40 mg  40 mg Oral BID Stephany Coyne MD   40 mg at 04/23/24 0850    potassium chloride SA CR tablet 40 mEq  40 mEq Oral Q2H Prakash Conway MD   40 mEq at 04/23/24 0850    prochlorperazine injection Soln 5 mg  5 mg Intravenous Q6H PRN Prakash Conway MD        sodium chloride 0.9% flush 10 mL  10 mL Intravenous Q12H PRN Prakash Conway MD        sodium chloride 0.9% flush 3 mL  3 mL Intravenous PRN Prakash Conway MD           Objective:     Vital Signs (Most Recent):  Temp: 98.2 °F (36.8 °C) (04/23/24 0739)  Pulse: 95 (04/23/24 0739)  Resp: 16 (04/23/24 0739)  BP: (!) 101/55 (04/23/24 0739)  SpO2: 96 % (04/23/24 0739) Vital Signs (24h Range):  Temp:  [97.5 °F (36.4 °C)-100.6 °F (38.1 °C)] 98.2 °F (36.8 °C)  Pulse:  [] 95  Resp:  [16-20] 16  SpO2:  [93 %-97 %] 96 %  BP: (101-122)/(51-58) 101/55     Weight: 84.8 kg (187 lb 1 oz) (04/23/24 0435)  Body mass index is 34.21 kg/m².       Physical Exam  Constitutional:       General: She is not in acute distress.     Appearance: She is obese. She is ill-appearing. She is not toxic-appearing.   HENT:      Head: Normocephalic and atraumatic.      Right Ear: External ear normal.      Left Ear: External ear normal.      Nose: Nose normal.      Mouth/Throat:      Mouth: Mucous membranes are moist.   Eyes:      General: No scleral icterus.     Extraocular Movements: Extraocular movements intact.      Conjunctiva/sclera: Conjunctivae normal.   Cardiovascular:      Rate and Rhythm: Normal rate and regular rhythm.      Pulses: Normal pulses.      Heart sounds: Murmur (IV/VI AS) heard.   Pulmonary:      Effort: Pulmonary effort is normal.      Breath sounds: No rhonchi. Rales: minimal.  Abdominal:      General: There is distension.      Palpations: Abdomen is  soft. There is no mass.      Tenderness: There is no abdominal tenderness.   Musculoskeletal:         General: Swelling present. Normal range of motion.      Cervical back: Normal range of motion.      Right lower leg: Edema present.      Left lower leg: Edema present.   Skin:     Capillary Refill: Capillary refill takes less than 2 seconds.      Findings: Erythema (RLE) present.   Neurological:      General: No focal deficit present.      Mental Status: She is alert and oriented to person, place, and time.   Psychiatric:         Mood and Affect: Mood normal.         Behavior: Behavior normal.         Thought Content: Thought content normal.         Judgment: Judgment normal.            MELD 3.0: 20 at 4/23/2024  4:45 AM  MELD-Na: 16 at 4/23/2024  4:45 AM  Calculated from:  Serum Creatinine: 0.5 mg/dL (Using min of 1 mg/dL) at 4/23/2024  4:45 AM  Serum Sodium: 139 mmol/L (Using max of 137 mmol/L) at 4/23/2024  4:45 AM  Total Bilirubin: 3.8 mg/dL at 4/23/2024  4:45 AM  Serum Albumin: 2.1 g/dL at 4/23/2024  4:45 AM  INR(ratio): 1.5 at 4/23/2024  4:45 AM  Age at listing (hypothetical): 53 years  Sex: Female at 4/23/2024  4:45 AM      Significant Labs:  CBC:   Recent Labs   Lab 04/23/24 0445   WBC 5.56   RBC 2.31*   HGB 8.1*   HCT 24.5*        CMP:   Recent Labs   Lab 04/23/24 0445   GLU 94   CALCIUM 7.5*   ALBUMIN 2.1*   PROT 4.9*      K 3.3*   CO2 28      BUN 9   CREATININE 0.5   ALKPHOS 140*   ALT 33   AST 52*   BILITOT 3.8*     Coagulation:   Recent Labs   Lab 04/23/24 0445   INR 1.5*       Significant Imaging:  reviewed

## 2024-04-23 NOTE — SUBJECTIVE & OBJECTIVE
Interval History: Afebrile overnight though blood cultures growing GPCs so reculturing and back on IV abx. Hepatology consulted to assist with diuresis. Remains feeling much improved    Oncology Treatment Plan:   OP BREAST FAM-TRASTUZUMAB DERUXTECAN-NXKI Q3W    Medications:  Continuous Infusions:  Current Facility-Administered Medications   Medication Dose Route Frequency Last Rate Last Admin     Scheduled Meds:  Current Facility-Administered Medications   Medication Dose Route Frequency    benzonatate  100 mg Oral TID    ceFAZolin (Ancef) IV (PEDS and ADULTS)  2 g Intravenous Q8H    furosemide (LASIX) injection  40 mg Intravenous Once    lactulose  15 g Oral TID    pantoprazole  40 mg Oral BID     PRN Meds:  Current Facility-Administered Medications:     0.9%  NaCl infusion (for blood administration), , Intravenous, Q24H PRN    0.9%  NaCl infusion (for blood administration), , Intravenous, Q24H PRN    0.9%  NaCl infusion (for blood administration), , Intravenous, Q24H PRN    0.9%  NaCl infusion (for blood administration), , Intravenous, Q24H PRN    acetaminophen, 1,000 mg, Oral, Q6H PRN    alteplase, 2 mg, Intra-Catheter, Daily PRN    dextrose 10%, 12.5 g, Intravenous, PRN    dextrose 10%, 25 g, Intravenous, PRN    diphenhydrAMINE, 25 mg, Oral, Q6H PRN    glucagon (human recombinant), 1 mg, Intramuscular, PRN    glucose, 16 g, Oral, PRN    glucose, 24 g, Oral, PRN    heparin, porcine (PF), 300 Units, Intravenous, PRN    hydrOXYzine HCL, 25 mg, Oral, TID PRN    naloxone, 0.02 mg, Intravenous, PRN    ondansetron, 4 mg, Intravenous, Q8H PRN    prochlorperazine, 5 mg, Intravenous, Q6H PRN    sodium chloride 0.9%, 10 mL, Intravenous, Q12H PRN    sodium chloride 0.9%, 3 mL, Intravenous, PRN     Review of Systems   Constitutional:  Negative for chills, fatigue and fever.   HENT:  Negative for congestion and sore throat.    Eyes:  Negative for visual disturbance.   Respiratory:  Negative for cough, shortness of breath and  wheezing.    Cardiovascular:  Positive for leg swelling. Negative for chest pain and palpitations.   Gastrointestinal:  Negative for abdominal pain, diarrhea, nausea and vomiting.   Endocrine: Negative for polyuria.   Genitourinary:  Negative for dysuria, flank pain and frequency.   Musculoskeletal:  Positive for myalgias. Negative for arthralgias and back pain.   Skin:  Positive for color change and wound. Negative for pallor and rash.   Neurological:  Negative for light-headedness and headaches.     Objective:     Vital Signs (Most Recent):  Temp: 97.4 °F (36.3 °C) (04/23/24 1132)  Pulse: 96 (04/23/24 1132)  Resp: 17 (04/23/24 1132)  BP: (!) 113/53 (04/23/24 1132)  SpO2: 98 % (04/23/24 1132) Vital Signs (24h Range):  Temp:  [97.4 °F (36.3 °C)-100.6 °F (38.1 °C)] 97.4 °F (36.3 °C)  Pulse:  [] 96  Resp:  [16-20] 17  SpO2:  [93 %-98 %] 98 %  BP: (101-117)/(51-55) 113/53     Weight: 84.8 kg (187 lb 1 oz)  Body mass index is 34.21 kg/m².  Body surface area is 1.93 meters squared.      Intake/Output Summary (Last 24 hours) at 4/23/2024 1232  Last data filed at 4/23/2024 1128  Gross per 24 hour   Intake 1380 ml   Output 2200 ml   Net -820 ml        Physical Exam  Vitals and nursing note reviewed.   Constitutional:       General: She is not in acute distress.     Appearance: She is obese. She is not toxic-appearing.   HENT:      Head: Normocephalic and atraumatic.      Mouth/Throat:      Mouth: Mucous membranes are moist.      Pharynx: No oropharyngeal exudate.      Comments: Poor dentition  Eyes:      Extraocular Movements: Extraocular movements intact.      Pupils: Pupils are equal, round, and reactive to light.   Cardiovascular:      Rate and Rhythm: Normal rate and regular rhythm.      Heart sounds: No murmur heard.  Pulmonary:      Effort: Pulmonary effort is normal.      Breath sounds: No wheezing or rales.   Abdominal:      General: Abdomen is flat. Bowel sounds are normal. There is no distension.       Tenderness: There is no abdominal tenderness. There is no guarding.   Musculoskeletal:         General: Swelling (both legs swollen with pitting edema. weeping R>L) and tenderness (ttp at right leg) present. Normal range of motion.      Cervical back: Normal range of motion.      Right lower leg: Edema present.      Left lower leg: Edema present.   Lymphadenopathy:      Cervical: No cervical adenopathy.   Skin:     General: Skin is warm.      Coloration: Skin is not jaundiced.      Findings: Erythema (bilateral leg erythema, worse at R>L) present. No bruising or rash.   Neurological:      General: No focal deficit present.      Mental Status: She is alert and oriented to person, place, and time.      Cranial Nerves: No cranial nerve deficit.          Significant Labs:   All pertinent labs from the last 24 hours have been reviewed.    Diagnostic Results:  I have reviewed all pertinent imaging results/findings within the past 24 hours.

## 2024-04-23 NOTE — HPI
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 Of note, patient was hospitalized 4/9-16 for hematochezia, LLQ abd pain, and anemia requiring 3u pRBC. EGD 4/10 with gastric varices showing stigmata. Underwent TIPS 4/12 and variceal embolization. No signs of HE after TIPS. Was discharged with as needed lactulose and Onc, Hep, and GI f/u. Now returning with worsening edema and BLE pain, particularly at RLE preventing ambulation. Hepatology consulted for volume overload in context of recent TIPS. Pt denied repeat GIB. No ascites, AMS, jaundice noted on arrival. Denies alcohol use. Quit smoking 1991. Testing negative for chronic viral hepatitis.  No known family history of liver disease.    Saw outpatient Hepatology on 4/18/24, lasix 40mg qd was continued. No overt signs of decompensation at that time. Hepatitis serologic workup was ordered but pt was unable to complete prior to this presentation. Patient also follows with cardio-onc outpatient, has hx of lower extremity swelling managed with lasix. Last echo 2/24, noted mild-moderate aortic valve stenosis, estimated PASP 38mmHg.     She denies recurrent GI bleeding since discharge. Labs on admit notable for Hgb 6.8, K 3.1, Plt 128, alk phos 140, T bili 4.1 (peaked at 4 during last admit, dropped to 2 after TIPS), , Hapto <10, Retic 7.4%, KM -, ferritin nml. INR 1.6, folate/B12 wnl. Peripheral smear pending. CXR w interstitial edema, on RA. CT AP w contrast noted patent TIPS shunt, persistent esophageal varices and upper abdominal/abdominal wall collaterals, unchanged ascending colon wall thickening and fat stranding, diffuse anasarca, distended IVC, ascites. Patient received 1u pRBC w Hgb to 8.1, started on lasix 40IV BID w substantial UOP and improvement in edema. Blood Cx w GPC resembling  staph, suspect RLE SSTI. Has been on Vanc -> cefazolin -> keflex since admit, mild fever to 100.6F, improved.

## 2024-04-23 NOTE — PROGRESS NOTES
Pharmacist Renal Dose Adjustment Note    Shikha López is a 53 y.o. female being treated with the medication cefazolin    Patient Data:    Vital Signs (Most Recent):  Temp: 97.4 °F (36.3 °C) (04/23/24 1132)  Pulse: 96 (04/23/24 1132)  Resp: 17 (04/23/24 1132)  BP: (!) 113/53 (04/23/24 1132)  SpO2: 98 % (04/23/24 1132) Vital Signs (72h Range):  Temp:  [97.4 °F (36.3 °C)-100.6 °F (38.1 °C)]   Pulse:  []   Resp:  [15-20]   BP: (101-136)/(51-62)   SpO2:  [91 %-100 %]      Recent Labs   Lab 04/21/24  1624 04/22/24  0414 04/23/24  0445   CREATININE 0.6 0.6 0.5     Serum creatinine: 0.5 mg/dL 04/23/24 0445  Estimated creatinine clearance: 130 mL/min    Medication:cefazolin 2gQ12H will be changed to cefazolin 2g Q8H due to crcl > 130mL/min    Pharmacist's Name: Teresa Mart  Pharmacist's Extension: [phone]

## 2024-04-23 NOTE — PROGRESS NOTES
[facility] - Oncology ([facility])  Hematology/Oncology  Progress Note    Patient Name: Shikha López  Admission Date: 4/21/2024  Hospital Length of Stay: 1 days  Code Status: Full Code     Subjective:     HPI:  53F with PMH stage 4 breast ca left (mets HER 2 +) Dr. 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 Recent hospital admission for GI bleeding, TIPS procedure, and was admitted to oncology service due to active chemotherapy. Presenting to the ED for complaint of bilateral legs wounds, redness, and swelling BLE. Refers the symptoms slowly worsened since last admission to the point that she cannot walk due to R leg pain. Also refers erythema and warmth of RLE that is new from last admission. She otherwise refers feeling much better when last seen.     AF, VSS, on RA with labs H/H stable from prior around 7-9 baseline, K 2.7, Tbil 5.4, , trop 0.027, CRP 51.3, CXR and T/F Xray stable with soft tissue swelling, given 1L IVFs, Vanc, tylenol in ED    Admitted to Medical Oncology service        Interval History: Afebrile overnight though blood cultures growing GPCs so reculturing and back on IV abx. Hepatology consulted to assist with diuresis. Remains feeling much improved    Oncology Treatment Plan:   OP BREAST FAM-TRASTUZUMAB DERUXTECAN-NXKI Q3W    Medications:  Continuous Infusions:  Current Facility-Administered Medications   Medication Dose Route Frequency Last Rate Last Admin     Scheduled Meds:  Current Facility-Administered Medications   Medication Dose Route Frequency    benzonatate  100 mg Oral TID    ceFAZolin (Ancef) IV (PEDS and ADULTS)  2 g Intravenous Q8H    furosemide (LASIX) injection  40 mg Intravenous Once    lactulose  15 g Oral TID    pantoprazole  40 mg Oral BID     PRN Meds:  Current Facility-Administered Medications:     0.9%  NaCl infusion  (for blood administration), , Intravenous, Q24H PRN    0.9%  NaCl infusion (for blood administration), , Intravenous, Q24H PRN    0.9%  NaCl infusion (for blood administration), , Intravenous, Q24H PRN    0.9%  NaCl infusion (for blood administration), , Intravenous, Q24H PRN    acetaminophen, 1,000 mg, Oral, Q6H PRN    alteplase, 2 mg, Intra-Catheter, Daily PRN    dextrose 10%, 12.5 g, Intravenous, PRN    dextrose 10%, 25 g, Intravenous, PRN    diphenhydrAMINE, 25 mg, Oral, Q6H PRN    glucagon (human recombinant), 1 mg, Intramuscular, PRN    glucose, 16 g, Oral, PRN    glucose, 24 g, Oral, PRN    heparin, porcine (PF), 300 Units, Intravenous, PRN    hydrOXYzine HCL, 25 mg, Oral, TID PRN    naloxone, 0.02 mg, Intravenous, PRN    ondansetron, 4 mg, Intravenous, Q8H PRN    prochlorperazine, 5 mg, Intravenous, Q6H PRN    sodium chloride 0.9%, 10 mL, Intravenous, Q12H PRN    sodium chloride 0.9%, 3 mL, Intravenous, PRN     Review of Systems   Constitutional:  Negative for chills, fatigue and fever.   HENT:  Negative for congestion and sore throat.    Eyes:  Negative for visual disturbance.   Respiratory:  Negative for cough, shortness of breath and wheezing.    Cardiovascular:  Positive for leg swelling. Negative for chest pain and palpitations.   Gastrointestinal:  Negative for abdominal pain, diarrhea, nausea and vomiting.   Endocrine: Negative for polyuria.   Genitourinary:  Negative for dysuria, flank pain and frequency.   Musculoskeletal:  Positive for myalgias. Negative for arthralgias and back pain.   Skin:  Positive for color change and wound. Negative for pallor and rash.   Neurological:  Negative for light-headedness and headaches.     Objective:     Vital Signs (Most Recent):  Temp: 97.4 °F (36.3 °C) (04/23/24 1132)  Pulse: 96 (04/23/24 1132)  Resp: 17 (04/23/24 1132)  BP: (!) 113/53 (04/23/24 1132)  SpO2: 98 % (04/23/24 1132) Vital Signs (24h Range):  Temp:  [97.4 °F (36.3 °C)-100.6 °F (38.1 °C)] 97.4 °F  (36.3 °C)  Pulse:  [] 96  Resp:  [16-20] 17  SpO2:  [93 %-98 %] 98 %  BP: (101-117)/(51-55) 113/53     Weight: 84.8 kg (187 lb 1 oz)  Body mass index is 34.21 kg/m².  Body surface area is 1.93 meters squared.      Intake/Output Summary (Last 24 hours) at 4/23/2024 1232  Last data filed at 4/23/2024 1128  Gross per 24 hour   Intake 1380 ml   Output 2200 ml   Net -820 ml        Physical Exam  Vitals and nursing note reviewed.   Constitutional:       General: She is not in acute distress.     Appearance: She is obese. She is not toxic-appearing.   HENT:      Head: Normocephalic and atraumatic.      Mouth/Throat:      Mouth: Mucous membranes are moist.      Pharynx: No oropharyngeal exudate.      Comments: Poor dentition  Eyes:      Extraocular Movements: Extraocular movements intact.      Pupils: Pupils are equal, round, and reactive to light.   Cardiovascular:      Rate and Rhythm: Normal rate and regular rhythm.      Heart sounds: No murmur heard.  Pulmonary:      Effort: Pulmonary effort is normal.      Breath sounds: No wheezing or rales.   Abdominal:      General: Abdomen is flat. Bowel sounds are normal. There is no distension.      Tenderness: There is no abdominal tenderness. There is no guarding.   Musculoskeletal:         General: Swelling (both legs swollen with pitting edema. weeping R>L) and tenderness (ttp at right leg) present. Normal range of motion.      Cervical back: Normal range of motion.      Right lower leg: Edema present.      Left lower leg: Edema present.   Lymphadenopathy:      Cervical: No cervical adenopathy.   Skin:     General: Skin is warm.      Coloration: Skin is not jaundiced.      Findings: Erythema (bilateral leg erythema, worse at R>L) present. No bruising or rash.   Neurological:      General: No focal deficit present.      Mental Status: She is alert and oriented to person, place, and time.      Cranial Nerves: No cranial nerve deficit.          Significant Labs:   All  pertinent labs from the last 24 hours have been reviewed.    Diagnostic Results:  I have reviewed all pertinent imaging results/findings within the past 24 hours.  Assessment/Plan:     * Cellulitis of foot  -Bilateral lower extremity swelling recurrent, but worsened from usual  -warm, tender, erythematous R > L  -afebrile, no leukocytosis, slightly tachycardiac  -Given dose of vanc in ED  -Bcx pending, GPC resembling Staph in 1/2 sets, likely contaminant, will f/u ID  -Started ancef for possible non-purulent cellulitis  -now growing GPCs and continuing IV antibiotics      Volume overload  -BL lower extremity swelling  -rales on lung exam  -  -recent TTE with normal systolic and diastolic fnc  -IV lasix 40 BID with good UOP  -will need increased home lasix dose on discharge +  likely spironolactone    S/P TIPS (transjugular intrahepatic portosystemic shunt)  -TIPS placed 4/12 for acute GI bleeding and hx of gastric and esophageal varices  -has gastric portal vein coiling and embolization   -monitor for HE, takes lactulose as needed, restarted  -f/u CT AP triple phase liver protocol to eval TIPS given rise in bilirubin. Showed patency though noted volume overload    Severe obesity (BMI 35.0-39.9) with comorbidity  -discussed diet/exercise/weight loss for overall health benefits    Hypokalemia  -repleted in the ED  -repleting as needed with potassium PO      Breast cancer, stage 4, left  -Follows with Dr. Willis  [facility]             Prakash Conway MD  Hematology/Oncology  [facility] - Oncology ([facility])

## 2024-04-24 PROBLEM — R78.81 BACTEREMIA: Status: ACTIVE | Noted: 2024-04-24

## 2024-04-24 LAB
ALBUMIN SERPL BCP-MCNC: 2.1 G/DL (ref 3.5–5.2)
ALP SERPL-CCNC: 151 U/L (ref 55–135)
ALT SERPL W/O P-5'-P-CCNC: 24 U/L (ref 10–44)
ANION GAP SERPL CALC-SCNC: 8 MMOL/L (ref 8–16)
ASCENDING AORTA: 2.66 CM
AST SERPL-CCNC: 39 U/L (ref 10–40)
AV INDEX (PROSTH): 0.36
AV MEAN GRADIENT: 21 MMHG
AV PEAK GRADIENT: 33 MMHG
AV VALVE AREA BY VELOCITY RATIO: 1.36 CM²
AV VALVE AREA: 1.39 CM²
AV VELOCITY RATIO: 0.36
BASOPHILS # BLD AUTO: 0.02 K/UL (ref 0–0.2)
BASOPHILS NFR BLD: 0.5 % (ref 0–1.9)
BILIRUB SERPL-MCNC: 2.9 MG/DL (ref 0.1–1)
BSA FOR ECHO PROCEDURE: 1.83 M2
BUN SERPL-MCNC: 8 MG/DL (ref 6–20)
CALCIUM SERPL-MCNC: 7.8 MG/DL (ref 8.7–10.5)
CHLORIDE SERPL-SCNC: 104 MMOL/L (ref 95–110)
CO2 SERPL-SCNC: 30 MMOL/L (ref 23–29)
CREAT SERPL-MCNC: 0.7 MG/DL (ref 0.5–1.4)
CV ECHO LV RWT: 0.42 CM
DIFFERENTIAL METHOD BLD: ABNORMAL
DOP CALC AO PEAK VEL: 2.87 M/S
DOP CALC AO VTI: 57.93 CM
DOP CALC LVOT AREA: 3.8 CM2
DOP CALC LVOT DIAMETER: 2.2 CM
DOP CALC LVOT PEAK VEL: 1.03 M/S
DOP CALC LVOT STROKE VOLUME: 80.24 CM3
DOP CALCLVOT PEAK VEL VTI: 21.12 CM
E WAVE DECELERATION TIME: 187.5 MSEC
E/A RATIO: 1.04
E/E' RATIO: 11.48 M/S
ECHO LV POSTERIOR WALL: 0.91 CM (ref 0.6–1.1)
EOSINOPHIL # BLD AUTO: 0.2 K/UL (ref 0–0.5)
EOSINOPHIL NFR BLD: 4.8 % (ref 0–8)
ERYTHROCYTE [DISTWIDTH] IN BLOOD BY AUTOMATED COUNT: 26.8 % (ref 11.5–14.5)
EST. GFR  (NO RACE VARIABLE): >60 ML/MIN/1.73 M^2
FRACTIONAL SHORTENING: 32 % (ref 28–44)
GLUCOSE SERPL-MCNC: 92 MG/DL (ref 70–110)
HCT VFR BLD AUTO: 26 % (ref 37–48.5)
HGB BLD-MCNC: 8.3 G/DL (ref 12–16)
IMM GRANULOCYTES # BLD AUTO: 0.02 K/UL (ref 0–0.04)
IMM GRANULOCYTES NFR BLD AUTO: 0.5 % (ref 0–0.5)
INR PPP: 1.4 (ref 0.8–1.2)
INTERVENTRICULAR SEPTUM: 0.82 CM (ref 0.6–1.1)
LA MAJOR: 5.79 CM
LA MINOR: 5.16 CM
LA WIDTH: 4.06 CM
LEFT ATRIUM SIZE: 4.76 CM
LEFT ATRIUM VOLUME INDEX: 50.6 ML/M2
LEFT ATRIUM VOLUME: 89.64 CM3
LEFT INTERNAL DIMENSION IN SYSTOLE: 2.97 CM (ref 2.1–4)
LEFT VENTRICLE DIASTOLIC VOLUME INDEX: 48.71 ML/M2
LEFT VENTRICLE DIASTOLIC VOLUME: 86.22 ML
LEFT VENTRICLE MASS INDEX: 68 G/M2
LEFT VENTRICLE SYSTOLIC VOLUME INDEX: 19.4 ML/M2
LEFT VENTRICLE SYSTOLIC VOLUME: 34.25 ML
LEFT VENTRICULAR INTERNAL DIMENSION IN DIASTOLE: 4.37 CM (ref 3.5–6)
LEFT VENTRICULAR MASS: 120.01 G
LV LATERAL E/E' RATIO: 11.92 M/S
LV SEPTAL E/E' RATIO: 11.07 M/S
LYMPHOCYTES # BLD AUTO: 0.6 K/UL (ref 1–4.8)
LYMPHOCYTES NFR BLD: 17.2 % (ref 18–48)
MAGNESIUM SERPL-MCNC: 1.6 MG/DL (ref 1.6–2.6)
MCH RBC QN AUTO: 34.7 PG (ref 27–31)
MCHC RBC AUTO-ENTMCNC: 31.9 G/DL (ref 32–36)
MCV RBC AUTO: 109 FL (ref 82–98)
MONOCYTES # BLD AUTO: 0.3 K/UL (ref 0.3–1)
MONOCYTES NFR BLD: 8 % (ref 4–15)
MV PEAK A VEL: 1.49 M/S
MV PEAK E VEL: 1.55 M/S
MV STENOSIS PRESSURE HALF TIME: 54.37 MS
MV VALVE AREA P 1/2 METHOD: 4.05 CM2
NEUTROPHILS # BLD AUTO: 2.6 K/UL (ref 1.8–7.7)
NEUTROPHILS NFR BLD: 69 % (ref 38–73)
NRBC BLD-RTO: 0 /100 WBC
OHS CV RV/LV RATIO: 0.65 CM
PHOSPHATE SERPL-MCNC: 2.5 MG/DL (ref 2.7–4.5)
PISA TR MAX VEL: 2.91 M/S
PLATELET # BLD AUTO: 164 K/UL (ref 150–450)
PMV BLD AUTO: 11 FL (ref 9.2–12.9)
POTASSIUM SERPL-SCNC: 3.6 MMOL/L (ref 3.5–5.1)
PROT SERPL-MCNC: 4.9 G/DL (ref 6–8.4)
PROTHROMBIN TIME: 14.9 SEC (ref 9–12.5)
RA MAJOR: 4.74 CM
RA PRESSURE ESTIMATED: 3 MMHG
RA WIDTH: 3.09 CM
RBC # BLD AUTO: 2.39 M/UL (ref 4–5.4)
RIGHT VENTRICULAR END-DIASTOLIC DIMENSION: 2.85 CM
RV TB RVSP: 6 MMHG
SINUS: 2.67 CM
SODIUM SERPL-SCNC: 142 MMOL/L (ref 136–145)
STJ: 2.48 CM
TDI LATERAL: 0.13 M/S
TDI SEPTAL: 0.14 M/S
TDI: 0.14 M/S
TR MAX PG: 34 MMHG
TRICUSPID ANNULAR PLANE SYSTOLIC EXCURSION: 2.36 CM
TV REST PULMONARY ARTERY PRESSURE: 37 MMHG
WBC # BLD AUTO: 3.73 K/UL (ref 3.9–12.7)
Z-SCORE OF LEFT VENTRICULAR DIMENSION IN END DIASTOLE: -1.13
Z-SCORE OF LEFT VENTRICULAR DIMENSION IN END SYSTOLE: -0.15

## 2024-04-24 PROCEDURE — 63600175 PHARM REV CODE 636 W HCPCS: Performed by: INTERNAL MEDICINE

## 2024-04-24 PROCEDURE — 36415 COLL VENOUS BLD VENIPUNCTURE: CPT | Performed by: INTERNAL MEDICINE

## 2024-04-24 PROCEDURE — 85025 COMPLETE CBC W/AUTO DIFF WBC: CPT | Performed by: INTERNAL MEDICINE

## 2024-04-24 PROCEDURE — 99233 SBSQ HOSP IP/OBS HIGH 50: CPT | Mod: ,,, | Performed by: INTERNAL MEDICINE

## 2024-04-24 PROCEDURE — 63600175 PHARM REV CODE 636 W HCPCS: Performed by: STUDENT IN AN ORGANIZED HEALTH CARE EDUCATION/TRAINING PROGRAM

## 2024-04-24 PROCEDURE — 84100 ASSAY OF PHOSPHORUS: CPT | Performed by: INTERNAL MEDICINE

## 2024-04-24 PROCEDURE — 25000003 PHARM REV CODE 250: Performed by: INTERNAL MEDICINE

## 2024-04-24 PROCEDURE — 85610 PROTHROMBIN TIME: CPT | Performed by: INTERNAL MEDICINE

## 2024-04-24 PROCEDURE — 25000003 PHARM REV CODE 250: Performed by: STUDENT IN AN ORGANIZED HEALTH CARE EDUCATION/TRAINING PROGRAM

## 2024-04-24 PROCEDURE — 25000003 PHARM REV CODE 250

## 2024-04-24 PROCEDURE — 20600001 HC STEP DOWN PRIVATE ROOM

## 2024-04-24 PROCEDURE — 83735 ASSAY OF MAGNESIUM: CPT | Performed by: INTERNAL MEDICINE

## 2024-04-24 PROCEDURE — 99223 1ST HOSP IP/OBS HIGH 75: CPT | Mod: ,,, | Performed by: INTERNAL MEDICINE

## 2024-04-24 PROCEDURE — 80053 COMPREHEN METABOLIC PANEL: CPT | Performed by: INTERNAL MEDICINE

## 2024-04-24 RX ORDER — MUPIROCIN 20 MG/G
OINTMENT TOPICAL 2 TIMES DAILY
Status: DISCONTINUED | OUTPATIENT
Start: 2024-04-24 | End: 2024-04-26 | Stop reason: HOSPADM

## 2024-04-24 RX ORDER — SODIUM,POTASSIUM PHOSPHATES 280-250MG
2 POWDER IN PACKET (EA) ORAL ONCE
Status: COMPLETED | OUTPATIENT
Start: 2024-04-24 | End: 2024-04-24

## 2024-04-24 RX ORDER — FUROSEMIDE 10 MG/ML
40 INJECTION INTRAMUSCULAR; INTRAVENOUS DAILY
Status: DISCONTINUED | OUTPATIENT
Start: 2024-04-25 | End: 2024-04-26 | Stop reason: HOSPADM

## 2024-04-24 RX ADMIN — HYDROXYZINE HYDROCHLORIDE 25 MG: 25 TABLET, FILM COATED ORAL at 09:04

## 2024-04-24 RX ADMIN — CEFAZOLIN 2 G: 2 INJECTION, POWDER, FOR SOLUTION INTRAMUSCULAR; INTRAVENOUS at 04:04

## 2024-04-24 RX ADMIN — BENZONATATE 100 MG: 100 CAPSULE ORAL at 09:04

## 2024-04-24 RX ADMIN — POTASSIUM & SODIUM PHOSPHATES POWDER PACK 280-160-250 MG 2 PACKET: 280-160-250 PACK at 08:04

## 2024-04-24 RX ADMIN — PANTOPRAZOLE SODIUM 40 MG: 40 TABLET, DELAYED RELEASE ORAL at 09:04

## 2024-04-24 RX ADMIN — LACTULOSE 15 G: 20 SOLUTION ORAL at 08:04

## 2024-04-24 RX ADMIN — PANTOPRAZOLE SODIUM 40 MG: 40 TABLET, DELAYED RELEASE ORAL at 08:04

## 2024-04-24 RX ADMIN — MUPIROCIN: 20 OINTMENT TOPICAL at 09:04

## 2024-04-24 RX ADMIN — CEFAZOLIN 2 G: 2 INJECTION, POWDER, FOR SOLUTION INTRAMUSCULAR; INTRAVENOUS at 01:04

## 2024-04-24 RX ADMIN — LACTULOSE 15 G: 20 SOLUTION ORAL at 02:04

## 2024-04-24 RX ADMIN — ACETAMINOPHEN 1000 MG: 500 TABLET ORAL at 06:04

## 2024-04-24 RX ADMIN — FUROSEMIDE 40 MG: 10 INJECTION, SOLUTION INTRAVENOUS at 08:04

## 2024-04-24 RX ADMIN — BENZONATATE 100 MG: 100 CAPSULE ORAL at 08:04

## 2024-04-24 RX ADMIN — ALTEPLASE 2 MG: 2.2 INJECTION, POWDER, LYOPHILIZED, FOR SOLUTION INTRAVENOUS at 05:04

## 2024-04-24 RX ADMIN — LACTULOSE 15 G: 20 SOLUTION ORAL at 09:04

## 2024-04-24 RX ADMIN — BENZONATATE 100 MG: 100 CAPSULE ORAL at 02:04

## 2024-04-24 RX ADMIN — DAPTOMYCIN 610 MG: 350 INJECTION, POWDER, LYOPHILIZED, FOR SOLUTION INTRAVENOUS at 09:04

## 2024-04-24 NOTE — PROGRESS NOTES
[facility] - Accepted and is awaiting  Orders and final rec's from ID. SW will continue to follow.     3:19 PM  SW met with patient at bedside to discuss d/c plans. Patient reports sh is a little nervous about going home with IV ABX and would like to speak with [facility] about how that would look for her. GIOVANNI sent a secure chat to [facility] and Linda will meet with the patient later today or tomorrow.     Patient informed GIOVANNI that she is in need of some incontinent pads for when she goes home. SW and patient completed [employer] application. SW faxed the application to Krissy at [facility] for review and processing. GIOVANNI will continue to follow.     ELSIE Wilkes, [facility] [place] Social Worker   [facility] - Oncology  [phone]

## 2024-04-24 NOTE — PROGRESS NOTES
Dereck Forrester - Oncology ([facility])  Hematology/Oncology  Progress Note    Patient Name: Shikha López  Admission Date: 4/21/2024  Hospital Length of Stay: 2 days  Code Status: Full Code     Subjective:     HPI:  53F with PMH stage 4 breast ca left (mets HER 2 +) Dr. 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 Recent hospital admission for GI bleeding, TIPS procedure, and was admitted to oncology service due to active chemotherapy. Presenting to the ED for complaint of bilateral legs wounds, redness, and swelling BLE. Refers the symptoms slowly worsened since last admission to the point that she cannot walk due to R leg pain. Also refers erythema and warmth of RLE that is new from last admission. She otherwise refers feeling much better when last seen.     AF, VSS, on RA with labs H/H stable from prior around 7-9 baseline, K 2.7, Tbil 5.4, , trop 0.027, CRP 51.3, CXR and T/F Xray stable with soft tissue swelling, given 1L IVFs, Vanc, tylenol in ED    Admitted to Medical Oncology service        Interval History: Good UOP on lasix 40mg IV BID, and lower extremity edema improved but still volume overloaded on exam, continuing diuresis. New blood cultures without any growth to date. ID consult, pending recs.    Oncology Treatment Plan:   OP BREAST FAM-TRASTUZUMAB DERUXTECAN-NXKI Q3W    Medications:  Continuous Infusions:  Current Facility-Administered Medications   Medication Dose Route Frequency Last Rate Last Admin     Scheduled Meds:  Current Facility-Administered Medications   Medication Dose Route Frequency    benzonatate  100 mg Oral TID    ceFAZolin (Ancef) IV (PEDS and ADULTS)  2 g Intravenous Q8H    [START ON 4/25/2024] furosemide (LASIX) injection  40 mg Intravenous Daily    lactulose  15 g Oral TID    pantoprazole  40 mg Oral BID     PRN Meds:  Current  Facility-Administered Medications:     0.9%  NaCl infusion (for blood administration), , Intravenous, Q24H PRN    0.9%  NaCl infusion (for blood administration), , Intravenous, Q24H PRN    0.9%  NaCl infusion (for blood administration), , Intravenous, Q24H PRN    0.9%  NaCl infusion (for blood administration), , Intravenous, Q24H PRN    acetaminophen, 1,000 mg, Oral, Q6H PRN    alteplase, 2 mg, Intra-Catheter, Daily PRN    dextrose 10%, 12.5 g, Intravenous, PRN    dextrose 10%, 25 g, Intravenous, PRN    diphenhydrAMINE, 25 mg, Oral, Q6H PRN    glucagon (human recombinant), 1 mg, Intramuscular, PRN    glucose, 16 g, Oral, PRN    glucose, 24 g, Oral, PRN    heparin, porcine (PF), 300 Units, Intravenous, PRN    hydrOXYzine HCL, 25 mg, Oral, TID PRN    naloxone, 0.02 mg, Intravenous, PRN    ondansetron, 4 mg, Intravenous, Q8H PRN    prochlorperazine, 5 mg, Intravenous, Q6H PRN    sodium chloride 0.9%, 10 mL, Intravenous, Q12H PRN    sodium chloride 0.9%, 3 mL, Intravenous, PRN     Review of Systems  Objective:     Vital Signs (Most Recent):  Temp: 97.6 °F (36.4 °C) (04/24/24 1539)  Pulse: 101 (04/24/24 1539)  Resp: 19 (04/24/24 1539)  BP: 125/60 (04/24/24 1539)  SpO2: 100 % (04/24/24 1539) Vital Signs (24h Range):  Temp:  [97.4 °F (36.3 °C)-99 °F (37.2 °C)] 97.6 °F (36.4 °C)  Pulse:  [] 101  Resp:  [16-20] 19  SpO2:  [96 %-100 %] 100 %  BP: (113-125)/(54-60) 125/60     Weight: 76.2 kg (168 lb)  Body mass index is 30.73 kg/m².  Body surface area is 1.83 meters squared.      Intake/Output Summary (Last 24 hours) at 4/24/2024 1542  Last data filed at 4/24/2024 1458  Gross per 24 hour   Intake 192.39 ml   Output 3150 ml   Net -2957.61 ml        Physical Exam  Vitals and nursing note reviewed.   Constitutional:       General: She is not in acute distress.     Appearance: She is obese. She is not toxic-appearing.   HENT:      Head: Normocephalic and atraumatic.      Mouth/Throat:      Mouth: Mucous membranes are moist.       Pharynx: No oropharyngeal exudate.      Comments: Poor dentition  Eyes:      Extraocular Movements: Extraocular movements intact.      Pupils: Pupils are equal, round, and reactive to light.   Cardiovascular:      Rate and Rhythm: Normal rate and regular rhythm.      Heart sounds: No murmur heard.  Pulmonary:      Effort: Pulmonary effort is normal.      Breath sounds: No wheezing or rales.   Abdominal:      General: Abdomen is flat. Bowel sounds are normal. There is no distension.      Tenderness: There is no abdominal tenderness. There is no guarding.   Musculoskeletal:         General: Swelling (both legs swollen with pitting edema. weeping R>L) and tenderness (ttp at right leg) present. Normal range of motion.      Cervical back: Normal range of motion.      Right lower leg: Edema present.      Left lower leg: Edema present.   Lymphadenopathy:      Cervical: No cervical adenopathy.   Skin:     General: Skin is warm.      Coloration: Skin is not jaundiced.      Findings: Erythema (bilateral leg erythema, worse at R>L) present. No bruising or rash.   Neurological:      General: No focal deficit present.      Mental Status: She is alert and oriented to person, place, and time.      Cranial Nerves: No cranial nerve deficit.          Significant Labs:   CBC:   Recent Labs   Lab 04/23/24  0445 04/24/24  0501   WBC 5.56 3.73*   HGB 8.1* 8.3*   HCT 24.5* 26.0*    164    and CMP:   Recent Labs   Lab 04/23/24  0445 04/24/24  0501    142   K 3.3* 3.6    104   CO2 28 30*   GLU 94 92   BUN 9 8   CREATININE 0.5 0.7   CALCIUM 7.5* 7.8*   PROT 4.9* 4.9*   ALBUMIN 2.1* 2.1*   BILITOT 3.8* 2.9*   ALKPHOS 140* 151*   AST 52* 39   ALT 33 24   ANIONGAP 6* 8       Diagnostic Results:  I have reviewed all pertinent imaging results/findings within the past 24 hours.  Assessment/Plan:     * Cellulitis of foot  -Bilateral lower extremity swelling recurrent, but worsened from usual  -warm, tender, erythematous R >  L  -afebrile, no leukocytosis, slightly tachycardiac  -Given dose of vanc in ED  -Bcx pending, GPC resembling Staph in 1/2 sets, likely contaminant, will f/u ID  -Started ancef for possible non-purulent cellulitis  -now growing Staph pettenkoferi and continuing IV antibiotics  -repeat cultures NGTD  -ID consulted, appreciate recs      Volume overload  -BL lower extremity swelling  -rales on lung exam  -  -recent TTE with normal systolic and diastolic fnc  -IV lasix 40 BID with good UOP, volume status improving  -change to IV lasix 40mg once daily  -will need increased home lasix dose on discharge +  likely spironolactone    S/P TIPS (transjugular intrahepatic portosystemic shunt)  -TIPS placed 4/12 for acute GI bleeding and hx of gastric and esophageal varices  -has gastric portal vein coiling and embolization   -monitor for HE, takes lactulose as needed, restarted  -CT AP triple phase liver protocol to eval TIPS show that TIPS is patient, no portal vein thrombosis  -bilirubin improving, likely 2/2 congestive hepatopathy    Severe obesity (BMI 35.0-39.9) with comorbidity  -discussed diet/exercise/weight loss for overall health benefits    Hypokalemia  -repleted in the ED  -repleting as needed with potassium PO      Breast cancer, stage 4, left  -Follows with Dr. Willis  -On [facility] for 2 yrs             Stephany Coyne MD  Hematology/Oncology  Dereck Forrester - Oncology ([facility])

## 2024-04-24 NOTE — PLAN OF CARE
No acute events this shift. Patient AAOx4. Afebrile and VSS. No complaints of pain or nausea. Patient refused bed alarm and assistance ambulating. IV abx continued as ordered. Bed in lowest position and locked. Side rails up x2. All possessions and call light within reach. Non-skid socks worn. Instructed to call for assistance and voiced understanding. All needs of patient currently met. Will continue to monitor with frequent rounding.

## 2024-04-24 NOTE — SUBJECTIVE & OBJECTIVE
Interval History: Good UOP on lasix 40mg IV BID, and lower extremity edema improved but still volume overloaded on exam, continuing diuresis. New blood cultures without any growth to date. ID consult, pending recs.    Oncology Treatment Plan:   OP BREAST FAM-TRASTUZUMAB DERUXTECAN-NXKI Q3W    Medications:  Continuous Infusions:  Current Facility-Administered Medications   Medication Dose Route Frequency Last Rate Last Admin     Scheduled Meds:  Current Facility-Administered Medications   Medication Dose Route Frequency    benzonatate  100 mg Oral TID    ceFAZolin (Ancef) IV (PEDS and ADULTS)  2 g Intravenous Q8H    [START ON 4/25/2024] furosemide (LASIX) injection  40 mg Intravenous Daily    lactulose  15 g Oral TID    pantoprazole  40 mg Oral BID     PRN Meds:  Current Facility-Administered Medications:     0.9%  NaCl infusion (for blood administration), , Intravenous, Q24H PRN    0.9%  NaCl infusion (for blood administration), , Intravenous, Q24H PRN    0.9%  NaCl infusion (for blood administration), , Intravenous, Q24H PRN    0.9%  NaCl infusion (for blood administration), , Intravenous, Q24H PRN    acetaminophen, 1,000 mg, Oral, Q6H PRN    alteplase, 2 mg, Intra-Catheter, Daily PRN    dextrose 10%, 12.5 g, Intravenous, PRN    dextrose 10%, 25 g, Intravenous, PRN    diphenhydrAMINE, 25 mg, Oral, Q6H PRN    glucagon (human recombinant), 1 mg, Intramuscular, PRN    glucose, 16 g, Oral, PRN    glucose, 24 g, Oral, PRN    heparin, porcine (PF), 300 Units, Intravenous, PRN    hydrOXYzine HCL, 25 mg, Oral, TID PRN    naloxone, 0.02 mg, Intravenous, PRN    ondansetron, 4 mg, Intravenous, Q8H PRN    prochlorperazine, 5 mg, Intravenous, Q6H PRN    sodium chloride 0.9%, 10 mL, Intravenous, Q12H PRN    sodium chloride 0.9%, 3 mL, Intravenous, PRN     Review of Systems  Objective:     Vital Signs (Most Recent):  Temp: 97.6 °F (36.4 °C) (04/24/24 1539)  Pulse: 101 (04/24/24 1539)  Resp: 19 (04/24/24 1539)  BP: 125/60  (04/24/24 1539)  SpO2: 100 % (04/24/24 1539) Vital Signs (24h Range):  Temp:  [97.4 °F (36.3 °C)-99 °F (37.2 °C)] 97.6 °F (36.4 °C)  Pulse:  [] 101  Resp:  [16-20] 19  SpO2:  [96 %-100 %] 100 %  BP: (113-125)/(54-60) 125/60     Weight: 76.2 kg (168 lb)  Body mass index is 30.73 kg/m².  Body surface area is 1.83 meters squared.      Intake/Output Summary (Last 24 hours) at 4/24/2024 1542  Last data filed at 4/24/2024 1458  Gross per 24 hour   Intake 192.39 ml   Output 3150 ml   Net -2957.61 ml        Physical Exam  Vitals and nursing note reviewed.   Constitutional:       General: She is not in acute distress.     Appearance: She is obese. She is not toxic-appearing.   HENT:      Head: Normocephalic and atraumatic.      Mouth/Throat:      Mouth: Mucous membranes are moist.      Pharynx: No oropharyngeal exudate.      Comments: Poor dentition  Eyes:      Extraocular Movements: Extraocular movements intact.      Pupils: Pupils are equal, round, and reactive to light.   Cardiovascular:      Rate and Rhythm: Normal rate and regular rhythm.      Heart sounds: No murmur heard.  Pulmonary:      Effort: Pulmonary effort is normal.      Breath sounds: No wheezing or rales.   Abdominal:      General: Abdomen is flat. Bowel sounds are normal. There is no distension.      Tenderness: There is no abdominal tenderness. There is no guarding.   Musculoskeletal:         General: Swelling (both legs swollen with pitting edema. weeping R>L) and tenderness (ttp at right leg) present. Normal range of motion.      Cervical back: Normal range of motion.      Right lower leg: Edema present.      Left lower leg: Edema present.   Lymphadenopathy:      Cervical: No cervical adenopathy.   Skin:     General: Skin is warm.      Coloration: Skin is not jaundiced.      Findings: Erythema (bilateral leg erythema, worse at R>L) present. No bruising or rash.   Neurological:      General: No focal deficit present.      Mental Status: She is alert  and oriented to person, place, and time.      Cranial Nerves: No cranial nerve deficit.          Significant Labs:   CBC:   Recent Labs   Lab 04/23/24  0445 04/24/24  0501   WBC 5.56 3.73*   HGB 8.1* 8.3*   HCT 24.5* 26.0*    164    and CMP:   Recent Labs   Lab 04/23/24  0445 04/24/24  0501    142   K 3.3* 3.6    104   CO2 28 30*   GLU 94 92   BUN 9 8   CREATININE 0.5 0.7   CALCIUM 7.5* 7.8*   PROT 4.9* 4.9*   ALBUMIN 2.1* 2.1*   BILITOT 3.8* 2.9*   ALKPHOS 140* 151*   AST 52* 39   ALT 33 24   ANIONGAP 6* 8       Diagnostic Results:  I have reviewed all pertinent imaging results/findings within the past 24 hours.

## 2024-04-24 NOTE — PLAN OF CARE
Plan of care reviewed with patient. Echo completed. Cefazolin IV given as charted. No acute events  during this shift. Only complaint today was mild right leg pain, x 1 dose PRN tylenol given with relief obtained. Afebrile throughout shift. Room air. Bed alarm and assistance with ambulating refused. VSS, q1h patient rounds, bed in low position and wheels locked, rails up x2, call light and personal belongings within reach.    Heather Razo   4/24/2024   2:59 PM

## 2024-04-25 LAB
ALBUMIN SERPL BCP-MCNC: 2 G/DL (ref 3.5–5.2)
ALP SERPL-CCNC: 135 U/L (ref 55–135)
ALT SERPL W/O P-5'-P-CCNC: 17 U/L (ref 10–44)
ANION GAP SERPL CALC-SCNC: 5 MMOL/L (ref 8–16)
AST SERPL-CCNC: 34 U/L (ref 10–40)
BASOPHILS # BLD AUTO: 0.03 K/UL (ref 0–0.2)
BASOPHILS NFR BLD: 1.1 % (ref 0–1.9)
BILIRUB SERPL-MCNC: 2.5 MG/DL (ref 0.1–1)
BUN SERPL-MCNC: 9 MG/DL (ref 6–20)
CALCIUM SERPL-MCNC: 7.6 MG/DL (ref 8.7–10.5)
CHLORIDE SERPL-SCNC: 104 MMOL/L (ref 95–110)
CK SERPL-CCNC: 9 U/L (ref 20–180)
CO2 SERPL-SCNC: 32 MMOL/L (ref 23–29)
CREAT SERPL-MCNC: 0.5 MG/DL (ref 0.5–1.4)
DIFFERENTIAL METHOD BLD: ABNORMAL
EOSINOPHIL # BLD AUTO: 0.2 K/UL (ref 0–0.5)
EOSINOPHIL NFR BLD: 8.4 % (ref 0–8)
ERYTHROCYTE [DISTWIDTH] IN BLOOD BY AUTOMATED COUNT: 27.4 % (ref 11.5–14.5)
EST. GFR  (NO RACE VARIABLE): >60 ML/MIN/1.73 M^2
GLUCOSE SERPL-MCNC: 94 MG/DL (ref 70–110)
HCT VFR BLD AUTO: 24.6 % (ref 37–48.5)
HGB BLD-MCNC: 7.9 G/DL (ref 12–16)
IMM GRANULOCYTES # BLD AUTO: 0.02 K/UL (ref 0–0.04)
IMM GRANULOCYTES NFR BLD AUTO: 0.7 % (ref 0–0.5)
INR PPP: 1.4 (ref 0.8–1.2)
LYMPHOCYTES # BLD AUTO: 0.6 K/UL (ref 1–4.8)
LYMPHOCYTES NFR BLD: 22.3 % (ref 18–48)
MAGNESIUM SERPL-MCNC: 1.7 MG/DL (ref 1.6–2.6)
MCH RBC QN AUTO: 34.8 PG (ref 27–31)
MCHC RBC AUTO-ENTMCNC: 32.1 G/DL (ref 32–36)
MCV RBC AUTO: 108 FL (ref 82–98)
MONOCYTES # BLD AUTO: 0.3 K/UL (ref 0.3–1)
MONOCYTES NFR BLD: 11.7 % (ref 4–15)
NEUTROPHILS # BLD AUTO: 1.5 K/UL (ref 1.8–7.7)
NEUTROPHILS NFR BLD: 55.8 % (ref 38–73)
NRBC BLD-RTO: 0 /100 WBC
PHOSPHATE SERPL-MCNC: 2.6 MG/DL (ref 2.7–4.5)
PLATELET # BLD AUTO: 143 K/UL (ref 150–450)
PMV BLD AUTO: 11 FL (ref 9.2–12.9)
POTASSIUM SERPL-SCNC: 2.9 MMOL/L (ref 3.5–5.1)
PROT SERPL-MCNC: 4.7 G/DL (ref 6–8.4)
PROTHROMBIN TIME: 15.1 SEC (ref 9–12.5)
RBC # BLD AUTO: 2.27 M/UL (ref 4–5.4)
SODIUM SERPL-SCNC: 141 MMOL/L (ref 136–145)
WBC # BLD AUTO: 2.74 K/UL (ref 3.9–12.7)

## 2024-04-25 PROCEDURE — 25000003 PHARM REV CODE 250: Performed by: STUDENT IN AN ORGANIZED HEALTH CARE EDUCATION/TRAINING PROGRAM

## 2024-04-25 PROCEDURE — 99499 UNLISTED E&M SERVICE: CPT | Mod: ,,, | Performed by: INTERNAL MEDICINE

## 2024-04-25 PROCEDURE — 99233 SBSQ HOSP IP/OBS HIGH 50: CPT | Mod: ,,, | Performed by: INTERNAL MEDICINE

## 2024-04-25 PROCEDURE — 63600175 PHARM REV CODE 636 W HCPCS: Performed by: INTERNAL MEDICINE

## 2024-04-25 PROCEDURE — 63600175 PHARM REV CODE 636 W HCPCS

## 2024-04-25 PROCEDURE — 85610 PROTHROMBIN TIME: CPT | Performed by: INTERNAL MEDICINE

## 2024-04-25 PROCEDURE — 80053 COMPREHEN METABOLIC PANEL: CPT | Performed by: INTERNAL MEDICINE

## 2024-04-25 PROCEDURE — 20600001 HC STEP DOWN PRIVATE ROOM

## 2024-04-25 PROCEDURE — 82550 ASSAY OF CK (CPK): CPT | Performed by: INTERNAL MEDICINE

## 2024-04-25 PROCEDURE — 83735 ASSAY OF MAGNESIUM: CPT | Performed by: INTERNAL MEDICINE

## 2024-04-25 PROCEDURE — 85025 COMPLETE CBC W/AUTO DIFF WBC: CPT | Performed by: INTERNAL MEDICINE

## 2024-04-25 PROCEDURE — 25000003 PHARM REV CODE 250

## 2024-04-25 PROCEDURE — 84100 ASSAY OF PHOSPHORUS: CPT | Performed by: INTERNAL MEDICINE

## 2024-04-25 PROCEDURE — 25000003 PHARM REV CODE 250: Performed by: INTERNAL MEDICINE

## 2024-04-25 RX ORDER — SODIUM,POTASSIUM PHOSPHATES 280-250MG
2 POWDER IN PACKET (EA) ORAL ONCE
Status: COMPLETED | OUTPATIENT
Start: 2024-04-25 | End: 2024-04-25

## 2024-04-25 RX ADMIN — LACTULOSE 15 G: 20 SOLUTION ORAL at 08:04

## 2024-04-25 RX ADMIN — POTASSIUM BICARBONATE 40 MEQ: 391 TABLET, EFFERVESCENT ORAL at 11:04

## 2024-04-25 RX ADMIN — POTASSIUM BICARBONATE 40 MEQ: 391 TABLET, EFFERVESCENT ORAL at 10:04

## 2024-04-25 RX ADMIN — PANTOPRAZOLE SODIUM 40 MG: 40 TABLET, DELAYED RELEASE ORAL at 08:04

## 2024-04-25 RX ADMIN — PANTOPRAZOLE SODIUM 40 MG: 40 TABLET, DELAYED RELEASE ORAL at 10:04

## 2024-04-25 RX ADMIN — BENZONATATE 100 MG: 100 CAPSULE ORAL at 02:04

## 2024-04-25 RX ADMIN — POTASSIUM & SODIUM PHOSPHATES POWDER PACK 280-160-250 MG 2 PACKET: 280-160-250 PACK at 10:04

## 2024-04-25 RX ADMIN — LACTULOSE 15 G: 20 SOLUTION ORAL at 02:04

## 2024-04-25 RX ADMIN — BENZONATATE 100 MG: 100 CAPSULE ORAL at 10:04

## 2024-04-25 RX ADMIN — MUPIROCIN: 20 OINTMENT TOPICAL at 08:04

## 2024-04-25 RX ADMIN — FUROSEMIDE 40 MG: 10 INJECTION, SOLUTION INTRAVENOUS at 10:04

## 2024-04-25 RX ADMIN — MUPIROCIN: 20 OINTMENT TOPICAL at 10:04

## 2024-04-25 RX ADMIN — ACETAMINOPHEN 1000 MG: 500 TABLET ORAL at 06:04

## 2024-04-25 RX ADMIN — LACTULOSE 15 G: 20 SOLUTION ORAL at 10:04

## 2024-04-25 RX ADMIN — BENZONATATE 100 MG: 100 CAPSULE ORAL at 08:04

## 2024-04-25 RX ADMIN — DAPTOMYCIN 610 MG: 350 INJECTION, POWDER, LYOPHILIZED, FOR SOLUTION INTRAVENOUS at 09:04

## 2024-04-25 NOTE — PROGRESS NOTES
Dereck Forrester - Oncology ([facility])  Hematology/Oncology  Progress Note    Patient Name: Shikha López  Admission Date: 4/21/2024  Hospital Length of Stay: 3 days  Code Status: Full Code     Subjective:     HPI:  53F with PMH stage 4 breast ca left (mets HER 2 +) Dr. 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 Recent hospital admission for GI bleeding, TIPS procedure, and was admitted to oncology service due to active chemotherapy. Presenting to the ED for complaint of bilateral legs wounds, redness, and swelling BLE. Refers the symptoms slowly worsened since last admission to the point that she cannot walk due to R leg pain. Also refers erythema and warmth of RLE that is new from last admission. She otherwise refers feeling much better when last seen.     AF, VSS, on RA with labs H/H stable from prior around 7-9 baseline, K 2.7, Tbil 5.4, , trop 0.027, CRP 51.3, CXR and T/F Xray stable with soft tissue swelling, given 1L IVFs, Vanc, tylenol in ED    Admitted to Medical Oncology service        Interval History: Continued improvement in leg swelling and right leg erythema and pain. Pending blood culture susceptibilities. New blood cultures with no growth at 48 hours. No abscess on R leg US.    Oncology Treatment Plan:   OP BREAST FAM-TRASTUZUMAB DERUXTECAN-NXKI Q3W    Medications:  Continuous Infusions:  Current Facility-Administered Medications   Medication Dose Route Frequency Last Rate Last Admin     Scheduled Meds:  Current Facility-Administered Medications   Medication Dose Route Frequency    benzonatate  100 mg Oral TID    DAPTOmycin (CUBICIN) IV (PEDS and ADULTS)  8 mg/kg Intravenous Q24H    furosemide (LASIX) injection  40 mg Intravenous Daily    lactulose  15 g Oral TID    mupirocin   Nasal BID    pantoprazole  40 mg Oral BID     PRN Meds:  Current  Facility-Administered Medications:     0.9%  NaCl infusion (for blood administration), , Intravenous, Q24H PRN    acetaminophen, 1,000 mg, Oral, Q6H PRN    alteplase, 2 mg, Intra-Catheter, Daily PRN    dextrose 10%, 12.5 g, Intravenous, PRN    dextrose 10%, 25 g, Intravenous, PRN    diphenhydrAMINE, 25 mg, Oral, Q6H PRN    glucagon (human recombinant), 1 mg, Intramuscular, PRN    glucose, 16 g, Oral, PRN    glucose, 24 g, Oral, PRN    heparin, porcine (PF), 300 Units, Intravenous, PRN    hydrOXYzine HCL, 25 mg, Oral, TID PRN    naloxone, 0.02 mg, Intravenous, PRN    ondansetron, 4 mg, Intravenous, Q8H PRN    prochlorperazine, 5 mg, Intravenous, Q6H PRN    sodium chloride 0.9%, 10 mL, Intravenous, Q12H PRN    sodium chloride 0.9%, 3 mL, Intravenous, PRN     Review of Systems  Objective:     Vital Signs (Most Recent):  Temp: 98.2 °F (36.8 °C) (04/25/24 1245)  Pulse: 99 (04/25/24 1245)  Resp: 18 (04/25/24 1245)  BP: (!) 105/51 (04/25/24 1245)  SpO2: 98 % (04/25/24 1245) Vital Signs (24h Range):  Temp:  [97.6 °F (36.4 °C)-98.4 °F (36.9 °C)] 98.2 °F (36.8 °C)  Pulse:  [] 99  Resp:  [16-19] 18  SpO2:  [95 %-100 %] 98 %  BP: (105-125)/(51-60) 105/51     Weight: 84.6 kg (186 lb 8.2 oz)  Body mass index is 34.11 kg/m².  Body surface area is 1.92 meters squared.      Intake/Output Summary (Last 24 hours) at 4/25/2024 1456  Last data filed at 4/25/2024 1444  Gross per 24 hour   Intake 1349.05 ml   Output 1750 ml   Net -400.95 ml        Physical Exam  Vitals and nursing note reviewed.   Constitutional:       General: She is not in acute distress.     Appearance: She is obese. She is not toxic-appearing.   HENT:      Head: Normocephalic and atraumatic.      Mouth/Throat:      Mouth: Mucous membranes are moist.      Pharynx: No oropharyngeal exudate.      Comments: Poor dentition  Eyes:      Extraocular Movements: Extraocular movements intact.      Pupils: Pupils are equal, round, and reactive to light.   Cardiovascular:       Rate and Rhythm: Normal rate and regular rhythm.      Heart sounds: No murmur heard.  Pulmonary:      Effort: Pulmonary effort is normal.      Breath sounds: No wheezing or rales.   Abdominal:      General: Abdomen is flat. Bowel sounds are normal. There is no distension.      Tenderness: There is no abdominal tenderness. There is no guarding.   Musculoskeletal:         General: Swelling (both legs swollen with pitting edema. weeping R>L) and tenderness (ttp at right leg) present. Normal range of motion.      Cervical back: Normal range of motion.      Right lower leg: Edema present.      Left lower leg: Edema present.   Lymphadenopathy:      Cervical: No cervical adenopathy.   Skin:     General: Skin is warm.      Coloration: Skin is not jaundiced.      Findings: Erythema (bilateral leg erythema, worse at R>L) present. No bruising or rash.   Neurological:      General: No focal deficit present.      Mental Status: She is alert and oriented to person, place, and time.      Cranial Nerves: No cranial nerve deficit.          Significant Labs:   CBC:   Recent Labs   Lab 04/24/24  0501 04/25/24  0436   WBC 3.73* 2.74*   HGB 8.3* 7.9*   HCT 26.0* 24.6*    143*    and CMP:   Recent Labs   Lab 04/24/24  0501 04/25/24  0436    141   K 3.6 2.9*    104   CO2 30* 32*   GLU 92 94   BUN 8 9   CREATININE 0.7 0.5   CALCIUM 7.8* 7.6*   PROT 4.9* 4.7*   ALBUMIN 2.1* 2.0*   BILITOT 2.9* 2.5*   ALKPHOS 151* 135   AST 39 34   ALT 24 17   ANIONGAP 8 5*       Diagnostic Results:  I have reviewed all pertinent imaging results/findings within the past 24 hours.  Assessment/Plan:     * Cellulitis of foot  -Bilateral lower extremity swelling recurrent, but worsened from usual  -warm, tender, erythematous R > L  -afebrile, no leukocytosis, slightly tachycardiac  -Given dose of vanc in ED  -Started ancef for possible non-purulent cellulitis  -now growing Staph pettenkoferi and continuing IV antibiotics  -repeat cultures  NGTD  -ID consulted, appreciate recs, pending susceptibilities       Volume overload  -BL lower extremity swelling  -rales on lung exam  -  -recent TTE with normal systolic and diastolic fnc  -IV lasix 40 BID with good UOP, volume status improving  -change to IV lasix 40mg once daily with good UOP  -will need increased home lasix dose on discharge +  likely spironolactone    S/P TIPS (transjugular intrahepatic portosystemic shunt)  -TIPS placed 4/12 for acute GI bleeding and hx of gastric and esophageal varices  -has gastric portal vein coiling and embolization   -monitor for HE, takes lactulose as needed, restarted  -CT AP triple phase liver protocol to eval TIPS show that TIPS is patient, no portal vein thrombosis  -bilirubin improving, likely 2/2 congestive hepatopathy    Severe obesity (BMI 35.0-39.9) with comorbidity  -discussed diet/exercise/weight loss for overall health benefits    Hypokalemia  -repleted in the ED  -repleting as needed with potassium PO      Breast cancer, stage 4, left  -Follows with Dr. Willis  -On [facility] for 2 yrs             Stephany Coyne MD  Hematology/Oncology  Dereck Forrester - Oncology ([facility])

## 2024-04-25 NOTE — PROGRESS NOTES
[facility] - Oncology (Hospital)  Infectious Disease  Progress Note    Patient Name: Shikha López  MRN: [id number]  Admission Date: 4/21/2024  Length of Stay: 2 days  Attending Physician: Mine Quinonez MD  Primary Care Provider: No, Primary Doctor    Isolation Status: No active isolations  Assessment/Plan:      ID  Bacteremia  53F with stage 4 breast cancer, HAWTHORNE s/p TIPS, admitted w/ fevers and RLE cellulitis. Blood cx + staph pettenkoferi.     Recommendations:   - obtain soft tissue US of RLE to assess for underlying abscess development, unusual for uncomplicated cellulitis to cause bacteremia  - follow up repeat blood cx - should stay inpatient until negative for 48h  - stop cefazolin, start daptomycin given high rates of methacillin R CONS  - if blood cx persistently positive, will need to remove port and obtain TTE  - may be able to discharge on PO abx - final plan TBD.     Anticipated Disposition: TBD    Thank you for your consult. I will follow-up with patient. Please contact us if you have any additional questions.    Aletha Pickard DO  Transplant Infectious Disease    Time: 75 minutes   50% of time spent on face-to-face counseling and coordination of care. Counseling included review of test results, diagnosis, and treatment plan with patient and/or family.        Subjective:     Principal Problem:Cellulitis of foot    HPI: 53F with stage 4 breast CA w pulm mets (HER2+, on trastuzumab-deruxtecan), cirrhosis presumed 2/2 HAWTHORNE (diagnosed 6/23 w variceal bleeds s/p coil embolization) admitted to [facility] [place] on [place]21/24 w BLE edema, erythema, and wounds. Blood cultures obtained on admission are positive for staph pettenkoferi in 4/4 bottles. Patient endorses chronic lymphedema, but noted an increase in redness and pain in her R lower extremity. The pain has improved since admission. She has a port, denies issues w/ accessing, pain, erythema or purulence. ID consulted for antibiotic recommendations.   Past Medical  History:   Diagnosis Date    Aortic atherosclerosis 07/06/2023    Breast cancer     Esophageal and gastric varices 06/23/2023    Malignant neoplasm metastatic to left lung 06/08/2021       Past Surgical History:   Procedure Laterality Date    ENDOSCOPIC ULTRASOUND OF UPPER GASTROINTESTINAL TRACT N/A 6/27/2023    Procedure: ULTRASOUND, UPPER GI TRACT, ENDOSCOPIC;  Surgeon: Home Lopez MD;  Location: [facility] (2ND FLR);  Service: Endoscopy;  Laterality: N/A;    ENDOSCOPIC ULTRASOUND OF UPPER GASTROINTESTINAL TRACT N/A 1/12/2024    Procedure: ULTRASOUND, UPPER GI TRACT, ENDOSCOPIC;  Surgeon: Home Lopez MD;  Location: [facility] (2ND FLR);  Service: Endoscopy;  Laterality: N/A;  11/28/23-Instructions via portal-DS  1/8-lvm for precall-MS  1/10-precall complete-Kpvt    ESOPHAGOGASTRODUODENOSCOPY N/A 6/23/2023    Procedure: EGD (ESOPHAGOGASTRODUODENOSCOPY);  Surgeon: Quintin Mooney MD;  Location: [facility] (2ND FLR);  Service: Endoscopy;  Laterality: N/A;    ESOPHAGOGASTRODUODENOSCOPY N/A 6/27/2023    Procedure: EGD (ESOPHAGOGASTRODUODENOSCOPY);  Surgeon: Home Lopez MD;  Location: [facility] (2ND FLR);  Service: Endoscopy;  Laterality: N/A;    ESOPHAGOGASTRODUODENOSCOPY N/A 8/3/2023    Procedure: EGD (ESOPHAGOGASTRODUODENOSCOPY);  Surgeon: Home Lopez MD;  Location: [facility] (2ND FLR);  Service: Endoscopy;  Laterality: N/A;  instr portal-labs 6/28/23-tb    ESOPHAGOGASTRODUODENOSCOPY N/A 1/12/2024    Procedure: EGD (ESOPHAGOGASTRODUODENOSCOPY);  Surgeon: Home Lopez MD;  Location: [facility] (2ND FLR);  Service: Endoscopy;  Laterality: N/A;    ESOPHAGOGASTRODUODENOSCOPY N/A 4/10/2024    Procedure: EGD (ESOPHAGOGASTRODUODENOSCOPY);  Surgeon: Ze Anderson MD;  Location: [facility] (2ND FLR);  Service: Endoscopy;  Laterality: N/A;    MASTECTOMY         Review of patient's allergies indicates:  No Known Allergies    Medications:  Current Facility-Administered Medications   Medication Dose Route  Frequency Provider Last Rate Last Admin    0.9%  NaCl infusion (for blood administration)   Intravenous Q24H PRN Prakash Conway MD        0.9%  NaCl infusion (for blood administration)   Intravenous Q24H PRN Prakash Conway MD        0.9%  NaCl infusion (for blood administration)   Intravenous Q24H PRN Prakash Conway MD        0.9%  NaCl infusion (for blood administration)   Intravenous Q24H PRN Stephany Coyne MD        acetaminophen tablet 1,000 mg  1,000 mg Oral Q6H PRN Cm Morley MD   1,000 mg at 04/24/24 1806    alteplase injection 2 mg  2 mg Intra-Catheter Daily PRN Prakash Conway MD   2 mg at 04/24/24 0551    benzonatate capsule 100 mg  100 mg Oral TID Prakash Conway MD   100 mg at 04/24/24 1408    DAPTOmycin (CUBICIN) 610 mg in sodium chloride 0.9% SolP 50 mL IVPB  8 mg/kg Intravenous Q24H Milli Pickard DO        dextrose 10% bolus 125 mL 125 mL  12.5 g Intravenous PRN Prakash Conway MD        dextrose 10% bolus 250 mL 250 mL  25 g Intravenous PRN Prakash Conway MD        diphenhydrAMINE capsule 25 mg  25 mg Oral Q6H PRN Stephany Coyne MD        [START ON 4/25/2024] furosemide injection 40 mg  40 mg Intravenous Daily Stephany Coyne MD        glucagon (human recombinant) injection 1 mg  1 mg Intramuscular PRN Prakash Conway MD        glucose chewable tablet 16 g  16 g Oral PRN Prakash Conway MD        glucose chewable tablet 24 g  24 g Oral PRN Prakash Conway MD        heparin, porcine (PF) 100 unit/mL injection flush 300 Units  300 Units Intravenous PRN Prakash Conway MD        hydrOXYzine HCL tablet 25 mg  25 mg Oral TID PRN Prakash Conway MD        lactulose 20 gram/30 mL solution Soln 15 g  15 g Oral TID Prakash Conway MD   15 g at 04/24/24 1408    naloxone 0.4 mg/mL injection 0.02 mg  0.02 mg Intravenous PRN  Prakash Conway MD        ondansetron injection 4 mg  4 mg Intravenous Q8H PRN Prakash Conway MD        pantoprazole EC tablet 40 mg  40 mg Oral BID Stephany Coyne MD   40 mg at 04/24/24 0814    prochlorperazine injection Soln 5 mg  5 mg Intravenous Q6H PRN Prakash Conway MD        sodium chloride 0.9% flush 10 mL  10 mL Intravenous Q12H PRN Prakash Conway MD        sodium chloride 0.9% flush 3 mL  3 mL Intravenous PRN Prakash Conway MD         Antibiotics (From admission, onward)      Start     Stop Route Frequency Ordered    04/24/24 2015  DAPTOmycin (CUBICIN) 610 mg in sodium chloride 0.9% SolP 50 mL IVPB         -- IV Every 24 hours (non-standard times) 04/24/24 1910          Antifungals (From admission, onward)      None          Antivirals (From admission, onward)      None             Immunization History   Administered Date(s) Administered    COVID-19, MRNA, LN-S, PF (MODERNA FULL 0.5 ML DOSE) 03/12/2021, 04/09/2021       Family History       Problem Relation (Age of Onset)    Lung cancer Father          Social History     Socioeconomic History    Marital status: Single    Number of children: 1   Tobacco Use    Smoking status: Never    Smokeless tobacco: Never   Substance and Sexual Activity    Alcohol use: Never    Drug use: Never    Sexual activity: Not Currently     Social Determinants of Health     Financial Resource Strain: Patient Declined (4/21/2024)    Overall Financial Resource Strain (CARDIA)     Difficulty of Paying Living Expenses: Patient declined   Recent Concern: Financial Resource Strain - Medium Risk (4/11/2024)    Overall Financial Resource Strain (CARDIA)     Difficulty of Paying Living Expenses: Somewhat hard   Food Insecurity: Patient Declined (4/21/2024)    Hunger Vital Sign     Worried About Running Out of Food in the Last Year: Patient declined     Ran Out of Food in the Last Year: Patient declined   Recent Concern:  Food Insecurity - Food Insecurity Present (4/11/2024)    Hunger Vital Sign     Worried About Running Out of Food in the Last Year: Often true     Ran Out of Food in the Last Year: Sometimes true   Transportation Needs: Patient Declined (4/21/2024)    PRAPARE - Transportation     Lack of Transportation (Medical): Patient declined     Lack of Transportation (Non-Medical): Patient declined   Physical Activity: Inactive (4/11/2024)    Exercise Vital Sign     Days of Exercise per Week: 0 days     Minutes of Exercise per Session: 0 min   Stress: Patient Declined (4/21/2024)    [employer] [place] [employer] - Occupational Stress Questionnaire     Feeling of Stress : Patient declined   Social Connections: Unknown (4/11/2024)    Social Connection and Isolation Panel [NHANES]     Frequency of Communication with Friends and Family: More than three times a week     Frequency of Social Gatherings with Friends and Family: Twice a week     Active Member of Clubs or Organizations: No     Attends Club or Organization Meetings: Never     Marital Status: Never    Housing Stability: Patient Declined (4/21/2024)    Housing Stability Vital Sign     Unable to Pay for Housing in the Last Year: Patient declined     Number of Times Moved in the Last Year: 1     Homeless in the Last Year: Patient declined     Review of Systems   All other systems reviewed and are negative.    Objective:     Vital Signs (Most Recent):  Temp: 97.6 °F (36.4 °C) (04/24/24 1539)  Pulse: 101 (04/24/24 1539)  Resp: 19 (04/24/24 1539)  BP: 125/60 (04/24/24 1539)  SpO2: 100 % (04/24/24 1539) Vital Signs (24h Range):  Temp:  [97.4 °F (36.3 °C)-99 °F (37.2 °C)] 97.6 °F (36.4 °C)  Pulse:  [] 101  Resp:  [16-20] 19  SpO2:  [96 %-100 %] 100 %  BP: (113-125)/(54-60) 125/60     Weight: 76.2 kg (168 lb)  Body mass index is 30.73 kg/m².    Estimated Creatinine Clearance: 87.7 mL/min (based on SCr of 0.7 mg/dL).     Physical Exam  Constitutional:        General: She is not in acute distress.     Appearance: She is well-developed. She is not ill-appearing or diaphoretic.   HENT:      Head: Normocephalic and atraumatic.      Right Ear: External ear normal.      Left Ear: External ear normal.      Nose: Nose normal.   Eyes:      General: No scleral icterus.        Right eye: No discharge.         Left eye: No discharge.      Extraocular Movements: Extraocular movements intact.      Conjunctiva/sclera: Conjunctivae normal.   Pulmonary:      Effort: Pulmonary effort is normal. No respiratory distress.      Breath sounds: No stridor.   Musculoskeletal:         General: Swelling present. Normal range of motion.      Comments: Erythema, warmth and tenderness of R lower leg   Skin:     Findings: No erythema or rash.   Neurological:      General: No focal deficit present.      Mental Status: She is alert and oriented to person, place, and time. Mental status is at baseline.      Cranial Nerves: No cranial nerve deficit.   Psychiatric:         Mood and Affect: Mood normal.         Behavior: Behavior normal.         Thought Content: Thought content normal.         Judgment: Judgment normal.          Significant Labs: CBC:   Recent Labs   Lab 04/23/24  0445 04/24/24  0501   WBC 5.56 3.73*   HGB 8.1* 8.3*   HCT 24.5* 26.0*    164     CMP:   Recent Labs   Lab 04/23/24  0445 04/24/24  0501    142   K 3.3* 3.6    104   CO2 28 30*   GLU 94 92   BUN 9 8   CREATININE 0.5 0.7   CALCIUM 7.5* 7.8*   PROT 4.9* 4.9*   ALBUMIN 2.1* 2.1*   BILITOT 3.8* 2.9*   ALKPHOS 140* 151*   AST 52* 39   ALT 33 24   ANIONGAP 6* 8     Microbiology Results (last 7 days)       Procedure Component Value Units Date/Time    Blood culture [[id number]]  (Abnormal) Collected: 04/21/24 1811    Order Status: Completed Specimen: Blood from Line, Port A Cath Updated: 04/24/24 1428     Blood Culture, Routine Gram stain aer bottle: Gram positive cocci in clusters resembling Staph      Results called  to and read back by:Jaylene Crump RN. 04/22/2024      16:48      Gram stain carroll bottle: Gram positive cocci in clusters resembling Staph      Positive results previously called 04/24/2024  09:07      STAPHYLOCOCCUS PETTENKOFERI  Susceptibility pending      Blood culture [[id number]]  (Abnormal) Collected: 04/21/24 1811    Order Status: Completed Specimen: Blood from Peripheral, Antecubital, Left Updated: 04/24/24 1427     Blood Culture, Routine Gram stain aer bottle: Gram positive cocci in clusters resembling Staph      Results called to and read back by: Alyse Mayer RN 04/22/2024  14:32      Gram stain carroll bottle: Gram positive cocci in clusters resembling Staph      Positive results previously called 04/24/2024  10:38      STAPHYLOCOCCUS PETTENKOFERI  Susceptibility pending      Blood culture [[id number]] Collected: 04/23/24 1156    Order Status: Completed Specimen: Blood from Peripheral, Antecubital, Right Updated: 04/24/24 1412     Blood Culture, Routine No Growth to date      No Growth to date    Blood culture [[id number]] Collected: 04/23/24 1157    Order Status: Completed Specimen: Blood Updated: 04/24/24 1412     Blood Culture, Routine No Growth to date      No Growth to date    MRSA/SA Rapid ID by PCR from Blood culture [[id number]] Collected: 04/21/24 1811    Order Status: Completed Updated: 04/22/24 1616     Staph aureus ID by PCR Negative     Methicillin Resistant ID by PCR Negative            Significant Imaging: I have reviewed all pertinent imaging results/findings within the past 24 hours.

## 2024-04-25 NOTE — SUBJECTIVE & OBJECTIVE
Interval History: No acute events overnight; patient reports improving erythema.  No new complaints of note    Objective:     Vital Signs (Most Recent):  Temp: 98.2 °F (36.8 °C) (04/25/24 1245)  Pulse: 99 (04/25/24 1245)  Resp: 18 (04/25/24 1245)  BP: (!) 105/51 (04/25/24 1245)  SpO2: 98 % (04/25/24 1245) Vital Signs (24h Range):  Temp:  [97.6 °F (36.4 °C)-98.4 °F (36.9 °C)] 98.2 °F (36.8 °C)  Pulse:  [] 99  Resp:  [16-19] 18  SpO2:  [95 %-100 %] 98 %  BP: (105-125)/(51-60) 105/51     Weight: 84.6 kg (186 lb 8.2 oz)  Body mass index is 34.11 kg/m².    Estimated Creatinine Clearance: 129.8 mL/min (based on SCr of 0.5 mg/dL).     Physical Exam  Constitutional:       General: She is not in acute distress.  HENT:      Head: Normocephalic and atraumatic.   Eyes:      General: No scleral icterus.     Conjunctiva/sclera: Conjunctivae normal.   Cardiovascular:      Rate and Rhythm: Normal rate and regular rhythm.      Heart sounds: Normal heart sounds. No murmur heard.  Pulmonary:      Effort: No respiratory distress.      Breath sounds: Normal breath sounds. No wheezing or rhonchi.   Abdominal:      General: There is no distension.      Palpations: Abdomen is soft.      Tenderness: There is no abdominal tenderness.   Musculoskeletal:      Cervical back: No rigidity.      Right lower leg: Edema present.      Left lower leg: Edema present.   Lymphadenopathy:      Cervical: No cervical adenopathy.   Skin:     General: Skin is warm and dry.      Coloration: Skin is not jaundiced.      Findings: Erythema and rash present.      Comments: RLE erythematous; more intense patch distal anterior shin; lighter area extends from this and covers much of the medial leg.  Slightly warm to touch.  Nontender   Neurological:      General: No focal deficit present.      Mental Status: She is alert and oriented to person, place, and time.   Psychiatric:         Mood and Affect: Mood normal.         Behavior: Behavior normal.           Significant Labs:   Microbiology Results (last 7 days)       Procedure Component Value Units Date/Time    Blood culture [[id number]]  (Abnormal) Collected: 04/21/24 1811    Order Status: Completed Specimen: Blood from Line, Port A Cath Updated: 04/25/24 0931     Blood Culture, Routine Gram stain aer bottle: Gram positive cocci in clusters resembling Staph      Results called to and read back by:Jaylene Crump RN. 04/22/2024      16:48      Gram stain carroll bottle: Gram positive cocci in clusters resembling Staph      Positive results previously called 04/24/2024  09:07      STAPHYLOCOCCUS PETTENKOFERI  Susceptibility pending      Blood culture [[id number]]  (Abnormal) Collected: 04/21/24 1811    Order Status: Completed Specimen: Blood from Peripheral, Antecubital, Left Updated: 04/25/24 0930     Blood Culture, Routine Gram stain aer bottle: Gram positive cocci in clusters resembling Staph      Results called to and read back by: Alyse Mayer RN 04/22/2024  14:32      Gram stain carroll bottle: Gram positive cocci in clusters resembling Staph      Positive results previously called 04/24/2024  10:38      STAPHYLOCOCCUS PETTENKOFERI  Susceptibility pending      Blood culture [[id number]] Collected: 04/23/24 1156    Order Status: Completed Specimen: Blood from Peripheral, Antecubital, Right Updated: 04/24/24 1412     Blood Culture, Routine No Growth to date      No Growth to date    Blood culture [[id number]] Collected: 04/23/24 1157    Order Status: Completed Specimen: Blood Updated: 04/24/24 1412     Blood Culture, Routine No Growth to date      No Growth to date    MRSA/SA Rapid ID by PCR from Blood culture [[id number]] Collected: 04/21/24 1811    Order Status: Completed Updated: 04/22/24 1616     Staph aureus ID by PCR Negative     Methicillin Resistant ID by PCR Negative            Significant Imaging: I have reviewed all pertinent imaging results/findings within the past 24 hours.

## 2024-04-25 NOTE — PLAN OF CARE
Problem: Adult Inpatient Plan of Care  Goal: Plan of Care Review  Outcome: Progressing  Goal: Patient-Specific Goal (Individualized)  Outcome: Progressing  Goal: Absence of Hospital-Acquired Illness or Injury  Outcome: Progressing  Goal: Optimal Comfort and Wellbeing  Outcome: Progressing  Goal: Readiness for Transition of Care  Outcome: Progressing     Problem: Infection  Goal: Absence of Infection Signs and Symptoms  Outcome: Progressing     Problem: Skin Injury Risk Increased  Goal: Skin Health and Integrity  Outcome: Progressing     Problem: Fall Injury Risk  Goal: Absence of Fall and Fall-Related Injury  Outcome: Progressing  Uneventful shift.

## 2024-04-25 NOTE — HPI
53F with stage 4 breast CA w pulm mets (HER2+, on trastuzumab-deruxtecan), cirrhosis presumed 2/2 HAWTHORNE (diagnosed 6/23 w variceal bleeds s/p coil embolization) admitted to [facility] on 4/21/24 w BLE edema, erythema, and wounds. Blood cultures obtained on admission are positive for staph pettenkoferi in 4/4 bottles. Patient endorses chronic lymphedema, but noted an increase in redness and pain in her R lower extremity. The pain has improved since admission. She has a port, denies issues w/ accessing, pain, erythema or purulence. ID consulted for antibiotic recommendations.

## 2024-04-25 NOTE — SUBJECTIVE & OBJECTIVE
Interval History: Continued improvement in leg swelling and right leg erythema and pain. Pending blood culture susceptibilities. New blood cultures with no growth at 48 hours. No abscess on R leg US.    Oncology Treatment Plan:   OP BREAST FAM-TRASTUZUMAB DERUXTECAN-NXKI Q3W    Medications:  Continuous Infusions:  Current Facility-Administered Medications   Medication Dose Route Frequency Last Rate Last Admin     Scheduled Meds:  Current Facility-Administered Medications   Medication Dose Route Frequency    benzonatate  100 mg Oral TID    DAPTOmycin (CUBICIN) IV (PEDS and ADULTS)  8 mg/kg Intravenous Q24H    furosemide (LASIX) injection  40 mg Intravenous Daily    lactulose  15 g Oral TID    mupirocin   Nasal BID    pantoprazole  40 mg Oral BID     PRN Meds:  Current Facility-Administered Medications:     0.9%  NaCl infusion (for blood administration), , Intravenous, Q24H PRN    acetaminophen, 1,000 mg, Oral, Q6H PRN    alteplase, 2 mg, Intra-Catheter, Daily PRN    dextrose 10%, 12.5 g, Intravenous, PRN    dextrose 10%, 25 g, Intravenous, PRN    diphenhydrAMINE, 25 mg, Oral, Q6H PRN    glucagon (human recombinant), 1 mg, Intramuscular, PRN    glucose, 16 g, Oral, PRN    glucose, 24 g, Oral, PRN    heparin, porcine (PF), 300 Units, Intravenous, PRN    hydrOXYzine HCL, 25 mg, Oral, TID PRN    naloxone, 0.02 mg, Intravenous, PRN    ondansetron, 4 mg, Intravenous, Q8H PRN    prochlorperazine, 5 mg, Intravenous, Q6H PRN    sodium chloride 0.9%, 10 mL, Intravenous, Q12H PRN    sodium chloride 0.9%, 3 mL, Intravenous, PRN     Review of Systems  Objective:     Vital Signs (Most Recent):  Temp: 98.2 °F (36.8 °C) (04/25/24 1245)  Pulse: 99 (04/25/24 1245)  Resp: 18 (04/25/24 1245)  BP: (!) 105/51 (04/25/24 1245)  SpO2: 98 % (04/25/24 1245) Vital Signs (24h Range):  Temp:  [97.6 °F (36.4 °C)-98.4 °F (36.9 °C)] 98.2 °F (36.8 °C)  Pulse:  [] 99  Resp:  [16-19] 18  SpO2:  [95 %-100 %] 98 %  BP: (105-125)/(51-60) 105/51      Weight: 84.6 kg (186 lb 8.2 oz)  Body mass index is 34.11 kg/m².  Body surface area is 1.92 meters squared.      Intake/Output Summary (Last 24 hours) at 4/25/2024 1456  Last data filed at 4/25/2024 1444  Gross per 24 hour   Intake 1349.05 ml   Output 1750 ml   Net -400.95 ml        Physical Exam  Vitals and nursing note reviewed.   Constitutional:       General: She is not in acute distress.     Appearance: She is obese. She is not toxic-appearing.   HENT:      Head: Normocephalic and atraumatic.      Mouth/Throat:      Mouth: Mucous membranes are moist.      Pharynx: No oropharyngeal exudate.      Comments: Poor dentition  Eyes:      Extraocular Movements: Extraocular movements intact.      Pupils: Pupils are equal, round, and reactive to light.   Cardiovascular:      Rate and Rhythm: Normal rate and regular rhythm.      Heart sounds: No murmur heard.  Pulmonary:      Effort: Pulmonary effort is normal.      Breath sounds: No wheezing or rales.   Abdominal:      General: Abdomen is flat. Bowel sounds are normal. There is no distension.      Tenderness: There is no abdominal tenderness. There is no guarding.   Musculoskeletal:         General: Swelling (both legs swollen with pitting edema. weeping R>L) and tenderness (ttp at right leg) present. Normal range of motion.      Cervical back: Normal range of motion.      Right lower leg: Edema present.      Left lower leg: Edema present.   Lymphadenopathy:      Cervical: No cervical adenopathy.   Skin:     General: Skin is warm.      Coloration: Skin is not jaundiced.      Findings: Erythema (bilateral leg erythema, worse at R>L) present. No bruising or rash.   Neurological:      General: No focal deficit present.      Mental Status: She is alert and oriented to person, place, and time.      Cranial Nerves: No cranial nerve deficit.          Significant Labs:   CBC:   Recent Labs   Lab 04/24/24  0501 04/25/24  0436   WBC 3.73* 2.74*   HGB 8.3* 7.9*   HCT 26.0*  24.6*    143*    and CMP:   Recent Labs   Lab 04/24/24  0501 04/25/24  0436    141   K 3.6 2.9*    104   CO2 30* 32*   GLU 92 94   BUN 8 9   CREATININE 0.7 0.5   CALCIUM 7.8* 7.6*   PROT 4.9* 4.7*   ALBUMIN 2.1* 2.0*   BILITOT 2.9* 2.5*   ALKPHOS 151* 135   AST 39 34   ALT 24 17   ANIONGAP 8 5*       Diagnostic Results:  I have reviewed all pertinent imaging results/findings within the past 24 hours.

## 2024-04-25 NOTE — PLAN OF CARE
Plan of care reviewed with patient. Pt is afebrile. Free from falls or injury. No complaints of pain. US RLE completed this shift. Pt is up with stand by assist. Prn Vistaril given for anxiety. Bed locked in lowest position, non skid socks on, call light within reach. Pt instructed to call if any assistance is needed. Vitals stable.  Will cont to roma pt.

## 2024-04-25 NOTE — SUBJECTIVE & OBJECTIVE
Past Medical History:   Diagnosis Date    Aortic atherosclerosis 07/06/2023    Breast cancer     Esophageal and gastric varices 06/23/2023    Malignant neoplasm metastatic to left lung 06/08/2021       Past Surgical History:   Procedure Laterality Date    ENDOSCOPIC ULTRASOUND OF UPPER GASTROINTESTINAL TRACT N/A 6/27/2023    Procedure: ULTRASOUND, UPPER GI TRACT, ENDOSCOPIC;  Surgeon: Home Lopez MD;  Location: [facility] (2ND FLR);  Service: Endoscopy;  Laterality: N/A;    ENDOSCOPIC ULTRASOUND OF UPPER GASTROINTESTINAL TRACT N/A 1/12/2024    Procedure: ULTRASOUND, UPPER GI TRACT, ENDOSCOPIC;  Surgeon: Home Lopez MD;  Location: [facility] (2ND FLR);  Service: Endoscopy;  Laterality: N/A;  11/28/23-Instructions via portal-DS  1/8-lvm for precall-MS  1/10-precall complete-Kpvt    ESOPHAGOGASTRODUODENOSCOPY N/A 6/23/2023    Procedure: EGD (ESOPHAGOGASTRODUODENOSCOPY);  Surgeon: Quintin Mooney MD;  Location: [facility] (2ND FLR);  Service: Endoscopy;  Laterality: N/A;    ESOPHAGOGASTRODUODENOSCOPY N/A 6/27/2023    Procedure: EGD (ESOPHAGOGASTRODUODENOSCOPY);  Surgeon: Home Lopez MD;  Location: [facility] (2ND FLR);  Service: Endoscopy;  Laterality: N/A;    ESOPHAGOGASTRODUODENOSCOPY N/A 8/3/2023    Procedure: EGD (ESOPHAGOGASTRODUODENOSCOPY);  Surgeon: Home Lopez MD;  Location: [facility] (2ND FLR);  Service: Endoscopy;  Laterality: N/A;  instr portal-labs 6/28/23-tb    ESOPHAGOGASTRODUODENOSCOPY N/A 1/12/2024    Procedure: EGD (ESOPHAGOGASTRODUODENOSCOPY);  Surgeon: Home Lopez MD;  Location: [facility] (2ND FLR);  Service: Endoscopy;  Laterality: N/A;    ESOPHAGOGASTRODUODENOSCOPY N/A 4/10/2024    Procedure: EGD (ESOPHAGOGASTRODUODENOSCOPY);  Surgeon: Ze Anderson MD;  Location: [facility] (2ND FLR);  Service: Endoscopy;  Laterality: N/A;    MASTECTOMY         Review of patient's allergies indicates:  No Known Allergies    Medications:  Current Facility-Administered Medications   Medication  Dose Route Frequency Provider Last Rate Last Admin    0.9%  NaCl infusion (for blood administration)   Intravenous Q24H PRN Prakash Conway MD        0.9%  NaCl infusion (for blood administration)   Intravenous Q24H PRN Prakash Conway MD        0.9%  NaCl infusion (for blood administration)   Intravenous Q24H PRN Prakash Conway MD        0.9%  NaCl infusion (for blood administration)   Intravenous Q24H PRN Stephany Coyne MD        acetaminophen tablet 1,000 mg  1,000 mg Oral Q6H PRN Cm Morley MD   1,000 mg at 04/24/24 1806    alteplase injection 2 mg  2 mg Intra-Catheter Daily PRN Prakash Conway MD   2 mg at 04/24/24 0551    benzonatate capsule 100 mg  100 mg Oral TID Prakash Conway MD   100 mg at 04/24/24 1408    DAPTOmycin (CUBICIN) 610 mg in sodium chloride 0.9% SolP 50 mL IVPB  8 mg/kg Intravenous Q24H Milli Pickard DO        dextrose 10% bolus 125 mL 125 mL  12.5 g Intravenous PRN Prakash Conway MD        dextrose 10% bolus 250 mL 250 mL  25 g Intravenous PRN Prakash Conway MD        diphenhydrAMINE capsule 25 mg  25 mg Oral Q6H PRN Stephany Coyne MD        [START ON 4/25/2024] furosemide injection 40 mg  40 mg Intravenous Daily Stephany Coyne MD        glucagon (human recombinant) injection 1 mg  1 mg Intramuscular PRN Prakash Conway MD        glucose chewable tablet 16 g  16 g Oral PRN Prakash Conway MD        glucose chewable tablet 24 g  24 g Oral PRN Prakash Conway MD        heparin, porcine (PF) 100 unit/mL injection flush 300 Units  300 Units Intravenous PRN Prakash Conway MD        hydrOXYzine HCL tablet 25 mg  25 mg Oral TID PRN Prakash Conway MD        lactulose 20 gram/30 mL solution Soln 15 g  15 g Oral TID Prakash Conway MD   15 g at 04/24/24 1408    naloxone 0.4 mg/mL injection 0.02 mg  0.02 mg  Intravenous PRN Prakash Conway MD        ondansetron injection 4 mg  4 mg Intravenous Q8H PRN Prakash Conway MD        pantoprazole EC tablet 40 mg  40 mg Oral BID Stephany Coyne MD   40 mg at 04/24/24 0814    prochlorperazine injection Soln 5 mg  5 mg Intravenous Q6H PRN Prakash Conway MD        sodium chloride 0.9% flush 10 mL  10 mL Intravenous Q12H PRN Prakash Conway MD        sodium chloride 0.9% flush 3 mL  3 mL Intravenous PRN Prakash Conway MD         Antibiotics (From admission, onward)      Start     Stop Route Frequency Ordered    04/24/24 2015  DAPTOmycin (CUBICIN) 610 mg in sodium chloride 0.9% SolP 50 mL IVPB         -- IV Every 24 hours (non-standard times) 04/24/24 1910          Antifungals (From admission, onward)      None          Antivirals (From admission, onward)      None             Immunization History   Administered Date(s) Administered    COVID-19, MRNA, LN-S, PF (MODERNA FULL 0.5 ML DOSE) 03/12/2021, 04/09/2021       Family History       Problem Relation (Age of Onset)    Lung cancer Father          Social History     Socioeconomic History    Marital status: Single    Number of children: 1   Tobacco Use    Smoking status: Never    Smokeless tobacco: Never   Substance and Sexual Activity    Alcohol use: Never    Drug use: Never    Sexual activity: Not Currently     Social Determinants of Health     Financial Resource Strain: Patient Declined (4/21/2024)    Overall Financial Resource Strain (CARDIA)     Difficulty of Paying Living Expenses: Patient declined   Recent Concern: Financial Resource Strain - Medium Risk (4/11/2024)    Overall Financial Resource Strain (CARDIA)     Difficulty of Paying Living Expenses: Somewhat hard   Food Insecurity: Patient Declined (4/21/2024)    Hunger Vital Sign     Worried About Running Out of Food in the Last Year: Patient declined     Ran Out of Food in the Last Year: Patient declined    Recent Concern: Food Insecurity - Food Insecurity Present (4/11/2024)    Hunger Vital Sign     Worried About Running Out of Food in the Last Year: Often true     Ran Out of Food in the Last Year: Sometimes true   Transportation Needs: Patient Declined (4/21/2024)    PRAPARE - Transportation     Lack of Transportation (Medical): Patient declined     Lack of Transportation (Non-Medical): Patient declined   Physical Activity: Inactive (4/11/2024)    Exercise Vital Sign     Days of Exercise per Week: 0 days     Minutes of Exercise per Session: 0 min   Stress: Patient Declined (4/21/2024)    [facility] - Occupational Stress Questionnaire     Feeling of Stress : Patient declined   Social Connections: Unknown (4/11/2024)    Social Connection and Isolation Panel [NHANES]     Frequency of Communication with Friends and Family: More than three times a week     Frequency of Social Gatherings with Friends and Family: Twice a week     Active Member of Clubs or Organizations: No     Attends Club or Organization Meetings: Never     Marital Status: Never    Housing Stability: Patient Declined (4/21/2024)    Housing Stability Vital Sign     Unable to Pay for Housing in the Last Year: Patient declined     Number of Times Moved in the Last Year: 1     Homeless in the Last Year: Patient declined     Review of Systems   All other systems reviewed and are negative.    Objective:     Vital Signs (Most Recent):  Temp: 97.6 °F (36.4 °C) (04/24/24 1539)  Pulse: 101 (04/24/24 1539)  Resp: 19 (04/24/24 1539)  BP: 125/60 (04/24/24 1539)  SpO2: 100 % (04/24/24 1539) Vital Signs (24h Range):  Temp:  [97.4 °F (36.3 °C)-99 °F (37.2 °C)] 97.6 °F (36.4 °C)  Pulse:  [] 101  Resp:  [16-20] 19  SpO2:  [96 %-100 %] 100 %  BP: (113-125)/(54-60) 125/60     Weight: 76.2 kg (168 lb)  Body mass index is 30.73 kg/m².    Estimated Creatinine Clearance: 87.7 mL/min (based on SCr of 0.7 mg/dL).     Physical  Exam  Constitutional:       General: She is not in acute distress.     Appearance: She is well-developed. She is not ill-appearing or diaphoretic.   HENT:      Head: Normocephalic and atraumatic.      Right Ear: External ear normal.      Left Ear: External ear normal.      Nose: Nose normal.   Eyes:      General: No scleral icterus.        Right eye: No discharge.         Left eye: No discharge.      Extraocular Movements: Extraocular movements intact.      Conjunctiva/sclera: Conjunctivae normal.   Pulmonary:      Effort: Pulmonary effort is normal. No respiratory distress.      Breath sounds: No stridor.   Musculoskeletal:         General: Swelling present. Normal range of motion.      Comments: Erythema, warmth and tenderness of R lower leg   Skin:     Findings: No erythema or rash.   Neurological:      General: No focal deficit present.      Mental Status: She is alert and oriented to person, place, and time. Mental status is at baseline.      Cranial Nerves: No cranial nerve deficit.   Psychiatric:         Mood and Affect: Mood normal.         Behavior: Behavior normal.         Thought Content: Thought content normal.         Judgment: Judgment normal.          Significant Labs: CBC:   Recent Labs   Lab 04/23/24  0445 04/24/24  0501   WBC 5.56 3.73*   HGB 8.1* 8.3*   HCT 24.5* 26.0*    164     CMP:   Recent Labs   Lab 04/23/24  0445 04/24/24  0501    142   K 3.3* 3.6    104   CO2 28 30*   GLU 94 92   BUN 9 8   CREATININE 0.5 0.7   CALCIUM 7.5* 7.8*   PROT 4.9* 4.9*   ALBUMIN 2.1* 2.1*   BILITOT 3.8* 2.9*   ALKPHOS 140* 151*   AST 52* 39   ALT 33 24   ANIONGAP 6* 8     Microbiology Results (last 7 days)       Procedure Component Value Units Date/Time    Blood culture [[id number]]  (Abnormal) Collected: 04/21/24 1811    Order Status: Completed Specimen: Blood from Line, Port A Cath Updated: 04/24/24 1428     Blood Culture, Routine Gram stain aer bottle: Gram positive cocci in clusters  resembling Staph      Results called to and read back by:Jaylene Crump RN. 04/22/2024      16:48      Gram stain carroll bottle: Gram positive cocci in clusters resembling Staph      Positive results previously called 04/24/2024  09:07      STAPHYLOCOCCUS PETTENKOFERI  Susceptibility pending      Blood culture [[id number]]  (Abnormal) Collected: 04/21/24 1811    Order Status: Completed Specimen: Blood from Peripheral, Antecubital, Left Updated: 04/24/24 1427     Blood Culture, Routine Gram stain aer bottle: Gram positive cocci in clusters resembling Staph      Results called to and read back by: Alyse Mayer RN 04/22/2024  14:32      Gram stain carroll bottle: Gram positive cocci in clusters resembling Staph      Positive results previously called 04/24/2024  10:38      STAPHYLOCOCCUS PETTENKOFERI  Susceptibility pending      Blood culture [[id number]] Collected: 04/23/24 1156    Order Status: Completed Specimen: Blood from Peripheral, Antecubital, Right Updated: 04/24/24 1412     Blood Culture, Routine No Growth to date      No Growth to date    Blood culture [[id number]] Collected: 04/23/24 1157    Order Status: Completed Specimen: Blood Updated: 04/24/24 1412     Blood Culture, Routine No Growth to date      No Growth to date    MRSA/SA Rapid ID by PCR from Blood culture [[id number]] Collected: 04/21/24 1811    Order Status: Completed Updated: 04/22/24 1616     Staph aureus ID by PCR Negative     Methicillin Resistant ID by PCR Negative            Significant Imaging: I have reviewed all pertinent imaging results/findings within the past 24 hours.

## 2024-04-25 NOTE — PROGRESS NOTES
[facility]reck [facility]er - Oncology ([facility])  Infectious Disease  Progress Note    Patient Name: Shikha López  MRN: [id number]  Admission Date: 4/21/2024  Length of Stay: 3 days  Attending Physician: Mine Quinonez MD  Primary Care Provider: No, Primary Doctor  Isolation Status: No active isolations  Assessment/Plan:      ID  Bacteremia  53F with stage 4 breast cancer, HAWTHORNE s/p TIPS, admitted w/ fevers and RLE cellulitis. Blood cx + staph pettenkoferi.     Repeat cultures NG at day 1; pending today's assessment.  Soft tissue US does not show a focal collection - only notes diffuse soft tissue edema consistent with cellulitis.     Recommendations:   -- Continue Daptomycin   -- Follow-up repeat cultures for clearance  -- if blood cx persistently positive, will need to remove port and obtain TTE  -- Pending sensitivities - hopeful patient has an appropriate oral option available      Thank you for your consult. I will follow-up with patient. Please contact us if you have any additional questions.    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 Blood cultures obtained on admission are positive for staph pettenkoferi in 4/4 bottles. Patient endorses chronic lymphedema, but noted an increase in redness and pain in her R lower extremity. The pain has improved since admission. She has a port, denies issues w/ accessing, pain, erythema or purulence. ID consulted for antibiotic recommendations.     Interval History: No acute events overnight; patient reports improving erythema.  No new complaints of note    Objective:     Vital Signs (Most Recent):  Temp: 98.2 °F (36.8 °C) (04/25/24 1245)  Pulse: 99 (04/25/24 1245)  Resp: 18 (04/25/24 1245)  BP: (!) 105/51  (04/25/24 1245)  SpO2: 98 % (04/25/24 1245) Vital Signs (24h Range):  Temp:  [97.6 °F (36.4 °C)-98.4 °F (36.9 °C)] 98.2 °F (36.8 °C)  Pulse:  [] 99  Resp:  [16-19] 18  SpO2:  [95 %-100 %] 98 %  BP: (105-125)/(51-60) 105/51     Weight: 84.6 kg (186 lb 8.2 oz)  Body mass index is 34.11 kg/m².    Estimated Creatinine Clearance: 129.8 mL/min (based on SCr of 0.5 mg/dL).     Physical Exam  Constitutional:       General: She is not in acute distress.  HENT:      Head: Normocephalic and atraumatic.   Eyes:      General: No scleral icterus.     Conjunctiva/sclera: Conjunctivae normal.   Cardiovascular:      Rate and Rhythm: Normal rate and regular rhythm.      Heart sounds: Normal heart sounds. No murmur heard.  Pulmonary:      Effort: No respiratory distress.      Breath sounds: Normal breath sounds. No wheezing or rhonchi.   Abdominal:      General: There is no distension.      Palpations: Abdomen is soft.      Tenderness: There is no abdominal tenderness.   Musculoskeletal:      Cervical back: No rigidity.      Right lower leg: Edema present.      Left lower leg: Edema present.   Lymphadenopathy:      Cervical: No cervical adenopathy.   Skin:     General: Skin is warm and dry.      Coloration: Skin is not jaundiced.      Findings: Erythema and rash present.      Comments: RLE erythematous; more intense patch distal anterior shin; lighter area extends from this and covers much of the medial leg.  Slightly warm to touch.  Nontender   Neurological:      General: No focal deficit present.      Mental Status: She is alert and oriented to person, place, and time.   Psychiatric:         Mood and Affect: Mood normal.         Behavior: Behavior normal.          Significant Labs:   Microbiology Results (last 7 days)       Procedure Component Value Units Date/Time    Blood culture [[id number]]  (Abnormal) Collected: 04/21/24 1811    Order Status: Completed Specimen: Blood from Line, Port A Cath Updated: 04/25/24 0323      Blood Culture, Routine Gram stain aer bottle: Gram positive cocci in clusters resembling Staph      Results called to and read back by:Jaylene Crump RN. 04/22/2024      16:48      Gram stain carroll bottle: Gram positive cocci in clusters resembling Staph      Positive results previously called 04/24/2024  09:07      STAPHYLOCOCCUS PETTENKOFERI  Susceptibility pending      Blood culture [[id number]]  (Abnormal) Collected: 04/21/24 1811    Order Status: Completed Specimen: Blood from Peripheral, Antecubital, Left Updated: 04/25/24 0930     Blood Culture, Routine Gram stain aer bottle: Gram positive cocci in clusters resembling Staph      Results called to and read back by: Alyse Mayer RN 04/22/2024  14:32      Gram stain carroll bottle: Gram positive cocci in clusters resembling Staph      Positive results previously called 04/24/2024  10:38      STAPHYLOCOCCUS PETTENKOFERI  Susceptibility pending      Blood culture [[id number]] Collected: 04/23/24 1156    Order Status: Completed Specimen: Blood from Peripheral, Antecubital, Right Updated: 04/24/24 1412     Blood Culture, Routine No Growth to date      No Growth to date    Blood culture [[id number]] Collected: 04/23/24 1157    Order Status: Completed Specimen: Blood Updated: 04/24/24 1412     Blood Culture, Routine No Growth to date      No Growth to date    MRSA/SA Rapid ID by PCR from Blood culture [[id number]] Collected: 04/21/24 1811    Order Status: Completed Updated: 04/22/24 1616     Staph aureus ID by PCR Negative     Methicillin Resistant ID by PCR Negative            Significant Imaging: I have reviewed all pertinent imaging results/findings within the past 24 hours.

## 2024-04-26 VITALS
OXYGEN SATURATION: 99 % | DIASTOLIC BLOOD PRESSURE: 55 MMHG | TEMPERATURE: 98 F | WEIGHT: 185.44 LBS | HEIGHT: 62 IN | RESPIRATION RATE: 18 BRPM | SYSTOLIC BLOOD PRESSURE: 117 MMHG | HEART RATE: 107 BPM | BODY MASS INDEX: 34.12 KG/M2

## 2024-04-26 LAB
ABO + RH BLD: NORMAL
ALBUMIN SERPL BCP-MCNC: 2 G/DL (ref 3.5–5.2)
ALP SERPL-CCNC: 139 U/L (ref 55–135)
ALT SERPL W/O P-5'-P-CCNC: 15 U/L (ref 10–44)
ANION GAP SERPL CALC-SCNC: 4 MMOL/L (ref 8–16)
AST SERPL-CCNC: 34 U/L (ref 10–40)
BACTERIA BLD CULT: ABNORMAL
BASOPHILS # BLD AUTO: 0.02 K/UL (ref 0–0.2)
BASOPHILS NFR BLD: 0.8 % (ref 0–1.9)
BILIRUB SERPL-MCNC: 2.7 MG/DL (ref 0.1–1)
BLD GP AB SCN CELLS X3 SERPL QL: NORMAL
BUN SERPL-MCNC: 10 MG/DL (ref 6–20)
CALCIUM SERPL-MCNC: 7.8 MG/DL (ref 8.7–10.5)
CHLORIDE SERPL-SCNC: 105 MMOL/L (ref 95–110)
CO2 SERPL-SCNC: 33 MMOL/L (ref 23–29)
CREAT SERPL-MCNC: 0.6 MG/DL (ref 0.5–1.4)
DIFFERENTIAL METHOD BLD: ABNORMAL
EOSINOPHIL # BLD AUTO: 0.2 K/UL (ref 0–0.5)
EOSINOPHIL NFR BLD: 7 % (ref 0–8)
ERYTHROCYTE [DISTWIDTH] IN BLOOD BY AUTOMATED COUNT: 28.3 % (ref 11.5–14.5)
EST. GFR  (NO RACE VARIABLE): >60 ML/MIN/1.73 M^2
GLUCOSE SERPL-MCNC: 87 MG/DL (ref 70–110)
HCT VFR BLD AUTO: 26.3 % (ref 37–48.5)
HGB BLD-MCNC: 8.4 G/DL (ref 12–16)
IMM GRANULOCYTES # BLD AUTO: 0.02 K/UL (ref 0–0.04)
IMM GRANULOCYTES NFR BLD AUTO: 0.8 % (ref 0–0.5)
INR PPP: 1.4 (ref 0.8–1.2)
LYMPHOCYTES # BLD AUTO: 0.6 K/UL (ref 1–4.8)
LYMPHOCYTES NFR BLD: 25.2 % (ref 18–48)
MAGNESIUM SERPL-MCNC: 1.8 MG/DL (ref 1.6–2.6)
MCH RBC QN AUTO: 35.4 PG (ref 27–31)
MCHC RBC AUTO-ENTMCNC: 31.9 G/DL (ref 32–36)
MCV RBC AUTO: 111 FL (ref 82–98)
MONOCYTES # BLD AUTO: 0.3 K/UL (ref 0.3–1)
MONOCYTES NFR BLD: 11.6 % (ref 4–15)
NEUTROPHILS # BLD AUTO: 1.3 K/UL (ref 1.8–7.7)
NEUTROPHILS NFR BLD: 54.6 % (ref 38–73)
NRBC BLD-RTO: 0 /100 WBC
PHOSPHATE SERPL-MCNC: 2.9 MG/DL (ref 2.7–4.5)
PLATELET # BLD AUTO: 142 K/UL (ref 150–450)
PMV BLD AUTO: 10.8 FL (ref 9.2–12.9)
POTASSIUM SERPL-SCNC: 3 MMOL/L (ref 3.5–5.1)
PROT SERPL-MCNC: 4.7 G/DL (ref 6–8.4)
PROTHROMBIN TIME: 15.1 SEC (ref 9–12.5)
RBC # BLD AUTO: 2.37 M/UL (ref 4–5.4)
SODIUM SERPL-SCNC: 142 MMOL/L (ref 136–145)
SPECIMEN OUTDATE: NORMAL
WBC # BLD AUTO: 2.42 K/UL (ref 3.9–12.7)

## 2024-04-26 PROCEDURE — 25000003 PHARM REV CODE 250: Performed by: STUDENT IN AN ORGANIZED HEALTH CARE EDUCATION/TRAINING PROGRAM

## 2024-04-26 PROCEDURE — 63600175 PHARM REV CODE 636 W HCPCS: Performed by: STUDENT IN AN ORGANIZED HEALTH CARE EDUCATION/TRAINING PROGRAM

## 2024-04-26 PROCEDURE — 86901 BLOOD TYPING SEROLOGIC RH(D): CPT | Performed by: INTERNAL MEDICINE

## 2024-04-26 PROCEDURE — 99233 SBSQ HOSP IP/OBS HIGH 50: CPT | Mod: ,,, | Performed by: INTERNAL MEDICINE

## 2024-04-26 PROCEDURE — 25000003 PHARM REV CODE 250

## 2024-04-26 PROCEDURE — 80053 COMPREHEN METABOLIC PANEL: CPT | Performed by: INTERNAL MEDICINE

## 2024-04-26 PROCEDURE — 85025 COMPLETE CBC W/AUTO DIFF WBC: CPT | Performed by: INTERNAL MEDICINE

## 2024-04-26 PROCEDURE — 85610 PROTHROMBIN TIME: CPT | Performed by: INTERNAL MEDICINE

## 2024-04-26 PROCEDURE — 83735 ASSAY OF MAGNESIUM: CPT | Performed by: INTERNAL MEDICINE

## 2024-04-26 PROCEDURE — 63600175 PHARM REV CODE 636 W HCPCS

## 2024-04-26 PROCEDURE — 99239 HOSP IP/OBS DSCHRG MGMT >30: CPT | Mod: ,,, | Performed by: HOSPITALIST

## 2024-04-26 PROCEDURE — 84100 ASSAY OF PHOSPHORUS: CPT | Performed by: INTERNAL MEDICINE

## 2024-04-26 RX ORDER — LINEZOLID 600 MG/1
600 TABLET, FILM COATED ORAL EVERY 12 HOURS
Status: DISCONTINUED | OUTPATIENT
Start: 2024-04-26 | End: 2024-04-26

## 2024-04-26 RX ORDER — LINEZOLID 600 MG/1
600 TABLET, FILM COATED ORAL EVERY 12 HOURS
Qty: 24 TABLET | Refills: 0 | Status: ON HOLD | OUTPATIENT
Start: 2024-04-26 | End: 2024-05-16 | Stop reason: ALTCHOICE

## 2024-04-26 RX ORDER — HYDROXYZINE HYDROCHLORIDE 25 MG/1
12.5 TABLET, FILM COATED ORAL 3 TIMES DAILY PRN
Qty: 10 TABLET | Refills: 0 | Status: ON HOLD | OUTPATIENT
Start: 2024-04-26 | End: 2024-05-16 | Stop reason: ALTCHOICE

## 2024-04-26 RX ORDER — LACTULOSE 10 G/15ML
10 SOLUTION ORAL; RECTAL 3 TIMES DAILY
Qty: 120 ML | Refills: 3 | Status: ON HOLD | OUTPATIENT
Start: 2024-04-26 | End: 2024-05-29 | Stop reason: HOSPADM

## 2024-04-26 RX ORDER — BENZONATATE 100 MG/1
100 CAPSULE ORAL 3 TIMES DAILY
Qty: 30 CAPSULE | Refills: 0 | Status: SHIPPED | OUTPATIENT
Start: 2024-04-26 | End: 2024-05-06

## 2024-04-26 RX ORDER — LINEZOLID 600 MG/1
600 TABLET, FILM COATED ORAL EVERY 12 HOURS
Status: DISCONTINUED | OUTPATIENT
Start: 2024-04-26 | End: 2024-04-26 | Stop reason: HOSPADM

## 2024-04-26 RX ORDER — FUROSEMIDE 40 MG/1
40 TABLET ORAL DAILY
Qty: 90 EACH | Refills: 3 | Status: ON HOLD | OUTPATIENT
Start: 2024-04-26 | End: 2024-05-21

## 2024-04-26 RX ADMIN — LACTULOSE 15 G: 20 SOLUTION ORAL at 02:04

## 2024-04-26 RX ADMIN — BENZONATATE 100 MG: 100 CAPSULE ORAL at 02:04

## 2024-04-26 RX ADMIN — FUROSEMIDE 40 MG: 10 INJECTION, SOLUTION INTRAVENOUS at 08:04

## 2024-04-26 RX ADMIN — MUPIROCIN: 20 OINTMENT TOPICAL at 08:04

## 2024-04-26 RX ADMIN — HEPARIN 300 UNITS: 100 SYRINGE at 04:04

## 2024-04-26 RX ADMIN — POTASSIUM BICARBONATE 40 MEQ: 391 TABLET, EFFERVESCENT ORAL at 08:04

## 2024-04-26 RX ADMIN — BENZONATATE 100 MG: 100 CAPSULE ORAL at 08:04

## 2024-04-26 RX ADMIN — PANTOPRAZOLE SODIUM 40 MG: 40 TABLET, DELAYED RELEASE ORAL at 08:04

## 2024-04-26 RX ADMIN — ACETAMINOPHEN 1000 MG: 500 TABLET ORAL at 02:04

## 2024-04-26 RX ADMIN — LACTULOSE 15 G: 20 SOLUTION ORAL at 08:04

## 2024-04-26 RX ADMIN — POTASSIUM BICARBONATE 40 MEQ: 391 TABLET, EFFERVESCENT ORAL at 11:04

## 2024-04-26 NOTE — PLAN OF CARE
Plan of care reviewed with patient. Pt is afebrile. Free from falls or injury. No complaints of pain. No complaints of nausea. Daptomycin given as scheduled. Pt is up with stand by assist. Bed locked in lowest position, non skid socks on, call light within reach. Pt instructed to call if any assistance is needed. Vitals stable.  Will cont to roma pt.

## 2024-04-26 NOTE — ASSESSMENT & PLAN NOTE
-BL lower extremity swelling  -rales on lung exam  -  -recent TTE with normal systolic and diastolic fnc  -IV lasix 40 BID  
-BL lower extremity swelling  -rales on lung exam  -  -recent TTE with normal systolic and diastolic fnc  -IV lasix 40 BID with good UOP  -will need increased home lasix dose on discharge  
-BL lower extremity swelling  -rales on lung exam  -  -recent TTE with normal systolic and diastolic fnc  -IV lasix 40 BID with good UOP  -will need increased home lasix dose on discharge +  likely spironolactone  
-BL lower extremity swelling  -rales on lung exam  -  -recent TTE with normal systolic and diastolic fnc  -IV lasix 40 BID with good UOP, volume status improving  -change to IV lasix 40mg once daily  -will need increased home lasix dose on discharge +  likely spironolactone  
-BL lower extremity swelling  -rales on lung exam  -  -recent TTE with normal systolic and diastolic fnc  -IV lasix 40 BID with good UOP, volume status improving  -change to IV lasix 40mg once daily with good UOP  -will need increased home lasix dose on discharge +  likely spironolactone  
-Bilateral lower extremity swelling recurrent, but worsened from usual  -warm, tender, erythematous R > L  -afebrile, no leukocytosis, slightly tachycardiac  -Given dose of vanc in ED  -Bcx pending  -Started cefazolin  -LE  pending  
-Bilateral lower extremity swelling recurrent, but worsened from usual  -warm, tender, erythematous R > L  -afebrile, no leukocytosis, slightly tachycardiac  -Given dose of vanc in ED  -Bcx pending, GPC resembling Staph in 1/2 sets, likely contaminant, will f/u ID  -Started ancef for possible non-purulent cellulitis  -now growing GPCs and continuing IV antibiotics    
-Bilateral lower extremity swelling recurrent, but worsened from usual  -warm, tender, erythematous R > L  -afebrile, no leukocytosis, slightly tachycardiac  -Given dose of vanc in ED  -Bcx pending, GPC resembling Staph in 1/2 sets, likely contaminant, will f/u ID  -Started ancef for possible non-purulent cellulitis  -now growing Staph pettenkoferi and continuing IV antibiotics  -repeat cultures NGTD  -ID consulted, appreciate recs    
-Bilateral lower extremity swelling recurrent, but worsened from usual  -warm, tender, erythematous R > L  -afebrile, no leukocytosis, slightly tachycardiac  -Given dose of vanc in ED  -Bcx pending, GPC resembling Staph in 1/2 sets, likely contaminant, will f/u ID  -Started cephalexin for possible non-purulent cellulitis  -LE US pending  
-Bilateral lower extremity swelling recurrent, but worsened from usual  -warm, tender, erythematous R > L  -afebrile, no leukocytosis, slightly tachycardiac  -Given dose of vanc in ED  -Started ancef for possible non-purulent cellulitis  -now growing Staph pettenkoferi and continuing IV antibiotics  -repeat cultures NGTD  -ID consulted, appreciate recs, pending susceptibilities     
-Follows with Dr. Willis  -On [facility] for 2 yrs  
-[facility] with Dr. Willis  [facility]  
-Follows with Dr. Willis  -On [facility] for 2 yrs  
-Follows with Dr. Willis  -On [facility] for 2 yrs  
-Follows with Dr. Willis  -On [facility] for 2 yrs  
-TIPS placed 4/12 for acute GI bleeding and hx of gastric and esophageal varices  -has gastric portal vein coiling and embolization   -monitor for HE, takes lactulose as needed  
-TIPS placed 4/12 for acute GI bleeding and hx of gastric and esophageal varices  -has gastric portal vein coiling and embolization   -monitor for HE, takes lactulose as needed, restarted  -CT AP triple phase liver protocol to eval TIPS show that TIPS is patient, no portal vein thrombosis  -bilirubin improving, likely 2/2 congestive hepatopathy  
-TIPS placed 4/12 for acute GI bleeding and hx of gastric and esophageal varices  -has gastric portal vein coiling and embolization   -monitor for HE, takes lactulose as needed, restarted  -CT AP triple phase liver protocol to eval TIPS show that TIPS is patient, no portal vein thrombosis  -bilirubin improving, likely 2/2 congestive hepatopathy  
-TIPS placed 4/12 for acute GI bleeding and hx of gastric and esophageal varices  -has gastric portal vein coiling and embolization   -monitor for HE, takes lactulose as needed, restarted  -f/u CT AP triple phase liver protocol to eval TIPS given rise in bilirubin  
-TIPS placed 4/12 for acute GI bleeding and hx of gastric and esophageal varices  -has gastric portal vein coiling and embolization   -monitor for HE, takes lactulose as needed, restarted  -f/u CT AP triple phase liver protocol to eval TIPS given rise in bilirubin. Showed patency though noted volume overload  
-discussed diet/exercise/weight loss for overall health benefits  
-repleted in the ED  -repleting as needed with potassium PO    
-repleted in the ED  -repleting as needed with potassium PO  -2.7 on admission  
-repleted in the ED  -repleting as needed with potassium PO  -2.7 on admission  
53F with stage 4 breast cancer, HAWTHORNE s/p TIPS, admitted w/ fevers and RLE cellulitis. Blood cx + staph pettenkoferi.     Recommendations:   - obtain soft tissue US of RLE to assess for underlying abscess development, unusual for uncomplicated cellulitis to cause bacteremia  - follow up repeat blood cx - should stay inpatient until negative for 48h  - stop cefazolin, start daptomycin given high rates of methacillin R CONS  - if blood cx persistently positive, will need to remove port and obtain TTE  - may be able to discharge on PO abx - final plan TBD.   
53F with stage 4 breast cancer, HAWTHORNE s/p TIPS, admitted w/ fevers and RLE cellulitis. Blood cx + staph pettenkoferi.     Repeat cultures NG at day 1; pending today's assessment.  Soft tissue US does not show a focal collection - only notes diffuse soft tissue edema consistent with cellulitis.     Recommendations:   -- Continue Daptomycin   -- Follow-up repeat cultures for clearance  -- if blood cx persistently positive, will need to remove port and obtain TTE  -- Pending sensitivities - hopeful patient has an appropriate oral option available  
53F with stage 4 breast cancer, HAWTHORNE s/p TIPS, admitted w/ fevers and RLE cellulitis. Blood cx + staph pettenkoferi.     Treating for staph bacteremia with RLE cellulitis as potential source.  Blood cultures cleared.  Improving infection; afebrile; no leukocytosis.  Suspect patient can transition to PO in anticipation of discharge    Recommendations:   -- OK to transition to Linezolid 600mg PO BID, given sensitivities  -- Treat for 2 weeks from negative culture (4/23 - 5/6)  -- Close follow-up with primary provider and/or oncologist   
No s/s of HE, continue lactulose titrating to 3-4 BM daily.   
Pt reports adequate UOP with lasix 40mg PO qd at home, but grossly overloaded on arrival. Possible concurrent cardiac, BSI contributions with positive cultures and echo w AS (prev followed with cards-onc). Responding well to lasix 40IV BID. persistent hypokalemia since admit, low phos/mag, also possibly d/t lactulose.    - still undergoing IV diuresis today but would discharge with lasix 40mg PO qd and add spironolactone 100mg qd if BP tolerates   - CMP weekly for 4 weeks following discharge   - close Hepatology follow-up   - AST/ALT normalized.    - daily CMP, INR.   
Home

## 2024-04-26 NOTE — PROGRESS NOTES
Dereck Forrester - Oncology ([facility])  Infectious Disease  Progress Note    Patient Name: Shikha López  MRN: [id number]  Admission Date: 4/21/2024  Length of Stay: 4 days  Attending Physician: Gali Amaya MD  Primary Care Provider: No, Primary Doctor    Isolation Status: No active isolations  Assessment/Plan:      ID  Bacteremia  53F with stage 4 breast cancer, HAWTHORNE s/p TIPS, admitted w/ fevers and RLE cellulitis. Blood cx + staph pettenkoferi.     Treating for staph bacteremia with RLE cellulitis as potential source.  Blood cultures cleared.  Improving infection; afebrile; no leukocytosis.  Suspect patient can transition to PO in anticipation of discharge    Recommendations:   -- OK to transition to Linezolid 600mg PO BID, given sensitivities  -- Treat for 2 weeks from negative culture (4/23 - 5/6)  -- Close follow-up with primary provider and/or oncologist       Thank you for your consult. ID will sign off    Home Adorno MD  Infectious Disease  Subjective:     Principal Problem:Cellulitis of foot    HPI: 53F with stage 4 breast CA w pulm mets (HER2+, on trastuzumab-deruxtecan), cirrhosis presumed 2/2 HAWTHORNE (diagnosed 6/23 w variceal bleeds s/p coil embolization) [place] [facility] [place] w BLE edema, erythema, and wounds. Blood cultures obtained on admission are positive for staph pettenkoferi in 4/4 bottles. Patient endorses chronic lymphedema, but noted an increase in redness and pain in her R lower extremity. The pain has improved since admission. She has a port, denies issues w/ accessing, pain, erythema or purulence. ID consulted for antibiotic recommendations.     Interval History: Sitting up in chair upon my entry; patient feeling generally well today..  Per micro, staph pettenkofer is Linezolid sensitive    Objective:     Vital Signs (Most Recent):  Temp: 98 °F (36.7 °C) (04/26/24 1153)  Pulse: 107 (04/26/24 1153)  Resp: 18 (04/26/24 1153)  BP: (!) 117/55 (04/26/24 1153)  SpO2: 99 % (04/26/24 1153)  Vital Signs (24h Range):  Temp:  [97.5 °F (36.4 °C)-99.2 °F (37.3 °C)] 98 °F (36.7 °C)  Pulse:  [] 107  Resp:  [18] 18  SpO2:  [96 %-99 %] 99 %  BP: (105-124)/(51-59) 117/55     Weight: 84.1 kg (185 lb 6.5 oz)  Body mass index is 33.91 kg/m².    Estimated Creatinine Clearance: 107.8 mL/min (based on SCr of 0.6 mg/dL).     Physical Exam  Constitutional:       General: She is not in acute distress.  HENT:      Head: Normocephalic and atraumatic.   Eyes:      General: No scleral icterus.     Conjunctiva/sclera: Conjunctivae normal.   Cardiovascular:      Rate and Rhythm: Normal rate and regular rhythm.      Heart sounds: Normal heart sounds. No murmur heard.  Pulmonary:      Effort: No respiratory distress.      Breath sounds: Normal breath sounds. No wheezing or rhonchi.   Abdominal:      General: There is no distension.      Palpations: Abdomen is soft.      Tenderness: There is no abdominal tenderness.   Musculoskeletal:      Cervical back: No rigidity.      Right lower leg: Edema present.      Left lower leg: Edema present.   Lymphadenopathy:      Cervical: No cervical adenopathy.   Skin:     General: Skin is warm and dry.      Coloration: Skin is not jaundiced.      Findings: Erythema and rash present.      Comments: Improving overall erythema; somewhat warm to touch   Neurological:      General: No focal deficit present.      Mental Status: She is alert and oriented to person, place, and time.   Psychiatric:         Mood and Affect: Mood normal.         Behavior: Behavior normal.          Significant Labs:   Microbiology Results (last 7 days)       Procedure Component Value Units Date/Time    Blood culture [[id number]]  (Abnormal)  (Susceptibility) Collected: 04/21/24 1811    Order Status: Completed Specimen: Blood from Peripheral, Antecubital, Left Updated: 04/26/24 1137     Blood Culture, Routine Gram stain aer bottle: Gram positive cocci in clusters resembling Staph      Results called to and read back  by: Alyse Mayer RN 04/22/2024  14:32      Gram stain carroll bottle: Gram positive cocci in clusters resembling Staph      Positive results previously called 04/24/2024  10:38      STAPHYLOCOCCUS PETTENKOFERI    Blood culture [[id number]]  (Abnormal)  (Susceptibility) Collected: 04/21/24 1811    Order Status: Completed Specimen: Blood from Line, Port A Cath Updated: 04/26/24 1129     Blood Culture, Routine Gram stain aer bottle: Gram positive cocci in clusters resembling Staph      Results called to and read back by:Jaylene Crump RN. 04/22/2024      16:48      Gram stain carroll bottle: Gram positive cocci in clusters resembling Staph      Positive results previously called 04/24/2024  09:07      STAPHYLOCOCCUS PETTENKOFERI    Blood culture [[id number]] Collected: 04/23/24 1156    Order Status: Completed Specimen: Blood from Peripheral, Antecubital, Right Updated: 04/25/24 1412     Blood Culture, Routine No Growth to date      No Growth to date      No Growth to date    Blood culture [[id number]] Collected: 04/23/24 1157    Order Status: Completed Specimen: Blood Updated: 04/25/24 1412     Blood Culture, Routine No Growth to date      No Growth to date      No Growth to date    MRSA/SA Rapid ID by PCR from Blood culture [[id number]] Collected: 04/21/24 1811    Order Status: Completed Updated: 04/22/24 1616     Staph aureus ID by PCR Negative     Methicillin Resistant ID by PCR Negative          Significant Imaging: I have reviewed all pertinent imaging results/findings within the past 24 hours.

## 2024-04-26 NOTE — SUBJECTIVE & OBJECTIVE
Interval History: Sitting up in chair upon my entry; patient feeling generally well today..  Per micro, chacorta ruiz is Linezolid sensitive    Objective:     Vital Signs (Most Recent):  Temp: 98 °F (36.7 °C) (04/26/24 1153)  Pulse: 107 (04/26/24 1153)  Resp: 18 (04/26/24 1153)  BP: (!) 117/55 (04/26/24 1153)  SpO2: 99 % (04/26/24 1153) Vital Signs (24h Range):  Temp:  [97.5 °F (36.4 °C)-99.2 °F (37.3 °C)] 98 °F (36.7 °C)  Pulse:  [] 107  Resp:  [18] 18  SpO2:  [96 %-99 %] 99 %  BP: (105-124)/(51-59) 117/55     Weight: 84.1 kg (185 lb 6.5 oz)  Body mass index is 33.91 kg/m².    Estimated Creatinine Clearance: 107.8 mL/min (based on SCr of 0.6 mg/dL).     Physical Exam  Constitutional:       General: She is not in acute distress.  HENT:      Head: Normocephalic and atraumatic.   Eyes:      General: No scleral icterus.     Conjunctiva/sclera: Conjunctivae normal.   Cardiovascular:      Rate and Rhythm: Normal rate and regular rhythm.      Heart sounds: Normal heart sounds. No murmur heard.  Pulmonary:      Effort: No respiratory distress.      Breath sounds: Normal breath sounds. No wheezing or rhonchi.   Abdominal:      General: There is no distension.      Palpations: Abdomen is soft.      Tenderness: There is no abdominal tenderness.   Musculoskeletal:      Cervical back: No rigidity.      Right lower leg: Edema present.      Left lower leg: Edema present.   Lymphadenopathy:      Cervical: No cervical adenopathy.   Skin:     General: Skin is warm and dry.      Coloration: Skin is not jaundiced.      Findings: Erythema and rash present.      Comments: Improving overall erythema; somewhat warm to touch   Neurological:      General: No focal deficit present.      Mental Status: She is alert and oriented to person, place, and time.   Psychiatric:         Mood and Affect: Mood normal.         Behavior: Behavior normal.          Significant Labs:   Microbiology Results (last 7 days)       Procedure Component  Value Units Date/Time    Blood culture [[id number]]  (Abnormal)  (Susceptibility) Collected: 04/21/24 1811    Order Status: Completed Specimen: Blood from Peripheral, Antecubital, Left Updated: 04/26/24 1137     Blood Culture, Routine Gram stain aer bottle: Gram positive cocci in clusters resembling Staph      Results called to and read back by: Alyse Mayer RN 04/22/2024  14:32      Gram stain carroll bottle: Gram positive cocci in clusters resembling Staph      Positive results previously called 04/24/2024  10:38      STAPHYLOCOCCUS PETTENKOFERI    Blood culture [[id number]]  (Abnormal)  (Susceptibility) Collected: 04/21/24 1811    Order Status: Completed Specimen: Blood from Line, Port A Cath Updated: 04/26/24 1129     Blood Culture, Routine Gram stain aer bottle: Gram positive cocci in clusters resembling Staph      Results called to and read back by:Jaylene Crump RN. 04/22/2024      16:48      Gram stain carroll bottle: Gram positive cocci in clusters resembling Staph      Positive results previously called 04/24/2024  09:07      STAPHYLOCOCCUS PETTENKOFERI    Blood culture [[id number]] Collected: 04/23/24 1156    Order Status: Completed Specimen: Blood from Peripheral, Antecubital, Right Updated: 04/25/24 1412     Blood Culture, Routine No Growth to date      No Growth to date      No Growth to date    Blood culture [[id number]] Collected: 04/23/24 1157    Order Status: Completed Specimen: Blood Updated: 04/25/24 1412     Blood Culture, Routine No Growth to date      No Growth to date      No Growth to date    MRSA/SA Rapid ID by PCR from Blood culture [[id number]] Collected: 04/21/24 1811    Order Status: Completed Updated: 04/22/24 1616     Staph aureus ID by PCR Negative     Methicillin Resistant ID by PCR Negative          Significant Imaging: I have reviewed all pertinent imaging results/findings within the past 24 hours.

## 2024-04-26 NOTE — PROGRESS NOTES
This Oncology Social Worker is following patient's case today in the absence of patient's primary Oncology Social Worker Shrutih Navarrete LMSW. Patient will be discharged home today on oral Antibiotics (Linezolid) per orders from Dr. Amaya's Medical Oncology Service. Home health for IV Antibiotics will not be needed. Spoke with [facility] onsite liaison nurse Enma Samaniego at [phone], and sent a Secure Chat message to [facility] with [facility] to inform them so that referrals can be cancelled. No other needs are indicated at this time.

## 2024-04-26 NOTE — DISCHARGE SUMMARY
Dereck Forrester - Oncology ([facility])  Hematology/Oncology  Discharge Summary      Patient Name: Shikha López  MRN: [id number]  Admission Date: 4/21/2024  Hospital Length of Stay: 4 days  Discharge Date and Time:  04/26/2024 1:17 PM  Attending Physician: Gali Amaya MD   Discharging Provider: Stephany Coyne MD  Primary Care Provider: [place], Primary Doctor    HPI: 53F with PMH stage 4 breast ca left (mets HER 2 +) Dr. 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 Recent hospital admission for GI bleeding, TIPS procedure, and was admitted to oncology service due to active chemotherapy. Presenting to the ED for complaint of bilateral legs wounds, redness, and swelling BLE. Refers the symptoms slowly worsened since last admission to the point that she cannot walk due to R leg pain. Also refers erythema and warmth of RLE that is new from last admission. She otherwise refers feeling much better when last seen.     AF, VSS, on RA with labs H/H stable from prior around 7-9 baseline, K 2.7, Tbil 5.4, , trop 0.027, CRP 51.3, CXR and T/F Xray stable with soft tissue swelling, given 1L IVFs, Vanc, tylenol in ED    Admitted to Medical Oncology service        * No surgery found *     Hospital Course: Patient admitted to medical oncology for increased swelling, erythema, and pain of bilateral lower extremities. Patient started on increased lasix dose for increased swelling and antibiotics for possible non-purulent cellulitis. S/p recent TIPS procedure. Increased bilirubin from prior admission noted, CT abdomina pelvis liver protocol ordered to eval recent TIPS procedure. CT abd/pelvis noted patent TIPS though findings of volume overloaded. She was diuresed with IV furosemide with good UOP, hepatology consulted for assitance and recommended lasix 40mg daily and spironolactone 100mg  daily along with close OP follow up. Found to be bacteremic and placed back on IV cefazolin. Cultures growing Staph pettenkoferi, susceptible to oral linezolid. Repeat cultures negative at 72 hours. Patient to d/c to finish 14 day course of oral linezolid for cellulitis with bacteremia. She is also d/c with lasix and spironolactone for continued volume management, and will have close follow up with hepatology. Patient agreeable to plan and is stable for discharge.    Goals of Care Treatment Preferences:  Code Status: Full Code          What is most important right now is to focus on remaining as independent as possible, symptom/pain control, curative/life-prolongation (regardless of treatment burdens), comfort and QOL .  Accordingly, we have decided that the best plan to meet the patient's goals includes continuing with treatment.      Consults:   Consults (From admission, onward)          Status Ordering Provider     Inpatient consult to Infectious Diseases  Once        Provider:  (Not yet assigned)    Completed PAUL URBINA     Inpatient consult to Hepatology  Once        Provider:  (Not yet assigned)    Completed PAUL URBINA            Significant Diagnostic Studies: Microbiology: Blood Culture   Lab Results   Component Value Date    LABBLOO No Growth to date 04/23/2024    LABBLOO No Growth to date 04/23/2024    LABBLOO No Growth to date 04/23/2024     Susceptibility data from last 90 days.  Collected Specimen Info Organism Clindamycin Erythromycin Oxacillin Tetracycline Trimeth/Sulfa   04/21/24 Blood from Peripheral, Antecubital, Left Staphylococcus pettenkoferi  R  S  S  S  S   04/21/24 Blood from Line, Port A Cath Staphylococcus pettenkoferi  S  S  S  S  S        Pending Diagnostic Studies:       None          Final Active Diagnoses:    Diagnosis Date Noted POA    PRINCIPAL PROBLEM:  Cellulitis of foot [L03.119] 04/21/2024 Yes    Bacteremia [R78.81] 04/24/2024 Yes    Biliary  cirrhosis [K74.5] 04/23/2024 Yes    S/P TIPS (transjugular intrahepatic portosystemic shunt) [Z95.828] 04/21/2024 Not Applicable    Volume overload [E87.70] 04/21/2024 Yes    Severe obesity (BMI 35.0-39.9) with comorbidity [E66.01] 07/06/2023 Yes    Hypokalemia [E87.6] 02/08/2023 Yes    Breast cancer, stage 4, left [C50.912] 06/08/2021 Yes      Problems Resolved During this Admission:      Discharged Condition: stable    Disposition: Home or Self Care    Follow Up:    Patient Instructions:      Ambulatory referral/consult to Hepatology   Standing Status: Future   Referral Priority: Routine Referral Type: Consultation   Referral Reason: Specialty Services Required   Requested Specialty: Hepatology   Number of Visits Requested: 1     Echo   Standing Status: Future Standing Exp. Date: 04/23/25     Order Specific Question Answer Comments   Release to patient Immediate      Medications:  Reconciled Home Medications:      Medication List        START taking these medications      benzonatate 100 MG capsule  Commonly known as: TESSALON  Take 1 capsule (100 mg total) by mouth 3 (three) times daily. for 10 days     hydrOXYzine HCL 25 MG tablet  Commonly known as: ATARAX  Take 0.5 tablets (12.5 mg total) by mouth 3 (three) times daily as needed for Anxiety.     linezolid 600 mg Tab  Commonly known as: ZYVOX  Take 1 tablet (600 mg total) by mouth every 12 (twelve) hours.     spironolactone 100 MG tablet  Commonly known as: ALDACTONE  Take 1 tablet (100 mg total) by mouth once daily.            CHANGE how you take these medications      FeroSuL 325 mg (65 mg iron) Tab tablet  Generic drug: ferrous sulfate  Take daily with Vitamin C on an empty stomach  What changed:   how much to take  how to take this  when to take this  additional instructions     furosemide 40 MG tablet  Commonly known as: LASIX  Take 1 tablet (40 mg total) by mouth once daily.  What changed:   medication strength  how much to take            CONTINUE taking  these medications      CONSTULOSE 10 gram/15 mL solution  Generic drug: lactulose  Take 15 mLs (10 g total) by mouth 3 (three) times daily. PLEASE take as needed if you start feeling confused or altered, take as need to have around 2-3 bowel movements per day     methylphenidate HCl 5 MG tablet  Commonly known as: RITALIN  Take 1 tablet (5 mg total) by mouth 2 (two) times daily.     OLANZapine 5 MG tablet  Commonly known as: ZyPREXA  Take days 1-4 of chemotherapy     pantoprazole 40 MG tablet  Commonly known as: PROTONIX  Take 1 tablet (40 mg total) by mouth 2 (two) times daily.            STOP taking these medications      potassium chloride 10% 20 mEq/15 mL oral solution  Commonly known as: MICHELLE Coyne MD  Hematology/Oncology  [facility] - Oncology ([facility])

## 2024-04-26 NOTE — PLAN OF CARE
Discharge instructions and prescriptions given and explained to pt. Upcoming appointments discussed. Pt. verbalized understanding with no further questions. All questions and concerns addressed. Right Power port heparin locked and deaccessed. AAOx4. VS WDL.  Patient is awaiting ride home by daughter. Wheelchair provided for discharge.

## 2024-04-28 LAB
BACTERIA BLD CULT: NORMAL
BACTERIA BLD CULT: NORMAL

## 2024-04-29 ENCOUNTER — HOSPITAL ENCOUNTER (OUTPATIENT)
Dept: RADIOLOGY | Facility: HOSPITAL | Age: 54
Discharge: HOME | End: 2024-04-29
Payer: MEDICARE

## 2024-04-29 ENCOUNTER — TELEPHONE (OUTPATIENT)
Dept: HEPATOLOGY | Facility: CLINIC | Age: 54
End: 2024-04-29
Payer: MEDICARE

## 2024-04-29 DIAGNOSIS — C50.912 BREAST CANCER, STAGE 4, LEFT: ICD-10-CM

## 2024-04-29 DIAGNOSIS — C78.02 MALIGNANT NEOPLASM METASTATIC TO LEFT LUNG: ICD-10-CM

## 2024-04-29 PROCEDURE — A9698 NON-RAD CONTRAST MATERIALNOC: HCPCS | Performed by: NURSE PRACTITIONER

## 2024-04-29 PROCEDURE — 74177 CT ABD & PELVIS W/CONTRAST: CPT | Mod: 26,,, | Performed by: RADIOLOGY

## 2024-04-29 PROCEDURE — 74177 CT ABD & PELVIS W/CONTRAST: CPT | Mod: TC

## 2024-04-29 PROCEDURE — 25500020 PHARM REV CODE 255: Performed by: NURSE PRACTITIONER

## 2024-04-29 PROCEDURE — 71260 CT THORAX DX C+: CPT | Mod: 26,,, | Performed by: RADIOLOGY

## 2024-04-29 RX ADMIN — BARIUM SULFATE 450 ML: 20 SUSPENSION ORAL at 04:04

## 2024-04-29 RX ADMIN — IOHEXOL 100 ML: 350 INJECTION, SOLUTION INTRAVENOUS at 04:04

## 2024-04-29 NOTE — TELEPHONE ENCOUNTER
----- Message from Ana Marty sent at 4/25/2024 11:31 AM CDT -----  Regarding: Schedule an appt  Contact: Leda  Consult/Advisory     Name Of Caller:Leda        Contact Preference:[phone] (home) [phone] (Mobile)       Nature of call: leda from In patient discharge. calling to get pt scheduled within the next week or two, pt is very weak.       Note: Related to her recent tip procedure.     Note: Leda said they are trying to set up cluster visit for the pt. So pt doesn't have to have so many outings.

## 2024-04-29 NOTE — PROGRESS NOTES
Subjective:       Patient ID: Shikha López is a 53 y.o. female.    Chief Complaint: No chief complaint on file.      HPI 53-year-old female who returns for F/U  for metastatic HER 2 + breast cancer.  She is on Trastuzumab deruxtecan  - here for cycle  47.      She had a TIPS procedure on April 12th.  She was hospitalized from April 21st to 26th with cellulitis and edema of lower extremities with staph bacteremia.  She was treated with antibiotics and diuretics with improvement.  She was discharged on oral linezolid and diuretics with Lasix and spironolactone.  She remains on antibiotics and has follow-up with Infectious diseases later this week.  Today she is feeling better.  She still has some pain and swelling in her right leg.  She denies any shortness of breath her appetite has been good.  Bowel function has been good on lactulose.  Her anxiety has improved.          Breast history:  She presented in the emergency room at [facility] on September 29, 2006 with a 4 months history of inflammation of her left breast.  Physical examination at that time showed the left breast was largely replaced by large mass with multiple skin ulcerations.    A biopsy in September 2006 showed poorly differentiated carcinoma which was ER and IL. negative and HER2 positive.    She was then referred to [facility] for additional care.   CT scan of the chest and abdomen November 2006 revealed multiple nodules in the lungs consistent with metastatic disease.  There also enlarged left axillary node.    She was treated with chemotherapy with weekly Herceptin and Abraxane with improvement in her breast and lung metastasis.    On May 29, 2007 she underwent left modified radical mastectomy(Dr. Colvin) which showed no residual tumor in the breast and 1/5 nodes was positive for metastasis measuring 6 mm.  (ypT0N1).  She then received postoperative radiation therapy(Dr Singh)  to the left chest wall supraclav and internal  mammary lymph nodes from August 20, 2007 to September 28, 2007.    Postoperatively she continued on Herceptin for approximately 3 and 1/2 years before her lung metastasis begin to grow.    She subsequently received a number of different chemotherapy treatments including Adriamycin, Halaven, Xeloda plus lapatinib then Xeloda plus Herceptin. (  in [facility].)    Subsequently, she transferred her care to  at [facility].  -She was initially treated with Taxotere Herceptin Perjeta.    -She was then changed to Kadcyla.    In July 2020 PET scan showed progression.   She then took approximately 9 months of Herceptin and Navelbine with initial response then progression.    She started trastuzumab- deruxtecan  4/12/21.     CT 3/11/24 - stable  Stable postsurgical changes of left mastectomy and axillary lymph node dissection is patient with metastatic left breast cancer.   Redemonstration of multiple irregular bilateral pulmonary nodules concerning for metastatic disease.  No new lesions identified.   Hepatic steatosis and portal hypertension.  Increased bowel wall thickening and diffuse mesenteric edema with mild ascites, likely related to hepatic dysfunction.    Echo 4/24/24 - EF 60-65%      CT 4/29/24  -stable    Review of Systems   Constitutional:  Positive for fatigue. Negative for appetite change, fever and unexpected weight change.   Eyes:  Negative for visual disturbance.   Respiratory:  Negative for cough and shortness of breath.    Cardiovascular:  Positive for leg swelling. Negative for chest pain.   Gastrointestinal:  Negative for abdominal pain, blood in stool, constipation, diarrhea and nausea.   Musculoskeletal:  Negative for back pain.   Neurological:  Negative for headaches.   Psychiatric/Behavioral:  The patient is not nervous/anxious.          Objective:      Physical Exam  Vitals reviewed.   Constitutional:       General: She is not in acute distress.     Appearance: She is obese.    Eyes:      General: No scleral icterus.  Cardiovascular:      Rate and Rhythm: Normal rate and regular rhythm.      Heart sounds: Murmur (systolic -aortic) heard.   Pulmonary:      Effort: Pulmonary effort is normal. No respiratory distress.      Breath sounds: No stridor. No wheezing, rhonchi or rales.   Abdominal:      Palpations: Abdomen is soft. There is no mass.      Tenderness: There is no abdominal tenderness.   Lymphadenopathy:      Cervical: No cervical adenopathy.      Upper Body:      Right upper body: No supraclavicular or axillary adenopathy.      Left upper body: No supraclavicular or axillary adenopathy.   Skin:     Findings: No rash.   Neurological:      Mental Status: She is alert and oriented to person, place, and time.   Psychiatric:         Mood and Affect: Mood normal.         Behavior: Behavior normal.         Thought Content: Thought content normal.         Judgment: Judgment normal.       Assessment:    Labs :  pending  Problem List Items Addressed This Visit       Breast cancer, stage 4, left - Primary       Plan:    Continue current therapy-delay 1 week to complete antibiotics  RTC 4 weeks.      Route Chart for Scheduling    Med Onc Chart Routing      Follow up with physician 4 weeks.   Follow up with JIMI    Infusion scheduling note   1 week for next infusion   Injection scheduling note    Labs CBC and CMP   Scheduling:  Preferred lab:  Lab interval:     Imaging None      Pharmacy appointment No pharmacy appointment needed      Other referrals no referral to Oncology Primary Care needed -  no Massage appointment needed    No additional referrals needed           Treatment Plan Information   OP BREAST FAM-TRASTUZUMAB DERUXTECAN-NXKI Q3W   Home Willis MD   Upcoming Treatment Dates - OP BREAST FAM-TRASTUZUMAB DERUXTECAN-NXKI Q3W    5/9/2024       Chemotherapy       fam-trastuzumab deruxtecan-nxki (ENHERTU) 470 mg in dextrose 5 % (D5W) 100 mL infusion       Antiemetics       fosaprepitant  150 mg in sodium chloride 0.9% 150 mL IVPB       palonosetron (ALOXI) 0.25 mg in sodium chloride 0.9% 50 mL IVPB  5/30/2024       Chemotherapy       fam-trastuzumab deruxtecan-nxki (ENHERTU) 470 mg in dextrose 5 % (D5W) 100 mL infusion       Antiemetics       fosaprepitant 150 mg in sodium chloride 0.9% 150 mL IVPB       palonosetron (ALOXI) 0.25 mg in sodium chloride 0.9% 50 mL IVPB  6/20/2024       Chemotherapy       fam-trastuzumab deruxtecan-nxki (ENHERTU) 470 mg in dextrose 5 % (D5W) 100 mL infusion       Antiemetics       fosaprepitant 150 mg in sodium chloride 0.9% 150 mL IVPB       palonosetron (ALOXI) 0.25 mg in sodium chloride 0.9% 50 mL IVPB  7/11/2024       Chemotherapy       fam-trastuzumab deruxtecan-nxki (ENHERTU) 470 mg in dextrose 5 % (D5W) 100 mL infusion       Antiemetics       fosaprepitant 150 mg in sodium chloride 0.9% 150 mL IVPB       palonosetron (ALOXI) 0.25 mg in sodium chloride 0.9% 50 mL IVPB    Supportive Plan Information  IV FLUIDS AND ELECTROLYTES   Miller Carrasquillo MD   Upcoming Treatment Dates - IV FLUIDS AND ELECTROLYTES    No upcoming days in selected categories.

## 2024-04-30 ENCOUNTER — OFFICE VISIT (OUTPATIENT)
Dept: HEMATOLOGY/ONCOLOGY | Facility: CLINIC | Age: 54
End: 2024-04-30
Payer: MEDICARE

## 2024-04-30 ENCOUNTER — PATIENT MESSAGE (OUTPATIENT)
Dept: HEMATOLOGY/ONCOLOGY | Facility: CLINIC | Age: 54
End: 2024-04-30

## 2024-04-30 ENCOUNTER — LAB VISIT (OUTPATIENT)
Dept: LAB | Facility: HOSPITAL | Age: 54
End: 2024-04-30
Attending: INTERNAL MEDICINE
Payer: MEDICARE

## 2024-04-30 VITALS
OXYGEN SATURATION: 100 % | WEIGHT: 189.63 LBS | SYSTOLIC BLOOD PRESSURE: 121 MMHG | BODY MASS INDEX: 34.89 KG/M2 | HEART RATE: 97 BPM | RESPIRATION RATE: 18 BRPM | HEIGHT: 62 IN | DIASTOLIC BLOOD PRESSURE: 72 MMHG

## 2024-04-30 DIAGNOSIS — C50.912 BREAST CANCER, STAGE 4, LEFT: ICD-10-CM

## 2024-04-30 DIAGNOSIS — K74.60 LIVER CIRRHOSIS SECONDARY TO NASH: ICD-10-CM

## 2024-04-30 DIAGNOSIS — C50.912 BREAST CANCER, STAGE 4, LEFT: Primary | ICD-10-CM

## 2024-04-30 DIAGNOSIS — C78.02 MALIGNANT NEOPLASM METASTATIC TO LEFT LUNG: ICD-10-CM

## 2024-04-30 DIAGNOSIS — K75.81 LIVER CIRRHOSIS SECONDARY TO NASH: ICD-10-CM

## 2024-04-30 LAB
A1AT SERPL-MCNC: 209 MG/DL (ref 100–190)
AFP SERPL-MCNC: 5.2 NG/ML (ref 0–8.4)
ALBUMIN SERPL BCP-MCNC: 2.6 G/DL (ref 3.5–5.2)
ALBUMIN SERPL BCP-MCNC: 2.6 G/DL (ref 3.5–5.2)
ALP SERPL-CCNC: 163 U/L (ref 55–135)
ALP SERPL-CCNC: 163 U/L (ref 55–135)
ALT SERPL W/O P-5'-P-CCNC: 21 U/L (ref 10–44)
ALT SERPL W/O P-5'-P-CCNC: 21 U/L (ref 10–44)
ANION GAP SERPL CALC-SCNC: 8 MMOL/L (ref 8–16)
ANION GAP SERPL CALC-SCNC: 8 MMOL/L (ref 8–16)
AST SERPL-CCNC: 47 U/L (ref 10–40)
AST SERPL-CCNC: 47 U/L (ref 10–40)
BASOPHILS # BLD AUTO: 0.04 K/UL (ref 0–0.2)
BASOPHILS # BLD AUTO: 0.04 K/UL (ref 0–0.2)
BASOPHILS NFR BLD: 1 % (ref 0–1.9)
BASOPHILS NFR BLD: 1 % (ref 0–1.9)
BILIRUB SERPL-MCNC: 3.8 MG/DL (ref 0.1–1)
BILIRUB SERPL-MCNC: 3.8 MG/DL (ref 0.1–1)
BUN SERPL-MCNC: 10 MG/DL (ref 6–20)
BUN SERPL-MCNC: 10 MG/DL (ref 6–20)
CALCIUM SERPL-MCNC: 8.4 MG/DL (ref 8.7–10.5)
CALCIUM SERPL-MCNC: 8.4 MG/DL (ref 8.7–10.5)
CERULOPLASMIN SERPL-MCNC: 30 MG/DL (ref 15–45)
CHLORIDE SERPL-SCNC: 99 MMOL/L (ref 95–110)
CHLORIDE SERPL-SCNC: 99 MMOL/L (ref 95–110)
CO2 SERPL-SCNC: 30 MMOL/L (ref 23–29)
CO2 SERPL-SCNC: 30 MMOL/L (ref 23–29)
CREAT SERPL-MCNC: 0.6 MG/DL (ref 0.5–1.4)
CREAT SERPL-MCNC: 0.6 MG/DL (ref 0.5–1.4)
DIFFERENTIAL METHOD BLD: ABNORMAL
DIFFERENTIAL METHOD BLD: ABNORMAL
EOSINOPHIL # BLD AUTO: 0.2 K/UL (ref 0–0.5)
EOSINOPHIL # BLD AUTO: 0.2 K/UL (ref 0–0.5)
EOSINOPHIL NFR BLD: 4 % (ref 0–8)
EOSINOPHIL NFR BLD: 4 % (ref 0–8)
ERYTHROCYTE [DISTWIDTH] IN BLOOD BY AUTOMATED COUNT: 29.4 % (ref 11.5–14.5)
ERYTHROCYTE [DISTWIDTH] IN BLOOD BY AUTOMATED COUNT: 29.4 % (ref 11.5–14.5)
EST. GFR  (NO RACE VARIABLE): >60 ML/MIN/1.73 M^2
EST. GFR  (NO RACE VARIABLE): >60 ML/MIN/1.73 M^2
FERRITIN SERPL-MCNC: 220 NG/ML (ref 20–300)
GLUCOSE SERPL-MCNC: 76 MG/DL (ref 70–110)
GLUCOSE SERPL-MCNC: 76 MG/DL (ref 70–110)
HAV IGG SER QL IA: REACTIVE
HBV CORE AB SERPL QL IA: NORMAL
HBV SURFACE AG SERPL QL IA: NORMAL
HCT VFR BLD AUTO: 30.5 % (ref 37–48.5)
HCT VFR BLD AUTO: 30.5 % (ref 37–48.5)
HCV AB SERPL QL IA: NORMAL
HGB BLD-MCNC: 10.2 G/DL (ref 12–16)
HGB BLD-MCNC: 10.2 G/DL (ref 12–16)
IGG SERPL-MCNC: 1516 MG/DL (ref 650–1600)
IMM GRANULOCYTES # BLD AUTO: 0.01 K/UL (ref 0–0.04)
IMM GRANULOCYTES # BLD AUTO: 0.01 K/UL (ref 0–0.04)
IMM GRANULOCYTES NFR BLD AUTO: 0.2 % (ref 0–0.5)
IMM GRANULOCYTES NFR BLD AUTO: 0.2 % (ref 0–0.5)
INR PPP: 1.3 (ref 0.8–1.2)
IRON SERPL-MCNC: 226 UG/DL (ref 30–160)
LYMPHOCYTES # BLD AUTO: 0.7 K/UL (ref 1–4.8)
LYMPHOCYTES # BLD AUTO: 0.7 K/UL (ref 1–4.8)
LYMPHOCYTES NFR BLD: 17.7 % (ref 18–48)
LYMPHOCYTES NFR BLD: 17.7 % (ref 18–48)
MCH RBC QN AUTO: 37.1 PG (ref 27–31)
MCH RBC QN AUTO: 37.1 PG (ref 27–31)
MCHC RBC AUTO-ENTMCNC: 33.4 G/DL (ref 32–36)
MCHC RBC AUTO-ENTMCNC: 33.4 G/DL (ref 32–36)
MCV RBC AUTO: 111 FL (ref 82–98)
MCV RBC AUTO: 111 FL (ref 82–98)
MONOCYTES # BLD AUTO: 0.2 K/UL (ref 0.3–1)
MONOCYTES # BLD AUTO: 0.2 K/UL (ref 0.3–1)
MONOCYTES NFR BLD: 6 % (ref 4–15)
MONOCYTES NFR BLD: 6 % (ref 4–15)
NEUTROPHILS # BLD AUTO: 2.9 K/UL (ref 1.8–7.7)
NEUTROPHILS # BLD AUTO: 2.9 K/UL (ref 1.8–7.7)
NEUTROPHILS NFR BLD: 71.1 % (ref 38–73)
NEUTROPHILS NFR BLD: 71.1 % (ref 38–73)
NRBC BLD-RTO: 0 /100 WBC
NRBC BLD-RTO: 0 /100 WBC
PLATELET # BLD AUTO: 166 K/UL (ref 150–450)
PLATELET # BLD AUTO: 166 K/UL (ref 150–450)
PMV BLD AUTO: 10.9 FL (ref 9.2–12.9)
PMV BLD AUTO: 10.9 FL (ref 9.2–12.9)
POTASSIUM SERPL-SCNC: 3.7 MMOL/L (ref 3.5–5.1)
POTASSIUM SERPL-SCNC: 3.7 MMOL/L (ref 3.5–5.1)
PROT SERPL-MCNC: 6 G/DL (ref 6–8.4)
PROT SERPL-MCNC: 6 G/DL (ref 6–8.4)
PROTHROMBIN TIME: 13.9 SEC (ref 9–12.5)
RBC # BLD AUTO: 2.75 M/UL (ref 4–5.4)
RBC # BLD AUTO: 2.75 M/UL (ref 4–5.4)
SATURATED IRON: 88 % (ref 20–50)
SODIUM SERPL-SCNC: 137 MMOL/L (ref 136–145)
SODIUM SERPL-SCNC: 137 MMOL/L (ref 136–145)
TOTAL IRON BINDING CAPACITY: 256 UG/DL (ref 250–450)
TRANSFERRIN SERPL-MCNC: 173 MG/DL (ref 200–375)
WBC # BLD AUTO: 4.02 K/UL (ref 3.9–12.7)
WBC # BLD AUTO: 4.02 K/UL (ref 3.9–12.7)

## 2024-04-30 PROCEDURE — 82105 ALPHA-FETOPROTEIN SERUM: CPT | Performed by: STUDENT IN AN ORGANIZED HEALTH CARE EDUCATION/TRAINING PROGRAM

## 2024-04-30 PROCEDURE — 80321 ALCOHOLS BIOMARKERS 1OR 2: CPT | Performed by: STUDENT IN AN ORGANIZED HEALTH CARE EDUCATION/TRAINING PROGRAM

## 2024-04-30 PROCEDURE — 82390 ASSAY OF CERULOPLASMIN: CPT | Performed by: STUDENT IN AN ORGANIZED HEALTH CARE EDUCATION/TRAINING PROGRAM

## 2024-04-30 PROCEDURE — 86015 ACTIN ANTIBODY EACH: CPT | Performed by: STUDENT IN AN ORGANIZED HEALTH CARE EDUCATION/TRAINING PROGRAM

## 2024-04-30 PROCEDURE — 99214 OFFICE O/P EST MOD 30 MIN: CPT | Mod: S$PBB,,, | Performed by: INTERNAL MEDICINE

## 2024-04-30 PROCEDURE — 36415 COLL VENOUS BLD VENIPUNCTURE: CPT | Performed by: STUDENT IN AN ORGANIZED HEALTH CARE EDUCATION/TRAINING PROGRAM

## 2024-04-30 PROCEDURE — 86706 HEP B SURFACE ANTIBODY: CPT | Performed by: STUDENT IN AN ORGANIZED HEALTH CARE EDUCATION/TRAINING PROGRAM

## 2024-04-30 PROCEDURE — 86790 VIRUS ANTIBODY NOS: CPT | Performed by: STUDENT IN AN ORGANIZED HEALTH CARE EDUCATION/TRAINING PROGRAM

## 2024-04-30 PROCEDURE — 87340 HEPATITIS B SURFACE AG IA: CPT | Performed by: STUDENT IN AN ORGANIZED HEALTH CARE EDUCATION/TRAINING PROGRAM

## 2024-04-30 PROCEDURE — 99213 OFFICE O/P EST LOW 20 MIN: CPT | Mod: PBBFAC | Performed by: INTERNAL MEDICINE

## 2024-04-30 PROCEDURE — 86038 ANTINUCLEAR ANTIBODIES: CPT | Performed by: STUDENT IN AN ORGANIZED HEALTH CARE EDUCATION/TRAINING PROGRAM

## 2024-04-30 PROCEDURE — 82784 ASSAY IGA/IGD/IGG/IGM EACH: CPT | Performed by: STUDENT IN AN ORGANIZED HEALTH CARE EDUCATION/TRAINING PROGRAM

## 2024-04-30 PROCEDURE — 86803 HEPATITIS C AB TEST: CPT | Performed by: STUDENT IN AN ORGANIZED HEALTH CARE EDUCATION/TRAINING PROGRAM

## 2024-04-30 PROCEDURE — 85610 PROTHROMBIN TIME: CPT | Performed by: STUDENT IN AN ORGANIZED HEALTH CARE EDUCATION/TRAINING PROGRAM

## 2024-04-30 PROCEDURE — 85025 COMPLETE CBC W/AUTO DIFF WBC: CPT | Performed by: INTERNAL MEDICINE

## 2024-04-30 PROCEDURE — 99999 PR PBB SHADOW E&M-EST. PATIENT-LVL III: CPT | Mod: PBBFAC,,, | Performed by: INTERNAL MEDICINE

## 2024-04-30 PROCEDURE — 80053 COMPREHEN METABOLIC PANEL: CPT | Performed by: INTERNAL MEDICINE

## 2024-04-30 PROCEDURE — 82728 ASSAY OF FERRITIN: CPT | Performed by: STUDENT IN AN ORGANIZED HEALTH CARE EDUCATION/TRAINING PROGRAM

## 2024-04-30 PROCEDURE — 86704 HEP B CORE ANTIBODY TOTAL: CPT | Performed by: STUDENT IN AN ORGANIZED HEALTH CARE EDUCATION/TRAINING PROGRAM

## 2024-04-30 PROCEDURE — 86381 MITOCHONDRIAL ANTIBODY EACH: CPT | Performed by: STUDENT IN AN ORGANIZED HEALTH CARE EDUCATION/TRAINING PROGRAM

## 2024-04-30 PROCEDURE — 83540 ASSAY OF IRON: CPT | Performed by: STUDENT IN AN ORGANIZED HEALTH CARE EDUCATION/TRAINING PROGRAM

## 2024-05-01 ENCOUNTER — CLINICAL SUPPORT (OUTPATIENT)
Dept: REHABILITATION | Facility: HOSPITAL | Age: 54
End: 2024-05-01
Payer: MEDICARE

## 2024-05-01 DIAGNOSIS — M79.89 LEG SWELLING: Primary | ICD-10-CM

## 2024-05-01 DIAGNOSIS — I89.0 LYMPHEDEMA: ICD-10-CM

## 2024-05-01 DIAGNOSIS — M79.89 ARM SWELLING: ICD-10-CM

## 2024-05-01 DIAGNOSIS — C78.02 MALIGNANT NEOPLASM METASTATIC TO LEFT LUNG: ICD-10-CM

## 2024-05-01 LAB
ANA SER QL IF: NORMAL
MITOCHONDRIA AB TITR SER IF: NORMAL {TITER}

## 2024-05-01 PROCEDURE — 97535 SELF CARE MNGMENT TRAINING: CPT

## 2024-05-01 NOTE — PHYSICIAN QUERY
Please further specify the documented Pulmonary Edema:  Acute pulmonary edema, non-cardiac cause (please specify causative condition): recent TIPS and related full body anasarca

## 2024-05-01 NOTE — PROGRESS NOTES
See evaluation in POC for goals and assessment     Eval Date: 05/14/2024    Cortney Farah, PT, DPT, CLT

## 2024-05-02 ENCOUNTER — HOSPITAL ENCOUNTER (OUTPATIENT)
Dept: RADIOLOGY | Facility: HOSPITAL | Age: 54
Discharge: HOME | End: 2024-05-02
Attending: INTERNAL MEDICINE
Payer: MEDICARE

## 2024-05-02 ENCOUNTER — OFFICE VISIT (OUTPATIENT)
Dept: INFECTIOUS DISEASES | Facility: CLINIC | Age: 54
End: 2024-05-02
Payer: MEDICARE

## 2024-05-02 VITALS
WEIGHT: 191.38 LBS | SYSTOLIC BLOOD PRESSURE: 116 MMHG | HEART RATE: 105 BPM | DIASTOLIC BLOOD PRESSURE: 72 MMHG | BODY MASS INDEX: 35.22 KG/M2 | TEMPERATURE: 98 F | HEIGHT: 62 IN

## 2024-05-02 DIAGNOSIS — L03.119 CELLULITIS OF FOOT: ICD-10-CM

## 2024-05-02 DIAGNOSIS — L03.119 CELLULITIS OF FOOT: Primary | ICD-10-CM

## 2024-05-02 LAB
CLINICAL BIOCHEMIST REVIEW: NORMAL
HBV SURFACE AB SER QL IA: NEGATIVE
HBV SURFACE AB SERPL IA-ACNC: <3 MIU/ML
PLPETH BLD-MCNC: <10 NG/ML
POPETH BLD-MCNC: <10 NG/ML

## 2024-05-02 PROCEDURE — 73702 CT LWR EXTREMITY W/O&W/DYE: CPT | Mod: 26,RT,, | Performed by: INTERNAL MEDICINE

## 2024-05-02 PROCEDURE — 99999 PR PBB SHADOW E&M-EST. PATIENT-LVL IV: CPT | Mod: PBBFAC,,, | Performed by: INTERNAL MEDICINE

## 2024-05-02 PROCEDURE — 25500020 PHARM REV CODE 255: Performed by: INTERNAL MEDICINE

## 2024-05-02 PROCEDURE — 99214 OFFICE O/P EST MOD 30 MIN: CPT | Mod: PBBFAC,25 | Performed by: INTERNAL MEDICINE

## 2024-05-02 PROCEDURE — 99214 OFFICE O/P EST MOD 30 MIN: CPT | Mod: S$PBB,,, | Performed by: INTERNAL MEDICINE

## 2024-05-02 PROCEDURE — 73702 CT LWR EXTREMITY W/O&W/DYE: CPT | Mod: TC,RT

## 2024-05-02 RX ORDER — TRIAMCINOLONE ACETONIDE 5 MG/G
CREAM TOPICAL 2 TIMES DAILY
Qty: 15 G | Refills: 0 | Status: SHIPPED | OUTPATIENT
Start: 2024-05-02 | End: 2024-05-10 | Stop reason: SDUPTHER

## 2024-05-02 RX ADMIN — IOHEXOL 75 ML: 350 INJECTION, SOLUTION INTRAVENOUS at 01:05

## 2024-05-02 NOTE — PROGRESS NOTES
Subjective:     Patient ID: Shikha López is a 53 y.o. female    Chief Complaint: cellulitis    HPI: 53F with breast CA recently admitted w/ fevers, found to have CONS bacteremia and RLE cellulitis, discharged on linezolid to complete antibiotic course. Message from PT received yesterday that she was having new redness extending to medial knee. Seen in clinic today. Notes increase in redness that she noticed on Monday. Denies change since that time. No fevers or chills and is otherwise feeling well. Taking linezolid as prescribed. Denies worsening pain. No itching.       Immunization History   Administered Date(s) Administered    COVID-19, MRNA, LN-S, PF (MODERNA FULL 0.5 ML DOSE) 03/12/2021, 04/09/2021        Review of Systems   All other systems reviewed and are negative.       Past Medical History:   Diagnosis Date    Aortic atherosclerosis 07/06/2023    Breast cancer     Esophageal and gastric varices 06/23/2023    Malignant neoplasm metastatic to left lung 06/08/2021     Past Surgical History:   Procedure Laterality Date    ENDOSCOPIC ULTRASOUND OF UPPER GASTROINTESTINAL TRACT N/A 6/27/2023    Procedure: ULTRASOUND, UPPER GI TRACT, ENDOSCOPIC;  Surgeon: Home Lopez MD;  Location: [facility] ([address] [place] [address]);  Service: Endoscopy;  Laterality: N/A;    ENDOSCOPIC ULTRASOUND OF UPPER GASTROINTESTINAL TRACT N/A 1/12/2024    Procedure: ULTRASOUND, UPPER GI TRACT, ENDOSCOPIC;  Surgeon: Home Lopez MD;  Location: [facility] (2ND FLR);  Service: Endoscopy;  Laterality: N/A;  11/28/23-Instructions via portal-DS  1/8-lvm for precall-MS  1/10-precall complete-Kpvt    ESOPHAGOGASTRODUODENOSCOPY N/A 6/23/2023    Procedure: EGD (ESOPHAGOGASTRODUODENOSCOPY);  Surgeon: Quintin Mooney MD;  Location: [facility] (2ND FLR);  Service: Endoscopy;  Laterality: N/A;    ESOPHAGOGASTRODUODENOSCOPY N/A 6/27/2023    Procedure: EGD (ESOPHAGOGASTRODUODENOSCOPY);  Surgeon: Home Lopez MD;  Location: [facility] (2ND FLR);  Service:  Endoscopy;  Laterality: N/A;    ESOPHAGOGASTRODUODENOSCOPY N/A 8/3/2023    Procedure: EGD (ESOPHAGOGASTRODUODENOSCOPY);  Surgeon: Home Lopez MD;  Location: [facility] (2ND FLR);  Service: Endoscopy;  Laterality: N/A;  instr portal-labs 6/28/23-tb    ESOPHAGOGASTRODUODENOSCOPY N/A 1/12/2024    Procedure: EGD (ESOPHAGOGASTRODUODENOSCOPY);  Surgeon: Home Lopez MD;  Location: [facility] (2ND FLR);  Service: Endoscopy;  Laterality: N/A;    ESOPHAGOGASTRODUODENOSCOPY N/A 4/10/2024    Procedure: EGD (ESOPHAGOGASTRODUODENOSCOPY);  Surgeon: Ze Anderson MD;  Location: [facility] (2ND FLR);  Service: Endoscopy;  Laterality: N/A;    MASTECTOMY       Family History   Problem Relation Name Age of Onset    Lung cancer Father       Social History     Tobacco Use    Smoking status: Never    Smokeless tobacco: Never   Substance Use Topics    Alcohol use: Never    Drug use: Never       Objective:     Physical Exam  Constitutional:       General: She is not in acute distress.     Appearance: Normal appearance. She is well-developed. She is not ill-appearing or diaphoretic.   HENT:      Head: Normocephalic and atraumatic.      Right Ear: External ear normal.      Left Ear: External ear normal.      Nose: Nose normal.   Eyes:      General: No scleral icterus.        Right eye: No discharge.         Left eye: No discharge.      Extraocular Movements: Extraocular movements intact.      Conjunctiva/sclera: Conjunctivae normal.   Pulmonary:      Effort: Pulmonary effort is normal. No respiratory distress.      Breath sounds: No stridor.   Musculoskeletal:         General: Normal range of motion.   Skin:     Findings: No erythema or rash.   Neurological:      General: No focal deficit present.      Mental Status: She is alert and oriented to person, place, and time. Mental status is at baseline.      Cranial Nerves: No cranial nerve deficit.   Psychiatric:         Mood and Affect: Mood normal.         Behavior: Behavior normal.          Thought Content: Thought content normal.         Judgment: Judgment normal.         Data:    All data, including recent labs, radiology, and pathology, has been independently reviewed.    Labs:    Recent Labs   Lab Result Units 04/21/24  1624 04/22/24  0414 04/25/24  0436 04/26/24  0429 04/30/24  0903   WBC K/uL 11.15   < > 2.74* 2.42* 4.02  4.02   Hemoglobin g/dL 7.8*   < > 7.9* 8.4* 10.2*  10.2*   Hematocrit % 22.4*   < > 24.6* 26.3* 30.5*  30.5*   Sodium mmol/L 137   < > 141 142 137  137   Potassium mmol/L 2.7*   < > 2.9* 3.0* 3.7  3.7   Chloride mmol/L 100   < > 104 105 99  99   BUN mg/dL 8   < > 9 10 10  10   Creatinine mg/dL 0.6   < > 0.5 0.6 0.6  0.6   AST U/L 80*   < > 34 34 47*  47*   ALT U/L 52*   < > 17 15 21  21   Alkaline Phosphatase U/L 165*   < > 135 139* 163*  163*   Total Bilirubin mg/dL 5.4*   < > 2.5* 2.7* 3.8*  3.8*   CRP mg/L 51.3*  --   --   --   --    INR   --    < > 1.4* 1.4* 1.3*    < > = values in this interval not displayed.        Radiology:    No results found in the last 24 hours.     Assessment:    1. Cellulitis of foot  CT of R leg obtained to assess for abscess is negative  Not clear if new redness represents worsening cellulitis vs. contact dermatitis w/ maculopapular areas of medial knee  Will try some topical steroid - pt will let me know if any worsening w/ this  Continue linezolid   Will see for follow up  next week    CT Leg (Tibia-Fibula) W W/O Contrast Right    CT Ankle (Including Hindfoot) With Contrast Right           Follow up in 1 week    The total time for evaluation and management services performed on 5/2/24 was greater than 30 minutes.     Aletha Coronao DO  Transplant Infectious Disease

## 2024-05-03 LAB — SMOOTH MUSCLE AB TITR SER IF: ABNORMAL {TITER}

## 2024-05-07 ENCOUNTER — INFUSION (OUTPATIENT)
Dept: INFUSION THERAPY | Facility: HOSPITAL | Age: 54
End: 2024-05-07
Attending: INTERNAL MEDICINE
Payer: MEDICARE

## 2024-05-07 VITALS
SYSTOLIC BLOOD PRESSURE: 115 MMHG | DIASTOLIC BLOOD PRESSURE: 54 MMHG | OXYGEN SATURATION: 100 % | BODY MASS INDEX: 35.22 KG/M2 | HEIGHT: 62 IN | RESPIRATION RATE: 18 BRPM | HEART RATE: 92 BPM | TEMPERATURE: 98 F | WEIGHT: 191.38 LBS

## 2024-05-07 DIAGNOSIS — E87.6 HYPOKALEMIA: ICD-10-CM

## 2024-05-07 DIAGNOSIS — C50.912 BREAST CANCER, STAGE 4, LEFT: Primary | ICD-10-CM

## 2024-05-07 PROCEDURE — 96375 TX/PRO/DX INJ NEW DRUG ADDON: CPT

## 2024-05-07 PROCEDURE — 96413 CHEMO IV INFUSION 1 HR: CPT

## 2024-05-07 PROCEDURE — 96367 TX/PROPH/DG ADDL SEQ IV INF: CPT

## 2024-05-07 PROCEDURE — A4216 STERILE WATER/SALINE, 10 ML: HCPCS | Performed by: INTERNAL MEDICINE

## 2024-05-07 PROCEDURE — 63600175 PHARM REV CODE 636 W HCPCS: Performed by: INTERNAL MEDICINE

## 2024-05-07 PROCEDURE — 25000003 PHARM REV CODE 250: Performed by: INTERNAL MEDICINE

## 2024-05-07 RX ORDER — DEXTROSE MONOHYDRATE 50 MG/ML
INJECTION, SOLUTION INTRAVENOUS ONCE
Status: COMPLETED | OUTPATIENT
Start: 2024-05-07 | End: 2024-05-07

## 2024-05-07 RX ORDER — SODIUM CHLORIDE 9 MG/ML
INJECTION, SOLUTION INTRAVENOUS ONCE
Status: CANCELLED
Start: 2024-05-15 | End: 2024-05-15

## 2024-05-07 RX ORDER — DEXTROSE MONOHYDRATE 50 MG/ML
INJECTION, SOLUTION INTRAVENOUS ONCE
Status: CANCELLED | OUTPATIENT
Start: 2024-05-15 | End: 2024-05-15

## 2024-05-07 RX ORDER — SODIUM CHLORIDE 0.9 % (FLUSH) 0.9 %
10 SYRINGE (ML) INJECTION
Status: DISCONTINUED | OUTPATIENT
Start: 2024-05-07 | End: 2024-05-07 | Stop reason: HOSPADM

## 2024-05-07 RX ORDER — HEPARIN 100 UNIT/ML
500 SYRINGE INTRAVENOUS
Status: DISCONTINUED | OUTPATIENT
Start: 2024-05-07 | End: 2024-05-07 | Stop reason: HOSPADM

## 2024-05-07 RX ORDER — HEPARIN 100 UNIT/ML
500 SYRINGE INTRAVENOUS
Status: CANCELLED | OUTPATIENT
Start: 2024-05-15

## 2024-05-07 RX ORDER — SODIUM CHLORIDE 0.9 % (FLUSH) 0.9 %
10 SYRINGE (ML) INJECTION
Status: CANCELLED | OUTPATIENT
Start: 2024-05-15

## 2024-05-07 RX ADMIN — DEXTROSE MONOHYDRATE: 50 INJECTION, SOLUTION INTRAVENOUS at 03:05

## 2024-05-07 RX ADMIN — PALONOSETRON 0.25 MG: 0.05 INJECTION, SOLUTION INTRAVENOUS at 03:05

## 2024-05-07 RX ADMIN — Medication 10 ML: at 05:05

## 2024-05-07 RX ADMIN — FOSAPREPITANT 150 MG: 150 INJECTION, POWDER, LYOPHILIZED, FOR SOLUTION INTRAVENOUS at 03:05

## 2024-05-07 RX ADMIN — HEPARIN 500 UNITS: 100 SYRINGE at 05:05

## 2024-05-07 RX ADMIN — FAM-TRASTUZUMAB DERUXTECAN-NXKI 470 MG: 100 INJECTION, POWDER, LYOPHILIZED, FOR SOLUTION INTRAVENOUS at 04:05

## 2024-05-07 NOTE — PLAN OF CARE
3453  Patient completed Enhertu infusion, tolerated well.  Port de accessed, flushed, blood return noted, heparin locked.  Patient ambulated off floor independently, NAD./

## 2024-05-07 NOTE — PLAN OF CARE
Patient seated in chair, VSS, assessment done.  Patient had edemous legs, right larger than left.  Pt refused 1 liter NS that was in tx plan.  Stated doctor had put her on Lasix.  Port accessed, flushed, blood return noted, started D5W @ 25 cc/hr KVO while waiting for Enhertu from pharmacy.  [facility] for safety

## 2024-05-08 DIAGNOSIS — R41.840 LACK OF CONCENTRATION: ICD-10-CM

## 2024-05-08 DIAGNOSIS — K92.2 GASTROINTESTINAL HEMORRHAGE, UNSPECIFIED GASTROINTESTINAL HEMORRHAGE TYPE: ICD-10-CM

## 2024-05-08 DIAGNOSIS — D62 ACUTE BLOOD LOSS ANEMIA: ICD-10-CM

## 2024-05-09 RX ORDER — PANTOPRAZOLE SODIUM 40 MG/1
40 TABLET, DELAYED RELEASE ORAL 2 TIMES DAILY
Qty: 180 TABLET | Refills: 0 | Status: SHIPPED | OUTPATIENT
Start: 2024-05-09 | End: 2024-08-08

## 2024-05-09 RX ORDER — METHYLPHENIDATE HYDROCHLORIDE 5 MG/1
5 TABLET ORAL 2 TIMES DAILY
Qty: 60 TABLET | Refills: 0 | Status: SHIPPED | OUTPATIENT
Start: 2024-05-09

## 2024-05-09 RX ORDER — FERROUS SULFATE 325(65) MG
TABLET ORAL
Qty: 60 TABLET | Refills: 3 | Status: SHIPPED | OUTPATIENT
Start: 2024-05-09

## 2024-05-10 ENCOUNTER — OFFICE VISIT (OUTPATIENT)
Dept: INFECTIOUS DISEASES | Facility: CLINIC | Age: 54
End: 2024-05-10
Payer: MEDICARE

## 2024-05-10 ENCOUNTER — HOSPITAL ENCOUNTER (OUTPATIENT)
Dept: RADIOLOGY | Facility: HOSPITAL | Age: 54
Discharge: HOME | End: 2024-05-10
Attending: PHYSICIAN ASSISTANT
Payer: MEDICARE

## 2024-05-10 VITALS
HEART RATE: 97 BPM | DIASTOLIC BLOOD PRESSURE: 82 MMHG | SYSTOLIC BLOOD PRESSURE: 134 MMHG | BODY MASS INDEX: 35.14 KG/M2 | HEIGHT: 62 IN | TEMPERATURE: 98 F | WEIGHT: 190.94 LBS

## 2024-05-10 DIAGNOSIS — L25.9 CONTACT DERMATITIS, UNSPECIFIED CONTACT DERMATITIS TYPE, UNSPECIFIED TRIGGER: Primary | ICD-10-CM

## 2024-05-10 DIAGNOSIS — Z95.828 S/P TIPS (TRANSJUGULAR INTRAHEPATIC PORTOSYSTEMIC SHUNT): ICD-10-CM

## 2024-05-10 PROCEDURE — 93976 VASCULAR STUDY: CPT | Mod: 26,,, | Performed by: INTERNAL MEDICINE

## 2024-05-10 PROCEDURE — 76705 ECHO EXAM OF ABDOMEN: CPT | Mod: 26,59,, | Performed by: INTERNAL MEDICINE

## 2024-05-10 PROCEDURE — 93976 VASCULAR STUDY: CPT | Mod: TC

## 2024-05-10 PROCEDURE — 99214 OFFICE O/P EST MOD 30 MIN: CPT | Mod: S$PBB,,, | Performed by: INTERNAL MEDICINE

## 2024-05-10 PROCEDURE — 99213 OFFICE O/P EST LOW 20 MIN: CPT | Mod: PBBFAC,25 | Performed by: INTERNAL MEDICINE

## 2024-05-10 PROCEDURE — 99999 PR PBB SHADOW E&M-EST. PATIENT-LVL III: CPT | Mod: PBBFAC,,, | Performed by: INTERNAL MEDICINE

## 2024-05-10 RX ORDER — TRIAMCINOLONE ACETONIDE 5 MG/G
CREAM TOPICAL 2 TIMES DAILY
Qty: 454 G | Refills: 0 | Status: SHIPPED | OUTPATIENT
Start: 2024-05-10

## 2024-05-10 NOTE — PROGRESS NOTES
Subjective:     Patient ID: Shikha López is a 53 y.o. female    Chief Complaint: cellulitis    HPI: 53F with breast CA recently admitted w/ fevers, found to have CONS bacteremia and RLE cellulitis, discharged on linezolid to complete antibiotic course. Seen for follow up last week after noticing increased redness around medial knee. Noted to have new rash on evaluation. Continued linezolid, and was given triamcinolone to apply to the area. Here today for follow up. Notes that her RLE has now returned to baseline w/ resolution of the rash, however she has developed a rash on her LLE that is similar in appearance. Not itchy, denies pain in LLE. Cannot think of any new detergents/soaps/products she may have been exposed to. Had chemo earlier this week, no issues. Has now finished 14d course of linezolid.       Immunization History   Administered Date(s) Administered    COVID-19, MRNA, LN-S, PF (MODERNA FULL 0.5 ML DOSE) 03/12/2021, 04/09/2021        Review of Systems   All other systems reviewed and are negative.       Past Medical History:   Diagnosis Date    Aortic atherosclerosis 07/06/2023    Breast cancer     Esophageal and gastric varices 06/23/2023    Malignant neoplasm metastatic to left lung 06/08/2021     Past Surgical History:   Procedure Laterality Date    ENDOSCOPIC ULTRASOUND OF UPPER GASTROINTESTINAL TRACT N/A 6/27/2023    Procedure: ULTRASOUND, UPPER GI TRACT, ENDOSCOPIC;  Surgeon: Home Lopez MD;  Location: [address]);  Service: Endoscopy;  Laterality: N/A;    ENDOSCOPIC ULTRASOUND OF UPPER GASTROINTESTINAL TRACT N/A 1/12/2024    Procedure: ULTRASOUND, UPPER GI TRACT, ENDOSCOPIC;  Surgeon: Home Lopez MD;  Location: [address]);  Service: Endoscopy;  Laterality: N/A;  11/28/23-Instructions via portal-DS  1/8-lvm for precall-MS  1/10-precall complete-Kpvt    ESOPHAGOGASTRODUODENOSCOPY N/A 6/23/2023    Procedure: EGD (ESOPHAGOGASTRODUODENOSCOPY);  Surgeon: Quintin Mooney  MD;  Location: [facility] (2ND FLR);  Service: Endoscopy;  Laterality: N/A;    ESOPHAGOGASTRODUODENOSCOPY N/A 6/27/2023    Procedure: EGD (ESOPHAGOGASTRODUODENOSCOPY);  Surgeon: Home Lopez MD;  Location: [facility] (2ND FLR);  Service: Endoscopy;  Laterality: N/A;    ESOPHAGOGASTRODUODENOSCOPY N/A 8/3/2023    Procedure: EGD (ESOPHAGOGASTRODUODENOSCOPY);  Surgeon: Home Lopez MD;  Location: [facility] (2ND FLR);  Service: Endoscopy;  Laterality: N/A;  instr portal-labs 6/28/23-tb    ESOPHAGOGASTRODUODENOSCOPY N/A 1/12/2024    Procedure: EGD (ESOPHAGOGASTRODUODENOSCOPY);  Surgeon: Home Lopez MD;  Location: [facility] (2ND FLR);  Service: Endoscopy;  Laterality: N/A;    ESOPHAGOGASTRODUODENOSCOPY N/A 4/10/2024    Procedure: EGD (ESOPHAGOGASTRODUODENOSCOPY);  Surgeon: Ze Anderson MD;  Location: [facility] (2ND FLR);  Service: Endoscopy;  Laterality: N/A;    MASTECTOMY       Family History   Problem Relation Name Age of Onset    Lung cancer Father       Social History     Tobacco Use    Smoking status: Never    Smokeless tobacco: Never   Substance Use Topics    Alcohol use: Never    Drug use: Never       Objective:     Physical Exam  Constitutional:       General: She is not in acute distress.     Appearance: Normal appearance. She is well-developed. She is not ill-appearing or diaphoretic.   HENT:      Head: Normocephalic and atraumatic.      Right Ear: External ear normal.      Left Ear: External ear normal.      Nose: Nose normal.   Eyes:      General: No scleral icterus.        Right eye: No discharge.         Left eye: No discharge.      Extraocular Movements: Extraocular movements intact.      Conjunctiva/sclera: Conjunctivae normal.   Pulmonary:      Effort: Pulmonary effort is normal. No respiratory distress.      Breath sounds: No stridor.   Musculoskeletal:         General: Normal range of motion.   Skin:     Findings: No erythema or rash.   Neurological:      General: No focal deficit present.       Mental Status: She is alert and oriented to person, place, and time. Mental status is at baseline.      Cranial Nerves: No cranial nerve deficit.   Psychiatric:         Mood and Affect: Mood normal.         Behavior: Behavior normal.         Thought Content: Thought content normal.         Judgment: Judgment normal.         Data:    All data, including recent labs, radiology, and pathology, has been independently reviewed.    Labs:    Recent Labs   Lab Result Units 04/21/24  1624 04/22/24  0414 04/25/24  0436 04/26/24  0429 04/30/24  0903   WBC K/uL 11.15   < > 2.74* 2.42* 4.02  4.02   Hemoglobin g/dL 7.8*   < > 7.9* 8.4* 10.2*  10.2*   Hematocrit % 22.4*   < > 24.6* 26.3* 30.5*  30.5*   Sodium mmol/L 137   < > 141 142 137  137   Potassium mmol/L 2.7*   < > 2.9* 3.0* 3.7  3.7   Chloride mmol/L 100   < > 104 105 99  99   BUN mg/dL 8   < > 9 10 10  10   Creatinine mg/dL 0.6   < > 0.5 0.6 0.6  0.6   AST U/L 80*   < > 34 34 47*  47*   ALT U/L 52*   < > 17 15 21  21   Alkaline Phosphatase U/L 165*   < > 135 139* 163*  163*   Total Bilirubin mg/dL 5.4*   < > 2.5* 2.7* 3.8*  3.8*   CRP mg/L 51.3*  --   --   --   --    INR   --    < > 1.4* 1.4* 1.3*    < > = values in this interval not displayed.        Radiology:    No results found in the last 24 hours.     Assessment:    1. Contact dermatitis, unspecified contact dermatitis type, unspecified trigger  triamcinolone acetonide 0.5% (KENALOG) 0.5 % Cream    Triamcinolone refilled for new rash on LLE, appears to be some type of contact dermatitis, does not appear c/w soft tissue infection at this time    Patient will let me know next week if not improving, or if any new issues develop.            Follow up as needed    The total time for evaluation and management services performed on 5/10/24 was greater than 30 minutes.     Aletha Cocco DO  Transplant Infectious Disease

## 2024-05-13 ENCOUNTER — CLINICAL SUPPORT (OUTPATIENT)
Dept: REHABILITATION | Facility: HOSPITAL | Age: 54
End: 2024-05-13
Payer: MEDICARE

## 2024-05-13 DIAGNOSIS — M79.89 LEG SWELLING: ICD-10-CM

## 2024-05-13 DIAGNOSIS — I89.0 LYMPHEDEMA: Primary | ICD-10-CM

## 2024-05-13 DIAGNOSIS — M79.89 ARM SWELLING: ICD-10-CM

## 2024-05-13 PROCEDURE — 97140 MANUAL THERAPY 1/> REGIONS: CPT | Mod: CQ

## 2024-05-13 NOTE — PROGRESS NOTES
Physical Therapy Daily Treatment Note     Name: Shikha MORALES [facility] Number: [id number]    Therapy Diagnosis:   Encounter Diagnoses   Name Primary?    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 She feels the edema wear has been helping with the swelling in her arm. She got a pump for her arm awhile ago but never used it because the swelling went down and has stayed down. She would like to focus on her right leg swelling today . She has developed a rash on her left leg but they think it is just contact dermatis and she is using a cream for this.   She was compliant with home compression/exercise program.  Response to previous treatment: ace - agrees to begin CDT today wishes to work on her legs   Functional change: none    Pain: NT/10  Location: BLEs - pt endorses R ankle pain due to the swelling      Objective     Pt presents no AD , No compression donned   Erythema and rash noted to LLE - was cleared for infection and stated no cellulitis; contact dermatitis     Treatment:   Shikha received the following manual therapy techniques:- Manual Lymphatic Drainage were applied to the: RLE for 53 minutes, including: MLD and short stretch compression bandaging     MANUAL LYMPHATIC DRAINAGE (MLD):    While supine with LEs elevated stimulation at terminus, along GI region, B inguinal regions,  drainage of entire R LE kris lower leg, ankle, and foot with return proximally,  Use of Aquaphor/Eucerin due to dryness.   Consider self massage to abdominal areas, B inguinal areas, thigh, and remaining LE within reach.    MULTILAYERED BANDAGING:  issued supplies and bandaged R LE with cotton stockinette, Rosidal soft  section dorsum of foot,  1 Rosidal soft rolls ankle to knee, 1-8cm and 2- 10cm Durelast rolls foot to knee, to leave intact 12-24 hrs as tolerated, discontinue with any problems, return rolled bandages next session. Wash and wear schedules confirmed.       Home Exercises Provided and Patient Education Provided:    Monitoring for signs/symptoms of infection  Compression needs     PATIENT/FAMILY Education: bandaging/compression wear schedule,  HEP,  Beginning of self massage,  Self or assisted bandaging, compression options, and Risk reduction    Written Home Exercises Provided: yes.  Home exercise and compression plan of management was reviewed and Shikha was able to demonstrate understanding prior to the end of the session.  Shikha demonstrated good  understanding of the education provided.     Assessment     Pt presents for first session following initial evaluation with right ankle pain reported due to the swelling and would like to start treatment for her LEs today. Initiated treatment to her RLE - was recently treated for cellulitis and cleared of infection to RLE . Did not do LLE as present contact dermatitis and should continue use of cream as prescribed per M.D. Will continue to monitor and progress.   Shikha Is progressing well towards her goals.   Pt prognosis is Good.     Pt will continue to benefit from skilled outpatient physical therapy to address the deficits listed in the problem list box on initial evaluation, provide pt/family education and to maximize pt's level of independence in the home and community environment.     Pt's spiritual, cultural and educational needs considered and pt  agreeable to plan of care and goals.     Anticipated barriers to physical therapy: none    The following goals were discussed with the patient and patient is in agreement with them as to be addressed in the treatment plan.      Short Term Goals: (6 weeks)  1. Decrease girth in  L  upper arm, forearm, wrist and hand by up to  1 cm to allow for improved UE symmetry, clothing choice, ROM, and UE function. (progressing, not met)  2. Patient will demonstrate 100% knowledge of lymphedema precautions and signs of infection to allow for reduced risk of lymphedema, reduced risk of infection, and/or exacerbation.  (progressing, not met)  3. Patient will perform self-bandaging techniques to enhance lymphatic drainage, tissue density, and self management of condition.  (progressing, not met)  4. Patient will perform self lymph drainage techniques to enhance lymphatic drainage and mobility.  (progressing, not met)  5. Patient will tolerate daily activities with multilayered bandaging to enhance lymphatic drainage and skin elasticity.  (progressing, not met)     Long Term Goals: ( 12  weeks)  1. Patient to show decreased girth in L UE by up to 2 cm to allow for improved UE symmetry, clothing choice, ROM, and UE function.  (progressing, not met)  2. Patient will show reduction in density to mild or less with improved contour of limb to allow for improved cosmesis, improved lymphatic drainage, and functional mobility.  (progressing, not met)  3. Patient to laine/doff compression garment with daily compliance to support lymphatic mobility and skin elasticity.  (progressing, not met)  4. Pt to show improved postural awareness and alignment.  (progressing, not met)  5. Pt to be I and compliant with HEP to allow for improved ROM, reach and carry with functional endurance and strength.    (progressing, not met)        Plan   Plan of care Certification: 5/1/2024 to 7/1/2024.     Outpatient Physical Therapy 3 times weekly for 8 weeks to  include the following interventions: Gait Training, Manual Therapy, Neuromuscular Re-ed, Patient Education, Self Care, Therapeutic Activities, and Therapeutic Exercise. Complete Decongestive Therapy- compression and home equipment needs to be addressed and assisted.    Maida Blake, PTA

## 2024-05-14 ENCOUNTER — TELEPHONE (OUTPATIENT)
Dept: HEPATOLOGY | Facility: CLINIC | Age: 54
End: 2024-05-14
Payer: MEDICARE

## 2024-05-14 ENCOUNTER — PATIENT MESSAGE (OUTPATIENT)
Dept: HEPATOLOGY | Facility: CLINIC | Age: 54
End: 2024-05-14
Payer: MEDICARE

## 2024-05-14 DIAGNOSIS — K74.60 LIVER CIRRHOSIS SECONDARY TO NASH: Primary | ICD-10-CM

## 2024-05-14 DIAGNOSIS — E83.19 IRON OVERLOAD: ICD-10-CM

## 2024-05-14 DIAGNOSIS — K75.81 LIVER CIRRHOSIS SECONDARY TO NASH: Primary | ICD-10-CM

## 2024-05-14 PROBLEM — M79.89 LEG SWELLING: Status: ACTIVE | Noted: 2024-05-14

## 2024-05-14 PROBLEM — M79.89 ARM SWELLING: Status: ACTIVE | Noted: 2024-05-14

## 2024-05-14 NOTE — TELEPHONE ENCOUNTER
Lab scheduled   ----- Message from Farhad Figueroa MD sent at 5/14/2024 12:15 PM CDT -----  Please arrange hemochromatosis lab

## 2024-05-15 ENCOUNTER — TELEPHONE (OUTPATIENT)
Dept: HEPATOLOGY | Facility: CLINIC | Age: 54
End: 2024-05-15
Payer: MEDICARE

## 2024-05-15 ENCOUNTER — HOSPITAL ENCOUNTER (INPATIENT)
Facility: HOSPITAL | Age: 54
LOS: 6 days | Discharge: HOME | DRG: 809 | End: 2024-05-21
Attending: EMERGENCY MEDICINE | Admitting: STUDENT IN AN ORGANIZED HEALTH CARE EDUCATION/TRAINING PROGRAM
Payer: MEDICARE

## 2024-05-15 DIAGNOSIS — Z95.828 S/P TIPS (TRANSJUGULAR INTRAHEPATIC PORTOSYSTEMIC SHUNT): ICD-10-CM

## 2024-05-15 DIAGNOSIS — D64.9 SYMPTOMATIC ANEMIA: Primary | ICD-10-CM

## 2024-05-15 DIAGNOSIS — C50.919 MALIGNANT NEOPLASM OF FEMALE BREAST, UNSPECIFIED ESTROGEN RECEPTOR STATUS, UNSPECIFIED LATERALITY, UNSPECIFIED SITE OF BREAST: ICD-10-CM

## 2024-05-15 DIAGNOSIS — R01.1 MURMUR, CARDIAC: ICD-10-CM

## 2024-05-15 DIAGNOSIS — R79.89 TROPONIN LEVEL ELEVATED: ICD-10-CM

## 2024-05-15 DIAGNOSIS — R07.9 CHEST PAIN: ICD-10-CM

## 2024-05-15 DIAGNOSIS — R06.02 SOB (SHORTNESS OF BREATH): ICD-10-CM

## 2024-05-15 DIAGNOSIS — E87.70 HYPERVOLEMIA, UNSPECIFIED HYPERVOLEMIA TYPE: ICD-10-CM

## 2024-05-15 LAB
ABO + RH BLD: NORMAL
ALBUMIN SERPL BCP-MCNC: 2.5 G/DL (ref 3.5–5.2)
ALLENS TEST: NORMAL
ALP SERPL-CCNC: 171 U/L (ref 55–135)
ALT SERPL W/O P-5'-P-CCNC: 13 U/L (ref 10–44)
ANION GAP SERPL CALC-SCNC: 9 MMOL/L (ref 8–16)
AST SERPL-CCNC: 26 U/L (ref 10–40)
BASOPHILS # BLD AUTO: 0.01 K/UL (ref 0–0.2)
BASOPHILS NFR BLD: 0.3 % (ref 0–1.9)
BILIRUB SERPL-MCNC: 3.4 MG/DL (ref 0.1–1)
BNP SERPL-MCNC: 1032 PG/ML (ref 0–99)
BUN SERPL-MCNC: 12 MG/DL (ref 6–20)
CALCIUM SERPL-MCNC: 8.4 MG/DL (ref 8.7–10.5)
CHLORIDE SERPL-SCNC: 103 MMOL/L (ref 95–110)
CO2 SERPL-SCNC: 24 MMOL/L (ref 23–29)
CREAT SERPL-MCNC: 0.7 MG/DL (ref 0.5–1.4)
DIFFERENTIAL METHOD BLD: ABNORMAL
EOSINOPHIL # BLD AUTO: 0 K/UL (ref 0–0.5)
EOSINOPHIL NFR BLD: 0 % (ref 0–8)
ERYTHROCYTE [DISTWIDTH] IN BLOOD BY AUTOMATED COUNT: 24.2 % (ref 11.5–14.5)
EST. GFR  (NO RACE VARIABLE): >60 ML/MIN/1.73 M^2
GLUCOSE SERPL-MCNC: 115 MG/DL (ref 70–110)
HCT VFR BLD AUTO: 13.8 % (ref 37–48.5)
HGB BLD-MCNC: 4.9 G/DL (ref 12–16)
HYPOCHROMIA BLD QL SMEAR: ABNORMAL
IMM GRANULOCYTES # BLD AUTO: 0.04 K/UL (ref 0–0.04)
IMM GRANULOCYTES NFR BLD AUTO: 1.3 % (ref 0–0.5)
INFLUENZA A, MOLECULAR: NEGATIVE
INFLUENZA B, MOLECULAR: NEGATIVE
LDH SERPL L TO P-CCNC: 1.59 MMOL/L (ref 0.5–2.2)
LYMPHOCYTES # BLD AUTO: 0.4 K/UL (ref 1–4.8)
LYMPHOCYTES NFR BLD: 13.5 % (ref 18–48)
MAGNESIUM SERPL-MCNC: 1.7 MG/DL (ref 1.6–2.6)
MCH RBC QN AUTO: 38 PG (ref 27–31)
MCHC RBC AUTO-ENTMCNC: 35.5 G/DL (ref 32–36)
MCV RBC AUTO: 107 FL (ref 82–98)
MONOCYTES # BLD AUTO: 0.2 K/UL (ref 0.3–1)
MONOCYTES NFR BLD: 6.1 % (ref 4–15)
NEUTROPHILS # BLD AUTO: 2.3 K/UL (ref 1.8–7.7)
NEUTROPHILS NFR BLD: 78.8 % (ref 38–73)
NRBC BLD-RTO: 0 /100 WBC
PLATELET # BLD AUTO: 59 K/UL (ref 150–450)
PLATELET BLD QL SMEAR: ABNORMAL
PMV BLD AUTO: 12.8 FL (ref 9.2–12.9)
POTASSIUM SERPL-SCNC: 3.7 MMOL/L (ref 3.5–5.1)
PROT SERPL-MCNC: 6.1 G/DL (ref 6–8.4)
RBC # BLD AUTO: 1.29 M/UL (ref 4–5.4)
SAMPLE: NORMAL
SARS-COV-2 RDRP RESP QL NAA+PROBE: NEGATIVE
SITE: NORMAL
SODIUM SERPL-SCNC: 136 MMOL/L (ref 136–145)
SPECIMEN OUTDATE: NORMAL
SPECIMEN SOURCE: NORMAL
SPHEROCYTES BLD QL SMEAR: ABNORMAL
TROPONIN I SERPL DL<=0.01 NG/ML-MCNC: 0.13 NG/ML (ref 0–0.03)
TSH SERPL DL<=0.005 MIU/L-ACNC: 3.95 UIU/ML (ref 0.4–4)
WBC # BLD AUTO: 2.97 K/UL (ref 3.9–12.7)

## 2024-05-15 PROCEDURE — 85610 PROTHROMBIN TIME: CPT

## 2024-05-15 PROCEDURE — 36430 TRANSFUSION BLD/BLD COMPNT: CPT

## 2024-05-15 PROCEDURE — 83735 ASSAY OF MAGNESIUM: CPT | Performed by: PHYSICIAN ASSISTANT

## 2024-05-15 PROCEDURE — 63600175 PHARM REV CODE 636 W HCPCS: Performed by: PHYSICIAN ASSISTANT

## 2024-05-15 PROCEDURE — 87040 BLOOD CULTURE FOR BACTERIA: CPT | Mod: 59 | Performed by: PHYSICIAN ASSISTANT

## 2024-05-15 PROCEDURE — 85025 COMPLETE CBC W/AUTO DIFF WBC: CPT | Mod: 91 | Performed by: PHYSICIAN ASSISTANT

## 2024-05-15 PROCEDURE — 83605 ASSAY OF LACTIC ACID: CPT

## 2024-05-15 PROCEDURE — 86850 RBC ANTIBODY SCREEN: CPT | Performed by: PHYSICIAN ASSISTANT

## 2024-05-15 PROCEDURE — 81001 URINALYSIS AUTO W/SCOPE: CPT | Performed by: PHYSICIAN ASSISTANT

## 2024-05-15 PROCEDURE — 83880 ASSAY OF NATRIURETIC PEPTIDE: CPT | Performed by: PHYSICIAN ASSISTANT

## 2024-05-15 PROCEDURE — 84484 ASSAY OF TROPONIN QUANT: CPT | Mod: 91 | Performed by: PHYSICIAN ASSISTANT

## 2024-05-15 PROCEDURE — 99900035 HC TECH TIME PER 15 MIN (STAT)

## 2024-05-15 PROCEDURE — 93005 ELECTROCARDIOGRAM TRACING: CPT

## 2024-05-15 PROCEDURE — 87502 INFLUENZA DNA AMP PROBE: CPT | Performed by: PHYSICIAN ASSISTANT

## 2024-05-15 PROCEDURE — 80053 COMPREHEN METABOLIC PANEL: CPT | Performed by: PHYSICIAN ASSISTANT

## 2024-05-15 PROCEDURE — 86901 BLOOD TYPING SEROLOGIC RH(D): CPT | Performed by: PHYSICIAN ASSISTANT

## 2024-05-15 PROCEDURE — U0002 COVID-19 LAB TEST NON-CDC: HCPCS | Performed by: PHYSICIAN ASSISTANT

## 2024-05-15 PROCEDURE — 12000002 HC ACUTE/MED SURGE SEMI-PRIVATE ROOM

## 2024-05-15 PROCEDURE — 93010 ELECTROCARDIOGRAM REPORT: CPT | Mod: ,,, | Performed by: INTERNAL MEDICINE

## 2024-05-15 PROCEDURE — 84484 ASSAY OF TROPONIN QUANT: CPT | Performed by: PHYSICIAN ASSISTANT

## 2024-05-15 PROCEDURE — 85025 COMPLETE CBC W/AUTO DIFF WBC: CPT

## 2024-05-15 PROCEDURE — 84443 ASSAY THYROID STIM HORMONE: CPT | Performed by: PHYSICIAN ASSISTANT

## 2024-05-15 RX ORDER — SPIRONOLACTONE 50 MG/1
100 TABLET, FILM COATED ORAL DAILY
Status: DISCONTINUED | OUTPATIENT
Start: 2024-05-16 | End: 2024-05-21 | Stop reason: HOSPADM

## 2024-05-15 RX ORDER — PANTOPRAZOLE SODIUM 40 MG/1
40 TABLET, DELAYED RELEASE ORAL 2 TIMES DAILY
Status: DISCONTINUED | OUTPATIENT
Start: 2024-05-16 | End: 2024-05-21

## 2024-05-15 RX ORDER — HYDROCODONE BITARTRATE AND ACETAMINOPHEN 500; 5 MG/1; MG/1
TABLET ORAL
Status: DISCONTINUED | OUTPATIENT
Start: 2024-05-15 | End: 2024-05-20

## 2024-05-15 RX ORDER — FUROSEMIDE 10 MG/ML
40 INJECTION INTRAMUSCULAR; INTRAVENOUS
Status: COMPLETED | OUTPATIENT
Start: 2024-05-15 | End: 2024-05-15

## 2024-05-15 RX ORDER — IBUPROFEN 200 MG
16 TABLET ORAL
Status: DISCONTINUED | OUTPATIENT
Start: 2024-05-15 | End: 2024-05-21 | Stop reason: HOSPADM

## 2024-05-15 RX ORDER — ENOXAPARIN SODIUM 100 MG/ML
40 INJECTION SUBCUTANEOUS EVERY 24 HOURS
Status: DISCONTINUED | OUTPATIENT
Start: 2024-05-16 | End: 2024-05-21 | Stop reason: HOSPADM

## 2024-05-15 RX ORDER — POTASSIUM CHLORIDE 20 MEQ/1
40 TABLET, EXTENDED RELEASE ORAL ONCE
Status: COMPLETED | OUTPATIENT
Start: 2024-05-16 | End: 2024-05-15

## 2024-05-15 RX ORDER — MAGNESIUM SULFATE HEPTAHYDRATE 40 MG/ML
2 INJECTION, SOLUTION INTRAVENOUS ONCE
Status: COMPLETED | OUTPATIENT
Start: 2024-05-16 | End: 2024-05-16

## 2024-05-15 RX ORDER — FUROSEMIDE 10 MG/ML
40 INJECTION INTRAMUSCULAR; INTRAVENOUS 2 TIMES DAILY
Status: DISCONTINUED | OUTPATIENT
Start: 2024-05-16 | End: 2024-05-18

## 2024-05-15 RX ORDER — NALOXONE HCL 0.4 MG/ML
0.02 VIAL (ML) INJECTION
Status: DISCONTINUED | OUTPATIENT
Start: 2024-05-15 | End: 2024-05-21 | Stop reason: HOSPADM

## 2024-05-15 RX ORDER — POLYETHYLENE GLYCOL 3350 17 G/17G
17 POWDER, FOR SOLUTION ORAL 2 TIMES DAILY PRN
Status: DISCONTINUED | OUTPATIENT
Start: 2024-05-16 | End: 2024-05-21 | Stop reason: HOSPADM

## 2024-05-15 RX ORDER — ACETAMINOPHEN 325 MG/1
650 TABLET ORAL EVERY 4 HOURS PRN
Status: DISCONTINUED | OUTPATIENT
Start: 2024-05-16 | End: 2024-05-21 | Stop reason: HOSPADM

## 2024-05-15 RX ORDER — LACTULOSE 10 G/15ML
10 SOLUTION ORAL 3 TIMES DAILY
Status: DISCONTINUED | OUTPATIENT
Start: 2024-05-16 | End: 2024-05-18

## 2024-05-15 RX ORDER — IBUPROFEN 200 MG
24 TABLET ORAL
Status: DISCONTINUED | OUTPATIENT
Start: 2024-05-15 | End: 2024-05-21 | Stop reason: HOSPADM

## 2024-05-15 RX ORDER — SODIUM CHLORIDE 0.9 % (FLUSH) 0.9 %
10 SYRINGE (ML) INJECTION EVERY 12 HOURS PRN
Status: DISCONTINUED | OUTPATIENT
Start: 2024-05-15 | End: 2024-05-21 | Stop reason: HOSPADM

## 2024-05-15 RX ORDER — TRIAMCINOLONE ACETONIDE 5 MG/G
CREAM TOPICAL 2 TIMES DAILY
Status: DISCONTINUED | OUTPATIENT
Start: 2024-05-16 | End: 2024-05-21 | Stop reason: HOSPADM

## 2024-05-15 RX ORDER — GLUCAGON 1 MG
1 KIT INJECTION
Status: DISCONTINUED | OUTPATIENT
Start: 2024-05-15 | End: 2024-05-21 | Stop reason: HOSPADM

## 2024-05-15 RX ADMIN — POTASSIUM CHLORIDE 40 MEQ: 1500 TABLET, EXTENDED RELEASE ORAL at 11:05

## 2024-05-15 RX ADMIN — MAGNESIUM SULFATE HEPTAHYDRATE 2 G: 40 INJECTION, SOLUTION INTRAVENOUS at 11:05

## 2024-05-15 RX ADMIN — FUROSEMIDE 40 MG: 10 INJECTION, SOLUTION INTRAVENOUS at 10:05

## 2024-05-15 RX ADMIN — IOHEXOL 100 ML: 350 INJECTION, SOLUTION INTRAVENOUS at 11:05

## 2024-05-15 NOTE — PATIENT INSTRUCTIONS
LYMPHEDEMA    What is Lymphedema  Swelling in an arm, leg, the neck, the face, genitals and/or abdomen caused by a blockage in the lymphatic system. This type of swelling can decrease the amount of oxygen that reaches tissues and can also promote infection by serving as a medium in which bacteria may grow. An example of an infection seen commonly in those with Lymphedema is Cellulitis.  It is important to know that lymphedema is progressive and can lead to long-term, chronic conditions. Therefore, early and careful management is needed to reduce symptoms.    The Lymphatic System  A network of tissues and organs that help rid the body of toxins, waste and other unwanted materials. The lymphatic system returns fluid and protein to the circulatory system from the spaces in the body's tissues. Tiny lymph vessels of the lymphatic system  materials and waste products, along with foreign materials (bacteria) and transport them to larger lymph vessels. This fluid inside the vessels is called lymph. Surrounding muscles help move the lymph along to the larger vessels until it is returned to the circulatory system. Lymph nodes help filter the lymph fluid and break down foreign substances and help fight infection.                                                          What Causes Lymphedema?  Some people are born with an underdeveloped lymph system. Swelling may present at birth or develop during adolescence or adulthood. This is known as primary lymphedema. Secondary lymphedema may occur after lymph tissue and lymph nodes are injured or removed. This swelling may begin immediately or may not occur for several months or years. A common cause of secondary lymphedema is radiation treatment. Other causes of secondary lymphedema are surgery (removal of lymph nodes), cancer, infection and trauma. While there is presently no cure for lymphedema, it can be managed with diligent care. The earlier the stage, the easier the  management of the affected area.    Stages of Lymphedema:  Stage 0 (pre-stage): A subclinical state where swelling is not obvious. You may have complaints such as heaviness in the limb or mild aching or tightness  Stage 1: Skin of the limb is typically soft with pitting edema. Elevation of the limb may improve the swelling  Stage 2: Skin of the limb is more fibrotic. The skin may no longer pit when pressed. Limb swelling does not improve with elevation.   Stage 3: Severe stage also known as elephantiasis. Skin is fibrotic with deep skin creases which are prone to infection. Trophic skin changes, such as papillomatosis and hyperkeratosis also occur in this stage.                                                                             Precautions:  It is important to prevent skin irritation, cuts, scrapes and needle sticks in the skin to decrease risk of infection  Wear gloves for gardening and housework  Use an electric razor rather than a blade razor  Have injections and blood draws from the uninvolved side  Use a thimble when sewing  Avoid rubbing, scrubbing, waxing of involved limb  Take care to avoid bites and scratches from bugs and pets  Avoid walking barefoot  Use extreme caution when peeling shrimp, crawfish, crabs      It is important to avoid extreme heat exposure. When you increase circulation to a swollen limb, the chance of additional fluid leaking into the spaces of your body's tissue increases, which causes more swelling.  Avoid prolonged exposure to sunlight  Use sunscreen when outdoors  Use oven mitts when reaching into an oven  Avoid hot tubs and saunas  Avoid spending prolonged time under a hot hair dryer  Look for clothing made of breathable fabrics for hot weather      It is important to avoid anything that will reduce circulation of blood flow, since reduction of blood flow can lead to a restriction of lymph flow.  Have blood pressure measured in the uninvolved limb  Avoid tight or  constricting clothing such as tight sleeves and waistbands  Avoid tight watches and jewelry  Avoid sitting with your legs crossed        In general, practice healthy living habits:  Avoid alcohol and smoking  Maintain your ideal weight  Keep sodium intake at a moderate level (less than 2,400 mg/day)  Eat moderate amount of protein to maintain tissue health. Contact your primary doctor for a referral to [facility]  When you travel by air, wear your compression garment during the flight  Wash hands frequently and dry thoroughly  Keep skin moisturized and free from cracking or peeling. For sensitive skin, Eucerin lotion is a good option.   Avoid hangnails and do not pull or bite cuticles  Take care of all scratches, cuts, bites, immediately with an antibiotic cream or ointment (Neosporin, Bacitracin, Triple ABX) and cover with a clean bandage.       Call your doctor as soon as you suspect infection. Be aware of the signs of infection:  Increased swelling  Redness (generalized or localized small, red dots)  Heat (arm/leg feels warm, or you have fever)  Pain       MANAGING YOUR BANDAGES:  Although your bandages are inconvenient, they are an important part of the lymphedema program. You will receive one set of bandages for participating in [facility]. It is important to take care of these bandages during your treatment. You should not get your bandages wet! For bandages on the arm, it is possible to protect the arm bandages with a plastic bag when in a tub. No Showers with bandages on arm or leg!          WHAT TO EXPECT DURING LYMPHEDEMA TREATMENT:  Wear loose short sleeves for arm treatment. Sleeveless tops work well too. Wear skirts or baggy shorts or loose fitting/baggy pants for leg treatment.  If your leg(s) are being bandaged, find the widest shoe(s) possible. Wide slip-on shoes like crocks usually work. If you do not have a shoe that will fit over the bandages, a  disposable shoe cover will be provided to you.  Treatment sessions will last 45 minutes to 60 minutes and will consist of Manual Lymphatic Drainage (MLD), MLD training, vasopneumatic compression of the extremity as needed and bandaging of the swollen extremity.   You may be asked to remove clothes so that treatments can be performed efficiently. You will be provided with a sheet to drape yourself.   Once the compression bandage is applied, you will be expected to wear the bandage until the next visit. The bandage will loosen as the lymph fluid is pushed out of the limb. Do not attempt to unwrap and re-wrap the limb unless you have been trained to do so. In the event that the bandages are so loose that they fall off, remove them and put everything in a bag and bring it to the next therapy session.  While wearing the bandages on the arm or leg, keep fingers and toes moving, bend your elbow/knee, wrist/ankle frequently. Elevate the limb above the heart to relieve any throbbing or discomfort.  If the discomfort is unbearable, you may try to remove the outermost bandage. If this does not work, remove all bandages and bring them with you to the next therapy session.   Depending on severity of the swelling, expect weekly treatment for 2-5 days per week x 4-6 weeks.  An exercise program will be created based on individual need.  You will be fitted for a permanent compression garment (sleeve for arm/stocking for leg) prior to discharge from therapy. This permanent compression garment will take the place of the bandages and need to be worn as prescribed by your therapist.    CARE OF YOUR COMPRESSION GARMENT  Wear the compression garment daily as prescribed by your therapist. You will not wear the compression garment when sleeping. Put on the garment soon after you wake up and remove it before going to bed. This will decrease the risk of the lymph fluid returning to the extremity.   Use donning gloves/garden gloves to  the  compression garment. This will decrease tearing the material and make it easier to  the material.  If you feel the garment is too tight, notify your therapist.  It is best to have a wear one, wash one garment if possible. Check with your insurance provided to see what they will cover for lymphedema supplies.  Compression garments can be expected to last 3-6 months before they need to be replaced. When the compression garment loses its compressive ability, swelling from lymph fluid can return in the limb even if you are wearing the garment daily.   Consult your therapist or compression representative in the event you need to be re-measured.     Exercise:    Your therapist will instruct you in an exercise program tailored to you. Muscle contractions help promote the flow of lymph out of the swollen limb.  To prevent an increase in swelling of the limb, it is always best practice to wear your compression garment on the affected extremity when exercising.    **IF YOUR ARM IS BANDAGED, OPEN/CLOSE YOUR HAND AND PERFORM WRIST CIRCLES THROUGHOUT THE DAY.    Perform all exercises below with good, erect posture. Stand with feet shoulder width apart, with your knees slightly bent. Repeat each exercise 10-20 reps up to 3x/week               Shoulder Blade Squeeze      Backwards Shoulder Roll  Neck Rotation    Neck Side Bends    Chest Press with Stick    Elbow Bends with Stick      Ankle Pumps (and wiggle toes; do frequently)    Hamstring Curls    Heel Raises  Knee Extension    Knee Bends    Lunges  Knee Raises    Trunk Rotation    The following are a list of resources that may be helpful for you.  Lymphnotes.com: online information source for those having or are at risk of developing Lymphedema. This site is also for family and friend's who care for those with lymphedema  NLN (National Lymphedema Network): an organization dedicated to promoting the awareness of lymphedema and empowering people with lymphedema to live life  to the fullest. Lymphnet.org  LEARN (Lymphedema Education and Research Network): answers to frequently asked questions about Lymphedema, Lipedema, and the lymphatic system. Lymphaticnetwork.org  How Karly Got her Rack Back, A Breast Cancer Memoir by Helen Giles - a book that gives a laugh out loud humor to her adventure, along with poignant moments of self-discovery as she blogs her way to good health. (available on [employer] and paperback)  100 Questions and Answers About Lymphedema by Carina Santoyo, Nadiya Mobley, and Monica Lagos - provides clear, straightforward answers to your questions about lymphedema. (available on paperback)  Podcasts: Lymphedema Podcast, Livedema Podcast, Lymph Logic to name a few      If you need further assistance or have questions concerning your treatment, please do not hesitate to call Cortney Farah (therapist) at  [phone] (phone number).

## 2024-05-15 NOTE — PLAN OF CARE
[facility]  Physical Therapy Initial Evaluation    Name: Shikha López  Clinic Number: [id number]    Therapy Diagnosis:   Encounter Diagnoses   Name Primary?    Malignant neoplasm metastatic to left lung     Lymphedema     Leg swelling Yes    Arm swelling      Physician: Mariam Lisa NP    Physician Orders: PT Eval and Treat   Medical Diagnosis from Referral: C78.02 (ICD-10-CM) - Secondary malignant neoplasm of left lung    I89.0 (ICD-10-CM) - Lymphedema, not elsewhere classified  Evaluation Date: 5/1/2024  Authorization Period Expiration: 4/18/2025  Plan of Care Expiration: 7/1/2024  Visit # / Visits authorized: 1/ 1    Time In: 2:30pm   Time Out: 3:30pm   Total Billable Time: 60 minutes    Precautions: Standard and cancer    Subjective   Date of onset: 3 weeks ago   History of current condition - Shikha reports: 3 weeks ago she was in the hospital and had all over body swelling. She had some L arm swelling before but it was really bad this time. She had cellulitis in the RLE which she needed IV antibiotics for. She is still taking oral antibiotics.     2006- diagnosed with stage 4 breast cancer with mets to lungs   She had  a mastectomy with lymph nodes removed and a skin graft  She has been on many rounds of chemo, currently on chemo  She had radiation in 2007      She has some tubigrip she wears on her L arm she got years ago that has helped.   She doesn't have any compression sleeves          Medical History:   Past Medical History:   Diagnosis Date    Aortic atherosclerosis 07/06/2023    Breast cancer     Esophageal and gastric varices 06/23/2023    Malignant neoplasm metastatic to left lung 06/08/2021       Surgical History:   Shikha López  has a past surgical history that includes Mastectomy; Esophagogastroduodenoscopy (N/A, 6/23/2023); Endoscopic ultrasound of upper gastrointestinal tract (N/A, 6/27/2023); Esophagogastroduodenoscopy (N/A, 6/27/2023);  Esophagogastroduodenoscopy (N/A, 8/3/2023); Endoscopic ultrasound of upper gastrointestinal tract (N/A, 1/12/2024); Esophagogastroduodenoscopy (N/A, 1/12/2024); and Esophagogastroduodenoscopy (N/A, 4/10/2024).    Medications:   Shikha has a current medication list which includes the following prescription(s): ferrous sulfate, furosemide, hydroxyzine hcl, lactulose, lactulose, linezolid, methylphenidate hcl, olanzapine, pantoprazole, spironolactone, and triamcinolone acetonide 0.5%.    Allergies:   Review of patient's allergies indicates:  No Known Allergies     Imaging,     Impression:     No evidence of deep venous thrombosis in either lower extremity.     Findings suggestive of complex 4.2 x 0.8 x 2.5 cm right popliteal fossa cyst.    Surgery: Mastectomy with LN removal and skin graft   Radiation:  yes once   Chemotherapy: has been on many rounds of chemo   Hormonal Medications: none    Pt denies CHF, KF, and DM.  Previous Lymphedema Treatment: many years ago   Prior Therapy:  yes   Social History: She is staying with her mother currently due to dementia, is her caretaker    Environmental barriers: one step to get up- has noticed difficulty going up and down stairs    Abuse/Neglect: Pt shows no visible signs of abuse/neglect   Nutritional status: Pt reports no nutritional needs    Educational needs: Pt reports no educational needs    Spiritual/Cultural: Pt reports no spiritual/ cultural needs     Fall risk: none    Occupation: work very part time   Prior Level of Function: independent   Current Level of Function: walking, getting dressed, putting shoes on  Gait: no AD    Transfers:independent    Bed Mobility: independent      Pain:   Current 2/10, worst 10/10, best 2/10   Location: R ankle  Description: bruised   Aggravating Factors: morning   Easing Factors: elevation    Pts goals: be able to walk again     Objective     Amount of Swelling: moderate swelling of LUE, moderate swelling of BLEs    Location of  Swelling: LUE, mostly forearm and hand with finger swelling, B lower leg swelling    Skin Integrity:  RLE redness from lower leg extending to medial thigh   Palpation/Texture: pitting edema of BLEs, Pitting edema of LUE    Circulation: intact    Posture: FHP      Sensation: intact        Girth Measurements (in centimeters)            CMS Impairment/Limitation/Restriction for FOTO  Survey    Not performed     Therapist reviewed FOTO scores for Shikha López on 5/1/2024.   FOTO documents entered into [facility] - see Media section.           TREATMENT     Total Treatment time separate from Evaluation: 30 minutes      Shikha received the following ADLs were applied to the: LUE, BLEs for  30 minutes, including:  Complete Decongestive Therapy components discussed and reviewed goals towards therapy plan of care.   Compression needs and education with vendor list provided and product information.  Skin care, infection education    Home Exercises and Patient Education Provided    Education provided:   - lymph booklet   - Pt was educated in lymphedema etiology and management plans.  Pt was provided with written  risk reductions and precautions for managing lymphedema.      This patient is in agreement to participate in Lymphedema treatment.    Written Home Exercises Provided: yes.  Exercises were reviewed and Shikha was able to demonstrate them prior to the end of the session.  Shikha demonstrated good  understanding of the education provided.     See EMR under Patient Instructions for exercises provided 5/1/2024.    Assessment   Shikha is a 53 y.o. female referred to outpatient Physical Therapy with a medical diagnosis of C78.02 (ICD-10-CM) - Secondary malignant neoplasm of left lung    I89.0 (ICD-10-CM) - Lymphedema, not elsewhere classified. This patient presents s/p breast cancer, radiation, chemo   resulting in: lymphedema of the LUE, BLEs increased pain,  as well as difficulty performing getting dressed, putting shoes  on, walking , and placing the pt at higher risk of infection.     Patient presents with increased arm swelling and BLE leg swelling with pitting edema of both. Pt's UE swelling is most concentrated in forearm and hands. Patient's RLE was red and pt reports the redness has spread up the leg. Patient's MD was notified about potentially worsening rash.  As pt's LUE is s/p cancer lymphedema and possibly more persistent, treatment will start with addressing arm swelling first. Pt was educated on lymphedema causes and physiology, infection signs and symptoms, complete decongestive therapy and its components, and expectations for therapy. Pt was also educated on the need for some type of compression. Pt would benefit from therapy to decrease swelling, aid in finding compression, are preparing pt for home management.         Pt prognosis is Good.   Pt will benefit from skilled outpatient Physical Therapy to address the deficits stated above and in the chart below, provide pt/family education, and to maximize pt's level of independence.     Plan of care discussed with patient: Yes  Pt's spiritual, cultural and educational needs considered and patient is agreeable to the plan of care and goals as stated below:     Anticipated Barriers for therapy: rash     Medical Necessity is demonstrated by the following      Medical Necessity is demonstrated by the following  History  Co-morbidities and personal factors that may impact the plan of care [] LOW: no personal factors / co-morbidities  [] MODERATE: 1-2 personal factors / co-morbidities  [x] HIGH: 3+ personal factors / co-morbidities    Moderate / High Support Documentation:       Breast cancer   Hx of cellulitis   Chemo   Hx of radiation      Examination  Body Structures and Functions, activity limitations and participation restrictions that may impact the plan of care [] LOW: addressing 1-2 elements  [] MODERATE: 3+ elements  [x] HIGH: 4+ elements (please support  below)    Moderate / High Support Documentation: leg swelling, arm swelling, skin integrity, walking, dressing, putting shoes on     Clinical Presentation [] LOW: stable  [] MODERATE: Evolving  [x] HIGH: Unstable     Decision Making/ Complexity Score: high           The following goals were discussed with the patient and patient is in agreement with them as to be addressed in the treatment plan.     Short Term Goals: (6 weeks)  1. Decrease girth in  L  upper arm, forearm, wrist and hand by up to  1 cm to allow for improved UE symmetry, clothing choice, ROM, and UE function. (progressing, not met)  2. Patient will demonstrate 100% knowledge of lymphedema precautions and signs of infection to allow for reduced risk of lymphedema, reduced risk of infection, and/or exacerbation.  (progressing, not met)  3. Patient will perform self-bandaging techniques to enhance lymphatic drainage, tissue density, and self management of condition.  (progressing, not met)  4. Patient will perform self lymph drainage techniques to enhance lymphatic drainage and mobility.  (progressing, not met)  5. Patient will tolerate daily activities with multilayered bandaging to enhance lymphatic drainage and skin elasticity.  (progressing, not met)    Long Term Goals: ( 12  weeks)  1. Patient to show decreased girth in L UE by up to 2 cm to allow for improved UE symmetry, clothing choice, ROM, and UE function.  (progressing, not met)  2. Patient will show reduction in density to mild or less with improved contour of limb to allow for improved cosmesis, improved lymphatic drainage, and functional mobility.  (progressing, not met)  3. Patient to laine/doff compression garment with daily compliance to support lymphatic mobility and skin elasticity.  (progressing, not met)  4. Pt to show improved postural awareness and alignment.  (progressing, not met)  5. Pt to be I and compliant with HEP to allow for improved ROM, reach and carry with functional  endurance and strength.    (progressing, not met)      Plan   Plan of care Certification: 5/1/2024 to 7/1/2024.    Outpatient Physical Therapy 3 times weekly for 8 weeks to include the following interventions: Gait Training, Manual Therapy, Neuromuscular Re-ed, Patient Education, Self Care, Therapeutic Activities, and Therapeutic Exercise. Complete Decongestive Therapy- compression and home equipment needs to be addressed and assisted.    Pt may be seen by a PTA as part of the Rehab treatment team.    Cortney Farah, PT

## 2024-05-15 NOTE — TELEPHONE ENCOUNTER
Patient sent message on the portal,  I have been feeling horrible the past few days, barely eaten anything, my breathing is labored when walking 3 feet, I can hear my heartbeat in my ears and I have absolutely no energy. There is no blood in my stool.  Should I go to the ER?  Thank you,  Shikha López       I called patient and she confirmed above symptoms.   Patient instructed to go to the ER.  She agreed to go.  Message to Dr Figueroa.

## 2024-05-16 LAB
ALBUMIN SERPL BCP-MCNC: 2.5 G/DL (ref 3.5–5.2)
ALP SERPL-CCNC: 170 U/L (ref 55–135)
ALT SERPL W/O P-5'-P-CCNC: 12 U/L (ref 10–44)
ANION GAP SERPL CALC-SCNC: 9 MMOL/L (ref 8–16)
ASCENDING AORTA: 2.87 CM
AST SERPL-CCNC: 26 U/L (ref 10–40)
AV INDEX (PROSTH): 0.38
AV MEAN GRADIENT: 25 MMHG
AV PEAK GRADIENT: 42 MMHG
AV VALVE AREA BY VELOCITY RATIO: 1.27 CM²
AV VALVE AREA: 1.44 CM²
AV VELOCITY RATIO: 0.33
BACTERIA #/AREA URNS AUTO: ABNORMAL /HPF
BASOPHILS # BLD AUTO: 0 K/UL (ref 0–0.2)
BASOPHILS # BLD AUTO: 0 K/UL (ref 0–0.2)
BASOPHILS # BLD AUTO: 0.01 K/UL (ref 0–0.2)
BASOPHILS NFR BLD: 0 % (ref 0–1.9)
BASOPHILS NFR BLD: 0 % (ref 0–1.9)
BASOPHILS NFR BLD: 0.2 % (ref 0–1.9)
BILIRUB SERPL-MCNC: 5.4 MG/DL (ref 0.1–1)
BILIRUB UR QL STRIP: NEGATIVE
BLD GP AB SCN CELLS X3 SERPL QL: NORMAL
BLD PROD TYP BPU: NORMAL
BLD PROD TYP BPU: NORMAL
BLOOD UNIT EXPIRATION DATE: NORMAL
BLOOD UNIT EXPIRATION DATE: NORMAL
BLOOD UNIT TYPE CODE: 5100
BLOOD UNIT TYPE CODE: 5100
BLOOD UNIT TYPE: NORMAL
BLOOD UNIT TYPE: NORMAL
BSA FOR ECHO PROCEDURE: 1.9 M2
BUN SERPL-MCNC: 14 MG/DL (ref 6–20)
CALCIUM SERPL-MCNC: 8.2 MG/DL (ref 8.7–10.5)
CHLORIDE SERPL-SCNC: 105 MMOL/L (ref 95–110)
CLARITY UR REFRACT.AUTO: CLEAR
CO2 SERPL-SCNC: 24 MMOL/L (ref 23–29)
CODING SYSTEM: NORMAL
CODING SYSTEM: NORMAL
COLOR UR AUTO: YELLOW
CREAT SERPL-MCNC: 0.6 MG/DL (ref 0.5–1.4)
CROSSMATCH INTERPRETATION: NORMAL
CROSSMATCH INTERPRETATION: NORMAL
CV ECHO LV RWT: 0.39 CM
DIFFERENTIAL METHOD BLD: ABNORMAL
DISPENSE STATUS: NORMAL
DISPENSE STATUS: NORMAL
DOP CALC AO PEAK VEL: 3.24 M/S
DOP CALC AO VTI: 73.25 CM
DOP CALC LVOT AREA: 3.8 CM2
DOP CALC LVOT DIAMETER: 2.2 CM
DOP CALC LVOT PEAK VEL: 1.08 M/S
DOP CALC LVOT STROKE VOLUME: 105.62 CM3
DOP CALCLVOT PEAK VEL VTI: 27.8 CM
ECHO LV POSTERIOR WALL: 0.92 CM (ref 0.6–1.1)
EOSINOPHIL # BLD AUTO: 0 K/UL (ref 0–0.5)
EOSINOPHIL # BLD AUTO: 0 K/UL (ref 0–0.5)
EOSINOPHIL # BLD AUTO: 0.1 K/UL (ref 0–0.5)
EOSINOPHIL NFR BLD: 0.8 % (ref 0–8)
EOSINOPHIL NFR BLD: 1 % (ref 0–8)
EOSINOPHIL NFR BLD: 1.2 % (ref 0–8)
ERYTHROCYTE [DISTWIDTH] IN BLOOD BY AUTOMATED COUNT: 24.5 % (ref 11.5–14.5)
ERYTHROCYTE [DISTWIDTH] IN BLOOD BY AUTOMATED COUNT: 25.8 % (ref 11.5–14.5)
ERYTHROCYTE [DISTWIDTH] IN BLOOD BY AUTOMATED COUNT: 26.1 % (ref 11.5–14.5)
EST. GFR  (NO RACE VARIABLE): >60 ML/MIN/1.73 M^2
FIBRINOGEN PPP-MCNC: 273 MG/DL (ref 182–400)
FRACTIONAL SHORTENING: 43 % (ref 28–44)
GLUCOSE SERPL-MCNC: 98 MG/DL (ref 70–110)
GLUCOSE UR QL STRIP: NEGATIVE
HAPTOGLOB SERPL-MCNC: 35 MG/DL (ref 30–250)
HCT VFR BLD AUTO: 16.5 % (ref 37–48.5)
HCT VFR BLD AUTO: 21.2 % (ref 37–48.5)
HCT VFR BLD AUTO: 21.5 % (ref 37–48.5)
HGB BLD-MCNC: 5.6 G/DL (ref 12–16)
HGB BLD-MCNC: 7.2 G/DL (ref 12–16)
HGB BLD-MCNC: 7.4 G/DL (ref 12–16)
HGB UR QL STRIP: ABNORMAL
HYALINE CASTS UR QL AUTO: 1 /LPF
IMM GRANULOCYTES # BLD AUTO: 0.01 K/UL (ref 0–0.04)
IMM GRANULOCYTES # BLD AUTO: 0.01 K/UL (ref 0–0.04)
IMM GRANULOCYTES # BLD AUTO: 0.04 K/UL (ref 0–0.04)
IMM GRANULOCYTES NFR BLD AUTO: 0.3 % (ref 0–0.5)
IMM GRANULOCYTES NFR BLD AUTO: 0.4 % (ref 0–0.5)
IMM GRANULOCYTES NFR BLD AUTO: 0.8 % (ref 0–0.5)
INR PPP: 1.5 (ref 0.8–1.2)
INTERVENTRICULAR SEPTUM: 0.81 CM (ref 0.6–1.1)
KETONES UR QL STRIP: NEGATIVE
LA MAJOR: 5.67 CM
LA MINOR: 5.97 CM
LA WIDTH: 4.72 CM
LDH SERPL L TO P-CCNC: 353 U/L (ref 110–260)
LEFT ATRIUM SIZE: 4.38 CM
LEFT ATRIUM VOLUME INDEX MOD: 44.6 ML/M2
LEFT ATRIUM VOLUME INDEX: 55.5 ML/M2
LEFT ATRIUM VOLUME MOD: 82.08 CM3
LEFT ATRIUM VOLUME: 102.2 CM3
LEFT INTERNAL DIMENSION IN SYSTOLE: 2.69 CM (ref 2.1–4)
LEFT VENTRICLE DIASTOLIC VOLUME INDEX: 56.14 ML/M2
LEFT VENTRICLE DIASTOLIC VOLUME: 103.3 ML
LEFT VENTRICLE MASS INDEX: 74 G/M2
LEFT VENTRICLE SYSTOLIC VOLUME INDEX: 14.6 ML/M2
LEFT VENTRICLE SYSTOLIC VOLUME: 26.82 ML
LEFT VENTRICULAR INTERNAL DIMENSION IN DIASTOLE: 4.72 CM (ref 3.5–6)
LEFT VENTRICULAR MASS: 136.37 G
LEUKOCYTE ESTERASE UR QL STRIP: ABNORMAL
LYMPHOCYTES # BLD AUTO: 0.4 K/UL (ref 1–4.8)
LYMPHOCYTES # BLD AUTO: 0.5 K/UL (ref 1–4.8)
LYMPHOCYTES # BLD AUTO: 0.5 K/UL (ref 1–4.8)
LYMPHOCYTES NFR BLD: 13.1 % (ref 18–48)
LYMPHOCYTES NFR BLD: 14.8 % (ref 18–48)
LYMPHOCYTES NFR BLD: 9.9 % (ref 18–48)
MAGNESIUM SERPL-MCNC: 2 MG/DL (ref 1.6–2.6)
MCH RBC QN AUTO: 33.2 PG (ref 27–31)
MCH RBC QN AUTO: 33.5 PG (ref 27–31)
MCH RBC QN AUTO: 37.3 PG (ref 27–31)
MCHC RBC AUTO-ENTMCNC: 33.9 G/DL (ref 32–36)
MCHC RBC AUTO-ENTMCNC: 34 G/DL (ref 32–36)
MCHC RBC AUTO-ENTMCNC: 34.4 G/DL (ref 32–36)
MCV RBC AUTO: 110 FL (ref 82–98)
MCV RBC AUTO: 96 FL (ref 82–98)
MCV RBC AUTO: 99 FL (ref 82–98)
MICROSCOPIC COMMENT: ABNORMAL
MONOCYTES # BLD AUTO: 0.2 K/UL (ref 0.3–1)
MONOCYTES # BLD AUTO: 0.2 K/UL (ref 0.3–1)
MONOCYTES # BLD AUTO: 0.3 K/UL (ref 0.3–1)
MONOCYTES NFR BLD: 5.5 % (ref 4–15)
MONOCYTES NFR BLD: 6.4 % (ref 4–15)
MONOCYTES NFR BLD: 7 % (ref 4–15)
MV A" WAVE DURATION": 6.85 MSEC
NEUTROPHILS # BLD AUTO: 1.9 K/UL (ref 1.8–7.7)
NEUTROPHILS # BLD AUTO: 2.7 K/UL (ref 1.8–7.7)
NEUTROPHILS # BLD AUTO: 4.3 K/UL (ref 1.8–7.7)
NEUTROPHILS NFR BLD: 77.6 % (ref 38–73)
NEUTROPHILS NFR BLD: 78.4 % (ref 38–73)
NEUTROPHILS NFR BLD: 82.6 % (ref 38–73)
NITRITE UR QL STRIP: NEGATIVE
NRBC BLD-RTO: 0 /100 WBC
NUM UNITS TRANS PACKED RBC: NORMAL
NUM UNITS TRANS PACKED RBC: NORMAL
OHS CV RV/LV RATIO: 0.97 CM
OHS QRS DURATION: 90 MS
OHS QRS DURATION: 92 MS
OHS QTC CALCULATION: 438 MS
OHS QTC CALCULATION: 440 MS
PH UR STRIP: 6 [PH] (ref 5–8)
PHOSPHATE SERPL-MCNC: 3.3 MG/DL (ref 2.7–4.5)
PISA TR MAX VEL: 3.2 M/S
PLATELET # BLD AUTO: 48 K/UL (ref 150–450)
PLATELET # BLD AUTO: 58 K/UL (ref 150–450)
PLATELET # BLD AUTO: 73 K/UL (ref 150–450)
PMV BLD AUTO: 11.5 FL (ref 9.2–12.9)
PMV BLD AUTO: 12.8 FL (ref 9.2–12.9)
PMV BLD AUTO: 13.1 FL (ref 9.2–12.9)
POTASSIUM SERPL-SCNC: 4.1 MMOL/L (ref 3.5–5.1)
PROT SERPL-MCNC: 6 G/DL (ref 6–8.4)
PROT UR QL STRIP: NEGATIVE
PROTHROMBIN TIME: 15.6 SEC (ref 9–12.5)
PULM VEIN S/D RATIO: 0.58
PV PEAK D VEL: 1.1 M/S
PV PEAK S VEL: 0.64 M/S
RA MAJOR: 4.37 CM
RA PRESSURE ESTIMATED: 8 MMHG
RA WIDTH: 4.3 CM
RBC # BLD AUTO: 1.5 M/UL (ref 4–5.4)
RBC # BLD AUTO: 2.15 M/UL (ref 4–5.4)
RBC # BLD AUTO: 2.23 M/UL (ref 4–5.4)
RBC #/AREA URNS AUTO: 3 /HPF (ref 0–4)
RIGHT VENTRICULAR END-DIASTOLIC DIMENSION: 4.6 CM
RV TB RVSP: 11 MMHG
SINUS: 3.18 CM
SODIUM SERPL-SCNC: 138 MMOL/L (ref 136–145)
SP GR UR STRIP: 1.01 (ref 1–1.03)
SQUAMOUS #/AREA URNS AUTO: 1 /HPF
STJ: 2.53 CM
TDI LATERAL: 0.1 M/S
TDI SEPTAL: 0.08 M/S
TDI: 0.09 M/S
TR MAX PG: 41 MMHG
TRICUSPID ANNULAR PLANE SYSTOLIC EXCURSION: 2.3 CM
TROPONIN I SERPL DL<=0.01 NG/ML-MCNC: 0.06 NG/ML (ref 0–0.03)
TROPONIN I SERPL DL<=0.01 NG/ML-MCNC: 0.11 NG/ML (ref 0–0.03)
TROPONIN I SERPL DL<=0.01 NG/ML-MCNC: 0.11 NG/ML (ref 0–0.03)
TROPONIN I SERPL DL<=0.01 NG/ML-MCNC: 0.4 NG/ML (ref 0–0.03)
TV REST PULMONARY ARTERY PRESSURE: 49 MMHG
URN SPEC COLLECT METH UR: ABNORMAL
WBC # BLD AUTO: 2.5 K/UL (ref 3.9–12.7)
WBC # BLD AUTO: 3.44 K/UL (ref 3.9–12.7)
WBC # BLD AUTO: 5.25 K/UL (ref 3.9–12.7)
WBC #/AREA URNS AUTO: 6 /HPF (ref 0–5)
Z-SCORE OF LEFT VENTRICULAR DIMENSION IN END DIASTOLE: -0.78
Z-SCORE OF LEFT VENTRICULAR DIMENSION IN END SYSTOLE: -1.25

## 2024-05-16 PROCEDURE — 85025 COMPLETE CBC W/AUTO DIFF WBC: CPT | Mod: 91

## 2024-05-16 PROCEDURE — 83010 ASSAY OF HAPTOGLOBIN QUANT: CPT

## 2024-05-16 PROCEDURE — 20600001 HC STEP DOWN PRIVATE ROOM

## 2024-05-16 PROCEDURE — 84484 ASSAY OF TROPONIN QUANT: CPT

## 2024-05-16 PROCEDURE — 30233N1 TRANSFUSION OF NONAUTOLOGOUS RED BLOOD CELLS INTO PERIPHERAL VEIN, PERCUTANEOUS APPROACH: ICD-10-PCS | Performed by: HOSPITALIST

## 2024-05-16 PROCEDURE — 25500020 PHARM REV CODE 255: Performed by: STUDENT IN AN ORGANIZED HEALTH CARE EDUCATION/TRAINING PROGRAM

## 2024-05-16 PROCEDURE — 93005 ELECTROCARDIOGRAM TRACING: CPT

## 2024-05-16 PROCEDURE — 86920 COMPATIBILITY TEST SPIN: CPT | Performed by: PHYSICIAN ASSISTANT

## 2024-05-16 PROCEDURE — 83735 ASSAY OF MAGNESIUM: CPT

## 2024-05-16 PROCEDURE — 93010 ELECTROCARDIOGRAM REPORT: CPT | Mod: ,,, | Performed by: INTERNAL MEDICINE

## 2024-05-16 PROCEDURE — 25000003 PHARM REV CODE 250

## 2024-05-16 PROCEDURE — 63600175 PHARM REV CODE 636 W HCPCS

## 2024-05-16 PROCEDURE — 84484 ASSAY OF TROPONIN QUANT: CPT | Mod: 91

## 2024-05-16 PROCEDURE — 36415 COLL VENOUS BLD VENIPUNCTURE: CPT

## 2024-05-16 PROCEDURE — P9016 RBC LEUKOCYTES REDUCED: HCPCS | Performed by: PHYSICIAN ASSISTANT

## 2024-05-16 PROCEDURE — 99291 CRITICAL CARE FIRST HOUR: CPT

## 2024-05-16 PROCEDURE — 83615 LACTATE (LD) (LDH) ENZYME: CPT

## 2024-05-16 PROCEDURE — 84100 ASSAY OF PHOSPHORUS: CPT

## 2024-05-16 PROCEDURE — 80053 COMPREHEN METABOLIC PANEL: CPT

## 2024-05-16 PROCEDURE — 85384 FIBRINOGEN ACTIVITY: CPT

## 2024-05-16 RX ADMIN — TRIAMCINOLONE ACETONIDE: 5 CREAM TOPICAL at 09:05

## 2024-05-16 RX ADMIN — SPIRONOLACTONE 100 MG: 50 TABLET ORAL at 11:05

## 2024-05-16 RX ADMIN — TRIAMCINOLONE ACETONIDE: 5 CREAM TOPICAL at 12:05

## 2024-05-16 RX ADMIN — PANTOPRAZOLE SODIUM 40 MG: 40 TABLET, DELAYED RELEASE ORAL at 09:05

## 2024-05-16 RX ADMIN — FUROSEMIDE 40 MG: 10 INJECTION, SOLUTION INTRAVENOUS at 06:05

## 2024-05-16 RX ADMIN — LACTULOSE 10 G: 20 SOLUTION ORAL at 11:05

## 2024-05-16 RX ADMIN — FUROSEMIDE 40 MG: 10 INJECTION, SOLUTION INTRAVENOUS at 11:05

## 2024-05-16 RX ADMIN — LACTULOSE 10 G: 20 SOLUTION ORAL at 06:05

## 2024-05-16 RX ADMIN — PANTOPRAZOLE SODIUM 40 MG: 40 TABLET, DELAYED RELEASE ORAL at 11:05

## 2024-05-16 NOTE — MEDICAL/APP STUDENT
[facility] Medicine Student   Progress Note  [facility] [place] HOSP MED 3    Admit Date: 5/15/2024  Hospital Day: 1  05/16/2024  10:51 AM    SUBJECTIVE:   Ms. Shikha López is a 53 y.o. female with a relevant medical history of stage IV breast cancer (known lung mets, on Trastuzumab Deruxtecan q3 weks, most recent chemo 5/7/24; followed by Dr. Willis) s/p L radical mastectomy, s/p radiation therapy, HAWTHORNE cirrhosis (s/p TIPS 4/12 w/ outpatient IR appointment scheduled 5/16 for doppler w/ concern for decreased flow), GI bleeds w/ gastric and esophageal varices  who is being followed up for Symptomatic anemia.    Interval history: NAEON. Received 2 u PRBC + IV furosemide in ED     ROS  Constitutional:  Positive for activity change and fatigue. Negative for chills and fever.   HENT:  Negative for congestion and sore throat.    Eyes:  Negative for visual disturbance.   Respiratory:  Positive for shortness of breath. Negative for cough and wheezing.    Cardiovascular:  Positive for leg swelling. Negative for chest pain and palpitations.   Gastrointestinal:  Positive for diarrhea. Negative for abdominal pain, nausea and vomiting. denies hemoptysis, hematemesis, nausea/vomiting, constipation, bloody bowel movements   Endocrine: Negative for polyuria.   Genitourinary:  Negative for dysuria, flank pain and frequency.   Musculoskeletal:  Negative for arthralgias, back pain and myalgias.   Skin:  Positive for color change and wound. Negative for pallor and rash.   Neurological:  Negative for light-headedness and headaches.   Psychiatric/Behavioral:  Negative for agitation. The patient is not nervous/anxious.      Please refer to the H&P for past medical, family, and social history.    OBJECTIVE:     Vital Signs Recent:  Temp: 97.8 °F (36.6 °C) (05/16/24 0700)  Pulse: 89 (05/16/24 0700)  Resp: 16 (05/16/24 0700)  BP: (!) 128/58 (05/16/24 0700)  SpO2: (!) 93 % (05/16/24 0700)  Oxygen Documentation:                Device (Oxygen Therapy): room  air         Vital Signs Range (Last 24H):  Temp:  [97.4 °F (36.3 °C)-99 °F (37.2 °C)]   Pulse:  []   Resp:  [16-33]   BP: (112-131)/(52-62)   SpO2:  [91 %-99 %]        I & O (Last 24H):  Intake/Output Summary (Last 24 hours) at 5/16/2024 1051  Last data filed at 5/16/2024 0700  Gross per 24 hour   Intake 1142.5 ml   Output 1000 ml   Net 142.5 ml        Physical Exam:  Physical Exam  Constitutional:       General: She is not in acute distress.  HENT:      Head: Normocephalic and atraumatic.      Right Ear: External ear normal.      Left Ear: External ear normal.      Mouth/Throat:      Mouth: Mucous membranes are moist.      Pharynx: Oropharynx is clear.   Eyes:      General: No scleral icterus.     Extraocular Movements: Extraocular movements intact.      Conjunctiva/sclera: Conjunctivae normal.   Cardiovascular:      Rate and Rhythm: Normal rate and regular rhythm.      Heart sounds: Murmur (4/6 systolic) heard.   Pulmonary:      Effort: Pulmonary effort is normal. No respiratory distress.      Breath sounds: Normal breath sounds. No wheezing or rales.   Abdominal:      General: Abdomen is flat. There is no distension.      Palpations: Abdomen is soft.      Tenderness: There is no abdominal tenderness.   Musculoskeletal:      Cervical back: No rigidity.      Right lower leg: Edema present.      Left lower leg: Edema present.      Comments: 3+ bilateral lower extremity edema   Lymphadenopathy:      Cervical: No cervical adenopathy.   Skin:     General: Skin is warm and dry.      Coloration: Skin is not jaundiced.      Findings: Rash (Left lower extremity, erythema) present.   Neurological:      General: No focal deficit present.      Mental Status: She is alert and oriented to person, place, and time.   Psychiatric:         Mood and Affect: Mood normal.         Behavior: Behavior normal.     Labs:   Recent Labs   Lab 05/15/24  2042 05/16/24  0808    138   K 3.7 4.1    105   CO2 24 24   BUN 12 14    CREATININE 0.7 0.6   * 98   CALCIUM 8.4* 8.2*   MG 1.7 2.0   PHOS  --  3.3     Recent Labs   Lab 05/15/24  2042 05/15/24  2357 05/16/24  0808   ALKPHOS 171*  --  170*   ALT 13  --  12   AST 26  --  26   ALBUMIN 2.5*  --  2.5*   PROT 6.1  --  6.0   BILITOT 3.4*  --  5.4*   INR  --  1.5*  --      Recent Labs   Lab 05/15/24  2042 05/15/24  2357 05/16/24  0808   WBC 2.97* 2.50* 3.44*   HGB 4.9* 5.6* 7.2*   HCT 13.8* 16.5* 21.2*   PLT 59* 48* 58*       Diagnostic Results:  Imaging Results              US Liver with Doppler (xpd) (Final result)  Result time 05/16/24 10:37:50      Final result by Ksah Terry MD (05/16/24 10:37:50)                   Impression:      Heterogeneous liver suggestive of chronic liver disease.  No focal hepatic lesions.    Improved although persistently low velocities within the portal venous side of the tips shunt.  Tips shunt is patent.  Bidirectional flow within the left portal vein similar to prior exam.  Bidirectional flow within the anterior branch of the right portal vein.  Findings may represent mild tips shunt dysfunction.  Continued follow-up is recommended.    Cholelithiasis.    Splenomegaly.      Electronically signed by: Kash Terry MD  Date:    05/16/2024  Time:    10:37               Narrative:    EXAMINATION:  US LIVER WITH DOPPLER    CLINICAL HISTORY:  Previous study w/ questionable TIPS function;    TECHNIQUE:  Ultrasound liver with Doppler.  Color and spectral Doppler were performed.    COMPARISON:  None.    FINDINGS:  The visualized portion of the pancreas is unremarkable.    The liver is normal in size measuring 14.0 cm.  The liver demonstrates heterogeneous echotexture.  No focal hepatic lesions are seen.    The gallbladder demonstrates multiple mobile gallstones.  The common duct is not dilated, measuring 3 mm.  The gallbladder wall is not thickened.  There is no sonographic Griffin sign.  No dilated intrahepatic radicles are seen.    The spleen is  enlarged in size measuring 14.6 x 5.4 cm with a homogeneous echotexture.  A few assess Ree splenules.    There is no evidence of ascites.    TIPS SHUNT EVALUATION:    *Main Portal Vein velocity: 44(<30 cm/sec is abnormal)  *Flow direction in portal vein branches: Bidirectional  *Flow direction within the hepatic veins:Appropriate.  Within Shunt    *Shunt velocities within the shunt:  72, 141, and 115 cm/sec  *Visible narrowing of the shunt:Absent  *Decrease in intra shunt velocity of 50 cm/sec from previous exams: Absent  *Increase in velocity along the course of the shunt of more than 100 cm/sec: Absent  The main hepatic artery is patent. The umbilical vein is not patent.                                        CTA Chest Non-Coronary (PE Studies) (Final result)  Result time 05/16/24 00:18:06      Final result by Zurdo Aleman MD (05/16/24 00:18:06)                   Impression:      1. No evidence of pulmonary embolism.  See above comments.  2. Bilateral patchy and ground-glass alveolar infiltrates in upper and lower lobes.  Mild nodular components also.  The findings could be associated with pulmonary edema or pneumonitis.  Underlying pulmonary nodules cannot be excluded.  Largest nodular component is in the right upper lobe measuring 1.9 cm.  Minimal bibasilar pleural effusions also.  Follow-up recommended.  3. Postoperative changes.  4. Cardiomegaly  5. Subcutaneous edema noted in the right breast and chest wall bilaterally.  Status post left mastectomy  6. See above comments also.  7. This report was flagged in Epic as abnormal.      Electronically signed by: Zurdo Aleman  Date:    05/16/2024  Time:    00:18               Narrative:    EXAMINATION:  CTA CHEST NON CORONARY (PE STUDIES)    CLINICAL HISTORY:  Pulmonary embolism (PE) suspected, high prob;    TECHNIQUE:  Low dose axial images, sagittal and coronal reformations were obtained from the thoracic inlet to the lung bases following the IV  administration of 100 mL of Omnipaque 350.  Contrast timing was optimized to evaluate the pulmonary arteries.  MIP images were performed.    COMPARISON:  None    FINDINGS:  Pulmonary arteries:Pulmonary arteries are suboptimally enhanced.No filling defects to indicate pulmonary embolism.  Limited visualization of distal segmental and subsegmental pulmonary arteries no significant respiratory motion artifact.    Thoracic soft tissues: Status post left mastectomy.  There is edema in the right breast and chest wall bilaterally.  No focal mass or abscess.    Aorta: Left-sided aortic arch.  No aneurysm or dissection.    Heart: The heart is enlarged.    Sandra/Mediastinum: No pathologic astrid enlargement.    Right IJ port catheter with tip projecting in the brachiocephalic vein to the left, better visualized on prior x-ray    Airways: Patent.    Lungs/Pleura: Minimal bibasilar pleural effusions.  Bilateral patchy and ground-glass alveolar infiltrates in upper and lower lobes.  Findings could be associated with pulmonary edema.  Pneumonitis could appear similar.  Recommend follow-up.    5 mm pulmonary nodule or nodular infiltrate in the superior left lower lobe on axial 180 of series 3.  Follow-up recommended.    1.9 cm nodular infiltrate, mild consolidation or small nodular mass in the right upper lobe on axial 175 of series 3.  Follow-up recommended.    Esophagus: Unremarkable.    Upper Abdomen: Tips shunt appears normally position.    Multiple metallic coils with and adjacent to the proximal stomach.    Bones: No acute fracture. No suspicious lytic or sclerotic lesions.                                       X-Ray Chest AP Portable (Final result)  Result time 05/15/24 20:50:55      Final result by Dawson Michelle MD (05/15/24 20:50:55)                   Impression:      Findings suggesting pulmonary edema/CHF pattern, without consolidative process.  Interstitial type pneumonia not excluded.    Grossly stable additional  findings as above.      Electronically signed by: Dawson Michelle MD  Date:    05/15/2024  Time:    20:50               Narrative:    EXAMINATION:  XR CHEST AP PORTABLE    CLINICAL HISTORY:  Shortness of breath    TECHNIQUE:  Single frontal view of the chest was performed.    COMPARISON:  Chest radiograph 04/21/2024 and CT thorax 04/29/2024    FINDINGS:  Resolution is limited by body habitus with underpenetration.  Patient is slightly rotated.    Right chest vascular port unchanged.  Cardiomediastinal silhouette is midline and prominent similar to prior.  Continued prominence of the central pulmonary vasculature with bilateral diffuse nonspecific interstitial coarsening and central peribronchial cuffing.  Hilar contours are grossly within normal limits.  Similar blunting of the left costophrenic angle with mild elevation of the left hemidiaphragm likely representing pleural thickening/scarring with superimposed platelike scarring versus atelectasis, better demonstrated on previous cross-sectional imaging.  No large consolidation or sizable pleural effusion on the right.  No pneumothorax.  Left axillary/chest wall and left upper quadrant surgical clips with left upper quadrant presumed embolization coils again noted.                                       Scheduled Meds:   enoxparin  40 mg Subcutaneous Daily    furosemide (LASIX) injection  40 mg Intravenous BID    lactulose  10 g Oral TID    pantoprazole  40 mg Oral BID    spironolactone  100 mg Oral Daily    triamcinolone acetonide 0.5%   Topical (Top) BID     Continuous Infusions:  PRN Meds:  Current Facility-Administered Medications:     0.9%  NaCl infusion (for blood administration), , Intravenous, Q24H PRN    acetaminophen, 650 mg, Oral, Q4H PRN    dextrose 10%, 12.5 g, Intravenous, PRN    dextrose 10%, 25 g, Intravenous, PRN    glucagon (human recombinant), 1 mg, Intramuscular, PRN    glucose, 16 g, Oral, PRN    glucose, 24 g, Oral, PRN    naloxone, 0.02 mg,  Intravenous, PRN    polyethylene glycol, 17 g, Oral, BID PRN    sodium chloride 0.9%, 10 mL, Intravenous, Q12H PRN    ASSESSMENT/PLAN:   Ms. Shikha López is a 53 y.o. female with a relevant medical history of stage IV breast cancer (known lung mets, on Trastuzumab Deruxtecan q3 weks, most recent chemo 5/7/24; followed by Dr. Willis) s/p L radical mastectomy, s/p radiation therapy, HAWTHORNE cirrhosis (s/p TIPS 4/12 w/ outpatient IR appointment scheduled 5/16 for doppler w/ concern for decreased flow), GI bleeds w/ gastric and esophageal varices who is being followed up for Symptomatic anemia.    Active Hospital Problems    Diagnosis  POA    *Symptomatic anemia [D64.9]  Yes    S/P TIPS (transjugular intrahepatic portosystemic shunt) [Z95.828]  Not Applicable    Volume overload [E87.70]  Yes    Pancytopenia [D61.818]  Yes    Esophageal and gastric varices [I85.00, I86.4]  Yes    Lymphedema [I89.0]  Yes    Breast cancer, stage 4, left [C50.912]  Yes    Malignant neoplasm metastatic to left lung [C78.02]  Yes      Resolved Hospital Problems   No resolved problems to display.     Symptomatic anemia  Progressive dyspnea and fatigue likely 2/2 chemotherapy. Received 2u PRBCs in ED. Hemolysis labs not suggestive of hemolytic process.     Recent Labs   Lab 05/16/24  0808   WBC 3.44*   RBC 2.15*   HGB 7.2*   HCT 21.2*   PLT 58*   MCV 99*   MCH 33.5*   MCHC 34.0     - Serial CBC q 8 hrs  - Transfuse Hgb < 7, plts < 50  - F/u BC to r/o infection, NGTD     Volume overload  CXR w/ pulmonary edema. BNP 1032. CTA Chest with cardiomegaly. Hx of lymphedema, HAWTHORNE cirrhosis. Most recent TTE 4/24 w/ normal EF, no diastolic function.  - IV diuresis with furosemide 40 mg  - Repeat TTE    HAWTHORNE cirrhosis s/p TIPS  T. Bili 3.4. S/p TIPS 4/12 for GI bleed and hx of gastric and variceal varices. S/p gastric portal vein coiling and embolization. Recent liver U/S with concern for TIPS malfunction.   - Repeat US liver  - F/u IR outpt TIPs revision  vs inpt  - Ct lactulose, pantoprazole, spironolactone    Stage IV Breast Cancer  Follows with Dr. Willis.    Oncologic History:  Diagnosed September 2006 w/ poorly differentiated carcinoma which was ER and [place]. negative and HER2 positive.     She was then referred to [facility] for additional care.  CT scan of the chest and abdomen November 2006 revealed multiple nodules in the lungs consistent with metastatic disease.       She was treated with chemotherapy with weekly Herceptin and Abraxane with improvement in her breast and lung metastasis.     On May 29, 2007 she underwent left modified radical mastectomy(Dr. Colvin) which showed no residual tumor in the breast and 1/5 nodes was positive for metastasis measuring 6 mm.  (ypT0N1).  She then received postoperative radiation therapy(Dr Singh)  to the left chest wall supraclav and internal mammary lymph nodes from August 20, 2007 to September 28, 2007.   Postoperatively she continued on Herceptin for approximately 3 and 1/2 years before her lung metastasis begin to grow.     She subsequently received a number of different chemotherapy treatments including Adriamycin, Halaven, Xeloda plus lapatinib then Xeloda plus Herceptin. (  in [facility].)     Subsequently, she transferred her care to  at [facility].  -She was initially treated with Taxotere Herceptin Perjeta.    -She was then changed to Kadcyla.     In July 2020 PET scan showed progression.  She then took approximately 9 months of Herceptin and Navelbine with initial response then progression.     She started trastuzumab- deruxtecan  4/12/21.    - F/u Dr. Willis:   -rescan after diuresis and if not resolved would start steroids - pred 60/day.  - F/u med onc to verify plan.    VTE Risk Mitigation (From admission, onward)           Ordered     enoxaparin injection 40 mg  Daily         05/15/24 2303     IP VTE HIGH RISK PATIENT  Once         05/15/24 2303     Place sequential  compression device  Until discontinued         05/15/24 4008                    Art Welsh, MS4  [facility]

## 2024-05-16 NOTE — ASSESSMENT & PLAN NOTE
This patient is found to have pancytopenia, the likely etiology is Drug induced (including chemotherapy) related to breast cancer , will monitor CBC Every 8 hours. Will transfuse red blood cells if the hemoglobin is <7g/dL (or <8 in the setting of ACS). Will transfuse platelets if platelet count is <50k (if undergoing surgical procedure or have active bleeding). Hold DVT prophylaxis if platelets are <50k. The patient's hemoglobin, white blood cell count, and platelet count results have been reviewed and are listed below.  Recent Labs   Lab 05/15/24  2042   HGB 4.9*   WBC 2.97*   PLT 59*     -- S/p 2 units PRBC in the ED  -- Maintain up to date type and screen  -- Trend CBC q8h to ensure stability  -- Transfuse for Hgb < 7, plts < 50  -- Follow-up hemolysis labs

## 2024-05-16 NOTE — NURSING
Patient arrived from the ED on telemetry.  Patient was able to stand for a weight.  Patient currently had a blood infusion going on unit #1.  Patient placed in bed and admission process started.

## 2024-05-16 NOTE — FIRST PROVIDER EVALUATION
Emergency Department TeleTriage Encounter Note      CHIEF COMPLAINT    Chief Complaint   Patient presents with    Weakness     States she can't walk more than three feet.        VITAL SIGNS   Initial Vitals [05/15/24 1853]   BP Pulse Resp Temp SpO2   (!) 114/56 100 17 99 °F (37.2 °C) 99 %      MAP       --            ALLERGIES    Review of patient's allergies indicates:  No Known Allergies    PROVIDER TRIAGE NOTE  54 yo F to the ED with 3 days of weakness and generalized malaise.  Patient also reports KAPADIA and shortness a breath.  Vital signs are reassuring on exam.  No distress noted, afebrile      ORDERS  Labs Reviewed - No data to display    ED Orders (720h ago, onward)      None              Virtual Visit Note: The provider triage portion of this emergency department evaluation and documentation was performed via [employer], a HIPAA-compliant telemedicine application, in concert with a tele-presenter in the room. A face to face patient evaluation with one of my colleagues will occur once the patient is placed in an emergency department room.      DISCLAIMER: This note was prepared with [employer] voice recognition transcription software. Garbled syntax, mangled pronouns, and other bizarre constructions may be attributed to that software system.

## 2024-05-16 NOTE — ASSESSMENT & PLAN NOTE
Patient's anemia is currently uncontrolled. Has received 2 units of PRBCs on 5/15 . Etiology likely d/t drug toxicity from systemic chemotherapy  Current CBC reviewed-   Lab Results   Component Value Date    HGB 4.9 (LL) 05/15/2024    HCT 13.8 (LL) 05/15/2024     Monitor serial CBC and transfuse if patient becomes hemodynamically unstable, symptomatic or H/H drops below 7/21.    -- See 'pancytopenia' for plan

## 2024-05-16 NOTE — NURSING
Nurses Note -- 4 Eyes      5/16/2024   4:07 AM      Skin assessed during: Admit      [] No Altered Skin Integrity Present    []Prevention Measures Documented      [x] Yes- Altered Skin Integrity Present or Discovered   [x] LDA Added if Not in Epic (Describe Wound)   [] New Altered Skin Integrity was Present on Admit and Documented in LDA   [x] Wound Image Taken in ER.    Rash on left leg.    Wound Care Consulted? No    Attending Nurse:  Krissy Cardoso RN/Staff Member:   Iva

## 2024-05-16 NOTE — PLAN OF CARE
Problem: Adult Inpatient Plan of Care  Goal: Plan of Care Review  Outcome: Progressing  Goal: Patient-Specific Goal (Individualized)  Outcome: Progressing  Goal: Absence of Hospital-Acquired Illness or Injury  Outcome: Progressing  Goal: Optimal Comfort and Wellbeing  Outcome: Progressing  Intervention: Provide Person-Centered Care  Flowsheets (Taken 5/16/2024 1741)  Trust Relationship/Rapport:   care explained   empathic listening provided   questions answered  Goal: Readiness for Transition of Care  Outcome: Progressing     Problem: Infection  Goal: Absence of Infection Signs and Symptoms  Outcome: Progressing     Problem: Wound  Goal: Optimal Coping  Outcome: Progressing  Intervention: Support Patient and Family Response  Flowsheets (Taken 5/16/2024 9382)  Supportive Measures: active listening utilized  Goal: Optimal Functional Ability  Outcome: Progressing  Goal: Absence of Infection Signs and Symptoms  Outcome: Progressing  Goal: Improved Oral Intake  Outcome: Progressing  Goal: Optimal Pain Control and Function  Outcome: Progressing  Goal: Skin Health and Integrity  Outcome: Progressing  Intervention: Optimize Skin Protection  Flowsheets (Taken 5/16/2024 6023)  Pressure Reduction Techniques: frequent weight shift encouraged  Activity Management: Ambulated in room - L4  Head of Bed (HOB) Positioning: HOB at 20 degrees  Goal: Optimal Wound Healing  Outcome: Progressing

## 2024-05-16 NOTE — HPI
The patient is a 53 year old female with a history of stage IV breast cancer (known lung mets, on Trastuzumab Deruxtecan q3 weks, most recent chemo 5/7/24; followed by Dr. Willis), HAWTHORNE cirrhosis (s/p TIPS 4/12 w/ outpatient IR appointment scheduled 5/16 for doppler w/ concern for decreased flow), GI bleeds w/ gastric and esophageal varices, presenting with two days of progressive dyspnea and fatigue. She reports decreased appetite and diarrhea (non-bloody). Of note she had recent hospital admission 4/21-4/26 for lower extremity cellulitis, with staph bacteremia now s/p linezolid course. She denies hemoptysis, hematemesis, nausea/vomiting, constipation, bloody bowel movements, cough, or chest pain.    ED evaluation significant for hemodynamic stability, pulse 92, RR 21, Tmax 99.0, saturating 99% on room air. WBC 2.29, Hgb 4.9, plts 59, Na 136, K 3.7, Bicarb 24, Cr 0.7, glucose 115, Mag 1.7, alk phos 171, albumin 2.5, Bili 3.4 (stable), BNP 1032, trop 0.134, TSH 3.9, flu/covid negative, lactate 1.59. FOBT testing negative in the ED. CXR consistent w/ pulmonary edema. ECG with sinus rhythm. ED discussed case with oncology who recommended admission to hospital medicine given elevated troponin, BNP.

## 2024-05-16 NOTE — SUBJECTIVE & OBJECTIVE
Past Medical History:   Diagnosis Date    Aortic atherosclerosis 07/06/2023    Breast cancer     Esophageal and gastric varices 06/23/2023    Malignant neoplasm metastatic to left lung 06/08/2021       Past Surgical History:   Procedure Laterality Date    ENDOSCOPIC ULTRASOUND OF UPPER GASTROINTESTINAL TRACT N/A 6/27/2023    Procedure: ULTRASOUND, UPPER GI TRACT, ENDOSCOPIC;  Surgeon: Home Lopez MD;  Location: [facility] (2ND FLR);  Service: Endoscopy;  Laterality: N/A;    ENDOSCOPIC ULTRASOUND OF UPPER GASTROINTESTINAL TRACT N/A 1/12/2024    Procedure: ULTRASOUND, UPPER GI TRACT, ENDOSCOPIC;  Surgeon: Home Lopez MD;  Location: [facility] (2ND FLR);  Service: Endoscopy;  Laterality: N/A;  11/28/23-Instructions via portal-DS  1/8-lvm for precall-MS  1/10-precall complete-Kpvt    ESOPHAGOGASTRODUODENOSCOPY N/A 6/23/2023    Procedure: EGD (ESOPHAGOGASTRODUODENOSCOPY);  Surgeon: Quintin Mooney MD;  Location: [facility] (2ND FLR);  Service: Endoscopy;  Laterality: N/A;    ESOPHAGOGASTRODUODENOSCOPY N/A 6/27/2023    Procedure: EGD (ESOPHAGOGASTRODUODENOSCOPY);  Surgeon: Home Lopez MD;  Location: [facility] (2ND FLR);  Service: Endoscopy;  Laterality: N/A;    ESOPHAGOGASTRODUODENOSCOPY N/A 8/3/2023    Procedure: EGD (ESOPHAGOGASTRODUODENOSCOPY);  Surgeon: Home Lopez MD;  Location: [facility] (2ND FLR);  Service: Endoscopy;  Laterality: N/A;  instr portal-labs 6/28/23-tb    ESOPHAGOGASTRODUODENOSCOPY N/A 1/12/2024    Procedure: EGD (ESOPHAGOGASTRODUODENOSCOPY);  Surgeon: Home Lopez MD;  Location: [facility] (2ND FLR);  Service: Endoscopy;  Laterality: N/A;    ESOPHAGOGASTRODUODENOSCOPY N/A 4/10/2024    Procedure: EGD (ESOPHAGOGASTRODUODENOSCOPY);  Surgeon: Ze Anderson MD;  Location: [facility] (2ND FLR);  Service: Endoscopy;  Laterality: N/A;    MASTECTOMY         Review of patient's allergies indicates:  No Known Allergies    No current facility-administered medications on file prior to  encounter.     Current Outpatient Medications on File Prior to Encounter   Medication Sig    ferrous sulfate (FEROSUL) 325 mg (65 mg iron) Tab tablet Take 1 tablet by mouth daily with Vitamin C on an empty stomach    furosemide (LASIX) 40 MG tablet Take 1 tablet (40 mg total) by mouth once daily.    hydrOXYzine HCL (ATARAX) 25 MG tablet Take 0.5 tablets (12.5 mg total) by mouth 3 (three) times daily as needed for Anxiety.    lactulose (CHRONULAC) 10 gram/15 mL solution Take 15 mLs (10 g total) by mouth 3 (three) times daily. PLEASE take as needed if you start feeling confused or altered, take as need to have around 2-3 bowel movements per day    lactulose (CHRONULAC) 10 gram/15 mL solution Take 15 mLs (10 g total) by mouth 3 (three) times daily.    linezolid (ZYVOX) 600 mg Tab Take 1 tablet (600 mg total) by mouth every 12 (twelve) hours.    methylphenidate HCl (RITALIN) 5 MG tablet Take 1 tablet (5 mg total) by mouth 2 (two) times daily.    pantoprazole (PROTONIX) 40 MG tablet Take 1 tablet (40 mg total) by mouth 2 (two) times daily.    spironolactone (ALDACTONE) 100 MG tablet Take 1 tablet (100 mg total) by mouth once daily.    triamcinolone acetonide 0.5% (KENALOG) 0.5 % Crea Apply topically 2 (two) times daily.    [DISCONTINUED] OLANZapine (ZYPREXA) 5 MG tablet Take days 1-4 of chemotherapy     Family History       Problem Relation (Age of Onset)    Lung cancer Father          Tobacco Use    Smoking status: Never    Smokeless tobacco: Never   Substance and Sexual Activity    Alcohol use: Never    Drug use: Never    Sexual activity: Not Currently     Review of Systems   Constitutional:  Positive for activity change and fatigue. Negative for chills and fever.   HENT:  Negative for congestion and sore throat.    Eyes:  Negative for visual disturbance.   Respiratory:  Positive for shortness of breath. Negative for cough and wheezing.    Cardiovascular:  Positive for leg swelling. Negative for chest pain and  palpitations.   Gastrointestinal:  Positive for diarrhea. Negative for abdominal pain, nausea and vomiting.   Endocrine: Negative for polyuria.   Genitourinary:  Negative for dysuria, flank pain and frequency.   Musculoskeletal:  Negative for arthralgias, back pain and myalgias.   Skin:  Positive for color change and wound. Negative for pallor and rash.   Neurological:  Negative for light-headedness and headaches.   Psychiatric/Behavioral:  Negative for agitation. The patient is not nervous/anxious.      Objective:     Vital Signs (Most Recent):  Temp: 99 °F (37.2 °C) (05/15/24 1853)  Pulse: 92 (05/15/24 2245)  Resp: 20 (05/15/24 2245)  BP: (!) 112/54 (05/15/24 2245)  SpO2: 97 % (05/15/24 2245) Vital Signs (24h Range):  Temp:  [99 °F (37.2 °C)] 99 °F (37.2 °C)  Pulse:  [] 92  Resp:  [17-21] 20  SpO2:  [95 %-99 %] 97 %  BP: (112-125)/(54-60) 112/54     Weight: 86.3 kg (190 lb 4.6 oz)  Body mass index is 34.8 kg/m².     Physical Exam  Constitutional:       General: She is not in acute distress.  HENT:      Head: Normocephalic and atraumatic.      Right Ear: External ear normal.      Left Ear: External ear normal.      Mouth/Throat:      Mouth: Mucous membranes are moist.      Pharynx: Oropharynx is clear.   Eyes:      General: No scleral icterus.     Extraocular Movements: Extraocular movements intact.      Conjunctiva/sclera: Conjunctivae normal.   Cardiovascular:      Rate and Rhythm: Normal rate and regular rhythm.      Heart sounds: Murmur (4/6 systolic) heard.   Pulmonary:      Effort: Pulmonary effort is normal. No respiratory distress.      Breath sounds: Normal breath sounds. No wheezing or rales.   Abdominal:      General: Abdomen is flat. There is no distension.      Palpations: Abdomen is soft.      Tenderness: There is no abdominal tenderness.   Musculoskeletal:      Cervical back: No rigidity.      Right lower leg: Edema present.      Left lower leg: Edema present.      Comments: 3+ bilateral lower  extremity edema   Lymphadenopathy:      Cervical: No cervical adenopathy.   Skin:     General: Skin is warm and dry.      Coloration: Skin is not jaundiced.      Findings: Rash (Left lower extremity, erythema) present.   Neurological:      General: No focal deficit present.      Mental Status: She is alert and oriented to person, place, and time.   Psychiatric:         Mood and Affect: Mood normal.         Behavior: Behavior normal.          Significant Labs: All pertinent labs within the past 24 hours have been reviewed.  CBC:   Recent Labs   Lab 05/15/24  2042   WBC 2.97*   HGB 4.9*   HCT 13.8*   PLT 59*     CMP:   Recent Labs   Lab 05/15/24  2042      K 3.7      CO2 24   *   BUN 12   CREATININE 0.7   CALCIUM 8.4*   PROT 6.1   ALBUMIN 2.5*   BILITOT 3.4*   ALKPHOS 171*   AST 26   ALT 13   ANIONGAP 9       Significant Imaging: I have reviewed all pertinent imaging results/findings within the past 24 hours.

## 2024-05-16 NOTE — ED PROVIDER NOTES
"Encounter Date: 5/15/2024       History     Chief Complaint   Patient presents with    Weakness     States she can't walk more than three feet.      The history is provided by the patient and medical records. No  was used.     Shikha López is a 53 y.o. 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     Reports worsening generalized weakness, lightheadedness, SOB for the past 2 days. Feels like she can only walk 3 feet. Reports associated decreased appetite. Reports "feeling her heart beat in her ears" when she feels really short of breath. Reports having loose, non-bloody stools. She is not on blood thinners. Last chemotherapy was 1 week ago. She was admitted last month for lower extremity cellulitis and bacteremia. She has finished her ABx therapy. Reports having more lower extremity swelling than normal. Reports having chills last night but has not had a fever. No chest pain, abdominal pain, vomiting, urinary or bowel movement changes. Denies melena or hematochezia. No blood thinner use.       Review of patient's allergies indicates:  No Known Allergies  Past Medical History:   Diagnosis Date    Aortic atherosclerosis 07/06/2023    Breast cancer     Esophageal and gastric varices 06/23/2023    Malignant neoplasm metastatic to left lung 06/08/2021     Past Surgical History:   Procedure Laterality Date    ENDOSCOPIC ULTRASOUND OF UPPER GASTROINTESTINAL TRACT N/A 6/27/2023    Procedure: ULTRASOUND, UPPER GI TRACT, ENDOSCOPIC;  Surgeon: Home Lopez MD;  Location: [facility] ([address] [place] [address]);  Service: Endoscopy;  Laterality: N/A;    ENDOSCOPIC ULTRASOUND OF UPPER GASTROINTESTINAL TRACT N/A 1/12/2024    Procedure: ULTRASOUND, UPPER GI TRACT, ENDOSCOPIC;  Surgeon: Home Lopez MD;  Location: [facility] ([address] [place] [address]);  Service: Endoscopy;  " Laterality: N/A;  11/28/23-Instructions via portal-DS  1/8-lvm for precall-MS  1/10-precall complete-Kpvt    ESOPHAGOGASTRODUODENOSCOPY N/A 6/23/2023    Procedure: EGD (ESOPHAGOGASTRODUODENOSCOPY);  Surgeon: Quintin Mooney MD;  Location: [facility] (2ND FLR);  Service: Endoscopy;  Laterality: N/A;    ESOPHAGOGASTRODUODENOSCOPY N/A 6/27/2023    Procedure: EGD (ESOPHAGOGASTRODUODENOSCOPY);  Surgeon: Home Lopez MD;  Location: [facility] (2ND FLR);  Service: Endoscopy;  Laterality: N/A;    ESOPHAGOGASTRODUODENOSCOPY N/A 8/3/2023    Procedure: EGD (ESOPHAGOGASTRODUODENOSCOPY);  Surgeon: Home Lopez MD;  Location: [facility] (2ND FLR);  Service: Endoscopy;  Laterality: N/A;  instr portal-labs 6/28/23-tb    ESOPHAGOGASTRODUODENOSCOPY N/A 1/12/2024    Procedure: EGD (ESOPHAGOGASTRODUODENOSCOPY);  Surgeon: Home Lopez MD;  Location: [facility] (2ND FLR);  Service: Endoscopy;  Laterality: N/A;    ESOPHAGOGASTRODUODENOSCOPY N/A 4/10/2024    Procedure: EGD (ESOPHAGOGASTRODUODENOSCOPY);  Surgeon: Ze Anderson MD;  Location: [facility] (2ND FLR);  Service: Endoscopy;  Laterality: N/A;    MASTECTOMY       Family History   Problem Relation Name Age of Onset    Lung cancer Father       Social History     Tobacco Use    Smoking status: Never    Smokeless tobacco: Never   Substance Use Topics    Alcohol use: Never    Drug use: Never     Review of Systems   Respiratory:  Positive for shortness of breath.    Cardiovascular:  Positive for leg swelling.   Allergic/Immunologic: Positive for immunocompromised state.       Physical Exam     Initial Vitals [05/15/24 1853]   BP Pulse Resp Temp SpO2   (!) 114/56 100 17 99 °F (37.2 °C) 99 %      MAP       --         Physical Exam    Constitutional: She appears ill.   HENT:   Head: Normocephalic and atraumatic.   Eyes: EOM are normal. Pupils are equal, round, and reactive to light.   Neck: Neck supple.   Normal range of motion.  Cardiovascular:  Regular rhythm.   Tachycardia  present.         Port R upper chest  +2 pitting edema BLE   Pulmonary/Chest: Tachypnea noted. She has no wheezes. She has no rales.   Abdominal: Abdomen is soft. She exhibits no distension. There is no abdominal tenderness. There is no rebound.   Genitourinary:    Genitourinary Comments: Rectal: No visible stool on gloved finger. No melena or hematochezia. FOBT negative     Musculoskeletal:         General: No tenderness. Normal range of motion.      Cervical back: Normal range of motion and neck supple.     Neurological: She is alert and oriented to person, place, and time.   Skin: Skin is warm and dry.       ED Course   Critical Care    Date/Time: 5/15/2024 10:05 PM    Performed by: Dawson Webb PA-C  Authorized by: Ethan Morris MD  Direct patient critical care time: 15 minutes  Additional history critical care time: 6 minutes  Ordering / reviewing critical care time: 5 minutes  Documentation critical care time: 6 minutes  Total critical care time (exclusive of procedural time) : 32 minutes  Critical care was necessary to treat or prevent imminent or life-threatening deterioration of the following conditions: circulatory failure.        Labs Reviewed   CBC W/ AUTO DIFFERENTIAL - Abnormal; Notable for the following components:       Result Value    WBC 2.97 (*)     RBC 1.29 (*)     Hemoglobin 4.9 (*)     Hematocrit 13.8 (*)      (*)     MCH 38.0 (*)     RDW 24.2 (*)     Platelets 59 (*)     Immature Granulocytes 1.3 (*)     Lymph # 0.4 (*)     Mono # 0.2 (*)     Gran % 78.8 (*)     Lymph % 13.5 (*)     Platelet Estimate Decreased (*)     All other components within normal limits    Narrative:     Add on MG and TSH per Dr. Morris @ 20:44 pm to order # [id number]  hgb&hct critical result(s) called and verbal readback obtained from   Jeovany Garcia RN by [facility] 05/15/2024 21:27   COMPREHENSIVE METABOLIC PANEL - Abnormal; Notable for the following components:    Glucose 115 (*)     Calcium 8.4 (*)      Albumin 2.5 (*)     Total Bilirubin 3.4 (*)     Alkaline Phosphatase 171 (*)     All other components within normal limits    Narrative:     Add on MG and TSH per Dr. Morris @ 20:44 pm to order # [id number]   B-TYPE NATRIURETIC PEPTIDE - Abnormal; Notable for the following components:    BNP 1,032 (*)     All other components within normal limits    Narrative:     Add on MG and TSH per Dr. Morris @ 20:44 pm to order # [id number]   TROPONIN I - Abnormal; Notable for the following components:    Troponin I 0.134 (*)     All other components within normal limits    Narrative:     Add on MG and TSH per Dr. Morris @ 20:44 pm to order # [id number]   INFLUENZA A & B BY MOLECULAR   CULTURE, BLOOD   CULTURE, BLOOD   SARS-COV-2 RNA AMPLIFICATION, QUAL   MAGNESIUM   TSH   MAGNESIUM    Narrative:     Add on MG and TSH per Dr. Morris @ 20:44 pm to order # [id number]   TSH    Narrative:     Add on MG and TSH per Dr. Morris @ 20:44 pm to order # [id number]   URINALYSIS, REFLEX TO URINE CULTURE   TROPONIN I   TYPE & SCREEN   ISTAT LACTATE   PREPARE RBC SOFT     EKG Readings: (Independently Interpreted)   NSR 92 bpm. LVH criteria. No STEMI       Imaging Results              X-Ray Chest AP Portable (Final result)  Result time 05/15/24 20:50:55      Final result by Dawson Michelle MD (05/15/24 20:50:55)                   Impression:      Findings suggesting pulmonary edema/CHF pattern, without consolidative process.  Interstitial type pneumonia not excluded.    Grossly stable additional findings as above.      Electronically signed by: Dawson Michelle MD  Date:    05/15/2024  Time:    20:50               Narrative:    EXAMINATION:  XR CHEST AP PORTABLE    CLINICAL HISTORY:  Shortness of breath    TECHNIQUE:  Single frontal view of the chest was performed.    COMPARISON:  Chest radiograph 04/21/2024 and CT thorax 04/29/2024    FINDINGS:  Resolution is limited by body habitus with underpenetration.  Patient is slightly  rotated.    Right chest vascular port unchanged.  Cardiomediastinal silhouette is midline and prominent similar to prior.  Continued prominence of the central pulmonary vasculature with bilateral diffuse nonspecific interstitial coarsening and central peribronchial cuffing.  Hilar contours are grossly within normal limits.  Similar blunting of the left costophrenic angle with mild elevation of the left hemidiaphragm likely representing pleural thickening/scarring with superimposed platelike scarring versus atelectasis, better demonstrated on previous cross-sectional imaging.  No large consolidation or sizable pleural effusion on the right.  No pneumothorax.  Left axillary/chest wall and left upper quadrant surgical clips with left upper quadrant presumed embolization coils again noted.                                       Medications   0.9%  NaCl infusion (for blood administration) (has no administration in time range)   furosemide injection 40 mg (has no administration in time range)     Medical Decision Making  53 y.o. 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     DDx broad and includes but not limited to symptomatic anemia, hypervolemia, CHF, pneumonia, sepsis, pulmonary embolism, chemotherapy side effect.     Amount and/or Complexity of Data Reviewed  Labs: ordered. Decision-making details documented in ED Course.  Radiology: ordered and independent interpretation performed.  ECG/medicine tests: ordered and independent interpretation performed.  Discussion of management or test interpretation with external provider(s):   Hem-onc    Risk  Decision regarding hospitalization.                 ED Course as of 05/15/24 2206   Wed May 15, 2024   2202 Hemoglobin(!!): 4.9  Significant HBG drop from 2 weeks ago. No  neutropenia. ARYAN performed with no visible blood or melena. FOBT negative. T&S and blood consent obtained. 2 units ordered for transfusion [BA]   2203 Troponin I(!): 0.134 [BA]   2203 BNP(!): 1,032  BNP and Trop elevated concerning for hypervolemia given lower extremity swelling and CXR findings. Lasix 40mg IV ordered.  [BA]   2203 SARS-CoV-2 RNA, Amplification, Qual: Negative [BA]   2203 Influenza A, Molecular: Negative [BA]   2203 Influenza B, Molecular: Negative [BA]   2203 Creatinine: 0.7 [BA]   2204 CTA chest ordered to rule out R heart strain and pulmonary embolism as patient is not currently anticoagulated.     I did discuss the patient with hem-onc as she is on chemotherapy with her last infusion a week ago. They are deferring to hospital medicine given her elevated cardiac markers and hypervolemic status.     I have signed out to my colleague at the end of my shift pending lab completion, CTA chest, and final disposition. I have discussed the care of this patient with my supervising physician.  [BA]      ED Course User Index  [BA] Dawson Webb PA-C                             Clinical Impression:  Final diagnoses:  [R06.02] SOB (shortness of breath)  [D64.9] Symptomatic anemia (Primary)  [E87.70] Hypervolemia, unspecified hypervolemia type  [C50.919] Malignant neoplasm of female breast, unspecified estrogen receptor status, unspecified laterality, unspecified site of breast          ED Disposition Condition    Admit Stable                Dawson Webb PA-C  05/15/24 5822

## 2024-05-16 NOTE — ASSESSMENT & PLAN NOTE
Follows with Dr. Willis currently.    Oncologic History:  Diagnosed September 2006 w/ poorly differentiated carcinoma which was ER and HI. negative and HER2 positive.     She was then referred to [facility] for additional care.  CT scan of the chest and abdomen November 2006 revealed multiple nodules in the lungs consistent with metastatic disease.       She was treated with chemotherapy with weekly Herceptin and Abraxane with improvement in her breast and lung metastasis.     On May 29, 2007 she underwent left modified radical mastectomy(Dr. Colvin) which showed no residual tumor in the breast and 1/5 nodes was positive for metastasis measuring 6 mm.  (ypT0N1).  She then received postoperative radiation therapy(Dr Singh)  to the left chest wall supraclav and internal mammary lymph nodes from August 20, 2007 to September 28, 2007.   Postoperatively she continued on Herceptin for approximately 3 and 1/2 years before her lung metastasis begin to grow.     She subsequently received a number of different chemotherapy treatments including Adriamycin, Halaven, Xeloda plus lapatinib then Xeloda plus Herceptin. (  in [facility].)     Subsequently, [place] to  [place].  -She was initially treated with Taxotere Herceptin Perjeta.    -She was then changed to Kadcyla.    In July 2020 PET scan showed progression.  She then took approximately 9 months of Herceptin and Navelbine with initial response then progression.     She started trastuzumab- deruxtecan  4/12/21.

## 2024-05-16 NOTE — PLAN OF CARE
Problem: Infection  Goal: Absence of Infection Signs and Symptoms  Outcome: Progressing  Intervention: Prevent or Manage Infection  Flowsheets (Taken 5/16/2024 1719)  Fever Reduction/Comfort Measures: fluid intake increased  Infection Management: aseptic technique maintained

## 2024-05-16 NOTE — ASSESSMENT & PLAN NOTE
TIPS placed 4/12 for acute GI bleeding and hx of gastric and esophageal varices.  Had gastric portal vein coiling and embolization.    5/10 Liver Doppler US with: Sluggish flow in the portal vein end of the TIPS and bidirectional flow in the left portal vein, which could represent TIPS malfunction.   Patient had outpatient IR appointment the day after admission scheduled (5/16) to evaluate for necessity of TIPs revision. Will obtain updated liver doppler, consider inpatient IR consult pending results vs outpatient follow-up.    -- Follow-up Liver US w/ Doppler  -- Continue Lactulose

## 2024-05-16 NOTE — PLAN OF CARE
[facility] - [facility] ([place])  Initial Discharge Assessment       Primary Care Provider: Lenore, Primary Doctor    Admission Diagnosis: Murmur, cardiac [R01.1]  SOB (shortness of breath) [R06.02]  Chest pain [R07.9]  Symptomatic anemia [D64.9]  Hypervolemia, unspecified hypervolemia type [E87.70]  Malignant neoplasm of female breast, unspecified estrogen receptor status, unspecified laterality, unspecified site of breast [C50.919]    Admission Date: 5/15/2024  Expected Discharge Date: 5/18/2024    Transition of Care Barriers: None    Payor: MEDICARE / Plan: MEDICARE PART A & B / Product Type: Government /     Extended Emergency Contact Information  Primary Emergency Contact: Samra Jaeger   [place]  Mobile Phone: [phone]  Relation: Daughter  Secondary Emergency Contact: Joyce Yeh  Mobile Phone: [phone]  Relation: Sister    Discharge Plan A: Home with family  Discharge Plan B: Home      [facility] [place] [address] [address]  [facility] [address]  Phone: [phone] Fax: [phone]      Initial Assessment (most recent)       Adult Discharge Assessment - 05/16/24 1408          Discharge Assessment    Assessment Type Discharge Planning Assessment     Confirmed/corrected address, phone number and insurance Yes     Confirmed Demographics Correct on Facesheet     Source of Information patient     Does patient/caregiver understand observation status Yes     Communicated PAT with patient/caregiver Date not available/Unable to determine     Reason For Admission [place], cardiac     People in Home spouse     Do you expect to return to your current living situation? Yes     Prior to hospitilization cognitive status: Alert/Oriented     Current cognitive status: Alert/Oriented     Walking or Climbing Stairs Difficulty no     Dressing/Bathing Difficulty no     Equipment Currently Used at Home none     Readmission within 30 days? No     Patient currently being followed by  outpatient case management? No     Do you take prescription medications? Yes     Do you have prescription coverage? Yes     Coverage Medicare-Medicare part A & B     Do you have any problems affording any of your prescribed medications? No     Is the patient taking medications as prescribed? yes     Who is going to help you get home at discharge? family     How do you get to doctors appointments? family or friend will provide     Are you on dialysis? No     Do you take coumadin? No     Discharge Plan A Home with family     Discharge Plan B Home     DME Needed Upon Discharge  none     Discharge Plan discussed with: Patient     Transition of Care Barriers None                   SW met with pt to discuss discharge planning.  Pt lives with sister and don't need assistance with ambulation and ADLs.  No dialysis.  Pt will have transportation and assistance from family at discharge.  Discharge Plan A and Plan B have been determined by review of patient's clinical status, future medical and therapeutic needs, and coverage/benefits for post-acute care in coordination with multidisciplinary team members.      Elizabeth Culver MSW, CSW

## 2024-05-16 NOTE — H&P
[facility] [address]  [facility] Medicine  History & Physical    Patient Name: Shikha López  MRN: [id number]  Patient Class: IP- Inpatient  Admission Date: 5/15/2024  Attending Physician: Betito Muhammad MD   Primary Care Provider: Lenore Primary Doctor    Patient information was obtained from patient, past medical records, and ER records.     Subjective:     Principal Problem:Symptomatic anemia    Chief Complaint:   Chief Complaint   Patient presents with    Weakness     States she can't walk more than three feet.         HPI: The patient is a 53 year old female with a history of stage IV breast cancer (known lung mets, on Trastuzumab Deruxtecan q3 weks, most recent chemo 5/7/24; followed by Dr. Willis), HAWTHORNE cirrhosis (s/p TIPS 4/12 w/ outpatient IR appointment scheduled 5/16 for doppler w/ concern for decreased flow), GI bleeds w/ gastric and esophageal varices, presenting with two days of progressive dyspnea and fatigue. She reports decreased appetite and diarrhea (non-bloody). Of note she had recent hospital admission 4/21-4/26 for lower extremity cellulitis, with staph bacteremia now s/p linezolid course. She denies hemoptysis, hematemesis, nausea/vomiting, constipation, bloody bowel movements, cough, or chest pain.    ED evaluation significant for hemodynamic stability, pulse 92, RR 21, Tmax 99.0, saturating 99% on room air. WBC 2.29, Hgb 4.9, plts 59, Na 136, K 3.7, Bicarb 24, Cr 0.7, glucose 115, Mag 1.7, alk phos 171, albumin 2.5, Bili 3.4 (stable), BNP 1032, trop 0.134, TSH 3.9, flu/covid negative, lactate 1.59. FOBT testing negative in the ED. CXR consistent w/ pulmonary edema. ECG with sinus rhythm. ED discussed case with oncology who recommended admission to hospital medicine given elevated troponin, BNP.    Past Medical History:   Diagnosis Date    Aortic atherosclerosis 07/06/2023    Breast cancer     Esophageal and gastric varices 06/23/2023    Malignant neoplasm metastatic to left  lung 06/08/2021       Past Surgical History:   Procedure Laterality Date    ENDOSCOPIC ULTRASOUND OF UPPER GASTROINTESTINAL TRACT N/A 6/27/2023    Procedure: ULTRASOUND, UPPER GI TRACT, ENDOSCOPIC;  Surgeon: Home Lopez MD;  Location: [facility] (2ND FLR);  Service: Endoscopy;  Laterality: N/A;    ENDOSCOPIC ULTRASOUND OF UPPER GASTROINTESTINAL TRACT N/A 1/12/2024    Procedure: ULTRASOUND, UPPER GI TRACT, ENDOSCOPIC;  Surgeon: Home Lopez MD;  Location: [facility] (2ND FLR);  Service: Endoscopy;  Laterality: N/A;  11/28/23-Instructions via portal-DS  1/8-lvm for precall-MS  1/10-precall complete-Kpvt    ESOPHAGOGASTRODUODENOSCOPY N/A 6/23/2023    Procedure: EGD (ESOPHAGOGASTRODUODENOSCOPY);  Surgeon: Quintin Mooney MD;  Location: [facility] (2ND FLR);  Service: Endoscopy;  Laterality: N/A;    ESOPHAGOGASTRODUODENOSCOPY N/A 6/27/2023    Procedure: EGD (ESOPHAGOGASTRODUODENOSCOPY);  Surgeon: Home Lopez MD;  Location: [facility] (2ND FLR);  Service: Endoscopy;  Laterality: N/A;    ESOPHAGOGASTRODUODENOSCOPY N/A 8/3/2023    Procedure: EGD (ESOPHAGOGASTRODUODENOSCOPY);  Surgeon: Home Lopez MD;  Location: [facility] (2ND FLR);  Service: Endoscopy;  Laterality: N/A;  instr portal-labs 6/28/23-tb    ESOPHAGOGASTRODUODENOSCOPY N/A 1/12/2024    Procedure: EGD (ESOPHAGOGASTRODUODENOSCOPY);  Surgeon: Home Lopez MD;  Location: [facility] ENDO (2ND FLR);  Service: Endoscopy;  Laterality: N/A;    ESOPHAGOGASTRODUODENOSCOPY N/A 4/10/2024    Procedure: EGD (ESOPHAGOGASTRODUODENOSCOPY);  Surgeon: Ze Anderson MD;  Location: [facility] ENDO (2ND FLR);  Service: Endoscopy;  Laterality: N/A;    MASTECTOMY         Review of patient's allergies indicates:  No Known Allergies    No current facility-administered medications on file prior to encounter.     Current Outpatient Medications on File Prior to Encounter   Medication Sig    ferrous sulfate (FEROSUL) 325 mg (65 mg iron) Tab tablet Take 1 tablet by mouth daily with  Vitamin C on an empty stomach    furosemide (LASIX) 40 MG tablet Take 1 tablet (40 mg total) by mouth once daily.    hydrOXYzine HCL (ATARAX) 25 MG tablet Take 0.5 tablets (12.5 mg total) by mouth 3 (three) times daily as needed for Anxiety.    lactulose (CHRONULAC) 10 gram/15 mL solution Take 15 mLs (10 g total) by mouth 3 (three) times daily. PLEASE take as needed if you start feeling confused or altered, take as need to have around 2-3 bowel movements per day    lactulose (CHRONULAC) 10 gram/15 mL solution Take 15 mLs (10 g total) by mouth 3 (three) times daily.    linezolid (ZYVOX) 600 mg Tab Take 1 tablet (600 mg total) by mouth every 12 (twelve) hours.    methylphenidate HCl (RITALIN) 5 MG tablet Take 1 tablet (5 mg total) by mouth 2 (two) times daily.    pantoprazole (PROTONIX) 40 MG tablet Take 1 tablet (40 mg total) by mouth 2 (two) times daily.    spironolactone (ALDACTONE) 100 MG tablet Take 1 tablet (100 mg total) by mouth once daily.    triamcinolone acetonide 0.5% (KENALOG) 0.5 % Crea Apply topically 2 (two) times daily.    [DISCONTINUED] OLANZapine (ZYPREXA) 5 MG tablet Take days 1-4 of chemotherapy     Family History       Problem Relation (Age of Onset)    Lung cancer Father          Tobacco Use    Smoking status: Never    Smokeless tobacco: Never   Substance and Sexual Activity    Alcohol use: Never    Drug use: Never    Sexual activity: Not Currently     Review of Systems   Constitutional:  Positive for activity change and fatigue. Negative for chills and fever.   HENT:  Negative for congestion and sore throat.    Eyes:  Negative for visual disturbance.   Respiratory:  Positive for shortness of breath. Negative for cough and wheezing.    Cardiovascular:  Positive for leg swelling. Negative for chest pain and palpitations.   Gastrointestinal:  Positive for diarrhea. Negative for abdominal pain, nausea and vomiting.   Endocrine: Negative for polyuria.   Genitourinary:  Negative for dysuria, flank  pain and frequency.   Musculoskeletal:  Negative for arthralgias, back pain and myalgias.   Skin:  Positive for color change and wound. Negative for pallor and rash.   Neurological:  Negative for light-headedness and headaches.   Psychiatric/Behavioral:  Negative for agitation. The patient is not nervous/anxious.      Objective:     Vital Signs (Most Recent):  Temp: 99 °F (37.2 °C) (05/15/24 1853)  Pulse: 92 (05/15/24 2245)  Resp: 20 (05/15/24 2245)  BP: (!) 112/54 (05/15/24 2245)  SpO2: 97 % (05/15/24 2245) Vital Signs (24h Range):  Temp:  [99 °F (37.2 °C)] 99 °F (37.2 °C)  Pulse:  [] 92  Resp:  [17-21] 20  SpO2:  [95 %-99 %] 97 %  BP: (112-125)/(54-60) 112/54     Weight: 86.3 kg (190 lb 4.6 oz)  Body mass index is 34.8 kg/m².     Physical Exam  Constitutional:       General: She is not in acute distress.  HENT:      Head: Normocephalic and atraumatic.      Right Ear: External ear normal.      Left Ear: External ear normal.      Mouth/Throat:      Mouth: Mucous membranes are moist.      Pharynx: Oropharynx is clear.   Eyes:      General: No scleral icterus.     Extraocular Movements: Extraocular movements intact.      Conjunctiva/sclera: Conjunctivae normal.   Cardiovascular:      Rate and Rhythm: Normal rate and regular rhythm.      Heart sounds: Murmur (4/6 systolic) heard.   Pulmonary:      Effort: Pulmonary effort is normal. No respiratory distress.      Breath sounds: Normal breath sounds. No wheezing or rales.   Abdominal:      General: Abdomen is flat. There is no distension.      Palpations: Abdomen is soft.      Tenderness: There is no abdominal tenderness.   Musculoskeletal:      Cervical back: No rigidity.      Right lower leg: Edema present.      Left lower leg: Edema present.      Comments: 3+ bilateral lower extremity edema   Lymphadenopathy:      Cervical: No cervical adenopathy.   Skin:     General: Skin is warm and dry.      Coloration: Skin is not jaundiced.      Findings: Rash (Left lower  extremity, erythema) present.   Neurological:      General: No focal deficit present.      Mental Status: She is alert and oriented to person, place, and time.   Psychiatric:         Mood and Affect: Mood normal.         Behavior: Behavior normal.          Significant Labs: All pertinent labs within the past 24 hours have been reviewed.  CBC:   Recent Labs   Lab 05/15/24  2042   WBC 2.97*   HGB 4.9*   HCT 13.8*   PLT 59*     CMP:   Recent Labs   Lab 05/15/24  2042      K 3.7      CO2 24   *   BUN 12   CREATININE 0.7   CALCIUM 8.4*   PROT 6.1   ALBUMIN 2.5*   BILITOT 3.4*   ALKPHOS 171*   AST 26   ALT 13   ANIONGAP 9       Significant Imaging: I have reviewed all pertinent imaging results/findings within the past 24 hours.  Assessment/Plan:     * Symptomatic anemia  Patient's anemia is currently uncontrolled. Has received 2 units of PRBCs on 5/15 . Etiology likely d/t drug toxicity from systemic chemotherapy  Current CBC reviewed-   Lab Results   Component Value Date    HGB 4.9 (LL) 05/15/2024    HCT 13.8 (LL) 05/15/2024     Monitor serial CBC and transfuse if patient becomes hemodynamically unstable, symptomatic or H/H drops below 7/21.    -- See 'pancytopenia' for plan    Pancytopenia  This patient is found to have pancytopenia, the likely etiology is Drug induced (including chemotherapy) related to breast cancer , will monitor CBC Every 8 hours. Will transfuse red blood cells if the hemoglobin is <7g/dL (or <8 in the setting of ACS). Will transfuse platelets if platelet count is <50k (if undergoing surgical procedure or have active bleeding). Hold DVT prophylaxis if platelets are <50k. The patient's hemoglobin, white blood cell count, and platelet count results have been reviewed and are listed below.  Recent Labs   Lab 05/15/24  2042   HGB 4.9*   WBC 2.97*   PLT 59*     -- S/p 2 units PRBC in the ED  -- Maintain up to date type and screen  -- Trend CBC q8h to ensure stability  -- Transfuse for  Hgb < 7, plts < 50  -- Follow-up hemolysis labs      Volume overload  Patient appears grossly fluid overloaded on admission w/ 3-4+ bilateral lower extremity edema, CXR with pulmonary congestion/edema, and BNP elevated to 1032. She has a history of lymphedema and HAWTHORNE cirrhosis (s/p TIPs) which would explain her lower extremity swelling, however unclear etiology of her pulmonary congestion. Most recent TTE reviewed from 4/24 which had 60-65% EF, no diastolic dysfunction. She had moderate AS at that time. Cardiac exam with harsh systolic murmur. Given unclear etiology of pulmonary congestion and possible worsening of murmur will obtain repeat TTE.    -- Continue IV diuresis 40 mg BID; goal net negative 1-2L daily  -- Monitor lytes while diuresing  -- Follow-up TTE    S/P TIPS (transjugular intrahepatic portosystemic shunt)  TIPS placed 4/12 for acute GI bleeding and hx of gastric and esophageal varices.  Had gastric portal vein coiling and embolization.    5/10 Liver Doppler US with: Sluggish flow in the portal vein end of the TIPS and bidirectional flow in the left portal vein, which could represent TIPS malfunction.   Patient had outpatient IR appointment the day after admission scheduled (5/16) to evaluate for necessity of TIPs revision. Will obtain updated liver doppler, consider inpatient IR consult pending results vs outpatient follow-up.    -- Follow-up Liver US w/ Doppler  -- Continue Lactulose    Breast cancer, stage 4, left  Follows with Dr. Willis currently.    Oncologic History:  Diagnosed September 2006 w/ poorly differentiated carcinoma which was ER and MA. negative and HER2 positive.     She was then referred to [facility] for additional care.  CT scan of the chest and abdomen November 2006 revealed multiple nodules in the lungs consistent with metastatic disease.       She was treated with chemotherapy with weekly Herceptin and Abraxane with improvement in her breast and lung metastasis.      On May 29, 2007 she underwent left modified radical mastectomy(Dr. Colvin) which showed no residual tumor in the breast and 1/5 nodes was positive for metastasis measuring 6 mm.  (ypT0N1).  She then received postoperative radiation therapy(Dr Singh)  to the left chest wall supraclav and internal mammary lymph nodes from August 20, 2007 to September 28, 2007.   Postoperatively she continued on Herceptin for approximately 3 and 1/2 years before her lung metastasis begin to grow.     She subsequently received a number of different chemotherapy treatments including Adriamycin, Halaven, Xeloda plus lapatinib then Xeloda plus Herceptin. (  in [facility].)     Subsequently, she transferred her care to  at [facility].  -She was initially treated with Taxotere Herceptin Perjeta.    -She was then changed to Kadcyla.    In July 2020 PET scan showed progression.  She then took approximately 9 months of Herceptin and Navelbine with initial response then progression.     She started trastuzumab- deruxtecan  4/12/21.    Esophageal and gastric varices  Noted history in the setting of anemia on admission. Patient denies hemetemesis, bloody bowel movements. FOBT testing negative on arrival in the ED.    -- Monitor BP and Hgb  -- Continue home PPI    Lymphedema  Patient follows with the lymphedema clinic w/ leg wraps and leg elevation. Increased lower extremity swelling on admission. Given associated pulmonary congestion on imaging concern for component of left-heart failure as well. Will evaluate as below.    -- Leg elevation as tolerated  -- Diuresis as below    Malignant neoplasm metastatic to left lung  See 'breast cancer'      VTE Risk Mitigation (From admission, onward)           Ordered     enoxaparin injection 40 mg  Daily         05/15/24 2303     IP VTE HIGH RISK PATIENT  Once         05/15/24 2303     Place sequential compression device  Until discontinued         05/15/24 2303                        Anthony Desai MD  Department of Hospital Medicine  [facility] - [facility] ([place])

## 2024-05-16 NOTE — ASSESSMENT & PLAN NOTE
Noted history in the setting of anemia on admission. Patient denies hemetemesis, bloody bowel movements. FOBT testing negative on arrival in the ED.    -- Monitor BP and Hgb  -- Continue home PPI

## 2024-05-16 NOTE — ED TRIAGE NOTES
Shikha López, a 53 y.o. female presents to the ED w/ complaint of SOB. Patient states SOB started 2 days ago. Felt like she can't walk more than 3 feet without being short of breath. Patient denies chest pain.     Triage note:  Chief Complaint   Patient presents with    Weakness     States she can't walk more than three feet.      Review of patient's allergies indicates:  No Known Allergies  Past Medical History:   Diagnosis Date    Aortic atherosclerosis 07/06/2023    Breast cancer     Esophageal and gastric varices 06/23/2023    Malignant neoplasm metastatic to left lung 06/08/2021

## 2024-05-16 NOTE — CONSULTS
[facility] - [facility] ([place] [address])  Wound Care    Patient Name:  Shikha López   MRN:  [id number]  Date: 5/16/2024  Diagnosis: Symptomatic anemia    History:     Past Medical History:   Diagnosis Date    Aortic atherosclerosis 07/06/2023    Breast cancer     Esophageal and gastric varices 06/23/2023    Malignant neoplasm metastatic to left lung 06/08/2021       Social History     Socioeconomic History    Marital status: Single    Number of children: 1   Tobacco Use    Smoking status: Never    Smokeless tobacco: Never   Substance and Sexual Activity    Alcohol use: Never    Drug use: Never    Sexual activity: Not Currently     Social Determinants of Health     Financial Resource Strain: High Risk (5/16/2024)    Overall Financial Resource Strain (CARDIA)     Difficulty of Paying Living Expenses: Very hard   Food Insecurity: No Food Insecurity (5/16/2024)    Hunger Vital Sign     Worried About Running Out of Food in the Last Year: Never true     Ran Out of Food in the Last Year: Never true   Recent Concern: Food Insecurity - Food Insecurity Present (4/11/2024)    Hunger Vital Sign     Worried About Running Out of Food in the Last Year: Often true     Ran Out of Food in the Last Year: Sometimes true   Transportation Needs: No Transportation Needs (5/16/2024)    TRANSPORTATION NEEDS     Transportation : No   Physical Activity: Inactive (4/11/2024)    Exercise Vital Sign     Days of Exercise per Week: 0 days     Minutes of Exercise per Session: 0 min   Stress: Stress Concern Present (5/16/2024)    [employer] [employer] - Occupational Stress Questionnaire     Feeling of Stress : To some extent   Housing Stability: Low Risk  (5/16/2024)    Housing Stability Vital Sign     Unable to Pay for Housing in the Last Year: No     Homeless in the Last Year: No       Precautions:     Allergies as of 05/15/2024    (No Known Allergies)       WOC Assessment Details/Treatment     Patient seen for wound care  consultation for LLE.   Reviewed chart for this encounter.   See Flow Sheet for findings.    Pt found sitting up on EOB, agreeable to care at this time. On assessment, LLE AMINTA, red w/ rash, however, no open wounds. Pt states she applies topical steroids ordered for her by her MD. No wound care needs at this time. Wound care will sign off.    Discussed POC with patient and primary nurse.   See EMR for orders & patient education.    Bedside nursing to continue care & monitoring.  Bedside nursing to maintain pressure injury prevention interventions.

## 2024-05-16 NOTE — ED NOTES
Telemetry Verification   Patient placed on Telemetry Box  Verified with War Room  Box # [id number]   Monitor Tech War room   Rate 90   Rhythm NSR

## 2024-05-16 NOTE — ASSESSMENT & PLAN NOTE
Patient follows with the lymphedema clinic w/ leg wraps and leg elevation. Increased lower extremity swelling on admission. Given associated pulmonary congestion on imaging concern for component of left-heart failure as well. Will evaluate as below.    -- Leg elevation as tolerated  -- Diuresis as below

## 2024-05-16 NOTE — ED NOTES
Nurses Note -- 4 Eyes      5/15/2024   10:16 PM      Skin assessed during: Daily Assessment      [] No Altered Skin Integrity Present    []Prevention Measures Documented      [x] Yes- Altered Skin Integrity Present or Discovered   [x] LDA Added if Not in Epic (Describe Wound)   [x] New Altered Skin Integrity was Present on Admit and Documented in LDA   [x] Wound Image Taken    Wound Care Consulted? No    Attending Nurse:  NAHUN Thayer    Second RN/Staff Member:   NAHUN Saravia

## 2024-05-16 NOTE — ASSESSMENT & PLAN NOTE
Patient appears grossly fluid overloaded on admission w/ 3-4+ bilateral lower extremity edema, CXR with pulmonary congestion/edema, and BNP elevated to 1032. She has a history of lymphedema and HAWTHORNE cirrhosis (s/p TIPs) which would explain her lower extremity swelling, however unclear etiology of her pulmonary congestion. Most recent TTE reviewed from 4/24 which had 60-65% EF, no diastolic dysfunction. She had moderate AS at that time. Cardiac exam with harsh systolic murmur. Given unclear etiology of pulmonary congestion and possible worsening of murmur will obtain repeat TTE.    -- Continue IV diuresis 40 mg BID; goal net negative 1-2L daily  -- Monitor lytes while diuresing  -- Follow-up TTE

## 2024-05-17 PROBLEM — D62 ACUTE BLOOD LOSS ANEMIA: Status: ACTIVE | Noted: 2024-05-17

## 2024-05-17 PROBLEM — R79.89 ELEVATED TROPONIN: Status: ACTIVE | Noted: 2024-05-17

## 2024-05-17 PROBLEM — J90 PLEURAL EFFUSION: Status: ACTIVE | Noted: 2024-05-17

## 2024-05-17 LAB
ALBUMIN SERPL BCP-MCNC: 2.4 G/DL (ref 3.5–5.2)
ALP SERPL-CCNC: 168 U/L (ref 55–135)
ALT SERPL W/O P-5'-P-CCNC: 11 U/L (ref 10–44)
ANION GAP SERPL CALC-SCNC: 11 MMOL/L (ref 8–16)
AST SERPL-CCNC: 25 U/L (ref 10–40)
BASOPHILS # BLD AUTO: 0.01 K/UL (ref 0–0.2)
BASOPHILS # BLD AUTO: 0.02 K/UL (ref 0–0.2)
BASOPHILS # BLD AUTO: 0.02 K/UL (ref 0–0.2)
BASOPHILS NFR BLD: 0.2 % (ref 0–1.9)
BASOPHILS NFR BLD: 0.3 % (ref 0–1.9)
BASOPHILS NFR BLD: 0.4 % (ref 0–1.9)
BILIRUB SERPL-MCNC: 3.8 MG/DL (ref 0.1–1)
BUN SERPL-MCNC: 14 MG/DL (ref 6–20)
CALCIUM SERPL-MCNC: 8.1 MG/DL (ref 8.7–10.5)
CHLORIDE SERPL-SCNC: 102 MMOL/L (ref 95–110)
CO2 SERPL-SCNC: 25 MMOL/L (ref 23–29)
CREAT SERPL-MCNC: 0.6 MG/DL (ref 0.5–1.4)
DIFFERENTIAL METHOD BLD: ABNORMAL
EOSINOPHIL # BLD AUTO: 0.1 K/UL (ref 0–0.5)
EOSINOPHIL NFR BLD: 1.9 % (ref 0–8)
EOSINOPHIL NFR BLD: 2.1 % (ref 0–8)
EOSINOPHIL NFR BLD: 2.5 % (ref 0–8)
ERYTHROCYTE [DISTWIDTH] IN BLOOD BY AUTOMATED COUNT: 25.4 % (ref 11.5–14.5)
ERYTHROCYTE [DISTWIDTH] IN BLOOD BY AUTOMATED COUNT: 25.9 % (ref 11.5–14.5)
ERYTHROCYTE [DISTWIDTH] IN BLOOD BY AUTOMATED COUNT: 26.3 % (ref 11.5–14.5)
EST. GFR  (NO RACE VARIABLE): >60 ML/MIN/1.73 M^2
GLUCOSE SERPL-MCNC: 101 MG/DL (ref 70–110)
HCT VFR BLD AUTO: 20.1 % (ref 37–48.5)
HCT VFR BLD AUTO: 21.1 % (ref 37–48.5)
HCT VFR BLD AUTO: 21.9 % (ref 37–48.5)
HGB BLD-MCNC: 7 G/DL (ref 12–16)
HGB BLD-MCNC: 7.2 G/DL (ref 12–16)
HGB BLD-MCNC: 7.3 G/DL (ref 12–16)
IMM GRANULOCYTES # BLD AUTO: 0.04 K/UL (ref 0–0.04)
IMM GRANULOCYTES # BLD AUTO: 0.05 K/UL (ref 0–0.04)
IMM GRANULOCYTES # BLD AUTO: 0.07 K/UL (ref 0–0.04)
IMM GRANULOCYTES NFR BLD AUTO: 0.8 % (ref 0–0.5)
IMM GRANULOCYTES NFR BLD AUTO: 0.9 % (ref 0–0.5)
IMM GRANULOCYTES NFR BLD AUTO: 1.2 % (ref 0–0.5)
LYMPHOCYTES # BLD AUTO: 0.5 K/UL (ref 1–4.8)
LYMPHOCYTES # BLD AUTO: 0.7 K/UL (ref 1–4.8)
LYMPHOCYTES # BLD AUTO: 0.7 K/UL (ref 1–4.8)
LYMPHOCYTES NFR BLD: 12.9 % (ref 18–48)
LYMPHOCYTES NFR BLD: 13.8 % (ref 18–48)
LYMPHOCYTES NFR BLD: 9.1 % (ref 18–48)
MAGNESIUM SERPL-MCNC: 1.7 MG/DL (ref 1.6–2.6)
MCH RBC QN AUTO: 32.4 PG (ref 27–31)
MCH RBC QN AUTO: 33.2 PG (ref 27–31)
MCH RBC QN AUTO: 33.5 PG (ref 27–31)
MCHC RBC AUTO-ENTMCNC: 32.9 G/DL (ref 32–36)
MCHC RBC AUTO-ENTMCNC: 34.6 G/DL (ref 32–36)
MCHC RBC AUTO-ENTMCNC: 34.8 G/DL (ref 32–36)
MCV RBC AUTO: 96 FL (ref 82–98)
MCV RBC AUTO: 96 FL (ref 82–98)
MCV RBC AUTO: 99 FL (ref 82–98)
MONOCYTES # BLD AUTO: 0.3 K/UL (ref 0.3–1)
MONOCYTES # BLD AUTO: 0.3 K/UL (ref 0.3–1)
MONOCYTES # BLD AUTO: 0.4 K/UL (ref 0.3–1)
MONOCYTES NFR BLD: 5.2 % (ref 4–15)
MONOCYTES NFR BLD: 6.1 % (ref 4–15)
MONOCYTES NFR BLD: 7.8 % (ref 4–15)
NEUTROPHILS # BLD AUTO: 4 K/UL (ref 1.8–7.7)
NEUTROPHILS # BLD AUTO: 4 K/UL (ref 1.8–7.7)
NEUTROPHILS # BLD AUTO: 4.7 K/UL (ref 1.8–7.7)
NEUTROPHILS NFR BLD: 75.2 % (ref 38–73)
NEUTROPHILS NFR BLD: 77.5 % (ref 38–73)
NEUTROPHILS NFR BLD: 82.1 % (ref 38–73)
NRBC BLD-RTO: 0 /100 WBC
OHS QRS DURATION: 96 MS
OHS QTC CALCULATION: 440 MS
PHOSPHATE SERPL-MCNC: 2.7 MG/DL (ref 2.7–4.5)
PLATELET # BLD AUTO: 118 K/UL (ref 150–450)
PLATELET # BLD AUTO: 74 K/UL (ref 150–450)
PLATELET # BLD AUTO: 96 K/UL (ref 150–450)
PMV BLD AUTO: 11.5 FL (ref 9.2–12.9)
PMV BLD AUTO: 11.7 FL (ref 9.2–12.9)
PMV BLD AUTO: 11.8 FL (ref 9.2–12.9)
POTASSIUM SERPL-SCNC: 3.5 MMOL/L (ref 3.5–5.1)
PROT SERPL-MCNC: 5.7 G/DL (ref 6–8.4)
RBC # BLD AUTO: 2.09 M/UL (ref 4–5.4)
RBC # BLD AUTO: 2.2 M/UL (ref 4–5.4)
RBC # BLD AUTO: 2.22 M/UL (ref 4–5.4)
SODIUM SERPL-SCNC: 138 MMOL/L (ref 136–145)
WBC # BLD AUTO: 5.11 K/UL (ref 3.9–12.7)
WBC # BLD AUTO: 5.37 K/UL (ref 3.9–12.7)
WBC # BLD AUTO: 5.72 K/UL (ref 3.9–12.7)

## 2024-05-17 PROCEDURE — 20600001 HC STEP DOWN PRIVATE ROOM

## 2024-05-17 PROCEDURE — 84100 ASSAY OF PHOSPHORUS: CPT

## 2024-05-17 PROCEDURE — 99223 1ST HOSP IP/OBS HIGH 75: CPT | Mod: ,,, | Performed by: PHYSICIAN ASSISTANT

## 2024-05-17 PROCEDURE — 36415 COLL VENOUS BLD VENIPUNCTURE: CPT | Mod: XB | Performed by: STUDENT IN AN ORGANIZED HEALTH CARE EDUCATION/TRAINING PROGRAM

## 2024-05-17 PROCEDURE — 63600175 PHARM REV CODE 636 W HCPCS

## 2024-05-17 PROCEDURE — 99223 1ST HOSP IP/OBS HIGH 75: CPT | Mod: GC,,, | Performed by: HOSPITALIST

## 2024-05-17 PROCEDURE — 83735 ASSAY OF MAGNESIUM: CPT

## 2024-05-17 PROCEDURE — 80053 COMPREHEN METABOLIC PANEL: CPT

## 2024-05-17 PROCEDURE — 85025 COMPLETE CBC W/AUTO DIFF WBC: CPT | Mod: 91 | Performed by: STUDENT IN AN ORGANIZED HEALTH CARE EDUCATION/TRAINING PROGRAM

## 2024-05-17 PROCEDURE — 85025 COMPLETE CBC W/AUTO DIFF WBC: CPT

## 2024-05-17 PROCEDURE — 63600175 PHARM REV CODE 636 W HCPCS: Performed by: STUDENT IN AN ORGANIZED HEALTH CARE EDUCATION/TRAINING PROGRAM

## 2024-05-17 PROCEDURE — 25000003 PHARM REV CODE 250

## 2024-05-17 PROCEDURE — 36415 COLL VENOUS BLD VENIPUNCTURE: CPT

## 2024-05-17 RX ORDER — ONDANSETRON HYDROCHLORIDE 2 MG/ML
4 INJECTION, SOLUTION INTRAVENOUS EVERY 6 HOURS PRN
Status: DISCONTINUED | OUTPATIENT
Start: 2024-05-17 | End: 2024-05-21 | Stop reason: HOSPADM

## 2024-05-17 RX ADMIN — FUROSEMIDE 40 MG: 10 INJECTION, SOLUTION INTRAVENOUS at 05:05

## 2024-05-17 RX ADMIN — PANTOPRAZOLE SODIUM 40 MG: 40 TABLET, DELAYED RELEASE ORAL at 09:05

## 2024-05-17 RX ADMIN — ONDANSETRON 4 MG: 2 INJECTION INTRAMUSCULAR; INTRAVENOUS at 10:05

## 2024-05-17 RX ADMIN — TRIAMCINOLONE ACETONIDE: 5 CREAM TOPICAL at 09:05

## 2024-05-17 RX ADMIN — FUROSEMIDE 40 MG: 10 INJECTION, SOLUTION INTRAVENOUS at 09:05

## 2024-05-17 RX ADMIN — ENOXAPARIN SODIUM 40 MG: 40 INJECTION SUBCUTANEOUS at 05:05

## 2024-05-17 RX ADMIN — SPIRONOLACTONE 100 MG: 50 TABLET ORAL at 09:05

## 2024-05-17 NOTE — CONSULTS
"Interventional Radiology  Consult Note    Consult Requested By: Clara Foley MD  Reason for Consult: Liver US with low velocities in TIPS shunt, possible dysfunction?    SUBJECTIVE:     Chief Complaint:  s/p TIPS with low velocities on US    History of Present Illness:  Shikha López is a 53 y.o. female with a PMHx of stage IV breast cancer with lung mets, HAWTHORNE cirrhosis c/b esophageal/gastric varices s/p TIPS placement and gastric variceal embolization on 4/12/24, and obesity  who presented to the ED on 5/15/24 for SOB and fatigue, was found to be acute on chronic anemia with Hgb 4.9 and possible acute on chronic diastolic heart failure (increased BLE edema, BNP 1032 and elevated troponin). Hospital course notable for transfusion of 2 u pRBC with improvement in Hgb to 7.3, diuresis for management of CHF, and heme onc consult due to concern that anemia is related to her chemo. Interventional Radiology has been consulted due to concern for TIPS dysfunction. Pt underwent liver US on 5/16 which revealed "Improved although persistently low velocities within the portal venous side of the tips shunt. Tips shunt is patent. Bidirectional flow within the left portal vein similar to prior exam. Bidirectional flow within the anterior branch of the right portal vein. Findings may represent mild tips shunt dysfunction". Pt denies symptoms of GIB including melena, hematochezia and hematemsis. FOBT on 5/15/24 was negative. She also denies confusion or concerns for hepatic encephalopathy. TB 3.4 (5/15) --> 5.4 (5/16) --> 3.8 (5/17). Suspect bump in TB on 5/16 is related to recent blood transfusion, and that TB remains elevated due to congestive hepatopathy. LFTs WNL. The pt is hemodynamically stable.     Review of Systems   Constitutional:  Positive for malaise/fatigue. Negative for chills, fever and weight loss.   Respiratory:  Positive for shortness of breath. Negative for cough.    Cardiovascular:  Positive for leg " swelling. Negative for chest pain.   Gastrointestinal:  Positive for diarrhea. Negative for abdominal pain, blood in stool, constipation, melena, nausea and vomiting.   Genitourinary:  Negative for hematuria.   Neurological:  Positive for weakness (generalized).       Scheduled Meds:   enoxparin  40 mg Subcutaneous Daily    furosemide (LASIX) injection  40 mg Intravenous BID    lactulose  10 g Oral TID    pantoprazole  40 mg Oral BID    spironolactone  100 mg Oral Daily    triamcinolone acetonide 0.5%   Topical (Top) BID     Continuous Infusions:  PRN Meds:  Current Facility-Administered Medications:     0.9%  NaCl infusion (for blood administration), , Intravenous, Q24H PRN    acetaminophen, 650 mg, Oral, Q4H PRN    dextrose 10%, 12.5 g, Intravenous, PRN    dextrose 10%, 25 g, Intravenous, PRN    glucagon (human recombinant), 1 mg, Intramuscular, PRN    glucose, 16 g, Oral, PRN    glucose, 24 g, Oral, PRN    naloxone, 0.02 mg, Intravenous, PRN    polyethylene glycol, 17 g, Oral, BID PRN    sodium chloride 0.9%, 10 mL, Intravenous, Q12H PRN    Review of patient's allergies indicates:  No Known Allergies    Past Medical History:   Diagnosis Date    Aortic atherosclerosis 07/06/2023    Breast cancer     Esophageal and gastric varices 06/23/2023    Malignant neoplasm metastatic to left lung 06/08/2021     Past Surgical History:   Procedure Laterality Date    ENDOSCOPIC ULTRASOUND OF UPPER GASTROINTESTINAL TRACT N/A 6/27/2023    Procedure: ULTRASOUND, UPPER GI TRACT, ENDOSCOPIC;  Surgeon: Home Lopez MD;  Location: [address]);  Service: Endoscopy;  Laterality: N/A;    ENDOSCOPIC ULTRASOUND OF UPPER GASTROINTESTINAL TRACT N/A 1/12/2024    Procedure: ULTRASOUND, UPPER GI TRACT, ENDOSCOPIC;  Surgeon: Home Lopez MD;  Location: [facility] ([address] [place] [address]);  Service: Endoscopy;  Laterality: N/A;  11/28/23-Instructions via portal-DS  1/8-lvm for precall-MS  1/10-precall complete-Kpvt    ESOPHAGOGASTRODUODENOSCOPY N/A  6/23/2023    Procedure: EGD (ESOPHAGOGASTRODUODENOSCOPY);  Surgeon: Quintin Mooney MD;  Location: [facility] ENDO (2ND FLR);  Service: Endoscopy;  Laterality: N/A;    ESOPHAGOGASTRODUODENOSCOPY N/A 6/27/2023    Procedure: EGD (ESOPHAGOGASTRODUODENOSCOPY);  Surgeon: Home Lopez MD;  Location: [facility] ENDO (2ND FLR);  Service: Endoscopy;  Laterality: N/A;    ESOPHAGOGASTRODUODENOSCOPY N/A 8/3/2023    Procedure: EGD (ESOPHAGOGASTRODUODENOSCOPY);  Surgeon: Home Lopez MD;  Location: [facility] ENDO (2ND FLR);  Service: Endoscopy;  Laterality: N/A;  instr portal-labs 6/28/23-tb    ESOPHAGOGASTRODUODENOSCOPY N/A 1/12/2024    Procedure: EGD (ESOPHAGOGASTRODUODENOSCOPY);  Surgeon: Home Lopez MD;  Location: [facility] ENDO (2ND FLR);  Service: Endoscopy;  Laterality: N/A;    ESOPHAGOGASTRODUODENOSCOPY N/A 4/10/2024    Procedure: EGD (ESOPHAGOGASTRODUODENOSCOPY);  Surgeon: Ze Anderson MD;  Location: [facility] ENDO (2ND FLR);  Service: Endoscopy;  Laterality: N/A;    MASTECTOMY       Family History   Problem Relation Name Age of Onset    Lung cancer Father       Social History     Tobacco Use    Smoking status: Never    Smokeless tobacco: Never   Substance Use Topics    Alcohol use: Never    Drug use: Never       OBJECTIVE:     Vital Signs (Most Recent)  Temp: 98 °F (36.7 °C) (05/17/24 0845)  Pulse: 96 (05/17/24 0845)  Resp: 17 (05/17/24 0845)  BP: 123/60 (05/17/24 0845)  SpO2: 95 % (05/17/24 0845)    Physical Exam:  Physical Exam  Vitals and nursing note reviewed.   Constitutional:       General: She is not in acute distress.     Appearance: She is obese. She is ill-appearing.   HENT:      Head: Normocephalic and atraumatic.      Right Ear: External ear normal.      Left Ear: External ear normal.   Eyes:      Extraocular Movements: Extraocular movements intact.      Conjunctiva/sclera: Conjunctivae normal.      Pupils: Pupils are equal, round, and reactive to light.   Cardiovascular:      Rate and Rhythm: Normal rate.    Pulmonary:      Effort: Pulmonary effort is normal. No respiratory distress.   Abdominal:      General: Abdomen is flat. There is no distension.   Musculoskeletal:      Right lower leg: Edema present.      Left lower leg: Edema present.   Skin:     General: Skin is warm and dry.      Coloration: Skin is not jaundiced.   Neurological:      General: No focal deficit present.      Mental Status: She is alert and oriented to person, place, and time.   Psychiatric:         Mood and Affect: Mood normal.         Behavior: Behavior normal.         Thought Content: Thought content normal.         Judgment: Judgment normal.         Laboratory  I have reviewed all pertinent lab results within the past 24 hours.  CBC:   Recent Labs   Lab 05/17/24  0841   WBC 5.72   RBC 2.20*   HGB 7.3*   HCT 21.1*   PLT 96*   MCV 96   MCH 33.2*   MCHC 34.6     BMP:   Recent Labs   Lab 05/17/24  0522         K 3.5      CO2 25   BUN 14   CREATININE 0.6   CALCIUM 8.1*   MG 1.7     CMP:   Recent Labs   Lab 05/17/24  0522      CALCIUM 8.1*   ALBUMIN 2.4*   PROT 5.7*      K 3.5   CO2 25      BUN 14   CREATININE 0.6   ALKPHOS 168*   ALT 11   AST 25   BILITOT 3.8*     LFTs:   Recent Labs   Lab 05/17/24  0522   ALT 11   AST 25   ALKPHOS 168*   BILITOT 3.8*   PROT 5.7*   ALBUMIN 2.4*     Coagulation:   Recent Labs   Lab 05/15/24  2357   LABPROT 15.6*   INR 1.5*     Microbiology Results (last 7 days)       Procedure Component Value Units Date/Time    Blood culture #1 **CANNOT BE ORDERED STAT** [[id number]] Collected: 05/15/24 2056    Order Status: Completed Specimen: Blood from Peripheral, Antecubital, Left Updated: 05/16/24 2312     Blood Culture, Routine No Growth to date      No Growth to date    Blood culture #2 **CANNOT BE ORDERED STAT** [[id number]] Collected: 05/15/24 2056    Order Status: Completed Specimen: Blood from Peripheral, Antecubital, Right Updated: 05/16/24 2312     Blood Culture, Routine No Growth  to date      No Growth to date    Influenza A & B by Molecular [[id number]] Collected: 05/15/24 2057    Order Status: Completed Specimen: Nasopharyngeal Swab Updated: 05/15/24 2141     Influenza A, Molecular Negative     Influenza B, Molecular Negative     Flu A & B Source Nasal swab            ASA/Mallampati  ASA: 3  Mallampati: 2    Imaging:  Recent imaging studies including  liver US  on 5/16/24 which was independently reviewed by Shyam Morales MD.     EXAMINATION:  US LIVER WITH DOPPLER     CLINICAL HISTORY:  Previous study w/ questionable TIPS function;     TECHNIQUE:  Ultrasound liver with Doppler.  Color and spectral Doppler were performed.     COMPARISON:  None.     FINDINGS:  The visualized portion of the pancreas is unremarkable.     The liver is normal in size measuring 14.0 cm.  The liver demonstrates heterogeneous echotexture.  No focal hepatic lesions are seen.     The gallbladder demonstrates multiple mobile gallstones.  The common duct is not dilated, measuring 3 mm.  The gallbladder wall is not thickened.  There is no sonographic Griffin sign.  No dilated intrahepatic radicles are seen.     The spleen is enlarged in size measuring 14.6 x 5.4 cm with a homogeneous echotexture.  A few assess Ree splenules.     There is no evidence of ascites.     TIPS SHUNT EVALUATION:     *Main Portal Vein velocity: 44(<30 cm/sec is abnormal)  *Flow direction in portal vein branches: Bidirectional  *Flow direction within the hepatic veins:Appropriate.  Within Shunt     *Shunt velocities within the shunt:  72, 141, and 115 cm/sec  *Visible narrowing of the shunt:Absent  *Decrease in intra shunt velocity of 50 cm/sec from previous exams: Absent  *Increase in velocity along the course of the shunt of more than 100 cm/sec: Absent  The main hepatic artery is patent. The umbilical vein is not patent.     Impression:     Heterogeneous liver suggestive of chronic liver disease.  No focal hepatic lesions.     Improved although  persistently low velocities within the portal venous side of the tips shunt.  Tips shunt is patent.  Bidirectional flow within the left portal vein similar to prior exam.  Bidirectional flow within the anterior branch of the right portal vein.  Findings may represent mild tips shunt dysfunction.  Continued follow-up is recommended.     Cholelithiasis.     Splenomegaly.        Electronically signed by:Kash Terry MD  Date:                                            05/16/2024  Time:                                           10:37    ASSESSMENT/PLAN:     Assessment:  53 y.o. female with a PMHx of stage IV breast cancer with lung mets, HAWTHORNE cirrhosis c/b esophageal/gastric varices s/p TIPS placement and gastric variceal embolization on 4/12/24, and obesity  who has been referred to IR for TIPS eval due to concern for TIPS dysfunction. Case reviewed with Dr. Morales. Per her liver US, she has good cardiac phasicity which implies adequate flow through the TIPS. Suspect the cause of the low velocities through the TIPS may be due to volume overload on the heart. No need for any IR intervention at this time as there is currently no concern for GIB (pt denies melena/hematochezia/hematemsis and FOBT negative). Recommend repeating liver US to reassess the TIPS in 1 month. Recommendations discussed with primary team and pt who verbalized understanding of the plan and would like to proceed.    Plan:  No need for IR intervention at this time- see above  Recommend repeating liver US to reassess TIPS in 1 month  Thank you for the consult. Please contact with questions via [employer] secure chat or spectra.    Clara Mckinnon PA-C   Interventional Radiology  Spectra: [phone]

## 2024-05-17 NOTE — HPI
Ms. Shikha López is a 53 y.o. female with metastatic breast cancer (patient of Dr. Willis on [facility], last received on 5/7/24) who is currently admitted with anemia and volume overload. She presented with two days of progressively worsening dyspnea with exertion. Patient has been afebrile with a low/normal WBC. CTA negative for PE but there was pulmonary edema vs pneumonitis. CXR revealed pulmonary edema and her BNP was elevated to >1000 so she has been treated with lasix. TTE yesterday revealed a normal EF but CVP was 8. Hg was 4.9 on admission, no signs of bleeding. She has received multiple blood transfusions.

## 2024-05-17 NOTE — ASSESSMENT & PLAN NOTE
Patient's anemia is currently uncontrolled. Has received 2 units of PRBCs on 5/15 . Etiology likely d/t drug toxicity from systemic chemotherapy  Current CBC reviewed-   Lab Results   Component Value Date    HGB 7.3 (L) 05/17/2024    HCT 21.1 (L) 05/17/2024     Monitor serial CBC and transfuse if patient becomes hemodynamically unstable, symptomatic or H/H drops below 7/21.    - See 'pancytopenia' for plan

## 2024-05-17 NOTE — SUBJECTIVE & OBJECTIVE
Interval History: Tachycardic to 100s with O2 sat dropping to 89% overnight on RA. Nausea and vomiting this morning. Shortness of breath improved.    Review of Systems   Respiratory:  Negative for cough and shortness of breath.    Cardiovascular:  Positive for leg swelling. Negative for chest pain.   Gastrointestinal:  Positive for nausea and vomiting. Negative for abdominal pain.     Objective:     Vital Signs (Most Recent):  Temp: 98 °F (36.7 °C) (05/17/24 0845)  Pulse: 97 (05/17/24 1121)  Resp: 17 (05/17/24 0845)  BP: 123/60 (05/17/24 0845)  SpO2: 95 % (05/17/24 0845) Vital Signs (24h Range):  Temp:  [97.8 °F (36.6 °C)-98.2 °F (36.8 °C)] 98 °F (36.7 °C)  Pulse:  [] 97  Resp:  [16-18] 17  SpO2:  [89 %-97 %] 95 %  BP: (118-129)/(57-60) 123/60     Weight: 82.9 kg (182 lb 12.2 oz)  Body mass index is 33.43 kg/m².    Intake/Output Summary (Last 24 hours) at 5/17/2024 1133  Last data filed at 5/17/2024 0636  Gross per 24 hour   Intake 350 ml   Output 2100 ml   Net -1750 ml         Physical Exam  Vitals and nursing note reviewed.   Constitutional:       General: She is not in acute distress.  HENT:      Head: Normocephalic and atraumatic.      Mouth/Throat:      Mouth: Mucous membranes are moist.   Eyes:      Extraocular Movements: Extraocular movements intact.      Conjunctiva/sclera: Conjunctivae normal.   Cardiovascular:      Rate and Rhythm: Normal rate and regular rhythm.      Heart sounds: Murmur (4/6 systolic) heard.   Pulmonary:      Effort: Pulmonary effort is normal. No respiratory distress.      Breath sounds: Normal breath sounds.   Abdominal:      General: Abdomen is flat. There is no distension.      Palpations: Abdomen is soft.      Tenderness: There is no abdominal tenderness.   Musculoskeletal:         General: Normal range of motion.      Right lower leg: Edema (1+) present.      Left lower leg: Edema (1+) present.   Skin:     General: Skin is warm and dry.      Findings: Rash (LLE erythema)  present.   Neurological:      General: No focal deficit present.      Mental Status: She is alert and oriented to person, place, and time.             Significant Labs: All pertinent labs within the past 24 hours have been reviewed.    Significant Imaging: I have reviewed all pertinent imaging results/findings within the past 24 hours.

## 2024-05-17 NOTE — ASSESSMENT & PLAN NOTE
Patient with pancytopenia on admission. Platelets and WBC improving, Hg remaining low. Has required blood transfusions. She is on Enhertu which she has been on for a while (s/p 49 cycles). Her cell counts on admission are lower than they have been while she has been on her treatment.     - Pancytopenia more likely to be due to her recent linezolid use rather than the Enhertu  - Myelosuppression from linezolid will improve with time  - CBC daily   - Transfuse if Hg <7, platelets <10, or platelets <50 and actively bleeding   - WBC improving

## 2024-05-17 NOTE — PLAN OF CARE
Problem: Adult Inpatient Plan of Care  Goal: Plan of Care Review  Outcome: Progressing  Goal: Patient-Specific Goal (Individualized)  Outcome: Progressing  Goal: Absence of Hospital-Acquired Illness or Injury  Outcome: Progressing  Goal: Optimal Comfort and Wellbeing  Outcome: Progressing  Intervention: Provide Person-Centered Care  Flowsheets (Taken 5/17/2024 0257)  Trust Relationship/Rapport:   care explained   empathic listening provided  Goal: Readiness for Transition of Care  Outcome: Progressing     Problem: Infection  Goal: Absence of Infection Signs and Symptoms  Outcome: Progressing     Problem: Wound  Goal: Optimal Coping  Outcome: Progressing  Intervention: Support Patient and Family Response  Flowsheets (Taken 5/17/2024 0257)  Supportive Measures: active listening utilized  Family/Support System Care:   caregiver stress acknowledged   self-care encouraged  Goal: Optimal Functional Ability  Outcome: Progressing  Goal: Absence of Infection Signs and Symptoms  Outcome: Progressing  Goal: Improved Oral Intake  Outcome: Progressing  Goal: Optimal Pain Control and Function  Outcome: Progressing  Goal: Skin Health and Integrity  Outcome: Progressing  Intervention: Optimize Skin Protection  Flowsheets (Taken 5/17/2024 0257)  Pressure Reduction Techniques: frequent weight shift encouraged  Skin Protection: incontinence pads utilized  Head of Bed (HOB) Positioning: HOB at 20-30 degrees  Goal: Optimal Wound Healing  Outcome: Progressing

## 2024-05-17 NOTE — CONSULTS
[facility] - Stepdown [facility] ([place] [id number])  Hematology/Oncology  Consult Note    Patient Name: Shikha López  MRN: [id number]  Admission Date: 5/15/2024  Hospital Length of Stay: 2 days  Code Status: Full Code   Attending Provider: Antoni Castillo MD  Consulting Provider: Suleiman Renae MD  Primary Care Physician: Lenore, Primary Doctor  Principal Problem:Symptomatic anemia    Inpatient consult to Hematology/Oncology  Consult performed by: Suleiman Renae MD  Consult ordered by: Clara Foley MD        Subjective:     HPI:  Ms. Shikha López is a 53 y.o. female with metastatic breast cancer (patient of Dr. Willis on [facility], last received on 5/7/24) who is currently admitted with anemia and volume overload. She presented with two days of progressively worsening dyspnea with exertion. Patient has been afebrile with a low/normal WBC. CTA negative for PE but there was pulmonary edema vs pneumonitis. CXR revealed pulmonary edema and her BNP was elevated to >1000 so she has been treated with lasix. TTE yesterday revealed a normal EF but CVP was 8. Hg was 4.9 on admission, no signs of bleeding. She has received multiple blood transfusions.     Oncology Treatment Plan:   OP BREAST FAM-TRASTUZUMAB DERUXTECAN-NXKI Q3W    Medications:  Continuous Infusions:  Scheduled Meds:   enoxparin  40 mg Subcutaneous Daily    furosemide (LASIX) injection  40 mg Intravenous BID    lactulose  10 g Oral TID    pantoprazole  40 mg Oral BID    spironolactone  100 mg Oral Daily    triamcinolone acetonide 0.5%   Topical (Top) BID     PRN Meds:  Current Facility-Administered Medications:     0.9%  NaCl infusion (for blood administration), , Intravenous, Q24H PRN    acetaminophen, 650 mg, Oral, Q4H PRN    dextrose 10%, 12.5 g, Intravenous, PRN    dextrose 10%, 25 g, Intravenous, PRN    glucagon (human recombinant), 1 mg, Intramuscular, PRN    glucose, 16 g, Oral, PRN    glucose, 24 g, Oral, PRN    naloxone, 0.02 mg, Intravenous, PRN     ondansetron, 4 mg, Intravenous, Q6H PRN    polyethylene glycol, 17 g, Oral, BID PRN    sodium chloride 0.9%, 10 mL, Intravenous, Q12H PRN     Review of patient's allergies indicates:  No Known Allergies     Past Medical History:   Diagnosis Date    Aortic atherosclerosis 07/06/2023    Breast cancer     Esophageal and gastric varices 06/23/2023    Malignant neoplasm metastatic to left lung 06/08/2021     Past Surgical History:   Procedure Laterality Date    ENDOSCOPIC ULTRASOUND OF UPPER GASTROINTESTINAL TRACT N/A 6/27/2023    Procedure: ULTRASOUND, UPPER GI TRACT, ENDOSCOPIC;  Surgeon: Home Lopez MD;  Location: [facility] (2ND FLR);  Service: Endoscopy;  Laterality: N/A;    ENDOSCOPIC ULTRASOUND OF UPPER GASTROINTESTINAL TRACT N/A 1/12/2024    Procedure: ULTRASOUND, UPPER GI TRACT, ENDOSCOPIC;  Surgeon: Home Lopez MD;  Location: [facility] (2ND FLR);  Service: Endoscopy;  Laterality: N/A;  11/28/23-Instructions via portal-DS  1/8-lvm for precall-MS  1/10-precall complete-Kpvt    ESOPHAGOGASTRODUODENOSCOPY N/A 6/23/2023    Procedure: EGD (ESOPHAGOGASTRODUODENOSCOPY);  Surgeon: Quintin Mooney MD;  Location: [facility] (2ND FLR);  Service: Endoscopy;  Laterality: N/A;    ESOPHAGOGASTRODUODENOSCOPY N/A 6/27/2023    Procedure: EGD (ESOPHAGOGASTRODUODENOSCOPY);  Surgeon: Home Lopez MD;  Location: [facility] (2ND FLR);  Service: Endoscopy;  Laterality: N/A;    ESOPHAGOGASTRODUODENOSCOPY N/A 8/3/2023    Procedure: EGD (ESOPHAGOGASTRODUODENOSCOPY);  Surgeon: Home Lopez MD;  Location: [facility] (2ND FLR);  Service: Endoscopy;  Laterality: N/A;  instr portal-labs 6/28/23-tb    ESOPHAGOGASTRODUODENOSCOPY N/A 1/12/2024    Procedure: EGD (ESOPHAGOGASTRODUODENOSCOPY);  Surgeon: Home Lopez MD;  Location: [facility] (2ND FLR);  Service: Endoscopy;  Laterality: N/A;    ESOPHAGOGASTRODUODENOSCOPY N/A 4/10/2024    Procedure: EGD (ESOPHAGOGASTRODUODENOSCOPY);  Surgeon: Ze Anderson MD;  Location: [facility]  (2ND FLR);  Service: Endoscopy;  Laterality: N/A;    MASTECTOMY       Family History       Problem Relation (Age of Onset)    Lung cancer Father          Tobacco Use    Smoking status: Never    Smokeless tobacco: Never   Substance and Sexual Activity    Alcohol use: Never    Drug use: Never    Sexual activity: Not Currently       Review of Systems   Constitutional:  Negative for chills and fever.   HENT:  Negative for congestion and sore throat.    Eyes:  Negative for pain.   Respiratory:  Positive for shortness of breath (improving). Negative for cough.    Cardiovascular:  Negative for chest pain, palpitations and leg swelling.   Gastrointestinal:  Negative for abdominal pain, constipation, diarrhea, nausea and vomiting.   Genitourinary:  Negative for difficulty urinating, dysuria and hematuria.   Musculoskeletal:  Negative for back pain.   Skin:  Negative for rash.   Neurological:  Negative for light-headedness and headaches.   Hematological:  Does not bruise/bleed easily.   Psychiatric/Behavioral:  Negative for agitation.      Objective:     Vital Signs (Most Recent):  Temp: 98 °F (36.7 °C) (05/17/24 0845)  Pulse: 97 (05/17/24 1121)  Resp: 17 (05/17/24 0845)  BP: 123/60 (05/17/24 0845)  SpO2: 95 % (05/17/24 0845) Vital Signs (24h Range):  Temp:  [97.8 °F (36.6 °C)-98.2 °F (36.8 °C)] 98 °F (36.7 °C)  Pulse:  [] 97  Resp:  [16-18] 17  SpO2:  [89 %-97 %] 95 %  BP: (118-129)/(57-60) 123/60     Weight: 82.9 kg (182 lb 12.2 oz)  Body mass index is 33.43 kg/m².  Body surface area is 1.9 meters squared.      Intake/Output Summary (Last 24 hours) at 5/17/2024 1129  Last data filed at 5/17/2024 0636  Gross per 24 hour   Intake 350 ml   Output 2100 ml   Net -1750 ml        Physical Exam  Constitutional:       Appearance: Normal appearance.   HENT:      Head: Normocephalic and atraumatic.      Mouth/Throat:      Mouth: Mucous membranes are moist.   Eyes:      Extraocular Movements: Extraocular movements intact.       Pupils: Pupils are equal, round, and reactive to light.   Cardiovascular:      Rate and Rhythm: Normal rate and regular rhythm.      Pulses: Normal pulses.      Heart sounds: Normal heart sounds.   Pulmonary:      Effort: Pulmonary effort is normal. No respiratory distress.      Breath sounds: Rales (bilateral lower) present.   Abdominal:      General: Abdomen is flat. There is no distension.      Palpations: Abdomen is soft.      Tenderness: There is no abdominal tenderness.   Musculoskeletal:         General: Normal range of motion.      Cervical back: Normal range of motion and neck supple.      Right lower leg: Edema present.      Left lower leg: Edema present.   Skin:     General: Skin is warm.   Neurological:      General: No focal deficit present.      Mental Status: She is alert and oriented to person, place, and time.   Psychiatric:         Mood and Affect: Mood normal.         Behavior: Behavior normal.          Significant Labs:   All pertinent labs from the last 24 hours have been reviewed.    Diagnostic Results:  I have reviewed all pertinent imaging results/findings within the past 24 hours.  Assessment/Plan:     Pancytopenia  Patient with pancytopenia on admission. Platelets and WBC improving, Hg remaining low. Has required blood transfusions. She is on Enhertu which she has been on for a while (s/p 49 cycles). Her cell counts on admission are lower than they have been while she has been on her treatment.     - Pancytopenia more likely to be due to her recent linezolid use rather than the Enhertu  - Myelosuppression from linezolid will improve with time  - CBC daily   - Transfuse if Hg <7, platelets <10, or platelets <50 and actively bleeding   - WBC improving     Breast cancer, stage 4, left  Patient of Dr. Willis with metastatic breat cancer on Enhertu (received cycle 49 on 5/7) who is currently admitted with volume overload and anemia. CTA negative for PE but there was pulmonary edema vs pneumonitis.  On admission CXR with pulmonary edema and BNP >1000. She has been getting IV lasix. TTE on 5/16 with normal EF but CVP was 8.     - Patient currently on RA   - Symptoms are due to volume overload given pulmonary edema, elevated BNP, and CVP of 8, weight up 6 pounds on admission from last time she was in the hospital, and patient states she feels much better after diuresis   - Low suspicion for pneumonitis, symptoms would not be improving with diuresis  - Recommend continuing diuresis   - Follow-up appointment with oncology on 5/28        Thank you for your consult. I will sign off. Please contact us if you have any additional questions.    Suleiman Renae MD  Hematology/Oncology  [facility] - Stepdown Flex ([place])

## 2024-05-17 NOTE — CARE UPDATE
I have reviewed the chart of Shikha López and collaborated with Antoni Castillo MD in the care of the patient who is hospitalized for the following:    Active Hospital Problems    Diagnosis    *Symptomatic anemia    Elevated troponin    Pleural effusion    Biliary cirrhosis    S/P TIPS (transjugular intrahepatic portosystemic shunt)    Volume overload    Pancytopenia    Esophageal and gastric varices    Lymphedema    Breast cancer, stage 4, left    Malignant neoplasm metastatic to left lung          I have reviewed Shikha López with the multidisciplinary team during discharge huddle.       Enma Razo PA-C  [facility]

## 2024-05-17 NOTE — NURSING
End of Shift Summary:  Patient color has improved post blood transfusion.  Patient was exhausted from her mother calling her all day.  Her mother has Alzheimer's disease and the patient is her mother's primary caregiver.  Discussed with patient regarding her condition and ability to care for her mother.  Patient stated she understood and has been looking at memory care facilities for her mother.  Patient was able to rest during the night.

## 2024-05-17 NOTE — ASSESSMENT & PLAN NOTE
Patient appears grossly fluid overloaded on admission w/ 3-4+ bilateral lower extremity edema, CXR with pulmonary congestion/edema, and BNP elevated to 1032. She has a history of lymphedema and HAWTHORNE cirrhosis (s/p TIPs) which would explain her lower extremity swelling, however unclear etiology of her pulmonary congestion. Most recent TTE reviewed from 4/24 which had 60-65% EF, no diastolic dysfunction. She had moderate AS at that time. Cardiac exam with harsh systolic murmur. Given unclear etiology of pulmonary congestion and possible worsening of murmur will obtain repeat TTE. TTE showing EF 60-65%, severely dilated L atrium, mild to moderate AV stenosis, and mild pulmonary hypertensive given PASP of 49 mmHg. Heme/onc consulted and do not think pneumonitis is likely to be the cause of her symptoms given her improvement. Will continue to diurese.    - Continue IV diuresis 40 mg BID; goal net negative 1-2L daily  - Monitor lytes while diuresing

## 2024-05-17 NOTE — ASSESSMENT & PLAN NOTE
Follows with Dr. Willis currently.    Oncologic History:  Diagnosed September 2006 w/ poorly differentiated carcinoma which was ER and [place]. negative and HER2 positive.     She was then referred to [facility] for additional care.  CT scan of the chest and abdomen November 2006 revealed multiple nodules in the lungs consistent with metastatic disease.       She was treated with chemotherapy with weekly Herceptin and Abraxane with improvement in her breast and lung metastasis.     On May 29, 2007 she underwent left modified radical mastectomy(Dr. Colvin) which showed no residual tumor in the breast and 1/5 nodes was positive for metastasis measuring 6 mm.  (ypT0N1).  She then received postoperative radiation therapy(Dr Singh)  to the left chest wall supraclav and internal mammary lymph nodes from August 20, 2007 to September 28, 2007.   Postoperatively she continued on Herceptin for approximately 3 and 1/2 years before her lung metastasis begin to grow.     She subsequently received a number of different chemotherapy treatments including Adriamycin, Halaven, Xeloda plus lapatinib then Xeloda plus Herceptin. (  in [facility]     Subsequently, she transferred her care to  at [facility].  -She was initially treated with Taxotere Herceptin Perjeta.    -She was then changed to Kadcyla.    In July 2020 PET scan showed progression.  She then took approximately 9 months of Herceptin and Navelbine with initial response then progression.     She started trastuzumab- deruxtecan  4/12/21.

## 2024-05-17 NOTE — PROGRESS NOTES
[facility] - Stepdown Flex ([place] [address])  [facility] Medicine  Progress Note    Patient Name: Shikha López  MRN: [id number]  Patient Class: IP- Inpatient   Admission Date: 5/15/2024  Length of Stay: 2 days  Attending Physician: Antoni Castillo MD  Primary Care Provider: Lenore, Primary Doctor        Subjective:     Principal Problem:Symptomatic anemia        HPI:  The patient is a 53 year old female with a history of stage IV breast cancer (known lung mets, on Trastuzumab Deruxtecan q3 weks, most recent chemo 5/7/24; followed by Dr. Willis), HAWTHORNE cirrhosis (s/p TIPS 4/12 w/ outpatient IR appointment scheduled 5/16 for doppler w/ concern for decreased flow), GI bleeds w/ gastric and esophageal varices, presenting with two days of progressive dyspnea and fatigue. She reports decreased appetite and diarrhea (non-bloody). Of note she had recent hospital admission 4/21-4/26 for lower extremity cellulitis, with staph bacteremia now s/p linezolid course. She denies hemoptysis, hematemesis, nausea/vomiting, constipation, bloody bowel movements, cough, or chest pain.    ED evaluation significant for hemodynamic stability, pulse 92, RR 21, Tmax 99.0, saturating 99% on room air. WBC 2.29, Hgb 4.9, plts 59, Na 136, K 3.7, Bicarb 24, Cr 0.7, glucose 115, Mag 1.7, alk phos 171, albumin 2.5, Bili 3.4 (stable), BNP 1032, trop 0.134, TSH 3.9, flu/covid negative, lactate 1.59. FOBT testing negative in the ED. CXR consistent w/ pulmonary edema. ECG with sinus rhythm. ED discussed case with oncology who recommended admission to hospital medicine given elevated troponin, BNP.    Overview/Hospital Course:  Admitted to hospital medicine for symptomatic anemia and volume overload. S/p 2 units of pRBCs on admission with improvement in symptoms. Diuresing with lasix. Liver US showing low velocities in the portal venous side of the TIPS shunt. IR consulted and recommending repeat liver US in one month. Some concern for pneumonitis given CT  findings and current treatment with trastuzumab, heme/onc evaluated the patient and did not think pneumonitis was likely given she has had improvement of her symptoms with diuresis.     Interval History: Tachycardic to 100s with O2 sat dropping to 89% overnight on RA. Nausea and vomiting this morning. Shortness of breath improved.    Review of Systems   Respiratory:  Negative for cough and shortness of breath.    Cardiovascular:  Positive for leg swelling. Negative for chest pain.   Gastrointestinal:  Positive for nausea and vomiting. Negative for abdominal pain.     Objective:     Vital Signs (Most Recent):  Temp: 98 °F (36.7 °C) (05/17/24 0845)  Pulse: 97 (05/17/24 1121)  Resp: 17 (05/17/24 0845)  BP: 123/60 (05/17/24 0845)  SpO2: 95 % (05/17/24 0845) Vital Signs (24h Range):  Temp:  [97.8 °F (36.6 °C)-98.2 °F (36.8 °C)] 98 °F (36.7 °C)  Pulse:  [] 97  Resp:  [16-18] 17  SpO2:  [89 %-97 %] 95 %  BP: (118-129)/(57-60) 123/60     Weight: 82.9 kg (182 lb 12.2 oz)  Body mass index is 33.43 kg/m².    Intake/Output Summary (Last 24 hours) at 5/17/2024 1133  Last data filed at 5/17/2024 0636  Gross per 24 hour   Intake 350 ml   Output 2100 ml   Net -1750 ml         Physical Exam  Vitals and nursing note reviewed.   Constitutional:       General: She is not in acute distress.  HENT:      Head: Normocephalic and atraumatic.      Mouth/Throat:      Mouth: Mucous membranes are moist.   Eyes:      Extraocular Movements: Extraocular movements intact.      Conjunctiva/sclera: Conjunctivae normal.   Cardiovascular:      Rate and Rhythm: Normal rate and regular rhythm.      Heart sounds: Murmur (4/6 systolic) heard.   Pulmonary:      Effort: Pulmonary effort is normal. No respiratory distress.      Breath sounds: Normal breath sounds.   Abdominal:      General: Abdomen is flat. There is no distension.      Palpations: Abdomen is soft.      Tenderness: There is no abdominal tenderness.   Musculoskeletal:         General:  Normal range of motion.      Right lower leg: Edema (1+) present.      Left lower leg: Edema (1+) present.   Skin:     General: Skin is warm and dry.      Findings: Rash (LLE erythema) present.   Neurological:      General: No focal deficit present.      Mental Status: She is alert and oriented to person, place, and time.             Significant Labs: All pertinent labs within the past 24 hours have been reviewed.    Significant Imaging: I have reviewed all pertinent imaging results/findings within the past 24 hours.    Assessment/Plan:      * Symptomatic anemia  Patient's anemia is currently uncontrolled. Has received 2 units of PRBCs on 5/15 . Etiology likely d/t drug toxicity from systemic chemotherapy  Current CBC reviewed-   Lab Results   Component Value Date    HGB 7.3 (L) 05/17/2024    HCT 21.1 (L) 05/17/2024     Monitor serial CBC and transfuse if patient becomes hemodynamically unstable, symptomatic or H/H drops below 7/21.    - See 'pancytopenia' for plan    Volume overload  Patient appears grossly fluid overloaded on admission w/ 3-4+ bilateral lower extremity edema, CXR with pulmonary congestion/edema, and BNP elevated to 1032. She has a history of lymphedema and HAWTHORNE cirrhosis (s/p TIPs) which would explain her lower extremity swelling, however unclear etiology of her pulmonary congestion. Most recent TTE reviewed from 4/24 which had 60-65% EF, no diastolic dysfunction. She had moderate AS at that time. Cardiac exam with harsh systolic murmur. Given unclear etiology of pulmonary congestion and possible worsening of murmur will obtain repeat TTE. TTE showing EF 60-65%, severely dilated L atrium, mild to moderate AV stenosis, and mild pulmonary hypertensive given PASP of 49 mmHg. Heme/onc consulted and do not think pneumonitis is likely to be the cause of her symptoms given her improvement. Will continue to diurese.    - Continue IV diuresis 40 mg BID; goal net negative 1-2L daily  - Monitor lytes while  diuresing    S/P TIPS (transjugular intrahepatic portosystemic shunt)  TIPS placed 4/12 for acute GI bleeding and hx of gastric and esophageal varices.  Had gastric portal vein coiling and embolization.    5/10 Liver Doppler US with: Sluggish flow in the portal vein end of the TIPS and bidirectional flow in the left portal vein, which could represent TIPS malfunction.   Patient had outpatient IR appointment the day after admission scheduled (5/16) to evaluate for necessity of TIPs revision. Will obtain updated liver doppler, consider inpatient IR consult pending results vs outpatient follow-up. Liver US concerning for low velocities within the portal venous side of the tips shunt c/f TIPS dysfunction.    - IR consulted, recommending repeat Liver US in one month.  - Continue Lactulose    Pancytopenia  This patient is found to have pancytopenia, the likely etiology is Drug induced (including chemotherapy) related to breast cancer , will monitor CBC Every 8 hours. Will transfuse red blood cells if the hemoglobin is <7g/dL (or <8 in the setting of ACS). Will transfuse platelets if platelet count is <50k (if undergoing surgical procedure or have active bleeding). Hold DVT prophylaxis if platelets are <50k. The patient's hemoglobin, white blood cell count, and platelet count results have been reviewed and are listed below. Per heme/onc, feel pancytopenia is likely 2/2 recent linezolid course.   Recent Labs   Lab 05/17/24  0841   HGB 7.3*   WBC 5.72   PLT 96*       - S/p 2 units PRBC in the ED  - Maintain up to date type and screen  - Trend CBC q8h to ensure stability  - Transfuse for Hgb < 7, plts < 50    Esophageal and gastric varices  Noted history in the setting of anemia on admission. Patient denies hemetemesis, bloody bowel movements. FOBT testing negative on arrival in the ED.    - Monitor BP and Hgb  - Continue home PPI    Lymphedema  Patient follows with the lymphedema clinic w/ leg wraps and leg elevation.  Increased lower extremity swelling on admission. Given associated pulmonary congestion on imaging concern for component of left-heart failure as well. Will evaluate as below.    - Leg elevation as tolerated  - Diuresis as below    Malignant neoplasm metastatic to left lung  See 'breast cancer'    Breast cancer, stage 4, left  Follows with Dr. Willis currently.    Oncologic History:  Diagnosed September 2006 w/ poorly differentiated carcinoma which was ER and [place]. negative and HER2 positive.     She was then referred to [facility] for additional care.  CT scan of the chest and abdomen November 2006 revealed multiple nodules in the lungs consistent with metastatic disease.       She was treated with chemotherapy with weekly Herceptin and Abraxane with improvement in her breast and lung metastasis.     On May 29, 2007 she underwent left modified radical mastectomy(Dr. Colvin) which showed no residual tumor in the breast and 1/5 nodes was positive for metastasis measuring 6 mm.  (ypT0N1).  She then received postoperative radiation therapy(Dr Singh)  to the left chest wall supraclav and internal mammary lymph nodes from August 20, 2007 to September 28, 2007.   Postoperatively she continued on Herceptin for approximately 3 and 1/2 years before her lung metastasis begin to grow.     She subsequently received a number of different chemotherapy treatments including Adriamycin, Halaven, Xeloda plus lapatinib then Xeloda plus Herceptin. (  in [facility].)     Subsequently, she transferred her care to  at [facility].  -She was initially treated with Taxotere Herceptin Perjeta.    -She was then changed to Kadcyla.    In July 2020 PET scan showed progression.  She then took approximately 9 months of Herceptin and Navelbine with initial response then progression.     She started trastuzumab- deruxtecan  4/12/21.      VTE Risk Mitigation (From admission, onward)           Ordered      enoxaparin injection 40 mg  Daily         05/15/24 2303     IP VTE HIGH RISK PATIENT  Once         05/15/24 2303     Place sequential compression device  Until discontinued         05/15/24 2303                    Discharge Planning   PAT: 5/18/2024     Code Status: Full Code   Is the patient medically ready for discharge?:     Reason for patient still in hospital (select all that apply): Treatment  Discharge Plan A: Home with family                  Clara Foley MD  Department of Hospital Medicine   [facility] - Stepdown Flex ([place]14)

## 2024-05-17 NOTE — PLAN OF CARE
Problem: Adult Inpatient Plan of Care  Goal: Plan of Care Review  Outcome: Progressing  Flowsheets (Taken 5/17/2024 1851)  Plan of Care Reviewed With: patient  Goal: Patient-Specific Goal (Individualized)  Outcome: Progressing  Goal: Absence of Hospital-Acquired Illness or Injury  Outcome: Progressing  Intervention: Identify and Manage Fall Risk  Flowsheets (Taken 5/17/2024 1851)  Safety Promotion/Fall Prevention:   assistive device/personal item within reach   instructed to call staff for mobility  Intervention: Prevent Skin Injury  Flowsheets (Taken 5/17/2024 1851)  Body Position: position changed independently  Skin Protection: incontinence pads utilized  Device Skin Pressure Protection: absorbent pad utilized/changed  Intervention: Prevent and Manage VTE (Venous Thromboembolism) Risk  Flowsheets (Taken 5/17/2024 1851)  VTE Prevention/Management: ambulation promoted  Intervention: Prevent Infection  Flowsheets (Taken 5/17/2024 1851)  Infection Prevention: single patient room provided  Goal: Optimal Comfort and Wellbeing  Outcome: Progressing    Patient alert and oriented x4; care plan discussed with patient; call light and personal belongings within reach; x1 zofran given for nausea; patient able to express needs, no needs at this time; will continue with poc.

## 2024-05-17 NOTE — ASSESSMENT & PLAN NOTE
TIPS placed 4/12 for acute GI bleeding and hx of gastric and esophageal varices.  Had gastric portal vein coiling and embolization.    5/10 Liver Doppler US with: Sluggish flow in the portal vein end of the TIPS and bidirectional flow in the left portal vein, which could represent TIPS malfunction.   Patient had outpatient IR appointment the day after admission scheduled (5/16) to evaluate for necessity of TIPs revision. Will obtain updated liver doppler, consider inpatient IR consult pending results vs outpatient follow-up. Liver US concerning for low velocities within the portal venous side of the tips shunt c/f TIPS dysfunction.    - IR consulted, recommending repeat Liver US in one month.  - Continue Lactulose

## 2024-05-17 NOTE — ASSESSMENT & PLAN NOTE
Patient follows with the lymphedema clinic w/ leg wraps and leg elevation. Increased lower extremity swelling on admission. Given associated pulmonary congestion on imaging concern for component of left-heart failure as well. Will evaluate as below.    - Leg elevation as tolerated  - Diuresis as below

## 2024-05-17 NOTE — HOSPITAL COURSE
Admitted to hospital medicine for symptomatic anemia and volume overload. S/p 2 units of pRBCs on admission with improvement in symptoms. Diuresing with lasix. Liver US showing low velocities in the portal venous side of the TIPS shunt. IR consulted and recommending repeat liver US in one month. Some concern for pneumonitis given CT findings and current treatment with trastuzumab, heme/onc evaluated the patient and did not think pneumonitis was likely given she has had improvement of her symptoms with diuresis. Repeat CXR with persistent pulmonary vascular engorgement, increasing lasix dose and continuing diuresis. Transitioned to PO lasix on 5/20/24 in setting of euvolemia. One unit of pRBCs was added on to correct low hemoglobin in setting of symptomatic anemia. Orthostatic BP wnl. Medically ready to discharge. Will send home with new prescription for lasix 80 mg daily with instructions to take an additional dose if she notices weight gain. Repeat Liver US with Doppler ordered, ideally obtain within one month.

## 2024-05-17 NOTE — ASSESSMENT & PLAN NOTE
This patient is found to have pancytopenia, the likely etiology is Drug induced (including chemotherapy) related to breast cancer , will monitor CBC Every 8 hours. Will transfuse red blood cells if the hemoglobin is <7g/dL (or <8 in the setting of ACS). Will transfuse platelets if platelet count is <50k (if undergoing surgical procedure or have active bleeding). Hold DVT prophylaxis if platelets are <50k. The patient's hemoglobin, white blood cell count, and platelet count results have been reviewed and are listed below. Per heme/onc, feel pancytopenia is likely 2/2 recent linezolid course.   Recent Labs   Lab 05/17/24  0841   HGB 7.3*   WBC 5.72   PLT 96*       - S/p 2 units PRBC in the ED  - Maintain up to date type and screen  - Trend CBC q8h to ensure stability  - Transfuse for Hgb < 7, plts < 50

## 2024-05-17 NOTE — ASSESSMENT & PLAN NOTE
Patient of Dr. Willis with metastatic breat cancer on [facility] (received cycle 49 on 5/7) who is currently admitted with volume overload and anemia. CTA negative for PE but there was pulmonary edema vs pneumonitis. On admission CXR with pulmonary edema and BNP >1000. She has been getting IV lasix. TTE on 5/16 with normal EF but CVP was 8.     - Patient currently on RA   - Symptoms are due to volume overload given pulmonary edema, elevated BNP, and CVP of 8, weight up 6 pounds on admission from last time she was in the hospital, and patient states she feels much better after diuresis   - Low suspicion for pneumonitis, symptoms would not be improving with diuresis  - Recommend continuing diuresis   - Follow-up appointment with oncology on 5/28

## 2024-05-17 NOTE — ASSESSMENT & PLAN NOTE
Noted history in the setting of anemia on admission. Patient denies hemetemesis, bloody bowel movements. FOBT testing negative on arrival in the ED.    - Monitor BP and Hgb  - Continue home PPI

## 2024-05-17 NOTE — SUBJECTIVE & OBJECTIVE
Oncology Treatment Plan:   OP BREAST FAM-TRASTUZUMAB DERUXTECAN-NXKI Q3W    Medications:  Continuous Infusions:  Scheduled Meds:   enoxparin  40 mg Subcutaneous Daily    furosemide (LASIX) injection  40 mg Intravenous BID    lactulose  10 g Oral TID    pantoprazole  40 mg Oral BID    spironolactone  100 mg Oral Daily    triamcinolone acetonide 0.5%   Topical (Top) BID     PRN Meds:  Current Facility-Administered Medications:     0.9%  NaCl infusion (for blood administration), , Intravenous, Q24H PRN    acetaminophen, 650 mg, Oral, Q4H PRN    dextrose 10%, 12.5 g, Intravenous, PRN    dextrose 10%, 25 g, Intravenous, PRN    glucagon (human recombinant), 1 mg, Intramuscular, PRN    glucose, 16 g, Oral, PRN    glucose, 24 g, Oral, PRN    naloxone, 0.02 mg, Intravenous, PRN    ondansetron, 4 mg, Intravenous, Q6H PRN    polyethylene glycol, 17 g, Oral, BID PRN    sodium chloride 0.9%, 10 mL, Intravenous, Q12H PRN     Review of patient's allergies indicates:  No Known Allergies     Past Medical History:   Diagnosis Date    Aortic atherosclerosis 07/06/2023    Breast cancer     Esophageal and gastric varices 06/23/2023    Malignant neoplasm metastatic to left lung 06/08/2021     Past Surgical History:   Procedure Laterality Date    ENDOSCOPIC ULTRASOUND OF UPPER GASTROINTESTINAL TRACT N/A 6/27/2023    Procedure: ULTRASOUND, UPPER GI TRACT, ENDOSCOPIC;  Surgeon: Home Lopez MD;  Location: [address]);  Service: Endoscopy;  Laterality: N/A;    ENDOSCOPIC ULTRASOUND OF UPPER GASTROINTESTINAL TRACT N/A 1/12/2024    Procedure: ULTRASOUND, UPPER GI TRACT, ENDOSCOPIC;  Surgeon: Home Lopez MD;  [facility]  Service: Endoscopy;  Laterality: N/A;  11/28/23-Instructions via portal-DS  1/8-lvm for precall-MS  1/10-precall complete-Kpvt    ESOPHAGOGASTRODUODENOSCOPY N/A 6/23/2023    Procedure: EGD (ESOPHAGOGASTRODUODENOSCOPY);  Surgeon: Quintin Mooney MD;  Location: [facility] (2ND FLR);  Service:  Endoscopy;  Laterality: N/A;    ESOPHAGOGASTRODUODENOSCOPY N/A 6/27/2023    Procedure: EGD (ESOPHAGOGASTRODUODENOSCOPY);  Surgeon: Home Lopez MD;  Location: [facility] (2ND FLR);  Service: Endoscopy;  Laterality: N/A;    ESOPHAGOGASTRODUODENOSCOPY N/A 8/3/2023    Procedure: EGD (ESOPHAGOGASTRODUODENOSCOPY);  Surgeon: Home Lopez MD;  Location: [facility] (2ND FLR);  Service: Endoscopy;  Laterality: N/A;  instr portal-labs 6/28/23-tb    ESOPHAGOGASTRODUODENOSCOPY N/A 1/12/2024    Procedure: EGD (ESOPHAGOGASTRODUODENOSCOPY);  Surgeon: Home Lopez MD;  Location: [facility] (2ND FLR);  Service: Endoscopy;  Laterality: N/A;    ESOPHAGOGASTRODUODENOSCOPY N/A 4/10/2024    Procedure: EGD (ESOPHAGOGASTRODUODENOSCOPY);  Surgeon: Ze Anderson MD;  Location: [facility] (2ND FLR);  Service: Endoscopy;  Laterality: N/A;    MASTECTOMY       Family History       Problem Relation (Age of Onset)    Lung cancer Father          Tobacco Use    Smoking status: Never    Smokeless tobacco: Never   Substance and Sexual Activity    Alcohol use: Never    Drug use: Never    Sexual activity: Not Currently       Review of Systems   Constitutional:  Negative for chills and fever.   HENT:  Negative for congestion and sore throat.    Eyes:  Negative for pain.   Respiratory:  Positive for shortness of breath (improving). Negative for cough.    Cardiovascular:  Negative for chest pain, palpitations and leg swelling.   Gastrointestinal:  Negative for abdominal pain, constipation, diarrhea, nausea and vomiting.   Genitourinary:  Negative for difficulty urinating, dysuria and hematuria.   Musculoskeletal:  Negative for back pain.   Skin:  Negative for rash.   Neurological:  Negative for light-headedness and headaches.   Hematological:  Does not bruise/bleed easily.   Psychiatric/Behavioral:  Negative for agitation.      Objective:     Vital Signs (Most Recent):  Temp: 98 °F (36.7 °C) (05/17/24 0845)  Pulse: 97 (05/17/24 1121)  Resp: 17  (05/17/24 0845)  BP: 123/60 (05/17/24 0845)  SpO2: 95 % (05/17/24 0845) Vital Signs (24h Range):  Temp:  [97.8 °F (36.6 °C)-98.2 °F (36.8 °C)] 98 °F (36.7 °C)  Pulse:  [] 97  Resp:  [16-18] 17  SpO2:  [89 %-97 %] 95 %  BP: (118-129)/(57-60) 123/60     Weight: 82.9 kg (182 lb 12.2 oz)  Body mass index is 33.43 kg/m².  Body surface area is 1.9 meters squared.      Intake/Output Summary (Last 24 hours) at 5/17/2024 1129  Last data filed at 5/17/2024 0636  Gross per 24 hour   Intake 350 ml   Output 2100 ml   Net -1750 ml        Physical Exam  Constitutional:       Appearance: Normal appearance.   HENT:      Head: Normocephalic and atraumatic.      Mouth/Throat:      Mouth: Mucous membranes are moist.   Eyes:      Extraocular Movements: Extraocular movements intact.      Pupils: Pupils are equal, round, and reactive to light.   Cardiovascular:      Rate and Rhythm: Normal rate and regular rhythm.      Pulses: Normal pulses.      Heart sounds: Normal heart sounds.   Pulmonary:      Effort: Pulmonary effort is normal. No respiratory distress.      Breath sounds: Rales (bilateral lower) present.   Abdominal:      General: Abdomen is flat. There is no distension.      Palpations: Abdomen is soft.      Tenderness: There is no abdominal tenderness.   Musculoskeletal:         General: Normal range of motion.      Cervical back: Normal range of motion and neck supple.      Right lower leg: Edema present.      Left lower leg: Edema present.   Skin:     General: Skin is warm.   Neurological:      General: No focal deficit present.      Mental Status: She is alert and oriented to person, place, and time.   Psychiatric:         Mood and Affect: Mood normal.         Behavior: Behavior normal.          Significant Labs:   All pertinent labs from the last 24 hours have been reviewed.    Diagnostic Results:  I have reviewed all pertinent imaging results/findings within the past 24 hours.

## 2024-05-18 LAB
ABO + RH BLD: NORMAL
ALBUMIN SERPL BCP-MCNC: 2.3 G/DL (ref 3.5–5.2)
ALP SERPL-CCNC: 154 U/L (ref 55–135)
ALT SERPL W/O P-5'-P-CCNC: 11 U/L (ref 10–44)
ANION GAP SERPL CALC-SCNC: 10 MMOL/L (ref 8–16)
ANION GAP SERPL CALC-SCNC: 11 MMOL/L (ref 8–16)
AST SERPL-CCNC: 24 U/L (ref 10–40)
BASOPHILS # BLD AUTO: 0.01 K/UL (ref 0–0.2)
BASOPHILS # BLD AUTO: 0.01 K/UL (ref 0–0.2)
BASOPHILS NFR BLD: 0.2 % (ref 0–1.9)
BASOPHILS NFR BLD: 0.2 % (ref 0–1.9)
BILIRUB SERPL-MCNC: 2.8 MG/DL (ref 0.1–1)
BLD GP AB SCN CELLS X3 SERPL QL: NORMAL
BUN SERPL-MCNC: 10 MG/DL (ref 6–20)
BUN SERPL-MCNC: 9 MG/DL (ref 6–20)
CALCIUM SERPL-MCNC: 8.2 MG/DL (ref 8.7–10.5)
CALCIUM SERPL-MCNC: 8.7 MG/DL (ref 8.7–10.5)
CHLORIDE SERPL-SCNC: 101 MMOL/L (ref 95–110)
CHLORIDE SERPL-SCNC: 99 MMOL/L (ref 95–110)
CO2 SERPL-SCNC: 27 MMOL/L (ref 23–29)
CO2 SERPL-SCNC: 27 MMOL/L (ref 23–29)
CREAT SERPL-MCNC: 0.6 MG/DL (ref 0.5–1.4)
CREAT SERPL-MCNC: 0.6 MG/DL (ref 0.5–1.4)
DIFFERENTIAL METHOD BLD: ABNORMAL
DIFFERENTIAL METHOD BLD: ABNORMAL
EOSINOPHIL # BLD AUTO: 0.2 K/UL (ref 0–0.5)
EOSINOPHIL # BLD AUTO: 0.3 K/UL (ref 0–0.5)
EOSINOPHIL NFR BLD: 3.7 % (ref 0–8)
EOSINOPHIL NFR BLD: 4.9 % (ref 0–8)
ERYTHROCYTE [DISTWIDTH] IN BLOOD BY AUTOMATED COUNT: 24.3 % (ref 11.5–14.5)
ERYTHROCYTE [DISTWIDTH] IN BLOOD BY AUTOMATED COUNT: 24.9 % (ref 11.5–14.5)
EST. GFR  (NO RACE VARIABLE): >60 ML/MIN/1.73 M^2
EST. GFR  (NO RACE VARIABLE): >60 ML/MIN/1.73 M^2
GLUCOSE SERPL-MCNC: 115 MG/DL (ref 70–110)
GLUCOSE SERPL-MCNC: 123 MG/DL (ref 70–110)
HCT VFR BLD AUTO: 20.4 % (ref 37–48.5)
HCT VFR BLD AUTO: 21.5 % (ref 37–48.5)
HGB BLD-MCNC: 7 G/DL (ref 12–16)
HGB BLD-MCNC: 7.4 G/DL (ref 12–16)
IMM GRANULOCYTES # BLD AUTO: 0.04 K/UL (ref 0–0.04)
IMM GRANULOCYTES # BLD AUTO: 0.06 K/UL (ref 0–0.04)
IMM GRANULOCYTES NFR BLD AUTO: 0.9 % (ref 0–0.5)
IMM GRANULOCYTES NFR BLD AUTO: 1.1 % (ref 0–0.5)
LYMPHOCYTES # BLD AUTO: 0.6 K/UL (ref 1–4.8)
LYMPHOCYTES # BLD AUTO: 0.6 K/UL (ref 1–4.8)
LYMPHOCYTES NFR BLD: 10.1 % (ref 18–48)
LYMPHOCYTES NFR BLD: 14.3 % (ref 18–48)
MAGNESIUM SERPL-MCNC: 1.5 MG/DL (ref 1.6–2.6)
MCH RBC QN AUTO: 32.9 PG (ref 27–31)
MCH RBC QN AUTO: 33.7 PG (ref 27–31)
MCHC RBC AUTO-ENTMCNC: 34.3 G/DL (ref 32–36)
MCHC RBC AUTO-ENTMCNC: 34.4 G/DL (ref 32–36)
MCV RBC AUTO: 96 FL (ref 82–98)
MCV RBC AUTO: 98 FL (ref 82–98)
MONOCYTES # BLD AUTO: 0.3 K/UL (ref 0.3–1)
MONOCYTES # BLD AUTO: 0.4 K/UL (ref 0.3–1)
MONOCYTES NFR BLD: 7.6 % (ref 4–15)
MONOCYTES NFR BLD: 7.8 % (ref 4–15)
NEUTROPHILS # BLD AUTO: 3.2 K/UL (ref 1.8–7.7)
NEUTROPHILS # BLD AUTO: 4.2 K/UL (ref 1.8–7.7)
NEUTROPHILS NFR BLD: 73.1 % (ref 38–73)
NEUTROPHILS NFR BLD: 76.1 % (ref 38–73)
NRBC BLD-RTO: 0 /100 WBC
NRBC BLD-RTO: 0 /100 WBC
PHOSPHATE SERPL-MCNC: 2.7 MG/DL (ref 2.7–4.5)
PLATELET # BLD AUTO: 136 K/UL (ref 150–450)
PLATELET # BLD AUTO: 183 K/UL (ref 150–450)
PMV BLD AUTO: 10.6 FL (ref 9.2–12.9)
PMV BLD AUTO: 11.5 FL (ref 9.2–12.9)
POTASSIUM SERPL-SCNC: 2.8 MMOL/L (ref 3.5–5.1)
POTASSIUM SERPL-SCNC: 3.9 MMOL/L (ref 3.5–5.1)
PROT SERPL-MCNC: 5.6 G/DL (ref 6–8.4)
RBC # BLD AUTO: 2.08 M/UL (ref 4–5.4)
RBC # BLD AUTO: 2.25 M/UL (ref 4–5.4)
SODIUM SERPL-SCNC: 137 MMOL/L (ref 136–145)
SODIUM SERPL-SCNC: 138 MMOL/L (ref 136–145)
SPECIMEN OUTDATE: NORMAL
WBC # BLD AUTO: 4.34 K/UL (ref 3.9–12.7)
WBC # BLD AUTO: 5.55 K/UL (ref 3.9–12.7)

## 2024-05-18 PROCEDURE — 85025 COMPLETE CBC W/AUTO DIFF WBC: CPT | Mod: 91 | Performed by: STUDENT IN AN ORGANIZED HEALTH CARE EDUCATION/TRAINING PROGRAM

## 2024-05-18 PROCEDURE — 63600175 PHARM REV CODE 636 W HCPCS: Performed by: STUDENT IN AN ORGANIZED HEALTH CARE EDUCATION/TRAINING PROGRAM

## 2024-05-18 PROCEDURE — 86850 RBC ANTIBODY SCREEN: CPT | Performed by: STUDENT IN AN ORGANIZED HEALTH CARE EDUCATION/TRAINING PROGRAM

## 2024-05-18 PROCEDURE — 25000003 PHARM REV CODE 250: Performed by: HOSPITALIST

## 2024-05-18 PROCEDURE — 80053 COMPREHEN METABOLIC PANEL: CPT

## 2024-05-18 PROCEDURE — 83735 ASSAY OF MAGNESIUM: CPT

## 2024-05-18 PROCEDURE — 84100 ASSAY OF PHOSPHORUS: CPT

## 2024-05-18 PROCEDURE — 63600175 PHARM REV CODE 636 W HCPCS

## 2024-05-18 PROCEDURE — 80048 BASIC METABOLIC PNL TOTAL CA: CPT | Mod: XB

## 2024-05-18 PROCEDURE — 25000003 PHARM REV CODE 250

## 2024-05-18 PROCEDURE — 36415 COLL VENOUS BLD VENIPUNCTURE: CPT | Mod: XB | Performed by: STUDENT IN AN ORGANIZED HEALTH CARE EDUCATION/TRAINING PROGRAM

## 2024-05-18 PROCEDURE — 20600001 HC STEP DOWN PRIVATE ROOM

## 2024-05-18 PROCEDURE — 36415 COLL VENOUS BLD VENIPUNCTURE: CPT

## 2024-05-18 PROCEDURE — 25000003 PHARM REV CODE 250: Performed by: STUDENT IN AN ORGANIZED HEALTH CARE EDUCATION/TRAINING PROGRAM

## 2024-05-18 RX ORDER — FUROSEMIDE 10 MG/ML
80 INJECTION INTRAMUSCULAR; INTRAVENOUS 2 TIMES DAILY
Status: DISCONTINUED | OUTPATIENT
Start: 2024-05-18 | End: 2024-05-20

## 2024-05-18 RX ORDER — MAGNESIUM SULFATE HEPTAHYDRATE 40 MG/ML
2 INJECTION, SOLUTION INTRAVENOUS ONCE
Status: COMPLETED | OUTPATIENT
Start: 2024-05-18 | End: 2024-05-18

## 2024-05-18 RX ORDER — POTASSIUM CHLORIDE 20 MEQ/1
40 TABLET, EXTENDED RELEASE ORAL
Status: DISCONTINUED | OUTPATIENT
Start: 2024-05-18 | End: 2024-05-18

## 2024-05-18 RX ORDER — LACTULOSE 10 G/15ML
10 SOLUTION ORAL 2 TIMES DAILY
Status: DISCONTINUED | OUTPATIENT
Start: 2024-05-18 | End: 2024-05-19

## 2024-05-18 RX ORDER — MUPIROCIN 20 MG/G
OINTMENT TOPICAL 2 TIMES DAILY
Status: DISCONTINUED | OUTPATIENT
Start: 2024-05-18 | End: 2024-05-21 | Stop reason: HOSPADM

## 2024-05-18 RX ADMIN — FUROSEMIDE 40 MG: 10 INJECTION, SOLUTION INTRAVENOUS at 09:05

## 2024-05-18 RX ADMIN — POTASSIUM BICARBONATE 40 MEQ: 391 TABLET, EFFERVESCENT ORAL at 11:05

## 2024-05-18 RX ADMIN — PANTOPRAZOLE SODIUM 40 MG: 40 TABLET, DELAYED RELEASE ORAL at 08:05

## 2024-05-18 RX ADMIN — TRIAMCINOLONE ACETONIDE: 5 CREAM TOPICAL at 08:05

## 2024-05-18 RX ADMIN — FUROSEMIDE 80 MG: 10 INJECTION, SOLUTION INTRAVENOUS at 06:05

## 2024-05-18 RX ADMIN — POTASSIUM CHLORIDE 40 MEQ: 1500 TABLET, EXTENDED RELEASE ORAL at 09:05

## 2024-05-18 RX ADMIN — POTASSIUM BICARBONATE 40 MEQ: 391 TABLET, EFFERVESCENT ORAL at 03:05

## 2024-05-18 RX ADMIN — SPIRONOLACTONE 100 MG: 50 TABLET ORAL at 09:05

## 2024-05-18 RX ADMIN — POTASSIUM BICARBONATE 40 MEQ: 391 TABLET, EFFERVESCENT ORAL at 02:05

## 2024-05-18 RX ADMIN — MAGNESIUM SULFATE HEPTAHYDRATE 2 G: 40 INJECTION, SOLUTION INTRAVENOUS at 10:05

## 2024-05-18 RX ADMIN — PANTOPRAZOLE SODIUM 40 MG: 40 TABLET, DELAYED RELEASE ORAL at 09:05

## 2024-05-18 RX ADMIN — TRIAMCINOLONE ACETONIDE: 5 CREAM TOPICAL at 10:05

## 2024-05-18 RX ADMIN — MUPIROCIN: 20 OINTMENT TOPICAL at 08:05

## 2024-05-18 NOTE — ASSESSMENT & PLAN NOTE
Patient's anemia is currently uncontrolled. Has received 2 units of PRBCs on 5/15 . Etiology likely d/t drug toxicity from systemic chemotherapy  Current CBC reviewed-   Lab Results   Component Value Date    HGB 7.0 (L) 05/18/2024    HCT 20.4 (L) 05/18/2024     Monitor serial CBC and transfuse if patient becomes hemodynamically unstable, symptomatic or H/H drops below 7/21.    - See 'pancytopenia' for plan

## 2024-05-18 NOTE — ASSESSMENT & PLAN NOTE
Follows with Dr. Willis currently.    Oncologic History:  Diagnosed September 2006 w/ poorly differentiated carcinoma which was ER and GA. negative and HER2 positive.     She was then referred to [facility] for additional care.  CT scan of the chest and abdomen November 2006 revealed multiple nodules in the lungs consistent with metastatic disease.       She was treated with chemotherapy with weekly Herceptin and Abraxane with improvement in her breast and lung metastasis.     On May 29, 2007 she underwent left modified radical mastectomy(Dr. Colvin) which showed no residual tumor in the breast and 1/5 nodes was positive for metastasis measuring 6 mm.  (ypT0N1).  She then received postoperative radiation therapy(Dr Singh)  to the left chest wall supraclav and internal mammary lymph nodes from August 20, 2007 to September 28, 2007.   Postoperatively she continued on Herceptin for approximately 3 and 1/2 years before her lung metastasis begin to grow.     She subsequently received a number of different chemotherapy treatments including Adriamycin, Halaven, Xeloda plus lapatinib then Xeloda plus Herceptin. (  in [facility].)     Subsequently, she transferred her care to  at [facility].  -She was initially treated with Taxotere Herceptin Perjeta.    -She was then changed to Kadcyla.    In July 2020 PET scan showed progression.  She then took approximately 9 months of Herceptin and Navelbine with initial response then progression.     She started trastuzumab- deruxtecan  4/12/21.

## 2024-05-18 NOTE — PLAN OF CARE
Problem: Adult Inpatient Plan of Care  Goal: Plan of Care Review  Outcome: Progressing  Goal: Patient-Specific Goal (Individualized)  Outcome: Progressing  Goal: Absence of Hospital-Acquired Illness or Injury  Outcome: Progressing  Goal: Optimal Comfort and Wellbeing  Outcome: Progressing  Goal: Readiness for Transition of Care  Outcome: Progressing     Problem: Infection  Goal: Absence of Infection Signs and Symptoms  Outcome: Progressing     Problem: Wound  Goal: Optimal Coping  Outcome: Progressing  Intervention: Support Patient and Family Response  Flowsheets (Taken 5/18/2024 3240)  Supportive Measures: active listening utilized  Family/Support System Care: caregiver stress acknowledged  Goal: Optimal Functional Ability  Outcome: Progressing  Goal: Absence of Infection Signs and Symptoms  Outcome: Progressing  Goal: Improved Oral Intake  Outcome: Progressing  Goal: Optimal Pain Control and Function  Outcome: Progressing  Goal: Skin Health and Integrity  Outcome: Progressing  Intervention: Optimize Skin Protection  Flowsheets (Taken 5/18/2024 4793)  Activity Management: Ambulated to bathroom - L4  Goal: Optimal Wound Healing  Outcome: Progressing

## 2024-05-18 NOTE — MEDICAL/APP STUDENT
[facility] Medicine Student   Progress Note  [facility] HOSP MED 3    Admit Date: 5/15/2024  Hospital Day: 3  05/18/2024  8:56 AM    SUBJECTIVE:   Ms. Shikha López is a 53 y.o. female with a relevant medical history of stage IV breast cancer (known lung mets, on Trastuzumab Deruxtecan q3 weks, most recent chemo 5/7/24; followed by Dr. Willis) s/p L radical mastectomy, s/p radiation therapy, HAWTHORNE cirrhosis (s/p TIPS 4/12 w/ outpatient IR appointment scheduled 5/16 for doppler w/ concern for decreased flow), GI bleeds w/ gastric and esophageal varices  who is being followed up for Symptomatic anemia.    Interval history: NAEON. Pt refusing lactulose d/t stooling x4/day. Denies increased Sob. Was put on 4L NC for low O2 sats ON.     Review of Systems   Constitutional: Negative.    HENT: Negative.     Eyes: Negative.    Respiratory: Negative.     Cardiovascular:  Positive for leg swelling.   Gastrointestinal: Negative.    Genitourinary: Negative.    Musculoskeletal: Negative.    Skin: Negative.    Neurological: Negative.    Endo/Heme/Allergies: Negative.    Psychiatric/Behavioral: Negative.         Please refer to the H&P for past medical, family, and social history.    OBJECTIVE:     Vital Signs Recent:  Temp: 97.7 °F (36.5 °C) (05/18/24 0340)  Pulse: 92 (05/18/24 0853)  Resp: 18 (05/18/24 0340)  BP: (!) 112/53 (05/18/24 0853)  SpO2: 98 % (05/18/24 0853)  Oxygen Documentation:    Flow (L/min) (Oxygen Therapy): 4           Device (Oxygen Therapy): room air         Vital Signs Range (Last 24H):  Temp:  [97.6 °F (36.4 °C)-98 °F (36.7 °C)]   Pulse:  []   Resp:  [16-19]   BP: (112-139)/(53-63)   SpO2:  [90 %-98 %]        I & O (Last 24H):  Intake/Output Summary (Last 24 hours) at 5/18/2024 0856  Last data filed at 5/17/2024 1626  Gross per 24 hour   Intake --   Output 1150 ml   Net -1150 ml        Physical Exam:  Physical Exam  Constitutional:       Appearance: She is ill-appearing.   HENT:      Head: Normocephalic and  atraumatic.   Cardiovascular:      Rate and Rhythm: Normal rate and regular rhythm.      Pulses: Normal pulses.      Heart sounds: Murmur heard.      Comments: Grade III pansystolic murmur heard best over R 2nd intercostal space.  Pulmonary:      Effort: Pulmonary effort is normal. No respiratory distress.      Comments: Crackles over R lower lobe  Abdominal:      General: Abdomen is flat.      Palpations: Abdomen is soft.   Musculoskeletal:      Cervical back: Normal range of motion and neck supple.      Right lower leg: Edema present.      Left lower leg: Edema present.      Comments: Grade 2 pitting edema   Skin:     General: Skin is warm and dry.      Capillary Refill: Capillary refill takes less than 2 seconds.   Neurological:      General: No focal deficit present.      Mental Status: She is alert and oriented to person, place, and time.   Psychiatric:         Mood and Affect: Mood normal.         Behavior: Behavior normal.         Labs:   Recent Labs   Lab 05/16/24  0808 05/17/24  0522 05/18/24  0627    138 138   K 4.1 3.5 2.8*    102 101   CO2 24 25 27   BUN 14 14 10   CREATININE 0.6 0.6 0.6   GLU 98 101 123*   CALCIUM 8.2* 8.1* 8.2*   MG 2.0 1.7 1.5*   PHOS 3.3 2.7 2.7     Recent Labs   Lab 05/15/24  2357 05/16/24  0808 05/17/24  0522 05/18/24  0627   ALKPHOS  --  170* 168* 154*   ALT  --  12 11 11   AST  --  26 25 24   ALBUMIN  --  2.5* 2.4* 2.3*   PROT  --  6.0 5.7* 5.6*   BILITOT  --  5.4* 3.8* 2.8*   INR 1.5*  --   --   --      Recent Labs   Lab 05/17/24  0841 05/17/24  1738 05/18/24  0627   WBC 5.72 5.37 4.34   HGB 7.3* 7.2* 7.0*   HCT 21.1* 21.9* 20.4*   PLT 96* 118* 136*       Scheduled Meds:   enoxparin  40 mg Subcutaneous Daily    furosemide (LASIX) injection  40 mg Intravenous BID    lactulose  10 g Oral TID    magnesium sulfate IVPB  2 g Intravenous Once    pantoprazole  40 mg Oral BID    potassium chloride  40 mEq Oral Q2H    spironolactone  100 mg Oral Daily    triamcinolone  acetonide 0.5%   Topical (Top) BID     Continuous Infusions:  PRN Meds:  Current Facility-Administered Medications:     0.9%  NaCl infusion (for blood administration), , Intravenous, Q24H PRN    acetaminophen, 650 mg, Oral, Q4H PRN    dextrose 10%, 12.5 g, Intravenous, PRN    dextrose 10%, 25 g, Intravenous, PRN    glucagon (human recombinant), 1 mg, Intramuscular, PRN    glucose, 16 g, Oral, PRN    glucose, 24 g, Oral, PRN    naloxone, 0.02 mg, Intravenous, PRN    ondansetron, 4 mg, Intravenous, Q6H PRN    polyethylene glycol, 17 g, Oral, BID PRN    sodium chloride 0.9%, 10 mL, Intravenous, Q12H PRN    ASSESSMENT/PLAN:   Ms. Shikha López is a 53 y.o. female with a relevant medical history of stage IV breast cancer (known lung mets, on Trastuzumab Deruxtecan q3 weks, most recent chemo 5/7/24; followed by Dr. Willis) s/p L radical mastectomy, s/p radiation therapy, HAWTHORNE cirrhosis (s/p TIPS 4/12 w/ outpatient IR appointment scheduled 5/16 for doppler w/ concern for decreased flow), GI bleeds w/ gastric and esophageal varices who is being followed up for Symptomatic anemia.    Active Hospital Problems    Diagnosis  POA    *Symptomatic anemia [D64.9]  Yes    Elevated troponin [R79.89]  Yes    Pleural effusion [J90]  Yes    Biliary cirrhosis [K74.5]  Yes    S/P TIPS (transjugular intrahepatic portosystemic shunt) [Z95.828]  Not Applicable    Volume overload [E87.70]  Yes    Pancytopenia [D61.818]  Yes    Esophageal and gastric varices [I85.00, I86.4]  Yes    Lymphedema [I89.0]  Yes    Breast cancer, stage 4, left [C50.912]  Yes    Malignant neoplasm metastatic to left lung [C78.02]  Yes      Resolved Hospital Problems   No resolved problems to display.       Symptomatic anemia  Patient's anemia is currently uncontrolled. Has received 2 units of PRBCs on 5/15 . Etiology likely d/t drug toxicity from systemic chemotherapy  Current CBC reviewed-         Lab Results   Component Value Date     HGB 7.3 (L) 05/17/2024      HCT 21.1 (L) 05/17/2024      Monitor serial CBC and transfuse if patient becomes hemodynamically unstable, symptomatic or H/H drops below 7/21.     - See 'pancytopenia' for plan     Volume overload  Patient appears grossly fluid overloaded on admission w/ 3-4+ bilateral lower extremity edema, CXR with pulmonary congestion/edema, and BNP elevated to 1032. She has a history of lymphedema and HAWTHORNE cirrhosis (s/p TIPs) which would explain her lower extremity swelling, however unclear etiology of her pulmonary congestion. Most recent TTE reviewed from 4/24 which had 60-65% EF, no diastolic dysfunction. She had moderate AS at that time. Cardiac exam with harsh systolic murmur. Given unclear etiology of pulmonary congestion and possible worsening of murmur will obtain repeat TTE. TTE showing EF 60-65%, severely dilated L atrium, mild to moderate AV stenosis, and mild pulmonary hypertensive given PASP of 49 mmHg. Heme/onc consulted and do not think pneumonitis is likely to be the cause of her symptoms given her improvement. Will continue to diurese.     - Continue IV diuresis 40 mg BID; goal net negative 1-2L daily  - Monitor lytes while diuresing  - Repeat CXR     S/P TIPS (transjugular intrahepatic portosystemic shunt)  TIPS placed 4/12 for acute GI bleeding and hx of gastric and esophageal varices.  Had gastric portal vein coiling and embolization.     5/10 Liver Doppler US with: Sluggish flow in the portal vein end of the TIPS and bidirectional flow in the left portal vein, which could represent TIPS malfunction.   Patient had outpatient IR appointment the day after admission scheduled (5/16) to evaluate for necessity of TIPs revision. Will obtain updated liver doppler, consider inpatient IR consult pending results vs outpatient follow-up. Liver US concerning for low velocities within the portal venous side of the tips shunt c/f TIPS dysfunction.     - IR consulted, recommending repeat Liver US in one month.  - Continue  Lactulose 10 mg BID. Pt reported denying lactulose for excessive BM x4 yesterday, chart notes only 1x. Pt educated on lactulose ppx for hepatic encephalopathy especially with her Hx of TIPS. Pt voiced understanding. Pt not encephalopathic on exam.      Pancytopenia  This patient is found to have pancytopenia, the likely etiology is Drug induced (including chemotherapy) related to breast cancer , will monitor CBC Every 8 hours. Will transfuse red blood cells if the hemoglobin is <7g/dL (or <8 in the setting of ACS). Will transfuse platelets if platelet count is <50k (if undergoing surgical procedure or have active bleeding). Hold DVT prophylaxis if platelets are <50k. The patient's hemoglobin, white blood cell count, and platelet count results have been reviewed and are listed below. Per heme/onc, feel pancytopenia is likely 2/2 recent linezolid course.       Recent Labs   Lab 05/17/24  0841   HGB 7.3*   WBC 5.72   PLT 96*         - S/p 2 units PRBC in the ED  - Maintain up to date type and screen  - Trend CBC q8h to ensure stability  - Transfuse for Hgb < 7, plts < 50     Esophageal and gastric varices  Noted history in the setting of anemia on admission. Patient denies hemetemesis, bloody bowel movements. FOBT testing negative on arrival in the ED.     - Monitor BP and Hgb  - Continue home PPI     Lymphedema  Patient follows with the lymphedema clinic w/ leg wraps and leg elevation. Increased lower extremity swelling on admission. Given associated pulmonary congestion on imaging concern for component of left-heart failure as well. Will evaluate as below.     - Leg elevation as tolerated  - Diuresis as below     Malignant neoplasm metastatic to left lung  See 'breast cancer'     Breast cancer, stage 4, left  Follows with Dr. Willis currently.     Oncologic History:  Diagnosed September 2006 w/ poorly differentiated carcinoma which was ER and IL. negative and HER2 positive.     She was then referred to [facility]  [place] for additional care.  CT scan of the chest and abdomen November 2006 revealed multiple nodules in the lungs consistent with metastatic disease.       She was treated with chemotherapy with weekly Herceptin and Abraxane with improvement in her breast and lung metastasis.     On May 29, 2007 she underwent left modified radical mastectomy(Dr. Colvin) which showed no residual tumor in the breast and 1/5 nodes was positive for metastasis measuring 6 mm.  (ypT0N1).  She then received postoperative radiation therapy(Dr Singh)  to the left chest wall supraclav and internal mammary lymph nodes from August 20, 2007 to September 28, 2007.   Postoperatively she continued on Herceptin for approximately 3 and 1/2 years before her lung metastasis begin to grow.     She subsequently received a number of different chemotherapy treatments including Adriamycin, Halaven, Xeloda plus lapatinib then Xeloda plus Herceptin. (  in [facility].)     Subsequently, she transferred her care to  at [facility].  -She was initially treated with Taxotere Herceptin Perjeta.    -She was then changed to Kadcyla.     In July 2020 PET scan showed progression.  She then took approximately 9 months of Herceptin and Navelbine with initial response then progression.     She started trastuzumab- deruxtecan  4/12/21.    -[facility] following, appreciate recs:   - Low suspicion for pneumonitis, symptoms would not be improving with diuresis    - Recommend continuing diuresis   - Follow-up appointment with oncology on 5/28    VTE Risk Mitigation (From admission, onward)           Ordered     enoxaparin injection 40 mg  Daily         05/15/24 2303     IP VTE HIGH RISK PATIENT  Once         05/15/24 2303     Place sequential compression device  Until discontinued         05/15/24 2303                    Discharge planning:   PAT: 5/19/2024     Code Status: Full Code   Is the patient medically ready for discharge?: No    Reason for  patient still in hospital (select all that apply): Treatment  Discharge Plan A: Home with family       Art Welsh, MS4  [facility]

## 2024-05-18 NOTE — ASSESSMENT & PLAN NOTE
TIPS placed 4/12 for acute GI bleeding and hx of gastric and esophageal varices.  Had gastric portal vein coiling and embolization.    5/10 Liver Doppler US with: Sluggish flow in the portal vein end of the TIPS and bidirectional flow in the left portal vein, which could represent TIPS malfunction.   Patient had outpatient IR appointment the day after admission scheduled (5/16) to evaluate for necessity of TIPs revision. Will obtain updated liver doppler, consider inpatient IR consult pending results vs outpatient follow-up. Liver US concerning for low velocities within the portal venous side of the tips shunt c/f TIPS dysfunction. Currently declining lactulose due to reports of several diarrheal BMs, only one charted BM. I discussed with her the benefit of lactulose and she expressed understanding.    - IR consulted, recommending repeat Liver US in one month.  - Decrease Lactulose to BID

## 2024-05-18 NOTE — NURSING
"End of Shift summary:  Patient was placed on 4 l of oxygen as provider was concerned with her ability to oxygenate due to her anemia.  Patient was up to the bathroom and had a large BM during the night.  Patient stated it was "like soft serve ice cream".  Patient rested well during the night and provider is watching her hemoglobin and hematocrit for trends.  Patient concerned about her mother with Alzheimer's as her sister who is also a caregiver had to go to the hospital with a hernia.  Asked if their were any other caregivers and she stated that they did call another relative to assist.  Patient has multiple stressors at home.  "

## 2024-05-18 NOTE — SUBJECTIVE & OBJECTIVE
Interval History: Required 4L O2 overnight for O2 sat of 90%. Weaning this morning. Also reporting 4 watery BMs overnight and reports that she has been refusing her lactulose due to this. Did have some nausea and vomiting yesterday. Otherwise feeling well.     Review of Systems   Respiratory:  Negative for cough and shortness of breath.    Gastrointestinal:  Positive for diarrhea, nausea and vomiting. Negative for abdominal pain.   Neurological:  Negative for dizziness, weakness and light-headedness.     Objective:     Vital Signs (Most Recent):  Temp: 97.7 °F (36.5 °C) (05/18/24 0340)  Pulse: 92 (05/18/24 0853)  Resp: 18 (05/18/24 0340)  BP: (!) 112/53 (05/18/24 0853)  SpO2: 98 % (05/18/24 0853) Vital Signs (24h Range):  Temp:  [97.6 °F (36.4 °C)-98 °F (36.7 °C)] 97.7 °F (36.5 °C)  Pulse:  [] 92  Resp:  [16-19] 18  SpO2:  [90 %-98 %] 98 %  BP: (112-139)/(53-63) 112/53     Weight: 82.9 kg (182 lb 12.2 oz)  Body mass index is 33.43 kg/m².    Intake/Output Summary (Last 24 hours) at 5/18/2024 1033  Last data filed at 5/17/2024 1626  Gross per 24 hour   Intake --   Output 1150 ml   Net -1150 ml         Physical Exam  Vitals and nursing note reviewed.   Constitutional:       General: She is not in acute distress.  HENT:      Head: Normocephalic and atraumatic.      Mouth/Throat:      Mouth: Mucous membranes are moist.   Eyes:      Extraocular Movements: Extraocular movements intact.      Conjunctiva/sclera: Conjunctivae normal.   Cardiovascular:      Rate and Rhythm: Normal rate and regular rhythm.      Heart sounds: Murmur (4/6 systolic) heard.   Pulmonary:      Effort: Pulmonary effort is normal. No respiratory distress.   Musculoskeletal:         General: Normal range of motion.      Right lower leg: Edema (1+) present.      Left lower leg: Edema (1+) present.   Skin:     General: Skin is warm and dry.      Findings: Erythema (b/l, worse on LLE) present.   Neurological:      General: No focal deficit present.       Mental Status: She is alert and oriented to person, place, and time.   Psychiatric:         Mood and Affect: Mood normal.         Behavior: Behavior normal.             Significant Labs: All pertinent labs within the past 24 hours have been reviewed.    Significant Imaging: I have reviewed all pertinent imaging results/findings within the past 24 hours.

## 2024-05-18 NOTE — ASSESSMENT & PLAN NOTE
This patient is found to have pancytopenia, the likely etiology is Drug induced (including chemotherapy) related to breast cancer , will monitor CBC Every 8 hours. Will transfuse red blood cells if the hemoglobin is <7g/dL (or <8 in the setting of ACS). Will transfuse platelets if platelet count is <50k (if undergoing surgical procedure or have active bleeding). Hold DVT prophylaxis if platelets are <50k. The patient's hemoglobin, white blood cell count, and platelet count results have been reviewed and are listed below. Per heme/onc, feel pancytopenia is likely 2/2 recent linezolid course.   Recent Labs   Lab 05/18/24  0627   HGB 7.0*   WBC 4.34   *       - S/p 2 units PRBC in the ED  - Maintain up to date type and screen  - Trend CBC q8h to ensure stability  - Transfuse for Hgb < 7, plts < 50

## 2024-05-18 NOTE — PROGRESS NOTES
Dereck Forrester - Stepdown Flex ([place] [id number])  [facility] Medicine  Progress Note    Patient Name: Shikha López  MRN: [id number]  Patient Class: IP- Inpatient   Admission Date: 5/15/2024  Length of Stay: 3 days  Attending Physician: Antoni Castillo MD  Primary Care Provider: Lenore, Primary Doctor        Subjective:     Principal Problem:Symptomatic anemia        HPI:  The patient is a 53 year old female with a history of stage IV breast cancer (known lung mets, on Trastuzumab Deruxtecan q3 weks, most recent chemo 5/7/24; followed by Dr. Willis), HAWTHORNE cirrhosis (s/p TIPS 4/12 w/ outpatient IR appointment scheduled 5/16 for doppler w/ concern for decreased flow), GI bleeds w/ gastric and esophageal varices, presenting with two days of progressive dyspnea and fatigue. She reports decreased appetite and diarrhea (non-bloody). Of note she had recent hospital admission 4/21-4/26 for lower extremity cellulitis, with staph bacteremia now s/p linezolid course. She denies hemoptysis, hematemesis, nausea/vomiting, constipation, bloody bowel movements, cough, or chest pain.    ED evaluation significant for hemodynamic stability, pulse 92, RR 21, Tmax 99.0, saturating 99% on room air. WBC 2.29, Hgb 4.9, plts 59, Na 136, K 3.7, Bicarb 24, Cr 0.7, glucose 115, Mag 1.7, alk phos 171, albumin 2.5, Bili 3.4 (stable), BNP 1032, trop 0.134, TSH 3.9, flu/covid negative, lactate 1.59. FOBT testing negative in the ED. CXR consistent w/ pulmonary edema. ECG with sinus rhythm. ED discussed case with oncology who recommended admission to hospital medicine given elevated troponin, BNP.    Overview/Hospital Course:  Admitted to hospital medicine for symptomatic anemia and volume overload. S/p 2 units of pRBCs on admission with improvement in symptoms. Diuresing with lasix. Liver US showing low velocities in the portal venous side of the TIPS shunt. IR consulted and recommending repeat liver US in one month. Some concern for pneumonitis given CT  findings and current treatment with trastuzumab, heme/onc evaluated the patient and did not think pneumonitis was likely given she has had improvement of her symptoms with diuresis. Continuing diuresis.     Interval History: Required 4L O2 overnight for O2 sat of 90%. Weaning this morning. Also reporting 4 watery BMs overnight and reports that she has been refusing her lactulose due to this. Did have some nausea and vomiting yesterday. Otherwise feeling well.     Review of Systems   Respiratory:  Negative for cough and shortness of breath.    Gastrointestinal:  Positive for diarrhea, nausea and vomiting. Negative for abdominal pain.   Neurological:  Negative for dizziness, weakness and light-headedness.     Objective:     Vital Signs (Most Recent):  Temp: 97.7 °F (36.5 °C) (05/18/24 0340)  Pulse: 92 (05/18/24 0853)  Resp: 18 (05/18/24 0340)  BP: (!) 112/53 (05/18/24 0853)  SpO2: 98 % (05/18/24 0853) Vital Signs (24h Range):  Temp:  [97.6 °F (36.4 °C)-98 °F (36.7 °C)] 97.7 °F (36.5 °C)  Pulse:  [] 92  Resp:  [16-19] 18  SpO2:  [90 %-98 %] 98 %  BP: (112-139)/(53-63) 112/53     Weight: 82.9 kg (182 lb 12.2 oz)  Body mass index is 33.43 kg/m².    Intake/Output Summary (Last 24 hours) at 5/18/2024 1033  Last data filed at 5/17/2024 1626  Gross per 24 hour   Intake --   Output 1150 ml   Net -1150 ml         Physical Exam  Vitals and nursing note reviewed.   Constitutional:       General: She is not in acute distress.  HENT:      Head: Normocephalic and atraumatic.      Mouth/Throat:      Mouth: Mucous membranes are moist.   Eyes:      Extraocular Movements: Extraocular movements intact.      Conjunctiva/sclera: Conjunctivae normal.   Cardiovascular:      Rate and Rhythm: Normal rate and regular rhythm.      Heart sounds: Murmur (4/6 systolic) heard.   Pulmonary:      Effort: Pulmonary effort is normal. No respiratory distress.   Musculoskeletal:         General: Normal range of motion.      Right lower leg: Edema  (1+) present.      Left lower leg: Edema (1+) present.   Skin:     General: Skin is warm and dry.      Findings: Erythema (b/l, worse on LLE) present.   Neurological:      General: No focal deficit present.      Mental Status: She is alert and oriented to person, place, and time.   Psychiatric:         Mood and Affect: Mood normal.         Behavior: Behavior normal.             Significant Labs: All pertinent labs within the past 24 hours have been reviewed.    Significant Imaging: I have reviewed all pertinent imaging results/findings within the past 24 hours.    Assessment/Plan:      * Symptomatic anemia  Patient's anemia is currently uncontrolled. Has received 2 units of PRBCs on 5/15 . Etiology likely d/t drug toxicity from systemic chemotherapy  Current CBC reviewed-   Lab Results   Component Value Date    HGB 7.0 (L) 05/18/2024    HCT 20.4 (L) 05/18/2024     Monitor serial CBC and transfuse if patient becomes hemodynamically unstable, symptomatic or H/H drops below 7/21.    - See 'pancytopenia' for plan    Volume overload  Patient appears grossly fluid overloaded on admission w/ 3-4+ bilateral lower extremity edema, CXR with pulmonary congestion/edema, and BNP elevated to 1032. She has a history of lymphedema and HAWTHORNE cirrhosis (s/p TIPs) which would explain her lower extremity swelling, however unclear etiology of her pulmonary congestion. Most recent TTE reviewed from 4/24 which had 60-65% EF, no diastolic dysfunction. She had moderate AS at that time. Cardiac exam with harsh systolic murmur. Given unclear etiology of pulmonary congestion and possible worsening of murmur will obtain repeat TTE. TTE showing EF 60-65%, severely dilated L atrium, mild to moderate AV stenosis, and mild pulmonary hypertensive given PASP of 49 mmHg. Heme/onc consulted and do not think pneumonitis is likely to be the cause of her symptoms given her improvement. Will continue to diurese.    - Continue IV diuresis 40 mg BID; goal net  negative 1-2L daily  - Monitor lytes while diuresing    S/P TIPS (transjugular intrahepatic portosystemic shunt)  TIPS placed 4/12 for acute GI bleeding and hx of gastric and esophageal varices.  Had gastric portal vein coiling and embolization.    5/10 Liver Doppler US with: Sluggish flow in the portal vein end of the TIPS and bidirectional flow in the left portal vein, which could represent TIPS malfunction.   Patient had outpatient IR appointment the day after admission scheduled (5/16) to evaluate for necessity of TIPs revision. Will obtain updated liver doppler, consider inpatient IR consult pending results vs outpatient follow-up. Liver US concerning for low velocities within the portal venous side of the tips shunt c/f TIPS dysfunction. Currently declining lactulose due to reports of several diarrheal BMs, only one charted BM. I discussed with her the benefit of lactulose and she expressed understanding.    - IR consulted, recommending repeat Liver US in one month.  - Decrease Lactulose to BID    Pancytopenia  This patient is found to have pancytopenia, the likely etiology is Drug induced (including chemotherapy) related to breast cancer , will monitor CBC Every 8 hours. Will transfuse red blood cells if the hemoglobin is <7g/dL (or <8 in the setting of ACS). Will transfuse platelets if platelet count is <50k (if undergoing surgical procedure or have active bleeding). Hold DVT prophylaxis if platelets are <50k. The patient's hemoglobin, white blood cell count, and platelet count results have been reviewed and are listed below. Per heme/onc, feel pancytopenia is likely 2/2 recent linezolid course.   Recent Labs   Lab 05/18/24  0627   HGB 7.0*   WBC 4.34   *       - S/p 2 units PRBC in the ED  - Maintain up to date type and screen  - Trend CBC q8h to ensure stability  - Transfuse for Hgb < 7, plts < 50    Esophageal and gastric varices  Noted history in the setting of anemia on admission. Patient denies  hemetemesis, bloody bowel movements. FOBT testing negative on arrival in the ED.    - Monitor BP and Hgb  - Continue home PPI    Lymphedema  Patient follows with the lymphedema clinic w/ leg wraps and leg elevation. Increased lower extremity swelling on admission. Given associated pulmonary congestion on imaging concern for component of left-heart failure as well. Will evaluate as below.    - Leg elevation as tolerated  - Diuresis as below    Malignant neoplasm metastatic to left lung  See 'breast cancer'    Breast cancer, stage 4, left  Follows with Dr. Willis currently.    Oncologic History:  Diagnosed September 2006 w/ poorly differentiated carcinoma which was ER and NV. negative and HER2 positive.     She was then referred to [facility] for additional care.  CT scan of the chest and abdomen November 2006 revealed multiple nodules in the lungs consistent with metastatic disease.       She was treated with chemotherapy with weekly Herceptin and Abraxane with improvement in her breast and lung metastasis.     On May 29, 2007 she underwent left modified radical mastectomy(Dr. Colvin) which showed no residual tumor in the breast and 1/5 nodes was positive for metastasis measuring 6 mm.  (ypT0N1).  She then received postoperative radiation therapy(Dr Singh)  to the left chest wall supraclav and internal mammary lymph nodes from August 20, 2007 to September 28, 2007.   Postoperatively she continued on Herceptin for approximately 3 and 1/2 years before her lung metastasis begin to grow.     She subsequently received a number of different chemotherapy treatments including Adriamycin, Halaven, Xeloda plus lapatinib then Xeloda plus Herceptin. (  in [facility].)     Subsequently, she transferred her care to  at [facility].  -She was initially treated with Taxotere Herceptin Perjeta.    -She was then changed to Kadcyla.    In July 2020 PET scan showed progression.  She then took  approximately 9 months of Herceptin and Navelbine with initial response then progression.     She started trastuzumab- deruxtecan  4/12/21.      VTE Risk Mitigation (From admission, onward)           Ordered     enoxaparin injection 40 mg  Daily         05/15/24 2303     IP VTE HIGH RISK PATIENT  Once         05/15/24 2303     Place sequential compression device  Until discontinued         05/15/24 2303                    Discharge Planning   PAT: 5/18/2024     Code Status: Full Code   Is the patient medically ready for discharge?:     Reason for patient still in hospital (select all that apply): Treatment  Discharge Plan A: Home with family                  Clara Foley MD  Department of Hospital Medicine   [facility]reck Forrester - Stepdown Flex ([place])

## 2024-05-19 LAB
ALBUMIN SERPL BCP-MCNC: 2.4 G/DL (ref 3.5–5.2)
ALP SERPL-CCNC: 153 U/L (ref 55–135)
ALT SERPL W/O P-5'-P-CCNC: 9 U/L (ref 10–44)
ANION GAP SERPL CALC-SCNC: 9 MMOL/L (ref 8–16)
AST SERPL-CCNC: 27 U/L (ref 10–40)
BASOPHILS # BLD AUTO: 0.02 K/UL (ref 0–0.2)
BASOPHILS # BLD AUTO: 0.03 K/UL (ref 0–0.2)
BASOPHILS NFR BLD: 0.4 % (ref 0–1.9)
BASOPHILS NFR BLD: 0.6 % (ref 0–1.9)
BILIRUB SERPL-MCNC: 2.7 MG/DL (ref 0.1–1)
BUN SERPL-MCNC: 9 MG/DL (ref 6–20)
CALCIUM SERPL-MCNC: 8.5 MG/DL (ref 8.7–10.5)
CHLORIDE SERPL-SCNC: 97 MMOL/L (ref 95–110)
CO2 SERPL-SCNC: 29 MMOL/L (ref 23–29)
CREAT SERPL-MCNC: 0.5 MG/DL (ref 0.5–1.4)
DIFFERENTIAL METHOD BLD: ABNORMAL
DIFFERENTIAL METHOD BLD: ABNORMAL
EOSINOPHIL # BLD AUTO: 0.2 K/UL (ref 0–0.5)
EOSINOPHIL # BLD AUTO: 0.3 K/UL (ref 0–0.5)
EOSINOPHIL NFR BLD: 4.6 % (ref 0–8)
EOSINOPHIL NFR BLD: 6.9 % (ref 0–8)
ERYTHROCYTE [DISTWIDTH] IN BLOOD BY AUTOMATED COUNT: 24.1 % (ref 11.5–14.5)
ERYTHROCYTE [DISTWIDTH] IN BLOOD BY AUTOMATED COUNT: 24.4 % (ref 11.5–14.5)
EST. GFR  (NO RACE VARIABLE): >60 ML/MIN/1.73 M^2
GLUCOSE SERPL-MCNC: 88 MG/DL (ref 70–110)
HCT VFR BLD AUTO: 20.4 % (ref 37–48.5)
HCT VFR BLD AUTO: 20.5 % (ref 37–48.5)
HGB BLD-MCNC: 6.8 G/DL (ref 12–16)
HGB BLD-MCNC: 7 G/DL (ref 12–16)
IMM GRANULOCYTES # BLD AUTO: 0.03 K/UL (ref 0–0.04)
IMM GRANULOCYTES # BLD AUTO: 0.03 K/UL (ref 0–0.04)
IMM GRANULOCYTES NFR BLD AUTO: 0.6 % (ref 0–0.5)
IMM GRANULOCYTES NFR BLD AUTO: 0.6 % (ref 0–0.5)
LYMPHOCYTES # BLD AUTO: 0.7 K/UL (ref 1–4.8)
LYMPHOCYTES # BLD AUTO: 0.7 K/UL (ref 1–4.8)
LYMPHOCYTES NFR BLD: 14.9 % (ref 18–48)
LYMPHOCYTES NFR BLD: 15.9 % (ref 18–48)
MAGNESIUM SERPL-MCNC: 1.8 MG/DL (ref 1.6–2.6)
MCH RBC QN AUTO: 32.5 PG (ref 27–31)
MCH RBC QN AUTO: 33 PG (ref 27–31)
MCHC RBC AUTO-ENTMCNC: 33.3 G/DL (ref 32–36)
MCHC RBC AUTO-ENTMCNC: 34.1 G/DL (ref 32–36)
MCV RBC AUTO: 97 FL (ref 82–98)
MCV RBC AUTO: 98 FL (ref 82–98)
MONOCYTES # BLD AUTO: 0.3 K/UL (ref 0.3–1)
MONOCYTES # BLD AUTO: 0.4 K/UL (ref 0.3–1)
MONOCYTES NFR BLD: 6.9 % (ref 4–15)
MONOCYTES NFR BLD: 8.2 % (ref 4–15)
NEUTROPHILS # BLD AUTO: 3.2 K/UL (ref 1.8–7.7)
NEUTROPHILS # BLD AUTO: 3.4 K/UL (ref 1.8–7.7)
NEUTROPHILS NFR BLD: 69.1 % (ref 38–73)
NEUTROPHILS NFR BLD: 71.3 % (ref 38–73)
NRBC BLD-RTO: 0 /100 WBC
NRBC BLD-RTO: 0 /100 WBC
PHOSPHATE SERPL-MCNC: 2.2 MG/DL (ref 2.7–4.5)
PLATELET # BLD AUTO: 172 K/UL (ref 150–450)
PLATELET # BLD AUTO: 176 K/UL (ref 150–450)
PMV BLD AUTO: 10.8 FL (ref 9.2–12.9)
PMV BLD AUTO: 11.4 FL (ref 9.2–12.9)
POTASSIUM SERPL-SCNC: 3.7 MMOL/L (ref 3.5–5.1)
PROT SERPL-MCNC: 5.9 G/DL (ref 6–8.4)
RBC # BLD AUTO: 2.09 M/UL (ref 4–5.4)
RBC # BLD AUTO: 2.12 M/UL (ref 4–5.4)
SODIUM SERPL-SCNC: 135 MMOL/L (ref 136–145)
WBC # BLD AUTO: 4.64 K/UL (ref 3.9–12.7)
WBC # BLD AUTO: 4.77 K/UL (ref 3.9–12.7)

## 2024-05-19 PROCEDURE — 25000003 PHARM REV CODE 250: Performed by: STUDENT IN AN ORGANIZED HEALTH CARE EDUCATION/TRAINING PROGRAM

## 2024-05-19 PROCEDURE — 63600175 PHARM REV CODE 636 W HCPCS: Performed by: STUDENT IN AN ORGANIZED HEALTH CARE EDUCATION/TRAINING PROGRAM

## 2024-05-19 PROCEDURE — 25000003 PHARM REV CODE 250

## 2024-05-19 PROCEDURE — 85025 COMPLETE CBC W/AUTO DIFF WBC: CPT | Mod: 91 | Performed by: STUDENT IN AN ORGANIZED HEALTH CARE EDUCATION/TRAINING PROGRAM

## 2024-05-19 PROCEDURE — 80053 COMPREHEN METABOLIC PANEL: CPT

## 2024-05-19 PROCEDURE — 36415 COLL VENOUS BLD VENIPUNCTURE: CPT

## 2024-05-19 PROCEDURE — 83735 ASSAY OF MAGNESIUM: CPT

## 2024-05-19 PROCEDURE — 25000003 PHARM REV CODE 250: Performed by: HOSPITALIST

## 2024-05-19 PROCEDURE — 84100 ASSAY OF PHOSPHORUS: CPT

## 2024-05-19 PROCEDURE — 20600001 HC STEP DOWN PRIVATE ROOM

## 2024-05-19 PROCEDURE — 36415 COLL VENOUS BLD VENIPUNCTURE: CPT | Performed by: STUDENT IN AN ORGANIZED HEALTH CARE EDUCATION/TRAINING PROGRAM

## 2024-05-19 RX ORDER — SODIUM,POTASSIUM PHOSPHATES 280-250MG
2 POWDER IN PACKET (EA) ORAL ONCE
Status: COMPLETED | OUTPATIENT
Start: 2024-05-19 | End: 2024-05-19

## 2024-05-19 RX ADMIN — SPIRONOLACTONE 100 MG: 50 TABLET ORAL at 09:05

## 2024-05-19 RX ADMIN — MUPIROCIN: 20 OINTMENT TOPICAL at 08:05

## 2024-05-19 RX ADMIN — FUROSEMIDE 80 MG: 10 INJECTION, SOLUTION INTRAVENOUS at 05:05

## 2024-05-19 RX ADMIN — PANTOPRAZOLE SODIUM 40 MG: 40 TABLET, DELAYED RELEASE ORAL at 08:05

## 2024-05-19 RX ADMIN — TRIAMCINOLONE ACETONIDE: 5 CREAM TOPICAL at 09:05

## 2024-05-19 RX ADMIN — PANTOPRAZOLE SODIUM 40 MG: 40 TABLET, DELAYED RELEASE ORAL at 09:05

## 2024-05-19 RX ADMIN — MUPIROCIN: 20 OINTMENT TOPICAL at 09:05

## 2024-05-19 RX ADMIN — LACTULOSE 10 G: 20 SOLUTION ORAL at 08:05

## 2024-05-19 RX ADMIN — POTASSIUM & SODIUM PHOSPHATES POWDER PACK 280-160-250 MG 2 PACKET: 280-160-250 PACK at 11:05

## 2024-05-19 RX ADMIN — FUROSEMIDE 80 MG: 10 INJECTION, SOLUTION INTRAVENOUS at 08:05

## 2024-05-19 NOTE — PROGRESS NOTES
Dereck Forrester - Stepdown Flex ([place] [id number])  [facility] Medicine  Progress Note    Patient Name: Shikha López  MRN: [id number]  Patient Class: IP- Inpatient   Admission Date: 5/15/2024  Length of Stay: 4 days  Attending Physician: Antoni Castillo MD  Primary Care Provider: Lenore, Primary Doctor        Subjective:     Principal Problem:Symptomatic anemia        HPI:  The patient is a 53 year old female with a history of stage IV breast cancer (known lung mets, on Trastuzumab Deruxtecan q3 weks, most recent chemo 5/7/24; followed by Dr. Willis), HAWTHORNE cirrhosis (s/p TIPS 4/12 w/ outpatient IR appointment scheduled 5/16 for doppler w/ concern for decreased flow), GI bleeds w/ gastric and esophageal varices, presenting with two days of progressive dyspnea and fatigue. She reports decreased appetite and diarrhea (non-bloody). Of note she had recent hospital admission 4/21-4/26 for lower extremity cellulitis, with staph bacteremia now s/p linezolid course. She denies hemoptysis, hematemesis, nausea/vomiting, constipation, bloody bowel movements, cough, or chest pain.    ED evaluation significant for hemodynamic stability, pulse 92, RR 21, Tmax 99.0, saturating 99% on room air. WBC 2.29, Hgb 4.9, plts 59, Na 136, K 3.7, Bicarb 24, Cr 0.7, glucose 115, Mag 1.7, alk phos 171, albumin 2.5, Bili 3.4 (stable), BNP 1032, trop 0.134, TSH 3.9, flu/covid negative, lactate 1.59. FOBT testing negative in the ED. CXR consistent w/ pulmonary edema. ECG with sinus rhythm. ED discussed case with oncology who recommended admission to hospital medicine given elevated troponin, BNP.    Overview/Hospital Course:  Admitted to hospital medicine for symptomatic anemia and volume overload. S/p 2 units of pRBCs on admission with improvement in symptoms. Diuresing with lasix. Liver US showing low velocities in the portal venous side of the TIPS shunt. IR consulted and recommending repeat liver US in one month. Some concern for pneumonitis given CT  findings and current treatment with trastuzumab, heme/onc evaluated the patient and did not think pneumonitis was likely given she has had improvement of her symptoms with diuresis. Repeat CXR with persistent pulmonary vascular engorgement, increasing lasix dose and continuing diuresis.    Interval History: NAEO. AF. On 4L overnight with O2 sat 87%. Says she is feeling much better this morning. Reported 3 large BMs yesterday with improvement in appetite, one charted. Still refusing lactulose.    Review of Systems   Respiratory:  Negative for cough and shortness of breath.    Gastrointestinal:  Positive for diarrhea. Negative for abdominal pain, nausea and vomiting.   Neurological:  Negative for dizziness, weakness and light-headedness.     Objective:     Vital Signs (Most Recent):  Temp: 97.4 °F (36.3 °C) (05/19/24 0730)  Pulse: 89 (05/19/24 0730)  Resp: 17 (05/19/24 0730)  BP: (!) 114/56 (05/19/24 0730)  SpO2: (!) 92 % (05/19/24 0730) Vital Signs (24h Range):  Temp:  [97.4 °F (36.3 °C)-98.3 °F (36.8 °C)] 97.4 °F (36.3 °C)  Pulse:  [] 89  Resp:  [16-20] 17  SpO2:  [87 %-100 %] 92 %  BP: (107-124)/(53-60) 114/56     Weight: 82.9 kg (182 lb 12.2 oz)  Body mass index is 33.43 kg/m².    Intake/Output Summary (Last 24 hours) at 5/19/2024 0857  Last data filed at 5/18/2024 1944  Gross per 24 hour   Intake --   Output 3400 ml   Net -3400 ml         Physical Exam  Vitals and nursing note reviewed.   Constitutional:       General: She is not in acute distress.  HENT:      Head: Normocephalic and atraumatic.      Mouth/Throat:      Mouth: Mucous membranes are moist.   Eyes:      Extraocular Movements: Extraocular movements intact.      Conjunctiva/sclera: Conjunctivae normal.   Cardiovascular:      Rate and Rhythm: Normal rate and regular rhythm.      Heart sounds: Murmur (4/6 systolic) heard.   Pulmonary:      Effort: Pulmonary effort is normal. No respiratory distress.   Musculoskeletal:         General: Normal range  of motion.      Right lower leg: Edema (1+) present.      Left lower leg: Edema (1+) present.   Skin:     General: Skin is warm and dry.      Findings: Erythema (b/l, worse on LLE) present.   Neurological:      General: No focal deficit present.      Mental Status: She is alert and oriented to person, place, and time.   Psychiatric:         Mood and Affect: Mood normal.         Behavior: Behavior normal.             Significant Labs: All pertinent labs within the past 24 hours have been reviewed.    Significant Imaging: I have reviewed all pertinent imaging results/findings within the past 24 hours.    Assessment/Plan:      * Symptomatic anemia  Patient's anemia is currently uncontrolled. Has received 2 units of PRBCs on 5/15 . Etiology likely d/t drug toxicity from systemic chemotherapy  Current CBC reviewed-   Lab Results   Component Value Date    HGB 7.0 (L) 05/19/2024    HCT 20.5 (L) 05/19/2024     Monitor serial CBC and transfuse if patient becomes hemodynamically unstable, symptomatic or H/H drops below 7/21.    - See 'pancytopenia' for plan    Volume overload  Patient appears grossly fluid overloaded on admission w/ 3-4+ bilateral lower extremity edema, CXR with pulmonary congestion/edema, and BNP elevated to 1032. She has a history of lymphedema and HAWTHORNE cirrhosis (s/p TIPs) which would explain her lower extremity swelling, however unclear etiology of her pulmonary congestion. Most recent TTE reviewed from 4/24 which had 60-65% EF, no diastolic dysfunction. She had moderate AS at that time. Cardiac exam with harsh systolic murmur. Given unclear etiology of pulmonary congestion and possible worsening of murmur will obtain repeat TTE. TTE showing EF 60-65%, severely dilated L atrium, mild to moderate AV stenosis, and mild pulmonary hypertensive given PASP of 49 mmHg. Heme/onc consulted and do not think pneumonitis is likely to be the cause of her symptoms given her improvement. Will continue to diurese. Repeat  CXR 05/18 with persistent pulmonary vascular engorgement.    - Increasing diuresis to IV lasix 80 mg BID; goal net negative 1-2L daily  - Monitor lytes while diuresing    S/P TIPS (transjugular intrahepatic portosystemic shunt)  TIPS placed 4/12 for acute GI bleeding and hx of gastric and esophageal varices.  Had gastric portal vein coiling and embolization.    5/10 Liver Doppler US with: Sluggish flow in the portal vein end of the TIPS and bidirectional flow in the left portal vein, which could represent TIPS malfunction.   Patient had outpatient IR appointment the day after admission scheduled (5/16) to evaluate for necessity of TIPs revision. Will obtain updated liver doppler, consider inpatient IR consult pending results vs outpatient follow-up. Liver US concerning for low velocities within the portal venous side of the tips shunt c/f TIPS dysfunction. Currently declining lactulose due to reports of several diarrheal BMs, only one charted BM. I discussed with her the benefit of lactulose and she expressed understanding.    - IR consulted, recommending repeat Liver US in one month.  - Decrease Lactulose to BID    Pancytopenia  This patient is found to have pancytopenia, the likely etiology is Drug induced (including chemotherapy) related to breast cancer , will monitor CBC Every 8 hours. Will transfuse red blood cells if the hemoglobin is <7g/dL (or <8 in the setting of ACS). Will transfuse platelets if platelet count is <50k (if undergoing surgical procedure or have active bleeding). Hold DVT prophylaxis if platelets are <50k. The patient's hemoglobin, white blood cell count, and platelet count results have been reviewed and are listed below. Per heme/onc, feel pancytopenia is likely 2/2 recent linezolid course.   Recent Labs   Lab 05/19/24  0849   HGB 7.0*   WBC 4.64          - S/p 2 units PRBC in the ED  - Maintain up to date type and screen  - Trend CBC q8h to ensure stability  - Transfuse for Hgb < 7,  plts < 50    Esophageal and gastric varices  Noted history in the setting of anemia on admission. Patient denies hemetemesis, bloody bowel movements. FOBT testing negative on arrival in the ED.    - Monitor BP and Hgb  - Continue home PPI    Lymphedema  Patient follows with the lymphedema clinic w/ leg wraps and leg elevation. Increased lower extremity swelling on admission. Given associated pulmonary congestion on imaging concern for component of left-heart failure as well. Will evaluate as below.    - Leg elevation as tolerated  - Diuresis as below    Malignant neoplasm metastatic to left lung  See 'breast cancer'    Breast cancer, stage 4, left  Follows with Dr. Willis currently.    Oncologic History:  Diagnosed September 2006 w/ poorly differentiated carcinoma which was ER and AK. negative and HER2 positive.     She was then referred to [facility] for additional care.  CT scan of the chest and abdomen November 2006 revealed multiple nodules in the lungs consistent with metastatic disease.       She was treated with chemotherapy with weekly Herceptin and Abraxane with improvement in her breast and lung metastasis.     On May 29, 2007 she underwent left modified radical mastectomy(Dr. Colvin) which showed no residual tumor in the breast and 1/5 nodes was positive for metastasis measuring 6 mm.  (ypT0N1).  She then received postoperative radiation therapy(Dr Snigh)  to the left chest wall supraclav and internal mammary lymph nodes from August 20, 2007 to September 28, 2007.   Postoperatively she continued on Herceptin for approximately 3 and 1/2 years before her lung metastasis begin to grow.     She subsequently received a number of different chemotherapy treatments including Adriamycin, Halaven, Xeloda plus lapatinib then Xeloda plus Herceptin. (  in [facility]     Subsequently, she transferred her care to  at [facility].  -She was initially treated with Taxotere  Herceptin Perjeta.    -She was then changed to Kadcyla.    In July 2020 PET scan showed progression.  She then took approximately 9 months of Herceptin and Navelbine with initial response then progression.     She started trastuzumab- deruxtecan  4/12/21.      VTE Risk Mitigation (From admission, onward)           Ordered     enoxaparin injection 40 mg  Daily         05/15/24 2303     IP VTE HIGH RISK PATIENT  Once         05/15/24 2303     Place sequential compression device  Until discontinued         05/15/24 2303                    Discharge Planning   PAT: 5/18/2024     Code Status: Full Code   Is the patient medically ready for discharge?:     Reason for patient still in hospital (select all that apply): Treatment  Discharge Plan A: Home with family                  Clara Foley MD  Department of Hospital Medicine   Dereck Forrester - Stepdown Flex ([place])

## 2024-05-19 NOTE — SUBJECTIVE & OBJECTIVE
Interval History: NAEO. AF. On 4L overnight with O2 sat 87%. Says she is feeling much better this morning. Reported 3 large BMs yesterday with improvement in appetite, one charted. Still refusing lactulose.    Review of Systems   Respiratory:  Negative for cough and shortness of breath.    Gastrointestinal:  Positive for diarrhea. Negative for abdominal pain, nausea and vomiting.   Neurological:  Negative for dizziness, weakness and light-headedness.     Objective:     Vital Signs (Most Recent):  Temp: 97.4 °F (36.3 °C) (05/19/24 0730)  Pulse: 89 (05/19/24 0730)  Resp: 17 (05/19/24 0730)  BP: (!) 114/56 (05/19/24 0730)  SpO2: (!) 92 % (05/19/24 0730) Vital Signs (24h Range):  Temp:  [97.4 °F (36.3 °C)-98.3 °F (36.8 °C)] 97.4 °F (36.3 °C)  Pulse:  [] 89  Resp:  [16-20] 17  SpO2:  [87 %-100 %] 92 %  BP: (107-124)/(53-60) 114/56     Weight: 82.9 kg (182 lb 12.2 oz)  Body mass index is 33.43 kg/m².    Intake/Output Summary (Last 24 hours) at 5/19/2024 0857  Last data filed at 5/18/2024 1944  Gross per 24 hour   Intake --   Output 3400 ml   Net -3400 ml         Physical Exam  Vitals and nursing note reviewed.   Constitutional:       General: She is not in acute distress.  HENT:      Head: Normocephalic and atraumatic.      Mouth/Throat:      Mouth: Mucous membranes are moist.   Eyes:      Extraocular Movements: Extraocular movements intact.      Conjunctiva/sclera: Conjunctivae normal.   Cardiovascular:      Rate and Rhythm: Normal rate and regular rhythm.      Heart sounds: Murmur (4/6 systolic) heard.   Pulmonary:      Effort: Pulmonary effort is normal. No respiratory distress.   Musculoskeletal:         General: Normal range of motion.      Right lower leg: Edema (1+) present.      Left lower leg: Edema (1+) present.   Skin:     General: Skin is warm and dry.      Findings: Erythema (b/l, worse on LLE) present.   Neurological:      General: No focal deficit present.      Mental Status: She is alert and oriented  to person, place, and time.   Psychiatric:         Mood and Affect: Mood normal.         Behavior: Behavior normal.             Significant Labs: All pertinent labs within the past 24 hours have been reviewed.    Significant Imaging: I have reviewed all pertinent imaging results/findings within the past 24 hours.

## 2024-05-19 NOTE — ASSESSMENT & PLAN NOTE
This patient is found to have pancytopenia, the likely etiology is Drug induced (including chemotherapy) related to breast cancer , will monitor CBC Every 8 hours. Will transfuse red blood cells if the hemoglobin is <7g/dL (or <8 in the setting of ACS). Will transfuse platelets if platelet count is <50k (if undergoing surgical procedure or have active bleeding). Hold DVT prophylaxis if platelets are <50k. The patient's hemoglobin, white blood cell count, and platelet count results have been reviewed and are listed below. Per heme/onc, feel pancytopenia is likely 2/2 recent linezolid course.   Recent Labs   Lab 05/19/24  0849   HGB 7.0*   WBC 4.64          - S/p 2 units PRBC in the ED  - Maintain up to date type and screen  - Trend CBC q8h to ensure stability  - Transfuse for Hgb < 7, plts < 50

## 2024-05-19 NOTE — MEDICAL/APP STUDENT
[facility] Medicine Student   Progress Note  [facility] MED 3    Admit Date: 5/15/2024  Hospital Day: 4  05/19/2024  1:20 PM    SUBJECTIVE:   Ms. Shikha López is a 53 y.o. female with a relevant medical history of stage IV breast cancer (known lung mets, on Trastuzumab Deruxtecan q3 weks, most recent chemo 5/7/24; followed by Dr. Willis) s/p L radical mastectomy, s/p radiation therapy, HAWTHORNE cirrhosis (s/p TIPS 4/12 w/ outpatient IR appointment scheduled 5/16 for doppler w/ concern for decreased flow), GI bleeds w/ gastric and esophageal varices  who is being followed up for Symptomatic anemia.    Interval history: NAEON. Pt refusing lactulose. Received lactulose x1 this AM. Stool x3 yesterday per pt. O2 sat at low 90s on RA. Denies SOB, lightheadedness, dizziness.    Review of Systems   Constitutional: Negative.    HENT: Negative.     Eyes: Negative.    Respiratory: Negative.     Cardiovascular:  Positive for leg swelling.   Gastrointestinal: Negative.    Genitourinary: Negative.    Musculoskeletal: Negative.    Skin: Negative.    Neurological: Negative.    Endo/Heme/Allergies: Negative.    Psychiatric/Behavioral: Negative.         Please refer to the H&P for past medical, family, and social history.    OBJECTIVE:     Vital Signs Recent:  Temp: 97.6 °F (36.4 °C) (05/19/24 1103)  Pulse: 86 (05/19/24 1103)  Resp: 17 (05/19/24 1103)  BP: (!) 108/53 (05/19/24 1103)  SpO2: (!) 93 % (05/19/24 1103)  Oxygen Documentation:    Flow (L/min) (Oxygen Therapy): 4           Device (Oxygen Therapy): room air         Vital Signs Range (Last 24H):  Temp:  [97.4 °F (36.3 °C)-98.3 °F (36.8 °C)]   Pulse:  []   Resp:  [16-20]   BP: (107-117)/(53-58)   SpO2:  [87 %-100 %]        I & O (Last 24H):  Intake/Output Summary (Last 24 hours) at 5/19/2024 1320  Last data filed at 5/19/2024 0941  Gross per 24 hour   Intake --   Output 3150 ml   Net -3150 ml        Physical Exam:  Constitutional:       Appearance: She is ill-appearing.    HENT:      Head: Normocephalic and atraumatic.   Cardiovascular:      Rate and Rhythm: Normal rate and regular rhythm.      Pulses: Normal pulses.      Heart sounds: Murmur heard.      Comments: Grade III pansystolic murmur heard best over R 2nd intercostal space.  Pulmonary:      Effort: Pulmonary effort is normal. No respiratory distress.      Comments: Crackles over R lower lobe  Abdominal:      General: Abdomen is flat.      Palpations: Abdomen is soft.   Musculoskeletal:      Cervical back: Normal range of motion and neck supple.      Right lower leg: Edema present.      Left lower leg: Edema present.      Comments: Grade 2 pitting edema   Skin:     General: Skin is warm and dry.      Capillary Refill: Capillary refill takes less than 2 seconds.   Neurological:      General: No focal deficit present.      Mental Status: She is alert and oriented to person, place, and time.   Psychiatric:         Mood and Affect: Mood normal.         Behavior: Behavior normal.     Labs:   Recent Labs   Lab 05/17/24 0522 05/18/24 0627 05/18/24 1900 05/19/24  0849    138 137 135*   K 3.5 2.8* 3.9 3.7    101 99 97   CO2 25 27 27 29   BUN 14 10 9 9   CREATININE 0.6 0.6 0.6 0.5    123* 115* 88   CALCIUM 8.1* 8.2* 8.7 8.5*   MG 1.7 1.5*  --  1.8   PHOS 2.7 2.7  --  2.2*     Recent Labs   Lab 05/15/24  2357 05/16/24  0808 05/17/24 0522 05/18/24 0627 05/19/24  0849   ALKPHOS  --    < > 168* 154* 153*   ALT  --    < > 11 11 9*   AST  --    < > 25 24 27   ALBUMIN  --    < > 2.4* 2.3* 2.4*   PROT  --    < > 5.7* 5.6* 5.9*   BILITOT  --    < > 3.8* 2.8* 2.7*   INR 1.5*  --   --   --   --     < > = values in this interval not displayed.     Recent Labs   Lab 05/18/24 0627 05/18/24 1900 05/19/24  0849   WBC 4.34 5.55 4.64   HGB 7.0* 7.4* 7.0*   HCT 20.4* 21.5* 20.5*   * 183 176       Diagnostic Results:      Scheduled Meds:   enoxparin  40 mg Subcutaneous Daily    furosemide (LASIX) injection  80 mg  Intravenous BID    lactulose  10 g Oral BID    mupirocin   Nasal BID    pantoprazole  40 mg Oral BID    spironolactone  100 mg Oral Daily    triamcinolone acetonide 0.5%   Topical (Top) BID     Continuous Infusions:  PRN Meds:  Current Facility-Administered Medications:     0.9%  NaCl infusion (for blood administration), , Intravenous, Q24H PRN    acetaminophen, 650 mg, Oral, Q4H PRN    dextrose 10%, 12.5 g, Intravenous, PRN    dextrose 10%, 25 g, Intravenous, PRN    glucagon (human recombinant), 1 mg, Intramuscular, PRN    glucose, 16 g, Oral, PRN    glucose, 24 g, Oral, PRN    naloxone, 0.02 mg, Intravenous, PRN    ondansetron, 4 mg, Intravenous, Q6H PRN    polyethylene glycol, 17 g, Oral, BID PRN    sodium chloride 0.9%, 10 mL, Intravenous, Q12H PRN    ASSESSMENT/PLAN:   Ms. Shikha López is a 53 y.o. female with a relevant medical history of stage IV breast cancer (known lung mets, on Trastuzumab Deruxtecan q3 weks, most recent chemo 5/7/24; followed by Dr. Willis) s/p L radical mastectomy, s/p radiation therapy, HAWTHORNE cirrhosis (s/p TIPS 4/12 w/ outpatient IR appointment scheduled 5/16 for doppler w/ concern for decreased flow), GI bleeds w/ gastric and esophageal varices who is being followed up for Symptomatic anemia.    Active Hospital Problems    Diagnosis  POA    *Symptomatic anemia [D64.9]  Yes    Elevated troponin [R79.89]  Yes    Pleural effusion [J90]  Yes    Biliary cirrhosis [K74.5]  Yes    S/P TIPS (transjugular intrahepatic portosystemic shunt) [Z95.828]  Not Applicable    Volume overload [E87.70]  Yes    Pancytopenia [D61.818]  Yes    Esophageal and gastric varices [I85.00, I86.4]  Yes    Lymphedema [I89.0]  Yes    Breast cancer, stage 4, left [C50.912]  Yes    Malignant neoplasm metastatic to left lung [C78.02]  Yes      Resolved Hospital Problems   No resolved problems to display.       Symptomatic anemia  Patient's anemia is currently uncontrolled. Has received 2 units of PRBCs on 5/15 .  Etiology likely d/t drug toxicity from systemic chemotherapy  Current CBC reviewed-     Recent Labs   Lab 05/19/24  0849   WBC 4.64   RBC 2.12*   HGB 7.0*   HCT 20.5*      MCV 97   MCH 33.0*   MCHC 34.1       Monitor serial CBC and transfuse if patient becomes hemodynamically unstable, symptomatic or H/H drops below 7/21.     - See 'pancytopenia' for plan    Hypoxemia  Sating low 90s on RA. CXR with c/f pulmonary edema. Currently on Lasix 80. Pt denies SOB, lightheadedness.  - 6 minute walk test     Volume overload  Patient appears grossly fluid overloaded on admission w/ 3-4+ bilateral lower extremity edema, CXR with pulmonary congestion/edema, and BNP elevated to 1032. She has a history of lymphedema and HAWTHORNE cirrhosis (s/p TIPs) which would explain her lower extremity swelling, however unclear etiology of her pulmonary congestion. Most recent TTE reviewed from 4/24 which had 60-65% EF, no diastolic dysfunction. She had moderate AS at that time. Cardiac exam with harsh systolic murmur. Given unclear etiology of pulmonary congestion and possible worsening of murmur will obtain repeat TTE. TTE showing EF 60-65%, severely dilated L atrium, mild to moderate AV stenosis, and mild pulmonary hypertensive given PASP of 49 mmHg. Heme/onc consulted and do not think pneumonitis is likely to be the cause of her symptoms given her improvement. Will continue to diurese.     - Continue IV diuresis 80 mg BID; goal net negative 1-2L daily  - Monitor lytes while diuresing     S/P TIPS (transjugular intrahepatic portosystemic shunt)  TIPS placed 4/12 for acute GI bleeding and hx of gastric and esophageal varices.  Had gastric portal vein coiling and embolization.     5/10 Liver Doppler US with: Sluggish flow in the portal vein end of the TIPS and bidirectional flow in the left portal vein, which could represent TIPS malfunction.   Patient had outpatient IR appointment the day after admission scheduled (5/16) to evaluate for  necessity of TIPs revision. Will obtain updated liver doppler, consider inpatient IR consult pending results vs outpatient follow-up. Liver US concerning for low velocities within the portal venous side of the tips shunt c/f TIPS dysfunction.     - IR consulted, recommending repeat Liver US in one month.  - Continue Lactulose 20 mg qday. Pt reported denying lactulose for excessive BM , chart notes only 1x. Pt educated on lactulose ppx for hepatic encephalopathy especially with her Hx of TIPS. Pt voiced understanding. Pt not encephalopathic on exam.      Pancytopenia  This patient is found to have pancytopenia, the likely etiology is Drug induced (including chemotherapy) related to breast cancer , will monitor CBC Every 8 hours. Will transfuse red blood cells if the hemoglobin is <7g/dL (or <8 in the setting of ACS). Will transfuse platelets if platelet count is <50k (if undergoing surgical procedure or have active bleeding). Hold DVT prophylaxis if platelets are <50k. The patient's hemoglobin, white blood cell count, and platelet count results have been reviewed and are listed below. Per heme/onc, feel pancytopenia is likely 2/2 recent linezolid course.       Recent Labs   Lab 05/19/24  0849   HGB 7.0*   WBC 4.64                 - S/p 2 units PRBC in the ED  - Maintain up to date type and screen  - Trend CBC q8h to ensure stability  - Transfuse for Hgb < 7, plts < 50     Esophageal and gastric varices  Noted history in the setting of anemia on admission. Patient denies hemetemesis, bloody bowel movements. FOBT testing negative on arrival in the ED.     - Monitor BP and Hgb  - Continue home PPI     Lymphedema  Patient follows with the lymphedema clinic w/ leg wraps and leg elevation. Increased lower extremity swelling on admission. Given associated pulmonary congestion on imaging concern for component of left-heart failure as well. Will evaluate as below.     - Leg elevation as tolerated  - Diuresis as below      Malignant neoplasm metastatic to left lung  See 'breast cancer'     Breast cancer, stage 4, left  Follows with Dr. Willis currently.     Oncologic History:  Diagnosed September 2006 w/ poorly differentiated carcinoma which was ER and [place]. negative and HER2 positive.     She was then referred to [facility] for additional care.  CT scan of the chest and abdomen November 2006 revealed multiple nodules in the lungs consistent with metastatic disease.       She was treated with chemotherapy with weekly Herceptin and Abraxane with improvement in her breast and lung metastasis.     On May 29, 2007 she underwent left modified radical mastectomy(Dr. Colvin) which showed no residual tumor in the breast and 1/5 nodes was positive for metastasis measuring 6 mm.  (ypT0N1).  She then received postoperative radiation therapy(Dr Singh)  to the left chest wall supraclav and internal mammary lymph nodes from August 20, 2007 to September 28, 2007.   Postoperatively she continued on Herceptin for approximately 3 and 1/2 years before her lung metastasis begin to grow.     She subsequently received a number of different chemotherapy treatments including Adriamycin, Halaven, Xeloda plus lapatinib then Xeloda plus Herceptin. (  in [facility]     Subsequently, she transferred her care to  at [facility].  -She was initially treated with Taxotere Herceptin Perjeta.    -She was then changed to Kadcyla.     In July 2020 PET scan showed progression.  She then took approximately 9 months of Herceptin and Navelbine with initial response then progression.     She started trastuzumab- deruxtecan  4/12/21.     -[facility] following, appreciate recs:         - Low suspicion for pneumonitis, symptoms would not be improving with diuresis          - Recommend continuing diuresis   - Follow-up appointment with oncology on 5/28     Discharge planning:   PAT: 5/19/2024     Code Status: Full Code   Is the patient  medically ready for discharge?: No    Reason for patient still in hospital (select all that apply): Treatment  Discharge Plan A: Home with family         Art Welsh, MS4  [facility]

## 2024-05-19 NOTE — ASSESSMENT & PLAN NOTE
Patient appears grossly fluid overloaded on admission w/ 3-4+ bilateral lower extremity edema, CXR with pulmonary congestion/edema, and BNP elevated to 1032. She has a history of lymphedema and HAWTHORNE cirrhosis (s/p TIPs) which would explain her lower extremity swelling, however unclear etiology of her pulmonary congestion. Most recent TTE reviewed from 4/24 which had 60-65% EF, no diastolic dysfunction. She had moderate AS at that time. Cardiac exam with harsh systolic murmur. Given unclear etiology of pulmonary congestion and possible worsening of murmur will obtain repeat TTE. TTE showing EF 60-65%, severely dilated L atrium, mild to moderate AV stenosis, and mild pulmonary hypertensive given PASP of 49 mmHg. Heme/onc consulted and do not think pneumonitis is likely to be the cause of her symptoms given her improvement. Will continue to diurese. Repeat CXR 05/18 with persistent pulmonary vascular engorgement.    - Increasing diuresis to IV lasix 80 mg BID; goal net negative 1-2L daily  - Monitor lytes while diuresing

## 2024-05-19 NOTE — PLAN OF CARE
Problem: Adult Inpatient Plan of Care  Goal: Plan of Care Review  Outcome: Progressing  Flowsheets (Taken 5/18/2024 1925)  Plan of Care Reviewed With: patient  Goal: Patient-Specific Goal (Individualized)  Outcome: Progressing  Goal: Absence of Hospital-Acquired Illness or Injury  Outcome: Progressing  Intervention: Identify and Manage Fall Risk  Flowsheets (Taken 5/18/2024 1925)  Safety Promotion/Fall Prevention:   assistive device/personal item within reach   side rails raised x 2  Intervention: Prevent Skin Injury  Flowsheets (Taken 5/18/2024 1925)  Body Position: position changed independently  Skin Protection: incontinence pads utilized  Device Skin Pressure Protection: absorbent pad utilized/changed  Intervention: Prevent and Manage VTE (Venous Thromboembolism) Risk  Flowsheets (Taken 5/18/2024 1925)  VTE Prevention/Management: ambulation promoted  Intervention: Prevent Infection  Flowsheets (Taken 5/18/2024 1925)  Infection Prevention: single patient room provided  Goal: Optimal Comfort and Wellbeing  Outcome: Progressing  Intervention: Monitor Pain and Promote Comfort  Flowsheets (Taken 5/18/2024 1925)  Pain Management Interventions: pain management plan reviewed with patient/caregiver  Intervention: Provide Person-Centered Care  Flowsheets (Taken 5/18/2024 1925)  Trust Relationship/Rapport:   care explained   choices provided   thoughts/feelings acknowledged        Patient alert and oriented x4; care plan discussed with patient; call light and personal belongings within reach; free from falls/injuries during the shift; 6 MWT pending (patient declined to do it at the end of shift); lasix dose increased; patient able to express needs, no needs at this time; will continue with poc.

## 2024-05-19 NOTE — ASSESSMENT & PLAN NOTE
Follows with Dr. Willis currently.    Oncologic History:  Diagnosed September 2006 w/ poorly differentiated carcinoma which was ER and MD. negative and HER2 positive.     She was then referred to [facility] for additional care.  CT scan of the chest and abdomen November 2006 revealed multiple nodules in the lungs consistent with metastatic disease.       She was treated with chemotherapy with weekly Herceptin and Abraxane with improvement in her breast and lung metastasis.     On May 29, 2007 she underwent left modified radical mastectomy(Dr. Colvin) which showed no residual tumor in the breast and 1/5 nodes was positive for metastasis measuring 6 mm.  (ypT0N1).  She then received postoperative radiation therapy(Dr Singh)  to the left chest wall supraclav and internal mammary lymph nodes from August 20, 2007 to September 28, 2007.   Postoperatively she continued on Herceptin for approximately 3 and 1/2 years before her lung metastasis begin to grow.     She subsequently received a number of different chemotherapy treatments including Adriamycin, Halaven, Xeloda plus lapatinib then Xeloda plus Herceptin. (  in [facility]     Subsequently, she transferred her care to  at [facility].  -She was initially treated with Taxotere Herceptin Perjeta.    -She was then changed to Kadcyla.    In July 2020 PET scan showed progression.  She then took approximately 9 months of Herceptin and Navelbine with initial response then progression.     She started trastuzumab- deruxtecan  4/12/21.

## 2024-05-19 NOTE — ASSESSMENT & PLAN NOTE
Patient's anemia is currently uncontrolled. Has received 2 units of PRBCs on 5/15 . Etiology likely d/t drug toxicity from systemic chemotherapy  Current CBC reviewed-   Lab Results   Component Value Date    HGB 7.0 (L) 05/19/2024    HCT 20.5 (L) 05/19/2024     Monitor serial CBC and transfuse if patient becomes hemodynamically unstable, symptomatic or H/H drops below 7/21.    - See 'pancytopenia' for plan

## 2024-05-20 PROBLEM — E87.1 HYPONATREMIA: Status: ACTIVE | Noted: 2024-05-20

## 2024-05-20 PROBLEM — J81.1 PULMONARY EDEMA: Status: ACTIVE | Noted: 2024-05-20

## 2024-05-20 LAB
ALBUMIN SERPL BCP-MCNC: 2.4 G/DL (ref 3.5–5.2)
ALP SERPL-CCNC: 152 U/L (ref 55–135)
ALT SERPL W/O P-5'-P-CCNC: 12 U/L (ref 10–44)
ANION GAP SERPL CALC-SCNC: 6 MMOL/L (ref 8–16)
AST SERPL-CCNC: 32 U/L (ref 10–40)
BACTERIA BLD CULT: NORMAL
BACTERIA BLD CULT: NORMAL
BASOPHILS # BLD AUTO: 0.02 K/UL (ref 0–0.2)
BASOPHILS # BLD AUTO: 0.03 K/UL (ref 0–0.2)
BASOPHILS NFR BLD: 0.4 % (ref 0–1.9)
BASOPHILS NFR BLD: 0.6 % (ref 0–1.9)
BILIRUB SERPL-MCNC: 3 MG/DL (ref 0.1–1)
BLD PROD TYP BPU: NORMAL
BLOOD UNIT EXPIRATION DATE: NORMAL
BLOOD UNIT TYPE CODE: 5100
BLOOD UNIT TYPE: NORMAL
BUN SERPL-MCNC: 9 MG/DL (ref 6–20)
CALCIUM SERPL-MCNC: 8.6 MG/DL (ref 8.7–10.5)
CHLORIDE SERPL-SCNC: 95 MMOL/L (ref 95–110)
CO2 SERPL-SCNC: 33 MMOL/L (ref 23–29)
CODING SYSTEM: NORMAL
CREAT SERPL-MCNC: 0.6 MG/DL (ref 0.5–1.4)
CROSSMATCH INTERPRETATION: NORMAL
DIFFERENTIAL METHOD BLD: ABNORMAL
DIFFERENTIAL METHOD BLD: ABNORMAL
DISPENSE STATUS: NORMAL
EOSINOPHIL # BLD AUTO: 0.3 K/UL (ref 0–0.5)
EOSINOPHIL # BLD AUTO: 0.3 K/UL (ref 0–0.5)
EOSINOPHIL NFR BLD: 5.6 % (ref 0–8)
EOSINOPHIL NFR BLD: 5.9 % (ref 0–8)
ERYTHROCYTE [DISTWIDTH] IN BLOOD BY AUTOMATED COUNT: 22.5 % (ref 11.5–14.5)
ERYTHROCYTE [DISTWIDTH] IN BLOOD BY AUTOMATED COUNT: 24.1 % (ref 11.5–14.5)
EST. GFR  (NO RACE VARIABLE): >60 ML/MIN/1.73 M^2
GLUCOSE SERPL-MCNC: 98 MG/DL (ref 70–110)
HCT VFR BLD AUTO: 20.7 % (ref 37–48.5)
HCT VFR BLD AUTO: 24.9 % (ref 37–48.5)
HGB BLD-MCNC: 7.1 G/DL (ref 12–16)
HGB BLD-MCNC: 8.4 G/DL (ref 12–16)
IMM GRANULOCYTES # BLD AUTO: 0.05 K/UL (ref 0–0.04)
IMM GRANULOCYTES # BLD AUTO: 0.05 K/UL (ref 0–0.04)
IMM GRANULOCYTES NFR BLD AUTO: 1 % (ref 0–0.5)
IMM GRANULOCYTES NFR BLD AUTO: 1 % (ref 0–0.5)
LYMPHOCYTES # BLD AUTO: 0.7 K/UL (ref 1–4.8)
LYMPHOCYTES # BLD AUTO: 0.7 K/UL (ref 1–4.8)
LYMPHOCYTES NFR BLD: 12.5 % (ref 18–48)
LYMPHOCYTES NFR BLD: 13 % (ref 18–48)
MAGNESIUM SERPL-MCNC: 1.6 MG/DL (ref 1.6–2.6)
MCH RBC QN AUTO: 32.6 PG (ref 27–31)
MCH RBC QN AUTO: 33 PG (ref 27–31)
MCHC RBC AUTO-ENTMCNC: 33.7 G/DL (ref 32–36)
MCHC RBC AUTO-ENTMCNC: 34.3 G/DL (ref 32–36)
MCV RBC AUTO: 96 FL (ref 82–98)
MCV RBC AUTO: 97 FL (ref 82–98)
MONOCYTES # BLD AUTO: 0.3 K/UL (ref 0.3–1)
MONOCYTES # BLD AUTO: 0.4 K/UL (ref 0.3–1)
MONOCYTES NFR BLD: 6.5 % (ref 4–15)
MONOCYTES NFR BLD: 7.7 % (ref 4–15)
NEUTROPHILS # BLD AUTO: 3.8 K/UL (ref 1.8–7.7)
NEUTROPHILS # BLD AUTO: 3.9 K/UL (ref 1.8–7.7)
NEUTROPHILS NFR BLD: 71.8 % (ref 38–73)
NEUTROPHILS NFR BLD: 74 % (ref 38–73)
NRBC BLD-RTO: 0 /100 WBC
NRBC BLD-RTO: 1 /100 WBC
NUM UNITS TRANS PACKED RBC: NORMAL
PHOSPHATE SERPL-MCNC: 3.5 MG/DL (ref 2.7–4.5)
PLATELET # BLD AUTO: 189 K/UL (ref 150–450)
PLATELET # BLD AUTO: 209 K/UL (ref 150–450)
PMV BLD AUTO: 10.9 FL (ref 9.2–12.9)
PMV BLD AUTO: 10.9 FL (ref 9.2–12.9)
POTASSIUM SERPL-SCNC: 3.2 MMOL/L (ref 3.5–5.1)
PROT SERPL-MCNC: 5.7 G/DL (ref 6–8.4)
RBC # BLD AUTO: 2.15 M/UL (ref 4–5.4)
RBC # BLD AUTO: 2.58 M/UL (ref 4–5.4)
SODIUM SERPL-SCNC: 134 MMOL/L (ref 136–145)
WBC # BLD AUTO: 5.2 K/UL (ref 3.9–12.7)
WBC # BLD AUTO: 5.22 K/UL (ref 3.9–12.7)

## 2024-05-20 PROCEDURE — P9016 RBC LEUKOCYTES REDUCED: HCPCS

## 2024-05-20 PROCEDURE — 86920 COMPATIBILITY TEST SPIN: CPT

## 2024-05-20 PROCEDURE — 83735 ASSAY OF MAGNESIUM: CPT

## 2024-05-20 PROCEDURE — 20600001 HC STEP DOWN PRIVATE ROOM

## 2024-05-20 PROCEDURE — 63600175 PHARM REV CODE 636 W HCPCS

## 2024-05-20 PROCEDURE — 36415 COLL VENOUS BLD VENIPUNCTURE: CPT

## 2024-05-20 PROCEDURE — 36415 COLL VENOUS BLD VENIPUNCTURE: CPT | Performed by: STUDENT IN AN ORGANIZED HEALTH CARE EDUCATION/TRAINING PROGRAM

## 2024-05-20 PROCEDURE — 25000003 PHARM REV CODE 250: Performed by: HOSPITALIST

## 2024-05-20 PROCEDURE — 80053 COMPREHEN METABOLIC PANEL: CPT

## 2024-05-20 PROCEDURE — 25000003 PHARM REV CODE 250

## 2024-05-20 PROCEDURE — 84100 ASSAY OF PHOSPHORUS: CPT

## 2024-05-20 PROCEDURE — 85025 COMPLETE CBC W/AUTO DIFF WBC: CPT | Performed by: STUDENT IN AN ORGANIZED HEALTH CARE EDUCATION/TRAINING PROGRAM

## 2024-05-20 RX ORDER — HYDROCODONE BITARTRATE AND ACETAMINOPHEN 500; 5 MG/1; MG/1
TABLET ORAL
Status: DISCONTINUED | OUTPATIENT
Start: 2024-05-20 | End: 2024-05-21 | Stop reason: HOSPADM

## 2024-05-20 RX ORDER — FUROSEMIDE 40 MG/1
80 TABLET ORAL 2 TIMES DAILY
Status: DISCONTINUED | OUTPATIENT
Start: 2024-05-20 | End: 2024-05-21 | Stop reason: HOSPADM

## 2024-05-20 RX ORDER — MAGNESIUM SULFATE HEPTAHYDRATE 40 MG/ML
2 INJECTION, SOLUTION INTRAVENOUS
Status: COMPLETED | OUTPATIENT
Start: 2024-05-20 | End: 2024-05-20

## 2024-05-20 RX ORDER — POTASSIUM CHLORIDE 20 MEQ/1
40 TABLET, EXTENDED RELEASE ORAL 2 TIMES DAILY
Status: DISCONTINUED | OUTPATIENT
Start: 2024-05-20 | End: 2024-05-20

## 2024-05-20 RX ADMIN — SPIRONOLACTONE 100 MG: 50 TABLET ORAL at 08:05

## 2024-05-20 RX ADMIN — TRIAMCINOLONE ACETONIDE: 5 CREAM TOPICAL at 08:05

## 2024-05-20 RX ADMIN — FUROSEMIDE 80 MG: 40 TABLET ORAL at 08:05

## 2024-05-20 RX ADMIN — POTASSIUM BICARBONATE 40 MEQ: 391 TABLET, EFFERVESCENT ORAL at 02:05

## 2024-05-20 RX ADMIN — POTASSIUM BICARBONATE 40 MEQ: 391 TABLET, EFFERVESCENT ORAL at 10:05

## 2024-05-20 RX ADMIN — PANTOPRAZOLE SODIUM 40 MG: 40 TABLET, DELAYED RELEASE ORAL at 08:05

## 2024-05-20 RX ADMIN — MUPIROCIN: 20 OINTMENT TOPICAL at 08:05

## 2024-05-20 RX ADMIN — FUROSEMIDE 80 MG: 40 TABLET ORAL at 05:05

## 2024-05-20 RX ADMIN — MAGNESIUM SULFATE HEPTAHYDRATE 2 G: 40 INJECTION, SOLUTION INTRAVENOUS at 10:05

## 2024-05-20 RX ADMIN — MAGNESIUM SULFATE HEPTAHYDRATE 2 G: 40 INJECTION, SOLUTION INTRAVENOUS at 12:05

## 2024-05-20 NOTE — ASSESSMENT & PLAN NOTE
Patient's anemia is currently uncontrolled. Has received 2 units of PRBCs on 5/15 . Etiology likely d/t drug toxicity from systemic chemotherapy  Current CBC reviewed-   Lab Results   Component Value Date    HGB 7.1 (L) 05/20/2024    HCT 20.7 (L) 05/20/2024     Monitor serial CBC and transfuse if patient becomes hemodynamically unstable, symptomatic or H/H drops below 7/21.    - See 'pancytopenia' for plan

## 2024-05-20 NOTE — PROGRESS NOTES
[facility] - Stepdown Flex ([place] [id number])  [facility]  Progress Note    Patient Name: Shikha López  MRN: [id number]  Patient Class: IP- Inpatient   Admission Date: 5/15/2024  Length of Stay: 5 days  Attending Physician: Antoni Castillo MD  Primary Care Provider: Lenore, Primary Doctor        Subjective:     Principal Problem:Symptomatic anemia        HPI:  The patient is a 53 year old female with a history of stage IV breast cancer (known lung mets, on Trastuzumab Deruxtecan q3 weks, most recent chemo 5/7/24; followed by Dr. Willis), HAWTHORNE cirrhosis (s/p TIPS 4/12 w/ outpatient IR appointment scheduled 5/16 for doppler w/ concern for decreased flow), GI bleeds w/ gastric and esophageal varices, presenting with two days of progressive dyspnea and fatigue. She reports decreased appetite and diarrhea (non-bloody). Of note she had recent hospital admission 4/21-4/26 for lower extremity cellulitis, with staph bacteremia now s/p linezolid course. She denies hemoptysis, hematemesis, nausea/vomiting, constipation, bloody bowel movements, cough, or chest pain.    ED evaluation significant for hemodynamic stability, pulse 92, RR 21, Tmax 99.0, saturating 99% on room air. WBC 2.29, Hgb 4.9, plts 59, Na 136, K 3.7, Bicarb 24, Cr 0.7, glucose 115, Mag 1.7, alk phos 171, albumin 2.5, Bili 3.4 (stable), BNP 1032, trop 0.134, TSH 3.9, flu/covid negative, lactate 1.59. FOBT testing negative in the ED. CXR consistent w/ pulmonary edema. ECG with sinus rhythm. ED discussed case with oncology who recommended admission to hospital medicine given elevated troponin, BNP.    Overview/Hospital Course:  Admitted to hospital medicine for symptomatic anemia and volume overload. S/p 2 units of pRBCs on admission with improvement in symptoms. Diuresing with lasix. Liver US showing low velocities in the portal venous side of the TIPS shunt. IR consulted and recommending repeat liver US in one month. Some concern for pneumonitis given CT  findings and current treatment with trastuzumab, heme/onc evaluated the patient and did not think pneumonitis was likely given she has had improvement of her symptoms with diuresis. Repeat CXR with persistent pulmonary vascular engorgement, increasing lasix dose and continuing diuresis. Transitioned to PO lasix on 5/20/24 in setting of euvolemia. One unit of pRBCs was added on to correct low hemoglobin in setting of symptomatic anemia. Orthostatic BP wnl.     Interval History: No acute events overnight. One unit of pRBCs transfused for symptomatic anemia. Transitioned to PO diuresis. Will perform 6min walk test on 5/21/24.     Review of Systems   HENT:  Negative for congestion.    Respiratory:  Negative for cough and shortness of breath.    Cardiovascular:  Positive for leg swelling. Negative for chest pain.   Gastrointestinal:  Positive for diarrhea. Negative for abdominal distention, abdominal pain, nausea and vomiting.   Neurological:  Negative for dizziness, weakness and light-headedness.     Objective:     Vital Signs (Most Recent):  Temp: 97.9 °F (36.6 °C) (05/20/24 1531)  Pulse: 97 (05/20/24 1514)  Resp: 18 (05/20/24 1531)  BP: (!) 116/54 (05/20/24 1531)  SpO2: 96 % (05/20/24 1514) Vital Signs (24h Range):  Temp:  [97.8 °F (36.6 °C)-99 °F (37.2 °C)] 97.9 °F (36.6 °C)  Pulse:  [] 97  Resp:  [15-20] 18  SpO2:  [90 %-96 %] 96 %  BP: (107-128)/(51-64) 116/54     Weight: 82.9 kg (182 lb 12.2 oz)  Body mass index is 33.43 kg/m².    Intake/Output Summary (Last 24 hours) at 5/20/2024 1543  Last data filed at 5/19/2024 2118  Gross per 24 hour   Intake --   Output 1000 ml   Net -1000 ml         Physical Exam  Vitals and nursing note reviewed.   Constitutional:       General: She is not in acute distress.  HENT:      Head: Normocephalic and atraumatic.      Mouth/Throat:      Mouth: Mucous membranes are moist.   Eyes:      Extraocular Movements: Extraocular movements intact.      Conjunctiva/sclera: Conjunctivae  normal.   Cardiovascular:      Rate and Rhythm: Normal rate and regular rhythm.      Pulses:           Carotid pulses are 2+ on the right side and 2+ on the left side.     Heart sounds: Murmur heard.   Pulmonary:      Effort: Pulmonary effort is normal. No respiratory distress.      Breath sounds: Examination of the right-lower field reveals rales. Examination of the left-lower field reveals rales. Rales present. No wheezing.   Abdominal:      General: Bowel sounds are normal. There is no distension.      Palpations: Abdomen is soft.      Tenderness: There is no abdominal tenderness.   Musculoskeletal:         General: Normal range of motion.      Right lower le+ Pitting Edema present.      Left lower le+ Pitting Edema present.   Skin:     General: Skin is warm and dry.      Coloration: Skin is not jaundiced or pale.      Findings: Erythema present.   Neurological:      General: No focal deficit present.      Mental Status: She is alert and oriented to person, place, and time.   Psychiatric:         Mood and Affect: Mood normal.         Behavior: Behavior normal.             Significant Labs: All pertinent labs within the past 24 hours have been reviewed.    Significant Imaging: I have reviewed all pertinent imaging results/findings within the past 24 hours.    Assessment/Plan:      * Symptomatic anemia  Patient's anemia is currently uncontrolled. Has received 2 units of PRBCs on 5/15 . Etiology likely d/t drug toxicity from systemic chemotherapy  Current CBC reviewed-   Lab Results   Component Value Date    HGB 7.1 (L) 2024    HCT 20.7 (L) 2024     Monitor serial CBC and transfuse if patient becomes hemodynamically unstable, symptomatic or H/H drops below 7/21.    - See 'pancytopenia' for plan    Pulmonary edema  Pt admitted with SOB and low hemoglobin. pRBCs transfused. Supplemental O2 provided via LFNC with continuous SpO2 monitoring. CT Chest concerning for pulmonary edema. IV diuresis  started. Will transition to home PO lasix dose upon euvolemia.     - Monitor continuous pulse oxymetry   - Trend vitals and labs  - Strict I/Os  - Titrate diuretics as indicated  - 6min walk test for home oxygen requirements    Volume overload  Patient appears grossly fluid overloaded on admission w/ 3-4+ bilateral lower extremity edema, CXR with pulmonary congestion/edema, and BNP elevated to 1032. She has a history of lymphedema and HAWTHORNE cirrhosis (s/p TIPs) which would explain her lower extremity swelling, however unclear etiology of her pulmonary congestion. Most recent TTE reviewed from 4/24 which had 60-65% EF, no diastolic dysfunction. She had moderate AS at that time. Cardiac exam with harsh systolic murmur. Given unclear etiology of pulmonary congestion and possible worsening of murmur will obtain repeat TTE. TTE showing EF 60-65%, severely dilated L atrium, mild to moderate AV stenosis, and mild pulmonary hypertensive given PASP of 49 mmHg. Heme/onc consulted and do not think pneumonitis is likely to be the cause of her symptoms given her improvement. Will continue to diurese. Repeat CXR 05/18 with persistent pulmonary vascular engorgement.    - Increasing diuresis to IV lasix 80 mg BID; goal net negative 1-2L daily  - Monitor lytes while diuresing    S/P TIPS (transjugular intrahepatic portosystemic shunt)  TIPS placed 4/12 for acute GI bleeding and hx of gastric and esophageal varices.  Had gastric portal vein coiling and embolization.    5/10 Liver Doppler US with: Sluggish flow in the portal vein end of the TIPS and bidirectional flow in the left portal vein, which could represent TIPS malfunction.   Patient had outpatient IR appointment the day after admission scheduled (5/16) to evaluate for necessity of TIPs revision. Will obtain updated liver doppler, consider inpatient IR consult pending results vs outpatient follow-up. Liver US concerning for low velocities within the portal venous side of the  tips shunt c/f TIPS dysfunction. Currently declining lactulose due to reports of several diarrheal BMs, only one charted BM. I discussed with her the benefit of lactulose and she expressed understanding.    - IR consulted, recommending repeat Liver US in one month.  - Decrease Lactulose to BID    Pancytopenia  This patient is found to have pancytopenia, the likely etiology is Drug induced (including chemotherapy) related to breast cancer , will monitor CBC Every 8 hours. Will transfuse red blood cells if the hemoglobin is <7g/dL (or <8 in the setting of ACS). Will transfuse platelets if platelet count is <50k (if undergoing surgical procedure or have active bleeding). Hold DVT prophylaxis if platelets are <50k. The patient's hemoglobin, white blood cell count, and platelet count results have been reviewed and are listed below. Per heme/onc, feel pancytopenia is likely 2/2 recent linezolid course.   Recent Labs   Lab 05/19/24  0849   HGB 7.0*   WBC 4.64          - S/p 2 units PRBC in the ED  - Maintain up to date type and screen  - Trend CBC q8h to ensure stability  - Transfuse for Hgb < 7, plts < 50    Esophageal and gastric varices  Noted history in the setting of anemia on admission. Patient denies hemetemesis, bloody bowel movements. FOBT testing negative on arrival in the ED.    - Monitor BP and Hgb  - Continue home PPI    Lymphedema  Patient follows with the lymphedema clinic w/ leg wraps and leg elevation. Increased lower extremity swelling on admission. Given associated pulmonary congestion on imaging concern for component of left-heart failure as well. Will evaluate as below.    - Leg elevation as tolerated  - Diuresis as below    Malignant neoplasm metastatic to left lung  See 'breast cancer'    Breast cancer, stage 4, left  Follows with Dr. Willis currently.    Oncologic History:  Diagnosed September 2006 w/ poorly differentiated carcinoma which was ER and AL. negative and HER2 positive.     She was  then referred to [facility] for additional care.  CT scan of the chest and abdomen November 2006 revealed multiple nodules in the lungs consistent with metastatic disease.       She was treated with chemotherapy with weekly Herceptin and Abraxane with improvement in her breast and lung metastasis.     On May 29, 2007 she underwent left modified radical mastectomy(Dr. Colvin) which showed no residual tumor in the breast and 1/5 nodes was positive for metastasis measuring 6 mm.  (ypT0N1).  She then received postoperative radiation therapy(Dr Singh)  to the left chest wall supraclav and internal mammary lymph nodes from August 20, 2007 to September 28, 2007.   Postoperatively she continued on Herceptin for approximately 3 and 1/2 years before her lung metastasis begin to grow.     She subsequently received a number of different chemotherapy treatments including Adriamycin, Halaven, Xeloda plus lapatinib then Xeloda plus Herceptin. (  in [facility].)     Subsequently, she transferred her care to  at [facility].  -She was initially treated with Taxotere Herceptin Perjeta.    -She was then changed to Kadcyla.    In July 2020 PET scan showed progression.  She then took approximately 9 months of Herceptin and Navelbine with initial response then progression.     She started trastuzumab- deruxtecan  4/12/21.      VTE Risk Mitigation (From admission, onward)           Ordered     enoxaparin injection 40 mg  Daily         05/15/24 2303     IP VTE HIGH RISK PATIENT  Once         05/15/24 2303     Place sequential compression device  Until discontinued         05/15/24 2303                    Discharge Planning   PAT: 5/22/2024     Code Status: Full Code   Is the patient medically ready for discharge?:     Reason for patient still in hospital (select all that apply): Treatment  Discharge Plan A: Home with family          Shyam Toure MD  Department of Hospital Medicine   [facility] -  Stepdown Flex ([place]-[facility])

## 2024-05-20 NOTE — ASSESSMENT & PLAN NOTE
Pt admitted with SOB and low hemoglobin. pRBCs transfused. Supplemental O2 provided via LFNC with continuous SpO2 monitoring. CT Chest concerning for pulmonary edema. IV diuresis started. Will transition to home PO lasix dose upon euvolemia.     - Monitor continuous pulse oxymetry   - Trend vitals and labs  - Strict I/Os  - Titrate diuretics as indicated  - 6min walk test for home oxygen requirements

## 2024-05-20 NOTE — MEDICAL/APP STUDENT
[facility] Medicine Student   Progress Note  [facility] [place] HOSP MED 3    Admit Date: 5/15/2024  Hospital Day: 5  05/20/2024  8:43 AM    SUBJECTIVE:   Ms. Shikha López is a 53 y.o. female with a relevant medical history of stage IV breast cancer (known lung mets, on Trastuzumab Deruxtecan q3 weeks, most recent chemo 5/7/24; followed by Dr. Willis) s/p L radical mastectomy, s/p radiation therapy, HAWTHORNE cirrhosis (s/p TIPS 4/12 w/ outpatient IR appointment scheduled 5/16 for doppler w/ concern for decreased flow), GI bleeds w/ gastric and esophageal varices  who is being followed up for Symptomatic anemia.    Interval history: NAEON. Stool x2 yesterday. O2 sats at low 90s on RA. Reports increased SOB nausea.    Review of Systems   Constitutional: Negative.    HENT: Negative.     Eyes: Negative.    Respiratory:  Negative for shortness of breath.    Cardiovascular: Negative.    Gastrointestinal: Negative.    Genitourinary: Negative.    Musculoskeletal: Negative.    Skin: Negative.    Neurological: Negative.    Endo/Heme/Allergies: Negative.    Psychiatric/Behavioral: Negative.         Please refer to the H&P for past medical, family, and social history.    OBJECTIVE:     Vital Signs Recent:  Temp: 99 °F (37.2 °C) (05/20/24 0740)  Pulse: 97 (05/20/24 0427)  Resp: 15 (05/20/24 0740)  BP: (!) 107/51 (05/20/24 0740)  SpO2: (!) 90 % (05/20/24 0427)  Oxygen Documentation:    Flow (L/min) (Oxygen Therapy): 4           Device (Oxygen Therapy): room air         Vital Signs Range (Last 24H):  Temp:  [97.6 °F (36.4 °C)-99 °F (37.2 °C)]   Pulse:  []   Resp:  [15-20]   BP: (107-118)/(51-58)   SpO2:  [90 %-93 %]        I & O (Last 24H):  Intake/Output Summary (Last 24 hours) at 5/20/2024 0843  Last data filed at 5/19/2024 2118  Gross per 24 hour   Intake --   Output 1750 ml   Net -1750 ml          Physical Exam  Constitutional:       Appearance: She is ill-appearing.   HENT:      Head: Normocephalic and atraumatic.   Cardiovascular:       Rate and Rhythm: Normal rate and regular rhythm.      Pulses: Normal pulses.      Heart sounds: Murmur heard.      Comments: Grade III pansystolic murmur heard best over R 2nd intercostal space.  Pulmonary:      Effort: Pulmonary effort is normal. No respiratory distress.      Comments: Decreased breath sounds left lower lobe.  Abdominal:      General: Abdomen is flat.      Palpations: Abdomen is soft.   Musculoskeletal:      Cervical back: Normal range of motion and neck supple.      Right lower leg: Edema present.      Left lower leg: Edema present.      Comments: Grade 2 pitting edema. Improving.  Skin:     General: Skin is warm and dry.      Capillary Refill: Capillary refill takes less than 2 seconds.   Neurological:      General: No focal deficit present.      Mental Status: She is alert and oriented to person, place, and time.   Psychiatric:         Mood and Affect: Mood normal.         Behavior: Behavior normal  Labs:   Recent Labs   Lab 05/18/24  0627 05/18/24  1900 05/19/24  0849    137 135*   K 2.8* 3.9 3.7    99 97   CO2 27 27 29   BUN 10 9 9   CREATININE 0.6 0.6 0.5   * 115* 88   CALCIUM 8.2* 8.7 8.5*   MG 1.5*  --  1.8   PHOS 2.7  --  2.2*     Recent Labs   Lab 05/15/24  2357 05/16/24  0808 05/17/24  0522 05/18/24  0627 05/19/24  0849   ALKPHOS  --    < > 168* 154* 153*   ALT  --    < > 11 11 9*   AST  --    < > 25 24 27   ALBUMIN  --    < > 2.4* 2.3* 2.4*   PROT  --    < > 5.7* 5.6* 5.9*   BILITOT  --    < > 3.8* 2.8* 2.7*   INR 1.5*  --   --   --   --     < > = values in this interval not displayed.     Recent Labs   Lab 05/19/24  0849 05/19/24  1752 05/20/24  0748   WBC 4.64 4.77 5.22   HGB 7.0* 6.8* 7.1*   HCT 20.5* 20.4* 20.7*    172 189       Diagnostic Results:    Scheduled Meds:   enoxparin  40 mg Subcutaneous Daily    furosemide  80 mg Oral BID    mupirocin   Nasal BID    pantoprazole  40 mg Oral BID    spironolactone  100 mg Oral Daily    triamcinolone acetonide 0.5%    Topical (Top) BID     Continuous Infusions:  PRN Meds:  Current Facility-Administered Medications:     0.9%  NaCl infusion (for blood administration), , Intravenous, Q24H PRN    acetaminophen, 650 mg, Oral, Q4H PRN    dextrose 10%, 12.5 g, Intravenous, PRN    dextrose 10%, 25 g, Intravenous, PRN    glucagon (human recombinant), 1 mg, Intramuscular, PRN    glucose, 16 g, Oral, PRN    glucose, 24 g, Oral, PRN    naloxone, 0.02 mg, Intravenous, PRN    ondansetron, 4 mg, Intravenous, Q6H PRN    polyethylene glycol, 17 g, Oral, BID PRN    sodium chloride 0.9%, 10 mL, Intravenous, Q12H PRN    ASSESSMENT/PLAN:   Ms. Shikha López is a 53 y.o. female with a relevant medical history of stage IV breast cancer (known lung mets, on Trastuzumab Deruxtecan q3 weeks, most recent chemo 5/7/24; followed by Dr. Willis) s/p L radical mastectomy, s/p radiation therapy, HAWTHORNE cirrhosis (s/p TIPS 4/12 w/ outpatient IR appointment scheduled 5/16 for doppler w/ concern for decreased flow), GI bleeds w/ gastric and esophageal varices who is being followed up for Symptomatic anemia.    Active Hospital Problems    Diagnosis  POA    *Symptomatic anemia [D64.9]  Yes    Elevated troponin [R79.89]  Yes    Pleural effusion [J90]  Yes    Biliary cirrhosis [K74.5]  Yes    S/P TIPS (transjugular intrahepatic portosystemic shunt) [Z95.828]  Not Applicable    Volume overload [E87.70]  Yes    Pancytopenia [D61.818]  Yes    Esophageal and gastric varices [I85.00, I86.4]  Yes    Lymphedema [I89.0]  Yes    Breast cancer, stage 4, left [C50.912]  Yes    Malignant neoplasm metastatic to left lung [C78.02]  Yes      Resolved Hospital Problems   No resolved problems to display.       Symptomatic anemia  Patient's anemia is currently uncontrolled. Has received 2 units of PRBCs on 5/15 . Etiology likely d/t drug toxicity from systemic chemotherapy vs recent course linezolid from recent admission for cellulitis.   Current CBC reviewed-      Recent Labs   Lab  05/20/24  0748   WBC 5.22   RBC 2.15*   HGB 7.1*   HCT 20.7*      MCV 96   MCH 33.0*   MCHC 34.3        Monitor serial CBC and transfuse if patient becomes hemodynamically unstable, symptomatic or H/H drops below 7/21.     - See 'pancytopenia' for plan     Hypoxemia  Sating low 90s on RA. CXR with c/f pulmonary edema. Currently on Lasix 80. Pt denies SOB, lightheadedness.  - 6 minute walk test     Volume overload  Patient appears grossly fluid overloaded on admission w/ 3-4+ bilateral lower extremity edema, CXR with pulmonary congestion/edema, and BNP elevated to 1032. She has a history of lymphedema and HAWTHORNE cirrhosis (s/p TIPs) which would explain her lower extremity swelling, however unclear etiology of her pulmonary congestion. Most recent TTE reviewed from 4/24 which had 60-65% EF, no diastolic dysfunction. She had moderate AS at that time. Cardiac exam with harsh systolic murmur. Given unclear etiology of pulmonary congestion and possible worsening of murmur will obtain repeat TTE. TTE showing EF 60-65%, severely dilated L atrium, mild to moderate AV stenosis, and mild pulmonary hypertensive given PASP of 49 mmHg. Heme/onc consulted and do not think pneumonitis is likely to be the cause of her symptoms given her improvement. Will continue to diurese.     - Switch IV diuresis 80 mg BID to Oral Lasix 80 mg BID; goal net negative 1-2L daily  - Monitor lytes while diuresing     S/P TIPS (transjugular intrahepatic portosystemic shunt)  TIPS placed 4/12 for acute GI bleeding and hx of gastric and esophageal varices.  Had gastric portal vein coiling and embolization.     5/10 Liver Doppler US with: Sluggish flow in the portal vein end of the TIPS and bidirectional flow in the left portal vein, which could represent TIPS malfunction.   Patient had outpatient IR appointment the day after admission scheduled (5/16) to evaluate for necessity of TIPs revision. Will obtain updated liver doppler, consider inpatient  IR consult pending results vs outpatient follow-up. Liver US concerning for low velocities within the portal venous side of the tips shunt c/f TIPS dysfunction.     - IR consulted, recommending repeat Liver US in one month.  -Trend CMP labs  - Continue Lactulose 20 mg qday. Pt reported denying lactulose for excessive BM , chart notes only 1x. Pt educated on lactulose ppx for hepatic encephalopathy especially with her Hx of TIPS. Pt voiced understanding. Pt not encephalopathic on exam.      Pancytopenia  This patient is found to have pancytopenia, the likely etiology is Drug induced (including chemotherapy) related to breast cancer , will monitor CBC Every 8 hours. Will transfuse red blood cells if the hemoglobin is <7g/dL (or <8 in the setting of ACS). Will transfuse platelets if platelet count is <50k (if undergoing surgical procedure or have active bleeding). Hold DVT prophylaxis if platelets are <50k. The patient's hemoglobin, white blood cell count, and platelet count results have been reviewed and are listed below. Per heme/onc, feel pancytopenia is likely 2/2 recent linezolid course.         Recent Labs   Lab 05/20/24  0748   HGB 7.1*   WBC 5.22        - S/p 2 units PRBC in the ED  - Maintain up to date type and screen  - Trend CBC q8h to ensure stability  - Transfuse for Hgb < 7, plts < 50  - Plan transfusion PRBC today  - Orthostatic BP     Esophageal and gastric varices  Noted history in the setting of anemia on admission. Patient denies hemetemesis, bloody bowel movements. FOBT testing negative on arrival in the ED.     - Monitor BP and Hgb  - Continue home PPI     Lymphedema  Patient follows with the lymphedema clinic w/ leg wraps and leg elevation. Increased lower extremity swelling on admission. Given associated pulmonary congestion on imaging concern for component of left-heart failure as well. Will evaluate as below.     - Leg elevation as tolerated  - Diuresis as below     Malignant neoplasm  metastatic to left lung  See 'breast cancer'     Breast cancer, stage 4, left  Follows with Dr. Willis currently.     Oncologic History:  Diagnosed September 2006 w/ poorly differentiated carcinoma which was ER and OK. negative and HER2 positive.     She was then referred to [facility] for additional care.  CT scan of the chest and abdomen November 2006 revealed multiple nodules in the lungs consistent with metastatic disease.       She was treated with chemotherapy with weekly Herceptin and Abraxane with improvement in her breast and lung metastasis.     On May 29, 2007 she underwent left modified radical mastectomy(Dr. Colvin) which showed no residual tumor in the breast and 1/5 nodes was positive for metastasis measuring 6 mm.  (ypT0N1).  She then received postoperative radiation therapy(Dr Singh)  to the left chest wall supraclav and internal mammary lymph nodes from August 20, 2007 to September 28, 2007.   Postoperatively she continued on Herceptin for approximately 3 and 1/2 years before her lung metastasis begin to grow.     She subsequently received a number of different chemotherapy treatments including Adriamycin, Halaven, Xeloda plus lapatinib then Xeloda plus Herceptin. (  in [facility].)     Subsequently, she transferred her care to  at [facility].  -She was initially treated with Taxotere Herceptin Perjeta.    -She was then changed to Kadcyla.     In July 2020 PET scan showed progression.  She then took approximately 9 months of Herceptin and Navelbine with initial response then progression.     She started trastuzumab- deruxtecan  4/12/21.     -[facility] following, appreciate recs:         - Low suspicion for pneumonitis, symptoms would not be improving with diuresis          - Recommend continuing diuresis   - Follow-up appointment with oncology on 5/28      Discharge planning:   PAT: 5/21/2024     Code Status: Full Code   Is the patient medically ready for  discharge?: No    Reason for patient still in hospital (select all that apply): Treatment  Discharge Plan A: Home with family       Art Welsh, MS4  [facility]

## 2024-05-20 NOTE — PLAN OF CARE
I have reviewed the chart of Shikha Lpóez who is hospitalized for the following:    Active Hospital Problems    Diagnosis    *Symptomatic anemia    Hyponatremia    Elevated troponin    Pleural effusion    Biliary cirrhosis    S/P TIPS (transjugular intrahepatic portosystemic shunt)    Volume overload    Pancytopenia    Esophageal and gastric varices    Hypokalemia    Lymphedema    Breast cancer, stage 4, left    Malignant neoplasm metastatic to left lung        Nyla Luna, PA-C  [facility]     [place]

## 2024-05-20 NOTE — SUBJECTIVE & OBJECTIVE
Interval History: No acute events overnight. One unit of pRBCs transfused for symptomatic anemia. Transitioned to PO diuresis. Will perform 6min walk test on 5/21/24.     Review of Systems   HENT:  Negative for congestion.    Respiratory:  Negative for cough and shortness of breath.    Cardiovascular:  Positive for leg swelling. Negative for chest pain.   Gastrointestinal:  Positive for diarrhea. Negative for abdominal distention, abdominal pain, nausea and vomiting.   Neurological:  Negative for dizziness, weakness and light-headedness.     Objective:     Vital Signs (Most Recent):  Temp: 97.9 °F (36.6 °C) (05/20/24 1531)  Pulse: 97 (05/20/24 1514)  Resp: 18 (05/20/24 1531)  BP: (!) 116/54 (05/20/24 1531)  SpO2: 96 % (05/20/24 1514) Vital Signs (24h Range):  Temp:  [97.8 °F (36.6 °C)-99 °F (37.2 °C)] 97.9 °F (36.6 °C)  Pulse:  [] 97  Resp:  [15-20] 18  SpO2:  [90 %-96 %] 96 %  BP: (107-128)/(51-64) 116/54     Weight: 82.9 kg (182 lb 12.2 oz)  Body mass index is 33.43 kg/m².    Intake/Output Summary (Last 24 hours) at 5/20/2024 1543  Last data filed at 5/19/2024 2118  Gross per 24 hour   Intake --   Output 1000 ml   Net -1000 ml         Physical Exam  Vitals and nursing note reviewed.   Constitutional:       General: She is not in acute distress.  HENT:      Head: Normocephalic and atraumatic.      Mouth/Throat:      Mouth: Mucous membranes are moist.   Eyes:      Extraocular Movements: Extraocular movements intact.      Conjunctiva/sclera: Conjunctivae normal.   Cardiovascular:      Rate and Rhythm: Normal rate and regular rhythm.      Pulses:           Carotid pulses are 2+ on the right side and 2+ on the left side.     Heart sounds: Murmur heard.   Pulmonary:      Effort: Pulmonary effort is normal. No respiratory distress.      Breath sounds: Examination of the right-lower field reveals rales. Examination of the left-lower field reveals rales. Rales present. No wheezing.   Abdominal:      General: Bowel  sounds are normal. There is no distension.      Palpations: Abdomen is soft.      Tenderness: There is no abdominal tenderness.   Musculoskeletal:         General: Normal range of motion.      Right lower le+ Pitting Edema present.      Left lower le+ Pitting Edema present.   Skin:     General: Skin is warm and dry.      Coloration: Skin is not jaundiced or pale.      Findings: Erythema present.   Neurological:      General: No focal deficit present.      Mental Status: She is alert and oriented to person, place, and time.   Psychiatric:         Mood and Affect: Mood normal.         Behavior: Behavior normal.             Significant Labs: All pertinent labs within the past 24 hours have been reviewed.    Significant Imaging: I have reviewed all pertinent imaging results/findings within the past 24 hours.

## 2024-05-20 NOTE — NURSING
Patient has been tired and c/o feeling weak received magnesium sulfate and potassium chloride also received 1 U of packed RBCs. Patient now reports feeling better after blood administration and is eating dinner. Call light in reach bed in lowest position and plan of care ongoing.

## 2024-05-20 NOTE — PLAN OF CARE
[facility] - Stepdown Flex ([place])  Discharge Reassessment    Primary Care Provider: No, Primary Doctor    Expected Discharge Date: 5/22/2024    Reassessment (most recent)       Discharge Reassessment - 05/20/24 1750          Discharge Reassessment    Assessment Type Discharge Planning Reassessment     Did the patient's condition or plan change since previous assessment? Yes     Discharge Plan discussed with: Patient     Communicated PAT with patient/caregiver Date not available/Unable to determine     Discharge Plan A Home with family     Discharge Plan B Home     DME Needed Upon Discharge  none     Transition of Care Barriers None        Post-Acute Status    Coverage Medicare-Medicare part A & B     Discharge Delays None known at this time                       Discharge Plan A and Plan B have been determined by review of patient's clinical status, future medical and therapeutic needs, and coverage/benefits for post-acute care in coordination with multidisciplinary team members.    Elizabeth Culevr MSW, CSW

## 2024-05-21 VITALS
WEIGHT: 149.94 LBS | DIASTOLIC BLOOD PRESSURE: 66 MMHG | HEART RATE: 96 BPM | BODY MASS INDEX: 27.59 KG/M2 | HEIGHT: 62 IN | RESPIRATION RATE: 16 BRPM | TEMPERATURE: 98 F | SYSTOLIC BLOOD PRESSURE: 110 MMHG | OXYGEN SATURATION: 92 %

## 2024-05-21 LAB
ALBUMIN SERPL BCP-MCNC: 2.5 G/DL (ref 3.5–5.2)
ALP SERPL-CCNC: 165 U/L (ref 55–135)
ALT SERPL W/O P-5'-P-CCNC: 12 U/L (ref 10–44)
ANION GAP SERPL CALC-SCNC: 8 MMOL/L (ref 8–16)
AST SERPL-CCNC: 38 U/L (ref 10–40)
BASOPHILS # BLD AUTO: 0.02 K/UL (ref 0–0.2)
BASOPHILS NFR BLD: 0.4 % (ref 0–1.9)
BILIRUB SERPL-MCNC: 4.6 MG/DL (ref 0.1–1)
BUN SERPL-MCNC: 9 MG/DL (ref 6–20)
CALCIUM SERPL-MCNC: 8.3 MG/DL (ref 8.7–10.5)
CHLORIDE SERPL-SCNC: 99 MMOL/L (ref 95–110)
CO2 SERPL-SCNC: 30 MMOL/L (ref 23–29)
CREAT SERPL-MCNC: 0.5 MG/DL (ref 0.5–1.4)
DIFFERENTIAL METHOD BLD: ABNORMAL
EOSINOPHIL # BLD AUTO: 0.3 K/UL (ref 0–0.5)
EOSINOPHIL NFR BLD: 6.4 % (ref 0–8)
ERYTHROCYTE [DISTWIDTH] IN BLOOD BY AUTOMATED COUNT: 23.2 % (ref 11.5–14.5)
EST. GFR  (NO RACE VARIABLE): >60 ML/MIN/1.73 M^2
GLUCOSE SERPL-MCNC: 105 MG/DL (ref 70–110)
HCT VFR BLD AUTO: 24.5 % (ref 37–48.5)
HGB BLD-MCNC: 8.5 G/DL (ref 12–16)
IMM GRANULOCYTES # BLD AUTO: 0.06 K/UL (ref 0–0.04)
IMM GRANULOCYTES NFR BLD AUTO: 1.2 % (ref 0–0.5)
LYMPHOCYTES # BLD AUTO: 0.6 K/UL (ref 1–4.8)
LYMPHOCYTES NFR BLD: 12.4 % (ref 18–48)
MAGNESIUM SERPL-MCNC: 1.9 MG/DL (ref 1.6–2.6)
MCH RBC QN AUTO: 33.5 PG (ref 27–31)
MCHC RBC AUTO-ENTMCNC: 34.7 G/DL (ref 32–36)
MCV RBC AUTO: 97 FL (ref 82–98)
MONOCYTES # BLD AUTO: 0.4 K/UL (ref 0.3–1)
MONOCYTES NFR BLD: 7.8 % (ref 4–15)
NEUTROPHILS # BLD AUTO: 3.6 K/UL (ref 1.8–7.7)
NEUTROPHILS NFR BLD: 71.8 % (ref 38–73)
NRBC BLD-RTO: 1 /100 WBC
PHOSPHATE SERPL-MCNC: 3.1 MG/DL (ref 2.7–4.5)
PLATELET # BLD AUTO: 187 K/UL (ref 150–450)
PMV BLD AUTO: 11 FL (ref 9.2–12.9)
POTASSIUM SERPL-SCNC: 3.6 MMOL/L (ref 3.5–5.1)
PROT SERPL-MCNC: 5.8 G/DL (ref 6–8.4)
RBC # BLD AUTO: 2.54 M/UL (ref 4–5.4)
SODIUM SERPL-SCNC: 137 MMOL/L (ref 136–145)
WBC # BLD AUTO: 5.01 K/UL (ref 3.9–12.7)

## 2024-05-21 PROCEDURE — 83735 ASSAY OF MAGNESIUM: CPT

## 2024-05-21 PROCEDURE — 80053 COMPREHEN METABOLIC PANEL: CPT

## 2024-05-21 PROCEDURE — 25000003 PHARM REV CODE 250: Performed by: STUDENT IN AN ORGANIZED HEALTH CARE EDUCATION/TRAINING PROGRAM

## 2024-05-21 PROCEDURE — 25000003 PHARM REV CODE 250: Performed by: HOSPITALIST

## 2024-05-21 PROCEDURE — 84100 ASSAY OF PHOSPHORUS: CPT

## 2024-05-21 PROCEDURE — 85025 COMPLETE CBC W/AUTO DIFF WBC: CPT | Performed by: STUDENT IN AN ORGANIZED HEALTH CARE EDUCATION/TRAINING PROGRAM

## 2024-05-21 PROCEDURE — 25000003 PHARM REV CODE 250

## 2024-05-21 RX ORDER — POTASSIUM CHLORIDE 20 MEQ/1
40 TABLET, EXTENDED RELEASE ORAL
Status: DISCONTINUED | OUTPATIENT
Start: 2024-05-21 | End: 2024-05-21

## 2024-05-21 RX ORDER — POTASSIUM CHLORIDE 20 MEQ/1
40 TABLET, EXTENDED RELEASE ORAL ONCE
Status: DISCONTINUED | OUTPATIENT
Start: 2024-05-21 | End: 2024-05-21 | Stop reason: HOSPADM

## 2024-05-21 RX ORDER — FUROSEMIDE 80 MG/1
80 TABLET ORAL DAILY
Qty: 90 TABLET | Refills: 2 | Status: SHIPPED | OUTPATIENT
Start: 2024-05-21 | End: 2025-02-15

## 2024-05-21 RX ORDER — PANTOPRAZOLE SODIUM 40 MG/1
40 TABLET, DELAYED RELEASE ORAL ONCE
Status: COMPLETED | OUTPATIENT
Start: 2024-05-21 | End: 2024-05-21

## 2024-05-21 RX ADMIN — PANTOPRAZOLE SODIUM 40 MG: 40 TABLET, DELAYED RELEASE ORAL at 11:05

## 2024-05-21 RX ADMIN — FUROSEMIDE 80 MG: 40 TABLET ORAL at 09:05

## 2024-05-21 RX ADMIN — POTASSIUM BICARBONATE 40 MEQ: 391 TABLET, EFFERVESCENT ORAL at 11:05

## 2024-05-21 RX ADMIN — PANTOPRAZOLE SODIUM 40 MG: 40 TABLET, DELAYED RELEASE ORAL at 09:05

## 2024-05-21 RX ADMIN — SPIRONOLACTONE 100 MG: 50 TABLET ORAL at 09:05

## 2024-05-21 RX ADMIN — TRIAMCINOLONE ACETONIDE: 5 CREAM TOPICAL at 09:05

## 2024-05-21 RX ADMIN — MUPIROCIN: 20 OINTMENT TOPICAL at 09:05

## 2024-05-21 NOTE — NURSING
Pt d/c'ed home, wheeled to private vehicle in w/c by nurse, v/s jose, PIV d/c'ed cath intact, cornelius well, verbalized understanding of all d/c teaching done, no distress noted.

## 2024-05-21 NOTE — DISCHARGE SUMMARY
[facility] - Stepdown Flex ([place] [address])  [facility] Medicine  Discharge Summary      Patient Name: Shikha Lóepz  MRN: [id number]  MACKENZIE: [id number]  Patient Class: IP- Inpatient  Admission Date: 5/15/2024  Hospital Length of Stay: 6 days  Discharge Date and Time:  05/21/2024 1:18 PM  Attending Physician: Antoni Castillo MD   Discharging Provider: Clara Foley MD  Primary Care Provider: Lenore Primary Doctor  [place] [facility] Clara Foley MD  Primary Care Team: [facility] 3    HPI:   The patient is a 53 year old female with a history of stage IV breast cancer (known lung mets, on Trastuzumab Deruxtecan q3 weks, most recent chemo 5/7/24; followed by Dr. Willis), HAWTHORNE cirrhosis (s/p TIPS 4/12 w/ outpatient IR appointment scheduled 5/16 for doppler w/ concern for decreased flow), GI bleeds w/ gastric and esophageal varices, presenting with two days of progressive dyspnea and fatigue. She reports decreased appetite and diarrhea (non-bloody). Of note she had recent hospital admission 4/21-4/26 for lower extremity cellulitis, with staph bacteremia now s/p linezolid course. She denies hemoptysis, hematemesis, nausea/vomiting, constipation, bloody bowel movements, cough, or chest pain.    ED evaluation significant for hemodynamic stability, pulse 92, RR 21, Tmax 99.0, saturating 99% on room air. WBC 2.29, Hgb 4.9, plts 59, Na 136, K 3.7, Bicarb 24, Cr 0.7, glucose 115, Mag 1.7, alk phos 171, albumin 2.5, Bili 3.4 (stable), BNP 1032, trop 0.134, TSH 3.9, flu/covid negative, lactate 1.59. FOBT testing negative in the ED. CXR consistent w/ pulmonary edema. ECG with sinus rhythm. ED discussed case with oncology who recommended admission to hospital medicine given elevated troponin, BNP.    * No surgery found *      Hospital Course:   Admitted to hospital medicine for symptomatic anemia and volume overload. S/p 2 units of pRBCs on admission with improvement in symptoms. Diuresing with lasix. Liver US  showing low velocities in the portal venous side of the TIPS shunt. IR consulted and recommending repeat liver US in one month. Some concern for pneumonitis given CT findings and current treatment with trastuzumab, heme/onc evaluated the patient and did not think pneumonitis was likely given she has had improvement of her symptoms with diuresis. Repeat CXR with persistent pulmonary vascular engorgement, increasing lasix dose and continuing diuresis. Transitioned to PO lasix on 5/20/24 in setting of euvolemia. One unit of pRBCs was added on to correct low hemoglobin in setting of symptomatic anemia. Orthostatic BP wnl. Medically ready to discharge. Will send home with new prescription for lasix 80 mg daily with instructions to take an additional dose if she notices weight gain. Repeat Liver US with Doppler ordered, ideally obtain within one month.     Goals of Care Treatment Preferences:  Code Status: Full Code          What is most important right now is to focus on remaining as independent as possible, symptom/pain control, curative/life-prolongation (regardless of treatment burdens), comfort and QOL .  Accordingly, we have decided that the best plan to meet the patient's goals includes continuing with treatment.    Vitals:    05/21/24 1200   BP: 110/66   Pulse:    Resp: 16   Temp: 97.5 °F (36.4 °C)     Physical Exam  Vitals and nursing note reviewed.   HENT:      Head: Normocephalic and atraumatic.      Mouth/Throat:      Mouth: Mucous membranes are moist.   Eyes:      Extraocular Movements: Extraocular movements intact.      Conjunctiva/sclera: Conjunctivae normal.   Cardiovascular:      Rate and Rhythm: Normal rate and regular rhythm.      Heart sounds: Murmur heard.   Pulmonary:      Effort: Pulmonary effort is normal. No respiratory distress.      Breath sounds: Rales (mild) present. No wheezing or rhonchi.   Abdominal:      General: Abdomen is flat. There is no distension.      Palpations: Abdomen is soft.       Tenderness: There is no abdominal tenderness.   Musculoskeletal:         General: Normal range of motion.      Right lower leg: Edema (1+) present.      Left lower leg: Edema (1+) present.   Skin:     General: Skin is warm and dry.      Findings: Erythema present.   Neurological:      General: No focal deficit present.      Mental Status: She is alert and oriented to person, place, and time.   Psychiatric:         Mood and Affect: Mood normal.         Behavior: Behavior normal.         Consults:   Consults (From admission, onward)          Status Ordering Provider     Inpatient consult to Hematology/Oncology  Once        Provider:  (Not yet assigned)    Completed KIM DOAN     Inpatient consult to Interventional Radiology  Once        Provider:  (Not yet assigned)    Completed KIM DOAN     Inpatient consult to Hematology/Oncology Psychology  Once        Provider:  (Not yet assigned)    ZANDRA oDve            No new Assessment & Plan notes have been filed under this hospital service since the last note was generated.  Service: Hospital Medicine    Final Active Diagnoses:    Diagnosis Date Noted POA    PRINCIPAL PROBLEM:  Symptomatic anemia [D64.9] 05/15/2024 Yes    Hyponatremia [E87.1] 05/20/2024 No    Pulmonary edema [J81.1] 05/20/2024 Yes    Elevated troponin [R79.89] 05/17/2024 Yes    Pleural effusion [J90] 05/17/2024 Yes    Biliary cirrhosis [K74.5] 04/23/2024 Yes    S/P TIPS (transjugular intrahepatic portosystemic shunt) [Z95.828] 04/21/2024 Not Applicable    Volume overload [E87.70] 04/21/2024 Yes    Pancytopenia [D61.818] 07/26/2023 Yes    Esophageal and gastric varices [I85.00, I86.4] 06/23/2023 Yes    Hypokalemia [E87.6] 02/08/2023 Yes    Lymphedema [I89.0] 07/27/2021 Yes    Breast cancer, stage 4, left [C50.912] 06/08/2021 Yes    Malignant neoplasm metastatic to left lung [C78.02] 06/08/2021 Yes      Problems Resolved During this Admission:       Discharged Condition:  stable    Disposition:     Follow Up:   Follow-up Information       No, Primary Doctor .                           Patient Instructions:      US Liver with Doppler (xpd)   Standing Status: Future Standing Exp. Date: 05/21/25   Scheduling Instructions: Please obtain prior to f/u with IR     Order Specific Question Answer Comments   Reason for Exam: Reduced TIPS velocities on inpatient US    May the Radiologist modify the order per protocol to meet the clinical needs of the patient? Yes    Release to patient Immediate      Ambulatory referral/consult to [facility]   Standing Status: Future   Referral Priority: Routine Referral Type: Consultation   Referral Reason: Specialty Services Required   Requested Specialty: Cardiology   Number of Visits Requested: 1     Ambulatory referral/consult to Cardiology   Standing Status: Future   Referral Priority: Routine Referral Type: Consultation   Referral Reason: Specialty Services Required   Requested Specialty: Cardiology   Number of Visits Requested: 1     Ambulatory referral/consult to Interventional Radiology   Standing Status: Future   Referral Priority: Routine Referral Type: Consultation   Referral Reason: Specialty Services Required   Requested Specialty: Interventional Radiology   Number of Visits Requested: 1       Significant Diagnostic Studies: N/A    Pending Diagnostic Studies:       None           Medications:  Reconciled Home Medications:      Medication List        CHANGE how you take these medications      furosemide 80 MG tablet  Commonly known as: LASIX  Take 1 tablet (80 mg total) by mouth once daily. If you are gaining weight (2 lbs in 24 hours or 3 lbs in two days) may take an extra dose of 80 mg  What changed:   medication strength  how much to take  additional instructions            CONTINUE taking these medications      CONSTULOSE 10 gram/15 mL solution  Generic drug: lactulose  Take 15 mLs (10 g total) by mouth 3 (three)  times daily.     FeroSuL 325 mg (65 mg iron) Tab tablet  Generic drug: ferrous sulfate  Take 1 tablet by mouth daily with Vitamin C on an empty stomach     methylphenidate HCl 5 MG tablet  Commonly known as: RITALIN  Take 1 tablet (5 mg total) by mouth 2 (two) times daily.     pantoprazole 40 MG tablet  Commonly known as: PROTONIX  Take 1 tablet (40 mg total) by mouth 2 (two) times daily.     spironolactone 100 MG tablet  Commonly known as: ALDACTONE  Take 1 tablet (100 mg total) by mouth once daily.     triamcinolone acetonide 0.5% 0.5 % Crea  Commonly known as: KENALOG  Apply topically 2 (two) times daily.              Indwelling Lines/Drains at time of discharge:   Lines/Drains/Airways       Central Venous Catheter Line  Duration             Port A Cath Single Lumen 06/22/21 right internal jugular 1064 days    Port A Cath Single Lumen 04/21/24 1626 29 days              Drain  Duration             Female External Urinary Catheter w/ Suction 05/15/24 2121 5 days                    Time spent on the discharge of patient: 42 minutes         Clara Foley MD  Department of Hospital Medicine  [facility] - Stepdown Flex ([place]

## 2024-05-21 NOTE — PLAN OF CARE
CHW met with patient/family at bedside. Patient experience rounding completed and reviewed the following.     Do you know your discharge plan? Yes,SNF    Have you discussed your needs and preferences with your SW/CM? Yes     If you are discharging home, do you have help at home? Yes     Do you think you will need help additional at home at discharge?No     Do you currently have difficulty keeping up with bills, affording medicine or buying food?  No    Assigned SW/CM notified of any patient/family needs or concerns. Appropriate resources provided to address patient's needs.      Julito Sharp DUSTY  Case Management

## 2024-05-21 NOTE — PLAN OF CARE
[facility] - Stepdown Flex ([place] [address])      HOME HEALTH ORDERS  FACE TO FACE ENCOUNTER    Patient Name: Shikha López  YOB: 1970    PCP: No, Primary Doctor   PCP Address: None  PCP Phone Number: None  PCP Fax: None    Encounter Date: 5/15/24    Admit to Home Health    Diagnoses:  Active Hospital Problems    Diagnosis  POA    *Symptomatic anemia [D64.9]  Yes    Hyponatremia [E87.1]  No    Pulmonary edema [J81.1]  Yes    Elevated troponin [R79.89]  Yes    Pleural effusion [J90]  Yes    Biliary cirrhosis [K74.5]  Yes    S/P TIPS (transjugular intrahepatic portosystemic shunt) [Z95.828]  Not Applicable    Volume overload [E87.70]  Yes    Pancytopenia [D61.818]  Yes    Esophageal and gastric varices [I85.00, I86.4]  Yes    Hypokalemia [E87.6]  Yes    Lymphedema [I89.0]  Yes    Breast cancer, stage 4, left [C50.912]  Yes    Malignant neoplasm metastatic to left lung [C78.02]  Yes      Resolved Hospital Problems   No resolved problems to display.       Follow Up Appointments:  Future Appointments   Date Time Provider Department Center   5/22/2024  1:00 PM Samra Romano MD [facility]   5/23/2024 11:00 AM Linda Figueroa, PhD [facility] CAN PSY [place]   5/27/2024  8:00 AM Maida Blake, MIROSLAVA [facility] OP RHB2 [place] 2 fl   5/28/2024 12:00 PM LAB, HEMONC SAME DAY [facility] LAB HO [place]   5/28/2024  1:00 PM Mariam Lisa NP [facility] HEMONC3 [place] [facility]   5/28/2024  2:00 PM NURSE 8, [facility] CHEMO [facility] [place] [facility]   5/29/2024  8:00 AM Cortney Farah, PT [facility] OP RHB2 [place] 2 fl   6/3/2024  8:00 AM Cortney Farah, PT [facility] OP RHB2 [place] 2 fl   6/5/2024  8:00 AM Cortney Farah, PT [facility] OP RHB2 [place] 2 fl   6/10/2024  8:00 AM Cortney Farah, PT [facility] OP RHB2 [place] 2 fl   6/12/2024  8:00 AM Cortney Farah, PT [facility] OP RHB2 [place] 2 fl   6/17/2024  8:00 AM Cortney Farah, PT [facility] OP RHB2 [place] 2 fl   6/19/2024  8:00 AM Cortney Farah, PT [facility] OP RHB2 [place] 2 fl    6/24/2024  8:00 AM Cortney Farah, PT [facility] OP RHB2 [place] 2 fl   7/11/2024  3:00 PM Lizbeth Mehta DNP [facility] PLMDBEHILARY [place] [place]   7/22/2024  8:00 AM LAB, HEMONC CANCER [facility] LAB HO [facility]   7/30/2024  3:00 PM Farhad Figueroa MD [facility] HEPAT [facility]       Allergies:Review of patient's allergies indicates:  No Known Allergies    Medications: Review discharge medications with patient and family and provide education.    Current Facility-Administered Medications   Medication Dose Route Frequency Provider Last Rate Last Admin    0.9%  NaCl infusion (for blood administration)   Intravenous Q24H PRN Shyam Toure MD        acetaminophen tablet 650 mg  650 mg Oral Q4H PRN Anthony Desai MD        dextrose 10% bolus 125 mL 125 mL  12.5 g Intravenous PRN Anthony Desai MD        dextrose 10% bolus 250 mL 250 mL  25 g Intravenous PRN Anthony Desai MD        enoxaparin injection 40 mg  40 mg Subcutaneous Daily Anthony Desai MD   40 mg at 05/17/24 1752    furosemide tablet 80 mg  80 mg Oral BID Shyam Toure MD   80 mg at 05/21/24 0901    glucagon (human recombinant) injection 1 mg  1 mg Intramuscular PRN Anthony Desai MD        glucose chewable tablet 16 g  16 g Oral PRN Anthony Desai MD        glucose chewable tablet 24 g  24 g Oral PRN Anthony Desai MD        mupirocin 2 % ointment   Nasal BID Antoni Castillo MD   Given at 05/21/24 0902    naloxone 0.4 mg/mL injection 0.02 mg  0.02 mg Intravenous PRN Anthony Desai MD        ondansetron injection 4 mg  4 mg Intravenous Q6H PRN Clara Foley MD   4 mg at 05/17/24 1000    polyethylene glycol packet 17 g  17 g Oral BID PRN Anthony Desai MD        sodium chloride 0.9% flush 10 mL  10 mL Intravenous Q12H PRN Anthony Desai MD        spironolactone tablet 100 mg  100 mg Oral Daily Anthony Desai MD   100 mg at 05/21/24 0901    triamcinolone acetonide 0.5% cream   Topical (Top) BID Anthony Desai MD   Given at 05/21/24 0902      Current Discharge Medication List        CONTINUE these medications which have CHANGED    Details   furosemide (LASIX) 80 MG tablet Take 1 tablet (80 mg total) by mouth once daily. If you are gaining weight (2 lbs in 24 hours or 3 lbs in two days) may take an extra dose of 80 mg  Qty: 90 tablet, Refills: 2    Associated Diagnoses: Hypervolemia, unspecified hypervolemia type           CONTINUE these medications which have NOT CHANGED    Details   ferrous sulfate (FEROSUL) 325 mg (65 mg iron) Tab tablet Take 1 tablet by mouth daily with Vitamin C on an empty stomach  Qty: 60 tablet, Refills: 3    Associated Diagnoses: Acute blood loss anemia      methylphenidate HCl (RITALIN) 5 MG tablet Take 1 tablet (5 mg total) by mouth 2 (two) times daily.  Qty: 60 tablet, Refills: 0    Associated Diagnoses: Lack of concentration      pantoprazole (PROTONIX) 40 MG tablet Take 1 tablet (40 mg total) by mouth 2 (two) times daily.  Qty: 180 tablet, Refills: 0    Associated Diagnoses: Gastrointestinal hemorrhage, unspecified gastrointestinal hemorrhage type      spironolactone (ALDACTONE) 100 MG tablet Take 1 tablet (100 mg total) by mouth once daily.  Qty: 30 tablet, Refills: 11    Associated Diagnoses: Hypervolemia, unspecified hypervolemia type      triamcinolone acetonide 0.5% (KENALOG) 0.5 % Crea Apply topically 2 (two) times daily.  Qty: 454 g, Refills: 0    Associated Diagnoses: Contact dermatitis, unspecified contact dermatitis type, unspecified trigger      lactulose (CHRONULAC) 10 gram/15 mL solution Take 15 mLs (10 g total) by mouth 3 (three) times daily.  Qty: 120 mL, Refills: 3               I have seen and examined this patient within the last 30 days. My clinical findings that support the need for the home health skilled services and home bound status are the following:no   Weakness/numbness causing balance and gait disturbance due to Malignancy/Cancer making it taxing to leave home.     Diet:   cardiac  diet    Labs:  None    Referrals/ Consults  Physical Therapy to evaluate and treat. Evaluate for home safety and equipment needs; Establish/upgrade home exercise program. Perform / instruct on therapeutic exercises, gait training, transfer training, and Range of Motion.  Occupational Therapy to evaluate and treat. Evaluate home environment for safety and equipment needs. Perform/Instruct on transfers, ADL training, ROM, and therapeutic exercises.  Aide to provide assistance with personal care, ADLs, and vital signs.    Activities:   activity as tolerated    Nursing:   Agency to admit patient within 24 hours of hospital discharge unless specified on physician order or at patient request    SN to complete comprehensive assessment including routine vital signs. Instruct on disease process and s/s of complications to report to MD. Review/verify medication list sent home with the patient at time of discharge  and instruct patient/caregiver as needed. Frequency may be adjusted depending on start of care date.     Skilled nurse to perform up to 3 visits PRN for symptoms related to diagnosis    Notify MD if SBP > 160 or < 90; DBP > 90 or < 50; HR > 120 or < 50; Temp > 101; O2 < 88%; Other:   None    Ok to schedule additional visits based on staff availability and patient request on consecutive days within the home health episode.    When multiple disciplines ordered:    Start of Care occurs on Sunday - Wednesday schedule remaining discipline evaluations as ordered on separate consecutive days following the start of care.    Thursday SOC -schedule subsequent evaluations Friday and Monday the following week.     Friday - Saturday SOC - schedule subsequent discipline evaluations on consecutive days starting Monday of the following week.    For all post-discharge communication and subsequent orders please contact patient's primary care physician. If unable to reach primary care physician or do not receive response within 30  minutes, please contact Emergency Physician for clinical staff order clarification    Miscellaneous   None    Home Health Aide:  Physical Therapy Three times weekly, Occupational Therapy Three times weekly, and Home Health Aide Three times weekly    Wound Care Orders  no    I certify that this patient is confined to her home and needs physical therapy and occupational therapy.

## 2024-05-21 NOTE — NURSING
Shikha López is a 53 y.o.   female patient, who presents for a 6 minute walk test ordered by  .  The diagnosis is  .  The patient's BMI is 27.4 kg/m2. Predicted distance (lower limit of normal) is 392.2 meters.    Test Results:    The test was 6 mins .  The patient stopped 0  times for a total of 0 seconds.  The total time walked was 360 seconds.  During walking, the patient reported:  no SOB . The patient used nothing  during testing.     05/21/2024---------Distance:   ( )     O2 Sat % Supplemental Oxygen Heart Rate Blood Pressure Hunter Scale   Pre-exercise  (Resting)          mmHg     During Exercise          mmHg     Post-exercise             mmHg       Recovery Time:      Oxygen Qualification:     O2 Sat % Supplemental Oxygen Heart Rate Blood Pressure Hunter Scale   Pre-exercise  (Resting)              mmHg      During Exercise              mmHg      Post-exercise                mmHg         Recovery Time:      Performing nurse/tech:      PREVIOUS STUDY:   The patient has not had a previous study.      CLINICAL INTERPRETATION:  Six minute walk distance is  several feet in room ( with no dyspnea.  Both blood pressure and heart rate remained stable with no O2.  Oxygen saturation did improve while breathing supplemental oxygen.

## 2024-05-22 NOTE — PLAN OF CARE
[facility] - Stepdown Flex ([place])  Discharge Final Note    Primary Care Provider: Lenore Primary Doctor    Expected Discharge Date: 5/21/2024    Patient discharged to home via family transportation.     Patient's bedside nurse notified of the above.    Discharge Plan A and Plan B have been determined by review of patient's clinical status, future medical and therapeutic needs, and coverage/benefits for post-acute care in coordination with multidisciplinary team members.        Final Discharge Note (most recent)       Final Note - 05/22/24 0932          Final Note    Assessment Type Final Discharge Note     Anticipated Discharge Disposition Home or Self Care     What phone number can be called within the next 1-3 days to see how you are doing after discharge? [phone]     Hospital Resources/Appts/Education Provided Appointments scheduled and added to AVS        Post-Acute Status    Post-Acute Authorization Other   Home    Coverage Medicare- Medicare part A & B     Discharge Delays None known at this time                     Important Message from Medicare  Important Message from Medicare regarding Discharge Appeal Rights: Given to patient/caregiver, Explained to patient/caregiver, Signed/date by patient/caregiver     Date IMM was signed: 05/21/24  Time IMM was signed: 2785    Contact Info       Lenore Primary Doctor   Relationship: PCP - General        Next Steps: Follow up            Future Appointments   Date Time Provider Department Center   5/22/2024  1:00 PM Samra Romano MD [facility]   5/27/2024  8:00 AM Maida Blake, PTA [facility] OP RHB2 [place] 2 fl   5/28/2024 12:00 PM LAB, HEMONC SAME DAY [facility] LAB HO [place]   5/28/2024  1:00 PM Mariam Lisa NP [facility] HEMONC3 [place] [facility]   5/28/2024  2:00 PM NURSE 8, [facility] CHEMO [facility] [place] [facility]   5/29/2024  8:00 AM Cortney Farah, PT [facility] OP RHB2 [place] 2 fl   6/3/2024  8:00 AM Cortney Farah, PT [facility] OP RHB2 [place] 2 fl    6/5/2024  8:00 AM Farah, Cortney, PT [facility] OP RHB2 [place] 2 fl   6/10/2024  8:00 AM Farah, Cortney, PT [facility] OP RHB2 [place] 2 fl   6/12/2024  8:00 AM Farah, Cortney, PT [facility] OP RHB2 [place] 2 fl   6/17/2024  8:00 AM Farah, Cortney, PT [facility] OP RHB2 [place] 2 fl   6/19/2024  8:00 AM Farah, Cortney, PT [facility] OP RHB2 [place] 2 fl   6/24/2024  8:00 AM Farah, Cortney, PT [facility] OP RHB2 [place] 2 fl   7/11/2024  3:00 PM Lizbeth Mehta DNP [facility] [place]   7/22/2024  8:00 AM LAB, HEMONC CANCER [facility] LAB HO [place]   7/30/2024  3:00 PM Farhad Figueroa MD [facility] HEPAT [facility]       W scheduled post-discharge follow-up appointment and information added to AVS.     Elizabeth Culver MSW, CSW

## 2024-05-23 ENCOUNTER — DOCUMENTATION ONLY (OUTPATIENT)
Dept: CARDIOLOGY | Facility: CLINIC | Age: 54
End: 2024-05-23
Payer: MEDICARE

## 2024-05-25 ENCOUNTER — HOSPITAL ENCOUNTER (INPATIENT)
Facility: HOSPITAL | Age: 54
LOS: 4 days | Discharge: HOME | DRG: 441 | End: 2024-05-29
Attending: EMERGENCY MEDICINE | Admitting: EMERGENCY MEDICINE
Payer: MEDICARE

## 2024-05-25 DIAGNOSIS — Z95.828 S/P TIPS (TRANSJUGULAR INTRAHEPATIC PORTOSYSTEMIC SHUNT): ICD-10-CM

## 2024-05-25 DIAGNOSIS — R00.0 TACHYCARDIA: ICD-10-CM

## 2024-05-25 DIAGNOSIS — C50.912 BREAST CANCER, STAGE 4, LEFT: Chronic | ICD-10-CM

## 2024-05-25 DIAGNOSIS — A41.9 SEPSIS, DUE TO UNSPECIFIED ORGANISM, UNSPECIFIED WHETHER ACUTE ORGAN DYSFUNCTION PRESENT: Primary | ICD-10-CM

## 2024-05-25 DIAGNOSIS — C78.02 MALIGNANT NEOPLASM METASTATIC TO LEFT LUNG: Chronic | ICD-10-CM

## 2024-05-25 DIAGNOSIS — R07.9 CHEST PAIN: ICD-10-CM

## 2024-05-25 DIAGNOSIS — E87.6 HYPOKALEMIA: ICD-10-CM

## 2024-05-25 DIAGNOSIS — R41.82 AMS (ALTERED MENTAL STATUS): ICD-10-CM

## 2024-05-25 PROBLEM — K74.60 CIRRHOSIS: Status: ACTIVE | Noted: 2024-05-25

## 2024-05-25 PROBLEM — K74.5 BILIARY CIRRHOSIS: Chronic | Status: ACTIVE | Noted: 2024-04-23

## 2024-05-25 PROBLEM — I89.0 LYMPHEDEMA: Chronic | Status: ACTIVE | Noted: 2021-07-27

## 2024-05-25 PROBLEM — K76.82 HEPATIC ENCEPHALOPATHY: Status: ACTIVE | Noted: 2024-05-25

## 2024-05-25 PROBLEM — K74.60 CIRRHOSIS: Status: RESOLVED | Noted: 2024-05-25 | Resolved: 2024-05-25

## 2024-05-25 PROBLEM — I86.4 ESOPHAGEAL AND GASTRIC VARICES: Chronic | Status: ACTIVE | Noted: 2023-06-23

## 2024-05-25 PROBLEM — I85.00 ESOPHAGEAL AND GASTRIC VARICES: Chronic | Status: ACTIVE | Noted: 2023-06-23

## 2024-05-25 PROBLEM — G93.40 ACUTE ENCEPHALOPATHY: Status: ACTIVE | Noted: 2024-05-25

## 2024-05-25 LAB
ALBUMIN SERPL BCP-MCNC: 2.7 G/DL (ref 3.5–5.2)
ALLENS TEST: ABNORMAL
ALP SERPL-CCNC: 170 U/L (ref 55–135)
ALT SERPL W/O P-5'-P-CCNC: 20 U/L (ref 10–44)
AMMONIA PLAS-SCNC: 113 UMOL/L (ref 10–50)
ANION GAP SERPL CALC-SCNC: 9 MMOL/L (ref 8–16)
AST SERPL-CCNC: 41 U/L (ref 10–40)
BASOPHILS # BLD AUTO: 0.02 K/UL (ref 0–0.2)
BASOPHILS NFR BLD: 0.6 % (ref 0–1.9)
BILIRUB SERPL-MCNC: 6.1 MG/DL (ref 0.1–1)
BILIRUB UR QL STRIP: NEGATIVE
BUN SERPL-MCNC: 9 MG/DL (ref 6–20)
CALCIUM SERPL-MCNC: 9.4 MG/DL (ref 8.7–10.5)
CHLORIDE SERPL-SCNC: 102 MMOL/L (ref 95–110)
CLARITY UR REFRACT.AUTO: CLEAR
CO2 SERPL-SCNC: 29 MMOL/L (ref 23–29)
COLOR UR AUTO: YELLOW
CREAT SERPL-MCNC: 0.7 MG/DL (ref 0.5–1.4)
DIFFERENTIAL METHOD BLD: ABNORMAL
EOSINOPHIL # BLD AUTO: 0.1 K/UL (ref 0–0.5)
EOSINOPHIL NFR BLD: 3.7 % (ref 0–8)
ERYTHROCYTE [DISTWIDTH] IN BLOOD BY AUTOMATED COUNT: 28.3 % (ref 11.5–14.5)
EST. GFR  (NO RACE VARIABLE): >60 ML/MIN/1.73 M^2
GLUCOSE SERPL-MCNC: 181 MG/DL (ref 70–110)
GLUCOSE UR QL STRIP: NEGATIVE
HCO3 UR-SCNC: 27.8 MMOL/L (ref 24–28)
HCO3 UR-SCNC: 29.4 MMOL/L (ref 24–28)
HCT VFR BLD AUTO: 29.5 % (ref 37–48.5)
HGB BLD-MCNC: 10.1 G/DL (ref 12–16)
HGB UR QL STRIP: NEGATIVE
IMM GRANULOCYTES # BLD AUTO: 0.02 K/UL (ref 0–0.04)
IMM GRANULOCYTES NFR BLD AUTO: 0.6 % (ref 0–0.5)
KETONES UR QL STRIP: NEGATIVE
LDH SERPL L TO P-CCNC: 2.36 MMOL/L (ref 0.5–2.2)
LDH SERPL L TO P-CCNC: 3.53 MMOL/L (ref 0.5–2.2)
LEUKOCYTE ESTERASE UR QL STRIP: NEGATIVE
LYMPHOCYTES # BLD AUTO: 0.5 K/UL (ref 1–4.8)
LYMPHOCYTES NFR BLD: 15 % (ref 18–48)
MAGNESIUM SERPL-MCNC: 2.1 MG/DL (ref 1.6–2.6)
MCH RBC QN AUTO: 34.8 PG (ref 27–31)
MCHC RBC AUTO-ENTMCNC: 34.2 G/DL (ref 32–36)
MCV RBC AUTO: 102 FL (ref 82–98)
MONOCYTES # BLD AUTO: 0.4 K/UL (ref 0.3–1)
MONOCYTES NFR BLD: 13.1 % (ref 4–15)
NEUTROPHILS # BLD AUTO: 2.2 K/UL (ref 1.8–7.7)
NEUTROPHILS NFR BLD: 67 % (ref 38–73)
NITRITE UR QL STRIP: NEGATIVE
NRBC BLD-RTO: 1 /100 WBC
PCO2 BLDA: 31.8 MMHG (ref 35–45)
PCO2 BLDA: 39.4 MMHG (ref 35–45)
PH SMN: 7.48 [PH] (ref 7.35–7.45)
PH SMN: 7.55 [PH] (ref 7.35–7.45)
PH UR STRIP: 7 [PH] (ref 5–8)
PLATELET # BLD AUTO: 135 K/UL (ref 150–450)
PMV BLD AUTO: 9.9 FL (ref 9.2–12.9)
PO2 BLDA: 41 MMHG (ref 40–60)
PO2 BLDA: 55 MMHG (ref 40–60)
POC BE: 5 MMOL/L
POC BE: 6 MMOL/L
POC SATURATED O2: 80 % (ref 95–100)
POC SATURATED O2: 92 % (ref 95–100)
POC TCO2: 29 MMOL/L (ref 24–29)
POC TCO2: 31 MMOL/L (ref 24–29)
POTASSIUM SERPL-SCNC: 2.5 MMOL/L (ref 3.5–5.1)
PROCALCITONIN SERPL IA-MCNC: 0.07 NG/ML
PROT SERPL-MCNC: 6.6 G/DL (ref 6–8.4)
PROT UR QL STRIP: NEGATIVE
RBC # BLD AUTO: 2.9 M/UL (ref 4–5.4)
SAMPLE: ABNORMAL
SARS-COV-2 RDRP RESP QL NAA+PROBE: NEGATIVE
SITE: ABNORMAL
SODIUM SERPL-SCNC: 140 MMOL/L (ref 136–145)
SP GR UR STRIP: 1.01 (ref 1–1.03)
URN SPEC COLLECT METH UR: NORMAL
WBC # BLD AUTO: 3.21 K/UL (ref 3.9–12.7)

## 2024-05-25 PROCEDURE — 25000003 PHARM REV CODE 250: Performed by: EMERGENCY MEDICINE

## 2024-05-25 PROCEDURE — U0002 COVID-19 LAB TEST NON-CDC: HCPCS | Performed by: STUDENT IN AN ORGANIZED HEALTH CARE EDUCATION/TRAINING PROGRAM

## 2024-05-25 PROCEDURE — 25000003 PHARM REV CODE 250

## 2024-05-25 PROCEDURE — 82803 BLOOD GASES ANY COMBINATION: CPT

## 2024-05-25 PROCEDURE — 83735 ASSAY OF MAGNESIUM: CPT | Performed by: EMERGENCY MEDICINE

## 2024-05-25 PROCEDURE — 63600175 PHARM REV CODE 636 W HCPCS: Performed by: EMERGENCY MEDICINE

## 2024-05-25 PROCEDURE — 80053 COMPREHEN METABOLIC PANEL: CPT

## 2024-05-25 PROCEDURE — 93010 ELECTROCARDIOGRAM REPORT: CPT | Mod: ,,, | Performed by: INTERNAL MEDICINE

## 2024-05-25 PROCEDURE — 83605 ASSAY OF LACTIC ACID: CPT

## 2024-05-25 PROCEDURE — 99285 EMERGENCY DEPT VISIT HI MDM: CPT | Mod: 25

## 2024-05-25 PROCEDURE — 87040 BLOOD CULTURE FOR BACTERIA: CPT | Performed by: EMERGENCY MEDICINE

## 2024-05-25 PROCEDURE — 96365 THER/PROPH/DIAG IV INF INIT: CPT

## 2024-05-25 PROCEDURE — 99900035 HC TECH TIME PER 15 MIN (STAT)

## 2024-05-25 PROCEDURE — 84145 PROCALCITONIN (PCT): CPT | Performed by: EMERGENCY MEDICINE

## 2024-05-25 PROCEDURE — 63600175 PHARM REV CODE 636 W HCPCS

## 2024-05-25 PROCEDURE — 93005 ELECTROCARDIOGRAM TRACING: CPT

## 2024-05-25 PROCEDURE — 20600001 HC STEP DOWN PRIVATE ROOM

## 2024-05-25 PROCEDURE — 81003 URINALYSIS AUTO W/O SCOPE: CPT

## 2024-05-25 PROCEDURE — 82140 ASSAY OF AMMONIA: CPT

## 2024-05-25 PROCEDURE — 85025 COMPLETE CBC W/AUTO DIFF WBC: CPT

## 2024-05-25 PROCEDURE — 96367 TX/PROPH/DG ADDL SEQ IV INF: CPT

## 2024-05-25 RX ORDER — IBUPROFEN 200 MG
16 TABLET ORAL
Status: DISCONTINUED | OUTPATIENT
Start: 2024-05-25 | End: 2024-05-29 | Stop reason: HOSPADM

## 2024-05-25 RX ORDER — POTASSIUM CHLORIDE 7.45 MG/ML
10 INJECTION INTRAVENOUS
Status: COMPLETED | OUTPATIENT
Start: 2024-05-25 | End: 2024-05-25

## 2024-05-25 RX ORDER — LACTULOSE 10 G/15ML
20 SOLUTION ORAL
Status: CANCELLED | OUTPATIENT
Start: 2024-05-25 | End: 2024-05-26

## 2024-05-25 RX ORDER — NALOXONE HCL 0.4 MG/ML
0.02 VIAL (ML) INJECTION
Status: DISCONTINUED | OUTPATIENT
Start: 2024-05-25 | End: 2024-05-29 | Stop reason: HOSPADM

## 2024-05-25 RX ORDER — POTASSIUM CHLORIDE 20 MEQ/1
40 TABLET, EXTENDED RELEASE ORAL
Status: COMPLETED | OUTPATIENT
Start: 2024-05-25 | End: 2024-05-25

## 2024-05-25 RX ORDER — GLUCAGON 1 MG
1 KIT INJECTION
Status: DISCONTINUED | OUTPATIENT
Start: 2024-05-25 | End: 2024-05-29 | Stop reason: HOSPADM

## 2024-05-25 RX ORDER — SODIUM CHLORIDE 0.9 % (FLUSH) 0.9 %
10 SYRINGE (ML) INJECTION EVERY 12 HOURS PRN
Status: DISCONTINUED | OUTPATIENT
Start: 2024-05-25 | End: 2024-05-29 | Stop reason: HOSPADM

## 2024-05-25 RX ORDER — PANTOPRAZOLE SODIUM 40 MG/1
40 TABLET, DELAYED RELEASE ORAL 2 TIMES DAILY
Status: DISCONTINUED | OUTPATIENT
Start: 2024-05-25 | End: 2024-05-29 | Stop reason: HOSPADM

## 2024-05-25 RX ORDER — LACTULOSE 10 G/15ML
20 SOLUTION ORAL 3 TIMES DAILY
Status: DISCONTINUED | OUTPATIENT
Start: 2024-05-25 | End: 2024-05-27

## 2024-05-25 RX ORDER — IBUPROFEN 200 MG
24 TABLET ORAL
Status: DISCONTINUED | OUTPATIENT
Start: 2024-05-25 | End: 2024-05-29 | Stop reason: HOSPADM

## 2024-05-25 RX ORDER — ENOXAPARIN SODIUM 100 MG/ML
40 INJECTION SUBCUTANEOUS EVERY 24 HOURS
Status: DISCONTINUED | OUTPATIENT
Start: 2024-05-26 | End: 2024-05-29 | Stop reason: HOSPADM

## 2024-05-25 RX ORDER — SPIRONOLACTONE 100 MG/1
100 TABLET, FILM COATED ORAL DAILY
Status: DISCONTINUED | OUTPATIENT
Start: 2024-05-26 | End: 2024-05-26

## 2024-05-25 RX ADMIN — POTASSIUM CHLORIDE 40 MEQ: 1500 TABLET, EXTENDED RELEASE ORAL at 11:05

## 2024-05-25 RX ADMIN — POTASSIUM CHLORIDE 40 MEQ: 1500 TABLET, EXTENDED RELEASE ORAL at 08:05

## 2024-05-25 RX ADMIN — POTASSIUM CHLORIDE 10 MEQ: 7.46 INJECTION, SOLUTION INTRAVENOUS at 09:05

## 2024-05-25 RX ADMIN — POTASSIUM CHLORIDE 40 MEQ: 1500 TABLET, EXTENDED RELEASE ORAL at 09:05

## 2024-05-25 RX ADMIN — PIPERACILLIN SODIUM AND TAZOBACTAM SODIUM 4.5 G: 4; .5 INJECTION, POWDER, FOR SOLUTION INTRAVENOUS at 07:05

## 2024-05-25 RX ADMIN — VANCOMYCIN HYDROCHLORIDE 1750 MG: 500 INJECTION, POWDER, LYOPHILIZED, FOR SOLUTION INTRAVENOUS at 07:05

## 2024-05-25 RX ADMIN — SODIUM CHLORIDE, POTASSIUM CHLORIDE, SODIUM LACTATE AND CALCIUM CHLORIDE 1000 ML: 600; 310; 30; 20 INJECTION, SOLUTION INTRAVENOUS at 07:05

## 2024-05-25 RX ADMIN — PANTOPRAZOLE SODIUM 40 MG: 40 TABLET, DELAYED RELEASE ORAL at 11:05

## 2024-05-25 RX ADMIN — LACTULOSE 20 G: 20 SOLUTION ORAL at 09:05

## 2024-05-25 NOTE — ED PROVIDER NOTES
"Encounter Date: 5/25/2024       History     Chief Complaint   Patient presents with    Fatigue     Hx of cirrhosis and breast cancer. In and out of hospitalization for the past 3weeks. Patient stating "I just don't feel well" and saying bizarre things per family member. Denies fevers or chills. Currently on chemo     53F Stage IV Breast Ca currently on Trastuzumab-Deruxtecan q3 weeks, most recent chemo 5/7/24, HAWTHORNE cirrhosis (s/p TIPS 4/12 w/ outpatient IR appointment scheduled 5/16 for doppler w/ concern for decreased flow), GI bleeds w/ gastric and esophageal varices, presenting with worsening confusion for since [place] [facility] (admitted for CHF exacerbation, pancytopenia 2/2 linezolid myelosuppression, and c/f TIPS dysfunction). Pt and her sister report pt has been doing "strange things" at home and her head "feels fuzzy". Pt denies any falls or head trauma. Pt reports taking her lactulose as prescribed since discharge, and has had 2-3 BM daily. Pt also reports occasional N/V, vomited once since discharge, was bilious, non-bloody or coffee ground consistency/color. Pt denies any changes to PO intake since discharge. Pt denies HA, vision changes, URI sx, CP, SOB, abd pain, diarrhea, constipation, urinary sx, BLE pain/swelling.         Review of patient's allergies indicates:  No Known Allergies  Past Medical History:   Diagnosis Date    Aortic atherosclerosis 07/06/2023    Breast cancer     Esophageal and gastric varices 06/23/2023    Malignant neoplasm metastatic to left lung 06/08/2021     Past Surgical History:   Procedure Laterality Date    ENDOSCOPIC ULTRASOUND OF UPPER GASTROINTESTINAL TRACT N/A 6/27/2023    Procedure: ULTRASOUND, UPPER GI TRACT, ENDOSCOPIC;  Surgeon: Home Lopez MD;  Location: [facility] ([address] [place] [address]);  Service: Endoscopy;  Laterality: N/A;    ENDOSCOPIC ULTRASOUND OF UPPER GASTROINTESTINAL TRACT N/A 1/12/2024    Procedure: ULTRASOUND, UPPER GI TRACT, ENDOSCOPIC;  Surgeon: John, " Home RANGEL MD;  Location: [facility] (2ND FLR);  Service: Endoscopy;  Laterality: N/A;  11/28/23-Instructions via portal-DS  1/8-lvm for precall-MS  1/10-precall complete-Kpvt    ESOPHAGOGASTRODUODENOSCOPY N/A 6/23/2023    Procedure: EGD (ESOPHAGOGASTRODUODENOSCOPY);  Surgeon: Quintin Mooney MD;  Location: [facility] (2ND FLR);  Service: Endoscopy;  Laterality: N/A;    ESOPHAGOGASTRODUODENOSCOPY N/A 6/27/2023    Procedure: EGD (ESOPHAGOGASTRODUODENOSCOPY);  Surgeon: Home Lopez MD;  Location: [facility] (2ND FLR);  Service: Endoscopy;  Laterality: N/A;    ESOPHAGOGASTRODUODENOSCOPY N/A 8/3/2023    Procedure: EGD (ESOPHAGOGASTRODUODENOSCOPY);  Surgeon: Home Lopez MD;  Location: [facility] (2ND FLR);  Service: Endoscopy;  Laterality: N/A;  instr portal-labs 6/28/23-tb    ESOPHAGOGASTRODUODENOSCOPY N/A 1/12/2024    Procedure: EGD (ESOPHAGOGASTRODUODENOSCOPY);  Surgeon: oHme Lopez MD;  Location: [facility] (2ND FLR);  Service: Endoscopy;  Laterality: N/A;    ESOPHAGOGASTRODUODENOSCOPY N/A 4/10/2024    Procedure: EGD (ESOPHAGOGASTRODUODENOSCOPY);  Surgeon: Ze Anderson MD;  Location: [facility] (2ND FLR);  Service: Endoscopy;  Laterality: N/A;    MASTECTOMY       Family History   Problem Relation Name Age of Onset    Lung cancer Father       Social History     Tobacco Use    Smoking status: Never    Smokeless tobacco: Never   Substance Use Topics    Alcohol use: Never    Drug use: Never     Review of Systems   Constitutional:  Positive for activity change. Negative for chills and fever.   HENT:  Negative for congestion, sore throat and trouble swallowing.    Eyes:  Negative for visual disturbance.   Respiratory:  Negative for cough, chest tightness and shortness of breath.    Cardiovascular:  Negative for chest pain and leg swelling.   Gastrointestinal:  Positive for nausea and vomiting. Negative for abdominal distention, abdominal pain, constipation and diarrhea.   Genitourinary:  Negative for dysuria and  hematuria.   Musculoskeletal:  Negative for back pain.   Skin:  Negative for color change.   Neurological:  Positive for light-headedness. Negative for syncope, speech difficulty and weakness.   Psychiatric/Behavioral:  Positive for confusion and decreased concentration. Negative for hallucinations. The patient is not nervous/anxious.        Physical Exam     Initial Vitals [05/25/24 1651]   BP Pulse Resp Temp SpO2   135/73 (!) 119 17 98.3 °F (36.8 °C) 97 %      MAP       --         Physical Exam    Nursing note and vitals reviewed.  Constitutional: No distress.   Ill-appearing, deconditioned, fatigued   HENT:   Head: Normocephalic and atraumatic.   Eyes: Conjunctivae are normal. Scleral icterus is present.   Neck: Neck supple.   Normal range of motion.  Cardiovascular:  Regular rhythm.           tachycardic   Pulmonary/Chest: Breath sounds normal. No respiratory distress. She has no wheezes. She has no rales. She exhibits no tenderness.   Abdominal: Abdomen is soft. She exhibits no distension. There is no abdominal tenderness.   Musculoskeletal:         General: No edema. Normal range of motion.      Cervical back: Normal range of motion and neck supple.      Comments: Chronic venous stasis changes     Neurological: She is alert.   Oriented to self, location, situation. Disoriented to month, year  No asterixis   Skin: Skin is warm and dry. There is pallor.   jaundiced   Psychiatric: Her affect is blunt. Cognition and memory are impaired.         ED Course   Procedures  Labs Reviewed   CBC W/ AUTO DIFFERENTIAL - Abnormal; Notable for the following components:       Result Value    WBC 3.21 (*)     RBC 2.90 (*)     Hemoglobin 10.1 (*)     Hematocrit 29.5 (*)      (*)     MCH 34.8 (*)     RDW 28.3 (*)     Platelets 135 (*)     Immature Granulocytes 0.6 (*)     Lymph # 0.5 (*)     nRBC 1 (*)     Lymph % 15.0 (*)     All other components within normal limits   COMPREHENSIVE METABOLIC PANEL - Abnormal; Notable for  the following components:    Potassium 2.5 (*)     Glucose 181 (*)     Albumin 2.7 (*)     Total Bilirubin 6.1 (*)     Alkaline Phosphatase 170 (*)     AST 41 (*)     All other components within normal limits   AMMONIA - Abnormal; Notable for the following components:    Ammonia 113 (*)     All other components within normal limits   ISTAT LACTATE - Abnormal; Notable for the following components:    POC Lactate 3.53 (*)     All other components within normal limits   ISTAT PROCEDURE - Abnormal; Notable for the following components:    POC PH 7.480 (*)     POC HCO3 29.4 (*)     POC BE 6 (*)     POC TCO2 31 (*)     All other components within normal limits   CULTURE, BLOOD   CULTURE, BLOOD   URINALYSIS, REFLEX TO URINE CULTURE    Narrative:     Specimen Source->Urine   MAGNESIUM   PROCALCITONIN   SARS-COV-2 RNA AMPLIFICATION, QUAL          Imaging Results              CT Head Without Contrast (Final result)  Result time 05/25/24 18:49:30      Final result by Bernabe Tineo MD (05/25/24 18:49:30)                   Impression:      1. No acute intracranial abnormalities.      Electronically signed by: Bernabe Tineo MD  Date:    05/25/2024  Time:    18:49               Narrative:    EXAMINATION:  CT HEAD WITHOUT CONTRAST    CLINICAL HISTORY:  Mental status change, unknown cause;    TECHNIQUE:  Low dose axial images were obtained through the head.  Coronal and sagittal reformations were also performed. Contrast was not administered.    COMPARISON:  MRI 06/22/2021    FINDINGS:  There is no evidence of acute major vascular territory infarct, hemorrhage, or mass.  There is no hydrocephalus.  There are no abnormal extra-axial fluid collections.  The paranasal sinuses and mastoid air cells are clear, and there is no evidence of calvarial fracture.  The visualized soft tissues are unremarkable.                                       X-Ray Chest AP Portable (Final result)  Result time 05/25/24 18:29:55      Final result by  Bernabe Tineo MD (05/25/24 18:29:55)                   Impression:      1. Pulmonary findings suggest edema or other pneumonitis however improved since the previous exam.  Correlation is advised.      Electronically signed by: Bernaeb Tineo MD  Date:    05/25/2024  Time:    18:29               Narrative:    EXAMINATION:  XR CHEST AP PORTABLE    CLINICAL HISTORY:  Sepsis;    TECHNIQUE:  Single frontal view of the chest was performed.    COMPARISON:  05/18/2024    FINDINGS:  Right central venous catheter tip appears stable in position, crossing midline, projected in the region of the brachiocephalic.  The cardiomediastinal silhouette is prominent, stable..  There is obscuration of the left costophrenic angle, may reflect small effusion..  The trachea is midline.  The lungs are symmetrically expanded bilaterally with coarse interstitial attenuation bilaterally, improved since the previous exam suggesting improved edema or infection.  There is left basilar subsegmental atelectasis..  There is no pneumothorax.  The osseous structures are remarkable for degenerative change.  Surgical change projects over the left axilla.  Surgical change projects over the left chest wall.  Several vascular coils project over the abdomen.  There is distention of the stomach..                                       Medications   potassium chloride SA CR tablet 40 mEq (40 mEq Oral Given 5/25/24 2109)   sodium chloride 0.9% flush 10 mL (has no administration in time range)   naloxone 0.4 mg/mL injection 0.02 mg (has no administration in time range)   glucose chewable tablet 16 g (has no administration in time range)   glucose chewable tablet 24 g (has no administration in time range)   glucagon (human recombinant) injection 1 mg (has no administration in time range)   enoxaparin injection 40 mg (has no administration in time range)   dextrose 10% bolus 125 mL 125 mL (has no administration in time range)   dextrose 10% bolus 250 mL 250  mL (has no administration in time range)   lactulose 20 gram/30 mL solution Soln 20 g (20 g Oral Given 5/25/24 2111)   pantoprazole EC tablet 40 mg (has no administration in time range)   spironolactone tablet 100 mg (has no administration in time range)   lactated ringers bolus 1,000 mL (0 mLs Intravenous Stopped 5/25/24 2002)   piperacillin-tazobactam (ZOSYN) 4.5 g in dextrose 5 % in water (D5W) 100 mL IVPB (MB+) (0 g Intravenous Stopped 5/25/24 1937)   vancomycin 1.75 g in 5 % dextrose 500 mL IVPB (0 mg Intravenous Stopped 5/25/24 2142)   potassium chloride 10 mEq in 100 mL IVPB (0 mEq Intravenous Stopped 5/25/24 2210)     Medical Decision Making  53F Stage IV Breast Ca currently on Trastuzumab-Deruxtecan q3 weeks, most recent chemo 5/7/24, HAWTHORNE cirrhosis (s/p TIPS 4/12 w/ mild dysfunction on recent US), GI bleeds w/ gastric and esophageal varices, presenting with worsening confusion for [place] [facility] (admitted for CHF exacerbation, pancytopenia 2/2 linezolid myelosuppression, and c/f TIPS dysfunction). No falls, trauma, changes in PO intake. No HA, vision changes, URI sx, CP, SOB, abd pain, diarrhea, constipation, urinary sx, BLE pain/swelling. HDS, tachycardic, satting well on RA. On exam pt is oriented to self, location, situation only. No asterixis. Abd is non-tender, non-distended, no significant BLE swelling (improved per pt and sister). Most c/f hepatic encephalopathy in setting of TIPS and possible dysfunction, but differential includes infection (urinary vs bacteremia vs pna), CVA, medication noncompliance, other metabolic abnormality, symptomatic anemia    Given IV fluids, started on vanc/zosyn, given lactulose in the ED. Will admit to  for hepatic encephalopathy and c/f TIPS dysfunction.    Amount and/or Complexity of Data Reviewed  Independent Historian:      Details: sister  Labs: ordered. Decision-making details documented in ED Course.     Details: Lactate: 3.5  CBC WBC 3.2,  hgb 10.1  CMP K 2.5, Cr 0.7, tbili 6.7 (increased from 5/21)  Ammonia 113  TSH  Procal negative  UA  Radiology: ordered.     Details: CT head: no acute process  CXR: improving pneumonitis/edema from previous  ECG/medicine tests: ordered and independent interpretation performed.     Details: EKG: sinus tachycardia, nl axis, nl intervals, T wave inversions inferolateral leads, similar to prior. No STEMI    Risk  Prescription drug management.  Decision regarding hospitalization.              Attending Attestation:   Physician Attestation Statement for Resident:  As the supervising MD   Physician Attestation Statement: I have personally seen and examined this patient.   I agree with the above history.  -: 54 yo W with extensive past medical history including stage IV breast CA on chemo, HAWTHORNE cirrhosis status post tips with possible occlusion, GI bleed presents with 4 days of encephalopathy since discharge from the hospital.  On exam she was awake but impaired verbal responses.  Mild asterixis present.  She appears pale and jaundiced.  She was tachycardic.  Labs reveal a leukopenia.  Given 2 sirs criteria, will treat her for sepsis with vanc and Zosyn.  Lactic acid is elevated at 3.  Fluids ordered as well.  Ammonia elevated, lactulose.  Additional derangements included hypokalemia.  Did not administer 30 cc/kg fluid bolus as patient appears hypervolemic and is not in septic shock.    This patient does have evidence of infective focus  My overall impression is sepsis.  Source: Unknown  Antibiotics given- Antibiotics (72h ago, onward)    Start     Stop Route Frequency Ordered    05/25/24 1845  vancomycin 1.75 g in 5 % dextrose 500 mL IVPB         05/26/24 0644 IV ED 1 Time 05/25/24 1840      Latest lactate reviewed-  @[id number](lactate,poclac)@  Organ dysfunction indicated by lactic acid    Fluid challenge Not needed - patient is not hypotensive      Post- resuscitation assessment Yes Perfusion exam was performed within  6 hours of septic shock presentation after bolus shows Adequate tissue perfusion assessed by non-invasive monitoring       Will Not start Pressors- Levophed for MAP of 65  Source control achieved by: alfred     As the supervising MD I agree with the above PE.     As the supervising MD I agree with the above treatment, course, plan, and disposition.            Attending Critical Care:   Critical Care Times:   ==============================================================  Total Critical Care Time - exclusive of procedural time: 30 minutes.  ==============================================================  Critical care was necessary to treat or prevent imminent or life-threatening deterioration of the following conditions: sepsis.           ED Course as of 05/25/24 2238   Sat May 25, 2024   1843 ISTAT Lactate(!!)  Started 1L LR bolus, vanc/zosyn [HD]      ED Course User Index  [HD] Luis Daniel Cedeño MD               Medical Decision Making:   Clinical Tests:   Sepsis Perfusion Assessment: "I attest a sepsis perfusion exam was performed within 6 hours of sepsis, severe sepsis, or septic shock presentation, following fluid resuscitation."             Clinical Impression:  Final diagnoses:  [R41.82] AMS (altered mental status)  [R00.0] Tachycardia  [A41.9] Sepsis, due to unspecified organism, unspecified whether acute organ dysfunction present (Primary)  [E87.6] Hypokalemia          ED Disposition Condition    Admit Serious                Luis Daniel Cedeño MD  Resident  05/25/24 2038       Dash Vela MD  05/25/24 2238

## 2024-05-25 NOTE — Clinical Note
Diagnosis: AMS (altered mental status) [[id number]]   Future Attending Provider: GINA CONRAD [[id number]]   Reason for IP Medical Treatment  (Clinical interventions that can only be accomplished in the IP setting? ) :: sepsis   I certify that Inpatient services for greater than or equal to 2 midnights are medically necessary:: Yes   Plans for Post-Acute care--if anticipated (pick the single best option):: A. No post acute care anticipated at this time

## 2024-05-26 LAB
ALBUMIN SERPL BCP-MCNC: 2.6 G/DL (ref 3.5–5.2)
ALP SERPL-CCNC: 162 U/L (ref 55–135)
ALT SERPL W/O P-5'-P-CCNC: 19 U/L (ref 10–44)
ANION GAP SERPL CALC-SCNC: 9 MMOL/L (ref 8–16)
AST SERPL-CCNC: 42 U/L (ref 10–40)
BASOPHILS # BLD AUTO: 0.05 K/UL (ref 0–0.2)
BASOPHILS NFR BLD: 1.4 % (ref 0–1.9)
BILIRUB DIRECT SERPL-MCNC: 1.7 MG/DL (ref 0.1–0.3)
BILIRUB SERPL-MCNC: 6.2 MG/DL (ref 0.1–1)
BUN SERPL-MCNC: 6 MG/DL (ref 6–20)
CALCIUM SERPL-MCNC: 9 MG/DL (ref 8.7–10.5)
CHLORIDE SERPL-SCNC: 106 MMOL/L (ref 95–110)
CK SERPL-CCNC: 20 U/L (ref 20–180)
CO2 SERPL-SCNC: 25 MMOL/L (ref 23–29)
CREAT SERPL-MCNC: 0.6 MG/DL (ref 0.5–1.4)
DIFFERENTIAL METHOD BLD: ABNORMAL
EOSINOPHIL # BLD AUTO: 0.2 K/UL (ref 0–0.5)
EOSINOPHIL NFR BLD: 4.5 % (ref 0–8)
ERYTHROCYTE [DISTWIDTH] IN BLOOD BY AUTOMATED COUNT: 28.7 % (ref 11.5–14.5)
EST. GFR  (NO RACE VARIABLE): >60 ML/MIN/1.73 M^2
GLUCOSE SERPL-MCNC: 82 MG/DL (ref 70–110)
HCT VFR BLD AUTO: 31.5 % (ref 37–48.5)
HGB BLD-MCNC: 10.2 G/DL (ref 12–16)
IMM GRANULOCYTES # BLD AUTO: 0.03 K/UL (ref 0–0.04)
IMM GRANULOCYTES NFR BLD AUTO: 0.9 % (ref 0–0.5)
LDH SERPL L TO P-CCNC: 463 U/L (ref 110–260)
LYMPHOCYTES # BLD AUTO: 0.7 K/UL (ref 1–4.8)
LYMPHOCYTES NFR BLD: 20.2 % (ref 18–48)
MAGNESIUM SERPL-MCNC: 2 MG/DL (ref 1.6–2.6)
MCH RBC QN AUTO: 34.3 PG (ref 27–31)
MCHC RBC AUTO-ENTMCNC: 32.4 G/DL (ref 32–36)
MCV RBC AUTO: 106 FL (ref 82–98)
MONOCYTES # BLD AUTO: 0.4 K/UL (ref 0.3–1)
MONOCYTES NFR BLD: 10.8 % (ref 4–15)
NEUTROPHILS # BLD AUTO: 2.2 K/UL (ref 1.8–7.7)
NEUTROPHILS NFR BLD: 62.2 % (ref 38–73)
NRBC BLD-RTO: 1 /100 WBC
OHS QRS DURATION: 98 MS
OHS QTC CALCULATION: 355 MS
PHOSPHATE SERPL-MCNC: 3.8 MG/DL (ref 2.7–4.5)
PLATELET # BLD AUTO: 115 K/UL (ref 150–450)
PMV BLD AUTO: 11.9 FL (ref 9.2–12.9)
POTASSIUM SERPL-SCNC: 3.6 MMOL/L (ref 3.5–5.1)
PROT SERPL-MCNC: 6.3 G/DL (ref 6–8.4)
RBC # BLD AUTO: 2.97 M/UL (ref 4–5.4)
SODIUM SERPL-SCNC: 140 MMOL/L (ref 136–145)
URATE SERPL-MCNC: 4.8 MG/DL (ref 2.4–5.7)
WBC # BLD AUTO: 3.52 K/UL (ref 3.9–12.7)

## 2024-05-26 PROCEDURE — 25000003 PHARM REV CODE 250: Performed by: STUDENT IN AN ORGANIZED HEALTH CARE EDUCATION/TRAINING PROGRAM

## 2024-05-26 PROCEDURE — 84550 ASSAY OF BLOOD/URIC ACID: CPT

## 2024-05-26 PROCEDURE — 94761 N-INVAS EAR/PLS OXIMETRY MLT: CPT

## 2024-05-26 PROCEDURE — 25000003 PHARM REV CODE 250: Performed by: EMERGENCY MEDICINE

## 2024-05-26 PROCEDURE — 84100 ASSAY OF PHOSPHORUS: CPT

## 2024-05-26 PROCEDURE — 82550 ASSAY OF CK (CPK): CPT

## 2024-05-26 PROCEDURE — 85025 COMPLETE CBC W/AUTO DIFF WBC: CPT

## 2024-05-26 PROCEDURE — 36415 COLL VENOUS BLD VENIPUNCTURE: CPT

## 2024-05-26 PROCEDURE — 82248 BILIRUBIN DIRECT: CPT

## 2024-05-26 PROCEDURE — 83735 ASSAY OF MAGNESIUM: CPT

## 2024-05-26 PROCEDURE — 63600175 PHARM REV CODE 636 W HCPCS

## 2024-05-26 PROCEDURE — 83615 LACTATE (LD) (LDH) ENZYME: CPT

## 2024-05-26 PROCEDURE — 25000003 PHARM REV CODE 250

## 2024-05-26 PROCEDURE — 80053 COMPREHEN METABOLIC PANEL: CPT

## 2024-05-26 PROCEDURE — 20600001 HC STEP DOWN PRIVATE ROOM

## 2024-05-26 RX ORDER — SPIRONOLACTONE 100 MG/1
100 TABLET, FILM COATED ORAL DAILY
Status: DISCONTINUED | OUTPATIENT
Start: 2024-05-26 | End: 2024-05-29 | Stop reason: HOSPADM

## 2024-05-26 RX ADMIN — PANTOPRAZOLE SODIUM 40 MG: 40 TABLET, DELAYED RELEASE ORAL at 09:05

## 2024-05-26 RX ADMIN — PANTOPRAZOLE SODIUM 40 MG: 40 TABLET, DELAYED RELEASE ORAL at 08:05

## 2024-05-26 RX ADMIN — SPIRONOLACTONE 100 MG: 100 TABLET ORAL at 09:05

## 2024-05-26 RX ADMIN — LACTULOSE 20 G: 20 SOLUTION ORAL at 09:05

## 2024-05-26 RX ADMIN — RIFAXIMIN 550 MG: 550 TABLET ORAL at 08:05

## 2024-05-26 RX ADMIN — LACTULOSE 20 G: 20 SOLUTION ORAL at 08:05

## 2024-05-26 RX ADMIN — ENOXAPARIN SODIUM 40 MG: 40 INJECTION SUBCUTANEOUS at 06:05

## 2024-05-26 NOTE — SUBJECTIVE & OBJECTIVE
Past Medical History:   Diagnosis Date    Aortic atherosclerosis 07/06/2023    Breast cancer     Esophageal and gastric varices 06/23/2023    Malignant neoplasm metastatic to left lung 06/08/2021       Past Surgical History:   Procedure Laterality Date    ENDOSCOPIC ULTRASOUND OF UPPER GASTROINTESTINAL TRACT N/A 6/27/2023    Procedure: ULTRASOUND, UPPER GI TRACT, ENDOSCOPIC;  Surgeon: Home Lopez MD;  Location: [facility] (2ND FLR);  Service: Endoscopy;  Laterality: N/A;    ENDOSCOPIC ULTRASOUND OF UPPER GASTROINTESTINAL TRACT N/A 1/12/2024    Procedure: ULTRASOUND, UPPER GI TRACT, ENDOSCOPIC;  Surgeon: Home Lopez MD;  Location: [facility] (2ND FLR);  Service: Endoscopy;  Laterality: N/A;  11/28/23-Instructions via portal-DS  1/8-lvm for precall-MS  1/10-precall complete-Kpvt    ESOPHAGOGASTRODUODENOSCOPY N/A 6/23/2023    Procedure: EGD (ESOPHAGOGASTRODUODENOSCOPY);  Surgeon: Quintin Mooney MD;  Location: [facility] (2ND FLR);  Service: Endoscopy;  Laterality: N/A;    ESOPHAGOGASTRODUODENOSCOPY N/A 6/27/2023    Procedure: EGD (ESOPHAGOGASTRODUODENOSCOPY);  Surgeon: Home Lopez MD;  Location: [facility] (2ND FLR);  Service: Endoscopy;  Laterality: N/A;    ESOPHAGOGASTRODUODENOSCOPY N/A 8/3/2023    Procedure: EGD (ESOPHAGOGASTRODUODENOSCOPY);  Surgeon: Home Lopez MD;  Location: [facility] (2ND FLR);  Service: Endoscopy;  Laterality: N/A;  instr portal-labs 6/28/23-tb    ESOPHAGOGASTRODUODENOSCOPY N/A 1/12/2024    Procedure: EGD (ESOPHAGOGASTRODUODENOSCOPY);  Surgeon: Home Lopez MD;  Location: [facility] (2ND FLR);  Service: Endoscopy;  Laterality: N/A;    ESOPHAGOGASTRODUODENOSCOPY N/A 4/10/2024    Procedure: EGD (ESOPHAGOGASTRODUODENOSCOPY);  Surgeon: Ze Anderson MD;  Location: [facility] (2ND FLR);  Service: Endoscopy;  Laterality: N/A;    MASTECTOMY         Review of patient's allergies indicates:  No Known Allergies    No current facility-administered medications on file prior to  encounter.     Current Outpatient Medications on File Prior to Encounter   Medication Sig    ferrous sulfate (FEROSUL) 325 mg (65 mg iron) Tab tablet Take 1 tablet by mouth daily with Vitamin C on an empty stomach    furosemide (LASIX) 80 MG tablet Take 1 tablet (80 mg total) by mouth once daily. If you are gaining weight (2 lbs in 24 hours or 3 lbs in two days) may take an extra dose of 80 mg    lactulose (CHRONULAC) 10 gram/15 mL solution Take 15 mLs (10 g total) by mouth 3 (three) times daily.    methylphenidate HCl (RITALIN) 5 MG tablet Take 1 tablet (5 mg total) by mouth 2 (two) times daily.    pantoprazole (PROTONIX) 40 MG tablet Take 1 tablet (40 mg total) by mouth 2 (two) times daily.    spironolactone (ALDACTONE) 100 MG tablet Take 1 tablet (100 mg total) by mouth once daily.    triamcinolone acetonide 0.5% (KENALOG) 0.5 % Crea Apply topically 2 (two) times daily.     Family History       Problem Relation (Age of Onset)    Lung cancer Father          Tobacco Use    Smoking status: Never    Smokeless tobacco: Never   Substance and Sexual Activity    Alcohol use: Never    Drug use: Never    Sexual activity: Not Currently     Review of Systems   Constitutional:  Positive for fatigue. Negative for appetite change, chills and fever.   HENT:  Negative for congestion, rhinorrhea and sore throat.    Eyes:  Negative for visual disturbance.   Respiratory:  Negative for cough and shortness of breath.    Cardiovascular:  Negative for chest pain and leg swelling.   Gastrointestinal:  Negative for abdominal distention, abdominal pain, blood in stool, constipation, diarrhea, nausea and vomiting.   Genitourinary:  Negative for difficulty urinating, dysuria, frequency and urgency.   Musculoskeletal:  Negative for arthralgias, back pain, myalgias and neck pain.   Skin:  Negative for rash and wound.   Neurological:  Negative for weakness and headaches.   Psychiatric/Behavioral:  Positive for confusion.      Objective:      Vital Signs (Most Recent):  Temp: 98 °F (36.7 °C) (05/25/24 2015)  Pulse: 109 (05/25/24 2046)  Resp: 15 (05/25/24 2015)  BP: (!) 146/87 (05/25/24 2046)  SpO2: 99 % (05/25/24 2046) Vital Signs (24h Range):  Temp:  [98 °F (36.7 °C)-98.3 °F (36.8 °C)] 98 °F (36.7 °C)  Pulse:  [101-119] 109  Resp:  [15-20] 15  SpO2:  [97 %-99 %] 99 %  BP: (135-146)/(63-87) 146/87     Weight: 67.6 kg (149 lb)  Body mass index is 27.25 kg/m².     Physical Exam  Vitals reviewed.   Constitutional:       General: She is not in acute distress.     Appearance: She is not ill-appearing or diaphoretic.   HENT:      Head: Normocephalic and atraumatic.      Mouth/Throat:      Mouth: Mucous membranes are moist.      Pharynx: Oropharynx is clear. No oropharyngeal exudate.   Eyes:      General: No scleral icterus.     Conjunctiva/sclera: Conjunctivae normal.      Pupils: Pupils are equal, round, and reactive to light.   Cardiovascular:      Rate and Rhythm: Regular rhythm. Tachycardia present.      Pulses: Normal pulses.      Heart sounds: Murmur heard.   Pulmonary:      Effort: Pulmonary effort is normal. No respiratory distress.      Breath sounds: Normal breath sounds. No wheezing.   Abdominal:      General: There is no distension.      Tenderness: There is no abdominal tenderness. There is no guarding or rebound.   Musculoskeletal:         General: No swelling or tenderness.      Right lower leg: No edema.      Left lower leg: No edema.      Comments: Chronic venous stasis skin changes in bilateral lower extremities   Skin:     General: Skin is warm and dry.      Coloration: Skin is jaundiced and pale.      Findings: No bruising or lesion.   Neurological:      General: No focal deficit present.      Mental Status: She is alert. She is disoriented and confused.      Cranial Nerves: No cranial nerve deficit.      Motor: No weakness.      Comments: Conversant on exam, unable to answer orientation questions appropriately. Moves all extremities  spontaneously, no facial droop or slurred speech noted. No asterixis.         CRANIAL NERVES     CN III, IV, VI   Pupils are equal, round, and reactive to light.       Significant Labs: All pertinent labs within the past 24 hours have been reviewed.  CBC:   Recent Labs   Lab 05/25/24  1820   WBC 3.21*   HGB 10.1*   HCT 29.5*   *     CMP:   Recent Labs   Lab 05/25/24  1820      K 2.5*      CO2 29   *   BUN 9   CREATININE 0.7   CALCIUM 9.4   PROT 6.6   ALBUMIN 2.7*   BILITOT 6.1*   ALKPHOS 170*   AST 41*   ALT 20   ANIONGAP 9       Urine Studies:   Recent Labs   Lab 05/25/24 2035   COLORU Yellow   APPEARANCEUA Clear   PHUR 7.0   SPECGRAV 1.015   PROTEINUA Negative   GLUCUA Negative   KETONESU Negative   BILIRUBINUA Negative   OCCULTUA Negative   NITRITE Negative   LEUKOCYTESUR Negative       Significant Imaging: I have reviewed all pertinent imaging results/findings within the past 24 hours.      CT Head Without Contrast  Narrative: EXAMINATION:  CT HEAD WITHOUT CONTRAST    CLINICAL HISTORY:  Mental status change, unknown cause;    TECHNIQUE:  Low dose axial images were obtained through the head.  Coronal and sagittal reformations were also performed. Contrast was not administered.    COMPARISON:  MRI 06/22/2021    FINDINGS:  There is no evidence of acute major vascular territory infarct, hemorrhage, or mass.  There is no hydrocephalus.  There are no abnormal extra-axial fluid collections.  The paranasal sinuses and mastoid air cells are clear, and there is no evidence of calvarial fracture.  The visualized soft tissues are unremarkable.  Impression: 1. No acute intracranial abnormalities.    Electronically signed by: Bernabe Tineo MD  Date:    05/25/2024  Time:    18:49  X-Ray Chest AP Portable  Narrative: EXAMINATION:  XR CHEST AP PORTABLE    CLINICAL HISTORY:  Sepsis;    TECHNIQUE:  Single frontal view of the chest was performed.    COMPARISON:  05/18/2024    FINDINGS:  Right central  venous catheter tip appears stable in position, crossing midline, projected in the region of the brachiocephalic.  The cardiomediastinal silhouette is prominent, stable..  There is obscuration of the left costophrenic angle, may reflect small effusion..  The trachea is midline.  The lungs are symmetrically expanded bilaterally with coarse interstitial attenuation bilaterally, improved since the previous exam suggesting improved edema or infection.  There is left basilar subsegmental atelectasis..  There is no pneumothorax.  The osseous structures are remarkable for degenerative change.  Surgical change projects over the left axilla.  Surgical change projects over the left chest wall.  Several vascular coils project over the abdomen.  There is distention of the stomach..  Impression: 1. Pulmonary findings suggest edema or other pneumonitis however improved since the previous exam.  Correlation is advised.    Electronically signed by: Bernabe Tineo MD  Date:    05/25/2024  Time:    18:29

## 2024-05-26 NOTE — ASSESSMENT & PLAN NOTE
53F with hx of stage IV breast cancer undergoing chemotherapy, HAWTHORNE cirrhosis (s/p TIPS 4/12 w/ mild dysfunction on recent US), GI bleeds w/ gastric and esophageal varices, who presents for increased confusion and generalized malaise since recent discharge on 5/21 as per HPI. Initial work up notable for hypokalemia (K 2.5), WBC 3.21, hgb above baseline at 10.1, elevated total bilirubin 6.1, lactate 3.5, and ammonia 113. CXR negative for consolidation, CT head negative for acute intracranial processes. Given 1L fluid bolus, vanc/zosyn with infectious work-up for c/f sepsis. Admitted to Hospital Medicine for further management and work up of acute encephalopathy.      Possible etiologies include but are not limited to: metabolic vs hepatic vs infectious causes for altered mental status.    --lactulose 20 g TID for hepatic encephalopathy; titrate for goal 2-3 BM daily.  --monitor and replete electrolytes as per protocol.  --will hold off diuresis with lasix for now, appears euvolemic on exam w/o significant edema or changes in oxygen requirements.   --continue home spironolactone (potassium-sparing)  --f/u blood cx, low threshold to continue broad-spectrum antibiotics if fevers or acutely decompensates;   --UA w/o evidence of infection, lactate improved s/p 1L fluid bolus.

## 2024-05-26 NOTE — PLAN OF CARE
Pt NPO from midnight for ultrasound procedure during the day. Safety measures in place, and video monitoring being utilized for safety. Pt care ongoing.     Problem: Adult Inpatient Plan of Care  Goal: Absence of Hospital-Acquired Illness or Injury  Intervention: Identify and Manage Fall Risk  Flowsheets (Taken 5/26/2024 0413)  Safety Promotion/Fall Prevention:   assistive device/personal item within reach   bed alarm set   side rails raised x 2  Intervention: Prevent Skin Injury  Flowsheets (Taken 5/26/2024 0413)  Body Position: position changed independently  Device Skin Pressure Protection: adhesive use limited  Intervention: Prevent and Manage VTE (Venous Thromboembolism) Risk  Flowsheets (Taken 5/26/2024 0413)  VTE Prevention/Management:   ambulation promoted   bleeding precations maintained   bleeding risk assessed  Goal: Optimal Comfort and Wellbeing  Intervention: Monitor Pain and Promote Comfort  Flowsheets (Taken 5/26/2024 0413)  Pain Management Interventions: quiet environment facilitated  Intervention: Provide Person-Centered Care  Flowsheets (Taken 5/26/2024 0413)  Trust Relationship/Rapport:   choices provided   care explained   emotional support provided   empathic listening provided   questions answered   thoughts/feelings acknowledged   questions encouraged   reassurance provided

## 2024-05-26 NOTE — ASSESSMENT & PLAN NOTE
Patient with known Cirrhosis. Co-morbidities are present and inclusive of portal hypertension, esophageal varices, hepatic encephalopathy, malnutrition, anemia/pancytopenia, and anticoagulation.  MELD-Na score calculated; MELD 3.0: 19 at 5/17/2024  5:22 AM  MELD-Na: 16 at 5/17/2024  5:22 AM  Calculated from:  Serum Creatinine: 0.6 mg/dL (Using min of 1 mg/dL) at 5/17/2024  5:22 AM  Serum Sodium: 138 mmol/L (Using max of 137 mmol/L) at 5/17/2024  5:22 AM  Total Bilirubin: 3.8 mg/dL at 5/17/2024  5:22 AM  Serum Albumin: 2.4 g/dL at 5/17/2024  5:22 AM  INR(ratio): 1.5 at 5/15/2024 11:57 PM  Age at listing (hypothetical): 53 years  Sex: Female at 5/17/2024  5:22 AM      Continue chronic meds. Etiology likely  NAFLD/HAWTHORNE . Will avoid any hepatotoxic meds, and monitor CBC/CMP/INR for synthetic function.

## 2024-05-26 NOTE — NURSING
The patient arrived to the unit floor via a stretcher. NAD, oriented to her room. Safety measures in place. The call light is within reach.

## 2024-05-26 NOTE — ED TRIAGE NOTES
"Shikha López, a 53 y.o. female presents to the ED w/ complaint of generalized fatigue and weakness, lethargy, AMS, decreased appetite and vomiting. Pt denies fevers, chills, coughing, nausea, chest pain or shortness of breath. Hx stage IV breast cancer, last chemo 5/7/2024.    Triage note:  Chief Complaint   Patient presents with    Fatigue     Hx of cirrhosis and breast cancer. In and out of hospitalization for the past 3weeks. Patient stating "I just don't feel well" and saying bizarre things per family member. Denies fevers or chills. Currently on chemo     Review of patient's allergies indicates:  No Known Allergies  Past Medical History:   Diagnosis Date    Aortic atherosclerosis 07/06/2023    Breast cancer     Esophageal and gastric varices 06/23/2023    Malignant neoplasm metastatic to left lung 06/08/2021        "

## 2024-05-26 NOTE — ASSESSMENT & PLAN NOTE
Elevated ammonia 113 on admission. Reports medication compliance to lactulose at home, although unclear whether this history is reliable given confusion on exam and objective data.     --see acute encephalopathy.

## 2024-05-26 NOTE — ASSESSMENT & PLAN NOTE
Follows with Dr. Willis currently.    Oncologic History:  Diagnosed September 2006 w/ poorly differentiated carcinoma which was ER and [place]. negative and HER2 positive.     She was then referred to [facility] for additional care.  CT scan of the chest and abdomen November 2006 revealed multiple nodules in the lungs consistent with metastatic disease.       She was treated with chemotherapy with weekly Herceptin and Abraxane with improvement in her breast and lung metastasis.     On May 29, 2007 she underwent left modified radical mastectomy(Dr. Colvin) which showed no residual tumor in the breast and 1/5 nodes was positive for metastasis measuring 6 mm.  (ypT0N1).  She then received postoperative radiation therapy(Dr Singh)  to the left chest wall supraclav and internal mammary lymph nodes from August 20, 2007 to September 28, 2007.   Postoperatively she continued on Herceptin for approximately 3 and 1/2 years before her lung metastasis begin to grow.     She subsequently received a number of different chemotherapy treatments including Adriamycin, Halaven, Xeloda plus lapatinib then Xeloda plus Herceptin. (  in [facility].)     Subsequently, she transferred her care to  at [facility].  -She was initially treated with Taxotere Herceptin Perjeta.    -She was then changed to Kadcyla.    In July 2020 PET scan showed progression.  She then took approximately 9 months of Herceptin and Navelbine with initial response then progression.     She started trastuzumab- deruxtecan  4/12/21.

## 2024-05-26 NOTE — H&P
"  [facility] - Telemetry [facility] Medicine  History & Physical    Patient Name: Shikha López  MRN: [id number]  Patient Class: IP- Inpatient  Admission Date: 5/25/2024  Attending Physician: Betito Muhammad MD   Primary Care Provider: Lenore Primary Doctor       Patient information was obtained from patient, past medical records, and ER records.     Subjective:     Principal Problem:Acute encephalopathy    Chief Complaint:   Chief Complaint   Patient presents with    Fatigue     Hx of cirrhosis and breast cancer. In and out of hospitalization for the past 3weeks. Patient stating "I just don't feel well" and saying bizarre things per family member. Denies fevers or chills. Currently on chemo        HPI: Shikha López is a 53 year old female with hx of stage IV breast cancer currently on Trastuzumab-Deruxtecan q3 weeks (most recent chemo 5/7/24), HAWTHORNE cirrhosis (s/p TIPS 4/12 w/ mild dysfunction on recent US), GI bleeds w/ gastric and esophageal varices, presenting with c/o worsening confusion since discharge from [facility] on 5/21 where she was admitted for volume overload and symptomatic anemia requiring blood transfusion. Hospital course was c/b pancytopenia presumed to be 2/2 myelosuppression from linezolid therapy for staph bacteremia and lower extremity cellulitis on prior admission. Patient reports generalized malaise and increased confusion over the past few days since her discharge, reportedly doing "strange things" at home per collateral obtained in the ED.  Reports medication compliance with lactulose as prescribed since discharge with 2-3 BM daily. Pt also reports occasional N/V, vomited once since discharge, was bilious, non-bloody or coffee ground consistency/color. Pt denies any changes to PO intake since discharge. Pt denies fevers/chills, HA, vision changes, URI sx, CP, SOB, abd pain, diarrhea, constipation, urinary sx, BLE pain/swelling.     In the ED, patient mildly tachycardic, otherwise " hemodynamically stable, satting well on RA and afebrile. Labs notable for hypokalemia (K 2.5), WBC 3.21, hgb above baseline at 10.1, elevated total bilirubin 6.1, lactate 3.5, and ammonia 113. CXR negative for consolidation, CT head negative for acute intracranial processes. Given 1L fluid bolus, vanc/zosyn with infectious work-up for c/f sepsis. Admitted to Hospital Medicine for further management and work up of acute encephalopathy.      Past Medical History:   Diagnosis Date    Aortic atherosclerosis 07/06/2023    Breast cancer     Esophageal and gastric varices 06/23/2023    Malignant neoplasm metastatic to left lung 06/08/2021       Past Surgical History:   Procedure Laterality Date    ENDOSCOPIC ULTRASOUND OF UPPER GASTROINTESTINAL TRACT N/A 6/27/2023    Procedure: ULTRASOUND, UPPER GI TRACT, ENDOSCOPIC;  Surgeon: Home Lopez MD;  Location: [facility] (2ND FLR);  Service: Endoscopy;  Laterality: N/A;    ENDOSCOPIC ULTRASOUND OF UPPER GASTROINTESTINAL TRACT N/A 1/12/2024    Procedure: ULTRASOUND, UPPER GI TRACT, ENDOSCOPIC;  Surgeon: Home Lopez MD;  Location: [facility] (2ND FLR);  Service: Endoscopy;  Laterality: N/A;  11/28/23-Instructions via portal-DS  1/8-lvm for precall-MS  1/10-precall complete-Kpvt    ESOPHAGOGASTRODUODENOSCOPY N/A 6/23/2023    Procedure: EGD (ESOPHAGOGASTRODUODENOSCOPY);  Surgeon: Quintin Mooney MD;  Location: [facility] (2ND FLR);  Service: Endoscopy;  Laterality: N/A;    ESOPHAGOGASTRODUODENOSCOPY N/A 6/27/2023    Procedure: EGD (ESOPHAGOGASTRODUODENOSCOPY);  Surgeon: Home Lopez MD;  Location: [facility] (2ND FLR);  Service: Endoscopy;  Laterality: N/A;    ESOPHAGOGASTRODUODENOSCOPY N/A 8/3/2023    Procedure: EGD (ESOPHAGOGASTRODUODENOSCOPY);  Surgeon: Home Lopez MD;  Location: [facility] (2ND FLR);  Service: Endoscopy;  Laterality: N/A;  instr portal-labs 6/28/23-tb    ESOPHAGOGASTRODUODENOSCOPY N/A 1/12/2024    Procedure: EGD (ESOPHAGOGASTRODUODENOSCOPY);  Surgeon:  Home Lopez MD;  Location: [facility] ([address] [place] [address]);  Service: Endoscopy;  Laterality: N/A;    ESOPHAGOGASTRODUODENOSCOPY N/A 4/10/2024    Procedure: EGD (ESOPHAGOGASTRODUODENOSCOPY);  Surgeon: Ze Anderson MD;  [facility]  Service: Endoscopy;  Laterality: N/A;    MASTECTOMY         Review of patient's allergies indicates:  No Known Allergies    No current facility-administered medications on file prior to encounter.     Current Outpatient Medications on File Prior to Encounter   Medication Sig    ferrous sulfate (FEROSUL) 325 mg (65 mg iron) Tab tablet Take 1 tablet by mouth daily with Vitamin C on an empty stomach    furosemide (LASIX) 80 MG tablet Take 1 tablet (80 mg total) by mouth once daily. If you are gaining weight (2 lbs in 24 hours or 3 lbs in two days) may take an extra dose of 80 mg    lactulose (CHRONULAC) 10 gram/15 mL solution Take 15 mLs (10 g total) by mouth 3 (three) times daily.    methylphenidate HCl (RITALIN) 5 MG tablet Take 1 tablet (5 mg total) by mouth 2 (two) times daily.    pantoprazole (PROTONIX) 40 MG tablet Take 1 tablet (40 mg total) by mouth 2 (two) times daily.    spironolactone (ALDACTONE) 100 MG tablet Take 1 tablet (100 mg total) by mouth once daily.    triamcinolone acetonide 0.5% (KENALOG) 0.5 % Crea Apply topically 2 (two) times daily.     Family History       Problem Relation (Age of Onset)    Lung cancer Father          Tobacco Use    Smoking status: Never    Smokeless tobacco: Never   Substance and Sexual Activity    Alcohol use: Never    Drug use: Never    Sexual activity: Not Currently     Review of Systems   Constitutional:  Positive for fatigue. Negative for appetite change, chills and fever.   HENT:  Negative for congestion, rhinorrhea and sore throat.    Eyes:  Negative for visual disturbance.   Respiratory:  Negative for cough and shortness of breath.    Cardiovascular:  Negative for chest pain and leg swelling.   Gastrointestinal:  Negative  for abdominal distention, abdominal pain, blood in stool, constipation, diarrhea, nausea and vomiting.   Genitourinary:  Negative for difficulty urinating, dysuria, frequency and urgency.   Musculoskeletal:  Negative for arthralgias, back pain, myalgias and neck pain.   Skin:  Negative for rash and wound.   Neurological:  Negative for weakness and headaches.   Psychiatric/Behavioral:  Positive for confusion.      Objective:     Vital Signs (Most Recent):  Temp: 98 °F (36.7 °C) (05/25/24 2015)  Pulse: 109 (05/25/24 2046)  Resp: 15 (05/25/24 2015)  BP: (!) 146/87 (05/25/24 2046)  SpO2: 99 % (05/25/24 2046) Vital Signs (24h Range):  Temp:  [98 °F (36.7 °C)-98.3 °F (36.8 °C)] 98 °F (36.7 °C)  Pulse:  [101-119] 109  Resp:  [15-20] 15  SpO2:  [97 %-99 %] 99 %  BP: (135-146)/(63-87) 146/87     Weight: 67.6 kg (149 lb)  Body mass index is 27.25 kg/m².     Physical Exam  Vitals reviewed.   Constitutional:       General: She is not in acute distress.     Appearance: She is not ill-appearing or diaphoretic.   HENT:      Head: Normocephalic and atraumatic.      Mouth/Throat:      Mouth: Mucous membranes are moist.      Pharynx: Oropharynx is clear. No oropharyngeal exudate.   Eyes:      General: No scleral icterus.     Conjunctiva/sclera: Conjunctivae normal.      Pupils: Pupils are equal, round, and reactive to light.   Cardiovascular:      Rate and Rhythm: Regular rhythm. Tachycardia present.      Pulses: Normal pulses.      Heart sounds: Murmur heard.   Pulmonary:      Effort: Pulmonary effort is normal. No respiratory distress.      Breath sounds: Normal breath sounds. No wheezing.   Abdominal:      General: There is no distension.      Tenderness: There is no abdominal tenderness. There is no guarding or rebound.   Musculoskeletal:         General: No swelling or tenderness.      Right lower leg: No edema.      Left lower leg: No edema.      Comments: Chronic venous stasis skin changes in bilateral lower extremities    Skin:     General: Skin is warm and dry.      Coloration: Skin is jaundiced and pale.      Findings: No bruising or lesion.   Neurological:      General: No focal deficit present.      Mental Status: She is alert. She is disoriented and confused.      Cranial Nerves: No cranial nerve deficit.      Motor: No weakness.      Comments: Conversant on exam, unable to answer orientation questions appropriately. Moves all extremities spontaneously, no facial droop or slurred speech noted. No asterixis.         CRANIAL NERVES     CN III, IV, VI   Pupils are equal, round, and reactive to light.       Significant Labs: All pertinent labs within the past 24 hours have been reviewed.  CBC:   Recent Labs   Lab 05/25/24  1820   WBC 3.21*   HGB 10.1*   HCT 29.5*   *     CMP:   Recent Labs   Lab 05/25/24  1820      K 2.5*      CO2 29   *   BUN 9   CREATININE 0.7   CALCIUM 9.4   PROT 6.6   ALBUMIN 2.7*   BILITOT 6.1*   ALKPHOS 170*   AST 41*   ALT 20   ANIONGAP 9       Urine Studies:   Recent Labs   Lab 05/25/24 2035   COLORU Yellow   APPEARANCEUA Clear   PHUR 7.0   SPECGRAV 1.015   PROTEINUA Negative   GLUCUA Negative   KETONESU Negative   BILIRUBINUA Negative   OCCULTUA Negative   NITRITE Negative   LEUKOCYTESUR Negative       Significant Imaging: I have reviewed all pertinent imaging results/findings within the past 24 hours.      CT Head Without Contrast  Narrative: EXAMINATION:  CT HEAD WITHOUT CONTRAST    CLINICAL HISTORY:  Mental status change, unknown cause;    TECHNIQUE:  Low dose axial images were obtained through the head.  Coronal and sagittal reformations were also performed. Contrast was not administered.    COMPARISON:  MRI 06/22/2021    FINDINGS:  There is no evidence of acute major vascular territory infarct, hemorrhage, or mass.  There is no hydrocephalus.  There are no abnormal extra-axial fluid collections.  The paranasal sinuses and mastoid air cells are clear, and there is no  evidence of calvarial fracture.  The visualized soft tissues are unremarkable.  Impression: 1. No acute intracranial abnormalities.    Electronically signed by: Bernabe Tineo MD  Date:    05/25/2024  Time:    18:49  X-Ray Chest AP Portable  Narrative: EXAMINATION:  XR CHEST AP PORTABLE    CLINICAL HISTORY:  Sepsis;    TECHNIQUE:  Single frontal view of the chest was performed.    COMPARISON:  05/18/2024    FINDINGS:  Right central venous catheter tip appears stable in position, crossing midline, projected in the region of the brachiocephalic.  The cardiomediastinal silhouette is prominent, stable..  There is obscuration of the left costophrenic angle, may reflect small effusion..  The trachea is midline.  The lungs are symmetrically expanded bilaterally with coarse interstitial attenuation bilaterally, improved since the previous exam suggesting improved edema or infection.  There is left basilar subsegmental atelectasis..  There is no pneumothorax.  The osseous structures are remarkable for degenerative change.  Surgical change projects over the left axilla.  Surgical change projects over the left chest wall.  Several vascular coils project over the abdomen.  There is distention of the stomach..  Impression: 1. Pulmonary findings suggest edema or other pneumonitis however improved since the previous exam.  Correlation is advised.    Electronically signed by: Bernabe Tineo MD  Date:    05/25/2024  Time:    18:29      Assessment/Plan:     * Acute encephalopathy  53F with hx of stage IV breast cancer undergoing chemotherapy, HAWTHRONE cirrhosis (s/p TIPS 4/12 w/ mild dysfunction on recent US), GI bleeds w/ gastric and esophageal varices, who presents for increased confusion and generalized malaise since recent discharge on 5/21 as per HPI. Initial work up notable for hypokalemia (K 2.5), WBC 3.21, hgb above baseline at 10.1, elevated total bilirubin 6.1, lactate 3.5, and ammonia 113. CXR negative for consolidation, CT  head negative for acute intracranial processes. Given 1L fluid bolus, vanc/zosyn with infectious work-up for c/f sepsis. Admitted to Hospital Medicine for further management and work up of acute encephalopathy.      Possible etiologies include but are not limited to: metabolic vs hepatic vs infectious causes for altered mental status.    --lactulose 20 g TID for hepatic encephalopathy; titrate for goal 2-3 BM daily.  --monitor and replete electrolytes as per protocol.  --will hold off diuresis with lasix for now, appears euvolemic on exam w/o significant edema or changes in oxygen requirements.   --continue home spironolactone (potassium-sparing)  --f/u blood cx, low threshold to continue broad-spectrum antibiotics if fevers or acutely decompensates;   --UA w/o evidence of infection, lactate improved s/p 1L fluid bolus.           Hepatic encephalopathy  Elevated ammonia 113 on admission. Reports medication compliance to lactulose at home, although unclear whether this history is reliable given confusion on exam and objective data.     --see acute encephalopathy.        Biliary cirrhosis  Patient with known Cirrhosis. Co-morbidities are present and inclusive of portal hypertension, esophageal varices, hepatic encephalopathy, malnutrition, anemia/pancytopenia, and anticoagulation.  MELD-Na score calculated; MELD 3.0: 19 at 5/17/2024  5:22 AM  MELD-Na: 16 at 5/17/2024  5:22 AM  Calculated from:  Serum Creatinine: 0.6 mg/dL (Using min of 1 mg/dL) at 5/17/2024  5:22 AM  Serum Sodium: 138 mmol/L (Using max of 137 mmol/L) at 5/17/2024  5:22 AM  Total Bilirubin: 3.8 mg/dL at 5/17/2024  5:22 AM  Serum Albumin: 2.4 g/dL at 5/17/2024  5:22 AM  INR(ratio): 1.5 at 5/15/2024 11:57 PM  Age at listing (hypothetical): 53 years  Sex: Female at 5/17/2024  5:22 AM      Continue chronic meds. Etiology likely  NAFLD/HAWTHORNE . Will avoid any hepatotoxic meds, and monitor CBC/CMP/INR for synthetic function.     S/P TIPS (transjugular  intrahepatic portosystemic shunt)  TIPS placed 4/12 for acute GI bleeding and hx of gastric and esophageal varices.  Had gastric portal vein coiling and embolization.    5/10 Liver Doppler US with: Sluggish flow in the portal vein end of the TIPS and bidirectional flow in the left portal vein, which could represent TIPS malfunction.   Patient had outpatient IR appointment the day after admission scheduled (5/16) to evaluate for necessity of TIPs revision. Will obtain updated liver doppler, consider inpatient IR consult pending results vs outpatient follow-up. Liver US concerning for low velocities within the portal venous side of the tips shunt c/f TIPS dysfunction. Currently declining lactulose due to reports of several diarrheal BMs, only one charted BM. I discussed with her the benefit of lactulose and she expressed understanding.    --f/u repeat liver ultrasound w doppler for re-evaluation of shunt dysfunction  --consider IR consult if indicated for possible intervention    Pancytopenia  This patient is found to have pancytopenia, the likely etiology is Drug induced (including chemotherapy) related to linezolid, stage IV breast cancer , will monitor CBC Daily. Will transfuse red blood cells if the hemoglobin is <7g/dL (or <8 in the setting of ACS). Will transfuse platelets if platelet count is <10k. Hold DVT prophylaxis if platelets are <50k. The patient's hemoglobin, white blood cell count, and platelet count results have been reviewed and are listed below.  Recent Labs   Lab 05/25/24  1820   HGB 10.1*   WBC 3.21*   *     --monitor closely for s/s of active bleeding.         Esophageal and gastric varices  Noted history in the setting of anemia on previous admission. Patient denies hemetemesis, bloody bowel movements.     --monitor BP and Hgb  --continue home PPI    Hypokalemia  Patient has hypokalemia which is Acute and currently uncontrolled. Most recent potassium levels reviewed-   Lab Results    Component Value Date    K 2.5 (LL) 05/25/2024   . Will continue potassium replacement per protocol and recheck repeat levels after replacement completed.     Lymphedema  Patient follows with the lymphedema clinic w/ leg wraps and leg elevation. Increased lower extremity swelling on admission. Given associated pulmonary congestion on imaging concern for component of left-heart failure as well. Will evaluate as below.    --leg elevation as tolerated  --consider resumption of home diuretics     Malignant neoplasm metastatic to left lung  See 'breast cancer'    Breast cancer, stage 4, left  Follows with Dr. Willis currently.    Oncologic History:  Diagnosed September 2006 w/ poorly differentiated carcinoma which was ER and OH. negative and HER2 positive.     She was then referred to [facility] for additional care.  CT scan of the chest and abdomen November 2006 revealed multiple nodules in the lungs consistent with metastatic disease.       She was treated with chemotherapy with weekly Herceptin and Abraxane with improvement in her breast and lung metastasis.     On May 29, 2007 she underwent left modified radical mastectomy(Dr. Colvin) which showed no residual tumor in the breast and 1/5 nodes was positive for metastasis measuring 6 mm.  (ypT0N1).  She then received postoperative radiation therapy(Dr Singh)  to the left chest wall supraclav and internal mammary lymph nodes from August 20, 2007 to September 28, 2007.   Postoperatively she continued on Herceptin for approximately 3 and 1/2 years before her lung metastasis begin to grow.     She subsequently received a number of different chemotherapy treatments including Adriamycin, Halaven, Xeloda plus lapatinib then Xeloda plus Herceptin. (  in [facility].)     Subsequently, she transferred her care to  at [facility].  -She was initially treated with Taxotere Herceptin Perjeta.    -She was then changed to Kadcyla.    In July  2020 PET scan showed progression.  She then took approximately 9 months of Herceptin and Navelbine with initial response then progression.     She started trastuzumab- deruxtecan  4/12/21.      VTE Risk Mitigation (From admission, onward)           Ordered     enoxaparin injection 40 mg  Every 24 hours         05/25/24 2027     IP VTE HIGH RISK PATIENT  Once         05/25/24 2027     Place sequential compression device  Until discontinued         05/25/24 2027                            Cher Vera MD  Department of Hospital Medicine  [facility] - Telemetry Stepdown

## 2024-05-26 NOTE — ASSESSMENT & PLAN NOTE
TIPS placed 4/12 for acute GI bleeding and hx of gastric and esophageal varices.  Had gastric portal vein coiling and embolization.    5/10 Liver Doppler US with: Sluggish flow in the portal vein end of the TIPS and bidirectional flow in the left portal vein, which could represent TIPS malfunction.   Patient had outpatient IR appointment the day after admission scheduled (5/16) to evaluate for necessity of TIPs revision. Will obtain updated liver doppler, consider inpatient IR consult pending results vs outpatient follow-up. Liver US concerning for low velocities within the portal venous side of the tips shunt c/f TIPS dysfunction. Currently declining lactulose due to reports of several diarrheal BMs, only one charted BM. I discussed with her the benefit of lactulose and she expressed understanding.    --f/u repeat liver ultrasound w doppler for re-evaluation of shunt dysfunction  --consider IR consult if indicated for possible intervention

## 2024-05-26 NOTE — HPI
"Shikha López is a 53 year old female with hx of stage IV breast cancer currently on Trastuzumab-Deruxtecan q3 weeks (most recent chemo 5/7/24), HAWTHORNE cirrhosis (s/p TIPS 4/12 w/ mild dysfunction on recent US), GI bleeds w/ gastric and esophageal varices, presenting with c/o worsening confusion since discharge from [facility] [place]21 where she was admitted for volume overload and symptomatic anemia requiring blood transfusion. Hospital course was c/b pancytopenia presumed to be 2/2 myelosuppression from linezolid therapy for staph bacteremia and lower extremity cellulitis on prior admission. Patient reports generalized malaise and increased confusion over the past few days since her discharge, reportedly doing "strange things" at home per collateral obtained in the ED.  Reports medication compliance with lactulose as prescribed since discharge with 2-3 BM daily. Pt also reports occasional N/V, vomited once since discharge, was bilious, non-bloody or coffee ground consistency/color. Pt denies any changes to PO intake since discharge. Pt denies fevers/chills, HA, vision changes, URI sx, CP, SOB, abd pain, diarrhea, constipation, urinary sx, BLE pain/swelling.     In the ED, patient mildly tachycardic, otherwise hemodynamically stable, satting well on RA and afebrile. Labs notable for hypokalemia (K 2.5), WBC 3.21, hgb above baseline at 10.1, elevated total bilirubin 6.1, lactate 3.5, and ammonia 113. CXR negative for consolidation, CT head negative for acute intracranial processes. Given 1L fluid bolus, vanc/zosyn with infectious work-up for c/f sepsis. Admitted to Hospital Medicine for further management and work up of acute encephalopathy.    "

## 2024-05-26 NOTE — ASSESSMENT & PLAN NOTE
This patient is found to have pancytopenia, the likely etiology is Drug induced (including chemotherapy) related to linezolid, stage IV breast cancer , will monitor CBC Daily. Will transfuse red blood cells if the hemoglobin is <7g/dL (or <8 in the setting of ACS). Will transfuse platelets if platelet count is <10k. Hold DVT prophylaxis if platelets are <50k. The patient's hemoglobin, white blood cell count, and platelet count results have been reviewed and are listed below.  Recent Labs   Lab 05/25/24  1820   HGB 10.1*   WBC 3.21*   *     --monitor closely for s/s of active bleeding.

## 2024-05-26 NOTE — ED NOTES
Bedside handoff with NAHUN Persaud. Assumed care of pt at this time. VSS, RR even and unlabored. Resting in bed comfortably. No voiced compaints of pain or discomfort at this time. Safety protocols remain. Verified lines/leads attatched to patient at this time.      General: Awake and alert  Appearance: Jaundiced and appears chronically-ill. Alopecia noted.   Neck: Supple  Respiratory: Nonlabored respirations. No audible wheeze. Speaking in full sentences.  Cardiac: Borderline tachycardia noted. Well-perfused. No acrocyanosis.  Abdomen: Supple, slightly distended  Neurological: Moves all extremities symmetrically and equally. Answers questions appropriately.   MSK: Generalized weakness without lateralizing deficits. Ambulates slowly with 1 person assist  : Clear orange-babita urine

## 2024-05-26 NOTE — ASSESSMENT & PLAN NOTE
Patient follows with the lymphedema clinic w/ leg wraps and leg elevation. Increased lower extremity swelling on admission. Given associated pulmonary congestion on imaging concern for component of left-heart failure as well. Will evaluate as below.    --leg elevation as tolerated  --consider resumption of home diuretics

## 2024-05-26 NOTE — NURSING
...Nurses Note -- 4 Eyes      5/25/2024   11:13 PM      Skin assessed during: Transfer      [x] No Altered Skin Integrity Present    []Prevention Measures Documented      [] Yes- Altered Skin Integrity Present or Discovered   [] LDA Added if Not in Epic (Describe Wound)   [] New Altered Skin Integrity was Present on Admit and Documented in LDA   [] Wound Image Taken    Wound Care Consulted? No    Attending Nurse:  Nikhil GARNICA    Second RN/Staff Member:  Tejas GARNICA

## 2024-05-26 NOTE — ASSESSMENT & PLAN NOTE
Noted history in the setting of anemia on previous admission. Patient denies hemetemesis, bloody bowel movements.     --monitor BP and Hgb  --continue home PPI

## 2024-05-26 NOTE — ASSESSMENT & PLAN NOTE
Patient has hypokalemia which is Acute and currently uncontrolled. Most recent potassium levels reviewed-   Lab Results   Component Value Date    K 2.5 (LL) 05/25/2024   . Will continue potassium replacement per protocol and recheck repeat levels after replacement completed.

## 2024-05-27 LAB
ALBUMIN SERPL BCP-MCNC: 2.3 G/DL (ref 3.5–5.2)
ALP SERPL-CCNC: 155 U/L (ref 55–135)
ALT SERPL W/O P-5'-P-CCNC: 16 U/L (ref 10–44)
ANION GAP SERPL CALC-SCNC: 7 MMOL/L (ref 8–16)
ANISOCYTOSIS BLD QL SMEAR: ABNORMAL
AST SERPL-CCNC: 34 U/L (ref 10–40)
BASO STIPL BLD QL SMEAR: ABNORMAL
BASOPHILS # BLD AUTO: 0.06 K/UL (ref 0–0.2)
BASOPHILS NFR BLD: 1.3 % (ref 0–1.9)
BILIRUB SERPL-MCNC: 5.1 MG/DL (ref 0.1–1)
BUN SERPL-MCNC: 7 MG/DL (ref 6–20)
CALCIUM SERPL-MCNC: 8.4 MG/DL (ref 8.7–10.5)
CHLORIDE SERPL-SCNC: 105 MMOL/L (ref 95–110)
CO2 SERPL-SCNC: 27 MMOL/L (ref 23–29)
CREAT SERPL-MCNC: 0.6 MG/DL (ref 0.5–1.4)
DIFFERENTIAL METHOD BLD: ABNORMAL
EOSINOPHIL # BLD AUTO: 0.3 K/UL (ref 0–0.5)
EOSINOPHIL NFR BLD: 6.7 % (ref 0–8)
ERYTHROCYTE [DISTWIDTH] IN BLOOD BY AUTOMATED COUNT: 30.6 % (ref 11.5–14.5)
EST. GFR  (NO RACE VARIABLE): >60 ML/MIN/1.73 M^2
GLUCOSE SERPL-MCNC: 97 MG/DL (ref 70–110)
HCT VFR BLD AUTO: 28 % (ref 37–48.5)
HGB BLD-MCNC: 9.2 G/DL (ref 12–16)
IMM GRANULOCYTES # BLD AUTO: 0.02 K/UL (ref 0–0.04)
IMM GRANULOCYTES NFR BLD AUTO: 0.4 % (ref 0–0.5)
LYMPHOCYTES # BLD AUTO: 1.3 K/UL (ref 1–4.8)
LYMPHOCYTES NFR BLD: 27.4 % (ref 18–48)
MAGNESIUM SERPL-MCNC: 2.1 MG/DL (ref 1.6–2.6)
MCH RBC QN AUTO: 35.2 PG (ref 27–31)
MCHC RBC AUTO-ENTMCNC: 32.9 G/DL (ref 32–36)
MCV RBC AUTO: 107 FL (ref 82–98)
MONOCYTES # BLD AUTO: 0.7 K/UL (ref 0.3–1)
MONOCYTES NFR BLD: 15.1 % (ref 4–15)
NEUTROPHILS # BLD AUTO: 2.3 K/UL (ref 1.8–7.7)
NEUTROPHILS NFR BLD: 49.1 % (ref 38–73)
NRBC BLD-RTO: 0 /100 WBC
OVALOCYTES BLD QL SMEAR: ABNORMAL
PHOSPHATE SERPL-MCNC: 2.9 MG/DL (ref 2.7–4.5)
PLATELET # BLD AUTO: 125 K/UL (ref 150–450)
PMV BLD AUTO: 11 FL (ref 9.2–12.9)
POIKILOCYTOSIS BLD QL SMEAR: SLIGHT
POLYCHROMASIA BLD QL SMEAR: ABNORMAL
POTASSIUM SERPL-SCNC: 4.2 MMOL/L (ref 3.5–5.1)
PROT SERPL-MCNC: 5.5 G/DL (ref 6–8.4)
RBC # BLD AUTO: 2.61 M/UL (ref 4–5.4)
SODIUM SERPL-SCNC: 139 MMOL/L (ref 136–145)
SPHEROCYTES BLD QL SMEAR: ABNORMAL
WBC # BLD AUTO: 4.63 K/UL (ref 3.9–12.7)

## 2024-05-27 PROCEDURE — 83735 ASSAY OF MAGNESIUM: CPT

## 2024-05-27 PROCEDURE — 25000003 PHARM REV CODE 250

## 2024-05-27 PROCEDURE — 85025 COMPLETE CBC W/AUTO DIFF WBC: CPT

## 2024-05-27 PROCEDURE — 84100 ASSAY OF PHOSPHORUS: CPT

## 2024-05-27 PROCEDURE — 25000003 PHARM REV CODE 250: Performed by: EMERGENCY MEDICINE

## 2024-05-27 PROCEDURE — 36415 COLL VENOUS BLD VENIPUNCTURE: CPT

## 2024-05-27 PROCEDURE — 25000003 PHARM REV CODE 250: Performed by: STUDENT IN AN ORGANIZED HEALTH CARE EDUCATION/TRAINING PROGRAM

## 2024-05-27 PROCEDURE — 20600001 HC STEP DOWN PRIVATE ROOM

## 2024-05-27 PROCEDURE — 63600175 PHARM REV CODE 636 W HCPCS

## 2024-05-27 PROCEDURE — 80053 COMPREHEN METABOLIC PANEL: CPT

## 2024-05-27 RX ORDER — LACTULOSE 10 G/15ML
20 SOLUTION ORAL 2 TIMES DAILY
Status: DISCONTINUED | OUTPATIENT
Start: 2024-05-27 | End: 2024-05-29 | Stop reason: HOSPADM

## 2024-05-27 RX ORDER — FUROSEMIDE 80 MG/1
80 TABLET ORAL DAILY
Status: DISCONTINUED | OUTPATIENT
Start: 2024-05-27 | End: 2024-05-29 | Stop reason: HOSPADM

## 2024-05-27 RX ADMIN — SPIRONOLACTONE 100 MG: 100 TABLET ORAL at 09:05

## 2024-05-27 RX ADMIN — RIFAXIMIN 550 MG: 550 TABLET ORAL at 08:05

## 2024-05-27 RX ADMIN — LACTULOSE 20 G: 20 SOLUTION ORAL at 09:05

## 2024-05-27 RX ADMIN — PANTOPRAZOLE SODIUM 40 MG: 40 TABLET, DELAYED RELEASE ORAL at 08:05

## 2024-05-27 RX ADMIN — LACTULOSE 20 G: 20 SOLUTION ORAL at 08:05

## 2024-05-27 RX ADMIN — FUROSEMIDE 80 MG: 80 TABLET ORAL at 05:05

## 2024-05-27 RX ADMIN — PANTOPRAZOLE SODIUM 40 MG: 40 TABLET, DELAYED RELEASE ORAL at 09:05

## 2024-05-27 RX ADMIN — ENOXAPARIN SODIUM 40 MG: 40 INJECTION SUBCUTANEOUS at 05:05

## 2024-05-27 RX ADMIN — RIFAXIMIN 550 MG: 550 TABLET ORAL at 09:05

## 2024-05-27 NOTE — HOSPITAL COURSE
Admitted to hospital medicine for acute encephalopathy. Given elevated ammonia to 113, etiology likely hepatic encephalopathy. We continued her lactulose and started rifaximin with improvement in her symptoms. Liver US notable for patent TIPS shunt but still concerning for TIPS dysfunction, IR was wanting a repeat ultrasound one month following prior hospitalization. Encephalopathy has resolved and mental status is stable on current regiment of lactulose and rifaximin. She is medically ready for discharge discharging home today.

## 2024-05-27 NOTE — ASSESSMENT & PLAN NOTE
Patient has hypokalemia which is Acute and currently uncontrolled. Most recent potassium levels reviewed-   Lab Results   Component Value Date    K 4.2 05/27/2024   . Will continue potassium replacement per protocol and recheck repeat levels after replacement completed.     RESOLVED

## 2024-05-27 NOTE — ASSESSMENT & PLAN NOTE
Patient follows with the lymphedema clinic w/ leg wraps and leg elevation. Increased lower extremity swelling on admission. Given associated pulmonary congestion on imaging concern for component of left-heart failure as well. Will evaluate as below.    - Leg elevation as tolerated  - Continue home spironolactone and lasix

## 2024-05-27 NOTE — ASSESSMENT & PLAN NOTE
Follows with Dr. Willis currently.    Oncologic History:  Diagnosed September 2006 w/ poorly differentiated carcinoma which was ER and VA. negative and HER2 positive.     She was then referred to [facility] for additional care.  CT scan of the chest and abdomen November 2006 revealed multiple nodules in the lungs consistent with metastatic disease.       She was treated with chemotherapy with weekly Herceptin and Abraxane with improvement in her breast and lung metastasis.     On May 29, 2007 she underwent left modified radical mastectomy(Dr. Colvin) which showed no residual tumor in the breast and 1/5 nodes was positive for metastasis measuring 6 mm.  (ypT0N1).  She then received postoperative radiation therapy(Dr Singh)  to the left chest wall supraclav and internal mammary lymph nodes from August 20, 2007 to September 28, 2007.   Postoperatively she continued on Herceptin for approximately 3 and 1/2 years before her lung metastasis begin to grow.     She subsequently received a number of different chemotherapy treatments including Adriamycin, Halaven, Xeloda plus lapatinib then Xeloda plus Herceptin. (  in [facility].)     Subsequently, she transferred her care to  at [facility].  -She was initially treated with Taxotere Herceptin Perjeta.    -She was then changed to Kadcyla.    In July 2020 PET scan showed progression.  She then took approximately 9 months of Herceptin and Navelbine with initial response then progression.     She started trastuzumab- deruxtecan  4/12/21.

## 2024-05-27 NOTE — PLAN OF CARE
Problem: Infection  Goal: Absence of Infection Signs and Symptoms  Outcome: Progressing     Problem: Adult Inpatient Plan of Care  Goal: Plan of Care Review  Outcome: Progressing  Goal: Patient-Specific Goal (Individualized)  Outcome: Progressing  Goal: Absence of Hospital-Acquired Illness or Injury  Outcome: Progressing  Goal: Optimal Comfort and Wellbeing  Outcome: Progressing  Goal: Readiness for Transition of Care  Outcome: Progressing     Problem: Fall Injury Risk  Goal: Absence of Fall and Fall-Related Injury  Outcome: Progressing     Problem: Skin Injury Risk Increased  Goal: Skin Health and Integrity  Outcome: Progressing

## 2024-05-27 NOTE — ASSESSMENT & PLAN NOTE
This patient is found to have pancytopenia, the likely etiology is Drug induced (including chemotherapy) related to linezolid, stage IV breast cancer , will monitor CBC Daily. Will transfuse red blood cells if the hemoglobin is <7g/dL (or <8 in the setting of ACS). Will transfuse platelets if platelet count is <10k. Hold DVT prophylaxis if platelets are <50k. The patient's hemoglobin, white blood cell count, and platelet count results have been reviewed and are listed below.  Recent Labs   Lab 05/27/24  0446   HGB 9.2*   WBC 4.63   *       --monitor closely for s/s of active bleeding.

## 2024-05-27 NOTE — ASSESSMENT & PLAN NOTE
53F with hx of stage IV breast cancer undergoing chemotherapy, HAWTHORNE cirrhosis (s/p TIPS 4/12 w/ mild dysfunction on recent US), GI bleeds w/ gastric and esophageal varices, who presents for increased confusion and generalized malaise since recent discharge on 5/21 as per HPI. Initial work up notable for hypokalemia (K 2.5), WBC 3.21, hgb above baseline at 10.1, elevated total bilirubin 6.1, lactate 3.5, and ammonia 113. CXR negative for consolidation, CT head negative for acute intracranial processes. Given 1L fluid bolus, vanc/zosyn with infectious work-up for c/f sepsis. Admitted to Hospital Medicine for further management and work up of acute encephalopathy.      Possible etiologies include but are not limited to: metabolic vs hepatic vs infectious causes for altered mental status.    - Lactulose 20g BID, starting rifaximin  - BCx NGTD

## 2024-05-27 NOTE — PLAN OF CARE
Pt had a restful night, no s/s of distress noted. Safety measures in place. Pt care ongoing.     Problem: Adult Inpatient Plan of Care  Goal: Absence of Hospital-Acquired Illness or Injury  Intervention: Identify and Manage Fall Risk  Flowsheets (Taken 5/27/2024 0357)  Safety Promotion/Fall Prevention:   assistive device/personal item within reach   bed alarm set   side rails raised x 2  Intervention: Prevent Skin Injury  Flowsheets (Taken 5/27/2024 0357)  Body Position: position changed independently  Skin Protection: incontinence pads utilized  Device Skin Pressure Protection: adhesive use limited  Intervention: Prevent and Manage VTE (Venous Thromboembolism) Risk  Flowsheets (Taken 5/27/2024 0357)  VTE Prevention/Management:   ambulation promoted   bleeding precations maintained   bleeding risk assessed  Intervention: Prevent Infection  Flowsheets (Taken 5/27/2024 0357)  Infection Prevention:   environmental surveillance performed   rest/sleep promoted  Goal: Optimal Comfort and Wellbeing  Intervention: Monitor Pain and Promote Comfort  Flowsheets (Taken 5/27/2024 0357)  Pain Management Interventions: quiet environment facilitated

## 2024-05-27 NOTE — PROGRESS NOTES
"[facility] - Telemetry [facility] Medicine  Progress Note    Patient Name: Shikha López  MRN: [id number]  Patient Class: IP- Inpatient   Admission Date: 5/25/2024  Length of Stay: 2 days  Attending Physician: Betito Muhammad MD  Primary Care Provider: Lenore, Primary Doctor        Subjective:     Principal Problem:Acute encephalopathy        HPI:  Shikha López is a 53 year old female with hx of stage IV breast cancer currently on Trastuzumab-Deruxtecan q3 weeks (most recent chemo 5/7/24), HAWTHORNE cirrhosis (s/p TIPS 4/12 w/ mild dysfunction on recent US), GI bleeds w/ gastric and esophageal varices, presenting with c/o worsening confusion since discharge from [facility] on [place]/21 where she was admitted for volume overload and symptomatic anemia requiring blood transfusion. Hospital course was c/b pancytopenia presumed to be 2/2 myelosuppression from linezolid therapy for staph bacteremia and lower extremity cellulitis on prior admission. Patient reports generalized malaise and increased confusion over the past few days since her discharge, reportedly doing "strange things" at home per collateral obtained in the ED.  Reports medication compliance with lactulose as prescribed since discharge with 2-3 BM daily. Pt also reports occasional N/V, vomited once since discharge, was bilious, non-bloody or coffee ground consistency/color. Pt denies any changes to PO intake since discharge. Pt denies fevers/chills, HA, vision changes, URI sx, CP, SOB, abd pain, diarrhea, constipation, urinary sx, BLE pain/swelling.     In the ED, patient mildly tachycardic, otherwise hemodynamically stable, satting well on RA and afebrile. Labs notable for hypokalemia (K 2.5), WBC 3.21, hgb above baseline at 10.1, elevated total bilirubin 6.1, lactate 3.5, and ammonia 113. CXR negative for consolidation, CT head negative for acute intracranial processes. Given 1L fluid bolus, vanc/zosyn with infectious work-up for c/f sepsis. Admitted to Hospital " Medicine for further management and work up of acute encephalopathy.      Overview/Hospital Course:  Admitted to hospital medicine for acute encephalopathy. Given elevated ammonia to 113, etiology likely hepatic encephalopathy. We continued her lactulose and started rifaximin with improvement in her symptoms. Liver US notable for patent TIPS shunt but still concerning for TIPS dysfunction, IR was wanting a repeat ultrasound one month following prior hospitalization.    Interval History: NAEO. Vitally stable. Oriented to person and place but not oriented to the year (2005). Otherwise no complaints. Noted a good appetite.    Review of Systems   Constitutional:  Positive for fatigue. Negative for appetite change.   Respiratory:  Negative for cough and shortness of breath.    Cardiovascular:  Negative for chest pain.   Gastrointestinal:  Positive for diarrhea (on lactulose). Negative for abdominal pain, constipation, nausea and vomiting.   Neurological:  Negative for weakness and headaches.     Objective:     Vital Signs (Most Recent):  Temp: 98.1 °F (36.7 °C) (05/27/24 1131)  Pulse: 98 (05/27/24 1152)  Resp: 18 (05/27/24 1131)  BP: 104/66 (05/27/24 1131)  SpO2: 97 % (05/27/24 1152) Vital Signs (24h Range):  Temp:  [97.8 °F (36.6 °C)-98.7 °F (37.1 °C)] 98.1 °F (36.7 °C)  Pulse:  [] 98  Resp:  [18-19] 18  SpO2:  [97 %-100 %] 97 %  BP: (104-120)/(66-74) 104/66     Weight: 67.6 kg (149 lb)  Body mass index is 27.25 kg/m².    Intake/Output Summary (Last 24 hours) at 5/27/2024 1427  Last data filed at 5/27/2024 0430  Gross per 24 hour   Intake 300 ml   Output --   Net 300 ml         Physical Exam  Vitals and nursing note reviewed.   Constitutional:       General: She is not in acute distress.  HENT:      Head: Normocephalic and atraumatic.      Mouth/Throat:      Mouth: Mucous membranes are moist.   Eyes:      Extraocular Movements: Extraocular movements intact.      Conjunctiva/sclera: Conjunctivae normal.    Cardiovascular:      Rate and Rhythm: Normal rate and regular rhythm.   Pulmonary:      Effort: Pulmonary effort is normal. No respiratory distress.   Abdominal:      General: Abdomen is flat. There is no distension.      Palpations: Abdomen is soft.      Tenderness: There is no abdominal tenderness.   Musculoskeletal:         General: Normal range of motion.      Right lower leg: Edema (1+) present.      Left lower leg: Edema (1+) present.      Comments: No asterixis   Skin:     General: Skin is warm and dry.      Coloration: Skin is jaundiced (mild) and pale.      Comments: B/l LE erythema   Neurological:      General: No focal deficit present.      Mental Status: She is alert. She is disoriented.   Psychiatric:         Mood and Affect: Mood normal.         Behavior: Behavior normal.             Significant Labs: All pertinent labs within the past 24 hours have been reviewed.    Significant Imaging: I have reviewed all pertinent imaging results/findings within the past 24 hours.    Assessment/Plan:      * Acute encephalopathy  53F with hx of stage IV breast cancer undergoing chemotherapy, HAWTHORNE cirrhosis (s/p TIPS 4/12 w/ mild dysfunction on recent US), GI bleeds w/ gastric and esophageal varices, who presents for increased confusion and generalized malaise since recent discharge on 5/21 as per HPI. Initial work up notable for hypokalemia (K 2.5), WBC 3.21, hgb above baseline at 10.1, elevated total bilirubin 6.1, lactate 3.5, and ammonia 113. CXR negative for consolidation, CT head negative for acute intracranial processes. Given 1L fluid bolus, vanc/zosyn with infectious work-up for c/f sepsis. Admitted to Hospital Medicine for further management and work up of acute encephalopathy.      Possible etiologies include but are not limited to: metabolic vs hepatic vs infectious causes for altered mental status.    - Lactulose 20g BID, starting rifaximin  - BCx NGTD    Hepatic encephalopathy  Elevated ammonia 113 on  admission. Reports medication compliance to lactulose at home, although unclear whether this history is reliable given confusion on exam and objective data.     --see acute encephalopathy.        Biliary cirrhosis  Patient with known Cirrhosis. Co-morbidities are present and inclusive of portal hypertension, esophageal varices, hepatic encephalopathy, malnutrition, anemia/pancytopenia, and anticoagulation.  MELD-Na score calculated; MELD 3.0: 19 at 5/17/2024  5:22 AM  MELD-Na: 16 at 5/17/2024  5:22 AM  Calculated from:  Serum Creatinine: 0.6 mg/dL (Using min of 1 mg/dL) at 5/17/2024  5:22 AM  Serum Sodium: 138 mmol/L (Using max of 137 mmol/L) at 5/17/2024  5:22 AM  Total Bilirubin: 3.8 mg/dL at 5/17/2024  5:22 AM  Serum Albumin: 2.4 g/dL at 5/17/2024  5:22 AM  INR(ratio): 1.5 at 5/15/2024 11:57 PM  Age at listing (hypothetical): 53 years  Sex: Female at 5/17/2024  5:22 AM      Continue chronic meds. Etiology likely  NAFLD/HAWTHORNE . Will avoid any hepatotoxic meds, and monitor CBC/CMP/INR for synthetic function.     S/P TIPS (transjugular intrahepatic portosystemic shunt)  TIPS placed 4/12 for acute GI bleeding and hx of gastric and esophageal varices.  Had gastric portal vein coiling and embolization.    5/10 Liver Doppler US with: Sluggish flow in the portal vein end of the TIPS and bidirectional flow in the left portal vein, which could represent TIPS malfunction.   Patient had outpatient IR appointment the day after admission scheduled (5/16) to evaluate for necessity of TIPs revision. Will obtain updated liver doppler, consider inpatient IR consult pending results vs outpatient follow-up. Liver US concerning for low velocities within the portal venous side of the tips shunt c/f TIPS dysfunction. Currently declining lactulose due to reports of several diarrheal BMs, only one charted BM. I discussed with her the benefit of lactulose and she expressed understanding.    Liver US showed patent TIPS but still concerning  for possible TIPS dysfunction.     --consider IR consult if indicated for possible intervention    Pancytopenia  This patient is found to have pancytopenia, the likely etiology is Drug induced (including chemotherapy) related to linezolid, stage IV breast cancer , will monitor CBC Daily. Will transfuse red blood cells if the hemoglobin is <7g/dL (or <8 in the setting of ACS). Will transfuse platelets if platelet count is <10k. Hold DVT prophylaxis if platelets are <50k. The patient's hemoglobin, white blood cell count, and platelet count results have been reviewed and are listed below.  Recent Labs   Lab 05/27/24  0446   HGB 9.2*   WBC 4.63   *       --monitor closely for s/s of active bleeding.         Esophageal and gastric varices  Noted history in the setting of anemia on previous admission. Patient denies hemetemesis, bloody bowel movements.     --monitor BP and Hgb  --continue home PPI    Hypokalemia  Patient has hypokalemia which is Acute and currently uncontrolled. Most recent potassium levels reviewed-   Lab Results   Component Value Date    K 4.2 05/27/2024   . Will continue potassium replacement per protocol and recheck repeat levels after replacement completed.     RESOLVED    Lymphedema  Patient follows with the lymphedema clinic w/ leg wraps and leg elevation. Increased lower extremity swelling on admission. Given associated pulmonary congestion on imaging concern for component of left-heart failure as well. Will evaluate as below.    - Leg elevation as tolerated  - Continue home spironolactone and lasix    Malignant neoplasm metastatic to left lung  See 'breast cancer'    Breast cancer, stage 4, left  Follows with Dr. Willis currently.    Oncologic History:  Diagnosed September 2006 w/ poorly differentiated carcinoma which was ER and [place]. negative and HER2 positive.     She was then referred to [facility] for additional care.  CT scan of the chest and abdomen November 2006 revealed  multiple nodules in the lungs consistent with metastatic disease.       She was treated with chemotherapy with weekly Herceptin and Abraxane with improvement in her breast and lung metastasis.     On May 29, 2007 she underwent left modified radical mastectomy(Dr. Colvin) which showed no residual tumor in the breast and 1/5 nodes was positive for metastasis measuring 6 mm.  (ypT0N1).  She then received postoperative radiation therapy(Dr Singh)  to the left chest wall supraclav and internal mammary lymph nodes from August 20, 2007 to September 28, 2007.   Postoperatively she continued on Herceptin for approximately 3 and 1/2 years before her lung metastasis begin to grow.     She subsequently received a number of different chemotherapy treatments including Adriamycin, Halaven, Xeloda plus lapatinib then Xeloda plus Herceptin. (  in [facility]     Subsequently, she transferred her care to  at [facility].  -She was initially treated with Taxotere Herceptin Perjeta.    -She was then changed to Kadcyla.    In July 2020 PET scan showed progression.  She then took approximately 9 months of Herceptin and Navelbine with initial response then progression.     She started trastuzumab- deruxtecan  4/12/21.      VTE Risk Mitigation (From admission, onward)           Ordered     enoxaparin injection 40 mg  Every 24 hours         05/25/24 2027     IP VTE HIGH RISK PATIENT  Once         05/25/24 2027     Place sequential compression device  Until discontinued         05/25/24 2027                    Discharge Planning   PAT: 5/28/2024     Code Status: Full Code   Is the patient medically ready for discharge?:     Reason for patient still in hospital (select all that apply): Treatment and Pending disposition  Discharge Plan A: Home, Home Health                  Clara Foley MD  Department of Hospital Medicine   [facility] - Telemetry Stepdown

## 2024-05-27 NOTE — PLAN OF CARE
[facility] - Telemetry Stepdown  Initial Discharge Assessment       Primary Care Provider: No, Primary Doctor    Admission Diagnosis: Hypokalemia [E87.6]  Tachycardia [R00.0]  Chest pain [R07.9]  AMS (altered mental status) [R41.82]  Sepsis, due to unspecified organism, unspecified whether acute organ dysfunction present [A41.9]    Admission Date: 5/25/2024  Expected Discharge Date: 5/28/2024    Transition of Care Barriers: None    Payor: MEDICARE / Plan: MEDICARE PART A & B / Product Type: Government /     Extended Emergency Contact Information  Primary Emergency Contact: Samra Jaeger   [place]  Mobile Phone: [phone]  Relation: Daughter  Secondary Emergency Contact: Joyce Yeh  Mobile Phone: [phone]  Relation: Sister    Discharge Plan A: Home, Home Health  Discharge Plan B: Home, Home with family, Home Health      [facility]  [address]  [facility] [address]  Phone: [phone] Fax: [phone]      Initial Assessment (most recent)       Adult Discharge Assessment - 05/27/24 0959          Discharge Assessment    Assessment Type Discharge Planning Assessment     Confirmed/corrected address, phone number and insurance Yes     Confirmed Demographics Correct on Facesheet     Source of Information patient     Communicated PAT with patient/caregiver Yes     Reason For Admission Hypokalemia     People in Home alone   [facility]    Do you expect to return to your current living situation? Yes     Do you have help at home or someone to help you manage your care at home? No     Prior to hospitilization cognitive status: Alert/Oriented     Current cognitive status: Alert/Oriented     Walking or Climbing Stairs Difficulty no     Dressing/Bathing Difficulty no     Equipment Currently Used at Home none     Readmission within 30 days? Yes     Patient currently being followed by outpatient case management? No   referral sent    Do you currently  have service(s) that help you manage your care at home? No     Do you take prescription medications? Yes     Do you have prescription coverage? Yes     Do you have any problems affording any of your prescribed medications? No     Is the patient taking medications as prescribed? yes     Who is going to help you get home at discharge? family     How do you get to doctors appointments? car, drives self;family or friend will provide     Are you on dialysis? No     Do you take coumadin? No     Discharge Plan A Home;Home Health     Discharge Plan B Home;Home with family;Home Health     DME Needed Upon Discharge  other (see comments)   TBD    Discharge Plan discussed with: Patient     Transition of Care Barriers None        Physical Activity    On average, how many days per week do you engage in moderate to strenuous exercise (like a brisk walk)? 0 days     On average, how many minutes do you engage in exercise at this level? 0 min        Financial Resource Strain    How hard is it for you to pay for the very basics like food, housing, medical care, and heating? Not hard at all        Housing Stability    In the last 12 months, was there a time when you were not able to pay the mortgage or rent on time? No     At any time in the past 12 months, were you homeless or living in a shelter (including now)? No        Transportation Needs    Has the lack of transportation kept you from medical appointments, meetings, work or from getting things needed for daily living? No        Food Insecurity    Within the past 12 months, you worried that your food would run out before you got the money to buy more. Never true     Within the past 12 months, the food you bought just didn't last and you didn't have money to get more. Never true        Stress    Do you feel stress - tense, restless, nervous, or anxious, or unable to sleep at night because your mind is troubled all the time - these days? To some extent        Social Isolation     How often do you feel lonely or isolated from those around you?  Never        Alcohol Use    Q1: How often do you have a drink containing alcohol? Never     Q2: How many drinks containing alcohol do you have on a typical day when you are drinking? Patient does not drink     Q3: How often do you have six or more drinks on one occasion? Never        Utilities    In the past 12 months has the electric, gas, oil, or water company threatened to shut off services in your home? No        Health Literacy    How often do you need to have someone help you when you read instructions, pamphlets, or other written material from your doctor or pharmacy? Never                     Readmission Assessment (most recent)       Readmission Assessment - 05/27/24 1003          Readmission    Was this a planned readmission? No     Why were you hospitalized in the last 30 days? same reason     Why were you readmitted? Related to previous admission     When you left the hospital how did you feel? Good     When you left the hospital where did you go? Home Alone     Did patient/caregiver refused recommended DC plan? No     Tell me about what happened between when you left the hospital and the day you returned. felt good then gort sick     When did you start not feeling well? don't remember     Did you try to manage your symptoms your self? Yes     Did you call anyone? Yes     Did you try to see or did see a doctor or nurse before you came? No     Did you have  a follow-up appointment on discharge? No                    Completed initial assessment on the patient. Pt does live alone but states she might have to go live with family upon D/C. Pt is open to . She doesn't use any equipment at home. Referral placed to Outpatient case management  Discharge Plan A and Plan B have been determined by review of patient's clinical status, future medical and therapeutic needs, and coverage/benefits for post-acute care in coordination with  multidisciplinary team members.  Jose Wolf GIOVANNI    [facility]  [phone]

## 2024-05-27 NOTE — SUBJECTIVE & OBJECTIVE
Interval History: NAEO. Vitally stable. Oriented to person and place but not oriented to the year (2005). Otherwise no complaints. Noted a good appetite.    Review of Systems   Constitutional:  Positive for fatigue. Negative for appetite change.   Respiratory:  Negative for cough and shortness of breath.    Cardiovascular:  Negative for chest pain.   Gastrointestinal:  Positive for diarrhea (on lactulose). Negative for abdominal pain, constipation, nausea and vomiting.   Neurological:  Negative for weakness and headaches.     Objective:     Vital Signs (Most Recent):  Temp: 98.1 °F (36.7 °C) (05/27/24 1131)  Pulse: 98 (05/27/24 1152)  Resp: 18 (05/27/24 1131)  BP: 104/66 (05/27/24 1131)  SpO2: 97 % (05/27/24 1152) Vital Signs (24h Range):  Temp:  [97.8 °F (36.6 °C)-98.7 °F (37.1 °C)] 98.1 °F (36.7 °C)  Pulse:  [] 98  Resp:  [18-19] 18  SpO2:  [97 %-100 %] 97 %  BP: (104-120)/(66-74) 104/66     Weight: 67.6 kg (149 lb)  Body mass index is 27.25 kg/m².    Intake/Output Summary (Last 24 hours) at 5/27/2024 1427  Last data filed at 5/27/2024 0430  Gross per 24 hour   Intake 300 ml   Output --   Net 300 ml         Physical Exam  Vitals and nursing note reviewed.   Constitutional:       General: She is not in acute distress.  HENT:      Head: Normocephalic and atraumatic.      Mouth/Throat:      Mouth: Mucous membranes are moist.   Eyes:      Extraocular Movements: Extraocular movements intact.      Conjunctiva/sclera: Conjunctivae normal.   Cardiovascular:      Rate and Rhythm: Normal rate and regular rhythm.   Pulmonary:      Effort: Pulmonary effort is normal. No respiratory distress.   Abdominal:      General: Abdomen is flat. There is no distension.      Palpations: Abdomen is soft.      Tenderness: There is no abdominal tenderness.   Musculoskeletal:         General: Normal range of motion.      Right lower leg: Edema (1+) present.      Left lower leg: Edema (1+) present.      Comments: No asterixis   Skin:      General: Skin is warm and dry.      Coloration: Skin is jaundiced (mild) and pale.      Comments: B/l LE erythema   Neurological:      General: No focal deficit present.      Mental Status: She is alert. She is disoriented.   Psychiatric:         Mood and Affect: Mood normal.         Behavior: Behavior normal.             Significant Labs: All pertinent labs within the past 24 hours have been reviewed.    Significant Imaging: I have reviewed all pertinent imaging results/findings within the past 24 hours.

## 2024-05-28 ENCOUNTER — DOCUMENTATION ONLY (OUTPATIENT)
Dept: REHABILITATION | Facility: HOSPITAL | Age: 54
End: 2024-05-28
Payer: MEDICARE

## 2024-05-28 LAB
ALBUMIN SERPL BCP-MCNC: 2.5 G/DL (ref 3.5–5.2)
ALP SERPL-CCNC: 167 U/L (ref 55–135)
ALT SERPL W/O P-5'-P-CCNC: 20 U/L (ref 10–44)
ANION GAP SERPL CALC-SCNC: 9 MMOL/L (ref 8–16)
AST SERPL-CCNC: 40 U/L (ref 10–40)
BASOPHILS # BLD AUTO: 0.07 K/UL (ref 0–0.2)
BASOPHILS NFR BLD: 1.6 % (ref 0–1.9)
BILIRUB SERPL-MCNC: 5.7 MG/DL (ref 0.1–1)
BUN SERPL-MCNC: 10 MG/DL (ref 6–20)
CALCIUM SERPL-MCNC: 8.5 MG/DL (ref 8.7–10.5)
CHLORIDE SERPL-SCNC: 100 MMOL/L (ref 95–110)
CO2 SERPL-SCNC: 28 MMOL/L (ref 23–29)
CREAT SERPL-MCNC: 0.6 MG/DL (ref 0.5–1.4)
DIFFERENTIAL METHOD BLD: ABNORMAL
EOSINOPHIL # BLD AUTO: 0.2 K/UL (ref 0–0.5)
EOSINOPHIL NFR BLD: 5.1 % (ref 0–8)
ERYTHROCYTE [DISTWIDTH] IN BLOOD BY AUTOMATED COUNT: 31.8 % (ref 11.5–14.5)
EST. GFR  (NO RACE VARIABLE): >60 ML/MIN/1.73 M^2
GLUCOSE SERPL-MCNC: 107 MG/DL (ref 70–110)
HCT VFR BLD AUTO: 28.6 % (ref 37–48.5)
HGB BLD-MCNC: 9.4 G/DL (ref 12–16)
IMM GRANULOCYTES # BLD AUTO: 0.03 K/UL (ref 0–0.04)
IMM GRANULOCYTES NFR BLD AUTO: 0.7 % (ref 0–0.5)
LYMPHOCYTES # BLD AUTO: 1 K/UL (ref 1–4.8)
LYMPHOCYTES NFR BLD: 21.4 % (ref 18–48)
MAGNESIUM SERPL-MCNC: 2.1 MG/DL (ref 1.6–2.6)
MCH RBC QN AUTO: 35.5 PG (ref 27–31)
MCHC RBC AUTO-ENTMCNC: 32.9 G/DL (ref 32–36)
MCV RBC AUTO: 108 FL (ref 82–98)
MONOCYTES # BLD AUTO: 0.6 K/UL (ref 0.3–1)
MONOCYTES NFR BLD: 12.9 % (ref 4–15)
NEUTROPHILS # BLD AUTO: 2.6 K/UL (ref 1.8–7.7)
NEUTROPHILS NFR BLD: 58.3 % (ref 38–73)
NRBC BLD-RTO: 1 /100 WBC
PHOSPHATE SERPL-MCNC: 3.4 MG/DL (ref 2.7–4.5)
PLATELET # BLD AUTO: 131 K/UL (ref 150–450)
PMV BLD AUTO: 11.9 FL (ref 9.2–12.9)
POTASSIUM SERPL-SCNC: 3.9 MMOL/L (ref 3.5–5.1)
PROT SERPL-MCNC: 5.9 G/DL (ref 6–8.4)
RBC # BLD AUTO: 2.65 M/UL (ref 4–5.4)
SODIUM SERPL-SCNC: 137 MMOL/L (ref 136–145)
WBC # BLD AUTO: 4.48 K/UL (ref 3.9–12.7)

## 2024-05-28 PROCEDURE — 84100 ASSAY OF PHOSPHORUS: CPT

## 2024-05-28 PROCEDURE — 80053 COMPREHEN METABOLIC PANEL: CPT

## 2024-05-28 PROCEDURE — 83735 ASSAY OF MAGNESIUM: CPT

## 2024-05-28 PROCEDURE — 85025 COMPLETE CBC W/AUTO DIFF WBC: CPT

## 2024-05-28 PROCEDURE — 25000003 PHARM REV CODE 250

## 2024-05-28 PROCEDURE — 25000003 PHARM REV CODE 250: Performed by: STUDENT IN AN ORGANIZED HEALTH CARE EDUCATION/TRAINING PROGRAM

## 2024-05-28 PROCEDURE — 63600175 PHARM REV CODE 636 W HCPCS

## 2024-05-28 PROCEDURE — 36415 COLL VENOUS BLD VENIPUNCTURE: CPT

## 2024-05-28 PROCEDURE — 20600001 HC STEP DOWN PRIVATE ROOM

## 2024-05-28 RX ADMIN — PANTOPRAZOLE SODIUM 40 MG: 40 TABLET, DELAYED RELEASE ORAL at 09:05

## 2024-05-28 RX ADMIN — LACTULOSE 20 G: 20 SOLUTION ORAL at 09:05

## 2024-05-28 RX ADMIN — FUROSEMIDE 80 MG: 80 TABLET ORAL at 09:05

## 2024-05-28 RX ADMIN — RIFAXIMIN 550 MG: 550 TABLET ORAL at 09:05

## 2024-05-28 RX ADMIN — ENOXAPARIN SODIUM 40 MG: 40 INJECTION SUBCUTANEOUS at 06:05

## 2024-05-28 RX ADMIN — SPIRONOLACTONE 100 MG: 100 TABLET ORAL at 08:05

## 2024-05-28 NOTE — PLAN OF CARE
[facility] - Telemetry Stepdown      HOME HEALTH ORDERS  FACE TO FACE ENCOUNTER    Patient Name: Shikha López  YOB: 1970    PCP: No, Primary Doctor   PCP Address: None  PCP Phone Number: None  PCP Fax: None    Encounter Date: 5/25/24    Admit to Home Health    Diagnoses:  Active Hospital Problems    Diagnosis  POA    *Acute encephalopathy [G93.40]  Yes    Hepatic encephalopathy [K76.82]  Yes    Biliary cirrhosis [K74.5]  Yes     Chronic    S/P TIPS (transjugular intrahepatic portosystemic shunt) [Z95.828]  Not Applicable    Pancytopenia [D61.818]  Yes    Esophageal and gastric varices [I85.00, I86.4]  Yes     Chronic    Hypokalemia [E87.6]  Yes    Lymphedema [I89.0]  Yes     Chronic    Malignant neoplasm metastatic to left lung [C78.02]  Yes     Chronic    Breast cancer, stage 4, left [C50.912]  Yes     Chronic      Resolved Hospital Problems    Diagnosis Date Resolved POA    Cirrhosis [K74.60] 05/25/2024 Unknown       Follow Up Appointments:  Future Appointments   Date Time Provider Department Center   5/28/2024  1:00 PM Mariam Lisa NP [facility] [place] [facility]   5/29/2024  8:00 AM Cortney Farah PT [facility] OP [facility]2 [place] 2 fl   6/3/2024  8:00 AM Cortney Farah, PT [facility] [place] 2 fl   6/4/2024  3:30 PM Td Ricci DO [facility]   6/5/2024  8:00 AM Cortney Farah PT [facility] [place] 2 fl   6/10/2024  8:00 AM Cortney Farah, PT [facility] [place] 2 fl   6/12/2024  8:00 AM Cortney Farah, PT [facility] [place] 2 fl   6/17/2024  8:00 AM Cortney Farah, PT [facility] [place] 2 fl   6/19/2024  8:00 AM Cortney Farah, PT [facility] [place] 2 fl   6/24/2024  8:00 AM Cortney Farah, PT [facility] [place] 2 fl   7/11/2024  3:00 PM Lizbeth Mehta DNP [facility] [place]   7/22/2024  8:00 AM LAB, HEMONC CANCER BLDG [facility] LAB HO [place]   7/30/2024  3:00 PM Farhad Figueroa MD [facility] HEPAT [facility]       Allergies:Review of patient's allergies  indicates:  No Known Allergies    Medications: Review discharge medications with patient and family and provide education.    Current Facility-Administered Medications   Medication Dose Route Frequency Provider Last Rate Last Admin    dextrose 10% bolus 125 mL 125 mL  12.5 g Intravenous PRN Cher Vera MD        dextrose 10% bolus 250 mL 250 mL  25 g Intravenous PRN Cher Vera MD        enoxaparin injection 40 mg  40 mg Subcutaneous Q24H (prophylaxis, 1700) Cher Vera MD   40 mg at 05/27/24 1753    furosemide tablet 80 mg  80 mg Oral Daily Clara Foley MD   80 mg at 05/28/24 0901    glucagon (human recombinant) injection 1 mg  1 mg Intramuscular PRN Cher Vera MD        glucose chewable tablet 16 g  16 g Oral PRN Cher Vera MD        glucose chewable tablet 24 g  24 g Oral PRN Cher Vera MD        lactulose 20 gram/30 mL solution Soln 20 g  20 g Oral BID Clara Foley MD   20 g at 05/28/24 0901    naloxone 0.4 mg/mL injection 0.02 mg  0.02 mg Intravenous PRN Cher Vera MD        pantoprazole EC tablet 40 mg  40 mg Oral BID Cher Vera MD   40 mg at 05/28/24 0906    rifAXIMin tablet 550 mg  550 mg Oral BID Clara Foley MD   550 mg at 05/28/24 0902    sodium chloride 0.9% flush 10 mL  10 mL Intravenous Q12H PRN Cher Vera MD        spironolactone tablet 100 mg  100 mg Oral Daily Shyam Toure MD   100 mg at 05/28/24 0859     Current Discharge Medication List        START taking these medications    Details   rifAXIMin (XIFAXAN) 550 mg Tab Take 1 tablet (550 mg total) by mouth 2 (two) times daily.  Qty: 60 tablet, Refills: 3           CONTINUE these medications which have NOT CHANGED    Details   ferrous sulfate (FEROSUL) 325 mg (65 mg iron) Tab tablet Take 1 tablet by mouth daily with Vitamin C on an empty stomach  Qty: 60 tablet, Refills: 3    Associated Diagnoses: Acute blood loss anemia      furosemide (LASIX) 80 MG tablet Take 1 tablet (80 mg total) by mouth once  daily. If you are gaining weight (2 lbs in 24 hours or 3 lbs in two days) may take an extra dose of 80 mg  Qty: 90 tablet, Refills: 2    Associated Diagnoses: Hypervolemia, unspecified hypervolemia type      lactulose (CHRONULAC) 10 gram/15 mL solution Take 15 mLs (10 g total) by mouth 3 (three) times daily.  Qty: 120 mL, Refills: 3      methylphenidate HCl (RITALIN) 5 MG tablet Take 1 tablet (5 mg total) by mouth 2 (two) times daily.  Qty: 60 tablet, Refills: 0    Associated Diagnoses: Lack of concentration      pantoprazole (PROTONIX) 40 MG tablet Take 1 tablet (40 mg total) by mouth 2 (two) times daily.  Qty: 180 tablet, Refills: 0    Associated Diagnoses: Gastrointestinal hemorrhage, unspecified gastrointestinal hemorrhage type      spironolactone (ALDACTONE) 100 MG tablet Take 1 tablet (100 mg total) by mouth once daily.  Qty: 30 tablet, Refills: 11    Associated Diagnoses: Hypervolemia, unspecified hypervolemia type      triamcinolone acetonide 0.5% (KENALOG) 0.5 % Crea Apply topically 2 (two) times daily.  Qty: 454 g, Refills: 0    Associated Diagnoses: Contact dermatitis, unspecified contact dermatitis type, unspecified trigger               I have seen and examined this patient within the last 30 days. My clinical findings that support the need for the home health skilled services and home bound status are the following:no   Weakness/numbness causing balance and gait disturbance due to Liver Disease and Malignancy/Cancer making it taxing to leave home.     Diet:   fluid restriction and 2 gram sodium diet    Labs:  None    Referrals/ Consults  Physical Therapy to evaluate and treat. Evaluate for home safety and equipment needs; Establish/upgrade home exercise program. Perform / instruct on therapeutic exercises, gait training, transfer training, and Range of Motion.  Occupational Therapy to evaluate and treat. Evaluate home environment for safety and equipment needs. Perform/Instruct on transfers, ADL  training, ROM, and therapeutic exercises.  Aide to provide assistance with personal care, ADLs, and vital signs.    Activities:   activity as tolerated    Nursing:   Agency to admit patient within 24 hours of hospital discharge unless specified on physician order or at patient request    SN to complete comprehensive assessment including routine vital signs. Instruct on disease process and s/s of complications to report to MD. Review/verify medication list sent home with the patient at time of discharge  and instruct patient/caregiver as needed. Frequency may be adjusted depending on start of care date.     Skilled nurse to perform up to 3 visits PRN for symptoms related to diagnosis    Notify MD if SBP > 160 or < 90; DBP > 90 or < 50; HR > 120 or < 50; Temp > 101; O2 < 88%; Other:   confusion    Ok to schedule additional visits based on staff availability and patient request on consecutive days within the home health episode.    When multiple disciplines ordered:    Start of Care occurs on Sunday - Wednesday schedule remaining discipline evaluations as ordered on separate consecutive days following the start of care.    Thursday SOC -schedule subsequent evaluations Friday and Monday the following week.     Friday - Saturday SOC - schedule subsequent discipline evaluations on consecutive days starting Monday of the following week.    For all post-discharge communication and subsequent orders please contact patient's primary care physician. If unable to reach primary care physician or do not receive response within 30 minutes, please contact Emergency Physician for clinical staff order clarification    Miscellaneous   Heart Failure:      SN to instruct on the following:    Instruct on the definition of CHF.   Instruct on the signs/sympoms of CHF to be reported.   Instruct on and monitor daily weights.   Instruct on factors that cause exacerbation.   Instruct on action, dose, schedule, and side effects of  medications.   Instruct on diet as prescribed.   Instruct on activity allowed.   Instruct on life-style modifications for life long management of CHF   SN to assess compliance with daily weights, diet, medications, fluid retention,    safety precautions, activities permitted and life-style modifications.   Additional 1-2 SN visits per week as needed for signs and symptoms     of CHF exacerbation.      For Weight Gain > 2-3 lbs in 1 day or 4-6 lbs over 1 week notify PCP:  CHF DIURETIC SLIDING SCALE     Skilled nurse visits daily to instruct and monitor medication adherence until target weight. Then resume prior order frequency.     If weight gain exceeds 5 lbs over target weight, call MD  If weight gain of 3-4 lbs over target weight, then:     Increase Furosemide - current dose (mg/day) to 80 double dose and frequency of twice daily until target weight achieved or maximum 3 days.     If already on max oral daily dose, see IV diuretic instructions.    After 3 days of increased oral diuretic dose, get BMP. If patient is on increased oral diuretic dose greater than 5 days, repeat BMP at day 7 of increased dose.     Potassium supplementation   Scr > 1.5 mg/dl  Scr  1.5 mg/dl    K < 3.0 - NOTIFY MD and 40 mEq bid  40 mEq tid   K- 3.1-3.3  20 mEq bid  20 mEq tid    K 3.4-3.7  10 mEq bid  10 mEq tid      If target weight not reached after 5 days of increased oral diuretic, proceed to IV diuretic with daily patient contact to include face-to-face visit and telephone encounters.    Home Health Aide:  Nursing Daily, Physical Therapy Three times weekly, Occupational Therapy Three times weekly, and Home Health Aide Daily    Wound Care Orders  no    I certify that this patient is confined to her home and needs intermittent skilled nursing care, physical therapy, and occupational therapy.

## 2024-05-28 NOTE — PROGRESS NOTES
5/28/2024:     4:31pm     Pt's PT appointment for 5/29/2024 cancelled due to pt being currently admitted and needing clearance before resuming therapy. Will check pt's status for next visit     Cortney Farah, PT, DPT, CLT

## 2024-05-28 NOTE — PROGRESS NOTES
"Dereck Forrester - Telemetry [facility] Medicine  Progress Note    Patient Name: Shikha López  MRN: [id number]  Patient Class: IP- Inpatient   Admission Date: 5/25/2024  Length of Stay: 3 days  Attending Physician: Betito Muhammad MD  Primary Care Provider: Lenore, Primary Doctor        Subjective:     Principal Problem:Acute encephalopathy        HPI:  Shikha López is a 53 year old female with hx of stage IV breast cancer currently on Trastuzumab-Deruxtecan q3 weeks (most recent chemo 5/7/24), HAWTHORNE cirrhosis (s/p TIPS 4/12 w/ mild dysfunction on recent US), GI bleeds w/ gastric and esophageal varices, presenting with c/o worsening confusion since [place] [facility] [place] where she was admitted for volume overload and symptomatic anemia requiring blood transfusion. Hospital course was c/b pancytopenia presumed to be 2/2 myelosuppression from linezolid therapy for staph bacteremia and lower extremity cellulitis on prior admission. Patient reports generalized malaise and increased confusion over the past few days since her discharge, reportedly doing "strange things" at home per collateral obtained in the ED.  Reports medication compliance with lactulose as prescribed since discharge with 2-3 BM daily. Pt also reports occasional N/V, vomited once since discharge, was bilious, non-bloody or coffee ground consistency/color. Pt denies any changes to PO intake since discharge. Pt denies fevers/chills, HA, vision changes, URI sx, CP, SOB, abd pain, diarrhea, constipation, urinary sx, BLE pain/swelling.     In the ED, patient mildly tachycardic, otherwise hemodynamically stable, satting well on RA and afebrile. Labs notable for hypokalemia (K 2.5), WBC 3.21, hgb above baseline at 10.1, elevated total bilirubin 6.1, lactate 3.5, and ammonia 113. CXR negative for consolidation, CT head negative for acute intracranial processes. Given 1L fluid bolus, vanc/zosyn with infectious work-up for c/f sepsis. Admitted to Hospital " Medicine for further management and work up of acute encephalopathy.      Overview/Hospital Course:  Admitted to hospital medicine for acute encephalopathy. Given elevated ammonia to 113, etiology likely hepatic encephalopathy. We continued her lactulose and started rifaximin with improvement in her symptoms. Liver US notable for patent TIPS shunt but still concerning for TIPS dysfunction, IR was wanting a repeat ultrasound one month following prior hospitalization. Encephalopathy has resolved and mental status is stable on current regiment of lactulose and rifaximin. She is medically ready for discharge but pending safe dispo home with her daughter and HH.    Interval History: NAEO. AF, HDS on RA. Aox4 this morning. No other complaints this morning. I spoke with her about discharge plans and she explained her complicated social picture. She is currently planning to go stay with her daughter once her daughter arranges a safe discharge.     Review of Systems   Constitutional:  Positive for fatigue. Negative for appetite change.   Respiratory:  Negative for cough and shortness of breath.    Cardiovascular:  Positive for leg swelling. Negative for chest pain.   Gastrointestinal:  Positive for diarrhea (on lactulose). Negative for abdominal pain, nausea and vomiting.   Neurological:  Positive for weakness. Negative for headaches.     Objective:     Vital Signs (Most Recent):  Temp: 98.2 °F (36.8 °C) (05/28/24 1120)  Pulse: 108 (05/28/24 1120)  Resp: 18 (05/28/24 1120)  BP: 106/64 (05/28/24 1120)  SpO2: 100 % (05/28/24 1120) Vital Signs (24h Range):  Temp:  [97.8 °F (36.6 °C)-98.5 °F (36.9 °C)] 98.2 °F (36.8 °C)  Pulse:  [] 108  Resp:  [18] 18  SpO2:  [96 %-100 %] 100 %  BP: (106-114)/(60-72) 106/64     Weight: 67.6 kg (149 lb)  Body mass index is 27.25 kg/m².    Intake/Output Summary (Last 24 hours) at 5/28/2024 1206  Last data filed at 5/28/2024 0220  Gross per 24 hour   Intake 240 ml   Output --   Net 240 ml          Physical Exam  Vitals and nursing note reviewed.   Constitutional:       General: She is not in acute distress.  HENT:      Head: Normocephalic and atraumatic.      Mouth/Throat:      Mouth: Mucous membranes are moist.   Eyes:      Extraocular Movements: Extraocular movements intact.      Conjunctiva/sclera: Conjunctivae normal.   Cardiovascular:      Rate and Rhythm: Normal rate and regular rhythm.   Pulmonary:      Effort: Pulmonary effort is normal. No respiratory distress.   Abdominal:      General: Abdomen is flat. There is no distension.      Palpations: Abdomen is soft.      Tenderness: There is no abdominal tenderness.   Musculoskeletal:         General: Normal range of motion.      Right lower leg: Edema (1+) present.      Left lower leg: Edema (1+) present.      Comments: No asterixis   Skin:     General: Skin is warm and dry.      Coloration: Skin is jaundiced (very mild) and pale.      Findings: Erythema (b/l LE) present.   Neurological:      General: No focal deficit present.      Mental Status: She is alert and oriented to person, place, and time.   Psychiatric:         Mood and Affect: Mood normal.         Behavior: Behavior normal.             Significant Labs: All pertinent labs within the past 24 hours have been reviewed.    Significant Imaging: I have reviewed all pertinent imaging results/findings within the past 24 hours.    Assessment/Plan:      * Acute encephalopathy  53F with hx of stage IV breast cancer undergoing chemotherapy, HAWTHORNE cirrhosis (s/p TIPS 4/12 w/ mild dysfunction on recent US), GI bleeds w/ gastric and esophageal varices, who presents for increased confusion and generalized malaise since recent discharge on 5/21 as per HPI. Initial work up notable for hypokalemia (K 2.5), WBC 3.21, hgb above baseline at 10.1, elevated total bilirubin 6.1, lactate 3.5, and ammonia 113. CXR negative for consolidation, CT head negative for acute intracranial processes. Given 1L fluid bolus,  jaime/zosyn with infectious work-up for c/f sepsis. Admitted to Hospital Medicine for further management and work up of acute encephalopathy.      Possible etiologies include but are not limited to: metabolic vs hepatic vs infectious causes for altered mental status.    - Lactulose 20g BID and rifaximin  - BCx NGTD    Hepatic encephalopathy  Elevated ammonia 113 on admission. Reports medication compliance to lactulose at home, although unclear whether this history is reliable given confusion on exam and objective data.     --see acute encephalopathy.        Biliary cirrhosis  Patient with known Cirrhosis. Co-morbidities are present and inclusive of portal hypertension, esophageal varices, hepatic encephalopathy, malnutrition, anemia/pancytopenia, and anticoagulation.  MELD-Na score calculated; MELD 3.0: 19 at 5/17/2024  5:22 AM  MELD-Na: 16 at 5/17/2024  5:22 AM  Calculated from:  Serum Creatinine: 0.6 mg/dL (Using min of 1 mg/dL) at 5/17/2024  5:22 AM  Serum Sodium: 138 mmol/L (Using max of 137 mmol/L) at 5/17/2024  5:22 AM  Total Bilirubin: 3.8 mg/dL at 5/17/2024  5:22 AM  Serum Albumin: 2.4 g/dL at 5/17/2024  5:22 AM  INR(ratio): 1.5 at 5/15/2024 11:57 PM  Age at listing (hypothetical): 53 years  Sex: Female at 5/17/2024  5:22 AM      Continue chronic meds. Etiology likely  NAFLD/HAWTHORNE . Will avoid any hepatotoxic meds, and monitor CBC/CMP/INR for synthetic function.     S/P TIPS (transjugular intrahepatic portosystemic shunt)  TIPS placed 4/12 for acute GI bleeding and hx of gastric and esophageal varices.  Had gastric portal vein coiling and embolization.    5/10 Liver Doppler US with: Sluggish flow in the portal vein end of the TIPS and bidirectional flow in the left portal vein, which could represent TIPS malfunction.   Patient had outpatient IR appointment the day after admission scheduled (5/16) to evaluate for necessity of TIPs revision. Will obtain updated liver doppler, consider inpatient IR consult  pending results vs outpatient follow-up. Liver US concerning for low velocities within the portal venous side of the tips shunt c/f TIPS dysfunction. Currently declining lactulose due to reports of several diarrheal BMs, only one charted BM. I discussed with her the benefit of lactulose and she expressed understanding.    Liver US showed patent TIPS but still concerning for possible TIPS dysfunction.     --consider IR consult if indicated for possible intervention    Pancytopenia  This patient is found to have pancytopenia, the likely etiology is Drug induced (including chemotherapy) related to linezolid, stage IV breast cancer , will monitor CBC Daily. Will transfuse red blood cells if the hemoglobin is <7g/dL (or <8 in the setting of ACS). Will transfuse platelets if platelet count is <10k. Hold DVT prophylaxis if platelets are <50k. The patient's hemoglobin, white blood cell count, and platelet count results have been reviewed and are listed below.  Recent Labs   Lab 05/28/24  0254   HGB 9.4*   WBC 4.48   *       --monitor closely for s/s of active bleeding.         Esophageal and gastric varices  Noted history in the setting of anemia on previous admission. Patient denies hemetemesis, bloody bowel movements.     --monitor BP and Hgb  --continue home PPI    Hypokalemia  Patient has hypokalemia which is Acute and currently uncontrolled. Most recent potassium levels reviewed-   Lab Results   Component Value Date    K 3.9 05/28/2024   . Will continue potassium replacement per protocol and recheck repeat levels after replacement completed.     RESOLVED    Lymphedema  Patient follows with the lymphedema clinic w/ leg wraps and leg elevation. Increased lower extremity swelling on admission. Given associated pulmonary congestion on imaging concern for component of left-heart failure as well. Will evaluate as below.    - Leg elevation as tolerated  - Continue home spironolactone and lasix    Malignant neoplasm  metastatic to left lung  See 'breast cancer'    Breast cancer, stage 4, left  Follows with Dr. Willis currently.    Oncologic History:  Diagnosed September 2006 w/ poorly differentiated carcinoma which was ER and PA. negative and HER2 positive.     She was then referred to [facility] for additional care.  CT scan of the chest and abdomen November 2006 revealed multiple nodules in the lungs consistent with metastatic disease.       She was treated with chemotherapy with weekly Herceptin and Abraxane with improvement in her breast and lung metastasis.     On May 29, 2007 she underwent left modified radical mastectomy(Dr. Colvin) which showed no residual tumor in the breast and 1/5 nodes was positive for metastasis measuring 6 mm.  (ypT0N1).  She then received postoperative radiation therapy(Dr Singh)  to the left chest wall supraclav and internal mammary lymph nodes from August 20, 2007 to September 28, 2007.   Postoperatively she continued on Herceptin for approximately 3 and 1/2 years before her lung metastasis begin to grow.     She subsequently received a number of different chemotherapy treatments including Adriamycin, Halaven, Xeloda plus lapatinib then Xeloda plus Herceptin. (  in [facility].)     Subsequently, she transferred her care to  at [facility].  -She was initially treated with Taxotere Herceptin Perjeta.    -She was then changed to Kadcyla.    In July 2020 PET scan showed progression.  She then took approximately 9 months of Herceptin and Navelbine with initial response then progression.     She started trastuzumab- deruxtecan  4/12/21.      VTE Risk Mitigation (From admission, onward)           Ordered     enoxaparin injection 40 mg  Every 24 hours         05/25/24 2027     IP VTE HIGH RISK PATIENT  Once         05/25/24 2027     Place sequential compression device  Until discontinued         05/25/24 2027                    Discharge Planning   PAT: 5/28/2024      Code Status: Full Code   Is the patient medically ready for discharge?:     Reason for patient still in hospital (select all that apply): Pending disposition  Discharge Plan A: Home, Home Health                  Clara Foley MD  Department of Hospital Medicine   [facility] - Telemetry Stepdown

## 2024-05-28 NOTE — ASSESSMENT & PLAN NOTE
This patient is found to have pancytopenia, the likely etiology is Drug induced (including chemotherapy) related to linezolid, stage IV breast cancer , will monitor CBC Daily. Will transfuse red blood cells if the hemoglobin is <7g/dL (or <8 in the setting of ACS). Will transfuse platelets if platelet count is <10k. Hold DVT prophylaxis if platelets are <50k. The patient's hemoglobin, white blood cell count, and platelet count results have been reviewed and are listed below.  Recent Labs   Lab 05/28/24  0254   HGB 9.4*   WBC 4.48   *       --monitor closely for s/s of active bleeding.

## 2024-05-28 NOTE — SUBJECTIVE & OBJECTIVE
Interval History: NAEO. AF, HDS on RA. Aox4 this morning. No other complaints this morning. I spoke with her about discharge plans and she explained her complicated social picture. She is currently planning to go stay with her daughter once her daughter arranges a safe discharge.     Review of Systems   Constitutional:  Positive for fatigue. Negative for appetite change.   Respiratory:  Negative for cough and shortness of breath.    Cardiovascular:  Positive for leg swelling. Negative for chest pain.   Gastrointestinal:  Positive for diarrhea (on lactulose). Negative for abdominal pain, nausea and vomiting.   Neurological:  Positive for weakness. Negative for headaches.     Objective:     Vital Signs (Most Recent):  Temp: 98.2 °F (36.8 °C) (05/28/24 1120)  Pulse: 108 (05/28/24 1120)  Resp: 18 (05/28/24 1120)  BP: 106/64 (05/28/24 1120)  SpO2: 100 % (05/28/24 1120) Vital Signs (24h Range):  Temp:  [97.8 °F (36.6 °C)-98.5 °F (36.9 °C)] 98.2 °F (36.8 °C)  Pulse:  [] 108  Resp:  [18] 18  SpO2:  [96 %-100 %] 100 %  BP: (106-114)/(60-72) 106/64     Weight: 67.6 kg (149 lb)  Body mass index is 27.25 kg/m².    Intake/Output Summary (Last 24 hours) at 5/28/2024 1206  Last data filed at 5/28/2024 0220  Gross per 24 hour   Intake 240 ml   Output --   Net 240 ml         Physical Exam  Vitals and nursing note reviewed.   Constitutional:       General: She is not in acute distress.  HENT:      Head: Normocephalic and atraumatic.      Mouth/Throat:      Mouth: Mucous membranes are moist.   Eyes:      Extraocular Movements: Extraocular movements intact.      Conjunctiva/sclera: Conjunctivae normal.   Cardiovascular:      Rate and Rhythm: Normal rate and regular rhythm.   Pulmonary:      Effort: Pulmonary effort is normal. No respiratory distress.   Abdominal:      General: Abdomen is flat. There is no distension.      Palpations: Abdomen is soft.      Tenderness: There is no abdominal tenderness.   Musculoskeletal:          General: Normal range of motion.      Right lower leg: Edema (1+) present.      Left lower leg: Edema (1+) present.      Comments: No asterixis   Skin:     General: Skin is warm and dry.      Coloration: Skin is jaundiced (very mild) and pale.      Findings: Erythema (b/l LE) present.   Neurological:      General: No focal deficit present.      Mental Status: She is alert and oriented to person, place, and time.   Psychiatric:         Mood and Affect: Mood normal.         Behavior: Behavior normal.             Significant Labs: All pertinent labs within the past 24 hours have been reviewed.    Significant Imaging: I have reviewed all pertinent imaging results/findings within the past 24 hours.

## 2024-05-28 NOTE — ASSESSMENT & PLAN NOTE
TIPS placed 4/12 for acute GI bleeding and hx of gastric and esophageal varices.  Had gastric portal vein coiling and embolization.    5/10 Liver Doppler US with: Sluggish flow in the portal vein end of the TIPS and bidirectional flow in the left portal vein, which could represent TIPS malfunction.   Patient had outpatient IR appointment the day after admission scheduled (5/16) to evaluate for necessity of TIPs revision. Will obtain updated liver doppler, consider inpatient IR consult pending results vs outpatient follow-up. Liver US concerning for low velocities within the portal venous side of the tips shunt c/f TIPS dysfunction. Currently declining lactulose due to reports of several diarrheal BMs, only one charted BM. I discussed with her the benefit of lactulose and she expressed understanding.    Liver US showed patent TIPS but still concerning for possible TIPS dysfunction.     --consider IR consult if indicated for possible intervention

## 2024-05-28 NOTE — ASSESSMENT & PLAN NOTE
53F with hx of stage IV breast cancer undergoing chemotherapy, HAWTHORNE cirrhosis (s/p TIPS 4/12 w/ mild dysfunction on recent US), GI bleeds w/ gastric and esophageal varices, who presents for increased confusion and generalized malaise since recent discharge on 5/21 as per HPI. Initial work up notable for hypokalemia (K 2.5), WBC 3.21, hgb above baseline at 10.1, elevated total bilirubin 6.1, lactate 3.5, and ammonia 113. CXR negative for consolidation, CT head negative for acute intracranial processes. Given 1L fluid bolus, vanc/zosyn with infectious work-up for c/f sepsis. Admitted to Hospital Medicine for further management and work up of acute encephalopathy.      Possible etiologies include but are not limited to: metabolic vs hepatic vs infectious causes for altered mental status.    - Lactulose 20g BID and rifaximin  - BCx NGTD

## 2024-05-28 NOTE — ASSESSMENT & PLAN NOTE
Patient has hypokalemia which is Acute and currently uncontrolled. Most recent potassium levels reviewed-   Lab Results   Component Value Date    K 3.9 05/28/2024   . Will continue potassium replacement per protocol and recheck repeat levels after replacement completed.     RESOLVED

## 2024-05-28 NOTE — ASSESSMENT & PLAN NOTE
Follows with Dr. Willis currently.    Oncologic History:  Diagnosed September 2006 w/ poorly differentiated carcinoma which was ER and LA. negative and HER2 positive.     She was then referred to [facility] for additional care.  CT scan of the chest and abdomen November 2006 revealed multiple nodules in the lungs consistent with metastatic disease.       She was treated with chemotherapy with weekly Herceptin and Abraxane with improvement in her breast and lung metastasis.     On May 29, 2007 she underwent left modified radical mastectomy(Dr. Colvin) which showed no residual tumor in the breast and 1/5 nodes was positive for metastasis measuring 6 mm.  (ypT0N1).  She then received postoperative radiation therapy(Dr Singh)  to the left chest wall supraclav and internal mammary lymph nodes from August 20, 2007 to September 28, 2007.   Postoperatively she continued on Herceptin for approximately 3 and 1/2 years before her lung metastasis begin to grow.     She subsequently received a number of different chemotherapy treatments including Adriamycin, Halaven, Xeloda plus lapatinib then Xeloda plus Herceptin. (  in [facility].)     Subsequently, she transferred her care to  at [facility].  -She was initially treated with Taxotere Herceptin Perjeta.    -She was then changed to Kadcyla.    In July 2020 PET scan showed progression.  She then took approximately 9 months of Herceptin and Navelbine with initial response then progression.     She started trastuzumab- deruxtecan  4/12/21.

## 2024-05-29 ENCOUNTER — TELEPHONE (OUTPATIENT)
Dept: HEPATOLOGY | Facility: CLINIC | Age: 54
End: 2024-05-29
Payer: MEDICARE

## 2024-05-29 VITALS
WEIGHT: 149 LBS | OXYGEN SATURATION: 98 % | BODY MASS INDEX: 27.42 KG/M2 | DIASTOLIC BLOOD PRESSURE: 70 MMHG | RESPIRATION RATE: 20 BRPM | HEART RATE: 114 BPM | TEMPERATURE: 98 F | HEIGHT: 62 IN | SYSTOLIC BLOOD PRESSURE: 139 MMHG

## 2024-05-29 LAB
ALBUMIN SERPL BCP-MCNC: 2.4 G/DL (ref 3.5–5.2)
ALP SERPL-CCNC: 163 U/L (ref 55–135)
ALT SERPL W/O P-5'-P-CCNC: 21 U/L (ref 10–44)
ANION GAP SERPL CALC-SCNC: 8 MMOL/L (ref 8–16)
AST SERPL-CCNC: 37 U/L (ref 10–40)
BASOPHILS # BLD AUTO: 0.06 K/UL (ref 0–0.2)
BASOPHILS NFR BLD: 1.1 % (ref 0–1.9)
BILIRUB SERPL-MCNC: 5.2 MG/DL (ref 0.1–1)
BUN SERPL-MCNC: 8 MG/DL (ref 6–20)
CALCIUM SERPL-MCNC: 8.5 MG/DL (ref 8.7–10.5)
CHLORIDE SERPL-SCNC: 100 MMOL/L (ref 95–110)
CO2 SERPL-SCNC: 27 MMOL/L (ref 23–29)
CREAT SERPL-MCNC: 0.6 MG/DL (ref 0.5–1.4)
DIFFERENTIAL METHOD BLD: ABNORMAL
EOSINOPHIL # BLD AUTO: 0.3 K/UL (ref 0–0.5)
EOSINOPHIL NFR BLD: 5.7 % (ref 0–8)
ERYTHROCYTE [DISTWIDTH] IN BLOOD BY AUTOMATED COUNT: 32.1 % (ref 11.5–14.5)
EST. GFR  (NO RACE VARIABLE): >60 ML/MIN/1.73 M^2
GLUCOSE SERPL-MCNC: 52 MG/DL (ref 70–110)
HCT VFR BLD AUTO: 29.4 % (ref 37–48.5)
HGB BLD-MCNC: 9.7 G/DL (ref 12–16)
IMM GRANULOCYTES # BLD AUTO: 0.03 K/UL (ref 0–0.04)
IMM GRANULOCYTES NFR BLD AUTO: 0.5 % (ref 0–0.5)
LYMPHOCYTES # BLD AUTO: 1.5 K/UL (ref 1–4.8)
LYMPHOCYTES NFR BLD: 28 % (ref 18–48)
MAGNESIUM SERPL-MCNC: 2 MG/DL (ref 1.6–2.6)
MCH RBC QN AUTO: 35.1 PG (ref 27–31)
MCHC RBC AUTO-ENTMCNC: 33 G/DL (ref 32–36)
MCV RBC AUTO: 107 FL (ref 82–98)
MONOCYTES # BLD AUTO: 0.9 K/UL (ref 0.3–1)
MONOCYTES NFR BLD: 15.6 % (ref 4–15)
NEUTROPHILS # BLD AUTO: 2.7 K/UL (ref 1.8–7.7)
NEUTROPHILS NFR BLD: 49.1 % (ref 38–73)
NRBC BLD-RTO: 0 /100 WBC
PHOSPHATE SERPL-MCNC: 3.4 MG/DL (ref 2.7–4.5)
PLATELET # BLD AUTO: 131 K/UL (ref 150–450)
PMV BLD AUTO: 11.5 FL (ref 9.2–12.9)
POTASSIUM SERPL-SCNC: 3.4 MMOL/L (ref 3.5–5.1)
PROT SERPL-MCNC: 5.7 G/DL (ref 6–8.4)
RBC # BLD AUTO: 2.76 M/UL (ref 4–5.4)
SODIUM SERPL-SCNC: 135 MMOL/L (ref 136–145)
WBC # BLD AUTO: 5.46 K/UL (ref 3.9–12.7)

## 2024-05-29 PROCEDURE — 80053 COMPREHEN METABOLIC PANEL: CPT

## 2024-05-29 PROCEDURE — 36415 COLL VENOUS BLD VENIPUNCTURE: CPT

## 2024-05-29 PROCEDURE — 85025 COMPLETE CBC W/AUTO DIFF WBC: CPT

## 2024-05-29 PROCEDURE — 84100 ASSAY OF PHOSPHORUS: CPT

## 2024-05-29 PROCEDURE — 25000003 PHARM REV CODE 250

## 2024-05-29 PROCEDURE — 25000003 PHARM REV CODE 250: Performed by: STUDENT IN AN ORGANIZED HEALTH CARE EDUCATION/TRAINING PROGRAM

## 2024-05-29 PROCEDURE — 83735 ASSAY OF MAGNESIUM: CPT

## 2024-05-29 RX ORDER — LACTULOSE 10 G/15ML
20 SOLUTION ORAL ONCE
Status: COMPLETED | OUTPATIENT
Start: 2024-05-29 | End: 2024-05-29

## 2024-05-29 RX ORDER — HEPARIN 100 UNIT/ML
100 SYRINGE INTRAVENOUS ONCE
Status: DISCONTINUED | OUTPATIENT
Start: 2024-05-29 | End: 2024-05-29 | Stop reason: HOSPADM

## 2024-05-29 RX ORDER — LACTULOSE 10 G/15ML
20 SOLUTION ORAL; RECTAL 3 TIMES DAILY
Qty: 2700 ML | Refills: 0 | Status: SHIPPED | OUTPATIENT
Start: 2024-05-29 | End: 2024-06-28

## 2024-05-29 RX ADMIN — LACTULOSE 20 G: 20 SOLUTION ORAL at 08:05

## 2024-05-29 RX ADMIN — SPIRONOLACTONE 100 MG: 100 TABLET ORAL at 08:05

## 2024-05-29 RX ADMIN — FUROSEMIDE 80 MG: 80 TABLET ORAL at 08:05

## 2024-05-29 RX ADMIN — POTASSIUM BICARBONATE 40 MEQ: 391 TABLET, EFFERVESCENT ORAL at 11:05

## 2024-05-29 RX ADMIN — LACTULOSE 20 G: 20 SOLUTION ORAL at 11:05

## 2024-05-29 RX ADMIN — RIFAXIMIN 550 MG: 550 TABLET ORAL at 08:05

## 2024-05-29 RX ADMIN — PANTOPRAZOLE SODIUM 40 MG: 40 TABLET, DELAYED RELEASE ORAL at 08:05

## 2024-05-29 RX ADMIN — POTASSIUM BICARBONATE 40 MEQ: 391 TABLET, EFFERVESCENT ORAL at 08:05

## 2024-05-29 NOTE — PLAN OF CARE
Pt has a hospital f/u visit scheduled on 6/4/24 @ 3:30 pm and Hepatology 7/30/24 @ 3:00 pm and she's on the wait list.  [facility]W scheduled pt's hospital f/u  appointment with Cardiology on 6/13/24 @ 1:45 pm.

## 2024-05-29 NOTE — DISCHARGE SUMMARY
"[facility] - Telemetry [facility] Medicine  Discharge Summary      Patient Name: Shikha López  MRN: [id number]  MACKENZIE: [id number]  Patient Class: IP- Inpatient  Admission Date: 5/25/2024  Hospital Length of Stay: 4 days  Discharge Date and Time:  05/29/2024 9:11 AM  Attending Physician: Betito Muhammad MD   Discharging Provider: Clara Foley MD  Primary Care Provider: Lenore Primary Doctor  [facility] Medicine Team: [facility] [place] [facility] MED 3 Clara Foley MD  Primary Care Team: [facility] [place] HOS[facility] MED 3    HPI:   Shikha López is a 53 year old female with hx of stage IV breast cancer currently on Trastuzumab-Deruxtecan q3 weeks (most recent chemo 5/7/24), HA[place]OR[place] cirrhosis (s/p TIPS 4/12 w/ mild dysfunction on recent US), GI bleeds w/ gastric and esophageal varices, presenting with c/o worsening confusion since discharge from [facility] on 5/21 where she was admitted for volume overload and symptomatic anemia requiring blood transfusion. Hospital course was c/b pancytopenia presumed to be 2/2 myelosuppression from linezolid therapy for staph bacteremia and lower extremity cellulitis on prior admission. Patient reports generalized malaise and increased confusion over the past few days since her discharge, reportedly doing "strange things" at home per collateral obtained in the ED.  Reports medication compliance with lactulose as prescribed since discharge with 2-3 BM daily. Pt also reports occasional N/V, vomited once since discharge, was bilious, non-bloody or coffee ground consistency/color. Pt denies any changes to PO intake since discharge. Pt denies fevers/chills, HA, vision changes, URI sx, CP, SOB, abd pain, diarrhea, constipation, urinary sx, BLE pain/swelling.     In the ED, patient mildly tachycardic, otherwise hemodynamically stable, satting well on RA and afebrile. Labs notable for hypokalemia (K 2.5), WBC 3.21, hgb above baseline at 10.1, elevated total bilirubin 6.1, lactate 3.5, and ammonia 113. CXR negative for consolidation, " CT head negative for acute intracranial processes. Given 1L fluid bolus, vanc/zosyn with infectious work-up for c/f sepsis. Admitted to Hospital Medicine for further management and work up of acute encephalopathy.      * No surgery found *      Hospital Course:   Admitted to hospital medicine for acute encephalopathy. Given elevated ammonia to 113, etiology likely hepatic encephalopathy. We continued her lactulose and started rifaximin with improvement in her symptoms. Liver US notable for patent TIPS shunt but still concerning for TIPS dysfunction, IR was wanting a repeat ultrasound one month following prior hospitalization. Encephalopathy has resolved and mental status is stable on current regiment of lactulose and rifaximin. She is medically ready for discharge discharging home today.     Goals of Care Treatment Preferences:  Code Status: Full Code          What is most important right now is to focus on remaining as independent as possible, symptom/pain control, curative/life-prolongation (regardless of treatment burdens), comfort and QOL .  Accordingly, we have decided that the best plan to meet the patient's goals includes continuing with treatment.    Vitals:    05/29/24 0807   BP: 125/76   Pulse: 105   Resp: 18   Temp: 98 °F (36.7 °C)     Physical Exam  Vitals and nursing note reviewed.   HENT:      Head: Normocephalic.      Mouth/Throat:      Mouth: Mucous membranes are moist.   Eyes:      Extraocular Movements: Extraocular movements intact.      Conjunctiva/sclera: Conjunctivae normal.   Cardiovascular:      Rate and Rhythm: Regular rhythm. Tachycardia present.   Pulmonary:      Effort: Pulmonary effort is normal. No respiratory distress.   Abdominal:      General: Abdomen is flat. There is no distension.      Palpations: Abdomen is soft.      Tenderness: There is no abdominal tenderness.   Musculoskeletal:         General: Normal range of motion.      Right lower leg: Edema (1+) present.      Left lower  leg: Edema (1+) present.      Comments: No asterixis   Skin:     General: Skin is warm and dry.      Coloration: Skin is jaundiced (very mild) and pale.      Findings: Erythema (b/l LE) present.   Neurological:      General: No focal deficit present.      Mental Status: She is alert and oriented to person, place, and time.   Psychiatric:         Mood and Affect: Mood normal.         Behavior: Behavior normal.         Consults:     No new Assessment & Plan notes have been filed under this hospital service since the last note was generated.  Service: Hospital Medicine    Final Active Diagnoses:    Diagnosis Date Noted POA    PRINCIPAL PROBLEM:  Acute encephalopathy [G93.40] 05/25/2024 Yes    Hepatic encephalopathy [K76.82] 05/25/2024 Yes    Biliary cirrhosis [K74.5] 04/23/2024 Yes     Chronic    S/P TIPS (transjugular intrahepatic portosystemic shunt) [Z95.828] 04/21/2024 Not Applicable    Pancytopenia [D61.818] 07/26/2023 Yes    Esophageal and gastric varices [I85.00, I86.4] 06/23/2023 Yes     Chronic    Hypokalemia [E87.6] 02/08/2023 Yes    Lymphedema [I89.0] 07/27/2021 Yes     Chronic    Malignant neoplasm metastatic to left lung [C78.02] 06/08/2021 Yes     Chronic    Breast cancer, stage 4, left [C50.912] 06/08/2021 Yes     Chronic      Problems Resolved During this Admission:    Diagnosis Date Noted Date Resolved POA    Cirrhosis [K74.60] 05/25/2024 05/25/2024 Unknown       Discharged Condition: stable    Disposition:     Follow Up:    Patient Instructions:      US Abdomen Limited   Standing Status: Future Standing Exp. Date: 05/28/25     Order Specific Question Answer Comments   May the Radiologist modify the order per protocol to meet the clinical needs of the patient? Yes    Release to patient Immediate      Ambulatory referral/consult to Outpatient Case Management   Referral Priority: Routine Referral Type: Consultation   Referral Reason: Specialty Services Required   Number of Visits Requested: 1        Significant Diagnostic Studies: N/A    Pending Diagnostic Studies:       None           Medications:  Reconciled Home Medications:      Medication List        START taking these medications      rifAXIMin 550 mg Tab  Commonly known as: XIFAXAN  Take 1 tablet (550 mg total) by mouth 2 (two) times daily.            CHANGE how you take these medications      lactulose 20 gram/30 mL Soln  Commonly known as: CHRONULAC  Take 30 mLs (20 g total) by mouth 3 (three) times daily. If more than 5 BMs/day, hold the third lactulose dose.  What changed:   medication strength  how much to take  additional instructions            CONTINUE taking these medications      FeroSuL 325 mg (65 mg iron) Tab tablet  Generic drug: ferrous sulfate  Take 1 tablet by mouth daily with Vitamin C on an empty stomach     furosemide 80 MG tablet  Commonly known as: LASIX  Take 1 tablet (80 mg total) by mouth once daily. If you are gaining weight (2 lbs in 24 hours or 3 lbs in two days) may take an extra dose of 80 mg     methylphenidate HCl 5 MG tablet  Commonly known as: RITALIN  Take 1 tablet (5 mg total) by mouth 2 (two) times daily.     pantoprazole 40 MG tablet  Commonly known as: PROTONIX  Take 1 tablet (40 mg total) by mouth 2 (two) times daily.     spironolactone 100 MG tablet  Commonly known as: ALDACTONE  Take 1 tablet (100 mg total) by mouth once daily.     triamcinolone acetonide 0.5% 0.5 % Crea  Commonly known as: KENALOG  Apply topically 2 (two) times daily.              Indwelling Lines/Drains at time of discharge:   Lines/Drains/Airways       Central Venous Catheter Line  Duration             Port A Cath Single Lumen 06/22/21 right internal jugular 1072 days                    Time spent on the discharge of patient: 48 minutes         Clara Foley MD  Department of Hospital Medicine  [facility] - Telemetry Stepdown

## 2024-05-29 NOTE — PLAN OF CARE
Problem: Infection  Goal: Absence of Infection Signs and Symptoms  Outcome: Met     Problem: Adult Inpatient Plan of Care  Goal: Plan of Care Review  Outcome: Met  Goal: Patient-Specific Goal (Individualized)  Outcome: Met  Goal: Absence of Hospital-Acquired Illness or Injury  Outcome: Met  Goal: Optimal Comfort and Wellbeing  Outcome: Met  Goal: Readiness for Transition of Care  Outcome: Met     Problem: Fall Injury Risk  Goal: Absence of Fall and Fall-Related Injury  Outcome: Met     Problem: Skin Injury Risk Increased  Goal: Skin Health and Integrity  Outcome: Met    Pt discharged from unit via wheelchair. Delivered and discussed discharge paperwork with pt prior to discharge. Removed pt IV prior to discharge. Pt port deaccessed prior to discharge. Pt free of LDAs at time of discharge.

## 2024-05-29 NOTE — PLAN OF CARE
Problem: Adult Inpatient Plan of Care  Goal: Absence of Hospital-Acquired Illness or Injury  Outcome: Progressing  Goal: Optimal Comfort and Wellbeing  Outcome: Progressing  Goal: Readiness for Transition of Care  Outcome: Progressing     Problem: Fall Injury Risk  Goal: Absence of Fall and Fall-Related Injury  Outcome: Progressing

## 2024-05-30 ENCOUNTER — TELEPHONE (OUTPATIENT)
Dept: HEMATOLOGY/ONCOLOGY | Facility: CLINIC | Age: 54
End: 2024-05-30
Payer: MEDICARE

## 2024-05-30 LAB
BACTERIA BLD CULT: NORMAL
BACTERIA BLD CULT: NORMAL

## 2024-05-30 NOTE — TELEPHONE ENCOUNTER
----- Message from Gala Adams sent at 5/30/2024  3:10 PM CDT -----  Regarding: Appt  Contact: Pt  [phone]            Name of Caller:  Shikha      Contact Preference:  [phone]     Nature of Call:  Requesting a call to touch bases to and go over next steps of her care plan

## 2024-05-31 ENCOUNTER — TELEPHONE (OUTPATIENT)
Dept: HEMATOLOGY/ONCOLOGY | Facility: CLINIC | Age: 54
End: 2024-05-31
Payer: MEDICARE

## 2024-06-01 ENCOUNTER — NURSE TRIAGE (OUTPATIENT)
Dept: ADMINISTRATIVE | Facility: CLINIC | Age: 54
End: 2024-06-01
Payer: MEDICARE

## 2024-06-01 NOTE — TELEPHONE ENCOUNTER
Pt took her medications this afternoon as prescribed  and she vomited about 30 minutes after. Pt has no other symptoms and has not vomited again. Care advice for home care provided and instructed pt to call back with additional questions or worsening of symptoms. Pt verbalized understanding.   Reason for Disposition   Caller has medicine question, adult has minor symptoms, caller declines triage, AND triager answers question   MILD or MODERATE vomiting (e.g., 1 - 5 times / day)    Additional Information   Negative: [1] Intentional drug overdose AND [2] suicidal thoughts or ideas   Negative: MORE THAN A DOUBLE DOSE of a prescription or over-the-counter (OTC) drug   Negative: [1] DOUBLE DOSE (an extra dose or lesser amount) of prescription drug AND [2] any symptoms (e.g., dizziness, nausea, pain, sleepiness)   Negative: [1] DOUBLE DOSE (an extra dose or lesser amount) of over-the-counter (OTC) drug AND [2] any symptoms (e.g., dizziness, nausea, pain, sleepiness)   Negative: Took another person's prescription drug   Negative: [1] DOUBLE DOSE (an extra dose or lesser amount) of prescription drug AND [2] NO symptoms  (Exception: A double dose of antibiotics.)   Negative: Diabetes drug error or overdose (e.g., took wrong type of insulin or took extra dose)   Negative: [1] Prescription not at pharmacy AND [2] was prescribed by PCP recently (Exception: Triager has access to EMR and prescription is recorded there. Go to Home Care and confirm for pharmacy.)   Negative: [1] Pharmacy calling with prescription question AND [2] triager unable to answer question   Negative: [1] Caller has URGENT medicine question about med that PCP or specialist prescribed AND [2] triager unable to answer question   Negative: Medicine patch causing local rash or itching   Negative: [1] Caller has medicine question about med NOT prescribed by PCP AND [2] triager unable to answer question (e.g., compatibility with other med, storage)   Negative:  "Prescription request for new medicine (not a refill)   Negative: [1] Caller has NON-URGENT medicine question about med that PCP prescribed AND [2] triager unable to answer question   Negative: Caller wants to use a complementary or alternative medicine   Negative: Caller has medicine question only, adult not sick, AND triager answers question   Negative: Shock suspected (e.g., cold/pale/clammy skin, too weak to stand, low BP, rapid pulse)   Negative: Difficult to awaken or acting confused (e.g., disoriented, slurred speech)   Negative: Sounds like a life-threatening emergency to the triager   Negative: [1] Vomiting AND [2] contains red blood or black ("coffee ground") material  (Exception: Few red streaks in vomit that only happened once.)   Negative: Severe pain in one eye   Negative: Recent head injury (within last 3 days)   Negative: Recent abdominal injury (within last 3 days)   Negative: [1] Insulin-dependent diabetes (Type I) AND [2] glucose > 400 mg/dl (22 mmol/l)   Negative: [1] Vomiting AND [2] hernia is more painful or swollen than usual   Negative: [1] SEVERE vomiting (e.g., 6 or more times/day) AND [2] present > 8 hours (Exception: Patient sounds well, is drinking liquids, does not sound dehydrated, and vomiting has lasted less than 24 hours.)   Negative: [1] MODERATE vomiting (e.g., 3 - 5 times/day) AND [2] age > 60 years   Negative: Severe headache (e.g., excruciating)  (Exception: Similar to previous migraines.)   Negative: High-risk adult (e.g., diabetes mellitus, brain tumor, V-P shunt, hernia)   Negative: [1] Drinking very little AND [2] dehydration suspected (e.g., no urine > 12 hours, very dry mouth, very lightheaded)   Negative: Patient sounds very sick or weak to the triager   Negative: [1] Vomiting AND [2] abdomen looks much more swollen than usual   Negative: [1] Vomiting AND [2] contains bile (green color)   Negative: [1] Constant abdominal pain AND [2] present > 2 hours   Negative: [1] " Fever > 103 F (39.4 C) AND [2] not able to get the fever down using Fever Care Advice   Negative: [1] Fever > 101 F (38.3 C) AND [2] age > 60 years   Negative: [1] Fever > 100.0 F (37.8 C) AND [2] bedridden (e.g., CVA, chronic illness, recovering from surgery)   Negative: [1] Fever > 100.0 F (37.8 C) AND [2] weak immune system (e.g., HIV positive, cancer chemo, splenectomy, organ transplant, chronic steroids)   Negative: Taking any of the following medications: digoxin (Lanoxin), lithium, theophylline, phenytoin (Dilantin)   Negative: [1] MILD or MODERATE vomiting AND [2] present > 48 hours (2 days) (Exception: Mild vomiting with associated diarrhea.)   Negative: Fever present > 3 days (72 hours)   Negative: Vomiting a prescription medication   Negative: [1] MILD vomiting with diarrhea AND [2] present > 5 days   Negative: Substance use (drug use) or unhealthy alcohol use, known or suspected   Negative: Vomiting is a chronic symptom (recurrent or ongoing AND present > 4 weeks)   Negative: [1] SEVERE vomiting (e.g., 6 or more times/day, vomits everything) BUT [2] hydrated    Protocols used: Medication Question Call-A-AH, Vomiting-A-AH

## 2024-06-03 ENCOUNTER — TELEPHONE (OUTPATIENT)
Dept: INTERNAL MEDICINE | Facility: CLINIC | Age: 54
End: 2024-06-03
Payer: MEDICARE

## 2024-06-03 DIAGNOSIS — R53.81 PHYSICAL DEBILITY: ICD-10-CM

## 2024-06-03 DIAGNOSIS — C50.912 BREAST CANCER, STAGE 4, LEFT: Primary | Chronic | ICD-10-CM

## 2024-06-03 NOTE — TELEPHONE ENCOUNTER
----- Message from Angie Lee sent at 6/3/2024  2:22 PM CDT -----  Contact: [facility] Nurse [phone]  Would like to receive medical advice.    Would they like a call back or a response via [facility]:  call back    Additional information:  Melyssa 's homehealth nurse is calling to get some advice on the pt and having a  that works with the home health company to come by for the pt due to the [facility] social work never getting in touch with the pt. Please call Melyssa back for advice

## 2024-06-04 ENCOUNTER — DOCUMENTATION ONLY (OUTPATIENT)
Dept: CARDIOLOGY | Facility: CLINIC | Age: 54
End: 2024-06-04
Payer: MEDICARE

## 2024-06-04 ENCOUNTER — PATIENT MESSAGE (OUTPATIENT)
Dept: REHABILITATION | Facility: HOSPITAL | Age: 54
End: 2024-06-04
Payer: MEDICARE

## 2024-06-04 NOTE — PLAN OF CARE
[facility] - Telemetry Stepdown  Discharge Final Note    Primary Care Provider: No, Primary Doctor    Expected Discharge Date: 5/29/2024    Final Discharge Note (most recent)       Final Note - 06/04/24 0835          Final Note    Assessment Type Final Discharge Note     Anticipated Discharge Disposition Home-Health Care Svc        Post-Acute Status    Post-Acute Authorization Home Health     Home Health Status Set-up Complete/Auth obtained     Discharge Delays None known at this time                     Important Message from Medicare  Important Message from Medicare regarding Discharge Appeal Rights: Given to patient/caregiver, Explained to patient/caregiver, Signed/date by patient/caregiver     Date IMM was signed: 05/29/24  Time IMM was signed: 1046      Pt D/c home with HH. Discharge Plan A and Plan B have been determined by review of patient's clinical status, future medical and therapeutic needs, and coverage/benefits for post-acute care in coordination with multidisciplinary team members.  Jose Wolf, [facility]    [facility]  [phone]

## 2024-06-04 NOTE — PROGRESS NOTES
[facility]    Attempted to call pt to complete Phone enrollment to program. Unable to reach pt at listed phone numbers.  Was unable to leave message with family nor on voicemail. Voicemail not set up or full. .   Will continue to try to reach patient.

## 2024-06-04 NOTE — TELEPHONE ENCOUNTER
Attempted to call x3, but went straight to voicemail. Let voicemail that I will place order for someone to come check on her as well as to call back if she needs anything to help with her current illness.     Td Ricci, DO  PGY2  [facility]

## 2024-06-05 ENCOUNTER — DOCUMENTATION ONLY (OUTPATIENT)
Dept: CARDIOLOGY | Facility: CLINIC | Age: 54
End: 2024-06-05
Payer: MEDICARE

## 2024-06-05 DIAGNOSIS — R06.02 SOB (SHORTNESS OF BREATH): Primary | ICD-10-CM

## 2024-06-05 NOTE — PROGRESS NOTES
[facility] Phone Enrollment Complete.    Phone enrollment completed due to :Patient discharged before enrollment completed.    Called and spoke to  via phone. Introduced self to pt as [facility] nurse navigator Alison GARNICA      Patient education will be Completed on initial visit     Reviewed the following key points of [facility] program with pt and family:              1.) Take your medications as directed.               2.) Weight yourself daily              3.) Follow low salt and limited fluid diet.               4.) Stop smoking and start exercising              5.) Go to your appointments and call your team.          Reviewed plan for follow up once discharged to include phone calls, in person and virtual visits to assist pt optimizing their heart failure medication regimen and encouraging healthy lifestyle modifications.  Reminded pt that program will assist them over the next 4-6 weeks and then patient will be transferred to long term care provider .  Reminded pt how to contact [facility] navigator via phone and or via [employer].      Pt given appointment today via phone and mail     Pt also reminded RN will call 48-72 hours after discharge to check on them.      PT and family verbalize read back of information given.  Encouraged pt and family to read over information often and contact team with any questions or concerns.

## 2024-06-06 ENCOUNTER — OFFICE VISIT (OUTPATIENT)
Dept: INTERNAL MEDICINE | Facility: CLINIC | Age: 54
End: 2024-06-06
Payer: MEDICARE

## 2024-06-06 VITALS
WEIGHT: 142.88 LBS | SYSTOLIC BLOOD PRESSURE: 128 MMHG | HEART RATE: 106 BPM | HEIGHT: 62 IN | BODY MASS INDEX: 26.29 KG/M2 | DIASTOLIC BLOOD PRESSURE: 60 MMHG | OXYGEN SATURATION: 98 %

## 2024-06-06 DIAGNOSIS — G62.0 CHEMOTHERAPY-INDUCED PERIPHERAL NEUROPATHY: ICD-10-CM

## 2024-06-06 DIAGNOSIS — I50.32 CHRONIC HEART FAILURE WITH PRESERVED EJECTION FRACTION: ICD-10-CM

## 2024-06-06 DIAGNOSIS — I70.0 AORTIC ATHEROSCLEROSIS: ICD-10-CM

## 2024-06-06 DIAGNOSIS — C50.912 BREAST CANCER, STAGE 4, LEFT: Chronic | ICD-10-CM

## 2024-06-06 DIAGNOSIS — F80.9 SPEECH AND LANGUAGE DEFICITS: ICD-10-CM

## 2024-06-06 DIAGNOSIS — K76.82 HEPATIC ENCEPHALOPATHY: ICD-10-CM

## 2024-06-06 DIAGNOSIS — R35.89 DIURESIS: ICD-10-CM

## 2024-06-06 DIAGNOSIS — Z09 HOSPITAL DISCHARGE FOLLOW-UP: ICD-10-CM

## 2024-06-06 DIAGNOSIS — R53.81 PHYSICAL DEBILITY: ICD-10-CM

## 2024-06-06 DIAGNOSIS — K74.5 BILIARY CIRRHOSIS: Primary | Chronic | ICD-10-CM

## 2024-06-06 DIAGNOSIS — T45.1X5A CHEMOTHERAPY-INDUCED PERIPHERAL NEUROPATHY: ICD-10-CM

## 2024-06-06 PROCEDURE — 99214 OFFICE O/P EST MOD 30 MIN: CPT | Mod: S$PBB,,, | Performed by: STUDENT IN AN ORGANIZED HEALTH CARE EDUCATION/TRAINING PROGRAM

## 2024-06-06 PROCEDURE — 99215 OFFICE O/P EST HI 40 MIN: CPT | Mod: PBBFAC | Performed by: STUDENT IN AN ORGANIZED HEALTH CARE EDUCATION/TRAINING PROGRAM

## 2024-06-06 PROCEDURE — 99999 PR PBB SHADOW E&M-EST. PATIENT-LVL V: CPT | Mod: PBBFAC,,, | Performed by: STUDENT IN AN ORGANIZED HEALTH CARE EDUCATION/TRAINING PROGRAM

## 2024-06-06 NOTE — PATIENT INSTRUCTIONS
Orders for Home Health SLP (Speech Pathology), adult briefs (diapers), adult pads, and cane have been placed.     Maintain follow-up appointments as scheduled.

## 2024-06-06 NOTE — PROGRESS NOTES
[facility] Provider Note (Initial Clinic) Consult Note    Age: 53 y.o.  Gender: female  Ethnicity: White   Type of Congestive Heart Failure: Diastolic   Etiology: unspecified  Enrolled in Infusion suite: no    Diagnostic Labs:   EKG - 05/26/2024  CXR - 05/25/2024  ECHO - 05/16/2024  Stress test -   Stress echo -   Pharmacologic stress -   Cardiac catheterization -    Cardiac MRI -     Lab Results   Component Value Date     (L) 05/29/2024     05/28/2024    K 3.4 (L) 05/29/2024    K 3.9 05/28/2024     05/29/2024     05/28/2024    CO2 27 05/29/2024    CO2 28 05/28/2024    GLU 52 (L) 05/29/2024     05/28/2024    BUN 8 05/29/2024    BUN 10 05/28/2024    CREATININE 0.6 05/29/2024    CREATININE 0.6 05/28/2024    CALCIUM 8.5 (L) 05/29/2024    CALCIUM 8.5 (L) 05/28/2024    PROT 5.7 (L) 05/29/2024    PROT 5.9 (L) 05/28/2024    ALBUMIN 2.4 (L) 05/29/2024    ALBUMIN 2.5 (L) 05/28/2024    BILITOT 5.2 (H) 05/29/2024    BILITOT 5.7 (H) 05/28/2024    ALKPHOS 163 (H) 05/29/2024    ALKPHOS 167 (H) 05/28/2024    AST 37 05/29/2024    AST 40 05/28/2024    ALT 21 05/29/2024    ALT 20 05/28/2024    ANIONGAP 8 05/29/2024    ANIONGAP 9 05/28/2024    ESTGFRAFRICA >60.0 07/18/2022    ESTGFRAFRICA >60.0 06/27/2022    EGFRNONAA >60.0 07/18/2022    EGFRNONAA >60.0 06/27/2022       Lab Results   Component Value Date    WBC 5.46 05/29/2024    WBC 4.48 05/28/2024    RBC 2.76 (L) 05/29/2024    RBC 2.65 (L) 05/28/2024    HGB 9.7 (L) 05/29/2024    HGB 9.4 (L) 05/28/2024    HCT 29.4 (L) 05/29/2024    HCT 28.6 (L) 05/28/2024     (H) 05/29/2024     (H) 05/28/2024    MCH 35.1 (H) 05/29/2024    MCH 35.5 (H) 05/28/2024    MCHC 33.0 05/29/2024    MCHC 32.9 05/28/2024    RDW 32.1 (H) 05/29/2024    RDW 31.8 (H) 05/28/2024     (L) 05/29/2024     (L) 05/28/2024    MPV 11.5 05/29/2024    MPV 11.9 05/28/2024    IMMGR 0.5 05/29/2024    IMMGR 0.7 (H) 05/28/2024    IGABS 0.03 05/29/2024    IGABS 0.03 05/28/2024     LYMPH 1.5 05/29/2024    LYMPH 28.0 05/29/2024    MONO 0.9 05/29/2024    MONO 15.6 (H) 05/29/2024    EOS 0.3 05/29/2024    EOS 0.2 05/28/2024    BASO 0.06 05/29/2024    BASO 0.07 05/28/2024    NRBC 0 05/29/2024    NRBC 1 (A) 05/28/2024    GRAN 2.7 05/29/2024    GRAN 49.1 05/29/2024    EOSINOPHIL 5.7 05/29/2024    EOSINOPHIL 5.1 05/28/2024    BASOPHIL 1.1 05/29/2024    BASOPHIL 1.6 05/28/2024    PLTEST Decreased (A) 05/15/2024    PLTEST Decreased (A) 04/22/2024    ANISO Moderate 05/27/2024    ANISO Moderate 04/22/2024    HYPO Occasional 05/15/2024    HYPO Occasional 04/22/2024       Lab Results   Component Value Date    BNP 1,032 (H) 05/15/2024     (H) 04/21/2024    MG 2.0 05/29/2024    MG 2.1 05/28/2024    PHOS 3.4 05/29/2024    PHOS 3.4 05/28/2024    TROPONINI 0.063 (H) 05/16/2024    TROPONINI 0.398 (H) 05/16/2024    TSH 3.951 05/15/2024       Lab Results   Component Value Date    IRON 226 (H) 04/30/2024    TIBC 256 04/30/2024    FERRITIN 220 04/30/2024    COLORU Yellow 05/25/2024    APPEARANCEUA Clear 05/25/2024    PHUR 7.0 05/25/2024    SPECGRAV 1.015 05/25/2024    PROTEINUA Negative 05/25/2024    GLUCUA Negative 05/25/2024    KETONESU Negative 05/25/2024    BILIRUBINUA Negative 05/25/2024    OCCULTUA Negative 05/25/2024    NITRITE Negative 05/25/2024    LEUKOCYTESUR Negative 05/25/2024       No implanted cardiac devices    Current Outpatient Medications on File Prior to Visit   Medication Sig Dispense Refill    ferrous sulfate (FEROSUL) 325 mg (65 mg iron) Tab tablet Take 1 tablet by mouth daily with Vitamin C on an empty stomach 60 tablet 3    furosemide (LASIX) 80 MG tablet Take 1 tablet (80 mg total) by mouth once daily. If you are gaining weight (2 lbs in 24 hours or 3 lbs in two days) may take an extra dose of 80 mg 90 tablet 2    lactulose (CHRONULAC) 10 gram/15 mL solution Take 30 mLs (20 g total) by mouth 3 (three) times daily. If more than 5 BMs/day, hold the third lactulose dose. 2700 mL 0     methylphenidate HCl (RITALIN) 5 MG tablet Take 1 tablet (5 mg total) by mouth 2 (two) times daily. 60 tablet 0    pantoprazole (PROTONIX) 40 MG tablet Take 1 tablet (40 mg total) by mouth 2 (two) times daily. 180 tablet 0    rifAXIMin (XIFAXAN) 550 mg Tab Take 1 tablet (550 mg total) by mouth 2 (two) times daily. 60 tablet 3    spironolactone (ALDACTONE) 100 MG tablet Take 1 tablet (100 mg total) by mouth once daily. 30 tablet 11    triamcinolone acetonide 0.5% (KENALOG) 0.5 % Crea Apply topically 2 (two) times daily. 454 g 0     No current facility-administered medications on file prior to visit.         HPI:  Patient ambulating to clinic today from [facility] without appreciable SOB and pace normal/slow   Patient sleeps on 2 number of pillows behind head with legs elevated   Patient wakes up SOB, has to get out of bed, associated cough- denies, does endorse insomnia   Palpitations - denies   Dizzy, light-headed, pre-syncope or syncope- occasional dizziness/lightheadedness if standing quickly from bending. Denies pre-syncope/syncope   Since discharge frequency of performing weights, home weight and weight change- performing daily weights, fluctuating   Other information felt pertinent to HPI: Ms. Shikha López is a 52 yo female with a PMHx of stage IV breast cancer (known lung mets, on Trastuzumab Deruxtecan q3 weks, most recent chemo 5/7/24; followed by Dr. Willis), HA[place]TH[place] cirrhosis (s/p TIPS 4/12 w/ outpatient IR appointment scheduled 5/16 for doppler w/ concern for decreased flow), GI bleeds w/ gastric and esophageal varices, mild-moderate AS who presents to first [facility] visit following multiple recent admissions.     PHYSICAL:   Vitals:    06/10/24 1332   BP: (!) 117/57   Pulse: 94      @[id number](3)@    JVD: no   Heart rhythm: regular  Cardiac murmur: Yes - 3/6 systolic murmur best heard at RUSB    S3: no  S4: no  Lungs:  diminished breath sounds in posterior lung bases  Hepatojugular reflux:  no  Edema: yes, lymphedema with 1+ edema      Echo 5/16/24:    Left Ventricle: The left ventricle is normal in size. Normal wall thickness. Normal wall motion. There is normal systolic function with a visually estimated ejection fraction of 60 - 65%. There is indeterminate diastolic function.    Right Ventricle: Mild right ventricular enlargement. Wall thickness is normal. Systolic function is normal.    The left atrium is severely dilated.    Aortic Valve: There is moderate aortic valve sclerosis. There is annular calcification present. Mildly restricted motion. There is mild to moderate stenosis. Aortic valve area by VTI is 1.44 cm². Aortic valve peak velocity is 3.24 m/s. Mean gradient is 25 mmHg. The dimensionless index is 0.38.    Pulmonary Artery: There is mild pulmonary hypertension. The estimated pulmonary artery systolic pressure is 49 mmHg.    IVC/SVC: Intermediate venous pressure at 8 mmHg.    ASSESSMENT: HFpEF    PLAN:      Patient Instructions:   Instruct the patient to notify this clinic if HH, a physician or an advanced care provider wants to change medication one of their HF medications   Activity and Diet restrictions:   Recommend 2-3 gram sodium restriction and 1500cc- 2000cc fluid restriction.  Encourage physical activity with graded exercise program.  Requested patient to weigh themselves daily, and to notify us if their weight increases by more than 3 lbs in 1 day or 5 lbs in 3 days.    Medication changes (include current dose and changed dose): NYHA Class II symptoms. Well compensated on exam. Lymphedema therapy on hold currently as patient is under HH care. Recommend periodic leg elevation and wearing compression stockings with keeping feet dependent. Continue current diuretic regimen for now. Will need to establish with primary care and reestablish with [facility].  Upcoming labs and date anticipated: RTC in 2 weeks or sooner prn  Other diagnostic tests ordered: lipid panel    Stephanie Jose  CALLI

## 2024-06-06 NOTE — PROGRESS NOTES
[facility] FOLLOW-UP VISIT      CHIEF COMPLAINT:   Chief Complaint   Patient presents with    Follow-up       HISTORY OF PRESENT ILLNESS: Shikha López is a 53 y.o. female who presents here today for hospital follow-up. Patient was admitted 05/25-05/29. History of stage IV breast cancer currently on trastuzumab-deruxtecan (last given 5/7), [place] cirrhosis (status post tips April 12th with known mild dysfunction of her tips planned for repeat ultrasound as outpatient), esophageal varices and hepatic encephalopathy. Admitted for volume overload and worsening confusion in the setting of an elevated ammonia and lack of bowel movements. Patient was restarted on lactulose with improvement in her mentation and she was started on rifaximin as well. Mental status improved. Discharged on home diuretics as well as hepatology and cardiology follow up.    Since discharge, patient has been feeling better but still not back to baseline. Patient denies any s/s of fluid overload or fluid depletion. Patient has been compliant with medications. She is seeing Cardiology on 06/10, Heme/Onc 06/10, PCP 06/13, and Hepatology on 07/30. Patient has home health services - PT and OT. It was advised she also obtain orders for SLP. She also is requesting orders for a 4 pronged cane and adult briefs/pads as well.      REVIEW OF SYSTEMS:    ROS as in HPI.      MEDICAL HISTORY:    Past Medical History:   Diagnosis Date    Aortic atherosclerosis 07/06/2023    Breast cancer     Esophageal and gastric varices 06/23/2023    Malignant neoplasm metastatic to left lung 06/08/2021       MEDICATIONS:    Current Outpatient Medications on File Prior to Visit   Medication Sig Dispense Refill    ferrous sulfate (FEROSUL) 325 mg (65 mg iron) Tab tablet Take 1 tablet by mouth daily with Vitamin C on an empty stomach 60 tablet 3    furosemide (LASIX) 80 MG tablet Take 1 tablet (80 mg total) by mouth once daily. If you are gaining weight (2  "lbs in 24 hours or 3 lbs in two days) may take an extra dose of 80 mg 90 tablet 2    lactulose (CHRONULAC) 10 gram/15 mL solution Take 30 mLs (20 g total) by mouth 3 (three) times daily. If more than 5 BMs/day, hold the third lactulose dose. 2700 mL 0    methylphenidate HCl (RITALIN) 5 MG tablet Take 1 tablet (5 mg total) by mouth 2 (two) times daily. 60 tablet 0    pantoprazole (PROTONIX) 40 MG tablet Take 1 tablet (40 mg total) by mouth 2 (two) times daily. 180 tablet 0    rifAXIMin (XIFAXAN) 550 mg Tab Take 1 tablet (550 mg total) by mouth 2 (two) times daily. 60 tablet 3    spironolactone (ALDACTONE) 100 MG tablet Take 1 tablet (100 mg total) by mouth once daily. 30 tablet 11    triamcinolone acetonide 0.5% (KENALOG) 0.5 % Crea Apply topically 2 (two) times daily. 454 g 0     No current facility-administered medications on file prior to visit.         PHYSICAL EXAM:    /60 (BP Location: Right arm, Patient Position: Sitting, BP Method: Medium (Manual))   Pulse 106   Ht 5' 2" (1.575 m)   Wt 64.8 kg (142 lb 13.7 oz)   LMP  (LMP Unknown)   SpO2 98%   BMI 26.13 kg/m²     Physical Exam  Vitals and nursing note reviewed.   Constitutional:       General: She is not in acute distress.     Appearance: Normal appearance. She is not ill-appearing, toxic-appearing or diaphoretic.   HENT:      Head: Normocephalic and atraumatic.      Nose: Nose normal.   Eyes:      Extraocular Movements: Extraocular movements intact.      Conjunctiva/sclera: Conjunctivae normal.      Pupils: Pupils are equal, round, and reactive to light.   Cardiovascular:      Rate and Rhythm: Normal rate and regular rhythm.      Heart sounds: Normal heart sounds. No murmur heard.  Pulmonary:      Effort: Pulmonary effort is normal. No respiratory distress.      Breath sounds: Normal breath sounds. No wheezing.   Musculoskeletal:         General: No deformity. Normal range of motion.      Right lower leg: Edema (trace) present.      Left lower " leg: Edema (trace) present.   Skin:     Findings: No lesion or rash.   Neurological:      General: No focal deficit present.      Mental Status: She is alert.      Motor: No weakness.      Gait: Gait normal.   Psychiatric:         Mood and Affect: Mood normal.         Behavior: Behavior normal.         Thought Content: Thought content normal.         Judgment: Judgment normal.               ASSESSMENT & PLAN:    Shikha was seen today for follow-up.    Diagnoses and all orders for this visit:    Biliary cirrhosis  Hepatic encephalopathy  Recent hospitalization for volume overload & encephalopathy, now on lactulose, rifaximin, and lasix  No acute concerns  Continue current regimen  F/U with Hepatology as scheduled    Chronic heart failure with preserved ejection fraction  Aortic atherosclerosis  Recent hospitalization for volume overload 2/2 cirrhosis and/or HF  Continue lasix regimen  F/U with Cardiology as scheduled    Diuresis  Increased urinary frequency 2/2 lasix use - requests adult pads/briefs - ordered  -     diaper,brief,adult,disposable Misc; 1 Units by Misc.(Non-Drug; Combo Route) route 4 (four) times daily.  -     incontinence pad, liner, disp Pads; 1 Units by Misc.(Non-Drug; Combo Route) route 4 (four) times daily.    Breast cancer, stage 4, left  Chemotherapy-induced peripheral neuropathy  No acute concerns today  F/U with Oncology as scheduled    Speech and language deficits  Suggested she request SLP referral by her home health PT/OT providers due to language/communication delay likely 2/2 cognitive decline related to hepatic encephalopathy  Orders placed   -     Ambulatory referral/consult to Home Health; Future    Physical debility  Patient ambulates on her own for the most part but PT/OT requesting cane for home use due to occasional unsteadiness due to multiple chronic illness and neuropathy.   Orders placed   -     CANE FOR HOME USE    Hospital discharge follow-up  Details as  above              Zuleyma Bennett MD  [facility]

## 2024-06-07 ENCOUNTER — OUTPATIENT CASE MANAGEMENT (OUTPATIENT)
Dept: ADMINISTRATIVE | Facility: OTHER | Age: 54
End: 2024-06-07
Payer: MEDICARE

## 2024-06-07 NOTE — PROGRESS NOTES
Outpatient Care Management  Initial Patient Assessment    Patient: Shikha López  MRN: [id number]  Date of Service: 06/07/2024  Completed by: Lottie Marroquin RN  Referral Date: 05/27/2024  Date of Eligibility: 5/28/2024  Program:   High Risk  Status: Ongoing  Effective Dates: 6/7/2024 - present  Responsible Staff: Lottie Marroquin RN        Reason for Visit   Patient presents with    OPCM Enrollment Call     6/7/24    Nursing Assessment     6/7/24       Brief Summary:  Shikha López was referred by Dr. Muhammad for Sepsis, Malignant neoplasm metastatic to left lung, and Breast cancer, stage 4. Patient qualifies for program based on identified as high risk.   Active problem list, medical, surgical and social history reviewed. Active comorbidities include Malignant neoplasm metastatic to left lung, and Breast cancer, stage 4. Areas of need identified by patient include quad cane, help with medication organization.   Next steps: F/U concerning quad cane and medication organization.     Disability Status  Is the patient alert and oriented (person, place, time, and situation)?: Alert and oriented x 4  Hearing Difficulty or Deaf: no  Visual Difficulty or Blind: yes  Visual and Hearing Needs Conclusion: Reading glasses  Vision Management: Other  Difficulty Concentrating, Remembering or Making Decisions: yes  Concentration Management: Pt has issues with long term memory  Communication Difficulty: no  Eating/Swallowing Difficulty: no  Walking or Climbing Stairs Difficulty: yes  Walking or Climbing Stairs: ambulation difficulty, requires equipment  Mobility Management: Needs quad cane  Dressing/Bathing Difficulty: no  Toileting : Independent  Continence : Incontience - Bladder  Difficulty Managing Errands Independently: yes  Errands Management: Family assists  Equipment Currently Used at Home: grab bar  ADL Conclusion Statement: Independent with ADLs  Change in Functional Status Since Onset of Current Illness/Injury:  no        Spiritual Beliefs  Spiritual, Cultural Beliefs, Adventist Practices, Values that Affect Care: no      Social History     Socioeconomic History    Marital status: Single    Number of children: 1   Tobacco Use    Smoking status: Never    Smokeless tobacco: Never   Substance and Sexual Activity    Alcohol use: Never    Drug use: Never    Sexual activity: Not Currently     Social Determinants of Health     Financial Resource Strain: Low Risk  (5/27/2024)    Overall Financial Resource Strain (CARDIA)     Difficulty of Paying Living Expenses: Not hard at all   Recent Concern: Financial Resource Strain - High Risk (5/17/2024)    Overall Financial Resource Strain (CARDIA)     Difficulty of Paying Living Expenses: Hard   Food Insecurity: No Food Insecurity (5/27/2024)    Hunger Vital Sign     Worried About Running Out of Food in the Last Year: Never true     Ran Out of Food in the Last Year: Never true   Recent Concern: Food Insecurity - Food Insecurity Present (5/17/2024)    Hunger Vital Sign     Worried About Running Out of Food in the Last Year: Often true     Ran Out of Food in the Last Year: Sometimes true   Transportation Needs: No Transportation Needs (5/27/2024)    TRANSPORTATION NEEDS     Transportation : No   Physical Activity: Inactive (5/27/2024)    Exercise Vital Sign     Days of Exercise per Week: 0 days     Minutes of Exercise per Session: 0 min   Stress: Stress Concern Present (5/27/2024)    [place] [place] [employer] - Occupational Stress Questionnaire     Feeling of Stress : To some extent   Housing Stability: Low Risk  (5/27/2024)    Housing Stability Vital Sign     Unable to Pay for Housing in the Last Year: No     Homeless in the Last Year: No       Roles and Relationships  Primary Source of Support/Comfort: child(sejal)  Name of Support/Comfort Primary Source: Samra Jaeger      Advance Directives (For Healthcare)  Advance Directive  (If Adv Dir status is received, view document  under Adv Dir in header or Chart Review Media tab): Patient does not have Advance Directive, declines information.  Patient Requests Assistance: Patient will do independently        Patient Reported Insurance  Verified current insurance plan:: Medicaid            6/7/2024     4:24 PM 7/13/2023    10:18 AM   Depression Patient Health Questionnaire   Over the last two weeks how often have you been bothered by little interest or pleasure in doing things Not at all Not at all   Over the last two weeks how often have you been bothered by feeling down, depressed or hopeless Not at all Not at all   PHQ-2 Total Score 0 0       Learning Assessment       05/26/2024 1715 Dereck Laddheather - Telemetry Stepdown (5/25/2024 - 5/29/2024)   Created by Yun Fitzpatrick, RN - RN (Nurse) Status: Complete                 PRIMARY LEARNER     Primary Learner Name:  Shikha López BC - 05/26/2024 1715    Relationship:  Patient BC - 05/26/2024 1715    Does the primary learner have any barriers to learning?:  No Barriers BC - 05/26/2024 1715    What is the preferred language of the primary learner?:  English BC - 05/26/2024 1715    Is an  required?:  No BC - 05/26/2024 1715    How does the primary learner prefer to learn new concepts?:  Listening BC - 05/26/2024 1715    How often do you need to have someone help you read instructions, pamphlets, or written material from your doctor or pharmacy?:  Never BC - 05/26/2024 1715        CO-LEARNER #1     No question answered        CO-LEARNER #2     No question answered        SPECIAL TOPICS     No question answered        ANSWERED BY:     No question answered        Comments         Edit History       Yun Fitzpatrick, RN - RN (Nurse)   05/26/2024 1715

## 2024-06-07 NOTE — LETTER
June 7, 2024             Dear Ms. Shikha,    Welcome to [facility] Complex [facility] Management [facility].  It was a pleasure talking with you today.  My name is Lottie Marroquin RN and I look forward to being your Care Manager.  My goal is to help you function at the healthiest and highest level possible.  You can contact me directly at [phone].    As an [facility] patient, some of the services we may be able to provide include:     Development of an individualized care plan with a Registered Nurse   Connection with a   Connection with available resources and services    Coordinate communication among your care team members   Provide coaching and education   Help you understand your doctors treatment plan  Help you obtain information about your insurance coverage.     All services provided by [facility] Complex Care Managers and other care team members are coordinated with and communicated to your primary care team.      As part of your enrollment, you will be receiving education materials and more information about these services in your [facility] account, by phone or through the mail.  If you do not wish to participate or receive information, please contact our office at [phone].      Sincerely,        Lottie Marroquin RN  [facility]   Out-patient RN Complex Care Manager

## 2024-06-10 ENCOUNTER — OFFICE VISIT (OUTPATIENT)
Dept: CARDIOLOGY | Facility: CLINIC | Age: 54
End: 2024-06-10
Payer: MEDICARE

## 2024-06-10 ENCOUNTER — LAB VISIT (OUTPATIENT)
Dept: LAB | Facility: HOSPITAL | Age: 54
End: 2024-06-10
Attending: STUDENT IN AN ORGANIZED HEALTH CARE EDUCATION/TRAINING PROGRAM
Payer: MEDICARE

## 2024-06-10 ENCOUNTER — OFFICE VISIT (OUTPATIENT)
Dept: HEMATOLOGY/ONCOLOGY | Facility: CLINIC | Age: 54
End: 2024-06-10
Payer: MEDICARE

## 2024-06-10 VITALS
BODY MASS INDEX: 27.67 KG/M2 | DIASTOLIC BLOOD PRESSURE: 62 MMHG | HEART RATE: 107 BPM | OXYGEN SATURATION: 99 % | SYSTOLIC BLOOD PRESSURE: 122 MMHG | TEMPERATURE: 98 F | HEIGHT: 62 IN | WEIGHT: 150.38 LBS

## 2024-06-10 VITALS
OXYGEN SATURATION: 99 % | WEIGHT: 149.69 LBS | HEIGHT: 62 IN | SYSTOLIC BLOOD PRESSURE: 117 MMHG | DIASTOLIC BLOOD PRESSURE: 57 MMHG | HEART RATE: 94 BPM | BODY MASS INDEX: 27.55 KG/M2

## 2024-06-10 DIAGNOSIS — C50.912 BREAST CANCER, STAGE 4, LEFT: Primary | Chronic | ICD-10-CM

## 2024-06-10 DIAGNOSIS — I50.32 CHRONIC HEART FAILURE WITH PRESERVED EJECTION FRACTION: ICD-10-CM

## 2024-06-10 DIAGNOSIS — C50.912 BREAST CANCER, STAGE 4, LEFT: ICD-10-CM

## 2024-06-10 DIAGNOSIS — I50.32 CHRONIC HEART FAILURE WITH PRESERVED EJECTION FRACTION: Primary | ICD-10-CM

## 2024-06-10 DIAGNOSIS — K76.82 HEPATIC ENCEPHALOPATHY: ICD-10-CM

## 2024-06-10 DIAGNOSIS — I35.0 AORTIC VALVE STENOSIS, ETIOLOGY OF CARDIAC VALVE DISEASE UNSPECIFIED: ICD-10-CM

## 2024-06-10 DIAGNOSIS — Z13.220 LIPID SCREENING: ICD-10-CM

## 2024-06-10 DIAGNOSIS — K74.5 BILIARY CIRRHOSIS: Chronic | ICD-10-CM

## 2024-06-10 DIAGNOSIS — C78.02 MALIGNANT NEOPLASM METASTATIC TO LEFT LUNG: Chronic | ICD-10-CM

## 2024-06-10 DIAGNOSIS — I89.0 LYMPHEDEMA: Chronic | ICD-10-CM

## 2024-06-10 LAB
ALBUMIN SERPL BCP-MCNC: 2.7 G/DL (ref 3.5–5.2)
ALP SERPL-CCNC: 162 U/L (ref 55–135)
ALT SERPL W/O P-5'-P-CCNC: 37 U/L (ref 10–44)
ANION GAP SERPL CALC-SCNC: 10 MMOL/L (ref 8–16)
ANISOCYTOSIS BLD QL SMEAR: ABNORMAL
AST SERPL-CCNC: 52 U/L (ref 10–40)
BASOPHILS # BLD AUTO: 0.05 K/UL (ref 0–0.2)
BASOPHILS NFR BLD: 0.9 % (ref 0–1.9)
BILIRUB SERPL-MCNC: 5.9 MG/DL (ref 0.1–1)
BUN SERPL-MCNC: 17 MG/DL (ref 6–20)
BURR CELLS BLD QL SMEAR: ABNORMAL
CALCIUM SERPL-MCNC: 8.9 MG/DL (ref 8.7–10.5)
CHLORIDE SERPL-SCNC: 97 MMOL/L (ref 95–110)
CO2 SERPL-SCNC: 26 MMOL/L (ref 23–29)
CREAT SERPL-MCNC: 0.7 MG/DL (ref 0.5–1.4)
DACRYOCYTES BLD QL SMEAR: ABNORMAL
DIFFERENTIAL METHOD BLD: ABNORMAL
EOSINOPHIL # BLD AUTO: 0.4 K/UL (ref 0–0.5)
EOSINOPHIL NFR BLD: 6.9 % (ref 0–8)
ERYTHROCYTE [DISTWIDTH] IN BLOOD BY AUTOMATED COUNT: 25 % (ref 11.5–14.5)
EST. GFR  (NO RACE VARIABLE): >60 ML/MIN/1.73 M^2
GLUCOSE SERPL-MCNC: 67 MG/DL (ref 70–110)
HCT VFR BLD AUTO: 35.1 % (ref 37–48.5)
HGB BLD-MCNC: 11.7 G/DL (ref 12–16)
HOWELL-JOLLY BOD BLD QL SMEAR: ABNORMAL
IMM GRANULOCYTES # BLD AUTO: 0.01 K/UL (ref 0–0.04)
IMM GRANULOCYTES NFR BLD AUTO: 0.2 % (ref 0–0.5)
LYMPHOCYTES # BLD AUTO: 1.2 K/UL (ref 1–4.8)
LYMPHOCYTES NFR BLD: 20.1 % (ref 18–48)
MCH RBC QN AUTO: 37.7 PG (ref 27–31)
MCHC RBC AUTO-ENTMCNC: 33.3 G/DL (ref 32–36)
MCV RBC AUTO: 113 FL (ref 82–98)
MONOCYTES # BLD AUTO: 0.7 K/UL (ref 0.3–1)
MONOCYTES NFR BLD: 11.7 % (ref 4–15)
NEUTROPHILS # BLD AUTO: 3.5 K/UL (ref 1.8–7.7)
NEUTROPHILS NFR BLD: 60.2 % (ref 38–73)
NRBC BLD-RTO: 0 /100 WBC
OVALOCYTES BLD QL SMEAR: ABNORMAL
PLATELET # BLD AUTO: 79 K/UL (ref 150–450)
PMV BLD AUTO: 11.7 FL (ref 9.2–12.9)
POIKILOCYTOSIS BLD QL SMEAR: SLIGHT
POLYCHROMASIA BLD QL SMEAR: ABNORMAL
POTASSIUM SERPL-SCNC: 3.7 MMOL/L (ref 3.5–5.1)
PROT SERPL-MCNC: 6.1 G/DL (ref 6–8.4)
RBC # BLD AUTO: 3.1 M/UL (ref 4–5.4)
SCHISTOCYTES BLD QL SMEAR: ABNORMAL
SODIUM SERPL-SCNC: 133 MMOL/L (ref 136–145)
WBC # BLD AUTO: 5.83 K/UL (ref 3.9–12.7)

## 2024-06-10 PROCEDURE — 99214 OFFICE O/P EST MOD 30 MIN: CPT | Mod: S$PBB,,, | Performed by: INTERNAL MEDICINE

## 2024-06-10 PROCEDURE — 36415 COLL VENOUS BLD VENIPUNCTURE: CPT | Performed by: INTERNAL MEDICINE

## 2024-06-10 PROCEDURE — 99999 PR PBB SHADOW E&M-EST. PATIENT-LVL IV: CPT | Mod: PBBFAC,,,

## 2024-06-10 PROCEDURE — 99213 OFFICE O/P EST LOW 20 MIN: CPT | Mod: PBBFAC | Performed by: INTERNAL MEDICINE

## 2024-06-10 PROCEDURE — 99999 PR PBB SHADOW E&M-EST. PATIENT-LVL III: CPT | Mod: PBBFAC,,, | Performed by: INTERNAL MEDICINE

## 2024-06-10 PROCEDURE — 99214 OFFICE O/P EST MOD 30 MIN: CPT | Mod: PBBFAC,27

## 2024-06-10 PROCEDURE — 80053 COMPREHEN METABOLIC PANEL: CPT | Mod: 91 | Performed by: INTERNAL MEDICINE

## 2024-06-10 PROCEDURE — 85025 COMPLETE CBC W/AUTO DIFF WBC: CPT | Mod: 91 | Performed by: INTERNAL MEDICINE

## 2024-06-10 NOTE — PATIENT INSTRUCTIONS
Notify us ([phone]) with any worsening shortness of breath, swelling or 2-3lb weight gain in 1 day/5lb weight gain in 1 week on home scale.

## 2024-06-10 NOTE — Clinical Note
Semaj Mehta. I saw this patient in [facility] clinic today following her recent admission. She had complaints of insomnia with difficulty falling and staying asleep. Would you or your team mind reaching out with any suggestions for sleep aids that would be compatible with her history of cirrhosis? She scheduled to see you next month for follow up. Thanks in advance!

## 2024-06-10 NOTE — PROGRESS NOTES
Subjective:       Patient ID: Shikha López is a 54 y.o. female.    Chief Complaint: No chief complaint on file.      HPI 54-year-old female who returns for F/U  for metastatic HER 2 + breast cancer.  She is on Trastuzumab deruxtecan  - her last treatment was May 7, 2024..      She had a TIPS procedure for cirrhosis and portal hypertension on April 12th.  She was hospitalized from April 21st to 26th with cellulitis and edema of lower extremities with staph bacteremia.  She was discharged on linazolide.    She was hospitalized from May 15th to May 21st with severe anemia-hemoglobin 4.9.  She also had what was felt to be evidence for heart failure with some pulmonary infiltrates.  She was diuresed.    She was readmitted to the hospital on May 25th with confusion and an elevated pneumonia which was felt to be related to hepatic encephalopathy.  She was treated and discharged on Rifaximin on May 29th.      Today she reports that she has significant residual fatigue.  She has some issues with her speech in terms of finding the right word and getting that out.  She is planning to see speech therapy.    Her breathing is doing well she denies any cough.    She has having no pain.    She would eating small meals.  Her bowel function is variable but she is now having multiple bowel movements per day.      Breast history:  She presented in the emergency room at [facility] on September 29, 2006 with a 4 months history of inflammation of her left breast.  Physical examination at that time showed the left breast was largely replaced by large mass with multiple skin ulcerations.    A biopsy in September 2006 showed poorly differentiated carcinoma which was ER and NJ. negative and HER2 positive.    She was then referred to [facility] for additional care.   CT scan of the chest and abdomen November 2006 revealed multiple nodules in the lungs consistent with metastatic disease.  There also enlarged left axillary  node.    She was treated with chemotherapy with weekly Herceptin and Abraxane with improvement in her breast and lung metastasis.    On May 29, 2007 she underwent left modified radical mastectomy(Dr. Colvin) which showed no residual tumor in the breast and 1/5 nodes was positive for metastasis measuring 6 mm.  (ypT0N1).  She then received postoperative radiation therapy(Dr Singh)  to the left chest wall supraclav and internal mammary lymph nodes from August 20, 2007 to September 28, 2007.    Postoperatively she continued on Herceptin for approximately 3 and 1/2 years before her lung metastasis begin to grow.    She subsequently received a number of different chemotherapy treatments including Adriamycin, Halaven, Xeloda plus lapatinib then Xeloda plus Herceptin. (  in [facility].)    Subsequently, she transferred her care to  at [facility].  -She was initially treated with Taxotere Herceptin Perjeta.    -She was then changed to Kadcyla.    In July 2020 PET scan showed progression.   She then took approximately 9 months of Herceptin and Navelbine with initial response then progression.    She started trastuzumab- deruxtecan  4/12/21.     CT 3/11/24 - stable  Stable postsurgical changes of left mastectomy and axillary lymph node dissection is patient with metastatic left breast cancer.   Redemonstration of multiple irregular bilateral pulmonary nodules concerning for metastatic disease.  No new lesions identified.   Hepatic steatosis and portal hypertension.  Increased bowel wall thickening and diffuse mesenteric edema with mild ascites, likely related to hepatic dysfunction.    Echo 4/24/24 - EF 60-65%      CT 4/29/24  -stable    Review of Systems   Constitutional:  Positive for fatigue. Negative for appetite change, fever and unexpected weight change.   Eyes:  Negative for visual disturbance.   Respiratory:  Negative for cough and shortness of breath.    Cardiovascular:  Positive for leg  swelling. Negative for chest pain.   Gastrointestinal:  Negative for abdominal pain, blood in stool, constipation, diarrhea and nausea.   Musculoskeletal:  Negative for back pain.   Neurological:  Negative for headaches.   Psychiatric/Behavioral:  The patient is not nervous/anxious.          Objective:      Physical Exam  Vitals reviewed.   Constitutional:       General: She is not in acute distress.     Appearance: She is obese.   Eyes:      General: No scleral icterus.  Cardiovascular:      Rate and Rhythm: Normal rate and regular rhythm.      Heart sounds: Murmur (systolic -aortic) heard.   Pulmonary:      Effort: Pulmonary effort is normal. No respiratory distress.      Breath sounds: No stridor. No wheezing, rhonchi or rales.   Abdominal:      Palpations: Abdomen is soft. There is no mass.      Tenderness: There is no abdominal tenderness.   Musculoskeletal:      Right lower leg: Edema present.      Left lower leg: Edema present.   Lymphadenopathy:      Cervical: No cervical adenopathy.      Upper Body:      Right upper body: No supraclavicular or axillary adenopathy.      Left upper body: No supraclavicular or axillary adenopathy.   Skin:     Findings: Erythema (distal lower extremities) present. No rash.   Neurological:      Mental Status: She is alert and oriented to person, place, and time.   Psychiatric:         Mood and Affect: Mood normal.         Behavior: Behavior normal.         Thought Content: Thought content normal.         Judgment: Judgment normal.       Assessment:    Labs :  pending  Problem List Items Addressed This Visit       Breast cancer, stage 4, left - Primary (Chronic)    Malignant neoplasm metastatic to left lung (Chronic)       Plan:    Hold therapy.  IV iron.    RTC 2 weeks with labs.      Route Chart for Scheduling    Med Onc Chart Routing      Follow up with physician 2 weeks. me or mesha   Follow up with JIMI    Infusion scheduling note   IV iron   Injection scheduling note    Labs  CBC, CMP and other   Scheduling:  Preferred lab:  Lab interval:     Imaging    Pharmacy appointment    Other referrals              Treatment Plan Information   OP BREAST FAM-TRASTUZUMAB DERUXTECAN-NXKI Q3W   Home Willis MD   Upcoming Treatment Dates - OP BREAST FAM-TRASTUZUMAB DERUXTECAN-NXKI Q3W    6/5/2024       Chemotherapy       fam-trastuzumab deruxtecan-nxki (ENHERTU) 470 mg in dextrose 5 % (D5W) 100 mL infusion       Antiemetics       fosaprepitant 150 mg in sodium chloride 0.9% 150 mL IVPB       palonosetron (ALOXI) 0.25 mg in sodium chloride 0.9% 50 mL IVPB  6/26/2024       Chemotherapy       fam-trastuzumab deruxtecan-nxki (ENHERTU) 470 mg in dextrose 5 % (D5W) 100 mL infusion       Antiemetics       fosaprepitant 150 mg in sodium chloride 0.9% 150 mL IVPB       palonosetron (ALOXI) 0.25 mg in sodium chloride 0.9% 50 mL IVPB  7/17/2024       Chemotherapy       fam-trastuzumab deruxtecan-nxki (ENHERTU) 470 mg in dextrose 5 % (D5W) 100 mL infusion       Antiemetics       fosaprepitant 150 mg in sodium chloride 0.9% 150 mL IVPB       palonosetron (ALOXI) 0.25 mg in sodium chloride 0.9% 50 mL IVPB    Supportive Plan Information  IV FLUIDS AND ELECTROLYTES   Miller Carrasquillo MD   Upcoming Treatment Dates - IV FLUIDS AND ELECTROLYTES    No upcoming days in selected categories.

## 2024-06-11 ENCOUNTER — DOCUMENTATION ONLY (OUTPATIENT)
Dept: CARDIOLOGY | Facility: CLINIC | Age: 54
End: 2024-06-11
Payer: MEDICARE

## 2024-06-11 ENCOUNTER — TELEPHONE (OUTPATIENT)
Dept: PALLIATIVE MEDICINE | Facility: CLINIC | Age: 54
End: 2024-06-11
Payer: MEDICARE

## 2024-06-11 ENCOUNTER — PATIENT MESSAGE (OUTPATIENT)
Dept: PALLIATIVE MEDICINE | Facility: CLINIC | Age: 54
End: 2024-06-11
Payer: MEDICARE

## 2024-06-11 DIAGNOSIS — G47.00 INSOMNIA, UNSPECIFIED TYPE: Primary | ICD-10-CM

## 2024-06-11 RX ORDER — HYDROXYZINE HYDROCHLORIDE 25 MG/1
25-50 TABLET, FILM COATED ORAL NIGHTLY PRN
Qty: 20 TABLET | Refills: 0 | Status: SHIPPED | OUTPATIENT
Start: 2024-06-11 | End: 2024-06-26

## 2024-06-11 NOTE — PROGRESS NOTES
"Heart Failure Transitional Care Clinic([facility]) First Week Visit     Pt presents to clinic 6/10/2024.     Most Recent Hospital Discharge Date: 5/29/2024  Last admission Diagnosis/chief complaint: SOB        Visit Vitals:     Wt Readings from Last 3 Encounters:   06/10/24 67.9 kg (149 lb 11.1 oz)   06/10/24 68.2 kg (150 lb 5.7 oz)   06/06/24 64.8 kg (142 lb 13.7 oz)     Temp Readings from Last 3 Encounters:   06/10/24 97.8 °F (36.6 °C) (Oral)   05/29/24 98.2 °F (36.8 °C) (Oral)   05/21/24 97.5 °F (36.4 °C) (Oral)     BP Readings from Last 3 Encounters:   06/10/24 (!) 117/57   06/10/24 122/62   06/06/24 128/60     Pulse Readings from Last 3 Encounters:   06/10/24 94   06/10/24 107   06/06/24 106            Pt reports the following:  []  Shortness of Breath with activity  []  Shortness of Breath at rest/ sleeping on 2-3 pillows "some days"  []  Fatigue  [x]  Edema bilateral legs and feet bilateral bluish feet  [] Chest pain or tightness  [] Weight Increase since discharge  [] None of the above    Pt reports being in the Yellow (color) Zone. If in yellow/red, reminded that they should be calling HFT[facility] today or now.      Medications:   Medication reconciliation completed today per MA.  Pt reports having all medications available and understands how to take them appropriately. Reminded pt to call prior to making any changes to medications.      Education:    [x] Gave pt new  / Confirmed pt has  "Heart Failure Transitional Care Clinic Home Care Guide" .   Reviewed key points as listed below.      Recommend 2 gram sodium restriction and 1500cc fluid restriction.  Encourage physical activity with graded exercise program.  Requested patient to weigh themselves daily, and to notify us if their weight increases by more than 3 lbs in 1 day or 5 lbs in 3 days.      [x] Gave / Reviewed "Daily Weight and Symptom Tracker".  Reviewed with patient when and how to call  HFTCC according to "Yellow Zone" and "Red Zone".                  " "[x] Pt given list of low/high sodium food list                   Watch for these Signs and Symptoms: If any of these occur, contact HFT[facility] immediately:   Increase in shortness of breath with movement   Increase in swelling in your legs and ankles   Weight gain of more than 3 pounds in a night or 5 pounds in 3 days.   Difficulty breathing when you are lying down   Worsening fatigue or tiredness   Stomach bloating, a full feeling or a loss of appetite   Increased coughing--especially when you are lying down     MyChart and Care Companion:              Patient active on myChart? Yes, patient uses regularly.    Contacting our Team:              Reviewed with pt how to contact H[facility] RN via phone or [employer] messaging.      HF TCC Program Plan:  Pt educated on follow-up plan while in HFTCC program.   [x]  PT given /reviewed upcoming appointment/check in dates. These will include weekly contact with RN or visits with providers over the next 4-6 weeks.                   [x]  Pt educated that they will transitioned to long term care provider team at week 4-6.  This team will be either Cardiology, PCP or Advanced Heart Failure depending on needs.          Pt was able to verbalize back to Nurse in their own words correct diet/fluid restrictions, necessity for exercise, warning signs and symptoms, when and how to contact their TCC team .       Plan:     See PA-C note.       [x]  Pt given AVS with follow up appointment within two weeks and medication detail list for ease of use.     [x]  Explained to pt they will have a phone "check in" by Nurse in one week on 6/17/2024     [] Pt met with Prasad Leach Dietician today after visit.  Refer to his note for details.     Will follow up with pt at next clinic visit and Nurse navigator available for pt questions, issues or concerns.     Please refer to provider visit for additional details and assessment.    "

## 2024-06-11 NOTE — TELEPHONE ENCOUNTER
Contacted patient at Lizbeth Mehta DNP request in regards to trouble sleeping that was reported by the patient cardiologist. Patient voiced that she only get 2 hours of sleep max. Lizbeth Mehta DNP notified.

## 2024-06-13 ENCOUNTER — OFFICE VISIT (OUTPATIENT)
Dept: CARDIOLOGY | Facility: CLINIC | Age: 54
End: 2024-06-13
Payer: MEDICARE

## 2024-06-13 VITALS
SYSTOLIC BLOOD PRESSURE: 118 MMHG | OXYGEN SATURATION: 98 % | DIASTOLIC BLOOD PRESSURE: 64 MMHG | HEIGHT: 62 IN | WEIGHT: 149.06 LBS | BODY MASS INDEX: 27.43 KG/M2 | HEART RATE: 107 BPM

## 2024-06-13 DIAGNOSIS — I35.0 NONRHEUMATIC AORTIC VALVE STENOSIS: ICD-10-CM

## 2024-06-13 DIAGNOSIS — C78.02 MALIGNANT NEOPLASM METASTATIC TO LEFT LUNG: ICD-10-CM

## 2024-06-13 DIAGNOSIS — I50.30 HEART FAILURE WITH PRESERVED EJECTION FRACTION, UNSPECIFIED HF CHRONICITY: Primary | ICD-10-CM

## 2024-06-13 DIAGNOSIS — K74.60 HEPATIC CIRRHOSIS, UNSPECIFIED HEPATIC CIRRHOSIS TYPE, UNSPECIFIED WHETHER ASCITES PRESENT: ICD-10-CM

## 2024-06-13 PROCEDURE — 99214 OFFICE O/P EST MOD 30 MIN: CPT | Mod: PBBFAC | Performed by: STUDENT IN AN ORGANIZED HEALTH CARE EDUCATION/TRAINING PROGRAM

## 2024-06-13 PROCEDURE — 99214 OFFICE O/P EST MOD 30 MIN: CPT | Mod: S$PBB,GC,, | Performed by: STUDENT IN AN ORGANIZED HEALTH CARE EDUCATION/TRAINING PROGRAM

## 2024-06-13 PROCEDURE — 99999 PR PBB SHADOW E&M-EST. PATIENT-LVL IV: CPT | Mod: PBBFAC,GC,, | Performed by: STUDENT IN AN ORGANIZED HEALTH CARE EDUCATION/TRAINING PROGRAM

## 2024-06-13 NOTE — PROGRESS NOTES
Clinic Note  6/13/2024      Subjective:       Patient ID:  Shikha is a 54 y.o. female being seen for an established visit.    Chief Complaint: Hospital Follow Up    HPI  54-year-old female with h/o metastatic HER 2 + breast cancer ( on Trastuzumab deruxtecan  - her last treatment was May 7, 2024), HAWTHORNE cirrhosis s/p TIPS procedure, HFpEF who presents to the clinic for hospital follow up.  She had a recent hospitalization from 5/25/24- 5/29/24 where she was presented to the hospital with hepatic encephalopathy that improved after lactulose and rifaximine.  Prior to this she had 3 other hospitalization from April 2024 for the following issues: Acute on chronic decompensated heart failure , TIPS shunt malfunction, Pancytopenia ( attributed to be in setting of myelosuppression 2/2 linezolid)  States that since hospital discharge she has been feeling well. Denies any cp/chest discomfort, sob/menon, palpitations, syncope, presyncope. Also denies any PND and orthopnea. Sleeps on 1 pillow at night.    EF 60-65%. Moderate AS with valve area 1.44 cm2; peak velocity 3.24 m/s; mean gradient 25 mmHg, DI 0.38. ( .6)  Review of Systems   Constitutional:  Negative for chills, fever and weight loss.   HENT: Negative.     Eyes:  Negative for blurred vision.   Respiratory:  Negative for cough and shortness of breath.    Cardiovascular:  Negative for chest pain and palpitations.   Gastrointestinal:  Negative for abdominal pain, blood in stool, constipation, diarrhea, melena, nausea and vomiting.   Musculoskeletal:  Negative for myalgias.   Skin: Negative.    Neurological:  Negative for dizziness, loss of consciousness and weakness.       Past Medical History:   Diagnosis Date    Aortic atherosclerosis 07/06/2023    Breast cancer     Esophageal and gastric varices 06/23/2023    Malignant neoplasm metastatic to left lung 06/08/2021       Family History   Problem Relation Name Age of Onset    Lung cancer Father          reports that  she has never smoked. She has never used smokeless tobacco. She reports that she does not drink alcohol and does not use drugs.    Medication List with Changes/Refills   Current Medications    DIAPER,BRIEF,ADULT,DISPOSABLE MISC    1 Units by Misc.(Non-Drug; Combo Route) route 4 (four) times daily.    DIAPER,BRIEF,ADULT,DISPOSABLE MISC    1 Units by Misc.(Non-Drug; Combo Route) route 4 (four) times daily.    FERROUS SULFATE (FEROSUL) 325 MG (65 MG IRON) TAB TABLET    Take 1 tablet by mouth daily with Vitamin C on an empty stomach    FUROSEMIDE (LASIX) 80 MG TABLET    Take 1 tablet (80 mg total) by mouth once daily. If you are gaining weight (2 lbs in 24 hours or 3 lbs in two days) may take an extra dose of 80 mg    HYDROXYZINE HCL (ATARAX) 25 MG TABLET    Take 1-2 tablets (25-50 mg total) by mouth nightly as needed (Insomnia).    INCONTINENCE PAD, LINER, DISP PADS    1 Units by Misc.(Non-Drug; Combo Route) route 4 (four) times daily.    LACTULOSE (CHRONULAC) 10 GRAM/15 ML SOLUTION    Take 30 mLs (20 g total) by mouth 3 (three) times daily. If more than 5 BMs/day, hold the third lactulose dose.    METHYLPHENIDATE HCL (RITALIN) 5 MG TABLET    Take 1 tablet (5 mg total) by mouth 2 (two) times daily.    PANTOPRAZOLE (PROTONIX) 40 MG TABLET    Take 1 tablet (40 mg total) by mouth 2 (two) times daily.    RIFAXIMIN (XIFAXAN) 550 MG TAB    Take 1 tablet (550 mg total) by mouth 2 (two) times daily.    SPIRONOLACTONE (ALDACTONE) 100 MG TABLET    Take 1 tablet (100 mg total) by mouth once daily.    TRIAMCINOLONE ACETONIDE 0.5% (KENALOG) 0.5 % CREA    Apply topically 2 (two) times daily.     Review of patient's allergies indicates:  No Known Allergies    Patient Active Problem List   Diagnosis    Breast cancer, stage 4, left    Malignant neoplasm metastatic to left lung    Lymphedema    Physical deconditioning    Balance problem    Hypokalemia    Gastrointestinal hemorrhage with melena    Esophageal and gastric varices    Severe  "obesity (BMI 35.0-39.9) with comorbidity    Aortic atherosclerosis    Pancytopenia    Thrombocytopenia, unspecified    Acute gastrointestinal bleeding    S/P TIPS (transjugular intrahepatic portosystemic shunt)    Cellulitis of foot    Volume overload    Biliary cirrhosis    Bacteremia    Leg swelling    Arm swelling    Symptomatic anemia    Elevated troponin    Pleural effusion    Hyponatremia    Pulmonary edema    Acute encephalopathy    Hepatic encephalopathy    Chronic heart failure with preserved ejection fraction    Chemotherapy-induced peripheral neuropathy           Objective:      Ht 5' 2" (1.575 m)   Wt 67.6 kg (149 lb 0.5 oz)   LMP  (LMP Unknown)   SpO2 98%   BMI 27.26 kg/m²   Estimated body mass index is 27.26 kg/m² as calculated from the following:    Height as of this encounter: 5' 2" (1.575 m).    Weight as of this encounter: 67.6 kg (149 lb 0.5 oz).  Physical Exam  Constitutional:       General: She is not in acute distress.     Appearance: She is not diaphoretic.   HENT:      Head: Normocephalic and atraumatic.      Mouth/Throat:      Pharynx: No oropharyngeal exudate.   Eyes:      General: No scleral icterus.     Conjunctiva/sclera: Conjunctivae normal.      Pupils: Pupils are equal, round, and reactive to light.   Neck:      Vascular: No JVD.      Trachea: No tracheal deviation.   Cardiovascular:      Rate and Rhythm: Normal rate and regular rhythm.      Heart sounds: Murmur heard.      Systolic murmur is present with a grade of 3/6.      No friction rub. Gallop present. S4 sounds present.   Pulmonary:      Effort: Pulmonary effort is normal. No respiratory distress.      Breath sounds: Normal breath sounds. No wheezing.   Chest:      Chest wall: No tenderness.   Abdominal:      General: Bowel sounds are normal. There is no distension.      Tenderness: There is no abdominal tenderness. There is no rebound.   Musculoskeletal:         General: No deformity. Normal range of motion.      Cervical " back: Normal range of motion and neck supple.   Lymphadenopathy:      Cervical: No cervical adenopathy.   Skin:     General: Skin is warm and dry.   Neurological:      Mental Status: She is alert and oriented to person, place, and time.   Psychiatric:         Mood and Affect: Affect normal.           Assessment and Plan:         Shikha was seen today for hospital follow up.    Diagnoses and all orders for this visit:    Heart failure with preserved ejection fraction, unspecified HF chronicity  TTE with EF 60-65%  Euvolemic on examination today  Medications include: Lasix 80 mg qd  BP is controlled  Will add Jardiance and obtain CMP in 1 week.  -     empagliflozin (JARDIANCE) 10 mg tablet; Take 1 tablet (10 mg total) by mouth qd  -     Comprehensive Metabolic Panel; Future  Malignant neoplasm metastatic to left lung  h/o metastatic HER 2 + breast cancer   (on Trastuzumab deruxtecan  - her last treatment was May 7, 2024)  Oncologist: Dr. Willis, with last office visit on 6/10/24  Non-rheumatic moderate aortic valve stenosis  Grade2, stage 0  ASX  Last TTE with Aortic valve area by VTI is 1.44 cm². Aortic valve peak velocity is 3.24 m/s. Mean gradient is 25 mmHg. The dimensionless index is 0.38   Will cont to follow up with echocardiograms  No indication for valve replacement at this time    Hepatic cirrhosis  2/2 HAWTHORNE  S/p TIPS 4/12           Follow up in about 3 months (around 9/13/2024) for cardio oncology clinic.    Other Orders Placed This Visit:  Orders Placed This Encounter   Procedures    Comprehensive Metabolic Panel         Tr Camacho

## 2024-06-14 ENCOUNTER — PATIENT OUTREACH (OUTPATIENT)
Dept: ADMINISTRATIVE | Facility: OTHER | Age: 54
End: 2024-06-14
Payer: MEDICARE

## 2024-06-14 NOTE — PROGRESS NOTES
Community Health Worker attempted to contact patient via telephone to assist with SDOH. Left a voicemail message on patient's telephone number [phone].  Will attempt again at a later date.

## 2024-06-17 ENCOUNTER — PATIENT OUTREACH (OUTPATIENT)
Dept: ADMINISTRATIVE | Facility: OTHER | Age: 54
End: 2024-06-17
Payer: MEDICARE

## 2024-06-17 NOTE — PROGRESS NOTES
CHW - Initial Contact    This Community Health Worker completed the Social Determinant of Health questionnaire with patient via telephone today.    Pt identified barriers of most importance are: Rental assistance   Referrals to community agencies completed with patient/caregiver consent outside of [facility] include: YES   Referrals were put through [facility] - no: \  Support and Services: Mr. Trivedi stated that she has been living in her rental home for twenty years now. This month she has paid rent three weeks late ans it was only a partial payment.. Her rent is 1300 and her land yard at this time he isnt rushing her to make her second payment. He has informed her that he does take rental assistance and some options she can look into [employer]. I have informed her of two numbers for [employer] for assistance. I will follow for additional available rental assistance options.   Other information discussed the patient needs / wants help with:    Follow up required: yes

## 2024-06-20 NOTE — PROGRESS NOTES
HF TCC Provider Note (Follow-up) Consult Note      HPI:     Patient ambulating to clinic today from parking garage without appreciable SOB and pace normal/slow. Denies worsening SOB from last visit              Patient sleeps on 2 number of pillows behind head with legs elevated              Patient wakes up SOB, has to get out of bed, associated cough- denies, does endorse insomnia              Palpitations - denies              Dizzy, light-headed, pre-syncope or syncope- denies recent              Since discharge frequency of performing weights, home weight and weight change- performing daily weights. Last home weight 154lbs. Up ~5lbs on home scale and 8lbs on clinic scale from last visit. Has not taken prn lasix doses.              Other information felt pertinent to HPI: Ms. Shikha López is a 52 yo female with a PMHx of stage IV breast cancer (known lung mets, on Trastuzumab Deruxtecan q3 weks, most recent chemo 5/7/24; followed by Dr. Willis), HAWTHORNE cirrhosis (s/p TIPS 4/12 w/ outpatient IR appointment scheduled 5/16 for doppler w/ concern for decreased flow), GI bleeds w/ gastric and esophageal varices, mild-moderate AS who presents to second HFTCC visit following multiple recent admissions.     PHYSICAL:   Vitals:    06/24/24 0930   BP: (!) 124/58   Pulse: 84      @[id number](3)@    JVD: no   Heart rhythm: regular  Cardiac murmur: Yes - 3/6 systolic murmur best heard at RUSB    S3: no  S4: no  Lungs: diminished breath sounds and crackles in posterior lung bases   Hepatojugular reflux: no  Edema: yes, lymphedema with 1+ edema      Lab Results   Component Value Date     (L) 06/24/2024     06/24/2024    K 3.8 06/24/2024    K 3.9 06/24/2024    MG 2.0 06/24/2024    CL 95 06/24/2024    CL 98 06/24/2024    CO2 32 (H) 06/24/2024    CO2 32 (H) 06/24/2024    BUN 13 06/24/2024    BUN 14 06/24/2024    CREATININE 0.7 06/24/2024    CREATININE 0.6 06/24/2024    GLU 89 06/24/2024    GLU 89 06/24/2024     CALCIUM 9.2 06/24/2024    CALCIUM 8.9 06/24/2024    AST 55 (H) 06/24/2024    AST 55 (H) 06/24/2024    ALT 34 06/24/2024    ALT 34 06/24/2024    ALBUMIN 2.5 (L) 06/24/2024    ALBUMIN 2.5 (L) 06/24/2024    PROT 6.0 06/24/2024    PROT 6.1 06/24/2024    BILITOT 4.9 (H) 06/24/2024    BILITOT 5.0 (H) 06/24/2024     Lab Results   Component Value Date     (H) 06/24/2024     (H) 06/10/2024    BNP 1,032 (H) 05/15/2024       ASSESSMENT: HFpEF     PLAN:      Patient Instructions:   Instruct the patient to notify this clinic if HH, a physician or an advanced care provider wants to change medication one of their HF medications   Activity and Diet restrictions:   Recommend 2-3 gram sodium restriction and 1500cc- 2000cc fluid restriction.  Encourage physical activity with graded exercise program.  Requested patient to weigh themselves daily, and to notify us if their weight increases by more than 3 lbs in 1 day or 5 lbs in 3 days.    Medication changes (include current dose and changed dose): NYHA Class II symptoms. Mild fluid volume on exam. Recommend taking prn afternoon lasix dose for the next 2-3 days until returned to prior dry weight. Phone check in later this week to assess response. Labs stable with starting jardiance. Lymphedema therapy on hold currently as patient is under HH care. Recommend periodic leg elevation and wearing compression stockings with keeping feet dependent.   Upcoming labs and date anticipated: repeat labs next week with tentative phone dc     Stephanie Jose PA-C

## 2024-06-21 ENCOUNTER — PATIENT OUTREACH (OUTPATIENT)
Dept: ADMINISTRATIVE | Facility: OTHER | Age: 54
End: 2024-06-21
Payer: MEDICARE

## 2024-06-21 ENCOUNTER — DOCUMENTATION ONLY (OUTPATIENT)
Dept: CARDIOLOGY | Facility: CLINIC | Age: 54
End: 2024-06-21
Payer: MEDICARE

## 2024-06-21 ENCOUNTER — OUTPATIENT CASE MANAGEMENT (OUTPATIENT)
Dept: ADMINISTRATIVE | Facility: OTHER | Age: 54
End: 2024-06-21

## 2024-06-21 NOTE — PROGRESS NOTES
"[facility]) weekly phone follow up / triage call completed.     TCC RN Navigator spoke with Ms. Trivedi    Current Patient reported weight: 148 lbs   Patient Goal Weight: 149 lbs  Recent Patient reported blood pressure and heart rate:  not do did    Pt reports the following:  []  Shortness of Breath with Activity  []  Shortness of Breath at rest   []  Fatigue  []  Edema   [] Chest pain or tightness  [] Weight Increase since discharge  [x] None of the above    Pt reports using "Daily weight and symptom tracker".    Pt reports being in the Green Zone. If in yellow/red, reminded that they should be calling [facility] today or now.     Medications:   Medication compliance reviewed with pt.  Pt reports having medication list available and has all medications at home for use per list. Patient has no questions or concerns about medications.    Education:   Confirmed pt still has "[facility] Home Care Guide"  .     Reminded of key points as listed below.     Recommend 2 -3 gram sodium restriction and 1500 cc-2000 cc fluid restriction.  Encourage physical activity with graded exercise program.  Requested patient to weigh themselves daily, and to notify us if their weight increases by more than 3 lbs in 1 day or 5 lbs in 3 days.   Reminded to use "Daily weight and symptom tracker".  Even if pt does not have a scale, to use symptom tracker.       Watch for these Signs and Symptoms: If any of these occur, contact [facility] immediately:   Increase in shortness of breath with movement   Increase in swelling in your legs and ankles   Weight gain of more than 3 pounds in a day or 5 pounds in 3 days.   Difficulty breathing when you are lying down   Worsening fatigue or tiredness   Stomach bloating, a full feeling or a loss of appetite   Increased coughing--especially when you are lying down      Pt was able to verbalize back to RN in their own words correct diet/fluid " restrictions, necessity for exercise, warning signs and symptoms, when and how to contact their [facility] team.      Pt reminded of upcoming appointment.06/24/2024  PT reports they will attend.       Pt reminded of how and when to [facility]:  [phone] (Mon-Fri, 8a-5p) & for urgent issues on the weekend to page the Heart Transplant MD on call.  Pt also encouraged utilize [facility] messaging as well.      Pt  verbalized understanding and in agreement of plan.       Will follow up with pt at next clinic visit and RN navigator available for pt questions, issues or concerns.

## 2024-06-21 NOTE — PROGRESS NOTES
CHW - Case Closure    This Community Health Worker spoke to patient via telephone today.   Pt/Caregiver reported: Ms. López stated that [employer] at this time doesn't have any rental assistance an told her to get on their website for assistance. She has contacted another organization and were informed they will contact her to follow up with her for available assistance. I have informed Ms. López of [employer] for possible additional help   Pt/Caregiver denied any additional needs at this time and agrees with episode closure at this time.  Provided patient with Community Health Worker's contact information and encouraged him/her to contact this Community Health Worker if additional needs arise.

## 2024-06-24 ENCOUNTER — OFFICE VISIT (OUTPATIENT)
Dept: CARDIOLOGY | Facility: CLINIC | Age: 54
End: 2024-06-24
Payer: MEDICARE

## 2024-06-24 ENCOUNTER — EXTERNAL HOME HEALTH (OUTPATIENT)
Dept: HOME HEALTH SERVICES | Facility: HOSPITAL | Age: 54
End: 2024-06-24
Payer: MEDICARE

## 2024-06-24 VITALS
HEIGHT: 62 IN | WEIGHT: 157.06 LBS | OXYGEN SATURATION: 99 % | DIASTOLIC BLOOD PRESSURE: 58 MMHG | BODY MASS INDEX: 28.9 KG/M2 | HEART RATE: 84 BPM | SYSTOLIC BLOOD PRESSURE: 124 MMHG

## 2024-06-24 DIAGNOSIS — K74.5 BILIARY CIRRHOSIS: ICD-10-CM

## 2024-06-24 DIAGNOSIS — I89.0 LYMPHEDEMA: ICD-10-CM

## 2024-06-24 DIAGNOSIS — I50.32 CHRONIC HEART FAILURE WITH PRESERVED EJECTION FRACTION: Primary | ICD-10-CM

## 2024-06-24 DIAGNOSIS — I35.0 AORTIC VALVE STENOSIS, ETIOLOGY OF CARDIAC VALVE DISEASE UNSPECIFIED: ICD-10-CM

## 2024-06-24 PROCEDURE — 99999 PR PBB SHADOW E&M-EST. PATIENT-LVL V: CPT | Mod: PBBFAC,,,

## 2024-06-24 PROCEDURE — 99215 OFFICE O/P EST HI 40 MIN: CPT | Mod: PBBFAC

## 2024-06-24 PROCEDURE — 99214 OFFICE O/P EST MOD 30 MIN: CPT | Mod: S$PBB,,,

## 2024-06-24 NOTE — PATIENT INSTRUCTIONS
Take an additional 80mg lasix in the afternoon for the next 2-3 days until returned to prior dry weight.    Will check in Thursday to assess weight and with further lasix recommendations.

## 2024-06-27 ENCOUNTER — TELEPHONE (OUTPATIENT)
Dept: CARDIOLOGY | Facility: CLINIC | Age: 54
End: 2024-06-27
Payer: MEDICARE

## 2024-06-27 PROBLEM — G93.40 ACUTE ENCEPHALOPATHY: Status: RESOLVED | Noted: 2024-05-25 | Resolved: 2024-06-27

## 2024-06-27 NOTE — PROGRESS NOTES
Subjective:       Patient ID: Shikha López is a 54 y.o. female.    Chief Complaint: Breast cancer, stage 4, left      HPI 54-year-old female who returns for F/U  for metastatic HER 2 + breast cancer.  She is on Trastuzumab deruxtecan  - her last treatment was May 7, 2024..        Her recent course has been complicated by cirrhosis and hepatic encephalopathy.  She has also had variceal bleeding.  Today she reports that she has been feeling much better.  She has having no pain or shortness of breath.  She is eating well.  Her bowel function has been normal.  She would recent follow up with Cardiology had some adjustment in her medications including her diuretics.    She does have some chronic lower extremity swelling and stasis dermatitis.        Breast history:  She presented in the emergency room at [facility] on September 29, 2006 with a 4 months history of inflammation of her left breast.  Physical examination at that time showed the left breast was largely replaced by large mass with multiple skin ulcerations.    A biopsy in September 2006 showed poorly differentiated carcinoma which was ER and SD. negative and HER2 positive.    She was then referred to [facility] for additional care.   CT scan of the chest and abdomen November 2006 revealed multiple nodules in the lungs consistent with metastatic disease.  There also enlarged left axillary node.    She was treated with chemotherapy with weekly Herceptin and Abraxane with improvement in her breast and lung metastasis.    On May 29, 2007 she underwent left modified radical mastectomy(Dr. Colvin) which showed no residual tumor in the breast and 1/5 nodes was positive for metastasis measuring 6 mm.  (ypT0N1).  She then received postoperative radiation therapy(Dr Singh)  to the left chest wall supraclav and internal mammary lymph nodes from August 20, 2007 to September 28, 2007.    Postoperatively she continued on Herceptin for approximately 3 and  1/2 years before her lung metastasis begin to grow.    She subsequently received a number of different chemotherapy treatments including Adriamycin, Halaven, Xeloda plus lapatinib then Xeloda plus Herceptin. (  in [facility]    Subsequently, she transferred her care to  at [facility].  -She was initially treated with Taxotere Herceptin Perjeta.    -She was then changed to Kadcyla.    In July 2020 PET scan showed progression.   She then took approximately 9 months of Herceptin and Navelbine with initial response then progression.    She started trastuzumab- deruxtecan  4/12/21.     CT 3/11/24 - stable  Stable postsurgical changes of left mastectomy and axillary lymph node dissection is patient with metastatic left breast cancer.   Redemonstration of multiple irregular bilateral pulmonary nodules concerning for metastatic disease.  No new lesions identified.   Hepatic steatosis and portal hypertension.  Increased bowel wall thickening and diffuse mesenteric edema with mild ascites, likely related to hepatic dysfunction.    Echo 4/24/24 - EF 60-65%      CT 4/29/24  -stable    Review of Systems   Constitutional:  Positive for fatigue. Negative for appetite change, fever and unexpected weight change.   Eyes:  Negative for visual disturbance.   Respiratory:  Negative for cough and shortness of breath.    Cardiovascular:  Positive for leg swelling. Negative for chest pain.   Gastrointestinal:  Negative for abdominal pain, blood in stool, constipation, diarrhea and nausea.   Musculoskeletal:  Negative for back pain.   Neurological:  Negative for headaches.   Psychiatric/Behavioral:  The patient is not nervous/anxious.          Objective:      Physical Exam  Vitals reviewed.   Constitutional:       General: She is not in acute distress.     Appearance: She is obese.   Musculoskeletal:      Right lower leg: Edema (with some hyperpigmentation bilaterally) present.      Left lower leg: Edema present.    Neurological:      Mental Status: She is alert and oriented to person, place, and time.   Psychiatric:         Mood and Affect: Mood normal.         Behavior: Behavior normal.         Thought Content: Thought content normal.         Judgment: Judgment normal.       Assessment:    Labs :  CBC Plts [id number], otherwise normal  Problem List Items Addressed This Visit       Breast cancer, stage 4, left - Primary (Chronic)    Malignant neoplasm metastatic to left lung (Chronic)       Plan:        RTC 2 weeks with labs.    Route Chart for Scheduling    Med Onc Chart Routing      Follow up with physician    Follow up with JIMI 2 weeks.   Infusion scheduling note    Injection scheduling note    Labs CBC and CMP   Scheduling:  Preferred lab:  Lab interval:     Imaging None      Pharmacy appointment No pharmacy appointment needed      Other referrals no referral to Oncology Primary Care needed -  no Massage appointment needed    No additional referrals needed         Treatment Plan Information   OP BREAST FAM-TRASTUZUMAB DERUXTECAN-NXKI Q3W   Home Willis MD   Upcoming Treatment Dates - OP BREAST FAM-TRASTUZUMAB DERUXTECAN-NXKI Q3W    7/23/2024       Chemotherapy       fam-trastuzumab deruxtecan-nxki (ENHERTU) 470 mg in dextrose 5 % (D5W) 100 mL infusion       Antiemetics       fosaprepitant 150 mg in sodium chloride 0.9% 150 mL IVPB       palonosetron (ALOXI) 0.25 mg in sodium chloride 0.9% 50 mL IVPB  8/13/2024       Chemotherapy       fam-trastuzumab deruxtecan-nxki (ENHERTU) 470 mg in dextrose 5 % (D5W) 100 mL infusion       Antiemetics       fosaprepitant 150 mg in sodium chloride 0.9% 150 mL IVPB       palonosetron (ALOXI) 0.25 mg in sodium chloride 0.9% 50 mL IVPB  9/3/2024       Chemotherapy       fam-trastuzumab deruxtecan-nxki (ENHERTU) 470 mg in dextrose 5 % (D5W) 100 mL infusion       Antiemetics       fosaprepitant 150 mg in sodium chloride 0.9% 150 mL IVPB       palonosetron (ALOXI) 0.25 mg in sodium chloride  0.9% 50 mL IVPB    Supportive Plan Information  OP FERRIC CARBOXYMALTOSE Q2W   Home Willis MD   Upcoming Treatment Dates - OP FERRIC CARBOXYMALTOSE Q2W    6/18/2024       Supportive Care       ferric carboxymaltose (INJECTAFER) 750 mg in sodium chloride 0.9% 265 mL IVPB (ready to mix)  6/25/2024       Supportive Care       ferric carboxymaltose (INJECTAFER) 750 mg in sodium chloride 0.9% 265 mL IVPB (ready to mix)

## 2024-06-27 NOTE — TELEPHONE ENCOUNTER
"[facility]    Attempted to call pt to complete 1 week "check in" call. Unable to reach pt at listed phone numbers.  Was able to leave message on voicemail encouraging pt to return call with [facility] phone number. Pt was called to check medication response.     Will continue to try to reach patient.    "

## 2024-06-28 ENCOUNTER — TELEPHONE (OUTPATIENT)
Dept: CARDIOLOGY | Facility: CLINIC | Age: 54
End: 2024-06-28
Payer: MEDICARE

## 2024-06-28 NOTE — TELEPHONE ENCOUNTER
Spoke with MsGodwin Shikha López to ask how did she respond to taking the lasix 80mg BID. The pt states she is using the bathroom a lot more. But her weight is at 157. Not able to check bp or p. All was reported to our PA-C who recommended the following.     Can continue BID until we get her labs back on 7/1 for further recs.    The pt verbalized understanding.  She was reminded of the lab appt time as well.

## 2024-07-01 ENCOUNTER — TELEPHONE (OUTPATIENT)
Dept: CARDIOLOGY | Facility: CLINIC | Age: 54
End: 2024-07-01
Payer: MEDICARE

## 2024-07-01 ENCOUNTER — LAB VISIT (OUTPATIENT)
Dept: LAB | Facility: HOSPITAL | Age: 54
End: 2024-07-01
Attending: INTERNAL MEDICINE
Payer: MEDICARE

## 2024-07-01 ENCOUNTER — OFFICE VISIT (OUTPATIENT)
Dept: HEMATOLOGY/ONCOLOGY | Facility: CLINIC | Age: 54
End: 2024-07-01
Payer: MEDICARE

## 2024-07-01 VITALS
OXYGEN SATURATION: 98 % | SYSTOLIC BLOOD PRESSURE: 128 MMHG | TEMPERATURE: 98 F | DIASTOLIC BLOOD PRESSURE: 58 MMHG | WEIGHT: 162.94 LBS | BODY MASS INDEX: 29.98 KG/M2 | HEIGHT: 62 IN | RESPIRATION RATE: 17 BRPM | HEART RATE: 93 BPM

## 2024-07-01 DIAGNOSIS — R06.02 SOB (SHORTNESS OF BREATH): ICD-10-CM

## 2024-07-01 DIAGNOSIS — C50.912 BREAST CANCER, STAGE 4, LEFT: Chronic | ICD-10-CM

## 2024-07-01 DIAGNOSIS — C78.02 MALIGNANT NEOPLASM METASTATIC TO LEFT LUNG: Chronic | ICD-10-CM

## 2024-07-01 DIAGNOSIS — K76.82 HEPATIC ENCEPHALOPATHY: ICD-10-CM

## 2024-07-01 DIAGNOSIS — C50.912 BREAST CANCER, STAGE 4, LEFT: Primary | Chronic | ICD-10-CM

## 2024-07-01 LAB
ALBUMIN SERPL BCP-MCNC: 2.4 G/DL (ref 3.5–5.2)
ALP SERPL-CCNC: 155 U/L (ref 55–135)
ALT SERPL W/O P-5'-P-CCNC: 29 U/L (ref 10–44)
AMMONIA PLAS-SCNC: 82 UMOL/L (ref 10–50)
ANION GAP SERPL CALC-SCNC: 6 MMOL/L (ref 8–16)
AST SERPL-CCNC: 57 U/L (ref 10–40)
BILIRUB SERPL-MCNC: 3.9 MG/DL (ref 0.1–1)
BNP SERPL-MCNC: 302 PG/ML (ref 0–99)
BUN SERPL-MCNC: 19 MG/DL (ref 6–20)
CALCIUM SERPL-MCNC: 8.7 MG/DL (ref 8.7–10.5)
CHLORIDE SERPL-SCNC: 98 MMOL/L (ref 95–110)
CO2 SERPL-SCNC: 28 MMOL/L (ref 23–29)
CREAT SERPL-MCNC: 0.7 MG/DL (ref 0.5–1.4)
ERYTHROCYTE [DISTWIDTH] IN BLOOD BY AUTOMATED COUNT: 16 % (ref 11.5–14.5)
EST. GFR  (NO RACE VARIABLE): >60 ML/MIN/1.73 M^2
GLUCOSE SERPL-MCNC: 111 MG/DL (ref 70–110)
HCT VFR BLD AUTO: 36.1 % (ref 37–48.5)
HGB BLD-MCNC: 12.2 G/DL (ref 12–16)
IMM GRANULOCYTES # BLD AUTO: 0.01 K/UL (ref 0–0.04)
MCH RBC QN AUTO: 37.3 PG (ref 27–31)
MCHC RBC AUTO-ENTMCNC: 33.8 G/DL (ref 32–36)
MCV RBC AUTO: 110 FL (ref 82–98)
NEUTROPHILS # BLD AUTO: 3.3 K/UL (ref 1.8–7.7)
PLATELET # BLD AUTO: 125 K/UL (ref 150–450)
PMV BLD AUTO: 12 FL (ref 9.2–12.9)
POTASSIUM SERPL-SCNC: 3.8 MMOL/L (ref 3.5–5.1)
PROT SERPL-MCNC: 5.9 G/DL (ref 6–8.4)
RBC # BLD AUTO: 3.27 M/UL (ref 4–5.4)
SODIUM SERPL-SCNC: 132 MMOL/L (ref 136–145)
WBC # BLD AUTO: 4.63 K/UL (ref 3.9–12.7)

## 2024-07-01 PROCEDURE — 99999 PR PBB SHADOW E&M-EST. PATIENT-LVL III: CPT | Mod: PBBFAC,,, | Performed by: INTERNAL MEDICINE

## 2024-07-01 PROCEDURE — 83880 ASSAY OF NATRIURETIC PEPTIDE: CPT

## 2024-07-01 PROCEDURE — 99213 OFFICE O/P EST LOW 20 MIN: CPT | Mod: PBBFAC | Performed by: INTERNAL MEDICINE

## 2024-07-01 PROCEDURE — 80053 COMPREHEN METABOLIC PANEL: CPT | Performed by: INTERNAL MEDICINE

## 2024-07-01 PROCEDURE — 82140 ASSAY OF AMMONIA: CPT | Performed by: INTERNAL MEDICINE

## 2024-07-01 PROCEDURE — 85027 COMPLETE CBC AUTOMATED: CPT | Performed by: INTERNAL MEDICINE

## 2024-07-01 PROCEDURE — 36415 COLL VENOUS BLD VENIPUNCTURE: CPT | Performed by: INTERNAL MEDICINE

## 2024-07-01 RX ORDER — SODIUM CHLORIDE 0.9 % (FLUSH) 0.9 %
10 SYRINGE (ML) INJECTION
OUTPATIENT
Start: 2024-07-03

## 2024-07-01 RX ORDER — DIPHENHYDRAMINE HYDROCHLORIDE 50 MG/ML
50 INJECTION INTRAMUSCULAR; INTRAVENOUS ONCE AS NEEDED
Status: CANCELLED | OUTPATIENT
Start: 2024-07-01

## 2024-07-01 RX ORDER — EPINEPHRINE 0.3 MG/.3ML
0.3 INJECTION SUBCUTANEOUS ONCE AS NEEDED
Status: CANCELLED | OUTPATIENT
Start: 2024-07-01

## 2024-07-01 RX ORDER — SODIUM CHLORIDE 0.9 % (FLUSH) 0.9 %
10 SYRINGE (ML) INJECTION
Status: CANCELLED | OUTPATIENT
Start: 2024-07-01

## 2024-07-01 RX ORDER — HEPARIN 100 UNIT/ML
500 SYRINGE INTRAVENOUS
OUTPATIENT
Start: 2024-07-03

## 2024-07-01 RX ORDER — HEPARIN 100 UNIT/ML
500 SYRINGE INTRAVENOUS
Status: CANCELLED | OUTPATIENT
Start: 2024-07-01

## 2024-07-01 RX ORDER — DIPHENHYDRAMINE HYDROCHLORIDE 50 MG/ML
50 INJECTION INTRAMUSCULAR; INTRAVENOUS ONCE AS NEEDED
OUTPATIENT
Start: 2024-07-03

## 2024-07-01 RX ORDER — EPINEPHRINE 0.3 MG/.3ML
0.3 INJECTION SUBCUTANEOUS ONCE AS NEEDED
OUTPATIENT
Start: 2024-07-03

## 2024-07-01 NOTE — TELEPHONE ENCOUNTER
[facility]    Spoke with Ms. Maribel to inform her of the following.     kidney function and fluid lab stable.     The pt verbalized understanding.

## 2024-07-02 ENCOUNTER — INFUSION (OUTPATIENT)
Dept: INFUSION THERAPY | Facility: HOSPITAL | Age: 54
End: 2024-07-02
Attending: INTERNAL MEDICINE
Payer: MEDICARE

## 2024-07-02 VITALS
OXYGEN SATURATION: 100 % | HEART RATE: 104 BPM | SYSTOLIC BLOOD PRESSURE: 122 MMHG | RESPIRATION RATE: 18 BRPM | DIASTOLIC BLOOD PRESSURE: 59 MMHG

## 2024-07-02 DIAGNOSIS — K92.2 ACUTE GASTROINTESTINAL BLEEDING: ICD-10-CM

## 2024-07-02 DIAGNOSIS — C50.912 BREAST CANCER, STAGE 4, LEFT: Primary | ICD-10-CM

## 2024-07-02 PROCEDURE — A4216 STERILE WATER/SALINE, 10 ML: HCPCS | Performed by: INTERNAL MEDICINE

## 2024-07-02 PROCEDURE — 63600175 PHARM REV CODE 636 W HCPCS: Performed by: INTERNAL MEDICINE

## 2024-07-02 PROCEDURE — 96365 THER/PROPH/DIAG IV INF INIT: CPT

## 2024-07-02 PROCEDURE — 25000003 PHARM REV CODE 250: Performed by: INTERNAL MEDICINE

## 2024-07-02 RX ORDER — DIPHENHYDRAMINE HYDROCHLORIDE 50 MG/ML
50 INJECTION INTRAMUSCULAR; INTRAVENOUS ONCE AS NEEDED
Status: DISCONTINUED | OUTPATIENT
Start: 2024-07-02 | End: 2024-07-02 | Stop reason: HOSPADM

## 2024-07-02 RX ORDER — EPINEPHRINE 0.3 MG/.3ML
0.3 INJECTION SUBCUTANEOUS ONCE AS NEEDED
Status: DISCONTINUED | OUTPATIENT
Start: 2024-07-02 | End: 2024-07-02 | Stop reason: HOSPADM

## 2024-07-02 RX ORDER — SODIUM CHLORIDE 0.9 % (FLUSH) 0.9 %
10 SYRINGE (ML) INJECTION
Status: DISCONTINUED | OUTPATIENT
Start: 2024-07-02 | End: 2024-07-02 | Stop reason: HOSPADM

## 2024-07-02 RX ORDER — HEPARIN 100 UNIT/ML
500 SYRINGE INTRAVENOUS
Status: DISCONTINUED | OUTPATIENT
Start: 2024-07-02 | End: 2024-07-02 | Stop reason: HOSPADM

## 2024-07-02 RX ADMIN — FERRIC CARBOXYMALTOSE INJECTION 750 MG: 50 INJECTION, SOLUTION INTRAVENOUS at 09:07

## 2024-07-02 RX ADMIN — HEPARIN 500 UNITS: 100 SYRINGE at 10:07

## 2024-07-02 RX ADMIN — Medication 10 ML: at 10:07

## 2024-07-02 NOTE — PLAN OF CARE
1102 Pt tolerated Injectafer well today, including post obs period, no complaints or complications,. VSS. Pt aware to call provider with any questions or concerns and is aware of upcoming appts. Pt ambulatory from clinic with steady gait, no distress noted.

## 2024-07-03 ENCOUNTER — TELEPHONE (OUTPATIENT)
Dept: CARDIOLOGY | Facility: CLINIC | Age: 54
End: 2024-07-03
Payer: MEDICARE

## 2024-07-03 NOTE — TELEPHONE ENCOUNTER
"[facility]    Attempted to call pt to complete 1 week "check in"  and discharge phone call. Unable to reach pt at listed phone numbers.  Was able to leave message on voicemail encouraging pt to return call with [facility] phone number and reminded of upcoming appt .       "

## 2024-07-04 ENCOUNTER — PATIENT MESSAGE (OUTPATIENT)
Dept: ADMINISTRATIVE | Facility: OTHER | Age: 54
End: 2024-07-04
Payer: MEDICARE

## 2024-07-05 ENCOUNTER — PATIENT MESSAGE (OUTPATIENT)
Dept: ADMINISTRATIVE | Facility: OTHER | Age: 54
End: 2024-07-05
Payer: MEDICARE

## 2024-07-08 ENCOUNTER — PATIENT MESSAGE (OUTPATIENT)
Dept: ADMINISTRATIVE | Facility: OTHER | Age: 54
End: 2024-07-08
Payer: MEDICARE

## 2024-07-08 DIAGNOSIS — R41.840 LACK OF CONCENTRATION: ICD-10-CM

## 2024-07-08 RX ORDER — METHYLPHENIDATE HYDROCHLORIDE 5 MG/1
5 TABLET ORAL 2 TIMES DAILY
Qty: 60 TABLET | Refills: 0 | Status: SHIPPED | OUTPATIENT
Start: 2024-07-08

## 2024-07-09 ENCOUNTER — PATIENT MESSAGE (OUTPATIENT)
Dept: ADMINISTRATIVE | Facility: OTHER | Age: 54
End: 2024-07-09
Payer: MEDICARE

## 2024-07-09 NOTE — PROGRESS NOTES
Subjective:       Patient ID: Shikha López is a 54 y.o. female.    Chief Complaint: Breast cancer, stage 4, left      HPI 54-year-old female who returns for F/U  for metastatic HER 2 + breast cancer.  She is on Trastuzumab deruxtecan  - her last treatment was May 7, 2024.    He has had recent complications with cirrhosis and hepatic encephalopathy.  She has also had variceal bleeding.    She has been gaining a little bit of weight do to emotional eating. She is still living with her mother.  She has not been able to work in 2 months, so finances are difficult.  She is having emotional issues due to taking care of and living with her mother.     She has completed physical therapy and occupation therapy.   She does have a slight tremble in her hand at rest.  Notes her voice does not sound the same gets worse during the day.  She is on jardiance and 160 mg of lasix a day.     Appetite and bowel movements are good.  Still takes lactulose.  Denies unusual pain and shortness of breath.         Per Dr. Willis's previous note: Breast history:  She presented in the emergency room at [facility] on September 29, 2006 with a 4 months history of inflammation of her left breast.  Physical examination at that time showed the left breast was largely replaced by large mass with multiple skin ulcerations.    A biopsy in September 2006 showed poorly differentiated carcinoma which was ER and AR. negative and HER2 positive.    She was then referred to [facility] for additional care.   CT scan of the chest and abdomen November 2006 revealed multiple nodules in the lungs consistent with metastatic disease.  There also enlarged left axillary node.    She was treated with chemotherapy with weekly Herceptin and Abraxane with improvement in her breast and lung metastasis.    On May 29, 2007 she underwent left modified radical mastectomy(Dr. Colvin) which showed no residual tumor in the breast and 1/5 nodes was positive for  metastasis measuring 6 mm.  (ypT0N1).  She then received postoperative radiation therapy(Dr Singh)  to the left chest wall supraclav and internal mammary lymph nodes from August 20, 2007 to September 28, 2007.    Postoperatively she continued on Herceptin for approximately 3 and 1/2 years before her lung metastasis begin to grow.    She subsequently received a number of different chemotherapy treatments including Adriamycin, Halaven, Xeloda plus lapatinib then Xeloda plus Herceptin. (  in [facility].)    Subsequently, she transferred her care to  at [facility].  -She was initially treated with Taxotere Herceptin Perjeta.    -She was then changed to Kadcyla.    In July 2020 PET scan showed progression.   She then took approximately 9 months of Herceptin and Navelbine with initial response then progression.    She started trastuzumab- deruxtecan  4/12/21.     CT 3/11/24 - stable  Stable postsurgical changes of left mastectomy and axillary lymph node dissection is patient with metastatic left breast cancer.   Redemonstration of multiple irregular bilateral pulmonary nodules concerning for metastatic disease.  No new lesions identified.   Hepatic steatosis and portal hypertension.  Increased bowel wall thickening and diffuse mesenteric edema with mild ascites, likely related to hepatic dysfunction.    Echo 4/24/24 - EF 60-65%      CT 4/29/24  -stable    Review of Systems   Constitutional:  Positive for fatigue. Negative for appetite change, fever and unexpected weight change.   Eyes:  Negative for visual disturbance.   Respiratory:  Negative for cough and shortness of breath.    Cardiovascular:  Positive for leg swelling. Negative for chest pain.   Gastrointestinal:  Negative for abdominal pain, blood in stool, constipation, diarrhea and nausea.   Musculoskeletal:  Negative for back pain.   Neurological:  Negative for headaches.   Psychiatric/Behavioral:  The patient is not nervous/anxious.           Objective:      Physical Exam  Vitals and nursing note reviewed.   Constitutional:       General: She is not in acute distress.     Appearance: Normal appearance. She is well-developed. She is obese.   HENT:      Head: Normocephalic.   Cardiovascular:      Rate and Rhythm: Normal rate and regular rhythm.      Heart sounds: Normal heart sounds.   Pulmonary:      Effort: Pulmonary effort is normal.      Breath sounds: Normal breath sounds.   Abdominal:      General: Bowel sounds are normal. There is no distension.      Palpations: Abdomen is soft.      Tenderness: There is no abdominal tenderness.   Musculoskeletal:      Cervical back: Normal range of motion.      Right lower leg: Edema (with some hyperpigmentation bilaterally) present.      Left lower leg: Edema present.   Lymphadenopathy:      Cervical: No cervical adenopathy.      Upper Body:      Right upper body: No supraclavicular adenopathy.      Left upper body: No supraclavicular adenopathy.   Skin:     General: Skin is warm and dry.      Findings: No rash.   Neurological:      Mental Status: She is alert and oriented to person, place, and time.   Psychiatric:         Mood and Affect: Mood normal.         Behavior: Behavior normal.         Thought Content: Thought content normal.         Judgment: Judgment normal.         Assessment:    Labs :  CBC Plts [id number], otherwise normal  1. Breast cancer, stage 4, left        2. Malignant neoplasm metastatic to left lung        3. Pancytopenia        4. Chemotherapy-induced peripheral neuropathy               Plan:      Hold on enhertu until after next hepatology visit next week  Counts recovering  Stable will continue to monitor     Return to clinic in 2 weeks with MD appointment and labs.     Patient is in agreement with the proposed treatment plan. All questions were answered to the patient's satisfaction. Patient knows to call clinic for any new or worsening symptoms and if anything is needed before the  next clinic visit.          Mariam Lisa, FNP-C  Hematology & Medical Oncology   [address] [place] [address]  ph. [phone]  Fax. [phone]    Collaborating physician, Dr. Willis.    Approximately 20 minutes were spent face-to-face with the patient.  Approximately 30 minutes in total were spent on this encounter, which includes face-to-face time and non-face-to-face time preparing to see the patient (e.g., review of tests), obtaining and/or reviewing separately obtained history, documenting clinical information in the electronic or other health record, independently interpreting results (not separately reported) and communicating results to the patient/family/caregiver, or care coordination (not separately reported).       Route Chart for Scheduling    Med Onc Chart Routing      Follow up with physician 2 weeks. 2-3 weeks with me or Lazaro   Follow up with JIMI    Infusion scheduling note    Injection scheduling note    Labs    Imaging    Pharmacy appointment    Other referrals                Treatment Plan Information   OP BREAST FAM-TRASTUZUMAB DERUXTECAN-NXKI Q3W   Home Willis MD   Upcoming Treatment Dates - OP BREAST FAM-TRASTUZUMAB DERUXTECAN-NXKI Q3W    7/23/2024       Chemotherapy       fam-trastuzumab deruxtecan-nxki (ENHERTU) 470 mg in dextrose 5 % (D5W) 100 mL infusion       Antiemetics       fosaprepitant 150 mg in sodium chloride 0.9% 150 mL IVPB       palonosetron (ALOXI) 0.25 mg in sodium chloride 0.9% 50 mL IVPB  8/13/2024       Chemotherapy       fam-trastuzumab deruxtecan-nxki (ENHERTU) 470 mg in dextrose 5 % (D5W) 100 mL infusion       Antiemetics       fosaprepitant 150 mg in sodium chloride 0.9% 150 mL IVPB       palonosetron (ALOXI) 0.25 mg in sodium chloride 0.9% 50 mL IVPB  9/3/2024       Chemotherapy       fam-trastuzumab deruxtecan-nxki (ENHERTU) 470 mg in dextrose 5 % (D5W) 100 mL infusion       Antiemetics       fosaprepitant 150 mg in sodium chloride 0.9% 150 mL  IVPB       palonosetron (ALOXI) 0.25 mg in sodium chloride 0.9% 50 mL IVPB    Supportive Plan Information  OP FERRIC CARBOXYMALTOSE Q2W   Home Willis MD   Upcoming Treatment Dates - OP FERRIC CARBOXYMALTOSE Q2W    7/3/2024       Supportive Care       ferric carboxymaltose (INJECTAFER) 750 mg in 0.9% NaCl 265 mL infusion

## 2024-07-10 ENCOUNTER — PATIENT MESSAGE (OUTPATIENT)
Dept: ADMINISTRATIVE | Facility: OTHER | Age: 54
End: 2024-07-10
Payer: MEDICARE

## 2024-07-11 ENCOUNTER — OFFICE VISIT (OUTPATIENT)
Dept: PALLIATIVE MEDICINE | Facility: CLINIC | Age: 54
End: 2024-07-11
Payer: MEDICARE

## 2024-07-11 ENCOUNTER — PATIENT MESSAGE (OUTPATIENT)
Dept: ADMINISTRATIVE | Facility: OTHER | Age: 54
End: 2024-07-11
Payer: MEDICARE

## 2024-07-11 DIAGNOSIS — Z51.5 ENCOUNTER FOR PALLIATIVE CARE: Primary | ICD-10-CM

## 2024-07-11 DIAGNOSIS — C78.02 MALIGNANT NEOPLASM METASTATIC TO LEFT LUNG: ICD-10-CM

## 2024-07-11 DIAGNOSIS — F41.9 ANXIETY: ICD-10-CM

## 2024-07-11 DIAGNOSIS — K92.2 GASTROINTESTINAL HEMORRHAGE, UNSPECIFIED GASTROINTESTINAL HEMORRHAGE TYPE: ICD-10-CM

## 2024-07-11 DIAGNOSIS — R53.83 FATIGUE, UNSPECIFIED TYPE: ICD-10-CM

## 2024-07-11 DIAGNOSIS — R60.9 EDEMA, UNSPECIFIED TYPE: ICD-10-CM

## 2024-07-11 DIAGNOSIS — G47.00 INSOMNIA, UNSPECIFIED TYPE: ICD-10-CM

## 2024-07-11 DIAGNOSIS — C50.912 BREAST CANCER, STAGE 4, LEFT: ICD-10-CM

## 2024-07-11 NOTE — PROGRESS NOTES
The patient location is: [place]  The chief complaint leading to consultation is: symptom mgmt/ACP    Visit type: audiovisual    Face to Face time with patient: 50 minutes    65 minutes of total time spent on the encounter, which includes face to face time and non-face to face time preparing to see the patient (eg, review of tests), Obtaining and/or reviewing separately obtained history, Documenting clinical information in the electronic or other health record, Independently interpreting results (not separately reported) and communicating results to the patient/family/caregiver, or Care coordination (not separately reported).         Each patient to whom he or she provides medical services by telemedicine is:  (1) informed of the relationship between the physician and patient and the respective role of any other health care provider with respect to management of the patient; and (2) notified that he or she may decline to receive medical services by telemedicine and may withdraw from such care at any time.    Notes:     Consult Note  Palliative Care      Consult Requested By: No ref. provider found  Reason for Consult: symptom mgmt/ACP      ASSESSMENT/PLAN:     Plan/Recommendations:    07/11/2024:  - no changes made today    Metastatic breast cancer  - following with Dr. Willis & NP Sloan   - ID 2006, chemo, s/p radical mastectomy (2007), s/p post-op RT, herceptin x 3.5 years until progression in lung mass, cycled through myriad of chemo tx with progression on July 2020 PET, 9 months of continued therapy w eventual progression, stable on 4/2024 CT  - currently ENHERTU on 2 years    Encounter for palliative care  - Patient is decisional  - Patient accompanied today by self  - ACP documents are not uploaded into EMR   - Philosophy of Palliative Medicine reviewed with patient and family at first visit  - New patient folder given to and reviewed with patient and family at first visit  - Goals of care:  Patient lives alone  with her 2 dogs and 2 cats. She is fully independent with no care needs of any kind. Her daughter Samra Jaeger ([id number]) is her support system. Nearby, her mother is living with cognitive/health issues and pt anticipates having to care for her mother FT within a year. Pt also has a sister who is an  and will help her with getting her affairs in order, including HCPOA. A booklet is provided and reviewed today. We will complete at future visit. Ms. Trivedi shares that she was initially diagnosed with cancer 17 years ago. It has been a long journey for her. She found strength in setting goals along the way. Seeing her daughter graduate, , start her career - each milestone has motivated her to continue. Her daughter is now pregnant and due in December 2023, her goal is to meet her grandson. Ms. 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 Enjoys attending [employer], going to the [employer], working in the garden, reading and baking, going to [employer], farmer's markets, etc. No spiritual/rob, declines .    Cancer associated fatigue  - treatment-associated  - exacerbated by heat (she does not have AC in car, runs only 1 window unit in her house 2/2 cost)  - currently on ritalin, which helps  - she is ECOG 0  - will continue to monitor     Edema   - BLE  - follows with cardiologist  - controls with daily lasix, will hold doses on a busy day where it will be difficult to use a bathroom frequently    Understanding of illness: Excellent     Goals of care: preserve qol, independence, activity tolerance    Follow up: September 2024     Patient's encounter and above plan of care discussed with patient's oncology team.     SUBJECTIVE:     History of Present Illness:  Patient is a 54 y.o. year old female presenting with metastatic breast cancer. Please see oncology notes for full oncologic history and treatment course.      07/11/2024:  JOSSUE ELIZALDE reviewed: no  concerns    Patient presents alone via virtual visit. Interval updates include: hospital admission 4/9-4/16 for GIB and TIPS placement; again from 4/21-4/26 for cellulitis of foot; again 5/16-5/21 for symptomatic anemia; again from 5/25-5/29 for acute encephalopathy. She is feeling much better since her most recent hospitalization. She recounts being found on her mother's couch confused and mostly unable to remember her first two admission days. She says she brought this upon herself since she has not always been compliant with her medications but this was a wake-up call. She can feel unsteady on her feet if she walks too fast, but otherwise feels close to baseline. She is still caring for her mother full-time, this burden is significant. Her mother finally sees a neurologist 8/20 and pt hopes the decision to place her will be made at this time. She plans to move home end of August no matter the outcome, she does have other family that can step in to provide more care. She has a close friend she speaks with frequently, she's also been enjoying time with daughter and grandson, saying she needs to 'stick around' for him. She does not want to resume treatment until she feels entirely back to previous baseline. Next oncology f/u is next week. Next pall care f/u in person, per pt request.       04/04/2024:  JOSSUE ELIZALDE reviewed: no concerns    Patient presents to virtual follow-up alone. She is overall stable but does have a significant increase in personal stressors mostly r/t her mother's recent diagnosis of dementia. She is now staying in her mother's home and acting as primary caretaker though does have some support with hired sitter. Pt's repeat CT on 3/11/24 was stable though did reveal portal hypertension, pt is scheduled to see a hepatologist.      01/04/2024:  [place]  reviewed: no concerns    Patient presents to clinic follow-up alone. She is doing well from a symptom standpoint, she does report severe anxiety which  "is secondary to several family-members being sick in the month of December. Her first grandson was born, she spends time with him as much as possible and this brings her harley. Denies new or worsening symptoms. RTC in 3 months, pt instructed to contact clinic if interim needs arise.     10/23/2023:  JOSSUE ELIZALDE reviewed: no concerns    Patient presents to f/u clinic visit alone. She reports some increased fatigue and says she "gets a lot of mucus at night" for a few days after treatment. She is otherwise doing very well. She is busy planning her daughter's elaborate baby shower which will be held in November. She has been coordinating the party with her sister. She acknowledges how meaningful this is for her since this will be her only opportunity to throw a baby shower for her daughter, and this will be the only grandchild she will meet. Next CT 12/15.    08/24/2023:  JOSSUE ELIZALDE reviewed: no concerns    Patient presents to initial clinic visit alone, she is extremely pleasant with few complaints. She does have some lower extremity edema for which she uses lasix. She skips doses on days where she will be away from home or running errands and in less frequent contact with a bathroom. She uses ritalin for fatigue and that works well for her. She shares that her cancer journey started 17 years ago and each step of the way, she has used various milestones to keep her motivated. She has seen her daughter graduate,  and start her career and now looks forward to meeting her first grandchild in December. We review ACP booklet today. Her sister, who is an , was planning to complete ACP w her. She will talk to her sister and we will discuss at future visit.     Past Medical History:   Diagnosis Date    Acute encephalopathy 05/25/2024    Aortic atherosclerosis 07/06/2023    Breast cancer     Esophageal and gastric varices 06/23/2023    Malignant neoplasm metastatic to left lung 06/08/2021     Past Surgical History: "   Procedure Laterality Date    ENDOSCOPIC ULTRASOUND OF UPPER GASTROINTESTINAL TRACT N/A 6/27/2023    Procedure: ULTRASOUND, UPPER GI TRACT, ENDOSCOPIC;  Surgeon: Home Lopez MD;  Location: [facility] (2ND FLR);  Service: Endoscopy;  Laterality: N/A;    ENDOSCOPIC ULTRASOUND OF UPPER GASTROINTESTINAL TRACT N/A 1/12/2024    Procedure: ULTRASOUND, UPPER GI TRACT, ENDOSCOPIC;  Surgeon: Home Lopez MD;  Location: [facility] ENDO (2ND FLR);  Service: Endoscopy;  Laterality: N/A;  11/28/23-Instructions via portal-DS  1/8-lvm for precall-MS  1/10-precall complete-Kpvt    ESOPHAGOGASTRODUODENOSCOPY N/A 6/23/2023    Procedure: EGD (ESOPHAGOGASTRODUODENOSCOPY);  Surgeon: Quintin Mooney MD;  Location: [facility] ENDO (2ND FLR);  Service: Endoscopy;  Laterality: N/A;    ESOPHAGOGASTRODUODENOSCOPY N/A 6/27/2023    Procedure: EGD (ESOPHAGOGASTRODUODENOSCOPY);  Surgeon: Home Lopez MD;  Location: [facility] (2ND FLR);  Service: Endoscopy;  Laterality: N/A;    ESOPHAGOGASTRODUODENOSCOPY N/A 8/3/2023    Procedure: EGD (ESOPHAGOGASTRODUODENOSCOPY);  Surgeon: Home Lopez MD;  Location: [facility] ENDO (2ND FLR);  Service: Endoscopy;  Laterality: N/A;  instr portal-labs 6/28/23-tb    ESOPHAGOGASTRODUODENOSCOPY N/A 1/12/2024    Procedure: EGD (ESOPHAGOGASTRODUODENOSCOPY);  Surgeon: Home Lopez MD;  Location: [facility] ENDO (2ND FLR);  Service: Endoscopy;  Laterality: N/A;    ESOPHAGOGASTRODUODENOSCOPY N/A 4/10/2024    Procedure: EGD (ESOPHAGOGASTRODUODENOSCOPY);  Surgeon: Ze Anderson MD;  Location: [facility] ENDO (2ND FLR);  Service: Endoscopy;  Laterality: N/A;    MASTECTOMY       Family History   Problem Relation Name Age of Onset    Lung cancer Father       Review of patient's allergies indicates:  No Known Allergies    Medications:    Current Outpatient Medications:     diaper,brief,adult,disposable Misc, 1 Units by Misc.(Non-Drug; Combo Route) route 4 (four) times daily., Disp: 120 each, Rfl: 4    diaper,brief,adult,disposable Misc,  1 Units by Misc.(Non-Drug; Combo Route) route 4 (four) times daily., Disp: 125 each, Rfl: 4    empagliflozin (JARDIANCE) 10 mg tablet, Take 1 tablet (10 mg total) by mouth once daily., Disp: 30 tablet, Rfl: 11    ferrous sulfate (FEROSUL) 325 mg (65 mg iron) Tab tablet, Take 1 tablet by mouth daily with Vitamin C on an empty stomach, Disp: 60 tablet, Rfl: 3    furosemide (LASIX) 80 MG tablet, Take 1 tablet (80 mg total) by mouth once daily. If you are gaining weight (2 lbs in 24 hours or 3 lbs in two days) may take an extra dose of 80 mg, Disp: 90 tablet, Rfl: 2    incontinence pad, liner, disp Pads, 1 Units by Misc.(Non-Drug; Combo Route) route 4 (four) times daily., Disp: 125 each, Rfl: 4    methylphenidate HCl (RITALIN) 5 MG tablet, Take 1 tablet (5 mg total) by mouth 2 (two) times daily., Disp: 60 tablet, Rfl: 0    pantoprazole (PROTONIX) 40 MG tablet, Take 1 tablet (40 mg total) by mouth 2 (two) times daily., Disp: 180 tablet, Rfl: 0    rifAXIMin (XIFAXAN) 550 mg Tab, Take 1 tablet (550 mg total) by mouth 2 (two) times daily., Disp: 60 tablet, Rfl: 3    spironolactone (ALDACTONE) 100 MG tablet, Take 1 tablet (100 mg total) by mouth once daily., Disp: 30 tablet, Rfl: 11    triamcinolone acetonide 0.5% (KENALOG) 0.5 % Crea, Apply topically 2 (two) times daily., Disp: 454 g, Rfl: 0    OBJECTIVE:       ROS:  Review of Systems   Constitutional:  Positive for fatigue. Negative for activity change and appetite change.   HENT:  Negative for congestion, dental problem and drooling.    Eyes:  Negative for pain, discharge and itching.   Respiratory:  Positive for cough. Negative for choking and wheezing.    Cardiovascular:  Positive for leg swelling.   Gastrointestinal:  Negative for constipation, diarrhea, nausea and vomiting.   Genitourinary:  Negative for difficulty urinating, dyspareunia and dysuria.   Musculoskeletal:  Negative for arthralgias, back pain and gait problem.   Skin:  Negative for pallor, rash and wound.    Neurological:  Positive for weakness. Negative for dizziness, facial asymmetry and headaches.   Psychiatric/Behavioral:  Positive for sleep disturbance. Negative for dysphoric mood. The patient is not nervous/anxious.        Review of Symptoms      Symptom Assessment (ESAS 0-10 Scale)  Pain:  0  Dyspnea:  0  Anxiety:  8  Nausea:  0  Depression:  0  Anorexia:  0  Fatigue:  6  Insomnia:  0  Restlessness:  0  Agitation:  0     CAM / Delirium:  Negative  Constipation:  Negative  Diarrhea:  Negative    Anxiety:  Is not nervous/anxious  Constipation:  No constipation    Bowel Management Plan (BMP):  No      Pain Assessment:  OME in 24 hours:  0  Location(s): none      Modified Hunter Scale:  0    ECOG Performance Status stGstrstastdstest:st st1st Psychosocial/Cultural:   See Palliative Psychosocial Note: No  **Primary  to Follow**  Palliative Care  Consult: No     Time-Based Charting:  No      Advance Care Planning   Advance Directives:   Living Will: No        Oral Declaration: No    [place]: No    Do Not Resuscitate Status: No        Oral Declaration: No      Decision Making:  Patient answered questions  Goals of Care: What is most important right now is to focus on remaining as independent as possible, symptom/pain control, curative/life-prolongation (regardless of treatment burdens), comfort and QOL . Accordingly, we have decided that the best plan to meet the patient's goals includes continuing with treatment.        Physical Exam:  Vitals:    There were no vitals filed for this visit.  Telehealth visit       Physical Exam  Constitutional:       General: She is not in acute distress.     Appearance: Normal appearance. She is not ill-appearing, toxic-appearing or diaphoretic.   HENT:      Head: Atraumatic.      Right Ear: External ear normal.      Left Ear: External ear normal.      Nose: Nose normal.      Mouth/Throat:      Dentition: Abnormal dentition.   Eyes:      General:         Right eye: No  discharge.         Left eye: No discharge.      Extraocular Movements: Extraocular movements intact.      Conjunctiva/sclera: Conjunctivae normal.   Pulmonary:      Effort: Pulmonary effort is normal. No respiratory distress.      Breath sounds: No stridor.   Musculoskeletal:         General: Normal range of motion.      Cervical back: Normal range of motion.   Skin:     General: Skin is warm and dry.      Coloration: Skin is not jaundiced.   Neurological:      Mental Status: She is alert and oriented to person, place, and time. Mental status is at baseline.      Motor: No weakness.   Psychiatric:         Behavior: Behavior normal.         Thought Content: Thought content normal.         Judgment: Judgment normal.         Labs:  CBC:   WBC   Date Value Ref Range Status   07/01/2024 4.63 3.90 - 12.70 K/uL Final     Hemoglobin   Date Value Ref Range Status   07/01/2024 12.2 12.0 - 16.0 g/dL Final     Hematocrit   Date Value Ref Range Status   07/01/2024 36.1 (L) 37.0 - 48.5 % Final     MCV   Date Value Ref Range Status   07/01/2024 110 (H) 82 - 98 fL Final     Platelets   Date Value Ref Range Status   07/01/2024 125 (L) 150 - 450 K/uL Final       LFT:   Lab Results   Component Value Date    AST 57 (H) 07/01/2024    ALKPHOS 155 (H) 07/01/2024    BILITOT 3.9 (H) 07/01/2024       Albumin:   Albumin   Date Value Ref Range Status   07/01/2024 2.4 (L) 3.5 - 5.2 g/dL Final     Protein:   Total Protein   Date Value Ref Range Status   07/01/2024 5.9 (L) 6.0 - 8.4 g/dL Final       Radiology:I have reviewed all pertinent imaging results/findings within the past 24 hours.    04/29/2024 CT CAP: Impression:     History of breast cancer.  Surgical change of the left breast and bilateral axilla.  Borderline enlarged right axillary lymph node, similar to prior.     Decreased size of a left periaortic lymph node.     Borderline enlarged inguinal lymph nodes, similar to prior, perhaps reactive.     Unchanged bilateral irregular  pulmonary nodules/opacities as previously noted     Hepatic steatosis with sequela of portal hypertension including splenomegaly and gastric and esophageal varices with embolization coils about the GE junction.  There is a patent TIPS.     Unchanged subcentimeter hypodensity in the pancreatic head.     Diffuse body wall anasarca.     Cholelithiasis.       03/11/2024 CT CAP: Impression:     Stable postsurgical changes of left mastectomy and axillary lymph node dissection is patient with metastatic left breast cancer.     Redemonstration of multiple irregular bilateral pulmonary nodules concerning for metastatic disease.  No new lesions identified.     Hepatic steatosis and portal hypertension.  Increased bowel wall thickening and diffuse mesenteric edema with mild ascites, likely related to hepatic dysfunction.    12/15/2023 CT CAP: Impression:     Patient with breast cancer status post left mastectomy and axillary dissection.     Unchanged right axillary and left para-aortic lymphadenopathy.     Multiple pulmonary nodules.  Many are unchanged, however there is a slightly more conspicuous nodule in the right lower lobe.  Attention on follow-up.     No abdominopelvic metastases.     Hepatic steatosis with signs of portal hypertension including splenomegaly, portosystemic collaterals, and ascites.       06/30/2023 CT chest: Impression:     Stable multiple bilateral lung spiculated nodule compared to April 2023 likely metastatic disease.     Trace bilateral pleural effusion.    06/24/2023 CT AP: Impression:     No focal hepatic lesion.     Sequela of portal hypertension including splenomegaly and gastroesophageal varices.     Mild wall thickening and fatty infiltration at the proximal colon with mild pericolonic stranding.  Findings potentially relating to additional sequela of portosystemic change with portal colopathy.  A nonspecific colitis cannot be entirely excluded.     Similar left infrahilar spiculated opacity  with lower lung pleural thickening.  Continued dedicated surveillance as scheduled.     Prior left mastectomy.  Superficial skin thickening of the visualized right breast.  To correlate with mammographic history.     Trace intrapelvic free fluid and lower body wall edema.     Cholelithiasis, stable hypodense focus at the pancreatic head, and additional findings as above.      Signature: Lizbeth Mehta DNP

## 2024-07-12 ENCOUNTER — PATIENT MESSAGE (OUTPATIENT)
Dept: ADMINISTRATIVE | Facility: OTHER | Age: 54
End: 2024-07-12
Payer: MEDICARE

## 2024-07-12 ENCOUNTER — OUTPATIENT CASE MANAGEMENT (OUTPATIENT)
Dept: ADMINISTRATIVE | Facility: OTHER | Age: 54
End: 2024-07-12

## 2024-07-13 ENCOUNTER — PATIENT MESSAGE (OUTPATIENT)
Dept: ADMINISTRATIVE | Facility: OTHER | Age: 54
End: 2024-07-13
Payer: MEDICARE

## 2024-07-14 ENCOUNTER — PATIENT MESSAGE (OUTPATIENT)
Dept: ADMINISTRATIVE | Facility: OTHER | Age: 54
End: 2024-07-14
Payer: MEDICARE

## 2024-07-15 ENCOUNTER — PATIENT MESSAGE (OUTPATIENT)
Dept: ADMINISTRATIVE | Facility: OTHER | Age: 54
End: 2024-07-15
Payer: MEDICARE

## 2024-07-15 ENCOUNTER — LAB VISIT (OUTPATIENT)
Dept: LAB | Facility: HOSPITAL | Age: 54
End: 2024-07-15
Attending: INTERNAL MEDICINE
Payer: MEDICARE

## 2024-07-15 ENCOUNTER — OFFICE VISIT (OUTPATIENT)
Dept: HEMATOLOGY/ONCOLOGY | Facility: CLINIC | Age: 54
End: 2024-07-15
Payer: MEDICARE

## 2024-07-15 VITALS
DIASTOLIC BLOOD PRESSURE: 64 MMHG | WEIGHT: 168.44 LBS | SYSTOLIC BLOOD PRESSURE: 134 MMHG | BODY MASS INDEX: 31 KG/M2 | OXYGEN SATURATION: 98 % | TEMPERATURE: 98 F | HEIGHT: 62 IN | HEART RATE: 102 BPM

## 2024-07-15 DIAGNOSIS — T45.1X5A CHEMOTHERAPY-INDUCED PERIPHERAL NEUROPATHY: ICD-10-CM

## 2024-07-15 DIAGNOSIS — C78.02 MALIGNANT NEOPLASM METASTATIC TO LEFT LUNG: ICD-10-CM

## 2024-07-15 DIAGNOSIS — C78.02 MALIGNANT NEOPLASM METASTATIC TO LEFT LUNG: Chronic | ICD-10-CM

## 2024-07-15 DIAGNOSIS — D61.818 PANCYTOPENIA: ICD-10-CM

## 2024-07-15 DIAGNOSIS — C50.912 BREAST CANCER, STAGE 4, LEFT: Primary | Chronic | ICD-10-CM

## 2024-07-15 DIAGNOSIS — G62.0 CHEMOTHERAPY-INDUCED PERIPHERAL NEUROPATHY: ICD-10-CM

## 2024-07-15 LAB
ALBUMIN SERPL BCP-MCNC: 2.3 G/DL (ref 3.5–5.2)
ALP SERPL-CCNC: 161 U/L (ref 55–135)
ALT SERPL W/O P-5'-P-CCNC: 28 U/L (ref 10–44)
ANION GAP SERPL CALC-SCNC: 8 MMOL/L (ref 8–16)
AST SERPL-CCNC: 71 U/L (ref 10–40)
BASOPHILS # BLD AUTO: 0.07 K/UL (ref 0–0.2)
BASOPHILS NFR BLD: 1.3 % (ref 0–1.9)
BILIRUB SERPL-MCNC: 4 MG/DL (ref 0.1–1)
BUN SERPL-MCNC: 13 MG/DL (ref 6–20)
CALCIUM SERPL-MCNC: 9 MG/DL (ref 8.7–10.5)
CHLORIDE SERPL-SCNC: 102 MMOL/L (ref 95–110)
CO2 SERPL-SCNC: 26 MMOL/L (ref 23–29)
CREAT SERPL-MCNC: 0.7 MG/DL (ref 0.5–1.4)
DIFFERENTIAL METHOD BLD: ABNORMAL
EOSINOPHIL # BLD AUTO: 0.3 K/UL (ref 0–0.5)
EOSINOPHIL NFR BLD: 4.9 % (ref 0–8)
ERYTHROCYTE [DISTWIDTH] IN BLOOD BY AUTOMATED COUNT: 15.5 % (ref 11.5–14.5)
EST. GFR  (NO RACE VARIABLE): >60 ML/MIN/1.73 M^2
GLUCOSE SERPL-MCNC: 100 MG/DL (ref 70–110)
HCT VFR BLD AUTO: 38.3 % (ref 37–48.5)
HGB BLD-MCNC: 12.2 G/DL (ref 12–16)
IMM GRANULOCYTES # BLD AUTO: 0.02 K/UL (ref 0–0.04)
IMM GRANULOCYTES NFR BLD AUTO: 0.4 % (ref 0–0.5)
LYMPHOCYTES # BLD AUTO: 0.9 K/UL (ref 1–4.8)
LYMPHOCYTES NFR BLD: 17.3 % (ref 18–48)
MCH RBC QN AUTO: 34.9 PG (ref 27–31)
MCHC RBC AUTO-ENTMCNC: 31.9 G/DL (ref 32–36)
MCV RBC AUTO: 109 FL (ref 82–98)
MONOCYTES # BLD AUTO: 0.6 K/UL (ref 0.3–1)
MONOCYTES NFR BLD: 11.2 % (ref 4–15)
NEUTROPHILS # BLD AUTO: 3.4 K/UL (ref 1.8–7.7)
NEUTROPHILS NFR BLD: 64.9 % (ref 38–73)
NRBC BLD-RTO: 0 /100 WBC
PLATELET # BLD AUTO: 117 K/UL (ref 150–450)
PMV BLD AUTO: 10.9 FL (ref 9.2–12.9)
POTASSIUM SERPL-SCNC: 3.6 MMOL/L (ref 3.5–5.1)
PROT SERPL-MCNC: 6 G/DL (ref 6–8.4)
RBC # BLD AUTO: 3.5 M/UL (ref 4–5.4)
SODIUM SERPL-SCNC: 136 MMOL/L (ref 136–145)
WBC # BLD AUTO: 5.26 K/UL (ref 3.9–12.7)

## 2024-07-15 PROCEDURE — G2211 COMPLEX E/M VISIT ADD ON: HCPCS | Mod: S$PBB,,, | Performed by: NURSE PRACTITIONER

## 2024-07-15 PROCEDURE — 99215 OFFICE O/P EST HI 40 MIN: CPT | Mod: S$PBB,,, | Performed by: NURSE PRACTITIONER

## 2024-07-15 PROCEDURE — 85025 COMPLETE CBC W/AUTO DIFF WBC: CPT | Performed by: INTERNAL MEDICINE

## 2024-07-15 PROCEDURE — 99214 OFFICE O/P EST MOD 30 MIN: CPT | Mod: PBBFAC | Performed by: NURSE PRACTITIONER

## 2024-07-15 PROCEDURE — 99999 PR PBB SHADOW E&M-EST. PATIENT-LVL IV: CPT | Mod: PBBFAC,,, | Performed by: NURSE PRACTITIONER

## 2024-07-15 PROCEDURE — 80053 COMPREHEN METABOLIC PANEL: CPT | Performed by: INTERNAL MEDICINE

## 2024-07-15 PROCEDURE — 36415 COLL VENOUS BLD VENIPUNCTURE: CPT | Performed by: INTERNAL MEDICINE

## 2024-07-16 ENCOUNTER — PATIENT MESSAGE (OUTPATIENT)
Dept: ADMINISTRATIVE | Facility: OTHER | Age: 54
End: 2024-07-16
Payer: MEDICARE

## 2024-07-17 ENCOUNTER — PATIENT MESSAGE (OUTPATIENT)
Dept: ADMINISTRATIVE | Facility: OTHER | Age: 54
End: 2024-07-17
Payer: MEDICARE

## 2024-07-17 ENCOUNTER — TELEPHONE (OUTPATIENT)
Dept: HEMATOLOGY/ONCOLOGY | Facility: CLINIC | Age: 54
End: 2024-07-17
Payer: MEDICARE

## 2024-07-17 NOTE — TELEPHONE ENCOUNTER
Spoke w/ pt in regards to scheduling nutrition appt. MA received staff message from Clara AGUAYO RD that pt refused virtual. Pt approved to schedule in person visit with Joyce Joshua RD for Monday 7/22 @ 9AM.       DULCE [place] ext [phone]

## 2024-07-18 ENCOUNTER — PATIENT MESSAGE (OUTPATIENT)
Dept: ADMINISTRATIVE | Facility: OTHER | Age: 54
End: 2024-07-18
Payer: MEDICARE

## 2024-07-18 ENCOUNTER — TELEPHONE (OUTPATIENT)
Dept: HEMATOLOGY/ONCOLOGY | Facility: CLINIC | Age: 54
End: 2024-07-18
Payer: MEDICARE

## 2024-07-18 NOTE — TELEPHONE ENCOUNTER
Spoke w/ pt in regards to nutrition appt. MA received a message from Joyce Joshua RD to inform pt that Radha Patel RD specializes in the needs that pt is looking for, weight loss and that Radha Patel RD only do virtual appts. MA offer guidance on how to join for virtual if pt was interested. Pt decline and stated pt will look at an external RD. MA offer to still keep the appt with Joyce but pt decline.     [place], [place] ext [phone]

## 2024-07-19 ENCOUNTER — PATIENT MESSAGE (OUTPATIENT)
Dept: ADMINISTRATIVE | Facility: OTHER | Age: 54
End: 2024-07-19
Payer: MEDICARE

## 2024-07-21 ENCOUNTER — PATIENT MESSAGE (OUTPATIENT)
Dept: ADMINISTRATIVE | Facility: OTHER | Age: 54
End: 2024-07-21
Payer: MEDICARE

## 2024-07-22 ENCOUNTER — DOCUMENT SCAN (OUTPATIENT)
Dept: HOME HEALTH SERVICES | Facility: HOSPITAL | Age: 54
End: 2024-07-22
Payer: MEDICARE

## 2024-07-22 ENCOUNTER — LAB VISIT (OUTPATIENT)
Dept: LAB | Facility: HOSPITAL | Age: 54
End: 2024-07-22
Attending: STUDENT IN AN ORGANIZED HEALTH CARE EDUCATION/TRAINING PROGRAM
Payer: MEDICARE

## 2024-07-22 ENCOUNTER — PATIENT MESSAGE (OUTPATIENT)
Dept: ADMINISTRATIVE | Facility: OTHER | Age: 54
End: 2024-07-22
Payer: MEDICARE

## 2024-07-22 DIAGNOSIS — K75.81 LIVER CIRRHOSIS SECONDARY TO NASH: ICD-10-CM

## 2024-07-22 DIAGNOSIS — K74.60 LIVER CIRRHOSIS SECONDARY TO NASH: ICD-10-CM

## 2024-07-22 LAB
AFP SERPL-MCNC: 3.8 NG/ML (ref 0–8.4)
ALBUMIN SERPL BCP-MCNC: 2.5 G/DL (ref 3.5–5.2)
ALP SERPL-CCNC: 157 U/L (ref 55–135)
ALT SERPL W/O P-5'-P-CCNC: 29 U/L (ref 10–44)
ANION GAP SERPL CALC-SCNC: 6 MMOL/L (ref 8–16)
AST SERPL-CCNC: 74 U/L (ref 10–40)
BASOPHILS # BLD AUTO: 0.05 K/UL (ref 0–0.2)
BASOPHILS NFR BLD: 1.2 % (ref 0–1.9)
BILIRUB SERPL-MCNC: 4.2 MG/DL (ref 0.1–1)
BUN SERPL-MCNC: 14 MG/DL (ref 6–20)
CALCIUM SERPL-MCNC: 9 MG/DL (ref 8.7–10.5)
CHLORIDE SERPL-SCNC: 103 MMOL/L (ref 95–110)
CO2 SERPL-SCNC: 28 MMOL/L (ref 23–29)
CREAT SERPL-MCNC: 0.8 MG/DL (ref 0.5–1.4)
DIFFERENTIAL METHOD BLD: ABNORMAL
EOSINOPHIL # BLD AUTO: 0.3 K/UL (ref 0–0.5)
EOSINOPHIL NFR BLD: 6.5 % (ref 0–8)
ERYTHROCYTE [DISTWIDTH] IN BLOOD BY AUTOMATED COUNT: 15 % (ref 11.5–14.5)
EST. GFR  (NO RACE VARIABLE): >60 ML/MIN/1.73 M^2
GLUCOSE SERPL-MCNC: 98 MG/DL (ref 70–110)
HCT VFR BLD AUTO: 41.6 % (ref 37–48.5)
HGB BLD-MCNC: 13.5 G/DL (ref 12–16)
IMM GRANULOCYTES # BLD AUTO: 0.02 K/UL (ref 0–0.04)
IMM GRANULOCYTES NFR BLD AUTO: 0.5 % (ref 0–0.5)
LYMPHOCYTES # BLD AUTO: 0.9 K/UL (ref 1–4.8)
LYMPHOCYTES NFR BLD: 19.8 % (ref 18–48)
MCH RBC QN AUTO: 34.4 PG (ref 27–31)
MCHC RBC AUTO-ENTMCNC: 32.5 G/DL (ref 32–36)
MCV RBC AUTO: 106 FL (ref 82–98)
MONOCYTES # BLD AUTO: 0.4 K/UL (ref 0.3–1)
MONOCYTES NFR BLD: 9.6 % (ref 4–15)
NEUTROPHILS # BLD AUTO: 2.7 K/UL (ref 1.8–7.7)
NEUTROPHILS NFR BLD: 62.4 % (ref 38–73)
NRBC BLD-RTO: 0 /100 WBC
PLATELET # BLD AUTO: 117 K/UL (ref 150–450)
PMV BLD AUTO: 10.4 FL (ref 9.2–12.9)
POTASSIUM SERPL-SCNC: 4.2 MMOL/L (ref 3.5–5.1)
PROT SERPL-MCNC: 6.4 G/DL (ref 6–8.4)
RBC # BLD AUTO: 3.93 M/UL (ref 4–5.4)
SODIUM SERPL-SCNC: 137 MMOL/L (ref 136–145)
WBC # BLD AUTO: 4.29 K/UL (ref 3.9–12.7)

## 2024-07-22 PROCEDURE — 82105 ALPHA-FETOPROTEIN SERUM: CPT | Performed by: STUDENT IN AN ORGANIZED HEALTH CARE EDUCATION/TRAINING PROGRAM

## 2024-07-22 PROCEDURE — 85025 COMPLETE CBC W/AUTO DIFF WBC: CPT | Performed by: STUDENT IN AN ORGANIZED HEALTH CARE EDUCATION/TRAINING PROGRAM

## 2024-07-22 PROCEDURE — 80053 COMPREHEN METABOLIC PANEL: CPT | Performed by: STUDENT IN AN ORGANIZED HEALTH CARE EDUCATION/TRAINING PROGRAM

## 2024-07-22 PROCEDURE — 80321 ALCOHOLS BIOMARKERS 1OR 2: CPT | Performed by: STUDENT IN AN ORGANIZED HEALTH CARE EDUCATION/TRAINING PROGRAM

## 2024-07-23 ENCOUNTER — TELEPHONE (OUTPATIENT)
Dept: HEMATOLOGY/ONCOLOGY | Facility: CLINIC | Age: 54
End: 2024-07-23
Payer: MEDICARE

## 2024-07-23 ENCOUNTER — PATIENT MESSAGE (OUTPATIENT)
Dept: ADMINISTRATIVE | Facility: OTHER | Age: 54
End: 2024-07-23
Payer: MEDICARE

## 2024-07-24 ENCOUNTER — PATIENT MESSAGE (OUTPATIENT)
Dept: CARDIOLOGY | Facility: CLINIC | Age: 54
End: 2024-07-24
Payer: MEDICARE

## 2024-07-24 ENCOUNTER — PATIENT MESSAGE (OUTPATIENT)
Dept: ADMINISTRATIVE | Facility: OTHER | Age: 54
End: 2024-07-24
Payer: MEDICARE

## 2024-07-24 LAB
CLINICAL BIOCHEMIST REVIEW: NORMAL
PLPETH BLD-MCNC: <10 NG/ML
POPETH BLD-MCNC: <10 NG/ML

## 2024-07-25 ENCOUNTER — PATIENT MESSAGE (OUTPATIENT)
Dept: ADMINISTRATIVE | Facility: OTHER | Age: 54
End: 2024-07-25
Payer: MEDICARE

## 2024-07-25 DIAGNOSIS — R53.81 PHYSICAL DEBILITY: ICD-10-CM

## 2024-07-25 DIAGNOSIS — R35.89 DIURESIS: ICD-10-CM

## 2024-07-25 NOTE — TELEPHONE ENCOUNTER
Refill Routing Note   Medication(s) are not appropriate for processing by [facility] for the following reason(s):      - NO PCP LISTED IN EPIC; ROUTING TO DR DUKE AS LAST PRESCRIBING PROVIDER    [facility] action(s):  Route          Medication reconciliation completed: No     Appointments  past 12m or future 3m with PCP    Date Provider   Last Visit   6/6/2024 Zuleyma Bennett MD   Next Visit   Visit date not found Zuleyma Bennett MD   ED visits in past 90 days: 0        Note composed:12:54 PM 07/25/2024

## 2024-07-26 ENCOUNTER — PATIENT MESSAGE (OUTPATIENT)
Dept: ADMINISTRATIVE | Facility: OTHER | Age: 54
End: 2024-07-26
Payer: MEDICARE

## 2024-07-26 DIAGNOSIS — R53.81 PHYSICAL DEBILITY: ICD-10-CM

## 2024-07-26 DIAGNOSIS — K92.2 GASTROINTESTINAL HEMORRHAGE, UNSPECIFIED GASTROINTESTINAL HEMORRHAGE TYPE: ICD-10-CM

## 2024-07-26 DIAGNOSIS — R35.89 DIURESIS: ICD-10-CM

## 2024-07-27 ENCOUNTER — PATIENT MESSAGE (OUTPATIENT)
Dept: ADMINISTRATIVE | Facility: OTHER | Age: 54
End: 2024-07-27
Payer: MEDICARE

## 2024-07-28 ENCOUNTER — PATIENT MESSAGE (OUTPATIENT)
Dept: ADMINISTRATIVE | Facility: OTHER | Age: 54
End: 2024-07-28
Payer: MEDICARE

## 2024-07-29 ENCOUNTER — PATIENT MESSAGE (OUTPATIENT)
Dept: ADMINISTRATIVE | Facility: OTHER | Age: 54
End: 2024-07-29
Payer: MEDICARE

## 2024-07-29 RX ORDER — PANTOPRAZOLE SODIUM 40 MG/1
40 TABLET, DELAYED RELEASE ORAL 2 TIMES DAILY
Qty: 180 TABLET | Refills: 0 | Status: SHIPPED | OUTPATIENT
Start: 2024-07-29 | End: 2024-10-28

## 2024-07-29 NOTE — TELEPHONE ENCOUNTER
Refill Routing Note   Medication(s) are not appropriate for processing by [facility] for the following reason(s):        Outside of protocol    [facility] action(s):  Defer  Route  Quick Discontinue        Medication Therapy Plan: Date/Time Signed: 7/25/2024 12:55 [facility]    Pharmacist review requested: Yes     Appointments  past 12m or future 3m with PCP    Date Provider   Last Visit   6/6/2024 Zuleyma Bennett MD   Next Visit   Visit date not found Zuleyma Bennett MD   ED visits in past 90 days: 0        Note composed:9:55 AM 07/29/2024

## 2024-07-30 ENCOUNTER — OFFICE VISIT (OUTPATIENT)
Dept: HEPATOLOGY | Facility: CLINIC | Age: 54
End: 2024-07-30
Payer: MEDICARE

## 2024-07-30 ENCOUNTER — PATIENT MESSAGE (OUTPATIENT)
Dept: ADMINISTRATIVE | Facility: OTHER | Age: 54
End: 2024-07-30
Payer: MEDICARE

## 2024-07-30 VITALS
SYSTOLIC BLOOD PRESSURE: 144 MMHG | DIASTOLIC BLOOD PRESSURE: 64 MMHG | HEART RATE: 87 BPM | WEIGHT: 168 LBS | BODY MASS INDEX: 30.91 KG/M2 | OXYGEN SATURATION: 98 % | HEIGHT: 62 IN

## 2024-07-30 DIAGNOSIS — K74.60 LIVER CIRRHOSIS SECONDARY TO NASH: Primary | ICD-10-CM

## 2024-07-30 DIAGNOSIS — K75.81 LIVER CIRRHOSIS SECONDARY TO NASH: Primary | ICD-10-CM

## 2024-07-30 DIAGNOSIS — Z95.828 S/P TIPS (TRANSJUGULAR INTRAHEPATIC PORTOSYSTEMIC SHUNT): ICD-10-CM

## 2024-07-30 DIAGNOSIS — K76.82 HEPATIC ENCEPHALOPATHY: ICD-10-CM

## 2024-07-30 DIAGNOSIS — Z87.19 HISTORY OF ESOPHAGEAL VARICES WITH BLEEDING: ICD-10-CM

## 2024-07-30 PROCEDURE — 99214 OFFICE O/P EST MOD 30 MIN: CPT | Mod: PBBFAC | Performed by: STUDENT IN AN ORGANIZED HEALTH CARE EDUCATION/TRAINING PROGRAM

## 2024-07-30 PROCEDURE — 99215 OFFICE O/P EST HI 40 MIN: CPT | Mod: S$PBB,,, | Performed by: STUDENT IN AN ORGANIZED HEALTH CARE EDUCATION/TRAINING PROGRAM

## 2024-07-30 PROCEDURE — 99999 PR PBB SHADOW E&M-EST. PATIENT-LVL IV: CPT | Mod: PBBFAC,,, | Performed by: STUDENT IN AN ORGANIZED HEALTH CARE EDUCATION/TRAINING PROGRAM

## 2024-07-30 RX ORDER — LACTULOSE 10 G/15ML
20 SOLUTION ORAL; RECTAL 3 TIMES DAILY
Qty: 2700 ML | Refills: 11 | Status: SHIPPED | OUTPATIENT
Start: 2024-07-30 | End: 2025-07-30

## 2024-07-31 ENCOUNTER — PATIENT MESSAGE (OUTPATIENT)
Dept: ADMINISTRATIVE | Facility: OTHER | Age: 54
End: 2024-07-31
Payer: MEDICARE

## 2024-08-01 ENCOUNTER — PATIENT MESSAGE (OUTPATIENT)
Dept: ADMINISTRATIVE | Facility: OTHER | Age: 54
End: 2024-08-01
Payer: MEDICARE

## 2024-08-02 ENCOUNTER — PATIENT MESSAGE (OUTPATIENT)
Dept: ADMINISTRATIVE | Facility: OTHER | Age: 54
End: 2024-08-02
Payer: MEDICARE

## 2024-08-02 ENCOUNTER — OUTPATIENT CASE MANAGEMENT (OUTPATIENT)
Dept: ADMINISTRATIVE | Facility: OTHER | Age: 54
End: 2024-08-02

## 2024-08-05 ENCOUNTER — PATIENT MESSAGE (OUTPATIENT)
Dept: ADMINISTRATIVE | Facility: OTHER | Age: 54
End: 2024-08-05
Payer: MEDICARE

## 2024-08-06 ENCOUNTER — PATIENT MESSAGE (OUTPATIENT)
Dept: ADMINISTRATIVE | Facility: OTHER | Age: 54
End: 2024-08-06
Payer: MEDICARE

## 2024-08-07 ENCOUNTER — PATIENT MESSAGE (OUTPATIENT)
Dept: ADMINISTRATIVE | Facility: OTHER | Age: 54
End: 2024-08-07
Payer: MEDICARE

## 2024-08-07 ENCOUNTER — OFFICE VISIT (OUTPATIENT)
Dept: HEMATOLOGY/ONCOLOGY | Facility: CLINIC | Age: 54
End: 2024-08-07
Payer: MEDICARE

## 2024-08-07 ENCOUNTER — TELEPHONE (OUTPATIENT)
Dept: HEMATOLOGY/ONCOLOGY | Facility: CLINIC | Age: 54
End: 2024-08-07
Payer: MEDICARE

## 2024-08-07 VITALS
DIASTOLIC BLOOD PRESSURE: 56 MMHG | WEIGHT: 164.13 LBS | HEART RATE: 101 BPM | RESPIRATION RATE: 20 BRPM | OXYGEN SATURATION: 99 % | BODY MASS INDEX: 30.2 KG/M2 | HEIGHT: 62 IN | SYSTOLIC BLOOD PRESSURE: 112 MMHG

## 2024-08-07 DIAGNOSIS — C78.02 MALIGNANT NEOPLASM METASTATIC TO LEFT LUNG: Primary | Chronic | ICD-10-CM

## 2024-08-07 DIAGNOSIS — K74.5 BILIARY CIRRHOSIS: Chronic | ICD-10-CM

## 2024-08-07 DIAGNOSIS — C50.912 BREAST CANCER, STAGE 4, LEFT: Chronic | ICD-10-CM

## 2024-08-07 PROCEDURE — 99999 PR PBB SHADOW E&M-EST. PATIENT-LVL IV: CPT | Mod: PBBFAC,,, | Performed by: INTERNAL MEDICINE

## 2024-08-07 PROCEDURE — 99214 OFFICE O/P EST MOD 30 MIN: CPT | Mod: PBBFAC | Performed by: INTERNAL MEDICINE

## 2024-08-08 ENCOUNTER — PATIENT MESSAGE (OUTPATIENT)
Dept: ADMINISTRATIVE | Facility: OTHER | Age: 54
End: 2024-08-08
Payer: MEDICARE

## 2024-08-09 ENCOUNTER — PATIENT MESSAGE (OUTPATIENT)
Dept: ADMINISTRATIVE | Facility: OTHER | Age: 54
End: 2024-08-09
Payer: MEDICARE

## 2024-08-12 ENCOUNTER — PATIENT MESSAGE (OUTPATIENT)
Dept: ADMINISTRATIVE | Facility: OTHER | Age: 54
End: 2024-08-12
Payer: MEDICARE

## 2024-08-13 ENCOUNTER — PATIENT MESSAGE (OUTPATIENT)
Dept: ADMINISTRATIVE | Facility: OTHER | Age: 54
End: 2024-08-13
Payer: MEDICARE

## 2024-08-14 ENCOUNTER — PATIENT MESSAGE (OUTPATIENT)
Dept: ADMINISTRATIVE | Facility: OTHER | Age: 54
End: 2024-08-14
Payer: MEDICARE

## 2024-08-15 ENCOUNTER — HOSPITAL ENCOUNTER (OUTPATIENT)
Dept: RADIOLOGY | Facility: HOSPITAL | Age: 54
Discharge: HOME | End: 2024-08-15
Attending: INTERNAL MEDICINE
Payer: MEDICARE

## 2024-08-15 ENCOUNTER — TELEPHONE (OUTPATIENT)
Dept: CARDIOLOGY | Facility: CLINIC | Age: 54
End: 2024-08-15
Payer: MEDICARE

## 2024-08-15 ENCOUNTER — PATIENT MESSAGE (OUTPATIENT)
Dept: ADMINISTRATIVE | Facility: OTHER | Age: 54
End: 2024-08-15
Payer: MEDICARE

## 2024-08-15 DIAGNOSIS — C50.912 BREAST CANCER, STAGE 4, LEFT: Chronic | ICD-10-CM

## 2024-08-15 DIAGNOSIS — C78.02 MALIGNANT NEOPLASM METASTATIC TO LEFT LUNG: Chronic | ICD-10-CM

## 2024-08-15 DIAGNOSIS — R41.840 LACK OF CONCENTRATION: ICD-10-CM

## 2024-08-15 PROCEDURE — 71260 CT THORAX DX C+: CPT | Mod: 26,,, | Performed by: STUDENT IN AN ORGANIZED HEALTH CARE EDUCATION/TRAINING PROGRAM

## 2024-08-15 PROCEDURE — 25500020 PHARM REV CODE 255: Performed by: INTERNAL MEDICINE

## 2024-08-15 PROCEDURE — 71260 CT THORAX DX C+: CPT | Mod: TC

## 2024-08-15 RX ORDER — METHYLPHENIDATE HYDROCHLORIDE 5 MG/1
5 TABLET ORAL 2 TIMES DAILY
Qty: 60 TABLET | Refills: 0 | Status: SHIPPED | OUTPATIENT
Start: 2024-08-15

## 2024-08-15 RX ADMIN — IOHEXOL 75 ML: 350 INJECTION, SOLUTION INTRAVENOUS at 09:08

## 2024-08-15 NOTE — TELEPHONE ENCOUNTER
----- Message from Rosanne Pond RN sent at 8/15/2024 12:06 PM CDT -----  Regarding: FW: Medication  Called pt and left voicemail.  ----- Message -----  From: Isabelle Ramirez  Sent: 8/15/2024  11:37 AM CDT  To: Rosanne Pond RN  Subject: Medication                                       Pt [phone] would like to know if it's ok to restart her Jardiance since her MRI is over?    LOV 6/13/24 Dr. Camacho    Thanks

## 2024-08-16 ENCOUNTER — PATIENT MESSAGE (OUTPATIENT)
Dept: ADMINISTRATIVE | Facility: OTHER | Age: 54
End: 2024-08-16
Payer: MEDICARE

## 2024-08-20 ENCOUNTER — PATIENT MESSAGE (OUTPATIENT)
Dept: ADMINISTRATIVE | Facility: OTHER | Age: 54
End: 2024-08-20
Payer: MEDICARE

## 2024-08-20 ENCOUNTER — OUTPATIENT CASE MANAGEMENT (OUTPATIENT)
Dept: ADMINISTRATIVE | Facility: OTHER | Age: 54
End: 2024-08-20
Payer: MEDICARE

## 2024-08-21 ENCOUNTER — PATIENT MESSAGE (OUTPATIENT)
Dept: ADMINISTRATIVE | Facility: OTHER | Age: 54
End: 2024-08-21
Payer: MEDICARE

## 2024-08-22 ENCOUNTER — PATIENT MESSAGE (OUTPATIENT)
Dept: ADMINISTRATIVE | Facility: OTHER | Age: 54
End: 2024-08-22
Payer: MEDICARE

## 2024-08-23 ENCOUNTER — OUTPATIENT CASE MANAGEMENT (OUTPATIENT)
Dept: ADMINISTRATIVE | Facility: OTHER | Age: 54
End: 2024-08-23

## 2024-08-23 ENCOUNTER — PATIENT MESSAGE (OUTPATIENT)
Dept: ADMINISTRATIVE | Facility: OTHER | Age: 54
End: 2024-08-23
Payer: MEDICARE

## 2024-08-25 ENCOUNTER — OFFICE VISIT (OUTPATIENT)
Dept: URGENT CARE | Facility: CLINIC | Age: 54
End: 2024-08-25
Payer: MEDICARE

## 2024-08-25 VITALS
RESPIRATION RATE: 20 BRPM | HEIGHT: 62 IN | DIASTOLIC BLOOD PRESSURE: 73 MMHG | BODY MASS INDEX: 30.18 KG/M2 | WEIGHT: 164 LBS | TEMPERATURE: 98 F | SYSTOLIC BLOOD PRESSURE: 136 MMHG | HEART RATE: 103 BPM | OXYGEN SATURATION: 97 %

## 2024-08-25 DIAGNOSIS — L03.116 CELLULITIS OF LEFT LOWER LEG: Primary | ICD-10-CM

## 2024-08-25 RX ORDER — MUPIROCIN 20 MG/G
OINTMENT TOPICAL 3 TIMES DAILY
Qty: 22 G | Refills: 0 | Status: SHIPPED | OUTPATIENT
Start: 2024-08-25

## 2024-08-25 RX ORDER — SULFAMETHOXAZOLE AND TRIMETHOPRIM 800; 160 MG/1; MG/1
1 TABLET ORAL 2 TIMES DAILY
Qty: 14 TABLET | Refills: 0 | Status: SHIPPED | OUTPATIENT
Start: 2024-08-25 | End: 2024-09-01

## 2024-08-25 NOTE — PATIENT INSTRUCTIONS
Please keep your wound covered as it heals. Use a thin layer of antibiotic ointment (mupirocin) to help keep the wound moist. This will also keep the dressing from sticking to the wound.  After 24 hours injury, you can gently wash the wound with soap and water. Pat dry and put on a clean dressing. A telfa pad will not stick to the wound.  Change your dressing once a day or every other day.  Always wash your hands before and after touching the wound.  Each time you change the dressing, look closely at the wound to be sure it is healing the right way. The wound may have a yellowish discharge, and this is normal.  Avoid picking the scab or scratching the site which may cause more irritation.  Do not soak in water or swim with an open wound.  Do not use hydrogen peroxide; it will cause the wound to dry out or delay wound healing/new skin growth.  Please complete full course of oral antibiotics.       If not allergic, take Tylenol (Acetaminophen) 650 mg to  1 g every 6 hours as needed for fever/pain and/or Motrin (Ibuprofen) 600 to 800 mg every 6 hours as needed for pain and/or fever      Please remember that you have received care at an urgent care today. Urgent cares are not emergency rooms and are not equipped to handle life threatening emergencies and cannot rule in or out certain medical conditions and you may be released before all of your medical problems are known or treated. Please arrange follow up with your primary care physician or speciality clinic  within 2-5 days if your signs and symptoms have not resolved or worsen. Patient can call our Referral Hotline at [phone] to make an appointment.    Please return here or go to the Emergency Department for any concerns or worsening of condition.Patient was educated on signs/symptoms that would warrant emergent medical attention. Patient verbalized understanding.  Signs of infection. These include a fever of 100.4°F (38°C) or higher, chills, or wound that  will not heal.  The pain in and around the area gets much worse.  There is a bad smell or pus (thick yellow, green, or gray fluid) coming from your wound.  You notice a crunchy feeling or blisters in the skin around the wound.  The redness around your wound gets bigger or is spreading up your arm or leg.  Fluid that is not pus drains from your wound.  Your swelling doesnt improve or gets worse.

## 2024-08-25 NOTE — PROGRESS NOTES
"Subjective:      Patient ID: Shikha López is a 54 y.o. female.    Vitals:  height is 5' 2" (1.575 m) and weight is 74.4 kg (164 lb). Her temperature is 97.5 °F (36.4 °C). Her blood pressure is 136/73 and her pulse is 103. Her respiration is 20 and oxygen saturation is 97%.     Chief Complaint: Wound Check    54 year old female presents today with a wound check. States on 08/21/2024 she cut her leg on a cupcake vincent. States the wound is not weeping a clear fluid and is progressing worse with a rash that is warm to touch. States has Lymphedema currently to bilateral legs and takes Lasix for this problem. No known hx of Venous insuffiencey. Denies any fever, chills, sweats. Treatments at home includes abx ointment and a colloidal patch with no relief. States the rash is spreading.       Provider note begins here:  Patient with history of lymphedema to bilateral legs.  Reports 5 days ago she cut herself with a cupcake quarter.  She has been putting antibiotic ointment and a colloidal patch.  Noticed this morning more red patches around her wound.      Wound Check  Treated in ED: 08/21/2024. There has been clear discharge from the wound. The redness has worsened. The swelling has worsened. Pain course: pain is only present while touching the infected area. Describes the pain as a tingling sensation.       Constitution: Negative for fatigue and fever.   Skin:  Positive for color change, wound and erythema.      Objective:     Physical Exam   Constitutional: She is oriented to person, place, and time. She appears well-developed.   HENT:   Head: Normocephalic and atraumatic. Head is without abrasion, without contusion and without laceration.   Ears:   Right Ear: External ear normal.   Left Ear: External ear normal.   Nose: Nose normal.   Mouth/Throat: Oropharynx is clear and moist and mucous membranes are normal.   Eyes: Conjunctivae, EOM and lids are normal. Pupils are equal, round, and reactive to light.   Neck: " Trachea normal and phonation normal. Neck supple.   Cardiovascular: Normal rate, regular rhythm and normal heart sounds.   Pulmonary/Chest: Effort normal and breath sounds normal. No stridor. No respiratory distress.   Musculoskeletal: Normal range of motion.         General: Normal range of motion.      Left lower leg: She exhibits laceration.   Neurological: She is alert and oriented to person, place, and time.   Skin: Skin is warm, dry, intact and no rash. Capillary refill takes less than 2 seconds. Lacerations - lower ext.:  left lower leg erythema No abrasion, No burn, No bruising and No ecchymosis        Psychiatric: Her speech is normal and behavior is normal. Judgment and thought content normal.   Nursing note and vitals reviewed.      Assessment:     1. Cellulitis of left lower leg        Plan:       Cellulitis of left lower leg  -     sulfamethoxazole-trimethoprim 800-160mg (BACTRIM DS) 800-160 mg Tab; Take 1 tablet by mouth 2 (two) times daily. for 7 days  Dispense: 14 tablet; Refill: 0  -     mupirocin (BACTROBAN) 2 % ointment; Apply topically 3 (three) times daily.  Dispense: 30 g; Refill: 0        Cleaned wound with sterile water. Applied bactroban and nonadherent dressing with paper tape.         Patient Instructions     Please keep your wound covered as it heals. Use a thin layer of antibiotic ointment (mupirocin) to help keep the wound moist. This will also keep the dressing from sticking to the wound.  After 24 hours injury, you can gently wash the wound with soap and water. Pat dry and put on a clean dressing. A telfa pad will not stick to the wound.  Change your dressing once a day or every other day.  Always wash your hands before and after touching the wound.  Each time you change the dressing, look closely at the wound to be sure it is healing the right way. The wound may have a yellowish discharge, and this is normal.  Avoid picking the scab or scratching the site which may cause more  irritation.  Do not soak in water or swim with an open wound.  Do not use hydrogen peroxide; it will cause the wound to dry out or delay wound healing/new skin growth.  Please complete full course of oral antibiotics.       If not allergic, take Tylenol (Acetaminophen) 650 mg to  1 g every 6 hours as needed for fever/pain and/or Motrin (Ibuprofen) 600 to 800 mg every 6 hours as needed for pain and/or fever      Please remember that you have received care at an urgent care today. Urgent cares are not emergency rooms and are not equipped to handle life threatening emergencies and cannot rule in or out certain medical conditions and you may be released before all of your medical problems are known or treated. Please arrange follow up with your primary care physician or speciality clinic  within 2-5 days if your signs and symptoms have not resolved or worsen. Patient can call our Referral Hotline at [phone] to make an appointment.    Please return here or go to the Emergency Department for any concerns or worsening of condition.Patient was educated on signs/symptoms that would warrant emergent medical attention. Patient verbalized understanding.  Signs of infection. These include a fever of 100.4°F (38°C) or higher, chills, or wound that will not heal.  The pain in and around the area gets much worse.  There is a bad smell or pus (thick yellow, green, or gray fluid) coming from your wound.  You notice a crunchy feeling or blisters in the skin around the wound.  The redness around your wound gets bigger or is spreading up your arm or leg.  Fluid that is not pus drains from your wound.  Your swelling doesnt improve or gets worse.

## 2024-08-26 ENCOUNTER — PATIENT MESSAGE (OUTPATIENT)
Dept: INFECTIOUS DISEASES | Facility: CLINIC | Age: 54
End: 2024-08-26
Payer: MEDICARE

## 2024-08-26 ENCOUNTER — PATIENT MESSAGE (OUTPATIENT)
Dept: ADMINISTRATIVE | Facility: OTHER | Age: 54
End: 2024-08-26
Payer: MEDICARE

## 2024-08-28 ENCOUNTER — PATIENT MESSAGE (OUTPATIENT)
Dept: ADMINISTRATIVE | Facility: OTHER | Age: 54
End: 2024-08-28
Payer: MEDICARE

## 2024-08-29 ENCOUNTER — PATIENT MESSAGE (OUTPATIENT)
Dept: ADMINISTRATIVE | Facility: OTHER | Age: 54
End: 2024-08-29
Payer: MEDICARE

## 2024-08-29 ENCOUNTER — PATIENT MESSAGE (OUTPATIENT)
Dept: INFECTIOUS DISEASES | Facility: CLINIC | Age: 54
End: 2024-08-29
Payer: MEDICARE

## 2024-08-29 ENCOUNTER — TELEPHONE (OUTPATIENT)
Dept: WOUND CARE | Facility: CLINIC | Age: 54
End: 2024-08-29
Payer: MEDICARE

## 2024-08-29 DIAGNOSIS — L03.119 CELLULITIS OF FOOT: Primary | ICD-10-CM

## 2024-08-29 NOTE — TELEPHONE ENCOUNTER
----- Message from Leena Mccarthy MA sent at 8/29/2024  9:10 AM CDT -----  Indio Chavez has a referral in for L03.119 (ICD-10-CM) - Cellulitis of foot    Can someone please help schedule pt? Any help is greatly appreciated, thank you!

## 2024-08-29 NOTE — TELEPHONE ENCOUNTER
Called mobile no answer, message stated subscriber not available.  Called home spoke with family member who stated patient at work - left message for patient to return call. Will attempt to call again later today

## 2024-08-30 ENCOUNTER — PATIENT MESSAGE (OUTPATIENT)
Dept: ADMINISTRATIVE | Facility: OTHER | Age: 54
End: 2024-08-30
Payer: MEDICARE

## 2024-09-03 ENCOUNTER — PATIENT MESSAGE (OUTPATIENT)
Dept: ADMINISTRATIVE | Facility: OTHER | Age: 54
End: 2024-09-03
Payer: MEDICARE

## 2024-09-09 ENCOUNTER — PATIENT MESSAGE (OUTPATIENT)
Dept: ADMINISTRATIVE | Facility: OTHER | Age: 54
End: 2024-09-09
Payer: MEDICARE

## 2024-09-10 ENCOUNTER — PATIENT MESSAGE (OUTPATIENT)
Dept: ADMINISTRATIVE | Facility: OTHER | Age: 54
End: 2024-09-10
Payer: MEDICARE

## 2024-09-12 ENCOUNTER — OUTPATIENT CASE MANAGEMENT (OUTPATIENT)
Dept: ADMINISTRATIVE | Facility: OTHER | Age: 54
End: 2024-09-12

## 2024-09-16 ENCOUNTER — TELEPHONE (OUTPATIENT)
Dept: WOUND CARE | Facility: CLINIC | Age: 54
End: 2024-09-16

## 2024-09-16 ENCOUNTER — OFFICE VISIT (OUTPATIENT)
Dept: WOUND CARE | Facility: CLINIC | Age: 54
End: 2024-09-16
Payer: MEDICARE

## 2024-09-16 VITALS
HEIGHT: 62 IN | HEART RATE: 97 BPM | OXYGEN SATURATION: 95 % | SYSTOLIC BLOOD PRESSURE: 130 MMHG | BODY MASS INDEX: 33.1 KG/M2 | WEIGHT: 179.88 LBS | DIASTOLIC BLOOD PRESSURE: 61 MMHG

## 2024-09-16 DIAGNOSIS — I50.32 CHRONIC HEART FAILURE WITH PRESERVED EJECTION FRACTION: ICD-10-CM

## 2024-09-16 DIAGNOSIS — C50.912 BREAST CANCER, STAGE 4, LEFT: Chronic | ICD-10-CM

## 2024-09-16 DIAGNOSIS — I89.0 LYMPHEDEMA: Chronic | ICD-10-CM

## 2024-09-16 DIAGNOSIS — S81.802A OPEN WOUND OF LEFT LOWER EXTREMITY, INITIAL ENCOUNTER: Primary | ICD-10-CM

## 2024-09-16 DIAGNOSIS — C78.02 MALIGNANT NEOPLASM METASTATIC TO LEFT LUNG: Chronic | ICD-10-CM

## 2024-09-16 PROCEDURE — 99999 PR PBB SHADOW E&M-EST. PATIENT-LVL III: CPT | Mod: PBBFAC,,,

## 2024-09-16 PROCEDURE — 99213 OFFICE O/P EST LOW 20 MIN: CPT | Mod: PBBFAC

## 2024-09-16 PROCEDURE — 99214 OFFICE O/P EST MOD 30 MIN: CPT | Mod: 25,S$PBB,,

## 2024-09-16 PROCEDURE — 29580 STRAPPING UNNA BOOT: CPT | Mod: PBBFAC,LT

## 2024-09-16 PROCEDURE — 29580 STRAPPING UNNA BOOT: CPT | Mod: S$PBB,LT,,

## 2024-09-16 NOTE — TELEPHONE ENCOUNTER
----- Message from Aron Castillo sent at 9/16/2024  8:26 AM CDT -----  Regarding: Running late  Contact: [phone]  Calling to inform you that patient will be running 15-20 minutes late to his or her appointment. Please call patient if she cannot be seen today.

## 2024-09-16 NOTE — PROGRESS NOTES
Subjective:       Patient ID: Shikha López is a 54 y.o. female.    Chief Complaint: Wound Consult        Patient presents for an evaluation of a left leg wound. Pt reports this started on 8/21/24 when a cupcake vincent cut her leg. Pt went to urgent care for this on 8/25. She was dressing her wounds with antibiotic ointment and a colloidal patch. She was prescribed bactroban and bactrim. She messaged ID on 8/26 regarding an appointment. She was referred to wound care. Pt was dressing her wound with bactrim and covering with a bandaid. Pt has a history of leg edema. Admits compliance with lasix. She reports being told to wear compression stockings daily but has not been able to find any that fit her. Denies fever, chills, erythema, warmth, purulent drainage, or pain.        4/22/4 venous ultrasound:  No evidence of deep venous thrombosis in either lower extremity.  Findings suggestive of complex 4.2 x 0.8 x 2.5 cm right popliteal fossa cyst.      Review of Systems   Constitutional:  Negative for activity change, chills, diaphoresis, fatigue and fever.   Respiratory:  Negative for apnea, chest tightness and shortness of breath.    Cardiovascular:  Positive for leg swelling. Negative for chest pain and palpitations.   Musculoskeletal:  Negative for gait problem and joint swelling.   Skin:  Positive for wound. Negative for color change, pallor and rash.   Neurological:  Negative for syncope, weakness and numbness.   Psychiatric/Behavioral:  Negative for agitation. The patient is not nervous/anxious.    All other systems reviewed and are negative.      Objective:      Physical Exam  Vitals reviewed.   Constitutional:       General: She is not in acute distress.     Appearance: Normal appearance.   Cardiovascular:      Pulses: Normal pulses.   Pulmonary:      Effort: No respiratory distress.   Musculoskeletal:         General: No swelling.      Right lower leg: Edema present.      Left lower leg: Edema present.    Skin:     General: Skin is warm and dry.      Capillary Refill: Capillary refill takes less than 2 seconds.      Findings: Wound present. No erythema.          Neurological:      General: No focal deficit present.      Mental Status: She is alert and oriented to person, place, and time.   Psychiatric:         Mood and Affect: Mood normal.         Behavior: Behavior normal.         Thought Content: Thought content normal.         Judgment: Judgment normal.         Assessment:       1. Open wound of left lower extremity, initial encounter    2. Lymphedema    3. Chronic heart failure with preserved ejection fraction    4. Breast cancer, stage 4, left    5. Malignant neoplasm metastatic to left lung           Wound Traumatic Left lower;lateral Leg (Active)       Present on Original Admission:    Primary Wound Type: Traumatic   Side: Left   Orientation: lower;lateral   Location: Leg   Wound Approximate Age at First Assessment (Weeks):    Wound Number:    Is this injury device related?:    Incision Type:    Closure Method:    Wound Description (Comments):    Type:    Additional Comments:    Ankle-Brachial Index:    Pulses:    Removal Indication and Assessment:    Wound Outcome:    Dressing Appearance Dry;Intact;Clean;Moist drainage 09/16/24 0934   Drainage Amount Small 09/16/24 0934   Drainage Characteristics/Odor Serosanguineous 09/16/24 0934   Red (%), Wound Tissue Color 20 % 09/16/24 0934   Yellow (%), Wound Tissue Color 80 % 09/16/24 0934   Periwound Area Intact;Edematous 09/16/24 0934   Wound Length (cm) 2.4 cm 09/16/24 0934   Wound Width (cm) 2.7 cm 09/16/24 0934   Wound Depth (cm) 0.1 cm 09/16/24 0934   Wound Volume (cm^3) 0.648 cm^3 09/16/24 0934   Wound Surface Area (cm^2) 6.48 cm^2 09/16/24 0934   Care Cleansed with:;Soap and water 09/16/24 0934   Dressing Applied;Calcium alginate;Absorptive Pad;Compression wrap 09/16/24 0934   Periwound Care Skin barrier film applied 09/16/24 0934   Compression Unna's  Boot;Three layer compression 09/16/24 0934       Shikha was seen in the clinic room and placed in the supine position on the treatment table.  The dressing was removed and the area was cleansed with Easi-clense sponges and dried thoroughly. No odor, erythema, or warmth noted. Pt declines sharp debridement.    Plan of Care: Calmoseptine to periwound, aquacel, mextra pad, and a 3 layer calamine compression wrap. The patient's foot was positioned at a 90 degree angle.  A compression wrap was applied using a spiral technique avoiding creases or folds.  The wrap was started behind the first metatarsal and ended below the tibial tubercle of the knee.  There was overlap of each turn half the width of the previous turn.  The compression wrap will be changed every 7 days.           Plan:       Left lower extremity was dressed as detailed above.  Patient was instructed to not get the dressings wet and to use cast covers for showering.  Should the dressing become wet, she is to remove it, place a moist dressing over the wound, cover with gauze and roll gauze and to secure bandages.  She should then notify this office as soon as possible to have a new dressing applied.  Instructed patient to remove wrap if she notices tingling, pain, swelling, or coldness to her left lower extremity or if her toes become white, blue, or cold.    Discussed nutrition and the role of protein in wound healing with the patient. Instructed patient to optimize protein for wound healing.     Discussed lower extremity edema with patient. Instructed patient to elevate legs whenever sedentary, follow a low sodium diet, and to take lasix as prescribed.    Will give patient a prescription for compression stockings once wound heals.     Written and verbal instructions given to patient  RTC in 1 week      Rani Gregorio PA-C

## 2024-09-19 PROBLEM — I25.10 CORONARY ARTERY CALCIFICATION SEEN ON CT SCAN: Status: ACTIVE | Noted: 2024-09-19

## 2024-09-19 NOTE — PROGRESS NOTES
Subjective:   Patient ID:  Shikha López is a 54 y.o. female who presents for follow-up of Follow-up    PROBLEM LIST:  Stage 4 breast cancer, 2006 (Treatment on hole)  HFpEF  Mild-moderate AS  Coronary artery calcification on chest CT  Biliary cirrhosis    HPI: Cardio-oncology follow-up  She previously followed with Dr. Epps. Sees Dr. Willis in Oncology.  Ms López is a 53 year old female with breast cancer receiving trastuzumab deruxtecan (previously also received paclitaxel, doxorubicin, eribulin, capecitabine, lapatinib, trastuzumab, trastuzumab emtansine, docetaxel and vinorelbine). Her weight is up about 10 lbs. She states her leg edema never went fully down after her last admission.    ECG 25-MAY-2024 17:51:16: Personally reviewed by me shows NSR with PVC's, LVH with repol    Echo 5/24:    Summary  Show Result Comparison     Left Ventricle: The left ventricle is normal in size. Normal wall thickness. Normal wall motion. There is normal systolic function with a visually estimated ejection fraction of 60 - 65%. There is indeterminate diastolic function.    Right Ventricle: Mild right ventricular enlargement. Wall thickness is normal. Systolic function is normal.    The left atrium is severely dilated.    Aortic Valve: There is moderate aortic valve sclerosis. There is annular calcification present. Mildly restricted motion. There is mild to moderate stenosis. Aortic valve area by VTI is 1.44 cm². Aortic valve peak velocity is 3.24 m/s. Mean gradient is 25 mmHg. The dimensionless index is 0.38.    Pulmonary Artery: There is mild pulmonary hypertension. The estimated pulmonary artery systolic pressure is 49 mmHg.    IVC/SVC: Intermediate venous pressure at 8 mmHg.    Past Medical History:   Diagnosis Date    Acute encephalopathy 05/25/2024    Aortic atherosclerosis 07/06/2023    Breast cancer     CHF (congestive heart failure)     Coronary artery calcification seen on CT scan 09/19/2024    Esophageal and  gastric varices 06/23/2023    Malignant neoplasm metastatic to left lung 06/08/2021    Stenosis of aortic and mitral valves     Aortic stenosis       Past Surgical History:   Procedure Laterality Date    ENDOSCOPIC ULTRASOUND OF UPPER GASTROINTESTINAL TRACT N/A 6/27/2023    Procedure: ULTRASOUND, UPPER GI TRACT, ENDOSCOPIC;  Surgeon: Home Lopez MD;  Location: [facility] (2ND FLR);  Service: Endoscopy;  Laterality: N/A;    ENDOSCOPIC ULTRASOUND OF UPPER GASTROINTESTINAL TRACT N/A 1/12/2024    Procedure: ULTRASOUND, UPPER GI TRACT, ENDOSCOPIC;  Surgeon: Home Lopez MD;  Location: [facility] (2ND FLR);  Service: Endoscopy;  Laterality: N/A;  11/28/23-Instructions via portal-DS  1/8-lvm for precall-MS  1/10-precall complete-Kpvt    ESOPHAGOGASTRODUODENOSCOPY N/A 6/23/2023    Procedure: EGD (ESOPHAGOGASTRODUODENOSCOPY);  Surgeon: Quintin Mooney MD;  Location: [facility] (2ND FLR);  Service: Endoscopy;  Laterality: N/A;    ESOPHAGOGASTRODUODENOSCOPY N/A 6/27/2023    Procedure: EGD (ESOPHAGOGASTRODUODENOSCOPY);  Surgeon: Home Lopez MD;  Location: [facility] (2ND FLR);  Service: Endoscopy;  Laterality: N/A;    ESOPHAGOGASTRODUODENOSCOPY N/A 8/3/2023    Procedure: EGD (ESOPHAGOGASTRODUODENOSCOPY);  Surgeon: Home Lopez MD;  Location: [facility] (2ND FLR);  Service: Endoscopy;  Laterality: N/A;  instr portal-labs 6/28/23-tb    ESOPHAGOGASTRODUODENOSCOPY N/A 1/12/2024    Procedure: EGD (ESOPHAGOGASTRODUODENOSCOPY);  Surgeon: Home Lopez MD;  Location: [facility] (2ND FLR);  Service: Endoscopy;  Laterality: N/A;    ESOPHAGOGASTRODUODENOSCOPY N/A 4/10/2024    Procedure: EGD (ESOPHAGOGASTRODUODENOSCOPY);  Surgeon: Ze Anderson MD;  Location: [facility] (2ND FLR);  Service: Endoscopy;  Laterality: N/A;    MASTECTOMY         Social History     Socioeconomic History    Marital status: Single    Number of children: 1   Tobacco Use    Smoking status: Never    Smokeless tobacco: Never   Substance and Sexual  Activity    Alcohol use: Never    Drug use: Never    Sexual activity: Not Currently     Social Determinants of Health     Financial Resource Strain: High Risk (6/17/2024)    Overall Financial Resource Strain (CARDIA)     Difficulty of Paying Living Expenses: Very hard   Food Insecurity: Food Insecurity Present (6/17/2024)    Hunger Vital Sign     Worried About Running Out of Food in the Last Year: Never true     Ran Out of Food in the Last Year: Sometimes true   Transportation Needs: No Transportation Needs (6/17/2024)    PRAPARE - Transportation     Lack of Transportation (Medical): No     Lack of Transportation (Non-Medical): No   Physical Activity: Insufficiently Active (6/17/2024)    Exercise Vital Sign     Days of Exercise per Week: 4 days     Minutes of Exercise per Session: 10 min   Stress: Stress Concern Present (6/17/2024)    [employer] [place] [employer] - Occupational Stress Questionnaire     Feeling of Stress : Rather much   Housing Stability: High Risk (6/17/2024)    Housing Stability Vital Sign     Unable to Pay for Housing in the Last Year: Yes     Homeless in the Last Year: No       Family History   Problem Relation Name Age of Onset    Hypertension Mother      Heart attack Father      Lung cancer Father         Patient's Medications   New Prescriptions    No medications on file   Previous Medications    DIAPER,BRIEF,ADULT,DISPOSABLE MISC    1 Units by Misc.(Non-Drug; Combo Route) route 4 (four) times daily.    DIAPER,BRIEF,ADULT,DISPOSABLE MISC    1 Units by Misc.(Non-Drug; Combo Route) route 4 (four) times daily.    EMPAGLIFLOZIN (JARDIANCE) 10 MG TABLET    Take 1 tablet (10 mg total) by mouth once daily.    FERROUS SULFATE (FEROSUL) 325 MG (65 MG IRON) TAB TABLET    Take 1 tablet by mouth daily with Vitamin C on an empty stomach    FUROSEMIDE (LASIX) 80 MG TABLET    Take 1 tablet (80 mg total) by mouth once daily. If you are gaining weight (2 lbs in 24 hours or 3 lbs in two days) may  take an extra dose of 80 mg    INCONTINENCE PAD, LINER, DISP PADS    1 Units by Misc.(Non-Drug; Combo Route) route 4 (four) times daily.    LACTULOSE (CHRONULAC) 10 GRAM/15 ML SOLUTION    Take 30 mLs (20 g total) by mouth 3 (three) times daily.    METHYLPHENIDATE HCL (RITALIN) 5 MG TABLET    Take 1 tablet (5 mg total) by mouth 2 (two) times daily.    MUPIROCIN (BACTROBAN) 2 % OINTMENT    Apply topically 3 (three) times daily.    PANTOPRAZOLE (PROTONIX) 40 MG TABLET    Take 1 tablet (40 mg total) by mouth 2 (two) times daily.    RIFAXIMIN (XIFAXAN) 550 MG TAB    Take 1 tablet (550 mg total) by mouth 2 (two) times daily.    SPIRONOLACTONE (ALDACTONE) 100 MG TABLET    Take 1 tablet (100 mg total) by mouth once daily.    TRIAMCINOLONE ACETONIDE 0.5% (KENALOG) 0.5 % CREA    Apply topically 2 (two) times daily.   Modified Medications    No medications on file   Discontinued Medications    No medications on file       Review of Systems   Constitutional: Negative for malaise/fatigue and weight gain.   HENT:  Negative for hearing loss.    Eyes:  Negative for visual disturbance.   Cardiovascular:  Positive for leg swelling. Negative for chest pain, claudication, dyspnea on exertion, near-syncope, orthopnea, palpitations, paroxysmal nocturnal dyspnea and syncope.   Respiratory:  Negative for cough, shortness of breath, sleep disturbances due to breathing, snoring and wheezing.    Endocrine: Negative for cold intolerance, heat intolerance, polydipsia, polyphagia and polyuria.   Hematologic/Lymphatic: Negative for bleeding problem. Does not bruise/bleed easily.   Skin:  Negative for rash and suspicious lesions.   Musculoskeletal:  Negative for arthritis, falls, joint pain, muscle weakness and myalgias.   Gastrointestinal:  Negative for abdominal pain, change in bowel habit, constipation, diarrhea, heartburn, hematochezia, melena and nausea.   Genitourinary:  Negative for hematuria and nocturia.   Neurological:  Negative for  "excessive daytime sleepiness, dizziness, headaches, light-headedness, loss of balance and weakness.   Psychiatric/Behavioral:  Negative for depression. The patient is not nervous/anxious.    Allergic/Immunologic: Negative for environmental allergies.       BP (!) 114/55   Pulse 90   Ht 5' 2" (1.575 m)   Wt 77 kg (169 lb 12.8 oz)   LMP  (LMP Unknown)   SpO2 97%   BMI 31.06 kg/m²     Objective:   Physical Exam  Constitutional:       Appearance: She is well-developed.      Comments:      HENT:      Head: Normocephalic and atraumatic.      Comments: Poor dentition  Eyes:      General: No scleral icterus.     Conjunctiva/sclera: Conjunctivae normal.      Pupils: Pupils are equal, round, and reactive to light.   Neck:      Thyroid: No thyromegaly.      Vascular: No hepatojugular reflux or JVD.      Trachea: No tracheal deviation.   Cardiovascular:      Rate and Rhythm: Normal rate and regular rhythm.      Chest Wall: PMI is not displaced.      Pulses: Intact distal pulses.           Carotid pulses are 2+ on the right side and 2+ on the left side.       Radial pulses are 2+ on the right side and 2+ on the left side.        Dorsalis pedis pulses are 2+ on the right side and 2+ on the left side.        Posterior tibial pulses are 2+ on the right side and 2+ on the left side.      Heart sounds: Murmur heard.      Harsh midsystolic murmur is present with a grade of 3/6 at the upper right sternal border radiating to the neck.   Pulmonary:      Effort: Pulmonary effort is normal.      Breath sounds: Normal breath sounds.   Abdominal:      General: Bowel sounds are normal. There is no distension.      Palpations: Abdomen is soft. There is no mass.      Tenderness: There is no abdominal tenderness.   Musculoskeletal:         General: No tenderness.      Cervical back: Normal range of motion and neck supple.      Right lower leg: Edema present.      Left lower leg: Edema present.      Comments: 2+ edema to knees. Left leg " wrapped. Right leg erythematous   Lymphadenopathy:      Cervical: No cervical adenopathy.   Skin:     General: Skin is warm and dry.      Findings: No erythema or rash.      Nails: There is no clubbing.   Neurological:      Mental Status: She is alert and oriented to person, place, and time.   Psychiatric:         Speech: Speech normal.         Behavior: Behavior normal.         Lab Results   Component Value Date     07/22/2024    K 4.2 07/22/2024     07/22/2024    CO2 28 07/22/2024    BUN 14 07/22/2024    CREATININE 0.8 07/22/2024    GLU 98 07/22/2024    MG 2.0 06/24/2024    AST 74 (H) 07/22/2024    ALT 29 07/22/2024    ALBUMIN 2.5 (L) 07/22/2024    PROT 6.4 07/22/2024    BILITOT 4.2 (H) 07/22/2024    WBC 4.29 07/22/2024    HGB 13.5 07/22/2024    HCT 41.6 07/22/2024     (H) 07/22/2024     (L) 07/22/2024    INR 1.5 (H) 05/15/2024    TSH 3.951 05/15/2024    CHOL 114 (L) 06/24/2024    HDL 36 (L) 06/24/2024    LDLCALC 67.6 06/24/2024    TRIG 52 06/24/2024     (H) 07/01/2024       Assessment:     1. Breast cancer, stage 4, left : Treatment currently on hold.   2. Chronic heart failure with preserved ejection fraction : She appears volume overloaded with 10 lb weight gain and LE edema. Increase lasix to 80 mg bid. Labs next week. Continue Jardiance and Sprinolactone.   3. Aortic atherosclerosis    4. Biliary cirrhosis : s/p TIPS.   5. Coronary artery calcification seen on CT scan : Not on a statin due to liver disease. LDL 67.       7. Nonrheumatic aortic valve stenosis : This was mild to moderate on last echo. Possible radiation induced valvular heart disease? AV noted to be trileaflet on prior echo.       Plan:     Shikha was seen today for follow-up.    Diagnoses and all orders for this visit:    Breast cancer, stage 4, left    Chronic heart failure with preserved ejection fraction  -     BNP; Future  -     Basic Metabolic Panel; Future    Aortic atherosclerosis    Biliary  cirrhosis    Coronary artery calcification seen on CT scan    Hypervolemia, unspecified hypervolemia type  -     Ambulatory referral/consult to Cardiology    Nonrheumatic aortic valve stenosis        Thank you for allowing me to participate in this patient's care. Please do not hesitate to contact me with any questions or concerns.

## 2024-09-26 ENCOUNTER — OFFICE VISIT (OUTPATIENT)
Dept: PALLIATIVE MEDICINE | Facility: CLINIC | Age: 54
End: 2024-09-26
Payer: MEDICARE

## 2024-09-26 ENCOUNTER — OFFICE VISIT (OUTPATIENT)
Dept: WOUND CARE | Facility: CLINIC | Age: 54
End: 2024-09-26
Payer: MEDICARE

## 2024-09-26 ENCOUNTER — OFFICE VISIT (OUTPATIENT)
Dept: CARDIOLOGY | Facility: CLINIC | Age: 54
End: 2024-09-26
Payer: MEDICARE

## 2024-09-26 VITALS
HEART RATE: 90 BPM | SYSTOLIC BLOOD PRESSURE: 114 MMHG | WEIGHT: 169.75 LBS | OXYGEN SATURATION: 97 % | DIASTOLIC BLOOD PRESSURE: 55 MMHG | BODY MASS INDEX: 31.05 KG/M2

## 2024-09-26 VITALS
HEIGHT: 62 IN | TEMPERATURE: 98 F | HEART RATE: 97 BPM | SYSTOLIC BLOOD PRESSURE: 110 MMHG | WEIGHT: 170.88 LBS | DIASTOLIC BLOOD PRESSURE: 57 MMHG | BODY MASS INDEX: 31.45 KG/M2

## 2024-09-26 VITALS
DIASTOLIC BLOOD PRESSURE: 55 MMHG | SYSTOLIC BLOOD PRESSURE: 114 MMHG | HEIGHT: 62 IN | HEART RATE: 90 BPM | OXYGEN SATURATION: 97 % | WEIGHT: 169.81 LBS | BODY MASS INDEX: 31.25 KG/M2

## 2024-09-26 DIAGNOSIS — E87.70 HYPERVOLEMIA, UNSPECIFIED HYPERVOLEMIA TYPE: ICD-10-CM

## 2024-09-26 DIAGNOSIS — G47.00 INSOMNIA, UNSPECIFIED TYPE: ICD-10-CM

## 2024-09-26 DIAGNOSIS — I25.10 CORONARY ARTERY CALCIFICATION SEEN ON CT SCAN: ICD-10-CM

## 2024-09-26 DIAGNOSIS — R53.83 FATIGUE, UNSPECIFIED TYPE: ICD-10-CM

## 2024-09-26 DIAGNOSIS — K74.5 BILIARY CIRRHOSIS: Chronic | ICD-10-CM

## 2024-09-26 DIAGNOSIS — Z51.5 ENCOUNTER FOR PALLIATIVE CARE: Primary | ICD-10-CM

## 2024-09-26 DIAGNOSIS — I50.32 CHRONIC HEART FAILURE WITH PRESERVED EJECTION FRACTION: ICD-10-CM

## 2024-09-26 DIAGNOSIS — C78.02 MALIGNANT NEOPLASM METASTATIC TO LEFT LUNG: ICD-10-CM

## 2024-09-26 DIAGNOSIS — C50.912 BREAST CANCER, STAGE 4, LEFT: Primary | Chronic | ICD-10-CM

## 2024-09-26 DIAGNOSIS — I89.0 LYMPHEDEMA: ICD-10-CM

## 2024-09-26 DIAGNOSIS — C50.912 BREAST CANCER, STAGE 4, LEFT: ICD-10-CM

## 2024-09-26 DIAGNOSIS — S81.802D OPEN WOUND OF LEFT LOWER EXTREMITY, SUBSEQUENT ENCOUNTER: Primary | ICD-10-CM

## 2024-09-26 DIAGNOSIS — F41.9 ANXIETY: ICD-10-CM

## 2024-09-26 DIAGNOSIS — I70.0 AORTIC ATHEROSCLEROSIS: ICD-10-CM

## 2024-09-26 DIAGNOSIS — I35.0 NONRHEUMATIC AORTIC VALVE STENOSIS: ICD-10-CM

## 2024-09-26 PROCEDURE — 99213 OFFICE O/P EST LOW 20 MIN: CPT | Mod: PBBFAC,27,25 | Performed by: NURSE PRACTITIONER

## 2024-09-26 PROCEDURE — 99214 OFFICE O/P EST MOD 30 MIN: CPT | Mod: PBBFAC,27

## 2024-09-26 PROCEDURE — 99999 PR PBB SHADOW E&M-EST. PATIENT-LVL III: CPT | Mod: PBBFAC,,, | Performed by: NURSE PRACTITIONER

## 2024-09-26 PROCEDURE — 99999 PR PBB SHADOW E&M-EST. PATIENT-LVL IV: CPT | Mod: PBBFAC,,, | Performed by: INTERNAL MEDICINE

## 2024-09-26 PROCEDURE — 99499 UNLISTED E&M SERVICE: CPT | Mod: S$PBB,,,

## 2024-09-26 PROCEDURE — 99214 OFFICE O/P EST MOD 30 MIN: CPT | Mod: PBBFAC,25 | Performed by: INTERNAL MEDICINE

## 2024-09-26 PROCEDURE — 99999 PR PBB SHADOW E&M-EST. PATIENT-LVL IV: CPT | Mod: PBBFAC,,,

## 2024-09-26 PROCEDURE — 11042 DBRDMT SUBQ TIS 1ST 20SQCM/<: CPT | Mod: PBBFAC

## 2024-09-26 PROCEDURE — 11042 DBRDMT SUBQ TIS 1ST 20SQCM/<: CPT | Mod: S$PBB,,,

## 2024-09-26 NOTE — PROCEDURES
"Debridement    Date/Time: 9/26/2024 1:00 PM    Performed by: Rani Gregorio PA-C  Authorized by: Rani Gregorio PA-C    Time out: Immediately prior to procedure a "time out" was called to verify the correct patient, procedure, equipment, support staff and site/side marked as required.    Consent Done?:  Yes (Written)  Local anesthesia used?: Yes    Local anesthetic:  Topical anesthetic (Topical 4% Lidocaine Hydrochloride)    Wound Details:    Location:  Left leg    Type of Debridement:  Excisional       Length (cm):  1.7       Area (sq cm):  2.21       Width (cm):  1.3       Percent Debrided (%):  100       Depth (cm):  0.1       Total Area Debrided (sq cm):  2.21    Depth of debridement:  Subcutaneous tissue    Tissue debrided:  Subcutaneous    Devitalized tissue debrided:  Biofilm, Exudate, Fibrin and Slough    Instruments:  Curette  Bleeding:  Minimal  Hemostasis Achieved: Yes  Method Used:  Pressure  Patient tolerance:  Patient tolerated the procedure well with no immediate complications    "

## 2024-09-26 NOTE — PROGRESS NOTES
Subjective:       Patient ID: Shikha López is a 54 y.o. female.    Chief Complaint: Wound Check      Patient presents for a re-evaluation of a left leg wound. Pt reports this started on 8/21/24 when a cupcake vincent cut her leg. Pt went to urgent care for this on 8/25. She was dressing her wounds with antibiotic ointment and a colloidal patch. She was prescribed bactroban and bactrim. She messaged ID on 8/26 regarding an appointment. She was referred to wound care. Pt was dressing her wound with bactrim and covering with a bandaid. Pt has a history of leg edema. Admits compliance with lasix. She reports being told to wear compression stockings daily but has not been able to find any that fit her. No complaints at this time. Denies fever, chills, erythema, warmth, purulent drainage, or pain.        4/22/4 venous ultrasound:  No evidence of deep venous thrombosis in either lower extremity.  Findings suggestive of complex 4.2 x 0.8 x 2.5 cm right popliteal fossa cyst.      Review of Systems   Constitutional:  Negative for activity change, chills, diaphoresis, fatigue and fever.   Respiratory:  Negative for apnea, chest tightness and shortness of breath.    Cardiovascular:  Positive for leg swelling. Negative for chest pain and palpitations.   Musculoskeletal:  Negative for gait problem and joint swelling.   Skin:  Positive for wound. Negative for color change, pallor and rash.   Neurological:  Negative for syncope, weakness and numbness.   Psychiatric/Behavioral:  Negative for agitation. The patient is not nervous/anxious.    All other systems reviewed and are negative.      Objective:      Physical Exam  Vitals reviewed.   Constitutional:       General: She is not in acute distress.     Appearance: Normal appearance.   Cardiovascular:      Pulses: Normal pulses.   Pulmonary:      Effort: No respiratory distress.   Musculoskeletal:         General: No swelling.      Right lower leg: Edema present.      Left lower  leg: Edema present.   Skin:     General: Skin is warm and dry.      Capillary Refill: Capillary refill takes less than 2 seconds.      Findings: Wound present. No erythema.          Neurological:      General: No focal deficit present.      Mental Status: She is alert and oriented to person, place, and time.   Psychiatric:         Mood and Affect: Mood normal.         Behavior: Behavior normal.         Thought Content: Thought content normal.         Judgment: Judgment normal.         Assessment:       1. Open wound of left lower extremity, subsequent encounter    2. Lymphedema    3. Chronic heart failure with preserved ejection fraction    4. Breast cancer, stage 4, left    5. Malignant neoplasm metastatic to left lung           Wound Traumatic Left lower;lateral Leg (Active)       Present on Original Admission:    Primary Wound Type: Traumatic   Side: Left   Orientation: lower;lateral   Location: Leg   Wound Approximate Age at First Assessment (Weeks):    Wound Number:    Is this injury device related?:    Incision Type:    Closure Method:    Wound Description (Comments):    Type:    Additional Comments:    Ankle-Brachial Index:    Pulses:    Removal Indication and Assessment:    Wound Outcome:    Wound Image    09/26/24 1306   Dressing Appearance Dry;Intact;Clean;Dried drainage 09/26/24 1306   Drainage Amount Large 09/26/24 1306   Drainage Characteristics/Odor Serosanguineous 09/26/24 1306   Red (%), Wound Tissue Color 50 % 09/26/24 1306   Yellow (%), Wound Tissue Color 50 % 09/26/24 1306   Periwound Area Intact;Pink;Edematous 09/26/24 1306   Wound Length (cm) 1.7 cm 09/26/24 1306   Wound Width (cm) 1.3 cm 09/26/24 1306   Wound Depth (cm) 0.1 cm 09/26/24 1306   Wound Volume (cm^3) 0.221 cm^3 09/26/24 1306   Wound Surface Area (cm^2) 2.21 cm^2 09/26/24 1306   Care Cleansed with:;Soap and water 09/26/24 1306   Dressing Applied;Calcium alginate;Absorptive Pad;Compression wrap;Other (comment) 09/26/24 1306    Periwound Care Skin barrier film applied 09/26/24 1306   Compression Unna's Boot;Three layer compression 09/26/24 1306       Shikha was seen in the clinic room and placed in the supine position on the treatment table.  The dressing was removed and the area was cleansed with Easi-clense sponges and dried thoroughly. No odor, erythema, or warmth noted. Placed topical 4% Lidocaine Hydrochloride to wound bed for 15 minutes. Sharp debridement with 5 mm curette to remove non-viable tissue. Pt tolerated procedure well. See procedure note.      Plan of Care: Calmoseptine to periwound, vaseline impregnated gauze, aquacel, mextra pad, and a 3 layer calamine compression wrap. The patient's foot was positioned at a 90 degree angle.  A compression wrap was applied using a spiral technique avoiding creases or folds.  The wrap was started behind the first metatarsal and ended below the tibial tubercle of the knee.  There was overlap of each turn half the width of the previous turn.  The compression wrap will be changed every 7 days.           Plan:       Left lower extremity was dressed as detailed above.  Patient was instructed to not get the dressings wet and to use cast covers for showering.  Should the dressing become wet, she is to remove it, place a moist dressing over the wound, cover with gauze and roll gauze and to secure bandages.  She should then notify this office as soon as possible to have a new dressing applied.  Instructed patient to remove wrap if she notices tingling, pain, swelling, or coldness to her left lower extremity or if her toes become white, blue, or cold.    Discussed nutrition and the role of protein in wound healing with the patient. Instructed patient to optimize protein for wound healing.     Discussed lower extremity edema with patient. Instructed patient to elevate legs whenever sedentary, follow a low sodium diet, and to take lasix as prescribed.    Will give patient a prescription for  compression stockings once wound heals.     Written and verbal instructions given to patient  RTC in 1 week      Rani Gregorio PA-C

## 2024-09-26 NOTE — PROGRESS NOTES
Consult Note  Palliative Care      Consult Requested By: No ref. provider found  Reason for Consult: symptom mgmt/ACP      ASSESSMENT/PLAN:     Plan/Recommendations:    09/26/2024:  - no changes made     Metastatic breast cancer  - following with Dr. Willis & NP [place]   - ID 2006, chemo, s/p radical mastectomy (2007), s/p post-op RT, herceptin x 3.5 years until progression in lung mass, cycled through myriad of chemo tx with progression on July 2020 PET, 9 months of continued therapy w eventual progression, stable on 4/2024 CT  - currently ENHERTU on 2 years    Encounter for palliative care  - Patient is decisional  - Patient accompanied today by self  - ACP documents are not uploaded into EMR   - Philosophy of Palliative Medicine reviewed with patient and family at first visit  - New patient folder given to and reviewed with patient and family at first visit  - Goals of care:  Patient lives alone with her 2 dogs and 2 cats. She is fully independent with no care needs of any kind. Her daughter Samra Jaeger ([id number]) is her support system. Nearby, her mother is living with cognitive/health issues and pt anticipates having to care for her mother FT within a year. Pt also has a sister who is an  and will help her with getting her affairs in order, including HCPOA. A booklet is provided and reviewed today. We will complete at future visit. Ms. Trivedi shares that she was initially diagnosed with cancer 17 years ago. It has been a long journey for her. She found strength in setting goals along the way. Seeing her daughter graduate, , start her career - each milestone has motivated her to continue. Her daughter is now pregnant and due in December 2023, her goal is to meet her grandson. Ms. 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 Enjoys attending [employer], going to the movies, working in the garden, reading and baking, going to [employer], farmer's  markets, etc. No spiritual/rob, declines .    Cancer associated fatigue  - treatment-associated  - exacerbated by heat (she does not have AC in car, runs only 1 window unit in her house 2/2 cost)  - currently on ritalin, which helps  - she is ECOG 0  - will continue to monitor     Edema   - BLE  - follows with cardiologist  - controls with daily lasix, will hold doses on a busy day where it will be difficult to use a bathroom frequently  - using incontinence supplies though they are difficult for her to afford     Understanding of illness: Excellent     Goals of care: preserve qol, independence, activity tolerance    Follow up: January 2025    Patient's encounter and above plan of care discussed with patient's oncology team.     SUBJECTIVE:     History of Present Illness:  Patient is a 54 y.o. year old female presenting with metastatic breast cancer. Please see oncology notes for full oncologic history and treatment course.      09/26/2024  JOSSUE ELIZALDE reviewed: no concerns    Patient presents clinic follow up alone. Overall doing well, no new complaints. Still using lasix for edema, requiring incontinence supplies but they've been v costly for her, will send message to MSW re financial assistance. Has lost significant amount of fluid weight, her clothing no longer fits. Still on treatment break, next step will be determined based on hepatology visit in December. She is glad to be off treatments, and is not concerned bc she has been off of tx previously x 6 months and there was no change to her disease status. She appreciates her improved functional status. Reviewed ACP packet, patient says she would prefer her daughter as HCPOA but prefers to discuss with her first, she will review packet with her daughter at home and complete for future visit. Is moving forward with plan to move out of her mother's house.     07/11/2024:  JOSSUE ELIZALDE reviewed: no concerns    Patient presents alone via virtual visit. Interval  updates include: hospital admission 4/9-4/16 for GIB and TIPS placement; again from 4/21-4/26 for cellulitis of foot; again 5/16-5/21 for symptomatic anemia; again from 5/25-5/29 for acute encephalopathy. She is feeling much better since her most recent hospitalization. She recounts being found on her mother's couch confused and mostly unable to remember her first two admission days. She says she brought this upon herself since she has not always been compliant with her medications but this was a wake-up call. She can feel unsteady on her feet if she walks too fast, but otherwise feels close to baseline. She is still caring for her mother full-time, this burden is significant. Her mother finally sees a neurologist 8/20 and pt hopes the decision to place her will be made at this time. She plans to move home end of August no matter the outcome, she does have other family that can step in to provide more care. She has a close friend she speaks with frequently, she's also been enjoying time with daughter and grandson, saying she needs to 'stick around' for him. She does not want to resume treatment until she feels entirely back to previous baseline. Next oncology f/u is next week. Next pall care f/u in person, per pt request.       04/04/2024:  JOSSUE ELIZALDE reviewed: no concerns    Patient presents to virtual follow-up alone. She is overall stable but does have a significant increase in personal stressors mostly r/t her mother's recent diagnosis of dementia. She is now staying in her mother's home and acting as primary caretaker though does have some support with hired sitter. Pt's repeat CT on 3/11/24 was stable though did reveal portal hypertension, pt is scheduled to see a hepatologist.      01/04/2024:  JOSSUE ELIZALDE reviewed: no concerns    Patient presents to clinic follow-up alone. She is doing well from a symptom standpoint, she does report severe anxiety which is secondary to several family-members being sick in the month  "of December. Her first grandson was born, she spends time with him as much as possible and this brings her harley. Denies new or worsening symptoms. RTC in 3 months, pt instructed to contact clinic if interim needs arise.     10/23/2023:  JOSSUE ELIZALDE reviewed: no concerns    Patient presents to f/u clinic visit alone. She reports some increased fatigue and says she "gets a lot of mucus at night" for a few days after treatment. She is otherwise doing very well. She is busy planning her daughter's elaborate baby shower which will be held in November. She has been coordinating the party with her sister. She acknowledges how meaningful this is for her since this will be her only opportunity to throw a baby shower for her daughter, and this will be the only grandchild she will meet. Next CT 12/15.    08/24/2023:  JOSSUE ELIZALDE reviewed: no concerns    Patient presents to initial clinic visit alone, she is extremely pleasant with few complaints. She does have some lower extremity edema for which she uses lasix. She skips doses on days where she will be away from home or running errands and in less frequent contact with a bathroom. She uses ritalin for fatigue and that works well for her. She shares that her cancer journey started 17 years ago and each step of the way, she has used various milestones to keep her motivated. She has seen her daughter graduate,  and start her career and now looks forward to meeting her first grandchild in December. We review ACP booklet today. Her sister, who is an , was planning to complete ACP w her. She will talk to her sister and we will discuss at future visit.     Past Medical History:   Diagnosis Date    Acute encephalopathy 05/25/2024    Aortic atherosclerosis 07/06/2023    Breast cancer     Coronary artery calcification seen on CT scan 09/19/2024    Esophageal and gastric varices 06/23/2023    Malignant neoplasm metastatic to left lung 06/08/2021     Past Surgical History:   Procedure " Laterality Date    ENDOSCOPIC ULTRASOUND OF UPPER GASTROINTESTINAL TRACT N/A 6/27/2023    Procedure: ULTRASOUND, UPPER GI TRACT, ENDOSCOPIC;  Surgeon: Home Lopez MD;  Location: [facility] ENDO (2ND FLR);  Service: Endoscopy;  Laterality: N/A;    ENDOSCOPIC ULTRASOUND OF UPPER GASTROINTESTINAL TRACT N/A 1/12/2024    Procedure: ULTRASOUND, UPPER GI TRACT, ENDOSCOPIC;  Surgeon: Home Lopez MD;  Location: [facility] ENDO (2ND FLR);  Service: Endoscopy;  Laterality: N/A;  11/28/23-Instructions via portal-DS  1/8-lvm for precall-MS  1/10-precall complete-Kpvt    ESOPHAGOGASTRODUODENOSCOPY N/A 6/23/2023    Procedure: EGD (ESOPHAGOGASTRODUODENOSCOPY);  Surgeon: Quintin Mooney MD;  Location: [facility] ENDO (2ND FLR);  Service: Endoscopy;  Laterality: N/A;    ESOPHAGOGASTRODUODENOSCOPY N/A 6/27/2023    Procedure: EGD (ESOPHAGOGASTRODUODENOSCOPY);  Surgeon: Home Lopez MD;  Location: [facility] (2ND FLR);  Service: Endoscopy;  Laterality: N/A;    ESOPHAGOGASTRODUODENOSCOPY N/A 8/3/2023    Procedure: EGD (ESOPHAGOGASTRODUODENOSCOPY);  Surgeon: Home Lopez MD;  Location: [facility] ENDO (2ND FLR);  Service: Endoscopy;  Laterality: N/A;  instr portal-labs 6/28/23-tb    ESOPHAGOGASTRODUODENOSCOPY N/A 1/12/2024    Procedure: EGD (ESOPHAGOGASTRODUODENOSCOPY);  Surgeon: Home Lopez MD;  Location: [facility] ENDO (2ND FLR);  Service: Endoscopy;  Laterality: N/A;    ESOPHAGOGASTRODUODENOSCOPY N/A 4/10/2024    Procedure: EGD (ESOPHAGOGASTRODUODENOSCOPY);  Surgeon: Ze Anderson MD;  Location: [facility] ENDO (2ND FLR);  Service: Endoscopy;  Laterality: N/A;    MASTECTOMY       Family History   Problem Relation Name Age of Onset    Lung cancer Father       Review of patient's allergies indicates:  No Known Allergies    Medications:    Current Outpatient Medications:     diaper,brief,adult,disposable Misc, 1 Units by Misc.(Non-Drug; Combo Route) route 4 (four) times daily., Disp: 120 each, Rfl: 4    diaper,brief,adult,disposable Misc, 1 Units by  Misc.(Non-Drug; Combo Route) route 4 (four) times daily., Disp: 125 each, Rfl: 4    empagliflozin (JARDIANCE) 10 mg tablet, Take 1 tablet (10 mg total) by mouth once daily., Disp: 30 tablet, Rfl: 11    ferrous sulfate (FEROSUL) 325 mg (65 mg iron) Tab tablet, Take 1 tablet by mouth daily with Vitamin C on an empty stomach, Disp: 60 tablet, Rfl: 3    furosemide (LASIX) 80 MG tablet, Take 1 tablet (80 mg total) by mouth once daily. If you are gaining weight (2 lbs in 24 hours or 3 lbs in two days) may take an extra dose of 80 mg, Disp: 90 tablet, Rfl: 2    incontinence pad, liner, disp Pads, 1 Units by Misc.(Non-Drug; Combo Route) route 4 (four) times daily., Disp: 125 each, Rfl: 4    lactulose (CHRONULAC) 10 gram/15 mL solution, Take 30 mLs (20 g total) by mouth 3 (three) times daily., Disp: 2700 mL, Rfl: 11    methylphenidate HCl (RITALIN) 5 MG tablet, Take 1 tablet (5 mg total) by mouth 2 (two) times daily., Disp: 60 tablet, Rfl: 0    mupirocin (BACTROBAN) 2 % ointment, Apply topically 3 (three) times daily., Disp: 22 g, Rfl: 0    pantoprazole (PROTONIX) 40 MG tablet, Take 1 tablet (40 mg total) by mouth 2 (two) times daily., Disp: 180 tablet, Rfl: 0    rifAXIMin (XIFAXAN) 550 mg Tab, Take 1 tablet (550 mg total) by mouth 2 (two) times daily., Disp: 60 tablet, Rfl: 3    spironolactone (ALDACTONE) 100 MG tablet, Take 1 tablet (100 mg total) by mouth once daily., Disp: 30 tablet, Rfl: 11    triamcinolone acetonide 0.5% (KENALOG) 0.5 % Crea, Apply topically 2 (two) times daily., Disp: 454 g, Rfl: 0    OBJECTIVE:       ROS:  Review of Systems   Constitutional:  Positive for fatigue. Negative for activity change and appetite change.   HENT:  Negative for congestion, dental problem and drooling.    Eyes:  Negative for pain, discharge and itching.   Respiratory:  Positive for cough. Negative for choking and wheezing.    Cardiovascular:  Positive for leg swelling.   Gastrointestinal:  Negative for constipation, diarrhea,  nausea and vomiting.   Genitourinary:  Negative for difficulty urinating, dyspareunia and dysuria.   Musculoskeletal:  Negative for arthralgias, back pain and gait problem.   Skin:  Negative for pallor, rash and wound.   Neurological:  Positive for weakness. Negative for dizziness, facial asymmetry and headaches.   Psychiatric/Behavioral:  Positive for sleep disturbance. Negative for dysphoric mood. The patient is not nervous/anxious.        Review of Symptoms      Symptom Assessment (ESAS 0-10 Scale)  Pain:  0  Dyspnea:  0  Anxiety:  8  Nausea:  0  Depression:  0  Anorexia:  0  Fatigue:  5  Insomnia:  0  Restlessness:  0  Agitation:  0     CAM / Delirium:  Negative  Constipation:  Negative  Diarrhea:  Negative    Anxiety:  Is not nervous/anxious  Constipation:  No constipation    Bowel Management Plan (BMP):  No      Pain Assessment:  OME in 24 hours:  0  Location(s): none      Modified Hunter Scale:  0    ECOG Performance Status stGstrstastdstest:st st1st Living Arrangements:  Lives with family    Psychosocial/Cultural:   See Palliative Psychosocial Note: No  **Primary  to Follow**  Palliative Care  Consult: No     Time-Based Charting:  No      Advance Care Planning   Advance Directives:   Living Will: No        Oral Declaration: No    LaPOST: No    Do Not Resuscitate Status: No        Oral Declaration: No      Decision Making:  Patient answered questions  Goals of Care: What is most important right now is to focus on remaining as independent as possible, symptom/pain control, curative/life-prolongation (regardless of treatment burdens), comfort and QOL . Accordingly, we have decided that the best plan to meet the patient's goals includes continuing with treatment.        Physical Exam:  Vitals:    Vitals:    09/26/24 1104   BP: (!) 114/55   Pulse: 90        Physical Exam  Vitals reviewed.   Constitutional:       General: She is not in acute distress.     Appearance: Normal appearance. She is not  ill-appearing, toxic-appearing or diaphoretic.   HENT:      Head: Atraumatic.      Right Ear: External ear normal.      Left Ear: External ear normal.      Nose: Nose normal.      Mouth/Throat:      Dentition: Abnormal dentition.   Eyes:      General:         Right eye: No discharge.         Left eye: No discharge.      Extraocular Movements: Extraocular movements intact.      Conjunctiva/sclera: Conjunctivae normal.   Pulmonary:      Effort: Pulmonary effort is normal. No respiratory distress.      Breath sounds: No stridor.   Musculoskeletal:         General: Normal range of motion.      Cervical back: Normal range of motion.   Skin:     General: Skin is warm and dry.      Coloration: Skin is not jaundiced.   Neurological:      Mental Status: She is alert and oriented to person, place, and time. Mental status is at baseline.      Motor: No weakness.   Psychiatric:         Behavior: Behavior normal.         Thought Content: Thought content normal.         Judgment: Judgment normal.         Labs:  CBC:   WBC   Date Value Ref Range Status   07/22/2024 4.29 3.90 - 12.70 K/uL Final     Hemoglobin   Date Value Ref Range Status   07/22/2024 13.5 12.0 - 16.0 g/dL Final     Hematocrit   Date Value Ref Range Status   07/22/2024 41.6 37.0 - 48.5 % Final     MCV   Date Value Ref Range Status   07/22/2024 106 (H) 82 - 98 fL Final     Platelets   Date Value Ref Range Status   07/22/2024 117 (L) 150 - 450 K/uL Final       LFT:   Lab Results   Component Value Date    AST 74 (H) 07/22/2024    ALKPHOS 157 (H) 07/22/2024    BILITOT 4.2 (H) 07/22/2024       Albumin:   Albumin   Date Value Ref Range Status   07/22/2024 2.5 (L) 3.5 - 5.2 g/dL Final     Protein:   Total Protein   Date Value Ref Range Status   07/22/2024 6.4 6.0 - 8.4 g/dL Final       Radiology:I have reviewed all pertinent imaging results/findings within the past 24 hours.    08/15/2024 CT chest: Impression:     1. Right upper lobe and infrahilar left lower lobe nodules  are minimally bulkier compared to the prior study.  These remain suspicious for metastatic lesions.  2. Residual scattered ground-glass opacities bilaterally, improved in interval.  Resolution of the previously seen effusions.  3. Additional stable findings, as described above.    04/29/2024 CT CAP: Impression:     History of breast cancer.  Surgical change of the left breast and bilateral axilla.  Borderline enlarged right axillary lymph node, similar to prior.     Decreased size of a left periaortic lymph node.     Borderline enlarged inguinal lymph nodes, similar to prior, perhaps reactive.     Unchanged bilateral irregular pulmonary nodules/opacities as previously noted     Hepatic steatosis with sequela of portal hypertension including splenomegaly and gastric and esophageal varices with embolization coils about the GE junction.  There is a patent TIPS.     Unchanged subcentimeter hypodensity in the pancreatic head.     Diffuse body wall anasarca.     Cholelithiasis.           Signature: Lizbeth Mehta, DNP

## 2024-09-30 ENCOUNTER — DOCUMENTATION ONLY (OUTPATIENT)
Dept: HEMATOLOGY/ONCOLOGY | Facility: CLINIC | Age: 54
End: 2024-09-30
Payer: MEDICARE

## 2024-09-30 NOTE — PROGRESS NOTES
GIOVANNI received referral for incontinence supplies. GIOVANNI contacted the patient to discuss the referral. GIOVANNI informed the patient that she has an application on file with GERALDO and she could contact Krissy to request additional supplies. GIOVANNI provided the patient with GERALDO's number as well as provided the patient with GIOVANNI contact information. SW answered and addressed all questions and concerns. Patient verbalized understanding. SW continues to be available as needed.     ELSIE Wilkes, [facility]  Oncology Social Worker   [facility] - Oncology  [phone]

## 2024-10-03 ENCOUNTER — OFFICE VISIT (OUTPATIENT)
Dept: WOUND CARE | Facility: CLINIC | Age: 54
End: 2024-10-03
Payer: MEDICARE

## 2024-10-03 ENCOUNTER — LAB VISIT (OUTPATIENT)
Dept: LAB | Facility: HOSPITAL | Age: 54
End: 2024-10-03
Attending: INTERNAL MEDICINE
Payer: MEDICARE

## 2024-10-03 VITALS
TEMPERATURE: 98 F | WEIGHT: 171.75 LBS | SYSTOLIC BLOOD PRESSURE: 108 MMHG | DIASTOLIC BLOOD PRESSURE: 61 MMHG | BODY MASS INDEX: 31.6 KG/M2 | HEIGHT: 62 IN | HEART RATE: 84 BPM

## 2024-10-03 DIAGNOSIS — S81.802D OPEN WOUND OF LEFT LOWER EXTREMITY, SUBSEQUENT ENCOUNTER: Primary | ICD-10-CM

## 2024-10-03 DIAGNOSIS — C78.02 MALIGNANT NEOPLASM METASTATIC TO LEFT LUNG: ICD-10-CM

## 2024-10-03 DIAGNOSIS — I89.0 LYMPHEDEMA: ICD-10-CM

## 2024-10-03 DIAGNOSIS — I50.32 CHRONIC HEART FAILURE WITH PRESERVED EJECTION FRACTION: ICD-10-CM

## 2024-10-03 DIAGNOSIS — C50.912 BREAST CANCER, STAGE 4, LEFT: ICD-10-CM

## 2024-10-03 DIAGNOSIS — L03.90 WOUND CELLULITIS: ICD-10-CM

## 2024-10-03 LAB
ANION GAP SERPL CALC-SCNC: 7 MMOL/L (ref 8–16)
BNP SERPL-MCNC: 159 PG/ML (ref 0–99)
BUN SERPL-MCNC: 20 MG/DL (ref 6–20)
CALCIUM SERPL-MCNC: 8.8 MG/DL (ref 8.7–10.5)
CHLORIDE SERPL-SCNC: 98 MMOL/L (ref 95–110)
CO2 SERPL-SCNC: 28 MMOL/L (ref 23–29)
CREAT SERPL-MCNC: 0.7 MG/DL (ref 0.5–1.4)
EST. GFR  (NO RACE VARIABLE): >60 ML/MIN/1.73 M^2
GLUCOSE SERPL-MCNC: 82 MG/DL (ref 70–110)
POTASSIUM SERPL-SCNC: 3.5 MMOL/L (ref 3.5–5.1)
SODIUM SERPL-SCNC: 133 MMOL/L (ref 136–145)

## 2024-10-03 PROCEDURE — 29580 STRAPPING UNNA BOOT: CPT | Mod: PBBFAC,LT

## 2024-10-03 PROCEDURE — 83880 ASSAY OF NATRIURETIC PEPTIDE: CPT | Performed by: INTERNAL MEDICINE

## 2024-10-03 PROCEDURE — 99999 PR PBB SHADOW E&M-EST. PATIENT-LVL III: CPT | Mod: PBBFAC,,,

## 2024-10-03 PROCEDURE — 36415 COLL VENOUS BLD VENIPUNCTURE: CPT | Performed by: INTERNAL MEDICINE

## 2024-10-03 PROCEDURE — 99213 OFFICE O/P EST LOW 20 MIN: CPT | Mod: PBBFAC

## 2024-10-03 PROCEDURE — 80048 BASIC METABOLIC PNL TOTAL CA: CPT | Performed by: INTERNAL MEDICINE

## 2024-10-03 NOTE — PROGRESS NOTES
Subjective:       Patient ID: Shikha López is a 54 y.o. female.    Chief Complaint: Wound Check      Patient presents for a re-evaluation of a left leg wound. Pt reports this started on 8/21/24 when a cupcake vincent cut her leg. Pt went to urgent care for this on 8/25. She was dressing her wounds with antibiotic ointment and a colloidal patch. She was prescribed bactroban and bactrim. She messaged ID on 8/26 regarding an appointment. She was referred to wound care. Pt was dressing her wound with bactrim and covering with a bandaid. Pt has a history of leg edema. Admits compliance with lasix. She reports being told to wear compression stockings daily but has not been able to find any that fit her. No complaints at this time. Denies fever, chills, erythema, warmth, purulent drainage, or pain.        4/22/4 venous ultrasound:  No evidence of deep venous thrombosis in either lower extremity.  Findings suggestive of complex 4.2 x 0.8 x 2.5 cm right popliteal fossa cyst.      Review of Systems   Constitutional:  Negative for activity change, chills, diaphoresis, fatigue and fever.   Respiratory:  Negative for apnea, chest tightness and shortness of breath.    Cardiovascular:  Positive for leg swelling. Negative for chest pain and palpitations.   Musculoskeletal:  Negative for gait problem and joint swelling.   Skin:  Positive for wound. Negative for color change, pallor and rash.   Neurological:  Negative for syncope, weakness and numbness.   Psychiatric/Behavioral:  Negative for agitation. The patient is not nervous/anxious.    All other systems reviewed and are negative.      Objective:      Physical Exam  Vitals reviewed.   Constitutional:       General: She is not in acute distress.     Appearance: Normal appearance.   Cardiovascular:      Pulses: Normal pulses.   Pulmonary:      Effort: No respiratory distress.   Musculoskeletal:         General: No swelling.      Right lower leg: Edema present.      Left lower  leg: Edema present.   Skin:     General: Skin is warm and dry.      Capillary Refill: Capillary refill takes less than 2 seconds.      Findings: Wound present. No erythema.          Neurological:      General: No focal deficit present.      Mental Status: She is alert and oriented to person, place, and time.   Psychiatric:         Mood and Affect: Mood normal.         Behavior: Behavior normal.         Thought Content: Thought content normal.         Judgment: Judgment normal.         Assessment:       1. Open wound of left lower extremity, subsequent encounter    2. Lymphedema    3. Chronic heart failure with preserved ejection fraction    4. Breast cancer, stage 4, left    5. Malignant neoplasm metastatic to left lung           Wound Traumatic Left lower;lateral Leg (Active)       Present on Original Admission:    Primary Wound Type: Traumatic   Side: Left   Orientation: lower;lateral   Location: Leg   Wound Approximate Age at First Assessment (Weeks):    Wound Number:    Is this injury device related?:    Incision Type:    Closure Method:    Wound Description (Comments):    Type:    Additional Comments:    Ankle-Brachial Index:    Pulses:    Removal Indication and Assessment:    Wound Outcome:    Dressing Appearance Dry;Intact;Clean;Moist drainage 10/03/24 0952   Drainage Amount Large 10/03/24 0952   Drainage Characteristics/Odor Serosanguineous;Green 10/03/24 0952   Red (%), Wound Tissue Color 100 % 10/03/24 0952   Periwound Area Intact;Edematous;Pink 10/03/24 0952   Wound Length (cm) 1.3 cm 10/03/24 0952   Wound Width (cm) 1.2 cm 10/03/24 0952   Wound Depth (cm) 0.1 cm 10/03/24 0952   Wound Volume (cm^3) 0.156 cm^3 10/03/24 0952   Wound Surface Area (cm^2) 1.56 cm^2 10/03/24 0952   Care Cleansed with:;Soap and water 10/03/24 0952   Dressing Applied;Calcium alginate;Silver;Compression wrap;Other (comment);Absorptive Pad 10/03/24 0952   Periwound Care Skin barrier film applied 10/03/24 0952   Compression Unna's  Boot;Three layer compression 10/03/24 0952       Shikha was seen in the clinic room and placed in the supine position on the treatment table.  The dressing was removed and the area was cleansed with Easi-clense sponges and dried thoroughly. No odor, erythema, or warmth noted. Green drainage noted. Cleansed wounds with acetic acid (1:1 with water) to treat pseudomonas.      Plan of Care: Calmoseptine to periwound, vaseline impregnated gauze, aquacel Ag, mextra pad, and a 3 layer calamine compression wrap. The patient's foot was positioned at a 90 degree angle.  A compression wrap was applied using a spiral technique avoiding creases or folds.  The wrap was started behind the first metatarsal and ended below the tibial tubercle of the knee.  There was overlap of each turn half the width of the previous turn.  The compression wrap will be changed every 7 days.           Plan:       Left lower extremity was dressed as detailed above.  Patient was instructed to not get the dressings wet and to use cast covers for showering.  Should the dressing become wet, she is to remove it, place a moist dressing over the wound, cover with gauze and roll gauze and to secure bandages.  She should then notify this office as soon as possible to have a new dressing applied.  Instructed patient to remove wrap if she notices tingling, pain, swelling, or coldness to her left lower extremity or if her toes become white, blue, or cold.    Discussed nutrition and the role of protein in wound healing with the patient. Instructed patient to optimize protein for wound healing.     Discussed lower extremity edema with patient. Instructed patient to elevate legs whenever sedentary, follow a low sodium diet, and to take lasix as prescribed.    Will give patient a prescription for compression stockings once wound heals.     Discussed cellulitis- treated topically with aquacel Ag and acetic acid.    Written and verbal instructions given to patient  RTC  in 1 week      Rani Gregorio PA-C

## 2024-10-10 ENCOUNTER — OFFICE VISIT (OUTPATIENT)
Dept: WOUND CARE | Facility: CLINIC | Age: 54
End: 2024-10-10
Payer: MEDICARE

## 2024-10-10 VITALS
WEIGHT: 168 LBS | SYSTOLIC BLOOD PRESSURE: 120 MMHG | BODY MASS INDEX: 30.91 KG/M2 | HEART RATE: 94 BPM | DIASTOLIC BLOOD PRESSURE: 56 MMHG | HEIGHT: 62 IN | OXYGEN SATURATION: 96 %

## 2024-10-10 DIAGNOSIS — Z87.828 HEALED WOUND: ICD-10-CM

## 2024-10-10 DIAGNOSIS — I89.0 LYMPHEDEMA: Primary | ICD-10-CM

## 2024-10-10 DIAGNOSIS — R60.0 LEG EDEMA: ICD-10-CM

## 2024-10-10 PROCEDURE — 99999 PR PBB SHADOW E&M-EST. PATIENT-LVL III: CPT | Mod: PBBFAC,,,

## 2024-10-10 PROCEDURE — 99213 OFFICE O/P EST LOW 20 MIN: CPT | Mod: PBBFAC

## 2024-10-10 PROCEDURE — 99214 OFFICE O/P EST MOD 30 MIN: CPT | Mod: S$PBB,,,

## 2024-10-10 NOTE — PROGRESS NOTES
Subjective:       Patient ID: Shikha López is a 54 y.o. female.    Chief Complaint: Wound Check      Patient presents for a re-evaluation of a left leg wound. Pt reports this started on 8/21/24 when a cupcake vincent cut her leg. Pt went to urgent care for this on 8/25. She was dressing her wounds with antibiotic ointment and a colloidal patch. She was prescribed bactroban and bactrim. She messaged ID on 8/26 regarding an appointment. She was referred to wound care. Pt was dressing her wound with bactrim and covering with a bandaid. Pt has a history of leg edema. Admits compliance with lasix. She reports being told to wear compression stockings daily but has not been able to find any that fit her. No complaints at this time. Denies fever, chills, erythema, warmth, purulent drainage, or pain.        4/22/4 venous ultrasound:  No evidence of deep venous thrombosis in either lower extremity.  Findings suggestive of complex 4.2 x 0.8 x 2.5 cm right popliteal fossa cyst.      Review of Systems   Constitutional:  Negative for activity change, chills, diaphoresis, fatigue and fever.   Respiratory:  Negative for apnea, chest tightness and shortness of breath.    Cardiovascular:  Positive for leg swelling. Negative for chest pain and palpitations.   Musculoskeletal:  Negative for gait problem and joint swelling.   Skin:  Positive for wound. Negative for color change, pallor and rash.   Neurological:  Negative for syncope, weakness and numbness.   Psychiatric/Behavioral:  Negative for agitation. The patient is not nervous/anxious.    All other systems reviewed and are negative.      Objective:      Physical Exam  Vitals reviewed.   Constitutional:       General: She is not in acute distress.     Appearance: Normal appearance.   Cardiovascular:      Pulses: Normal pulses.   Pulmonary:      Effort: No respiratory distress.   Musculoskeletal:         General: No swelling.      Right lower leg: Edema present.      Left lower  leg: Edema present.   Skin:     General: Skin is warm and dry.      Capillary Refill: Capillary refill takes less than 2 seconds.      Findings: No erythema or wound.          Neurological:      General: No focal deficit present.      Mental Status: She is alert and oriented to person, place, and time.   Psychiatric:         Mood and Affect: Mood normal.         Behavior: Behavior normal.         Thought Content: Thought content normal.         Judgment: Judgment normal.         Assessment:       1. Lymphedema    2. Leg edema    3. Healed wound           Wound Traumatic Left lower;lateral Leg (Active)       Present on Original Admission:    Primary Wound Type: Traumatic   Side: Left   Orientation: lower;lateral   Location: Leg   Wound Approximate Age at First Assessment (Weeks):    Wound Number:    Is this injury device related?:    Incision Type:    Closure Method:    Wound Description (Comments):    Type:    Additional Comments:    Ankle-Brachial Index:    Pulses:    Removal Indication and Assessment:    Wound Outcome:    Wound Image   10/10/24 0908   Dressing Appearance Dry;Intact;Clean;Moist drainage 10/10/24 0908   Drainage Amount Large 10/10/24 0908   Drainage Characteristics/Odor Serosanguineous 10/10/24 0908   Care Cleansed with:;Soap and water 10/10/24 0908       Shikha was seen in the clinic room and placed in the supine position on the treatment table.  The dressing was removed and the area was cleansed with Easi-clense sponges and dried thoroughly. No odor, erythema, or warmth noted. No open wound noted.      Plan of Care: Compression stockings daily          Plan:       No open wounds noted.  Precautions given to prevent wounds from re-opening. Discussed how area is extremely fragile. Protect area from friction, wash area gently, and pat dry. If the wound should re-open, instructed patient to contact the office.    Discussed bilateral lower extremity edema. Instructed patient to utilize compression  stockings to bilateral lower extremities. Place on first thing in the morning and remove before bed.    Gave patient a prescription for compression stockings.    Written and verbal instructions given to patient  RTC PRN      Rani Gregorio PA-C

## 2024-10-15 ENCOUNTER — OUTPATIENT CASE MANAGEMENT (OUTPATIENT)
Dept: ADMINISTRATIVE | Facility: OTHER | Age: 54
End: 2024-10-15
Payer: MEDICARE

## 2024-10-22 DIAGNOSIS — K75.81 LIVER CIRRHOSIS SECONDARY TO NASH: Primary | ICD-10-CM

## 2024-10-22 DIAGNOSIS — K74.60 LIVER CIRRHOSIS SECONDARY TO NASH: Primary | ICD-10-CM

## 2024-10-22 DIAGNOSIS — K76.82 HEPATIC ENCEPHALOPATHY: ICD-10-CM

## 2024-10-23 ENCOUNTER — PATIENT MESSAGE (OUTPATIENT)
Dept: GASTROENTEROLOGY | Facility: CLINIC | Age: 54
End: 2024-10-23
Payer: MEDICARE

## 2024-10-23 DIAGNOSIS — R41.840 LACK OF CONCENTRATION: ICD-10-CM

## 2024-10-23 RX ORDER — METHYLPHENIDATE HYDROCHLORIDE 5 MG/1
5 TABLET ORAL 2 TIMES DAILY
Qty: 60 TABLET | Refills: 0 | Status: SHIPPED | OUTPATIENT
Start: 2024-10-23

## 2024-10-29 DIAGNOSIS — K92.2 GASTROINTESTINAL HEMORRHAGE, UNSPECIFIED GASTROINTESTINAL HEMORRHAGE TYPE: ICD-10-CM

## 2024-10-30 RX ORDER — PANTOPRAZOLE SODIUM 40 MG/1
40 TABLET, DELAYED RELEASE ORAL 2 TIMES DAILY
Qty: 180 TABLET | Refills: 0 | Status: SHIPPED | OUTPATIENT
Start: 2024-10-30 | End: 2025-01-29

## 2024-10-31 ENCOUNTER — OFFICE VISIT (OUTPATIENT)
Dept: CARDIOLOGY | Facility: CLINIC | Age: 54
End: 2024-10-31
Payer: MEDICARE

## 2024-10-31 VITALS
BODY MASS INDEX: 33.84 KG/M2 | OXYGEN SATURATION: 99 % | SYSTOLIC BLOOD PRESSURE: 117 MMHG | WEIGHT: 183.88 LBS | HEART RATE: 93 BPM | HEIGHT: 62 IN | DIASTOLIC BLOOD PRESSURE: 56 MMHG

## 2024-10-31 DIAGNOSIS — I50.32 CHRONIC HEART FAILURE WITH PRESERVED EJECTION FRACTION: ICD-10-CM

## 2024-10-31 DIAGNOSIS — I25.10 CORONARY ARTERY CALCIFICATION SEEN ON CT SCAN: ICD-10-CM

## 2024-10-31 DIAGNOSIS — I70.0 AORTIC ATHEROSCLEROSIS: Primary | ICD-10-CM

## 2024-10-31 DIAGNOSIS — I35.0 NONRHEUMATIC AORTIC VALVE STENOSIS: ICD-10-CM

## 2024-10-31 DIAGNOSIS — C50.912 BREAST CANCER, STAGE 4, LEFT: Chronic | ICD-10-CM

## 2024-10-31 PROCEDURE — 99999 PR PBB SHADOW E&M-EST. PATIENT-LVL IV: CPT | Mod: PBBFAC,,, | Performed by: INTERNAL MEDICINE

## 2024-10-31 PROCEDURE — 99214 OFFICE O/P EST MOD 30 MIN: CPT | Mod: PBBFAC | Performed by: INTERNAL MEDICINE

## 2024-11-05 NOTE — PROGRESS NOTES
Subjective:       Patient ID: Shikha López is a 54 y.o. female.    Chief Complaint: No chief complaint on file.      HPI 54-year-old female who returns for F/U  for metastatic HER 2 + breast cancer.  She is on Trastuzumab deruxtecan  - her last treatment was May 7, 2024. Treatment on hold due to cirrhosis and hepatic encephalopathy.  She has also had variceal bleeding and CHF.    Recently saw Cardiology and lasix increased.   She has had some back pain.  That has been migratory in his improve with activity and Tylenol.    Her appetite has been good.  She reports no shortness of breath.      Breast history:  She presented in the emergency room at [facility] on September 29, 2006 with a 4 months history of inflammation of her left breast.  Physical examination at that time showed the left breast was largely replaced by large mass with multiple skin ulcerations.    A biopsy in September 2006 showed poorly differentiated carcinoma which was ER and TN. negative and HER2 positive.    She was then referred to [facility] for additional care.   CT scan of the chest and abdomen November 2006 revealed multiple nodules in the lungs consistent with metastatic disease.  There also enlarged left axillary node.    She was treated with chemotherapy with weekly Herceptin and Abraxane with improvement in her breast and lung metastasis.    On May 29, 2007 she underwent left modified radical mastectomy(Dr. Colvin) which showed no residual tumor in the breast and 1/5 nodes was positive for metastasis measuring 6 mm.  (ypT0N1).  She then received postoperative radiation therapy(Dr Singh)  to the left chest wall supraclav and internal mammary lymph nodes from August 20, 2007 to September 28, 2007.    Postoperatively she continued on Herceptin for approximately 3 and 1/2 years before her lung metastasis begin to grow.    She subsequently received a number of different chemotherapy treatments including Adriamycin,  Gretel, Xeloda plus lapatinib then Xeloda plus Herceptin. (  in [facility].)    Subsequently, she transferred her care to  at [facility].  -She was initially treated with Taxotere Herceptin Perjeta.    -She was then changed to Kadcyla.    In July 2020 PET scan showed progression.   She then took approximately 9 months of Herceptin and Navelbine with initial response then progression.    She started trastuzumab- deruxtecan  4/12/21.     CT 3/11/24 - stable  Stable postsurgical changes of left mastectomy and axillary lymph node dissection is patient with metastatic left breast cancer.   Redemonstration of multiple irregular bilateral pulmonary nodules concerning for metastatic disease.  No new lesions identified.   Hepatic steatosis and portal hypertension.  Increased bowel wall thickening and diffuse mesenteric edema with mild ascites, likely related to hepatic dysfunction.    Echo 4/24/24 - EF 60-65%      CT 4/29/24  -stable    CT chest - 8/15/24 - 1. Right upper lobe and infrahilar left lower lobe nodules are minimally bulkier compared to the prior study.    2. Residual scattered ground-glass opacities bilaterally, improved in interval.  Resolution of the previously seen effusions    Review of Systems   Constitutional:  Positive for fatigue. Negative for appetite change, fever and unexpected weight change.   HENT:  Negative for mouth sores.    Eyes:  Negative for visual disturbance.   Respiratory:  Negative for cough and shortness of breath.    Cardiovascular:  Positive for leg swelling. Negative for chest pain.   Gastrointestinal:  Negative for abdominal pain, blood in stool, constipation, diarrhea and nausea.   Musculoskeletal:  Positive for back pain.   Integumentary:  Negative for rash.   Neurological:  Negative for headaches.   Hematological:  Negative for adenopathy.   Psychiatric/Behavioral:  The patient is not nervous/anxious.          Objective:      Physical Exam  Vitals reviewed.    Constitutional:       General: She is not in acute distress.     Appearance: She is obese.   Cardiovascular:      Rate and Rhythm: Normal rate and regular rhythm.      Heart sounds: Murmur heard.   Pulmonary:      Effort: Pulmonary effort is normal. No respiratory distress.      Breath sounds: Normal breath sounds. No wheezing or rales.   Chest:   Breasts:     Right: No mass or tenderness.       Abdominal:      Palpations: Abdomen is soft. There is no mass.      Tenderness: There is no abdominal tenderness.   Musculoskeletal:      Right lower leg: Edema present.      Left lower leg: Edema present.   Lymphadenopathy:      Cervical: No cervical adenopathy.      Upper Body:      Right upper body: No supraclavicular or axillary adenopathy.      Left upper body: No supraclavicular or axillary adenopathy.   Neurological:      Mental Status: She is alert and oriented to person, place, and time.   Psychiatric:         Mood and Affect: Mood normal.         Behavior: Behavior normal.         Thought Content: Thought content normal.         Judgment: Judgment normal.         Assessment:      Problem List Items Addressed This Visit       Breast cancer, stage 4, left (Chronic)    Malignant neoplasm metastatic to left lung - Primary (Chronic)       Plan:        Needs repeat imaging and see me with results to discuss additional therapy.        Route Chart for Scheduling    Med Onc Chart Routing      Follow up with physician 1 week.   Follow up with JIMI    Infusion scheduling note    Injection scheduling note    Labs CBC, CMP and CA 27.29   Scheduling:  Preferred lab:  Lab interval:     Imaging CT chest abdomen pelvis      Pharmacy appointment No pharmacy appointment needed      Other referrals no referral to Oncology Primary Care needed -  no Massage appointment needed    No additional referrals needed

## 2024-11-06 ENCOUNTER — OFFICE VISIT (OUTPATIENT)
Dept: HEMATOLOGY/ONCOLOGY | Facility: CLINIC | Age: 54
End: 2024-11-06
Payer: MEDICARE

## 2024-11-06 VITALS
OXYGEN SATURATION: 97 % | HEART RATE: 91 BPM | BODY MASS INDEX: 32.74 KG/M2 | WEIGHT: 177.94 LBS | SYSTOLIC BLOOD PRESSURE: 110 MMHG | HEIGHT: 62 IN | DIASTOLIC BLOOD PRESSURE: 74 MMHG | TEMPERATURE: 98 F

## 2024-11-06 DIAGNOSIS — C50.912 BREAST CANCER, STAGE 4, LEFT: Chronic | ICD-10-CM

## 2024-11-06 DIAGNOSIS — C78.02 MALIGNANT NEOPLASM METASTATIC TO LEFT LUNG: Primary | Chronic | ICD-10-CM

## 2024-11-06 PROCEDURE — 99214 OFFICE O/P EST MOD 30 MIN: CPT | Mod: PBBFAC | Performed by: INTERNAL MEDICINE

## 2024-11-06 PROCEDURE — 99999 PR PBB SHADOW E&M-EST. PATIENT-LVL IV: CPT | Mod: PBBFAC,,, | Performed by: INTERNAL MEDICINE

## 2024-11-07 ENCOUNTER — LAB VISIT (OUTPATIENT)
Dept: LAB | Facility: HOSPITAL | Age: 54
End: 2024-11-07
Attending: INTERNAL MEDICINE
Payer: MEDICARE

## 2024-11-07 DIAGNOSIS — I50.32 CHRONIC HEART FAILURE WITH PRESERVED EJECTION FRACTION: ICD-10-CM

## 2024-11-07 DIAGNOSIS — C50.912 BREAST CANCER, STAGE 4, LEFT: Chronic | ICD-10-CM

## 2024-11-07 LAB
ALBUMIN SERPL BCP-MCNC: 3 G/DL (ref 3.5–5.2)
ALP SERPL-CCNC: 141 U/L (ref 40–150)
ALT SERPL W/O P-5'-P-CCNC: 19 U/L (ref 10–44)
ANION GAP SERPL CALC-SCNC: 10 MMOL/L (ref 8–16)
ANION GAP SERPL CALC-SCNC: 10 MMOL/L (ref 8–16)
AST SERPL-CCNC: 60 U/L (ref 10–40)
BILIRUB SERPL-MCNC: 5.8 MG/DL (ref 0.1–1)
BUN SERPL-MCNC: 15 MG/DL (ref 6–20)
BUN SERPL-MCNC: 15 MG/DL (ref 6–20)
CALCIUM SERPL-MCNC: 8.9 MG/DL (ref 8.7–10.5)
CALCIUM SERPL-MCNC: 8.9 MG/DL (ref 8.7–10.5)
CHLORIDE SERPL-SCNC: 98 MMOL/L (ref 95–110)
CHLORIDE SERPL-SCNC: 98 MMOL/L (ref 95–110)
CO2 SERPL-SCNC: 29 MMOL/L (ref 23–29)
CO2 SERPL-SCNC: 29 MMOL/L (ref 23–29)
CREAT SERPL-MCNC: 0.8 MG/DL (ref 0.5–1.4)
CREAT SERPL-MCNC: 0.8 MG/DL (ref 0.5–1.4)
ERYTHROCYTE [DISTWIDTH] IN BLOOD BY AUTOMATED COUNT: 16.8 % (ref 11.5–14.5)
EST. GFR  (NO RACE VARIABLE): >60 ML/MIN/1.73 M^2
EST. GFR  (NO RACE VARIABLE): >60 ML/MIN/1.73 M^2
GLUCOSE SERPL-MCNC: 82 MG/DL (ref 70–110)
GLUCOSE SERPL-MCNC: 82 MG/DL (ref 70–110)
HCT VFR BLD AUTO: 41 % (ref 37–48.5)
HGB BLD-MCNC: 13.9 G/DL (ref 12–16)
IMM GRANULOCYTES # BLD AUTO: 0.01 K/UL (ref 0–0.04)
MCH RBC QN AUTO: 33.7 PG (ref 27–31)
MCHC RBC AUTO-ENTMCNC: 33.9 G/DL (ref 32–36)
MCV RBC AUTO: 100 FL (ref 82–98)
NEUTROPHILS # BLD AUTO: 2.6 K/UL (ref 1.8–7.7)
PLATELET # BLD AUTO: 112 K/UL (ref 150–450)
PMV BLD AUTO: 9.5 FL (ref 9.2–12.9)
POTASSIUM SERPL-SCNC: 3.6 MMOL/L (ref 3.5–5.1)
POTASSIUM SERPL-SCNC: 3.6 MMOL/L (ref 3.5–5.1)
PROT SERPL-MCNC: 6.7 G/DL (ref 6–8.4)
RBC # BLD AUTO: 4.12 M/UL (ref 4–5.4)
SODIUM SERPL-SCNC: 137 MMOL/L (ref 136–145)
SODIUM SERPL-SCNC: 137 MMOL/L (ref 136–145)
WBC # BLD AUTO: 3.93 K/UL (ref 3.9–12.7)

## 2024-11-07 PROCEDURE — 80053 COMPREHEN METABOLIC PANEL: CPT | Performed by: INTERNAL MEDICINE

## 2024-11-07 PROCEDURE — 85027 COMPLETE CBC AUTOMATED: CPT | Performed by: INTERNAL MEDICINE

## 2024-11-07 PROCEDURE — 36415 COLL VENOUS BLD VENIPUNCTURE: CPT | Performed by: INTERNAL MEDICINE

## 2024-11-08 LAB — CANCER AG27-29 SERPL-ACNC: 68.1 U/ML

## 2024-11-11 ENCOUNTER — HOSPITAL ENCOUNTER (OUTPATIENT)
Dept: RADIOLOGY | Facility: HOSPITAL | Age: 54
Discharge: HOME | End: 2024-11-11
Attending: INTERNAL MEDICINE
Payer: MEDICARE

## 2024-11-11 DIAGNOSIS — C50.912 BREAST CANCER, STAGE 4, LEFT: Chronic | ICD-10-CM

## 2024-11-11 PROCEDURE — 71260 CT THORAX DX C+: CPT | Mod: 26,,, | Performed by: INTERNAL MEDICINE

## 2024-11-11 PROCEDURE — 25500020 PHARM REV CODE 255: Performed by: INTERNAL MEDICINE

## 2024-11-11 PROCEDURE — 74177 CT ABD & PELVIS W/CONTRAST: CPT | Mod: TC

## 2024-11-11 PROCEDURE — 71260 CT THORAX DX C+: CPT | Mod: TC

## 2024-11-11 PROCEDURE — 74177 CT ABD & PELVIS W/CONTRAST: CPT | Mod: 26,,, | Performed by: INTERNAL MEDICINE

## 2024-11-11 RX ADMIN — IOHEXOL 100 ML: 350 INJECTION, SOLUTION INTRAVENOUS at 11:11

## 2024-11-13 ENCOUNTER — PATIENT MESSAGE (OUTPATIENT)
Dept: HEMATOLOGY/ONCOLOGY | Facility: CLINIC | Age: 54
End: 2024-11-13
Payer: MEDICARE

## 2024-11-14 ENCOUNTER — OUTPATIENT CASE MANAGEMENT (OUTPATIENT)
Dept: ADMINISTRATIVE | Facility: OTHER | Age: 54
End: 2024-11-14
Payer: MEDICARE

## 2024-11-21 ENCOUNTER — OUTPATIENT CASE MANAGEMENT (OUTPATIENT)
Dept: ADMINISTRATIVE | Facility: OTHER | Age: 54
End: 2024-11-21
Payer: MEDICARE

## 2024-11-21 PROBLEM — K76.82 HEPATIC ENCEPHALOPATHY: Status: RESOLVED | Noted: 2024-05-25 | Resolved: 2024-11-21

## 2024-11-21 NOTE — PROGRESS NOTES
Subjective:       Patient ID: Shikha López is a 54 y.o. female.    Chief Complaint: No chief complaint on file.      HPI 54-year-old female who returns for F/U  for metastatic HER 2 + breast cancer.  She is on Trastuzumab deruxtecan  - her last treatment was May 7, 2024. Treatment on hold due to cirrhosis and hepatic encephalopathy.  She has also had variceal bleeding and CHF.    Biggest complaint is back pain which started after she bent over to  her dog.        Breast history:  She presented in the emergency room at [facility] on September 29, 2006 with a 4 months history of inflammation of her left breast.  Physical examination at that time showed the left breast was largely replaced by large mass with multiple skin ulcerations.    A biopsy in September 2006 showed poorly differentiated carcinoma which was ER and DE. negative and HER2 positive.    She was then referred to [facility] for additional care.   CT scan of the chest and abdomen November 2006 revealed multiple nodules in the lungs consistent with metastatic disease.  There also enlarged left axillary node.    She was treated with chemotherapy with weekly Herceptin and Abraxane with improvement in her breast and lung metastasis.    On May 29, 2007 she underwent left modified radical mastectomy(Dr. Colvin) which showed no residual tumor in the breast and 1/5 nodes was positive for metastasis measuring 6 mm.  (ypT0N1).  She then received postoperative radiation therapy(Dr Singh)  to the left chest wall supraclav and internal mammary lymph nodes from August 20, 2007 to September 28, 2007.    Postoperatively she continued on Herceptin for approximately 3 and 1/2 years before her lung metastasis begin to grow.    She subsequently received a number of different chemotherapy treatments including Adriamycin, Halaven, Xeloda plus lapatinib then Xeloda plus Herceptin. (  in [facility].)    Subsequently, she transferred her  care to  at [facility].  -She was initially treated with Taxotere Herceptin Perjeta.    -She was then changed to Kadcyla.    In July 2020 PET scan showed progression.   She then took approximately 9 months of Herceptin and Navelbine with initial response then progression.    She started trastuzumab- deruxtecan  4/12/21.     CT 3/11/24 - stable  Stable postsurgical changes of left mastectomy and axillary lymph node dissection is patient with metastatic left breast cancer.   Redemonstration of multiple irregular bilateral pulmonary nodules concerning for metastatic disease.  No new lesions identified.   Hepatic steatosis and portal hypertension.  Increased bowel wall thickening and diffuse mesenteric edema with mild ascites, likely related to hepatic dysfunction.    Echo 4/24/24 - EF 60-65%      CT 4/29/24  -stable    CT chest - 8/15/24 - 1. Right upper lobe and infrahilar left lower lobe nodules are minimally bulkier compared to the prior study.    2. Residual scattered ground-glass opacities bilaterally, improved in interval.  Resolution of the previously seen effusions      CT CAP 11/11/24 - *Unchanged right axillary and bilateral inguinal lymphadenopathy.  *Unchanged left pleural thickening.  *Multiple solid pulmonary nodules scattered throughout both lungs measuring up to 2.5 cm, unchanged.  The left perihilar mass occludes adjacent airways resulting in partial collapse of the left lower lobe.  *No abdominopelvic metastases.  Mosaic attenuation throughout both lungs, which could represent small airway disease, pulmonary edema, or infection.     Cirrhosis with signs of portal hypertension including splenomegaly and portosystemic collaterals.  Patent tips.  Embolization coils in the upper abdomen, possibly from BRTO.  No focal hepatic lesions.     Multiple compression fractures in the thoracolumbar spine with severe height loss at T8, many of which are new    Review of Systems   Constitutional:   Positive for fatigue. Negative for appetite change, fever and unexpected weight change.   HENT:  Negative for mouth sores.    Eyes:  Negative for visual disturbance.   Respiratory:  Negative for cough and shortness of breath.    Cardiovascular:  Negative for chest pain and leg swelling.   Gastrointestinal:  Negative for abdominal pain, blood in stool, constipation, diarrhea and nausea.   Musculoskeletal:  Positive for back pain.   Integumentary:  Negative for rash.   Neurological:  Negative for headaches.   Hematological:  Negative for adenopathy.   Psychiatric/Behavioral:  The patient is not nervous/anxious.    All other systems reviewed and are negative.        Objective:      Physical Exam  Vitals reviewed.   Constitutional:       General: She is not in acute distress.     Appearance: She is obese.   Neurological:      Mental Status: She is alert and oriented to person, place, and time.   Psychiatric:         Mood and Affect: Mood normal.         Behavior: Behavior normal.         Thought Content: Thought content normal.         Judgment: Judgment normal.       Assessment:      Problem List Items Addressed This Visit       Chronic heart failure with preserved ejection fraction    Breast cancer, stage 4, left (Chronic)    Malignant neoplasm metastatic to left lung - Primary (Chronic)    Biliary cirrhosis (Chronic)       Plan:        I reviewed the scan results which essentially showed minimal change.    Given the minimal chain was without therapy over long period of time, I recommended that we continue to observe her.    I did recommend restart some bone protective therapy for what are likely to be osteoporotic fractures in her spine.    I will see her again in 6 weeks' time and rescanned again in 3 months.            Route Chart for Scheduling    Med Onc Chart Routing      Follow up with physician 6 weeks.   Follow up with JIMI    Infusion scheduling note    Injection scheduling note    Labs CBC, CMP and CA 27.29    Scheduling: Labs same day as infusion  Preferred lab:  Lab interval:  CMP before Zometa   Imaging None      Pharmacy appointment No pharmacy appointment needed      Other referrals no referral to Oncology Primary Care needed -  no Massage appointment needed    Additional referrals needed  PT         Supportive Plan Information  OP ZOLEDRONIC ACID (ZOMETA) 4MG Q6M Home Willis MD   Associated Diagnosis: Compression fracture of lumbar spine, non-traumatic, sequela   noted on 11/25/2024  Associated Diagnosis: Acute gastrointestinal bleeding   noted on 4/11/2024  Associated Diagnosis: Breast cancer, stage 4, left Stage IV cT4b, cN2a, cM1, G3, ER-, IL-, HER2+ noted on 6/8/2021   Line of treatment: Supportive Care   Treatment goal: Supportive     Upcoming Treatment Dates - OP ZOLEDRONIC ACID (ZOMETA) 4MG Q6M    12/10/2024       Medications       zoledronic acid (ZOMETA) 4 mg in 0.9% NaCl 100 mL IVPB  5/27/2025       Medications       zoledronic acid (ZOMETA) 4 mg in 0.9% NaCl 100 mL IVPB  11/11/2025       Medications       zoledronic acid (ZOMETA) 4 mg in 0.9% NaCl 100 mL IVPB  4/28/2026       Medications       zoledronic acid (ZOMETA) 4 mg in 0.9% NaCl 100 mL IVPB

## 2024-11-25 ENCOUNTER — OFFICE VISIT (OUTPATIENT)
Dept: HEMATOLOGY/ONCOLOGY | Facility: CLINIC | Age: 54
End: 2024-11-25
Payer: MEDICARE

## 2024-11-25 ENCOUNTER — LAB VISIT (OUTPATIENT)
Dept: LAB | Facility: HOSPITAL | Age: 54
End: 2024-11-25
Payer: MEDICARE

## 2024-11-25 VITALS
BODY MASS INDEX: 32.33 KG/M2 | DIASTOLIC BLOOD PRESSURE: 60 MMHG | WEIGHT: 175.69 LBS | RESPIRATION RATE: 18 BRPM | SYSTOLIC BLOOD PRESSURE: 134 MMHG | OXYGEN SATURATION: 99 % | TEMPERATURE: 98 F | HEART RATE: 88 BPM | HEIGHT: 62 IN

## 2024-11-25 DIAGNOSIS — I50.32 CHRONIC HEART FAILURE WITH PRESERVED EJECTION FRACTION: ICD-10-CM

## 2024-11-25 DIAGNOSIS — C78.02 MALIGNANT NEOPLASM METASTATIC TO LEFT LUNG: Primary | Chronic | ICD-10-CM

## 2024-11-25 DIAGNOSIS — C78.02 MALIGNANT NEOPLASM METASTATIC TO LEFT LUNG: Chronic | ICD-10-CM

## 2024-11-25 DIAGNOSIS — C50.912 BREAST CANCER, STAGE 4, LEFT: Chronic | ICD-10-CM

## 2024-11-25 DIAGNOSIS — K74.5 BILIARY CIRRHOSIS: Chronic | ICD-10-CM

## 2024-11-25 DIAGNOSIS — M48.56XS COMPRESSION FRACTURE OF LUMBAR SPINE, NON-TRAUMATIC, SEQUELA: ICD-10-CM

## 2024-11-25 LAB
ALBUMIN SERPL BCP-MCNC: 3 G/DL (ref 3.5–5.2)
ALP SERPL-CCNC: 158 U/L (ref 40–150)
ALT SERPL W/O P-5'-P-CCNC: 22 U/L (ref 10–44)
ANION GAP SERPL CALC-SCNC: 9 MMOL/L (ref 8–16)
AST SERPL-CCNC: 60 U/L (ref 10–40)
BASOPHILS # BLD AUTO: 0.04 K/UL (ref 0–0.2)
BASOPHILS NFR BLD: 1.1 % (ref 0–1.9)
BILIRUB SERPL-MCNC: 5.1 MG/DL (ref 0.1–1)
BUN SERPL-MCNC: 15 MG/DL (ref 6–20)
CALCIUM SERPL-MCNC: 8.8 MG/DL (ref 8.7–10.5)
CHLORIDE SERPL-SCNC: 102 MMOL/L (ref 95–110)
CO2 SERPL-SCNC: 27 MMOL/L (ref 23–29)
CREAT SERPL-MCNC: 0.7 MG/DL (ref 0.5–1.4)
DIFFERENTIAL METHOD BLD: ABNORMAL
EOSINOPHIL # BLD AUTO: 0.3 K/UL (ref 0–0.5)
EOSINOPHIL NFR BLD: 8.7 % (ref 0–8)
ERYTHROCYTE [DISTWIDTH] IN BLOOD BY AUTOMATED COUNT: 18.4 % (ref 11.5–14.5)
EST. GFR  (NO RACE VARIABLE): >60 ML/MIN/1.73 M^2
GLUCOSE SERPL-MCNC: 84 MG/DL (ref 70–110)
HCT VFR BLD AUTO: 40.2 % (ref 37–48.5)
HGB BLD-MCNC: 13.4 G/DL (ref 12–16)
IMM GRANULOCYTES # BLD AUTO: 0.01 K/UL (ref 0–0.04)
IMM GRANULOCYTES NFR BLD AUTO: 0.3 % (ref 0–0.5)
LYMPHOCYTES # BLD AUTO: 0.8 K/UL (ref 1–4.8)
LYMPHOCYTES NFR BLD: 20.6 % (ref 18–48)
MCH RBC QN AUTO: 34.9 PG (ref 27–31)
MCHC RBC AUTO-ENTMCNC: 33.3 G/DL (ref 32–36)
MCV RBC AUTO: 105 FL (ref 82–98)
MONOCYTES # BLD AUTO: 0.3 K/UL (ref 0.3–1)
MONOCYTES NFR BLD: 7 % (ref 4–15)
NEUTROPHILS # BLD AUTO: 2.3 K/UL (ref 1.8–7.7)
NEUTROPHILS NFR BLD: 62.3 % (ref 38–73)
NRBC BLD-RTO: 0 /100 WBC
PLATELET # BLD AUTO: 106 K/UL (ref 150–450)
PMV BLD AUTO: 9.8 FL (ref 9.2–12.9)
POTASSIUM SERPL-SCNC: 3.6 MMOL/L (ref 3.5–5.1)
PROT SERPL-MCNC: 6.8 G/DL (ref 6–8.4)
RBC # BLD AUTO: 3.84 M/UL (ref 4–5.4)
SODIUM SERPL-SCNC: 138 MMOL/L (ref 136–145)
WBC # BLD AUTO: 3.69 K/UL (ref 3.9–12.7)

## 2024-11-25 PROCEDURE — 85025 COMPLETE CBC W/AUTO DIFF WBC: CPT | Performed by: INTERNAL MEDICINE

## 2024-11-25 PROCEDURE — 99214 OFFICE O/P EST MOD 30 MIN: CPT | Mod: PBBFAC | Performed by: INTERNAL MEDICINE

## 2024-11-25 PROCEDURE — 99999 PR PBB SHADOW E&M-EST. PATIENT-LVL IV: CPT | Mod: PBBFAC,,, | Performed by: INTERNAL MEDICINE

## 2024-11-25 PROCEDURE — 36415 COLL VENOUS BLD VENIPUNCTURE: CPT | Performed by: INTERNAL MEDICINE

## 2024-11-25 PROCEDURE — 80053 COMPREHEN METABOLIC PANEL: CPT | Performed by: INTERNAL MEDICINE

## 2024-11-25 RX ORDER — HEPARIN 100 UNIT/ML
500 SYRINGE INTRAVENOUS
OUTPATIENT
Start: 2024-12-10

## 2024-11-25 RX ORDER — EPINEPHRINE 0.3 MG/.3ML
0.3 INJECTION SUBCUTANEOUS ONCE AS NEEDED
OUTPATIENT
Start: 2024-12-10

## 2024-11-25 RX ORDER — SODIUM CHLORIDE 0.9 % (FLUSH) 0.9 %
10 SYRINGE (ML) INJECTION
OUTPATIENT
Start: 2024-12-10

## 2024-11-25 RX ORDER — DIPHENHYDRAMINE HYDROCHLORIDE 50 MG/ML
50 INJECTION INTRAMUSCULAR; INTRAVENOUS ONCE AS NEEDED
OUTPATIENT
Start: 2024-12-10

## 2024-11-26 ENCOUNTER — TELEPHONE (OUTPATIENT)
Dept: HEMATOLOGY/ONCOLOGY | Facility: CLINIC | Age: 54
End: 2024-11-26
Payer: MEDICARE

## 2024-11-26 ENCOUNTER — DOCUMENTATION ONLY (OUTPATIENT)
Dept: HEMATOLOGY/ONCOLOGY | Facility: CLINIC | Age: 54
End: 2024-11-26
Payer: MEDICARE

## 2024-11-26 DIAGNOSIS — R41.840 LACK OF CONCENTRATION: ICD-10-CM

## 2024-11-26 RX ORDER — METHYLPHENIDATE HYDROCHLORIDE 5 MG/1
5 TABLET ORAL 2 TIMES DAILY
Qty: 60 TABLET | Refills: 0 | Status: CANCELLED | OUTPATIENT
Start: 2024-11-26

## 2024-11-26 NOTE — PROGRESS NOTES
GIOVANNI is covering [facility] on rounds this morning. GIOVANNI called pt and completed Neighbor Intake application. Notified Radha Patel RD when finished. Told pt to expect a call from Radah.    Akash Espinosa, [facility]  Oncology Social Worker  [facility]  [phone]

## 2024-11-27 RX ORDER — METHYLPHENIDATE HYDROCHLORIDE 5 MG/1
5 TABLET ORAL 2 TIMES DAILY
Qty: 60 TABLET | Refills: 0 | Status: SHIPPED | OUTPATIENT
Start: 2024-11-27

## 2024-12-03 ENCOUNTER — LAB VISIT (OUTPATIENT)
Dept: LAB | Facility: HOSPITAL | Age: 54
End: 2024-12-03
Attending: STUDENT IN AN ORGANIZED HEALTH CARE EDUCATION/TRAINING PROGRAM
Payer: MEDICARE

## 2024-12-03 DIAGNOSIS — E83.19 IRON OVERLOAD: ICD-10-CM

## 2024-12-03 DIAGNOSIS — K75.81 LIVER CIRRHOSIS SECONDARY TO NASH: ICD-10-CM

## 2024-12-03 DIAGNOSIS — K74.60 LIVER CIRRHOSIS SECONDARY TO NASH: ICD-10-CM

## 2024-12-03 LAB
AFP SERPL-MCNC: 3.6 NG/ML (ref 0–8.4)
ALBUMIN SERPL BCP-MCNC: 2.9 G/DL (ref 3.5–5.2)
ALP SERPL-CCNC: 145 U/L (ref 40–150)
ALT SERPL W/O P-5'-P-CCNC: 20 U/L (ref 10–44)
ANION GAP SERPL CALC-SCNC: 11 MMOL/L (ref 8–16)
AST SERPL-CCNC: 49 U/L (ref 10–40)
BASOPHILS # BLD AUTO: 0.04 K/UL (ref 0–0.2)
BASOPHILS NFR BLD: 1.2 % (ref 0–1.9)
BILIRUB SERPL-MCNC: 4.9 MG/DL (ref 0.1–1)
BUN SERPL-MCNC: 18 MG/DL (ref 6–20)
CALCIUM SERPL-MCNC: 9 MG/DL (ref 8.7–10.5)
CHLORIDE SERPL-SCNC: 107 MMOL/L (ref 95–110)
CO2 SERPL-SCNC: 22 MMOL/L (ref 23–29)
CREAT SERPL-MCNC: 0.7 MG/DL (ref 0.5–1.4)
DIFFERENTIAL METHOD BLD: ABNORMAL
EOSINOPHIL # BLD AUTO: 0.3 K/UL (ref 0–0.5)
EOSINOPHIL NFR BLD: 9.1 % (ref 0–8)
ERYTHROCYTE [DISTWIDTH] IN BLOOD BY AUTOMATED COUNT: 18.2 % (ref 11.5–14.5)
EST. GFR  (NO RACE VARIABLE): >60 ML/MIN/1.73 M^2
GLUCOSE SERPL-MCNC: 85 MG/DL (ref 70–110)
HCT VFR BLD AUTO: 41.1 % (ref 37–48.5)
HGB BLD-MCNC: 13.4 G/DL (ref 12–16)
IMM GRANULOCYTES # BLD AUTO: 0 K/UL (ref 0–0.04)
IMM GRANULOCYTES NFR BLD AUTO: 0 % (ref 0–0.5)
INR PPP: 1.4 (ref 0.8–1.2)
LYMPHOCYTES # BLD AUTO: 0.7 K/UL (ref 1–4.8)
LYMPHOCYTES NFR BLD: 20.4 % (ref 18–48)
MCH RBC QN AUTO: 34.9 PG (ref 27–31)
MCHC RBC AUTO-ENTMCNC: 32.6 G/DL (ref 32–36)
MCV RBC AUTO: 107 FL (ref 82–98)
MONOCYTES # BLD AUTO: 0.2 K/UL (ref 0.3–1)
MONOCYTES NFR BLD: 5.5 % (ref 4–15)
NEUTROPHILS # BLD AUTO: 2.1 K/UL (ref 1.8–7.7)
NEUTROPHILS NFR BLD: 63.8 % (ref 38–73)
NRBC BLD-RTO: 0 /100 WBC
PLATELET # BLD AUTO: 99 K/UL (ref 150–450)
PMV BLD AUTO: 9.8 FL (ref 9.2–12.9)
POTASSIUM SERPL-SCNC: 4.3 MMOL/L (ref 3.5–5.1)
PROT SERPL-MCNC: 6.7 G/DL (ref 6–8.4)
PROTHROMBIN TIME: 14.7 SEC (ref 9–12.5)
RBC # BLD AUTO: 3.84 M/UL (ref 4–5.4)
SODIUM SERPL-SCNC: 140 MMOL/L (ref 136–145)
WBC # BLD AUTO: 3.29 K/UL (ref 3.9–12.7)

## 2024-12-03 PROCEDURE — 36415 COLL VENOUS BLD VENIPUNCTURE: CPT | Performed by: STUDENT IN AN ORGANIZED HEALTH CARE EDUCATION/TRAINING PROGRAM

## 2024-12-03 PROCEDURE — 85610 PROTHROMBIN TIME: CPT | Performed by: STUDENT IN AN ORGANIZED HEALTH CARE EDUCATION/TRAINING PROGRAM

## 2024-12-03 PROCEDURE — 82105 ALPHA-FETOPROTEIN SERUM: CPT | Performed by: STUDENT IN AN ORGANIZED HEALTH CARE EDUCATION/TRAINING PROGRAM

## 2024-12-03 PROCEDURE — 80053 COMPREHEN METABOLIC PANEL: CPT | Performed by: STUDENT IN AN ORGANIZED HEALTH CARE EDUCATION/TRAINING PROGRAM

## 2024-12-03 PROCEDURE — 85025 COMPLETE CBC W/AUTO DIFF WBC: CPT | Performed by: STUDENT IN AN ORGANIZED HEALTH CARE EDUCATION/TRAINING PROGRAM

## 2024-12-03 NOTE — PROGRESS NOTES
"[facility]   Physical Therapy Initial Evaluation      Name: Shikha López  Clinic Number: [id number]    Therapy Diagnosis: No diagnosis found.     Physician: Home Willis MD    Physician Orders: PT Eval and Treat   Medical Diagnosis from Referral: M48.56XS (ICD-10-CM) - Compression fracture of lumbar spine, non-traumatic, sequela   Evaluation Date: 12/4/2024  Authorization Period Expiration: 11/25/2025  Plan of Care Expiration: 3/4/2025  Progress Note Due: 1/4/2025  Date of Surgery: ***  Visit # / Visits authorized: 1/1   FOTO: 1/3    Precautions: {IP WOUND PRECAUTIONS OHS:40675} , breast cancer    Time In: ***  Time Out: ***  Total Billable Time: *** minutes    Subjective   Date of onset: ***    History of current condition - Shikha reports    Falls: ***    Imaging: see imaging section    Prior Therapy: ***  Social History:   Occupation: ***  Prior Level of Function: ***  Current Level of Function: ***    Pain:  Current {0-10:27304::"0"}/10, worst {0-10:97126::"0"}/10, best {0-10:71122::"0"}/10   Location:     Description: {Pain Description:47772}  Aggravating Factors: {Causes; Pain:71603}  Easing Factors: {Pain (activities that relieve):92728}    Patients goals: ***     Medical History:   Past Medical History:   Diagnosis Date    Acute encephalopathy 05/25/2024    Aortic atherosclerosis 07/06/2023    Breast cancer     CHF (congestive heart failure)     Coronary artery calcification seen on CT scan 09/19/2024    Esophageal and gastric varices 06/23/2023    Hepatic encephalopathy 05/25/2024    Malignant neoplasm metastatic to left lung 06/08/2021    Stenosis of aortic and mitral valves     Aortic stenosis     Surgical History:   Shikha López  has a past surgical history that includes Mastectomy; Esophagogastroduodenoscopy (N/A, 6/23/2023); Endoscopic ultrasound of upper gastrointestinal tract (N/A, 6/27/2023); Esophagogastroduodenoscopy (N/A, 6/27/2023); " Esophagogastroduodenoscopy (N/A, 8/3/2023); Endoscopic ultrasound of upper gastrointestinal tract (N/A, 1/12/2024); Esophagogastroduodenoscopy (N/A, 1/12/2024); and Esophagogastroduodenoscopy (N/A, 4/10/2024).    Medications:   Shikha has a current medication list which includes the following prescription(s): diaper,brief,adult,disposable, diaper,brief,adult,disposable, empagliflozin, ferrous sulfate, furosemide, incontinence pad, liner, disp, lactulose, methylphenidate hcl, mupirocin, pantoprazole, rifaximin, spironolactone, and triamcinolone acetonide 0.5%.    Allergies:   Review of patient's allergies indicates:  No Known Allergies     Objective      ***    Intake Outcome Measure for FOTO *** Survey    Therapist reviewed FOTO scores for Shikha López on 12/4/2024.   FOTO report - see Media section or FOTO account episode details.    Intake Score: ***%         Treatment     Total Treatment time (time-based codes) separate from Evaluation: *** minutes     Shikha received the treatments listed below:      therapeutic exercises to develop {AMB PT PROGRESS OBJECTIVE:83604} for *** minutes including:  ***    manual therapy techniques: {AMB PT PROGRESS MANUAL THERAPY:80557} were applied to the: *** for *** minutes, including:  ***    neuromuscular re-education activities to improve: {AMB PT PROGRESS NEURO RE-ED:87454} for *** minutes. The following activities were included:  ***    therapeutic activities to improve functional performance for ***  minutes, including:  ***    gait training to improve functional mobility and safety for ***  minutes, including:  ***    direct contact modalities after being cleared for contraindications: {AMB PT PROGRESS DIRECT CONTACT MODES:82054}    supervised modalities after being cleared for contradictions: {AMB PT SUPERVISED MODES:36079}    hot pack for *** minutes to ***.    cold pack for *** minutes to ***.    Patient Education and Home Exercises     Education provided:   -  ***    Written Home Exercises Provided: {Home Exercise Program:41975}. Exercises were reviewed and Shikha was able to demonstrate them prior to the end of the session.  Shikha demonstrated {Desc; good/fair/poor:32779} understanding of the education provided. See EMR under Patient Instructions for exercises provided during therapy sessions.    Assessment     Shikha is a 54 y.o. female referred to outpatient Physical Therapy with a medical diagnosis of ***. Patient presents with ***    Patient prognosis is {REHAB PROGNOSIS OHS:78049}.   Patient will benefit from skilled outpatient Physical Therapy to address the deficits stated above and in the chart below, provide patient /family education, and to maximize patientt's level of independence.     Plan of care discussed with patient: {YES:11509}  Patient's spiritual, cultural and educational needs considered and patient is agreeable to the plan of care and goals as stated below:     Anticipated Barriers for therapy: ***    Medical Necessity is demonstrated by the following  History  Co-morbidities and personal factors that may impact the plan of care [] LOW: no personal factors / co-morbidities  [] MODERATE: 1-2 personal factors / co-morbidities  [] HIGH: 3+ personal factors / co-morbidities    Moderate / High Support Documentation:   Co-morbidities affecting plan of care: ***    Personal Factors:   {Personal Factors:06284}     Examination  Body Structures and Functions, activity limitations and participation restrictions that may impact the plan of care [] LOW: addressing 1-2 elements  [] MODERATE: 3+ elements  [] HIGH: 4+ elements (please support below)    Moderate / High Support Documentation: ***     Clinical Presentation [] LOW: stable  [] MODERATE: Evolving  [] HIGH: Unstable     Decision Making/ Complexity Score: {Desc; low/moderate/high:754274}       Goals:  Short Term Goals: *** weeks   ***    Long Term Goals: *** weeks   ***  Plan     Plan of care  "Certification: 12/4/2024 to ***.    Outpatient Physical Therapy {NUMBERS 1-5:82444} times weekly for {0-10:51361::"0"} weeks to include the following interventions: {TX PLAN:57732}.     Starla Alston, PT      Physician's Signature: _________________________________________ Date: ________________    "

## 2024-12-03 NOTE — PROGRESS NOTES
Subjective:   Patient ID:  Shikha López is a 54 y.o. female who presents for follow-up of Follow-up    PROBLEM LIST:  Stage 4 breast cancer, 2006 (Treatment on hold)  HFpEF  Mild-moderate AS  Coronary artery calcification on chest CT  Biliary cirrhosis    HPI 9/26/24: Cardio-oncology follow-up  She previously followed with Dr. Epps. Sees Dr. Willis in Oncology.  Ms López is a 53 year old female with breast cancer receiving trastuzumab deruxtecan (previously also received paclitaxel, doxorubicin, eribulin, capecitabine, lapatinib, trastuzumab, trastuzumab emtansine, docetaxel and vinorelbine). We had increased her lasix at her last visit, however she cut it back to once daily for the past 2 weeks since she has been staying on the couch at her mother's house taking care of her. Her weight is up 40 lbs since June and 13 lbs since her last visit with me. She has been eating prepared meals. Reports 2 weeks of left sided mid thoracic back pain. Taking tylenol. No leg weakness. Increasing edema in her legs and left arm. No PND/ orthopnea. +KAPADIA.    Interval history: She was having back pain and found to have new multiple compression fractures. She is starting Zometa with Dr. Willis. She continues to have significant leg edema. I doubled her lasix at her last visit to 80 mg BID which has helped. She takes a second pm dose about 4 times per week but is limited by needing to stay very close to a bathroom. She denies any SOB. She sleeps upright due to her back pain.    ECG 25-MAY-2024 17:51:16: Personally reviewed by me shows NSR with PVC's, LVH with repol    Echo 5/24:    Summary  Show Result Comparison     Left Ventricle: The left ventricle is normal in size. Normal wall thickness. Normal wall motion. There is normal systolic function with a visually estimated ejection fraction of 60 - 65%. There is indeterminate diastolic function.    Right Ventricle: Mild right ventricular enlargement. Wall thickness is normal.  Systolic function is normal.    The left atrium is severely dilated.    Aortic Valve: There is moderate aortic valve sclerosis. There is annular calcification present. Mildly restricted motion. There is mild to moderate stenosis. Aortic valve area by VTI is 1.44 cm². Aortic valve peak velocity is 3.24 m/s. Mean gradient is 25 mmHg. The dimensionless index is 0.38.    Pulmonary Artery: There is mild pulmonary hypertension. The estimated pulmonary artery systolic pressure is 49 mmHg.    IVC/SVC: Intermediate venous pressure at 8 mmHg.    Past Medical History:   Diagnosis Date    Acute encephalopathy 05/25/2024    Aortic atherosclerosis 07/06/2023    Breast cancer     CHF (congestive heart failure)     Coronary artery calcification seen on CT scan 09/19/2024    Esophageal and gastric varices 06/23/2023    Hepatic encephalopathy 05/25/2024    Malignant neoplasm metastatic to left lung 06/08/2021    Stenosis of aortic and mitral valves     Aortic stenosis       Past Surgical History:   Procedure Laterality Date    ENDOSCOPIC ULTRASOUND OF UPPER GASTROINTESTINAL TRACT N/A 6/27/2023    Procedure: ULTRASOUND, UPPER GI TRACT, ENDOSCOPIC;  Surgeon: Home Lopez MD;  Location: [facility] ([address] [place] [address]);  Service: Endoscopy;  Laterality: N/A;    ENDOSCOPIC ULTRASOUND OF UPPER GASTROINTESTINAL TRACT N/A 1/12/2024    Procedure: ULTRASOUND, UPPER GI TRACT, ENDOSCOPIC;  Surgeon: Home Lopez MD;  Location: [facility] (2ND FLR);  Service: Endoscopy;  Laterality: N/A;  11/28/23-Instructions via portal-DS  1/8-lvm for precall-MS  1/10-precall complete-Kpvt    ESOPHAGOGASTRODUODENOSCOPY N/A 6/23/2023    Procedure: EGD (ESOPHAGOGASTRODUODENOSCOPY);  Surgeon: Qunitin Mooney MD;  Location: [facility] (2ND FLR);  Service: Endoscopy;  Laterality: N/A;    ESOPHAGOGASTRODUODENOSCOPY N/A 6/27/2023    Procedure: EGD (ESOPHAGOGASTRODUODENOSCOPY);  Surgeon: Home Lopez MD;  Location: [facility] (2ND FLR);  Service: Endoscopy;  Laterality:  N/A;    ESOPHAGOGASTRODUODENOSCOPY N/A 8/3/2023    Procedure: EGD (ESOPHAGOGASTRODUODENOSCOPY);  Surgeon: Home Lopez MD;  Location: [facility] (2ND FLR);  Service: Endoscopy;  Laterality: N/A;  instr portal-labs 6/28/23-tb    ESOPHAGOGASTRODUODENOSCOPY N/A 1/12/2024    Procedure: EGD (ESOPHAGOGASTRODUODENOSCOPY);  Surgeon: Home Lopez MD;  Location: [facility] (2ND FLR);  Service: Endoscopy;  Laterality: N/A;    ESOPHAGOGASTRODUODENOSCOPY N/A 4/10/2024    Procedure: EGD (ESOPHAGOGASTRODUODENOSCOPY);  Surgeon: Ze Anderson MD;  Location: [facility] ENDO (2ND FLR);  Service: Endoscopy;  Laterality: N/A;    MASTECTOMY         Social History     Socioeconomic History    Marital status: Single    Number of children: 1   Tobacco Use    Smoking status: Never    Smokeless tobacco: Never   Substance and Sexual Activity    Alcohol use: Never    Drug use: Never    Sexual activity: Not Currently     Social Drivers of Health     Financial Resource Strain: High Risk (6/17/2024)    Overall Financial Resource Strain (CARDIA)     Difficulty of Paying Living Expenses: Very hard   Food Insecurity: Food Insecurity Present (11/25/2024)    Hunger Vital Sign     Worried About Running Out of Food in the Last Year: Sometimes true     Ran Out of Food in the Last Year: Sometimes true   Transportation Needs: No Transportation Needs (6/17/2024)    PRAPARE - Transportation     Lack of Transportation (Medical): No     Lack of Transportation (Non-Medical): No   Physical Activity: Insufficiently Active (6/17/2024)    Exercise Vital Sign     Days of Exercise per Week: 4 days     Minutes of Exercise per Session: 10 min   Stress: Stress Concern Present (6/17/2024)    [place] [employer] - Occupational Stress Questionnaire     Feeling of Stress : Rather much   Housing Stability: High Risk (6/17/2024)    Housing Stability Vital Sign     Unable to Pay for Housing in the Last Year: Yes     Homeless in the Last Year: No        Family History   Problem Relation Name Age of Onset    Hypertension Mother      Heart attack Father      Lung cancer Father         Patient's Medications   New Prescriptions    No medications on file   Previous Medications    DIAPER,BRIEF,ADULT,DISPOSABLE MISC    1 Units by Misc.(Non-Drug; Combo Route) route 4 (four) times daily.    DIAPER,BRIEF,ADULT,DISPOSABLE MISC    1 Units by Misc.(Non-Drug; Combo Route) route 4 (four) times daily.    EMPAGLIFLOZIN (JARDIANCE) 10 MG TABLET    Take 1 tablet (10 mg total) by mouth once daily.    FERROUS SULFATE (FEROSUL) 325 MG (65 MG IRON) TAB TABLET    Take 1 tablet by mouth daily with Vitamin C on an empty stomach    FUROSEMIDE (LASIX) 80 MG TABLET    Take 1 tablet (80 mg total) by mouth once daily. If you are gaining weight (2 lbs in 24 hours or 3 lbs in two days) may take an extra dose of 80 mg    INCONTINENCE PAD, LINER, DISP PADS    1 Units by Misc.(Non-Drug; Combo Route) route 4 (four) times daily.    LACTULOSE (CHRONULAC) 10 GRAM/15 ML SOLUTION    Take 30 mLs (20 g total) by mouth 3 (three) times daily.    METHYLPHENIDATE HCL (RITALIN) 5 MG TABLET    Take 1 tablet (5 mg total) by mouth 2 (two) times daily.    MUPIROCIN (BACTROBAN) 2 % OINTMENT    Apply topically 3 (three) times daily.    PANTOPRAZOLE (PROTONIX) 40 MG TABLET    Take 1 tablet (40 mg total) by mouth 2 (two) times daily.    RIFAXIMIN (XIFAXAN) 550 MG TAB    Take 1 tablet (550 mg total) by mouth 2 (two) times daily.    SPIRONOLACTONE (ALDACTONE) 100 MG TABLET    Take 1 tablet (100 mg total) by mouth once daily.    TRIAMCINOLONE ACETONIDE 0.5% (KENALOG) 0.5 % CREA    Apply topically 2 (two) times daily.   Modified Medications    No medications on file   Discontinued Medications    No medications on file       Review of Systems   Constitutional: Positive for weight gain. Negative for malaise/fatigue.   HENT:  Negative for hearing loss.    Eyes:  Negative for visual disturbance.   Cardiovascular:  Positive  "for leg swelling. Negative for chest pain, claudication, dyspnea on exertion, near-syncope, orthopnea, palpitations, paroxysmal nocturnal dyspnea and syncope.   Respiratory:  Negative for cough, shortness of breath, sleep disturbances due to breathing, snoring and wheezing.    Endocrine: Negative for cold intolerance, heat intolerance, polydipsia, polyphagia and polyuria.   Hematologic/Lymphatic: Negative for bleeding problem. Does not bruise/bleed easily.   Skin:  Negative for rash and suspicious lesions.   Musculoskeletal:  Positive for back pain. Negative for arthritis, falls, joint pain, muscle weakness and myalgias.   Gastrointestinal:  Positive for bloating. Negative for abdominal pain, change in bowel habit, constipation, diarrhea, heartburn, hematochezia, melena and nausea.   Genitourinary:  Negative for hematuria and nocturia.   Neurological:  Negative for excessive daytime sleepiness, dizziness, headaches, light-headedness, loss of balance and weakness.   Psychiatric/Behavioral:  Negative for depression. The patient is not nervous/anxious.    Allergic/Immunologic: Negative for environmental allergies.       BP (!) 125/58 (Patient Position: Sitting)   Pulse 90   Ht 5' 2" (1.575 m)   Wt 81.5 kg (179 lb 10.8 oz)   LMP  (LMP Unknown)   SpO2 96%   BMI 32.86 kg/m²     Objective:   Physical Exam  Constitutional:       Appearance: She is well-developed.      Comments:      HENT:      Head: Normocephalic and atraumatic.      Comments: Poor dentition  Eyes:      General: No scleral icterus.     Conjunctiva/sclera: Conjunctivae normal.      Pupils: Pupils are equal, round, and reactive to light.   Neck:      Thyroid: No thyromegaly.      Vascular: No hepatojugular reflux or JVD.      Trachea: No tracheal deviation.   Cardiovascular:      Rate and Rhythm: Normal rate and regular rhythm.      Chest Wall: PMI is not displaced.      Pulses: Intact distal pulses.           Carotid pulses are 2+ on the right side and " 2+ on the left side.       Radial pulses are 2+ on the right side and 2+ on the left side.        Dorsalis pedis pulses are 2+ on the right side and 2+ on the left side.        Posterior tibial pulses are 2+ on the right side and 2+ on the left side.      Heart sounds: Murmur heard.      Harsh midsystolic murmur is present with a grade of 3/6 at the upper right sternal border radiating to the neck.   Pulmonary:      Effort: Pulmonary effort is normal.      Breath sounds: Normal breath sounds.   Abdominal:      General: Bowel sounds are normal. There is no distension.      Palpations: Abdomen is soft. There is no mass.      Tenderness: There is no abdominal tenderness.   Musculoskeletal:         General: No tenderness.      Cervical back: Normal range of motion and neck supple.      Right lower leg: Edema present.      Left lower leg: Edema present.      Comments: 2+ edema to knees. Erythema    Lymphedema left arm   Lymphadenopathy:      Cervical: No cervical adenopathy.   Skin:     General: Skin is warm and dry.      Findings: No erythema or rash.      Nails: There is no clubbing.   Neurological:      Mental Status: She is alert and oriented to person, place, and time.   Psychiatric:         Speech: Speech normal.         Behavior: Behavior normal.         Lab Results   Component Value Date     12/03/2024    K 4.3 12/03/2024     12/03/2024    CO2 22 (L) 12/03/2024    BUN 18 12/03/2024    CREATININE 0.7 12/03/2024    GLU 85 12/03/2024    MG 2.0 06/24/2024    AST 49 (H) 12/03/2024    ALT 20 12/03/2024    ALBUMIN 2.9 (L) 12/03/2024    PROT 6.7 12/03/2024    BILITOT 4.9 (H) 12/03/2024    WBC 3.29 (L) 12/03/2024    HGB 13.4 12/03/2024    HCT 41.1 12/03/2024     (H) 12/03/2024    PLT 99 (L) 12/03/2024    INR 1.4 (H) 12/03/2024    TSH 3.951 05/15/2024    CHOL 114 (L) 06/24/2024    HDL 36 (L) 06/24/2024    LDLCALC 67.6 06/24/2024    TRIG 52 06/24/2024     (H) 10/03/2024       Assessment:     1.  Breast cancer, stage 4, left : Starting Zometa.   2. Chronic heart failure with preserved ejection fraction : She appears volume overloaded. Increase lasix back to 80 mg bid as much as possible.  Continue Jardiance and Sprinolactone.   3. Aortic atherosclerosis    4. Biliary cirrhosis : s/p TIPS.   5. Coronary artery calcification seen on CT scan : Not on a statin due to liver disease. LDL 67.   6. Chronic venous insufficiency: I suspect she has a component of this contributing to her leg edema. Advised her to follow back up with lymphedema clinic.   7. Nonrheumatic aortic valve stenosis : This was mild to moderate on last echo. Possible radiation induced valvular heart disease? AV noted to be trileaflet on prior echo.       Plan:     Shikha was seen today for follow-up.    Diagnoses and all orders for this visit:    Aortic atherosclerosis    Chronic heart failure with preserved ejection fraction  -     Basic Metabolic Panel; Future  -     BNP; Future    Coronary artery calcification seen on CT scan    Nonrheumatic aortic valve stenosis    Breast cancer, stage 4, left    Biliary cirrhosis        Thank you for allowing me to participate in this patient's care. Please do not hesitate to contact me with any questions or concerns.

## 2024-12-04 ENCOUNTER — CLINICAL SUPPORT (OUTPATIENT)
Dept: REHABILITATION | Facility: HOSPITAL | Age: 54
End: 2024-12-04
Payer: MEDICARE

## 2024-12-04 ENCOUNTER — HOSPITAL ENCOUNTER (OUTPATIENT)
Dept: RADIOLOGY | Facility: HOSPITAL | Age: 54
Discharge: HOME | End: 2024-12-04
Attending: STUDENT IN AN ORGANIZED HEALTH CARE EDUCATION/TRAINING PROGRAM
Payer: MEDICARE

## 2024-12-04 DIAGNOSIS — M53.86 DECREASED RANGE OF MOTION OF LUMBAR SPINE: Primary | ICD-10-CM

## 2024-12-04 DIAGNOSIS — M48.56XS COMPRESSION FRACTURE OF LUMBAR SPINE, NON-TRAUMATIC, SEQUELA: ICD-10-CM

## 2024-12-04 DIAGNOSIS — Z95.828 S/P TIPS (TRANSJUGULAR INTRAHEPATIC PORTOSYSTEMIC SHUNT): ICD-10-CM

## 2024-12-04 DIAGNOSIS — R29.898 WEAKNESS OF BOTH HIPS: ICD-10-CM

## 2024-12-04 PROCEDURE — 97110 THERAPEUTIC EXERCISES: CPT

## 2024-12-04 PROCEDURE — 76705 ECHO EXAM OF ABDOMEN: CPT | Mod: TC

## 2024-12-04 PROCEDURE — 97161 PT EVAL LOW COMPLEX 20 MIN: CPT

## 2024-12-05 ENCOUNTER — PATIENT MESSAGE (OUTPATIENT)
Dept: CARDIOLOGY | Facility: CLINIC | Age: 54
End: 2024-12-05
Payer: MEDICARE

## 2024-12-05 ENCOUNTER — OFFICE VISIT (OUTPATIENT)
Dept: CARDIOLOGY | Facility: CLINIC | Age: 54
End: 2024-12-05
Payer: MEDICARE

## 2024-12-05 ENCOUNTER — PATIENT MESSAGE (OUTPATIENT)
Dept: HEPATOLOGY | Facility: CLINIC | Age: 54
End: 2024-12-05

## 2024-12-05 ENCOUNTER — OFFICE VISIT (OUTPATIENT)
Dept: HEPATOLOGY | Facility: CLINIC | Age: 54
End: 2024-12-05
Payer: MEDICARE

## 2024-12-05 VITALS
BODY MASS INDEX: 32.74 KG/M2 | HEIGHT: 62 IN | HEART RATE: 84 BPM | SYSTOLIC BLOOD PRESSURE: 130 MMHG | RESPIRATION RATE: 18 BRPM | DIASTOLIC BLOOD PRESSURE: 60 MMHG | OXYGEN SATURATION: 96 % | TEMPERATURE: 98 F | WEIGHT: 177.94 LBS

## 2024-12-05 VITALS
BODY MASS INDEX: 33.07 KG/M2 | HEIGHT: 62 IN | DIASTOLIC BLOOD PRESSURE: 58 MMHG | WEIGHT: 179.69 LBS | SYSTOLIC BLOOD PRESSURE: 125 MMHG | HEART RATE: 90 BPM | OXYGEN SATURATION: 96 %

## 2024-12-05 DIAGNOSIS — K75.81 LIVER CIRRHOSIS SECONDARY TO NASH: Primary | ICD-10-CM

## 2024-12-05 DIAGNOSIS — R18.8 OTHER ASCITES: ICD-10-CM

## 2024-12-05 DIAGNOSIS — K74.5 BILIARY CIRRHOSIS: Chronic | ICD-10-CM

## 2024-12-05 DIAGNOSIS — I70.0 AORTIC ATHEROSCLEROSIS: Primary | ICD-10-CM

## 2024-12-05 DIAGNOSIS — K76.82 HEPATIC ENCEPHALOPATHY: ICD-10-CM

## 2024-12-05 DIAGNOSIS — Z87.19 HISTORY OF ESOPHAGEAL VARICES WITH BLEEDING: ICD-10-CM

## 2024-12-05 DIAGNOSIS — I87.2 CHRONIC VENOUS INSUFFICIENCY: ICD-10-CM

## 2024-12-05 DIAGNOSIS — Z95.828 S/P TIPS (TRANSJUGULAR INTRAHEPATIC PORTOSYSTEMIC SHUNT): ICD-10-CM

## 2024-12-05 DIAGNOSIS — C50.912 BREAST CANCER, STAGE 4, LEFT: Chronic | ICD-10-CM

## 2024-12-05 DIAGNOSIS — K74.60 LIVER CIRRHOSIS SECONDARY TO NASH: Primary | ICD-10-CM

## 2024-12-05 DIAGNOSIS — I25.10 CORONARY ARTERY CALCIFICATION SEEN ON CT SCAN: ICD-10-CM

## 2024-12-05 DIAGNOSIS — I35.0 NONRHEUMATIC AORTIC VALVE STENOSIS: ICD-10-CM

## 2024-12-05 DIAGNOSIS — I50.32 CHRONIC HEART FAILURE WITH PRESERVED EJECTION FRACTION: ICD-10-CM

## 2024-12-05 PROCEDURE — 99214 OFFICE O/P EST MOD 30 MIN: CPT | Mod: PBBFAC,27 | Performed by: STUDENT IN AN ORGANIZED HEALTH CARE EDUCATION/TRAINING PROGRAM

## 2024-12-05 PROCEDURE — 99214 OFFICE O/P EST MOD 30 MIN: CPT | Mod: PBBFAC | Performed by: INTERNAL MEDICINE

## 2024-12-05 PROCEDURE — 99999 PR PBB SHADOW E&M-EST. PATIENT-LVL IV: CPT | Mod: PBBFAC,,, | Performed by: INTERNAL MEDICINE

## 2024-12-05 PROCEDURE — 99214 OFFICE O/P EST MOD 30 MIN: CPT | Mod: S$PBB,,, | Performed by: STUDENT IN AN ORGANIZED HEALTH CARE EDUCATION/TRAINING PROGRAM

## 2024-12-05 PROCEDURE — 99999 PR PBB SHADOW E&M-EST. PATIENT-LVL IV: CPT | Mod: PBBFAC,,, | Performed by: STUDENT IN AN ORGANIZED HEALTH CARE EDUCATION/TRAINING PROGRAM

## 2024-12-05 NOTE — PATIENT INSTRUCTIONS
Call lymphedema clinic to see if you can back in.    Take you lasix twice daily as often as you can.    Keep legs elevated while seated.

## 2024-12-05 NOTE — PROGRESS NOTES
Hepatology Follow Up Note    Referring provider: Dr. Stephany Coyne  PCP: No, Primary Doctor    Chief complaint:  follow up cirrhosis    HPI:  Shikha López is a 54 y.o. female who was referred to Hepatology Clinic for cirrhosis.    She reports back back attributed to recently diagnosed compression fractures. She is otherwise without complaints today.  No recent hospitalizations or ED visits.  Compliant with lactulose and rifaximin without recent encephalopathy. She denies other signs of decompensated cirrhosis including no recent abdominal distention, encephalopathy, jaundice.    Background  She was diagnosed with cirrhosis in June 2023 when hospitalized with variceal bleeding.  EGD showed gastric varices that were subsequently treated with coil embolization.  She was hospitalized again earlier this month with recurrent variceal bleeding for which he underwent TIPS placement.  She denies recurrent GI bleeding since discharge.  She denies other signs of decompensated cirrhosis including no recent abdominal distention, encephalopathy, jaundice.    She does not drink alcohol and has never been a heavy drinker.  Testing negative for chronic viral hepatitis.  No known family history of liver disease.      Past Medical History:   Diagnosis Date    Acute encephalopathy 05/25/2024    Aortic atherosclerosis 07/06/2023    Breast cancer     CHF (congestive heart failure)     Coronary artery calcification seen on CT scan 09/19/2024    Esophageal and gastric varices 06/23/2023    Hepatic encephalopathy 05/25/2024    Malignant neoplasm metastatic to left lung 06/08/2021    Stenosis of aortic and mitral valves     Aortic stenosis       Past Surgical History:   Procedure Laterality Date    ENDOSCOPIC ULTRASOUND OF UPPER GASTROINTESTINAL TRACT N/A 6/27/2023    Procedure: ULTRASOUND, UPPER GI TRACT, ENDOSCOPIC;  Surgeon: Home Lopez MD;  Location: [facility] ([address] [place] [address]);  Service: Endoscopy;  Laterality: N/A;    ENDOSCOPIC  ULTRASOUND OF UPPER GASTROINTESTINAL TRACT N/A 1/12/2024    Procedure: ULTRASOUND, UPPER GI TRACT, ENDOSCOPIC;  Surgeon: Home Lopez MD;  Location: [facility] (2ND FLR);  Service: Endoscopy;  Laterality: N/A;  11/28/23-Instructions via portal-DS  1/8-lvm for precall-MS  1/10-precall complete-Kpvt    ESOPHAGOGASTRODUODENOSCOPY N/A 6/23/2023    Procedure: EGD (ESOPHAGOGASTRODUODENOSCOPY);  Surgeon: Quintin Mooney MD;  Location: [facility] (2ND FLR);  Service: Endoscopy;  Laterality: N/A;    ESOPHAGOGASTRODUODENOSCOPY N/A 6/27/2023    Procedure: EGD (ESOPHAGOGASTRODUODENOSCOPY);  Surgeon: Home Lopez MD;  Location: [facility] (2ND FLR);  Service: Endoscopy;  Laterality: N/A;    ESOPHAGOGASTRODUODENOSCOPY N/A 8/3/2023    Procedure: EGD (ESOPHAGOGASTRODUODENOSCOPY);  Surgeon: Home Lopez MD;  Location: [facility] (2ND FLR);  Service: Endoscopy;  Laterality: N/A;  instr portal-labs 6/28/23-tb    ESOPHAGOGASTRODUODENOSCOPY N/A 1/12/2024    Procedure: EGD (ESOPHAGOGASTRODUODENOSCOPY);  Surgeon: Home Lopez MD;  Location: [facility] (2ND FLR);  Service: Endoscopy;  Laterality: N/A;    ESOPHAGOGASTRODUODENOSCOPY N/A 4/10/2024    Procedure: EGD (ESOPHAGOGASTRODUODENOSCOPY);  Surgeon: Ze Anderson MD;  Location: [facility] (2ND FLR);  Service: Endoscopy;  Laterality: N/A;    MASTECTOMY         Family History   Problem Relation Name Age of Onset    Hypertension Mother      Heart attack Father      Lung cancer Father         Social History     Tobacco Use    Smoking status: Never    Smokeless tobacco: Never   Substance Use Topics    Alcohol use: Never    Drug use: Never       Current Outpatient Medications   Medication Sig Dispense Refill    diaper,brief,adult,disposable Misc 1 Units by Misc.(Non-Drug; Combo Route) route 4 (four) times daily. 120 each 4    diaper,brief,adult,disposable Misc 1 Units by Misc.(Non-Drug; Combo Route) route 4 (four) times daily. 125 each 4    empagliflozin (JARDIANCE) 10 mg tablet  "Take 1 tablet (10 mg total) by mouth once daily. 30 tablet 11    ferrous sulfate (FEROSUL) 325 mg (65 mg iron) Tab tablet Take 1 tablet by mouth daily with Vitamin C on an empty stomach 60 tablet 3    furosemide (LASIX) 80 MG tablet Take 1 tablet (80 mg total) by mouth once daily. If you are gaining weight (2 lbs in 24 hours or 3 lbs in two days) may take an extra dose of 80 mg 90 tablet 2    incontinence pad, liner, disp Pads 1 Units by Misc.(Non-Drug; Combo Route) route 4 (four) times daily. 125 each 4    lactulose (CHRONULAC) 10 gram/15 mL solution Take 30 mLs (20 g total) by mouth 3 (three) times daily. 2700 mL 11    methylphenidate HCl (RITALIN) 5 MG tablet Take 1 tablet (5 mg total) by mouth 2 (two) times daily. 60 tablet 0    mupirocin (BACTROBAN) 2 % ointment Apply topically 3 (three) times daily. 22 g 0    pantoprazole (PROTONIX) 40 MG tablet Take 1 tablet (40 mg total) by mouth 2 (two) times daily. 180 tablet 0    rifAXIMin (XIFAXAN) 550 mg Tab Take 1 tablet (550 mg total) by mouth 2 (two) times daily. 60 tablet 11    spironolactone (ALDACTONE) 100 MG tablet Take 1 tablet (100 mg total) by mouth once daily. 30 tablet 11    triamcinolone acetonide 0.5% (KENALOG) 0.5 % Crea Apply topically 2 (two) times daily. 454 g 0     No current facility-administered medications for this visit.       Review of patient's allergies indicates:  No Known Allergies    Review of Systems   Constitutional:  Negative for fever and weight loss.   Cardiovascular:  Negative for leg swelling.   Gastrointestinal:  Negative for abdominal pain, blood in stool, constipation, diarrhea, heartburn, melena, nausea and vomiting.       Vitals:    12/05/24 1039   BP: 130/60   Pulse: 84   Resp: 18   Temp: 97.9 °F (36.6 °C)   TempSrc: Oral   SpO2: 96%   Weight: 80.7 kg (177 lb 14.6 oz)   Height: 5' 2" (1.575 m)         Physical Exam  Constitutional:       General: She is not in acute distress.  Eyes:      General: Scleral icterus present. "   Cardiovascular:      Rate and Rhythm: Normal rate and regular rhythm.   Pulmonary:      Effort: Pulmonary effort is normal. No respiratory distress.   Abdominal:      General: Bowel sounds are normal. There is no distension.      Palpations: Abdomen is soft.      Tenderness: There is no abdominal tenderness. There is no guarding or rebound.   Musculoskeletal:      Right lower leg: No edema.      Left lower leg: No edema.   Skin:     Coloration: Skin is not jaundiced.         LABS: I personally reviewed pertinent laboratory findings.    Lab Results   Component Value Date    WBC 3.29 (L) 12/03/2024    HGB 13.4 12/03/2024    HCT 41.1 12/03/2024     (H) 12/03/2024    PLT 99 (L) 12/03/2024       Lab Results   Component Value Date     12/03/2024    K 4.3 12/03/2024     12/03/2024    CO2 22 (L) 12/03/2024    BUN 18 12/03/2024    CREATININE 0.7 12/03/2024    CALCIUM 9.0 12/03/2024    ANIONGAP 11 12/03/2024    ESTGFRAFRICA >60.0 07/18/2022    EGFRNONAA >60.0 07/18/2022       Lab Results   Component Value Date    ALT 20 12/03/2024    AST 49 (H) 12/03/2024    ALKPHOS 145 12/03/2024    BILITOT 4.9 (H) 12/03/2024       Lab Results   Component Value Date    HEPBCAB Non-reactive 04/30/2024    HEPCAB Non-reactive 04/30/2024       Lab Results   Component Value Date    SMOOTHMUSCAB Positive 1:40 (A) 04/30/2024    CERULOPLSM 30.0 04/30/2024        MELD 3.0: 19 at 12/3/2024 12:48 PM  MELD-Na: 16 at 12/3/2024 12:48 PM  Calculated from:  Serum Creatinine: 0.7 mg/dL (Using min of 1 mg/dL) at 12/3/2024 12:48 PM  Serum Sodium: 140 mmol/L (Using max of 137 mmol/L) at 12/3/2024 12:48 PM  Total Bilirubin: 4.9 mg/dL at 12/3/2024 12:48 PM  Serum Albumin: 2.9 g/dL at 12/3/2024 12:48 PM  INR(ratio): 1.4 at 12/3/2024 12:48 PM  Age at listing (hypothetical): 54 years  Sex: Female at 12/3/2024 12:48 PM       IMAGING: I personally reviewed imaging studies.      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 MELD-Na  16.    1. Liver cirrhosis secondary to HA[place]THORNE    2. History of esophageal varices with bleeding    3. Other ascites    4. Hepatic encephalopathy    5. S/P TIPS (transjugular intrahepatic portosystemic shunt)          Recommendations:  Cirrhosis due to MASH: Counseled on diet, weight loss, and control of co-morbidities.    History of variceal bleeding:  She is status post TIPS placement 04/2024.  Surveillance EGD not warranted as long as TIPS remains patent. Recent US concerning TIPS dysfunction. Will review with IR to determine if revision is needed.    Ascites/Edema: 2 gm Na diet. She is status post TIPS placement 04/2024.  Recent US without ascites.    Encephalopathy: Continue lactulose. Titrate to maintain 3-4 bowel movements per day. Continue rifaximin 550mg BID    Transplant candidacy:  She is unfortunately not a transplant candidate given metastatic breast cancer.    Cirrhosis HM  - HCC screening: US in 12/2024 without HCC. AFP 3.6. Repeat abdominal US and AFP every 6 months.    - Immunizations: Recommend HAV and HBV vaccinations if not immune.    Return to clinic in 6 months or sooner if needed.    I spent a total of 30 minutes on the day of the visit. This includes face to face time and non-face to face time preparing to see the patient (eg, review of tests), obtaining and/or reviewing separately obtained history, documenting clinical information in the electronic or other health record, independently interpreting results, and communicating results to the patient/family/caregiver, or care coordination.    This note includes dictation done using [employer] speech recognition program. Word recognition mistakes are occasionally missed on review.    Farhad Figueroa MD  Staff Physician  Hepatology and Liver Transplant  [facility]  [facility]

## 2024-12-06 ENCOUNTER — PATIENT MESSAGE (OUTPATIENT)
Dept: ADMINISTRATIVE | Facility: OTHER | Age: 54
End: 2024-12-06
Payer: MEDICARE

## 2024-12-06 DIAGNOSIS — Z95.828 S/P TIPS (TRANSJUGULAR INTRAHEPATIC PORTOSYSTEMIC SHUNT): Primary | ICD-10-CM

## 2024-12-06 DIAGNOSIS — I85.00 ESOPHAGEAL AND GASTRIC VARICES: Chronic | ICD-10-CM

## 2024-12-06 DIAGNOSIS — K76.6 PORTAL HYPERTENSION: ICD-10-CM

## 2024-12-06 DIAGNOSIS — I86.4 ESOPHAGEAL AND GASTRIC VARICES: Chronic | ICD-10-CM

## 2024-12-06 LAB
GENETICIST REVIEW: NORMAL
HFE GENE MUT ANL BLD/T: NORMAL
HFE RELEASED BY: NORMAL
HFE RESULT SUMMARY: NEGATIVE
REF LAB TEST METHOD: NORMAL
SPECIMEN SOURCE: NORMAL
SPECIMEN,  HEMOCHROMATOSIS: NORMAL

## 2024-12-07 LAB
CLINICAL BIOCHEMIST REVIEW: NORMAL
PLPETH BLD-MCNC: <10 NG/ML
POPETH BLD-MCNC: <10 NG/ML

## 2024-12-09 ENCOUNTER — OUTPATIENT CASE MANAGEMENT (OUTPATIENT)
Dept: ADMINISTRATIVE | Facility: OTHER | Age: 54
End: 2024-12-09
Payer: MEDICARE

## 2024-12-09 ENCOUNTER — TELEPHONE (OUTPATIENT)
Dept: INTERVENTIONAL RADIOLOGY/VASCULAR | Facility: CLINIC | Age: 54
End: 2024-12-09
Payer: MEDICARE

## 2024-12-09 NOTE — PROGRESS NOTES
Outpatient Care Management  Plan of Care Follow Up Visit    Patient: Shikha López  MRN: [id number]  Date of Service: 12/09/2024  Completed by: Lottie Marroquin RN  Referral Date: 05/27/2024    Reason for Visit   Patient presents with    OPCM RN Follow Up Call     12/9/24    Case Closure     Pt  graduated       Brief Summary: Pt voiced no other questions or concerns and agrees with case closing.   Next Steps: Case Closed

## 2024-12-10 PROBLEM — R29.898 WEAKNESS OF BOTH HIPS: Status: ACTIVE | Noted: 2024-12-10

## 2024-12-10 PROBLEM — M53.86 DECREASED RANGE OF MOTION OF LUMBAR SPINE: Status: ACTIVE | Noted: 2024-12-10

## 2024-12-10 NOTE — PLAN OF CARE
[facility]   Physical Therapy Initial Evaluation      Name: Shikha MORALES [facility] Number: [id number]    Therapy Diagnosis:   Encounter Diagnoses   Name Primary?    Compression fracture of lumbar spine, non-traumatic, sequela     Decreased range of motion of lumbar spine Yes    Weakness of both hips         Physician: Home Willis MD    Physician Orders: PT Eval and Treat   Medical Diagnosis from Referral: M48.56XS (ICD-10-CM) - Compression fracture of lumbar spine, non-traumatic, sequela   Evaluation Date: 12/4/2024  Authorization Period Expiration: 11/25/2025  Plan of Care Expiration: 3/4/2025  Progress Note Due: 1/4/2025  Visit # / Visits authorized: 1/1   FOTO: 0/3  (Give FOTO next visit)    Precautions:  Hx of breast cancer    Time In: 8:15 AM (pt with late arrival)  Time Out: 9:00 AM  Total Billable Time: 45 minutes    Subjective   Date of onset: a month ago     History of current condition - Shikha reports onset of low back pain that began about a month ago after she bent forward to  her dog. She describes back pain as achy, throbbing, and sharp. She denies LE numbness or tingling. She had a CT scan done which revealed compression fractures in her back. She thinks the fractures may have been caused by being on chemotherapy for several years to treat breast cancer. She has been walking with a cane since onset of back pain.       Falls: no    Imaging: CT scan abdomen/pelvis 11/11/24:                   Multiple compression fractures in the thoracolumbar spine with severe height loss at T8, many of which are new.     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   Occupation: desk job  Prior Level of Function:   Current Level of Function: difficulty with walking, prolonged standing, bending, lifting objects     Pain:  Current 9/10, worst 9/10, best 4/10   Location: low back  Description: Aching, Throbbing, and Sharp  Aggravating Factors: walking,  prolonged standing, bending, lifting objects   Easing Factors: heating pad, icy hot, tylenol     Patients goals: decrease the pain, improve walking tolerance, ADLs with less limitations      Medical History:   Past Medical History:   Diagnosis Date    Acute encephalopathy 05/25/2024    Aortic atherosclerosis 07/06/2023    Breast cancer     CHF (congestive heart failure)     Coronary artery calcification seen on CT scan 09/19/2024    Esophageal and gastric varices 06/23/2023    Hepatic encephalopathy 05/25/2024    Malignant neoplasm metastatic to left lung 06/08/2021    Stenosis of aortic and mitral valves     Aortic stenosis     Surgical History:   Shikha López  has a past surgical history that includes Mastectomy; Esophagogastroduodenoscopy (N/A, 6/23/2023); Endoscopic ultrasound of upper gastrointestinal tract (N/A, 6/27/2023); Esophagogastroduodenoscopy (N/A, 6/27/2023); Esophagogastroduodenoscopy (N/A, 8/3/2023); Endoscopic ultrasound of upper gastrointestinal tract (N/A, 1/12/2024); Esophagogastroduodenoscopy (N/A, 1/12/2024); and Esophagogastroduodenoscopy (N/A, 4/10/2024).    Medications:   Shikha has a current medication list which includes the following prescription(s): diaper,brief,adult,disposable, diaper,brief,adult,disposable, empagliflozin, ferrous sulfate, furosemide, incontinence pad, liner, disp, lactulose, methylphenidate hcl, mupirocin, pantoprazole, rifaximin, spironolactone, and triamcinolone acetonide 0.5%.    Allergies:   Review of patient's allergies indicates:  No Known Allergies     Objective      Gait: ambulates with SPC    Posture:      Lumbar Range of Motion:    % Limitation Pain   Flexion 50% loss   Pain when returning to neutral        Extension 90% loss   Pain low back        Left Side Bending 50% loss         Right Side Bending 75% loss         Left rotation   NT         Right Rotation   NT            Hip Range of Motion:   Right  Left Goal   Hip Extension NT NT 30 deg.   Hip  External Rotation (hip flexed to 90) WNL WNL 45 deg.   Hip Internal Rotation (hip flexed to 90) WNL 30 deg 45 deg.   Hip Flexion 110 deg 100 deg 120 deg.     Lower Extremity Strength  Right LE  Left LE  Goal   Knee extension: 5/5 Knee extension: 5/5 5/5   Knee flexion: 5/5 Knee flexion: 5/5 5/5   Hip flexion: 4-/5 Hip flexion: 4-/5 5/5   Hip extension:  3+/5 Hip extension: 3+/5 5/5   Hip abduction: 4-/5 Hip abduction: 4-/5 5/5       Neuro Dynamic Testing:    Sciatic nerve:      SLR: R = Negative     L = Negative        Joint Mobility: NT    Palpation:     Sensation: Intact B LE    Flexibility: mild hamstring tightness bilaterally       Intake Outcome Measure for FOTO Lumbar Survey    Therapist reviewed FOTO scores for Shikha López on 12/4/2024.   FOTO report - see Media section or FOTO account episode details.    Intake Score: TBD%         Treatment     Total Treatment time (time-based codes) separate from Evaluation: 10 minutes     Shikha received the treatments listed below:      therapeutic exercises to develop strength, ROM, and core stabilization for 10 minutes including:    HEP instruction:  SKTC  Supine PPT  Hip adduction isometrics (ball squeeze)    Patient Education and Home Exercises     Education provided:   - Anatomy and exam objective findings  - PT role, POC, prognosis  - HEP    Written Home Exercises Provided: Yes. Exercises were reviewed and Shikha was able to demonstrate them prior to the end of the session.  Shikha demonstrated good  understanding of the education provided. See EMR under Patient Instructions for exercises provided during therapy sessions.    Assessment   Shikha is a 54 y.o. female referred to outpatient Physical Therapy with a medical diagnosis of M48.56XS (ICD-10-CM) - Compression fracture of lumbar spine, non-traumatic, sequela. Upon physical assessment, patient demonstrates decreased lumbar range of motion, bilateral hip weakness, impaired gait, and decreased functional  mobility. Patient prognosis is Good. Patient will benefit from skilled outpatient Physical Therapy to address the deficits stated above and in the chart below, provide patient education, and to improve patient's quality of life.     Plan of care discussed with patient: Yes  Patient's spiritual, cultural and educational needs considered and patient is agreeable to the plan of care and goals as stated below:     Anticipated Barriers for therapy: none    Medical Necessity is demonstrated by the following  History  Co-morbidities and personal factors that may impact the plan of care [] LOW: no personal factors / co-morbidities  [x] MODERATE: 1-2 personal factors / co-morbidities  [] HIGH: 3+ personal factors / co-morbidities    Moderate / High Support Documentation:   Co-morbidities affecting plan of care: Hx of cancer    Personal Factors:   no deficits     Examination  Body Structures and Functions, activity limitations and participation restrictions that may impact the plan of care [] LOW: addressing 1-2 elements  [x] MODERATE: 3+ elements  [] HIGH: 4+ elements (please support below)    Moderate / High Support Documentation: decreased lumbar ROM, B hip weakness, impaired gait     Clinical Presentation [x] LOW: stable  [] MODERATE: Evolving  [] HIGH: Unstable     Decision Making/ Complexity Score: low       Goals:    SHORT TERM GOALS:  4 weeks    Patient will be independent with HEP to supplement therapy in return to maximal function.    Pain rating at Worst: 5/10 or less for improved tolerance with prolonged walking.     Patient will demonstrate bilateral hip strength at least 4/5.       LONG TERM GOALS: 8 weeks    Patient will demonstrate full lumbar flexion AROM to reach for low surfaces with no difficulty.     Patient will demonstrate bilateral hip strength at least 4+/5.     Pain Rating at Worst: 3/10 or less to improve overall Quality of Life.     Patient will meet predicted functional outcome (FOTO) score: 10%  improvement in functional ability or greater to increase self-perceived functional ability.    Patient will be able to ambulate community distances without AD with minimal to no difficulty.     Patient will be independent with updated HEP at discharge for self-management of symptoms.         Plan   Plan of care Certification: 12/4/2024 to 3/4/2025.    Outpatient Physical Therapy 2 times weekly for 8 weeks to include the following interventions: Gait Training, Manual Therapy, Neuromuscular Re-ed, Patient Education, Therapeutic Activities, and Therapeutic Exercise.     Starla Alston PT      Physician's Signature: _________________________________________ Date: ________________

## 2024-12-16 ENCOUNTER — HOSPITAL ENCOUNTER (OUTPATIENT)
Dept: CARDIOLOGY | Facility: HOSPITAL | Age: 54
Discharge: HOME | End: 2024-12-16
Attending: INTERNAL MEDICINE
Payer: MEDICARE

## 2024-12-16 ENCOUNTER — INFUSION (OUTPATIENT)
Dept: INFUSION THERAPY | Facility: HOSPITAL | Age: 54
End: 2024-12-16
Payer: MEDICARE

## 2024-12-16 ENCOUNTER — PATIENT MESSAGE (OUTPATIENT)
Dept: HEMATOLOGY/ONCOLOGY | Facility: CLINIC | Age: 54
End: 2024-12-16
Payer: MEDICARE

## 2024-12-16 VITALS
SYSTOLIC BLOOD PRESSURE: 140 MMHG | DIASTOLIC BLOOD PRESSURE: 67 MMHG | HEART RATE: 99 BPM | OXYGEN SATURATION: 97 % | HEIGHT: 62 IN | WEIGHT: 178.38 LBS | RESPIRATION RATE: 18 BRPM | BODY MASS INDEX: 32.82 KG/M2 | TEMPERATURE: 98 F

## 2024-12-16 DIAGNOSIS — M48.56XS COMPRESSION FRACTURE OF LUMBAR SPINE, NON-TRAUMATIC, SEQUELA: Primary | ICD-10-CM

## 2024-12-16 DIAGNOSIS — I87.2 CHRONIC VENOUS INSUFFICIENCY: ICD-10-CM

## 2024-12-16 DIAGNOSIS — K92.2 ACUTE GASTROINTESTINAL BLEEDING: ICD-10-CM

## 2024-12-16 DIAGNOSIS — C50.912 BREAST CANCER, STAGE 4, LEFT: ICD-10-CM

## 2024-12-16 LAB
LEFT GREAT SAPHENOUS DISTAL THIGH DIA: 0.44 CM
LEFT GREAT SAPHENOUS JUNCTION DIA: 0.52 CM
LEFT GREAT SAPHENOUS JUNCTION REFLUX: 568 MS
LEFT GREAT SAPHENOUS KNEE DIA: 0.28 CM
LEFT GREAT SAPHENOUS MIDDLE THIGH DIA: 0.63 CM
LEFT GREAT SAPHENOUS MIDDLE THIGH REFLUX: 845 MS
LEFT GREAT SAPHENOUS PROXIMAL CALF DIA: 0.28 CM
LEFT SMALL SAPHENOUS KNEE DIA: 0.5 CM
LEFT SMALL SAPHENOUS KNEE REFLUX: 2649 MS
LEFT SMALL SAPHENOUS SPJ DIA: 0.3 CM
RIGHT GREAT SAPHENOUS DISTAL THIGH DIA: 0.58 CM
RIGHT GREAT SAPHENOUS JUNCTION DIA: 0.98 CM
RIGHT GREAT SAPHENOUS JUNCTION REFLUX: 1324 MS
RIGHT GREAT SAPHENOUS KNEE DIA: 0.65 CM
RIGHT GREAT SAPHENOUS KNEE REFLUX: 2226 MS
RIGHT GREAT SAPHENOUS MIDDLE THIGH DIA: 0.72 CM
RIGHT GREAT SAPHENOUS MIDDLE THIGH REFLUX: 1170 MS
RIGHT GREAT SAPHENOUS PROXIMAL CALF DIA: 0.4 CM
RIGHT SMALL SAPHENOUS KNEE DIA: 0.3 CM

## 2024-12-16 PROCEDURE — 93970 EXTREMITY STUDY: CPT | Mod: 26,,, | Performed by: INTERNAL MEDICINE

## 2024-12-16 PROCEDURE — 63600175 PHARM REV CODE 636 W HCPCS: Performed by: INTERNAL MEDICINE

## 2024-12-16 PROCEDURE — 96374 THER/PROPH/DIAG INJ IV PUSH: CPT

## 2024-12-16 PROCEDURE — 93970 EXTREMITY STUDY: CPT | Mod: TC

## 2024-12-16 PROCEDURE — 25000003 PHARM REV CODE 250: Performed by: INTERNAL MEDICINE

## 2024-12-16 RX ORDER — EPINEPHRINE 0.3 MG/.3ML
0.3 INJECTION SUBCUTANEOUS ONCE AS NEEDED
Status: DISCONTINUED | OUTPATIENT
Start: 2024-12-16 | End: 2024-12-16 | Stop reason: HOSPADM

## 2024-12-16 RX ORDER — SODIUM CHLORIDE 0.9 % (FLUSH) 0.9 %
10 SYRINGE (ML) INJECTION
Status: DISCONTINUED | OUTPATIENT
Start: 2024-12-16 | End: 2024-12-16 | Stop reason: HOSPADM

## 2024-12-16 RX ORDER — ZOLEDRONIC ACID 0.04 MG/ML
4 INJECTION, SOLUTION INTRAVENOUS
Status: COMPLETED | OUTPATIENT
Start: 2024-12-16 | End: 2024-12-16

## 2024-12-16 RX ORDER — HYDROCODONE BITARTRATE AND ACETAMINOPHEN 5; 325 MG/1; MG/1
1 TABLET ORAL EVERY 6 HOURS PRN
Qty: 40 TABLET | Refills: 0 | Status: SHIPPED | OUTPATIENT
Start: 2024-12-16

## 2024-12-16 RX ORDER — HEPARIN 100 UNIT/ML
500 SYRINGE INTRAVENOUS
Status: DISCONTINUED | OUTPATIENT
Start: 2024-12-16 | End: 2024-12-16 | Stop reason: HOSPADM

## 2024-12-16 RX ORDER — DIPHENHYDRAMINE HYDROCHLORIDE 50 MG/ML
50 INJECTION INTRAMUSCULAR; INTRAVENOUS ONCE AS NEEDED
Status: DISCONTINUED | OUTPATIENT
Start: 2024-12-16 | End: 2024-12-16 | Stop reason: HOSPADM

## 2024-12-16 RX ADMIN — SODIUM CHLORIDE: 9 INJECTION, SOLUTION INTRAVENOUS at 05:12

## 2024-12-16 RX ADMIN — ALTEPLASE 2 MG: 2.2 INJECTION, POWDER, LYOPHILIZED, FOR SOLUTION INTRAVENOUS at 04:12

## 2024-12-16 RX ADMIN — ZOLEDRONIC ACID 4 MG: 0.04 INJECTION, SOLUTION INTRAVENOUS at 05:12

## 2024-12-16 RX ADMIN — HEPARIN SODIUM (PORCINE) LOCK FLUSH IV SOLN 100 UNIT/ML 500 UNITS: 100 SOLUTION at 05:12

## 2024-12-16 NOTE — PLAN OF CARE
1705 - Labs, hx, and medications reviewed, Pt meets parameters for treatment today. Assessment completed and plan of care reviewed. Pt verbalized understanding. PAC accessed with flushing but no blood return present - Cathflo instilled and blood return present after instillation. Pt voices no new complaints or concerns, will continue to monitor for safety.

## 2024-12-17 NOTE — PLAN OF CARE
1735 - Pt tolerated Zometa infusion well, no complaints or complications reported. VSS throughout treatment. Positive blood return noted from port; port heparin locked and deaccessed. Pt aware to call provider with any questions or concerns, and is aware of upcoming appts. Pt escorted from clinic in wheelchair by RN, no distress noted.

## 2024-12-27 ENCOUNTER — PATIENT MESSAGE (OUTPATIENT)
Dept: INTERVENTIONAL RADIOLOGY/VASCULAR | Facility: HOSPITAL | Age: 54
End: 2024-12-27
Payer: MEDICARE

## 2024-12-30 ENCOUNTER — TELEPHONE (OUTPATIENT)
Dept: INTERVENTIONAL RADIOLOGY/VASCULAR | Facility: HOSPITAL | Age: 54
End: 2024-12-30
Payer: MEDICARE

## 2024-12-30 ENCOUNTER — CLINICAL SUPPORT (OUTPATIENT)
Dept: REHABILITATION | Facility: HOSPITAL | Age: 54
End: 2024-12-30
Payer: MEDICARE

## 2024-12-30 DIAGNOSIS — M53.86 DECREASED RANGE OF MOTION OF LUMBAR SPINE: Primary | ICD-10-CM

## 2024-12-30 DIAGNOSIS — R29.898 WEAKNESS OF BOTH HIPS: ICD-10-CM

## 2024-12-30 PROCEDURE — 97110 THERAPEUTIC EXERCISES: CPT

## 2024-12-30 NOTE — PROGRESS NOTES
[facility]   Physical Therapy Treatment Note     Name: Shikha MORALES [facility] Number: [id number]    Therapy Diagnosis:   Encounter Diagnoses   Name Primary?    Decreased range of motion of lumbar spine Yes    Weakness of both hips      Physician: Mariam Lisa NP    Visit Date: 12/30/2024    Physician Orders: PT Eval and Treat   Medical Diagnosis from Referral: M48.56XS (ICD-10-CM) - Compression fracture of lumbar spine, non-traumatic, sequela   Evaluation Date: 12/4/2024  Authorization Period Expiration: 11/25/2025  Plan of Care Certification Period: 3/4/2025  Visit # / Visits authorized: 2/12       Progress Note Due: 1/4/2025  FOTO: 1/3    PTA Visit #: 0/5     Precautions: Hx of breast cancer     Time In: 10:05 AM  Time Out: 10:50 AM  Total Billable Time: 45 minutes    SUBJECTIVE     Pt reports: she is still having significant back pain, especially with standing and walking.     She was compliant with home exercise program.  Response to previous treatment:   Functional change:     Pain: 5/10  Location: mid & low back     OBJECTIVE     Objective Measures updated at progress report unless specified.     TREATMENT     Shikha received the treatments listed below:      received therapeutic exercises to develop strength and ROM for 40 minutes including:    Use wedge for table exercises   SKTC with towel x 10 each side   Hip adduction isometrics (ball squeeze) 5 second hold 3 x 10  Supine PPT 5 second hold 2 x 10  Supine BKFO no resistance 2 x 10 bilateral  Supine marches 1 x 10 bilateral   LAQ 2 x 10 bilateral       received the following manual therapy techniques: were applied for 00 minutes, including:      PATIENT EDUCATION AND HOME EXERCISES     Home Exercises Provided and Patient Education Provided     Education provided:   - cues with exercises     Written Home Exercises Provided: Patient instructed to cont prior HEP. Exercises were reviewed and Shikha was able to demonstrate  them prior to the end of the session.  Shikha demonstrated good  understanding of the education provided. See EMR under Patient Instructions for exercises provided during therapy sessions    ASSESSMENT   Patient continues to c/o moderate back pain that increases with standing and walking. Treatment limited to mat exercises today due to patient's symptoms. Patient demonstrates difficulty transitioning from supine to sitting at end of mat due to back pain - plan to use wedge next visit to elevate head. Encouraged patient to continue with HEP. Will progress treatment per patient's tolerance.     Pt prognosis is Good.     Pt will continue to benefit from skilled outpatient physical therapy to address the deficits listed in the problem list box on initial evaluation, provide pt/family education and to maximize pt's level of independence in the home and community environment.     Pt's spiritual, cultural and educational needs considered and pt agreeable to plan of care and goals.     Anticipated barriers to physical therapy:     Goals:     SHORT TERM GOALS:  4 weeks     Patient will be independent with HEP to supplement therapy in return to maximal function.     Pain rating at Worst: 5/10 or less for improved tolerance with prolonged walking.      Patient will demonstrate bilateral hip strength at least 4/5.         LONG TERM GOALS: 8 weeks     Patient will demonstrate full lumbar flexion AROM to reach for low surfaces with no difficulty.      Patient will demonstrate bilateral hip strength at least 4+/5.      Pain Rating at Worst: 3/10 or less to improve overall Quality of Life.      Patient will meet predicted functional outcome (FOTO) score: 10% improvement in functional ability or greater to increase self-perceived functional ability.     Patient will be able to ambulate community distances without AD with minimal to no difficulty.      Patient will be independent with updated HEP at discharge for self-management of  symptoms.           PLAN     Continue per established PT plan of care.       Starla Alston, PT

## 2024-12-30 NOTE — NURSING
Pre-procedure call complete.  2 patient identifier used (name and ).  Pt instructed not to eat or drink anything after midnight the night before procedure. Pt aware will need someone to provide transport home and monitor pt 8 hours post procedure. No driving for at least 24 hours after procedure. Patient verbalized aware of which medications to take.  Do not take  sleep medication (including OTC) and anxiety medication the night before procedure.  Arrival time and location given.  Expected length of stay reviewed.  Covid screening completed.  Pt verbalized understanding of all pre-procedure instructions.  Written instructions and directions sent to patient in [email or web address].

## 2025-01-02 ENCOUNTER — HOSPITAL ENCOUNTER (OUTPATIENT)
Dept: INTERVENTIONAL RADIOLOGY/VASCULAR | Facility: HOSPITAL | Age: 55
Discharge: HOME | End: 2025-01-02
Attending: FAMILY MEDICINE
Payer: MEDICARE

## 2025-01-02 VITALS
HEIGHT: 62 IN | SYSTOLIC BLOOD PRESSURE: 120 MMHG | HEART RATE: 106 BPM | RESPIRATION RATE: 21 BRPM | BODY MASS INDEX: 33.13 KG/M2 | DIASTOLIC BLOOD PRESSURE: 58 MMHG | TEMPERATURE: 97 F | OXYGEN SATURATION: 99 % | WEIGHT: 180 LBS

## 2025-01-02 DIAGNOSIS — I85.00 ESOPHAGEAL AND GASTRIC VARICES: Chronic | ICD-10-CM

## 2025-01-02 DIAGNOSIS — K76.6 PORTAL HYPERTENSION: ICD-10-CM

## 2025-01-02 DIAGNOSIS — Z95.828 S/P TIPS (TRANSJUGULAR INTRAHEPATIC PORTOSYSTEMIC SHUNT): ICD-10-CM

## 2025-01-02 DIAGNOSIS — I86.4 ESOPHAGEAL AND GASTRIC VARICES: Chronic | ICD-10-CM

## 2025-01-02 PROCEDURE — C1769 GUIDE WIRE: HCPCS

## 2025-01-02 PROCEDURE — C1894 INTRO/SHEATH, NON-LASER: HCPCS

## 2025-01-02 PROCEDURE — 63600175 PHARM REV CODE 636 W HCPCS: Performed by: STUDENT IN AN ORGANIZED HEALTH CARE EDUCATION/TRAINING PROGRAM

## 2025-01-02 PROCEDURE — C1725 CATH, TRANSLUMIN NON-LASER: HCPCS

## 2025-01-02 PROCEDURE — 25500020 PHARM REV CODE 255: Performed by: STUDENT IN AN ORGANIZED HEALTH CARE EDUCATION/TRAINING PROGRAM

## 2025-01-02 PROCEDURE — 27201423 OPTIME MED/SURG SUP & DEVICES STERILE SUPPLY

## 2025-01-02 PROCEDURE — 25000003 PHARM REV CODE 250: Performed by: STUDENT IN AN ORGANIZED HEALTH CARE EDUCATION/TRAINING PROGRAM

## 2025-01-02 RX ORDER — SODIUM CHLORIDE 9 MG/ML
INJECTION, SOLUTION INTRAVENOUS
Status: COMPLETED | OUTPATIENT
Start: 2025-01-02 | End: 2025-01-02

## 2025-01-02 RX ORDER — FENTANYL CITRATE 50 UG/ML
INJECTION, SOLUTION INTRAMUSCULAR; INTRAVENOUS
Status: COMPLETED | OUTPATIENT
Start: 2025-01-02 | End: 2025-01-02

## 2025-01-02 RX ORDER — LIDOCAINE HYDROCHLORIDE 10 MG/ML
INJECTION, SOLUTION INFILTRATION; PERINEURAL
Status: COMPLETED | OUTPATIENT
Start: 2025-01-02 | End: 2025-01-02

## 2025-01-02 RX ORDER — ONDANSETRON HYDROCHLORIDE 2 MG/ML
4 INJECTION, SOLUTION INTRAVENOUS EVERY 6 HOURS PRN
Status: DISCONTINUED | OUTPATIENT
Start: 2025-01-02 | End: 2025-01-03 | Stop reason: HOSPADM

## 2025-01-02 RX ORDER — SODIUM CHLORIDE 9 MG/ML
INJECTION, SOLUTION INTRAVENOUS CONTINUOUS
Status: DISCONTINUED | OUTPATIENT
Start: 2025-01-02 | End: 2025-01-03 | Stop reason: HOSPADM

## 2025-01-02 RX ORDER — LIDOCAINE HYDROCHLORIDE 10 MG/ML
1 INJECTION, SOLUTION EPIDURAL; INFILTRATION; INTRACAUDAL; PERINEURAL ONCE
Status: DISCONTINUED | OUTPATIENT
Start: 2025-01-02 | End: 2025-01-03 | Stop reason: HOSPADM

## 2025-01-02 RX ORDER — CEFTRIAXONE 1 G/1
INJECTION, POWDER, FOR SOLUTION INTRAMUSCULAR; INTRAVENOUS
Status: COMPLETED | OUTPATIENT
Start: 2025-01-02 | End: 2025-01-02

## 2025-01-02 RX ORDER — MIDAZOLAM HYDROCHLORIDE 1 MG/ML
INJECTION, SOLUTION INTRAMUSCULAR; INTRAVENOUS
Status: COMPLETED | OUTPATIENT
Start: 2025-01-02 | End: 2025-01-02

## 2025-01-02 RX ADMIN — MIDAZOLAM HYDROCHLORIDE 1 MG: 2 INJECTION, SOLUTION INTRAMUSCULAR; INTRAVENOUS at 02:01

## 2025-01-02 RX ADMIN — SODIUM CHLORIDE 250 ML: 0.9 INJECTION, SOLUTION INTRAVENOUS at 02:01

## 2025-01-02 RX ADMIN — FENTANYL CITRATE 25 MCG: 50 INJECTION, SOLUTION INTRAMUSCULAR; INTRAVENOUS at 02:01

## 2025-01-02 RX ADMIN — MIDAZOLAM HYDROCHLORIDE 0.5 MG: 2 INJECTION, SOLUTION INTRAMUSCULAR; INTRAVENOUS at 02:01

## 2025-01-02 RX ADMIN — LIDOCAINE HYDROCHLORIDE 5 ML: 10 INJECTION, SOLUTION INFILTRATION; PERINEURAL at 02:01

## 2025-01-02 RX ADMIN — CEFTRIAXONE 1 G: 1 INJECTION, POWDER, FOR SOLUTION INTRAMUSCULAR; INTRAVENOUS at 02:01

## 2025-01-02 RX ADMIN — FENTANYL CITRATE 25 MCG: 50 INJECTION, SOLUTION INTRAMUSCULAR; INTRAVENOUS at 03:01

## 2025-01-02 RX ADMIN — IOHEXOL 40 ML: 300 INJECTION, SOLUTION INTRAVENOUS at 03:01

## 2025-01-02 RX ADMIN — FENTANYL CITRATE 50 MCG: 50 INJECTION, SOLUTION INTRAMUSCULAR; INTRAVENOUS at 02:01

## 2025-01-02 NOTE — H&P
Radiology History & Physical      SUBJECTIVE:     Chief Complaint: TIPS dysfunction    History of Present Illness:  Shikha López is a 54 y.o. female with PMHx including cirrhosis with portal HTN, esophageal varices, breast cancer, HFpEF, aortic stenosis who presents for TIPS revision.    US liver with doppler on 5/21/24 showed elevated velocity at the mid shunt with bidirectional flow through the anterior right portal vein and left portal vein.    Past Medical History:   Diagnosis Date    Acute encephalopathy 05/25/2024    Aortic atherosclerosis 07/06/2023    Breast cancer     CHF (congestive heart failure)     Coronary artery calcification seen on CT scan 09/19/2024    Esophageal and gastric varices 06/23/2023    Hepatic encephalopathy 05/25/2024    Liver disease     Malignant neoplasm metastatic to left lung 06/08/2021    Stenosis of aortic and mitral valves     Aortic stenosis     Past Surgical History:   Procedure Laterality Date    ENDOSCOPIC ULTRASOUND OF UPPER GASTROINTESTINAL TRACT N/A 06/27/2023    Procedure: ULTRASOUND, UPPER GI TRACT, ENDOSCOPIC;  Surgeon: Home Lopez MD;  Location: [facility] ENDO (2ND FLR);  Service: Endoscopy;  Laterality: N/A;    ENDOSCOPIC ULTRASOUND OF UPPER GASTROINTESTINAL TRACT N/A 01/12/2024    Procedure: ULTRASOUND, UPPER GI TRACT, ENDOSCOPIC;  Surgeon: Home Lopez MD;  Location: [facility] (2ND FLR);  Service: Endoscopy;  Laterality: N/A;  11/28/23-Instructions via portal-DS  1/8-lvm for precall-MS  1/10-precall complete-Kpvt    ESOPHAGOGASTRODUODENOSCOPY N/A 06/23/2023    Procedure: EGD (ESOPHAGOGASTRODUODENOSCOPY);  Surgeon: Quintin Mooney MD;  Location: [facility] ENDO (2ND FLR);  Service: Endoscopy;  Laterality: N/A;    ESOPHAGOGASTRODUODENOSCOPY N/A 06/27/2023    Procedure: EGD (ESOPHAGOGASTRODUODENOSCOPY);  Surgeon: Home Lopez MD;  Location: [facility] ENDO (2ND FLR);  Service: Endoscopy;  Laterality: N/A;    ESOPHAGOGASTRODUODENOSCOPY N/A 08/03/2023    Procedure: EGD  (ESOPHAGOGASTRODUODENOSCOPY);  Surgeon: Home Lopez MD;  Location: [facility] ENDO (2ND FLR);  Service: Endoscopy;  Laterality: N/A;  instr portal-labs 6/28/23-tb    ESOPHAGOGASTRODUODENOSCOPY N/A 01/12/2024    Procedure: EGD (ESOPHAGOGASTRODUODENOSCOPY);  Surgeon: Home Lopez MD;  Location: [facility] ENDO (2ND FLR);  Service: Endoscopy;  Laterality: N/A;    ESOPHAGOGASTRODUODENOSCOPY N/A 04/10/2024    Procedure: EGD (ESOPHAGOGASTRODUODENOSCOPY);  Surgeon: Ze Anderson MD;  Location: [facility] ENDO (2ND FLR);  Service: Endoscopy;  Laterality: N/A;    INSERTION OF TUNNELED CENTRAL VENOUS CATHETER (CVC) WITH SUBCUTANEOUS PORT      MASTECTOMY         Home Meds:   Prior to Admission medications    Medication Sig Start Date End Date Taking? Authorizing Provider   empagliflozin (JARDIANCE) 10 mg tablet Take 1 tablet (10 mg total) by mouth once daily. 6/13/24 6/13/25 Yes Tr Camacho MD   furosemide (LASIX) 80 MG tablet Take 1 tablet (80 mg total) by mouth once daily. If you are gaining weight (2 lbs in 24 hours or 3 lbs in two days) may take an extra dose of 80 mg 5/21/24 3/12/25 Yes Clara Foley MD   HYDROcodone-acetaminophen (NORCO) 5-325 mg per tablet Take 1 tablet by mouth every 6 (six) hours as needed for Pain. 12/16/24  Yes Home Willis MD   lactulose (CHRONULAC) 10 gram/15 mL solution Take 30 mLs (20 g total) by mouth 3 (three) times daily. 7/30/24 7/30/25 Yes Farhad Figueroa MD   methylphenidate HCl (RITALIN) 5 MG tablet Take 1 tablet (5 mg total) by mouth 2 (two) times daily. 11/27/24  Yes Mariam Lisa NP   pantoprazole (PROTONIX) 40 MG tablet Take 1 tablet (40 mg total) by mouth 2 (two) times daily. 10/30/24 1/29/25 Yes Tyson, Lizbeth M, DNP   rifAXIMin (XIFAXAN) 550 mg Tab Take 1 tablet (550 mg total) by mouth 2 (two) times daily. 10/22/24 10/22/25 Yes Farhad Figueroa MD   spironolactone (ALDACTONE) 100 MG tablet Take 1 tablet (100 mg total) by mouth once daily. 4/23/24 4/23/25 Yes  Prakash Conway MD   triamcinolone acetonide 0.5% (KENALOG) 0.5 % Crea Apply topically 2 (two) times daily. 5/10/24  Yes Milli Pickard,    diaper,brief,adult,disposable Misc 1 Units by Misc.(Non-Drug; Combo Route) route 4 (four) times daily. 6/6/24 6/1/25  Zuleyma Bennett MD   diaper,brief,adult,disposable Misc 1 Units by Misc.(Non-Drug; Combo Route) route 4 (four) times daily. 6/6/24 6/1/25  Zuleyma Bennett MD   ferrous sulfate (FEROSUL) 325 mg (65 mg iron) Tab tablet Take 1 tablet by mouth daily with Vitamin C on an empty stomach 5/9/24   Home Willis MD   incontinence pad, liner, disp Pads 1 Units by Misc.(Non-Drug; Combo Route) route 4 (four) times daily. 7/29/24   Zuleyma Bennett MD   mupirocin (BACTROBAN) 2 % ointment Apply topically 3 (three) times daily. 8/25/24   Lanie Whyte, NP     Anticoagulants/Antiplatelets: no anticoagulation    Allergies: Review of patient's allergies indicates:  No Known Allergies  Sedation History:  no adverse reactions    Review of Systems:   Hematological: no known coagulopathies  Respiratory: no shortness of breath  Cardiovascular: no chest pain  Gastrointestinal: no abdominal pain  Genito-Urinary: no dysuria  Musculoskeletal: positive for - back pain  Neurological: no TIA or stroke symptoms         OBJECTIVE:     Vital Signs (Most Recent)  Temp: 98.2 °F (36.8 °C) (01/02/25 1215)  Pulse: 89 (01/02/25 1215)  Resp: 18 (01/02/25 1215)  BP: (!) 132/58 (01/02/25 1215)  SpO2: 97 % (01/02/25 1215)    Physical Exam:  ASA: class 3  Mallampati: class II    General: no acute distress  Mental Status: alert and oriented to person, place and time  HEENT: normocephalic, atraumatic  Chest: unlabored breathing  Heart: regular heart rate  Abdomen: nondistended  Extremity: moves all extremities    Laboratory  Lab Results   Component Value Date    INR 1.4 (H) 12/03/2024       Lab Results   Component Value Date    WBC 3.29 (L) 12/03/2024    HGB 13.4 12/03/2024     HCT 41.1 12/03/2024     (H) 12/03/2024    PLT 99 (L) 12/03/2024      Lab Results   Component Value Date     (H) 12/16/2024     12/16/2024    K 3.9 12/16/2024     12/16/2024    CO2 26 12/16/2024    BUN 15 12/16/2024    CREATININE 0.8 12/16/2024    CALCIUM 8.7 12/16/2024    MG 2.0 06/24/2024    ALT 16 12/16/2024    AST 46 (H) 12/16/2024    ALBUMIN 2.9 (L) 12/16/2024    BILITOT 4.5 (H) 12/16/2024    BILIDIR 1.7 (H) 05/26/2024       ASSESSMENT/PLAN:     Sedation Plan: up to moderate sedation.    After thorough discussion regarding the nature of the procedure and its risks and benefits, the patient elected to proceed, and formal consent was obtained. Patient will undergo TIPS revision.    Kevin Ritter MD PGY-2  Department of Radiology  [facility]

## 2025-01-02 NOTE — PLAN OF CARE
Pt arrived to  for TIPS revision. Pt oriented to unit and staff. Plan of care reviewed with patient, patient verbalizes understanding. Comfort measures utilized. Pt safely transferred from stretcher to procedural table. Fall risk reviewed with patient, fall risk interventions maintained. Safety strap applied, positioner pillows utilized to minimize pressure points. Blankets applied. Pt prepped and draped utilizing standard sterile technique. Patient placed on continuous monitoring, as required by sedation policy. Timeouts completed utilizing standard universal time-out, per department and facility policy. RN to remain at bedside, continuous monitoring maintained. Pt resting comfortably. Denies pain/discomfort. Will continue to monitor. See flow sheets for monitoring, medication administration, and updates.

## 2025-01-02 NOTE — CARE UPDATE
Patient VSS- see flow sheet. Right lateral neck procedure site C/D/I. Daughter at bedside. Discharge instructions given, accepting of instructions. Patient transported via wheelchair to garage via RN in stable condition.

## 2025-01-02 NOTE — PLAN OF CARE
TIPS revision complete. Pt tolerated well. VSS. No signs or symptoms of distress noted.  Right lateral neck site CDI.  Pt will be transferred to recovery bed and report to be given at bedside.

## 2025-01-02 NOTE — CARE UPDATE
Patient arrived to MPU 7 for 1 hour recovery post TIPS revision. Connected to bedside monitor - cardiac monitoring and continuous pulse oximetry applied. Call bell within reach, side rails raised x 2, bed locked and in lowest position. VSS - see flowsheet. Patient in no acute distress. No pain noted. Procedure site clean, dry, and intact; no bleeding or hematoma noted. Will continue to monitor.

## 2025-01-02 NOTE — CARE UPDATE
1 hour post-procedure recovery completed. Patient sleeping, easily aroused, no distress noted, respirations even and unlabored, will continue to monitor. VSS - see flowsheet. Right neck site clean, dry, and intact; no bleeding or hematoma noted. Patient awaiting ride at this time, spoke with daughter who states she will be here in about 15min. Will continue to monitor.

## 2025-01-02 NOTE — DISCHARGE INSTRUCTIONS
TIPS REVISION DISCHARGE EDUCATION       Information:   A TIPS revision is a procedure in which your previously placed TIPS is accessed through a vein in your neck. Pressures within your heart and portal vein are taken. Occasion a balloon is used to improve flow through the stent or a new stent is placed to allow for better flow through the TIPS.    What should I expect after the procedure?      There may be some soreness around the access site in your neck or in your back.    You may return to work after 24 hours unless your primary doctor instructs you otherwise.    You do not have any diet restrictions because of this procedure but should continue any that were given to you by any other doctors.    Continue all previously prescribed medications      Bathing & Wound Care:    You may bath/shower after 24 hours   Dressing to stay on 1 day (clean and dry)    If the accesssite(s) become red, tender, swollen, or starts to drain, contact us.    Avoid heavy lifting and strenuous activity for 3 days.     Follow-up visit information:   Typically repeat US of your liver is performed in 2-4 weeks. You should also have follow up with your hepatologist (liver doctor).       Occasionally, a situation will require prompt attention and an emergency room visit is necessary:    Sudden chest pain, shortness of breath, or fainting    Increasing redness, swelling or drainage from the biopsy site    Increasing pain not relieved by medication    Bleeding or drainage from the needle site that is saturating the dressing    You have shaking, chills and/or a temperature over 100.3°F    New, sudden difficulty breathing    Drop in blood pressure, and/or light-headed feeling    Interventional Radiology Clinic    [phone], choose prompt 3 Monday - Friday, 8:00 am - 4:00 pm    [phone] After hours and on holidays. Ask to speak with the interventional radiologist on call.

## 2025-01-02 NOTE — PLAN OF CARE
Received pt to [facility] from home.  AAO x 4. Denies pain or discomfort. Respirations even and unlabored. No distress noted. Pt stable.  Admit assessment complete. IV x 1 placed.  Pt oriented to room and call bell placed within reach.

## 2025-01-02 NOTE — NURSING
Spoke to IR concerning orders for pregnancy test.  Per pt she has not had a period in 16 years due to chemo. Was advised by IR that no pregnancy test needed.

## 2025-01-03 ENCOUNTER — TELEPHONE (OUTPATIENT)
Dept: INTERVENTIONAL RADIOLOGY/VASCULAR | Facility: CLINIC | Age: 55
End: 2025-01-03
Payer: MEDICARE

## 2025-01-03 DIAGNOSIS — Z95.828 S/P TIPS (TRANSJUGULAR INTRAHEPATIC PORTOSYSTEMIC SHUNT): Primary | ICD-10-CM

## 2025-01-05 DIAGNOSIS — M48.56XS COMPRESSION FRACTURE OF LUMBAR SPINE, NON-TRAUMATIC, SEQUELA: ICD-10-CM

## 2025-01-06 RX ORDER — HYDROCODONE BITARTRATE AND ACETAMINOPHEN 5; 325 MG/1; MG/1
1 TABLET ORAL EVERY 6 HOURS PRN
Qty: 40 TABLET | Refills: 0 | Status: SHIPPED | OUTPATIENT
Start: 2025-01-06

## 2025-01-06 NOTE — PROGRESS NOTES
Subjective:       Patient ID: Shikha López is a 54 y.o. female.    Chief Complaint: No chief complaint on file.      HPI 54-year-old female who returns for F/U  for metastatic HER 2 + breast cancer.  She is on Trastuzumab deruxtecan  - her last treatment was May 7, 2024. Treatment on hold due to cirrhosis and hepatic encephalopathy.  She has also had variceal bleeding and CH, and obesity..  She has chronic cytopenias - with thrombocytopenia.  Osteoporosis with compression Fx of the spine.      She had TIPS revision on January 2nd.  Her major problem continues to be her back pain which limits her activities.  She continues to have leg swelling as she has been unable to elevate her legs adequately due to her back issues.  She denies any shortness of breath.  Her appetite and bowel function has been good.            Breast history:  She presented in the emergency room at [facility] on September 29, 2006 with a 4 months history of inflammation of her left breast.  Physical examination at that time showed the left breast was largely replaced by large mass with multiple skin ulcerations.    A biopsy in September 2006 showed poorly differentiated carcinoma which was ER and OR. negative and HER2 positive.    She was then referred to [facility] for additional care.   CT scan of the chest and abdomen November 2006 revealed multiple nodules in the lungs consistent with metastatic disease.  There also enlarged left axillary node.    She was treated with chemotherapy with weekly Herceptin and Abraxane with improvement in her breast and lung metastasis.    On May 29, 2007 she underwent left modified radical mastectomy(Dr. Colvin) which showed no residual tumor in the breast and 1/5 nodes was positive for metastasis measuring 6 mm.  (ypT0N1).  She then received postoperative radiation therapy(Dr Singh)  to the left chest wall supraclav and internal mammary lymph nodes from August 20, 2007 to September 28,  2007.    Postoperatively she continued on Herceptin for approximately 3 and 1/2 years before her lung metastasis begin to grow.    She subsequently received a number of different chemotherapy treatments including Adriamycin, Halaven, Xeloda plus lapatinib then Xeloda plus Herceptin. (  in [facility].)    Subsequently, she transferred her care to  at [facility].  -She was initially treated with Taxotere Herceptin Perjeta.    -She was then changed to Kadcyla.    In July 2020 PET scan showed progression.   She then took approximately 9 months of Herceptin and Navelbine with initial response then progression.    She started trastuzumab- deruxtecan  4/12/21.     CT 3/11/24 - stable  Stable postsurgical changes of left mastectomy and axillary lymph node dissection is patient with metastatic left breast cancer.   Redemonstration of multiple irregular bilateral pulmonary nodules concerning for metastatic disease.  No new lesions identified.   Hepatic steatosis and portal hypertension.  Increased bowel wall thickening and diffuse mesenteric edema with mild ascites, likely related to hepatic dysfunction.    Echo 4/24/24 - EF 60-65%      CT 4/29/24  -stable    CT chest - 8/15/24 - 1. Right upper lobe and infrahilar left lower lobe nodules are minimally bulkier compared to the prior study.    2. Residual scattered ground-glass opacities bilaterally, improved in interval.  Resolution of the previously seen effusions      CT CAP 11/11/24 - *Unchanged right axillary and bilateral inguinal lymphadenopathy.  *Unchanged left pleural thickening.  *Multiple solid pulmonary nodules scattered throughout both lungs measuring up to 2.5 cm, unchanged.  The left perihilar mass occludes adjacent airways resulting in partial collapse of the left lower lobe.  *No abdominopelvic metastases.  Mosaic attenuation throughout both lungs, which could represent small airway disease, pulmonary edema, or infection.     Cirrhosis  with signs of portal hypertension including splenomegaly and portosystemic collaterals.  Patent tips.  Embolization coils in the upper abdomen, possibly from BRTO.  No focal hepatic lesions.     Multiple compression fractures in the thoracolumbar spine with severe height loss at T8, many of which are new    Review of Systems   Constitutional:  Positive for fatigue. Negative for appetite change, fever and unexpected weight change.   Eyes:  Negative for visual disturbance.   Respiratory:  Negative for cough and shortness of breath.    Cardiovascular:  Negative for chest pain and leg swelling.   Gastrointestinal:  Negative for abdominal pain, blood in stool, constipation, diarrhea and nausea.   Musculoskeletal:  Positive for back pain.   Integumentary:  Negative for rash.   Neurological:  Negative for headaches.   Hematological:  Negative for adenopathy.   Psychiatric/Behavioral:  The patient is not nervous/anxious.    All other systems reviewed and are negative.        Objective:      Physical Exam  Vitals reviewed.   Constitutional:       General: She is not in acute distress.     Appearance: She is obese.   Neurological:      Mental Status: She is alert and oriented to person, place, and time.   Psychiatric:         Mood and Affect: Mood normal.         Behavior: Behavior normal.         Thought Content: Thought content normal.         Judgment: Judgment normal.       Assessment:    CBC shows white blood cell count 3550, hemoglobin 13.1, platelet count 853180, metabolic profile shows bilirubin 0 5, AST of 48  Problem List Items Addressed This Visit       Chemotherapy-induced peripheral neuropathy    Breast cancer, stage 4, left - Primary (Chronic)    Malignant neoplasm metastatic to left lung (Chronic)    Biliary cirrhosis (Chronic)       Plan:       I recommended that we continue to observe her off therapy.    Monitor labs for platelets.  No intervention for severiano at this time.  Eval for vertebroplasty.  I will see  her again in 6 weeks' time with scans.        Route Chart for Scheduling    Med Onc Chart Routing      Follow up with physician 6 weeks.   Follow up with JIMI    Infusion scheduling note    Injection scheduling note    Labs CBC and CMP   Scheduling:  Preferred lab:  Lab interval:     Imaging CT chest abdomen pelvis      Pharmacy appointment No pharmacy appointment needed      Other referrals no referral to Oncology Primary Care needed -  no Massage appointment needed    No additional referrals needed           Supportive Plan Information  OP ZOLEDRONIC ACID (ZOMETA) 4MG Q6M Home Willis MD   Associated Diagnosis: Compression fracture of lumbar spine, non-traumatic, sequela   noted on 11/25/2024  Associated Diagnosis: Acute gastrointestinal bleeding   noted on 4/11/2024  Associated Diagnosis: Breast cancer, stage 4, left Stage IV cT4b, cN2a, cM1, G3, ER-, CO-, HER2+ noted on 6/8/2021   Line of treatment: Supportive Care   Treatment goal: Supportive     Upcoming Treatment Dates - OP ZOLEDRONIC ACID (ZOMETA) 4MG Q6M    5/27/2025       Medications       zoledronic 4 mg/100 mL infusion 4 mg  11/11/2025       Medications       zoledronic 4 mg/100 mL infusion 4 mg  4/28/2026       Medications       zoledronic 4 mg/100 mL infusion 4 mg  10/13/2026       Medications       zoledronic 4 mg/100 mL infusion 4 mg

## 2025-01-07 ENCOUNTER — LAB VISIT (OUTPATIENT)
Dept: LAB | Facility: HOSPITAL | Age: 55
End: 2025-01-07
Payer: MEDICARE

## 2025-01-07 ENCOUNTER — OFFICE VISIT (OUTPATIENT)
Dept: PALLIATIVE MEDICINE | Facility: CLINIC | Age: 55
End: 2025-01-07
Payer: MEDICARE

## 2025-01-07 ENCOUNTER — OFFICE VISIT (OUTPATIENT)
Dept: HEMATOLOGY/ONCOLOGY | Facility: CLINIC | Age: 55
End: 2025-01-07
Payer: MEDICARE

## 2025-01-07 VITALS
HEART RATE: 93 BPM | DIASTOLIC BLOOD PRESSURE: 66 MMHG | SYSTOLIC BLOOD PRESSURE: 144 MMHG | BODY MASS INDEX: 32.7 KG/M2 | WEIGHT: 177.69 LBS | OXYGEN SATURATION: 94 % | HEIGHT: 62 IN

## 2025-01-07 VITALS
DIASTOLIC BLOOD PRESSURE: 66 MMHG | SYSTOLIC BLOOD PRESSURE: 144 MMHG | OXYGEN SATURATION: 94 % | HEART RATE: 93 BPM | BODY MASS INDEX: 32.5 KG/M2 | WEIGHT: 177.69 LBS

## 2025-01-07 DIAGNOSIS — C50.912 BREAST CANCER, STAGE 4, LEFT: Chronic | ICD-10-CM

## 2025-01-07 DIAGNOSIS — C50.912 BREAST CANCER, STAGE 4, LEFT: ICD-10-CM

## 2025-01-07 DIAGNOSIS — Z51.5 ENCOUNTER FOR PALLIATIVE CARE: Primary | ICD-10-CM

## 2025-01-07 DIAGNOSIS — R53.83 FATIGUE, UNSPECIFIED TYPE: ICD-10-CM

## 2025-01-07 DIAGNOSIS — D69.6 THROMBOCYTOPENIA, UNSPECIFIED: ICD-10-CM

## 2025-01-07 DIAGNOSIS — K92.2 GASTROINTESTINAL HEMORRHAGE, UNSPECIFIED GASTROINTESTINAL HEMORRHAGE TYPE: ICD-10-CM

## 2025-01-07 DIAGNOSIS — G47.00 INSOMNIA, UNSPECIFIED TYPE: ICD-10-CM

## 2025-01-07 DIAGNOSIS — E66.01 SEVERE OBESITY (BMI 35.0-39.9) WITH COMORBIDITY: ICD-10-CM

## 2025-01-07 DIAGNOSIS — C78.02 MALIGNANT NEOPLASM METASTATIC TO LEFT LUNG: ICD-10-CM

## 2025-01-07 DIAGNOSIS — C78.02 MALIGNANT NEOPLASM METASTATIC TO LEFT LUNG: Chronic | ICD-10-CM

## 2025-01-07 DIAGNOSIS — R60.9 EDEMA, UNSPECIFIED TYPE: ICD-10-CM

## 2025-01-07 DIAGNOSIS — G62.0 CHEMOTHERAPY-INDUCED PERIPHERAL NEUROPATHY: ICD-10-CM

## 2025-01-07 DIAGNOSIS — M48.56XS COMPRESSION FRACTURE OF LUMBAR SPINE, NON-TRAUMATIC, SEQUELA: ICD-10-CM

## 2025-01-07 DIAGNOSIS — F41.9 ANXIETY: ICD-10-CM

## 2025-01-07 DIAGNOSIS — K74.5 BILIARY CIRRHOSIS: Chronic | ICD-10-CM

## 2025-01-07 DIAGNOSIS — T45.1X5A CHEMOTHERAPY-INDUCED PERIPHERAL NEUROPATHY: ICD-10-CM

## 2025-01-07 DIAGNOSIS — C50.912 BREAST CANCER, STAGE 4, LEFT: Primary | Chronic | ICD-10-CM

## 2025-01-07 PROBLEM — D61.818 PANCYTOPENIA: Status: RESOLVED | Noted: 2023-07-26 | Resolved: 2025-01-07

## 2025-01-07 LAB
ALBUMIN SERPL BCP-MCNC: 3 G/DL (ref 3.5–5.2)
ALP SERPL-CCNC: 144 U/L (ref 40–150)
ALT SERPL W/O P-5'-P-CCNC: 17 U/L (ref 10–44)
ANION GAP SERPL CALC-SCNC: 7 MMOL/L (ref 8–16)
AST SERPL-CCNC: 48 U/L (ref 10–40)
BASOPHILS # BLD AUTO: 0.04 K/UL (ref 0–0.2)
BASOPHILS NFR BLD: 1.1 % (ref 0–1.9)
BILIRUB SERPL-MCNC: 5 MG/DL (ref 0.1–1)
BUN SERPL-MCNC: 12 MG/DL (ref 6–20)
CALCIUM SERPL-MCNC: 9.1 MG/DL (ref 8.7–10.5)
CHLORIDE SERPL-SCNC: 102 MMOL/L (ref 95–110)
CO2 SERPL-SCNC: 30 MMOL/L (ref 23–29)
CREAT SERPL-MCNC: 0.7 MG/DL (ref 0.5–1.4)
DIFFERENTIAL METHOD BLD: ABNORMAL
EOSINOPHIL # BLD AUTO: 0.3 K/UL (ref 0–0.5)
EOSINOPHIL NFR BLD: 7.3 % (ref 0–8)
ERYTHROCYTE [DISTWIDTH] IN BLOOD BY AUTOMATED COUNT: 17.2 % (ref 11.5–14.5)
EST. GFR  (NO RACE VARIABLE): >60 ML/MIN/1.73 M^2
GLUCOSE SERPL-MCNC: 83 MG/DL (ref 70–110)
HCT VFR BLD AUTO: 40.1 % (ref 37–48.5)
HGB BLD-MCNC: 13.1 G/DL (ref 12–16)
IMM GRANULOCYTES # BLD AUTO: 0.01 K/UL (ref 0–0.04)
IMM GRANULOCYTES NFR BLD AUTO: 0.3 % (ref 0–0.5)
LYMPHOCYTES # BLD AUTO: 0.8 K/UL (ref 1–4.8)
LYMPHOCYTES NFR BLD: 22.5 % (ref 18–48)
MCH RBC QN AUTO: 34.3 PG (ref 27–31)
MCHC RBC AUTO-ENTMCNC: 32.7 G/DL (ref 32–36)
MCV RBC AUTO: 105 FL (ref 82–98)
MONOCYTES # BLD AUTO: 0.3 K/UL (ref 0.3–1)
MONOCYTES NFR BLD: 9 % (ref 4–15)
NEUTROPHILS # BLD AUTO: 2.1 K/UL (ref 1.8–7.7)
NEUTROPHILS NFR BLD: 59.8 % (ref 38–73)
NRBC BLD-RTO: 0 /100 WBC
PLATELET # BLD AUTO: 116 K/UL (ref 150–450)
PMV BLD AUTO: 10.1 FL (ref 9.2–12.9)
POTASSIUM SERPL-SCNC: 3.4 MMOL/L (ref 3.5–5.1)
PROT SERPL-MCNC: 7.1 G/DL (ref 6–8.4)
RBC # BLD AUTO: 3.82 M/UL (ref 4–5.4)
SODIUM SERPL-SCNC: 139 MMOL/L (ref 136–145)
WBC # BLD AUTO: 3.55 K/UL (ref 3.9–12.7)

## 2025-01-07 PROCEDURE — 85025 COMPLETE CBC W/AUTO DIFF WBC: CPT | Performed by: INTERNAL MEDICINE

## 2025-01-07 PROCEDURE — 99999 PR PBB SHADOW E&M-EST. PATIENT-LVL III: CPT | Mod: PBBFAC,,, | Performed by: NURSE PRACTITIONER

## 2025-01-07 PROCEDURE — 80053 COMPREHEN METABOLIC PANEL: CPT | Performed by: INTERNAL MEDICINE

## 2025-01-07 PROCEDURE — 99213 OFFICE O/P EST LOW 20 MIN: CPT | Mod: PBBFAC,27 | Performed by: NURSE PRACTITIONER

## 2025-01-07 PROCEDURE — 99214 OFFICE O/P EST MOD 30 MIN: CPT | Mod: PBBFAC | Performed by: INTERNAL MEDICINE

## 2025-01-07 PROCEDURE — 36415 COLL VENOUS BLD VENIPUNCTURE: CPT | Performed by: INTERNAL MEDICINE

## 2025-01-07 PROCEDURE — 99999 PR PBB SHADOW E&M-EST. PATIENT-LVL IV: CPT | Mod: PBBFAC,,, | Performed by: INTERNAL MEDICINE

## 2025-01-07 NOTE — PROGRESS NOTES
Consult Note  Palliative Care      Consult Requested By: No ref. provider found  Reason for Consult: symptom mgmt/ACP      ASSESSMENT/PLAN:     Plan/Recommendations:    01/07/2025:  - no changes    Metastatic breast cancer  - following with Dr. Willis & NP [place]   - ID 2006, chemo, s/p radical mastectomy (2007), s/p post-op RT, herceptin x 3.5 years until progression in lung mass, cycled through myriad of chemo tx with progression on July 2020 PET, 9 months of continued therapy w eventual progression, stable on 4/2024 CT  - on ENHERTU on 2 years  - currently observation off therapy     Encounter for palliative care  - Patient is decisional  - Patient accompanied today by self  - ACP documents are not uploaded into EMR   - Philosophy of Palliative Medicine reviewed with patient and family at first visit  - New patient folder given to and reviewed with patient and family at first visit  - Goals of care:  Patient lives alone with her 2 dogs and 2 cats. She is fully independent with no care needs of any kind. Her daughter Samra Jaeger (31) is her support system. Nearby, her mother is living with cognitive/health issues and pt anticipates having to care for her mother FT within a year. Pt also has a sister who is an  and will help her with getting her affairs in order, including HCPOA. A booklet is provided and reviewed today. We will complete at future visit. Ms. Trivedi shares that she was initially diagnosed with cancer 17 years ago. It has been a long journey for her. She found strength in setting goals along the way. Seeing her daughter graduate, , start her career - each milestone has motivated her to continue. Her daughter is now pregnant and due in December 2023, her goal is to meet her grandson. Ms. 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 Enjoys attending [employer], going to the movies, working in the garden, reading and baking, going to  [employer], farmer's markets, etc. No spiritual/rob, declines .    Cancer associated fatigue  - treatment-associated  - exacerbated by heat (she does not have AC in car, runs only 1 window unit in her house 2/2 cost)  - currently on ritalin, which helps  - she is ECOG 1-2  - will continue to monitor     Edema   - BLE  - follows with cardiologist  - controls with daily lasix, will hold doses on a busy day where it will be difficult to use a bathroom frequently  - using incontinence supplies though they are difficult for her to afford     Cancer pain  - back pain, severe  - patient cannot tolerate laying flat, she is sleeping in a recliner couch. Getting up and down take a long time due to pain and back spasm  - she cannot hold her grand baby due to back pain  - sitting and leaning forward or standing/walking (for moderate periods of time) are tolerable positions   - spasms with getting up and down   - 10/10 down to 5/10  after taking 1.5 hours to kick in  - heating pad  - lidocaine patch  - Vionic shoes  - plans to ask her oncologist about vert plasty   - using walker at home, cane while out   - using norco 5 mg bid prn, using two doses daily, worried about about liver and addiction.     Understanding of illness: Excellent     Goals of care: preserve qol, independence, activity tolerance    Follow up: 1 month    Patient's encounter and above plan of care discussed with patient's oncology team.     SUBJECTIVE:     History of Present Illness:  Patient is a 54 y.o. year old female presenting with metastatic breast cancer. Please see oncology notes for full oncologic history and treatment course.     01/07/2025:  JOSSUE ELIZALDE reviewed: no concerns    Patient presents to clinic follow up alone. Interval history includes CT with multiple new thoracolumbar compression fractures and s/p TIPS. S/p one zometa infusion. Patient started on norco by oncology provider. The last two months have been difficult with severe  pain, the holidays were difficult. Her sister is stepping in to help care for their mother. Patient was not able to move home bc she cannot manage the stairs with her back pain. Pt and sister are moving forward with exploring nursing home placement for their mother, though mother is extremely resistant to leaving her home.     09/26/2024  LA  reviewed: no concerns    Patient presents clinic follow up alone. Overall doing well, no new complaints. Still using lasix for edema, requiring incontinence supplies but they've been v costly for her, will send message to MSW re financial assistance. Has lost significant amount of fluid weight, her clothing no longer fits. Still on treatment break, next step will be determined based on hepatology visit in December. She is glad to be off treatments, and is not concerned bc she has been off of tx previously x 6 months and there was no change to her disease status. She appreciates her improved functional status. Reviewed ACP packet, patient says she would prefer her daughter as HCPOA but prefers to discuss with her first, she will review packet with her daughter at home and complete for future visit. Is moving forward with plan to move out of her mother's house.     07/11/2024:  LA  reviewed: no concerns    Patient presents alone via virtual visit. Interval updates include: hospital admission 4/9-4/16 for GIB and TIPS placement; again from 4/21-4/26 for cellulitis of foot; again 5/16-5/21 for symptomatic anemia; again from 5/25-5/29 for acute encephalopathy. She is feeling much better since her most recent hospitalization. She recounts being found on her mother's couch confused and mostly unable to remember her first two admission days. She says she brought this upon herself since she has not always been compliant with her medications but this was a wake-up call. She can feel unsteady on her feet if she walks too fast, but otherwise feels close to baseline. She is still  "caring for her mother full-time, this burden is significant. Her mother finally sees a neurologist 8/20 and pt hopes the decision to place her will be made at this time. She plans to move home end of August no matter the outcome, she does have other family that can step in to provide more care. She has a close friend she speaks with frequently, she's also been enjoying time with daughter and grandson, saying she needs to 'stick around' for him. She does not want to resume treatment until she feels entirely back to previous baseline. Next oncology f/u is next week. Next pall care f/u in person, per pt request.       04/04/2024:  LA  reviewed: no concerns    Patient presents to virtual follow-up alone. She is overall stable but does have a significant increase in personal stressors mostly r/t her mother's recent diagnosis of dementia. She is now staying in her mother's home and acting as primary caretaker though does have some support with hired sitter. Pt's repeat CT on 3/11/24 was stable though did reveal portal hypertension, pt is scheduled to see a hepatologist.      01/04/2024:  LA  reviewed: no concerns    Patient presents to clinic follow-up alone. She is doing well from a symptom standpoint, she does report severe anxiety which is secondary to several family-members being sick in the month of December. Her first grandson was born, she spends time with him as much as possible and this brings her harley. Denies new or worsening symptoms. RTC in 3 months, pt instructed to contact clinic if interim needs arise.     10/23/2023:  LA  reviewed: no concerns    Patient presents to f/u clinic visit alone. She reports some increased fatigue and says she "gets a lot of mucus at night" for a few days after treatment. She is otherwise doing very well. She is busy planning her daughter's elaborate baby shower which will be held in November. She has been coordinating the party with her sister. She acknowledges how " meaningful this is for her since this will be her only opportunity to throw a baby shower for her daughter, and this will be the only grandchild she will meet. Next CT 12/15.    08/24/2023:  JOSSUE ELIZALDE reviewed: no concerns    Patient presents to initial clinic visit alone, she is extremely pleasant with few complaints. She does have some lower extremity edema for which she uses lasix. She skips doses on days where she will be away from home or running errands and in less frequent contact with a bathroom. She uses ritalin for fatigue and that works well for her. She shares that her cancer journey started 17 years ago and each step of the way, she has used various milestones to keep her motivated. She has seen her daughter graduate,  and start her career and now looks forward to meeting her first grandchild in December. We review ACP booklet today. Her sister, who is an , was planning to complete ACP w her. She will talk to her sister and we will discuss at future visit.     Past Medical History:   Diagnosis Date    Acute encephalopathy 05/25/2024    Aortic atherosclerosis 07/06/2023    Breast cancer     CHF (congestive heart failure)     Coronary artery calcification seen on CT scan 09/19/2024    Esophageal and gastric varices 06/23/2023    Hepatic encephalopathy 05/25/2024    Liver disease     Malignant neoplasm metastatic to left lung 06/08/2021    Stenosis of aortic and mitral valves     Aortic stenosis     Past Surgical History:   Procedure Laterality Date    ENDOSCOPIC ULTRASOUND OF UPPER GASTROINTESTINAL TRACT N/A 06/27/2023    Procedure: ULTRASOUND, UPPER GI TRACT, ENDOSCOPIC;  Surgeon: Home Lopez MD;  Location: [address]);  Service: Endoscopy;  Laterality: N/A;    ENDOSCOPIC ULTRASOUND OF UPPER GASTROINTESTINAL TRACT N/A 01/12/2024    Procedure: ULTRASOUND, UPPER GI TRACT, ENDOSCOPIC;  Surgeon: Home Lopez MD;  Location: [facility] ([address] [place] [address]);  Service: Endoscopy;  Laterality: N/A;   11/28/23-Instructions via portal-DS  1/8-lvm for precall-MS  1/10-precall complete-Kpvt    ESOPHAGOGASTRODUODENOSCOPY N/A 06/23/2023    Procedure: EGD (ESOPHAGOGASTRODUODENOSCOPY);  Surgeon: Quintin Mooney MD;  Location: [facility] (2ND FLR);  Service: Endoscopy;  Laterality: N/A;    ESOPHAGOGASTRODUODENOSCOPY N/A 06/27/2023    Procedure: EGD (ESOPHAGOGASTRODUODENOSCOPY);  Surgeon: Home Lopez MD;  Location: [facility] END[facility] (2ND FLR);  Service: Endoscopy;  Laterality: N/A;    ESOPHAGOGASTRODUODENOSCOPY N/A 08/03/2023    Procedure: EGD (ESOPHAGOGASTRODUODENOSCOPY);  Surgeon: Home Lopez MD;  Location: [facility] (2ND FLR);  Service: Endoscopy;  Laterality: N/A;  instr portal-labs 6/28/23-tb    ESOPHAGOGASTRODUODENOSCOPY N/A 01/12/2024    Procedure: EGD (ESOPHAGOGASTRODUODENOSCOPY);  Surgeon: Home Lopez MD;  Location: [facility] (2ND FLR);  Service: Endoscopy;  Laterality: N/A;    ESOPHAGOGASTRODUODENOSCOPY N/A 04/10/2024    Procedure: EGD (ESOPHAGOGASTRODUODENOSCOPY);  Surgeon: Ze Anderson MD;  Location: [facility] (2ND FLR);  Service: Endoscopy;  Laterality: N/A;    INSERTION OF TUNNELED CENTRAL VENOUS CATHETER (CVC) WITH SUBCUTANEOUS PORT      MASTECTOMY       Family History   Problem Relation Name Age of Onset    Hypertension Mother      Heart attack Father      Lung cancer Father       Review of patient's allergies indicates:  No Known Allergies    Medications:    Current Outpatient Medications:     diaper,brief,adult,disposable Misc, 1 Units by Misc.(Non-Drug; Combo Route) route 4 (four) times daily., Disp: 120 each, Rfl: 4    diaper,brief,adult,disposable Misc, 1 Units by Misc.(Non-Drug; Combo Route) route 4 (four) times daily., Disp: 125 each, Rfl: 4    empagliflozin (JARDIANCE) 10 mg tablet, Take 1 tablet (10 mg total) by mouth once daily., Disp: 30 tablet, Rfl: 11    ferrous sulfate (FEROSUL) 325 mg (65 mg iron) Tab tablet, Take 1 tablet by mouth daily with Vitamin C on an empty stomach, Disp:  60 tablet, Rfl: 3    furosemide (LASIX) 80 MG tablet, Take 1 tablet (80 mg total) by mouth once daily. If you are gaining weight (2 lbs in 24 hours or 3 lbs in two days) may take an extra dose of 80 mg, Disp: 90 tablet, Rfl: 2    HYDROcodone-acetaminophen (NORCO) 5-325 mg per tablet, Take 1 tablet by mouth every 6 (six) hours as needed for Pain., Disp: 40 tablet, Rfl: 0    incontinence pad, liner, disp Pads, 1 Units by Misc.(Non-Drug; Combo Route) route 4 (four) times daily., Disp: 125 each, Rfl: 4    lactulose (CHRONULAC) 10 gram/15 mL solution, Take 30 mLs (20 g total) by mouth 3 (three) times daily., Disp: 2700 mL, Rfl: 11    methylphenidate HCl (RITALIN) 5 MG tablet, Take 1 tablet (5 mg total) by mouth 2 (two) times daily., Disp: 60 tablet, Rfl: 0    mupirocin (BACTROBAN) 2 % ointment, Apply topically 3 (three) times daily., Disp: 22 g, Rfl: 0    pantoprazole (PROTONIX) 40 MG tablet, Take 1 tablet (40 mg total) by mouth 2 (two) times daily., Disp: 180 tablet, Rfl: 0    rifAXIMin (XIFAXAN) 550 mg Tab, Take 1 tablet (550 mg total) by mouth 2 (two) times daily., Disp: 60 tablet, Rfl: 11    spironolactone (ALDACTONE) 100 MG tablet, Take 1 tablet (100 mg total) by mouth once daily., Disp: 30 tablet, Rfl: 11    triamcinolone acetonide 0.5% (KENALOG) 0.5 % Crea, Apply topically 2 (two) times daily., Disp: 454 g, Rfl: 0    OBJECTIVE:       ROS:  Review of Systems   Constitutional:  Positive for fatigue. Negative for activity change and appetite change.   HENT:  Negative for congestion, dental problem and drooling.    Eyes:  Negative for pain, discharge and itching.   Respiratory:  Positive for cough. Negative for choking and wheezing.    Cardiovascular:  Positive for leg swelling.   Gastrointestinal:  Negative for constipation, diarrhea, nausea and vomiting.   Genitourinary:  Negative for difficulty urinating, dyspareunia and dysuria.   Musculoskeletal:  Positive for back pain and gait problem. Negative for arthralgias.    Skin:  Negative for pallor, rash and wound.   Neurological:  Positive for weakness. Negative for dizziness, facial asymmetry and headaches.   Psychiatric/Behavioral:  Positive for sleep disturbance. Negative for dysphoric mood. The patient is not nervous/anxious.        Review of Symptoms      Symptom Assessment (ESAS 0-10 Scale)  Pain:  10  Dyspnea:  0  Anxiety:  8  Nausea:  0  Depression:  0  Anorexia:  0  Fatigue:  7  Insomnia:  0  Restlessness:  0  Agitation:  0     CAM / Delirium:  Negative  Constipation:  Negative  Diarrhea:  Negative    Anxiety:  Is not nervous/anxious  Constipation:  No constipation    Bowel Management Plan (BMP):  No      Pain Assessment:  OME in 24 hours:  10  Location(s): none      Modified Hunter Scale:  0    ECOG Performance Status stGstrstastdstest:st st1st Living Arrangements:  Lives with family    Psychosocial/Cultural:   See Palliative Psychosocial Note: No  **Primary  to Follow**  Palliative Care  Consult: No     Time-Based Charting:  No      Advance Care Planning   Advance Directives:   Living Will: No        Oral Declaration: No    LaPOST: No    Do Not Resuscitate Status: No        Oral Declaration: No      Decision Making:  Patient answered questions  Goals of Care: What is most important right now is to focus on remaining as independent as possible, symptom/pain control, curative/life-prolongation (regardless of treatment burdens), comfort and QOL . Accordingly, we have decided that the best plan to meet the patient's goals includes continuing with treatment.        Physical Exam:  Vitals:    Vitals:    01/07/25 1033   BP: (!) 144/66   Pulse: 93         Physical Exam  Vitals reviewed.   Constitutional:       General: She is not in acute distress.     Appearance: Normal appearance. She is obese. She is not ill-appearing, toxic-appearing or diaphoretic.   HENT:      Head: Atraumatic.      Right Ear: External ear normal.      Left Ear: External ear normal.      Nose:  Nose normal.      Mouth/Throat:      Dentition: Abnormal dentition.   Eyes:      General:         Right eye: No discharge.         Left eye: No discharge.      Extraocular Movements: Extraocular movements intact.      Conjunctiva/sclera: Conjunctivae normal.   Pulmonary:      Effort: Pulmonary effort is normal. No respiratory distress.      Breath sounds: No stridor.   Musculoskeletal:         General: Normal range of motion.      Cervical back: Normal range of motion.      Right lower leg: Edema present.      Left lower leg: Edema present.   Skin:     General: Skin is warm and dry.      Coloration: Skin is not jaundiced.   Neurological:      Mental Status: She is alert and oriented to person, place, and time. Mental status is at baseline.      Motor: No weakness.      Gait: Gait abnormal (with cane).   Psychiatric:         Behavior: Behavior normal.         Thought Content: Thought content normal.         Judgment: Judgment normal.         Labs:  CBC:   WBC   Date Value Ref Range Status   12/03/2024 3.29 (L) 3.90 - 12.70 K/uL Final     Hemoglobin   Date Value Ref Range Status   12/03/2024 13.4 12.0 - 16.0 g/dL Final     Hematocrit   Date Value Ref Range Status   12/03/2024 41.1 37.0 - 48.5 % Final     MCV   Date Value Ref Range Status   12/03/2024 107 (H) 82 - 98 fL Final     Platelets   Date Value Ref Range Status   12/03/2024 99 (L) 150 - 450 K/uL Final       LFT:   Lab Results   Component Value Date    AST 46 (H) 12/16/2024    ALKPHOS 148 12/16/2024    BILITOT 4.5 (H) 12/16/2024       Albumin:   Albumin   Date Value Ref Range Status   12/16/2024 2.9 (L) 3.5 - 5.2 g/dL Final     Protein:   Total Protein   Date Value Ref Range Status   12/16/2024 6.2 6.0 - 8.4 g/dL Final       Radiology:I have reviewed all pertinent imaging results/findings within the past 24 hours.    11/11/2024 CT AP: Impression:     History of breast cancer status post left mastectomy and axillary lymph node dissection.     *Unchanged right  axillary and bilateral inguinal lymphadenopathy.  *Unchanged left pleural thickening.  *Multiple solid pulmonary nodules scattered throughout both lungs measuring up to 2.5 cm, unchanged.  The left perihilar mass occludes adjacent airways resulting in partial collapse of the left lower lobe.  *No abdominopelvic metastases.  Mosaic attenuation throughout both lungs, which could represent small airway disease, pulmonary edema, or infection.     Cirrhosis with signs of portal hypertension including splenomegaly and portosystemic collaterals.  Patent tips.  Embolization coils in the upper abdomen, possibly from BRTO.  No focal hepatic lesions.     Multiple compression fractures in the thoracolumbar spine with severe height loss at T8, many of which are new.    08/15/2024 CT chest: Impression:     1. Right upper lobe and infrahilar left lower lobe nodules are minimally bulkier compared to the prior study.  These remain suspicious for metastatic lesions.  2. Residual scattered ground-glass opacities bilaterally, improved in interval.  Resolution of the previously seen effusions.  3. Additional stable findings, as described above.    04/29/2024 CT CAP: Impression:     History of breast cancer.  Surgical change of the left breast and bilateral axilla.  Borderline enlarged right axillary lymph node, similar to prior.     Decreased size of a left periaortic lymph node.     Borderline enlarged inguinal lymph nodes, similar to prior, perhaps reactive.     Unchanged bilateral irregular pulmonary nodules/opacities as previously noted     Hepatic steatosis with sequela of portal hypertension including splenomegaly and gastric and esophageal varices with embolization coils about the GE junction.  There is a patent TIPS.     Unchanged subcentimeter hypodensity in the pancreatic head.     Diffuse body wall anasarca.     Cholelithiasis.           Signature: Lizbeth Mehta, DNP

## 2025-01-09 LAB — CANCER AG27-29 SERPL-ACNC: 59.1 U/ML

## 2025-01-10 ENCOUNTER — PATIENT MESSAGE (OUTPATIENT)
Dept: HEMATOLOGY/ONCOLOGY | Facility: CLINIC | Age: 55
End: 2025-01-10
Payer: MEDICARE

## 2025-01-19 DIAGNOSIS — M48.56XS COMPRESSION FRACTURE OF LUMBAR SPINE, NON-TRAUMATIC, SEQUELA: ICD-10-CM

## 2025-01-19 DIAGNOSIS — R41.840 LACK OF CONCENTRATION: ICD-10-CM

## 2025-01-20 RX ORDER — METHYLPHENIDATE HYDROCHLORIDE 5 MG/1
5 TABLET ORAL 2 TIMES DAILY
Qty: 60 TABLET | Refills: 0 | Status: SHIPPED | OUTPATIENT
Start: 2025-01-20

## 2025-01-20 RX ORDER — HYDROCODONE BITARTRATE AND ACETAMINOPHEN 5; 325 MG/1; MG/1
1 TABLET ORAL EVERY 6 HOURS PRN
Qty: 40 TABLET | Refills: 0 | Status: SHIPPED | OUTPATIENT
Start: 2025-01-20

## 2025-01-24 ENCOUNTER — TELEPHONE (OUTPATIENT)
Dept: INTERVENTIONAL RADIOLOGY/VASCULAR | Facility: CLINIC | Age: 55
End: 2025-01-24
Payer: MEDICARE

## 2025-01-24 DIAGNOSIS — Z95.828 PORT-A-CATH IN PLACE: Primary | ICD-10-CM

## 2025-01-27 ENCOUNTER — PATIENT MESSAGE (OUTPATIENT)
Dept: HEMATOLOGY/ONCOLOGY | Facility: CLINIC | Age: 55
End: 2025-01-27
Payer: MEDICARE

## 2025-01-27 ENCOUNTER — PATIENT MESSAGE (OUTPATIENT)
Dept: HEPATOLOGY | Facility: CLINIC | Age: 55
End: 2025-01-27
Payer: MEDICARE

## 2025-01-27 ENCOUNTER — PATIENT MESSAGE (OUTPATIENT)
Dept: PALLIATIVE MEDICINE | Facility: CLINIC | Age: 55
End: 2025-01-27
Payer: MEDICARE

## 2025-01-27 ENCOUNTER — DOCUMENTATION ONLY (OUTPATIENT)
Dept: HEMATOLOGY/ONCOLOGY | Facility: CLINIC | Age: 55
End: 2025-01-27
Payer: MEDICARE

## 2025-01-27 DIAGNOSIS — K74.60 LIVER CIRRHOSIS SECONDARY TO NASH: ICD-10-CM

## 2025-01-27 DIAGNOSIS — K92.2 GASTROINTESTINAL HEMORRHAGE, UNSPECIFIED GASTROINTESTINAL HEMORRHAGE TYPE: ICD-10-CM

## 2025-01-27 DIAGNOSIS — K75.81 LIVER CIRRHOSIS SECONDARY TO NASH: ICD-10-CM

## 2025-01-27 DIAGNOSIS — K76.82 HEPATIC ENCEPHALOPATHY: ICD-10-CM

## 2025-01-27 RX ORDER — PANTOPRAZOLE SODIUM 40 MG/1
40 TABLET, DELAYED RELEASE ORAL 2 TIMES DAILY
Qty: 180 TABLET | Refills: 0 | Status: SHIPPED | OUTPATIENT
Start: 2025-01-27 | End: 2025-04-29

## 2025-01-28 ENCOUNTER — PATIENT MESSAGE (OUTPATIENT)
Dept: HEMATOLOGY/ONCOLOGY | Facility: CLINIC | Age: 55
End: 2025-01-28
Payer: MEDICARE

## 2025-01-31 ENCOUNTER — DOCUMENTATION ONLY (OUTPATIENT)
Dept: HEMATOLOGY/ONCOLOGY | Facility: CLINIC | Age: 55
End: 2025-01-31
Payer: MEDICARE

## 2025-01-31 NOTE — PROGRESS NOTES
SW received referral for the food pantry. SW contacted the patient to discuss the referral. Patient reports she was approved for the food pantry the end of last year, but had not heard fro anyone since that time and had never been scheduled for an appointment and at this time she could really use some assistance. GIOVANNI apologized to the patient that no one had contacted her and informed her that GIOVANNI would contact Jose Martin to request a follow up call for an appointment. GIOVANNI sent jose martin a secure message requesting if she could contact the patient to schedule an appointment. SW answered and addressed all questions and concerns. Patient verbalized understanding. GIOVANNI provided patient with SW contact information should she have any further questions or concerns. GIOVANNI will continue to follow.     ELSIE Wilkes, [facility]  Oncology Social Worker   Hardik heather - Oncology  [phone]

## 2025-02-04 ENCOUNTER — HOSPITAL ENCOUNTER (OUTPATIENT)
Dept: RADIOLOGY | Facility: HOSPITAL | Age: 55
Discharge: HOME | End: 2025-02-04
Attending: FAMILY MEDICINE
Payer: MEDICARE

## 2025-02-04 DIAGNOSIS — Z95.828 S/P TIPS (TRANSJUGULAR INTRAHEPATIC PORTOSYSTEMIC SHUNT): ICD-10-CM

## 2025-02-04 PROCEDURE — 76705 ECHO EXAM OF ABDOMEN: CPT | Mod: TC

## 2025-02-04 PROCEDURE — 93976 VASCULAR STUDY: CPT | Mod: 26,,, | Performed by: STUDENT IN AN ORGANIZED HEALTH CARE EDUCATION/TRAINING PROGRAM

## 2025-02-04 PROCEDURE — 76705 ECHO EXAM OF ABDOMEN: CPT | Mod: 26,59,, | Performed by: STUDENT IN AN ORGANIZED HEALTH CARE EDUCATION/TRAINING PROGRAM

## 2025-02-05 DIAGNOSIS — M48.56XS COMPRESSION FRACTURE OF LUMBAR SPINE, NON-TRAUMATIC, SEQUELA: ICD-10-CM

## 2025-02-05 RX ORDER — HYDROCODONE BITARTRATE AND ACETAMINOPHEN 5; 325 MG/1; MG/1
1 TABLET ORAL EVERY 6 HOURS PRN
Qty: 40 TABLET | Refills: 0 | Status: SHIPPED | OUTPATIENT
Start: 2025-02-05 | End: 2025-02-21 | Stop reason: SDUPTHER

## 2025-02-06 ENCOUNTER — TELEPHONE (OUTPATIENT)
Dept: HEMATOLOGY/ONCOLOGY | Facility: CLINIC | Age: 55
End: 2025-02-06
Payer: MEDICARE

## 2025-02-06 NOTE — TELEPHONE ENCOUNTER
Called patient per her request regarding apt for food pantry. She has been screened by social work and is eligible. Call went to voicemail. I offered 9am on 2/12 (after her palliative apt) and left my number if she has questions.

## 2025-02-07 ENCOUNTER — TELEPHONE (OUTPATIENT)
Dept: PALLIATIVE MEDICINE | Facility: CLINIC | Age: 55
End: 2025-02-07
Payer: MEDICARE

## 2025-02-12 ENCOUNTER — OFFICE VISIT (OUTPATIENT)
Dept: PALLIATIVE MEDICINE | Facility: CLINIC | Age: 55
End: 2025-02-12
Payer: MEDICARE

## 2025-02-12 ENCOUNTER — TELEPHONE (OUTPATIENT)
Dept: PALLIATIVE MEDICINE | Facility: CLINIC | Age: 55
End: 2025-02-12
Payer: MEDICARE

## 2025-02-12 VITALS — OXYGEN SATURATION: 93 % | SYSTOLIC BLOOD PRESSURE: 129 MMHG | HEART RATE: 98 BPM | DIASTOLIC BLOOD PRESSURE: 59 MMHG

## 2025-02-12 DIAGNOSIS — G89.3 CANCER RELATED PAIN: ICD-10-CM

## 2025-02-12 DIAGNOSIS — C50.912 BREAST CANCER, STAGE 4, LEFT: ICD-10-CM

## 2025-02-12 DIAGNOSIS — R60.9 EDEMA, UNSPECIFIED TYPE: ICD-10-CM

## 2025-02-12 DIAGNOSIS — R53.83 FATIGUE, UNSPECIFIED TYPE: ICD-10-CM

## 2025-02-12 DIAGNOSIS — G47.00 INSOMNIA, UNSPECIFIED TYPE: ICD-10-CM

## 2025-02-12 DIAGNOSIS — K92.2 GASTROINTESTINAL HEMORRHAGE, UNSPECIFIED GASTROINTESTINAL HEMORRHAGE TYPE: ICD-10-CM

## 2025-02-12 DIAGNOSIS — C78.02 MALIGNANT NEOPLASM METASTATIC TO LEFT LUNG: Primary | ICD-10-CM

## 2025-02-12 DIAGNOSIS — F41.9 ANXIETY: ICD-10-CM

## 2025-02-12 DIAGNOSIS — Z51.5 ENCOUNTER FOR PALLIATIVE CARE: ICD-10-CM

## 2025-02-12 PROCEDURE — 99999 PR PBB SHADOW E&M-EST. PATIENT-LVL III: CPT | Mod: PBBFAC,,, | Performed by: NURSE PRACTITIONER

## 2025-02-12 PROCEDURE — 99213 OFFICE O/P EST LOW 20 MIN: CPT | Mod: PBBFAC | Performed by: NURSE PRACTITIONER

## 2025-02-12 NOTE — PROGRESS NOTES
Consult Note  Palliative Care      Consult Requested By: No ref. provider found  Reason for Consult: symptom mgmt/ACP      ASSESSMENT/PLAN:     Plan/Recommendations:    02/12/2025:  - no changes made today     Metastatic breast cancer  - following with Dr. Willis & NP [place]   - ID 2006, chemo, s/p radical mastectomy (2007), s/p post-op RT, herceptin x 3.5 years until progression in lung mass, cycled through myriad of chemo tx with progression on July 2020 PET, 9 months of continued therapy w eventual progression, stable on 4/2024 CT  - on ENHERTU on 2 years  - currently observation off therapy     Encounter for palliative care  - Patient is decisional  - Patient accompanied today by self  - ACP documents are not uploaded into EMR   - Philosophy of Palliative Medicine reviewed with patient and family at first visit  - New patient folder given to and reviewed with patient and family at first visit  - Goals of care:  Patient lives alone with her 2 dogs and 2 cats. She is fully independent with no care needs of any kind. Her daughter Samra Jaeger ([id number]) is her support system. Nearby, her mother is living with cognitive/health issues and pt anticipates having to care for her mother FT within a year. Pt also has a sister who is an  and will help her with getting her affairs in order, including HCPOA. A booklet is provided and reviewed today. We will complete at future visit. Ms. Trivedi shares that she was initially diagnosed with cancer 17 years ago. It has been a long journey for her. She found strength in setting goals along the way. Seeing her daughter graduate, , start her career - each milestone has motivated her to continue. Her daughter is now pregnant and due in December 2023, her goal is to meet her grandson. Ms. 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 Enjoys attending [employer], going to the movies, working in the garden, reading and  baking, going to [employer], farmer's markets, etc. No spiritual/rob, declines .    Cancer associated fatigue/insomnia   - treatment-associated  - exacerbated by heat (she does not have AC in car, runs only 1 window unit in her house 2/2 cost)  - currently on ritalin, which helps  - she is ECOG 1-2  - will continue to monitor     Edema   - BLE  - follows with cardiologist  - controls with daily lasix, will hold doses on a busy day where it will be difficult to use a bathroom frequently  - using incontinence supplies though they are difficult for her to afford     Cancer pain  - back pain, severe  - patient cannot tolerate laying flat, she is sleeping in a recliner couch. Getting up and down take a long time due to pain and back spasm  - she cannot hold her grand baby due to back pain  - sitting and leaning forward or standing/walking (for moderate periods of time) are tolerable positions   - spasms with getting up and down   - 10/10 down to 5/10  after taking 1.5 hours to kick in  - heating pad  - lidocaine patch  - Vionic shoes  - plans to ask her oncologist about vert plasty   - using walker at home, cane while out   - using norco 5 mg bid prn, using two doses daily, worried about about liver and addiction.     Depression, anxiety  - mostly r/t role as primary caretaker for 87 year-old mom with severe dementia  - plan for mother to switch to gerontologist  - plan for mother to be placed in a nursing home  - sister has become more involved in assisting patient with their mother's care  - patient enjoys time w grandson, friends and getting of the house when she can, care for aaron dog    Understanding of illness: Excellent     Goals of care: preserve qol, independence, activity tolerance    Follow up: 8-10 weeks    Patient's encounter and above plan of care discussed with patient's oncology team.     SUBJECTIVE:     History of Present Illness:  Patient is a 54 y.o. year old female presenting with  "metastatic breast cancer. Please see oncology notes for full oncologic history and treatment course.     02/12/2025:  LA  reviewed: no concerns    History of Present Illness    CHIEF COMPLAINT:  - Patient presents for a follow-up visit to discuss ongoing back pain, medication management, and updates on her stress at home (mother's care situation).    GOALS OF CARE/ADVANCED DIRECTIVES:  - currently caring for her mother who has dementia, with assistance from a caregiver  - patient and her sister are in the process of finding a care facility for their mother, indicating a shift in caregiving responsibilities  - expresses a desire to move to a smaller home, stating "I want a tiny little home.  - Give me two rooms and I'll be happy."  - mentions wanting to move closer to her daughter and grandchild, indicating a desire to maintain family connections      - Physical Therapy for her back, last session canceled due to snow storm, plans to reschedule     HPI:  Patient reports ongoing back pain related to compression fractures. She was expecting a referral to see Dr. Calle for potential kyphoplasty, but has not received a call. She expresses concern about the timing, as she understands kyphoplasty may not be effective if too much time has passed since the injury. She plans to reach out to provider again. Patient has made progress in managing her pain, now able to sleep in bed again instead of on the sofa. She recently purchased a 3-inch gel foam mattress topper and a foot prop to improve her comfort, also manage edema. Patient is currently taking pain medication twice daily, once in the morning and once in the evening, which is less than the prescribed 4 times daily. Her back pain is not constant and is currently not bothering her, though it can be tender to touch at times. Ritalin has been useful to help with fatigue and what she describes as "brain scatter." Regarding her mother's care, the patient reports significant " stress and anxiety related to caregiving responsibilities, including recent incidents of her mother's declining cognitive function and behavioral changes.    ACTIVITIES OF DAILY LIVING:  - able to sleep in bed again after previously sleeping on the sofa due to back issues  - purchased a 3-inch gel foam mattress topper and a device to prop up feet while sleeping to improve comfort  - able to prepare meals, shop for groceries, use the bathroom independently, drive, and care for a dog  - able to leave the house and attend appointments independently    MEDICATIONS DISCUSSED:  - Ritalin, helps with fatigue and brain scatter  - Pain medication, 2 per day, oral, for back pain    Review chart for previous visit notes: 01/07/2025; 09/26/2024; 07/11/2024; 04/04/2024; 01/04/2024; 10/23/2023; 08/24/2023      Past Medical History:   Diagnosis Date    Acute encephalopathy 05/25/2024    Aortic atherosclerosis 07/06/2023    Breast cancer     CHF (congestive heart failure)     Coronary artery calcification seen on CT scan 09/19/2024    Esophageal and gastric varices 06/23/2023    Hepatic encephalopathy 05/25/2024    Liver disease     Malignant neoplasm metastatic to left lung 06/08/2021    Stenosis of aortic and mitral valves     Aortic stenosis     Past Surgical History:   Procedure Laterality Date    ENDOSCOPIC ULTRASOUND OF UPPER GASTROINTESTINAL TRACT N/A 06/27/2023    Procedure: ULTRASOUND, UPPER GI TRACT, ENDOSCOPIC;  Surgeon: Home Lopez MD;  Location: [address]);  Service: Endoscopy;  Laterality: N/A;    ENDOSCOPIC ULTRASOUND OF UPPER GASTROINTESTINAL TRACT N/A 01/12/2024    Procedure: ULTRASOUND, UPPER GI TRACT, ENDOSCOPIC;  Surgeon: Home Lopez MD;  Location: [address]);  Service: Endoscopy;  Laterality: N/A;  11/28/23-Instructions via portal-DS  1/8-lvm for precall-MS  1/10-precall complete-Kpvt    ESOPHAGOGASTRODUODENOSCOPY N/A 06/23/2023    Procedure: EGD (ESOPHAGOGASTRODUODENOSCOPY);  Surgeon:  Quintin Mooney MD;  Location: [facility] ENDO (2ND FLR);  Service: Endoscopy;  Laterality: N/A;    ESOPHAGOGASTRODUODENOSCOPY N/A 06/27/2023    Procedure: EGD (ESOPHAGOGASTRODUODENOSCOPY);  Surgeon: Home Lopez MD;  Location: [facility] ENDO (2ND FLR);  Service: Endoscopy;  Laterality: N/A;    ESOPHAGOGASTRODUODENOSCOPY N/A 08/03/2023    Procedure: EGD (ESOPHAGOGASTRODUODENOSCOPY);  Surgeon: Home Lopez MD;  Location: [facility] ENDO (2ND FLR);  Service: Endoscopy;  Laterality: N/A;  instr portal-labs 6/28/23-tb    ESOPHAGOGASTRODUODENOSCOPY N/A 01/12/2024    Procedure: EGD (ESOPHAGOGASTRODUODENOSCOPY);  Surgeon: Home Lopez MD;  Location: [facility] ENDO (2ND FLR);  Service: Endoscopy;  Laterality: N/A;    ESOPHAGOGASTRODUODENOSCOPY N/A 04/10/2024    Procedure: EGD (ESOPHAGOGASTRODUODENOSCOPY);  Surgeon: Ze Anderson MD;  Location: [facility] ENDO (2ND FLR);  Service: Endoscopy;  Laterality: N/A;    INSERTION OF TUNNELED CENTRAL VENOUS CATHETER (CVC) WITH SUBCUTANEOUS PORT      MASTECTOMY       Family History   Problem Relation Name Age of Onset    Hypertension Mother      Heart attack Father      Lung cancer Father       Review of patient's allergies indicates:  No Known Allergies    Medications:    Current Outpatient Medications:     empagliflozin (JARDIANCE) 10 mg tablet, Take 1 tablet (10 mg total) by mouth once daily., Disp: 30 tablet, Rfl: 11    furosemide (LASIX) 80 MG tablet, Take 1 tablet (80 mg total) by mouth once daily. If you are gaining weight (2 lbs in 24 hours or 3 lbs in two days) may take an extra dose of 80 mg, Disp: 90 tablet, Rfl: 2    HYDROcodone-acetaminophen (NORCO) 5-325 mg per tablet, Take 1 tablet by mouth every 6 (six) hours as needed for Pain., Disp: 40 tablet, Rfl: 0    lactulose (CHRONULAC) 10 gram/15 mL solution, Take 30 mLs (20 g total) by mouth 3 (three) times daily., Disp: 2700 mL, Rfl: 11    methylphenidate HCl (RITALIN) 5 MG tablet, Take 1 tablet (5 mg total) by mouth 2 (two) times  daily., Disp: 60 tablet, Rfl: 0    pantoprazole (PROTONIX) 40 MG tablet, Take 1 tablet (40 mg total) by mouth 2 (two) times daily., Disp: 180 tablet, Rfl: 0    rifAXIMin (XIFAXAN) 550 mg Tab, Take 1 tablet (550 mg total) by mouth 2 (two) times daily., Disp: 60 tablet, Rfl: 11    spironolactone (ALDACTONE) 100 MG tablet, Take 1 tablet (100 mg total) by mouth once daily., Disp: 30 tablet, Rfl: 11    triamcinolone acetonide 0.5% (KENALOG) 0.5 % Crea, Apply topically 2 (two) times daily., Disp: 454 g, Rfl: 0    diaper,brief,adult,disposable Misc, 1 Units by Misc.(Non-Drug; Combo Route) route 4 (four) times daily., Disp: 120 each, Rfl: 4    diaper,brief,adult,disposable Misc, 1 Units by Misc.(Non-Drug; Combo Route) route 4 (four) times daily., Disp: 125 each, Rfl: 4    ferrous sulfate (FEROSUL) 325 mg (65 mg iron) Tab tablet, Take 1 tablet by mouth daily with Vitamin C on an empty stomach, Disp: 60 tablet, Rfl: 3    incontinence pad, liner, disp Pads, 1 Units by Misc.(Non-Drug; Combo Route) route 4 (four) times daily., Disp: 125 each, Rfl: 4    mupirocin (BACTROBAN) 2 % ointment, Apply topically 3 (three) times daily., Disp: 22 g, Rfl: 0    OBJECTIVE:       ROS:  Review of Systems   Constitutional:  Positive for fatigue. Negative for activity change and appetite change.   HENT:  Negative for congestion, dental problem and drooling.    Eyes:  Negative for pain, discharge and itching.   Respiratory:  Positive for cough. Negative for choking and wheezing.    Cardiovascular:  Positive for leg swelling.   Gastrointestinal:  Negative for constipation, diarrhea, nausea and vomiting.   Genitourinary:  Negative for difficulty urinating, dyspareunia and dysuria.   Musculoskeletal:  Positive for back pain and gait problem. Negative for arthralgias.   Skin:  Negative for pallor, rash and wound.   Neurological:  Positive for weakness. Negative for dizziness, facial asymmetry and headaches.   Psychiatric/Behavioral:  Positive for  sleep disturbance. Negative for dysphoric mood. The patient is not nervous/anxious.        Review of Symptoms      Symptom Assessment (ESAS 0-10 Scale)  Pain:  6  Dyspnea:  0  Anxiety:  10  Nausea:  0  Depression:  4  Anorexia:  0  Fatigue:  8  Insomnia:  0  Restlessness:  6  Agitation:  0     CAM / Delirium:  Negative  Constipation:  Negative  Diarrhea:  Negative    Anxiety:  Is not nervous/anxious  Constipation:  No constipation    Bowel Management Plan (BMP):  No      Pain Assessment:  OME in 24 hours:  10  Location(s): none      Modified Hunter Scale:  0    ECOG Performance Status stGstrstastdstest:st st1st Living Arrangements:  Lives with family    Psychosocial/Cultural:   See Palliative Psychosocial Note: No  **Primary  to Follow**  Palliative Care  Consult: No     Time-Based Charting:  No      Advance Care Planning   Advance Directives:   Living Will: No        Oral Declaration: No    LaPOST: No    Do Not Resuscitate Status: No        Oral Declaration: No      Decision Making:  Patient answered questions  Goals of Care: What is most important right now is to focus on remaining as independent as possible, symptom/pain control, curative/life-prolongation (regardless of treatment burdens), comfort and QOL . Accordingly, we have decided that the best plan to meet the patient's goals includes continuing with treatment.        Physical Exam:  Vitals: Pulse: 98 (02/12/25 0825)  BP: (!) 129/59 (02/12/25 0825)  SpO2: (!) 93 % (02/12/25 0825)  Vitals:    02/12/25 0825   BP: (!) 129/59   Pulse: 98         Physical Exam  Vitals reviewed.   Constitutional:       General: She is not in acute distress.     Appearance: Normal appearance. She is obese. She is not ill-appearing, toxic-appearing or diaphoretic.   HENT:      Head: Atraumatic.      Right Ear: External ear normal.      Left Ear: External ear normal.      Nose: Nose normal.      Mouth/Throat:      Dentition: Abnormal dentition.   Eyes:      General:          Right eye: No discharge.         Left eye: No discharge.      Extraocular Movements: Extraocular movements intact.      Conjunctiva/sclera: Conjunctivae normal.   Pulmonary:      Effort: Pulmonary effort is normal. No respiratory distress.      Breath sounds: No stridor.   Musculoskeletal:         General: Normal range of motion.      Cervical back: Normal range of motion.      Right lower leg: Edema present.      Left lower leg: Edema present.   Skin:     General: Skin is warm and dry.      Coloration: Skin is not jaundiced.   Neurological:      Mental Status: She is alert and oriented to person, place, and time. Mental status is at baseline.      Motor: No weakness.      Gait: Gait abnormal (with cane).   Psychiatric:         Behavior: Behavior normal.         Thought Content: Thought content normal.         Judgment: Judgment normal.         Labs:  CBC:   WBC   Date Value Ref Range Status   01/07/2025 3.55 (L) 3.90 - 12.70 K/uL Final     Hemoglobin   Date Value Ref Range Status   01/07/2025 13.1 12.0 - 16.0 g/dL Final     Hematocrit   Date Value Ref Range Status   01/07/2025 40.1 37.0 - 48.5 % Final     MCV   Date Value Ref Range Status   01/07/2025 105 (H) 82 - 98 fL Final     Platelets   Date Value Ref Range Status   01/07/2025 116 (L) 150 - 450 K/uL Final       LFT:   Lab Results   Component Value Date    AST 48 (H) 01/07/2025    ALKPHOS 144 01/07/2025    BILITOT 5.0 (H) 01/07/2025       Albumin:   Albumin   Date Value Ref Range Status   01/07/2025 3.0 (L) 3.5 - 5.2 g/dL Final     Protein:   Total Protein   Date Value Ref Range Status   01/07/2025 7.1 6.0 - 8.4 g/dL Final       Radiology:I have reviewed all pertinent imaging results/findings within the past 24 hours.    02/04/2025 US liver: Impression:     Postprocedural change of TIPS placement with recent TIPS revision.  Overall improved velocity about the TIPS shunt most notably about the mid aspect, with expected reversal flow about the right anterior  branch portal vein.  Attention on follow-up imaging of persistent decreased velocity about the TIPS shunt portal venous end and bidirectional flow about the left portal vein.     Cirrhotic liver morphology.  No focal hepatic lesions.     Sequela of portal hypertension including splenomegaly.  No ascites.     Cholelithiasis without evidence for acute cholecystitis.    11/11/2024 CT AP: Impression:     History of breast cancer status post left mastectomy and axillary lymph node dissection.     *Unchanged right axillary and bilateral inguinal lymphadenopathy.  *Unchanged left pleural thickening.  *Multiple solid pulmonary nodules scattered throughout both lungs measuring up to 2.5 cm, unchanged.  The left perihilar mass occludes adjacent airways resulting in partial collapse of the left lower lobe.  *No abdominopelvic metastases.  Mosaic attenuation throughout both lungs, which could represent small airway disease, pulmonary edema, or infection.     Cirrhosis with signs of portal hypertension including splenomegaly and portosystemic collaterals.  Patent tips.  Embolization coils in the upper abdomen, possibly from BRTO.  No focal hepatic lesions.     Multiple compression fractures in the thoracolumbar spine with severe height loss at T8, many of which are new.    08/15/2024 CT chest: Impression:     1. Right upper lobe and infrahilar left lower lobe nodules are minimally bulkier compared to the prior study.  These remain suspicious for metastatic lesions.  2. Residual scattered ground-glass opacities bilaterally, improved in interval.  Resolution of the previously seen effusions.  3. Additional stable findings, as described above.      Signature: Lizbeth Mehta, DNP

## 2025-02-13 ENCOUNTER — OFFICE VISIT (OUTPATIENT)
Dept: WOUND CARE | Facility: CLINIC | Age: 55
End: 2025-02-13
Payer: MEDICARE

## 2025-02-13 ENCOUNTER — TELEPHONE (OUTPATIENT)
Dept: WOUND CARE | Facility: CLINIC | Age: 55
End: 2025-02-13
Payer: MEDICARE

## 2025-02-13 VITALS
DIASTOLIC BLOOD PRESSURE: 53 MMHG | BODY MASS INDEX: 32.7 KG/M2 | WEIGHT: 177.69 LBS | SYSTOLIC BLOOD PRESSURE: 114 MMHG | HEART RATE: 94 BPM | TEMPERATURE: 98 F | HEIGHT: 62 IN

## 2025-02-13 DIAGNOSIS — I87.2 VENOUS INSUFFICIENCY: ICD-10-CM

## 2025-02-13 DIAGNOSIS — I89.0 LYMPHEDEMA: ICD-10-CM

## 2025-02-13 DIAGNOSIS — R60.0 LEG EDEMA: ICD-10-CM

## 2025-02-13 DIAGNOSIS — S81.802A OPEN WOUND OF LEFT LOWER EXTREMITY, INITIAL ENCOUNTER: Primary | ICD-10-CM

## 2025-02-13 DIAGNOSIS — C78.02 MALIGNANT NEOPLASM METASTATIC TO LEFT LUNG: ICD-10-CM

## 2025-02-13 DIAGNOSIS — I50.32 CHRONIC HEART FAILURE WITH PRESERVED EJECTION FRACTION: ICD-10-CM

## 2025-02-13 DIAGNOSIS — C50.912 BREAST CANCER, STAGE 4, LEFT: ICD-10-CM

## 2025-02-13 PROCEDURE — 29580 STRAPPING UNNA BOOT: CPT | Mod: PBBFAC,LT

## 2025-02-13 PROCEDURE — 99999 PR PBB SHADOW E&M-EST. PATIENT-LVL IV: CPT | Mod: PBBFAC,,,

## 2025-02-13 PROCEDURE — 99214 OFFICE O/P EST MOD 30 MIN: CPT | Mod: PBBFAC

## 2025-02-13 NOTE — TELEPHONE ENCOUNTER
Called no answer, left voice message asking patient to return call regarding scheduling an appointment for wound care.

## 2025-02-13 NOTE — PROGRESS NOTES
Subjective:       Patient ID: Shikha López is a 54 y.o. female.    Chief Complaint: Wound Consult    Patient presents for an evaluation of a left lower extremity wound. She reports that she did not wear her compression stockings this weekend. She noticed on 2/8/25 a blister formed. It popped and she was left with an open wound. Pt has been dressing her wound with a bandage. She noticed her wounds have been draining. Patient has followed in wound care from 9/16/24- 10/10/24. Her wounds heal with a 3 layer compression wrap. Pt has a history of leg edema. Admits compliance with lasix. Denies fever, chills, erythema, warmth, purulent drainage, or pain.      12/16/24 venous ultrasound:  There is no evidence of a right lower extremity DVT.  The right common femoral vein has reflux.  The right greater saphenous vein has reflux.  There is no evidence of a left lower extremity DVT.  The left greater saphenous vein has reflux.  The left smaller saphenous vein has reflux.    4/22/24 venous ultrasound:  No evidence of deep venous thrombosis in either lower extremity.  Findings suggestive of complex 4.2 x 0.8 x 2.5 cm right popliteal fossa cyst.      Review of Systems   Constitutional:  Negative for activity change, chills, diaphoresis, fatigue and fever.   Respiratory:  Negative for apnea, chest tightness and shortness of breath.    Cardiovascular:  Positive for leg swelling. Negative for chest pain and palpitations.   Musculoskeletal:  Negative for gait problem and joint swelling.   Skin:  Positive for wound. Negative for color change, pallor and rash.   Neurological:  Negative for syncope, weakness and numbness.   Psychiatric/Behavioral:  Negative for agitation. The patient is not nervous/anxious.    All other systems reviewed and are negative.      Objective:      Physical Exam  Vitals reviewed.   Constitutional:       General: She is not in acute distress.     Appearance: Normal appearance.   Cardiovascular:       Pulses: Normal pulses.   Pulmonary:      Effort: No respiratory distress.   Musculoskeletal:         General: No swelling.      Right lower leg: Edema present.      Left lower leg: Edema present.   Skin:     General: Skin is warm and dry.      Capillary Refill: Capillary refill takes less than 2 seconds.      Findings: Wound present. No erythema.          Neurological:      General: No focal deficit present.      Mental Status: She is alert and oriented to person, place, and time.   Psychiatric:         Mood and Affect: Mood normal.         Behavior: Behavior normal.         Thought Content: Thought content normal.         Judgment: Judgment normal.         Assessment:       1. Open wound of left lower extremity, initial encounter    2. Lymphedema    3. Chronic heart failure with preserved ejection fraction    4. Leg edema    5. Breast cancer, stage 4, left    6. Malignant neoplasm metastatic to left lung    7. Venous insufficiency             Wound Blister(s) Left anterior;lower Leg (Active)     Leg   Present on Original Admission:    Primary Wound Type: Blister(s)   Side: Left   Orientation: anterior;lower   Wound Approximate Age at First Assessment (Weeks):    Wound Number:    Is this injury device related?:    Incision Type:    Closure Method:    Wound Description (Comments):    Type:    Additional Comments:    Ankle-Brachial Index:    Pulses:    Removal Indication and Assessment:    Wound Outcome:    Wound Image   02/13/25 1055   Dressing Appearance Dry;Intact;Clean;Moist drainage 02/13/25 1055   Drainage Amount Large 02/13/25 1055   Drainage Characteristics/Odor Serous 02/13/25 1055   Red (%), Wound Tissue Color 100 % 02/13/25 1055   Periwound Area Intact;Edematous 02/13/25 1055   Wound Length (cm) 2.8 cm 02/13/25 1055   Wound Width (cm) 2.4 cm 02/13/25 1055   Wound Depth (cm) 0.1 cm 02/13/25 1055   Wound Volume (cm^3) 0.672 cm^3 02/13/25 1055   Wound Surface Area (cm^2) 6.72 cm^2 02/13/25 1055   Care Cleansed  with:;Soap and water 02/13/25 1055   Dressing Applied;Calcium alginate;Absorptive Pad;Compression wrap;Other (comment) 02/13/25 1055   Periwound Care Skin barrier film applied 02/13/25 1055   Compression Unna's Boot;Three layer compression 02/13/25 1055         Shikha was seen in the clinic room and placed in the supine position on the treatment table.  The dressing was removed and the area was cleansed with Easi-clense sponges and dried thoroughly. No odor, erythema, or warmth noted.       Plan of Care: Calmoseptine to periwound, drawtex, aquacel, mextra pad, and a 3 layer calamine compression wrap. The patient's foot was positioned at a 90 degree angle.  A compression wrap was applied using a spiral technique avoiding creases or folds.  The wrap was started behind the first metatarsal and ended below the tibial tubercle of the knee.  There was overlap of each turn half the width of the previous turn.  The compression wrap will be changed every 7 days.           Plan:       Left lower extremity was dressed as detailed above.  Patient was instructed to not get the dressings wet and to use cast covers for showering.  Should the dressing become wet, she is to remove it, place a moist dressing over the wound, cover with gauze and roll gauze and to secure bandages.  She should then notify this office as soon as possible to have a new dressing applied.  Instructed patient to remove wrap if she notices tingling, pain, swelling, or coldness to her left lower extremity or if her toes become white, blue, or cold.    Discussed nutrition and the role of protein in wound healing with the patient. Instructed patient to optimize protein for wound healing.     Discussed lower extremity edema with patient. Instructed patient to elevate legs whenever sedentary, follow a low sodium diet, and to take lasix as prescribed.    No signs or symptoms of cellulites noted.    Discussed utilizing compression stockings daily to prevent  recurrent wounds/ skin bulla.    Written and verbal instructions given to patient  RTC in 1 week      Rani Gregorio PA-C

## 2025-02-17 ENCOUNTER — HOSPITAL ENCOUNTER (OUTPATIENT)
Dept: RADIOLOGY | Facility: HOSPITAL | Age: 55
Discharge: HOME | End: 2025-02-17
Attending: INTERNAL MEDICINE
Payer: MEDICARE

## 2025-02-17 DIAGNOSIS — C50.912 BREAST CANCER, STAGE 4, LEFT: Chronic | ICD-10-CM

## 2025-02-17 PROCEDURE — 25500020 PHARM REV CODE 255: Performed by: INTERNAL MEDICINE

## 2025-02-17 PROCEDURE — 71260 CT THORAX DX C+: CPT | Mod: TC

## 2025-02-17 RX ADMIN — IOHEXOL 100 ML: 350 INJECTION, SOLUTION INTRAVENOUS at 02:02

## 2025-02-19 NOTE — PROGRESS NOTES
Subjective:       Patient ID: Shikha López is a 54 y.o. female.    Chief Complaint: No chief complaint on file.      HPI 54-year-old female who returns for F/U  for metastatic HER 2 + breast cancer.  She was on Trastuzumab deruxtecan  - her last treatment was May 7, 2024. Treatment has been held due to cirrhosis and hepatic encephalopathy.  She has also had variceal bleeding and CHF.  - She has chronic cytopenias - with thrombocytopenia.  - Osteoporosis with compression Fx of the spine.    Back pain better.  It primarily bothers her if she does too much.    Appetite and bowel function has been.  She has no shortness of breath.  Her lower extremity edema has been much better since using diuretics on a regular basis.  She continues to see wound care for her left lower leg.  Happy with her mothers new doctor who has referred her to [facility].      Breast history:  She presented in the emergency room at [facility] on September 29, 2006 with a 4 months history of inflammation of her left breast.  Physical examination at that time showed the left breast was largely replaced by large mass with multiple skin ulcerations.    A biopsy in September 2006 showed poorly differentiated carcinoma which was ER and ND. negative and HER2 positive.    She was then referred to [facility] for additional care.   CT scan of the chest and abdomen November 2006 revealed multiple nodules in the lungs consistent with metastatic disease.  There also enlarged left axillary node.    She was treated with chemotherapy with weekly Herceptin and Abraxane with improvement in her breast and lung metastasis.    On May 29, 2007 she underwent left modified radical mastectomy(Dr. Colvin) which showed no residual tumor in the breast and 1/5 nodes was positive for metastasis measuring 6 mm.  (ypT0N1).  She then received postoperative radiation therapy(Dr Singh)  to the left chest wall supraclav and internal mammary lymph nodes from  August 20, 2007 to September 28, 2007.    Postoperatively she continued on Herceptin for approximately 3 and 1/2 years before her lung metastasis begin to grow.    She subsequently received a number of different chemotherapy treatments including Adriamycin, Halaven, Xeloda plus lapatinib then Xeloda plus Herceptin. (  in [facility].)    Subsequently, she transferred her care to  at [facility].  -She was initially treated with Taxotere Herceptin Perjeta.    -She was then changed to Kadcyla.    In July 2020 PET scan showed progression.   She then took approximately 9 months of Herceptin and Navelbine with initial response then progression.    She started trastuzumab- deruxtecan  4/12/21.     CT 3/11/24 - stable  Stable postsurgical changes of left mastectomy and axillary lymph node dissection is patient with metastatic left breast cancer.   Redemonstration of multiple irregular bilateral pulmonary nodules concerning for metastatic disease.  No new lesions identified.   Hepatic steatosis and portal hypertension.  Increased bowel wall thickening and diffuse mesenteric edema with mild ascites, likely related to hepatic dysfunction.    Echo 4/24/24 - EF 60-65%      CT 4/29/24  -stable    CT chest - 8/15/24 - 1. Right upper lobe and infrahilar left lower lobe nodules are minimally bulkier compared to the prior study.    2. Residual scattered ground-glass opacities bilaterally, improved in interval.  Resolution of the previously seen effusions      CT CAP 11/11/24 - *Unchanged right axillary and bilateral inguinal lymphadenopathy.  *Unchanged left pleural thickening.  *Multiple solid pulmonary nodules scattered throughout both lungs measuring up to 2.5 cm, unchanged.  The left perihilar mass occludes adjacent airways resulting in partial collapse of the left lower lobe.  *No abdominopelvic metastases.  Mosaic attenuation throughout both lungs, which could represent small airway disease, pulmonary  edema, or infection.   Cirrhosis with signs of portal hypertension including splenomegaly and portosystemic collaterals.  Patent tips.  Embolization coils in the upper abdomen, possibly from BRTO.  No focal hepatic lesions.   Multiple compression fractures in the thoracolumbar spine with severe height loss at T8, many of which are new    Review of Systems   Constitutional:  Negative for appetite change and unexpected weight change.   HENT:  Negative for mouth sores.    Eyes:  Negative for visual disturbance.   Respiratory:  Negative for cough and shortness of breath.    Cardiovascular:  Negative for chest pain.   Gastrointestinal:  Negative for abdominal pain and diarrhea.   Genitourinary:  Negative for frequency.   Musculoskeletal:  Positive for back pain.   Integumentary:  Negative for rash.   Neurological:  Negative for headaches.   Hematological:  Negative for adenopathy.   Psychiatric/Behavioral:  The patient is nervous/anxious.    All other systems reviewed and are negative.        Objective:      Physical Exam  Vitals reviewed.   Constitutional:       General: She is not in acute distress.     Appearance: She is obese.   Cardiovascular:      Rate and Rhythm: Normal rate and regular rhythm.      Heart sounds: Murmur heard.   Pulmonary:      Effort: Pulmonary effort is normal. No respiratory distress.      Breath sounds: Normal breath sounds. No wheezing or rales.   Abdominal:      Palpations: Abdomen is soft. There is no mass.      Tenderness: There is no abdominal tenderness.   Lymphadenopathy:      Cervical: No cervical adenopathy.   Neurological:      Mental Status: She is alert and oriented to person, place, and time.   Psychiatric:         Mood and Affect: Mood normal.         Behavior: Behavior normal.         Thought Content: Thought content normal.         Judgment: Judgment normal.       Assessment:    CBC HGB 13.7, WBC 3360, Plts [id number]  Problem List Items Addressed This Visit       Breast cancer,  stage 4, left - Primary (Chronic)    Malignant neoplasm metastatic to left lung (Chronic)       Plan:    Await CT results from 2/17/25 -I will call her .     I will see her again in 6 weeks' time with labs      Route Chart for Scheduling    Med Onc Chart Routing      Follow up with physician 6 weeks.   Follow up with JIMI    Infusion scheduling note    Injection scheduling note    Labs CBC and CMP   Scheduling:  Preferred lab:  Lab interval:     Imaging None      Pharmacy appointment No pharmacy appointment needed      Other referrals no referral to Oncology Primary Care needed -  no Massage appointment needed    No additional referrals needed           Supportive Plan Information  OP ZOLEDRONIC ACID (ZOMETA) 4MG Q6M Home Willis MD   Associated Diagnosis: Compression fracture of lumbar spine, non-traumatic, sequela   noted on 11/25/2024  Associated Diagnosis: Acute gastrointestinal bleeding   noted on 4/11/2024  Associated Diagnosis: Breast cancer, stage 4, left Stage IV cT4b, cN2a, cM1, G3, ER-, TN-, HER2+ noted on 6/8/2021   Line of treatment: Supportive Care   Treatment goal: Supportive     Upcoming Treatment Dates - OP ZOLEDRONIC ACID (ZOMETA) 4MG Q6M    5/27/2025       Medications       zoledronic 4 mg/100 mL infusion 4 mg  11/11/2025       Medications       zoledronic 4 mg/100 mL infusion 4 mg  4/28/2026       Medications       zoledronic 4 mg/100 mL infusion 4 mg  10/13/2026       Medications       zoledronic 4 mg/100 mL infusion 4 mg

## 2025-02-20 ENCOUNTER — LAB VISIT (OUTPATIENT)
Dept: LAB | Facility: HOSPITAL | Age: 55
End: 2025-02-20
Attending: INTERNAL MEDICINE
Payer: MEDICARE

## 2025-02-20 ENCOUNTER — OFFICE VISIT (OUTPATIENT)
Dept: HEMATOLOGY/ONCOLOGY | Facility: CLINIC | Age: 55
End: 2025-02-20
Payer: MEDICARE

## 2025-02-20 ENCOUNTER — OFFICE VISIT (OUTPATIENT)
Dept: WOUND CARE | Facility: CLINIC | Age: 55
End: 2025-02-20
Payer: MEDICARE

## 2025-02-20 ENCOUNTER — TELEPHONE (OUTPATIENT)
Dept: INTERVENTIONAL RADIOLOGY/VASCULAR | Facility: CLINIC | Age: 55
End: 2025-02-20
Payer: MEDICARE

## 2025-02-20 VITALS
HEART RATE: 93 BPM | HEIGHT: 62 IN | DIASTOLIC BLOOD PRESSURE: 68 MMHG | TEMPERATURE: 98 F | BODY MASS INDEX: 31.97 KG/M2 | WEIGHT: 173.75 LBS | SYSTOLIC BLOOD PRESSURE: 111 MMHG

## 2025-02-20 VITALS
SYSTOLIC BLOOD PRESSURE: 111 MMHG | OXYGEN SATURATION: 100 % | HEIGHT: 62 IN | WEIGHT: 173.81 LBS | DIASTOLIC BLOOD PRESSURE: 68 MMHG | BODY MASS INDEX: 31.99 KG/M2 | TEMPERATURE: 98 F | HEART RATE: 93 BPM | RESPIRATION RATE: 16 BRPM

## 2025-02-20 DIAGNOSIS — R60.0 LEG EDEMA: ICD-10-CM

## 2025-02-20 DIAGNOSIS — C50.912 BREAST CANCER, STAGE 4, LEFT: Chronic | ICD-10-CM

## 2025-02-20 DIAGNOSIS — I89.0 LYMPHEDEMA: ICD-10-CM

## 2025-02-20 DIAGNOSIS — C50.912 BREAST CANCER, STAGE 4, LEFT: ICD-10-CM

## 2025-02-20 DIAGNOSIS — I50.32 CHRONIC HEART FAILURE WITH PRESERVED EJECTION FRACTION: ICD-10-CM

## 2025-02-20 DIAGNOSIS — C78.02 MALIGNANT NEOPLASM METASTATIC TO LEFT LUNG: ICD-10-CM

## 2025-02-20 DIAGNOSIS — C50.912 BREAST CANCER, STAGE 4, LEFT: Primary | Chronic | ICD-10-CM

## 2025-02-20 DIAGNOSIS — I87.2 VENOUS INSUFFICIENCY: ICD-10-CM

## 2025-02-20 DIAGNOSIS — S81.802D OPEN WOUND OF LEFT LOWER EXTREMITY, SUBSEQUENT ENCOUNTER: Primary | ICD-10-CM

## 2025-02-20 DIAGNOSIS — C78.02 MALIGNANT NEOPLASM METASTATIC TO LEFT LUNG: Chronic | ICD-10-CM

## 2025-02-20 LAB
ALBUMIN SERPL BCP-MCNC: 2.8 G/DL (ref 3.5–5.2)
ALP SERPL-CCNC: 133 U/L (ref 40–150)
ALT SERPL W/O P-5'-P-CCNC: 21 U/L (ref 10–44)
ANION GAP SERPL CALC-SCNC: 7 MMOL/L (ref 8–16)
AST SERPL-CCNC: 56 U/L (ref 10–40)
BASOPHILS # BLD AUTO: 0.04 K/UL (ref 0–0.2)
BASOPHILS NFR BLD: 1.2 % (ref 0–1.9)
BILIRUB SERPL-MCNC: 4.2 MG/DL (ref 0.1–1)
BUN SERPL-MCNC: 11 MG/DL (ref 6–20)
CALCIUM SERPL-MCNC: 8.3 MG/DL (ref 8.7–10.5)
CHLORIDE SERPL-SCNC: 98 MMOL/L (ref 95–110)
CO2 SERPL-SCNC: 32 MMOL/L (ref 23–29)
CREAT SERPL-MCNC: 0.7 MG/DL (ref 0.5–1.4)
DIFFERENTIAL METHOD BLD: ABNORMAL
EOSINOPHIL # BLD AUTO: 0.4 K/UL (ref 0–0.5)
EOSINOPHIL NFR BLD: 10.7 % (ref 0–8)
ERYTHROCYTE [DISTWIDTH] IN BLOOD BY AUTOMATED COUNT: 15 % (ref 11.5–14.5)
EST. GFR  (NO RACE VARIABLE): >60 ML/MIN/1.73 M^2
GLUCOSE SERPL-MCNC: 135 MG/DL (ref 70–110)
HCT VFR BLD AUTO: 41.6 % (ref 37–48.5)
HGB BLD-MCNC: 13.7 G/DL (ref 12–16)
IMM GRANULOCYTES # BLD AUTO: 0.01 K/UL (ref 0–0.04)
IMM GRANULOCYTES NFR BLD AUTO: 0.3 % (ref 0–0.5)
LYMPHOCYTES # BLD AUTO: 0.8 K/UL (ref 1–4.8)
LYMPHOCYTES NFR BLD: 25 % (ref 18–48)
MCH RBC QN AUTO: 34 PG (ref 27–31)
MCHC RBC AUTO-ENTMCNC: 32.9 G/DL (ref 32–36)
MCV RBC AUTO: 103 FL (ref 82–98)
MONOCYTES # BLD AUTO: 0.3 K/UL (ref 0.3–1)
MONOCYTES NFR BLD: 7.4 % (ref 4–15)
NEUTROPHILS # BLD AUTO: 1.9 K/UL (ref 1.8–7.7)
NEUTROPHILS NFR BLD: 55.4 % (ref 38–73)
NRBC BLD-RTO: 0 /100 WBC
PLATELET # BLD AUTO: 117 K/UL (ref 150–450)
PMV BLD AUTO: 9.7 FL (ref 9.2–12.9)
POTASSIUM SERPL-SCNC: 3.7 MMOL/L (ref 3.5–5.1)
PROT SERPL-MCNC: 6.4 G/DL (ref 6–8.4)
RBC # BLD AUTO: 4.03 M/UL (ref 4–5.4)
SODIUM SERPL-SCNC: 137 MMOL/L (ref 136–145)
WBC # BLD AUTO: 3.36 K/UL (ref 3.9–12.7)

## 2025-02-20 PROCEDURE — 99213 OFFICE O/P EST LOW 20 MIN: CPT | Mod: PBBFAC | Performed by: INTERNAL MEDICINE

## 2025-02-20 PROCEDURE — 80053 COMPREHEN METABOLIC PANEL: CPT | Performed by: INTERNAL MEDICINE

## 2025-02-20 PROCEDURE — 85025 COMPLETE CBC W/AUTO DIFF WBC: CPT | Performed by: INTERNAL MEDICINE

## 2025-02-20 PROCEDURE — 36415 COLL VENOUS BLD VENIPUNCTURE: CPT | Performed by: INTERNAL MEDICINE

## 2025-02-20 PROCEDURE — 29580 STRAPPING UNNA BOOT: CPT | Mod: PBBFAC,LT

## 2025-02-20 PROCEDURE — 99213 OFFICE O/P EST LOW 20 MIN: CPT | Mod: PBBFAC,27

## 2025-02-20 NOTE — PROGRESS NOTES
Subjective:       Patient ID: Shikha López is a 54 y.o. female.    Chief Complaint: Wound Check    Patient presents for a re-evaluation of a left lower extremity wound. She reports that she did not wear her compression stockings this weekend. She noticed on 2/8/25 a blister formed. It popped and she was left with an open wound. Pt has been dressing her wound with a bandage. She noticed her wounds have been draining. Patient has followed in wound care from 9/16/24- 10/10/24. Her wounds heal with a 3 layer compression wrap. Pt has a history of leg edema. Admits compliance with lasix. No complaints at this time.  Denies fever, chills, erythema, warmth, purulent drainage, or pain.      12/16/24 venous ultrasound:  There is no evidence of a right lower extremity DVT.  The right common femoral vein has reflux.  The right greater saphenous vein has reflux.  There is no evidence of a left lower extremity DVT.  The left greater saphenous vein has reflux.  The left smaller saphenous vein has reflux.    4/22/24 venous ultrasound:  No evidence of deep venous thrombosis in either lower extremity.  Findings suggestive of complex 4.2 x 0.8 x 2.5 cm right popliteal fossa cyst.      Review of Systems   Constitutional:  Negative for activity change, chills, diaphoresis, fatigue and fever.   Respiratory:  Negative for apnea, chest tightness and shortness of breath.    Cardiovascular:  Positive for leg swelling. Negative for chest pain and palpitations.   Musculoskeletal:  Negative for gait problem and joint swelling.   Skin:  Positive for wound. Negative for color change, pallor and rash.   Neurological:  Negative for syncope, weakness and numbness.   Psychiatric/Behavioral:  Negative for agitation. The patient is not nervous/anxious.    All other systems reviewed and are negative.      Objective:      Physical Exam  Vitals reviewed.   Constitutional:       General: She is not in acute distress.     Appearance: Normal appearance.    Cardiovascular:      Pulses: Normal pulses.   Pulmonary:      Effort: No respiratory distress.   Musculoskeletal:         General: No swelling.      Right lower leg: Edema present.      Left lower leg: Edema present.   Skin:     General: Skin is warm and dry.      Capillary Refill: Capillary refill takes less than 2 seconds.      Findings: Wound present. No erythema.          Neurological:      General: No focal deficit present.      Mental Status: She is alert and oriented to person, place, and time.   Psychiatric:         Mood and Affect: Mood normal.         Behavior: Behavior normal.         Thought Content: Thought content normal.         Judgment: Judgment normal.         Assessment:       1. Open wound of left lower extremity, subsequent encounter    2. Lymphedema    3. Chronic heart failure with preserved ejection fraction    4. Leg edema    5. Breast cancer, stage 4, left    6. Malignant neoplasm metastatic to left lung    7. Venous insufficiency             Wound Blister(s) Left anterior;lower Leg (Active)     Leg   Present on Original Admission:    Primary Wound Type: Blister(s)   Side: Left   Orientation: anterior;lower   Wound Approximate Age at First Assessment (Weeks):    Wound Number:    Is this injury device related?:    Incision Type:    Closure Method:    Wound Description (Comments):    Type:    Additional Comments:    Ankle-Brachial Index:    Pulses:    Removal Indication and Assessment:    Wound Outcome:    Wound Image   02/20/25 1116   Dressing Appearance Dry;Intact;Clean;Moist drainage;Dried drainage 02/20/25 1116   Drainage Amount Large 02/20/25 1116   Drainage Characteristics/Odor Serosanguineous 02/20/25 1116   Red (%), Wound Tissue Color 100 % 02/20/25 1116   Periwound Area Intact;Edematous;Rebecca 02/20/25 1116   Wound Length (cm) 2.7 cm 02/20/25 1116   Wound Width (cm) 1.9 cm 02/20/25 1116   Wound Depth (cm) 0.1 cm 02/20/25 1116   Wound Volume (cm^3) 0.269 cm^3 02/20/25 1116   Wound  Surface Area (cm^2) 4.03 cm^2 02/20/25 1116   Care Cleansed with:;Soap and water 02/20/25 1116   Dressing Applied;Calcium alginate;Absorptive Pad;Compression wrap;Other (comment) 02/20/25 1116   Periwound Care Skin barrier film applied 02/20/25 1116   Compression Unna's Boot;Three layer compression 02/20/25 1116         Shikha was seen in the clinic room and placed in the supine position on the treatment table.  The dressing was removed and the area was cleansed with Easi-clense sponges and dried thoroughly. No odor, erythema, or warmth noted.       Plan of Care: Calmoseptine to periwound, vaseline impregnated gauze, drawtex, aquacel, mextra pad, and a 3 layer calamine compression wrap. The patient's foot was positioned at a 90 degree angle.  A compression wrap was applied using a spiral technique avoiding creases or folds.  The wrap was started behind the first metatarsal and ended below the tibial tubercle of the knee.  There was overlap of each turn half the width of the previous turn.  The compression wrap will be changed every 7 days.           Plan:       Left lower extremity was dressed as detailed above.  Patient was instructed to not get the dressings wet and to use cast covers for showering.  Should the dressing become wet, she is to remove it, place a moist dressing over the wound, cover with gauze and roll gauze and to secure bandages.  She should then notify this office as soon as possible to have a new dressing applied.  Instructed patient to remove wrap if she notices tingling, pain, swelling, or coldness to her left lower extremity or if her toes become white, blue, or cold.    Discussed nutrition and the role of protein in wound healing with the patient. Instructed patient to optimize protein for wound healing.     Discussed lower extremity edema with patient. Instructed patient to elevate legs whenever sedentary, follow a low sodium diet, and to take lasix as prescribed.    No signs or symptoms of  cellulites noted.    Discussed utilizing compression stockings daily to prevent recurrent wounds/ skin bulla.    Written and verbal instructions given to patient  RTC in 1 week      Rani Gregorio PA-C

## 2025-02-21 ENCOUNTER — PATIENT MESSAGE (OUTPATIENT)
Dept: HEMATOLOGY/ONCOLOGY | Facility: CLINIC | Age: 55
End: 2025-02-21
Payer: MEDICARE

## 2025-02-21 DIAGNOSIS — M48.56XS COMPRESSION FRACTURE OF LUMBAR SPINE, NON-TRAUMATIC, SEQUELA: ICD-10-CM

## 2025-02-23 RX ORDER — HYDROCODONE BITARTRATE AND ACETAMINOPHEN 5; 325 MG/1; MG/1
1 TABLET ORAL EVERY 6 HOURS PRN
Qty: 40 TABLET | Refills: 0 | Status: SHIPPED | OUTPATIENT
Start: 2025-02-23

## 2025-02-24 DIAGNOSIS — S22.070A COMPRESSION FRACTURE OF T10 VERTEBRA, INITIAL ENCOUNTER: Primary | ICD-10-CM

## 2025-02-24 DIAGNOSIS — S32.020A COMPRESSION FRACTURE OF L2 VERTEBRA, INITIAL ENCOUNTER: ICD-10-CM

## 2025-02-27 ENCOUNTER — OFFICE VISIT (OUTPATIENT)
Dept: WOUND CARE | Facility: CLINIC | Age: 55
End: 2025-02-27
Payer: MEDICARE

## 2025-02-27 ENCOUNTER — OFFICE VISIT (OUTPATIENT)
Dept: INTERVENTIONAL RADIOLOGY/VASCULAR | Facility: CLINIC | Age: 55
End: 2025-02-27
Payer: MEDICARE

## 2025-02-27 VITALS
TEMPERATURE: 99 F | HEIGHT: 62 IN | HEART RATE: 99 BPM | BODY MASS INDEX: 30.76 KG/M2 | SYSTOLIC BLOOD PRESSURE: 102 MMHG | WEIGHT: 167.13 LBS | DIASTOLIC BLOOD PRESSURE: 49 MMHG

## 2025-02-27 DIAGNOSIS — Z95.828 S/P TIPS (TRANSJUGULAR INTRAHEPATIC PORTOSYSTEMIC SHUNT): Primary | ICD-10-CM

## 2025-02-27 DIAGNOSIS — I87.2 VENOUS INSUFFICIENCY: ICD-10-CM

## 2025-02-27 DIAGNOSIS — S81.802D OPEN WOUND OF LEFT LOWER EXTREMITY, SUBSEQUENT ENCOUNTER: Primary | ICD-10-CM

## 2025-02-27 DIAGNOSIS — C78.02 MALIGNANT NEOPLASM METASTATIC TO LEFT LUNG: ICD-10-CM

## 2025-02-27 DIAGNOSIS — C50.912 BREAST CANCER, STAGE 4, LEFT: ICD-10-CM

## 2025-02-27 DIAGNOSIS — R60.0 LEG EDEMA: ICD-10-CM

## 2025-02-27 DIAGNOSIS — K76.6 PORTAL HYPERTENSION: ICD-10-CM

## 2025-02-27 DIAGNOSIS — I86.4 ESOPHAGEAL AND GASTRIC VARICES: ICD-10-CM

## 2025-02-27 DIAGNOSIS — I89.0 LYMPHEDEMA: ICD-10-CM

## 2025-02-27 DIAGNOSIS — I85.00 ESOPHAGEAL AND GASTRIC VARICES: ICD-10-CM

## 2025-02-27 DIAGNOSIS — I50.32 CHRONIC HEART FAILURE WITH PRESERVED EJECTION FRACTION: ICD-10-CM

## 2025-02-27 PROCEDURE — 29580 STRAPPING UNNA BOOT: CPT | Mod: PBBFAC,LT

## 2025-02-27 PROCEDURE — 98005 SYNCH AUDIO-VIDEO EST LOW 20: CPT | Mod: 95,,, | Performed by: FAMILY MEDICINE

## 2025-02-27 PROCEDURE — 99213 OFFICE O/P EST LOW 20 MIN: CPT | Mod: PBBFAC

## 2025-02-27 PROCEDURE — 99999 PR PBB SHADOW E&M-EST. PATIENT-LVL III: CPT | Mod: PBBFAC,,,

## 2025-02-27 NOTE — PROGRESS NOTES
Subjective:       Patient ID: Shikha López is a 54 y.o. female.    Chief Complaint: Wound Check    Patient presents for a re-evaluation of a left lower extremity wound. She reports that she did not wear her compression stockings this weekend. She noticed on 2/8/25 a blister formed. It popped and she was left with an open wound. Pt has been dressing her wound with a bandage. She noticed her wounds have been draining. Patient has followed in wound care from 9/16/24- 10/10/24. Her wounds heal with a 3 layer compression wrap. Pt has a history of leg edema. Admits compliance with lasix. No complaints at this time.  Denies fever, chills, erythema, warmth, purulent drainage, or pain.      12/16/24 venous ultrasound:  There is no evidence of a right lower extremity DVT.  The right common femoral vein has reflux.  The right greater saphenous vein has reflux.  There is no evidence of a left lower extremity DVT.  The left greater saphenous vein has reflux.  The left smaller saphenous vein has reflux.    4/22/24 venous ultrasound:  No evidence of deep venous thrombosis in either lower extremity.  Findings suggestive of complex 4.2 x 0.8 x 2.5 cm right popliteal fossa cyst.      Review of Systems   Constitutional:  Negative for activity change, chills, diaphoresis, fatigue and fever.   Respiratory:  Negative for apnea, chest tightness and shortness of breath.    Cardiovascular:  Positive for leg swelling. Negative for chest pain and palpitations.   Musculoskeletal:  Negative for gait problem and joint swelling.   Skin:  Positive for wound. Negative for color change, pallor and rash.   Neurological:  Negative for syncope, weakness and numbness.   Psychiatric/Behavioral:  Negative for agitation. The patient is not nervous/anxious.    All other systems reviewed and are negative.      Objective:      Physical Exam  Vitals reviewed.   Constitutional:       General: She is not in acute distress.     Appearance: Normal appearance.    Cardiovascular:      Pulses: Normal pulses.   Pulmonary:      Effort: No respiratory distress.   Musculoskeletal:         General: No swelling.      Right lower leg: Edema present.      Left lower leg: Edema present.   Skin:     General: Skin is warm and dry.      Capillary Refill: Capillary refill takes less than 2 seconds.      Findings: Wound present. No erythema.          Neurological:      General: No focal deficit present.      Mental Status: She is alert and oriented to person, place, and time.   Psychiatric:         Mood and Affect: Mood normal.         Behavior: Behavior normal.         Thought Content: Thought content normal.         Judgment: Judgment normal.         Assessment:       1. Open wound of left lower extremity, subsequent encounter    2. Lymphedema    3. Chronic heart failure with preserved ejection fraction    4. Leg edema    5. Breast cancer, stage 4, left    6. Malignant neoplasm metastatic to left lung    7. Venous insufficiency             Wound Blister(s) Left anterior;lower Leg (Active)     Leg   Present on Original Admission:    Primary Wound Type: Blister(s)   Side: Left   Orientation: anterior;lower   Wound Approximate Age at First Assessment (Weeks):    Wound Number:    Is this injury device related?:    Incision Type:    Closure Method:    Wound Description (Comments):    Type:    Additional Comments:    Ankle-Brachial Index:    Pulses:    Removal Indication and Assessment:    Wound Outcome:    Wound Image    02/27/25 1138   Dressing Appearance Dry;Intact;Clean;Moist drainage 02/27/25 1138   Drainage Amount Large 02/27/25 1138   Drainage Characteristics/Odor Serosanguineous 02/27/25 1138   Red (%), Wound Tissue Color 100 % 02/27/25 1138   Periwound Area Intact;Edematous;Apache 02/27/25 1138   Wound Length (cm) 1.8 cm 02/27/25 1138   Wound Width (cm) 0.9 cm 02/27/25 1138   Wound Depth (cm) 0.1 cm 02/27/25 1138   Wound Volume (cm^3) 0.085 cm^3 02/27/25 1138   Wound Surface Area  (cm^2) 1.27 cm^2 02/27/25 1138   Care Cleansed with:;Soap and water 02/27/25 1138   Dressing Applied;Calcium alginate;Absorptive Pad;Compression wrap;Other (comment) 02/27/25 1138   Periwound Care Skin barrier film applied 02/27/25 1138   Compression Unna's Boot;Three layer compression 02/27/25 1138         Shikha was seen in the clinic room and placed in the supine position on the treatment table.  The dressing was removed and the area was cleansed with Easi-clense sponges and dried thoroughly. No odor, erythema, or warmth noted.       Plan of Care: Calmoseptine to periwound, vaseline impregnated gauze, drawtex, aquacel, mextra pad, and a 3 layer calamine compression wrap. The patient's foot was positioned at a 90 degree angle.  A compression wrap was applied using a spiral technique avoiding creases or folds.  The wrap was started behind the first metatarsal and ended below the tibial tubercle of the knee.  There was overlap of each turn half the width of the previous turn.  The compression wrap will be changed every 7 days.           Plan:       Left lower extremity was dressed as detailed above.  Patient was instructed to not get the dressings wet and to use cast covers for showering.  Should the dressing become wet, she is to remove it, place a moist dressing over the wound, cover with gauze and roll gauze and to secure bandages.  She should then notify this office as soon as possible to have a new dressing applied.  Instructed patient to remove wrap if she notices tingling, pain, swelling, or coldness to her left lower extremity or if her toes become white, blue, or cold.    Discussed nutrition and the role of protein in wound healing with the patient. Instructed patient to optimize protein for wound healing.     Discussed lower extremity edema with patient. Instructed patient to elevate legs whenever sedentary, follow a low sodium diet, and to take lasix as prescribed.    No signs or symptoms of cellulites  noted.    Discussed utilizing compression stockings daily to prevent recurrent wounds/ skin bulla.    Written and verbal instructions given to patient  RTC in 1 week      Rani Gregorio PA-C

## 2025-02-27 NOTE — PROGRESS NOTES
"Subjective     Patient ID: Shikha López is a 54 y.o. female.    Chief Complaint: Follow-up (TIPS revision)    Virtual visit with patient for follow up of a TIPS revision. TIPS was originally placed on 4/12/2024 secondary to a variceal bleed. Surveillance ultrasound obtained on 12/4/2024 noted "Patent tips shunt as detailed above.  Persistent mild elevated velocity at the mid shunt with bidirectional flow seen through the anterior right portal vein and left portal vein.  Findings are suspicious for shunt malfunction." She underwent TIPS revision on 1/2/2025. She had a repeat ultrasound on 2/4/2025. Today she reports feeling well. She denies any abdominal distention or abdominal pain. She denies any GI bleeding.     Reviewed hepatology progress note.      Review of Systems   Constitutional:  Negative for activity change, appetite change, chills, fatigue and fever.   Respiratory:  Negative for cough, shortness of breath, wheezing and stridor.    Cardiovascular:  Negative for chest pain, palpitations and leg swelling.   Gastrointestinal:  Negative for abdominal distention, abdominal pain, constipation, diarrhea, nausea and vomiting.          Objective     Physical Exam  Constitutional:       General: She is not in acute distress.     Appearance: She is well-developed. She is not diaphoretic.   HENT:      Head: Normocephalic and atraumatic.   Pulmonary:      Effort: Pulmonary effort is normal. No respiratory distress.   Neurological:      Mental Status: She is alert and oriented to person, place, and time.   Psychiatric:         Behavior: Behavior normal.         Thought Content: Thought content normal.         Judgment: Judgment normal.     US 2/4/2025  FINDINGS:  Liver: Normal in size, measuring 13.4 cm. Coarsened heterogeneous echotexture and subtle nodular contour, in keeping with cirrhosis.  The hepatorenal index is 1.0.  No focal hepatic lesions.     Gallbladder: Multiple gallstones, the largest measuring 6 " mm, without significant gallbladder wall thickening, pericholecystic fluid, or sonographic Griffin's sign     Biliary system: The common duct is not dilated, measuring 4 mm.  No intrahepatic ductal dilatation.     Spleen: Enlarged in size measuring 14.1 x 5.4 cm, with a homogeneous echotexture.     Pancreas: The visualized portions of pancreas appear normal.  Mild prominence of the pancreatic duct measuring 4 mm.     Inferior vena cava: Normal in appearance.     Miscellaneous: No ascites.     Postprocedural change of TIPS.  Peak velocity measurements (cm/sec) as follows.     Portal vein: 47 (previously 43)     Portal vein end of the shunt: 39 (previously 41)     Mid shunt: 97 (previously 200)     Hepatic vein end of the shunt 116 (previously 97)     Main hepatic artery: Patent.     Main portal vein: Patent with normal expected forward flow.  2.3 cm caliber.     Left portal vein:  Bidirectional flow.     Right portal vein, anterior: Expected reversed flow.     Right portal vein, posterior: TIPS in place.     Splenic vein: Patent with proper directional flow.     SMV:  Patent with proper directional flow.     IVC: Patent     Left and middle hepatic veins: Patent.     Right hepatic vein: TIPS in place.     Umbilical vein: Not patent.     Collaterals: None.     Impression:     Postprocedural change of TIPS placement with recent TIPS revision.  Overall improved velocity about the TIPS shunt most notably about the mid aspect, with expected reversal flow about the right anterior branch portal vein.  Attention on follow-up imaging of persistent decreased velocity about the TIPS shunt portal venous end and bidirectional flow about the left portal vein.     Cirrhotic liver morphology.  No focal hepatic lesions.     Sequela of portal hypertension including splenomegaly.  No ascites.     Cholelithiasis without evidence for acute cholecystitis.       Assessment and Plan     1. S/P TIPS (transjugular intrahepatic portosystemic  shunt)    2. Portal hypertension    3. Esophageal and gastric varices        The patient location is: [place]  The chief complaint leading to consultation is: s/p TIPS revision    Visit type: audiovisual    Face to Face time with patient: 15 minutes  20 minutes of total time spent on the encounter, which includes face to face time and non-face to face time preparing to see the patient (eg, review of tests), Obtaining and/or reviewing separately obtained history, Documenting clinical information in the electronic or other health record, Independently interpreting results (not separately reported) and communicating results to the patient/family/caregiver, or Care coordination (not separately reported).         Each patient to whom he or she provides medical services by telemedicine is:  (1) informed of the relationship between the physician and patient and the respective role of any other health care provider with respect to management of the patient; and (2) notified that he or she may decline to receive medical services by telemedicine and may withdraw from such care at any time.    Notes:   Reviewed ultrasound with Dr. Dyson. Explained to patient ultrasound shows good response to treatment. Recommendation is to continue surveillance with repeat ultrasound in at least 3 months. Patient is already scheduled for ultrasound on 5/27/2025 with Dr. Figueroa. Advised patient to keep her appointments. She verbalized understanding and agreement. No further questions at this time.

## 2025-03-04 ENCOUNTER — PATIENT MESSAGE (OUTPATIENT)
Dept: CARDIOLOGY | Facility: CLINIC | Age: 55
End: 2025-03-04
Payer: MEDICARE

## 2025-03-04 DIAGNOSIS — R41.840 LACK OF CONCENTRATION: ICD-10-CM

## 2025-03-04 DIAGNOSIS — M48.56XS COMPRESSION FRACTURE OF LUMBAR SPINE, NON-TRAUMATIC, SEQUELA: ICD-10-CM

## 2025-03-05 DIAGNOSIS — E87.70 HYPERVOLEMIA, UNSPECIFIED HYPERVOLEMIA TYPE: ICD-10-CM

## 2025-03-05 RX ORDER — HYDROCODONE BITARTRATE AND ACETAMINOPHEN 5; 325 MG/1; MG/1
1 TABLET ORAL EVERY 6 HOURS PRN
Qty: 40 TABLET | Refills: 0 | OUTPATIENT
Start: 2025-03-05

## 2025-03-06 ENCOUNTER — OFFICE VISIT (OUTPATIENT)
Dept: WOUND CARE | Facility: CLINIC | Age: 55
End: 2025-03-06
Payer: MEDICARE

## 2025-03-06 VITALS
SYSTOLIC BLOOD PRESSURE: 118 MMHG | HEART RATE: 95 BPM | DIASTOLIC BLOOD PRESSURE: 57 MMHG | TEMPERATURE: 98 F | HEIGHT: 62 IN | WEIGHT: 170.63 LBS | BODY MASS INDEX: 31.4 KG/M2

## 2025-03-06 DIAGNOSIS — Z87.828 HEALED WOUND: ICD-10-CM

## 2025-03-06 DIAGNOSIS — I89.0 LYMPHEDEMA: Primary | ICD-10-CM

## 2025-03-06 PROCEDURE — 99213 OFFICE O/P EST LOW 20 MIN: CPT | Mod: PBBFAC

## 2025-03-06 PROCEDURE — 99214 OFFICE O/P EST MOD 30 MIN: CPT | Mod: S$PBB,,,

## 2025-03-06 PROCEDURE — 99999 PR PBB SHADOW E&M-EST. PATIENT-LVL III: CPT | Mod: PBBFAC,,,

## 2025-03-06 RX ORDER — FUROSEMIDE 80 MG/1
80 TABLET ORAL DAILY
Qty: 90 TABLET | Refills: 2 | Status: SHIPPED | OUTPATIENT
Start: 2025-03-06 | End: 2025-12-01

## 2025-03-06 NOTE — PROGRESS NOTES
Subjective:       Patient ID: Shikha López is a 54 y.o. female.    Chief Complaint: Wound Check    Patient presents for a re-evaluation of a left lower extremity wound. She reports that she did not wear her compression stockings this weekend. She noticed on 2/8/25 a blister formed. It popped and she was left with an open wound. Pt has been dressing her wound with a bandage. She noticed her wounds have been draining. Patient has followed in wound care from 9/16/24- 10/10/24. Her wounds heal with a 3 layer compression wrap. Pt has a history of leg edema. Admits compliance with lasix. No complaints at this time.  Denies fever, chills, erythema, warmth, purulent drainage, or pain.      12/16/24 venous ultrasound:  There is no evidence of a right lower extremity DVT.  The right common femoral vein has reflux.  The right greater saphenous vein has reflux.  There is no evidence of a left lower extremity DVT.  The left greater saphenous vein has reflux.  The left smaller saphenous vein has reflux.    4/22/24 venous ultrasound:  No evidence of deep venous thrombosis in either lower extremity.  Findings suggestive of complex 4.2 x 0.8 x 2.5 cm right popliteal fossa cyst.      Review of Systems   Constitutional:  Negative for activity change, chills, diaphoresis, fatigue and fever.   Respiratory:  Negative for apnea, chest tightness and shortness of breath.    Cardiovascular:  Positive for leg swelling. Negative for chest pain and palpitations.   Musculoskeletal:  Negative for gait problem and joint swelling.   Skin:  Positive for wound. Negative for color change, pallor and rash.   Neurological:  Negative for syncope, weakness and numbness.   Psychiatric/Behavioral:  Negative for agitation. The patient is not nervous/anxious.    All other systems reviewed and are negative.      Objective:      Physical Exam  Vitals reviewed.   Constitutional:       General: She is not in acute distress.     Appearance: Normal appearance.    Cardiovascular:      Pulses: Normal pulses.   Pulmonary:      Effort: No respiratory distress.   Musculoskeletal:         General: No swelling.      Right lower leg: Edema present.      Left lower leg: Edema present.   Skin:     General: Skin is warm and dry.      Capillary Refill: Capillary refill takes less than 2 seconds.      Findings: No erythema or wound.          Neurological:      General: No focal deficit present.      Mental Status: She is alert and oriented to person, place, and time.   Psychiatric:         Mood and Affect: Mood normal.         Behavior: Behavior normal.         Thought Content: Thought content normal.         Judgment: Judgment normal.         Assessment:       1. Lymphedema    2. Healed wound             Wound Blister(s) Left anterior;lower Leg (Active)     Leg   Present on Original Admission:    Primary Wound Type: Blister(s)   Side: Left   Orientation: anterior;lower   Wound Approximate Age at First Assessment (Weeks):    Wound Number:    Is this injury device related?:    Incision Type:    Closure Method:    Wound Description (Comments):    Type:    Additional Comments:    Ankle-Brachial Index:    Pulses:    Removal Indication and Assessment:    Wound Outcome:    Wound Image   03/06/25 1129   Dressing Appearance Dry;Intact;Clean;Moist drainage 03/06/25 1129   Drainage Amount Large 03/06/25 1129   Drainage Characteristics/Odor Serosanguineous 03/06/25 1129   Care Cleansed with:;Soap and water 03/06/25 1129         Shikha was seen in the clinic room and placed in the supine position on the treatment table.  The dressing was removed and the area was cleansed with Easi-clense sponges and dried thoroughly. No odor, erythema, or warmth noted. No open wounds noted.      Plan of Care: Compression stockings to bilateral lower extremities daily          Plan:       No open wounds noted.  Precautions given to prevent wounds from re-opening. Discussed how area is extremely fragile. Protect  area from friction, wash area gently, and pat dry. If the wound should re-open, instructed patient to contact the office.    Discussed bilateral lower extremity edema. Instructed patient to utilize compression stockings to bilateral lower extremities. Place on first thing in the morning and remove before bed.      Written and verbal instructions given to patient  RTC PRN      Rani Gregorio PA-C

## 2025-03-07 RX ORDER — METHYLPHENIDATE HYDROCHLORIDE 5 MG/1
5 TABLET ORAL 2 TIMES DAILY
Qty: 60 TABLET | Refills: 0 | Status: SHIPPED | OUTPATIENT
Start: 2025-03-07

## 2025-03-08 ENCOUNTER — HOSPITAL ENCOUNTER (OUTPATIENT)
Dept: RADIOLOGY | Facility: HOSPITAL | Age: 55
Discharge: HOME | End: 2025-03-08
Payer: MEDICARE

## 2025-03-08 DIAGNOSIS — S22.070A COMPRESSION FRACTURE OF T10 VERTEBRA, INITIAL ENCOUNTER: ICD-10-CM

## 2025-03-08 DIAGNOSIS — S32.020A COMPRESSION FRACTURE OF L2 VERTEBRA, INITIAL ENCOUNTER: ICD-10-CM

## 2025-03-08 PROCEDURE — A9585 GADOBUTROL INJECTION: HCPCS

## 2025-03-08 PROCEDURE — 25500020 PHARM REV CODE 255

## 2025-03-08 PROCEDURE — 72158 MRI LUMBAR SPINE W/O & W/DYE: CPT | Mod: 26,,, | Performed by: INTERNAL MEDICINE

## 2025-03-08 PROCEDURE — 72157 MRI CHEST SPINE W/O & W/DYE: CPT | Mod: TC

## 2025-03-08 PROCEDURE — 72158 MRI LUMBAR SPINE W/O & W/DYE: CPT | Mod: TC

## 2025-03-08 PROCEDURE — 72157 MRI CHEST SPINE W/O & W/DYE: CPT | Mod: 26,,, | Performed by: INTERNAL MEDICINE

## 2025-03-08 RX ORDER — GADOBUTROL 604.72 MG/ML
8 INJECTION INTRAVENOUS
Status: COMPLETED | OUTPATIENT
Start: 2025-03-08 | End: 2025-03-08

## 2025-03-08 RX ADMIN — GADOBUTROL 8 ML: 604.72 INJECTION INTRAVENOUS at 02:03

## 2025-03-09 DIAGNOSIS — M48.56XS COMPRESSION FRACTURE OF LUMBAR SPINE, NON-TRAUMATIC, SEQUELA: ICD-10-CM

## 2025-03-10 RX ORDER — HYDROCODONE BITARTRATE AND ACETAMINOPHEN 5; 325 MG/1; MG/1
1 TABLET ORAL EVERY 6 HOURS PRN
Qty: 40 TABLET | Refills: 0 | OUTPATIENT
Start: 2025-03-10

## 2025-03-11 ENCOUNTER — PATIENT MESSAGE (OUTPATIENT)
Dept: WOUND CARE | Facility: CLINIC | Age: 55
End: 2025-03-11
Payer: MEDICARE

## 2025-03-11 DIAGNOSIS — M48.56XS COMPRESSION FRACTURE OF LUMBAR SPINE, NON-TRAUMATIC, SEQUELA: ICD-10-CM

## 2025-03-11 RX ORDER — HYDROCODONE BITARTRATE AND ACETAMINOPHEN 5; 325 MG/1; MG/1
1 TABLET ORAL EVERY 6 HOURS PRN
Qty: 40 TABLET | Refills: 0 | Status: SHIPPED | OUTPATIENT
Start: 2025-03-11

## 2025-03-24 ENCOUNTER — TELEPHONE (OUTPATIENT)
Dept: INTERVENTIONAL RADIOLOGY/VASCULAR | Facility: HOSPITAL | Age: 55
End: 2025-03-24
Payer: MEDICARE

## 2025-03-24 ENCOUNTER — OFFICE VISIT (OUTPATIENT)
Dept: INTERVENTIONAL RADIOLOGY/VASCULAR | Facility: CLINIC | Age: 55
End: 2025-03-24
Payer: MEDICARE

## 2025-03-24 ENCOUNTER — LAB VISIT (OUTPATIENT)
Dept: LAB | Facility: HOSPITAL | Age: 55
End: 2025-03-24
Payer: MEDICARE

## 2025-03-24 VITALS
WEIGHT: 171.31 LBS | DIASTOLIC BLOOD PRESSURE: 71 MMHG | HEIGHT: 62 IN | HEART RATE: 98 BPM | BODY MASS INDEX: 31.52 KG/M2 | SYSTOLIC BLOOD PRESSURE: 124 MMHG

## 2025-03-24 DIAGNOSIS — S22.080A COMPRESSION FRACTURE OF T12 VERTEBRA, INITIAL ENCOUNTER: ICD-10-CM

## 2025-03-24 DIAGNOSIS — M80.08XA AGE-RELATED OSTEOPOROSIS WITH CURRENT PATHOLOGICAL FRACTURE, VERTEBRA(E), INITIAL ENCOUNTER FOR FRACTURE: ICD-10-CM

## 2025-03-24 DIAGNOSIS — M48.56XS COMPRESSION FRACTURE OF LUMBAR SPINE, NON-TRAUMATIC, SEQUELA: ICD-10-CM

## 2025-03-24 DIAGNOSIS — S32.040A COMPRESSION FRACTURE OF L4 LUMBAR VERTEBRA, CLOSED, INITIAL ENCOUNTER: ICD-10-CM

## 2025-03-24 DIAGNOSIS — S22.080A COMPRESSION FRACTURE OF T12 VERTEBRA, INITIAL ENCOUNTER: Primary | ICD-10-CM

## 2025-03-24 DIAGNOSIS — M54.9 BACK PAIN, UNSPECIFIED BACK LOCATION, UNSPECIFIED BACK PAIN LATERALITY, UNSPECIFIED CHRONICITY: ICD-10-CM

## 2025-03-24 LAB
ABSOLUTE EOSINOPHIL (OHS): 0.42 K/UL
ABSOLUTE MONOCYTE (OHS): 0.27 K/UL (ref 0.3–1)
ABSOLUTE NEUTROPHIL COUNT (OHS): 2.05 K/UL (ref 1.8–7.7)
ALBUMIN SERPL BCP-MCNC: 3 G/DL (ref 3.5–5.2)
ALP SERPL-CCNC: 107 UNIT/L (ref 40–150)
ALT SERPL W/O P-5'-P-CCNC: 22 UNIT/L (ref 10–44)
ANION GAP (OHS): 7 MMOL/L (ref 8–16)
AST SERPL-CCNC: 49 UNIT/L (ref 11–45)
BASOPHILS # BLD AUTO: 0.04 K/UL
BASOPHILS NFR BLD AUTO: 1.1 %
BILIRUB SERPL-MCNC: 5.9 MG/DL (ref 0.1–1)
BUN SERPL-MCNC: 12 MG/DL (ref 6–20)
CALCIUM SERPL-MCNC: 8.6 MG/DL (ref 8.7–10.5)
CHLORIDE SERPL-SCNC: 97 MMOL/L (ref 95–110)
CO2 SERPL-SCNC: 30 MMOL/L (ref 23–29)
CREAT SERPL-MCNC: 0.7 MG/DL (ref 0.5–1.4)
ERYTHROCYTE [DISTWIDTH] IN BLOOD BY AUTOMATED COUNT: 15.6 % (ref 11.5–14.5)
GFR SERPLBLD CREATININE-BSD FMLA CKD-EPI: >60 ML/MIN/1.73/M2
GLUCOSE SERPL-MCNC: 74 MG/DL (ref 70–110)
HCT VFR BLD AUTO: 41.1 % (ref 37–48.5)
HGB BLD-MCNC: 13.9 GM/DL (ref 12–16)
IMM GRANULOCYTES # BLD AUTO: 0.01 K/UL (ref 0–0.04)
IMM GRANULOCYTES NFR BLD AUTO: 0.3 % (ref 0–0.5)
INR PPP: 1.4 (ref 0.8–1.2)
LYMPHOCYTES # BLD AUTO: 0.87 K/UL (ref 1–4.8)
MCH RBC QN AUTO: 34 PG (ref 27–50)
MCHC RBC AUTO-ENTMCNC: 33.8 G/DL (ref 32–36)
MCV RBC AUTO: 101 FL (ref 82–98)
NUCLEATED RBC (/100WBC) (OHS): 0 /100 WBC
PLATELET # BLD AUTO: 133 K/UL (ref 150–450)
PMV BLD AUTO: 10.4 FL (ref 9.2–12.9)
POTASSIUM SERPL-SCNC: 3.8 MMOL/L (ref 3.5–5.1)
PROT SERPL-MCNC: 6.7 GM/DL (ref 6–8.4)
PROTHROMBIN TIME: 15.2 SECONDS (ref 9–12.5)
RBC # BLD AUTO: 4.09 M/UL (ref 4–5.4)
RELATIVE EOSINOPHIL (OHS): 11.5 %
RELATIVE LYMPHOCYTE (OHS): 23.8 % (ref 18–48)
RELATIVE MONOCYTE (OHS): 7.4 % (ref 4–15)
RELATIVE NEUTROPHIL (OHS): 55.9 % (ref 38–73)
SODIUM SERPL-SCNC: 134 MMOL/L (ref 136–145)
WBC # BLD AUTO: 3.66 K/UL (ref 3.9–12.7)

## 2025-03-24 PROCEDURE — 99999 PR PBB SHADOW E&M-EST. PATIENT-LVL III: CPT | Mod: PBBFAC,,,

## 2025-03-24 PROCEDURE — 85610 PROTHROMBIN TIME: CPT

## 2025-03-24 PROCEDURE — 85025 COMPLETE CBC W/AUTO DIFF WBC: CPT

## 2025-03-24 PROCEDURE — 99214 OFFICE O/P EST MOD 30 MIN: CPT | Mod: S$PBB,,,

## 2025-03-24 PROCEDURE — 80053 COMPREHEN METABOLIC PANEL: CPT

## 2025-03-24 PROCEDURE — 36415 COLL VENOUS BLD VENIPUNCTURE: CPT

## 2025-03-24 PROCEDURE — 99213 OFFICE O/P EST LOW 20 MIN: CPT | Mod: PBBFAC

## 2025-03-24 RX ORDER — HYDROCODONE BITARTRATE AND ACETAMINOPHEN 5; 325 MG/1; MG/1
1 TABLET ORAL EVERY 6 HOURS PRN
Qty: 40 TABLET | Refills: 0 | OUTPATIENT
Start: 2025-03-24

## 2025-03-24 NOTE — LETTER
[facility] [address]  [address]  [facility] [address]  Phone: [phone] Dear Mrs. López:    PRE-PROCEDURE INSTRUCTIONS    Your procedure with Interventional Radiology is scheduled for ***. Please arrive by ***.    You must check-in and receive a wristband before going to your procedure. Your check-in location is ***.      **Do not eat or drink anything between midnight and the time of your procedure. This includes gum, mints, and candy lemon drops.    **Do not smoke or drink alcoholic beverages 24 hours prior to your procedure.    **If you wear contact lenses, dentures, hearing aids, or glasses, bring a container to put them in during the procedure and give them to a family member for safekeeping.    **If you have been diagnosed with sleep apnea please bring your CPAP machine.    **If your doctor has scheduled you for an overnight stay, bring a small overnight bag with any personal items that you may need.    **Make arrangements in advance for transportation home by a responsible adult. It is not safe to drive a vehicle during the 24 hours following the procedure.    **All [facility] facilities and properties are tobacco free. Smoking is NOT allowed.    PLEASE NOTE: The procedure schedule has many variables which affect the time of your procedure. Family members should be available if your surgery time changes.    If you have any questions about these instructions call Interventional Radiology at [phone] Monday - Friday between 8:00am and 4:00pm or [phone] (ask for interventional radiology resident) for after hours.March 24, 2025    Shikha López  [address] [facility] [address]      If you have any questions or concerns, please don't hesitate to call.    Sincerely,        Adolfo Mcdonald PA-C

## 2025-03-24 NOTE — PROGRESS NOTES
"Subjective     Patient ID: Shikha López is a 54 y.o. female.    Chief Complaint: Back pain    53 y/o female who presents to clinic with chronic back pain for an unknown amount of time. CT scan in November of 2024 showed multiple compression fractures. Patient states that her bone scan 18 years ago prior to starting chemo was WNL, but she has not had an osteoporosis evaluation since. She states that the majority of her pain is in the mid to low back. She takes hydrocodone 0-2 times daily with good pain relief. Pain is worse with standing, walking, and twisting. She describes the pain as a "sore muscle" type of pain and it improves with laying down. She denies any other symptoms at this time.      Review of Systems   Constitutional:  Positive for activity change. Negative for fever.   HENT: Negative.     Respiratory: Negative.     Cardiovascular: Negative.    Gastrointestinal: Negative.    Musculoskeletal:  Positive for back pain.   Neurological: Negative.           Objective     Physical Exam  Constitutional:       Appearance: Normal appearance.   HENT:      Head: Atraumatic.      Nose: Nose normal.   Eyes:      Conjunctiva/sclera: Conjunctivae normal.   Pulmonary:      Effort: Pulmonary effort is normal. No respiratory distress.   Musculoskeletal:      Comments: TTP to low thoracic and lumbar vertebrae.    Neurological:      General: No focal deficit present.      Mental Status: She is oriented to person, place, and time.   Psychiatric:         Mood and Affect: Mood normal.         Behavior: Behavior normal.       Imaging:  EXAMINATION:  MRI THORACIC SPINE W WO CONTRAST; MRI LUMBAR SPINE W WO CONTRAST     CLINICAL HISTORY:  T10 compression fracture;; L2 compression fracture;  Wedge compression fracture of T9-T10 vertebra, initial encounter for closed fracture     TECHNIQUE:  MRIs of the thoracic and lumbar spine before and after intravenous administration of 8 mL Gadavist.     COMPARISON:  CT chest 02/17/2025; " CT abdomen pelvis 11/11/2024     FINDINGS:  ALIGNMENT: Thoracolumbar levocurvature.  No spondylolisthesis.     BONE: There are multiple compression fractures involving T4, T5, T8, T10, T12, L2, and L4.  Some demonstrate severe height loss for example at T8, T10, and T12.  There is minimal retropulsion at T10 and T12.  There is subtle bone marrow edema at T12 indicating subacute injury.  Others appear chronic.  Fat containing lesions at in the T4-6 vertebral bodies compatible with hemangiomas.  No focal lesion concerning for osseous metastasis.     JOINT: Multilevel degenerative changes with disc desiccation, disc height loss, and facet arthropathy.     SPINAL CANAL: The spinal cord is unremarkable.  The conus medullaris has a normal appearance and terminates at the L1 level.  Cauda equina nerve roots are unremarkable.  No mass or collection.  No abnormal enhancement.     PARASPINAL SOFT TISSUES: Partially imaged postoperative changes from left mastectomy and axillary lymph node dissection.  Prominent right axillary lymph node measuring 1.0 cm.  There is body wall edema, most pronounced in the right breast.  Small left pleural effusion.  Ill-defined hyperintense lesions in the lungs bilaterally, similar to recent chest CT.  Partially imaged tips.  Splenomegaly.     SIGNIFICANT FINDINGS BY LEVEL:     Thoracic spine: No focal disc abnormality, spinal canal stenosis, or neural foraminal stenosis at any level.     Lumbar spine: Mild disc bulges at several levels.  Small annular fissure at L4-5.  No significant spinal canal or neural foraminal stenosis.     Impression:     Multiple compression fractures in the thoracolumbar spine, described in detail above.  These are likely osteoporotic.     No metastases in the thoracolumbar spine.     Mild degenerative changes.  No significant spinal canal or neural foraminal stenosis.        Assessment and Plan     1. Compression fracture of T12 vertebra, initial encounter  -     IR  Kyphoplasty Thoracic First Vertebral with Imaging; Future; Expected date: 03/24/2025  -     Comprehensive Metabolic Panel; Future; Expected date: 03/24/2025  -     CBC W/ AUTO DIFFERENTIAL; Future; Expected date: 03/24/2025  -     Protime-INR; Future; Expected date: 03/24/2025    2. Compression fracture of L4 lumbar vertebra, closed, initial encounter  -     IR Kyphoplasty Thoracic_Lumbar Ea Add; Future; Expected date: 03/24/2025  -     Comprehensive Metabolic Panel; Future; Expected date: 03/24/2025  -     CBC W/ AUTO DIFFERENTIAL; Future; Expected date: 03/24/2025  -     Protime-INR; Future; Expected date: 03/24/2025    3. Back pain, unspecified back location, unspecified back pain laterality, unspecified chronicity    4. Age-related osteoporosis with current pathological fracture, vertebra(e), initial encounter for fracture  -     IR Kyphoplasty Thoracic First Vertebral with Imaging; Future; Expected date: 03/24/2025  -     IR Kyphoplasty Thoracic_Lumbar Ea Add; Future; Expected date: 03/24/2025        30 minutes of total time spent on the encounter, which includes face to face time and non-face to face time preparing to see the patient (eg, review of tests), Obtaining and/or reviewing separately obtained history, Documenting clinical information in the electronic or other health record, Independently interpreting results (not separately reported) and communicating results to the patient/family/caregiver, or Care coordination (not separately reported).     Adolfo Mcdonald PA-C  Interventional Radiology          No follow-ups on file.

## 2025-03-24 NOTE — NURSING
Patient seen in IR clinic today.  Scheduled for upcoming IR procedure.  Pre-procedure instructions were reviewed with patient.  Pt aware will need someone to provide transport home and monitor pt 8 hours post procedure.  No driving for at least 24 hours after procedure.   Pt verbalized understanding of all pre-procedure instructions.  Written instructions given at appointment today.

## 2025-03-25 RX ORDER — HYDROCODONE BITARTRATE AND ACETAMINOPHEN 5; 325 MG/1; MG/1
1 TABLET ORAL EVERY 6 HOURS PRN
Qty: 40 TABLET | Refills: 0 | Status: SHIPPED | OUTPATIENT
Start: 2025-03-25

## 2025-03-27 ENCOUNTER — PATIENT MESSAGE (OUTPATIENT)
Dept: INTERVENTIONAL RADIOLOGY/VASCULAR | Facility: HOSPITAL | Age: 55
End: 2025-03-27
Payer: MEDICARE

## 2025-04-01 ENCOUNTER — HOSPITAL ENCOUNTER (OUTPATIENT)
Dept: INTERVENTIONAL RADIOLOGY/VASCULAR | Facility: HOSPITAL | Age: 55
Discharge: HOME | End: 2025-04-01
Payer: MEDICARE

## 2025-04-01 ENCOUNTER — ANESTHESIA (OUTPATIENT)
Dept: INTERVENTIONAL RADIOLOGY/VASCULAR | Facility: HOSPITAL | Age: 55
End: 2025-04-01
Payer: MEDICARE

## 2025-04-01 VITALS
WEIGHT: 165 LBS | SYSTOLIC BLOOD PRESSURE: 114 MMHG | HEART RATE: 94 BPM | RESPIRATION RATE: 18 BRPM | OXYGEN SATURATION: 94 % | DIASTOLIC BLOOD PRESSURE: 56 MMHG | TEMPERATURE: 98 F | BODY MASS INDEX: 30.36 KG/M2 | HEIGHT: 62 IN

## 2025-04-01 DIAGNOSIS — M80.08XA AGE-RELATED OSTEOPOROSIS WITH CURRENT PATHOLOGICAL FRACTURE, VERTEBRA(E), INITIAL ENCOUNTER FOR FRACTURE: ICD-10-CM

## 2025-04-01 DIAGNOSIS — S22.080A COMPRESSION FRACTURE OF T12 VERTEBRA, INITIAL ENCOUNTER: ICD-10-CM

## 2025-04-01 DIAGNOSIS — S32.040A COMPRESSION FRACTURE OF L4 LUMBAR VERTEBRA, CLOSED, INITIAL ENCOUNTER: ICD-10-CM

## 2025-04-01 PROCEDURE — C1894 INTRO/SHEATH, NON-LASER: HCPCS

## 2025-04-01 PROCEDURE — 25000003 PHARM REV CODE 250: Performed by: NURSE ANESTHETIST, CERTIFIED REGISTERED

## 2025-04-01 PROCEDURE — 37000008 HC ANESTHESIA 1ST 15 MINUTES

## 2025-04-01 PROCEDURE — 27201423 OPTIME MED/SURG SUP & DEVICES STERILE SUPPLY

## 2025-04-01 PROCEDURE — 63600175 PHARM REV CODE 636 W HCPCS: Performed by: NURSE ANESTHETIST, CERTIFIED REGISTERED

## 2025-04-01 PROCEDURE — C1713 ANCHOR/SCREW BN/BN,TIS/BN: HCPCS

## 2025-04-01 PROCEDURE — 25000003 PHARM REV CODE 250

## 2025-04-01 PROCEDURE — C1726 CATH, BAL DIL, NON-VASCULAR: HCPCS

## 2025-04-01 PROCEDURE — 37000009 HC ANESTHESIA EA ADD 15 MINS

## 2025-04-01 RX ORDER — SODIUM CHLORIDE 0.9 % (FLUSH) 0.9 %
3 SYRINGE (ML) INJECTION
Status: DISCONTINUED | OUTPATIENT
Start: 2025-04-01 | End: 2025-04-02 | Stop reason: HOSPADM

## 2025-04-01 RX ORDER — ONDANSETRON HYDROCHLORIDE 2 MG/ML
INJECTION, SOLUTION INTRAVENOUS
Status: DISCONTINUED | OUTPATIENT
Start: 2025-04-01 | End: 2025-04-01

## 2025-04-01 RX ORDER — GLUCAGON 1 MG
1 KIT INJECTION
Status: DISCONTINUED | OUTPATIENT
Start: 2025-04-01 | End: 2025-04-02 | Stop reason: HOSPADM

## 2025-04-01 RX ORDER — PROPOFOL 10 MG/ML
VIAL (ML) INTRAVENOUS CONTINUOUS PRN
Status: DISCONTINUED | OUTPATIENT
Start: 2025-04-01 | End: 2025-04-01

## 2025-04-01 RX ORDER — ONDANSETRON HYDROCHLORIDE 2 MG/ML
4 INJECTION, SOLUTION INTRAVENOUS DAILY PRN
Status: DISCONTINUED | OUTPATIENT
Start: 2025-04-01 | End: 2025-04-02 | Stop reason: HOSPADM

## 2025-04-01 RX ORDER — HALOPERIDOL LACTATE 5 MG/ML
0.5 INJECTION, SOLUTION INTRAMUSCULAR EVERY 10 MIN PRN
Status: DISCONTINUED | OUTPATIENT
Start: 2025-04-01 | End: 2025-04-02 | Stop reason: HOSPADM

## 2025-04-01 RX ORDER — FENTANYL CITRATE 50 UG/ML
INJECTION, SOLUTION INTRAMUSCULAR; INTRAVENOUS
Status: DISCONTINUED | OUTPATIENT
Start: 2025-04-01 | End: 2025-04-01

## 2025-04-01 RX ORDER — MIDAZOLAM HYDROCHLORIDE 1 MG/ML
INJECTION INTRAMUSCULAR; INTRAVENOUS
Status: DISCONTINUED | OUTPATIENT
Start: 2025-04-01 | End: 2025-04-01

## 2025-04-01 RX ORDER — DEXMEDETOMIDINE HYDROCHLORIDE 100 UG/ML
INJECTION, SOLUTION INTRAVENOUS
Status: DISCONTINUED | OUTPATIENT
Start: 2025-04-01 | End: 2025-04-01

## 2025-04-01 RX ORDER — HYDROMORPHONE HYDROCHLORIDE 1 MG/ML
0.2 INJECTION, SOLUTION INTRAMUSCULAR; INTRAVENOUS; SUBCUTANEOUS EVERY 5 MIN PRN
Status: DISCONTINUED | OUTPATIENT
Start: 2025-04-01 | End: 2025-04-02 | Stop reason: HOSPADM

## 2025-04-01 RX ORDER — OXYCODONE AND ACETAMINOPHEN 5; 325 MG/1; MG/1
1 TABLET ORAL
Status: DISCONTINUED | OUTPATIENT
Start: 2025-04-01 | End: 2025-04-02 | Stop reason: HOSPADM

## 2025-04-01 RX ORDER — LIDOCAINE HYDROCHLORIDE 10 MG/ML
1 INJECTION, SOLUTION EPIDURAL; INFILTRATION; INTRACAUDAL; PERINEURAL ONCE
Status: DISCONTINUED | OUTPATIENT
Start: 2025-04-01 | End: 2025-04-02 | Stop reason: HOSPADM

## 2025-04-01 RX ORDER — SODIUM CHLORIDE 9 MG/ML
INJECTION, SOLUTION INTRAVENOUS CONTINUOUS
Status: DISCONTINUED | OUTPATIENT
Start: 2025-04-01 | End: 2025-04-02 | Stop reason: HOSPADM

## 2025-04-01 RX ADMIN — MIDAZOLAM HYDROCHLORIDE 2 MG: 2 INJECTION, SOLUTION INTRAMUSCULAR; INTRAVENOUS at 12:04

## 2025-04-01 RX ADMIN — ONDANSETRON 4 MG: 2 INJECTION INTRAMUSCULAR; INTRAVENOUS at 01:04

## 2025-04-01 RX ADMIN — PROPOFOL 50 MCG/KG/MIN: 10 INJECTION, EMULSION INTRAVENOUS at 12:04

## 2025-04-01 RX ADMIN — DEXMEDETOMIDINE 8 MCG: 200 INJECTION, SOLUTION INTRAVENOUS at 12:04

## 2025-04-01 RX ADMIN — SODIUM CHLORIDE: 0.9 INJECTION, SOLUTION INTRAVENOUS at 12:04

## 2025-04-01 RX ADMIN — FENTANYL CITRATE 50 MCG: 50 INJECTION, SOLUTION INTRAMUSCULAR; INTRAVENOUS at 12:04

## 2025-04-01 NOTE — PLAN OF CARE
Discharge orders in place. Instructions reviewed with patient. PIV discontinued. . Patient waiting on family to provide transport home via car.

## 2025-04-01 NOTE — TRANSFER OF CARE
"Anesthesia Transfer of Care Note    Patient: Shikha López    Procedure(s) Performed: * No procedures listed *    Patient location: [facility]    Anesthesia Type: general    Transport from OR: Transported from OR on 6-10 L/min O2 by face mask with adequate spontaneous ventilation    Post pain: adequate analgesia    Post assessment: no apparent anesthetic complications and tolerated procedure well    Post vital signs: stable    Level of consciousness: awake, alert and oriented    Nausea/Vomiting: no nausea/vomiting    Complications: none    Transfer of care protocol was followed    Last vitals: Visit Vitals  BP (!) 126/58 (BP Location: Right arm, Patient Position: Lying)   Pulse 98   Temp 37 °C (98.6 °F)   Resp 18   Ht 5' 2" (1.575 m)   Wt 74.8 kg (165 lb)   LMP  (LMP Unknown)   SpO2 95%   Breastfeeding No   BMI 30.18 kg/m²     "

## 2025-04-01 NOTE — ANESTHESIA POSTPROCEDURE EVALUATION
Anesthesia Post Evaluation    Patient: Shikha López    Procedure(s) Performed: * No procedures listed *    Final Anesthesia Type: general      Patient location during evaluation: PACU  Patient participation: Yes- Able to Participate  Level of consciousness: awake and alert and oriented  Post-procedure vital signs: reviewed and stable  Pain management: adequate  Airway patency: patent    PONV status at discharge: No PONV  Anesthetic complications: no      Cardiovascular status: stable  Respiratory status: unassisted, spontaneous ventilation and room air  Hydration status: euvolemic  Follow-up not needed.              Vitals Value Taken Time   /55 04/01/25 14:46   Temp 36.7 °C (98.1 °F) 04/01/25 13:34   Pulse 89 04/01/25 14:52   Resp 14 04/01/25 14:52   SpO2 100 % 04/01/25 14:52   Vitals shown include unfiled device data.      No case tracking events are documented in the log.      Pain/Yisel Score: Yisel Score: 10 (4/1/2025  2:30 PM)

## 2025-04-01 NOTE — H&P
Interventional Neuroradiology Pre-Procedure Note    Chief Complaint: t12 and L4 compression fracture    History of Present Illness:  Shikha López is a 54 y.o. female with t12 and L4 compression fracture presents for kyphoplasty.      Past Medical History:   Diagnosis Date    Acute encephalopathy 05/25/2024    Aortic atherosclerosis 07/06/2023    Breast cancer     CHF (congestive heart failure)     Coronary artery calcification seen on CT scan 09/19/2024    Esophageal and gastric varices 06/23/2023    Hepatic encephalopathy 05/25/2024    Liver disease     Malignant neoplasm metastatic to left lung 06/08/2021    Stenosis of aortic and mitral valves     Aortic stenosis     Past Surgical History:   Procedure Laterality Date    ENDOSCOPIC ULTRASOUND OF UPPER GASTROINTESTINAL TRACT N/A 06/27/2023    Procedure: ULTRASOUND, UPPER GI TRACT, ENDOSCOPIC;  Surgeon: Home Lopez MD;  Location: [facility] (2ND FLR);  Service: Endoscopy;  Laterality: N/A;    ENDOSCOPIC ULTRASOUND OF UPPER GASTROINTESTINAL TRACT N/A 01/12/2024    Procedure: ULTRASOUND, UPPER GI TRACT, ENDOSCOPIC;  Surgeon: Home Lopez MD;  Location: [facility] (2ND FLR);  Service: Endoscopy;  Laterality: N/A;  11/28/23-Instructions via portal-DS  1/8-lvm for precall-MS  1/10-precall complete-Kpvt    ESOPHAGOGASTRODUODENOSCOPY N/A 06/23/2023    Procedure: EGD (ESOPHAGOGASTRODUODENOSCOPY);  Surgeon: Quintin Mooney MD;  Location: [facility] (2ND FLR);  Service: Endoscopy;  Laterality: N/A;    ESOPHAGOGASTRODUODENOSCOPY N/A 06/27/2023    Procedure: EGD (ESOPHAGOGASTRODUODENOSCOPY);  Surgeon: Home Lopez MD;  Location: [facility] (2ND FLR);  Service: Endoscopy;  Laterality: N/A;    ESOPHAGOGASTRODUODENOSCOPY N/A 08/03/2023    Procedure: EGD (ESOPHAGOGASTRODUODENOSCOPY);  Surgeon: Home Lopez MD;  Location: [facility] (2ND FLR);  Service: Endoscopy;  Laterality: N/A;  instr portal-labs 6/28/23-tb    ESOPHAGOGASTRODUODENOSCOPY N/A 01/12/2024     Procedure: EGD (ESOPHAGOGASTRODUODENOSCOPY);  Surgeon: Home Lopez MD;  Location: [facility] (2ND FLR);  Service: Endoscopy;  Laterality: N/A;    ESOPHAGOGASTRODUODENOSCOPY N/A 04/10/2024    Procedure: EGD (ESOPHAGOGASTRODUODENOSCOPY);  Surgeon: Ze Anderson MD;  Location: [facility] (2ND FLR);  Service: Endoscopy;  Laterality: N/A;    INSERTION OF TUNNELED CENTRAL VENOUS CATHETER (CVC) WITH SUBCUTANEOUS PORT      MASTECTOMY         Home Meds:   Prior to Admission medications    Medication Sig Start Date End Date Taking? Authorizing Provider   furosemide (LASIX) 80 MG tablet Take 1 tablet (80 mg total) by mouth once daily. If you are gaining weight (2 lbs in 24 hours or 3 lbs in two days) may take an extra dose of 80 mg 3/6/25 12/1/25 Yes Yajaira Canchola PA-C   HYDROcodone-acetaminophen (NORCO) 5-325 mg per tablet Take 1 tablet by mouth every 6 (six) hours as needed for Pain. 3/25/25  Yes Home Willis MD   methylphenidate HCl (RITALIN) 5 MG tablet Take 1 tablet (5 mg total) by mouth 2 (two) times daily. 3/7/25  Yes Mariam Lsia NP   pantoprazole (PROTONIX) 40 MG tablet Take 1 tablet (40 mg total) by mouth 2 (two) times daily. 1/27/25 5/25/25 Yes Lizbeth Mehta DNP   rifAXIMin (XIFAXAN) 550 mg Tab Take 1 tablet (550 mg total) by mouth 2 (two) times daily. 1/27/25 1/27/26 Yes Farhad Figueroa MD   empagliflozin (JARDIANCE) 10 mg tablet Take 1 tablet (10 mg total) by mouth once daily. 6/13/24 6/13/25  Tr Camacho MD   lactulose (CHRONULAC) 10 gram/15 mL solution Take 30 mLs (20 g total) by mouth 3 (three) times daily. 7/30/24 7/30/25  Farhad Figueroa MD   spironolactone (ALDACTONE) 100 MG tablet Take 1 tablet (100 mg total) by mouth once daily. 4/23/24 4/23/25  Prakash Conway MD   triamcinolone acetonide 0.5% (KENALOG) 0.5 % Crea Apply topically 2 (two) times daily. 5/10/24 4/1/25  Milli Pickard DO     Anticoagulants/Antiplatelets: no  anticoagulation    Allergies: Review of patient's allergies indicates:  No Known Allergies  Sedation History:  no adverse reactions    Review of Systems:   Hematological: no known coagulopathies  Respiratory: no shortness of breath  Cardiovascular: no chest pain  Gastrointestinal: no abdominal pain  Genito-Urinary: no dysuria  Musculoskeletal: +back pain  Neurological: no TIA or stroke symptoms         OBJECTIVE:     Vital Signs (Most Recent)  Temp: 98.6 °F (37 °C) (04/01/25 1042)  Pulse: 98 (04/01/25 1100)  Resp: 18 (04/01/25 1042)  BP: (!) 126/58 (04/01/25 1042)  SpO2: 95 % (04/01/25 1042)    Physical Exam:  ASA: 2  Mallampati: 2    General: no acute distress  Mental Status: alert and oriented to person, place and time  HEENT: normocephalic, atraumatic  Chest: unlabored breathing  Heart: regular heart rate  Abdomen: nondistended  Extremity: moves all extremities    Laboratory  Lab Results   Component Value Date    INR 1.4 (H) 03/24/2025       Lab Results   Component Value Date    WBC 3.66 (L) 03/24/2025    HGB 13.9 03/24/2025    HCT 41.1 03/24/2025     (H) 03/24/2025     (L) 03/24/2025      Lab Results   Component Value Date     (H) 02/20/2025     (L) 03/24/2025    K 3.8 03/24/2025    CL 97 03/24/2025    CO2 30 (H) 03/24/2025    BUN 12 03/24/2025    CREATININE 0.7 03/24/2025    CALCIUM 8.6 (L) 03/24/2025    MG 2.0 06/24/2024    ALT 22 03/24/2025    AST 49 (H) 03/24/2025    ALBUMIN 3.0 (L) 03/24/2025    BILITOT 5.9 (H) 03/24/2025    BILIDIR 1.7 (H) 05/26/2024       ASSESSMENT/PLAN:   -Sedation Plan: Up to local/moderate / deep / general anesthesia  -Patient will undergo: Multilevel fluoroscopy guided kyphoplasty of T12 and L4 vertebrae                Hari Gutierrez MD, MHA  Fellow, NeuroEndovascular Surgery, [facility]  Neurologist, [facility] [place], [place]

## 2025-04-01 NOTE — ANESTHESIA PREPROCEDURE EVALUATION
04/01/2025  Shikha López is a 54 y.o., female.    Procedures:      IR KYPHOPLASTY THORACIC FIRST VERTEBRAL      IR KYPHOPLASTY THORACIC_LUMBAR EA ADD   Diagnosis:      Compression fracture of T12 vertebra, initial encounter [S22.080A]      Age-related osteoporosis with current pathological fracture, vertebra(e), initial encounter for fracture [M80.08XA]       Pre-operative evaluation for * No procedures listed *      Encounter Diagnoses   Name Primary?    Compression fracture of L4 lumbar vertebra, closed, initial encounter     Compression fracture of T12 vertebra, initial encounter     Age-related osteoporosis with current pathological fracture, vertebra(e), initial encounter for fracture        Review of patient's allergies indicates:  No Known Allergies    Prescriptions Prior to Admission[1]      0.9% NaCl   Intravenous Continuous           Medications Ordered Prior to Encounter[2]    Past Medical History:  05/25/2024: Acute encephalopathy  07/06/2023: Aortic atherosclerosis  No date: Breast cancer  No date: CHF (congestive heart failure)  09/19/2024: Coronary artery calcification seen on CT scan  06/23/2023: Esophageal and gastric varices  05/25/2024: Hepatic encephalopathy  No date: Liver disease  06/08/2021: Malignant neoplasm metastatic to left lung  No date: Stenosis of aortic and mitral valves      Comment:  Aortic stenosis    Past Surgical History:   Procedure Laterality Date    ENDOSCOPIC ULTRASOUND OF UPPER GASTROINTESTINAL TRACT N/A 06/27/2023    Procedure: ULTRASOUND, UPPER GI TRACT, ENDOSCOPIC;  Surgeon: Home Lopez MD;  Location: [facility] ([address] [place] [address]);  Service: Endoscopy;  Laterality: N/A;    ENDOSCOPIC ULTRASOUND OF UPPER GASTROINTESTINAL TRACT N/A 01/12/2024    Procedure: ULTRASOUND, UPPER GI TRACT, ENDOSCOPIC;  Surgeon: Home Lopez MD;  Location: [facility] ([address] [place] [address]);  Service:  "Endoscopy;  Laterality: N/A;  11/28/23-Instructions via portal-DS  1/8-lvm for precall-MS  1/10-precall complete-Kpvt    ESOPHAGOGASTRODUODENOSCOPY N/A 06/23/2023    Procedure: EGD (ESOPHAGOGASTRODUODENOSCOPY);  Surgeon: Quintin Mooney MD;  Location: [facility] (2ND FLR);  Service: Endoscopy;  Laterality: N/A;    ESOPHAGOGASTRODUODENOSCOPY N/A 06/27/2023    Procedure: EGD (ESOPHAGOGASTRODUODENOSCOPY);  Surgeon: Home Lopez MD;  Location: [facility] (2ND FLR);  Service: Endoscopy;  Laterality: N/A;    ESOPHAGOGASTRODUODENOSCOPY N/A 08/03/2023    Procedure: EGD (ESOPHAGOGASTRODUODENOSCOPY);  Surgeon: Home Lopez MD;  Location: [facility] (2ND FLR);  Service: Endoscopy;  Laterality: N/A;  instr portal-labs 6/28/23-tb    ESOPHAGOGASTRODUODENOSCOPY N/A 01/12/2024    Procedure: EGD (ESOPHAGOGASTRODUODENOSCOPY);  Surgeon: Home Lopez MD;  Location: [facility] (2ND FLR);  Service: Endoscopy;  Laterality: N/A;    ESOPHAGOGASTRODUODENOSCOPY N/A 04/10/2024    Procedure: EGD (ESOPHAGOGASTRODUODENOSCOPY);  Surgeon: Ze Anderson MD;  Location: [facility] (2ND FLR);  Service: Endoscopy;  Laterality: N/A;    INSERTION OF TUNNELED CENTRAL VENOUS CATHETER (CVC) WITH SUBCUTANEOUS PORT      MASTECTOMY         Tobacco Use History[3]    Social History     Substance and Sexual Activity   Alcohol Use Never       Physical Activity: Insufficiently Active (6/17/2024)    Exercise Vital Sign     Days of Exercise per Week: 4 days     Minutes of Exercise per Session: 10 min       No birth history on file.  No results for input(s): "HCT" in the last 72 hours.  No results for input(s): "PLT" in the last 72 hours.  No results for input(s): "K" in the last 72 hours.  No results for input(s): "CREATININE" in the last 72 hours.  No results for input(s): "GLU" in the last 72 hours.  No results for input(s): "PT" in the last 72 hours.  There were no vitals filed for this visit.                    Pre-op Assessment          Review of " Systems  Anesthesia Hx:  No problems with previous Anesthesia                Hematology/Oncology:  Hematology Normal                       --  Cancer in past history (Breast stage 4 with lung mets 2006):           left axillary node dissection lymphedema chemotherapy and surgery       Cardiovascular:     Denies Pacemaker.  Denies Hypertension. Valvular problems/Murmurs, AS  Denies MI.     Denies CABG/stent.    Denies Angina. CHF (HFpEF)        Before injury in September would walks 2 miles                           Pulmonary:  Pulmonary Normal   Denies COPD.  Denies Asthma.   Denies Shortness of breath.                  Renal/:   Denies Chronic Renal Disease.                Hepatic/GI:      Liver Disease, (biliary cirrhosos)               Neurological:  Denies TIA.  Denies CVA.    Denies Seizures.                                Endocrine:  Diabetes (last jardiance Sunday), type 2               Physical Exam  General: Well nourished, Cooperative, Alert and Oriented    Airway:  Mallampati: II   Mouth Opening: Normal  TM Distance: Normal  Tongue: Normal  Neck ROM: Normal ROM        Anesthesia Plan  Type of Anesthesia, risks & benefits discussed:    Anesthesia Type: Gen ETT  Intra-op Monitoring Plan: Standard ASA Monitors  Post Op Pain Control Plan: multimodal analgesia and IV/PO Opioids PRN  Induction:  IV  Informed Consent: Informed consent signed with the Patient and all parties understand the risks and agree with anesthesia plan.  All questions answered.   ASA Score: 2  Day of Surgery Review of History & Physical: H&P Update referred to the surgeon/provider.    Ready For Surgery From Anesthesia Perspective.     .    Date/Time: 4/12/2024 2:34 PM     Performed by: Ismael Menendez CRNA  Authorized by: Shahla Wilson MD    Intubation:     Induction:  Intravenous    Intubated:  Postinduction    Mask Ventilation:  Easy mask    Attempts:  1    Attempted By:  CRNA    Method of Intubation:  Video laryngoscopy     Blade:  Yee 3    Laryngeal View Grade: Grade IIA - cords partially seen      Difficult Airway Encountered?: No      Complications:  None    Airway Device:  Oral endotracheal tube    Airway Device Size:  7.5    Style/Cuff Inflation:  Cuffed    Inflation Amount (mL):  10    Tube secured:  20    Secured at:  The teeth    Placement Verified By:  Capnometry    Complicating Factors:  None    Findings Post-Intubation:  BS equal bilateral and atraumatic/condition of teeth unchanged       Latest Reference Range & Units 10/03/24 08:50   BNP 0 - 99 pg/mL 159 (H)   (H): Data is abnormally high    Vent. Rate : 104 BPM     Atrial Rate : 104 BPM      P-R Int : 174 ms          QRS Dur : 098 ms       QT Int : 270 ms       P-R-T Axes : 053 016 -87 degrees      QTc Int : 355 ms     Sinus tachycardia with occasional Premature ventricular complexes   Possible Left atrial enlargement   LVH   ST and T wave abnormality, consider inferior ischemia   Abnormal ECG   When compared with ECG of 16-MAY-2024 17:33,   Premature ventricular complexes are now Present   Inverted T waves have replaced nonspecific T wave abnormality in Anterior   leads   QT has shortened   Confirmed by Teodora Tucker MD ([id number]) on 5/26/2024 8:53:29 AM     5/2024  Left Ventricle The left ventricle is normal in size. Normal wall thickness. Normal wall motion. There is normal systolic function with a visually estimated ejection fraction of 60 - 65%. There is indeterminate diastolic function.   Right Ventricle Mild right ventricular enlargement. Wall thickness is normal. Right ventricle wall motion  is normal. Systolic function is normal.   Left Atrium Left atrium is severely dilated.   Right Atrium Normal right atrial size.   Aortic Valve There is moderate aortic valve sclerosis. There is annular calcification present. Mildly restricted motion. There is mild to moderate stenosis. Aortic valve area by VTI is 1.44 cm². Aortic valve peak velocity is 3.24 m/s. Mean  gradient is 25 mmHg. The dimensionless index is 0.38.   Mitral Valve The mitral valve is structurally normal. There is normal leaflet mobility. There is trace regurgitation.   Tricuspid Valve The tricuspid valve is structurally normal. There is normal leaflet mobility.   Pulmonic Valve The pulmonic valve is structurally normal. There is normal leaflet mobility.   IVC/SVC Intermediate venous pressure at 8 mmHg.   Ascending Aorta Aortic root is normal in size measuring 3.18 cm. Ascending aorta is normal measuring 2.87 cm.   Pericardium and Other Findings There is no pericardial effusion.   Pulmonary Artery There is mild pulmonary hypertension. The estimated pulmonary artery systolic pressure is 49 mmHg.              [1] (Not in a hospital admission)  [2]   Current Outpatient Medications on File Prior to Encounter   Medication Sig Dispense Refill    empagliflozin (JARDIANCE) 10 mg tablet Take 1 tablet (10 mg total) by mouth once daily. 30 tablet 11    furosemide (LASIX) 80 MG tablet Take 1 tablet (80 mg total) by mouth once daily. If you are gaining weight (2 lbs in 24 hours or 3 lbs in two days) may take an extra dose of 80 mg 90 tablet 2    HYDROcodone-acetaminophen (NORCO) 5-325 mg per tablet Take 1 tablet by mouth every 6 (six) hours as needed for Pain. 40 tablet 0    lactulose (CHRONULAC) 10 gram/15 mL solution Take 30 mLs (20 g total) by mouth 3 (three) times daily. 2700 mL 11    methylphenidate HCl (RITALIN) 5 MG tablet Take 1 tablet (5 mg total) by mouth 2 (two) times daily. 60 tablet 0    pantoprazole (PROTONIX) 40 MG tablet Take 1 tablet (40 mg total) by mouth 2 (two) times daily. 180 tablet 0    rifAXIMin (XIFAXAN) 550 mg Tab Take 1 tablet (550 mg total) by mouth 2 (two) times daily. 60 tablet 11    spironolactone (ALDACTONE) 100 MG tablet Take 1 tablet (100 mg total) by mouth once daily. 30 tablet 11    triamcinolone acetonide 0.5% (KENALOG) 0.5 % Crea Apply topically 2 (two) times daily. 454 g 0     No  current facility-administered medications on file prior to encounter.   [3]   Social History  Tobacco Use   Smoking Status Never   Smokeless Tobacco Never

## 2025-04-02 ENCOUNTER — TELEPHONE (OUTPATIENT)
Dept: HEMATOLOGY/ONCOLOGY | Facility: CLINIC | Age: 55
End: 2025-04-02
Payer: MEDICARE

## 2025-04-02 PROBLEM — I87.2 CHRONIC VENOUS INSUFFICIENCY: Status: ACTIVE | Noted: 2025-04-02

## 2025-04-02 NOTE — PROCEDURES
Radiology Post-Procedure Note    Pre Op Diagnosis: T12 and L4 compression fractures  Post Op Diagnosis: Same    Procedure: T12 and L4 kyphoplasty    Procedure performed by: Td Calle MD    Written Informed Consent Obtained: Yes  Specimen Removed: NO  Estimated Blood Loss: Minimal    Findings:   Good result, no extravasation    Patient tolerated procedure well.    Td Calle MD  Attending Radiologist  Interventional Neuroradiology

## 2025-04-02 NOTE — DISCHARGE SUMMARY
Radiology Discharge Summary      Hospital Course: No complications    Admit Date: 4/1/2025  Discharge Date: 04/02/2025     Instructions Given to Patient: Yes  Diet: regular diet and Resume prior diet  Activity: activity as tolerated and no driving for today    Description of Condition on Discharge: Good  Vital Signs (Most Recent): Temp: 98.1 °F (36.7 °C) (04/01/25 1334)  Pulse: 94 (04/01/25 1715)  Resp: 18 (04/01/25 1715)  BP: (!) 114/56 (04/01/25 1715)  SpO2: (!) 94 % (04/01/25 1715)    Discharge Disposition: Home    Discharge Diagnosis: S/P kyphoplasty of T12 and L4      Follow-up: [facility] with Dr. Calle and Adolfo Calle MD  Attending Radiologist  Interventional Neuroradiology

## 2025-04-02 NOTE — PROGRESS NOTES
Subjective:   Patient ID:  Shikha López is a 54 y.o. female who presents for follow-up of No chief complaint on file.    PROBLEM LIST:  Stage 4 breast cancer, 2006 (Treatment on hold)  HFpEF  Mild-moderate AS  Coronary artery calcification on chest CT  Biliary cirrhosis  CVI    HPI 9/26/24: Cardio-oncology follow-up  She previously followed with Dr. Epps. Sees Dr. Willis in Oncology.  Ms López is a 53 year old female with breast cancer receiving trastuzumab deruxtecan (previously also received paclitaxel, doxorubicin, eribulin, capecitabine, lapatinib, trastuzumab, trastuzumab emtansine, docetaxel and vinorelbine). We had increased her lasix at her last visit, however she cut it back to once daily for the past 2 weeks since she has been staying on the couch at her mother's house taking care of her. Her weight is up 40 lbs since June and 13 lbs since her last visit with me. She has been eating prepared meals. Reports 2 weeks of left sided mid thoracic back pain. Taking tylenol. No leg weakness. Increasing edema in her legs and left arm. No PND/ orthopnea. +KAPADIA.    Interval history: She underwent kyphoplasty last week. Her leg edema has improved with bid lasix and waist high compression hose. She does report some urinary incontinence. Weight is down about 8 lbs    ECG 25-MAY-2024 17:51:16: Personally reviewed by me shows NSR with PVC's, LVH with repol    Echo 5/24:    Summary  Show Result Comparison     Left Ventricle: The left ventricle is normal in size. Normal wall thickness. Normal wall motion. There is normal systolic function with a visually estimated ejection fraction of 60 - 65%. There is indeterminate diastolic function.    Right Ventricle: Mild right ventricular enlargement. Wall thickness is normal. Systolic function is normal.    The left atrium is severely dilated.    Aortic Valve: There is moderate aortic valve sclerosis. There is annular calcification present. Mildly restricted motion. There is  mild to moderate stenosis. Aortic valve area by VTI is 1.44 cm². Aortic valve peak velocity is 3.24 m/s. Mean gradient is 25 mmHg. The dimensionless index is 0.38.    Pulmonary Artery: There is mild pulmonary hypertension. The estimated pulmonary artery systolic pressure is 49 mmHg.    IVC/SVC: Intermediate venous pressure at 8 mmHg.    Venous US 12/24:    Interpretation Summary  Show Result Comparison     There is no evidence of a right lower extremity DVT.    The right common femoral vein has reflux.    The right greater saphenous vein has reflux.    There is no evidence of a left lower extremity DVT.    The left greater saphenous vein has reflux.    The left smaller saphenous vein has reflux.    Past Medical History:   Diagnosis Date    Acute encephalopathy 05/25/2024    Aortic atherosclerosis 07/06/2023    Breast cancer     CHF (congestive heart failure)     Chronic venous insufficiency 04/02/2025    Coronary artery calcification seen on CT scan 09/19/2024    Esophageal and gastric varices 06/23/2023    Hepatic encephalopathy 05/25/2024    Liver disease     Malignant neoplasm metastatic to left lung 06/08/2021    Stenosis of aortic and mitral valves     Aortic stenosis       Past Surgical History:   Procedure Laterality Date    ENDOSCOPIC ULTRASOUND OF UPPER GASTROINTESTINAL TRACT N/A 06/27/2023    Procedure: ULTRASOUND, UPPER GI TRACT, ENDOSCOPIC;  Surgeon: Home Lopez MD;  Location: [facility] ([address] [place] [address]);  Service: Endoscopy;  Laterality: N/A;    ENDOSCOPIC ULTRASOUND OF UPPER GASTROINTESTINAL TRACT N/A 01/12/2024    Procedure: ULTRASOUND, UPPER GI TRACT, ENDOSCOPIC;  Surgeon: Home Lopez MD;  Location[facility]  Service: Endoscopy;  Laterality: N/A;  11/28/23-Instructions via portal-DS  1/8-lvm for precall-MS  1/10-precall complete-Kpvt    ESOPHAGOGASTRODUODENOSCOPY N/A 06/23/2023    Procedure: EGD (ESOPHAGOGASTRODUODENOSCOPY);  Surgeon: Quintin Mooney MD;  Location: [facility] (2ND FLR);   Service: Endoscopy;  Laterality: N/A;    ESOPHAGOGASTRODUODENOSCOPY N/A 06/27/2023    Procedure: EGD (ESOPHAGOGASTRODUODENOSCOPY);  Surgeon: Home Lopez MD;  Location: [facility] (2ND FLR);  Service: Endoscopy;  Laterality: N/A;    ESOPHAGOGASTRODUODENOSCOPY N/A 08/03/2023    Procedure: EGD (ESOPHAGOGASTRODUODENOSCOPY);  Surgeon: Home Lopez MD;  Location: [facility] (2ND FLR);  Service: Endoscopy;  Laterality: N/A;  instr portal-labs 6/28/23-tb    ESOPHAGOGASTRODUODENOSCOPY N/A 01/12/2024    Procedure: EGD (ESOPHAGOGASTRODUODENOSCOPY);  Surgeon: Home Lopez MD;  Location: [facility] (2ND FLR);  Service: Endoscopy;  Laterality: N/A;    ESOPHAGOGASTRODUODENOSCOPY N/A 04/10/2024    Procedure: EGD (ESOPHAGOGASTRODUODENOSCOPY);  Surgeon: Ze Anderson MD;  Location: [facility] (2ND FLR);  Service: Endoscopy;  Laterality: N/A;    INSERTION OF TUNNELED CENTRAL VENOUS CATHETER (CVC) WITH SUBCUTANEOUS PORT      MASTECTOMY         Social History     Socioeconomic History    Marital status: Single    Number of children: 1   Tobacco Use    Smoking status: Never    Smokeless tobacco: Never   Substance and Sexual Activity    Alcohol use: Never    Drug use: Never    Sexual activity: Not Currently     Social Drivers of Health     Financial Resource Strain: High Risk (6/17/2024)    Overall Financial Resource Strain (CARDIA)     Difficulty of Paying Living Expenses: Very hard   Food Insecurity: Food Insecurity Present (11/25/2024)    Hunger Vital Sign     Worried About Running Out of Food in the Last Year: Sometimes true     Ran Out of Food in the Last Year: Sometimes true   Transportation Needs: No Transportation Needs (6/17/2024)    PRAPARE - Transportation     Lack of Transportation (Medical): No     Lack of Transportation (Non-Medical): No   Physical Activity: Insufficiently Active (6/17/2024)    Exercise Vital Sign     Days of Exercise per Week: 4 days     Minutes of Exercise per Session: 10 min   Stress: Stress  Concern Present (6/17/2024)    [employer] [place] [employer] - Occupational Stress Questionnaire     Feeling of Stress : Rather much   Housing Stability: High Risk (6/17/2024)    Housing Stability Vital Sign     Unable to Pay for Housing in the Last Year: Yes     Number of Times Moved in the Last Year: 1     Homeless in the Last Year: No       Family History   Problem Relation Name Age of Onset    Hypertension Mother      Heart attack Father      Lung cancer Father         Patient's Medications   New Prescriptions    No medications on file   Previous Medications    EMPAGLIFLOZIN (JARDIANCE) 10 MG TABLET    Take 1 tablet (10 mg total) by mouth once daily.    FUROSEMIDE (LASIX) 80 MG TABLET    Take 1 tablet (80 mg total) by mouth once daily. If you are gaining weight (2 lbs in 24 hours or 3 lbs in two days) may take an extra dose of 80 mg    HYDROCODONE-ACETAMINOPHEN (NORCO) 5-325 MG PER TABLET    Take 1 tablet by mouth every 6 (six) hours as needed for Pain.    LACTULOSE (CHRONULAC) 10 GRAM/15 ML SOLUTION    Take 30 mLs (20 g total) by mouth 3 (three) times daily.    METHYLPHENIDATE HCL (RITALIN) 5 MG TABLET    Take 1 tablet (5 mg total) by mouth 2 (two) times daily.    PANTOPRAZOLE (PROTONIX) 40 MG TABLET    Take 1 tablet (40 mg total) by mouth 2 (two) times daily.    RIFAXIMIN (XIFAXAN) 550 MG TAB    Take 1 tablet (550 mg total) by mouth 2 (two) times daily.    SPIRONOLACTONE (ALDACTONE) 100 MG TABLET    Take 1 tablet (100 mg total) by mouth once daily.   Modified Medications    No medications on file   Discontinued Medications    No medications on file       Review of Systems   Constitutional: Positive for weight loss. Negative for malaise/fatigue.   HENT:  Negative for hearing loss.    Eyes:  Negative for visual disturbance.   Cardiovascular:  Positive for leg swelling. Negative for chest pain, claudication, dyspnea on exertion, near-syncope, orthopnea, palpitations, paroxysmal nocturnal dyspnea and  syncope.   Respiratory:  Negative for cough, shortness of breath, sleep disturbances due to breathing, snoring and wheezing.    Endocrine: Negative for cold intolerance, heat intolerance, polydipsia, polyphagia and polyuria.   Hematologic/Lymphatic: Negative for bleeding problem. Does not bruise/bleed easily.   Skin:  Negative for rash and suspicious lesions.   Musculoskeletal:  Positive for back pain. Negative for arthritis, falls, joint pain, muscle weakness and myalgias.   Gastrointestinal:  Positive for bloating. Negative for abdominal pain, change in bowel habit, constipation, diarrhea, heartburn, hematochezia, melena and nausea.   Genitourinary:  Negative for hematuria and nocturia.   Neurological:  Negative for excessive daytime sleepiness, dizziness, headaches, light-headedness, loss of balance and weakness.   Psychiatric/Behavioral:  Negative for depression. The patient is not nervous/anxious.    Allergic/Immunologic: Negative for environmental allergies.       LMP  (LMP Unknown)     Objective:   Physical Exam  Constitutional:       Appearance: She is well-developed.      Comments:      HENT:      Head: Normocephalic and atraumatic.      Comments: Poor dentition  Eyes:      General: No scleral icterus.     Conjunctiva/sclera: Conjunctivae normal.      Pupils: Pupils are equal, round, and reactive to light.   Neck:      Thyroid: No thyromegaly.      Vascular: No hepatojugular reflux or JVD.      Trachea: No tracheal deviation.   Cardiovascular:      Rate and Rhythm: Normal rate and regular rhythm.      Chest Wall: PMI is not displaced.      Pulses: Intact distal pulses.           Carotid pulses are 2+ on the right side and 2+ on the left side.       Radial pulses are 2+ on the right side and 2+ on the left side.        Dorsalis pedis pulses are 2+ on the right side and 2+ on the left side.        Posterior tibial pulses are 2+ on the right side and 2+ on the left side.      Heart sounds: Murmur heard.       Harsh midsystolic murmur is present with a grade of 3/6 at the upper right sternal border radiating to the neck.   Pulmonary:      Effort: Pulmonary effort is normal.      Breath sounds: Normal breath sounds.   Abdominal:      General: Bowel sounds are normal. There is no distension.      Palpations: Abdomen is soft. There is no mass.      Tenderness: There is no abdominal tenderness.   Musculoskeletal:         General: No tenderness.      Cervical back: Normal range of motion and neck supple.      Right lower leg: Edema present.      Left lower leg: Edema present.      Comments: Compression stockings on. +pedal rubor, feet warm    Lymphedema left arm   Lymphadenopathy:      Cervical: No cervical adenopathy.   Skin:     General: Skin is warm and dry.      Findings: No erythema or rash.      Nails: There is no clubbing.   Neurological:      Mental Status: She is alert and oriented to person, place, and time.   Psychiatric:         Speech: Speech normal.         Behavior: Behavior normal.         Lab Results   Component Value Date     (L) 03/24/2025     02/20/2025    K 3.8 03/24/2025    K 3.7 02/20/2025    CL 97 03/24/2025    CL 98 02/20/2025    CO2 30 (H) 03/24/2025    CO2 32 (H) 02/20/2025    BUN 12 03/24/2025    CREATININE 0.7 03/24/2025     (H) 02/20/2025    MG 2.0 06/24/2024    AST 49 (H) 03/24/2025    AST 56 (H) 02/20/2025    ALT 22 03/24/2025    ALT 21 02/20/2025    ALBUMIN 3.0 (L) 03/24/2025    ALBUMIN 2.8 (L) 02/20/2025    PROT 6.4 02/20/2025    BILITOT 5.9 (H) 03/24/2025    BILITOT 4.2 (H) 02/20/2025    WBC 3.66 (L) 03/24/2025    HGB 13.9 03/24/2025    HGB 13.7 02/20/2025    HCT 41.1 03/24/2025    HCT 41.6 02/20/2025     (H) 03/24/2025     (H) 02/20/2025     (L) 03/24/2025     (L) 02/20/2025    INR 1.4 (H) 03/24/2025    INR 1.4 (H) 12/03/2024    TSH 3.951 05/15/2024    CHOL 114 (L) 06/24/2024    HDL 36 (L) 06/24/2024    LDLCALC 67.6 06/24/2024    TRIG 52  06/24/2024     (H) 10/03/2024       Assessment:     1. Breast cancer, stage 4, left : Starting Zometa.   2. Chronic heart failure with preserved ejection fraction : She appears overall euvolemic. Weight down 8 lbs. Continue Jardiance, Sprinolactone and bid lasix.   3. Aortic atherosclerosis    4. Biliary cirrhosis : s/p TIPS.   5. Coronary artery calcification seen on CT scan : Not on a statin due to liver disease. LDL 67.   6. Chronic venous insufficiency: Continue compression garments and leg elevation.   7. Nonrheumatic aortic valve stenosis : This was mild to moderate on last echo. Possible radiation induced valvular heart disease? AV noted to be trileaflet on prior echo.       Plan:     Diagnoses and all orders for this visit:    Chronic heart failure with preserved ejection fraction    Aortic atherosclerosis    Coronary artery calcification seen on CT scan    Nonrheumatic aortic valve stenosis    Breast cancer, stage 4, left    Biliary cirrhosis    Chronic venous insufficiency        Thank you for allowing me to participate in this patient's care. Please do not hesitate to contact me with any questions or concerns.

## 2025-04-02 NOTE — TELEPHONE ENCOUNTER
Returned patient call   Patient presented with appointment options  Appointment selected  Provider appointment and lab appointment rescheduled  Details confirmed  No further questions or concerns noted during call.

## 2025-04-02 NOTE — TELEPHONE ENCOUNTER
----- Message from Gala sent at 4/2/2025  1:57 PM CDT -----  Regarding: Appt  Contact: Pt  [phone]  Current Appt date:   04/03/25 Type of Appt:  Lab, Est Pt  Physician: Home Willis MD  Reason for rescheduling: Just had procedure done trying to get appt rescheduled for 04/10/25 Caller: Shikha  Contact Preference:  [phone]

## 2025-04-08 DIAGNOSIS — M48.56XS COMPRESSION FRACTURE OF LUMBAR SPINE, NON-TRAUMATIC, SEQUELA: ICD-10-CM

## 2025-04-08 DIAGNOSIS — R41.840 LACK OF CONCENTRATION: ICD-10-CM

## 2025-04-09 RX ORDER — METHYLPHENIDATE HYDROCHLORIDE 5 MG/1
5 TABLET ORAL 2 TIMES DAILY
Qty: 60 TABLET | Refills: 0 | Status: SHIPPED | OUTPATIENT
Start: 2025-04-09

## 2025-04-09 RX ORDER — HYDROCODONE BITARTRATE AND ACETAMINOPHEN 5; 325 MG/1; MG/1
1 TABLET ORAL EVERY 6 HOURS PRN
Qty: 40 TABLET | Refills: 0 | Status: SHIPPED | OUTPATIENT
Start: 2025-04-09

## 2025-04-10 ENCOUNTER — OFFICE VISIT (OUTPATIENT)
Dept: CARDIOLOGY | Facility: CLINIC | Age: 55
End: 2025-04-10
Payer: MEDICARE

## 2025-04-10 VITALS
DIASTOLIC BLOOD PRESSURE: 59 MMHG | WEIGHT: 169.31 LBS | SYSTOLIC BLOOD PRESSURE: 119 MMHG | OXYGEN SATURATION: 97 % | HEART RATE: 99 BPM | BODY MASS INDEX: 31.16 KG/M2 | HEIGHT: 62 IN

## 2025-04-10 DIAGNOSIS — K74.5 BILIARY CIRRHOSIS: Chronic | ICD-10-CM

## 2025-04-10 DIAGNOSIS — I25.10 CORONARY ARTERY CALCIFICATION SEEN ON CT SCAN: ICD-10-CM

## 2025-04-10 DIAGNOSIS — I50.32 CHRONIC HEART FAILURE WITH PRESERVED EJECTION FRACTION: Primary | ICD-10-CM

## 2025-04-10 DIAGNOSIS — I70.0 AORTIC ATHEROSCLEROSIS: ICD-10-CM

## 2025-04-10 DIAGNOSIS — I35.0 NONRHEUMATIC AORTIC VALVE STENOSIS: ICD-10-CM

## 2025-04-10 DIAGNOSIS — I87.2 CHRONIC VENOUS INSUFFICIENCY: ICD-10-CM

## 2025-04-10 DIAGNOSIS — C50.912 BREAST CANCER, STAGE 4, LEFT: Chronic | ICD-10-CM

## 2025-04-10 PROCEDURE — 99214 OFFICE O/P EST MOD 30 MIN: CPT | Mod: PBBFAC | Performed by: INTERNAL MEDICINE

## 2025-04-10 PROCEDURE — 99999 PR PBB SHADOW E&M-EST. PATIENT-LVL IV: CPT | Mod: PBBFAC,,, | Performed by: INTERNAL MEDICINE

## 2025-04-16 NOTE — PROGRESS NOTES
Subjective:       Patient ID: Shikha López is a 54 y.o. female.    Chief Complaint: No chief complaint on file.      HPI 54-year-old female who returns for F/U  for metastatic HER 2 + breast cancer.  She was on Trastuzumab deruxtecan  - her last treatment was May 7, 2024. Treatment has been held due to cirrhosis and hepatic encephalopathy.  She has also had variceal bleeding and CHF.  - She has chronic cytopenias - with thrombocytopenia.  - Osteoporosis with compression Fx of the spine.    She had kyphoplasty T12 and L 4 on  4/1/25.  Subsequently she has had improvement in her back pain although she still has some positional back pain especially at night.  She takes her pain medication about once a day.  Her breathing has been good but she has to stay on her diuretics.  Leg swelling is improved.  Her appetite is better as well since her kyphoplasty.              Breast Cancer history:  She presented in the emergency room at [facility] on September 29, 2006 with a 4 months history of inflammation of her left breast.  Physical examination at that time showed the left breast was largely replaced by large mass with multiple skin ulcerations.    A biopsy in September 2006 showed poorly differentiated carcinoma which was ER and VA. negative and HER2 positive.    She was then referred to [facility] for additional care.   CT scan of the chest and abdomen November 2006 revealed multiple nodules in the lungs consistent with metastatic disease.  There also enlarged left axillary node.    She was treated with chemotherapy with weekly Herceptin and Abraxane with improvement in her breast and lung metastasis.    On May 29, 2007 she underwent left modified radical mastectomy(Dr. Colvin) which showed no residual tumor in the breast and 1/5 nodes was positive for metastasis measuring 6 mm.  (ypT0N1).  She then received postoperative radiation therapy(Dr Singh)  to the left chest wall supraclav and internal  mammary lymph nodes from August 20, 2007 to September 28, 2007.    Postoperatively she continued on Herceptin for approximately 3 and 1/2 years before her lung metastasis begin to grow.    She subsequently received a number of different chemotherapy treatments including Adriamycin, Halaven, Xeloda plus lapatinib then Xeloda plus Herceptin. (  in [facility]    Subsequently, she transferred her care to  at [facility].  -She was initially treated with Taxotere Herceptin Perjeta.    -She was then changed to Kadcyla.    In July 2020 PET scan showed progression.   She then took approximately 9 months of Herceptin and Navelbine with initial response then progression.    She started trastuzumab- deruxtecan  4/12/21.     CT 3/11/24 - stable  Stable postsurgical changes of left mastectomy and axillary lymph node dissection is patient with metastatic left breast cancer.   Redemonstration of multiple irregular bilateral pulmonary nodules concerning for metastatic disease.  No new lesions identified.   Hepatic steatosis and portal hypertension.  Increased bowel wall thickening and diffuse mesenteric edema with mild ascites, likely related to hepatic dysfunction.    Echo 4/24/24 - EF 60-65%      CT 4/29/24  -stable    CT chest - 8/15/24 - 1. Right upper lobe and infrahilar left lower lobe nodules are minimally bulkier compared to the prior study.    2. Residual scattered ground-glass opacities bilaterally, improved in interval.  Resolution of the previously seen effusions      CT CAP 11/11/24 - *Unchanged right axillary and bilateral inguinal lymphadenopathy.  *Unchanged left pleural thickening.  *Multiple solid pulmonary nodules scattered throughout both lungs measuring up to 2.5 cm, unchanged.   Mosaic attenuation throughout both lungs, which could represent small airway disease, pulmonary edema, or infection.   Cirrhosis with signs of portal hypertension including splenomegaly and portosystemic  collaterals.  No focal hepatic lesions.   Multiple compression fractures in the thoracolumbar spine with severe height loss at T8, many of which are new    CT 2/17/25 - 1. Unchanged bilateral nodular opacities.  No new nodules.  2. Stable bilateral irregular opacities.  3. Left upper lobe posttreatment changes  4. New multilevel vertebral compression fractures    Review of Systems   Constitutional:  Positive for appetite change (Improved). Negative for activity change and unexpected weight change.   HENT:  Negative for mouth sores.    Eyes:  Negative for visual disturbance.   Respiratory:  Negative for cough and shortness of breath.    Cardiovascular:  Positive for leg swelling. Negative for chest pain.   Gastrointestinal:  Negative for abdominal pain and diarrhea.   Genitourinary:  Negative for frequency.   Musculoskeletal:  Positive for back pain (improved).   Integumentary:  Negative for rash.   Neurological:  Negative for headaches.   Hematological:  Negative for adenopathy.   Psychiatric/Behavioral:  The patient is not nervous/anxious.    All other systems reviewed and are negative.        Objective:      Physical Exam  Vitals reviewed.   Constitutional:       General: She is not in acute distress.     Appearance: She is obese.   Cardiovascular:      Rate and Rhythm: Normal rate and regular rhythm.      Heart sounds: Murmur heard.   Pulmonary:      Effort: Pulmonary effort is normal. No respiratory distress.      Breath sounds: Normal breath sounds. No wheezing or rales.   Abdominal:      Palpations: Abdomen is soft. There is no mass.      Tenderness: There is no abdominal tenderness.   Musculoskeletal:      Right lower leg: Edema (mild) present.      Left lower leg: Edema (mild) present.   Lymphadenopathy:      Cervical: No cervical adenopathy.      Upper Body:      Right upper body: No supraclavicular or axillary adenopathy.      Left upper body: No supraclavicular or axillary adenopathy.   Neurological:       Mental Status: She is alert and oriented to person, place, and time.   Psychiatric:         Mood and Affect: Mood normal.         Behavior: Behavior normal.         Thought Content: Thought content normal.         Judgment: Judgment normal.         Assessment:    Labs are pending  Problem List Items Addressed This Visit       Chronic heart failure with preserved ejection fraction    Breast cancer, stage 4, left - Primary (Chronic)    Malignant neoplasm metastatic to left lung (Chronic)    Biliary cirrhosis (Chronic)       Plan:         I will see her again in 6 weeks' time with labs and scans        Route Chart for Scheduling    Med Onc Chart Routing      Follow up with physician 6 weeks.   Follow up with JIMI    Infusion scheduling note    Injection scheduling note    Labs CBC and CMP   Scheduling:  Preferred lab:  Lab interval:     Imaging CT chest abdomen pelvis      Pharmacy appointment No pharmacy appointment needed      Other referrals no referral to Oncology Primary Care needed -  no Massage appointment needed    No additional referrals needed           Supportive Plan Information  OP ZOLEDRONIC ACID (ZOMETA) 4MG Q6M Home Willis MD   Associated Diagnosis: Compression fracture of lumbar spine, non-traumatic, sequela   noted on 11/25/2024  Associated Diagnosis: Acute gastrointestinal bleeding   noted on 4/11/2024  Associated Diagnosis: Breast cancer, stage 4, left Stage IV cT4b, cN2a, cM1, G3, ER-, NC-, HER2+ noted on 6/8/2021   Line of treatment: Supportive Care   Treatment goal: Supportive     Upcoming Treatment Dates - OP ZOLEDRONIC ACID (ZOMETA) 4MG Q6M    5/27/2025       Medications       zoledronic 4 mg/100 mL infusion 4 mg  11/11/2025       Medications       zoledronic 4 mg/100 mL infusion 4 mg  4/28/2026       Medications       zoledronic 4 mg/100 mL infusion 4 mg  10/13/2026       Medications       zoledronic 4 mg/100 mL infusion 4 mg

## 2025-04-17 ENCOUNTER — PATIENT MESSAGE (OUTPATIENT)
Dept: HEMATOLOGY/ONCOLOGY | Facility: CLINIC | Age: 55
End: 2025-04-17

## 2025-04-17 ENCOUNTER — TELEPHONE (OUTPATIENT)
Dept: PALLIATIVE MEDICINE | Facility: CLINIC | Age: 55
End: 2025-04-17
Payer: MEDICARE

## 2025-04-17 ENCOUNTER — LAB VISIT (OUTPATIENT)
Dept: LAB | Facility: HOSPITAL | Age: 55
End: 2025-04-17
Attending: INTERNAL MEDICINE
Payer: MEDICARE

## 2025-04-17 ENCOUNTER — OFFICE VISIT (OUTPATIENT)
Dept: HEMATOLOGY/ONCOLOGY | Facility: CLINIC | Age: 55
End: 2025-04-17
Payer: MEDICARE

## 2025-04-17 VITALS
TEMPERATURE: 98 F | DIASTOLIC BLOOD PRESSURE: 58 MMHG | HEART RATE: 101 BPM | BODY MASS INDEX: 30.63 KG/M2 | SYSTOLIC BLOOD PRESSURE: 131 MMHG | WEIGHT: 166.44 LBS | RESPIRATION RATE: 18 BRPM | OXYGEN SATURATION: 98 % | HEIGHT: 62 IN

## 2025-04-17 DIAGNOSIS — I50.32 CHRONIC HEART FAILURE WITH PRESERVED EJECTION FRACTION: ICD-10-CM

## 2025-04-17 DIAGNOSIS — C78.02 MALIGNANT NEOPLASM METASTATIC TO LEFT LUNG: ICD-10-CM

## 2025-04-17 DIAGNOSIS — C50.912 BREAST CANCER, STAGE 4, LEFT: Primary | Chronic | ICD-10-CM

## 2025-04-17 DIAGNOSIS — K74.5 BILIARY CIRRHOSIS: Chronic | ICD-10-CM

## 2025-04-17 DIAGNOSIS — C78.02 MALIGNANT NEOPLASM METASTATIC TO LEFT LUNG: Chronic | ICD-10-CM

## 2025-04-17 LAB
ABSOLUTE EOSINOPHIL (OHS): 0.44 K/UL
ABSOLUTE MONOCYTE (OHS): 0.31 K/UL (ref 0.3–1)
ABSOLUTE NEUTROPHIL COUNT (OHS): 2.1 K/UL (ref 1.8–7.7)
ALBUMIN SERPL BCP-MCNC: 2.7 G/DL (ref 3.5–5.2)
ALP SERPL-CCNC: 121 UNIT/L (ref 40–150)
ALT SERPL W/O P-5'-P-CCNC: 18 UNIT/L (ref 10–44)
ANION GAP (OHS): 5 MMOL/L (ref 8–16)
AST SERPL-CCNC: 44 UNIT/L (ref 11–45)
BASOPHILS # BLD AUTO: 0.04 K/UL
BASOPHILS NFR BLD AUTO: 1.1 %
BILIRUB SERPL-MCNC: 5.1 MG/DL (ref 0.1–1)
BUN SERPL-MCNC: 8 MG/DL (ref 6–20)
CALCIUM SERPL-MCNC: 8.4 MG/DL (ref 8.7–10.5)
CHLORIDE SERPL-SCNC: 103 MMOL/L (ref 95–110)
CO2 SERPL-SCNC: 33 MMOL/L (ref 23–29)
CREAT SERPL-MCNC: 0.6 MG/DL (ref 0.5–1.4)
ERYTHROCYTE [DISTWIDTH] IN BLOOD BY AUTOMATED COUNT: 16.4 % (ref 11.5–14.5)
GFR SERPLBLD CREATININE-BSD FMLA CKD-EPI: >60 ML/MIN/1.73/M2
GLUCOSE SERPL-MCNC: 77 MG/DL (ref 70–110)
HCT VFR BLD AUTO: 40.7 % (ref 37–48.5)
HGB BLD-MCNC: 13.4 GM/DL (ref 12–16)
IMM GRANULOCYTES # BLD AUTO: 0.01 K/UL (ref 0–0.04)
IMM GRANULOCYTES NFR BLD AUTO: 0.3 % (ref 0–0.5)
LYMPHOCYTES # BLD AUTO: 0.85 K/UL (ref 1–4.8)
MCH RBC QN AUTO: 33.8 PG (ref 27–31)
MCHC RBC AUTO-ENTMCNC: 32.9 G/DL (ref 32–36)
MCV RBC AUTO: 103 FL (ref 82–98)
NUCLEATED RBC (/100WBC) (OHS): 0 /100 WBC
PLATELET # BLD AUTO: 137 K/UL (ref 150–450)
PMV BLD AUTO: 10.1 FL (ref 9.2–12.9)
POTASSIUM SERPL-SCNC: 3.7 MMOL/L (ref 3.5–5.1)
PROT SERPL-MCNC: 6.2 GM/DL (ref 6–8.4)
RBC # BLD AUTO: 3.97 M/UL (ref 4–5.4)
RELATIVE EOSINOPHIL (OHS): 11.7 %
RELATIVE LYMPHOCYTE (OHS): 22.7 % (ref 18–48)
RELATIVE MONOCYTE (OHS): 8.3 % (ref 4–15)
RELATIVE NEUTROPHIL (OHS): 55.9 % (ref 38–73)
SODIUM SERPL-SCNC: 141 MMOL/L (ref 136–145)
WBC # BLD AUTO: 3.75 K/UL (ref 3.9–12.7)

## 2025-04-17 PROCEDURE — 85025 COMPLETE CBC W/AUTO DIFF WBC: CPT

## 2025-04-17 PROCEDURE — 99999 PR PBB SHADOW E&M-EST. PATIENT-LVL III: CPT | Mod: PBBFAC,,, | Performed by: INTERNAL MEDICINE

## 2025-04-17 PROCEDURE — 36415 COLL VENOUS BLD VENIPUNCTURE: CPT

## 2025-04-17 PROCEDURE — 99213 OFFICE O/P EST LOW 20 MIN: CPT | Mod: PBBFAC | Performed by: INTERNAL MEDICINE

## 2025-04-17 PROCEDURE — 82040 ASSAY OF SERUM ALBUMIN: CPT

## 2025-04-17 RX ORDER — EPINEPHRINE 0.3 MG/.3ML
0.3 INJECTION SUBCUTANEOUS ONCE AS NEEDED
OUTPATIENT
Start: 2025-05-27

## 2025-04-17 RX ORDER — SODIUM CHLORIDE 0.9 % (FLUSH) 0.9 %
10 SYRINGE (ML) INJECTION
OUTPATIENT
Start: 2025-05-27

## 2025-04-17 RX ORDER — DIPHENHYDRAMINE HYDROCHLORIDE 50 MG/ML
50 INJECTION, SOLUTION INTRAMUSCULAR; INTRAVENOUS ONCE AS NEEDED
OUTPATIENT
Start: 2025-05-27

## 2025-04-17 RX ORDER — ZOLEDRONIC ACID 0.04 MG/ML
4 INJECTION, SOLUTION INTRAVENOUS
OUTPATIENT
Start: 2025-05-27

## 2025-04-17 RX ORDER — HEPARIN 100 UNIT/ML
500 SYRINGE INTRAVENOUS
OUTPATIENT
Start: 2025-05-27

## 2025-04-22 ENCOUNTER — TELEPHONE (OUTPATIENT)
Dept: HEMATOLOGY/ONCOLOGY | Facility: CLINIC | Age: 55
End: 2025-04-22
Payer: MEDICARE

## 2025-04-23 ENCOUNTER — OFFICE VISIT (OUTPATIENT)
Dept: PALLIATIVE MEDICINE | Facility: CLINIC | Age: 55
End: 2025-04-23
Payer: MEDICARE

## 2025-04-23 VITALS
HEART RATE: 104 BPM | SYSTOLIC BLOOD PRESSURE: 142 MMHG | DIASTOLIC BLOOD PRESSURE: 64 MMHG | WEIGHT: 171.31 LBS | BODY MASS INDEX: 31.33 KG/M2 | OXYGEN SATURATION: 94 %

## 2025-04-23 DIAGNOSIS — G47.00 INSOMNIA, UNSPECIFIED TYPE: Primary | ICD-10-CM

## 2025-04-23 DIAGNOSIS — F41.9 ANXIETY: ICD-10-CM

## 2025-04-23 DIAGNOSIS — C50.912 BREAST CANCER, STAGE 4, LEFT: ICD-10-CM

## 2025-04-23 DIAGNOSIS — R60.9 EDEMA, UNSPECIFIED TYPE: ICD-10-CM

## 2025-04-23 DIAGNOSIS — K92.2 GASTROINTESTINAL HEMORRHAGE, UNSPECIFIED GASTROINTESTINAL HEMORRHAGE TYPE: ICD-10-CM

## 2025-04-23 DIAGNOSIS — G89.3 CANCER RELATED PAIN: ICD-10-CM

## 2025-04-23 DIAGNOSIS — C78.02 MALIGNANT NEOPLASM METASTATIC TO LEFT LUNG: ICD-10-CM

## 2025-04-23 DIAGNOSIS — Z51.5 ENCOUNTER FOR PALLIATIVE CARE: ICD-10-CM

## 2025-04-23 DIAGNOSIS — R53.83 FATIGUE, UNSPECIFIED TYPE: ICD-10-CM

## 2025-04-23 PROCEDURE — 99213 OFFICE O/P EST LOW 20 MIN: CPT | Mod: PBBFAC | Performed by: NURSE PRACTITIONER

## 2025-04-23 PROCEDURE — 99999 PR PBB SHADOW E&M-EST. PATIENT-LVL III: CPT | Mod: PBBFAC,,, | Performed by: NURSE PRACTITIONER

## 2025-04-23 RX ORDER — TRAZODONE HYDROCHLORIDE 50 MG/1
25 TABLET ORAL NIGHTLY
Qty: 8 TABLET | Refills: 0 | Status: SHIPPED | OUTPATIENT
Start: 2025-04-23 | End: 2025-05-09

## 2025-04-23 NOTE — PROGRESS NOTES
Consult Note  Palliative Care      Consult Requested By: No ref. provider found  Reason for Consult: symptom mgmt/ACP      ASSESSMENT/PLAN:     Plan/Recommendations:    04/23/2025:  - start trazodone 25-50 mg qhs prn for insomnia    Metastatic breast cancer  - following with Dr. Willis & NP [facility]   - ID 2006, chemo, s/p radical mastectomy (2007), s/p post-op RT, herceptin x 3.5 years until progression in lung mass, cycled through myriad of chemo tx with progression on July 2020 PET, 9 months of continued therapy w eventual progression, stable on 4/2024 CT  - on ENHERTU on 2 years  - currently observation off therapy     Encounter for palliative care  - Patient is decisional  - Patient accompanied today by self  - ACP documents are not uploaded into EMR   - Philosophy of Palliative Medicine reviewed with patient and family at first visit  - New patient folder given to and reviewed with patient and family at first visit  - Goals of care: To spend as much time with daughter and grandson as she can, to remain functional, independent.     Cancer associated fatigue/insomnia   - infrequent but severe when it occurs, trazodone 25-50 mg prn qhs   - currently on ritalin, which helps w fatigue     Edema   - BLE  - follows with cardiologist  - controls with daily lasix, will hold doses on a busy day where it will be difficult to use a bathroom frequently  - using incontinence supplies though they are difficult for her to afford   - compression stockings     Cancer pain  - s/p kyphoplasty which has provided near complete relief of back pain  - heating pad  - lidocaine patch  - Vionic shoes  - using walker at home, cane while out   - using norco 5 mg bid prn, only at night    Depression, anxiety  - mostly r/t role as primary caretaker for 88 year-old mom with severe dementia  - plan for mother to switch to gerontologist  - plan for mother to be placed in a nursing home  - sister has become more involved in assisting patient  with their mother's care  - patient enjoys time w grandson, friends and getting of the house when she can, care for aaron dog    Understanding of illness: Excellent     Goals of care: preserve qol, independence, activity tolerance    Follow up: 12 weeks    Patient's encounter and above plan of care discussed with patient's oncology team.     SUBJECTIVE:     History of Present Illness:  Patient is a 54 y.o. year old female presenting with metastatic breast cancer. Please see oncology notes for full oncologic history and treatment course.     04/23/2025:  JOSSUE ELIZALDE reviewed: no concerns    History of Present Illness    INTERVAL HISTORY :  Patient underwent a kyphoplasty procedure three weeks ago, which has significantly improved her back pain and posture. She reports being sore, especially when walking quickly or as the day progresses, but overall is not in pain. Despite the improvement in back pain, the patient is experiencing sleep issues. She reports difficulty falling asleep and staying asleep, waking up after one or two hours on one or two nights per week. When she wakes up, she usually stays in bed and reads. Patient has tried various interventions for sleep, including using a 3-inch gel foam mattress, keeping the bedroom dark, using a small lamp for reading, and listening to music. She has also tried melatonin, which works inconsistently for her. Patient is currently taking hydrocodone before bed for her back. She expresses concern about taking sleep medication that might make it difficult to wake up when needed, as she has responsibilities caring for her mother in the morning. Patient denies sleeping in anything other than a bed or turning on bright lights when she wakes up at night.    ACTIVITIES OF DAILY LIVING:  - Reports difficulty going up and down stairs at her house, which has five stairs in the front and back  - Sleeps in a bed with a 3-inch gel foam mattress  - Experiences intermittent sleep disturbances,  waking up after 1-2 hours of sleep 1-2 nights per week  - Stays in bed when she wakes up at night, reading books or listening to music to help fall back asleep  - Bedroom is kept dark with paper shades and blinds to block out sunlight  - Able to cook for herself, mentioning plans to get a larger refrigerator for more storage    GOALS OF CARE/ADVANCED DIRECTIVES:  - Expressed uncertainty about end-of-life decisions  - Completed an advanced directive document, designating her daughter as the primary healthcare decision-maker and her sister as the secondary choice  - Lives with her mother, who has dementia, and is considering moving in permanently to provide care and save money  - Sister is involved in discussions about the patient's living situation and care for their mother  - Expressed a desire [place]       Assessment & Plan    PLAN SUMMARY:  - Continue Norco (hydrocodone) for back pain management  - Prescribe trazodone 50 mg tablets for sleep issues  - Take 1/2 tablet (25 mg) at bedtime as needed for insomnia  - Pharmacy to split tablets  - Complete living will documentation  - Patient to be assigned new provider due to current provider's relocation  - Follow up in 12 weeks    Portions of this note were generated by [employer].  Time spent with patient: 48 minutes          LOV: 02/12/2025    Past Medical History:   Diagnosis Date    Acute encephalopathy 05/25/2024    Aortic atherosclerosis 07/06/2023    Breast cancer     CHF (congestive heart failure)     Chronic venous insufficiency 04/02/2025    Coronary artery calcification seen on CT scan 09/19/2024    Esophageal and gastric varices 06/23/2023    Hepatic encephalopathy 05/25/2024    Liver disease     Malignant neoplasm metastatic to left lung 06/08/2021    Stenosis of aortic and mitral valves     Aortic stenosis     Past Surgical History:   Procedure Laterality Date    ENDOSCOPIC ULTRASOUND OF  UPPER GASTROINTESTINAL TRACT N/A 06/27/2023    Procedure: ULTRASOUND, UPPER GI TRACT, ENDOSCOPIC;  Surgeon: Home Lopez MD;  Location: [facility] (2ND FLR);  Service: Endoscopy;  Laterality: N/A;    ENDOSCOPIC ULTRASOUND OF UPPER GASTROINTESTINAL TRACT N/A 01/12/2024    Procedure: ULTRASOUND, UPPER GI TRACT, ENDOSCOPIC;  Surgeon: Home Lopez MD;  Location: [facility] (2ND FLR);  Service: Endoscopy;  Laterality: N/A;  11/28/23-Instructions via portal-DS  1/8-lvm for precall-MS  1/10-precall complete-Kpvt    ESOPHAGOGASTRODUODENOSCOPY N/A 06/23/2023    Procedure: EGD (ESOPHAGOGASTRODUODENOSCOPY);  Surgeon: Quintin Mooney MD;  Location: [facility] (2ND FLR);  Service: Endoscopy;  Laterality: N/A;    ESOPHAGOGASTRODUODENOSCOPY N/A 06/27/2023    Procedure: EGD (ESOPHAGOGASTRODUODENOSCOPY);  Surgeon: Home Lopez MD;  Location: [facility] (2ND FLR);  Service: Endoscopy;  Laterality: N/A;    ESOPHAGOGASTRODUODENOSCOPY N/A 08/03/2023    Procedure: EGD (ESOPHAGOGASTRODUODENOSCOPY);  Surgeon: Home Lopez MD;  Location: [facility] (2ND FLR);  Service: Endoscopy;  Laterality: N/A;  instr portal-labs 6/28/23-tb    ESOPHAGOGASTRODUODENOSCOPY N/A 01/12/2024    Procedure: EGD (ESOPHAGOGASTRODUODENOSCOPY);  Surgeon: Home Lopez MD;  Location: [facility] (2ND FLR);  Service: Endoscopy;  Laterality: N/A;    ESOPHAGOGASTRODUODENOSCOPY N/A 04/10/2024    Procedure: EGD (ESOPHAGOGASTRODUODENOSCOPY);  Surgeon: Ze Anderson MD;  Location: [facility] (2ND FLR);  Service: Endoscopy;  Laterality: N/A;    INSERTION OF TUNNELED CENTRAL VENOUS CATHETER (CVC) WITH SUBCUTANEOUS PORT      MASTECTOMY       Family History   Problem Relation Name Age of Onset    Hypertension Mother      Heart attack Father      Lung cancer Father       Review of patient's allergies indicates:  No Known Allergies    Medications:    Current Outpatient Medications:     empagliflozin (JARDIANCE) 10 mg tablet, Take 1 tablet (10 mg total) by mouth once  daily., Disp: 30 tablet, Rfl: 11    furosemide (LASIX) 80 MG tablet, Take 1 tablet (80 mg total) by mouth once daily. If you are gaining weight (2 lbs in 24 hours or 3 lbs in two days) may take an extra dose of 80 mg, Disp: 90 tablet, Rfl: 2    HYDROcodone-acetaminophen (NORCO) 5-325 mg per tablet, Take 1 tablet by mouth every 6 (six) hours as needed for Pain., Disp: 40 tablet, Rfl: 0    lactulose (CHRONULAC) 10 gram/15 mL solution, Take 30 mLs (20 g total) by mouth 3 (three) times daily., Disp: 2700 mL, Rfl: 11    methylphenidate HCl (RITALIN) 5 MG tablet, Take 1 tablet (5 mg total) by mouth 2 (two) times daily., Disp: 60 tablet, Rfl: 0    pantoprazole (PROTONIX) 40 MG tablet, Take 1 tablet (40 mg total) by mouth 2 (two) times daily., Disp: 180 tablet, Rfl: 0    rifAXIMin (XIFAXAN) 550 mg Tab, Take 1 tablet (550 mg total) by mouth 2 (two) times daily., Disp: 60 tablet, Rfl: 11    spironolactone (ALDACTONE) 100 MG tablet, Take 1 tablet (100 mg total) by mouth once daily., Disp: 30 tablet, Rfl: 11    OBJECTIVE:       ROS:  Review of Systems   Constitutional:  Positive for fatigue. Negative for activity change and appetite change.   HENT:  Negative for congestion, dental problem and drooling.    Eyes:  Negative for pain, discharge and itching.   Respiratory:  Positive for cough. Negative for choking and wheezing.    Cardiovascular:  Positive for leg swelling.   Gastrointestinal:  Negative for constipation, diarrhea, nausea and vomiting.   Genitourinary:  Negative for difficulty urinating, dyspareunia and dysuria.   Musculoskeletal:  Positive for back pain and gait problem. Negative for arthralgias.   Skin:  Negative for pallor, rash and wound.   Neurological:  Positive for weakness. Negative for dizziness, facial asymmetry and headaches.   Psychiatric/Behavioral:  Positive for sleep disturbance. Negative for dysphoric mood. The patient is not nervous/anxious.        Review of Symptoms      Symptom Assessment (ESAS  0-10 Scale)  Pain:  3  Dyspnea:  0  Anxiety:  10  Nausea:  0  Depression:  0  Anorexia:  0  Fatigue:  7  Insomnia:  9  Restlessness:  6  Agitation:  0     CAM / Delirium:  Negative  Constipation:  Negative  Diarrhea:  Negative    Anxiety:  Is not nervous/anxious  Constipation:  No constipation    Bowel Management Plan (BMP):  No      Pain Assessment:  OME in 24 hours:  10  Location(s): none      Modified Hunter Scale:  0    ECOG Performance Status stGstrstastdstest:st st1st Living Arrangements:  Lives with family    Psychosocial/Cultural:   See Palliative Psychosocial Note: No  **Primary  to Follow**  Palliative Care  Consult: No     Time-Based Charting:  No      Advance Care Planning   Advance Directives:   Living Will: No        Oral Declaration: No    LaPOST: No    Do Not Resuscitate Status: No        Oral Declaration: No      Decision Making:  Patient answered questions  Goals of Care: What is most important right now is to focus on remaining as independent as possible, symptom/pain control, curative/life-prolongation (regardless of treatment burdens), comfort and QOL . Accordingly, we have decided that the best plan to meet the patient's goals includes continuing with treatment.        Physical Exam:  Vitals:    Vitals:    04/23/25 0818   BP: (!) 142/64   Pulse: 104          Physical Exam  Vitals reviewed.   Constitutional:       General: She is not in acute distress.     Appearance: Normal appearance. She is obese. She is not ill-appearing, toxic-appearing or diaphoretic.   HENT:      Head: Atraumatic.      Right Ear: External ear normal.      Left Ear: External ear normal.      Nose: Nose normal.      Mouth/Throat:      Dentition: Abnormal dentition.   Eyes:      General:         Right eye: No discharge.         Left eye: No discharge.      Extraocular Movements: Extraocular movements intact.      Conjunctiva/sclera: Conjunctivae normal.   Pulmonary:      Effort: Pulmonary effort is normal. No  respiratory distress.      Breath sounds: No stridor.   Musculoskeletal:         General: Normal range of motion.      Cervical back: Normal range of motion.      Right lower leg: Edema present.      Left lower leg: Edema present.   Skin:     General: Skin is warm and dry.      Coloration: Skin is not jaundiced.   Neurological:      Mental Status: She is alert and oriented to person, place, and time. Mental status is at baseline.      Motor: No weakness.      Gait: Gait abnormal (with cane).   Psychiatric:         Behavior: Behavior normal.         Thought Content: Thought content normal.         Judgment: Judgment normal.         Labs:  CBC:   WBC   Date Value Ref Range Status   04/17/2025 3.75 (L) 3.90 - 12.70 K/uL Final     Hemoglobin   Date Value Ref Range Status   02/20/2025 13.7 12.0 - 16.0 g/dL Final     HGB   Date Value Ref Range Status   04/17/2025 13.4 12.0 - 16.0 gm/dL Final     Hematocrit   Date Value Ref Range Status   02/20/2025 41.6 37.0 - 48.5 % Final     HCT   Date Value Ref Range Status   04/17/2025 40.7 37.0 - 48.5 % Final     MCV   Date Value Ref Range Status   04/17/2025 103 (H) 82 - 98 fL Final   02/20/2025 103 (H) 82 - 98 fL Final     Platelet Count   Date Value Ref Range Status   04/17/2025 137 (L) 150 - 450 K/uL Final     Platelets   Date Value Ref Range Status   02/20/2025 117 (L) 150 - 450 K/uL Final       LFT:   Lab Results   Component Value Date    AST 44 04/17/2025    ALKPHOS 121 04/17/2025    BILITOT 5.1 (H) 04/17/2025       Albumin:   Albumin   Date Value Ref Range Status   04/17/2025 2.7 (L) 3.5 - 5.2 g/dL Final   02/20/2025 2.8 (L) 3.5 - 5.2 g/dL Final     Protein:   Total Protein   Date Value Ref Range Status   02/20/2025 6.4 6.0 - 8.4 g/dL Final       Radiology:I have reviewed all pertinent imaging results/findings within the past 24 hours.    02/04/2025 US liver: Impression:     Postprocedural change of TIPS placement with recent TIPS revision.  Overall improved velocity about  the TIPS shunt most notably about the mid aspect, with expected reversal flow about the right anterior branch portal vein.  Attention on follow-up imaging of persistent decreased velocity about the TIPS shunt portal venous end and bidirectional flow about the left portal vein.     Cirrhotic liver morphology.  No focal hepatic lesions.     Sequela of portal hypertension including splenomegaly.  No ascites.     Cholelithiasis without evidence for acute cholecystitis.    11/11/2024 CT AP: Impression:     History of breast cancer status post left mastectomy and axillary lymph node dissection.     *Unchanged right axillary and bilateral inguinal lymphadenopathy.  *Unchanged left pleural thickening.  *Multiple solid pulmonary nodules scattered throughout both lungs measuring up to 2.5 cm, unchanged.  The left perihilar mass occludes adjacent airways resulting in partial collapse of the left lower lobe.  *No abdominopelvic metastases.  Mosaic attenuation throughout both lungs, which could represent small airway disease, pulmonary edema, or infection.     Cirrhosis with signs of portal hypertension including splenomegaly and portosystemic collaterals.  Patent tips.  Embolization coils in the upper abdomen, possibly from BRTO.  No focal hepatic lesions.     Multiple compression fractures in the thoracolumbar spine with severe height loss at T8, many of which are new.    08/15/2024 CT chest: Impression:     1. Right upper lobe and infrahilar left lower lobe nodules are minimally bulkier compared to the prior study.  These remain suspicious for metastatic lesions.  2. Residual scattered ground-glass opacities bilaterally, improved in interval.  Resolution of the previously seen effusions.  3. Additional stable findings, as described above.      Signature: Lizbeth Mehta, DNP

## 2025-04-28 NOTE — TELEPHONE ENCOUNTER
"Returned pts call. Pt did not answer, left vm for pt to give the office a call back   ----- Message from Quinten Leung sent at 5/29/2024  9:47 AM CDT -----  Scheduling Request      Patient Status: Est    Scheduling Appt: Hospital F/u    Time/Date Preference:  Within 2 weeks    Contact Preference?: [phone]    Treating Provider: Henry    Additional Notes: Requesting to still keep the 7/30 appt    "Thank you for all that you do for our patients"  " Detail Level: Detailed Detail Level: Simple Sunscreen Recommendations: Mineral based

## 2025-05-04 DIAGNOSIS — M48.56XS COMPRESSION FRACTURE OF LUMBAR SPINE, NON-TRAUMATIC, SEQUELA: ICD-10-CM

## 2025-05-05 RX ORDER — HYDROCODONE BITARTRATE AND ACETAMINOPHEN 5; 325 MG/1; MG/1
1 TABLET ORAL EVERY 6 HOURS PRN
Qty: 40 TABLET | Refills: 0 | Status: SHIPPED | OUTPATIENT
Start: 2025-05-05

## 2025-05-08 DIAGNOSIS — E87.70 HYPERVOLEMIA, UNSPECIFIED HYPERVOLEMIA TYPE: ICD-10-CM

## 2025-05-09 RX ORDER — SPIRONOLACTONE 100 MG/1
100 TABLET, FILM COATED ORAL DAILY
Qty: 30 TABLET | Refills: 11 | OUTPATIENT
Start: 2025-05-09 | End: 2026-05-09

## 2025-05-12 DIAGNOSIS — R41.840 LACK OF CONCENTRATION: ICD-10-CM

## 2025-05-13 RX ORDER — METHYLPHENIDATE HYDROCHLORIDE 5 MG/1
5 TABLET ORAL 2 TIMES DAILY
Qty: 60 TABLET | Refills: 0 | Status: SHIPPED | OUTPATIENT
Start: 2025-05-13

## 2025-05-17 DIAGNOSIS — K92.2 GASTROINTESTINAL HEMORRHAGE, UNSPECIFIED GASTROINTESTINAL HEMORRHAGE TYPE: ICD-10-CM

## 2025-05-19 RX ORDER — PANTOPRAZOLE SODIUM 40 MG/1
40 TABLET, DELAYED RELEASE ORAL 2 TIMES DAILY
Qty: 180 TABLET | Refills: 0 | Status: SHIPPED | OUTPATIENT
Start: 2025-05-19 | End: 2025-08-18

## 2025-05-24 ENCOUNTER — HOSPITAL ENCOUNTER (OUTPATIENT)
Dept: RADIOLOGY | Facility: HOSPITAL | Age: 55
Discharge: HOME | End: 2025-05-24
Attending: INTERNAL MEDICINE
Payer: MEDICARE

## 2025-05-24 DIAGNOSIS — C50.912 BREAST CANCER, STAGE 4, LEFT: Chronic | ICD-10-CM

## 2025-05-24 PROCEDURE — 71260 CT THORAX DX C+: CPT | Mod: 26,,, | Performed by: RADIOLOGY

## 2025-05-24 PROCEDURE — 71260 CT THORAX DX C+: CPT | Mod: TC

## 2025-05-24 PROCEDURE — 25500020 PHARM REV CODE 255: Performed by: INTERNAL MEDICINE

## 2025-05-24 RX ADMIN — IOHEXOL 100 ML: 350 INJECTION, SOLUTION INTRAVENOUS at 09:05

## 2025-05-26 DIAGNOSIS — M48.56XS COMPRESSION FRACTURE OF LUMBAR SPINE, NON-TRAUMATIC, SEQUELA: ICD-10-CM

## 2025-05-26 DIAGNOSIS — G47.00 INSOMNIA, UNSPECIFIED TYPE: ICD-10-CM

## 2025-05-26 RX ORDER — TRAZODONE HYDROCHLORIDE 50 MG/1
25 TABLET ORAL NIGHTLY
Qty: 8 TABLET | Refills: 0 | Status: SHIPPED | OUTPATIENT
Start: 2025-05-26 | End: 2025-06-13

## 2025-05-27 ENCOUNTER — HOSPITAL ENCOUNTER (OUTPATIENT)
Dept: RADIOLOGY | Facility: HOSPITAL | Age: 55
Discharge: HOME | End: 2025-05-27
Attending: STUDENT IN AN ORGANIZED HEALTH CARE EDUCATION/TRAINING PROGRAM
Payer: MEDICARE

## 2025-05-27 DIAGNOSIS — K74.60 LIVER CIRRHOSIS SECONDARY TO NASH: ICD-10-CM

## 2025-05-27 DIAGNOSIS — K75.81 LIVER CIRRHOSIS SECONDARY TO NASH: ICD-10-CM

## 2025-05-27 PROCEDURE — 93976 VASCULAR STUDY: CPT | Mod: TC

## 2025-05-27 RX ORDER — HYDROCODONE BITARTRATE AND ACETAMINOPHEN 5; 325 MG/1; MG/1
1 TABLET ORAL EVERY 6 HOURS PRN
Qty: 40 TABLET | Refills: 0 | Status: SHIPPED | OUTPATIENT
Start: 2025-05-27

## 2025-05-28 NOTE — PROGRESS NOTES
Subjective:       Patient ID: Shikha López is a 54 y.o. female.    Chief Complaint: No chief complaint on file.      HPI 54-year-old female who returns for F/U  for metastatic HER 2 + breast cancer.  She was on Trastuzumab deruxtecan  - her last treatment was May 7, 2024. Treatment has been held due to cirrhosis and hepatic encephalopathy.  She has also had variceal bleeding and CHF.  - She has chronic cytopenias - with thrombocytopenia.    Today she reports that her back pain has gradually been getting better.  Her breathing is doing well.    Her lower extremity swelling has been controlled with medication and use of support hose.              Breast Cancer history:  She presented in the emergency room at [facility] on September 29, 2006 with a 4 months history of inflammation of her left breast.  Physical examination at that time showed the left breast was largely replaced by large mass with multiple skin ulcerations.    A biopsy in September 2006 showed poorly differentiated carcinoma which was ER and NC. negative and HER2 positive.    She was then referred to [facility] for additional care.   CT scan of the chest and abdomen November 2006 revealed multiple nodules in the lungs consistent with metastatic disease.  There also enlarged left axillary node.    She was treated with chemotherapy with weekly Herceptin and Abraxane with improvement in her breast and lung metastasis.    On May 29, 2007 she underwent left modified radical mastectomy(Dr. Colvin) which showed no residual tumor in the breast and 1/5 nodes was positive for metastasis measuring 6 mm.  (ypT0N1).  She then received postoperative radiation therapy(Dr Singh)  to the left chest wall supraclav and internal mammary lymph nodes from August 20, 2007 to September 28, 2007.    Postoperatively she continued on Herceptin for approximately 3 and 1/2 years before her lung metastasis begin to grow.    She subsequently received a number of  different chemotherapy treatments including Adriamycin, Halaven, Xeloda plus lapatinib then Xeloda plus Herceptin. (  in [facility].)    Subsequently, she transferred her care to  at [facility].  -She was initially treated with Taxotere Herceptin Perjeta.    -She was then changed to Kadcyla.    In July 2020 PET scan showed progression.   She then took approximately 9 months of Herceptin and Navelbine with initial response then progression.    She started trastuzumab- deruxtecan  4/12/21.     CT 3/11/24 - stable  Stable postsurgical changes of left mastectomy and axillary lymph node dissection is patient with metastatic left breast cancer.   Redemonstration of multiple irregular bilateral pulmonary nodules concerning for metastatic disease.  No new lesions identified.   Hepatic steatosis and portal hypertension.  Increased bowel wall thickening and diffuse mesenteric edema with mild ascites, likely related to hepatic dysfunction.    Echo 4/24/24 - EF 60-65%      CT 4/29/24  -stable    CT chest - 8/15/24 - 1. Right upper lobe and infrahilar left lower lobe nodules are minimally bulkier compared to the prior study.    2. Residual scattered ground-glass opacities bilaterally, improved in interval.  Resolution of the previously seen effusions      CT CAP 11/11/24 - *Unchanged right axillary and bilateral inguinal lymphadenopathy.  *Unchanged left pleural thickening.  *Multiple solid pulmonary nodules scattered throughout both lungs measuring up to 2.5 cm, unchanged.   Mosaic attenuation throughout both lungs, which could represent small airway disease, pulmonary edema, or infection.   Cirrhosis with signs of portal hypertension including splenomegaly and portosystemic collaterals.  No focal hepatic lesions.   Multiple compression fractures in the thoracolumbar spine with severe height loss at T8, many of which are new    CT 2/17/25 - 1. Unchanged bilateral nodular opacities.  No new nodules.  2.  Stable bilateral irregular opacities.  3. Left upper lobe posttreatment changes  4. New multilevel vertebral compression fractures      She had kyphoplasty T12 and L 4 on  4/1/25.    CT -     Review of Systems   Constitutional:  Positive for appetite change (Improved). Negative for activity change and unexpected weight change.   HENT:  Negative for mouth sores.    Eyes:  Negative for visual disturbance.   Respiratory:  Negative for cough and shortness of breath.    Cardiovascular:  Positive for leg swelling (stable). Negative for chest pain.   Gastrointestinal:  Negative for abdominal pain and diarrhea.   Genitourinary:  Negative for frequency.   Musculoskeletal:  Positive for back pain (improved).   Integumentary:  Negative for rash.   Neurological:  Negative for headaches.   Hematological:  Negative for adenopathy.   Psychiatric/Behavioral:  The patient is not nervous/anxious.    All other systems reviewed and are negative.        Objective:      Physical Exam  Vitals reviewed.   Constitutional:       General: She is not in acute distress.     Appearance: She is obese.   Cardiovascular:      Rate and Rhythm: Normal rate and regular rhythm.      Heart sounds: Murmur heard.   Pulmonary:      Effort: Pulmonary effort is normal. No respiratory distress.      Breath sounds: Wheezing (occasional) present. No rales.   Abdominal:      Palpations: Abdomen is soft. There is no mass.      Tenderness: There is no abdominal tenderness.   Musculoskeletal:      Right lower leg: Edema (mild) present.      Left lower leg: Edema (mild) present.   Lymphadenopathy:      Cervical: No cervical adenopathy.      Upper Body:      Right upper body: No supraclavicular or axillary adenopathy.      Left upper body: No supraclavicular or axillary adenopathy.   Neurological:      Mental Status: She is alert and oriented to person, place, and time.   Psychiatric:         Mood and Affect: Mood normal.         Behavior: Behavior normal.          Thought Content: Thought content normal.         Judgment: Judgment normal.       Assessment:    Labs -CBC normal, CMP -Bili 4.4 otherwise unremarkable  CT-pending  Problem List Items Addressed This Visit       Breast cancer, stage 4, left (Chronic)    Malignant neoplasm metastatic to left lung - Primary (Chronic)       Plan:         If her CT is stable then she will RTC in 2 months        Route Chart for Scheduling    Med Onc Chart Routing      Follow up with physician    Follow up with JIMI 2 months.   Infusion scheduling note    Injection scheduling note    Labs CBC and CMP   Scheduling:  Preferred lab:  Lab interval:     Imaging None      Pharmacy appointment No pharmacy appointment needed      Other referrals No nutrition appointment needed -  No referral to Oncology Primary Care needed -  No massage appointment needed    No additional referrals needed         Supportive Plan Information  OP ZOLEDRONIC ACID (ZOMETA) 4MG Q6M Home Willis MD   Associated Diagnosis: Compression fracture of lumbar spine, non-traumatic, sequela   noted on 11/25/2024  Associated Diagnosis: Acute gastrointestinal bleeding   noted on 4/11/2024  Associated Diagnosis: Breast cancer, stage 4, left Stage IV cT4b, cN2a, cM1, G3, ER-, AL-, HER2+ noted on 6/8/2021   Line of treatment: Supportive Care   Treatment goal: Supportive     Upcoming Treatment Dates - OP ZOLEDRONIC ACID (ZOMETA) 4MG Q6M    5/27/2025       Medications       zoledronic 4 mg/100 mL infusion 4 mg  11/11/2025       Medications       zoledronic 4 mg/100 mL infusion 4 mg  4/28/2026       Medications       zoledronic 4 mg/100 mL infusion 4 mg  10/13/2026       Medications       zoledronic 4 mg/100 mL infusion 4 mg

## 2025-05-29 ENCOUNTER — OFFICE VISIT (OUTPATIENT)
Dept: HEMATOLOGY/ONCOLOGY | Facility: CLINIC | Age: 55
End: 2025-05-29
Payer: MEDICARE

## 2025-05-29 ENCOUNTER — LAB VISIT (OUTPATIENT)
Dept: LAB | Facility: HOSPITAL | Age: 55
End: 2025-05-29
Attending: INTERNAL MEDICINE
Payer: MEDICARE

## 2025-05-29 VITALS
HEART RATE: 85 BPM | WEIGHT: 171.5 LBS | RESPIRATION RATE: 18 BRPM | DIASTOLIC BLOOD PRESSURE: 78 MMHG | HEIGHT: 62 IN | BODY MASS INDEX: 31.56 KG/M2 | TEMPERATURE: 98 F | OXYGEN SATURATION: 98 % | SYSTOLIC BLOOD PRESSURE: 128 MMHG

## 2025-05-29 DIAGNOSIS — C78.02 MALIGNANT NEOPLASM METASTATIC TO LEFT LUNG: ICD-10-CM

## 2025-05-29 DIAGNOSIS — C50.912 BREAST CANCER, STAGE 4, LEFT: Chronic | ICD-10-CM

## 2025-05-29 DIAGNOSIS — C78.02 MALIGNANT NEOPLASM METASTATIC TO LEFT LUNG: Primary | Chronic | ICD-10-CM

## 2025-05-29 LAB
ABSOLUTE EOSINOPHIL (OHS): 0.57 K/UL
ABSOLUTE MONOCYTE (OHS): 0.31 K/UL (ref 0.3–1)
ABSOLUTE NEUTROPHIL COUNT (OHS): 2.07 K/UL (ref 1.8–7.7)
ALBUMIN SERPL BCP-MCNC: 2.7 G/DL (ref 3.5–5.2)
ALP SERPL-CCNC: 104 UNIT/L (ref 40–150)
ALT SERPL W/O P-5'-P-CCNC: 20 UNIT/L (ref 10–44)
ANION GAP (OHS): 9 MMOL/L (ref 8–16)
AST SERPL-CCNC: 43 UNIT/L (ref 11–45)
BASOPHILS # BLD AUTO: 0.06 K/UL
BASOPHILS NFR BLD AUTO: 1.4 %
BILIRUB SERPL-MCNC: 4.4 MG/DL (ref 0.1–1)
BUN SERPL-MCNC: 14 MG/DL (ref 6–20)
CALCIUM SERPL-MCNC: 8.1 MG/DL (ref 8.7–10.5)
CHLORIDE SERPL-SCNC: 102 MMOL/L (ref 95–110)
CO2 SERPL-SCNC: 31 MMOL/L (ref 23–29)
CREAT SERPL-MCNC: 0.6 MG/DL (ref 0.5–1.4)
ERYTHROCYTE [DISTWIDTH] IN BLOOD BY AUTOMATED COUNT: 16.1 % (ref 11.5–14.5)
GFR SERPLBLD CREATININE-BSD FMLA CKD-EPI: >60 ML/MIN/1.73/M2
GLUCOSE SERPL-MCNC: 89 MG/DL (ref 70–110)
HCT VFR BLD AUTO: 39.8 % (ref 37–48.5)
HGB BLD-MCNC: 12.9 GM/DL (ref 12–16)
IMM GRANULOCYTES # BLD AUTO: 0.01 K/UL (ref 0–0.04)
IMM GRANULOCYTES NFR BLD AUTO: 0.2 % (ref 0–0.5)
LYMPHOCYTES # BLD AUTO: 1.18 K/UL (ref 1–4.8)
MCH RBC QN AUTO: 33.2 PG (ref 27–31)
MCHC RBC AUTO-ENTMCNC: 32.4 G/DL (ref 32–36)
MCV RBC AUTO: 103 FL (ref 82–98)
NUCLEATED RBC (/100WBC) (OHS): 0 /100 WBC
PLATELET # BLD AUTO: 137 K/UL (ref 150–450)
PMV BLD AUTO: 10.3 FL (ref 9.2–12.9)
POTASSIUM SERPL-SCNC: 3.4 MMOL/L (ref 3.5–5.1)
PROT SERPL-MCNC: 6 GM/DL (ref 6–8.4)
RBC # BLD AUTO: 3.88 M/UL (ref 4–5.4)
RELATIVE EOSINOPHIL (OHS): 13.6 %
RELATIVE LYMPHOCYTE (OHS): 28.1 % (ref 18–48)
RELATIVE MONOCYTE (OHS): 7.4 % (ref 4–15)
RELATIVE NEUTROPHIL (OHS): 49.3 % (ref 38–73)
SODIUM SERPL-SCNC: 142 MMOL/L (ref 136–145)
WBC # BLD AUTO: 4.2 K/UL (ref 3.9–12.7)

## 2025-05-29 PROCEDURE — 84075 ASSAY ALKALINE PHOSPHATASE: CPT

## 2025-05-29 PROCEDURE — 99213 OFFICE O/P EST LOW 20 MIN: CPT | Mod: PBBFAC | Performed by: INTERNAL MEDICINE

## 2025-05-29 PROCEDURE — 36415 COLL VENOUS BLD VENIPUNCTURE: CPT

## 2025-05-29 PROCEDURE — 85025 COMPLETE CBC W/AUTO DIFF WBC: CPT

## 2025-05-29 PROCEDURE — 99999 PR PBB SHADOW E&M-EST. PATIENT-LVL III: CPT | Mod: PBBFAC,,, | Performed by: INTERNAL MEDICINE

## 2025-06-02 ENCOUNTER — PATIENT MESSAGE (OUTPATIENT)
Dept: HEPATOLOGY | Facility: CLINIC | Age: 55
End: 2025-06-02
Payer: MEDICAID

## 2025-06-06 ENCOUNTER — OFFICE VISIT (OUTPATIENT)
Dept: INTERNAL MEDICINE | Facility: CLINIC | Age: 55
End: 2025-06-06
Payer: MEDICAID

## 2025-06-06 ENCOUNTER — HOSPITAL ENCOUNTER (EMERGENCY)
Facility: HOSPITAL | Age: 55
Discharge: HOME | End: 2025-06-06
Attending: EMERGENCY MEDICINE
Payer: MEDICARE

## 2025-06-06 VITALS
OXYGEN SATURATION: 98 % | TEMPERATURE: 98 F | RESPIRATION RATE: 16 BRPM | SYSTOLIC BLOOD PRESSURE: 129 MMHG | WEIGHT: 172 LBS | DIASTOLIC BLOOD PRESSURE: 77 MMHG | HEART RATE: 97 BPM | BODY MASS INDEX: 31.65 KG/M2 | HEIGHT: 62 IN

## 2025-06-06 VITALS
SYSTOLIC BLOOD PRESSURE: 124 MMHG | BODY MASS INDEX: 32.3 KG/M2 | HEART RATE: 108 BPM | DIASTOLIC BLOOD PRESSURE: 66 MMHG | HEIGHT: 62 IN | WEIGHT: 175.5 LBS | OXYGEN SATURATION: 97 %

## 2025-06-06 DIAGNOSIS — B02.8 HERPES ZOSTER WITH COMPLICATION: Primary | ICD-10-CM

## 2025-06-06 DIAGNOSIS — B02.9 HERPES ZOSTER WITHOUT COMPLICATION: Primary | ICD-10-CM

## 2025-06-06 PROCEDURE — 99214 OFFICE O/P EST MOD 30 MIN: CPT | Mod: PBBFAC | Performed by: NURSE PRACTITIONER

## 2025-06-06 PROCEDURE — 99283 EMERGENCY DEPT VISIT LOW MDM: CPT

## 2025-06-06 PROCEDURE — 99999 PR PBB SHADOW E&M-EST. PATIENT-LVL IV: CPT | Mod: PBBFAC,,, | Performed by: NURSE PRACTITIONER

## 2025-06-06 PROCEDURE — 25000003 PHARM REV CODE 250: Performed by: EMERGENCY MEDICINE

## 2025-06-06 RX ORDER — VALACYCLOVIR HYDROCHLORIDE 1 G/1
1000 TABLET, FILM COATED ORAL 3 TIMES DAILY
Qty: 30 TABLET | Refills: 0 | Status: SHIPPED | OUTPATIENT
Start: 2025-06-06 | End: 2025-06-16

## 2025-06-06 RX ORDER — VALACYCLOVIR HYDROCHLORIDE 1 G/1
1000 TABLET, FILM COATED ORAL 3 TIMES DAILY
Qty: 30 TABLET | Refills: 0 | Status: SHIPPED | OUTPATIENT
Start: 2025-06-06 | End: 2025-06-06

## 2025-06-06 RX ORDER — PROPARACAINE HYDROCHLORIDE 5 MG/ML
1 SOLUTION/ DROPS OPHTHALMIC
Status: COMPLETED | OUTPATIENT
Start: 2025-06-06 | End: 2025-06-06

## 2025-06-06 RX ORDER — VALACYCLOVIR HYDROCHLORIDE 500 MG/1
1000 TABLET, FILM COATED ORAL
Status: COMPLETED | OUTPATIENT
Start: 2025-06-06 | End: 2025-06-06

## 2025-06-06 RX ADMIN — FLUORESCEIN SODIUM 1 EACH: 1 STRIP OPHTHALMIC at 05:06

## 2025-06-06 RX ADMIN — PROPARACAINE HYDROCHLORIDE 1 DROP: 5 SOLUTION/ DROPS OPHTHALMIC at 06:06

## 2025-06-06 RX ADMIN — VALACYCLOVIR HYDROCHLORIDE 1000 MG: 500 TABLET, FILM COATED ORAL at 06:06

## 2025-06-06 NOTE — ED PROVIDER NOTES
Encounter Date: 6/6/2025       History     Chief Complaint   Patient presents with    Herpes Zoster     Sent from clinic for shingles     HPI  54-year-old female with a past medical history as noted below coming in sent in by her primary care doctor for concern for facial/eye dermatome zoster.  She says since Monday night/Tuesday morning she has a painful rash on her left neck/posterior scalp she also noticed this morning a red dot on her nose.  She has some also lesions on her left neck.  The rash is painful and itchy.  The red spot on her nose is not painful or symptomatic.    She denies any vision changes, eye pain.  No other rashes in other parts of the body or right side of the body.  No fevers.  No dizziness, weakness, chest pain, shortness breath, malaise.    She is currently not on chemo or radiation.  Cancer is currently controlled.     Review of patient's allergies indicates:  No Known Allergies  Past Medical History:   Diagnosis Date    Acute encephalopathy 05/25/2024    Aortic atherosclerosis 07/06/2023    Breast cancer     CHF (congestive heart failure)     Chronic venous insufficiency 04/02/2025    Coronary artery calcification seen on CT scan 09/19/2024    Esophageal and gastric varices 06/23/2023    Hepatic encephalopathy 05/25/2024    Liver disease     Malignant neoplasm metastatic to left lung 06/08/2021    Stenosis of aortic and mitral valves     Aortic stenosis     Past Surgical History:   Procedure Laterality Date    ENDOSCOPIC ULTRASOUND OF UPPER GASTROINTESTINAL TRACT N/A 06/27/2023    Procedure: ULTRASOUND, UPPER GI TRACT, ENDOSCOPIC;  Surgeon: Home Lopez MD;  Location: [address]);  Service: Endoscopy;  Laterality: N/A;    ENDOSCOPIC ULTRASOUND OF UPPER GASTROINTESTINAL TRACT N/A 01/12/2024    Procedure: ULTRASOUND, UPPER GI TRACT, ENDOSCOPIC;  Surgeon: Home Lopez MD;  Location: [facility] ([address] [place] [address]);  Service: Endoscopy;  Laterality: N/A;  11/28/23-Instructions via  portal-DS  1/8-lvm for precall-MS  1/10-precall complete-Kpvt    ESOPHAGOGASTRODUODENOSCOPY N/A 06/23/2023    Procedure: EGD (ESOPHAGOGASTRODUODENOSCOPY);  Surgeon: Quintin Mooney MD;  Location: [facility] ENDO (2ND FLR);  Service: Endoscopy;  Laterality: N/A;    ESOPHAGOGASTRODUODENOSCOPY N/A 06/27/2023    Procedure: EGD (ESOPHAGOGASTRODUODENOSCOPY);  Surgeon: Home Lopez MD;  Location: [facility] ENDO (2ND FLR);  Service: Endoscopy;  Laterality: N/A;    ESOPHAGOGASTRODUODENOSCOPY N/A 08/03/2023    Procedure: EGD (ESOPHAGOGASTRODUODENOSCOPY);  Surgeon: Home Lopez MD;  Location: [facility] ENDO (2ND FLR);  Service: Endoscopy;  Laterality: N/A;  instr portal-labs 6/28/23-tb    ESOPHAGOGASTRODUODENOSCOPY N/A 01/12/2024    Procedure: EGD (ESOPHAGOGASTRODUODENOSCOPY);  Surgeon: Home Lopez MD;  Location: [facility] ENDO (2ND FLR);  Service: Endoscopy;  Laterality: N/A;    ESOPHAGOGASTRODUODENOSCOPY N/A 04/10/2024    Procedure: EGD (ESOPHAGOGASTRODUODENOSCOPY);  Surgeon: Ze Anderson MD;  Location: [facility] ENDO (2ND FLR);  Service: Endoscopy;  Laterality: N/A;    INSERTION OF TUNNELED CENTRAL VENOUS CATHETER (CVC) WITH SUBCUTANEOUS PORT      MASTECTOMY       Family History   Problem Relation Name Age of Onset    Hypertension Mother      Heart attack Father      Lung cancer Father       Social History[1]  Review of Systems    Physical Exam     Initial Vitals [06/06/25 1702]   BP Pulse Resp Temp SpO2   (!) 161/70 (!) 112 18 98.3 °F (36.8 °C) 96 %      MAP       --         Physical Exam    Physical Exam:  CONSTITUTIONAL: Well developed, well nourished, in no acute distress, calm  HENT: Normocephalic, atraumatic    EYES: Sclerae anicteric, pupils equal round reactive, extraocular is are intact, no nystagmus.  Corneas clear, fluorescein staining shows no uptake no dendrites, no ulcers, no abrasions.  No injection in the conjunctiva.  Visual acuity as noted below.  NECK: Supple  RESPIRATORY: Breathing comfortably. Speaking in full  sentences   NEUROLOGIC: Alert, interacting normally. No facial droop.   MSK: Moving all four extremities. No visible deformities.   SKIN:  There is red patches to her left occiput and left neck with some vesicular changes consistent with C3-C4 zoster.  No visible rash on exposed areas of skin.    PSYCH: Mood and affect normal.       ED Course   Procedures  Labs Reviewed - No data to display       Imaging Results    None          Medications   fluorescein ophthalmic strip 1 each (1 each Both Eyes Given by Other 6/6/25 6160)   proparacaine 0.5 % ophthalmic solution 1 drop (1 drop Both Eyes Given 6/6/25 1809)   valACYclovir tablet 1,000 mg (1,000 mg Oral Given 6/6/25 1809)     Medical Decision Making  Risk  Prescription drug management.      54-year-old female with past medical history as noted coming in with rash consistent with zoster to her left head.  No signs of ocular involvement at this time.  Will start valacyclovir and discharge with valacyclovir.  Return precautions for any vision changes, eye pain, multi a dermatomal involvement, malaise, fevers, or any other new, worsening worrisome symptoms.    She takes oxycodone for pain control she has some at home she can take them as needed.  She does not take Tylenol or ibuprofen.    Findings of ED work up and return precautions verbally explained to patient. Patient agrees with discharge plan, return instructions and verbalizes understanding of return precautions.                ED Course as of 06/08/25 1911 Fri Jun 06, 2025 1916 Right Eye  Right Visual Status: Uncorrected  Right Visual Test: 20/50   Left Eye  Left Visual Status: Uncorrected  Left Visual Test: 20/50   Both Eyes  Both Visual Status: Uncorrected  Both Visual Test : 20/40    [BA]      ED Course User Index  [BA] Arik West MD                           Clinical Impression:  Final diagnoses:  [B02.9] Herpes zoster without complication (Primary)          ED Disposition Condition    Discharge  Stable          ED Prescriptions       Medication Sig Dispense Start Date End Date Auth. Provider    valACYclovir (VALTREX) 1000 MG tablet  (Status: Discontinued) Take 1 tablet (1,000 mg total) by mouth 3 (three) times daily. for 10 days 30 tablet 6/6/2025 6/6/2025 Arik West MD    valACYclovir (VALTREX) 1000 MG tablet Take 1 tablet (1,000 mg total) by mouth 3 (three) times daily. for 10 days 30 tablet 6/6/2025 6/16/2025 Arik West MD          Follow-up Information       Follow up With Specialties Details Why Contact Info    primary care doctor  In 1 week                     [1]   Social History  Tobacco Use    Smoking status: Never    Smokeless tobacco: Never   Substance Use Topics    Alcohol use: Never    Drug use: Never        Arik West MD  06/08/25 1922

## 2025-06-06 NOTE — DISCHARGE INSTRUCTIONS
If you develop any vision changes, eye pain, rash spreading to other parts of your body, fevers, weakness, or any other new, worsening worrisome symptoms please return emergency department.    Take the antivirals as prescribed for the next 10 days.    Otherwise follow up with your primary care doctor for any continued minor symptoms.

## 2025-06-06 NOTE — ED TRIAGE NOTES
Shikha óLpez, a 54 y.o. female presents to the ED w/ complaint of rash to the neck, tip of the noes and left scalp that started on Tuesday.     Triage note:  Chief Complaint   Patient presents with    Herpes Zoster     Sent from clinic for shingles     Review of patient's allergies indicates:  No Known Allergies  Past Medical History:   Diagnosis Date    Acute encephalopathy 05/25/2024    Aortic atherosclerosis 07/06/2023    Breast cancer     CHF (congestive heart failure)     Chronic venous insufficiency 04/02/2025    Coronary artery calcification seen on CT scan 09/19/2024    Esophageal and gastric varices 06/23/2023    Hepatic encephalopathy 05/25/2024    Liver disease     Malignant neoplasm metastatic to left lung 06/08/2021    Stenosis of aortic and mitral valves     Aortic stenosis

## 2025-06-09 ENCOUNTER — TELEPHONE (OUTPATIENT)
Dept: HEPATOLOGY | Facility: CLINIC | Age: 55
End: 2025-06-09
Payer: MEDICARE

## 2025-06-09 ENCOUNTER — RESULTS FOLLOW-UP (OUTPATIENT)
Dept: CARDIOLOGY | Facility: CLINIC | Age: 55
End: 2025-06-09

## 2025-06-09 ENCOUNTER — OFFICE VISIT (OUTPATIENT)
Dept: HEPATOLOGY | Facility: CLINIC | Age: 55
End: 2025-06-09
Payer: MEDICARE

## 2025-06-09 DIAGNOSIS — M48.56XS COMPRESSION FRACTURE OF LUMBAR SPINE, NON-TRAUMATIC, SEQUELA: ICD-10-CM

## 2025-06-09 DIAGNOSIS — G47.00 INSOMNIA, UNSPECIFIED TYPE: ICD-10-CM

## 2025-06-09 DIAGNOSIS — Z95.828 S/P TIPS (TRANSJUGULAR INTRAHEPATIC PORTOSYSTEMIC SHUNT): ICD-10-CM

## 2025-06-09 DIAGNOSIS — Z87.19 HISTORY OF ESOPHAGEAL VARICES WITH BLEEDING: ICD-10-CM

## 2025-06-09 DIAGNOSIS — K76.82 HEPATIC ENCEPHALOPATHY: ICD-10-CM

## 2025-06-09 DIAGNOSIS — K75.81 LIVER CIRRHOSIS SECONDARY TO NASH: Primary | ICD-10-CM

## 2025-06-09 DIAGNOSIS — K74.60 LIVER CIRRHOSIS SECONDARY TO NASH: Primary | ICD-10-CM

## 2025-06-09 DIAGNOSIS — R18.8 OTHER ASCITES: ICD-10-CM

## 2025-06-09 PROCEDURE — 98005 SYNCH AUDIO-VIDEO EST LOW 20: CPT | Mod: 95,,, | Performed by: STUDENT IN AN ORGANIZED HEALTH CARE EDUCATION/TRAINING PROGRAM

## 2025-06-09 PROCEDURE — 1160F RVW MEDS BY RX/DR IN RCRD: CPT | Mod: CPTII,95,, | Performed by: STUDENT IN AN ORGANIZED HEALTH CARE EDUCATION/TRAINING PROGRAM

## 2025-06-09 PROCEDURE — 1159F MED LIST DOCD IN RCRD: CPT | Mod: CPTII,95,, | Performed by: STUDENT IN AN ORGANIZED HEALTH CARE EDUCATION/TRAINING PROGRAM

## 2025-06-09 RX ORDER — HYDROCODONE BITARTRATE AND ACETAMINOPHEN 5; 325 MG/1; MG/1
1 TABLET ORAL EVERY 6 HOURS PRN
Qty: 40 TABLET | Refills: 0 | OUTPATIENT
Start: 2025-06-09

## 2025-06-09 RX ORDER — TRAZODONE HYDROCHLORIDE 50 MG/1
25 TABLET ORAL NIGHTLY
Qty: 15 TABLET | Refills: 0 | Status: SHIPPED | OUTPATIENT
Start: 2025-06-09 | End: 2025-07-12

## 2025-06-09 NOTE — TELEPHONE ENCOUNTER
Received call from the .  Patient on the line and would like to know if she has to come in for her appt today?  Patient stated I went to ER and was diagnosed with Shingles.    Patient informed to keep her 6 mos Follow up appt.  The Patient has the My Chart Deja.  Will change the appt to Virtual.  Pt agreed. Appt changed today 6/9/25 at 9:30 am with Dr Figueroa to Virtual.  Provider notified.

## 2025-06-09 NOTE — PROGRESS NOTES
Hepatology Follow Up Note    The patient location is: [place]  The chief complaint leading to consultation is: follow up, [facility] cirrhosis    Visit type: audiovisual    Face to Face time with patient: 10  20 minutes of total time spent on the encounter, which includes face to face time and non-face to face time preparing to see the patient (eg, review of tests), Obtaining and/or reviewing separately obtained history, Documenting clinical information in the electronic or other health record, Independently interpreting results (not separately reported) and communicating results to the patient/family/caregiver, or Care coordination (not separately reported).     Each patient to whom he or she provides medical services by telemedicine is:  (1) informed of the relationship between the physician and patient and the respective role of any other health care provider with respect to management of the patient; and (2) notified that he or she may decline to receive medical services by telemedicine and may withdraw from such care at any time.    Referring provider: No ref. provider found  PCP: No, Primary Doctor    Chief complaint:  follow up cirrhosis    HPI:  Shikha López is a 55 y.o. female who was referred to Hepatology Clinic for cirrhosis.    She is without complaints today.  She presented to the ED last week with shingles.  No other recent hospitalizations or ED visits. Compliant with lactulose and rifaximin without recent encephalopathy. She denies other signs of decompensated cirrhosis including no recent abdominal distention, jaundice, GI bleeding.    Background  She was diagnosed with cirrhosis in June 2023 when hospitalized with variceal bleeding.  EGD showed gastric varices that were subsequently treated with coil embolization.  She was hospitalized again earlier this month with recurrent variceal bleeding for which he underwent TIPS placement.  She denies recurrent GI bleeding since discharge.  She denies  other signs of decompensated cirrhosis including no recent abdominal distention, encephalopathy, jaundice.    She does not drink alcohol and has never been a heavy drinker.  Testing negative for chronic viral hepatitis.  No known family history of liver disease.      Past Medical History:   Diagnosis Date    Acute encephalopathy 05/25/2024    Aortic atherosclerosis 07/06/2023    Breast cancer     CHF (congestive heart failure)     Chronic venous insufficiency 04/02/2025    Coronary artery calcification seen on CT scan 09/19/2024    Esophageal and gastric varices 06/23/2023    Hepatic encephalopathy 05/25/2024    Liver disease     Malignant neoplasm metastatic to left lung 06/08/2021    Stenosis of aortic and mitral valves     Aortic stenosis       Past Surgical History:   Procedure Laterality Date    ENDOSCOPIC ULTRASOUND OF UPPER GASTROINTESTINAL TRACT N/A 06/27/2023    Procedure: ULTRASOUND, UPPER GI TRACT, ENDOSCOPIC;  Surgeon: Home Lopez MD;  Location: [facility] ([address] [place] [address]);  Service: Endoscopy;  Laterality: N/A;    ENDOSCOPIC ULTRASOUND OF UPPER GASTROINTESTINAL TRACT N/A 01/12/2024    Procedure: ULTRASOUND, UPPER GI TRACT, ENDOSCOPIC;  Surgeon: Home Lopez MD;  Location: [facility] ([address] [place] [address]);  Service: Endoscopy;  Laterality: N/A;  11/28/23-Instructions via portal-DS  1/8-lvm for precall-MS  1/10-precall complete-Kpvt    ESOPHAGOGASTRODUODENOSCOPY N/A 06/23/2023    Procedure: EGD (ESOPHAGOGASTRODUODENOSCOPY);  Surgeon: Quintin Mooney MD;  [facility]  Service: Endoscopy;  Laterality: N/A;    ESOPHAGOGASTRODUODENOSCOPY N/A 06/27/2023    Procedure: EGD (ESOPHAGOGASTRODUODENOSCOPY);  Surgeon: Home Lopez MD;  [facility]  Service: Endoscopy;  Laterality: N/A;    ESOPHAGOGASTRODUODENOSCOPY N/A 08/03/2023    Procedure: EGD (ESOPHAGOGASTRODUODENOSCOPY);  Surgeon: Home Lopez MD;  Location: [facility] ([address] [place] [address]);  Service: Endoscopy;  Laterality: N/A;  instr  portal-labs 6/28/23-tb    ESOPHAGOGASTRODUODENOSCOPY N/A 01/12/2024    Procedure: EGD (ESOPHAGOGASTRODUODENOSCOPY);  Surgeon: Home Lopez MD;  Location: [facility] ENDO (2ND FLR);  Service: Endoscopy;  Laterality: N/A;    ESOPHAGOGASTRODUODENOSCOPY N/A 04/10/2024    Procedure: EGD (ESOPHAGOGASTRODUODENOSCOPY);  Surgeon: Ze Anderson MD;  Location: [facility] ENDO (2ND FLR);  Service: Endoscopy;  Laterality: N/A;    INSERTION OF TUNNELED CENTRAL VENOUS CATHETER (CVC) WITH SUBCUTANEOUS PORT      MASTECTOMY         Family History   Problem Relation Name Age of Onset    Hypertension Mother      Heart attack Father      Lung cancer Father         Social History     Tobacco Use    Smoking status: Never    Smokeless tobacco: Never   Substance Use Topics    Alcohol use: Never    Drug use: Never       Current Outpatient Medications   Medication Sig Dispense Refill    empagliflozin (JARDIANCE) 10 mg tablet Take 1 tablet (10 mg total) by mouth once daily. 30 tablet 11    furosemide (LASIX) 80 MG tablet Take 1 tablet (80 mg total) by mouth once daily. If you are gaining weight (2 lbs in 24 hours or 3 lbs in two days) may take an extra dose of 80 mg 90 tablet 2    HYDROcodone-acetaminophen (NORCO) 5-325 mg per tablet Take 1 tablet by mouth every 6 (six) hours as needed for Pain. 40 tablet 0    lactulose (CHRONULAC) 10 gram/15 mL solution Take 30 mLs (20 g total) by mouth 3 (three) times daily. 2700 mL 11    methylphenidate HCl (RITALIN) 5 MG tablet Take 1 tablet (5 mg total) by mouth 2 (two) times daily. 60 tablet 0    pantoprazole (PROTONIX) 40 MG tablet Take 1 tablet (40 mg total) by mouth 2 (two) times daily. 180 tablet 0    rifAXIMin (XIFAXAN) 550 mg Tab Take 1 tablet (550 mg total) by mouth 2 (two) times daily. 60 tablet 11    spironolactone (ALDACTONE) 100 MG tablet Take 1 tablet (100 mg total) by mouth once daily. 30 tablet 11    traZODone (DESYREL) 50 MG tablet Take 0.5 tablets (25 mg total) by mouth every evening. for 16  days 8 tablet 0    valACYclovir (VALTREX) 1000 MG tablet Take 1 tablet (1,000 mg total) by mouth 3 (three) times daily. for 10 days 30 tablet 0     No current facility-administered medications for this visit.       Review of patient's allergies indicates:  No Known Allergies    Review of Systems   Constitutional:  Negative for fever and weight loss.   Cardiovascular:  Negative for leg swelling.   Gastrointestinal:  Negative for abdominal pain, blood in stool, constipation, diarrhea, heartburn, melena, nausea and vomiting.       There were no vitals filed for this visit.        Physical Exam  Constitutional:       General: She is not in acute distress.     Appearance: She is not ill-appearing.   HENT:      Head: Normocephalic and atraumatic.   Eyes:      General: No scleral icterus.     Extraocular Movements: Extraocular movements intact.   Pulmonary:      Effort: No respiratory distress.   Skin:     Coloration: Skin is not jaundiced.   Neurological:      Mental Status: She is alert.         LABS: I personally reviewed pertinent laboratory findings.    Lab Results   Component Value Date    WBC 4.20 05/29/2025    HGB 12.9 05/29/2025    HCT 39.8 05/29/2025     (H) 05/29/2025     (L) 05/29/2025       Lab Results   Component Value Date     05/29/2025    K 3.4 (L) 05/29/2025     05/29/2025    CO2 31 (H) 05/29/2025    BUN 14 05/29/2025    CREATININE 0.6 05/29/2025    CALCIUM 8.1 (L) 05/29/2025    ANIONGAP 9 05/29/2025    ESTGFRAFRICA >60.0 07/18/2022    EGFRNONAA >60.0 07/18/2022       Lab Results   Component Value Date    ALT 20 05/29/2025    AST 43 05/29/2025    ALKPHOS 104 05/29/2025    BILITOT 4.4 (H) 05/29/2025       Lab Results   Component Value Date    HEPBCAB Non-reactive 04/30/2024    HEPCAB Non-reactive 04/30/2024       Lab Results   Component Value Date    SMOOTHMUSCAB Positive 1:40 (A) 04/30/2024    CERULOPLSM 30.0 04/30/2024        MELD 3.0: 21 at 3/24/2025 11:19 AM  MELD-Na: 19 at  3/24/2025 11:19 AM  Calculated from:  Serum Creatinine: 0.7 mg/dL (Using min of 1 mg/dL) at 3/24/2025 11:19 AM  Serum Sodium: 134 mmol/L at 3/24/2025 11:19 AM  Total Bilirubin: 5.9 mg/dL at 3/24/2025 11:19 AM  Serum Albumin: 3 g/dL at 3/24/2025 11:19 AM  INR(ratio): 1.4 at 3/24/2025 11:19 AM  Age at listing (hypothetical): 54 years  Sex: Female at 3/24/2025 11:19 AM       IMAGING: I personally reviewed imaging studies.      Assessment:  55 y.o. 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  She is status post TIPS placement.    1. Liver cirrhosis secondary to HA[place]THORNE    2. History of esophageal varices with bleeding    3. Other ascites    4. Hepatic encephalopathy    5. S/P TIPS (transjugular intrahepatic portosystemic shunt)        Recommendations:  Cirrhosis due to MASH: Counseled on diet, weight loss, and control of co-morbidities.    History of variceal bleeding:  She is status post TIPS placement 04/2024.  Surveillance EGD not warranted as long as TIPS remains patent.    Ascites/Edema: 2 gm Na diet. She is status post TIPS placement 04/2024.     Encephalopathy: Continue lactulose. Titrate to maintain 3-4 bowel movements per day. Continue rifaximin 550mg BID    Transplant candidacy:  She is unfortunately not a transplant candidate given metastatic breast cancer.    Cirrhosis HM  - HCC screening: US in 05/2025 without HCC. AFP 3.6. Repeat abdominal US and AFP every 6 months.    - Immunizations: Recommend HAV and HBV vaccinations if not immune.    Return to clinic in 5 months or sooner if needed.    This note includes dictation done using M*Modal speech recognition program. Word recognition mistakes are occasionally missed on review.    Farhad Figueroa MD  Staff Physician  Hepatology and Liver Transplant  [facility]  [facility]

## 2025-06-10 ENCOUNTER — PATIENT MESSAGE (OUTPATIENT)
Dept: PALLIATIVE MEDICINE | Facility: CLINIC | Age: 55
End: 2025-06-10
Payer: MEDICARE

## 2025-06-10 DIAGNOSIS — M48.56XS COMPRESSION FRACTURE OF LUMBAR SPINE, NON-TRAUMATIC, SEQUELA: ICD-10-CM

## 2025-06-10 DIAGNOSIS — R41.840 LACK OF CONCENTRATION: ICD-10-CM

## 2025-06-10 RX ORDER — METHYLPHENIDATE HYDROCHLORIDE 5 MG/1
5 TABLET ORAL 2 TIMES DAILY
Qty: 60 TABLET | Refills: 0 | Status: SHIPPED | OUTPATIENT
Start: 2025-06-10

## 2025-06-10 RX ORDER — HYDROCODONE BITARTRATE AND ACETAMINOPHEN 5; 325 MG/1; MG/1
1 TABLET ORAL EVERY 6 HOURS PRN
Qty: 40 TABLET | Refills: 0 | Status: SHIPPED | OUTPATIENT
Start: 2025-06-10

## 2025-06-11 ENCOUNTER — TELEPHONE (OUTPATIENT)
Dept: HEPATOLOGY | Facility: CLINIC | Age: 55
End: 2025-06-11
Payer: MEDICARE

## 2025-06-11 NOTE — TELEPHONE ENCOUNTER
----- Message from Farhad Figueroa MD sent at 6/11/2025  8:35 AM CDT -----  Return 5 months with labs and ultrasound

## 2025-06-13 ENCOUNTER — OFFICE VISIT (OUTPATIENT)
Dept: INTERNAL MEDICINE | Facility: CLINIC | Age: 55
End: 2025-06-13
Payer: MEDICARE

## 2025-06-13 VITALS
OXYGEN SATURATION: 95 % | DIASTOLIC BLOOD PRESSURE: 54 MMHG | BODY MASS INDEX: 33.26 KG/M2 | WEIGHT: 180.75 LBS | SYSTOLIC BLOOD PRESSURE: 116 MMHG | HEART RATE: 98 BPM | HEIGHT: 62 IN

## 2025-06-13 DIAGNOSIS — B02.8 HERPES ZOSTER WITH COMPLICATION: Primary | ICD-10-CM

## 2025-06-13 PROCEDURE — 99999 PR PBB SHADOW E&M-EST. PATIENT-LVL IV: CPT | Mod: PBBFAC,,, | Performed by: NURSE PRACTITIONER

## 2025-06-13 NOTE — PROGRESS NOTES
"Subjective     Patient ID: Shikha López is a 55 y.o. female.  BP (!) 116/54 (BP Location: Right arm, Patient Position: Sitting)   Pulse 98   Ht 5' 2" (1.575 m)   Wt 82 kg (180 lb 12.4 oz)   LMP  (LMP Unknown)   SpO2 95%   BMI 33.06 kg/m²      Chief Complaint: Hospital Follow Up    Presenting for ED follow up for shingles of the scalp. Sent to ED Friday of last week due to risk of ocular involvement. Seen in ED. No ocular involvement. Started on valacyclovir. Today, lesions are scabbed over and pain is minimal. Denies vision or hearing changes.    Problem List[1]     Current Medications[2]     Review of Systems   Integumentary:  Positive for rash.   All other systems reviewed and are negative.         Objective     Physical Exam  Constitutional:       General: She is not in acute distress.     Appearance: Normal appearance. She is obese. She is not ill-appearing, toxic-appearing or diaphoretic.   Cardiovascular:      Rate and Rhythm: Normal rate.   Pulmonary:      Effort: Pulmonary effort is normal.   Skin:     Comments: Scabbed over lesions noted to scalp and L neck / shoulder. Lesion to tip of nose is no longer present / healed    Neurological:      Mental Status: She is alert and oriented to person, place, and time.   Psychiatric:         Mood and Affect: Mood normal.         Behavior: Behavior normal.        Assessment and Plan     1. Herpes zoster with complication; improving. Complete valacyclovir course. No change in treatment needed at this time.           Art Lee NP   Internal Medicine           Follow up if symptoms worsen or fail to improve.         [1]   Patient Active Problem List  Diagnosis    Breast cancer, stage 4, left    Malignant neoplasm metastatic to left lung    Lymphedema    Physical deconditioning    Balance problem    Hypokalemia    Gastrointestinal hemorrhage with melena    Esophageal and gastric varices    Severe obesity (BMI 35.0-39.9) with comorbidity    Aortic " atherosclerosis    Thrombocytopenia, unspecified    Acute gastrointestinal bleeding    S/P TIPS (transjugular intrahepatic portosystemic shunt)    Cellulitis of foot    Volume overload    Biliary cirrhosis    Bacteremia    Leg swelling    Arm swelling    Symptomatic anemia    Elevated troponin    Pleural effusion    Hyponatremia    Pulmonary edema    Chronic heart failure with preserved ejection fraction    Chemotherapy-induced peripheral neuropathy    Coronary artery calcification seen on CT scan    Nonrheumatic aortic valve stenosis    Compression fracture of lumbar spine, non-traumatic, sequela    Decreased range of motion of lumbar spine    Weakness of both hips    Chronic venous insufficiency   [2]   Current Outpatient Medications   Medication Sig Dispense Refill    empagliflozin (JARDIANCE) 10 mg tablet Take 1 tablet (10 mg total) by mouth once daily. 30 tablet 11    furosemide (LASIX) 80 MG tablet Take 1 tablet (80 mg total) by mouth once daily. If you are gaining weight (2 lbs in 24 hours or 3 lbs in two days) may take an extra dose of 80 mg 90 tablet 2    HYDROcodone-acetaminophen (NORCO) 5-325 mg per tablet Take 1 tablet by mouth every 6 (six) hours as needed for Pain. 40 tablet 0    lactulose (CHRONULAC) 10 gram/15 mL solution Take 30 mLs (20 g total) by mouth 3 (three) times daily. 2700 mL 11    methylphenidate HCl (RITALIN) 5 MG tablet Take 1 tablet (5 mg total) by mouth 2 (two) times daily. 60 tablet 0    pantoprazole (PROTONIX) 40 MG tablet Take 1 tablet (40 mg total) by mouth 2 (two) times daily. 180 tablet 0    rifAXIMin (XIFAXAN) 550 mg Tab Take 1 tablet (550 mg total) by mouth 2 (two) times daily. 60 tablet 11    traZODone (DESYREL) 50 MG tablet Take 0.5 tablets (25 mg total) by mouth every evening. 15 tablet 0    valACYclovir (VALTREX) 1000 MG tablet Take 1 tablet (1,000 mg total) by mouth 3 (three) times daily. for 10 days 30 tablet 0    spironolactone (ALDACTONE) 100 MG tablet Take 1 tablet  (100 mg total) by mouth once daily. (Patient not taking: Reported on 6/13/2025) 30 tablet 11     No current facility-administered medications for this visit.

## 2025-06-23 ENCOUNTER — OFFICE VISIT (OUTPATIENT)
Dept: INTERNAL MEDICINE | Facility: CLINIC | Age: 55
End: 2025-06-23
Payer: MEDICARE

## 2025-06-23 ENCOUNTER — PATIENT MESSAGE (OUTPATIENT)
Dept: INTERNAL MEDICINE | Facility: CLINIC | Age: 55
End: 2025-06-23

## 2025-06-23 ENCOUNTER — LAB VISIT (OUTPATIENT)
Dept: LAB | Facility: HOSPITAL | Age: 55
End: 2025-06-23
Attending: FAMILY MEDICINE
Payer: MEDICARE

## 2025-06-23 ENCOUNTER — RESULTS FOLLOW-UP (OUTPATIENT)
Dept: INTERNAL MEDICINE | Facility: CLINIC | Age: 55
End: 2025-06-23

## 2025-06-23 VITALS
HEIGHT: 60 IN | HEART RATE: 90 BPM | BODY MASS INDEX: 34.97 KG/M2 | SYSTOLIC BLOOD PRESSURE: 106 MMHG | WEIGHT: 178.13 LBS | OXYGEN SATURATION: 97 % | DIASTOLIC BLOOD PRESSURE: 60 MMHG

## 2025-06-23 DIAGNOSIS — Z12.39 ENCOUNTER FOR OTHER SCREENING FOR MALIGNANT NEOPLASM OF BREAST: ICD-10-CM

## 2025-06-23 DIAGNOSIS — E66.09 CLASS 1 OBESITY DUE TO EXCESS CALORIES WITH SERIOUS COMORBIDITY AND BODY MASS INDEX (BMI) OF 34.0 TO 34.9 IN ADULT: ICD-10-CM

## 2025-06-23 DIAGNOSIS — M48.56XS COMPRESSION FRACTURE OF LUMBAR SPINE, NON-TRAUMATIC, SEQUELA: ICD-10-CM

## 2025-06-23 DIAGNOSIS — M81.8 OTHER OSTEOPOROSIS, UNSPECIFIED PATHOLOGICAL FRACTURE PRESENCE: ICD-10-CM

## 2025-06-23 DIAGNOSIS — Z76.89 ENCOUNTER TO ESTABLISH CARE WITH NEW DOCTOR: ICD-10-CM

## 2025-06-23 DIAGNOSIS — Z13.820 ENCOUNTER FOR SCREENING FOR OSTEOPOROSIS: ICD-10-CM

## 2025-06-23 DIAGNOSIS — B02.8 HERPES ZOSTER WITH COMPLICATION: ICD-10-CM

## 2025-06-23 DIAGNOSIS — I50.32 CHRONIC HEART FAILURE WITH PRESERVED EJECTION FRACTION: ICD-10-CM

## 2025-06-23 DIAGNOSIS — E03.8 SUBCLINICAL HYPOTHYROIDISM: ICD-10-CM

## 2025-06-23 DIAGNOSIS — Z12.4 ENCOUNTER FOR SCREENING FOR MALIGNANT NEOPLASM OF CERVIX: ICD-10-CM

## 2025-06-23 DIAGNOSIS — Z00.00 GENERAL MEDICAL EXAM: Primary | ICD-10-CM

## 2025-06-23 DIAGNOSIS — I89.0 LYMPHEDEMA: Chronic | ICD-10-CM

## 2025-06-23 DIAGNOSIS — E55.9 VITAMIN D DEFICIENCY: Primary | ICD-10-CM

## 2025-06-23 DIAGNOSIS — C50.912 BREAST CANCER, STAGE 4, LEFT: Chronic | ICD-10-CM

## 2025-06-23 DIAGNOSIS — I35.0 NONRHEUMATIC AORTIC VALVE STENOSIS: ICD-10-CM

## 2025-06-23 DIAGNOSIS — C78.02 MALIGNANT NEOPLASM METASTATIC TO LEFT LUNG: Chronic | ICD-10-CM

## 2025-06-23 DIAGNOSIS — D69.6 THROMBOCYTOPENIA, UNSPECIFIED: ICD-10-CM

## 2025-06-23 DIAGNOSIS — Z12.11 ENCOUNTER FOR SCREENING FOR MALIGNANT NEOPLASM OF COLON: ICD-10-CM

## 2025-06-23 DIAGNOSIS — E66.811 CLASS 1 OBESITY DUE TO EXCESS CALORIES WITH SERIOUS COMORBIDITY AND BODY MASS INDEX (BMI) OF 34.0 TO 34.9 IN ADULT: ICD-10-CM

## 2025-06-23 DIAGNOSIS — Z00.00 GENERAL MEDICAL EXAM: ICD-10-CM

## 2025-06-23 LAB
25(OH)D3+25(OH)D2 SERPL-MCNC: 16 NG/ML (ref 30–96)
ABSOLUTE EOSINOPHIL (OHS): 0.41 K/UL
ABSOLUTE MONOCYTE (OHS): 0.25 K/UL (ref 0.3–1)
ABSOLUTE NEUTROPHIL COUNT (OHS): 1.9 K/UL (ref 1.8–7.7)
ALBUMIN SERPL BCP-MCNC: 2.9 G/DL (ref 3.5–5.2)
ALP SERPL-CCNC: 109 UNIT/L (ref 40–150)
ALT SERPL W/O P-5'-P-CCNC: 23 UNIT/L (ref 10–44)
ANION GAP (OHS): 10 MMOL/L (ref 8–16)
AST SERPL-CCNC: 56 UNIT/L (ref 11–45)
BASOPHILS # BLD AUTO: 0.05 K/UL
BASOPHILS NFR BLD AUTO: 1.4 %
BILIRUB SERPL-MCNC: 5.1 MG/DL (ref 0.1–1)
BUN SERPL-MCNC: 13 MG/DL (ref 6–20)
CALCIUM SERPL-MCNC: 9 MG/DL (ref 8.7–10.5)
CHLORIDE SERPL-SCNC: 96 MMOL/L (ref 95–110)
CHOLEST SERPL-MCNC: 158 MG/DL (ref 120–199)
CHOLEST/HDLC SERPL: 4.2 {RATIO} (ref 2–5)
CO2 SERPL-SCNC: 36 MMOL/L (ref 23–29)
CREAT SERPL-MCNC: 0.6 MG/DL (ref 0.5–1.4)
EAG (OHS): ABNORMAL
ERYTHROCYTE [DISTWIDTH] IN BLOOD BY AUTOMATED COUNT: 19.3 % (ref 11.5–14.5)
GFR SERPLBLD CREATININE-BSD FMLA CKD-EPI: >60 ML/MIN/1.73/M2
GLUCOSE SERPL-MCNC: 79 MG/DL (ref 70–110)
HBA1C MFR BLD: <4 % (ref 4–5.6)
HCT VFR BLD AUTO: 40.3 % (ref 37–48.5)
HDLC SERPL-MCNC: 38 MG/DL (ref 40–75)
HDLC SERPL: 24.1 % (ref 20–50)
HGB BLD-MCNC: 13.2 GM/DL (ref 12–16)
IMM GRANULOCYTES # BLD AUTO: 0 K/UL (ref 0–0.04)
IMM GRANULOCYTES NFR BLD AUTO: 0 % (ref 0–0.5)
LDLC SERPL CALC-MCNC: 109.2 MG/DL (ref 63–159)
LYMPHOCYTES # BLD AUTO: 0.96 K/UL (ref 1–4.8)
MCH RBC QN AUTO: 34.4 PG (ref 27–31)
MCHC RBC AUTO-ENTMCNC: 32.8 G/DL (ref 32–36)
MCV RBC AUTO: 105 FL (ref 82–98)
NONHDLC SERPL-MCNC: 120 MG/DL
NUCLEATED RBC (/100WBC) (OHS): 0 /100 WBC
PLATELET # BLD AUTO: 111 K/UL (ref 150–450)
PMV BLD AUTO: 10.1 FL (ref 9.2–12.9)
POTASSIUM SERPL-SCNC: 3.1 MMOL/L (ref 3.5–5.1)
PROT SERPL-MCNC: 6.7 GM/DL (ref 6–8.4)
RBC # BLD AUTO: 3.84 M/UL (ref 4–5.4)
RELATIVE EOSINOPHIL (OHS): 11.5 %
RELATIVE LYMPHOCYTE (OHS): 26.9 % (ref 18–48)
RELATIVE MONOCYTE (OHS): 7 % (ref 4–15)
RELATIVE NEUTROPHIL (OHS): 53.2 % (ref 38–73)
SODIUM SERPL-SCNC: 142 MMOL/L (ref 136–145)
T4 FREE SERPL-MCNC: 1.02 NG/DL (ref 0.71–1.51)
TRIGL SERPL-MCNC: 54 MG/DL (ref 30–150)
TSH SERPL-ACNC: 5.78 UIU/ML (ref 0.4–4)
WBC # BLD AUTO: 3.57 K/UL (ref 3.9–12.7)

## 2025-06-23 PROCEDURE — 82306 VITAMIN D 25 HYDROXY: CPT

## 2025-06-23 PROCEDURE — 36415 COLL VENOUS BLD VENIPUNCTURE: CPT

## 2025-06-23 PROCEDURE — 83036 HEMOGLOBIN GLYCOSYLATED A1C: CPT

## 2025-06-23 PROCEDURE — 84439 ASSAY OF FREE THYROXINE: CPT

## 2025-06-23 PROCEDURE — 85025 COMPLETE CBC W/AUTO DIFF WBC: CPT

## 2025-06-23 PROCEDURE — 84443 ASSAY THYROID STIM HORMONE: CPT

## 2025-06-23 PROCEDURE — 99999 PR PBB SHADOW E&M-EST. PATIENT-LVL IV: CPT | Mod: PBBFAC,,, | Performed by: FAMILY MEDICINE

## 2025-06-23 PROCEDURE — 82465 ASSAY BLD/SERUM CHOLESTEROL: CPT

## 2025-06-23 PROCEDURE — 82565 ASSAY OF CREATININE: CPT

## 2025-06-23 RX ORDER — ERGOCALCIFEROL 1.25 MG/1
50000 CAPSULE ORAL
Qty: 12 CAPSULE | Refills: 3 | Status: SHIPPED | OUTPATIENT
Start: 2025-06-23

## 2025-06-23 NOTE — PROGRESS NOTES
[facility]  Annual Exam/Wellness Visit    Patient Information  Shikha López  55 y.o. female    PCP  Oscar Parkinson MD    Reason for Visit  Establish Care      Subjective:  History of Present Illness    CHIEF COMPLAINT:  Patient presents today for follow-up of shingles.    She reports a recent resolved episode of shingles with lesions located on her head. She attributes the shingles outbreak to an extremely stressful week characterized by multiple emotional challenges.    She has a complex medical history including Stage IV breast cancer diagnosed in October 2006, for which she has undergone continuous treatment including chemotherapy, surgery, and radiation for 19 years. She has been diagnosed with non-alcoholic cirrhosis of the liver with a stent placed in the liver (TIPS). She also has congestive heart failure managed with diuretics. She has compression fractures in multiple vertebrae with kyphoplasty performed on two vertebrae. She experiences lymphedema in her legs which has caused significant skin fragility, resulting in easy skin tears with minimal contact.     She currently takes half hydrocodone at night for pain management. She possesses Trazodone for sleep but is hesitant to take it due to caregiving responsibilities for her mother with dementia.     She denies smoking or vaping. She reports minimal alcohol consumption, typically limited to one drink during [place], such as a mimosa. She currently lives with and serves as caregiver for her mother who has dementia. 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         Health Maintenance         Date Due Completion Date    Shingles Vaccine (1 of 2) Never done ---    Pneumococcal Vaccines (Age 50+) (1 of 2 - PCV) Never done ---    DEXA Scan Never done ---    COVID-19 Vaccine (3 - Moderna risk series) 05/07/2021 4/9/2021    Mammogram 09/01/2023 9/1/2022    Colorectal Cancer Screening 06/23/2026 (Originally  6/7/2014) ---    Influenza Vaccine (Season Ended) 09/01/2025 ---    Hemoglobin A1c (Diabetic Prevention Screening) 06/23/2028 6/23/2025    Lipid Panel 06/23/2030 6/23/2025    TETANUS VACCINE 09/12/2031 9/12/2021    RSV Vaccine (Age 60+ and Pregnant patients) (1 - 1-dose 75+ series) 06/07/2044 ---            Review of Systems   Constitutional:  Negative for chills, fatigue and fever.   HENT:  Negative for nasal congestion, ear pain, postnasal drip and sore throat.    Eyes:  Negative for visual disturbance.   Respiratory:  Negative for cough, shortness of breath and wheezing.    Cardiovascular:  Negative for chest pain and palpitations.   Gastrointestinal:  Negative for abdominal pain, change in bowel habit, constipation, diarrhea, nausea and vomiting.   Genitourinary:  Negative for dysuria and hematuria.   Musculoskeletal:  Negative for back pain, leg pain and neck pain.   Neurological:  Negative for dizziness, numbness and headaches.   Hematological:  Negative for adenopathy.   Psychiatric/Behavioral:  Negative for dysphoric mood, sleep disturbance and suicidal ideas. The patient is not nervous/anxious.         Patient answers are not available for this visit.      Problem List and History  Problem List[1]  Past Medical History[2]  Past Surgical History[3]  Social History     Substance and Sexual Activity   Sexual Activity Not Currently    Partners: Male    Birth control/protection: Post-menopausal     Tobacco Use History[4]  Social History     Substance and Sexual Activity   Alcohol Use Never     Social History     Substance and Sexual Activity   Drug Use Never       Medication List  Current Medications[5]    Objective:  Vitals:    06/23/25 0857   BP: 106/60   Pulse: 90   SpO2: 97%   Weight: 80.8 kg (178 lb 2.1 oz)   Height: 5' (1.524 m)       Physical Exam  Vitals reviewed.   Constitutional:       General: She is not in acute distress.     Appearance: Normal appearance. She is obese. She is not ill-appearing.    HENT:      Head: Normocephalic and atraumatic.        Right Ear: Tympanic membrane, ear canal and external ear normal. There is no impacted cerumen.      Left Ear: Tympanic membrane, ear canal and external ear normal. There is no impacted cerumen.      Nose: Nose normal. No congestion or rhinorrhea.      Mouth/Throat:      Mouth: Mucous membranes are moist.      Pharynx: Oropharynx is clear. No oropharyngeal exudate or posterior oropharyngeal erythema.   Eyes:      General: No scleral icterus.        Right eye: No discharge.         Left eye: No discharge.      Conjunctiva/sclera: Conjunctivae normal.   Neck:      Thyroid: No thyroid mass, thyromegaly or thyroid tenderness.   Cardiovascular:      Rate and Rhythm: Normal rate and regular rhythm.      Pulses: Normal pulses.      Heart sounds: Murmur heard.      Systolic murmur is present with a grade of 3/6.      No friction rub. No gallop.      Comments: Lymphedema noted bilateral lower extremities  Pulmonary:      Effort: Pulmonary effort is normal. No respiratory distress.      Breath sounds: Normal breath sounds. No stridor. No wheezing, rhonchi or rales.   Abdominal:      General: Abdomen is flat. Bowel sounds are normal. There is no distension.      Palpations: Abdomen is soft. There is no mass.      Tenderness: There is no abdominal tenderness. There is no guarding.      Hernia: No hernia is present.   Musculoskeletal:         General: No swelling or deformity. Normal range of motion.      Cervical back: Normal range of motion and neck supple. No tenderness.      Right lower leg: Edema present.      Left lower leg: Edema present.   Lymphadenopathy:      Cervical: No cervical adenopathy.   Skin:     General: Skin is warm and dry.   Neurological:      General: No focal deficit present.      Mental Status: She is alert and oriented to person, place, and time. Mental status is at baseline.      Gait: Gait normal.      Deep Tendon Reflexes: Reflexes normal.    Psychiatric:         Mood and Affect: Mood normal.         Behavior: Behavior normal.         Thought Content: Thought content normal.         Judgment: Judgment normal.       Lab Results  CBC  Lab Results   Component Value Date    WBC 3.57 (L) 06/23/2025    RBC 3.84 (L) 06/23/2025    HGB 13.2 06/23/2025    HCT 40.3 06/23/2025     (H) 06/23/2025    MCH 34.4 (H) 06/23/2025    MCHC 32.8 06/23/2025    RDW 19.3 (H) 06/23/2025     (L) 06/23/2025    MPV 10.1 06/23/2025    GRAN 1.9 02/20/2025    GRAN 55.4 02/20/2025    LYMPH 26.9 06/23/2025    LYMPH 0.96 (L) 06/23/2025    MONO 7.0 06/23/2025    MONO 0.25 (L) 06/23/2025    EOS 11.5 (H) 06/23/2025    EOS 0.41 06/23/2025    BASO 0.04 02/20/2025    EOSINOPHIL 10.7 (H) 02/20/2025    BASOPHIL 1.4 06/23/2025    BASOPHIL 0.05 06/23/2025       CMP    Chemistry        Component Value Date/Time     06/23/2025 0958     02/20/2025 1022    K 3.1 (L) 06/23/2025 0958    K 3.7 02/20/2025 1022    CL 96 06/23/2025 0958    CL 98 02/20/2025 1022    CO2 36 (H) 06/23/2025 0958    CO2 32 (H) 02/20/2025 1022    BUN 13 06/23/2025 0958    CREATININE 0.6 06/23/2025 0958    GLU 79 06/23/2025 0958     (H) 02/20/2025 1022        Component Value Date/Time    CALCIUM 9.0 06/23/2025 0958    CALCIUM 8.3 (L) 02/20/2025 1022    ALKPHOS 109 06/23/2025 0958    ALKPHOS 133 02/20/2025 1022    AST 56 (H) 06/23/2025 0958    AST 56 (H) 02/20/2025 1022    ALT 23 06/23/2025 0958    ALT 21 02/20/2025 1022    BILITOT 5.1 (H) 06/23/2025 0958    BILITOT 4.2 (H) 02/20/2025 1022    ESTGFRAFRICA >60.0 07/18/2022 1054    EGFRNONAA >60.0 07/18/2022 1054            Lipids  Lab Results   Component Value Date    CHOL 158 06/23/2025    CHOL 114 (L) 06/24/2024      Lab Results   Component Value Date    HDL 38 (L) 06/23/2025    HDL 36 (L) 06/24/2024     Lab Results   Component Value Date    LDLCALC 109.2 06/23/2025    LDLCALC 67.6 06/24/2024      Lab Results   Component Value Date    TRIG 54  06/23/2025    TRIG 52 06/24/2024     Lab Results   Component Value Date    TOTALCHOLEST 4.2 06/23/2025    TOTALCHOLEST 3.2 06/24/2024     Lab Results   Component Value Date    NONHDLCHOL 120 06/23/2025    NONHDLCHOL 78 06/24/2024     Lab Results   Component Value Date    CHOLHDL 24.1 06/23/2025    CHOLHDL 31.6 06/24/2024       Thyroid Function  Lab Results   Component Value Date    TSH 5.775 (H) 06/23/2025    FREET4 1.02 06/23/2025     Diabetes Screen  Lab Results   Component Value Date    HGBA1C <4.0 (L) 06/23/2025       Assessment and Plan:    ICD-10-CM ICD-9-CM   1. General medical exam  Z00.00 V70.9   2. Encounter to establish care with new doctor  Z76.89 V65.8   3. Chronic heart failure with preserved ejection fraction  I50.32 428.9   4. Breast cancer, stage 4, left  C50.912 174.9   5. Malignant neoplasm metastatic to left lung  C78.02 197.0   6. Encounter for screening for malignant neoplasm of colon  Z12.11 V76.51   7. Encounter for other screening for malignant neoplasm of breast  Z12.39 V76.19   8. Encounter for screening for malignant neoplasm of cervix  Z12.4 V76.2   9. Encounter for screening for osteoporosis  Z13.820 V82.81   10. Class 1 obesity due to excess calories with serious comorbidity and body mass index (BMI) of 34.0 to 34.9 in adult  E66.811 278.00    E66.09 V85.34    Z68.34    11. Lymphedema  I89.0 457.1   12. Herpes zoster with complication  B02.8 053.8   13. Nonrheumatic aortic valve stenosis  I35.0 424.1   14. Thrombocytopenia, unspecified  D69.6 287.5   15. Compression fracture of lumbar spine, non-traumatic, sequela  M48.56XS 905.1     Orders Placed This Encounter    DXA Bone Density Axial Skeleton 1 or more sites    CBC Auto Differential    Lipid Panel    Hemoglobin A1C    TSH    T4, Free    Comprehensive Metabolic Panel    Vitamin D       Assessment & Plan    - Assessed recent shingles outbreak, noting it has healed well and is no longer contagious.  - Evaluated complex medical  history, including Stage IV breast cancer, non-alcoholic cirrhosis, congestive heart failure, and compression fractures.  - Considered bone health given history of compression fractures and kyphoplasty.  - Recognized impact of recent stressful life events on health, potentially contributing to shingles outbreak.    1. General medical exam (Primary)  Health maintenance updated  Chronic issues reviewed  Fasting labs ordered  - CBC Auto Differential; Future  - Lipid Panel; Future  - Hemoglobin A1C; Future  - TSH; Future  - T4, Free; Future  - Comprehensive Metabolic Panel; Future    2. Encounter to establish care with new doctor  Reviewed chronic medical conditions, updated problem list and medication list    3. Chronic heart failure with preserved ejection fraction  Stable, chronic  Primary mgmt w/ cardiology  Cont jardiance, lasix, rifaximin, spironolactone    4. Breast cancer, stage 4, left  Stable, chronic  S/p left mastectomy  Primary mgmt w/ oncology  Defer to specialist for monitoring and imaging    5. Malignant neoplasm metastatic to left lung  As above    6. Encounter for screening for malignant neoplasm of colon  Declined    7. Encounter for other screening for malignant neoplasm of breast  Defer to oncology for continued screening and timing    8. Encounter for screening for malignant neoplasm of cervix  Declined    9. Encounter for screening for osteoporosis  - DXA Bone Density Axial Skeleton 1 or more sites; Future    10. Class 1 obesity due to excess calories with serious comorbidity and body mass index (BMI) of 34.0 to 34.9 in adult  Encourage healthy diet and exercise habits to optimize health and minimize chance of comorbidity development  Comorbid Aortic atherosclerosis    11. Lymphedema  Stable, chronic  Compression stockings worn  Continue diuretics  Cont specialist monitoring    12. Herpes zoster with complication  Resolved    13. Nonrheumatic aortic valve stenosis  Stable, chronic  Monitored by  cardiology  No changes to plan of care    14. Thrombocytopenia, unspecified  Chronic, f/u labs    15. Compression fracture of lumbar spine, non-traumatic, sequela  F/u DEXA  F/u VitD  - Vitamin D; Future    Check-Out:  Follow-Up  Follow up in about 1 year (around 6/23/2026) for Fasting labs.    Upcoming Appointments  Future Appointments   Date Time Provider Department Center   7/16/2025  2:00 PM Lizbeth Mehta, DNP [facility] PLMDBEN [facility]   7/18/2025 10:40 AM NOM, DEXA1 [facility] BMD [facility]   8/7/2025 10:00 AM LAB, HEMON CANCER BLDG N[facility] LAB HO [facility]   8/7/2025 11:00 AM Carolina Trent PA-C [facility] HEMONC3 [facility]   10/28/2025  9:30 AM NOM OIC-US1 [facility] ULTR [facility]   10/28/2025 10:20 AM LAB, APPOINTMENT [facility] INTMED [facility] LAB IM [facility] PCW         Oscar Parkinson MD, FAAFP  Family Medicine Physician  [facility]  6/23/2025      This note was generated with the assistance of ambient listening technology. Verbal consent was obtained by the patient and accompanying visitor(s) for the recording of patient appointment to facilitate this note. I attest to having reviewed and edited the generated note for accuracy, though some syntax or spelling errors may persist. Please contact the author of this note for any clarification.                                   [1]   Patient Active Problem List  Diagnosis    Breast cancer, stage 4, left    Malignant neoplasm metastatic to left lung    Lymphedema    Physical deconditioning    Balance problem    Hypokalemia    Gastrointestinal hemorrhage with melena    Esophageal and gastric varices    Class 1 obesity due to excess calories with serious comorbidity and body mass index (BMI) of 34.0 to 34.9 in adult    Aortic atherosclerosis    Thrombocytopenia, unspecified    Acute gastrointestinal bleeding    S/P TIPS (transjugular intrahepatic portosystemic shunt)    Cellulitis of foot    Volume overload    Biliary  cirrhosis    Bacteremia    Leg swelling    Arm swelling    Symptomatic anemia    Elevated troponin    Pleural effusion    Hyponatremia    Pulmonary edema    Chronic heart failure with preserved ejection fraction    Chemotherapy-induced peripheral neuropathy    Coronary artery calcification seen on CT scan    Nonrheumatic aortic valve stenosis    Compression fracture of lumbar spine, non-traumatic, sequela    Decreased range of motion of lumbar spine    Weakness of both hips    Chronic venous insufficiency   [2]   Past Medical History:  Diagnosis Date    Acute encephalopathy 05/25/2024    Aortic atherosclerosis 07/06/2023    Breast cancer     CHF (congestive heart failure)     Chronic venous insufficiency 04/02/2025    Coronary artery calcification seen on CT scan 09/19/2024    Esophageal and gastric varices 06/23/2023    Hepatic encephalopathy 05/25/2024    Liver disease     Malignant neoplasm metastatic to left lung 06/08/2021    Stenosis of aortic and mitral valves     Aortic stenosis   [3]   Past Surgical History:  Procedure Laterality Date    BREAST SURGERY  June 2007    ENDOSCOPIC ULTRASOUND OF UPPER GASTROINTESTINAL TRACT N/A 06/27/2023    Procedure: ULTRASOUND, UPPER GI TRACT, ENDOSCOPIC;  Surgeon: Home Lopez MD;  [facility]  Service: Endoscopy;  Laterality: N/A;    ENDOSCOPIC ULTRASOUND OF UPPER GASTROINTESTINAL TRACT N/A 01/12/2024    Procedure: ULTRASOUND, UPPER GI TRACT, ENDOSCOPIC;  Surgeon: Home Lopez MD;  Location: [facility] (2[facility]);  Service: Endoscopy;  Laterality: N/A;  11/28/23-Instructions via portal-DS  1/8-lvm for precall-MS  1/10-precall complete-Kpvt    ESOPHAGOGASTRODUODENOSCOPY N/A 06/23/2023    Procedure: EGD (ESOPHAGOGASTRODUODENOSCOPY);  Surgeon: Quintin Mooney MD;  Location: [facility] ([facility]);  Service: Endoscopy;  Laterality: N/A;    ESOPHAGOGASTRODUODENOSCOPY N/A 06/27/2023    Procedure: EGD (ESOPHAGOGASTRODUODENOSCOPY);  Surgeon: Home Lopez  MD;  Location: [facility] ENDO (2ND FLR);  Service: Endoscopy;  Laterality: N/A;    ESOPHAGOGASTRODUODENOSCOPY N/A 08/03/2023    Procedure: EGD (ESOPHAGOGASTRODUODENOSCOPY);  Surgeon: Home Lopez MD;  Location: [facility] ENDO (2ND FLR);  Service: Endoscopy;  Laterality: N/A;  instr portal-labs 6/28/23-tb    ESOPHAGOGASTRODUODENOSCOPY N/A 01/12/2024    Procedure: EGD (ESOPHAGOGASTRODUODENOSCOPY);  Surgeon: Home Lopez MD;  Location: [facility] ENDO (2ND FLR);  Service: Endoscopy;  Laterality: N/A;    ESOPHAGOGASTRODUODENOSCOPY N/A 04/10/2024    Procedure: EGD (ESOPHAGOGASTRODUODENOSCOPY);  Surgeon: Ze Anderson MD;  Location: [facility] ENDO (2ND FLR);  Service: Endoscopy;  Laterality: N/A;    INSERTION OF TUNNELED CENTRAL VENOUS CATHETER (CVC) WITH SUBCUTANEOUS PORT      MASTECTOMY      SPINE SURGERY  April 1, 2025    Kyphoplasty   [4]   Social History  Tobacco Use   Smoking Status Never   Smokeless Tobacco Never   [5]   Current Outpatient Medications:     empagliflozin (JARDIANCE) 10 mg tablet, Take 1 tablet (10 mg total) by mouth once daily., Disp: 30 tablet, Rfl: 11    furosemide (LASIX) 80 MG tablet, Take 1 tablet (80 mg total) by mouth once daily. If you are gaining weight (2 lbs in 24 hours or 3 lbs in two days) may take an extra dose of 80 mg, Disp: 90 tablet, Rfl: 2    HYDROcodone-acetaminophen (NORCO) 5-325 mg per tablet, Take 1 tablet by mouth every 6 (six) hours as needed for Pain., Disp: 40 tablet, Rfl: 0    lactulose (CHRONULAC) 10 gram/15 mL solution, Take 30 mLs (20 g total) by mouth 3 (three) times daily., Disp: 2700 mL, Rfl: 11    methylphenidate HCl (RITALIN) 5 MG tablet, Take 1 tablet (5 mg total) by mouth 2 (two) times daily., Disp: 60 tablet, Rfl: 0    pantoprazole (PROTONIX) 40 MG tablet, Take 1 tablet (40 mg total) by mouth 2 (two) times daily., Disp: 180 tablet, Rfl: 0    rifAXIMin (XIFAXAN) 550 mg Tab, Take 1 tablet (550 mg total) by mouth 2 (two) times daily., Disp: 60 tablet, Rfl: 11    traZODone  (DESYREL) 50 MG tablet, Take 0.5 tablets (25 mg total) by mouth every evening., Disp: 15 tablet, Rfl: 0    spironolactone (ALDACTONE) 100 MG tablet, Take 1 tablet (100 mg total) by mouth once daily. (Patient not taking: Reported on 6/13/2025), Disp: 30 tablet, Rfl: 11    valACYclovir (VALTREX) 1000 MG tablet, Take 1 tablet (1,000 mg total) by mouth 3 (three) times daily. for 10 days, Disp: 30 tablet, Rfl: 0

## 2025-06-24 RX ORDER — HYDROCODONE BITARTRATE AND ACETAMINOPHEN 5; 325 MG/1; MG/1
1 TABLET ORAL EVERY 6 HOURS PRN
Qty: 40 TABLET | Refills: 0 | Status: SHIPPED | OUTPATIENT
Start: 2025-06-24

## 2025-07-10 ENCOUNTER — TELEPHONE (OUTPATIENT)
Dept: PALLIATIVE MEDICINE | Facility: CLINIC | Age: 55
End: 2025-07-10
Payer: MEDICARE

## 2025-07-11 DIAGNOSIS — I50.30 HEART FAILURE WITH PRESERVED EJECTION FRACTION, UNSPECIFIED HF CHRONICITY: ICD-10-CM

## 2025-07-12 NOTE — TELEPHONE ENCOUNTER
No care due was identified.  [facility] Embedded Care Due Messages. Reference number: [id number].   7/11/2025 10:23:42 PM CDT

## 2025-07-14 NOTE — TELEPHONE ENCOUNTER
Refill Routing Note   Medication(s) are not appropriate for processing by [facility] for the following reason(s):        No active prescription written by provider    [facility] action(s):  Defer             Appointments  past 12m or future 3m with PCP    Date Provider   Last Visit   6/23/2025 Oscar Parkinson MD   Next Visit   Visit date not found Oscar Parkinson MD   ED visits in past 90 days: 1        Note composed:9:19 AM 07/14/2025

## 2025-07-15 DIAGNOSIS — M48.56XS COMPRESSION FRACTURE OF LUMBAR SPINE, NON-TRAUMATIC, SEQUELA: ICD-10-CM

## 2025-07-15 DIAGNOSIS — R41.840 LACK OF CONCENTRATION: ICD-10-CM

## 2025-07-15 RX ORDER — METHYLPHENIDATE HYDROCHLORIDE 5 MG/1
5 TABLET ORAL 2 TIMES DAILY
Qty: 60 TABLET | Refills: 0 | Status: SHIPPED | OUTPATIENT
Start: 2025-07-15

## 2025-07-15 RX ORDER — HYDROCODONE BITARTRATE AND ACETAMINOPHEN 5; 325 MG/1; MG/1
1 TABLET ORAL EVERY 6 HOURS PRN
Qty: 40 TABLET | Refills: 0 | Status: SHIPPED | OUTPATIENT
Start: 2025-07-15

## 2025-07-16 ENCOUNTER — OFFICE VISIT (OUTPATIENT)
Dept: PALLIATIVE MEDICINE | Facility: CLINIC | Age: 55
End: 2025-07-16
Payer: MEDICARE

## 2025-07-16 DIAGNOSIS — C50.912 BREAST CANCER, STAGE 4, LEFT: ICD-10-CM

## 2025-07-16 DIAGNOSIS — K92.2 GASTROINTESTINAL HEMORRHAGE, UNSPECIFIED GASTROINTESTINAL HEMORRHAGE TYPE: ICD-10-CM

## 2025-07-16 DIAGNOSIS — R60.9 EDEMA, UNSPECIFIED TYPE: ICD-10-CM

## 2025-07-16 DIAGNOSIS — Z51.5 ENCOUNTER FOR PALLIATIVE CARE: ICD-10-CM

## 2025-07-16 DIAGNOSIS — G47.00 INSOMNIA, UNSPECIFIED TYPE: ICD-10-CM

## 2025-07-16 DIAGNOSIS — F41.9 ANXIETY: ICD-10-CM

## 2025-07-16 DIAGNOSIS — R53.83 FATIGUE, UNSPECIFIED TYPE: ICD-10-CM

## 2025-07-16 DIAGNOSIS — G89.3 CANCER RELATED PAIN: ICD-10-CM

## 2025-07-16 DIAGNOSIS — C78.02 MALIGNANT NEOPLASM METASTATIC TO LEFT LUNG: Primary | ICD-10-CM

## 2025-07-16 RX ORDER — TRAZODONE HYDROCHLORIDE 50 MG/1
25 TABLET ORAL NIGHTLY
Qty: 15 TABLET | Refills: 0 | Status: SHIPPED | OUTPATIENT
Start: 2025-07-16 | End: 2025-08-16

## 2025-07-16 NOTE — Clinical Note
Hello! Just a quick note to let you know we'll be signing off on Ms. Trivedi's care. She's doing well and doesn't require ongoing palliative follow-up. Her pain meds are minimal-Norco 2.5 or 5 mg once daily-so I'm hoping you can take over prescribing. I'm also transitioning out of the clinic, and we're restructuring to focus on more acute cases. Let me know if any questions come up. Thanks so much, Lizbeth

## 2025-07-17 NOTE — PROGRESS NOTES
The patient location is: [place]  The chief complaint leading to consultation is: symptom mgmt    Visit type: audiovisual    Face to Face time with patient: 35 minutes    45 minutes of total time spent on the encounter, which includes face to face time and non-face to face time preparing to see the patient (eg, review of tests), Obtaining and/or reviewing separately obtained history, Documenting clinical information in the electronic or other health record, Independently interpreting results (not separately reported) and communicating results to the patient/family/caregiver, or Care coordination (not separately reported).       Each patient to whom he or she provides medical services by telemedicine is:  (1) informed of the relationship between the physician and patient and the respective role of any other health care provider with respect to management of the patient; and (2) notified that he or she may decline to receive medical services by telemedicine and may withdraw from such care at any time.    Notes:     Consult Note  Palliative Care      Consult Requested By: No ref. provider found  Reason for Consult: symptom mgmt/ACP      ASSESSMENT/PLAN:     Plan/Recommendations:    07/16/2025:  - transition out of palliative care  - happy to reschedule if palliative need re-present    Metastatic breast cancer  - following with Dr. Willis & NP [place]   - ID 2006, chemo, s/p radical mastectomy (2007), s/p post-op RT, herceptin x 3.5 years until progression in lung mass, cycled through myriad of chemo tx with progression on July 2020 PET, 9 months of continued therapy w eventual progression, stable on 4/2024 CT  - on ENHERTU on 2 years  - currently observation off therapy     Encounter for palliative care  - Patient is decisional  - Patient accompanied today by self  - ACP documents are not uploaded into EMR   - Philosophy of Palliative Medicine reviewed with patient and family at first visit  - New patient folder  given to and reviewed with patient and family at first visit  - Goals of care: To spend as much time with daughter and grandson as she can, to remain functional, independent.     Cancer associated fatigue/insomnia   - infrequent but severe when it occurs, trazodone 25-50 mg prn qhs   - currently on ritalin, which helps w fatigue     Edema   - BLE  - follows with cardiologist  - controls with daily lasix, will hold doses on a busy day where it will be difficult to use a bathroom frequently  - using incontinence supplies though they are difficult for her to afford   - compression stockings     Cancer pain  - s/p kyphoplasty which has provided near complete relief of back pain  - heating pad  - lidocaine patch  - Vionic shoes  - using walker at home, cane while out   - using norco 5 mg bid prn, only at night    Depression, anxiety  - mostly r/t role as primary caretaker for 88 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     SUBJECTIVE:     History of Present Illness:  Patient is a 55 y.o. year old female presenting with metastatic breast cancer. Please see oncology notes for full oncologic history and treatment course.     07/16/2025:  JOSSUE ELIZALDE reviewed: no concerns    History of Present Illness    INTERVAL HISTORY :  Patient reports significant life changes, including plans to move in with her mother to save money and eventually purchase property. She recently experienced shingles affecting her head, neck, and nose, triggered by multiple stressful events.  Patient has started meditating and practicing breathing exercises to manage stress and expresses interest in trying acupuncture again for chronic back pain. Her longstanding lower back pain varies depending on daily activities, managed with occasional hydrocodone use at night and a heating pad. Patient mentions losing at least two inches in height and having mild scoliosis. Despite these challenges, she desires to strengthen her core and go tandem skydiving in October. Her cancer is currently stable without chemotherapy. A new PCP has started her on weekly Vitamin D supplements and ordered a bone scan.    ACTIVITIES OF DAILY LIVING:  - performs daily activities independently, including cooking, household chores, driving, and errands  - needs to sit periodically when cooking or chopping vegetables due to back pain  - plans to move in with her mother to save money  - cares for her dog independently  - swims and engages in social activities  - has difficulty standing for long periods due to back pain  - uses a heating pad at night for back pain management  - plans future activities like skydiving and ziplining    GOALS OF CARE/ADVANCED DIRECTIVES:  - expresses desire to live life more fully, planning adventurous activities like skydiving and ziplining  - plans to move in with her mother to save money for future property purchase and pursue personal goals  - sister is becoming more involved in their mother's care  - plans to install cameras to monitor her mother during the day       Assessment & Plan    PLAN SUMMARY:  - Referral to Dr. Duque for ongoing care  - Continue as-needed hydrocodone for pain management  - Vitamin D supplementation initiated by new PCP Dr. Parkinson  - Bone scan ordered by Dr. Parkinson  - Dietitian Radha Patel to contact patient about food pantry appointments  - Contact office if needed until transition to Dr. Duque is complete    Portions of this note were generated by [employer].  Time spent  with patient: 43 minutes          Past Medical History:   Diagnosis Date    Acute encephalopathy 05/25/2024    Aortic atherosclerosis 07/06/2023    Breast cancer     CHF (congestive heart failure)     Chronic venous insufficiency 04/02/2025    Coronary artery calcification seen on CT scan 09/19/2024    Esophageal and gastric varices 06/23/2023    Hepatic encephalopathy 05/25/2024    Liver disease     Malignant neoplasm metastatic to left lung 06/08/2021    Stenosis of aortic and mitral valves     Aortic stenosis     Past Surgical History:   Procedure Laterality Date    BREAST SURGERY  June 2007    ENDOSCOPIC ULTRASOUND OF UPPER GASTROINTESTINAL TRACT N/A 06/27/2023    Procedure: ULTRASOUND, UPPER GI TRACT, ENDOSCOPIC;  Surgeon: Home Lopez MD;  Location: [facility] ENDO (2ND FLR);  Service: Endoscopy;  Laterality: N/A;    ENDOSCOPIC ULTRASOUND OF UPPER GASTROINTESTINAL TRACT N/A 01/12/2024    Procedure: ULTRASOUND, UPPER GI TRACT, ENDOSCOPIC;  Surgeon: Home Lopez MD;  Location: [facility] ENDO (2ND FLR);  Service: Endoscopy;  Laterality: N/A;  11/28/23-Instructions via portal-DS  1/8-lvm for precall-MS  1/10-precall complete-Kpvt    ESOPHAGOGASTRODUODENOSCOPY N/A 06/23/2023    Procedure: EGD (ESOPHAGOGASTRODUODENOSCOPY);  Surgeon: Quintin Mooney MD;  Location: [facility] ENDO (2ND FLR);  Service: Endoscopy;  Laterality: N/A;    ESOPHAGOGASTRODUODENOSCOPY N/A 06/27/2023    Procedure: EGD (ESOPHAGOGASTRODUODENOSCOPY);  Surgeon: Home Lopez MD;  Location: [facility] ENDO (2ND FLR);  Service: Endoscopy;  Laterality: N/A;    ESOPHAGOGASTRODUODENOSCOPY N/A 08/03/2023    Procedure: EGD (ESOPHAGOGASTRODUODENOSCOPY);  Surgeon: Home Lopez MD;  Location: [facility] ENDO (2ND FLR);  Service: Endoscopy;  Laterality: N/A;  instr portal-labs 6/28/23-tb    ESOPHAGOGASTRODUODENOSCOPY N/A 01/12/2024    Procedure: EGD (ESOPHAGOGASTRODUODENOSCOPY);  Surgeon: Home Lopez MD;  Location: [facility] ENDO (2ND FLR);  Service: Endoscopy;  Laterality:  N/A;    ESOPHAGOGASTRODUODENOSCOPY N/A 04/10/2024    Procedure: EGD (ESOPHAGOGASTRODUODENOSCOPY);  Surgeon: Ze Anderson MD;  Location: [address]);  Service: Endoscopy;  Laterality: N/A;    INSERTION OF TUNNELED CENTRAL VENOUS CATHETER (CVC) WITH SUBCUTANEOUS PORT      MASTECTOMY      SPINE SURGERY  April 1, 2025    Kyphoplasty     Family History   Problem Relation Name Age of Onset    Hypertension Mother Briana López     Hearing loss Mother Briana López     Heart attack Father Hiro López     Lung cancer Father Hiro López     Cancer Father Hiro López     Heart disease Father Hiro López      Review of patient's allergies indicates:  No Known Allergies    Medications:    Current Outpatient Medications:     empagliflozin (JARDIANCE) 10 mg tablet, Take 1 tablet (10 mg total) by mouth once daily., Disp: 90 tablet, Rfl: 3    ergocalciferol (VITAMIN D2) 50,000 unit Cap, Take 1 capsule (50,000 Units total) by mouth every 7 days., Disp: 12 capsule, Rfl: 3    furosemide (LASIX) 80 MG tablet, Take 1 tablet (80 mg total) by mouth once daily. If you are gaining weight (2 lbs in 24 hours or 3 lbs in two days) may take an extra dose of 80 mg, Disp: 90 tablet, Rfl: 2    HYDROcodone-acetaminophen (NORCO) 5-325 mg per tablet, Take 1 tablet by mouth every 6 (six) hours as needed for Pain., Disp: 40 tablet, Rfl: 0    lactulose (CHRONULAC) 10 gram/15 mL solution, Take 30 mLs (20 g total) by mouth 3 (three) times daily., Disp: 2700 mL, Rfl: 11    methylphenidate HCl (RITALIN) 5 MG tablet, Take 1 tablet (5 mg total) by mouth 2 (two) times daily., Disp: 60 tablet, Rfl: 0    pantoprazole (PROTONIX) 40 MG tablet, Take 1 tablet (40 mg total) by mouth 2 (two) times daily., Disp: 180 tablet, Rfl: 0    rifAXIMin (XIFAXAN) 550 mg Tab, Take 1 tablet (550 mg total) by mouth 2 (two) times daily., Disp: 60 tablet, Rfl: 11    spironolactone (ALDACTONE) 100 MG tablet, Take 1 tablet (100 mg total) by mouth once  daily. (Patient not taking: Reported on 6/13/2025), Disp: 30 tablet, Rfl: 11    traZODone (DESYREL) 50 MG tablet, Take 0.5 tablets (25 mg total) by mouth every evening., Disp: 15 tablet, Rfl: 0    valACYclovir (VALTREX) 1000 MG tablet, Take 1 tablet (1,000 mg total) by mouth 3 (three) times daily. for 10 days, Disp: 30 tablet, Rfl: 0    OBJECTIVE:       ROS:  Review of Systems   Constitutional:  Positive for fatigue. Negative for activity change and appetite change.   HENT:  Negative for congestion, dental problem and drooling.    Eyes:  Negative for pain, discharge and itching.   Respiratory:  Positive for cough. Negative for choking and wheezing.    Cardiovascular:  Positive for leg swelling.   Gastrointestinal:  Negative for constipation, diarrhea, nausea and vomiting.   Genitourinary:  Negative for difficulty urinating, dyspareunia and dysuria.   Musculoskeletal:  Positive for back pain and gait problem. Negative for arthralgias.   Skin:  Negative for pallor, rash and wound.   Neurological:  Positive for weakness. Negative for dizziness, facial asymmetry and headaches.   Psychiatric/Behavioral:  Positive for sleep disturbance. Negative for dysphoric mood. The patient is not nervous/anxious.        Review of Symptoms      Symptom Assessment (ESAS 0-10 Scale)  Pain:  2  Dyspnea:  0  Anxiety:  6  Nausea:  0  Depression:  0  Anorexia:  0  Fatigue:  4  Insomnia:  7  Restlessness:  0  Agitation:  0     CAM / Delirium:  Negative  Constipation:  Negative  Diarrhea:  Negative    Anxiety:  Is not nervous/anxious  Constipation:  No constipation    Bowel Management Plan (BMP):  No      Pain Assessment:  OME in 24 hours:  2.5  Location(s): none      Modified Hunter Scale:  0    ECOG Performance Status ndGndrndanddndend:nd nd2nd Living Arrangements:  Lives with family    Psychosocial/Cultural:   See Palliative Psychosocial Note: No  **Primary  to Follow**  Palliative Care  Consult: No     Time-Based Charting:   No      Advance Care Planning   Advance Directives:   Living Will: No        Oral Declaration: No    LaPOST: No    Do Not Resuscitate Status: No        Oral Declaration: No      Decision Making:  Patient answered questions  Goals of Care: What is most important right now is to focus on remaining as independent as possible, symptom/pain control, curative/life-prolongation (regardless of treatment burdens), comfort and QOL . Accordingly, we have decided that the best plan to meet the patient's goals includes continuing with treatment.        Physical Exam:  Vitals:    VIRTUAL VISIT      Physical Exam  Vitals reviewed.   Constitutional:       General: She is not in acute distress.     Appearance: Normal appearance. She is obese. She is not ill-appearing, toxic-appearing or diaphoretic.   HENT:      Head: Atraumatic.      Right Ear: External ear normal.      Left Ear: External ear normal.      Nose: Nose normal.      Mouth/Throat:      Dentition: Abnormal dentition.   Eyes:      General:         Right eye: No discharge.         Left eye: No discharge.      Extraocular Movements: Extraocular movements intact.      Conjunctiva/sclera: Conjunctivae normal.   Pulmonary:      Effort: Pulmonary effort is normal. No respiratory distress.      Breath sounds: No stridor.   Musculoskeletal:         General: Normal range of motion.      Cervical back: Normal range of motion.      Right lower leg: Edema present.      Left lower leg: Edema present.   Skin:     General: Skin is warm and dry.      Coloration: Skin is not jaundiced.   Neurological:      Mental Status: She is alert and oriented to person, place, and time. Mental status is at baseline.      Motor: No weakness.      Gait: Gait abnormal (with cane).   Psychiatric:         Behavior: Behavior normal.         Thought Content: Thought content normal.         Judgment: Judgment normal.         Labs:  CBC:   WBC   Date Value Ref Range Status   06/23/2025 3.57 (L) 3.90 - 12.70  K/uL Final     Hemoglobin   Date Value Ref Range Status   02/20/2025 13.7 12.0 - 16.0 g/dL Final     HGB   Date Value Ref Range Status   06/23/2025 13.2 12.0 - 16.0 gm/dL Final     Hematocrit   Date Value Ref Range Status   02/20/2025 41.6 37.0 - 48.5 % Final     HCT   Date Value Ref Range Status   06/23/2025 40.3 37.0 - 48.5 % Final     MCV   Date Value Ref Range Status   06/23/2025 105 (H) 82 - 98 fL Final   02/20/2025 103 (H) 82 - 98 fL Final     Platelet Count   Date Value Ref Range Status   06/23/2025 111 (L) 150 - 450 K/uL Final     Platelets   Date Value Ref Range Status   02/20/2025 117 (L) 150 - 450 K/uL Final       LFT:   Lab Results   Component Value Date    AST 56 (H) 06/23/2025    ALKPHOS 109 06/23/2025    BILITOT 5.1 (H) 06/23/2025       Albumin:   Albumin   Date Value Ref Range Status   06/23/2025 2.9 (L) 3.5 - 5.2 g/dL Final   02/20/2025 2.8 (L) 3.5 - 5.2 g/dL Final     Protein:   Protein Total   Date Value Ref Range Status   06/23/2025 6.7 6.0 - 8.4 gm/dL Final     Total Protein   Date Value Ref Range Status   02/20/2025 6.4 6.0 - 8.4 g/dL Final       Radiology:I have reviewed all pertinent imaging results/findings within the past 24 hours.    05/24/2025 CT chest: Impression:     History of breast cancer status post left mastectomy and axillary lymph node dissection.     Unchanged bilateral nodular opacities when allowing for slight differences in technique.  No new discrete nodules.     Left upper lobe post treatment changes.     Multiple compression deformities, not significantly changed compared to prior exam.     Chest port with its tip in the innominate vein.    02/04/2025 US liver: Impression:     Postprocedural change of TIPS placement with recent TIPS revision.  Overall improved velocity about the TIPS shunt most notably about the mid aspect, with expected reversal flow about the right anterior branch portal vein.  Attention on follow-up imaging of persistent decreased velocity about the  TIPS shunt portal venous end and bidirectional flow about the left portal vein.     Cirrhotic liver morphology.  No focal hepatic lesions.     Sequela of portal hypertension including splenomegaly.  No ascites.     Cholelithiasis without evidence for acute cholecystitis.    11/11/2024 CT AP: Impression:     History of breast cancer status post left mastectomy and axillary lymph node dissection.     *Unchanged right axillary and bilateral inguinal lymphadenopathy.  *Unchanged left pleural thickening.  *Multiple solid pulmonary nodules scattered throughout both lungs measuring up to 2.5 cm, unchanged.  The left perihilar mass occludes adjacent airways resulting in partial collapse of the left lower lobe.  *No abdominopelvic metastases.  Mosaic attenuation throughout both lungs, which could represent small airway disease, pulmonary edema, or infection.     Cirrhosis with signs of portal hypertension including splenomegaly and portosystemic collaterals.  Patent tips.  Embolization coils in the upper abdomen, possibly from BRTO.  No focal hepatic lesions.     Multiple compression fractures in the thoracolumbar spine with severe height loss at T8, many of which are new.      Signature: Lizbeth Mehta, DNP

## 2025-07-18 ENCOUNTER — HOSPITAL ENCOUNTER (OUTPATIENT)
Dept: RADIOLOGY | Facility: CLINIC | Age: 55
Discharge: HOME | End: 2025-07-18
Attending: FAMILY MEDICINE
Payer: MEDICARE

## 2025-07-18 DIAGNOSIS — Z13.820 ENCOUNTER FOR SCREENING FOR OSTEOPOROSIS: ICD-10-CM

## 2025-07-18 PROCEDURE — 77080 DXA BONE DENSITY AXIAL: CPT | Mod: 26,,, | Performed by: INTERNAL MEDICINE

## 2025-07-18 PROCEDURE — 77080 DXA BONE DENSITY AXIAL: CPT | Mod: TC

## 2025-07-22 ENCOUNTER — LAB VISIT (OUTPATIENT)
Dept: LAB | Facility: HOSPITAL | Age: 55
End: 2025-07-22
Payer: MEDICARE

## 2025-07-22 DIAGNOSIS — E03.8 SUBCLINICAL HYPOTHYROIDISM: ICD-10-CM

## 2025-07-22 LAB
T4 FREE SERPL-MCNC: 0.92 NG/DL (ref 0.71–1.51)
TSH SERPL-ACNC: 6.04 UIU/ML (ref 0.4–4)

## 2025-07-22 PROCEDURE — 84443 ASSAY THYROID STIM HORMONE: CPT

## 2025-07-22 PROCEDURE — 36415 COLL VENOUS BLD VENIPUNCTURE: CPT

## 2025-07-22 PROCEDURE — 84439 ASSAY OF FREE THYROXINE: CPT

## 2025-07-24 ENCOUNTER — PATIENT MESSAGE (OUTPATIENT)
Dept: HEMATOLOGY/ONCOLOGY | Facility: CLINIC | Age: 55
End: 2025-07-24
Payer: MEDICARE

## 2025-07-28 DIAGNOSIS — Z00.00 ENCOUNTER FOR MEDICARE ANNUAL WELLNESS EXAM: ICD-10-CM

## 2025-07-30 ENCOUNTER — PATIENT MESSAGE (OUTPATIENT)
Dept: PALLIATIVE MEDICINE | Facility: CLINIC | Age: 55
End: 2025-07-30
Payer: MEDICARE

## 2025-08-05 ENCOUNTER — LAB VISIT (OUTPATIENT)
Dept: LAB | Facility: HOSPITAL | Age: 55
End: 2025-08-05
Attending: INTERNAL MEDICINE
Payer: MEDICARE

## 2025-08-05 ENCOUNTER — TELEPHONE (OUTPATIENT)
Dept: INTERNAL MEDICINE | Facility: CLINIC | Age: 55
End: 2025-08-05
Payer: MEDICARE

## 2025-08-05 ENCOUNTER — OFFICE VISIT (OUTPATIENT)
Dept: HEMATOLOGY/ONCOLOGY | Facility: CLINIC | Age: 55
End: 2025-08-05
Payer: MEDICARE

## 2025-08-05 VITALS
SYSTOLIC BLOOD PRESSURE: 131 MMHG | BODY MASS INDEX: 34.06 KG/M2 | TEMPERATURE: 98 F | OXYGEN SATURATION: 96 % | HEART RATE: 96 BPM | HEIGHT: 60 IN | WEIGHT: 173.5 LBS | DIASTOLIC BLOOD PRESSURE: 72 MMHG | RESPIRATION RATE: 18 BRPM

## 2025-08-05 DIAGNOSIS — E87.6 HYPOKALEMIA: ICD-10-CM

## 2025-08-05 DIAGNOSIS — C50.912 BREAST CANCER, STAGE 4, LEFT: Chronic | ICD-10-CM

## 2025-08-05 DIAGNOSIS — K74.5 BILIARY CIRRHOSIS: Chronic | ICD-10-CM

## 2025-08-05 DIAGNOSIS — C78.02 MALIGNANT NEOPLASM METASTATIC TO LEFT LUNG: ICD-10-CM

## 2025-08-05 DIAGNOSIS — C78.02 MALIGNANT NEOPLASM METASTATIC TO LEFT LUNG: Chronic | ICD-10-CM

## 2025-08-05 DIAGNOSIS — I50.32 CHRONIC HEART FAILURE WITH PRESERVED EJECTION FRACTION: ICD-10-CM

## 2025-08-05 LAB
ABSOLUTE EOSINOPHIL (OHS): 0.3 K/UL
ABSOLUTE MONOCYTE (OHS): 0.31 K/UL (ref 0.3–1)
ABSOLUTE NEUTROPHIL COUNT (OHS): 2.06 K/UL (ref 1.8–7.7)
ALBUMIN SERPL BCP-MCNC: 2.7 G/DL (ref 3.5–5.2)
ALP SERPL-CCNC: 115 UNIT/L (ref 40–150)
ALT SERPL W/O P-5'-P-CCNC: 27 UNIT/L (ref 0–55)
ANION GAP (OHS): 9 MMOL/L (ref 8–16)
AST SERPL-CCNC: 71 UNIT/L (ref 0–50)
BASOPHILS # BLD AUTO: 0.06 K/UL
BASOPHILS NFR BLD AUTO: 1.6 %
BILIRUB SERPL-MCNC: 5.3 MG/DL (ref 0.1–1)
BUN SERPL-MCNC: 12 MG/DL (ref 6–20)
CALCIUM SERPL-MCNC: 8.6 MG/DL (ref 8.7–10.5)
CHLORIDE SERPL-SCNC: 93 MMOL/L (ref 95–110)
CO2 SERPL-SCNC: 39 MMOL/L (ref 23–29)
CREAT SERPL-MCNC: 0.6 MG/DL (ref 0.5–1.4)
ERYTHROCYTE [DISTWIDTH] IN BLOOD BY AUTOMATED COUNT: 15.9 % (ref 11.5–14.5)
GFR SERPLBLD CREATININE-BSD FMLA CKD-EPI: >60 ML/MIN/1.73/M2
GLUCOSE SERPL-MCNC: 103 MG/DL (ref 70–110)
HCT VFR BLD AUTO: 40.4 % (ref 37–48.5)
HGB BLD-MCNC: 13.9 GM/DL (ref 12–16)
IMM GRANULOCYTES # BLD AUTO: 0.01 K/UL (ref 0–0.04)
IMM GRANULOCYTES NFR BLD AUTO: 0.3 % (ref 0–0.5)
LYMPHOCYTES # BLD AUTO: 1.02 K/UL (ref 1–4.8)
MAGNESIUM SERPL-MCNC: 1.7 MG/DL (ref 1.6–2.6)
MCH RBC QN AUTO: 33.8 PG (ref 27–31)
MCHC RBC AUTO-ENTMCNC: 34.4 G/DL (ref 32–36)
MCV RBC AUTO: 98 FL (ref 82–98)
NUCLEATED RBC (/100WBC) (OHS): 0 /100 WBC
PLATELET # BLD AUTO: 122 K/UL (ref 150–450)
PMV BLD AUTO: 10 FL (ref 9.2–12.9)
POTASSIUM SERPL-SCNC: 2.5 MMOL/L (ref 3.5–5.1)
PROT SERPL-MCNC: 6.3 GM/DL (ref 6–8.4)
RBC # BLD AUTO: 4.11 M/UL (ref 4–5.4)
RELATIVE EOSINOPHIL (OHS): 8 %
RELATIVE LYMPHOCYTE (OHS): 27.1 % (ref 18–48)
RELATIVE MONOCYTE (OHS): 8.2 % (ref 4–15)
RELATIVE NEUTROPHIL (OHS): 54.8 % (ref 38–73)
SODIUM SERPL-SCNC: 141 MMOL/L (ref 136–145)
WBC # BLD AUTO: 3.76 K/UL (ref 3.9–12.7)

## 2025-08-05 PROCEDURE — 99999 PR PBB SHADOW E&M-EST. PATIENT-LVL IV: CPT | Mod: PBBFAC,,, | Performed by: PHYSICIAN ASSISTANT

## 2025-08-05 PROCEDURE — 82565 ASSAY OF CREATININE: CPT

## 2025-08-05 PROCEDURE — 85025 COMPLETE CBC W/AUTO DIFF WBC: CPT

## 2025-08-05 PROCEDURE — 36415 COLL VENOUS BLD VENIPUNCTURE: CPT

## 2025-08-05 PROCEDURE — 83735 ASSAY OF MAGNESIUM: CPT

## 2025-08-05 RX ORDER — POTASSIUM CHLORIDE 20 MEQ/1
40 TABLET, EXTENDED RELEASE ORAL 2 TIMES DAILY
Qty: 40 TABLET | Refills: 0 | Status: SHIPPED | OUTPATIENT
Start: 2025-08-05 | End: 2026-08-05

## 2025-08-05 NOTE — Clinical Note
Hi- Patient seen today for breast cancer f/u and noted to have K of 2.5 on labs.  She is on lasix 80 mg qday and 100 mg spironolactone daily- notes continued issues with edema. 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  I gave her 80 meq KCL to take tonight and for the next several days but she will need close follow up for management of these meds in setting of low K. We discussed aggressive oral potassium (bananas) and the need for close follow up which she was in agreement with. I just wanted to give you a heads up.  Thanks- Carolina

## 2025-08-05 NOTE — Clinical Note
Hi, I saw this patient for breast cancer f/u today. Her bili and liver enzymes are up- it looks like she missed her f/u with you in June due to a case of shingles. I just wanted to make sure she got back into clinic for f/u. Thanks! Carolina

## 2025-08-05 NOTE — PROGRESS NOTES
Subjective     Patient ID: Shikha López is a 55 y.o. female.    Chief Complaint: No chief complaint on file.    89-year-old female who returns for F/U  for metastatic HER 2 + breast cancer.  She was on Trastuzumab deruxtecan  - her last treatment was May 7, 2024. Treatment has been held due to cirrhosis and hepatic encephalopathy.  She has also had variceal bleeding and CHF.  - She has chronic cytopenias - with thrombocytopenia.     Today she reports that her back pain has recently worsened but that was due to moving furniture in her mother's house. She is using a heating pad for relief.   Her breathing is doing well.    Her lower extremity swelling has been controlled with medication and use of support hose.  Normal bowel and urinary function.  Episode of shingles at left neck and scalp in June, resolved with use of anti-viral.   She takes 80 mg lasix and 100 mg spironolactone daily.     Labs today reveal potassium of 2.5.  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       Chest CT from 5/24/2025:  Impression:     History of breast cancer status post left mastectomy and axillary lymph node dissection.     Unchanged bilateral nodular opacities when allowing for slight differences in technique.  No new discrete nodules.     Left upper lobe post treatment changes.     Multiple compression deformities, not significantly changed compared to prior exam.     Chest port with its tip in the innominate vein.                       Breast Cancer history:  She presented in the emergency room at [facility] on September 29, 2006 with a 4 months history of inflammation of her left breast.  Physical examination at that time showed the left breast was largely replaced by large mass with multiple skin ulcerations.     A biopsy in September 2006 showed poorly differentiated carcinoma which was ER and IA. negative and HER2 positive.     She was  then referred to [facility] for additional care.   CT scan of the chest and abdomen November 2006 revealed multiple nodules in the lungs consistent with metastatic disease.  There also enlarged left axillary node.     She was treated with chemotherapy with weekly Herceptin and Abraxane with improvement in her breast and lung metastasis.     On May 29, 2007 she underwent left modified radical mastectomy(Dr. Colvin) which showed no residual tumor in the breast and 1/5 nodes was positive for metastasis measuring 6 mm.  ([id number]).  She then received postoperative radiation therapy(Dr Singh)  to the left chest wall supraclav and internal mammary lymph nodes from August 20, 2007 to September 28, 2007.     Postoperatively she continued on Herceptin for approximately 3 and 1/2 years before her lung metastasis begin to grow.     She subsequently received a number of different chemotherapy treatments including Adriamycin, Halaven, Xeloda plus lapatinib then Xeloda plus Herceptin. (  in [facility].)     Subsequently, she transferred her care to  at [facility].  -She was initially treated with Taxotere Herceptin Perjeta.    -She was then changed to Kadcyla.    In July 2020 PET scan showed progression.   She then took approximately 9 months of Herceptin and Navelbine with initial response then progression.     She started trastuzumab- deruxtecan  4/12/21.      CT 3/11/24 - stable  Stable postsurgical changes of left mastectomy and axillary lymph node dissection is patient with metastatic left breast cancer.   Redemonstration of multiple irregular bilateral pulmonary nodules concerning for metastatic disease.  No new lesions identified.   Hepatic steatosis and portal hypertension.  Increased bowel wall thickening and diffuse mesenteric edema with mild ascites, likely related to hepatic dysfunction.     Echo 4/24/24 - EF 60-65%        CT 4/29/24  -stable     CT chest - 8/15/24 - 1. Right  upper lobe and infrahilar left lower lobe nodules are minimally bulkier compared to the prior study.    2. Residual scattered ground-glass opacities bilaterally, improved in interval.  Resolution of the previously seen effusions        CT CAP 11/11/24 - *Unchanged right axillary and bilateral inguinal lymphadenopathy.  *Unchanged left pleural thickening.  *Multiple solid pulmonary nodules scattered throughout both lungs measuring up to 2.5 cm, unchanged.   Mosaic attenuation throughout both lungs, which could represent small airway disease, pulmonary edema, or infection.   Cirrhosis with signs of portal hypertension including splenomegaly and portosystemic collaterals.  No focal hepatic lesions.   Multiple compression fractures in the thoracolumbar spine with severe height loss at T8, many of which are new     CT 2/17/25 - 1. Unchanged bilateral nodular opacities.  No new nodules.  2. Stable bilateral irregular opacities.  3. Left upper lobe posttreatment changes  4. New multilevel vertebral compression fractures        She had kyphoplasty T12 and L 4 on  4/1/25.        Review of Systems   Constitutional:  Positive for appetite change (Improved). Negative for activity change and unexpected weight change.   HENT:  Negative for mouth sores.    Eyes:  Negative for visual disturbance.   Respiratory:  Negative for cough and shortness of breath.    Cardiovascular:  Positive for leg swelling (stable). Negative for chest pain.   Gastrointestinal:  Negative for abdominal pain and diarrhea.   Genitourinary:  Negative for frequency.   Musculoskeletal:  Positive for back pain (improved).   Integumentary:  Negative for rash.   Neurological:  Negative for headaches.   Hematological:  Negative for adenopathy.   Psychiatric/Behavioral:  The patient is not nervous/anxious.    All other systems reviewed and are negative.         Objective     Physical Exam  Vitals reviewed.   Constitutional:       General: She is not in acute  distress.     Appearance: She is obese.   Cardiovascular:      Rate and Rhythm: Normal rate and regular rhythm.      Heart sounds: Murmur heard.   Pulmonary:      Effort: Pulmonary effort is normal. No respiratory distress.      Breath sounds: No wheezing or rales.      Comments: Right breast with generalized edema, no erythema.  S/p left mastectomy, no chest wall mass or nodule.   Abdominal:      Palpations: Abdomen is soft. There is no mass.      Tenderness: There is no abdominal tenderness.   Musculoskeletal:      Right lower leg: Edema (mild) present.      Left lower leg: Edema (mild) present.   Lymphadenopathy:      Cervical: No cervical adenopathy.      Upper Body:      Right upper body: No supraclavicular or axillary adenopathy.      Left upper body: No supraclavicular or axillary adenopathy.   Neurological:      Mental Status: She is alert and oriented to person, place, and time.   Psychiatric:         Mood and Affect: Mood normal.         Behavior: Behavior normal.         Thought Content: Thought content normal.         Judgment: Judgment normal.            Assessment and Plan     1. Breast cancer, stage 4, left  -     CT Chest With Contrast; Future; Expected date: 08/05/2025  -     CT Abdomen Pelvis With IV Contrast NO Oral Contrast; Future; Expected date: 08/05/2025    2. Malignant neoplasm metastatic to left lung    3. Hypokalemia  -     potassium chloride SA (K-DUR,KLOR-CON) 20 MEQ tablet; Take 4 tablets (80 meq) tonight and then 2 tablets (40 meq) twice a day for the next three days  Dispense: 40 tablet; Refill: 0    4. Chronic heart failure with preserved ejection fraction    5. Biliary cirrhosis        1&2) CT from 5/2025 did not show any new abnormalities and she remains off of treatment.  Will plan on Q 3 months imaging, due at the end of August.  RTC in 2 months pending stable scan.    3)Hypokalemia- patient's lab history show persistent hypokalemia, although dip on today's labs in potassium  levels.  She takes lasix 80 mg daily and doubles the dose on weekends.  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  She will take 80 meq KCL daily and I have asked for close lab follow up with her PCP either tomorrow or Thursday as her diuretics will need to be managed.    4)CHF- managed by [facility], as above may need adjustment of diuretics due to hypokalemia.    5)Follows with hepatology and overdue for f/u.  Bilirubin and AST elevated on today's labs.  Request for f/u sent to Hepatology as patient missed her appt there in June.     Patient in agreement with plan and knows to call the office if any any issues.          No follow-ups on file.

## 2025-08-05 NOTE — TELEPHONE ENCOUNTER
Called and spoke to patient, informed her that her potassium is critical. (Patient was  in hematology/oncology appt at this time) Will go to ER from her appt. For evaluation

## 2025-08-06 ENCOUNTER — PATIENT MESSAGE (OUTPATIENT)
Dept: INTERNAL MEDICINE | Facility: CLINIC | Age: 55
End: 2025-08-06
Payer: MEDICARE

## 2025-08-06 ENCOUNTER — PATIENT MESSAGE (OUTPATIENT)
Dept: CARDIOLOGY | Facility: CLINIC | Age: 55
End: 2025-08-06
Payer: MEDICARE

## 2025-08-06 DIAGNOSIS — I50.32 CHRONIC HEART FAILURE WITH PRESERVED EJECTION FRACTION: Primary | ICD-10-CM

## 2025-08-06 NOTE — PROGRESS NOTES
Patient has not taken the Spironolactone since 6/9/2025.  Medication card updated.  She missed her dose of Lasix and Jardiance today because she was not sure if she should hold those.  She will take them tomorrow.  Are there any reasons why she should not take Lasix and Jardiance at the same time everyday?

## 2025-08-13 DIAGNOSIS — K74.60 LIVER CIRRHOSIS SECONDARY TO NASH: ICD-10-CM

## 2025-08-13 DIAGNOSIS — K76.82 HEPATIC ENCEPHALOPATHY: ICD-10-CM

## 2025-08-13 DIAGNOSIS — G47.00 INSOMNIA, UNSPECIFIED TYPE: ICD-10-CM

## 2025-08-13 DIAGNOSIS — K75.81 LIVER CIRRHOSIS SECONDARY TO NASH: ICD-10-CM

## 2025-08-13 DIAGNOSIS — M48.56XS COMPRESSION FRACTURE OF LUMBAR SPINE, NON-TRAUMATIC, SEQUELA: ICD-10-CM

## 2025-08-13 RX ORDER — HYDROCODONE BITARTRATE AND ACETAMINOPHEN 5; 325 MG/1; MG/1
1 TABLET ORAL EVERY 6 HOURS PRN
Qty: 40 TABLET | Refills: 0 | Status: SHIPPED | OUTPATIENT
Start: 2025-08-13

## 2025-08-13 RX ORDER — TRAZODONE HYDROCHLORIDE 50 MG/1
25 TABLET ORAL NIGHTLY
Qty: 15 TABLET | Refills: 0 | Status: SHIPPED | OUTPATIENT
Start: 2025-08-13 | End: 2025-09-12

## 2025-08-14 RX ORDER — LACTULOSE 10 G/15ML
20 SOLUTION ORAL; RECTAL 3 TIMES DAILY
Qty: 2700 ML | Refills: 11 | Status: SHIPPED | OUTPATIENT
Start: 2025-08-14 | End: 2026-08-14

## 2025-08-15 ENCOUNTER — LAB VISIT (OUTPATIENT)
Dept: LAB | Facility: HOSPITAL | Age: 55
End: 2025-08-15
Attending: INTERNAL MEDICINE
Payer: MEDICARE

## 2025-08-15 DIAGNOSIS — I50.32 CHRONIC HEART FAILURE WITH PRESERVED EJECTION FRACTION: ICD-10-CM

## 2025-08-15 LAB — POTASSIUM SERPL-SCNC: 3.9 MMOL/L (ref 3.5–5.1)

## 2025-08-15 PROCEDURE — 84132 ASSAY OF SERUM POTASSIUM: CPT

## 2025-08-15 PROCEDURE — 36415 COLL VENOUS BLD VENIPUNCTURE: CPT

## 2025-08-19 DIAGNOSIS — K92.2 GASTROINTESTINAL HEMORRHAGE, UNSPECIFIED GASTROINTESTINAL HEMORRHAGE TYPE: ICD-10-CM

## 2025-08-19 DIAGNOSIS — R41.840 LACK OF CONCENTRATION: ICD-10-CM

## 2025-08-19 RX ORDER — PANTOPRAZOLE SODIUM 40 MG/1
40 TABLET, DELAYED RELEASE ORAL 2 TIMES DAILY
Qty: 180 TABLET | Refills: 0 | Status: SHIPPED | OUTPATIENT
Start: 2025-08-19 | End: 2025-11-17

## 2025-08-19 RX ORDER — METHYLPHENIDATE HYDROCHLORIDE 5 MG/1
5 TABLET ORAL 2 TIMES DAILY
Qty: 60 TABLET | Refills: 0 | Status: SHIPPED | OUTPATIENT
Start: 2025-08-19

## 2025-08-20 ENCOUNTER — DOCUMENTATION ONLY (OUTPATIENT)
Dept: HEMATOLOGY/ONCOLOGY | Facility: CLINIC | Age: 55
End: 2025-08-20
Payer: MEDICARE

## 2025-08-20 ENCOUNTER — OFFICE VISIT (OUTPATIENT)
Dept: INTERNAL MEDICINE | Facility: CLINIC | Age: 55
End: 2025-08-20
Payer: MEDICARE

## 2025-08-20 VITALS
HEART RATE: 88 BPM | HEIGHT: 60 IN | BODY MASS INDEX: 36.36 KG/M2 | WEIGHT: 185.19 LBS | DIASTOLIC BLOOD PRESSURE: 66 MMHG | OXYGEN SATURATION: 98 % | SYSTOLIC BLOOD PRESSURE: 136 MMHG

## 2025-08-20 DIAGNOSIS — C78.02 MALIGNANT NEOPLASM METASTATIC TO LEFT LUNG: Chronic | ICD-10-CM

## 2025-08-20 DIAGNOSIS — Z00.00 ENCOUNTER FOR MEDICARE ANNUAL WELLNESS EXAM: Primary | ICD-10-CM

## 2025-08-20 DIAGNOSIS — K74.5 BILIARY CIRRHOSIS: Chronic | ICD-10-CM

## 2025-08-20 DIAGNOSIS — Z99.89 DEPENDENCE ON OTHER ENABLING MACHINES AND DEVICES: ICD-10-CM

## 2025-08-20 DIAGNOSIS — M48.56XS COMPRESSION FRACTURE OF LUMBAR SPINE, NON-TRAUMATIC, SEQUELA: ICD-10-CM

## 2025-08-20 DIAGNOSIS — I86.4 ESOPHAGEAL AND GASTRIC VARICES: Chronic | ICD-10-CM

## 2025-08-20 DIAGNOSIS — I35.0 NONRHEUMATIC AORTIC VALVE STENOSIS: ICD-10-CM

## 2025-08-20 DIAGNOSIS — E03.8 SUBCLINICAL HYPOTHYROIDISM: ICD-10-CM

## 2025-08-20 DIAGNOSIS — I25.10 CORONARY ARTERY CALCIFICATION SEEN ON CT SCAN: ICD-10-CM

## 2025-08-20 DIAGNOSIS — E43 SEVERE PROTEIN-CALORIE MALNUTRITION: ICD-10-CM

## 2025-08-20 DIAGNOSIS — E66.01 SEVERE OBESITY (BMI 35.0-35.9 WITH COMORBIDITY): ICD-10-CM

## 2025-08-20 DIAGNOSIS — C50.912 BREAST CANCER, STAGE 4, LEFT: Chronic | ICD-10-CM

## 2025-08-20 DIAGNOSIS — I70.0 AORTIC ATHEROSCLEROSIS: ICD-10-CM

## 2025-08-20 DIAGNOSIS — I85.00 ESOPHAGEAL AND GASTRIC VARICES: Chronic | ICD-10-CM

## 2025-08-20 DIAGNOSIS — D69.6 THROMBOCYTOPENIA, UNSPECIFIED: ICD-10-CM

## 2025-08-20 DIAGNOSIS — M80.00XD OSTEOPOROSIS WITH PATHOLOGICAL FRACTURE WITH ROUTINE HEALING: ICD-10-CM

## 2025-08-20 DIAGNOSIS — R26.9 ABNORMALITY OF GAIT AND MOBILITY: ICD-10-CM

## 2025-08-20 DIAGNOSIS — G62.0 CHEMOTHERAPY-INDUCED PERIPHERAL NEUROPATHY: ICD-10-CM

## 2025-08-20 DIAGNOSIS — T45.1X5A CHEMOTHERAPY-INDUCED PERIPHERAL NEUROPATHY: ICD-10-CM

## 2025-08-20 DIAGNOSIS — I50.32 CHRONIC HEART FAILURE WITH PRESERVED EJECTION FRACTION: ICD-10-CM

## 2025-08-20 DIAGNOSIS — I27.9 CHRONIC PULMONARY HEART DISEASE: ICD-10-CM

## 2025-08-20 PROCEDURE — 99999 PR PBB SHADOW E&M-EST. PATIENT-LVL IV: CPT | Mod: PBBFAC,,, | Performed by: NURSE PRACTITIONER

## 2025-08-21 PROBLEM — R29.898 WEAKNESS OF BOTH HIPS: Status: RESOLVED | Noted: 2024-12-10 | Resolved: 2025-08-21

## 2025-08-21 PROBLEM — M79.89 ARM SWELLING: Status: RESOLVED | Noted: 2024-05-14 | Resolved: 2025-08-21

## 2025-08-21 PROBLEM — M80.00XD OSTEOPOROSIS WITH PATHOLOGICAL FRACTURE WITH ROUTINE HEALING: Status: ACTIVE | Noted: 2025-08-21

## 2025-08-21 PROBLEM — E43 SEVERE PROTEIN-CALORIE MALNUTRITION: Status: ACTIVE | Noted: 2025-08-21

## 2025-08-21 PROBLEM — E87.70 VOLUME OVERLOAD: Status: RESOLVED | Noted: 2024-04-21 | Resolved: 2025-08-21

## 2025-08-21 PROBLEM — R78.81 BACTEREMIA: Status: RESOLVED | Noted: 2024-04-24 | Resolved: 2025-08-21

## 2025-08-21 PROBLEM — I27.9 CHRONIC PULMONARY HEART DISEASE: Status: ACTIVE | Noted: 2025-08-21

## 2025-08-21 PROBLEM — M79.89 LEG SWELLING: Status: RESOLVED | Noted: 2024-05-14 | Resolved: 2025-08-21

## 2025-08-25 ENCOUNTER — PATIENT MESSAGE (OUTPATIENT)
Dept: INTERNAL MEDICINE | Facility: CLINIC | Age: 55
End: 2025-08-25
Payer: MEDICARE

## 2025-08-25 ENCOUNTER — TELEPHONE (OUTPATIENT)
Dept: INTERNAL MEDICINE | Facility: CLINIC | Age: 55
End: 2025-08-25
Payer: MEDICARE

## 2025-08-26 ENCOUNTER — TELEPHONE (OUTPATIENT)
Dept: CARDIOLOGY | Facility: CLINIC | Age: 55
End: 2025-08-26
Payer: MEDICARE

## 2025-08-26 DIAGNOSIS — E87.6 HYPOKALEMIA: ICD-10-CM

## 2025-08-26 RX ORDER — POTASSIUM CHLORIDE 20 MEQ/1
20 TABLET, EXTENDED RELEASE ORAL 2 TIMES DAILY
Qty: 60 TABLET | Refills: 11 | Status: SHIPPED | OUTPATIENT
Start: 2025-08-26

## 2025-09-04 ENCOUNTER — TELEPHONE (OUTPATIENT)
Dept: HEMATOLOGY/ONCOLOGY | Facility: CLINIC | Age: 55
End: 2025-09-04
Payer: MEDICARE